# Patient Record
Sex: FEMALE | Race: WHITE | NOT HISPANIC OR LATINO | Employment: OTHER | URBAN - METROPOLITAN AREA
[De-identification: names, ages, dates, MRNs, and addresses within clinical notes are randomized per-mention and may not be internally consistent; named-entity substitution may affect disease eponyms.]

---

## 2017-12-04 ENCOUNTER — CONVERSION ENCOUNTER (OUTPATIENT)
Dept: MAMMOGRAPHY | Facility: HOSPITAL | Age: 62
End: 2017-12-04

## 2018-05-08 ENCOUNTER — CONVERSION ENCOUNTER (OUTPATIENT)
Dept: ORTHOPEDIC SURGERY | Facility: CLINIC | Age: 63
End: 2018-05-08

## 2018-05-08 ENCOUNTER — OFFICE VISIT CONVERTED (OUTPATIENT)
Dept: ORTHOPEDIC SURGERY | Facility: CLINIC | Age: 63
End: 2018-05-08
Attending: ORTHOPAEDIC SURGERY

## 2018-06-14 ENCOUNTER — OFFICE VISIT CONVERTED (OUTPATIENT)
Dept: ORTHOPEDIC SURGERY | Facility: CLINIC | Age: 63
End: 2018-06-14
Attending: ORTHOPAEDIC SURGERY

## 2018-07-13 ENCOUNTER — OFFICE VISIT CONVERTED (OUTPATIENT)
Dept: ORTHOPEDIC SURGERY | Facility: CLINIC | Age: 63
End: 2018-07-13
Attending: ORTHOPAEDIC SURGERY

## 2018-11-13 ENCOUNTER — OFFICE VISIT CONVERTED (OUTPATIENT)
Dept: ORTHOPEDIC SURGERY | Facility: CLINIC | Age: 63
End: 2018-11-13
Attending: ORTHOPAEDIC SURGERY

## 2018-11-29 ENCOUNTER — OFFICE VISIT CONVERTED (OUTPATIENT)
Dept: ORTHOPEDIC SURGERY | Facility: CLINIC | Age: 63
End: 2018-11-29
Attending: ORTHOPAEDIC SURGERY

## 2018-11-29 ENCOUNTER — CONVERSION ENCOUNTER (OUTPATIENT)
Dept: ORTHOPEDIC SURGERY | Facility: CLINIC | Age: 63
End: 2018-11-29

## 2019-01-02 ENCOUNTER — HOSPITAL ENCOUNTER (OUTPATIENT)
Dept: NUCLEAR MEDICINE | Facility: HOSPITAL | Age: 64
Discharge: HOME | End: 2019-01-02
Attending: SPECIALIST

## 2019-01-03 ENCOUNTER — OFFICE VISIT CONVERTED (OUTPATIENT)
Dept: ORTHOPEDIC SURGERY | Facility: CLINIC | Age: 64
End: 2019-01-03
Attending: ORTHOPAEDIC SURGERY

## 2019-01-29 ENCOUNTER — HOSPITAL ENCOUNTER (OUTPATIENT)
Dept: PREADMISSION TESTING | Facility: HOSPITAL | Age: 64
Discharge: HOME | End: 2019-01-29
Attending: ORTHOPAEDIC SURGERY

## 2019-01-29 LAB
ANION GAP SERPL CALC-SCNC: 20 MMOL/L (ref 8–19)
APTT BLD: 22.4 S (ref 22.2–34.2)
BASOPHILS # BLD AUTO: 0.03 10*3/UL (ref 0–0.2)
BASOPHILS NFR BLD AUTO: 0.34 % (ref 0–3)
BUN SERPL-MCNC: 18 MG/DL (ref 5–25)
BUN/CREAT SERPL: 18 {RATIO} (ref 6–20)
CALCIUM SERPL-MCNC: 10.2 MG/DL (ref 8.7–10.4)
CHLORIDE SERPL-SCNC: 98 MMOL/L (ref 99–111)
CONV CO2: 24 MMOL/L (ref 22–32)
CREAT UR-MCNC: 1.02 MG/DL (ref 0.5–0.9)
EOSINOPHIL # BLD AUTO: 0.37 10*3/UL (ref 0–0.7)
EOSINOPHIL # BLD AUTO: 4.82 % (ref 0–7)
ERYTHROCYTE [DISTWIDTH] IN BLOOD BY AUTOMATED COUNT: 13.6 % (ref 11.5–14.5)
GFR SERPLBLD BASED ON 1.73 SQ M-ARVRAT: 57 ML/MIN/{1.73_M2}
GLUCOSE SERPL-MCNC: 120 MG/DL (ref 65–99)
HBA1C MFR BLD: 13.7 G/DL (ref 12–16)
HCT VFR BLD AUTO: 40.2 % (ref 37–47)
INR PPP: 1 (ref 2–3)
LYMPHOCYTES # BLD AUTO: 2.38 10*3/UL (ref 1–5)
MCH RBC QN AUTO: 28.7 PG (ref 27–31)
MCHC RBC AUTO-ENTMCNC: 34.1 G/DL (ref 33–37)
MCV RBC AUTO: 84.1 FL (ref 81–99)
MONOCYTES # BLD AUTO: 0.84 10*3/UL (ref 0.2–1.2)
MONOCYTES NFR BLD AUTO: 11 % (ref 3–10)
NEUTROPHILS # BLD AUTO: 3.97 10*3/UL (ref 2–8)
NEUTROPHILS NFR BLD AUTO: 52.4 % (ref 30–85)
NRBC BLD AUTO-RTO: 0 % (ref 0–0.01)
OSMOLALITY SERPL CALC.SUM OF ELEC: 287 MOSM/KG (ref 273–304)
PLATELET # BLD AUTO: 203 10*3/UL (ref 130–400)
PMV BLD AUTO: 7.4 FL (ref 7.4–10.4)
POTASSIUM SERPL-SCNC: 5 MMOL/L (ref 3.5–5.3)
PROTHROMBIN TIME: 10.4 S (ref 9.4–12)
RBC # BLD AUTO: 4.78 10*6/UL (ref 4.2–5.4)
SODIUM SERPL-SCNC: 137 MMOL/L (ref 135–147)
VARIANT LYMPHS NFR BLD MANUAL: 31.4 % (ref 20–45)
WBC # BLD AUTO: 7.58 10*3/UL (ref 4.8–10.8)

## 2019-02-01 ENCOUNTER — HOSPITAL ENCOUNTER (OUTPATIENT)
Dept: OTHER | Facility: HOSPITAL | Age: 64
Discharge: HOME | End: 2019-02-01

## 2019-02-01 LAB
ALBUMIN SERPL-MCNC: 4 G/DL (ref 3.5–5)
ALBUMIN/GLOB SERPL: 1.4 {RATIO} (ref 1.4–2.6)
ALP SERPL-CCNC: 100 U/L (ref 43–160)
ALT SERPL-CCNC: 29 U/L (ref 10–40)
ANION GAP SERPL CALC-SCNC: 18 MMOL/L (ref 8–19)
AST SERPL-CCNC: 25 U/L (ref 15–50)
BILIRUB SERPL-MCNC: 0.95 MG/DL (ref 0.2–1.3)
BUN SERPL-MCNC: 22 MG/DL (ref 5–25)
BUN/CREAT SERPL: 17 {RATIO} (ref 6–20)
CALCIUM SERPL-MCNC: 9.8 MG/DL (ref 8.7–10.4)
CHLORIDE SERPL-SCNC: 102 MMOL/L (ref 99–111)
CHOLEST SERPL-MCNC: 133 MG/DL (ref 107–200)
CHOLEST/HDLC SERPL: 3.3 {RATIO} (ref 3–6)
CONV CO2: 23 MMOL/L (ref 22–32)
CONV CREATININE URINE, RANDOM: 82.5 MG/DL (ref 10–300)
CONV MICROALBUM.,U,RANDOM: <12 MG/L (ref 0–20)
CONV TOTAL PROTEIN: 6.9 G/DL (ref 6.3–8.2)
CREAT UR-MCNC: 1.32 MG/DL (ref 0.5–0.9)
EST. AVERAGE GLUCOSE BLD GHB EST-MCNC: 151 MG/DL
GFR SERPLBLD BASED ON 1.73 SQ M-ARVRAT: 42 ML/MIN/{1.73_M2}
GLOBULIN UR ELPH-MCNC: 2.9 G/DL (ref 2–3.5)
GLUCOSE SERPL-MCNC: 151 MG/DL (ref 65–99)
HBA1C MFR BLD: 6.9 % (ref 3.5–5.7)
HDLC SERPL-MCNC: 40 MG/DL (ref 40–60)
LDLC SERPL CALC-MCNC: 65 MG/DL (ref 70–100)
MICROALBUMIN/CREAT UR: 14.5 MG/G{CRE} (ref 0–35)
OSMOLALITY SERPL CALC.SUM OF ELEC: 292 MOSM/KG (ref 273–304)
POTASSIUM SERPL-SCNC: 4.9 MMOL/L (ref 3.5–5.3)
SODIUM SERPL-SCNC: 138 MMOL/L (ref 135–147)
TRIGL SERPL-MCNC: 140 MG/DL (ref 40–150)
TSH SERPL-ACNC: 3.89 M[IU]/L (ref 0.27–4.2)
VLDLC SERPL-MCNC: 28 MG/DL (ref 5–37)

## 2019-02-20 ENCOUNTER — HOSPITAL ENCOUNTER (OUTPATIENT)
Dept: PHYSICAL THERAPY | Facility: CLINIC | Age: 64
Setting detail: RECURRING SERIES
Discharge: STILL ADMITTED OR ADMINISTRATIVE | End: 2019-06-12
Attending: ORTHOPAEDIC SURGERY

## 2019-03-05 ENCOUNTER — OFFICE VISIT CONVERTED (OUTPATIENT)
Dept: ORTHOPEDIC SURGERY | Facility: CLINIC | Age: 64
End: 2019-03-05
Attending: PHYSICIAN ASSISTANT

## 2019-03-08 ENCOUNTER — OFFICE VISIT CONVERTED (OUTPATIENT)
Dept: ORTHOPEDIC SURGERY | Facility: CLINIC | Age: 64
End: 2019-03-08
Attending: PHYSICIAN ASSISTANT

## 2019-03-12 ENCOUNTER — OFFICE VISIT CONVERTED (OUTPATIENT)
Dept: ORTHOPEDIC SURGERY | Facility: CLINIC | Age: 64
End: 2019-03-12
Attending: PHYSICIAN ASSISTANT

## 2019-03-15 ENCOUNTER — CONVERSION ENCOUNTER (OUTPATIENT)
Dept: ORTHOPEDIC SURGERY | Facility: CLINIC | Age: 64
End: 2019-03-15

## 2019-03-15 ENCOUNTER — OFFICE VISIT CONVERTED (OUTPATIENT)
Dept: ORTHOPEDIC SURGERY | Facility: CLINIC | Age: 64
End: 2019-03-15
Attending: PHYSICIAN ASSISTANT

## 2019-03-19 ENCOUNTER — OFFICE VISIT CONVERTED (OUTPATIENT)
Dept: ORTHOPEDIC SURGERY | Facility: CLINIC | Age: 64
End: 2019-03-19
Attending: PHYSICIAN ASSISTANT

## 2019-03-20 ENCOUNTER — HOSPITAL ENCOUNTER (OUTPATIENT)
Dept: INFUSION THERAPY | Facility: HOSPITAL | Age: 64
Setting detail: RECURRING SERIES
Discharge: HOME | End: 2019-03-31
Attending: ORTHOPAEDIC SURGERY

## 2019-03-26 ENCOUNTER — HOSPITAL ENCOUNTER (OUTPATIENT)
Dept: URGENT CARE | Facility: CLINIC | Age: 64
Discharge: HOME | End: 2019-03-26
Attending: NURSE PRACTITIONER

## 2019-04-02 ENCOUNTER — HOSPITAL ENCOUNTER (OUTPATIENT)
Dept: INFUSION THERAPY | Facility: HOSPITAL | Age: 64
Setting detail: RECURRING SERIES
Discharge: HOME | End: 2019-04-30
Attending: ORTHOPAEDIC SURGERY

## 2019-04-09 ENCOUNTER — OFFICE VISIT CONVERTED (OUTPATIENT)
Dept: ORTHOPEDIC SURGERY | Facility: CLINIC | Age: 64
End: 2019-04-09
Attending: PHYSICIAN ASSISTANT

## 2019-04-09 ENCOUNTER — CONVERSION ENCOUNTER (OUTPATIENT)
Dept: ORTHOPEDIC SURGERY | Facility: CLINIC | Age: 64
End: 2019-04-09

## 2019-04-18 ENCOUNTER — OFFICE VISIT CONVERTED (OUTPATIENT)
Dept: ORTHOPEDIC SURGERY | Facility: CLINIC | Age: 64
End: 2019-04-18
Attending: ORTHOPAEDIC SURGERY

## 2019-05-03 ENCOUNTER — HOSPITAL ENCOUNTER (OUTPATIENT)
Dept: INFUSION THERAPY | Facility: HOSPITAL | Age: 64
Setting detail: RECURRING SERIES
Discharge: HOME | End: 2019-05-31
Attending: ORTHOPAEDIC SURGERY

## 2019-05-07 ENCOUNTER — OFFICE VISIT CONVERTED (OUTPATIENT)
Dept: ORTHOPEDIC SURGERY | Facility: CLINIC | Age: 64
End: 2019-05-07
Attending: ORTHOPAEDIC SURGERY

## 2019-05-14 ENCOUNTER — OFFICE VISIT CONVERTED (OUTPATIENT)
Dept: ORTHOPEDIC SURGERY | Facility: CLINIC | Age: 64
End: 2019-05-14
Attending: PHYSICIAN ASSISTANT

## 2019-05-14 ENCOUNTER — HOSPITAL ENCOUNTER (OUTPATIENT)
Dept: URGENT CARE | Facility: CLINIC | Age: 64
Discharge: HOME | End: 2019-05-14
Attending: NURSE PRACTITIONER

## 2019-05-14 LAB — GLUCOSE BLD-MCNC: 262 MG/DL (ref 65–99)

## 2019-05-21 ENCOUNTER — CONVERSION ENCOUNTER (OUTPATIENT)
Dept: ORTHOPEDIC SURGERY | Facility: CLINIC | Age: 64
End: 2019-05-21

## 2019-05-21 ENCOUNTER — OFFICE VISIT CONVERTED (OUTPATIENT)
Dept: ORTHOPEDIC SURGERY | Facility: CLINIC | Age: 64
End: 2019-05-21
Attending: ORTHOPAEDIC SURGERY

## 2019-05-21 ENCOUNTER — HOSPITAL ENCOUNTER (OUTPATIENT)
Dept: LAB | Facility: HOSPITAL | Age: 64
Discharge: HOME | End: 2019-05-21
Attending: ORTHOPAEDIC SURGERY

## 2019-05-21 LAB
BASOPHILS # BLD AUTO: 0.05 10*3/UL (ref 0–0.2)
BASOPHILS NFR BLD AUTO: 0.4 % (ref 0–3)
CONV ABS IMM GRAN: 0.14 10*3/UL (ref 0–0.2)
CONV IMMATURE GRAN: 1.2 % (ref 0–1.8)
CRP SERPL-MCNC: 38.7 MG/L (ref 0–5)
DEPRECATED RDW RBC AUTO: 49.8 FL (ref 36.4–46.3)
EOSINOPHIL # BLD AUTO: 0.2 10*3/UL (ref 0–0.7)
EOSINOPHIL # BLD AUTO: 1.7 % (ref 0–7)
ERYTHROCYTE [DISTWIDTH] IN BLOOD BY AUTOMATED COUNT: 17.2 % (ref 11.7–14.4)
ERYTHROCYTE [SEDIMENTATION RATE] IN BLOOD: 101 MM/H (ref 0–30)
HBA1C MFR BLD: 10.3 G/DL (ref 12–16)
HCT VFR BLD AUTO: 33.9 % (ref 37–47)
LYMPHOCYTES # BLD AUTO: 1.94 10*3/UL (ref 1–5)
MCH RBC QN AUTO: 24.1 PG (ref 27–31)
MCHC RBC AUTO-ENTMCNC: 30.4 G/DL (ref 33–37)
MCV RBC AUTO: 79.4 FL (ref 81–99)
MONOCYTES # BLD AUTO: 1.03 10*3/UL (ref 0.2–1.2)
MONOCYTES NFR BLD AUTO: 8.6 % (ref 3–10)
NEUTROPHILS # BLD AUTO: 8.59 10*3/UL (ref 2–8)
NEUTROPHILS NFR BLD AUTO: 71.9 % (ref 30–85)
NRBC CBCN: 0 % (ref 0–0.7)
PLATELET # BLD AUTO: 413 10*3/UL (ref 130–400)
PMV BLD AUTO: 9.8 FL (ref 9.4–12.3)
RBC # BLD AUTO: 4.27 10*6/UL (ref 4.2–5.4)
VARIANT LYMPHS NFR BLD MANUAL: 16.2 % (ref 20–45)
WBC # BLD AUTO: 11.95 10*3/UL (ref 4.8–10.8)

## 2019-05-31 ENCOUNTER — HOSPITAL ENCOUNTER (OUTPATIENT)
Dept: GENERAL RADIOLOGY | Facility: HOSPITAL | Age: 64
Discharge: HOME | End: 2019-05-31
Attending: ORTHOPAEDIC SURGERY

## 2019-06-06 ENCOUNTER — OFFICE VISIT CONVERTED (OUTPATIENT)
Dept: ORTHOPEDIC SURGERY | Facility: CLINIC | Age: 64
End: 2019-06-06
Attending: ORTHOPAEDIC SURGERY

## 2019-06-06 ENCOUNTER — CONVERSION ENCOUNTER (OUTPATIENT)
Dept: ORTHOPEDIC SURGERY | Facility: CLINIC | Age: 64
End: 2019-06-06

## 2019-06-11 ENCOUNTER — HOSPITAL ENCOUNTER (OUTPATIENT)
Dept: INFUSION THERAPY | Facility: HOSPITAL | Age: 64
Setting detail: RECURRING SERIES
Discharge: HOME | End: 2019-06-30
Attending: ORTHOPAEDIC SURGERY

## 2019-06-20 ENCOUNTER — HOSPITAL ENCOUNTER (OUTPATIENT)
Dept: LAB | Facility: HOSPITAL | Age: 64
Discharge: HOME | End: 2019-06-20
Attending: ORTHOPAEDIC SURGERY

## 2019-06-20 LAB
BASOPHILS # BLD AUTO: 0.04 10*3/UL (ref 0–0.2)
BASOPHILS NFR BLD AUTO: 0.4 % (ref 0–3)
CONV ABS IMM GRAN: 0.07 10*3/UL (ref 0–0.2)
CONV IMMATURE GRAN: 0.7 % (ref 0–1.8)
CRP SERPL-MCNC: 11.9 MG/L (ref 0–5)
DEPRECATED RDW RBC AUTO: 55.1 FL (ref 36.4–46.3)
EOSINOPHIL # BLD AUTO: 0.49 10*3/UL (ref 0–0.7)
EOSINOPHIL # BLD AUTO: 4.8 % (ref 0–7)
ERYTHROCYTE [DISTWIDTH] IN BLOOD BY AUTOMATED COUNT: 18.8 % (ref 11.7–14.4)
ERYTHROCYTE [SEDIMENTATION RATE] IN BLOOD: 44 MM/H (ref 0–30)
HBA1C MFR BLD: 11.5 G/DL (ref 12–16)
HCT VFR BLD AUTO: 38 % (ref 37–47)
LYMPHOCYTES # BLD AUTO: 2.29 10*3/UL (ref 1–5)
MCH RBC QN AUTO: 24.2 PG (ref 27–31)
MCHC RBC AUTO-ENTMCNC: 30.3 G/DL (ref 33–37)
MCV RBC AUTO: 79.8 FL (ref 81–99)
MONOCYTES # BLD AUTO: 1.08 10*3/UL (ref 0.2–1.2)
MONOCYTES NFR BLD AUTO: 10.7 % (ref 3–10)
NEUTROPHILS # BLD AUTO: 6.14 10*3/UL (ref 2–8)
NEUTROPHILS NFR BLD AUTO: 60.7 % (ref 30–85)
NRBC CBCN: 0 % (ref 0–0.7)
PLATELET # BLD AUTO: 291 10*3/UL (ref 130–400)
PMV BLD AUTO: 9.7 FL (ref 9.4–12.3)
RBC # BLD AUTO: 4.76 10*6/UL (ref 4.2–5.4)
VARIANT LYMPHS NFR BLD MANUAL: 22.7 % (ref 20–45)
WBC # BLD AUTO: 10.11 10*3/UL (ref 4.8–10.8)

## 2019-07-09 ENCOUNTER — HOSPITAL ENCOUNTER (OUTPATIENT)
Dept: OTHER | Facility: HOSPITAL | Age: 64
Discharge: HOME | End: 2019-07-09

## 2019-07-09 LAB
ALBUMIN SERPL-MCNC: 2.9 G/DL (ref 3.5–5)
ALBUMIN/GLOB SERPL: 1 {RATIO} (ref 1.4–2.6)
ALP SERPL-CCNC: 64 U/L (ref 43–160)
ALT SERPL-CCNC: 9 U/L (ref 10–40)
ANION GAP SERPL CALC-SCNC: 17 MMOL/L (ref 8–19)
AST SERPL-CCNC: 13 U/L (ref 15–50)
BASOPHILS # BLD AUTO: 0.03 10*3/UL (ref 0–0.2)
BASOPHILS NFR BLD AUTO: 0.4 % (ref 0–3)
BILIRUB SERPL-MCNC: 0.36 MG/DL (ref 0.2–1.3)
BUN SERPL-MCNC: 15 MG/DL (ref 5–25)
BUN/CREAT SERPL: 8 {RATIO} (ref 6–20)
CALCIUM SERPL-MCNC: 9.2 MG/DL (ref 8.7–10.4)
CHLORIDE SERPL-SCNC: 100 MMOL/L (ref 99–111)
CONV ABS IMM GRAN: 0.1 10*3/UL (ref 0–0.2)
CONV CO2: 24 MMOL/L (ref 22–32)
CONV IMMATURE GRAN: 1.4 % (ref 0–1.8)
CONV TOTAL PROTEIN: 5.7 G/DL (ref 6.3–8.2)
CREAT UR-MCNC: 1.78 MG/DL (ref 0.5–0.9)
CRP SERPL-MCNC: 27.7 MG/L (ref 0–5)
DEPRECATED RDW RBC AUTO: 58.3 FL (ref 36.4–46.3)
EOSINOPHIL # BLD AUTO: 0.47 10*3/UL (ref 0–0.7)
EOSINOPHIL # BLD AUTO: 6.7 % (ref 0–7)
ERYTHROCYTE [DISTWIDTH] IN BLOOD BY AUTOMATED COUNT: 18.3 % (ref 11.7–14.4)
ERYTHROCYTE [SEDIMENTATION RATE] IN BLOOD: 71 MM/H (ref 0–30)
GFR SERPLBLD BASED ON 1.73 SQ M-ARVRAT: 29 ML/MIN/{1.73_M2}
GLOBULIN UR ELPH-MCNC: 2.8 G/DL (ref 2–3.5)
GLUCOSE SERPL-MCNC: 149 MG/DL (ref 65–99)
HBA1C MFR BLD: 8 G/DL (ref 12–16)
HCT VFR BLD AUTO: 26.2 % (ref 37–47)
LYMPHOCYTES # BLD AUTO: 1.24 10*3/UL (ref 1–5)
MCH RBC QN AUTO: 26.3 PG (ref 27–31)
MCHC RBC AUTO-ENTMCNC: 30.5 G/DL (ref 33–37)
MCV RBC AUTO: 86.2 FL (ref 81–99)
MONOCYTES # BLD AUTO: 0.97 10*3/UL (ref 0.2–1.2)
MONOCYTES NFR BLD AUTO: 13.9 % (ref 3–10)
NEUTROPHILS # BLD AUTO: 4.19 10*3/UL (ref 2–8)
NEUTROPHILS NFR BLD AUTO: 59.9 % (ref 30–85)
NRBC CBCN: 0 % (ref 0–0.7)
OSMOLALITY SERPL CALC.SUM OF ELEC: 286 MOSM/KG (ref 273–304)
PLATELET # BLD AUTO: 200 10*3/UL (ref 130–400)
PMV BLD AUTO: 9.6 FL (ref 9.4–12.3)
POTASSIUM SERPL-SCNC: 4.5 MMOL/L (ref 3.5–5.3)
RBC # BLD AUTO: 3.04 10*6/UL (ref 4.2–5.4)
SODIUM SERPL-SCNC: 136 MMOL/L (ref 135–147)
VANCOMYCIN TROUGH SERPL-MCNC: 34 UG/ML (ref 10–20)
VARIANT LYMPHS NFR BLD MANUAL: 17.7 % (ref 20–45)
WBC # BLD AUTO: 7 10*3/UL (ref 4.8–10.8)

## 2019-07-11 ENCOUNTER — HOSPITAL ENCOUNTER (OUTPATIENT)
Dept: OTHER | Facility: HOSPITAL | Age: 64
Discharge: HOME | End: 2019-07-11

## 2019-07-11 LAB
ANION GAP SERPL CALC-SCNC: 16 MMOL/L (ref 8–19)
BUN SERPL-MCNC: 17 MG/DL (ref 5–25)
BUN/CREAT SERPL: 9 {RATIO} (ref 6–20)
CALCIUM SERPL-MCNC: 8.4 MG/DL (ref 8.7–10.4)
CHLORIDE SERPL-SCNC: 101 MMOL/L (ref 99–111)
CONV CO2: 24 MMOL/L (ref 22–32)
CREAT UR-MCNC: 1.9 MG/DL (ref 0.5–0.9)
GFR SERPLBLD BASED ON 1.73 SQ M-ARVRAT: 27 ML/MIN/{1.73_M2}
GLUCOSE SERPL-MCNC: 156 MG/DL (ref 65–99)
OSMOLALITY SERPL CALC.SUM OF ELEC: 287 MOSM/KG (ref 273–304)
POTASSIUM SERPL-SCNC: 4.6 MMOL/L (ref 3.5–5.3)
SODIUM SERPL-SCNC: 136 MMOL/L (ref 135–147)
VANCOMYCIN SERPL-MCNC: 24 UG/ML

## 2019-07-16 ENCOUNTER — HOSPITAL ENCOUNTER (OUTPATIENT)
Dept: OTHER | Facility: HOSPITAL | Age: 64
Discharge: HOME | End: 2019-07-16

## 2019-07-16 LAB
ALBUMIN SERPL-MCNC: 3.2 G/DL (ref 3.5–5)
ALBUMIN/GLOB SERPL: 1.2 {RATIO} (ref 1.4–2.6)
ALP SERPL-CCNC: 65 U/L (ref 43–160)
ALT SERPL-CCNC: 7 U/L (ref 10–40)
ANION GAP SERPL CALC-SCNC: 21 MMOL/L (ref 8–19)
AST SERPL-CCNC: 13 U/L (ref 15–50)
BASOPHILS # BLD AUTO: 0.03 10*3/UL (ref 0–0.2)
BASOPHILS NFR BLD AUTO: 0.5 % (ref 0–3)
BILIRUB SERPL-MCNC: 0.33 MG/DL (ref 0.2–1.3)
BUN SERPL-MCNC: 26 MG/DL (ref 5–25)
BUN/CREAT SERPL: 11 {RATIO} (ref 6–20)
CALCIUM SERPL-MCNC: 9.3 MG/DL (ref 8.7–10.4)
CHLORIDE SERPL-SCNC: 100 MMOL/L (ref 99–111)
CONV ABS IMM GRAN: 0.03 10*3/UL (ref 0–0.2)
CONV CO2: 21 MMOL/L (ref 22–32)
CONV IMMATURE GRAN: 0.5 % (ref 0–1.8)
CONV TOTAL PROTEIN: 5.9 G/DL (ref 6.3–8.2)
CREAT UR-MCNC: 2.27 MG/DL (ref 0.5–0.9)
CRP SERPL-MCNC: 5.9 MG/L (ref 0–5)
DEPRECATED RDW RBC AUTO: 55.9 FL (ref 36.4–46.3)
EOSINOPHIL # BLD AUTO: 0.36 10*3/UL (ref 0–0.7)
EOSINOPHIL # BLD AUTO: 5.4 % (ref 0–7)
ERYTHROCYTE [DISTWIDTH] IN BLOOD BY AUTOMATED COUNT: 18 % (ref 11.7–14.4)
ERYTHROCYTE [SEDIMENTATION RATE] IN BLOOD: 58 MM/H (ref 0–30)
GFR SERPLBLD BASED ON 1.73 SQ M-ARVRAT: 22 ML/MIN/{1.73_M2}
GLOBULIN UR ELPH-MCNC: 2.7 G/DL (ref 2–3.5)
GLUCOSE SERPL-MCNC: 130 MG/DL (ref 65–99)
HBA1C MFR BLD: 8 G/DL (ref 12–16)
HCT VFR BLD AUTO: 26.2 % (ref 37–47)
LYMPHOCYTES # BLD AUTO: 1.08 10*3/UL (ref 1–5)
MCH RBC QN AUTO: 25.7 PG (ref 27–31)
MCHC RBC AUTO-ENTMCNC: 30.5 G/DL (ref 33–37)
MCV RBC AUTO: 84.2 FL (ref 81–99)
MONOCYTES # BLD AUTO: 0.65 10*3/UL (ref 0.2–1.2)
MONOCYTES NFR BLD AUTO: 9.8 % (ref 3–10)
NEUTROPHILS # BLD AUTO: 4.5 10*3/UL (ref 2–8)
NEUTROPHILS NFR BLD AUTO: 67.6 % (ref 30–85)
NRBC CBCN: 0 % (ref 0–0.7)
OSMOLALITY SERPL CALC.SUM OF ELEC: 293 MOSM/KG (ref 273–304)
PLATELET # BLD AUTO: 184 10*3/UL (ref 130–400)
PMV BLD AUTO: 9.6 FL (ref 9.4–12.3)
POTASSIUM SERPL-SCNC: 4.4 MMOL/L (ref 3.5–5.3)
RBC # BLD AUTO: 3.11 10*6/UL (ref 4.2–5.4)
SODIUM SERPL-SCNC: 138 MMOL/L (ref 135–147)
VARIANT LYMPHS NFR BLD MANUAL: 16.2 % (ref 20–45)
WBC # BLD AUTO: 6.65 10*3/UL (ref 4.8–10.8)

## 2019-07-17 ENCOUNTER — HOSPITAL ENCOUNTER (OUTPATIENT)
Dept: LAB | Facility: HOSPITAL | Age: 64
Discharge: HOME | End: 2019-07-17

## 2019-07-17 LAB
ALBUMIN SERPL-MCNC: 3.6 G/DL (ref 3.5–5)
ALBUMIN/GLOB SERPL: 1.3 {RATIO} (ref 1.4–2.6)
ALP SERPL-CCNC: 71 U/L (ref 43–160)
ALT SERPL-CCNC: 8 U/L (ref 10–40)
ANION GAP SERPL CALC-SCNC: 19 MMOL/L (ref 8–19)
AST SERPL-CCNC: 13 U/L (ref 15–50)
BILIRUB SERPL-MCNC: 0.5 MG/DL (ref 0.2–1.3)
BUN SERPL-MCNC: 28 MG/DL (ref 5–25)
BUN/CREAT SERPL: 12 {RATIO} (ref 6–20)
CALCIUM SERPL-MCNC: 9.2 MG/DL (ref 8.7–10.4)
CHLORIDE SERPL-SCNC: 100 MMOL/L (ref 99–111)
CHOLEST SERPL-MCNC: 104 MG/DL (ref 107–200)
CHOLEST/HDLC SERPL: 3.1 {RATIO} (ref 3–6)
CONV CO2: 23 MMOL/L (ref 22–32)
CONV CREATININE URINE, RANDOM: 58.2 MG/DL (ref 10–300)
CONV MICROALBUM.,U,RANDOM: <12 MG/L (ref 0–20)
CONV TOTAL PROTEIN: 6.4 G/DL (ref 6.3–8.2)
CREAT UR-MCNC: 2.26 MG/DL (ref 0.5–0.9)
EST. AVERAGE GLUCOSE BLD GHB EST-MCNC: 134 MG/DL
GFR SERPLBLD BASED ON 1.73 SQ M-ARVRAT: 22 ML/MIN/{1.73_M2}
GLOBULIN UR ELPH-MCNC: 2.8 G/DL (ref 2–3.5)
GLUCOSE SERPL-MCNC: 159 MG/DL (ref 65–99)
HBA1C MFR BLD: 6.3 % (ref 3.5–5.7)
HDLC SERPL-MCNC: 34 MG/DL (ref 40–60)
LDLC SERPL CALC-MCNC: 46 MG/DL (ref 70–100)
MICROALBUMIN/CREAT UR: 20.6 MG/G{CRE} (ref 0–35)
OSMOLALITY SERPL CALC.SUM OF ELEC: 293 MOSM/KG (ref 273–304)
POTASSIUM SERPL-SCNC: 4.8 MMOL/L (ref 3.5–5.3)
SODIUM SERPL-SCNC: 137 MMOL/L (ref 135–147)
TRIGL SERPL-MCNC: 118 MG/DL (ref 40–150)
VLDLC SERPL-MCNC: 24 MG/DL (ref 5–37)

## 2019-07-18 ENCOUNTER — HOSPITAL ENCOUNTER (OUTPATIENT)
Dept: OTHER | Facility: HOSPITAL | Age: 64
Discharge: HOME | End: 2019-07-18

## 2019-07-18 LAB
ANION GAP SERPL CALC-SCNC: 18 MMOL/L (ref 8–19)
BUN SERPL-MCNC: 26 MG/DL (ref 5–25)
BUN/CREAT SERPL: 14 {RATIO} (ref 6–20)
CALCIUM SERPL-MCNC: 8.5 MG/DL (ref 8.7–10.4)
CHLORIDE SERPL-SCNC: 103 MMOL/L (ref 99–111)
CONV CO2: 23 MMOL/L (ref 22–32)
CREAT UR-MCNC: 1.91 MG/DL (ref 0.5–0.9)
GFR SERPLBLD BASED ON 1.73 SQ M-ARVRAT: 27 ML/MIN/{1.73_M2}
GLUCOSE SERPL-MCNC: 158 MG/DL (ref 65–99)
OSMOLALITY SERPL CALC.SUM OF ELEC: 296 MOSM/KG (ref 273–304)
POTASSIUM SERPL-SCNC: 4.5 MMOL/L (ref 3.5–5.3)
SODIUM SERPL-SCNC: 139 MMOL/L (ref 135–147)

## 2019-07-23 ENCOUNTER — HOSPITAL ENCOUNTER (OUTPATIENT)
Dept: OTHER | Facility: HOSPITAL | Age: 64
Discharge: HOME | End: 2019-07-23

## 2019-07-23 LAB
ALBUMIN SERPL-MCNC: 3.3 G/DL (ref 3.5–5)
ALBUMIN/GLOB SERPL: 1.2 {RATIO} (ref 1.4–2.6)
ALP SERPL-CCNC: 72 U/L (ref 43–160)
ALT SERPL-CCNC: 8 U/L (ref 10–40)
ANION GAP SERPL CALC-SCNC: 19 MMOL/L (ref 8–19)
AST SERPL-CCNC: 17 U/L (ref 15–50)
BASOPHILS # BLD AUTO: 0.04 10*3/UL (ref 0–0.2)
BASOPHILS NFR BLD AUTO: 0.5 % (ref 0–3)
BILIRUB SERPL-MCNC: 0.54 MG/DL (ref 0.2–1.3)
BUN SERPL-MCNC: 22 MG/DL (ref 5–25)
BUN/CREAT SERPL: 14 {RATIO} (ref 6–20)
CALCIUM SERPL-MCNC: 9.3 MG/DL (ref 8.7–10.4)
CHLORIDE SERPL-SCNC: 102 MMOL/L (ref 99–111)
CONV ABS IMM GRAN: 0.04 10*3/UL (ref 0–0.2)
CONV CO2: 21 MMOL/L (ref 22–32)
CONV IMMATURE GRAN: 0.5 % (ref 0–1.8)
CONV TOTAL PROTEIN: 6.1 G/DL (ref 6.3–8.2)
CREAT UR-MCNC: 1.54 MG/DL (ref 0.5–0.9)
CRP SERPL-MCNC: 6.9 MG/L (ref 0–5)
DEPRECATED RDW RBC AUTO: 54.3 FL (ref 36.4–46.3)
EOSINOPHIL # BLD AUTO: 0.58 10*3/UL (ref 0–0.7)
EOSINOPHIL # BLD AUTO: 7.8 % (ref 0–7)
ERYTHROCYTE [DISTWIDTH] IN BLOOD BY AUTOMATED COUNT: 17.6 % (ref 11.7–14.4)
ERYTHROCYTE [SEDIMENTATION RATE] IN BLOOD: 46 MM/H (ref 0–30)
GFR SERPLBLD BASED ON 1.73 SQ M-ARVRAT: 35 ML/MIN/{1.73_M2}
GLOBULIN UR ELPH-MCNC: 2.8 G/DL (ref 2–3.5)
GLUCOSE SERPL-MCNC: 125 MG/DL (ref 65–99)
HBA1C MFR BLD: 8.4 G/DL (ref 12–16)
HCT VFR BLD AUTO: 26.4 % (ref 37–47)
LYMPHOCYTES # BLD AUTO: 1.23 10*3/UL (ref 1–5)
MCH RBC QN AUTO: 26.8 PG (ref 27–31)
MCHC RBC AUTO-ENTMCNC: 31.8 G/DL (ref 33–37)
MCV RBC AUTO: 84.1 FL (ref 81–99)
MONOCYTES # BLD AUTO: 0.78 10*3/UL (ref 0.2–1.2)
MONOCYTES NFR BLD AUTO: 10.5 % (ref 3–10)
NEUTROPHILS # BLD AUTO: 4.76 10*3/UL (ref 2–8)
NEUTROPHILS NFR BLD AUTO: 64.1 % (ref 30–85)
NRBC CBCN: 0 % (ref 0–0.7)
OSMOLALITY SERPL CALC.SUM OF ELEC: 291 MOSM/KG (ref 273–304)
PLATELET # BLD AUTO: 168 10*3/UL (ref 130–400)
PMV BLD AUTO: 10.1 FL (ref 9.4–12.3)
POTASSIUM SERPL-SCNC: 4.1 MMOL/L (ref 3.5–5.3)
RBC # BLD AUTO: 3.14 10*6/UL (ref 4.2–5.4)
SODIUM SERPL-SCNC: 138 MMOL/L (ref 135–147)
VARIANT LYMPHS NFR BLD MANUAL: 16.6 % (ref 20–45)
WBC # BLD AUTO: 7.43 10*3/UL (ref 4.8–10.8)

## 2019-07-30 ENCOUNTER — HOSPITAL ENCOUNTER (OUTPATIENT)
Dept: OTHER | Facility: HOSPITAL | Age: 64
Discharge: HOME | End: 2019-07-30

## 2019-07-30 LAB
ALBUMIN SERPL-MCNC: 3.4 G/DL (ref 3.5–5)
ALBUMIN/GLOB SERPL: 1.3 {RATIO} (ref 1.4–2.6)
ALP SERPL-CCNC: 72 U/L (ref 43–160)
ALT SERPL-CCNC: 18 U/L (ref 10–40)
ANION GAP SERPL CALC-SCNC: 16 MMOL/L (ref 8–19)
AST SERPL-CCNC: 29 U/L (ref 15–50)
BASOPHILS # BLD AUTO: 0.02 10*3/UL (ref 0–0.2)
BASOPHILS NFR BLD AUTO: 0.3 % (ref 0–3)
BILIRUB SERPL-MCNC: 0.58 MG/DL (ref 0.2–1.3)
BUN SERPL-MCNC: 18 MG/DL (ref 5–25)
BUN/CREAT SERPL: 14 {RATIO} (ref 6–20)
CALCIUM SERPL-MCNC: 9.5 MG/DL (ref 8.7–10.4)
CHLORIDE SERPL-SCNC: 102 MMOL/L (ref 99–111)
CK SERPL-CCNC: 105 U/L (ref 35–230)
CONV ABS IMM GRAN: 0.03 10*3/UL (ref 0–0.2)
CONV CO2: 24 MMOL/L (ref 22–32)
CONV IMMATURE GRAN: 0.5 % (ref 0–1.8)
CONV TOTAL PROTEIN: 6 G/DL (ref 6.3–8.2)
CREAT UR-MCNC: 1.3 MG/DL (ref 0.5–0.9)
CRP SERPL-MCNC: 6.7 MG/L (ref 0–5)
DEPRECATED RDW RBC AUTO: 53.2 FL (ref 36.4–46.3)
EOSINOPHIL # BLD AUTO: 0.51 10*3/UL (ref 0–0.7)
EOSINOPHIL # BLD AUTO: 8 % (ref 0–7)
ERYTHROCYTE [DISTWIDTH] IN BLOOD BY AUTOMATED COUNT: 17.2 % (ref 11.7–14.4)
ERYTHROCYTE [SEDIMENTATION RATE] IN BLOOD: 36 MM/H (ref 0–30)
GFR SERPLBLD BASED ON 1.73 SQ M-ARVRAT: 43 ML/MIN/{1.73_M2}
GLOBULIN UR ELPH-MCNC: 2.6 G/DL (ref 2–3.5)
GLUCOSE SERPL-MCNC: 80 MG/DL (ref 65–99)
HBA1C MFR BLD: 8.4 G/DL (ref 12–16)
HCT VFR BLD AUTO: 27 % (ref 37–47)
LYMPHOCYTES # BLD AUTO: 1.17 10*3/UL (ref 1–5)
MCH RBC QN AUTO: 26.3 PG (ref 27–31)
MCHC RBC AUTO-ENTMCNC: 31.1 G/DL (ref 33–37)
MCV RBC AUTO: 84.4 FL (ref 81–99)
MONOCYTES # BLD AUTO: 0.83 10*3/UL (ref 0.2–1.2)
MONOCYTES NFR BLD AUTO: 13.1 % (ref 3–10)
NEUTROPHILS # BLD AUTO: 3.78 10*3/UL (ref 2–8)
NEUTROPHILS NFR BLD AUTO: 59.6 % (ref 30–85)
NRBC CBCN: 0 % (ref 0–0.7)
OSMOLALITY SERPL CALC.SUM OF ELEC: 287 MOSM/KG (ref 273–304)
PLATELET # BLD AUTO: 142 10*3/UL (ref 130–400)
PMV BLD AUTO: 10 FL (ref 9.4–12.3)
POTASSIUM SERPL-SCNC: 3.9 MMOL/L (ref 3.5–5.3)
RBC # BLD AUTO: 3.2 10*6/UL (ref 4.2–5.4)
SODIUM SERPL-SCNC: 138 MMOL/L (ref 135–147)
VARIANT LYMPHS NFR BLD MANUAL: 18.5 % (ref 20–45)
WBC # BLD AUTO: 6.34 10*3/UL (ref 4.8–10.8)

## 2019-08-06 ENCOUNTER — HOSPITAL ENCOUNTER (OUTPATIENT)
Dept: OTHER | Facility: HOSPITAL | Age: 64
Discharge: HOME | End: 2019-08-06

## 2019-08-06 LAB
ALBUMIN SERPL-MCNC: 3.4 G/DL (ref 3.5–5)
ALBUMIN/GLOB SERPL: 1.1 {RATIO} (ref 1.4–2.6)
ALP SERPL-CCNC: 79 U/L (ref 43–160)
ALT SERPL-CCNC: 26 U/L (ref 10–40)
ANION GAP SERPL CALC-SCNC: 16 MMOL/L (ref 8–19)
AST SERPL-CCNC: 32 U/L (ref 15–50)
BASOPHILS # BLD AUTO: 0.02 10*3/UL (ref 0–0.2)
BASOPHILS NFR BLD AUTO: 0.3 % (ref 0–3)
BILIRUB SERPL-MCNC: 0.4 MG/DL (ref 0.2–1.3)
BUN SERPL-MCNC: 16 MG/DL (ref 5–25)
BUN/CREAT SERPL: 15 {RATIO} (ref 6–20)
CALCIUM SERPL-MCNC: 9.5 MG/DL (ref 8.7–10.4)
CHLORIDE SERPL-SCNC: 102 MMOL/L (ref 99–111)
CK SERPL-CCNC: 37 U/L (ref 35–230)
CONV ABS IMM GRAN: 0.02 10*3/UL (ref 0–0.2)
CONV CO2: 22 MMOL/L (ref 22–32)
CONV IMMATURE GRAN: 0.3 % (ref 0–1.8)
CONV TOTAL PROTEIN: 6.5 G/DL (ref 6.3–8.2)
CREAT UR-MCNC: 1.07 MG/DL (ref 0.5–0.9)
CRP SERPL-MCNC: 4.7 MG/L (ref 0–5)
DEPRECATED RDW RBC AUTO: 52 FL (ref 36.4–46.3)
EOSINOPHIL # BLD AUTO: 0.38 10*3/UL (ref 0–0.7)
EOSINOPHIL # BLD AUTO: 6.6 % (ref 0–7)
ERYTHROCYTE [DISTWIDTH] IN BLOOD BY AUTOMATED COUNT: 16.8 % (ref 11.7–14.4)
ERYTHROCYTE [SEDIMENTATION RATE] IN BLOOD: 32 MM/H (ref 0–30)
GFR SERPLBLD BASED ON 1.73 SQ M-ARVRAT: 54 ML/MIN/{1.73_M2}
GLOBULIN UR ELPH-MCNC: 3.1 G/DL (ref 2–3.5)
GLUCOSE SERPL-MCNC: 125 MG/DL (ref 65–99)
HCT VFR BLD AUTO: 29.8 % (ref 37–47)
HGB BLD-MCNC: 9 G/DL (ref 12–16)
LYMPHOCYTES # BLD AUTO: 1.29 10*3/UL (ref 1–5)
LYMPHOCYTES NFR BLD AUTO: 22.2 % (ref 20–45)
MCH RBC QN AUTO: 25.6 PG (ref 27–31)
MCHC RBC AUTO-ENTMCNC: 30.2 G/DL (ref 33–37)
MCV RBC AUTO: 84.7 FL (ref 81–99)
MONOCYTES # BLD AUTO: 0.57 10*3/UL (ref 0.2–1.2)
MONOCYTES NFR BLD AUTO: 9.8 % (ref 3–10)
NEUTROPHILS # BLD AUTO: 3.52 10*3/UL (ref 2–8)
NEUTROPHILS NFR BLD AUTO: 60.8 % (ref 30–85)
NRBC CBCN: 0 % (ref 0–0.7)
OSMOLALITY SERPL CALC.SUM OF ELEC: 285 MOSM/KG (ref 273–304)
PLATELET # BLD AUTO: 176 10*3/UL (ref 130–400)
PMV BLD AUTO: 10.1 FL (ref 9.4–12.3)
POTASSIUM SERPL-SCNC: 4.3 MMOL/L (ref 3.5–5.3)
RBC # BLD AUTO: 3.52 10*6/UL (ref 4.2–5.4)
SODIUM SERPL-SCNC: 136 MMOL/L (ref 135–147)
WBC # BLD AUTO: 5.8 10*3/UL (ref 4.8–10.8)

## 2019-08-08 ENCOUNTER — HOSPITAL ENCOUNTER (OUTPATIENT)
Dept: URGENT CARE | Facility: CLINIC | Age: 64
Discharge: HOME | End: 2019-08-08
Attending: NURSE PRACTITIONER

## 2019-08-13 ENCOUNTER — HOSPITAL ENCOUNTER (OUTPATIENT)
Dept: OTHER | Facility: HOSPITAL | Age: 64
Discharge: HOME | End: 2019-08-13

## 2019-08-13 LAB
ALBUMIN SERPL-MCNC: 3.3 G/DL (ref 3.5–5)
ALBUMIN/GLOB SERPL: 1.1 {RATIO} (ref 1.4–2.6)
ALP SERPL-CCNC: 83 U/L (ref 43–160)
ALT SERPL-CCNC: 30 U/L (ref 10–40)
ANION GAP SERPL CALC-SCNC: 15 MMOL/L (ref 8–19)
AST SERPL-CCNC: 34 U/L (ref 15–50)
BASOPHILS # BLD AUTO: 0.01 10*3/UL (ref 0–0.2)
BASOPHILS NFR BLD AUTO: 0.2 % (ref 0–3)
BILIRUB SERPL-MCNC: 0.45 MG/DL (ref 0.2–1.3)
BUN SERPL-MCNC: 13 MG/DL (ref 5–25)
BUN/CREAT SERPL: 14 {RATIO} (ref 6–20)
CALCIUM SERPL-MCNC: 9.5 MG/DL (ref 8.7–10.4)
CHLORIDE SERPL-SCNC: 103 MMOL/L (ref 99–111)
CK SERPL-CCNC: 41 U/L (ref 35–230)
CONV ABS IMM GRAN: 0.03 10*3/UL (ref 0–0.2)
CONV CO2: 23 MMOL/L (ref 22–32)
CONV IMMATURE GRAN: 0.6 % (ref 0–1.8)
CONV TOTAL PROTEIN: 6.2 G/DL (ref 6.3–8.2)
CREAT UR-MCNC: 0.92 MG/DL (ref 0.5–0.9)
CRP SERPL-MCNC: 3.5 MG/L (ref 0–5)
DEPRECATED RDW RBC AUTO: 49.3 FL (ref 36.4–46.3)
EOSINOPHIL # BLD AUTO: 0.2 10*3/UL (ref 0–0.7)
EOSINOPHIL # BLD AUTO: 4.2 % (ref 0–7)
ERYTHROCYTE [DISTWIDTH] IN BLOOD BY AUTOMATED COUNT: 16.2 % (ref 11.7–14.4)
ERYTHROCYTE [SEDIMENTATION RATE] IN BLOOD: 23 MM/H (ref 0–30)
GFR SERPLBLD BASED ON 1.73 SQ M-ARVRAT: >60 ML/MIN/{1.73_M2}
GLOBULIN UR ELPH-MCNC: 2.9 G/DL (ref 2–3.5)
GLUCOSE SERPL-MCNC: 160 MG/DL (ref 65–99)
HCT VFR BLD AUTO: 28.4 % (ref 37–47)
HGB BLD-MCNC: 9 G/DL (ref 12–16)
LYMPHOCYTES # BLD AUTO: 1.17 10*3/UL (ref 1–5)
LYMPHOCYTES NFR BLD AUTO: 24.5 % (ref 20–45)
MCH RBC QN AUTO: 26.3 PG (ref 27–31)
MCHC RBC AUTO-ENTMCNC: 31.7 G/DL (ref 33–37)
MCV RBC AUTO: 83 FL (ref 81–99)
MONOCYTES # BLD AUTO: 0.67 10*3/UL (ref 0.2–1.2)
MONOCYTES NFR BLD AUTO: 14 % (ref 3–10)
NEUTROPHILS # BLD AUTO: 2.7 10*3/UL (ref 2–8)
NEUTROPHILS NFR BLD AUTO: 56.5 % (ref 30–85)
NRBC CBCN: 0 % (ref 0–0.7)
OSMOLALITY SERPL CALC.SUM OF ELEC: 288 MOSM/KG (ref 273–304)
PLATELET # BLD AUTO: 166 10*3/UL (ref 130–400)
PMV BLD AUTO: 9.8 FL (ref 9.4–12.3)
POTASSIUM SERPL-SCNC: 4.4 MMOL/L (ref 3.5–5.3)
RBC # BLD AUTO: 3.42 10*6/UL (ref 4.2–5.4)
SODIUM SERPL-SCNC: 137 MMOL/L (ref 135–147)
WBC # BLD AUTO: 4.78 10*3/UL (ref 4.8–10.8)

## 2019-08-15 ENCOUNTER — OFFICE VISIT CONVERTED (OUTPATIENT)
Dept: ORTHOPEDIC SURGERY | Facility: CLINIC | Age: 64
End: 2019-08-15
Attending: PHYSICIAN ASSISTANT

## 2019-08-20 ENCOUNTER — HOSPITAL ENCOUNTER (OUTPATIENT)
Dept: OTHER | Facility: HOSPITAL | Age: 64
Discharge: HOME | End: 2019-08-20

## 2019-08-20 LAB
ALBUMIN SERPL-MCNC: 3.2 G/DL (ref 3.5–5)
ALBUMIN/GLOB SERPL: 1.1 {RATIO} (ref 1.4–2.6)
ALP SERPL-CCNC: 82 U/L (ref 43–160)
ALT SERPL-CCNC: 24 U/L (ref 10–40)
ANION GAP SERPL CALC-SCNC: 19 MMOL/L (ref 8–19)
AST SERPL-CCNC: 29 U/L (ref 15–50)
BASOPHILS # BLD AUTO: 0.01 10*3/UL (ref 0–0.2)
BASOPHILS NFR BLD AUTO: 0.2 % (ref 0–3)
BILIRUB SERPL-MCNC: 0.39 MG/DL (ref 0.2–1.3)
BUN SERPL-MCNC: 16 MG/DL (ref 5–25)
BUN/CREAT SERPL: 17 {RATIO} (ref 6–20)
CALCIUM SERPL-MCNC: 9.4 MG/DL (ref 8.7–10.4)
CHLORIDE SERPL-SCNC: 104 MMOL/L (ref 99–111)
CK SERPL-CCNC: 48 U/L (ref 35–230)
CONV ABS IMM GRAN: 0.02 10*3/UL (ref 0–0.2)
CONV CO2: 20 MMOL/L (ref 22–32)
CONV IMMATURE GRAN: 0.4 % (ref 0–1.8)
CONV TOTAL PROTEIN: 6 G/DL (ref 6.3–8.2)
CREAT UR-MCNC: 0.94 MG/DL (ref 0.5–0.9)
CRP SERPL-MCNC: 6.4 MG/L (ref 0–5)
DEPRECATED RDW RBC AUTO: 47.8 FL (ref 36.4–46.3)
EOSINOPHIL # BLD AUTO: 0.16 10*3/UL (ref 0–0.7)
EOSINOPHIL # BLD AUTO: 3.5 % (ref 0–7)
ERYTHROCYTE [DISTWIDTH] IN BLOOD BY AUTOMATED COUNT: 15.9 % (ref 11.7–14.4)
ERYTHROCYTE [SEDIMENTATION RATE] IN BLOOD: 25 MM/H (ref 0–30)
GFR SERPLBLD BASED ON 1.73 SQ M-ARVRAT: >60 ML/MIN/{1.73_M2}
GLOBULIN UR ELPH-MCNC: 2.8 G/DL (ref 2–3.5)
GLUCOSE SERPL-MCNC: 245 MG/DL (ref 65–99)
HCT VFR BLD AUTO: 28.5 % (ref 37–47)
HGB BLD-MCNC: 9 G/DL (ref 12–16)
LYMPHOCYTES # BLD AUTO: 1.08 10*3/UL (ref 1–5)
LYMPHOCYTES NFR BLD AUTO: 23.6 % (ref 20–45)
MCH RBC QN AUTO: 26 PG (ref 27–31)
MCHC RBC AUTO-ENTMCNC: 31.6 G/DL (ref 33–37)
MCV RBC AUTO: 82.4 FL (ref 81–99)
MONOCYTES # BLD AUTO: 0.58 10*3/UL (ref 0.2–1.2)
MONOCYTES NFR BLD AUTO: 12.7 % (ref 3–10)
NEUTROPHILS # BLD AUTO: 2.72 10*3/UL (ref 2–8)
NEUTROPHILS NFR BLD AUTO: 59.6 % (ref 30–85)
NRBC CBCN: 0 % (ref 0–0.7)
OSMOLALITY SERPL CALC.SUM OF ELEC: 297 MOSM/KG (ref 273–304)
PLATELET # BLD AUTO: 161 10*3/UL (ref 130–400)
PMV BLD AUTO: 9.9 FL (ref 9.4–12.3)
POTASSIUM SERPL-SCNC: 4.2 MMOL/L (ref 3.5–5.3)
RBC # BLD AUTO: 3.46 10*6/UL (ref 4.2–5.4)
SODIUM SERPL-SCNC: 139 MMOL/L (ref 135–147)
WBC # BLD AUTO: 4.57 10*3/UL (ref 4.8–10.8)

## 2019-08-28 ENCOUNTER — HOSPITAL ENCOUNTER (OUTPATIENT)
Dept: MAMMOGRAPHY | Facility: HOSPITAL | Age: 64
Discharge: HOME | End: 2019-08-28

## 2019-09-03 ENCOUNTER — HOSPITAL ENCOUNTER (OUTPATIENT)
Dept: OTHER | Facility: HOSPITAL | Age: 64
Discharge: HOME | End: 2019-09-03

## 2019-09-03 LAB
ALBUMIN SERPL-MCNC: 3.3 G/DL (ref 3.5–5)
ALBUMIN/GLOB SERPL: 1.2 {RATIO} (ref 1.4–2.6)
ALP SERPL-CCNC: 72 U/L (ref 43–160)
ALT SERPL-CCNC: 12 U/L (ref 10–40)
ANION GAP SERPL CALC-SCNC: 18 MMOL/L (ref 8–19)
AST SERPL-CCNC: 22 U/L (ref 15–50)
BASOPHILS # BLD AUTO: 0.02 10*3/UL (ref 0–0.2)
BASOPHILS NFR BLD AUTO: 0.5 % (ref 0–3)
BILIRUB SERPL-MCNC: 0.31 MG/DL (ref 0.2–1.3)
BUN SERPL-MCNC: 10 MG/DL (ref 5–25)
BUN/CREAT SERPL: 9 {RATIO} (ref 6–20)
CALCIUM SERPL-MCNC: 9 MG/DL (ref 8.7–10.4)
CHLORIDE SERPL-SCNC: 103 MMOL/L (ref 99–111)
CONV ABS IMM GRAN: 0.01 10*3/UL (ref 0–0.2)
CONV CO2: 22 MMOL/L (ref 22–32)
CONV IMMATURE GRAN: 0.3 % (ref 0–1.8)
CONV TOTAL PROTEIN: 6.1 G/DL (ref 6.3–8.2)
CREAT UR-MCNC: 1.15 MG/DL (ref 0.5–0.9)
CRP SERPL-MCNC: 3.9 MG/L (ref 0–5)
DEPRECATED RDW RBC AUTO: 52.4 FL (ref 36.4–46.3)
EOSINOPHIL # BLD AUTO: 0.28 10*3/UL (ref 0–0.7)
EOSINOPHIL # BLD AUTO: 7.1 % (ref 0–7)
ERYTHROCYTE [DISTWIDTH] IN BLOOD BY AUTOMATED COUNT: 16.6 % (ref 11.7–14.4)
ERYTHROCYTE [SEDIMENTATION RATE] IN BLOOD: 38 MM/H (ref 0–30)
GFR SERPLBLD BASED ON 1.73 SQ M-ARVRAT: 49 ML/MIN/{1.73_M2}
GLOBULIN UR ELPH-MCNC: 2.8 G/DL (ref 2–3.5)
GLUCOSE SERPL-MCNC: 159 MG/DL (ref 65–99)
HCT VFR BLD AUTO: 23.2 % (ref 37–47)
HGB BLD-MCNC: 6.9 G/DL (ref 12–16)
LYMPHOCYTES # BLD AUTO: 0.92 10*3/UL (ref 1–5)
LYMPHOCYTES NFR BLD AUTO: 23.4 % (ref 20–45)
MCH RBC QN AUTO: 25.7 PG (ref 27–31)
MCHC RBC AUTO-ENTMCNC: 29.7 G/DL (ref 33–37)
MCV RBC AUTO: 86.6 FL (ref 81–99)
MONOCYTES # BLD AUTO: 0.58 10*3/UL (ref 0.2–1.2)
MONOCYTES NFR BLD AUTO: 14.8 % (ref 3–10)
NEUTROPHILS # BLD AUTO: 2.12 10*3/UL (ref 2–8)
NEUTROPHILS NFR BLD AUTO: 53.9 % (ref 30–85)
NRBC CBCN: 0 % (ref 0–0.7)
OSMOLALITY SERPL CALC.SUM OF ELEC: 290 MOSM/KG (ref 273–304)
PLATELET # BLD AUTO: 164 10*3/UL (ref 130–400)
PMV BLD AUTO: 10 FL (ref 9.4–12.3)
POTASSIUM SERPL-SCNC: 4.4 MMOL/L (ref 3.5–5.3)
RBC # BLD AUTO: 2.68 10*6/UL (ref 4.2–5.4)
SODIUM SERPL-SCNC: 139 MMOL/L (ref 135–147)
WBC # BLD AUTO: 3.93 10*3/UL (ref 4.8–10.8)

## 2019-09-06 ENCOUNTER — HOSPITAL ENCOUNTER (OUTPATIENT)
Dept: OTHER | Facility: HOSPITAL | Age: 64
Discharge: HOME | End: 2019-09-06

## 2019-09-06 LAB
BASOPHILS # BLD AUTO: 0.01 10*3/UL (ref 0–0.2)
BASOPHILS NFR BLD AUTO: 0.3 % (ref 0–3)
CONV ABS IMM GRAN: 0.02 10*3/UL (ref 0–0.2)
CONV IMMATURE GRAN: 0.6 % (ref 0–1.8)
DEPRECATED RDW RBC AUTO: 51 FL (ref 36.4–46.3)
EOSINOPHIL # BLD AUTO: 0.27 10*3/UL (ref 0–0.7)
EOSINOPHIL # BLD AUTO: 7.8 % (ref 0–7)
ERYTHROCYTE [DISTWIDTH] IN BLOOD BY AUTOMATED COUNT: 16.2 % (ref 11.7–14.4)
HCT VFR BLD AUTO: 24.3 % (ref 37–47)
HGB BLD-MCNC: 7.3 G/DL (ref 12–16)
LYMPHOCYTES # BLD AUTO: 0.67 10*3/UL (ref 1–5)
LYMPHOCYTES NFR BLD AUTO: 19.4 % (ref 20–45)
MCH RBC QN AUTO: 25.6 PG (ref 27–31)
MCHC RBC AUTO-ENTMCNC: 30 G/DL (ref 33–37)
MCV RBC AUTO: 85.3 FL (ref 81–99)
MONOCYTES # BLD AUTO: 0.6 10*3/UL (ref 0.2–1.2)
MONOCYTES NFR BLD AUTO: 17.3 % (ref 3–10)
NEUTROPHILS # BLD AUTO: 1.89 10*3/UL (ref 2–8)
NEUTROPHILS NFR BLD AUTO: 54.6 % (ref 30–85)
NRBC CBCN: 0 % (ref 0–0.7)
PLATELET # BLD AUTO: 141 10*3/UL (ref 130–400)
PMV BLD AUTO: 10.5 FL (ref 9.4–12.3)
RBC # BLD AUTO: 2.85 10*6/UL (ref 4.2–5.4)
WBC # BLD AUTO: 3.46 10*3/UL (ref 4.8–10.8)

## 2019-09-19 ENCOUNTER — HOSPITAL ENCOUNTER (OUTPATIENT)
Dept: LAB | Facility: HOSPITAL | Age: 64
Discharge: HOME | End: 2019-09-19

## 2019-09-19 LAB
BASOPHILS # BLD AUTO: 0.05 10*3/UL (ref 0–0.2)
BASOPHILS NFR BLD AUTO: 0.9 % (ref 0–3)
CONV ABS IMM GRAN: 0.06 10*3/UL (ref 0–0.2)
CONV IMMATURE GRAN: 1.1 % (ref 0–1.8)
CRP SERPL-MCNC: 14.1 MG/L (ref 0–5)
DEPRECATED RDW RBC AUTO: 48.9 FL (ref 36.4–46.3)
EOSINOPHIL # BLD AUTO: 0.24 10*3/UL (ref 0–0.7)
EOSINOPHIL # BLD AUTO: 4.4 % (ref 0–7)
ERYTHROCYTE [DISTWIDTH] IN BLOOD BY AUTOMATED COUNT: 16.3 % (ref 11.7–14.4)
ERYTHROCYTE [SEDIMENTATION RATE] IN BLOOD: 57 MM/H (ref 0–30)
HCT VFR BLD AUTO: 29.6 % (ref 37–47)
HGB BLD-MCNC: 8.9 G/DL (ref 12–16)
LYMPHOCYTES # BLD AUTO: 1.38 10*3/UL (ref 1–5)
LYMPHOCYTES NFR BLD AUTO: 25.4 % (ref 20–45)
MCH RBC QN AUTO: 24.6 PG (ref 27–31)
MCHC RBC AUTO-ENTMCNC: 30.1 G/DL (ref 33–37)
MCV RBC AUTO: 81.8 FL (ref 81–99)
MONOCYTES # BLD AUTO: 1.17 10*3/UL (ref 0.2–1.2)
MONOCYTES NFR BLD AUTO: 21.5 % (ref 3–10)
NEUTROPHILS # BLD AUTO: 2.54 10*3/UL (ref 2–8)
NEUTROPHILS NFR BLD AUTO: 46.7 % (ref 30–85)
NRBC CBCN: 0 % (ref 0–0.7)
PLATELET # BLD AUTO: 284 10*3/UL (ref 130–400)
PMV BLD AUTO: 10.1 FL (ref 9.4–12.3)
RBC # BLD AUTO: 3.62 10*6/UL (ref 4.2–5.4)
WBC # BLD AUTO: 5.44 10*3/UL (ref 4.8–10.8)

## 2019-09-26 ENCOUNTER — HOSPITAL ENCOUNTER (OUTPATIENT)
Dept: OTHER | Facility: HOSPITAL | Age: 64
Discharge: HOME | End: 2019-09-26
Attending: INTERNAL MEDICINE

## 2019-09-26 LAB
ANION GAP SERPL CALC-SCNC: 16 MMOL/L (ref 8–19)
APPEARANCE UR: CLEAR
BILIRUB UR QL: NEGATIVE
BUN SERPL-MCNC: 11 MG/DL (ref 5–25)
BUN/CREAT SERPL: 12 {RATIO} (ref 6–20)
CALCIUM SERPL-MCNC: 9.2 MG/DL (ref 8.7–10.4)
CHLORIDE SERPL-SCNC: 100 MMOL/L (ref 99–111)
COLOR UR: YELLOW
CONV CO2: 23 MMOL/L (ref 22–32)
CONV COLLECTION SOURCE (UA): ABNORMAL
CONV CREATININE URINE, RANDOM: 56.4 MG/DL (ref 10–300)
CONV UROBILINOGEN IN URINE BY AUTOMATED TEST STRIP: 0.2 {EHRLICHU}/DL (ref 0.1–1)
CREAT UR-MCNC: 0.92 MG/DL (ref 0.5–0.9)
GFR SERPLBLD BASED ON 1.73 SQ M-ARVRAT: >60 ML/MIN/{1.73_M2}
GLUCOSE SERPL-MCNC: 226 MG/DL (ref 65–99)
GLUCOSE UR QL: 100 MG/DL
HGB UR QL STRIP: NEGATIVE
KETONES UR QL STRIP: NEGATIVE MG/DL
LEUKOCYTE ESTERASE UR QL STRIP: NEGATIVE
NITRITE UR QL STRIP: NEGATIVE
OSMOLALITY SERPL CALC.SUM OF ELEC: 286 MOSM/KG (ref 273–304)
PH UR STRIP.AUTO: 5.5 [PH] (ref 5–8)
POTASSIUM SERPL-SCNC: 4.1 MMOL/L (ref 3.5–5.3)
PROT UR QL: NEGATIVE MG/DL
PROT UR-MCNC: 6.7 MG/DL
SODIUM SERPL-SCNC: 135 MMOL/L (ref 135–147)
SP GR UR: 1.01 (ref 1–1.03)

## 2019-10-21 ENCOUNTER — HOSPITAL ENCOUNTER (OUTPATIENT)
Dept: OTHER | Facility: HOSPITAL | Age: 64
Discharge: HOME | End: 2019-10-21

## 2019-10-21 LAB
ALBUMIN SERPL-MCNC: 2.9 G/DL (ref 3.5–5)
ALBUMIN/GLOB SERPL: 0.9 {RATIO} (ref 1.4–2.6)
ALP SERPL-CCNC: 72 U/L (ref 43–160)
ALT SERPL-CCNC: 10 U/L (ref 10–40)
ANION GAP SERPL CALC-SCNC: 14 MMOL/L (ref 8–19)
AST SERPL-CCNC: 16 U/L (ref 15–50)
BASOPHILS # BLD AUTO: 0.03 10*3/UL (ref 0–0.2)
BASOPHILS NFR BLD AUTO: 0.5 % (ref 0–3)
BILIRUB SERPL-MCNC: 0.28 MG/DL (ref 0.2–1.3)
BUN SERPL-MCNC: 12 MG/DL (ref 5–25)
BUN/CREAT SERPL: 17 {RATIO} (ref 6–20)
CALCIUM SERPL-MCNC: 9.9 MG/DL (ref 8.7–10.4)
CHLORIDE SERPL-SCNC: 106 MMOL/L (ref 99–111)
CONV ABS IMM GRAN: 0.06 10*3/UL (ref 0–0.2)
CONV CO2: 22 MMOL/L (ref 22–32)
CONV IMMATURE GRAN: 1.1 % (ref 0–1.8)
CONV TOTAL PROTEIN: 6 G/DL (ref 6.3–8.2)
CREAT UR-MCNC: 0.69 MG/DL (ref 0.5–0.9)
CRP SERPL-MCNC: 12 MG/L (ref 0–5)
DEPRECATED RDW RBC AUTO: 53.3 FL (ref 36.4–46.3)
EOSINOPHIL # BLD AUTO: 0.48 10*3/UL (ref 0–0.7)
EOSINOPHIL # BLD AUTO: 8.4 % (ref 0–7)
ERYTHROCYTE [DISTWIDTH] IN BLOOD BY AUTOMATED COUNT: 17.1 % (ref 11.7–14.4)
ERYTHROCYTE [SEDIMENTATION RATE] IN BLOOD: 67 MM/H (ref 0–30)
GFR SERPLBLD BASED ON 1.73 SQ M-ARVRAT: >60 ML/MIN/{1.73_M2}
GLOBULIN UR ELPH-MCNC: 3.1 G/DL (ref 2–3.5)
GLUCOSE SERPL-MCNC: 163 MG/DL (ref 65–99)
HCT VFR BLD AUTO: 25.8 % (ref 37–47)
HGB BLD-MCNC: 7.7 G/DL (ref 12–16)
LYMPHOCYTES # BLD AUTO: 0.9 10*3/UL (ref 1–5)
LYMPHOCYTES NFR BLD AUTO: 15.8 % (ref 20–45)
MCH RBC QN AUTO: 25.5 PG (ref 27–31)
MCHC RBC AUTO-ENTMCNC: 29.8 G/DL (ref 33–37)
MCV RBC AUTO: 85.4 FL (ref 81–99)
MONOCYTES # BLD AUTO: 0.57 10*3/UL (ref 0.2–1.2)
MONOCYTES NFR BLD AUTO: 10 % (ref 3–10)
NEUTROPHILS # BLD AUTO: 3.66 10*3/UL (ref 2–8)
NEUTROPHILS NFR BLD AUTO: 64.2 % (ref 30–85)
NRBC CBCN: 0 % (ref 0–0.7)
OSMOLALITY SERPL CALC.SUM OF ELEC: 289 MOSM/KG (ref 273–304)
PLATELET # BLD AUTO: 192 10*3/UL (ref 130–400)
PMV BLD AUTO: 10.7 FL (ref 9.4–12.3)
POTASSIUM SERPL-SCNC: 4.3 MMOL/L (ref 3.5–5.3)
RBC # BLD AUTO: 3.02 10*6/UL (ref 4.2–5.4)
SODIUM SERPL-SCNC: 138 MMOL/L (ref 135–147)
WBC # BLD AUTO: 5.7 10*3/UL (ref 4.8–10.8)

## 2019-10-28 ENCOUNTER — HOSPITAL ENCOUNTER (OUTPATIENT)
Dept: LAB | Facility: HOSPITAL | Age: 64
Discharge: HOME | End: 2019-10-28
Attending: INTERNAL MEDICINE

## 2019-10-28 LAB
25(OH)D3 SERPL-MCNC: 19.7 NG/ML (ref 30–100)
ANION GAP SERPL CALC-SCNC: 17 MMOL/L (ref 8–19)
APPEARANCE UR: CLEAR
BASOPHILS # BLD AUTO: 0.04 10*3/UL (ref 0–0.2)
BASOPHILS NFR BLD AUTO: 0.5 % (ref 0–3)
BILIRUB UR QL: NEGATIVE
BUN SERPL-MCNC: 12 MG/DL (ref 5–25)
BUN/CREAT SERPL: 16 {RATIO} (ref 6–20)
CALCIUM SERPL-MCNC: 9.7 MG/DL (ref 8.7–10.4)
CHLORIDE SERPL-SCNC: 103 MMOL/L (ref 99–111)
COLOR UR: YELLOW
CONV ABS IMM GRAN: 0.06 10*3/UL (ref 0–0.2)
CONV BACTERIA: NEGATIVE
CONV CO2: 22 MMOL/L (ref 22–32)
CONV COLLECTION SOURCE (UA): ABNORMAL
CONV CREATININE URINE, RANDOM: 55.2 MG/DL (ref 10–300)
CONV HYALINE CASTS IN URINE MICRO: ABNORMAL /[LPF]
CONV IMMATURE GRAN: 0.8 % (ref 0–1.8)
CONV UROBILINOGEN IN URINE BY AUTOMATED TEST STRIP: 0.2 {EHRLICHU}/DL (ref 0.1–1)
CREAT UR-MCNC: 0.76 MG/DL (ref 0.5–0.9)
DEPRECATED RDW RBC AUTO: 50 FL (ref 36.4–46.3)
EOSINOPHIL # BLD AUTO: 0.43 10*3/UL (ref 0–0.7)
EOSINOPHIL # BLD AUTO: 5.6 % (ref 0–7)
ERYTHROCYTE [DISTWIDTH] IN BLOOD BY AUTOMATED COUNT: 16.3 % (ref 11.7–14.4)
GFR SERPLBLD BASED ON 1.73 SQ M-ARVRAT: >60 ML/MIN/{1.73_M2}
GLUCOSE SERPL-MCNC: 102 MG/DL (ref 65–99)
GLUCOSE UR QL: NEGATIVE MG/DL
HCT VFR BLD AUTO: 32.5 % (ref 37–47)
HGB BLD-MCNC: 9.7 G/DL (ref 12–16)
HGB UR QL STRIP: ABNORMAL
KETONES UR QL STRIP: NEGATIVE MG/DL
LEUKOCYTE ESTERASE UR QL STRIP: ABNORMAL
LYMPHOCYTES # BLD AUTO: 1.43 10*3/UL (ref 1–5)
LYMPHOCYTES NFR BLD AUTO: 18.6 % (ref 20–45)
MCH RBC QN AUTO: 24.9 PG (ref 27–31)
MCHC RBC AUTO-ENTMCNC: 29.8 G/DL (ref 33–37)
MCV RBC AUTO: 83.3 FL (ref 81–99)
MONOCYTES # BLD AUTO: 0.99 10*3/UL (ref 0.2–1.2)
MONOCYTES NFR BLD AUTO: 12.9 % (ref 3–10)
NEUTROPHILS # BLD AUTO: 4.73 10*3/UL (ref 2–8)
NEUTROPHILS NFR BLD AUTO: 61.6 % (ref 30–85)
NITRITE UR QL STRIP: NEGATIVE
NRBC CBCN: 0 % (ref 0–0.7)
OSMOLALITY SERPL CALC.SUM OF ELEC: 286 MOSM/KG (ref 273–304)
PH UR STRIP.AUTO: 6 [PH] (ref 5–8)
PHOSPHATE SERPL-MCNC: 2.6 MG/DL (ref 2.4–4.5)
PLATELET # BLD AUTO: 127 10*3/UL (ref 130–400)
PMV BLD AUTO: 10.9 FL (ref 9.4–12.3)
POTASSIUM SERPL-SCNC: 4.2 MMOL/L (ref 3.5–5.3)
PROT UR QL: 100 MG/DL
PROT UR-MCNC: 74.4 MG/DL
PTH-INTACT SERPL-MCNC: 31 PG/ML (ref 11.1–79.5)
RBC # BLD AUTO: 3.9 10*6/UL (ref 4.2–5.4)
RBC #/AREA URNS HPF: ABNORMAL /[HPF]
SODIUM SERPL-SCNC: 138 MMOL/L (ref 135–147)
SP GR UR: 1.01 (ref 1–1.03)
SQUAMOUS SPT QL MICRO: ABNORMAL /[HPF]
WBC # BLD AUTO: 7.68 10*3/UL (ref 4.8–10.8)
WBC #/AREA URNS HPF: ABNORMAL /[HPF]

## 2019-10-29 ENCOUNTER — HOSPITAL ENCOUNTER (OUTPATIENT)
Dept: OTHER | Facility: HOSPITAL | Age: 64
Discharge: HOME | End: 2019-10-29

## 2019-10-29 LAB
ALBUMIN SERPL-MCNC: 3.1 G/DL (ref 3.5–5)
ALBUMIN/GLOB SERPL: 0.9 {RATIO} (ref 1.4–2.6)
ALP SERPL-CCNC: 90 U/L (ref 43–160)
ALT SERPL-CCNC: 8 U/L (ref 10–40)
ANION GAP SERPL CALC-SCNC: 17 MMOL/L (ref 8–19)
AST SERPL-CCNC: 15 U/L (ref 15–50)
BASOPHILS # BLD AUTO: 0.03 10*3/UL (ref 0–0.2)
BASOPHILS NFR BLD AUTO: 0.5 % (ref 0–3)
BILIRUB SERPL-MCNC: 0.35 MG/DL (ref 0.2–1.3)
BUN SERPL-MCNC: 14 MG/DL (ref 5–25)
BUN/CREAT SERPL: 20 {RATIO} (ref 6–20)
CALCIUM SERPL-MCNC: 9.9 MG/DL (ref 8.7–10.4)
CHLORIDE SERPL-SCNC: 104 MMOL/L (ref 99–111)
CONV ABS IMM GRAN: 0.03 10*3/UL (ref 0–0.2)
CONV CO2: 21 MMOL/L (ref 22–32)
CONV IMMATURE GRAN: 0.5 % (ref 0–1.8)
CONV TOTAL PROTEIN: 6.4 G/DL (ref 6.3–8.2)
CREAT UR-MCNC: 0.69 MG/DL (ref 0.5–0.9)
CRP SERPL-MCNC: 4.8 MG/L (ref 0–5)
DEPRECATED RDW RBC AUTO: 48.9 FL (ref 36.4–46.3)
EOSINOPHIL # BLD AUTO: 0.44 10*3/UL (ref 0–0.7)
EOSINOPHIL # BLD AUTO: 7.3 % (ref 0–7)
ERYTHROCYTE [DISTWIDTH] IN BLOOD BY AUTOMATED COUNT: 16.2 % (ref 11.7–14.4)
ERYTHROCYTE [SEDIMENTATION RATE] IN BLOOD: 44 MM/H (ref 0–30)
GFR SERPLBLD BASED ON 1.73 SQ M-ARVRAT: >60 ML/MIN/{1.73_M2}
GLOBULIN UR ELPH-MCNC: 3.3 G/DL (ref 2–3.5)
GLUCOSE SERPL-MCNC: 89 MG/DL (ref 65–99)
HCT VFR BLD AUTO: 27.1 % (ref 37–47)
HGB BLD-MCNC: 8.2 G/DL (ref 12–16)
LYMPHOCYTES # BLD AUTO: 1.43 10*3/UL (ref 1–5)
LYMPHOCYTES NFR BLD AUTO: 23.6 % (ref 20–45)
MCH RBC QN AUTO: 24.7 PG (ref 27–31)
MCHC RBC AUTO-ENTMCNC: 30.3 G/DL (ref 33–37)
MCV RBC AUTO: 81.6 FL (ref 81–99)
MONOCYTES # BLD AUTO: 1.02 10*3/UL (ref 0.2–1.2)
MONOCYTES NFR BLD AUTO: 16.8 % (ref 3–10)
NEUTROPHILS # BLD AUTO: 3.11 10*3/UL (ref 2–8)
NEUTROPHILS NFR BLD AUTO: 51.3 % (ref 30–85)
NRBC CBCN: 0 % (ref 0–0.7)
OSMOLALITY SERPL CALC.SUM OF ELEC: 286 MOSM/KG (ref 273–304)
PLATELET # BLD AUTO: 116 10*3/UL (ref 130–400)
PMV BLD AUTO: 11.6 FL (ref 9.4–12.3)
POTASSIUM SERPL-SCNC: 4 MMOL/L (ref 3.5–5.3)
RBC # BLD AUTO: 3.32 10*6/UL (ref 4.2–5.4)
SODIUM SERPL-SCNC: 138 MMOL/L (ref 135–147)
WBC # BLD AUTO: 6.06 10*3/UL (ref 4.8–10.8)

## 2019-10-31 ENCOUNTER — HOSPITAL ENCOUNTER (OUTPATIENT)
Dept: NUCLEAR MEDICINE | Facility: HOSPITAL | Age: 64
Discharge: HOME | End: 2019-10-31
Attending: SPECIALIST

## 2019-11-07 ENCOUNTER — HOSPITAL ENCOUNTER (OUTPATIENT)
Dept: OTHER | Facility: HOSPITAL | Age: 64
Discharge: HOME | End: 2019-11-07

## 2019-11-07 LAB
ALBUMIN SERPL-MCNC: 3 G/DL (ref 3.5–5)
ALBUMIN/GLOB SERPL: 0.8 {RATIO} (ref 1.4–2.6)
ALP SERPL-CCNC: 89 U/L (ref 43–160)
ALT SERPL-CCNC: 7 U/L (ref 10–40)
ANION GAP SERPL CALC-SCNC: 18 MMOL/L (ref 8–19)
AST SERPL-CCNC: 16 U/L (ref 15–50)
BASOPHILS # BLD AUTO: 0.02 10*3/UL (ref 0–0.2)
BASOPHILS NFR BLD AUTO: 0.4 % (ref 0–3)
BILIRUB SERPL-MCNC: 0.3 MG/DL (ref 0.2–1.3)
BUN SERPL-MCNC: 12 MG/DL (ref 5–25)
BUN/CREAT SERPL: 17 {RATIO} (ref 6–20)
CALCIUM SERPL-MCNC: 9.8 MG/DL (ref 8.7–10.4)
CHLORIDE SERPL-SCNC: 99 MMOL/L (ref 99–111)
CONV ABS IMM GRAN: 0.02 10*3/UL (ref 0–0.2)
CONV CO2: 20 MMOL/L (ref 22–32)
CONV IMMATURE GRAN: 0.4 % (ref 0–1.8)
CONV TOTAL PROTEIN: 6.6 G/DL (ref 6.3–8.2)
CREAT UR-MCNC: 0.71 MG/DL (ref 0.5–0.9)
CRP SERPL-MCNC: 22 MG/L (ref 0–5)
DEPRECATED RDW RBC AUTO: 49.6 FL (ref 36.4–46.3)
EOSINOPHIL # BLD AUTO: 0.25 10*3/UL (ref 0–0.7)
EOSINOPHIL # BLD AUTO: 4.9 % (ref 0–7)
ERYTHROCYTE [DISTWIDTH] IN BLOOD BY AUTOMATED COUNT: 16.5 % (ref 11.7–14.4)
ERYTHROCYTE [SEDIMENTATION RATE] IN BLOOD: 74 MM/H (ref 0–30)
GFR SERPLBLD BASED ON 1.73 SQ M-ARVRAT: >60 ML/MIN/{1.73_M2}
GLOBULIN UR ELPH-MCNC: 3.6 G/DL (ref 2–3.5)
GLUCOSE SERPL-MCNC: 234 MG/DL (ref 65–99)
HCT VFR BLD AUTO: 27.8 % (ref 37–47)
HGB BLD-MCNC: 8.3 G/DL (ref 12–16)
LYMPHOCYTES # BLD AUTO: 1.02 10*3/UL (ref 1–5)
LYMPHOCYTES NFR BLD AUTO: 20.1 % (ref 20–45)
MCH RBC QN AUTO: 24.2 PG (ref 27–31)
MCHC RBC AUTO-ENTMCNC: 29.9 G/DL (ref 33–37)
MCV RBC AUTO: 81 FL (ref 81–99)
MONOCYTES # BLD AUTO: 0.82 10*3/UL (ref 0.2–1.2)
MONOCYTES NFR BLD AUTO: 16.1 % (ref 3–10)
NEUTROPHILS # BLD AUTO: 2.95 10*3/UL (ref 2–8)
NEUTROPHILS NFR BLD AUTO: 58.1 % (ref 30–85)
NRBC CBCN: 0 % (ref 0–0.7)
OSMOLALITY SERPL CALC.SUM OF ELEC: 283 MOSM/KG (ref 273–304)
PLATELET # BLD AUTO: 112 10*3/UL (ref 130–400)
PMV BLD AUTO: 12.5 FL (ref 9.4–12.3)
POTASSIUM SERPL-SCNC: 4.1 MMOL/L (ref 3.5–5.3)
RBC # BLD AUTO: 3.43 10*6/UL (ref 4.2–5.4)
SODIUM SERPL-SCNC: 133 MMOL/L (ref 135–147)
WBC # BLD AUTO: 5.08 10*3/UL (ref 4.8–10.8)

## 2019-11-11 ENCOUNTER — HOSPITAL ENCOUNTER (OUTPATIENT)
Dept: OTHER | Facility: HOSPITAL | Age: 64
Discharge: HOME | End: 2019-11-11

## 2019-11-11 LAB
ALBUMIN SERPL-MCNC: 3.3 G/DL (ref 3.5–5)
ALBUMIN/GLOB SERPL: 1 {RATIO} (ref 1.4–2.6)
ALP SERPL-CCNC: 88 U/L (ref 43–160)
ALT SERPL-CCNC: 9 U/L (ref 10–40)
ANION GAP SERPL CALC-SCNC: 18 MMOL/L (ref 8–19)
AST SERPL-CCNC: 20 U/L (ref 15–50)
BASOPHILS # BLD AUTO: 0.02 10*3/UL (ref 0–0.2)
BASOPHILS NFR BLD AUTO: 0.4 % (ref 0–3)
BILIRUB SERPL-MCNC: 0.34 MG/DL (ref 0.2–1.3)
BUN SERPL-MCNC: 9 MG/DL (ref 5–25)
BUN/CREAT SERPL: 13 {RATIO} (ref 6–20)
CALCIUM SERPL-MCNC: 9.8 MG/DL (ref 8.7–10.4)
CHLORIDE SERPL-SCNC: 99 MMOL/L (ref 99–111)
CONV ABS IMM GRAN: 0.03 10*3/UL (ref 0–0.2)
CONV CO2: 21 MMOL/L (ref 22–32)
CONV IMMATURE GRAN: 0.7 % (ref 0–1.8)
CONV TOTAL PROTEIN: 6.7 G/DL (ref 6.3–8.2)
CREAT UR-MCNC: 0.69 MG/DL (ref 0.5–0.9)
CRP SERPL-MCNC: 22.9 MG/L (ref 0–5)
DEPRECATED RDW RBC AUTO: 47.7 FL (ref 36.4–46.3)
EOSINOPHIL # BLD AUTO: 0.21 10*3/UL (ref 0–0.7)
EOSINOPHIL # BLD AUTO: 4.6 % (ref 0–7)
ERYTHROCYTE [DISTWIDTH] IN BLOOD BY AUTOMATED COUNT: 16.6 % (ref 11.7–14.4)
ERYTHROCYTE [SEDIMENTATION RATE] IN BLOOD: 87 MM/H (ref 0–30)
GFR SERPLBLD BASED ON 1.73 SQ M-ARVRAT: >60 ML/MIN/{1.73_M2}
GLOBULIN UR ELPH-MCNC: 3.4 G/DL (ref 2–3.5)
GLUCOSE SERPL-MCNC: 216 MG/DL (ref 65–99)
HCT VFR BLD AUTO: 27.7 % (ref 37–47)
HGB BLD-MCNC: 8.3 G/DL (ref 12–16)
LYMPHOCYTES # BLD AUTO: 0.89 10*3/UL (ref 1–5)
LYMPHOCYTES NFR BLD AUTO: 19.3 % (ref 20–45)
MCH RBC QN AUTO: 23.6 PG (ref 27–31)
MCHC RBC AUTO-ENTMCNC: 30 G/DL (ref 33–37)
MCV RBC AUTO: 78.7 FL (ref 81–99)
MONOCYTES # BLD AUTO: 0.56 10*3/UL (ref 0.2–1.2)
MONOCYTES NFR BLD AUTO: 12.1 % (ref 3–10)
NEUTROPHILS # BLD AUTO: 2.9 10*3/UL (ref 2–8)
NEUTROPHILS NFR BLD AUTO: 62.9 % (ref 30–85)
NRBC CBCN: 0 % (ref 0–0.7)
OSMOLALITY SERPL CALC.SUM OF ELEC: 283 MOSM/KG (ref 273–304)
PLATELET # BLD AUTO: 145 10*3/UL (ref 130–400)
PMV BLD AUTO: 11.1 FL (ref 9.4–12.3)
POTASSIUM SERPL-SCNC: 4.2 MMOL/L (ref 3.5–5.3)
RBC # BLD AUTO: 3.52 10*6/UL (ref 4.2–5.4)
SODIUM SERPL-SCNC: 134 MMOL/L (ref 135–147)
WBC # BLD AUTO: 4.61 10*3/UL (ref 4.8–10.8)

## 2019-11-18 ENCOUNTER — HOSPITAL ENCOUNTER (OUTPATIENT)
Dept: OTHER | Facility: HOSPITAL | Age: 64
Discharge: HOME | End: 2019-11-18

## 2019-11-18 LAB
ALBUMIN SERPL-MCNC: 3.3 G/DL (ref 3.5–5)
ALBUMIN/GLOB SERPL: 1 {RATIO} (ref 1.4–2.6)
ALP SERPL-CCNC: 81 U/L (ref 43–160)
ALT SERPL-CCNC: 9 U/L (ref 10–40)
ANION GAP SERPL CALC-SCNC: 16 MMOL/L (ref 8–19)
AST SERPL-CCNC: 18 U/L (ref 15–50)
BASOPHILS # BLD AUTO: 0.01 10*3/UL (ref 0–0.2)
BASOPHILS NFR BLD AUTO: 0.2 % (ref 0–3)
BILIRUB SERPL-MCNC: 0.36 MG/DL (ref 0.2–1.3)
BUN SERPL-MCNC: 11 MG/DL (ref 5–25)
BUN/CREAT SERPL: 17 {RATIO} (ref 6–20)
CALCIUM SERPL-MCNC: 10.1 MG/DL (ref 8.7–10.4)
CHLORIDE SERPL-SCNC: 98 MMOL/L (ref 99–111)
CK SERPL-CCNC: 21 U/L (ref 35–230)
CONV ABS IMM GRAN: 0.02 10*3/UL (ref 0–0.2)
CONV CO2: 23 MMOL/L (ref 22–32)
CONV IMMATURE GRAN: 0.4 % (ref 0–1.8)
CONV TOTAL PROTEIN: 6.7 G/DL (ref 6.3–8.2)
CREAT UR-MCNC: 0.66 MG/DL (ref 0.5–0.9)
CRP SERPL-MCNC: 18.5 MG/L (ref 0–5)
DEPRECATED RDW RBC AUTO: 45.3 FL (ref 36.4–46.3)
EOSINOPHIL # BLD AUTO: 0.21 10*3/UL (ref 0–0.7)
EOSINOPHIL # BLD AUTO: 4.1 % (ref 0–7)
ERYTHROCYTE [DISTWIDTH] IN BLOOD BY AUTOMATED COUNT: 16.3 % (ref 11.7–14.4)
ERYTHROCYTE [SEDIMENTATION RATE] IN BLOOD: 75 MM/H (ref 0–30)
GFR SERPLBLD BASED ON 1.73 SQ M-ARVRAT: >60 ML/MIN/{1.73_M2}
GLOBULIN UR ELPH-MCNC: 3.4 G/DL (ref 2–3.5)
GLUCOSE SERPL-MCNC: 158 MG/DL (ref 65–99)
HCT VFR BLD AUTO: 26.9 % (ref 37–47)
HGB BLD-MCNC: 8.3 G/DL (ref 12–16)
LYMPHOCYTES # BLD AUTO: 1.02 10*3/UL (ref 1–5)
LYMPHOCYTES NFR BLD AUTO: 19.8 % (ref 20–45)
MCH RBC QN AUTO: 23.4 PG (ref 27–31)
MCHC RBC AUTO-ENTMCNC: 30.9 G/DL (ref 33–37)
MCV RBC AUTO: 76 FL (ref 81–99)
MONOCYTES # BLD AUTO: 0.86 10*3/UL (ref 0.2–1.2)
MONOCYTES NFR BLD AUTO: 16.7 % (ref 3–10)
NEUTROPHILS # BLD AUTO: 3.03 10*3/UL (ref 2–8)
NEUTROPHILS NFR BLD AUTO: 58.8 % (ref 30–85)
NRBC CBCN: 0 % (ref 0–0.7)
OSMOLALITY SERPL CALC.SUM OF ELEC: 279 MOSM/KG (ref 273–304)
PLATELET # BLD AUTO: 122 10*3/UL (ref 130–400)
PMV BLD AUTO: 9.9 FL (ref 9.4–12.3)
POTASSIUM SERPL-SCNC: 4.2 MMOL/L (ref 3.5–5.3)
RBC # BLD AUTO: 3.54 10*6/UL (ref 4.2–5.4)
SODIUM SERPL-SCNC: 133 MMOL/L (ref 135–147)
WBC # BLD AUTO: 5.15 10*3/UL (ref 4.8–10.8)

## 2019-11-25 ENCOUNTER — HOSPITAL ENCOUNTER (OUTPATIENT)
Dept: OTHER | Facility: HOSPITAL | Age: 64
Discharge: HOME | End: 2019-11-25

## 2019-11-25 LAB
ALBUMIN SERPL-MCNC: 3.2 G/DL (ref 3.5–5)
ALBUMIN/GLOB SERPL: 0.9 {RATIO} (ref 1.4–2.6)
ALP SERPL-CCNC: 77 U/L (ref 43–160)
ALT SERPL-CCNC: 8 U/L (ref 10–40)
ANION GAP SERPL CALC-SCNC: 19 MMOL/L (ref 8–19)
AST SERPL-CCNC: 22 U/L (ref 15–50)
BASOPHILS # BLD AUTO: 0.01 10*3/UL (ref 0–0.2)
BASOPHILS NFR BLD AUTO: 0.3 % (ref 0–3)
BILIRUB SERPL-MCNC: 0.22 MG/DL (ref 0.2–1.3)
BUN SERPL-MCNC: 13 MG/DL (ref 5–25)
BUN/CREAT SERPL: 19 {RATIO} (ref 6–20)
CALCIUM SERPL-MCNC: 10.1 MG/DL (ref 8.7–10.4)
CHLORIDE SERPL-SCNC: 99 MMOL/L (ref 99–111)
CK SERPL-CCNC: 13 U/L (ref 35–230)
CONV ABS IMM GRAN: 0.02 10*3/UL (ref 0–0.2)
CONV CO2: 20 MMOL/L (ref 22–32)
CONV IMMATURE GRAN: 0.6 % (ref 0–1.8)
CONV TOTAL PROTEIN: 6.6 G/DL (ref 6.3–8.2)
CREAT UR-MCNC: 0.69 MG/DL (ref 0.5–0.9)
CRP SERPL-MCNC: 13.4 MG/L (ref 0–5)
DEPRECATED RDW RBC AUTO: 44.5 FL (ref 36.4–46.3)
EOSINOPHIL # BLD AUTO: 0.16 10*3/UL (ref 0–0.7)
EOSINOPHIL # BLD AUTO: 5 % (ref 0–7)
ERYTHROCYTE [DISTWIDTH] IN BLOOD BY AUTOMATED COUNT: 16.2 % (ref 11.7–14.4)
ERYTHROCYTE [SEDIMENTATION RATE] IN BLOOD: 65 MM/H (ref 0–30)
GFR SERPLBLD BASED ON 1.73 SQ M-ARVRAT: >60 ML/MIN/{1.73_M2}
GLOBULIN UR ELPH-MCNC: 3.4 G/DL (ref 2–3.5)
GLUCOSE SERPL-MCNC: 192 MG/DL (ref 65–99)
HCT VFR BLD AUTO: 27 % (ref 37–47)
HGB BLD-MCNC: 7.9 G/DL (ref 12–16)
LYMPHOCYTES # BLD AUTO: 0.93 10*3/UL (ref 1–5)
LYMPHOCYTES NFR BLD AUTO: 28.8 % (ref 20–45)
MCH RBC QN AUTO: 22.3 PG (ref 27–31)
MCHC RBC AUTO-ENTMCNC: 29.3 G/DL (ref 33–37)
MCV RBC AUTO: 76.1 FL (ref 81–99)
MONOCYTES # BLD AUTO: 0.7 10*3/UL (ref 0.2–1.2)
MONOCYTES NFR BLD AUTO: 21.7 % (ref 3–10)
NEUTROPHILS # BLD AUTO: 1.41 10*3/UL (ref 2–8)
NEUTROPHILS NFR BLD AUTO: 43.6 % (ref 30–85)
NRBC CBCN: 0 % (ref 0–0.7)
OSMOLALITY SERPL CALC.SUM OF ELEC: 281 MOSM/KG (ref 273–304)
PLATELET # BLD AUTO: 118 10*3/UL (ref 130–400)
PMV BLD AUTO: 10.8 FL (ref 9.4–12.3)
POTASSIUM SERPL-SCNC: 4.5 MMOL/L (ref 3.5–5.3)
RBC # BLD AUTO: 3.55 10*6/UL (ref 4.2–5.4)
SODIUM SERPL-SCNC: 133 MMOL/L (ref 135–147)
WBC # BLD AUTO: 3.23 10*3/UL (ref 4.8–10.8)

## 2019-12-02 ENCOUNTER — HOSPITAL ENCOUNTER (OUTPATIENT)
Dept: OTHER | Facility: HOSPITAL | Age: 64
Discharge: HOME | End: 2019-12-02

## 2019-12-02 LAB
ALBUMIN SERPL-MCNC: 3.5 G/DL (ref 3.5–5)
ALBUMIN/GLOB SERPL: 0.9 {RATIO} (ref 1.4–2.6)
ALP SERPL-CCNC: 85 U/L (ref 43–160)
ALT SERPL-CCNC: 10 U/L (ref 10–40)
ANION GAP SERPL CALC-SCNC: 19 MMOL/L (ref 8–19)
AST SERPL-CCNC: 20 U/L (ref 15–50)
BASOPHILS # BLD AUTO: 0.02 10*3/UL (ref 0–0.2)
BASOPHILS NFR BLD AUTO: 0.6 % (ref 0–3)
BILIRUB SERPL-MCNC: 0.31 MG/DL (ref 0.2–1.3)
BUN SERPL-MCNC: 12 MG/DL (ref 5–25)
BUN/CREAT SERPL: 16 {RATIO} (ref 6–20)
CALCIUM SERPL-MCNC: 10 MG/DL (ref 8.7–10.4)
CHLORIDE SERPL-SCNC: 98 MMOL/L (ref 99–111)
CK SERPL-CCNC: 17 U/L (ref 35–230)
CONV ABS IMM GRAN: 0.01 10*3/UL (ref 0–0.2)
CONV CO2: 20 MMOL/L (ref 22–32)
CONV IMMATURE GRAN: 0.3 % (ref 0–1.8)
CONV TOTAL PROTEIN: 7.2 G/DL (ref 6.3–8.2)
CREAT UR-MCNC: 0.73 MG/DL (ref 0.5–0.9)
CRP SERPL-MCNC: 14 MG/L (ref 0–5)
DEPRECATED RDW RBC AUTO: 45 FL (ref 36.4–46.3)
EOSINOPHIL # BLD AUTO: 0.15 10*3/UL (ref 0–0.7)
EOSINOPHIL # BLD AUTO: 4.8 % (ref 0–7)
ERYTHROCYTE [DISTWIDTH] IN BLOOD BY AUTOMATED COUNT: 16.4 % (ref 11.7–14.4)
ERYTHROCYTE [SEDIMENTATION RATE] IN BLOOD: 68 MM/H (ref 0–30)
GFR SERPLBLD BASED ON 1.73 SQ M-ARVRAT: >60 ML/MIN/{1.73_M2}
GLOBULIN UR ELPH-MCNC: 3.7 G/DL (ref 2–3.5)
GLUCOSE SERPL-MCNC: 277 MG/DL (ref 65–99)
HCT VFR BLD AUTO: 29.6 % (ref 37–47)
HGB BLD-MCNC: 8.7 G/DL (ref 12–16)
LYMPHOCYTES # BLD AUTO: 0.8 10*3/UL (ref 1–5)
LYMPHOCYTES NFR BLD AUTO: 25.6 % (ref 20–45)
MCH RBC QN AUTO: 22.1 PG (ref 27–31)
MCHC RBC AUTO-ENTMCNC: 29.4 G/DL (ref 33–37)
MCV RBC AUTO: 75.1 FL (ref 81–99)
MONOCYTES # BLD AUTO: 0.69 10*3/UL (ref 0.2–1.2)
MONOCYTES NFR BLD AUTO: 22.1 % (ref 3–10)
NEUTROPHILS # BLD AUTO: 1.45 10*3/UL (ref 2–8)
NEUTROPHILS NFR BLD AUTO: 46.6 % (ref 30–85)
NRBC CBCN: 0 % (ref 0–0.7)
OSMOLALITY SERPL CALC.SUM OF ELEC: 286 MOSM/KG (ref 273–304)
PLATELET # BLD AUTO: 117 10*3/UL (ref 130–400)
PMV BLD AUTO: 10.6 FL (ref 9.4–12.3)
POTASSIUM SERPL-SCNC: 4.2 MMOL/L (ref 3.5–5.3)
RBC # BLD AUTO: 3.94 10*6/UL (ref 4.2–5.4)
SODIUM SERPL-SCNC: 133 MMOL/L (ref 135–147)
WBC # BLD AUTO: 3.12 10*3/UL (ref 4.8–10.8)

## 2019-12-11 ENCOUNTER — HOSPITAL ENCOUNTER (OUTPATIENT)
Dept: OTHER | Facility: HOSPITAL | Age: 64
Discharge: HOME | End: 2019-12-11

## 2019-12-11 LAB
ALBUMIN SERPL-MCNC: 3.6 G/DL (ref 3.5–5)
ALBUMIN/GLOB SERPL: 1.1 {RATIO} (ref 1.4–2.6)
ALP SERPL-CCNC: 88 U/L (ref 43–160)
ALT SERPL-CCNC: 8 U/L (ref 10–40)
ANION GAP SERPL CALC-SCNC: 14 MMOL/L (ref 8–19)
AST SERPL-CCNC: 17 U/L (ref 15–50)
BASOPHILS # BLD AUTO: 0.03 10*3/UL (ref 0–0.2)
BASOPHILS NFR BLD AUTO: 1.1 % (ref 0–3)
BILIRUB SERPL-MCNC: 0.32 MG/DL (ref 0.2–1.3)
BUN SERPL-MCNC: 15 MG/DL (ref 5–25)
BUN/CREAT SERPL: 20 {RATIO} (ref 6–20)
CALCIUM SERPL-MCNC: 10 MG/DL (ref 8.7–10.4)
CHLORIDE SERPL-SCNC: 100 MMOL/L (ref 99–111)
CK SERPL-CCNC: 14 U/L (ref 35–230)
CONV ABS IMM GRAN: 0 10*3/UL (ref 0–0.2)
CONV CO2: 23 MMOL/L (ref 22–32)
CONV IMMATURE GRAN: 0 % (ref 0–1.8)
CONV TOTAL PROTEIN: 7 G/DL (ref 6.3–8.2)
CREAT UR-MCNC: 0.75 MG/DL (ref 0.5–0.9)
CRP SERPL-MCNC: 28.8 MG/L (ref 0–5)
DEPRECATED RDW RBC AUTO: 43.9 FL (ref 36.4–46.3)
EOSINOPHIL # BLD AUTO: 0.15 10*3/UL (ref 0–0.7)
EOSINOPHIL # BLD AUTO: 5.5 % (ref 0–7)
ERYTHROCYTE [DISTWIDTH] IN BLOOD BY AUTOMATED COUNT: 16.5 % (ref 11.7–14.4)
ERYTHROCYTE [SEDIMENTATION RATE] IN BLOOD: 57 MM/H (ref 0–30)
GFR SERPLBLD BASED ON 1.73 SQ M-ARVRAT: >60 ML/MIN/{1.73_M2}
GLOBULIN UR ELPH-MCNC: 3.4 G/DL (ref 2–3.5)
GLUCOSE SERPL-MCNC: 197 MG/DL (ref 65–99)
HCT VFR BLD AUTO: 31 % (ref 37–47)
HGB BLD-MCNC: 9.2 G/DL (ref 12–16)
LYMPHOCYTES # BLD AUTO: 0.86 10*3/UL (ref 1–5)
LYMPHOCYTES NFR BLD AUTO: 31.7 % (ref 20–45)
MCH RBC QN AUTO: 21.8 PG (ref 27–31)
MCHC RBC AUTO-ENTMCNC: 29.7 G/DL (ref 33–37)
MCV RBC AUTO: 73.5 FL (ref 81–99)
MONOCYTES # BLD AUTO: 0.64 10*3/UL (ref 0.2–1.2)
MONOCYTES NFR BLD AUTO: 23.6 % (ref 3–10)
NEUTROPHILS # BLD AUTO: 1.03 10*3/UL (ref 2–8)
NEUTROPHILS NFR BLD AUTO: 38.1 % (ref 30–85)
NRBC CBCN: 0 % (ref 0–0.7)
OSMOLALITY SERPL CALC.SUM OF ELEC: 282 MOSM/KG (ref 273–304)
PLATELET # BLD AUTO: 152 10*3/UL (ref 130–400)
PMV BLD AUTO: 11 FL (ref 9.4–12.3)
POTASSIUM SERPL-SCNC: 4.2 MMOL/L (ref 3.5–5.3)
RBC # BLD AUTO: 4.22 10*6/UL (ref 4.2–5.4)
SODIUM SERPL-SCNC: 133 MMOL/L (ref 135–147)
WBC # BLD AUTO: 2.71 10*3/UL (ref 4.8–10.8)

## 2019-12-16 ENCOUNTER — HOSPITAL ENCOUNTER (OUTPATIENT)
Dept: OTHER | Facility: HOSPITAL | Age: 64
Discharge: HOME | End: 2019-12-16

## 2019-12-16 LAB
ALBUMIN SERPL-MCNC: 3.7 G/DL (ref 3.5–5)
ALBUMIN/GLOB SERPL: 1 {RATIO} (ref 1.4–2.6)
ALP SERPL-CCNC: 90 U/L (ref 43–160)
ALT SERPL-CCNC: 6 U/L (ref 10–40)
ANION GAP SERPL CALC-SCNC: 21 MMOL/L (ref 8–19)
AST SERPL-CCNC: 12 U/L (ref 15–50)
BASOPHILS # BLD AUTO: 0.02 10*3/UL (ref 0–0.2)
BASOPHILS # BLD: 1 % (ref 0–3)
BASOPHILS NFR BLD AUTO: 0.9 % (ref 0–3)
BILIRUB SERPL-MCNC: 0.46 MG/DL (ref 0.2–1.3)
BUN SERPL-MCNC: 12 MG/DL (ref 5–25)
BUN/CREAT SERPL: 18 {RATIO} (ref 6–20)
CALCIUM SERPL-MCNC: 10.1 MG/DL (ref 8.7–10.4)
CHLORIDE SERPL-SCNC: 98 MMOL/L (ref 99–111)
CLUMPED PLATELETS: SLIGHT
CONV ABS BANDS: 1 % (ref 1–5)
CONV ABS IMM GRAN: 0 10*3/UL (ref 0–0.2)
CONV ANISOCYTES: SLIGHT
CONV CO2: 20 MMOL/L (ref 22–32)
CONV IMMATURE GRAN: 0 % (ref 0–1.8)
CONV SEGMENTED NEUTROPHILS: 5 % (ref 45–70)
CONV TOTAL PROTEIN: 7.3 G/DL (ref 6.3–8.2)
CREAT UR-MCNC: 0.66 MG/DL (ref 0.5–0.9)
CRP SERPL-MCNC: 61.5 MG/L (ref 0–5)
DACRYOCYTES BLD QL SMEAR: ABNORMAL
DEPRECATED RDW RBC AUTO: 43 FL (ref 36.4–46.3)
EOSINOPHIL # BLD AUTO: 0.12 10*3/UL (ref 0–0.7)
EOSINOPHIL # BLD AUTO: 5.3 % (ref 0–7)
EOSINOPHIL NFR BLD AUTO: 6 % (ref 0–7)
ERYTHROCYTE [DISTWIDTH] IN BLOOD BY AUTOMATED COUNT: 16.6 % (ref 11.7–14.4)
ERYTHROCYTE [SEDIMENTATION RATE] IN BLOOD: 78 MM/H (ref 0–30)
GFR SERPLBLD BASED ON 1.73 SQ M-ARVRAT: >60 ML/MIN/{1.73_M2}
GLOBULIN UR ELPH-MCNC: 3.6 G/DL (ref 2–3.5)
GLUCOSE SERPL-MCNC: 132 MG/DL (ref 65–99)
HCT VFR BLD AUTO: 32.4 % (ref 37–47)
HGB BLD-MCNC: 9.4 G/DL (ref 12–16)
LARGE PLATELETS: SLIGHT
LYMPHOCYTES # BLD AUTO: 0.86 10*3/UL (ref 1–5)
LYMPHOCYTES NFR BLD AUTO: 38.2 % (ref 20–45)
MACROCYTES BLD QL SMEAR: SLIGHT
MCH RBC QN AUTO: 20.8 PG (ref 27–31)
MCHC RBC AUTO-ENTMCNC: 29 G/DL (ref 33–37)
MCV RBC AUTO: 71.8 FL (ref 81–99)
MICROCYTES BLD QL: SLIGHT
MONOCYTES # BLD AUTO: 1.17 10*3/UL (ref 0.2–1.2)
MONOCYTES NFR BLD AUTO: 52 % (ref 3–10)
MONOCYTES NFR BLD MANUAL: 64 % (ref 3–10)
NEUTROPHILS # BLD AUTO: 0.08 10*3/UL (ref 2–8)
NEUTROPHILS NFR BLD AUTO: 3.6 % (ref 30–85)
NRBC CBCN: 0 % (ref 0–0.7)
NUC CELL # PRT MANUAL: 0 /100{WBCS}
OSMOLALITY SERPL CALC.SUM OF ELEC: 282 MOSM/KG (ref 273–304)
OVALOCYTES BLD QL SMEAR: SLIGHT
PLAT MORPH BLD: NORMAL
PLATELET # BLD AUTO: 133 10*3/UL (ref 130–400)
PMV BLD AUTO: 10.7 FL (ref 9.4–12.3)
POIKILOCYTOSIS BLD QL SMEAR: SLIGHT
POTASSIUM SERPL-SCNC: 4.3 MMOL/L (ref 3.5–5.3)
RBC # BLD AUTO: 4.51 10*6/UL (ref 4.2–5.4)
SMALL PLATELETS BLD QL SMEAR: ADEQUATE
SODIUM SERPL-SCNC: 135 MMOL/L (ref 135–147)
VARIANT LYMPHS NFR BLD MANUAL: 23 % (ref 20–45)
WBC # BLD AUTO: 2.25 10*3/UL (ref 4.8–10.8)

## 2019-12-27 ENCOUNTER — HOSPITAL ENCOUNTER (OUTPATIENT)
Dept: INFUSION THERAPY | Facility: HOSPITAL | Age: 64
Setting detail: HOSPITAL OUTPATIENT SURGERY
Discharge: NURSING, REHAB OR LONG-TERM CARE | End: 2019-12-27
Attending: INTERNAL MEDICINE

## 2019-12-27 LAB
ABO GROUP BLD: NORMAL
ALBUMIN SERPL-MCNC: 3.1 G/DL (ref 3.5–5)
ALP SERPL-CCNC: 86 U/L (ref 43–160)
ALT SERPL-CCNC: 9 U/L (ref 10–40)
AST SERPL-CCNC: 14 U/L (ref 15–50)
BASOPHILS # BLD AUTO: 0.02 10*3/UL (ref 0–0.2)
BASOPHILS # BLD: 0 % (ref 0–3)
BASOPHILS NFR BLD AUTO: 0.6 % (ref 0–3)
BILIRUB SERPL-MCNC: 0.34 MG/DL (ref 0.2–1.3)
BLD GP AB SCN SERPL QL: NORMAL
CONV ABD CONTROL: NORMAL
CONV ABS BANDS: 0 % (ref 1–5)
CONV ABS IMM GRAN: 0.01 10*3/UL (ref 0–0.2)
CONV ANISOCYTES: SLIGHT
CONV ATYPICAL LYMPHOCYTES: 2 % (ref 0–5)
CONV BILI, CONJUGATED: <0.2 MG/DL (ref 0–0.6)
CONV HYPOCHROMIA IN BLOOD BY LIGHT MICROSCOPY: SLIGHT
CONV IMMATURE GRAN: 0.3 % (ref 0–1.8)
CONV SEGMENTED NEUTROPHILS: 64 % (ref 45–70)
CONV TOTAL PROTEIN: 6.2 G/DL (ref 6.3–8.2)
CONV UNCONJUGATED BILIRUBIN: 0.1 MG/DL (ref 0–1.1)
DEPRECATED RDW RBC AUTO: 42.9 FL (ref 36.4–46.3)
EOSINOPHIL # BLD AUTO: 0.2 10*3/UL (ref 0–0.7)
EOSINOPHIL # BLD AUTO: 5.6 % (ref 0–7)
EOSINOPHIL NFR BLD AUTO: 2 % (ref 0–7)
ERYTHROCYTE [DISTWIDTH] IN BLOOD BY AUTOMATED COUNT: 17.3 % (ref 11.7–14.4)
FOLATE SERPL-MCNC: 6.6 NG/ML (ref 4.8–20)
GLUCOSE BLD-MCNC: 154 MG/DL (ref 65–99)
GLUCOSE BLD-MCNC: 232 MG/DL (ref 65–99)
HCT VFR BLD AUTO: 26 % (ref 37–47)
HGB BLD-MCNC: 7.7 G/DL (ref 12–16)
IRON SATN MFR SERPL: 4 % (ref 20–55)
IRON SERPL-MCNC: 17 UG/DL (ref 60–170)
LARGE PLATELETS: ABNORMAL
LYMPHOCYTES # BLD AUTO: 0.67 10*3/UL (ref 1–5)
LYMPHOCYTES NFR BLD AUTO: 18.7 % (ref 20–45)
MACROCYTES BLD QL SMEAR: SLIGHT
MCH RBC QN AUTO: 20.6 PG (ref 27–31)
MCHC RBC AUTO-ENTMCNC: 29.6 G/DL (ref 33–37)
MCV RBC AUTO: 69.7 FL (ref 81–99)
MONOCYTES # BLD AUTO: 0.61 10*3/UL (ref 0.2–1.2)
MONOCYTES NFR BLD AUTO: 17 % (ref 3–10)
MONOCYTES NFR BLD MANUAL: 14 % (ref 3–10)
NEUTROPHILS # BLD AUTO: 2.08 10*3/UL (ref 2–8)
NEUTROPHILS NFR BLD AUTO: 57.8 % (ref 30–85)
NRBC CBCN: 0 % (ref 0–0.7)
NUC CELL # PRT MANUAL: 0 /100{WBCS}
PLAT MORPH BLD: NORMAL
PLATELET # BLD AUTO: 140 10*3/UL (ref 130–400)
PMV BLD AUTO: 10 FL (ref 9.4–12.3)
POIKILOCYTOSIS BLD QL SMEAR: SLIGHT
RBC # BLD AUTO: 3.73 10*6/UL (ref 4.2–5.4)
RETICS # AUTO: 0.08 10*6/UL (ref 0.02–0.08)
RETICS/RBC NFR AUTO: 2.11 % (ref 0.5–1.7)
RH BLD: NORMAL
SMALL PLATELETS BLD QL SMEAR: ADEQUATE
TIBC SERPL-MCNC: 379 UG/DL (ref 245–450)
TRANSFERRIN SERPL-MCNC: 265 MG/DL (ref 250–380)
VARIANT LYMPHS NFR BLD MANUAL: 18 % (ref 20–45)
VIT B12 SERPL-MCNC: >2000 PG/ML (ref 211–911)
WBC # BLD AUTO: 3.59 10*3/UL (ref 4.8–10.8)

## 2019-12-29 ENCOUNTER — HOSPITAL ENCOUNTER (OUTPATIENT)
Dept: NUCLEAR MEDICINE | Facility: HOSPITAL | Age: 64
Discharge: HOME | End: 2019-12-29
Attending: INTERNAL MEDICINE

## 2020-01-23 ENCOUNTER — HOSPITAL ENCOUNTER (OUTPATIENT)
Dept: OTHER | Facility: HOSPITAL | Age: 65
Discharge: HOME | End: 2020-01-23
Attending: ORTHOPAEDIC SURGERY

## 2020-01-23 LAB
ALBUMIN SERPL-MCNC: 2.7 G/DL (ref 3.5–5)
ALBUMIN/GLOB SERPL: 0.8 {RATIO} (ref 1.4–2.6)
ALP SERPL-CCNC: 114 U/L (ref 43–160)
ALT SERPL-CCNC: 7 U/L (ref 10–40)
ANION GAP SERPL CALC-SCNC: 16 MMOL/L (ref 8–19)
AST SERPL-CCNC: 14 U/L (ref 15–50)
BASOPHILS # BLD AUTO: 0.03 10*3/UL (ref 0–0.2)
BASOPHILS NFR BLD AUTO: 0.8 % (ref 0–3)
BILIRUB SERPL-MCNC: 0.43 MG/DL (ref 0.2–1.3)
BUN SERPL-MCNC: 10 MG/DL (ref 5–25)
BUN/CREAT SERPL: 18 {RATIO} (ref 6–20)
BURR CELLS BLD QL SMEAR: NORMAL
C3 FRG RBC-MCNC: NORMAL
CALCIUM SERPL-MCNC: 10.1 MG/DL (ref 8.7–10.4)
CHLORIDE SERPL-SCNC: 96 MMOL/L (ref 99–111)
CK SERPL-CCNC: 19 U/L (ref 35–230)
CONV ABS IMM GRAN: 0.01 10*3/UL (ref 0–0.2)
CONV ANISOCYTES: SLIGHT
CONV BASOPHILIC STIPPLING IN BLOOD BY LIGHT MICROSCOPY: NORMAL
CONV CO2: 24 MMOL/L (ref 22–32)
CONV HYPOCHROMIA IN BLOOD BY LIGHT MICROSCOPY: SLIGHT
CONV IMMATURE GRAN: 0.3 % (ref 0–1.8)
CONV TOTAL PROTEIN: 6.1 G/DL (ref 6.3–8.2)
CREAT UR-MCNC: 0.57 MG/DL (ref 0.5–0.9)
CRP SERPL-MCNC: 22.5 MG/L (ref 0–5)
DACRYOCYTES BLD QL SMEAR: SLIGHT
DEPRECATED RDW RBC AUTO: 67.4 FL (ref 36.4–46.3)
EOSINOPHIL # BLD AUTO: 0.14 10*3/UL (ref 0–0.7)
EOSINOPHIL # BLD AUTO: 3.5 % (ref 0–7)
ERYTHROCYTE [DISTWIDTH] IN BLOOD BY AUTOMATED COUNT: 24.8 % (ref 11.7–14.4)
ERYTHROCYTE [SEDIMENTATION RATE] IN BLOOD: 53 MM/H (ref 0–30)
GFR SERPLBLD BASED ON 1.73 SQ M-ARVRAT: >60 ML/MIN/{1.73_M2}
GLOBULIN UR ELPH-MCNC: 3.4 G/DL (ref 2–3.5)
GLUCOSE SERPL-MCNC: 157 MG/DL (ref 65–99)
HCT VFR BLD AUTO: 35.5 % (ref 37–47)
HGB BLD-MCNC: 10.9 G/DL (ref 12–16)
LYMPHOCYTES # BLD AUTO: 0.92 10*3/UL (ref 1–5)
LYMPHOCYTES NFR BLD AUTO: 23.3 % (ref 20–45)
MACROCYTES BLD QL SMEAR: SLIGHT
MCH RBC QN AUTO: 23.5 PG (ref 27–31)
MCHC RBC AUTO-ENTMCNC: 30.7 G/DL (ref 33–37)
MCV RBC AUTO: 76.5 FL (ref 81–99)
MICROCYTES BLD QL: SLIGHT
MONOCYTES # BLD AUTO: 1.06 10*3/UL (ref 0.2–1.2)
MONOCYTES NFR BLD AUTO: 26.8 % (ref 3–10)
NEUTROPHILS # BLD AUTO: 1.79 10*3/UL (ref 2–8)
NEUTROPHILS NFR BLD AUTO: 45.3 % (ref 30–85)
NRBC CBCN: 0 % (ref 0–0.7)
OSMOLALITY SERPL CALC.SUM OF ELEC: 274 MOSM/KG (ref 273–304)
OVALOCYTES BLD QL SMEAR: SLIGHT
PLATELET # BLD AUTO: 179 10*3/UL (ref 130–400)
PMV BLD AUTO: 10.5 FL (ref 9.4–12.3)
POIKILOCYTOSIS BLD QL SMEAR: SLIGHT
POLYCHROMASIA BLD QL SMEAR: NORMAL
POTASSIUM SERPL-SCNC: 4.9 MMOL/L (ref 3.5–5.3)
RBC # BLD AUTO: 4.64 10*6/UL (ref 4.2–5.4)
SODIUM SERPL-SCNC: 131 MMOL/L (ref 135–147)
WBC # BLD AUTO: 3.95 10*3/UL (ref 4.8–10.8)

## 2020-01-29 ENCOUNTER — HOSPITAL ENCOUNTER (OUTPATIENT)
Dept: OTHER | Facility: HOSPITAL | Age: 65
Discharge: HOME | End: 2020-01-29
Attending: INTERNAL MEDICINE

## 2020-01-29 LAB
ALBUMIN SERPL-MCNC: 2.9 G/DL (ref 3.5–5)
ALBUMIN/GLOB SERPL: 0.9 {RATIO} (ref 1.4–2.6)
ALP SERPL-CCNC: 108 U/L (ref 43–160)
ALT SERPL-CCNC: 7 U/L (ref 10–40)
ANION GAP SERPL CALC-SCNC: 16 MMOL/L (ref 8–19)
AST SERPL-CCNC: 13 U/L (ref 15–50)
BASOPHILS # BLD AUTO: 0.04 10*3/UL (ref 0–0.2)
BASOPHILS NFR BLD AUTO: 1 % (ref 0–3)
BILIRUB SERPL-MCNC: 0.37 MG/DL (ref 0.2–1.3)
BUN SERPL-MCNC: 10 MG/DL (ref 5–25)
BUN/CREAT SERPL: 20 {RATIO} (ref 6–20)
CALCIUM SERPL-MCNC: 9.9 MG/DL (ref 8.7–10.4)
CHLORIDE SERPL-SCNC: 99 MMOL/L (ref 99–111)
CK SERPL-CCNC: 23 U/L (ref 35–230)
CONV ABS IMM GRAN: 0.02 10*3/UL (ref 0–0.2)
CONV CO2: 24 MMOL/L (ref 22–32)
CONV IMMATURE GRAN: 0.5 % (ref 0–1.8)
CONV TOTAL PROTEIN: 6.1 G/DL (ref 6.3–8.2)
CREAT UR-MCNC: 0.5 MG/DL (ref 0.5–0.9)
CRP SERPL-MCNC: 26.2 MG/L (ref 0–5)
DEPRECATED RDW RBC AUTO: 68.2 FL (ref 36.4–46.3)
EOSINOPHIL # BLD AUTO: 0.15 10*3/UL (ref 0–0.7)
EOSINOPHIL # BLD AUTO: 3.8 % (ref 0–7)
ERYTHROCYTE [DISTWIDTH] IN BLOOD BY AUTOMATED COUNT: 25 % (ref 11.7–14.4)
ERYTHROCYTE [SEDIMENTATION RATE] IN BLOOD: 64 MM/H (ref 0–30)
GFR SERPLBLD BASED ON 1.73 SQ M-ARVRAT: >60 ML/MIN/{1.73_M2}
GLOBULIN UR ELPH-MCNC: 3.2 G/DL (ref 2–3.5)
GLUCOSE SERPL-MCNC: 146 MG/DL (ref 65–99)
HCT VFR BLD AUTO: 35.9 % (ref 37–47)
HGB BLD-MCNC: 11 G/DL (ref 12–16)
LYMPHOCYTES # BLD AUTO: 0.78 10*3/UL (ref 1–5)
LYMPHOCYTES NFR BLD AUTO: 19.8 % (ref 20–45)
MCH RBC QN AUTO: 23.6 PG (ref 27–31)
MCHC RBC AUTO-ENTMCNC: 30.6 G/DL (ref 33–37)
MCV RBC AUTO: 77 FL (ref 81–99)
MONOCYTES # BLD AUTO: 0.89 10*3/UL (ref 0.2–1.2)
MONOCYTES NFR BLD AUTO: 22.6 % (ref 3–10)
NEUTROPHILS # BLD AUTO: 2.06 10*3/UL (ref 2–8)
NEUTROPHILS NFR BLD AUTO: 52.3 % (ref 30–85)
NRBC CBCN: 0 % (ref 0–0.7)
OSMOLALITY SERPL CALC.SUM OF ELEC: 280 MOSM/KG (ref 273–304)
PLATELET # BLD AUTO: 184 10*3/UL (ref 130–400)
PMV BLD AUTO: 10.2 FL (ref 9.4–12.3)
POTASSIUM SERPL-SCNC: 4.5 MMOL/L (ref 3.5–5.3)
RBC # BLD AUTO: 4.66 10*6/UL (ref 4.2–5.4)
SODIUM SERPL-SCNC: 134 MMOL/L (ref 135–147)
WBC # BLD AUTO: 3.94 10*3/UL (ref 4.8–10.8)

## 2020-02-05 ENCOUNTER — HOSPITAL ENCOUNTER (OUTPATIENT)
Dept: OTHER | Facility: HOSPITAL | Age: 65
Discharge: HOME | End: 2020-02-05
Attending: INTERNAL MEDICINE

## 2020-02-05 LAB
ALBUMIN SERPL-MCNC: 3.1 G/DL (ref 3.5–5)
ALBUMIN/GLOB SERPL: 0.9 {RATIO} (ref 1.4–2.6)
ALP SERPL-CCNC: 101 U/L (ref 43–160)
ALT SERPL-CCNC: 10 U/L (ref 10–40)
ANION GAP SERPL CALC-SCNC: 18 MMOL/L (ref 8–19)
AST SERPL-CCNC: 20 U/L (ref 15–50)
BASOPHILS # BLD AUTO: 0.03 10*3/UL (ref 0–0.2)
BASOPHILS NFR BLD AUTO: 0.6 % (ref 0–3)
BILIRUB SERPL-MCNC: 0.32 MG/DL (ref 0.2–1.3)
BUN SERPL-MCNC: 11 MG/DL (ref 5–25)
BUN/CREAT SERPL: 18 {RATIO} (ref 6–20)
CALCIUM SERPL-MCNC: 10.1 MG/DL (ref 8.7–10.4)
CHLORIDE SERPL-SCNC: 100 MMOL/L (ref 99–111)
CONV ABS IMM GRAN: 0.02 10*3/UL (ref 0–0.2)
CONV CO2: 22 MMOL/L (ref 22–32)
CONV IMMATURE GRAN: 0.4 % (ref 0–1.8)
CONV TOTAL PROTEIN: 6.4 G/DL (ref 6.3–8.2)
CREAT UR-MCNC: 0.61 MG/DL (ref 0.5–0.9)
CRP SERPL-MCNC: 17.2 MG/L (ref 0–5)
DEPRECATED RDW RBC AUTO: 68.2 FL (ref 36.4–46.3)
EOSINOPHIL # BLD AUTO: 0.33 10*3/UL (ref 0–0.7)
EOSINOPHIL # BLD AUTO: 6.8 % (ref 0–7)
ERYTHROCYTE [DISTWIDTH] IN BLOOD BY AUTOMATED COUNT: 24.9 % (ref 11.7–14.4)
ERYTHROCYTE [SEDIMENTATION RATE] IN BLOOD: 60 MM/H (ref 0–30)
GFR SERPLBLD BASED ON 1.73 SQ M-ARVRAT: >60 ML/MIN/{1.73_M2}
GLOBULIN UR ELPH-MCNC: 3.3 G/DL (ref 2–3.5)
GLUCOSE SERPL-MCNC: 123 MG/DL (ref 65–99)
HCT VFR BLD AUTO: 36.2 % (ref 37–47)
HGB BLD-MCNC: 11.2 G/DL (ref 12–16)
LYMPHOCYTES # BLD AUTO: 0.91 10*3/UL (ref 1–5)
LYMPHOCYTES NFR BLD AUTO: 18.8 % (ref 20–45)
MCH RBC QN AUTO: 24.1 PG (ref 27–31)
MCHC RBC AUTO-ENTMCNC: 30.9 G/DL (ref 33–37)
MCV RBC AUTO: 77.8 FL (ref 81–99)
MONOCYTES # BLD AUTO: 0.78 10*3/UL (ref 0.2–1.2)
MONOCYTES NFR BLD AUTO: 16.1 % (ref 3–10)
NEUTROPHILS # BLD AUTO: 2.76 10*3/UL (ref 2–8)
NEUTROPHILS NFR BLD AUTO: 57.3 % (ref 30–85)
NRBC CBCN: 0 % (ref 0–0.7)
OSMOLALITY SERPL CALC.SUM OF ELEC: 283 MOSM/KG (ref 273–304)
PLATELET # BLD AUTO: 183 10*3/UL (ref 130–400)
PMV BLD AUTO: 9.7 FL (ref 9.4–12.3)
POTASSIUM SERPL-SCNC: 4 MMOL/L (ref 3.5–5.3)
RBC # BLD AUTO: 4.65 10*6/UL (ref 4.2–5.4)
SODIUM SERPL-SCNC: 136 MMOL/L (ref 135–147)
WBC # BLD AUTO: 4.83 10*3/UL (ref 4.8–10.8)

## 2020-02-12 ENCOUNTER — HOSPITAL ENCOUNTER (OUTPATIENT)
Dept: OTHER | Facility: HOSPITAL | Age: 65
Discharge: HOME | End: 2020-02-12
Attending: INTERNAL MEDICINE

## 2020-02-12 LAB
ALBUMIN SERPL-MCNC: 3.1 G/DL (ref 3.5–5)
ALBUMIN/GLOB SERPL: 1 {RATIO} (ref 1.4–2.6)
ALP SERPL-CCNC: 98 U/L (ref 43–160)
ALT SERPL-CCNC: 7 U/L (ref 10–40)
ANION GAP SERPL CALC-SCNC: 18 MMOL/L (ref 8–19)
AST SERPL-CCNC: 13 U/L (ref 15–50)
BASOPHILS # BLD AUTO: 0.03 10*3/UL (ref 0–0.2)
BASOPHILS NFR BLD AUTO: 0.7 % (ref 0–3)
BILIRUB SERPL-MCNC: 0.39 MG/DL (ref 0.2–1.3)
BUN SERPL-MCNC: 13 MG/DL (ref 5–25)
BUN/CREAT SERPL: 23 {RATIO} (ref 6–20)
CALCIUM SERPL-MCNC: 9.9 MG/DL (ref 8.7–10.4)
CHLORIDE SERPL-SCNC: 101 MMOL/L (ref 99–111)
CONV ABS IMM GRAN: 0.01 10*3/UL (ref 0–0.2)
CONV CO2: 21 MMOL/L (ref 22–32)
CONV IMMATURE GRAN: 0.2 % (ref 0–1.8)
CONV TOTAL PROTEIN: 6.1 G/DL (ref 6.3–8.2)
CREAT UR-MCNC: 0.57 MG/DL (ref 0.5–0.9)
CRP SERPL-MCNC: 24.9 MG/L (ref 0–5)
DEPRECATED RDW RBC AUTO: 67.4 FL (ref 36.4–46.3)
EOSINOPHIL # BLD AUTO: 0.33 10*3/UL (ref 0–0.7)
EOSINOPHIL # BLD AUTO: 7.2 % (ref 0–7)
ERYTHROCYTE [DISTWIDTH] IN BLOOD BY AUTOMATED COUNT: 24.6 % (ref 11.7–14.4)
ERYTHROCYTE [SEDIMENTATION RATE] IN BLOOD: 48 MM/H (ref 0–30)
GFR SERPLBLD BASED ON 1.73 SQ M-ARVRAT: >60 ML/MIN/{1.73_M2}
GLOBULIN UR ELPH-MCNC: 3 G/DL (ref 2–3.5)
GLUCOSE SERPL-MCNC: 170 MG/DL (ref 65–99)
HCT VFR BLD AUTO: 35.2 % (ref 37–47)
HGB BLD-MCNC: 11 G/DL (ref 12–16)
LYMPHOCYTES # BLD AUTO: 0.67 10*3/UL (ref 1–5)
LYMPHOCYTES NFR BLD AUTO: 14.7 % (ref 20–45)
MCH RBC QN AUTO: 24.4 PG (ref 27–31)
MCHC RBC AUTO-ENTMCNC: 31.3 G/DL (ref 33–37)
MCV RBC AUTO: 78 FL (ref 81–99)
MONOCYTES # BLD AUTO: 0.76 10*3/UL (ref 0.2–1.2)
MONOCYTES NFR BLD AUTO: 16.6 % (ref 3–10)
NEUTROPHILS # BLD AUTO: 2.77 10*3/UL (ref 2–8)
NEUTROPHILS NFR BLD AUTO: 60.6 % (ref 30–85)
NRBC CBCN: 0 % (ref 0–0.7)
OSMOLALITY SERPL CALC.SUM OF ELEC: 284 MOSM/KG (ref 273–304)
PLATELET # BLD AUTO: 144 10*3/UL (ref 130–400)
PMV BLD AUTO: 9.9 FL (ref 9.4–12.3)
POTASSIUM SERPL-SCNC: 4.6 MMOL/L (ref 3.5–5.3)
RBC # BLD AUTO: 4.51 10*6/UL (ref 4.2–5.4)
SODIUM SERPL-SCNC: 135 MMOL/L (ref 135–147)
WBC # BLD AUTO: 4.57 10*3/UL (ref 4.8–10.8)

## 2020-02-19 ENCOUNTER — HOSPITAL ENCOUNTER (OUTPATIENT)
Dept: OTHER | Facility: HOSPITAL | Age: 65
Discharge: HOME | End: 2020-02-19
Attending: INTERNAL MEDICINE

## 2020-02-19 LAB
ALBUMIN SERPL-MCNC: 2.9 G/DL (ref 3.5–5)
ALBUMIN/GLOB SERPL: 1 {RATIO} (ref 1.4–2.6)
ALP SERPL-CCNC: 91 U/L (ref 43–160)
ALT SERPL-CCNC: 6 U/L (ref 10–40)
ANION GAP SERPL CALC-SCNC: 17 MMOL/L (ref 8–19)
AST SERPL-CCNC: 11 U/L (ref 15–50)
BASOPHILS # BLD AUTO: 0.03 10*3/UL (ref 0–0.2)
BASOPHILS NFR BLD AUTO: 0.7 % (ref 0–3)
BILIRUB SERPL-MCNC: 0.3 MG/DL (ref 0.2–1.3)
BUN SERPL-MCNC: 12 MG/DL (ref 5–25)
BUN/CREAT SERPL: 24 {RATIO} (ref 6–20)
CALCIUM SERPL-MCNC: 10.1 MG/DL (ref 8.7–10.4)
CHLORIDE SERPL-SCNC: 100 MMOL/L (ref 99–111)
CONV ABS IMM GRAN: 0.02 10*3/UL (ref 0–0.2)
CONV CO2: 21 MMOL/L (ref 22–32)
CONV IMMATURE GRAN: 0.5 % (ref 0–1.8)
CONV TOTAL PROTEIN: 5.9 G/DL (ref 6.3–8.2)
CREAT UR-MCNC: 0.51 MG/DL (ref 0.5–0.9)
CRP SERPL-MCNC: 25.8 MG/L (ref 0–5)
DEPRECATED RDW RBC AUTO: 66.7 FL (ref 36.4–46.3)
EOSINOPHIL # BLD AUTO: 0.26 10*3/UL (ref 0–0.7)
EOSINOPHIL # BLD AUTO: 6.2 % (ref 0–7)
ERYTHROCYTE [DISTWIDTH] IN BLOOD BY AUTOMATED COUNT: 23.8 % (ref 11.7–14.4)
ERYTHROCYTE [SEDIMENTATION RATE] IN BLOOD: 50 MM/H (ref 0–30)
GFR SERPLBLD BASED ON 1.73 SQ M-ARVRAT: >60 ML/MIN/{1.73_M2}
GLOBULIN UR ELPH-MCNC: 3 G/DL (ref 2–3.5)
GLUCOSE SERPL-MCNC: 194 MG/DL (ref 65–99)
HCT VFR BLD AUTO: 34.2 % (ref 37–47)
HGB BLD-MCNC: 10.7 G/DL (ref 12–16)
LYMPHOCYTES # BLD AUTO: 0.65 10*3/UL (ref 1–5)
LYMPHOCYTES NFR BLD AUTO: 15.5 % (ref 20–45)
MCH RBC QN AUTO: 24.7 PG (ref 27–31)
MCHC RBC AUTO-ENTMCNC: 31.3 G/DL (ref 33–37)
MCV RBC AUTO: 78.8 FL (ref 81–99)
MONOCYTES # BLD AUTO: 0.79 10*3/UL (ref 0.2–1.2)
MONOCYTES NFR BLD AUTO: 18.9 % (ref 3–10)
NEUTROPHILS # BLD AUTO: 2.44 10*3/UL (ref 2–8)
NEUTROPHILS NFR BLD AUTO: 58.2 % (ref 30–85)
NRBC CBCN: 0 % (ref 0–0.7)
OSMOLALITY SERPL CALC.SUM OF ELEC: 283 MOSM/KG (ref 273–304)
PLATELET # BLD AUTO: 137 10*3/UL (ref 130–400)
PMV BLD AUTO: 9.4 FL (ref 9.4–12.3)
POTASSIUM SERPL-SCNC: 4.1 MMOL/L (ref 3.5–5.3)
RBC # BLD AUTO: 4.34 10*6/UL (ref 4.2–5.4)
SODIUM SERPL-SCNC: 134 MMOL/L (ref 135–147)
WBC # BLD AUTO: 4.19 10*3/UL (ref 4.8–10.8)

## 2020-02-26 ENCOUNTER — HOSPITAL ENCOUNTER (OUTPATIENT)
Dept: OTHER | Facility: HOSPITAL | Age: 65
Discharge: HOME | End: 2020-02-26
Attending: INTERNAL MEDICINE

## 2020-02-26 LAB
ALBUMIN SERPL-MCNC: 2.8 G/DL (ref 3.5–5)
ALBUMIN/GLOB SERPL: 0.9 {RATIO} (ref 1.4–2.6)
ALP SERPL-CCNC: 94 U/L (ref 43–160)
ALT SERPL-CCNC: 9 U/L (ref 10–40)
ANION GAP SERPL CALC-SCNC: 16 MMOL/L (ref 8–19)
AST SERPL-CCNC: 17 U/L (ref 15–50)
BASOPHILS # BLD AUTO: 0.02 10*3/UL (ref 0–0.2)
BASOPHILS NFR BLD AUTO: 0.5 % (ref 0–3)
BILIRUB SERPL-MCNC: 0.32 MG/DL (ref 0.2–1.3)
BUN SERPL-MCNC: 14 MG/DL (ref 5–25)
BUN/CREAT SERPL: 22 {RATIO} (ref 6–20)
CALCIUM SERPL-MCNC: 9.7 MG/DL (ref 8.7–10.4)
CHLORIDE SERPL-SCNC: 102 MMOL/L (ref 99–111)
CONV ABS IMM GRAN: 0.02 10*3/UL (ref 0–0.2)
CONV CO2: 22 MMOL/L (ref 22–32)
CONV IMMATURE GRAN: 0.5 % (ref 0–1.8)
CONV TOTAL PROTEIN: 5.9 G/DL (ref 6.3–8.2)
CREAT UR-MCNC: 0.64 MG/DL (ref 0.5–0.9)
CRP SERPL-MCNC: 16.4 MG/L (ref 0–5)
DEPRECATED RDW RBC AUTO: 65 FL (ref 36.4–46.3)
EOSINOPHIL # BLD AUTO: 0.2 10*3/UL (ref 0–0.7)
EOSINOPHIL # BLD AUTO: 5 % (ref 0–7)
ERYTHROCYTE [DISTWIDTH] IN BLOOD BY AUTOMATED COUNT: 22.9 % (ref 11.7–14.4)
ERYTHROCYTE [SEDIMENTATION RATE] IN BLOOD: 53 MM/H (ref 0–30)
GFR SERPLBLD BASED ON 1.73 SQ M-ARVRAT: >60 ML/MIN/{1.73_M2}
GLOBULIN UR ELPH-MCNC: 3.1 G/DL (ref 2–3.5)
GLUCOSE SERPL-MCNC: 150 MG/DL (ref 65–99)
HCT VFR BLD AUTO: 34.1 % (ref 37–47)
HGB BLD-MCNC: 10.6 G/DL (ref 12–16)
LYMPHOCYTES # BLD AUTO: 0.71 10*3/UL (ref 1–5)
LYMPHOCYTES NFR BLD AUTO: 17.8 % (ref 20–45)
MCH RBC QN AUTO: 24.9 PG (ref 27–31)
MCHC RBC AUTO-ENTMCNC: 31.1 G/DL (ref 33–37)
MCV RBC AUTO: 80 FL (ref 81–99)
MONOCYTES # BLD AUTO: 0.68 10*3/UL (ref 0.2–1.2)
MONOCYTES NFR BLD AUTO: 17 % (ref 3–10)
NEUTROPHILS # BLD AUTO: 2.37 10*3/UL (ref 2–8)
NEUTROPHILS NFR BLD AUTO: 59.2 % (ref 30–85)
NRBC CBCN: 0 % (ref 0–0.7)
OSMOLALITY SERPL CALC.SUM OF ELEC: 285 MOSM/KG (ref 273–304)
PLATELET # BLD AUTO: 137 10*3/UL (ref 130–400)
PMV BLD AUTO: 9.6 FL (ref 9.4–12.3)
POTASSIUM SERPL-SCNC: 4.1 MMOL/L (ref 3.5–5.3)
RBC # BLD AUTO: 4.26 10*6/UL (ref 4.2–5.4)
SODIUM SERPL-SCNC: 136 MMOL/L (ref 135–147)
WBC # BLD AUTO: 4 10*3/UL (ref 4.8–10.8)

## 2020-03-04 ENCOUNTER — HOSPITAL ENCOUNTER (OUTPATIENT)
Dept: OTHER | Facility: HOSPITAL | Age: 65
Discharge: HOME | End: 2020-03-04
Attending: INTERNAL MEDICINE

## 2020-03-04 LAB
ALBUMIN SERPL-MCNC: 2.9 G/DL (ref 3.5–5)
ALBUMIN/GLOB SERPL: 0.9 {RATIO} (ref 1.4–2.6)
ALP SERPL-CCNC: 92 U/L (ref 43–160)
ALT SERPL-CCNC: 8 U/L (ref 10–40)
ANION GAP SERPL CALC-SCNC: 17 MMOL/L (ref 8–19)
AST SERPL-CCNC: 15 U/L (ref 15–50)
BASOPHILS # BLD AUTO: 0.04 10*3/UL (ref 0–0.2)
BASOPHILS NFR BLD AUTO: 0.9 % (ref 0–3)
BILIRUB SERPL-MCNC: 0.35 MG/DL (ref 0.2–1.3)
BUN SERPL-MCNC: 13 MG/DL (ref 5–25)
BUN/CREAT SERPL: 21 {RATIO} (ref 6–20)
CALCIUM SERPL-MCNC: 10.1 MG/DL (ref 8.7–10.4)
CHLORIDE SERPL-SCNC: 101 MMOL/L (ref 99–111)
CONV ABS IMM GRAN: 0.02 10*3/UL (ref 0–0.2)
CONV CO2: 21 MMOL/L (ref 22–32)
CONV IMMATURE GRAN: 0.5 % (ref 0–1.8)
CONV TOTAL PROTEIN: 6.1 G/DL (ref 6.3–8.2)
CREAT UR-MCNC: 0.63 MG/DL (ref 0.5–0.9)
CRP SERPL-MCNC: 15.5 MG/L (ref 0–5)
DEPRECATED RDW RBC AUTO: 63.4 FL (ref 36.4–46.3)
EOSINOPHIL # BLD AUTO: 0.23 10*3/UL (ref 0–0.7)
EOSINOPHIL # BLD AUTO: 5.4 % (ref 0–7)
ERYTHROCYTE [DISTWIDTH] IN BLOOD BY AUTOMATED COUNT: 21.6 % (ref 11.7–14.4)
ERYTHROCYTE [SEDIMENTATION RATE] IN BLOOD: 58 MM/H (ref 0–30)
GFR SERPLBLD BASED ON 1.73 SQ M-ARVRAT: >60 ML/MIN/{1.73_M2}
GLOBULIN UR ELPH-MCNC: 3.2 G/DL (ref 2–3.5)
GLUCOSE SERPL-MCNC: 142 MG/DL (ref 65–99)
HCT VFR BLD AUTO: 35.8 % (ref 37–47)
HGB BLD-MCNC: 11.2 G/DL (ref 12–16)
LYMPHOCYTES # BLD AUTO: 0.68 10*3/UL (ref 1–5)
LYMPHOCYTES NFR BLD AUTO: 15.9 % (ref 20–45)
MCH RBC QN AUTO: 25.2 PG (ref 27–31)
MCHC RBC AUTO-ENTMCNC: 31.3 G/DL (ref 33–37)
MCV RBC AUTO: 80.4 FL (ref 81–99)
MONOCYTES # BLD AUTO: 0.8 10*3/UL (ref 0.2–1.2)
MONOCYTES NFR BLD AUTO: 18.7 % (ref 3–10)
NEUTROPHILS # BLD AUTO: 2.5 10*3/UL (ref 2–8)
NEUTROPHILS NFR BLD AUTO: 58.6 % (ref 30–85)
NRBC CBCN: 0 % (ref 0–0.7)
OSMOLALITY SERPL CALC.SUM OF ELEC: 283 MOSM/KG (ref 273–304)
PLATELET # BLD AUTO: 180 10*3/UL (ref 130–400)
PMV BLD AUTO: 9.9 FL (ref 9.4–12.3)
POTASSIUM SERPL-SCNC: 4.2 MMOL/L (ref 3.5–5.3)
RBC # BLD AUTO: 4.45 10*6/UL (ref 4.2–5.4)
SODIUM SERPL-SCNC: 135 MMOL/L (ref 135–147)
WBC # BLD AUTO: 4.27 10*3/UL (ref 4.8–10.8)

## 2020-03-10 ENCOUNTER — HOSPITAL ENCOUNTER (OUTPATIENT)
Dept: OTHER | Facility: HOSPITAL | Age: 65
Discharge: HOME | End: 2020-03-10
Attending: INTERNAL MEDICINE

## 2020-03-10 LAB
ALBUMIN SERPL-MCNC: 3 G/DL (ref 3.5–5)
ALBUMIN/GLOB SERPL: 1 {RATIO} (ref 1.4–2.6)
ALP SERPL-CCNC: 87 U/L (ref 43–160)
ALT SERPL-CCNC: 8 U/L (ref 10–40)
ANION GAP SERPL CALC-SCNC: 17 MMOL/L (ref 8–19)
AST SERPL-CCNC: 16 U/L (ref 15–50)
BASOPHILS # BLD AUTO: 0.03 10*3/UL (ref 0–0.2)
BASOPHILS NFR BLD AUTO: 0.7 % (ref 0–3)
BILIRUB SERPL-MCNC: 0.32 MG/DL (ref 0.2–1.3)
BUN SERPL-MCNC: 12 MG/DL (ref 5–25)
BUN/CREAT SERPL: 18 {RATIO} (ref 6–20)
CALCIUM SERPL-MCNC: 10 MG/DL (ref 8.7–10.4)
CHLORIDE SERPL-SCNC: 101 MMOL/L (ref 99–111)
CONV ABS IMM GRAN: 0 10*3/UL (ref 0–0.2)
CONV CO2: 20 MMOL/L (ref 22–32)
CONV IMMATURE GRAN: 0 % (ref 0–1.8)
CONV TOTAL PROTEIN: 6.1 G/DL (ref 6.3–8.2)
CREAT UR-MCNC: 0.66 MG/DL (ref 0.5–0.9)
CRP SERPL-MCNC: 21.5 MG/L (ref 0–5)
DEPRECATED RDW RBC AUTO: 59.7 FL (ref 36.4–46.3)
EOSINOPHIL # BLD AUTO: 0.21 10*3/UL (ref 0–0.7)
EOSINOPHIL # BLD AUTO: 4.9 % (ref 0–7)
ERYTHROCYTE [DISTWIDTH] IN BLOOD BY AUTOMATED COUNT: 20.2 % (ref 11.7–14.4)
ERYTHROCYTE [SEDIMENTATION RATE] IN BLOOD: 72 MM/H (ref 0–30)
GFR SERPLBLD BASED ON 1.73 SQ M-ARVRAT: >60 ML/MIN/{1.73_M2}
GLOBULIN UR ELPH-MCNC: 3.1 G/DL (ref 2–3.5)
GLUCOSE SERPL-MCNC: 234 MG/DL (ref 65–99)
HCT VFR BLD AUTO: 34.5 % (ref 37–47)
HGB BLD-MCNC: 11 G/DL (ref 12–16)
LYMPHOCYTES # BLD AUTO: 0.68 10*3/UL (ref 1–5)
LYMPHOCYTES NFR BLD AUTO: 15.9 % (ref 20–45)
MCH RBC QN AUTO: 25.7 PG (ref 27–31)
MCHC RBC AUTO-ENTMCNC: 31.9 G/DL (ref 33–37)
MCV RBC AUTO: 80.6 FL (ref 81–99)
MONOCYTES # BLD AUTO: 0.75 10*3/UL (ref 0.2–1.2)
MONOCYTES NFR BLD AUTO: 17.5 % (ref 3–10)
NEUTROPHILS # BLD AUTO: 2.61 10*3/UL (ref 2–8)
NEUTROPHILS NFR BLD AUTO: 61 % (ref 30–85)
NRBC CBCN: 0 % (ref 0–0.7)
OSMOLALITY SERPL CALC.SUM OF ELEC: 285 MOSM/KG (ref 273–304)
PLATELET # BLD AUTO: 160 10*3/UL (ref 130–400)
PMV BLD AUTO: 10 FL (ref 9.4–12.3)
POTASSIUM SERPL-SCNC: 4 MMOL/L (ref 3.5–5.3)
RBC # BLD AUTO: 4.28 10*6/UL (ref 4.2–5.4)
SODIUM SERPL-SCNC: 134 MMOL/L (ref 135–147)
WBC # BLD AUTO: 4.28 10*3/UL (ref 4.8–10.8)

## 2020-03-11 ENCOUNTER — OFFICE VISIT CONVERTED (OUTPATIENT)
Dept: NEUROLOGY | Facility: CLINIC | Age: 65
End: 2020-03-11
Attending: PSYCHIATRY & NEUROLOGY

## 2020-03-11 ENCOUNTER — CONVERSION ENCOUNTER (OUTPATIENT)
Dept: NEUROLOGY | Facility: CLINIC | Age: 65
End: 2020-03-11

## 2020-03-17 ENCOUNTER — HOSPITAL ENCOUNTER (OUTPATIENT)
Dept: OTHER | Facility: HOSPITAL | Age: 65
Discharge: HOME | End: 2020-03-17
Attending: INTERNAL MEDICINE

## 2020-03-17 LAB
ALBUMIN SERPL-MCNC: 3 G/DL (ref 3.5–5)
ALBUMIN/GLOB SERPL: 0.9 {RATIO} (ref 1.4–2.6)
ALP SERPL-CCNC: 78 U/L (ref 43–160)
ALT SERPL-CCNC: 6 U/L (ref 10–40)
ANION GAP SERPL CALC-SCNC: 15 MMOL/L (ref 8–19)
AST SERPL-CCNC: 15 U/L (ref 15–50)
BASOPHILS # BLD AUTO: 0.02 10*3/UL (ref 0–0.2)
BASOPHILS NFR BLD AUTO: 0.4 % (ref 0–3)
BILIRUB SERPL-MCNC: 0.36 MG/DL (ref 0.2–1.3)
BUN SERPL-MCNC: 10 MG/DL (ref 5–25)
BUN/CREAT SERPL: 16 {RATIO} (ref 6–20)
CALCIUM SERPL-MCNC: 9.9 MG/DL (ref 8.7–10.4)
CHLORIDE SERPL-SCNC: 101 MMOL/L (ref 99–111)
CONV ABS IMM GRAN: 0.01 10*3/UL (ref 0–0.2)
CONV CO2: 21 MMOL/L (ref 22–32)
CONV IMMATURE GRAN: 0.2 % (ref 0–1.8)
CONV TOTAL PROTEIN: 6.3 G/DL (ref 6.3–8.2)
CREAT UR-MCNC: 0.61 MG/DL (ref 0.5–0.9)
CRP SERPL-MCNC: 42.1 MG/L (ref 0–5)
DEPRECATED RDW RBC AUTO: 57.1 FL (ref 36.4–46.3)
EOSINOPHIL # BLD AUTO: 0.22 10*3/UL (ref 0–0.7)
EOSINOPHIL # BLD AUTO: 4.8 % (ref 0–7)
ERYTHROCYTE [DISTWIDTH] IN BLOOD BY AUTOMATED COUNT: 18.8 % (ref 11.7–14.4)
ERYTHROCYTE [SEDIMENTATION RATE] IN BLOOD: 73 MM/H (ref 0–30)
GFR SERPLBLD BASED ON 1.73 SQ M-ARVRAT: >60 ML/MIN/{1.73_M2}
GLOBULIN UR ELPH-MCNC: 3.3 G/DL (ref 2–3.5)
GLUCOSE SERPL-MCNC: 210 MG/DL (ref 65–99)
HCT VFR BLD AUTO: 32.2 % (ref 37–47)
HGB BLD-MCNC: 10.3 G/DL (ref 12–16)
LYMPHOCYTES # BLD AUTO: 0.69 10*3/UL (ref 1–5)
LYMPHOCYTES NFR BLD AUTO: 15.1 % (ref 20–45)
MCH RBC QN AUTO: 26.2 PG (ref 27–31)
MCHC RBC AUTO-ENTMCNC: 32 G/DL (ref 33–37)
MCV RBC AUTO: 81.9 FL (ref 81–99)
MONOCYTES # BLD AUTO: 0.69 10*3/UL (ref 0.2–1.2)
MONOCYTES NFR BLD AUTO: 15.1 % (ref 3–10)
NEUTROPHILS # BLD AUTO: 2.95 10*3/UL (ref 2–8)
NEUTROPHILS NFR BLD AUTO: 64.4 % (ref 30–85)
NRBC CBCN: 0 % (ref 0–0.7)
OSMOLALITY SERPL CALC.SUM OF ELEC: 281 MOSM/KG (ref 273–304)
PLATELET # BLD AUTO: 166 10*3/UL (ref 130–400)
PMV BLD AUTO: 10.1 FL (ref 9.4–12.3)
POTASSIUM SERPL-SCNC: 3.9 MMOL/L (ref 3.5–5.3)
RBC # BLD AUTO: 3.93 10*6/UL (ref 4.2–5.4)
SODIUM SERPL-SCNC: 133 MMOL/L (ref 135–147)
WBC # BLD AUTO: 4.58 10*3/UL (ref 4.8–10.8)

## 2020-03-24 ENCOUNTER — HOSPITAL ENCOUNTER (OUTPATIENT)
Dept: OTHER | Facility: HOSPITAL | Age: 65
Discharge: HOME | End: 2020-03-24
Attending: INTERNAL MEDICINE

## 2020-03-24 LAB
ALBUMIN SERPL-MCNC: 3.1 G/DL (ref 3.5–5)
ALBUMIN/GLOB SERPL: 0.9 {RATIO} (ref 1.4–2.6)
ALP SERPL-CCNC: 88 U/L (ref 43–160)
ALT SERPL-CCNC: 6 U/L (ref 10–40)
ANION GAP SERPL CALC-SCNC: 16 MMOL/L (ref 8–19)
AST SERPL-CCNC: 14 U/L (ref 15–50)
BASOPHILS # BLD AUTO: 0.03 10*3/UL (ref 0–0.2)
BASOPHILS NFR BLD AUTO: 0.7 % (ref 0–3)
BILIRUB SERPL-MCNC: 0.38 MG/DL (ref 0.2–1.3)
BUN SERPL-MCNC: 11 MG/DL (ref 5–25)
BUN/CREAT SERPL: 20 {RATIO} (ref 6–20)
CALCIUM SERPL-MCNC: 10.1 MG/DL (ref 8.7–10.4)
CHLORIDE SERPL-SCNC: 98 MMOL/L (ref 99–111)
CONV ABS IMM GRAN: 0.01 10*3/UL (ref 0–0.2)
CONV CO2: 21 MMOL/L (ref 22–32)
CONV IMMATURE GRAN: 0.2 % (ref 0–1.8)
CONV TOTAL PROTEIN: 6.4 G/DL (ref 6.3–8.2)
CREAT UR-MCNC: 0.55 MG/DL (ref 0.5–0.9)
CRP SERPL-MCNC: 21.9 MG/L (ref 0–5)
DEPRECATED RDW RBC AUTO: 51.1 FL (ref 36.4–46.3)
EOSINOPHIL # BLD AUTO: 0.21 10*3/UL (ref 0–0.7)
EOSINOPHIL # BLD AUTO: 4.7 % (ref 0–7)
ERYTHROCYTE [DISTWIDTH] IN BLOOD BY AUTOMATED COUNT: 17.3 % (ref 11.7–14.4)
ERYTHROCYTE [SEDIMENTATION RATE] IN BLOOD: 78 MM/H (ref 0–30)
GFR SERPLBLD BASED ON 1.73 SQ M-ARVRAT: >60 ML/MIN/{1.73_M2}
GLOBULIN UR ELPH-MCNC: 3.3 G/DL (ref 2–3.5)
GLUCOSE SERPL-MCNC: 103 MG/DL (ref 65–99)
HCT VFR BLD AUTO: 33.7 % (ref 37–47)
HGB BLD-MCNC: 10.7 G/DL (ref 12–16)
LYMPHOCYTES # BLD AUTO: 0.69 10*3/UL (ref 1–5)
LYMPHOCYTES NFR BLD AUTO: 15.4 % (ref 20–45)
MCH RBC QN AUTO: 25.8 PG (ref 27–31)
MCHC RBC AUTO-ENTMCNC: 31.8 G/DL (ref 33–37)
MCV RBC AUTO: 81.2 FL (ref 81–99)
MONOCYTES # BLD AUTO: 0.84 10*3/UL (ref 0.2–1.2)
MONOCYTES NFR BLD AUTO: 18.7 % (ref 3–10)
NEUTROPHILS # BLD AUTO: 2.71 10*3/UL (ref 2–8)
NEUTROPHILS NFR BLD AUTO: 60.3 % (ref 30–85)
NRBC CBCN: 0 % (ref 0–0.7)
OSMOLALITY SERPL CALC.SUM OF ELEC: 272 MOSM/KG (ref 273–304)
PLATELET # BLD AUTO: 193 10*3/UL (ref 130–400)
PMV BLD AUTO: 9.6 FL (ref 9.4–12.3)
POTASSIUM SERPL-SCNC: 4.1 MMOL/L (ref 3.5–5.3)
RBC # BLD AUTO: 4.15 10*6/UL (ref 4.2–5.4)
SODIUM SERPL-SCNC: 131 MMOL/L (ref 135–147)
WBC # BLD AUTO: 4.49 10*3/UL (ref 4.8–10.8)

## 2020-03-31 ENCOUNTER — HOSPITAL ENCOUNTER (OUTPATIENT)
Dept: OTHER | Facility: HOSPITAL | Age: 65
Discharge: HOME | End: 2020-03-31
Attending: INTERNAL MEDICINE

## 2020-03-31 LAB
ALBUMIN SERPL-MCNC: 3.2 G/DL (ref 3.5–5)
ALBUMIN/GLOB SERPL: 1 {RATIO} (ref 1.4–2.6)
ALP SERPL-CCNC: 92 U/L (ref 43–160)
ALT SERPL-CCNC: 8 U/L (ref 10–40)
ANION GAP SERPL CALC-SCNC: 15 MMOL/L (ref 8–19)
AST SERPL-CCNC: 14 U/L (ref 15–50)
BASOPHILS # BLD AUTO: 0.03 10*3/UL (ref 0–0.2)
BASOPHILS NFR BLD AUTO: 0.7 % (ref 0–3)
BILIRUB SERPL-MCNC: 0.38 MG/DL (ref 0.2–1.3)
BUN SERPL-MCNC: 14 MG/DL (ref 5–25)
BUN/CREAT SERPL: 24 {RATIO} (ref 6–20)
CALCIUM SERPL-MCNC: 9.8 MG/DL (ref 8.7–10.4)
CHLORIDE SERPL-SCNC: 100 MMOL/L (ref 99–111)
CONV ABS IMM GRAN: 0.01 10*3/UL (ref 0–0.2)
CONV CO2: 22 MMOL/L (ref 22–32)
CONV IMMATURE GRAN: 0.2 % (ref 0–1.8)
CONV TOTAL PROTEIN: 6.5 G/DL (ref 6.3–8.2)
CREAT UR-MCNC: 0.59 MG/DL (ref 0.5–0.9)
CRP SERPL-MCNC: 25.1 MG/L (ref 0–5)
DEPRECATED RDW RBC AUTO: 47.4 FL (ref 36.4–46.3)
EOSINOPHIL # BLD AUTO: 0.23 10*3/UL (ref 0–0.7)
EOSINOPHIL # BLD AUTO: 5.1 % (ref 0–7)
ERYTHROCYTE [DISTWIDTH] IN BLOOD BY AUTOMATED COUNT: 16.4 % (ref 11.7–14.4)
ERYTHROCYTE [SEDIMENTATION RATE] IN BLOOD: 77 MM/H (ref 0–30)
GFR SERPLBLD BASED ON 1.73 SQ M-ARVRAT: >60 ML/MIN/{1.73_M2}
GLOBULIN UR ELPH-MCNC: 3.3 G/DL (ref 2–3.5)
GLUCOSE SERPL-MCNC: 174 MG/DL (ref 65–99)
HCT VFR BLD AUTO: 32.4 % (ref 37–47)
HGB BLD-MCNC: 10.5 G/DL (ref 12–16)
LYMPHOCYTES # BLD AUTO: 0.71 10*3/UL (ref 1–5)
LYMPHOCYTES NFR BLD AUTO: 15.8 % (ref 20–45)
MCH RBC QN AUTO: 26.2 PG (ref 27–31)
MCHC RBC AUTO-ENTMCNC: 32.4 G/DL (ref 33–37)
MCV RBC AUTO: 80.8 FL (ref 81–99)
MONOCYTES # BLD AUTO: 0.83 10*3/UL (ref 0.2–1.2)
MONOCYTES NFR BLD AUTO: 18.5 % (ref 3–10)
NEUTROPHILS # BLD AUTO: 2.67 10*3/UL (ref 2–8)
NEUTROPHILS NFR BLD AUTO: 59.7 % (ref 30–85)
NRBC CBCN: 0 % (ref 0–0.7)
OSMOLALITY SERPL CALC.SUM OF ELEC: 281 MOSM/KG (ref 273–304)
PLATELET # BLD AUTO: 138 10*3/UL (ref 130–400)
PMV BLD AUTO: 9.9 FL (ref 9.4–12.3)
POTASSIUM SERPL-SCNC: 4.2 MMOL/L (ref 3.5–5.3)
RBC # BLD AUTO: 4.01 10*6/UL (ref 4.2–5.4)
SODIUM SERPL-SCNC: 133 MMOL/L (ref 135–147)
WBC # BLD AUTO: 4.48 10*3/UL (ref 4.8–10.8)

## 2020-04-07 ENCOUNTER — HOSPITAL ENCOUNTER (OUTPATIENT)
Dept: OTHER | Facility: HOSPITAL | Age: 65
Discharge: HOME | End: 2020-04-07
Attending: INTERNAL MEDICINE

## 2020-04-07 LAB
ALBUMIN SERPL-MCNC: 3.2 G/DL (ref 3.5–5)
ALBUMIN/GLOB SERPL: 0.9 {RATIO} (ref 1.4–2.6)
ALP SERPL-CCNC: 91 U/L (ref 43–160)
ALT SERPL-CCNC: 13 U/L (ref 10–40)
ANION GAP SERPL CALC-SCNC: 18 MMOL/L (ref 8–19)
AST SERPL-CCNC: 19 U/L (ref 15–50)
BASOPHILS # BLD AUTO: 0.02 10*3/UL (ref 0–0.2)
BASOPHILS NFR BLD AUTO: 0.4 % (ref 0–3)
BILIRUB SERPL-MCNC: 0.3 MG/DL (ref 0.2–1.3)
BUN SERPL-MCNC: 14 MG/DL (ref 5–25)
BUN/CREAT SERPL: 21 {RATIO} (ref 6–20)
CALCIUM SERPL-MCNC: 9.7 MG/DL (ref 8.7–10.4)
CHLORIDE SERPL-SCNC: 99 MMOL/L (ref 99–111)
CONV ABS IMM GRAN: 0.02 10*3/UL (ref 0–0.2)
CONV CO2: 19 MMOL/L (ref 22–32)
CONV IMMATURE GRAN: 0.4 % (ref 0–1.8)
CONV TOTAL PROTEIN: 6.6 G/DL (ref 6.3–8.2)
CREAT UR-MCNC: 0.67 MG/DL (ref 0.5–0.9)
CRP SERPL-MCNC: 22.5 MG/L (ref 0–5)
DEPRECATED RDW RBC AUTO: 45 FL (ref 36.4–46.3)
EOSINOPHIL # BLD AUTO: 0.17 10*3/UL (ref 0–0.7)
EOSINOPHIL # BLD AUTO: 3.8 % (ref 0–7)
ERYTHROCYTE [DISTWIDTH] IN BLOOD BY AUTOMATED COUNT: 15.6 % (ref 11.7–14.4)
ERYTHROCYTE [SEDIMENTATION RATE] IN BLOOD: 51 MM/H (ref 0–30)
GFR SERPLBLD BASED ON 1.73 SQ M-ARVRAT: >60 ML/MIN/{1.73_M2}
GLOBULIN UR ELPH-MCNC: 3.4 G/DL (ref 2–3.5)
GLUCOSE SERPL-MCNC: 218 MG/DL (ref 65–99)
HCT VFR BLD AUTO: 34 % (ref 37–47)
HGB BLD-MCNC: 10.7 G/DL (ref 12–16)
LYMPHOCYTES # BLD AUTO: 0.61 10*3/UL (ref 1–5)
LYMPHOCYTES NFR BLD AUTO: 13.7 % (ref 20–45)
MCH RBC QN AUTO: 25.5 PG (ref 27–31)
MCHC RBC AUTO-ENTMCNC: 31.5 G/DL (ref 33–37)
MCV RBC AUTO: 81 FL (ref 81–99)
MONOCYTES # BLD AUTO: 0.67 10*3/UL (ref 0.2–1.2)
MONOCYTES NFR BLD AUTO: 15.1 % (ref 3–10)
NEUTROPHILS # BLD AUTO: 2.96 10*3/UL (ref 2–8)
NEUTROPHILS NFR BLD AUTO: 66.6 % (ref 30–85)
NRBC CBCN: 0 % (ref 0–0.7)
OSMOLALITY SERPL CALC.SUM OF ELEC: 279 MOSM/KG (ref 273–304)
PLATELET # BLD AUTO: 147 10*3/UL (ref 130–400)
PMV BLD AUTO: 9.5 FL (ref 9.4–12.3)
POTASSIUM SERPL-SCNC: 4.5 MMOL/L (ref 3.5–5.3)
RBC # BLD AUTO: 4.2 10*6/UL (ref 4.2–5.4)
SODIUM SERPL-SCNC: 131 MMOL/L (ref 135–147)
WBC # BLD AUTO: 4.45 10*3/UL (ref 4.8–10.8)

## 2020-04-15 ENCOUNTER — HOSPITAL ENCOUNTER (OUTPATIENT)
Dept: OTHER | Facility: HOSPITAL | Age: 65
Discharge: HOME | End: 2020-04-15
Attending: INTERNAL MEDICINE

## 2020-04-15 LAB
ALBUMIN SERPL-MCNC: 3.1 G/DL (ref 3.5–5)
ALBUMIN/GLOB SERPL: 0.9 {RATIO} (ref 1.4–2.6)
ALP SERPL-CCNC: 98 U/L (ref 43–160)
ALT SERPL-CCNC: 16 U/L (ref 10–40)
ANION GAP SERPL CALC-SCNC: 16 MMOL/L (ref 8–19)
AST SERPL-CCNC: 23 U/L (ref 15–50)
BASOPHILS # BLD AUTO: 0.03 10*3/UL (ref 0–0.2)
BASOPHILS NFR BLD AUTO: 0.6 % (ref 0–3)
BILIRUB SERPL-MCNC: 0.34 MG/DL (ref 0.2–1.3)
BUN SERPL-MCNC: 15 MG/DL (ref 5–25)
BUN/CREAT SERPL: 22 {RATIO} (ref 6–20)
CALCIUM SERPL-MCNC: 9.7 MG/DL (ref 8.7–10.4)
CHLORIDE SERPL-SCNC: 99 MMOL/L (ref 99–111)
CONV ABS IMM GRAN: 0.01 10*3/UL (ref 0–0.2)
CONV CO2: 20 MMOL/L (ref 22–32)
CONV IMMATURE GRAN: 0.2 % (ref 0–1.8)
CONV TOTAL PROTEIN: 6.4 G/DL (ref 6.3–8.2)
CREAT UR-MCNC: 0.67 MG/DL (ref 0.5–0.9)
CRP SERPL-MCNC: 32.4 MG/L (ref 0–5)
DEPRECATED RDW RBC AUTO: 44.1 FL (ref 36.4–46.3)
EOSINOPHIL # BLD AUTO: 0.19 10*3/UL (ref 0–0.7)
EOSINOPHIL # BLD AUTO: 4 % (ref 0–7)
ERYTHROCYTE [DISTWIDTH] IN BLOOD BY AUTOMATED COUNT: 15 % (ref 11.7–14.4)
ERYTHROCYTE [SEDIMENTATION RATE] IN BLOOD: 82 MM/H (ref 0–30)
GFR SERPLBLD BASED ON 1.73 SQ M-ARVRAT: >60 ML/MIN/{1.73_M2}
GLOBULIN UR ELPH-MCNC: 3.3 G/DL (ref 2–3.5)
GLUCOSE SERPL-MCNC: 252 MG/DL (ref 65–99)
HCT VFR BLD AUTO: 32.9 % (ref 37–47)
HGB BLD-MCNC: 10.4 G/DL (ref 12–16)
LYMPHOCYTES # BLD AUTO: 0.53 10*3/UL (ref 1–5)
LYMPHOCYTES NFR BLD AUTO: 11.2 % (ref 20–45)
MCH RBC QN AUTO: 25.6 PG (ref 27–31)
MCHC RBC AUTO-ENTMCNC: 31.6 G/DL (ref 33–37)
MCV RBC AUTO: 81 FL (ref 81–99)
MONOCYTES # BLD AUTO: 0.67 10*3/UL (ref 0.2–1.2)
MONOCYTES NFR BLD AUTO: 14.1 % (ref 3–10)
NEUTROPHILS # BLD AUTO: 3.32 10*3/UL (ref 2–8)
NEUTROPHILS NFR BLD AUTO: 69.9 % (ref 30–85)
NRBC CBCN: 0 % (ref 0–0.7)
OSMOLALITY SERPL CALC.SUM OF ELEC: 279 MOSM/KG (ref 273–304)
PLATELET # BLD AUTO: 134 10*3/UL (ref 130–400)
PMV BLD AUTO: 10.1 FL (ref 9.4–12.3)
POTASSIUM SERPL-SCNC: 4.6 MMOL/L (ref 3.5–5.3)
RBC # BLD AUTO: 4.06 10*6/UL (ref 4.2–5.4)
SODIUM SERPL-SCNC: 130 MMOL/L (ref 135–147)
WBC # BLD AUTO: 4.75 10*3/UL (ref 4.8–10.8)

## 2020-04-22 ENCOUNTER — HOSPITAL ENCOUNTER (OUTPATIENT)
Dept: OTHER | Facility: HOSPITAL | Age: 65
Discharge: HOME | End: 2020-04-22
Attending: INTERNAL MEDICINE

## 2020-04-22 LAB
ALBUMIN SERPL-MCNC: 3.2 G/DL (ref 3.5–5)
ALBUMIN/GLOB SERPL: 0.9 {RATIO} (ref 1.4–2.6)
ALP SERPL-CCNC: 96 U/L (ref 43–160)
ALT SERPL-CCNC: 12 U/L (ref 10–40)
ANION GAP SERPL CALC-SCNC: 17 MMOL/L (ref 8–19)
AST SERPL-CCNC: 17 U/L (ref 15–50)
BASOPHILS # BLD AUTO: 0.03 10*3/UL (ref 0–0.2)
BASOPHILS NFR BLD AUTO: 0.5 % (ref 0–3)
BILIRUB SERPL-MCNC: 0.35 MG/DL (ref 0.2–1.3)
BUN SERPL-MCNC: 15 MG/DL (ref 5–25)
BUN/CREAT SERPL: 24 {RATIO} (ref 6–20)
CALCIUM SERPL-MCNC: 9.8 MG/DL (ref 8.7–10.4)
CHLORIDE SERPL-SCNC: 98 MMOL/L (ref 99–111)
CONV ABS IMM GRAN: 0.02 10*3/UL (ref 0–0.2)
CONV CO2: 20 MMOL/L (ref 22–32)
CONV IMMATURE GRAN: 0.3 % (ref 0–1.8)
CONV TOTAL PROTEIN: 6.7 G/DL (ref 6.3–8.2)
CREAT UR-MCNC: 0.63 MG/DL (ref 0.5–0.9)
CRP SERPL-MCNC: 29 MG/L (ref 0–5)
DEPRECATED RDW RBC AUTO: 43.8 FL (ref 36.4–46.3)
EOSINOPHIL # BLD AUTO: 0.22 10*3/UL (ref 0–0.7)
EOSINOPHIL # BLD AUTO: 3.7 % (ref 0–7)
ERYTHROCYTE [DISTWIDTH] IN BLOOD BY AUTOMATED COUNT: 14.8 % (ref 11.7–14.4)
ERYTHROCYTE [SEDIMENTATION RATE] IN BLOOD: 82 MM/H (ref 0–30)
GFR SERPLBLD BASED ON 1.73 SQ M-ARVRAT: >60 ML/MIN/{1.73_M2}
GLOBULIN UR ELPH-MCNC: 3.5 G/DL (ref 2–3.5)
GLUCOSE SERPL-MCNC: 194 MG/DL (ref 65–99)
HCT VFR BLD AUTO: 32 % (ref 37–47)
HGB BLD-MCNC: 10.3 G/DL (ref 12–16)
LYMPHOCYTES # BLD AUTO: 0.74 10*3/UL (ref 1–5)
LYMPHOCYTES NFR BLD AUTO: 12.5 % (ref 20–45)
MCH RBC QN AUTO: 26.1 PG (ref 27–31)
MCHC RBC AUTO-ENTMCNC: 32.2 G/DL (ref 33–37)
MCV RBC AUTO: 81.2 FL (ref 81–99)
MONOCYTES # BLD AUTO: 0.85 10*3/UL (ref 0.2–1.2)
MONOCYTES NFR BLD AUTO: 14.4 % (ref 3–10)
NEUTROPHILS # BLD AUTO: 4.04 10*3/UL (ref 2–8)
NEUTROPHILS NFR BLD AUTO: 68.6 % (ref 30–85)
NRBC CBCN: 0 % (ref 0–0.7)
OSMOLALITY SERPL CALC.SUM OF ELEC: 276 MOSM/KG (ref 273–304)
PLATELET # BLD AUTO: 171 10*3/UL (ref 130–400)
PMV BLD AUTO: 10.3 FL (ref 9.4–12.3)
POTASSIUM SERPL-SCNC: 4.5 MMOL/L (ref 3.5–5.3)
RBC # BLD AUTO: 3.94 10*6/UL (ref 4.2–5.4)
SODIUM SERPL-SCNC: 130 MMOL/L (ref 135–147)
WBC # BLD AUTO: 5.9 10*3/UL (ref 4.8–10.8)

## 2020-04-28 ENCOUNTER — HOSPITAL ENCOUNTER (OUTPATIENT)
Dept: OTHER | Facility: HOSPITAL | Age: 65
Discharge: HOME | End: 2020-04-28
Attending: INTERNAL MEDICINE

## 2020-04-28 LAB
ALBUMIN SERPL-MCNC: 3.4 G/DL (ref 3.5–5)
ALBUMIN/GLOB SERPL: 1 {RATIO} (ref 1.4–2.6)
ALP SERPL-CCNC: 91 U/L (ref 43–160)
ALT SERPL-CCNC: 12 U/L (ref 10–40)
ANION GAP SERPL CALC-SCNC: 18 MMOL/L (ref 8–19)
AST SERPL-CCNC: 16 U/L (ref 15–50)
BASOPHILS # BLD AUTO: 0.02 10*3/UL (ref 0–0.2)
BASOPHILS NFR BLD AUTO: 0.4 % (ref 0–3)
BILIRUB SERPL-MCNC: 0.25 MG/DL (ref 0.2–1.3)
BUN SERPL-MCNC: 15 MG/DL (ref 5–25)
BUN/CREAT SERPL: 23 {RATIO} (ref 6–20)
CALCIUM SERPL-MCNC: 9.8 MG/DL (ref 8.7–10.4)
CHLORIDE SERPL-SCNC: 100 MMOL/L (ref 99–111)
CONV ABS IMM GRAN: 0.03 10*3/UL (ref 0–0.2)
CONV CO2: 19 MMOL/L (ref 22–32)
CONV IMMATURE GRAN: 0.6 % (ref 0–1.8)
CONV TOTAL PROTEIN: 6.7 G/DL (ref 6.3–8.2)
CREAT UR-MCNC: 0.65 MG/DL (ref 0.5–0.9)
CRP SERPL-MCNC: 26.8 MG/L (ref 0–5)
DEPRECATED RDW RBC AUTO: 43.5 FL (ref 36.4–46.3)
EOSINOPHIL # BLD AUTO: 0.26 10*3/UL (ref 0–0.7)
EOSINOPHIL # BLD AUTO: 5.2 % (ref 0–7)
ERYTHROCYTE [DISTWIDTH] IN BLOOD BY AUTOMATED COUNT: 14.6 % (ref 11.7–14.4)
ERYTHROCYTE [SEDIMENTATION RATE] IN BLOOD: 72 MM/H (ref 0–30)
GFR SERPLBLD BASED ON 1.73 SQ M-ARVRAT: >60 ML/MIN/{1.73_M2}
GLOBULIN UR ELPH-MCNC: 3.3 G/DL (ref 2–3.5)
GLUCOSE SERPL-MCNC: 179 MG/DL (ref 65–99)
HCT VFR BLD AUTO: 32.7 % (ref 37–47)
HGB BLD-MCNC: 10.3 G/DL (ref 12–16)
LYMPHOCYTES # BLD AUTO: 0.65 10*3/UL (ref 1–5)
LYMPHOCYTES NFR BLD AUTO: 12.9 % (ref 20–45)
MCH RBC QN AUTO: 25.6 PG (ref 27–31)
MCHC RBC AUTO-ENTMCNC: 31.5 G/DL (ref 33–37)
MCV RBC AUTO: 81.1 FL (ref 81–99)
MONOCYTES # BLD AUTO: 0.76 10*3/UL (ref 0.2–1.2)
MONOCYTES NFR BLD AUTO: 15.1 % (ref 3–10)
NEUTROPHILS # BLD AUTO: 3.3 10*3/UL (ref 2–8)
NEUTROPHILS NFR BLD AUTO: 65.8 % (ref 30–85)
NRBC CBCN: 0 % (ref 0–0.7)
OSMOLALITY SERPL CALC.SUM OF ELEC: 281 MOSM/KG (ref 273–304)
PLATELET # BLD AUTO: 139 10*3/UL (ref 130–400)
PMV BLD AUTO: 10.5 FL (ref 9.4–12.3)
POTASSIUM SERPL-SCNC: 4.2 MMOL/L (ref 3.5–5.3)
RBC # BLD AUTO: 4.03 10*6/UL (ref 4.2–5.4)
SODIUM SERPL-SCNC: 133 MMOL/L (ref 135–147)
WBC # BLD AUTO: 5.02 10*3/UL (ref 4.8–10.8)

## 2020-05-04 ENCOUNTER — TELEMEDICINE CONVERTED (OUTPATIENT)
Dept: NEUROSURGERY | Facility: CLINIC | Age: 65
End: 2020-05-04
Attending: NEUROLOGICAL SURGERY

## 2020-05-06 ENCOUNTER — HOSPITAL ENCOUNTER (OUTPATIENT)
Dept: OTHER | Facility: HOSPITAL | Age: 65
Discharge: HOME | End: 2020-05-06
Attending: INTERNAL MEDICINE

## 2020-05-06 LAB
ALBUMIN SERPL-MCNC: 3.2 G/DL (ref 3.5–5)
ALBUMIN/GLOB SERPL: 0.9 {RATIO} (ref 1.4–2.6)
ALP SERPL-CCNC: 92 U/L (ref 43–160)
ALT SERPL-CCNC: 12 U/L (ref 10–40)
ANION GAP SERPL CALC-SCNC: 15 MMOL/L (ref 8–19)
AST SERPL-CCNC: 16 U/L (ref 15–50)
BASOPHILS # BLD AUTO: 0.03 10*3/UL (ref 0–0.2)
BASOPHILS NFR BLD AUTO: 0.5 % (ref 0–3)
BILIRUB SERPL-MCNC: 0.32 MG/DL (ref 0.2–1.3)
BUN SERPL-MCNC: 16 MG/DL (ref 5–25)
BUN/CREAT SERPL: 24 {RATIO} (ref 6–20)
CALCIUM SERPL-MCNC: 9.8 MG/DL (ref 8.7–10.4)
CHLORIDE SERPL-SCNC: 100 MMOL/L (ref 99–111)
CONV ABS IMM GRAN: 0.03 10*3/UL (ref 0–0.2)
CONV CO2: 21 MMOL/L (ref 22–32)
CONV IMMATURE GRAN: 0.5 % (ref 0–1.8)
CONV TOTAL PROTEIN: 6.7 G/DL (ref 6.3–8.2)
CREAT UR-MCNC: 0.68 MG/DL (ref 0.5–0.9)
CRP SERPL-MCNC: 44.3 MG/L (ref 0–5)
DEPRECATED RDW RBC AUTO: 42.7 FL (ref 36.4–46.3)
EOSINOPHIL # BLD AUTO: 0.21 10*3/UL (ref 0–0.7)
EOSINOPHIL # BLD AUTO: 3.7 % (ref 0–7)
ERYTHROCYTE [DISTWIDTH] IN BLOOD BY AUTOMATED COUNT: 14.4 % (ref 11.7–14.4)
ERYTHROCYTE [SEDIMENTATION RATE] IN BLOOD: 90 MM/H (ref 0–30)
GFR SERPLBLD BASED ON 1.73 SQ M-ARVRAT: >60 ML/MIN/{1.73_M2}
GLOBULIN UR ELPH-MCNC: 3.5 G/DL (ref 2–3.5)
GLUCOSE SERPL-MCNC: 210 MG/DL (ref 65–99)
HCT VFR BLD AUTO: 32.8 % (ref 37–47)
HGB BLD-MCNC: 10.2 G/DL (ref 12–16)
LYMPHOCYTES # BLD AUTO: 0.66 10*3/UL (ref 1–5)
LYMPHOCYTES NFR BLD AUTO: 11.8 % (ref 20–45)
MCH RBC QN AUTO: 25.2 PG (ref 27–31)
MCHC RBC AUTO-ENTMCNC: 31.1 G/DL (ref 33–37)
MCV RBC AUTO: 81 FL (ref 81–99)
MONOCYTES # BLD AUTO: 0.85 10*3/UL (ref 0.2–1.2)
MONOCYTES NFR BLD AUTO: 15.2 % (ref 3–10)
NEUTROPHILS # BLD AUTO: 3.83 10*3/UL (ref 2–8)
NEUTROPHILS NFR BLD AUTO: 68.3 % (ref 30–85)
NRBC CBCN: 0 % (ref 0–0.7)
OSMOLALITY SERPL CALC.SUM OF ELEC: 279 MOSM/KG (ref 273–304)
PLATELET # BLD AUTO: 154 10*3/UL (ref 130–400)
PMV BLD AUTO: 10.1 FL (ref 9.4–12.3)
POTASSIUM SERPL-SCNC: 4.6 MMOL/L (ref 3.5–5.3)
RBC # BLD AUTO: 4.05 10*6/UL (ref 4.2–5.4)
SODIUM SERPL-SCNC: 131 MMOL/L (ref 135–147)
WBC # BLD AUTO: 5.61 10*3/UL (ref 4.8–10.8)

## 2020-05-12 ENCOUNTER — HOSPITAL ENCOUNTER (OUTPATIENT)
Dept: OTHER | Facility: HOSPITAL | Age: 65
Discharge: HOME | End: 2020-05-12
Attending: INTERNAL MEDICINE

## 2020-05-12 LAB
ALBUMIN SERPL-MCNC: 3.3 G/DL (ref 3.5–5)
ALBUMIN/GLOB SERPL: 0.9 {RATIO} (ref 1.4–2.6)
ALP SERPL-CCNC: 101 U/L (ref 43–160)
ALT SERPL-CCNC: 14 U/L (ref 10–40)
ANION GAP SERPL CALC-SCNC: 16 MMOL/L (ref 8–19)
AST SERPL-CCNC: 15 U/L (ref 15–50)
BASOPHILS # BLD AUTO: 0.03 10*3/UL (ref 0–0.2)
BASOPHILS NFR BLD AUTO: 0.5 % (ref 0–3)
BILIRUB SERPL-MCNC: 0.37 MG/DL (ref 0.2–1.3)
BUN SERPL-MCNC: 13 MG/DL (ref 5–25)
BUN/CREAT SERPL: 24 {RATIO} (ref 6–20)
CALCIUM SERPL-MCNC: 9.9 MG/DL (ref 8.7–10.4)
CHLORIDE SERPL-SCNC: 99 MMOL/L (ref 99–111)
CONV ABS IMM GRAN: 0.03 10*3/UL (ref 0–0.2)
CONV CO2: 23 MMOL/L (ref 22–32)
CONV IMMATURE GRAN: 0.5 % (ref 0–1.8)
CONV TOTAL PROTEIN: 6.8 G/DL (ref 6.3–8.2)
CREAT UR-MCNC: 0.55 MG/DL (ref 0.5–0.9)
CRP SERPL-MCNC: 89.6 MG/L (ref 0–5)
DEPRECATED RDW RBC AUTO: 42.1 FL (ref 36.4–46.3)
EOSINOPHIL # BLD AUTO: 0.23 10*3/UL (ref 0–0.7)
EOSINOPHIL # BLD AUTO: 3.5 % (ref 0–7)
ERYTHROCYTE [DISTWIDTH] IN BLOOD BY AUTOMATED COUNT: 14.4 % (ref 11.7–14.4)
ERYTHROCYTE [SEDIMENTATION RATE] IN BLOOD: 91 MM/H (ref 0–30)
GFR SERPLBLD BASED ON 1.73 SQ M-ARVRAT: >60 ML/MIN/{1.73_M2}
GLOBULIN UR ELPH-MCNC: 3.5 G/DL (ref 2–3.5)
GLUCOSE SERPL-MCNC: 156 MG/DL (ref 65–99)
HCT VFR BLD AUTO: 32.7 % (ref 37–47)
HGB BLD-MCNC: 10.8 G/DL (ref 12–16)
LYMPHOCYTES # BLD AUTO: 0.5 10*3/UL (ref 1–5)
LYMPHOCYTES NFR BLD AUTO: 7.7 % (ref 20–45)
MCH RBC QN AUTO: 26.6 PG (ref 27–31)
MCHC RBC AUTO-ENTMCNC: 33 G/DL (ref 33–37)
MCV RBC AUTO: 80.5 FL (ref 81–99)
MONOCYTES # BLD AUTO: 0.91 10*3/UL (ref 0.2–1.2)
MONOCYTES NFR BLD AUTO: 13.9 % (ref 3–10)
NEUTROPHILS # BLD AUTO: 4.83 10*3/UL (ref 2–8)
NEUTROPHILS NFR BLD AUTO: 73.9 % (ref 30–85)
NRBC CBCN: 0 % (ref 0–0.7)
OSMOLALITY SERPL CALC.SUM OF ELEC: 281 MOSM/KG (ref 273–304)
PLATELET # BLD AUTO: 177 10*3/UL (ref 130–400)
PMV BLD AUTO: 10.1 FL (ref 9.4–12.3)
POTASSIUM SERPL-SCNC: 4.4 MMOL/L (ref 3.5–5.3)
RBC # BLD AUTO: 4.06 10*6/UL (ref 4.2–5.4)
SODIUM SERPL-SCNC: 134 MMOL/L (ref 135–147)
WBC # BLD AUTO: 6.53 10*3/UL (ref 4.8–10.8)

## 2020-05-19 ENCOUNTER — HOSPITAL ENCOUNTER (OUTPATIENT)
Dept: OTHER | Facility: HOSPITAL | Age: 65
Discharge: HOME | End: 2020-05-19
Attending: INTERNAL MEDICINE

## 2020-05-19 LAB
ALBUMIN SERPL-MCNC: 3 G/DL (ref 3.5–5)
ALBUMIN/GLOB SERPL: 0.9 {RATIO} (ref 1.4–2.6)
ALP SERPL-CCNC: 89 U/L (ref 43–160)
ALT SERPL-CCNC: 13 U/L (ref 10–40)
ANION GAP SERPL CALC-SCNC: 15 MMOL/L (ref 8–19)
AST SERPL-CCNC: 24 U/L (ref 15–50)
BASOPHILS # BLD AUTO: 0.03 10*3/UL (ref 0–0.2)
BASOPHILS NFR BLD AUTO: 0.5 % (ref 0–3)
BILIRUB SERPL-MCNC: <0.15 MG/DL (ref 0.2–1.3)
BUN SERPL-MCNC: 16 MG/DL (ref 5–25)
BUN/CREAT SERPL: 27 {RATIO} (ref 6–20)
CALCIUM SERPL-MCNC: 9.8 MG/DL (ref 8.7–10.4)
CHLORIDE SERPL-SCNC: 100 MMOL/L (ref 99–111)
CONV ABS IMM GRAN: 0.11 10*3/UL (ref 0–0.2)
CONV CO2: 22 MMOL/L (ref 22–32)
CONV IMMATURE GRAN: 1.9 % (ref 0–1.8)
CONV TOTAL PROTEIN: 6.3 G/DL (ref 6.3–8.2)
CREAT UR-MCNC: 0.59 MG/DL (ref 0.5–0.9)
CRP SERPL-MCNC: 20.1 MG/L (ref 0–5)
DEPRECATED RDW RBC AUTO: 41.8 FL (ref 36.4–46.3)
EOSINOPHIL # BLD AUTO: 0.26 10*3/UL (ref 0–0.7)
EOSINOPHIL # BLD AUTO: 4.4 % (ref 0–7)
ERYTHROCYTE [DISTWIDTH] IN BLOOD BY AUTOMATED COUNT: 14.4 % (ref 11.7–14.4)
ERYTHROCYTE [SEDIMENTATION RATE] IN BLOOD: 85 MM/H (ref 0–30)
GFR SERPLBLD BASED ON 1.73 SQ M-ARVRAT: >60 ML/MIN/{1.73_M2}
GLOBULIN UR ELPH-MCNC: 3.3 G/DL (ref 2–3.5)
GLUCOSE SERPL-MCNC: 162 MG/DL (ref 65–99)
HCT VFR BLD AUTO: 29.5 % (ref 37–47)
HGB BLD-MCNC: 9.1 G/DL (ref 12–16)
LYMPHOCYTES # BLD AUTO: 0.6 10*3/UL (ref 1–5)
LYMPHOCYTES NFR BLD AUTO: 10.2 % (ref 20–45)
MCH RBC QN AUTO: 24.7 PG (ref 27–31)
MCHC RBC AUTO-ENTMCNC: 30.8 G/DL (ref 33–37)
MCV RBC AUTO: 79.9 FL (ref 81–99)
MONOCYTES # BLD AUTO: 0.64 10*3/UL (ref 0.2–1.2)
MONOCYTES NFR BLD AUTO: 10.9 % (ref 3–10)
NEUTROPHILS # BLD AUTO: 4.25 10*3/UL (ref 2–8)
NEUTROPHILS NFR BLD AUTO: 72.1 % (ref 30–85)
NRBC CBCN: 0 % (ref 0–0.7)
OSMOLALITY SERPL CALC.SUM OF ELEC: 279 MOSM/KG (ref 273–304)
PLATELET # BLD AUTO: 232 10*3/UL (ref 130–400)
PMV BLD AUTO: 10 FL (ref 9.4–12.3)
POTASSIUM SERPL-SCNC: 4.9 MMOL/L (ref 3.5–5.3)
RBC # BLD AUTO: 3.69 10*6/UL (ref 4.2–5.4)
SODIUM SERPL-SCNC: 132 MMOL/L (ref 135–147)
WBC # BLD AUTO: 5.89 10*3/UL (ref 4.8–10.8)

## 2020-05-22 ENCOUNTER — HOSPITAL ENCOUNTER (OUTPATIENT)
Dept: OTHER | Facility: HOSPITAL | Age: 65
Discharge: HOME | End: 2020-05-22

## 2020-05-22 LAB
C DIFF TOX B STL QL CT TISS CULT: POSITIVE
CONV 027 TOXIN: NEGATIVE

## 2020-05-26 ENCOUNTER — HOSPITAL ENCOUNTER (OUTPATIENT)
Dept: OTHER | Facility: HOSPITAL | Age: 65
Discharge: HOME | End: 2020-05-26
Attending: INTERNAL MEDICINE

## 2020-05-26 LAB
ALBUMIN SERPL-MCNC: 3.2 G/DL (ref 3.5–5)
ALBUMIN/GLOB SERPL: 1 {RATIO} (ref 1.4–2.6)
ALP SERPL-CCNC: 98 U/L (ref 43–160)
ALT SERPL-CCNC: 11 U/L (ref 10–40)
ANION GAP SERPL CALC-SCNC: 16 MMOL/L (ref 8–19)
AST SERPL-CCNC: 16 U/L (ref 15–50)
BASOPHILS # BLD AUTO: 0.04 10*3/UL (ref 0–0.2)
BASOPHILS NFR BLD AUTO: 0.7 % (ref 0–3)
BILIRUB SERPL-MCNC: 0.3 MG/DL (ref 0.2–1.3)
BUN SERPL-MCNC: 15 MG/DL (ref 5–25)
BUN/CREAT SERPL: 23 {RATIO} (ref 6–20)
CALCIUM SERPL-MCNC: 9.9 MG/DL (ref 8.7–10.4)
CHLORIDE SERPL-SCNC: 102 MMOL/L (ref 99–111)
CONV ABS IMM GRAN: 0.03 10*3/UL (ref 0–0.2)
CONV CO2: 20 MMOL/L (ref 22–32)
CONV IMMATURE GRAN: 0.5 % (ref 0–1.8)
CONV TOTAL PROTEIN: 6.5 G/DL (ref 6.3–8.2)
CREAT UR-MCNC: 0.66 MG/DL (ref 0.5–0.9)
CRP SERPL-MCNC: 11.1 MG/L (ref 0–5)
DEPRECATED RDW RBC AUTO: 44.9 FL (ref 36.4–46.3)
EOSINOPHIL # BLD AUTO: 0.18 10*3/UL (ref 0–0.7)
EOSINOPHIL # BLD AUTO: 3.1 % (ref 0–7)
ERYTHROCYTE [DISTWIDTH] IN BLOOD BY AUTOMATED COUNT: 15.2 % (ref 11.7–14.4)
ERYTHROCYTE [SEDIMENTATION RATE] IN BLOOD: 67 MM/H (ref 0–30)
GFR SERPLBLD BASED ON 1.73 SQ M-ARVRAT: >60 ML/MIN/{1.73_M2}
GLOBULIN UR ELPH-MCNC: 3.3 G/DL (ref 2–3.5)
GLUCOSE SERPL-MCNC: 214 MG/DL (ref 65–99)
HCT VFR BLD AUTO: 32.4 % (ref 37–47)
HGB BLD-MCNC: 10 G/DL (ref 12–16)
LYMPHOCYTES # BLD AUTO: 0.61 10*3/UL (ref 1–5)
LYMPHOCYTES NFR BLD AUTO: 10.6 % (ref 20–45)
MCH RBC QN AUTO: 25.1 PG (ref 27–31)
MCHC RBC AUTO-ENTMCNC: 30.9 G/DL (ref 33–37)
MCV RBC AUTO: 81.2 FL (ref 81–99)
MONOCYTES # BLD AUTO: 0.7 10*3/UL (ref 0.2–1.2)
MONOCYTES NFR BLD AUTO: 12.1 % (ref 3–10)
NEUTROPHILS # BLD AUTO: 4.22 10*3/UL (ref 2–8)
NEUTROPHILS NFR BLD AUTO: 73 % (ref 30–85)
NRBC CBCN: 0 % (ref 0–0.7)
OSMOLALITY SERPL CALC.SUM OF ELEC: 283 MOSM/KG (ref 273–304)
PLATELET # BLD AUTO: 173 10*3/UL (ref 130–400)
PMV BLD AUTO: 9.5 FL (ref 9.4–12.3)
POTASSIUM SERPL-SCNC: 4.7 MMOL/L (ref 3.5–5.3)
RBC # BLD AUTO: 3.99 10*6/UL (ref 4.2–5.4)
SODIUM SERPL-SCNC: 133 MMOL/L (ref 135–147)
WBC # BLD AUTO: 5.78 10*3/UL (ref 4.8–10.8)

## 2020-05-29 ENCOUNTER — HOSPITAL ENCOUNTER (OUTPATIENT)
Dept: OTHER | Facility: HOSPITAL | Age: 65
Discharge: HOME | End: 2020-05-29
Attending: INTERNAL MEDICINE

## 2020-05-31 LAB — BACTERIA SPEC AEROBE CULT: NORMAL

## 2020-06-02 ENCOUNTER — HOSPITAL ENCOUNTER (OUTPATIENT)
Dept: OTHER | Facility: HOSPITAL | Age: 65
Discharge: HOME | End: 2020-06-02
Attending: INTERNAL MEDICINE

## 2020-06-02 LAB
ALBUMIN SERPL-MCNC: 3.4 G/DL (ref 3.5–5)
ALBUMIN/GLOB SERPL: 1 {RATIO} (ref 1.4–2.6)
ALP SERPL-CCNC: 95 U/L (ref 43–160)
ALT SERPL-CCNC: 13 U/L (ref 10–40)
ANION GAP SERPL CALC-SCNC: 18 MMOL/L (ref 8–19)
AST SERPL-CCNC: 21 U/L (ref 15–50)
BASOPHILS # BLD AUTO: 0.03 10*3/UL (ref 0–0.2)
BASOPHILS NFR BLD AUTO: 0.6 % (ref 0–3)
BILIRUB SERPL-MCNC: 0.32 MG/DL (ref 0.2–1.3)
BUN SERPL-MCNC: 16 MG/DL (ref 5–25)
BUN/CREAT SERPL: 21 {RATIO} (ref 6–20)
CALCIUM SERPL-MCNC: 9.7 MG/DL (ref 8.7–10.4)
CHLORIDE SERPL-SCNC: 103 MMOL/L (ref 99–111)
CONV ABS IMM GRAN: 0.04 10*3/UL (ref 0–0.2)
CONV CO2: 18 MMOL/L (ref 22–32)
CONV IMMATURE GRAN: 0.8 % (ref 0–1.8)
CONV TOTAL PROTEIN: 6.7 G/DL (ref 6.3–8.2)
CREAT UR-MCNC: 0.77 MG/DL (ref 0.5–0.9)
CRP SERPL-MCNC: 20.3 MG/L (ref 0–5)
DEPRECATED RDW RBC AUTO: 46.7 FL (ref 36.4–46.3)
EOSINOPHIL # BLD AUTO: 0.23 10*3/UL (ref 0–0.7)
EOSINOPHIL # BLD AUTO: 4.3 % (ref 0–7)
ERYTHROCYTE [DISTWIDTH] IN BLOOD BY AUTOMATED COUNT: 15.7 % (ref 11.7–14.4)
ERYTHROCYTE [SEDIMENTATION RATE] IN BLOOD: 69 MM/H (ref 0–30)
GFR SERPLBLD BASED ON 1.73 SQ M-ARVRAT: >60 ML/MIN/{1.73_M2}
GLOBULIN UR ELPH-MCNC: 3.3 G/DL (ref 2–3.5)
GLUCOSE SERPL-MCNC: 146 MG/DL (ref 65–99)
HCT VFR BLD AUTO: 32.8 % (ref 37–47)
HGB BLD-MCNC: 10 G/DL (ref 12–16)
LYMPHOCYTES # BLD AUTO: 0.72 10*3/UL (ref 1–5)
LYMPHOCYTES NFR BLD AUTO: 13.5 % (ref 20–45)
MCH RBC QN AUTO: 24.9 PG (ref 27–31)
MCHC RBC AUTO-ENTMCNC: 30.5 G/DL (ref 33–37)
MCV RBC AUTO: 81.6 FL (ref 81–99)
MONOCYTES # BLD AUTO: 0.83 10*3/UL (ref 0.2–1.2)
MONOCYTES NFR BLD AUTO: 15.6 % (ref 3–10)
NEUTROPHILS # BLD AUTO: 3.48 10*3/UL (ref 2–8)
NEUTROPHILS NFR BLD AUTO: 65.2 % (ref 30–85)
NRBC CBCN: 0 % (ref 0–0.7)
OSMOLALITY SERPL CALC.SUM OF ELEC: 282 MOSM/KG (ref 273–304)
PLATELET # BLD AUTO: 180 10*3/UL (ref 130–400)
PMV BLD AUTO: 10.6 FL (ref 9.4–12.3)
POTASSIUM SERPL-SCNC: 4.6 MMOL/L (ref 3.5–5.3)
RBC # BLD AUTO: 4.02 10*6/UL (ref 4.2–5.4)
SODIUM SERPL-SCNC: 134 MMOL/L (ref 135–147)
WBC # BLD AUTO: 5.33 10*3/UL (ref 4.8–10.8)

## 2020-06-03 ENCOUNTER — HOSPITAL ENCOUNTER (OUTPATIENT)
Dept: OTHER | Facility: HOSPITAL | Age: 65
Discharge: HOME | End: 2020-06-03
Attending: INTERNAL MEDICINE

## 2020-06-05 LAB — BACTERIA SPEC AEROBE CULT: NORMAL

## 2020-07-15 ENCOUNTER — HOSPITAL ENCOUNTER (OUTPATIENT)
Dept: OTHER | Facility: HOSPITAL | Age: 65
Discharge: HOME | End: 2020-07-15
Attending: INTERNAL MEDICINE

## 2020-07-15 LAB
ALBUMIN SERPL-MCNC: 3.1 G/DL (ref 3.5–5)
ALBUMIN/GLOB SERPL: 0.9 {RATIO} (ref 1.4–2.6)
ALP SERPL-CCNC: 96 U/L (ref 43–160)
ALT SERPL-CCNC: 11 U/L (ref 10–40)
ANION GAP SERPL CALC-SCNC: 15 MMOL/L (ref 8–19)
AST SERPL-CCNC: 18 U/L (ref 15–50)
BASOPHILS # BLD AUTO: 0.03 10*3/UL (ref 0–0.2)
BASOPHILS NFR BLD AUTO: 0.3 % (ref 0–3)
BILIRUB SERPL-MCNC: 0.27 MG/DL (ref 0.2–1.3)
BUN SERPL-MCNC: 10 MG/DL (ref 5–25)
BUN/CREAT SERPL: 14 {RATIO} (ref 6–20)
CALCIUM SERPL-MCNC: 9.8 MG/DL (ref 8.7–10.4)
CHLORIDE SERPL-SCNC: 100 MMOL/L (ref 99–111)
CONV ABS IMM GRAN: 0.09 10*3/UL (ref 0–0.2)
CONV CO2: 23 MMOL/L (ref 22–32)
CONV IMMATURE GRAN: 1 % (ref 0–1.8)
CONV TOTAL PROTEIN: 6.7 G/DL (ref 6.3–8.2)
CREAT UR-MCNC: 0.7 MG/DL (ref 0.5–0.9)
CRP SERPL-MCNC: 9.9 MG/L (ref 0–5)
DEPRECATED RDW RBC AUTO: 47.4 FL (ref 36.4–46.3)
EOSINOPHIL # BLD AUTO: 0.26 10*3/UL (ref 0–0.7)
EOSINOPHIL # BLD AUTO: 2.8 % (ref 0–7)
ERYTHROCYTE [DISTWIDTH] IN BLOOD BY AUTOMATED COUNT: 16.7 % (ref 11.7–14.4)
ERYTHROCYTE [SEDIMENTATION RATE] IN BLOOD: 101 MM/H (ref 0–30)
GFR SERPLBLD BASED ON 1.73 SQ M-ARVRAT: >60 ML/MIN/{1.73_M2}
GLOBULIN UR ELPH-MCNC: 3.6 G/DL (ref 2–3.5)
GLUCOSE SERPL-MCNC: 191 MG/DL (ref 65–99)
HCT VFR BLD AUTO: 27.1 % (ref 37–47)
HGB BLD-MCNC: 7.9 G/DL (ref 12–16)
LYMPHOCYTES # BLD AUTO: 0.97 10*3/UL (ref 1–5)
LYMPHOCYTES NFR BLD AUTO: 10.5 % (ref 20–45)
MCH RBC QN AUTO: 22.8 PG (ref 27–31)
MCHC RBC AUTO-ENTMCNC: 29.2 G/DL (ref 33–37)
MCV RBC AUTO: 78.1 FL (ref 81–99)
MONOCYTES # BLD AUTO: 0.76 10*3/UL (ref 0.2–1.2)
MONOCYTES NFR BLD AUTO: 8.2 % (ref 3–10)
NEUTROPHILS # BLD AUTO: 7.11 10*3/UL (ref 2–8)
NEUTROPHILS NFR BLD AUTO: 77.2 % (ref 30–85)
NRBC CBCN: 0 % (ref 0–0.7)
OSMOLALITY SERPL CALC.SUM OF ELEC: 282 MOSM/KG (ref 273–304)
PLATELET # BLD AUTO: 273 10*3/UL (ref 130–400)
PMV BLD AUTO: 9.8 FL (ref 9.4–12.3)
POTASSIUM SERPL-SCNC: 4.4 MMOL/L (ref 3.5–5.3)
RBC # BLD AUTO: 3.47 10*6/UL (ref 4.2–5.4)
SODIUM SERPL-SCNC: 134 MMOL/L (ref 135–147)
WBC # BLD AUTO: 9.22 10*3/UL (ref 4.8–10.8)

## 2020-07-18 ENCOUNTER — HOSPITAL ENCOUNTER (OUTPATIENT)
Dept: OTHER | Facility: HOSPITAL | Age: 65
Discharge: HOME | End: 2020-07-18
Attending: INTERNAL MEDICINE

## 2020-07-18 LAB — CK SERPL-CCNC: 29 U/L (ref 35–230)

## 2020-07-22 ENCOUNTER — HOSPITAL ENCOUNTER (OUTPATIENT)
Dept: OTHER | Facility: HOSPITAL | Age: 65
Discharge: HOME | End: 2020-07-22
Attending: INTERNAL MEDICINE

## 2020-07-22 LAB
ALBUMIN SERPL-MCNC: 3.4 G/DL (ref 3.5–5)
ALBUMIN/GLOB SERPL: 0.9 {RATIO} (ref 1.4–2.6)
ALP SERPL-CCNC: 98 U/L (ref 43–160)
ALT SERPL-CCNC: 13 U/L (ref 10–40)
ANION GAP SERPL CALC-SCNC: 16 MMOL/L (ref 8–19)
AST SERPL-CCNC: 22 U/L (ref 15–50)
BASOPHILS # BLD AUTO: 0.04 10*3/UL (ref 0–0.2)
BASOPHILS NFR BLD AUTO: 0.6 % (ref 0–3)
BILIRUB SERPL-MCNC: 0.37 MG/DL (ref 0.2–1.3)
BUN SERPL-MCNC: 11 MG/DL (ref 5–25)
BUN/CREAT SERPL: 13 {RATIO} (ref 6–20)
CALCIUM SERPL-MCNC: 9.8 MG/DL (ref 8.7–10.4)
CHLORIDE SERPL-SCNC: 100 MMOL/L (ref 99–111)
CK SERPL-CCNC: 30 U/L (ref 35–230)
CONV ABS IMM GRAN: 0.03 10*3/UL (ref 0–0.2)
CONV CO2: 24 MMOL/L (ref 22–32)
CONV IMMATURE GRAN: 0.5 % (ref 0–1.8)
CONV TOTAL PROTEIN: 7 G/DL (ref 6.3–8.2)
CREAT UR-MCNC: 0.88 MG/DL (ref 0.5–0.9)
CRP SERPL-MCNC: 8.4 MG/L (ref 0–5)
DEPRECATED RDW RBC AUTO: 48.8 FL (ref 36.4–46.3)
EOSINOPHIL # BLD AUTO: 0.3 10*3/UL (ref 0–0.7)
EOSINOPHIL # BLD AUTO: 4.7 % (ref 0–7)
ERYTHROCYTE [DISTWIDTH] IN BLOOD BY AUTOMATED COUNT: 17.3 % (ref 11.7–14.4)
ERYTHROCYTE [SEDIMENTATION RATE] IN BLOOD: 92 MM/H (ref 0–30)
GFR SERPLBLD BASED ON 1.73 SQ M-ARVRAT: >60 ML/MIN/{1.73_M2}
GLOBULIN UR ELPH-MCNC: 3.6 G/DL (ref 2–3.5)
GLUCOSE SERPL-MCNC: 155 MG/DL (ref 65–99)
HCT VFR BLD AUTO: 29.8 % (ref 37–47)
HGB BLD-MCNC: 8.8 G/DL (ref 12–16)
LYMPHOCYTES # BLD AUTO: 1.09 10*3/UL (ref 1–5)
LYMPHOCYTES NFR BLD AUTO: 17.1 % (ref 20–45)
MCH RBC QN AUTO: 22.9 PG (ref 27–31)
MCHC RBC AUTO-ENTMCNC: 29.5 G/DL (ref 33–37)
MCV RBC AUTO: 77.4 FL (ref 81–99)
MONOCYTES # BLD AUTO: 0.8 10*3/UL (ref 0.2–1.2)
MONOCYTES NFR BLD AUTO: 12.6 % (ref 3–10)
NEUTROPHILS # BLD AUTO: 4.11 10*3/UL (ref 2–8)
NEUTROPHILS NFR BLD AUTO: 64.5 % (ref 30–85)
NRBC CBCN: 0 % (ref 0–0.7)
OSMOLALITY SERPL CALC.SUM OF ELEC: 285 MOSM/KG (ref 273–304)
PLATELET # BLD AUTO: 265 10*3/UL (ref 130–400)
PMV BLD AUTO: 10.8 FL (ref 9.4–12.3)
POTASSIUM SERPL-SCNC: 4.4 MMOL/L (ref 3.5–5.3)
RBC # BLD AUTO: 3.85 10*6/UL (ref 4.2–5.4)
SODIUM SERPL-SCNC: 136 MMOL/L (ref 135–147)
WBC # BLD AUTO: 6.37 10*3/UL (ref 4.8–10.8)

## 2020-07-29 ENCOUNTER — HOSPITAL ENCOUNTER (OUTPATIENT)
Dept: OTHER | Facility: HOSPITAL | Age: 65
Discharge: HOME | End: 2020-07-29
Attending: INTERNAL MEDICINE

## 2020-07-29 LAB
ALBUMIN SERPL-MCNC: 3.8 G/DL (ref 3.5–5)
ALBUMIN/GLOB SERPL: 1.1 {RATIO} (ref 1.4–2.6)
ALP SERPL-CCNC: 107 U/L (ref 43–160)
ALT SERPL-CCNC: 14 U/L (ref 10–40)
ANION GAP SERPL CALC-SCNC: 19 MMOL/L (ref 8–19)
AST SERPL-CCNC: 23 U/L (ref 15–50)
BASOPHILS # BLD AUTO: 0.04 10*3/UL (ref 0–0.2)
BASOPHILS NFR BLD AUTO: 0.6 % (ref 0–3)
BILIRUB SERPL-MCNC: 0.22 MG/DL (ref 0.2–1.3)
BUN SERPL-MCNC: 13 MG/DL (ref 5–25)
BUN/CREAT SERPL: 11 {RATIO} (ref 6–20)
CALCIUM SERPL-MCNC: 9.9 MG/DL (ref 8.7–10.4)
CHLORIDE SERPL-SCNC: 101 MMOL/L (ref 99–111)
CK SERPL-CCNC: 41 U/L (ref 35–230)
CONV ABS IMM GRAN: 0.04 10*3/UL (ref 0–0.2)
CONV CO2: 20 MMOL/L (ref 22–32)
CONV IMMATURE GRAN: 0.6 % (ref 0–1.8)
CONV TOTAL PROTEIN: 7.2 G/DL (ref 6.3–8.2)
CREAT UR-MCNC: 1.16 MG/DL (ref 0.5–0.9)
CRP SERPL-MCNC: 12 MG/L (ref 0–5)
DEPRECATED RDW RBC AUTO: 49.2 FL (ref 36.4–46.3)
EOSINOPHIL # BLD AUTO: 0.43 10*3/UL (ref 0–0.7)
EOSINOPHIL # BLD AUTO: 6.3 % (ref 0–7)
ERYTHROCYTE [DISTWIDTH] IN BLOOD BY AUTOMATED COUNT: 17.4 % (ref 11.7–14.4)
ERYTHROCYTE [SEDIMENTATION RATE] IN BLOOD: 72 MM/H (ref 0–30)
GFR SERPLBLD BASED ON 1.73 SQ M-ARVRAT: 49 ML/MIN/{1.73_M2}
GLOBULIN UR ELPH-MCNC: 3.4 G/DL (ref 2–3.5)
GLUCOSE SERPL-MCNC: 185 MG/DL (ref 65–99)
HCT VFR BLD AUTO: 31.6 % (ref 37–47)
HGB BLD-MCNC: 9.2 G/DL (ref 12–16)
LYMPHOCYTES # BLD AUTO: 1.26 10*3/UL (ref 1–5)
LYMPHOCYTES NFR BLD AUTO: 18.5 % (ref 20–45)
MCH RBC QN AUTO: 22.4 PG (ref 27–31)
MCHC RBC AUTO-ENTMCNC: 29.1 G/DL (ref 33–37)
MCV RBC AUTO: 77.1 FL (ref 81–99)
MONOCYTES # BLD AUTO: 0.79 10*3/UL (ref 0.2–1.2)
MONOCYTES NFR BLD AUTO: 11.6 % (ref 3–10)
NEUTROPHILS # BLD AUTO: 4.26 10*3/UL (ref 2–8)
NEUTROPHILS NFR BLD AUTO: 62.4 % (ref 30–85)
NRBC CBCN: 0 % (ref 0–0.7)
OSMOLALITY SERPL CALC.SUM OF ELEC: 285 MOSM/KG (ref 273–304)
PLATELET # BLD AUTO: 259 10*3/UL (ref 130–400)
PMV BLD AUTO: 10.7 FL (ref 9.4–12.3)
POTASSIUM SERPL-SCNC: 4.6 MMOL/L (ref 3.5–5.3)
RBC # BLD AUTO: 4.1 10*6/UL (ref 4.2–5.4)
SODIUM SERPL-SCNC: 135 MMOL/L (ref 135–147)
WBC # BLD AUTO: 6.82 10*3/UL (ref 4.8–10.8)

## 2020-08-05 ENCOUNTER — HOSPITAL ENCOUNTER (OUTPATIENT)
Dept: OTHER | Facility: HOSPITAL | Age: 65
Discharge: HOME | End: 2020-08-05
Attending: INTERNAL MEDICINE

## 2020-08-05 LAB
ALBUMIN SERPL-MCNC: 3.7 G/DL (ref 3.5–5)
ALBUMIN/GLOB SERPL: 1 {RATIO} (ref 1.4–2.6)
ALP SERPL-CCNC: 115 U/L (ref 43–160)
ALT SERPL-CCNC: 17 U/L (ref 10–40)
ANION GAP SERPL CALC-SCNC: 16 MMOL/L (ref 8–19)
AST SERPL-CCNC: 21 U/L (ref 15–50)
BASOPHILS # BLD AUTO: 0.05 10*3/UL (ref 0–0.2)
BASOPHILS NFR BLD AUTO: 0.7 % (ref 0–3)
BILIRUB SERPL-MCNC: 0.25 MG/DL (ref 0.2–1.3)
BUN SERPL-MCNC: 11 MG/DL (ref 5–25)
BUN/CREAT SERPL: 11 {RATIO} (ref 6–20)
CALCIUM SERPL-MCNC: 10.1 MG/DL (ref 8.7–10.4)
CHLORIDE SERPL-SCNC: 98 MMOL/L (ref 99–111)
CK SERPL-CCNC: 35 U/L (ref 35–230)
CONV ABS IMM GRAN: 0.04 10*3/UL (ref 0–0.2)
CONV CO2: 23 MMOL/L (ref 22–32)
CONV IMMATURE GRAN: 0.6 % (ref 0–1.8)
CONV TOTAL PROTEIN: 7.5 G/DL (ref 6.3–8.2)
CREAT UR-MCNC: 1.03 MG/DL (ref 0.5–0.9)
DEPRECATED RDW RBC AUTO: 47.3 FL (ref 36.4–46.3)
EOSINOPHIL # BLD AUTO: 0.35 10*3/UL (ref 0–0.7)
EOSINOPHIL # BLD AUTO: 4.9 % (ref 0–7)
ERYTHROCYTE [DISTWIDTH] IN BLOOD BY AUTOMATED COUNT: 17.2 % (ref 11.7–14.4)
ERYTHROCYTE [SEDIMENTATION RATE] IN BLOOD: 85 MM/H (ref 0–30)
GFR SERPLBLD BASED ON 1.73 SQ M-ARVRAT: 56 ML/MIN/{1.73_M2}
GLOBULIN UR ELPH-MCNC: 3.8 G/DL (ref 2–3.5)
GLUCOSE SERPL-MCNC: 266 MG/DL (ref 65–99)
HCT VFR BLD AUTO: 32.7 % (ref 37–47)
HGB BLD-MCNC: 9.6 G/DL (ref 12–16)
LYMPHOCYTES # BLD AUTO: 1.41 10*3/UL (ref 1–5)
LYMPHOCYTES NFR BLD AUTO: 19.8 % (ref 20–45)
MCH RBC QN AUTO: 22.2 PG (ref 27–31)
MCHC RBC AUTO-ENTMCNC: 29.4 G/DL (ref 33–37)
MCV RBC AUTO: 75.5 FL (ref 81–99)
MONOCYTES # BLD AUTO: 0.85 10*3/UL (ref 0.2–1.2)
MONOCYTES NFR BLD AUTO: 11.9 % (ref 3–10)
NEUTROPHILS # BLD AUTO: 4.42 10*3/UL (ref 2–8)
NEUTROPHILS NFR BLD AUTO: 62.1 % (ref 30–85)
NRBC CBCN: 0 % (ref 0–0.7)
OSMOLALITY SERPL CALC.SUM OF ELEC: 283 MOSM/KG (ref 273–304)
PLATELET # BLD AUTO: 309 10*3/UL (ref 130–400)
PMV BLD AUTO: 10.8 FL (ref 9.4–12.3)
POTASSIUM SERPL-SCNC: 4.6 MMOL/L (ref 3.5–5.3)
RBC # BLD AUTO: 4.33 10*6/UL (ref 4.2–5.4)
SODIUM SERPL-SCNC: 132 MMOL/L (ref 135–147)
WBC # BLD AUTO: 7.12 10*3/UL (ref 4.8–10.8)

## 2020-08-06 LAB — CRP SERPL-MCNC: 21.1 MG/L (ref 0–5)

## 2020-08-12 ENCOUNTER — HOSPITAL ENCOUNTER (OUTPATIENT)
Dept: OTHER | Facility: HOSPITAL | Age: 65
Discharge: HOME | End: 2020-08-12
Attending: INTERNAL MEDICINE

## 2020-08-12 LAB
BASOPHILS # BLD AUTO: 0.03 10*3/UL (ref 0–0.2)
BASOPHILS NFR BLD AUTO: 0.4 % (ref 0–3)
CONV ABS IMM GRAN: 0.03 10*3/UL (ref 0–0.2)
CONV IMMATURE GRAN: 0.4 % (ref 0–1.8)
DEPRECATED RDW RBC AUTO: 45.5 FL (ref 36.4–46.3)
EOSINOPHIL # BLD AUTO: 0.28 10*3/UL (ref 0–0.7)
EOSINOPHIL # BLD AUTO: 4.2 % (ref 0–7)
ERYTHROCYTE [DISTWIDTH] IN BLOOD BY AUTOMATED COUNT: 16.9 % (ref 11.7–14.4)
ERYTHROCYTE [SEDIMENTATION RATE] IN BLOOD: 64 MM/H (ref 0–30)
HCT VFR BLD AUTO: 30.5 % (ref 37–47)
HGB BLD-MCNC: 9 G/DL (ref 12–16)
LYMPHOCYTES # BLD AUTO: 1.25 10*3/UL (ref 1–5)
LYMPHOCYTES NFR BLD AUTO: 18.7 % (ref 20–45)
MCH RBC QN AUTO: 22 PG (ref 27–31)
MCHC RBC AUTO-ENTMCNC: 29.5 G/DL (ref 33–37)
MCV RBC AUTO: 74.4 FL (ref 81–99)
MONOCYTES # BLD AUTO: 0.58 10*3/UL (ref 0.2–1.2)
MONOCYTES NFR BLD AUTO: 8.7 % (ref 3–10)
NEUTROPHILS # BLD AUTO: 4.53 10*3/UL (ref 2–8)
NEUTROPHILS NFR BLD AUTO: 67.6 % (ref 30–85)
NRBC CBCN: 0 % (ref 0–0.7)
PLATELET # BLD AUTO: 248 10*3/UL (ref 130–400)
PMV BLD AUTO: 10.9 FL (ref 9.4–12.3)
RBC # BLD AUTO: 4.1 10*6/UL (ref 4.2–5.4)
WBC # BLD AUTO: 6.7 10*3/UL (ref 4.8–10.8)

## 2020-08-13 LAB
ALBUMIN SERPL-MCNC: 3.5 G/DL (ref 3.5–5)
ALBUMIN/GLOB SERPL: 1.1 {RATIO} (ref 1.4–2.6)
ALP SERPL-CCNC: 103 U/L (ref 43–160)
ALT SERPL-CCNC: 12 U/L (ref 10–40)
ANION GAP SERPL CALC-SCNC: 19 MMOL/L (ref 8–19)
AST SERPL-CCNC: 21 U/L (ref 15–50)
BILIRUB SERPL-MCNC: 0.21 MG/DL (ref 0.2–1.3)
BUN SERPL-MCNC: 13 MG/DL (ref 5–25)
BUN/CREAT SERPL: 14 {RATIO} (ref 6–20)
CALCIUM SERPL-MCNC: 10.1 MG/DL (ref 8.7–10.4)
CHLORIDE SERPL-SCNC: 100 MMOL/L (ref 99–111)
CK SERPL-CCNC: 29 U/L (ref 35–230)
CONV CO2: 22 MMOL/L (ref 22–32)
CONV TOTAL PROTEIN: 6.8 G/DL (ref 6.3–8.2)
CREAT UR-MCNC: 0.96 MG/DL (ref 0.5–0.9)
CRP SERPL-MCNC: 11 MG/L (ref 0–5)
GFR SERPLBLD BASED ON 1.73 SQ M-ARVRAT: >60 ML/MIN/{1.73_M2}
GLOBULIN UR ELPH-MCNC: 3.3 G/DL (ref 2–3.5)
GLUCOSE SERPL-MCNC: 206 MG/DL (ref 65–99)
OSMOLALITY SERPL CALC.SUM OF ELEC: 288 MOSM/KG (ref 273–304)
POTASSIUM SERPL-SCNC: 4.5 MMOL/L (ref 3.5–5.3)
SODIUM SERPL-SCNC: 136 MMOL/L (ref 135–147)

## 2020-08-19 ENCOUNTER — HOSPITAL ENCOUNTER (OUTPATIENT)
Dept: OTHER | Facility: HOSPITAL | Age: 65
Discharge: HOME | End: 2020-08-19
Attending: INTERNAL MEDICINE

## 2020-08-19 LAB
BASOPHILS # BLD AUTO: 0.03 10*3/UL (ref 0–0.2)
BASOPHILS NFR BLD AUTO: 0.5 % (ref 0–3)
CONV ABS IMM GRAN: 0.03 10*3/UL (ref 0–0.2)
CONV IMMATURE GRAN: 0.5 % (ref 0–1.8)
DEPRECATED RDW RBC AUTO: 45.1 FL (ref 36.4–46.3)
EOSINOPHIL # BLD AUTO: 0.37 10*3/UL (ref 0–0.7)
EOSINOPHIL # BLD AUTO: 5.8 % (ref 0–7)
ERYTHROCYTE [DISTWIDTH] IN BLOOD BY AUTOMATED COUNT: 16.9 % (ref 11.7–14.4)
ERYTHROCYTE [SEDIMENTATION RATE] IN BLOOD: 18 MM/H (ref 0–30)
HCT VFR BLD AUTO: 28.5 % (ref 37–47)
HGB BLD-MCNC: 8.5 G/DL (ref 12–16)
LYMPHOCYTES # BLD AUTO: 1.21 10*3/UL (ref 1–5)
LYMPHOCYTES NFR BLD AUTO: 18.8 % (ref 20–45)
MCH RBC QN AUTO: 22.1 PG (ref 27–31)
MCHC RBC AUTO-ENTMCNC: 29.8 G/DL (ref 33–37)
MCV RBC AUTO: 74.2 FL (ref 81–99)
MONOCYTES # BLD AUTO: 0.61 10*3/UL (ref 0.2–1.2)
MONOCYTES NFR BLD AUTO: 9.5 % (ref 3–10)
NEUTROPHILS # BLD AUTO: 4.18 10*3/UL (ref 2–8)
NEUTROPHILS NFR BLD AUTO: 64.9 % (ref 30–85)
NRBC CBCN: 0 % (ref 0–0.7)
PLATELET # BLD AUTO: 232 10*3/UL (ref 130–400)
PMV BLD AUTO: 11.8 FL (ref 9.4–12.3)
RBC # BLD AUTO: 3.84 10*6/UL (ref 4.2–5.4)
WBC # BLD AUTO: 6.43 10*3/UL (ref 4.8–10.8)

## 2020-08-20 LAB
ALBUMIN SERPL-MCNC: 3.5 G/DL (ref 3.5–5)
ALBUMIN/GLOB SERPL: 1.3 {RATIO} (ref 1.4–2.6)
ALP SERPL-CCNC: 95 U/L (ref 43–160)
ALT SERPL-CCNC: 11 U/L (ref 10–40)
ANION GAP SERPL CALC-SCNC: 16 MMOL/L (ref 8–19)
AST SERPL-CCNC: 18 U/L (ref 15–50)
BILIRUB SERPL-MCNC: 0.22 MG/DL (ref 0.2–1.3)
BUN SERPL-MCNC: 13 MG/DL (ref 5–25)
BUN/CREAT SERPL: 14 {RATIO} (ref 6–20)
CALCIUM SERPL-MCNC: 9.9 MG/DL (ref 8.7–10.4)
CHLORIDE SERPL-SCNC: 101 MMOL/L (ref 99–111)
CK SERPL-CCNC: 36 U/L (ref 35–230)
CONV CO2: 22 MMOL/L (ref 22–32)
CONV TOTAL PROTEIN: 6.3 G/DL (ref 6.3–8.2)
CREAT UR-MCNC: 0.94 MG/DL (ref 0.5–0.9)
CRP SERPL-MCNC: 11.3 MG/L (ref 0–5)
GFR SERPLBLD BASED ON 1.73 SQ M-ARVRAT: >60 ML/MIN/{1.73_M2}
GLOBULIN UR ELPH-MCNC: 2.8 G/DL (ref 2–3.5)
GLUCOSE SERPL-MCNC: 216 MG/DL (ref 65–99)
OSMOLALITY SERPL CALC.SUM OF ELEC: 287 MOSM/KG (ref 273–304)
POTASSIUM SERPL-SCNC: 4.3 MMOL/L (ref 3.5–5.3)
SODIUM SERPL-SCNC: 135 MMOL/L (ref 135–147)

## 2020-08-25 ENCOUNTER — CONVERSION ENCOUNTER (OUTPATIENT)
Dept: ORTHOPEDIC SURGERY | Facility: CLINIC | Age: 65
End: 2020-08-25

## 2020-08-25 ENCOUNTER — OFFICE VISIT CONVERTED (OUTPATIENT)
Dept: ORTHOPEDIC SURGERY | Facility: CLINIC | Age: 65
End: 2020-08-25
Attending: PHYSICIAN ASSISTANT

## 2020-10-24 ENCOUNTER — HOSPITAL ENCOUNTER (OUTPATIENT)
Dept: OTHER | Facility: HOSPITAL | Age: 65
Discharge: HOME | End: 2020-10-24

## 2020-10-24 LAB
ALBUMIN SERPL-MCNC: 3.9 G/DL (ref 3.5–5)
ALBUMIN/GLOB SERPL: 1.2 {RATIO} (ref 1.4–2.6)
ALP SERPL-CCNC: 127 U/L (ref 43–160)
ALT SERPL-CCNC: 14 U/L (ref 10–40)
ANION GAP SERPL CALC-SCNC: 13 MMOL/L (ref 8–19)
AST SERPL-CCNC: 21 U/L (ref 15–50)
BASOPHILS # BLD AUTO: 0.05 10*3/UL (ref 0–0.2)
BASOPHILS NFR BLD AUTO: 0.7 % (ref 0–3)
BILIRUB SERPL-MCNC: 0.48 MG/DL (ref 0.2–1.3)
BUN SERPL-MCNC: 18 MG/DL (ref 5–25)
BUN/CREAT SERPL: 20 {RATIO} (ref 6–20)
CALCIUM SERPL-MCNC: 9.5 MG/DL (ref 8.7–10.4)
CHLORIDE SERPL-SCNC: 103 MMOL/L (ref 99–111)
CHOLEST SERPL-MCNC: 143 MG/DL (ref 107–200)
CHOLEST/HDLC SERPL: 3.7 {RATIO} (ref 3–6)
CONV ABS IMM GRAN: 0.06 10*3/UL (ref 0–0.2)
CONV CO2: 25 MMOL/L (ref 22–32)
CONV CREATININE URINE, RANDOM: 48.9 MG/DL (ref 10–300)
CONV IMMATURE GRAN: 0.8 % (ref 0–1.8)
CONV MICROALBUM.,U,RANDOM: <12 MG/L (ref 0–20)
CONV TOTAL PROTEIN: 7.1 G/DL (ref 6.3–8.2)
CREAT UR-MCNC: 0.89 MG/DL (ref 0.5–0.9)
DEPRECATED RDW RBC AUTO: 55.1 FL (ref 36.4–46.3)
EOSINOPHIL # BLD AUTO: 0.32 10*3/UL (ref 0–0.7)
EOSINOPHIL # BLD AUTO: 4.3 % (ref 0–7)
ERYTHROCYTE [DISTWIDTH] IN BLOOD BY AUTOMATED COUNT: 20.8 % (ref 11.7–14.4)
FOLATE SERPL-MCNC: 6.9 NG/ML (ref 4.8–20)
GFR SERPLBLD BASED ON 1.73 SQ M-ARVRAT: >60 ML/MIN/{1.73_M2}
GLOBULIN UR ELPH-MCNC: 3.2 G/DL (ref 2–3.5)
GLUCOSE SERPL-MCNC: 179 MG/DL (ref 65–99)
HCT VFR BLD AUTO: 37.9 % (ref 37–47)
HDLC SERPL-MCNC: 39 MG/DL (ref 40–60)
HGB BLD-MCNC: 11.4 G/DL (ref 12–16)
LDLC SERPL CALC-MCNC: 69 MG/DL (ref 70–100)
LYMPHOCYTES # BLD AUTO: 1.24 10*3/UL (ref 1–5)
LYMPHOCYTES NFR BLD AUTO: 16.6 % (ref 20–45)
MCH RBC QN AUTO: 22.7 PG (ref 27–31)
MCHC RBC AUTO-ENTMCNC: 30.1 G/DL (ref 33–37)
MCV RBC AUTO: 75.5 FL (ref 81–99)
MICROALBUMIN/CREAT UR: 24.5 MG/G{CRE} (ref 0–35)
MONOCYTES # BLD AUTO: 0.76 10*3/UL (ref 0.2–1.2)
MONOCYTES NFR BLD AUTO: 10.2 % (ref 3–10)
NEUTROPHILS # BLD AUTO: 5.02 10*3/UL (ref 2–8)
NEUTROPHILS NFR BLD AUTO: 67.4 % (ref 30–85)
NRBC CBCN: 0 % (ref 0–0.7)
OSMOLALITY SERPL CALC.SUM OF ELEC: 290 MOSM/KG (ref 273–304)
PLATELET # BLD AUTO: 212 10*3/UL (ref 130–400)
PMV BLD AUTO: 9.3 FL (ref 9.4–12.3)
POTASSIUM SERPL-SCNC: 4.4 MMOL/L (ref 3.5–5.3)
RBC # BLD AUTO: 5.02 10*6/UL (ref 4.2–5.4)
SODIUM SERPL-SCNC: 137 MMOL/L (ref 135–147)
TRIGL SERPL-MCNC: 176 MG/DL (ref 40–150)
TSH SERPL-ACNC: 2.46 M[IU]/L (ref 0.27–4.2)
VIT B12 SERPL-MCNC: 326 PG/ML (ref 211–911)
VLDLC SERPL-MCNC: 35 MG/DL (ref 5–37)
WBC # BLD AUTO: 7.45 10*3/UL (ref 4.8–10.8)

## 2021-01-05 ENCOUNTER — CONVERSION ENCOUNTER (OUTPATIENT)
Dept: ORTHOPEDIC SURGERY | Facility: CLINIC | Age: 66
End: 2021-01-05

## 2021-01-05 ENCOUNTER — OFFICE VISIT CONVERTED (OUTPATIENT)
Dept: ORTHOPEDIC SURGERY | Facility: CLINIC | Age: 66
End: 2021-01-05
Attending: PHYSICIAN ASSISTANT

## 2021-01-12 ENCOUNTER — HOSPITAL ENCOUNTER (OUTPATIENT)
Dept: PHYSICAL THERAPY | Facility: CLINIC | Age: 66
Setting detail: RECURRING SERIES
Discharge: HOME | End: 2021-01-13
Attending: ORTHOPAEDIC SURGERY

## 2021-02-10 ENCOUNTER — HOSPITAL ENCOUNTER (OUTPATIENT)
Dept: MAMMOGRAPHY | Facility: HOSPITAL | Age: 66
Discharge: HOME | End: 2021-02-10

## 2021-03-08 ENCOUNTER — HOSPITAL ENCOUNTER (OUTPATIENT)
Dept: VACCINE CLINIC | Facility: HOSPITAL | Age: 66
Discharge: HOME | End: 2021-03-08
Attending: INTERNAL MEDICINE

## 2021-03-29 ENCOUNTER — HOSPITAL ENCOUNTER (OUTPATIENT)
Dept: VACCINE CLINIC | Facility: HOSPITAL | Age: 66
Discharge: HOME | End: 2021-03-29
Attending: INTERNAL MEDICINE

## 2021-05-10 NOTE — H&P
History and Physical      Patient Name: Stephy Duncan   Patient ID: [id number]   Sex: Female   YOB: 1955    Primary Care Provider: Reinaldo Zacarias DO   Referring Provider: Gregorio Rosales MD    Visit Date: August 25, 2020    Provider: Roopa Abdul PA-C   Location: [facility]   Location Address: [address] [place] [address]  [id number]   Location Phone: [phone]          Chief Complaint  · Left Shoulder Pain      History Of Present Illness  Stephy Duncan is a 65 year old /White female who presents today to [facility].      Patient is here for left shoulder pain. Patient states pain at the anterior and posterior sides of the shoulder. Patient states pain from using a walker. Patient states physical therapy has improved range of motion.       Past Medical History  Aftercare following surgery of the muskuloskeletal system, Left total knee arthroplasty; Arthritis; Benign Essential Hypertension; Cancer; Cervical Neoplasm, Malignant; Chest pain; Chronic Obstructive Pulmonary Disease; Coronary artery disease; Diabetes; Diabetes mellitus, insulin dependent (IDDM), controlled; Diabetes Mellitus, Type II, Uncontrolled; Diarrhea; Edema, unspecified type; Heart attack; Heart Disease; Hyperlipemia; Hyperlipidemia; Hypertension; Hypertension, Benign Essential; Left total hip replacement 02/18/2019; Leg pain; Leg swelling; Limb Swelling; LVH (left ventricular hypertrophy); Morbid obesity due to excess calories; Muscle cramps; Neuropathy; Osteoarth NOS-shlder; Reflux; Right Shoulder Impingement; Seasonal allergies; Shortness of Breath; Ulcer         Past Surgical History  Arthroscopic knee surgery, right; Artificial Joints/Limbs; Bunionectomy; Cervical cone biopsy; Cholecstectomy; Cholecystecomy; Colonoscopy; Foot surgery; Gallbladder; heart surgery; Hysterectomy; Joint Surgery; Metal implants; Tonsilectomy; Tonsillectomy; Total replacement of left knee joint          Medication List  [facility] miscellaneous strip; aspirin 81 mg oral tablet,delayed release (DR/EC); atorvastatin 40 mg oral tablet; Brilinta 60 mg oral tablet; cefepime 100 gram intravenous recon soln; ferrous sulfate oral; gabapentin 300 mg oral capsule; hydrocodone-acetaminophen 7.5-325 mg/15 mL oral solution; isosorbide mononitrate 30 mg oral tablet extended release 24 hr; Levemir U-100 Insulin 100 unit/mL subcutaneous solution; losartan 100 mg oral tablet; metoprolol succinate 100 mg oral tablet extended release 24 hr; Mycamine 100 mg intravenous recon soln; Nexium 40 mg oral capsule,delayed release(DR/EC); Nitrostat 0.4 mg sublingual tablet, sublingual; Novolog Flexpen 100 unit/mL subcutaneous insulin pen; Trulicity 0.75 mg/0.5 mL subcutaneous pen injector; Voltaren 1 % topical gel         Allergy List  * Other; Ace Inhibitors; Augmentin; daptomycin; morphine; PENICILLINS; vancomycin         Family Medical History  Chronic Obstructive Pulmonary Disease; Breast Neoplasm, Malignant; Uterus Neoplasm, Malignant; Lung Neoplasm, Malignant; Stroke; Heart Disease; Hypertension; Cancer, Unspecified; Diabetes, unspecified type; Kidney Cancer; Family history of certain chronic disabling diseases; arthritis; Osteoporosis; Family history of Arthritis; Family history of cancer; Family history of heart disease; Family history of diabetes mellitus; Family history of osteoporosis         Social History  Active but no formal exercise; Alcohol (Current some day); lives with spouse; .; No known infection risk; Recreational Drug Use (Never); Retired.; Tobacco (Former); Working         Review of Systems  · Constitutional  o Denies  o : fever, chills, weight loss  · Cardiovascular  o Denies  o : chest pain, shortness of breath  · Gastrointestinal  o Denies  o : liver disease, heartburn, nausea, blood in stools  · Genitourinary  o Denies  o : painful urination, blood in  "urine  · Integument  o Denies  o : rash, itching  · Neurologic  o Denies  o : headache, weakness, loss of consciousness  · Musculoskeletal  o Denies  o : painful, swollen joints  · Psychiatric  o Denies  o : drug/alcohol addiction, anxiety, depression      Vitals  Date Time BP Position Site L\R Cuff Size HR RR TEMP (F) WT  HT  BMI kg/m2 BSA m2 O2 Sat HC       08/25/2020 02:10 PM         236lbs 0oz 5'  4\" 40.51 2.2           Physical Examination  · Constitutional  o Appearance  o : well developed, well-nourished, no obvious deformities present  · Head and Face  o Head  o :   § Inspection  § : normocephalic  o Face  o :   § Inspection  § : no facial lesions  · Eyes  o Conjunctivae  o : conjunctivae normal  o Sclerae  o : sclerae white  · Ears, Nose, Mouth and Throat  o Ears  o :   § External Ears  § : appearance within normal limits  § Hearing  § : intact  o Nose  o :   § External Nose  § : appearance normal  · Neck  o Inspection/Palpation  o : normal appearance  o Range of Motion  o : full range of motion  · Respiratory  o Respiratory Effort  o : breathing unlabored  o Inspection of Chest  o : normal appearance  o Auscultation of Lungs  o : no audible wheezing or rales  · Cardiovascular  o Heart  o : regular rate  · Gastrointestinal  o Abdominal Examination  o : soft and non-tender  · Skin and Subcutaneous Tissue  o General Inspection  o : intact, no rashes  · Psychiatric  o General  o : Alert and oriented x3  o Judgement and Insight  o : judgment and insight intact  o Mood and Affect  o : mood normal, affect appropriate  · Injection Note/Aspiration Note  o Site  o : left shoulder   o Procedure  o : Procedure: After educating the patient, patient gave consent for procedure. After using Chloraprep, the joint space was injected. The patient tolerated the procedure well.   o Medication  o : 80 mg of DepoMedrol with 9cc of 1% Lidocaine  · In Office Procedures  o View  o : AP/LATERAL  o Site  o : left, shoulder "   o Indication  o : Left Shoulder Pain  o Study  o : X-rays ordered, taken in the office, and reviewed today.  o Xray  o : AC osteoarthritis  o Comparative Data  o : Comparative Data found and reviewed today   · Left Shoulder Street  o Inspection  o : No swelling, no erythema, no ecchymosis, no atrophy  o Palpation  o : no tenderness at sternoclavicular joint, no tenderness at clavicle, no tenderness of acromioclavicular joint, no tenderness at greater tuberosity, tenderness at subacromial bursae, tenderness at biciptal groove   o ROM  o : Full extension, full flexion, full abduction, full internal rotation, full external rotation, no winging of scapula, no scapular dyskinesis, full cervical ROM , full cross body adduction  o Strength  o : full extension, full flexion, full abduction, full external rotation, full internal rotation, full empty can test, full lift off test, full drop arm test  o Special Tests  o : negative neer's test, negative Hawkin's test  o Neurovascular  o : Full sensation, radial pulse 2+, ulnar pulse 2+          Assessment  · AC (acromioclavicular) arthritis     716.91/M19.019  · Left shoulder pain, unspecified chronicity     719.41/M25.512  · Shoulder bursitis     726.10/M75.50      Plan  · Orders  o Depo-Medrol injection 80mg () - - 08/25/2020   Lot [id number] Exp 07 2021 [employer] Administered by [address] PA C  o Shoulder Intra-articular Injection without US Guidance [facility] (98320) - - 08/25/2020   Lot [id number] Exp 08 01 2021 [employer] Administered by [address] PA C  o Scapula (Left) [facility] (37706-IE) - 719.41/M25.512 - 08/25/2020  · Medications  o Medications have been Reconciled  o Transition of Care or Provider Policy  · Instructions  o Reviewed the patient's Past Medical, Social, and Family history as well as the ROS at today's visit, no changes.  o Call or return if worsening symptoms.  o Exercise handout given.  o X-ray ordered, taken and reviewed at this  visit.  o Follow Up PRN.  o Electronically Identified Patient Education Materials Provided Electronically            Electronically Signed by: TYRA JuarezC -Author on August 25, 2020 03:02:11 PM

## 2021-05-10 NOTE — H&P
History and Physical      Patient Name: Stephy Duncan   Patient ID: [id number]   Sex: Female   YOB: 1955    Primary Care Provider: Reinaldo Zacarias DO   Referring Provider: Gregorio Rosales MD    Visit Date: May 4, 2020    Provider: Dino Spivey MD   Location: [facility]   Location Address: [address] [place] [address]  [id number]   Location Phone: [phone]          Chief Complaint  · Numbness in both hands      History Of Present Illness  Video Conferencing Visit  Stephy Duncan is a 64 year old /White female who is presenting for evaluation via video conferencing. Verbal consent obtained before beginning visit.   The following staff were present during this visit: Antonia Monet MA   Provider(s) involved in this patient's care are: Juan Jose Hamlin MD   The patient is a IS PATIENT RIGHT HANDED 64 year old /White female who presents for evaluation of numbness, paresthesias and pain in the right hand. - The numbness is localized to the palm, thumb, index finger, and long finger. She states the pain does not awaken her at night and has tingling then just numb quality. - These symptoms began approximately 2 years ago. -   The patient also reports mild on left side.. - The following information was reviewed : EMG report. The emg report showed carpal tunnel syndrome at the wrist on both arms.   The patient had a NCV/EMG recently that was consistent with carpal tunnel syndrome.   The patient has done conservative modalities including wrist splints.       Past Medical History  Aftercare following surgery of the muskuloskeletal system, Left total knee arthroplasty; Arthritis; Benign Essential Hypertension; Cancer; Cervical Neoplasm, Malignant; Chest pain; Chronic Obstructive Pulmonary Disease; Coronary artery disease; Diabetes; Diabetes mellitus, insulin dependent (IDDM), controlled; Diabetes Mellitus, Type II, Uncontrolled; Diarrhea; Edema, unspecified  type; Heart attack; Heart Disease; Hyperlipemia; Hyperlipidemia; Hypertension; Hypertension, Benign Essential; Left total hip replacement 02/18/2019; Leg pain; Leg swelling; Limb Swelling; LVH (left ventricular hypertrophy); Morbid obesity due to excess calories; Muscle cramps; Neuropathy; Osteoarth NOS-shlder; Reflux; Right Shoulder Impingement; Seasonal allergies; Shortness of Breath; Ulcer         Past Surgical History  Arthroscopic knee surgery, right; Artificial Joints/Limbs; Bunionectomy; Cervical cone biopsy; Cholecstectomy; Cholecystecomy; Colonoscopy; Foot surgery; Gallbladder; heart surgery; Hysterectomy; Joint Surgery; Metal implants; Tonsilectomy; Tonsillectomy; Total replacement of left knee joint         Medication List  [employer] SmartView Test Strip miscellaneous strip; aspirin 81 mg oral tablet,delayed release (DR/EC); atorvastatin 40 mg oral tablet; Brilinta 60 mg oral tablet; cefepime 100 gram intravenous recon soln; ferrous sulfate oral; gabapentin 300 mg oral capsule; hydrocodone-acetaminophen 7.5-325 mg/15 mL oral solution; isosorbide mononitrate 30 mg oral tablet extended release 24 hr; Levemir U-100 Insulin 100 unit/mL subcutaneous solution; losartan 100 mg oral tablet; metoprolol succinate 100 mg oral tablet extended release 24 hr; Mycamine 100 mg intravenous recon soln; Nexium 40 mg oral capsule,delayed release(DR/EC); Nitrostat 0.4 mg sublingual tablet, sublingual; Novolog Flexpen 100 unit/mL subcutaneous insulin pen; Trulicity 0.75 mg/0.5 mL subcutaneous pen injector; Voltaren 1 % topical gel         Allergy List  * Other; Ace Inhibitors; Augmentin; daptomycin; morphine; PENICILLINS; vancomycin       Allergies Reconciled  Family Medical History  Chronic Obstructive Pulmonary Disease; Breast Neoplasm, Malignant; Uterus Neoplasm, Malignant; Lung Neoplasm, Malignant; Stroke; Heart Disease; Hypertension; Cancer, Unspecified; Diabetes, unspecified type; Kidney Cancer; Family history of certain  chronic disabling diseases; arthritis; Osteoporosis; Family history of Arthritis; Family history of cancer; Family history of heart disease; Family history of diabetes mellitus; Family history of osteoporosis         Social History  Active but no formal exercise; Alcohol (Current some day); lives with spouse; .; No known infection risk; Recreational Drug Use (Never); Retired.; Tobacco (Former); Working         Review of Systems  · Constitutional  o Denies  o : chills, excessive sweating, fatigue, fever, sycope/passing out, weight gain, weight loss  · Eyes  o Denies  o : changes in vision, blurry vision, double vision  · HENT  o Denies  o : loss of hearing, ringing in the ears, ear aches, sore throat, nasal congestion, sinus pain, nose bleeds, seasonal allergies  · Cardiovascular  o Denies  o : blood clots, swollen legs, anemia, easy burising or bleeding, transfusions  · Respiratory  o Denies  o : shortness of breath, dry cough, productive cough, pneumonia, COPD  · Gastrointestinal  o Denies  o : difficulty swallowing, reflux  · Genitourinary  o Denies  o : incontinence  · Neurologic  o Admits  o : numbness/tingling/paresthesia   o Denies  o : headache, seizure, stroke, tremor, loss of balance, falls, dizziness/vertigo, difficulty with sleep, difficulty with coordination, difficulty with dexterity, weakness  · Musculoskeletal  o Denies  o : neck stiffness/pain, swollen lymph nodes, muscle aches, joint pain, weakness, spasms, sciatica, pain radiating in arm, pain radiating in leg, low back pain  · Endocrine  o Denies  o : diabetes, thyroid disorder  · Psychiatric  o Denies  o : anxiety, depression      Physical Examination  · Constitutional  o Appearance  o : well-nourished, well developed, alert, in no acute distress  · Neck  o Range of Motion  o : cervical range of motion within normal limits  · Cardiovascular  o Peripheral Vascular System  o :   § Extremities  § : no edema or cyanosis  · Psychiatric  o Mood  and Affect  o : mood normal, affect appropriate     PICC line right arm for IV abx following hip replacement.  CAD with MI in Dec 2019.  Has appointment tomorrow with ID.      negative Lherittes    appears to be 3/5 or better in extremitis.  able to open, close hand.  able to abduct and oppose thumb               Assessment  · Bilateral Carpal Tunnel Syndrome     354.0/G56.01  right greater than left  requested release from ID and Card  She wants to wait until not using walker.    Problems Reconciled  Plan  · Orders  o ACO-17: Screened for tobacco use AND received tobacco cessation intervention (4004F) - - 05/04/2020  · Medications  o Medications have been Reconciled  o Transition of Care or Provider Policy  · Instructions  o Tobacco Cessation Counseling: non-user  o Encouraged to follow-up with Primary Care Provider for preventative care.  o The ROS and the PFSH were reviewed at today's visit.  o The etiology and available treatment options for carpal tunnel syndrome were discussed in detail with patient.  o The risks and benefits of surgical decompression of the carpal tunnel were discussed with the patient, and all questions were answered.  o Based on the chronicity of the symptoms, physical examination findings, and electrophysiologic study results, the patient was informed that surgical intervention is an option.  o The patient will contact office if she would like to proceed with surgery.  o Possible risks, complications, benefits, and alternatives to surgical or invasive procedure have been explained to patient and/or legal guardian.  · Disposition  o Call or Return if symptoms worsen or persist.  o Follow Up PRN.     This was a [tele-video, including audio and video via Zoom application]  visit from [home] .  I personally spent 14 minutes of [face-to-face] time.  This does not include the time connecting to the patient , our medical assistant spent updating demographics, preferred pharmacy, medication  changes, and the note, order(s), maintain those to the office staff or other ancillary task(s).    Please note portions of this document are an electronic transcription of spoken language to printed text. The electronic translation/transcription may permit erroneous or at times nonsensical words or phrases to be inadvertently transcribed.  I have reviewed the note for such errors; however, some may still exist. Before it was signed, the document was checked for such errors, but some may still exist.  The reader is encouraged to use prudent judgment when interpreting this document.             Electronically Signed by: Dino Spivey MD -Author on May 4, 2020 01:25:57 PM

## 2021-05-14 VITALS — WEIGHT: 236 LBS | BODY MASS INDEX: 40.29 KG/M2 | HEIGHT: 64 IN

## 2021-05-14 VITALS — WEIGHT: 237 LBS | HEART RATE: 62 BPM | BODY MASS INDEX: 40.46 KG/M2 | HEIGHT: 64 IN | OXYGEN SATURATION: 97 %

## 2021-05-14 NOTE — PROGRESS NOTES
Progress Note      Patient Name: Stephy Duncan   Patient ID: [id number]   Sex: Female   YOB: 1955    Primary Care Provider: Reinaldo Zacarias DO   Referring Provider: Gregorio Rosales MD    Visit Date: January 5, 2021    Provider: Roopa Abdul PA-C   Location: [facility]   Location Address: [address] [place] [address]  [id number]   Location Phone: [phone]          Chief Complaint  · Follow up bilateral shoulder pain      History Of Present Illness  Stephy Duncan is a 65 year old /White female who presents today to [facility].      Patient is following up for shoulder pain. Patient states pain at the anterior and posterior sides of the shoulder. Patient states pain from moving. Patient is using a walker.       Past Medical History  Aftercare following surgery of the muskuloskeletal system, Left total knee arthroplasty; Arthritis; Benign Essential Hypertension; Cancer; Cervical Neoplasm, Malignant; Chest pain; Chronic Obstructive Pulmonary Disease; Coronary artery disease; Diabetes; Diabetes mellitus, insulin dependent (IDDM), controlled; Diabetes Mellitus, Type II, Uncontrolled; Diarrhea; Edema, unspecified type; Heart attack; Heart Disease; Hyperlipemia; Hyperlipidemia; Hypertension; Hypertension, Benign Essential; Left total hip replacement 02/18/2019; Leg pain; Leg swelling; Limb Swelling; LVH (left ventricular hypertrophy); Morbid obesity due to excess calories; Muscle cramps; Neurologic disorder; Neuropathy; Osteoarth NOS-shlder; Reflux; Right Shoulder Impingement; Seasonal allergies; Shortness of Breath; Ulcer         Past Surgical History  Arthroscopic knee surgery, right; Artificial Joints/Limbs; Bunionectomy; Cervical cone biopsy; Cholecstectomy; Cholecystecomy; Colonoscopy; Foot surgery; Gallbladder; heart surgery; Hysterectomy; Joint Surgery; Metal implants; Tonsilectomy; Tonsillectomy; Total replacement of left knee joint         Medication  List  Accu-Chek SmartView Test Strip miscellaneous strip; aspirin 81 mg oral tablet,delayed release (DR/EC); atorvastatin 40 mg oral tablet; Brilinta 60 mg oral tablet; cefepime 100 gram intravenous recon soln; ferrous sulfate oral; gabapentin 300 mg oral capsule; hydrocodone-acetaminophen 7.5-325 mg/15 mL oral solution; isosorbide mononitrate 30 mg oral tablet extended release 24 hr; Levemir U-100 Insulin 100 unit/mL subcutaneous solution; losartan 100 mg oral tablet; metoprolol succinate 100 mg oral tablet extended release 24 hr; Mycamine 100 mg intravenous recon soln; Nexium 40 mg oral capsule,delayed release(DR/EC); Nitrostat 0.4 mg sublingual tablet, sublingual; Novolog Flexpen 100 unit/mL subcutaneous insulin pen; Trulicity 0.75 mg/0.5 mL subcutaneous pen injector; Voltaren 1 % topical gel         Allergy List  * Other; Ace Inhibitors; Augmentin; daptomycin; morphine; PENICILLINS; vancomycin         Family Medical History  Chronic Obstructive Pulmonary Disease; Breast Neoplasm, Malignant; Uterus Neoplasm, Malignant; Lung Neoplasm, Malignant; Stroke; Heart Disease; Hypertension; Cancer, Unspecified; Diabetes, unspecified type; Kidney Cancer; Family history of certain chronic disabling diseases; arthritis; Osteoporosis; Family history of Arthritis; Family history of cancer; Family history of heart disease; Family history of diabetes mellitus; Family history of osteoporosis         Social History  Active but no formal exercise; Alcohol (Current some day); Alcohol Use (Current some day); lives with spouse; .; No known infection risk; Recreational Drug Use (Never); Retired.; Tobacco (Former); Working         Review of Systems  · Constitutional  o Denies  o : fever, chills, weight loss  · Cardiovascular  o Denies  o : chest pain, shortness of breath  · Gastrointestinal  o Denies  o : liver disease, heartburn, nausea, blood in stools  · Genitourinary  o Denies  o : painful urination, blood in  "urine  · Integument  o Denies  o : rash, itching  · Neurologic  o Denies  o : headache, weakness, loss of consciousness  · Musculoskeletal  o Denies  o : painful, swollen joints  · Psychiatric  o Denies  o : drug/alcohol addiction, anxiety, depression      Vitals  Date Time BP Position Site L\R Cuff Size HR RR TEMP (F) WT  HT  BMI kg/m2 BSA m2 O2 Sat FR L/min FiO2        01/05/2021 11:03 AM      62 - R   237lbs 0oz 5'  4\" 40.68 2.2 97 %            Physical Examination  · Constitutional  o Appearance  o : well developed, well-nourished, no obvious deformities present  · Head and Face  o Head  o :   § Inspection  § : normocephalic  o Face  o :   § Inspection  § : no facial lesions  · Eyes  o Conjunctivae  o : conjunctivae normal  o Sclerae  o : sclerae white  · Ears, Nose, Mouth and Throat  o Ears  o :   § External Ears  § : appearance within normal limits  § Hearing  § : intact  o Nose  o :   § External Nose  § : appearance normal  · Neck  o Inspection/Palpation  o : normal appearance  o Range of Motion  o : full range of motion  · Respiratory  o Respiratory Effort  o : breathing unlabored  o Inspection of Chest  o : normal appearance  o Auscultation of Lungs  o : no audible wheezing or rales  · Cardiovascular  o Heart  o : regular rate  · Gastrointestinal  o Abdominal Examination  o : soft and non-tender  · Skin and Subcutaneous Tissue  o General Inspection  o : intact, no rashes  · Psychiatric  o General  o : Alert and oriented x3  o Judgement and Insight  o : judgment and insight intact  o Mood and Affect  o : mood normal, affect appropriate  · Injection Note/Aspiration Note  o Site  o : bilateral shoulder   o Procedure  o : Procedure: After educating the patient, patient gave consent for procedure. After using Chloraprep, the joint space was injected. The patient tolerated the procedure well.   o Medication  o : 80 mg of DepoMedrol with 9cc of 1% Lidocaine  · Right Shoulder-Street  o Inspection  o : No " swelling, no erythema, no ecchymosis, no atrophy  o Palpation  o : no tenderness at sternoclavicular joint, no tenderness at clavicle, tenderness of acromioclavicular joint, tenderness at greater tuberosity, tenderness at subacromial bursae, no tenderness at biciptal groove  o ROM  o : Full extension, full flexion, full abduction, full internal rotation, full external rotation, no winging of scapula, no scapular dyskinesis, full cervical ROM , full cross body adduction  o Strength  o : full extension, full flexion, full abduction, full external rotation, full internal rotation, full empty can test, full lift off test, full drop arm test  o Special Tests  o : negative neer's test, negative Hawkin's test  o Neurovascular  o : Full sensation, radial pulse 2+, ulnar pulse 2+  · Left Shoulder Street  o Inspection  o : No swelling, no erythema, no ecchymosis, no atrophy  o Palpation  o : no tenderness at sternoclavicular joint, no tenderness at clavicle, tenderness of acromioclavicular joint, tenderness at greater tuberosity, tenderness at subacromial bursae, no tenderness at biciptal groove  o ROM  o : Full extension, full flexion, full abduction, full internal rotation, full external rotation, no winging of scapula, no scapular dyskinesis, full cervical ROM , full cross body adduction  o Strength  o : full extension, full flexion, full abduction, full external rotation, full internal rotation, full empty can test, full lift off test, full drop arm test  o Special Tests  o : negative neer's test, negative Hawkin's test  o Neurovascular  o : Full sensation, radial pulse 2+, ulnar pulse 2+          Assessment  · AC (acromioclavicular) arthritis     716.91/M19.019  · Pain: Shoulder     719.41/M25.519      Plan  · Orders  o 2 - Depo-Medrol injection 80mg () - 719.41/M25.519 - 01/05/2021   Lot [id number] manufactured by [employer] 03 2022  o 2 - Shoulder Intra-articular Injection without US Guidance [facility] ([id number]) -  719.41/M25.519 - 01/05/2021   Lot [id number] manufactured by [employer] 08 2021 Administered by Roopa ALVARADO  · Medications  o Medications have been Reconciled  o Transition of Care or Provider Policy  · Instructions  o Reviewed the patient's Past Medical, Social, and Family history as well as the ROS at today's visit, no changes.  o Call or return if worsening symptoms.  o Follow Up PRN.  o Electronically Identified Patient Education Materials Provided Electronically            Electronically Signed by: JENNY Juarez-C -Author on January 5, 2021 11:41:55 AM  Electronically Co-signed by: Yassine Perez MD -Reviewer on January 5, 2021 06:57:21 PM

## 2021-05-15 VITALS — HEIGHT: 64 IN | OXYGEN SATURATION: 96 % | HEART RATE: 73 BPM | BODY MASS INDEX: 40.8 KG/M2 | WEIGHT: 239 LBS

## 2021-05-15 VITALS
HEART RATE: 84 BPM | DIASTOLIC BLOOD PRESSURE: 49 MMHG | SYSTOLIC BLOOD PRESSURE: 153 MMHG | HEART RATE: 91 BPM | SYSTOLIC BLOOD PRESSURE: 123 MMHG | DIASTOLIC BLOOD PRESSURE: 100 MMHG | HEIGHT: 64 IN

## 2021-05-15 VITALS — WEIGHT: 236 LBS | BODY MASS INDEX: 40.29 KG/M2 | HEIGHT: 64 IN | OXYGEN SATURATION: 97 % | HEART RATE: 76 BPM

## 2021-05-15 VITALS — BODY MASS INDEX: 40.8 KG/M2 | HEIGHT: 64 IN | WEIGHT: 239 LBS

## 2021-05-15 VITALS — WEIGHT: 239.12 LBS | HEART RATE: 66 BPM | HEIGHT: 64 IN | BODY MASS INDEX: 40.82 KG/M2 | OXYGEN SATURATION: 97 %

## 2021-05-15 VITALS — BODY MASS INDEX: 40.29 KG/M2 | WEIGHT: 236 LBS | HEIGHT: 64 IN

## 2021-05-15 VITALS — HEIGHT: 64 IN | HEART RATE: 70 BPM | OXYGEN SATURATION: 97 % | WEIGHT: 236 LBS | BODY MASS INDEX: 40.29 KG/M2

## 2021-05-15 VITALS — HEIGHT: 64 IN | BODY MASS INDEX: 40.8 KG/M2 | WEIGHT: 239 LBS | OXYGEN SATURATION: 95 % | HEART RATE: 70 BPM

## 2021-05-15 VITALS — OXYGEN SATURATION: 93 % | HEART RATE: 94 BPM | HEIGHT: 64 IN

## 2021-05-16 VITALS — HEIGHT: 64 IN | OXYGEN SATURATION: 94 % | HEART RATE: 80 BPM

## 2021-05-16 VITALS — WEIGHT: 244 LBS | HEART RATE: 71 BPM | BODY MASS INDEX: 41.66 KG/M2 | HEIGHT: 64 IN | OXYGEN SATURATION: 93 %

## 2021-05-16 VITALS — WEIGHT: 242 LBS | BODY MASS INDEX: 41.32 KG/M2 | HEIGHT: 64 IN

## 2021-05-16 VITALS — WEIGHT: 236 LBS | HEART RATE: 53 BPM | OXYGEN SATURATION: 97 % | BODY MASS INDEX: 40.29 KG/M2 | HEIGHT: 64 IN

## 2021-05-16 VITALS — WEIGHT: 234.12 LBS | HEIGHT: 64 IN | BODY MASS INDEX: 39.97 KG/M2 | HEART RATE: 88 BPM | OXYGEN SATURATION: 98 %

## 2021-05-16 VITALS — HEART RATE: 55 BPM | OXYGEN SATURATION: 97 % | HEIGHT: 64 IN | BODY MASS INDEX: 41.66 KG/M2 | WEIGHT: 244 LBS

## 2021-05-16 VITALS — HEIGHT: 64 IN | HEART RATE: 70 BPM | BODY MASS INDEX: 40.01 KG/M2 | OXYGEN SATURATION: 98 % | WEIGHT: 234.37 LBS

## 2021-05-16 VITALS — OXYGEN SATURATION: 98 % | BODY MASS INDEX: 40.82 KG/M2 | HEIGHT: 64 IN | HEART RATE: 80 BPM | WEIGHT: 239.12 LBS

## 2021-05-16 VITALS — HEIGHT: 64 IN | OXYGEN SATURATION: 96 % | WEIGHT: 242.5 LBS | HEART RATE: 67 BPM | BODY MASS INDEX: 41.4 KG/M2

## 2021-05-16 VITALS — HEIGHT: 64 IN | WEIGHT: 239 LBS | HEART RATE: 70 BPM | BODY MASS INDEX: 40.8 KG/M2 | OXYGEN SATURATION: 96 %

## 2021-05-17 ENCOUNTER — HOSPITAL ENCOUNTER (OUTPATIENT)
Dept: MAMMOGRAPHY | Facility: HOSPITAL | Age: 66
Discharge: HOME | End: 2021-05-17

## 2021-07-15 ENCOUNTER — LAB (OUTPATIENT)
Dept: LAB | Facility: HOSPITAL | Age: 66
End: 2021-07-15

## 2021-07-15 ENCOUNTER — TRANSCRIBE ORDERS (OUTPATIENT)
Dept: LAB | Facility: HOSPITAL | Age: 66
End: 2021-07-15

## 2021-07-15 DIAGNOSIS — I10 HYPERTENSION, UNSPECIFIED TYPE: ICD-10-CM

## 2021-07-15 DIAGNOSIS — I25.10 CVD (CARDIOVASCULAR DISEASE): Primary | ICD-10-CM

## 2021-07-15 DIAGNOSIS — M86.9: ICD-10-CM

## 2021-07-15 DIAGNOSIS — E11.9 TYPE 2 DIABETES MELLITUS WITHOUT COMPLICATION, UNSPECIFIED WHETHER LONG TERM INSULIN USE (HCC): ICD-10-CM

## 2021-07-15 DIAGNOSIS — E78.5 HYPERLIPIDEMIA, UNSPECIFIED HYPERLIPIDEMIA TYPE: ICD-10-CM

## 2021-07-15 DIAGNOSIS — K21.9 GASTROESOPHAGEAL REFLUX DISEASE, UNSPECIFIED WHETHER ESOPHAGITIS PRESENT: ICD-10-CM

## 2021-07-15 DIAGNOSIS — G62.9 NEUROPATHY: ICD-10-CM

## 2021-07-15 DIAGNOSIS — I25.10 CVD (CARDIOVASCULAR DISEASE): ICD-10-CM

## 2021-07-15 DIAGNOSIS — Z11.59 SCREENING EXAMINATION FOR POLIOMYELITIS: ICD-10-CM

## 2021-07-15 LAB
BASOPHILS # BLD AUTO: 0.04 10*3/MM3 (ref 0–0.2)
BASOPHILS NFR BLD AUTO: 0.8 % (ref 0–1.5)
DEPRECATED RDW RBC AUTO: 41.6 FL (ref 37–54)
EOSINOPHIL # BLD AUTO: 0.23 10*3/MM3 (ref 0–0.4)
EOSINOPHIL NFR BLD AUTO: 4.7 % (ref 0.3–6.2)
ERYTHROCYTE [DISTWIDTH] IN BLOOD BY AUTOMATED COUNT: 12.9 % (ref 12.3–15.4)
HBA1C MFR BLD: 7.5 % (ref 4.8–5.6)
HCT VFR BLD AUTO: 36.8 % (ref 34–46.6)
HGB BLD-MCNC: 12 G/DL (ref 12–15.9)
IMM GRANULOCYTES # BLD AUTO: 0.02 10*3/MM3 (ref 0–0.05)
IMM GRANULOCYTES NFR BLD AUTO: 0.4 % (ref 0–0.5)
LYMPHOCYTES # BLD AUTO: 1.11 10*3/MM3 (ref 0.7–3.1)
LYMPHOCYTES NFR BLD AUTO: 22.7 % (ref 19.6–45.3)
MCH RBC QN AUTO: 28.8 PG (ref 26.6–33)
MCHC RBC AUTO-ENTMCNC: 32.6 G/DL (ref 31.5–35.7)
MCV RBC AUTO: 88.2 FL (ref 79–97)
MONOCYTES # BLD AUTO: 0.44 10*3/MM3 (ref 0.1–0.9)
MONOCYTES NFR BLD AUTO: 9 % (ref 5–12)
NEUTROPHILS NFR BLD AUTO: 3.04 10*3/MM3 (ref 1.7–7)
NEUTROPHILS NFR BLD AUTO: 62.4 % (ref 42.7–76)
NRBC BLD AUTO-RTO: 0 /100 WBC (ref 0–0.2)
PLATELET # BLD AUTO: 132 10*3/MM3 (ref 140–450)
PMV BLD AUTO: 10.5 FL (ref 6–12)
RBC # BLD AUTO: 4.17 10*6/MM3 (ref 3.77–5.28)
WBC # BLD AUTO: 4.88 10*3/MM3 (ref 3.4–10.8)

## 2021-07-15 PROCEDURE — 82570 ASSAY OF URINE CREATININE: CPT

## 2021-07-15 PROCEDURE — 83036 HEMOGLOBIN GLYCOSYLATED A1C: CPT

## 2021-07-15 PROCEDURE — 85025 COMPLETE CBC W/AUTO DIFF WBC: CPT

## 2021-07-15 PROCEDURE — 80061 LIPID PANEL: CPT

## 2021-07-15 PROCEDURE — 82043 UR ALBUMIN QUANTITATIVE: CPT

## 2021-07-15 PROCEDURE — 86803 HEPATITIS C AB TEST: CPT

## 2021-07-15 PROCEDURE — 80053 COMPREHEN METABOLIC PANEL: CPT

## 2021-07-15 PROCEDURE — 36415 COLL VENOUS BLD VENIPUNCTURE: CPT

## 2021-07-16 LAB
ALBUMIN SERPL-MCNC: 3.7 G/DL (ref 3.5–5.2)
ALBUMIN UR-MCNC: <1.2 MG/DL
ALBUMIN/GLOB SERPL: 1.5 G/DL
ALP SERPL-CCNC: 83 U/L (ref 39–117)
ALT SERPL W P-5'-P-CCNC: 17 U/L (ref 1–33)
ANION GAP SERPL CALCULATED.3IONS-SCNC: 9.5 MMOL/L (ref 5–15)
AST SERPL-CCNC: 19 U/L (ref 1–32)
BILIRUB SERPL-MCNC: 0.6 MG/DL (ref 0–1.2)
BUN SERPL-MCNC: 16 MG/DL (ref 8–23)
BUN/CREAT SERPL: 17 (ref 7–25)
CALCIUM SPEC-SCNC: 8.7 MG/DL (ref 8.6–10.5)
CHLORIDE SERPL-SCNC: 105 MMOL/L (ref 98–107)
CHOLEST SERPL-MCNC: 106 MG/DL (ref 0–200)
CO2 SERPL-SCNC: 21.5 MMOL/L (ref 22–29)
CREAT SERPL-MCNC: 0.94 MG/DL (ref 0.57–1)
CREAT UR-MCNC: 42.5 MG/DL
GFR SERPL CREATININE-BSD FRML MDRD: 59 ML/MIN/1.73
GLOBULIN UR ELPH-MCNC: 2.5 GM/DL
GLUCOSE SERPL-MCNC: 201 MG/DL (ref 65–99)
HDLC SERPL-MCNC: 35 MG/DL (ref 40–60)
LDLC SERPL CALC-MCNC: 49 MG/DL (ref 0–100)
LDLC/HDLC SERPL: 1.35 {RATIO}
MICROALBUMIN/CREAT UR: NORMAL MG/G{CREAT}
POTASSIUM SERPL-SCNC: 4.3 MMOL/L (ref 3.5–5.2)
PROT SERPL-MCNC: 6.2 G/DL (ref 6–8.5)
SODIUM SERPL-SCNC: 136 MMOL/L (ref 136–145)
TRIGL SERPL-MCNC: 118 MG/DL (ref 0–150)
VLDLC SERPL-MCNC: 22 MG/DL (ref 5–40)

## 2021-07-17 LAB
HCV AB S/CO SERPL IA: <0.1 S/CO RATIO (ref 0–0.9)
HCV AB SERPL QL IA: NORMAL

## 2021-09-09 ENCOUNTER — TRANSCRIBE ORDERS (OUTPATIENT)
Dept: LAB | Facility: HOSPITAL | Age: 66
End: 2021-09-09

## 2021-09-09 ENCOUNTER — LAB (OUTPATIENT)
Dept: LAB | Facility: HOSPITAL | Age: 66
End: 2021-09-09

## 2021-09-09 DIAGNOSIS — Z00.00 ROUTINE GENERAL MEDICAL EXAMINATION AT A HEALTH CARE FACILITY: ICD-10-CM

## 2021-09-09 DIAGNOSIS — Z00.00 ROUTINE GENERAL MEDICAL EXAMINATION AT A HEALTH CARE FACILITY: Primary | ICD-10-CM

## 2021-09-09 PROCEDURE — C9803 HOPD COVID-19 SPEC COLLECT: HCPCS

## 2021-09-09 PROCEDURE — U0004 COV-19 TEST NON-CDC HGH THRU: HCPCS

## 2021-09-09 PROCEDURE — U0005 INFEC AGEN DETEC AMPLI PROBE: HCPCS

## 2021-09-11 LAB — SARS-COV-2 RNA NOSE QL NAA+PROBE: NOT DETECTED

## 2021-10-01 ENCOUNTER — TRANSCRIBE ORDERS (OUTPATIENT)
Dept: ADMINISTRATIVE | Facility: HOSPITAL | Age: 66
End: 2021-10-01

## 2021-10-01 DIAGNOSIS — M25.551 RIGHT HIP PAIN: Primary | ICD-10-CM

## 2021-10-06 ENCOUNTER — HOSPITAL ENCOUNTER (OUTPATIENT)
Dept: CT IMAGING | Facility: HOSPITAL | Age: 66
Discharge: HOME | End: 2021-10-06

## 2021-10-06 ENCOUNTER — HOSPITAL ENCOUNTER (OUTPATIENT)
Dept: INTERVENTIONAL RADIOLOGY/VASCULAR | Facility: HOSPITAL | Age: 66
Discharge: HOME | End: 2021-10-06

## 2021-10-06 DIAGNOSIS — M25.551 RIGHT HIP PAIN: ICD-10-CM

## 2021-10-06 PROCEDURE — 72192 CT PELVIS W/O DYE: CPT

## 2021-10-13 ENCOUNTER — APPOINTMENT (OUTPATIENT)
Dept: CT IMAGING | Facility: HOSPITAL | Age: 66
End: 2021-10-13

## 2021-10-13 ENCOUNTER — HOSPITAL ENCOUNTER (OUTPATIENT)
Dept: INTERVENTIONAL RADIOLOGY/VASCULAR | Facility: HOSPITAL | Age: 66
Discharge: HOME | End: 2021-10-13
Admitting: ORTHOPAEDIC SURGERY

## 2021-10-13 LAB
APPEARANCE FLD: ABNORMAL
COLOR FLD: ABNORMAL
LYMPHOCYTES NFR FLD MANUAL: 36 %
MACROPHAGE FLUID: 4 %
MONOCYTES NFR FLD: 4 %
NEUTROPHILS NFR FLD MANUAL: 56 %
NUC CELL # FLD: 44.44 /MM3
RBC # FLD AUTO: ABNORMAL /MM3

## 2021-10-13 PROCEDURE — 87070 CULTURE OTHR SPECIMN AEROBIC: CPT | Performed by: ORTHOPAEDIC SURGERY

## 2021-10-13 PROCEDURE — 87205 SMEAR GRAM STAIN: CPT | Performed by: ORTHOPAEDIC SURGERY

## 2021-10-13 PROCEDURE — 77002 NEEDLE LOCALIZATION BY XRAY: CPT

## 2021-10-13 PROCEDURE — 89051 BODY FLUID CELL COUNT: CPT | Performed by: ORTHOPAEDIC SURGERY

## 2021-10-16 LAB
BACTERIA FLD CULT: NORMAL
GRAM STN SPEC: NORMAL

## 2021-10-27 ENCOUNTER — TRANSCRIBE ORDERS (OUTPATIENT)
Dept: ADMINISTRATIVE | Facility: HOSPITAL | Age: 66
End: 2021-10-27

## 2021-10-27 DIAGNOSIS — Z12.31 VISIT FOR SCREENING MAMMOGRAM: Primary | ICD-10-CM

## 2021-11-10 ENCOUNTER — PRE-ADMISSION TESTING (OUTPATIENT)
Dept: PREADMISSION TESTING | Facility: HOSPITAL | Age: 66
End: 2021-11-10

## 2021-11-10 ENCOUNTER — HOSPITAL ENCOUNTER (OUTPATIENT)
Dept: GENERAL RADIOLOGY | Facility: HOSPITAL | Age: 66
Discharge: HOME | End: 2021-11-10

## 2021-11-10 VITALS
SYSTOLIC BLOOD PRESSURE: 157 MMHG | TEMPERATURE: 97.2 F | BODY MASS INDEX: 39.78 KG/M2 | OXYGEN SATURATION: 97 % | WEIGHT: 233 LBS | HEART RATE: 72 BPM | DIASTOLIC BLOOD PRESSURE: 59 MMHG | RESPIRATION RATE: 16 BRPM | HEIGHT: 64 IN

## 2021-11-10 LAB
ALBUMIN SERPL-MCNC: 4.2 G/DL (ref 3.5–5.2)
ALBUMIN/GLOB SERPL: 1.4 G/DL
ALP SERPL-CCNC: 91 U/L (ref 39–117)
ALT SERPL W P-5'-P-CCNC: 16 U/L (ref 1–33)
ANION GAP SERPL CALCULATED.3IONS-SCNC: 9.8 MMOL/L (ref 5–15)
APTT PPP: 27 SECONDS (ref 22.7–35.4)
AST SERPL-CCNC: 19 U/L (ref 1–32)
BACTERIA UR QL AUTO: ABNORMAL /HPF
BASOPHILS # BLD AUTO: 0.03 10*3/MM3 (ref 0–0.2)
BASOPHILS NFR BLD AUTO: 0.4 % (ref 0–1.5)
BILIRUB SERPL-MCNC: 0.4 MG/DL (ref 0–1.2)
BILIRUB UR QL STRIP: NEGATIVE
BUN SERPL-MCNC: 19 MG/DL (ref 8–23)
BUN/CREAT SERPL: 24.1 (ref 7–25)
CALCIUM SPEC-SCNC: 9.3 MG/DL (ref 8.6–10.5)
CHLORIDE SERPL-SCNC: 106 MMOL/L (ref 98–107)
CLARITY UR: ABNORMAL
CO2 SERPL-SCNC: 26.2 MMOL/L (ref 22–29)
COLOR UR: YELLOW
CREAT SERPL-MCNC: 0.79 MG/DL (ref 0.57–1)
DEPRECATED RDW RBC AUTO: 43.4 FL (ref 37–54)
EOSINOPHIL # BLD AUTO: 0.28 10*3/MM3 (ref 0–0.4)
EOSINOPHIL NFR BLD AUTO: 3.7 % (ref 0.3–6.2)
ERYTHROCYTE [DISTWIDTH] IN BLOOD BY AUTOMATED COUNT: 13.4 % (ref 12.3–15.4)
GFR SERPL CREATININE-BSD FRML MDRD: 72 ML/MIN/1.73
GLOBULIN UR ELPH-MCNC: 3.1 GM/DL
GLUCOSE SERPL-MCNC: 120 MG/DL (ref 65–99)
GLUCOSE UR STRIP-MCNC: NEGATIVE MG/DL
HBA1C MFR BLD: 6.74 % (ref 4.8–5.6)
HCT VFR BLD AUTO: 39.1 % (ref 34–46.6)
HGB BLD-MCNC: 12.3 G/DL (ref 12–15.9)
HGB UR QL STRIP.AUTO: ABNORMAL
HYALINE CASTS UR QL AUTO: ABNORMAL /LPF
IMM GRANULOCYTES # BLD AUTO: 0.03 10*3/MM3 (ref 0–0.05)
IMM GRANULOCYTES NFR BLD AUTO: 0.4 % (ref 0–0.5)
INR PPP: 1.1 (ref 0.9–1.1)
KETONES UR QL STRIP: NEGATIVE
LEUKOCYTE ESTERASE UR QL STRIP.AUTO: ABNORMAL
LYMPHOCYTES # BLD AUTO: 1.13 10*3/MM3 (ref 0.7–3.1)
LYMPHOCYTES NFR BLD AUTO: 14.8 % (ref 19.6–45.3)
MCH RBC QN AUTO: 27.8 PG (ref 26.6–33)
MCHC RBC AUTO-ENTMCNC: 31.5 G/DL (ref 31.5–35.7)
MCV RBC AUTO: 88.3 FL (ref 79–97)
MONOCYTES # BLD AUTO: 0.58 10*3/MM3 (ref 0.1–0.9)
MONOCYTES NFR BLD AUTO: 7.6 % (ref 5–12)
NEUTROPHILS NFR BLD AUTO: 5.6 10*3/MM3 (ref 1.7–7)
NEUTROPHILS NFR BLD AUTO: 73.1 % (ref 42.7–76)
NITRITE UR QL STRIP: NEGATIVE
NRBC BLD AUTO-RTO: 0.1 /100 WBC (ref 0–0.2)
PH UR STRIP.AUTO: 6 [PH] (ref 5–8)
PLATELET # BLD AUTO: 133 10*3/MM3 (ref 140–450)
PMV BLD AUTO: 10.4 FL (ref 6–12)
POTASSIUM SERPL-SCNC: 4.6 MMOL/L (ref 3.5–5.2)
PROT SERPL-MCNC: 7.3 G/DL (ref 6–8.5)
PROT UR QL STRIP: NEGATIVE
PROTHROMBIN TIME: 14 SECONDS (ref 11.7–14.2)
QT INTERVAL: 406 MS
RBC # BLD AUTO: 4.43 10*6/MM3 (ref 3.77–5.28)
RBC # UR: ABNORMAL /HPF
REF LAB TEST METHOD: ABNORMAL
SODIUM SERPL-SCNC: 142 MMOL/L (ref 136–145)
SP GR UR STRIP: 1.01 (ref 1–1.03)
SQUAMOUS #/AREA URNS HPF: ABNORMAL /HPF
UROBILINOGEN UR QL STRIP: ABNORMAL
WBC # BLD AUTO: 7.65 10*3/MM3 (ref 3.4–10.8)
WBC UR QL AUTO: ABNORMAL /HPF

## 2021-11-10 PROCEDURE — 83036 HEMOGLOBIN GLYCOSYLATED A1C: CPT

## 2021-11-10 PROCEDURE — 36415 COLL VENOUS BLD VENIPUNCTURE: CPT

## 2021-11-10 PROCEDURE — 85610 PROTHROMBIN TIME: CPT

## 2021-11-10 PROCEDURE — 71046 X-RAY EXAM CHEST 2 VIEWS: CPT

## 2021-11-10 PROCEDURE — 80053 COMPREHEN METABOLIC PANEL: CPT

## 2021-11-10 PROCEDURE — 93010 ELECTROCARDIOGRAM REPORT: CPT | Performed by: INTERNAL MEDICINE

## 2021-11-10 PROCEDURE — 85025 COMPLETE CBC W/AUTO DIFF WBC: CPT

## 2021-11-10 PROCEDURE — 85730 THROMBOPLASTIN TIME PARTIAL: CPT

## 2021-11-10 PROCEDURE — 93005 ELECTROCARDIOGRAM TRACING: CPT

## 2021-11-10 PROCEDURE — 73502 X-RAY EXAM HIP UNI 2-3 VIEWS: CPT

## 2021-11-10 PROCEDURE — 81001 URINALYSIS AUTO W/SCOPE: CPT

## 2021-11-10 RX ORDER — LOSARTAN POTASSIUM 25 MG/1
25 TABLET ORAL 2 TIMES DAILY
COMMUNITY
End: 2021-12-23 | Stop reason: HOSPADM

## 2021-11-10 RX ORDER — CYCLOBENZAPRINE HCL 10 MG
10 TABLET ORAL 3 TIMES DAILY PRN
COMMUNITY
End: 2021-12-23 | Stop reason: HOSPADM

## 2021-11-10 RX ORDER — HYDROCODONE BITARTRATE AND ACETAMINOPHEN 7.5; 325 MG/1; MG/1
1 TABLET ORAL EVERY 6 HOURS PRN
COMMUNITY
End: 2021-11-24 | Stop reason: HOSPADM

## 2021-11-10 RX ORDER — ISOSORBIDE MONONITRATE 30 MG/1
30 TABLET, EXTENDED RELEASE ORAL EVERY MORNING
COMMUNITY

## 2021-11-10 RX ORDER — DULAGLUTIDE 3 MG/.5ML
0.5 INJECTION, SOLUTION SUBCUTANEOUS WEEKLY
COMMUNITY
End: 2022-06-10 | Stop reason: HOSPADM

## 2021-11-10 RX ORDER — FERROUS SULFATE 325(65) MG
325 TABLET ORAL EVERY OTHER DAY
COMMUNITY

## 2021-11-10 RX ORDER — ATORVASTATIN CALCIUM 10 MG/1
10 TABLET, FILM COATED ORAL NIGHTLY
COMMUNITY
End: 2022-04-21

## 2021-11-10 RX ORDER — GABAPENTIN 300 MG/1
300 CAPSULE ORAL 3 TIMES DAILY
COMMUNITY
End: 2021-12-17

## 2021-11-10 RX ORDER — LORATADINE 10 MG/1
10 TABLET ORAL NIGHTLY
COMMUNITY

## 2021-11-10 RX ORDER — LANSOPRAZOLE 30 MG/1
30 CAPSULE, DELAYED RELEASE ORAL DAILY
COMMUNITY
End: 2021-12-17

## 2021-11-10 ASSESSMENT — HOOS JR
HOOS JR SCORE: 46.652
HOOS JR SCORE: 14

## 2021-11-10 NOTE — DISCHARGE INSTRUCTIONS
Take the following medications the morning of surgery:    PREVACID,  IMDUR,  METOPROLOL AND GABAPENTIN    ARRIVE AT 8:00      If you are on prescription narcotic pain medication to control your pain you may also take that medication the morning of surgery.    General Instructions:  • Do not eat solid food after midnight the night before surgery.  • You may drink clear liquids day of surgery but must stop at least one hour before your hospital arrival time.  • It is beneficial for you to have a clear drink that contains carbohydrates the day of surgery.  We suggest a 12 to 20 ounce bottle of Gatorade or Powerade for non-diabetic patients or a 12 to 20 ounce bottle of G2 or Powerade Zero for diabetic patients. (Pediatric patients, are not advised to drink a 12 to 20 ounce carbohydrate drink)    Clear liquids are liquids you can see through.  Nothing red in color.     Plain water                               Sports drinks  Sodas                                   Gelatin (Jell-O)  Fruit juices without pulp such as white grape juice and apple juice  Popsicles that contain no fruit or yogurt  Tea or coffee (no cream or milk added)  Gatorade / Powerade  G2 / Powerade Zero    •   • Patients who avoid smoking, chewing tobacco and alcohol for 4 weeks prior to surgery have a reduced risk of post-operative complications.  Quit smoking as many days before surgery as you can.  • Do not smoke, use chewing tobacco or drink alcohol the day of surgery.   • If applicable bring your C-PAP/ BI-PAP machine.  • Bring any papers given to you in the doctor’s office.  • Wear clean comfortable clothes.  • Do not wear contact lenses, false eyelashes or make-up.  Bring a case for your glasses.   • Bring crutches or walker if applicable.  • Remove all piercings.  Leave jewelry and any other valuables at home.  • Hair extensions with metal clips must be removed prior to surgery.  • The Pre-Admission Testing nurse will instruct you to bring  medications if unable to obtain an accurate list in Pre-Admission Testing.          Preventing a Surgical Site Infection:  • For 2 to 3 days before surgery, avoid shaving with a razor because the razor can irritate skin and make it easier to develop an infection.    • Any areas of open skin can increase the risk of a post-operative wound infection by allowing bacteria to enter and travel throughout the body.  Notify your surgeon if you have any skin wounds / rashes even if it is not near the expected surgical site.  The area will need assessed to determine if surgery should be delayed until it is healed.  • The night prior to surgery shower using a fresh bar of anti-bacterial soap (such as Dial) and clean washcloth.  Sleep in a clean bed with clean clothing.  Do not allow pets to sleep with you.  • Shower on the morning of surgery using a fresh bar of anti-bacterial soap (such as Dial) and clean washcloth.  Dry with a clean towel and dress in clean clothing.  • Ask your surgeon if you will be receiving antibiotics prior to surgery.  • Make sure you, your family, and all healthcare providers clean their hands with soap and water or an alcohol based hand  before caring for you or your wound.    Day of surgery:  Your arrival time is approximately two hours before your scheduled surgery time.  Upon arrival, a Pre-op nurse and Anesthesiologist will review your health history, obtain vital signs, and answer questions you may have.  The only belongings needed at this time will be a list of your home medications and if applicable your C-PAP/BI-PAP machine.  A Pre-op nurse will start an IV and you may receive medication in preparation for surgery, including something to help you relax.     Please be aware that surgery does come with discomfort.  We want to make every effort to control your discomfort so please discuss any uncontrolled symptoms with your nurse.   Your doctor will most likely have prescribed pain  medications.      If you are going home after surgery you will receive individualized written care instructions before being discharged.  A responsible adult must drive you to and from the hospital on the day of your surgery and stay with you for 24 hours.  Discharge prescriptions can be filled by the hospital pharmacy during regular pharmacy hours.  If you are having surgery late in the day/evening your prescription may be e-prescribed to your pharmacy.  Please verify your pharmacy hours or chose a 24 hour pharmacy to avoid not having access to your prescription because your pharmacy has closed for the day.    If you are staying overnight following surgery, you will be transported to your hospital room following the recovery period.  [facility] has all private rooms.    If you have any questions please call Pre-Admission Testing at [phone].  Deductibles and co-payments are collected on the day of service. Please be prepared to pay the required co-pay, deductible or deposit on the day of service as defined by your plan.    Patient Education for Self-Quarantine Process    • Following your COVID testing, we strongly recommend that you wear a mask when you are with other people and practice social distancing.   • Limit your activities to only required outings.  • Wash your hands with soap and water frequently for at least 20 seconds.   • Avoid touching your eyes, nose and mouth with unwashed hands.  • Do not share anything - utensils, drinking glasses, food from the same bowl.   • Sanitize household surfaces daily. Include all high touch areas (door handles, light switches, phones, countertops, etc.)    Call your surgeon immediately if you experience any of the following symptoms:  • Sore Throat  • Shortness of Breath or difficulty breathing  • Cough  • Chills  • Body soreness or muscle pain  • Headache  • Fever  • New loss of taste or smell  • Do not arrive for your surgery ill.  Your procedure  will need to be rescheduled to another time.  You will need to call your physician before the day of surgery to avoid any unnecessary exposure to hospital staff as well as other patients.    CHLORHEXIDINE CLOTH INSTRUCTIONS  The morning of surgery follow these instructions using the Chlorhexidine cloths you've been given.  These steps reduce bacteria on the body.  Do not use the cloths near your eyes, ears mouth, genitalia or on open wounds.  Throw the cloths away after use but do not try to flush them down a toilet.      • Open and remove one cloth at a time from the package.    • Leave the cloth unfolded and begin the bathing.  • Massage the skin with the cloths using gentle pressure to remove bacteria.  Do not scrub harshly.   • Follow the steps below with one 2% CHG cloth per area (6 total cloths).  • One cloth for neck, shoulders and chest.  • One cloth for both arms, hands, fingers and underarms (do underarms last).  • One cloth for the abdomen followed by groin.  • One cloth for right leg and foot including between the toes.  • One cloth for left leg and foot including between the toes.  • The last cloth is to be used for the back of the neck, back and buttocks.    Allow the CHG to air dry 3 minutes on the skin which will give it time to work and decrease the chance of irritation.  The skin may feel sticky until it is dry.  Do not rinse with water or any other liquid or you will lose the beneficial effects of the CHG.  If mild skin irritation occurs, do rinse the skin to remove the CHG.  Report this to the nurse at time of admission.  Do not apply lotions, creams, ointments, deodorants or perfumes after using the clothes. Dress in clean clothes before coming to the hospital.    BACTROBAN NASAL OINTMENT  There are many germs normally in your nose. Bactroban is an ointment that will help reduce these germs. Please follow these instructions for Bactroban use:      ____The day before surgery in the  morning  Date________    ____The day before surgery in the evening              Date________    ____The day of surgery in the morning    Date________    **Squirt ½ package of Bactroban Ointment onto a cotton applicator and apply to inside of 1st nostril.  Squirt the remaining Bactroban and apply to the inside of the other nostril.

## 2021-11-13 ENCOUNTER — LAB (OUTPATIENT)
Dept: LAB | Facility: HOSPITAL | Age: 66
End: 2021-11-13

## 2021-11-13 LAB — SARS-COV-2 ORF1AB RESP QL NAA+PROBE: NOT DETECTED

## 2021-11-13 PROCEDURE — C9803 HOPD COVID-19 SPEC COLLECT: HCPCS

## 2021-11-13 PROCEDURE — U0004 COV-19 TEST NON-CDC HGH THRU: HCPCS

## 2021-11-13 PROCEDURE — U0005 INFEC AGEN DETEC AMPLI PROBE: HCPCS

## 2021-11-16 ENCOUNTER — HOSPITAL ENCOUNTER (INPATIENT)
Facility: HOSPITAL | Age: 66
LOS: 6 days | Discharge: NURSING, REHAB OR LONG-TERM CARE | End: 2021-11-24
Attending: ORTHOPAEDIC SURGERY | Admitting: ORTHOPAEDIC SURGERY

## 2021-11-16 ENCOUNTER — APPOINTMENT (OUTPATIENT)
Dept: GENERAL RADIOLOGY | Facility: HOSPITAL | Age: 66
End: 2021-11-16

## 2021-11-16 ENCOUNTER — ANESTHESIA EVENT (OUTPATIENT)
Dept: PERIOP | Facility: HOSPITAL | Age: 66
End: 2021-11-16

## 2021-11-16 ENCOUNTER — ANESTHESIA (OUTPATIENT)
Dept: PERIOP | Facility: HOSPITAL | Age: 66
End: 2021-11-16

## 2021-11-16 DIAGNOSIS — S79.919A HIP INJURY: ICD-10-CM

## 2021-11-16 PROBLEM — Z96.649 STATUS POST TOTAL REPLACEMENT OF HIP: Status: ACTIVE | Noted: 2021-11-16

## 2021-11-16 LAB
ABO GROUP BLD: NORMAL
BLD GP AB SCN SERPL QL: NEGATIVE
GLUCOSE BLDC GLUCOMTR-MCNC: 119 MG/DL (ref 70–130)
GLUCOSE BLDC GLUCOMTR-MCNC: 140 MG/DL (ref 70–130)
GLUCOSE BLDC GLUCOMTR-MCNC: 167 MG/DL (ref 70–130)
HCT VFR BLD AUTO: 29.2 % (ref 34–46.6)
HGB BLD-MCNC: 9.7 G/DL (ref 12–15.9)
RH BLD: POSITIVE
T&S EXPIRATION DATE: NORMAL

## 2021-11-16 PROCEDURE — 85014 HEMATOCRIT: CPT | Performed by: ORTHOPAEDIC SURGERY

## 2021-11-16 PROCEDURE — 25010000002 PHENYLEPHRINE 10 MG/ML SOLUTION: Performed by: NURSE ANESTHETIST, CERTIFIED REGISTERED

## 2021-11-16 PROCEDURE — C1713 ANCHOR/SCREW BN/BN,TIS/BN: HCPCS | Performed by: ORTHOPAEDIC SURGERY

## 2021-11-16 PROCEDURE — 86900 BLOOD TYPING SEROLOGIC ABO: CPT

## 2021-11-16 PROCEDURE — 25010000002 PROPOFOL 10 MG/ML EMULSION: Performed by: NURSE ANESTHETIST, CERTIFIED REGISTERED

## 2021-11-16 PROCEDURE — 25010000002 HYDROMORPHONE PER 4 MG: Performed by: NURSE ANESTHETIST, CERTIFIED REGISTERED

## 2021-11-16 PROCEDURE — C1889 IMPLANT/INSERT DEVICE, NOC: HCPCS | Performed by: ORTHOPAEDIC SURGERY

## 2021-11-16 PROCEDURE — 25010000002 ALBUMIN HUMAN 5% PER 50 ML: Performed by: NURSE ANESTHETIST, CERTIFIED REGISTERED

## 2021-11-16 PROCEDURE — 86901 BLOOD TYPING SEROLOGIC RH(D): CPT | Performed by: ORTHOPAEDIC SURGERY

## 2021-11-16 PROCEDURE — 0 CEFAZOLIN IN DEXTROSE 2-4 GM/100ML-% SOLUTION: Performed by: ORTHOPAEDIC SURGERY

## 2021-11-16 PROCEDURE — 25010000002 FENTANYL CITRATE (PF) 50 MCG/ML SOLUTION: Performed by: NURSE ANESTHETIST, CERTIFIED REGISTERED

## 2021-11-16 PROCEDURE — 86900 BLOOD TYPING SEROLOGIC ABO: CPT | Performed by: ORTHOPAEDIC SURGERY

## 2021-11-16 PROCEDURE — 25010000002 ONDANSETRON PER 1 MG: Performed by: NURSE ANESTHETIST, CERTIFIED REGISTERED

## 2021-11-16 PROCEDURE — C1776 JOINT DEVICE (IMPLANTABLE): HCPCS | Performed by: ORTHOPAEDIC SURGERY

## 2021-11-16 PROCEDURE — 86920 COMPATIBILITY TEST SPIN: CPT

## 2021-11-16 PROCEDURE — 86901 BLOOD TYPING SEROLOGIC RH(D): CPT

## 2021-11-16 PROCEDURE — 25010000002 EPINEPHRINE 1 MG/ML SOLUTION 30 ML VIAL: Performed by: ORTHOPAEDIC SURGERY

## 2021-11-16 PROCEDURE — P9041 ALBUMIN (HUMAN),5%, 50ML: HCPCS | Performed by: NURSE ANESTHETIST, CERTIFIED REGISTERED

## 2021-11-16 PROCEDURE — 85018 HEMOGLOBIN: CPT | Performed by: ORTHOPAEDIC SURGERY

## 2021-11-16 PROCEDURE — 86850 RBC ANTIBODY SCREEN: CPT | Performed by: ORTHOPAEDIC SURGERY

## 2021-11-16 PROCEDURE — 82962 GLUCOSE BLOOD TEST: CPT

## 2021-11-16 PROCEDURE — 25010000002 ROPIVACAINE PER 1 MG: Performed by: ORTHOPAEDIC SURGERY

## 2021-11-16 PROCEDURE — 0SR902Z REPLACEMENT OF RIGHT HIP JOINT WITH METAL ON POLYETHYLENE SYNTHETIC SUBSTITUTE, OPEN APPROACH: ICD-10-PCS | Performed by: ORTHOPAEDIC SURGERY

## 2021-11-16 PROCEDURE — 25010000002 KETOROLAC TROMETHAMINE PER 15 MG: Performed by: ORTHOPAEDIC SURGERY

## 2021-11-16 PROCEDURE — G0378 HOSPITAL OBSERVATION PER HR: HCPCS

## 2021-11-16 PROCEDURE — 72170 X-RAY EXAM OF PELVIS: CPT

## 2021-11-16 PROCEDURE — 25010000002 CLONIDINE PER 1 MG: Performed by: ORTHOPAEDIC SURGERY

## 2021-11-16 DEVICE — OR3O DUAL MOBILITY XLPE INSERT 28/44
Type: IMPLANTABLE DEVICE | Site: HIP | Status: FUNCTIONAL
Brand: OR3O DUAL MOBILITY

## 2021-11-16 DEVICE — KNOTLESS TISSUE CONTROL DEVICE, VIOLET UNIDIRECTIONAL (ANTIBACTERIAL) SYNTHETIC ABSORBABLE DEVICE
Type: IMPLANTABLE DEVICE | Site: HIP | Status: FUNCTIONAL
Brand: STRATAFIX

## 2021-11-16 DEVICE — OR3O DUAL MOBILITY LINER 44/56
Type: IMPLANTABLE DEVICE | Site: HIP | Status: FUNCTIONAL
Brand: OR3O DUAL MOBILITY

## 2021-11-16 DEVICE — REDAPT MODULAR SHELL 56MM
Type: IMPLANTABLE DEVICE | Site: HIP | Status: FUNCTIONAL
Brand: REDAPT

## 2021-11-16 DEVICE — VIOLET ANTIBACTERIAL POLYDIOXANONE, KNOTLESS TISSUE CONTROL DEVICE
Type: IMPLANTABLE DEVICE | Site: HIP | Status: FUNCTIONAL
Brand: STRATAFIX

## 2021-11-16 DEVICE — DEV CONTRL TISS STRATAFIX SYMM PDS PLUS VIL CT-1 45CM: Type: IMPLANTABLE DEVICE | Site: HIP | Status: FUNCTIONAL

## 2021-11-16 DEVICE — REDAPT 240MM SLEEVELESS REVISION                                    STEM SIZE 24 HIGH OFFSET
Type: IMPLANTABLE DEVICE | Site: HIP | Status: FUNCTIONAL
Brand: REDAPT

## 2021-11-16 DEVICE — OXINIUM FEMORAL HEAD 12/14 TAPER                                    28 MM +8
Type: IMPLANTABLE DEVICE | Site: HIP | Status: FUNCTIONAL
Brand: OXINIUM

## 2021-11-16 DEVICE — ACCORD 2.0MM COBALT CHROME CABLE                                    WITH CLAMP
Type: IMPLANTABLE DEVICE | Site: HIP | Status: FUNCTIONAL
Brand: ACCORD

## 2021-11-16 DEVICE — REDAPT LOCKING SCREW 30MM
Type: IMPLANTABLE DEVICE | Site: HIP | Status: FUNCTIONAL
Brand: REDAPT

## 2021-11-16 DEVICE — REDAPT LOCKING SCREW 20MM
Type: IMPLANTABLE DEVICE | Site: HIP | Status: FUNCTIONAL
Brand: REDAPT

## 2021-11-16 DEVICE — REDAPT LOCKING SCREW 15MM
Type: IMPLANTABLE DEVICE | Site: HIP | Status: FUNCTIONAL
Brand: REDAPT

## 2021-11-16 DEVICE — REDAPT LOCKING SCREW 25MM
Type: IMPLANTABLE DEVICE | Site: HIP | Status: FUNCTIONAL
Brand: REDAPT

## 2021-11-16 RX ORDER — HYDROCODONE BITARTRATE AND ACETAMINOPHEN 7.5; 325 MG/1; MG/1
1 TABLET ORAL ONCE AS NEEDED
Status: DISCONTINUED | OUTPATIENT
Start: 2021-11-16 | End: 2021-11-16 | Stop reason: HOSPADM

## 2021-11-16 RX ORDER — AMPICILLIN 500 MG/1
500 CAPSULE ORAL 2 TIMES DAILY
COMMUNITY
End: 2021-11-24 | Stop reason: HOSPADM

## 2021-11-16 RX ORDER — GABAPENTIN 300 MG/1
300 CAPSULE ORAL 3 TIMES DAILY
Status: DISCONTINUED | OUTPATIENT
Start: 2021-11-16 | End: 2021-11-24 | Stop reason: HOSPADM

## 2021-11-16 RX ORDER — OXYCODONE AND ACETAMINOPHEN 10; 325 MG/1; MG/1
1 TABLET ORAL EVERY 4 HOURS PRN
Status: DISCONTINUED | OUTPATIENT
Start: 2021-11-16 | End: 2021-11-16 | Stop reason: HOSPADM

## 2021-11-16 RX ORDER — PROMETHAZINE HYDROCHLORIDE 25 MG/1
25 SUPPOSITORY RECTAL ONCE AS NEEDED
Status: DISCONTINUED | OUTPATIENT
Start: 2021-11-16 | End: 2021-11-16 | Stop reason: HOSPADM

## 2021-11-16 RX ORDER — DROPERIDOL 2.5 MG/ML
0.62 INJECTION, SOLUTION INTRAMUSCULAR; INTRAVENOUS ONCE
Status: DISCONTINUED | OUTPATIENT
Start: 2021-11-16 | End: 2021-11-24 | Stop reason: HOSPADM

## 2021-11-16 RX ORDER — DIPHENHYDRAMINE HCL 25 MG
25 CAPSULE ORAL
Status: DISCONTINUED | OUTPATIENT
Start: 2021-11-16 | End: 2021-11-16 | Stop reason: HOSPADM

## 2021-11-16 RX ORDER — MULTIPLE VITAMINS W/ MINERALS TAB 9MG-400MCG
1 TAB ORAL DAILY
COMMUNITY
End: 2021-12-17

## 2021-11-16 RX ORDER — MAGNESIUM HYDROXIDE 1200 MG/15ML
LIQUID ORAL AS NEEDED
Status: DISCONTINUED | OUTPATIENT
Start: 2021-11-16 | End: 2021-11-16 | Stop reason: HOSPADM

## 2021-11-16 RX ORDER — SODIUM CHLORIDE, SODIUM LACTATE, POTASSIUM CHLORIDE, CALCIUM CHLORIDE 600; 310; 30; 20 MG/100ML; MG/100ML; MG/100ML; MG/100ML
9 INJECTION, SOLUTION INTRAVENOUS CONTINUOUS
Status: DISCONTINUED | OUTPATIENT
Start: 2021-11-16 | End: 2021-11-24 | Stop reason: HOSPADM

## 2021-11-16 RX ORDER — NALOXONE HCL 0.4 MG/ML
0.1 VIAL (ML) INJECTION
Status: DISCONTINUED | OUTPATIENT
Start: 2021-11-16 | End: 2021-11-24 | Stop reason: HOSPADM

## 2021-11-16 RX ORDER — IBUPROFEN 600 MG/1
600 TABLET ORAL ONCE AS NEEDED
Status: DISCONTINUED | OUTPATIENT
Start: 2021-11-16 | End: 2021-11-16 | Stop reason: HOSPADM

## 2021-11-16 RX ORDER — ACETAMINOPHEN 500 MG
1000 TABLET ORAL ONCE
Status: COMPLETED | OUTPATIENT
Start: 2021-11-16 | End: 2021-11-16

## 2021-11-16 RX ORDER — ESOMEPRAZOLE MAGNESIUM 40 MG/1
40 CAPSULE, DELAYED RELEASE ORAL
COMMUNITY

## 2021-11-16 RX ORDER — EPHEDRINE SULFATE 50 MG/ML
5 INJECTION, SOLUTION INTRAVENOUS ONCE AS NEEDED
Status: DISCONTINUED | OUTPATIENT
Start: 2021-11-16 | End: 2021-11-16 | Stop reason: HOSPADM

## 2021-11-16 RX ORDER — FAMOTIDINE 10 MG/ML
20 INJECTION, SOLUTION INTRAVENOUS ONCE
Status: COMPLETED | OUTPATIENT
Start: 2021-11-16 | End: 2021-11-16

## 2021-11-16 RX ORDER — SODIUM CHLORIDE 9 MG/ML
100 INJECTION, SOLUTION INTRAVENOUS CONTINUOUS
Status: DISCONTINUED | OUTPATIENT
Start: 2021-11-16 | End: 2021-11-19

## 2021-11-16 RX ORDER — TRANEXAMIC ACID 100 MG/ML
INJECTION, SOLUTION INTRAVENOUS AS NEEDED
Status: DISCONTINUED | OUTPATIENT
Start: 2021-11-16 | End: 2021-11-16 | Stop reason: SURG

## 2021-11-16 RX ORDER — OXYCODONE HYDROCHLORIDE AND ACETAMINOPHEN 5; 325 MG/1; MG/1
2 TABLET ORAL EVERY 4 HOURS PRN
Status: DISPENSED | OUTPATIENT
Start: 2021-11-16 | End: 2021-11-23

## 2021-11-16 RX ORDER — LABETALOL HYDROCHLORIDE 5 MG/ML
5 INJECTION, SOLUTION INTRAVENOUS
Status: DISCONTINUED | OUTPATIENT
Start: 2021-11-16 | End: 2021-11-16 | Stop reason: HOSPADM

## 2021-11-16 RX ORDER — LOSARTAN POTASSIUM 25 MG/1
25 TABLET ORAL 2 TIMES DAILY
Status: DISCONTINUED | OUTPATIENT
Start: 2021-11-16 | End: 2021-11-17

## 2021-11-16 RX ORDER — CEFAZOLIN SODIUM 2 G/100ML
2 INJECTION, SOLUTION INTRAVENOUS EVERY 8 HOURS
Status: COMPLETED | OUTPATIENT
Start: 2021-11-16 | End: 2021-11-17

## 2021-11-16 RX ORDER — HYDRALAZINE HYDROCHLORIDE 20 MG/ML
5 INJECTION INTRAMUSCULAR; INTRAVENOUS
Status: DISCONTINUED | OUTPATIENT
Start: 2021-11-16 | End: 2021-11-16 | Stop reason: HOSPADM

## 2021-11-16 RX ORDER — CELECOXIB 200 MG/1
200 CAPSULE ORAL ONCE
Status: COMPLETED | OUTPATIENT
Start: 2021-11-16 | End: 2021-11-16

## 2021-11-16 RX ORDER — ONDANSETRON 2 MG/ML
INJECTION INTRAMUSCULAR; INTRAVENOUS AS NEEDED
Status: DISCONTINUED | OUTPATIENT
Start: 2021-11-16 | End: 2021-11-16 | Stop reason: SURG

## 2021-11-16 RX ORDER — FAMOTIDINE 20 MG/1
40 TABLET, FILM COATED ORAL DAILY
Status: DISCONTINUED | OUTPATIENT
Start: 2021-11-16 | End: 2021-11-18

## 2021-11-16 RX ORDER — DEXTROSE MONOHYDRATE 25 G/50ML
25 INJECTION, SOLUTION INTRAVENOUS
Status: DISCONTINUED | OUTPATIENT
Start: 2021-11-16 | End: 2021-11-24 | Stop reason: HOSPADM

## 2021-11-16 RX ORDER — ROCURONIUM BROMIDE 10 MG/ML
INJECTION, SOLUTION INTRAVENOUS AS NEEDED
Status: DISCONTINUED | OUTPATIENT
Start: 2021-11-16 | End: 2021-11-16 | Stop reason: SURG

## 2021-11-16 RX ORDER — FLUMAZENIL 0.1 MG/ML
0.2 INJECTION INTRAVENOUS AS NEEDED
Status: DISCONTINUED | OUTPATIENT
Start: 2021-11-16 | End: 2021-11-16 | Stop reason: HOSPADM

## 2021-11-16 RX ORDER — EPHEDRINE SULFATE 50 MG/ML
INJECTION, SOLUTION INTRAVENOUS AS NEEDED
Status: DISCONTINUED | OUTPATIENT
Start: 2021-11-16 | End: 2021-11-16 | Stop reason: SURG

## 2021-11-16 RX ORDER — SODIUM CHLORIDE 0.9 % (FLUSH) 0.9 %
3 SYRINGE (ML) INJECTION EVERY 12 HOURS SCHEDULED
Status: DISCONTINUED | OUTPATIENT
Start: 2021-11-16 | End: 2021-11-16 | Stop reason: HOSPADM

## 2021-11-16 RX ORDER — FENTANYL CITRATE 50 UG/ML
INJECTION, SOLUTION INTRAMUSCULAR; INTRAVENOUS AS NEEDED
Status: DISCONTINUED | OUTPATIENT
Start: 2021-11-16 | End: 2021-11-16 | Stop reason: SURG

## 2021-11-16 RX ORDER — MIDAZOLAM HYDROCHLORIDE 1 MG/ML
0.5 INJECTION INTRAMUSCULAR; INTRAVENOUS
Status: DISCONTINUED | OUTPATIENT
Start: 2021-11-16 | End: 2021-11-16 | Stop reason: HOSPADM

## 2021-11-16 RX ORDER — DOCUSATE SODIUM 100 MG/1
100 CAPSULE, LIQUID FILLED ORAL 2 TIMES DAILY PRN
Status: DISCONTINUED | OUTPATIENT
Start: 2021-11-16 | End: 2021-11-22

## 2021-11-16 RX ORDER — FENTANYL CITRATE 50 UG/ML
50 INJECTION, SOLUTION INTRAMUSCULAR; INTRAVENOUS
Status: DISCONTINUED | OUTPATIENT
Start: 2021-11-16 | End: 2021-11-16 | Stop reason: HOSPADM

## 2021-11-16 RX ORDER — MELOXICAM 15 MG/1
15 TABLET ORAL DAILY
Status: DISCONTINUED | OUTPATIENT
Start: 2021-11-16 | End: 2021-11-18

## 2021-11-16 RX ORDER — SODIUM CHLORIDE 0.9 % (FLUSH) 0.9 %
3-10 SYRINGE (ML) INJECTION AS NEEDED
Status: DISCONTINUED | OUTPATIENT
Start: 2021-11-16 | End: 2021-11-16 | Stop reason: HOSPADM

## 2021-11-16 RX ORDER — ASPIRIN 81 MG/1
81 TABLET, CHEWABLE ORAL DAILY
Status: DISCONTINUED | OUTPATIENT
Start: 2021-11-16 | End: 2021-11-18

## 2021-11-16 RX ORDER — NALOXONE HCL 0.4 MG/ML
0.2 VIAL (ML) INJECTION AS NEEDED
Status: DISCONTINUED | OUTPATIENT
Start: 2021-11-16 | End: 2021-11-16 | Stop reason: HOSPADM

## 2021-11-16 RX ORDER — ONDANSETRON 2 MG/ML
4 INJECTION INTRAMUSCULAR; INTRAVENOUS ONCE AS NEEDED
Status: DISCONTINUED | OUTPATIENT
Start: 2021-11-16 | End: 2021-11-16 | Stop reason: HOSPADM

## 2021-11-16 RX ORDER — CEFAZOLIN SODIUM 2 G/100ML
2 INJECTION, SOLUTION INTRAVENOUS ONCE
Status: COMPLETED | OUTPATIENT
Start: 2021-11-16 | End: 2021-11-16

## 2021-11-16 RX ORDER — ERYTHROMYCIN 5 MG/G
OINTMENT OPHTHALMIC EVERY 12 HOURS
Status: DISCONTINUED | OUTPATIENT
Start: 2021-11-17 | End: 2021-11-24 | Stop reason: HOSPADM

## 2021-11-16 RX ORDER — INSULIN LISPRO 100 [IU]/ML
0-9 INJECTION, SOLUTION INTRAVENOUS; SUBCUTANEOUS
Status: DISCONTINUED | OUTPATIENT
Start: 2021-11-17 | End: 2021-11-24 | Stop reason: HOSPADM

## 2021-11-16 RX ORDER — PROMETHAZINE HYDROCHLORIDE 25 MG/1
25 TABLET ORAL ONCE AS NEEDED
Status: DISCONTINUED | OUTPATIENT
Start: 2021-11-16 | End: 2021-11-16 | Stop reason: HOSPADM

## 2021-11-16 RX ORDER — LIDOCAINE HYDROCHLORIDE 20 MG/ML
INJECTION, SOLUTION INFILTRATION; PERINEURAL AS NEEDED
Status: DISCONTINUED | OUTPATIENT
Start: 2021-11-16 | End: 2021-11-16 | Stop reason: SURG

## 2021-11-16 RX ORDER — NICOTINE POLACRILEX 4 MG
15 LOZENGE BUCCAL
Status: DISCONTINUED | OUTPATIENT
Start: 2021-11-16 | End: 2021-11-24 | Stop reason: HOSPADM

## 2021-11-16 RX ORDER — PHENYLEPHRINE HYDROCHLORIDE 10 MG/ML
INJECTION INTRAVENOUS AS NEEDED
Status: DISCONTINUED | OUTPATIENT
Start: 2021-11-16 | End: 2021-11-16 | Stop reason: SURG

## 2021-11-16 RX ORDER — PROPOFOL 10 MG/ML
VIAL (ML) INTRAVENOUS AS NEEDED
Status: DISCONTINUED | OUTPATIENT
Start: 2021-11-16 | End: 2021-11-16 | Stop reason: SURG

## 2021-11-16 RX ORDER — ONDANSETRON 2 MG/ML
4 INJECTION INTRAMUSCULAR; INTRAVENOUS EVERY 6 HOURS PRN
Status: DISCONTINUED | OUTPATIENT
Start: 2021-11-16 | End: 2021-11-24 | Stop reason: HOSPADM

## 2021-11-16 RX ORDER — LIDOCAINE HYDROCHLORIDE 10 MG/ML
0.5 INJECTION, SOLUTION EPIDURAL; INFILTRATION; INTRACAUDAL; PERINEURAL ONCE AS NEEDED
Status: DISCONTINUED | OUTPATIENT
Start: 2021-11-16 | End: 2021-11-16 | Stop reason: HOSPADM

## 2021-11-16 RX ORDER — PROPARACAINE HYDROCHLORIDE 5 MG/ML
2 SOLUTION/ DROPS OPHTHALMIC ONCE
Status: COMPLETED | OUTPATIENT
Start: 2021-11-16 | End: 2021-11-16

## 2021-11-16 RX ORDER — ONDANSETRON 4 MG/1
4 TABLET, FILM COATED ORAL EVERY 6 HOURS PRN
Status: DISCONTINUED | OUTPATIENT
Start: 2021-11-16 | End: 2021-11-24 | Stop reason: HOSPADM

## 2021-11-16 RX ORDER — OXYCODONE HYDROCHLORIDE AND ACETAMINOPHEN 5; 325 MG/1; MG/1
1 TABLET ORAL EVERY 4 HOURS PRN
Status: DISPENSED | OUTPATIENT
Start: 2021-11-16 | End: 2021-11-23

## 2021-11-16 RX ORDER — CYCLOBENZAPRINE HCL 10 MG
10 TABLET ORAL 3 TIMES DAILY PRN
Status: DISCONTINUED | OUTPATIENT
Start: 2021-11-16 | End: 2021-11-24 | Stop reason: HOSPADM

## 2021-11-16 RX ORDER — OXYCODONE HYDROCHLORIDE 5 MG/1
5 TABLET ORAL ONCE
Status: COMPLETED | OUTPATIENT
Start: 2021-11-16 | End: 2021-11-16

## 2021-11-16 RX ORDER — DIPHENHYDRAMINE HYDROCHLORIDE 50 MG/ML
12.5 INJECTION INTRAMUSCULAR; INTRAVENOUS
Status: DISCONTINUED | OUTPATIENT
Start: 2021-11-16 | End: 2021-11-16 | Stop reason: HOSPADM

## 2021-11-16 RX ORDER — ALBUMIN, HUMAN INJ 5% 5 %
SOLUTION INTRAVENOUS CONTINUOUS PRN
Status: DISCONTINUED | OUTPATIENT
Start: 2021-11-16 | End: 2021-11-16 | Stop reason: SURG

## 2021-11-16 RX ORDER — HYDROMORPHONE HYDROCHLORIDE 1 MG/ML
0.5 INJECTION, SOLUTION INTRAMUSCULAR; INTRAVENOUS; SUBCUTANEOUS
Status: DISCONTINUED | OUTPATIENT
Start: 2021-11-16 | End: 2021-11-16 | Stop reason: HOSPADM

## 2021-11-16 RX ADMIN — FENTANYL CITRATE 100 MCG: 0.05 INJECTION, SOLUTION INTRAMUSCULAR; INTRAVENOUS at 13:05

## 2021-11-16 RX ADMIN — MELOXICAM 15 MG: 15 TABLET ORAL at 18:52

## 2021-11-16 RX ADMIN — ALBUMIN HUMAN: 0.05 INJECTION, SOLUTION INTRAVENOUS at 14:21

## 2021-11-16 RX ADMIN — MUPIROCIN 1 APPLICATION: 20 OINTMENT TOPICAL at 21:43

## 2021-11-16 RX ADMIN — GABAPENTIN 300 MG: 300 CAPSULE ORAL at 18:52

## 2021-11-16 RX ADMIN — CELECOXIB 200 MG: 200 CAPSULE ORAL at 12:11

## 2021-11-16 RX ADMIN — FAMOTIDINE 20 MG: 10 INJECTION INTRAVENOUS at 11:33

## 2021-11-16 RX ADMIN — PHENYLEPHRINE HYDROCHLORIDE 200 MCG: 10 INJECTION, SOLUTION INTRAVENOUS at 13:21

## 2021-11-16 RX ADMIN — ROCURONIUM BROMIDE 50 MG: 50 INJECTION INTRAVENOUS at 13:13

## 2021-11-16 RX ADMIN — OXYCODONE AND ACETAMINOPHEN 2 TABLET: 5; 325 TABLET ORAL at 21:44

## 2021-11-16 RX ADMIN — EPHEDRINE SULFATE 10 MG: 50 INJECTION INTRAVENOUS at 15:15

## 2021-11-16 RX ADMIN — SUGAMMADEX 200 MG: 100 INJECTION, SOLUTION INTRAVENOUS at 15:22

## 2021-11-16 RX ADMIN — FAMOTIDINE 40 MG: 20 TABLET, FILM COATED ORAL at 18:52

## 2021-11-16 RX ADMIN — PHENYLEPHRINE HYDROCHLORIDE 200 MCG: 10 INJECTION, SOLUTION INTRAVENOUS at 13:39

## 2021-11-16 RX ADMIN — CEFAZOLIN SODIUM 2 G: 2 INJECTION, SOLUTION INTRAVENOUS at 12:53

## 2021-11-16 RX ADMIN — TRANEXAMIC ACID 1000 MG: 1 INJECTION, SOLUTION INTRAVENOUS at 13:20

## 2021-11-16 RX ADMIN — GABAPENTIN 300 MG: 300 CAPSULE ORAL at 21:47

## 2021-11-16 RX ADMIN — SODIUM CHLORIDE, POTASSIUM CHLORIDE, SODIUM LACTATE AND CALCIUM CHLORIDE 9 ML/HR: 600; 310; 30; 20 INJECTION, SOLUTION INTRAVENOUS at 11:33

## 2021-11-16 RX ADMIN — LIDOCAINE HYDROCHLORIDE 100 MG: 20 INJECTION, SOLUTION INFILTRATION; PERINEURAL at 13:12

## 2021-11-16 RX ADMIN — PROPOFOL 150 MG: 10 INJECTION, EMULSION INTRAVENOUS at 13:12

## 2021-11-16 RX ADMIN — HYDROMORPHONE HYDROCHLORIDE 0.5 MG: 1 INJECTION, SOLUTION INTRAMUSCULAR; INTRAVENOUS; SUBCUTANEOUS at 16:17

## 2021-11-16 RX ADMIN — PHENYLEPHRINE HYDROCHLORIDE 200 MCG: 10 INJECTION, SOLUTION INTRAVENOUS at 13:43

## 2021-11-16 RX ADMIN — OXYCODONE AND ACETAMINOPHEN 1 TABLET: 325; 10 TABLET ORAL at 16:59

## 2021-11-16 RX ADMIN — ONDANSETRON 4 MG: 2 INJECTION INTRAMUSCULAR; INTRAVENOUS at 15:16

## 2021-11-16 RX ADMIN — EPHEDRINE SULFATE 10 MG: 50 INJECTION INTRAVENOUS at 14:18

## 2021-11-16 RX ADMIN — ACETAMINOPHEN 1000 MG: 500 TABLET ORAL at 12:11

## 2021-11-16 RX ADMIN — OXYCODONE 5 MG: 5 TABLET ORAL at 12:11

## 2021-11-16 RX ADMIN — PROPARACAINE HYDROCHLORIDE 2 DROP: 5 SOLUTION/ DROPS OPHTHALMIC at 17:10

## 2021-11-16 RX ADMIN — EPHEDRINE SULFATE 10 MG: 50 INJECTION INTRAVENOUS at 14:45

## 2021-11-16 RX ADMIN — FENTANYL CITRATE 50 MCG: 50 INJECTION INTRAMUSCULAR; INTRAVENOUS at 15:52

## 2021-11-16 RX ADMIN — EPHEDRINE SULFATE 10 MG: 50 INJECTION INTRAVENOUS at 14:05

## 2021-11-16 RX ADMIN — PHENYLEPHRINE HYDROCHLORIDE 100 MCG: 10 INJECTION, SOLUTION INTRAVENOUS at 14:05

## 2021-11-16 RX ADMIN — ERYTHROMYCIN: 5 OINTMENT OPHTHALMIC at 23:41

## 2021-11-16 RX ADMIN — PHENYLEPHRINE HYDROCHLORIDE 200 MCG: 10 INJECTION, SOLUTION INTRAVENOUS at 13:24

## 2021-11-16 RX ADMIN — PHENYLEPHRINE HYDROCHLORIDE 100 MCG: 10 INJECTION, SOLUTION INTRAVENOUS at 14:18

## 2021-11-16 RX ADMIN — ASPIRIN 81 MG: 81 TABLET, CHEWABLE ORAL at 21:43

## 2021-11-16 RX ADMIN — CEFAZOLIN SODIUM 2 G: 2 INJECTION, SOLUTION INTRAVENOUS at 21:43

## 2021-11-16 NOTE — ANESTHESIA PREPROCEDURE EVALUATION
Anesthesia Evaluation     Patient summary reviewed and Nursing notes reviewed                Airway   Mallampati: II  TM distance: >3 FB  Neck ROM: limited  Dental    (+) edentulous    Pulmonary    (+) a smoker Former,   Cardiovascular     ECG reviewed  Patient on routine beta blocker and Beta blocker given within 24 hours of surgery  Rhythm: regular  Rate: normal    (+) hypertension, valvular problems/murmurs murmur, past MI  >12 months, cardiac stents Drug eluting stent more than 12 months ago hyperlipidemia,       Neuro/Psych- negative ROS  GI/Hepatic/Renal/Endo    (+) morbid obesity, GERD,  diabetes mellitus type 2 using insulin,     Musculoskeletal     Abdominal    Substance History - negative use     OB/GYN negative ob/gyn ROS         Other   arthritis,                      Anesthesia Plan    ASA 4     general   (Known CAD s/p placement of 2 coronary stents    DM treated with insulin    BMI    Edentulous    I have reviewed the patient's history with the patient and the chart, including all pertinent laboratory results and imaging. I have explained the risks of anesthesia including but not limited to dental damage, corneal abrasion, nerve injury, MI, stroke, and death. Questions asked and answered. Anesthetic plan discussed with patient and team as indicated. Patient expressed understanding of the above.  )  intravenous induction     Anesthetic plan, all risks, benefits, and alternatives have been provided, discussed and informed consent has been obtained with: patient.

## 2021-11-16 NOTE — ANESTHESIA PROCEDURE NOTES
Airway  Urgency: elective    Date/Time: 11/16/2021 1:16 PM  Airway not difficult    General Information and Staff    Patient location during procedure: OR  Anesthesiologist: Nazario Jackson MD  CRNA: Aleksandra Silva CRNA    Indications and Patient Condition  Indications for airway management: airway protection    Preoxygenated: yes  Mask difficulty assessment: 2 - vent by mask + OA or adjuvant +/- NMBA    Final Airway Details  Final airway type: endotracheal airway      Successful airway: ETT  Cuffed: yes   Successful intubation technique: direct laryngoscopy  Facilitating devices/methods: cricoid pressure  Endotracheal tube insertion site: oral  Blade: Hood  Blade size: 2  ETT size (mm): 7.0  Cormack-Lehane Classification: grade IIa - partial view of glottis  Placement verified by: chest auscultation and capnometry   Cuff volume (mL): 5  Measured from: lips  ETT/EBT  to lips (cm): 20  Number of attempts at approach: 1    Additional Comments  EBBS: ETCO2+: Atraumatic intubation; Lips &  gums same as preop.

## 2021-11-16 NOTE — OP NOTE
Total Hip Revision Operative Note  Dr. JENNIE Schaeffer II  [phone]    PATIENT NAME: Stephy Duncan  MRN: [id number]  : 1955 AGE: 66 y.o. 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 This case would not have been possible without another set of skilled surgical hands for retraction, use of instrumentation, and general assistance.  This assistance was vital to the success of the case.   ESTIMATED BLOOD LOSS: 1000  SPONGE AND NEEDLE COUNT: Correct  INDICATIONS: This patient had a prior hip arthroplasty with infection that had 3 separate spacers and then a Girdlestone. A discussion of operative versus nonoperative treatment was had with the patient. They elected to undergo revision hip arthroplasty. The risks of surgery were discussed and included the risk of anesthesia, infection, damage to neurovascular structures, implant loosening/failure, fracture, hardware prominence, dislocation, the need for further procedures, medical complications, and others. No guarantees were made. The patient wished to proceed with surgery and a surgical consent was signed.  COMPONENTS:   · Accord 2 mm cable with clamp  · 56 mm readapt modular acetabular shell with 8 locking screws and a dual mobility liner  · Readapt femoral high offset size 24 x 240 sleeveless revision femoral component with a +8 dual mobility head ball    PERTINENT FINDINGS: Gross scar tissue but no signs of infection    DETAILS OF PROCEDURE:   The patient was met in the preoperative area. The site was marked. The consent and H&P were reviewed. The patient was then wheeled back to the  operative suite and underwent anesthesia. The patient was positioned laterally using the pegboard.  An axillary roll was placed under the down arm. Excess hair about the operative hip was removed using surgical clippers. Surgical alcohol was used to thoroughly clean the entire operative extremity.     The hip and leg were then prepped in the normal sterile fashion, which included ChloraPrep, multiple layers of sterile drapes, and surgical space suits for the entire operative team. New outer gloves were used by all sterile surgical team members after final draping. The surgical incision was marked. A surgical timeout was performed in which administration of preoperative antibiotics and tranexamic acid, if not contraindicated, was confirmed.    A standard posterior approach was then utilized.  Dissection was carried down to the femur which I carefully dissected posteriorly to allow access to the calcar.  I had to remove excess amount of scar tissue around the calcar.  I did release the gluteus leonel tendon insertion distally.  Even after removal of that, there was still significant amount of scar tissue from her prior surgery.  I did up having to release a lot of the femur anteriorly as well to get it back out the length.  I then placed retractors around the acetabulum and did some dissection but unfortunately I think I was a little posterior to the acetabulum due to all the scar tissue.  Retractors were then placed around the acetabulum and I was able to dissected out.  There was still some anterior wall and posterior wall along with a superior rim.  There was very incompetent inferior structures especially inferiorly anterior.  After adequate removal of scar tissue and scraping away the remaining tissue, I began gently reaming.  I was unable to antevert the cup sufficiently due to bone stock and I had to put the cup in a rather neutral position.  I then passed 1 nonlocking screw to help secure the cup and then  "passed a bunch of locking screws before removing the nonlocking screw and replacing it with a locking screw.  A dual mobility liner was then placed.    I then turned my attention to the femur.  Due to the bony destruction, I did pass a prophylactic cable around the femur to prevent any sort of fracture.  This was tightened and then cut.  Sequential reaming of the femur was then performed and afterwards proximal reaming was done.  The hip was then trialed and noted to be very stable posteriorly but a little bit of instability anteriorly and I reduce some of the anteversion and the stem.  This along with increasing the offset led to a very stable hip.  The real implants were then opened and inserted and the hip was reduced 1 final time.  The hip was noted to be very stable.  The hip was thoroughly irrigated and injected with an analgesic cocktail and then closed in layers.  A sterile dressing was applied.    The patient was moved from the operative table to the bed. The patient was taken to the recovery room in stable condition. There were no complications and the patient tolerated the procedure well.    R \"Kilo\" Laury BAXTER MD  Orthopaedic Surgery  [facility]  [phone]                  "

## 2021-11-16 NOTE — PLAN OF CARE
See below.    Problem: Adult Inpatient Plan of Care  Goal: Plan of Care Review  Outcome: Ongoing, Progressing  Flowsheets (Taken 11/16/2021 1832)  Progress: improving  Plan of Care Reviewed With:   patient   spouse  Outcome Summary: 66/F POD#0 right posterior TRISTAN revision.  ALOx4, 2L NC, lungs clear but diminished, BS hypoactive but improving, due to void at 2346 - 11/16/2021.  Up x1-2 BRP/BSC with walker/gait belt.  1+ pedal pulses noted bilaterally, no c/o numbness/tingling in operative extremity, dressing CDI.  Pain well-controlled with PO pain meds only.  PIV infusing NS @ 100 cc/hr per MD orders with intermittent IV ABX.  D/C planning in progress, HH vs SNF, will CTM.

## 2021-11-16 NOTE — ANESTHESIA POSTPROCEDURE EVALUATION
"Patient: Stephy Duncan    Procedure Summary     Date: 11/16/21 Room / Location: [facility] OR [address] [place] [address] MAIN OR    Anesthesia Start: 1256 Anesthesia Stop: 1551    Procedure: TOTAL HIP REVISION ARTHROPLASTY RIGHT POSTERIOR (Right Hip) Diagnosis:     Surgeons: Karri Schaeffer II, MD Provider: Nazario Jackson MD    Anesthesia Type: general ASA Status: 4          Anesthesia Type: general    Vitals  Vitals Value Taken Time   /55 11/16/21 1731   Temp 36.3 °C (97.3 °F) 11/16/21 1730   Pulse 69 11/16/21 1745   Resp 16 11/16/21 1730   SpO2 97 % 11/16/21 1745   Vitals shown include unvalidated device data.        Post Anesthesia Care and Evaluation    Patient location during evaluation: bedside  Patient participation: complete - patient participated  Level of consciousness: awake and alert  Pain management: adequate  Airway patency: patent  Anesthetic complications: No anesthetic complications    Cardiovascular status: acceptable  Respiratory status: acceptable  Hydration status: acceptable    Comments: /55   Pulse 66   Temp 36.3 °C (97.3 °F) (Oral)   Resp 16   Ht 162.6 cm (64\")   Wt 102 kg (225 lb 9.6 oz)   SpO2 99%   BMI 38.72 kg/m²           "

## 2021-11-17 LAB
ANION GAP SERPL CALCULATED.3IONS-SCNC: 9 MMOL/L (ref 5–15)
BUN SERPL-MCNC: 22 MG/DL (ref 8–23)
BUN/CREAT SERPL: 16.7 (ref 7–25)
CALCIUM SPEC-SCNC: 8 MG/DL (ref 8.6–10.5)
CHLORIDE SERPL-SCNC: 105 MMOL/L (ref 98–107)
CO2 SERPL-SCNC: 21 MMOL/L (ref 22–29)
CREAT SERPL-MCNC: 1.32 MG/DL (ref 0.57–1)
FOLATE SERPL-MCNC: 6.7 NG/ML (ref 4.78–24.2)
GFR SERPL CREATININE-BSD FRML MDRD: 40 ML/MIN/1.73
GLUCOSE BLDC GLUCOMTR-MCNC: 180 MG/DL (ref 70–130)
GLUCOSE BLDC GLUCOMTR-MCNC: 202 MG/DL (ref 70–130)
GLUCOSE BLDC GLUCOMTR-MCNC: 241 MG/DL (ref 70–130)
GLUCOSE BLDC GLUCOMTR-MCNC: 254 MG/DL (ref 70–130)
GLUCOSE SERPL-MCNC: 182 MG/DL (ref 65–99)
HCT VFR BLD AUTO: 25.1 % (ref 34–46.6)
HCT VFR BLD AUTO: 25.8 % (ref 34–46.6)
HGB BLD-MCNC: 7.8 G/DL (ref 12–15.9)
HGB BLD-MCNC: 8.1 G/DL (ref 12–15.9)
IRON 24H UR-MRATE: 17 MCG/DL (ref 37–145)
IRON SATN MFR SERPL: 6 % (ref 20–50)
POTASSIUM SERPL-SCNC: 4.5 MMOL/L (ref 3.5–5.2)
SODIUM SERPL-SCNC: 135 MMOL/L (ref 136–145)
TIBC SERPL-MCNC: 277 MCG/DL (ref 298–536)
TRANSFERRIN SERPL-MCNC: 186 MG/DL (ref 200–360)
VIT B12 BLD-MCNC: 298 PG/ML (ref 211–946)

## 2021-11-17 PROCEDURE — 85014 HEMATOCRIT: CPT | Performed by: ORTHOPAEDIC SURGERY

## 2021-11-17 PROCEDURE — P9016 RBC LEUKOCYTES REDUCED: HCPCS

## 2021-11-17 PROCEDURE — 84466 ASSAY OF TRANSFERRIN: CPT | Performed by: HOSPITALIST

## 2021-11-17 PROCEDURE — 86900 BLOOD TYPING SEROLOGIC ABO: CPT

## 2021-11-17 PROCEDURE — 85018 HEMOGLOBIN: CPT | Performed by: HOSPITALIST

## 2021-11-17 PROCEDURE — 82962 GLUCOSE BLOOD TEST: CPT

## 2021-11-17 PROCEDURE — G0378 HOSPITAL OBSERVATION PER HR: HCPCS

## 2021-11-17 PROCEDURE — 0 CEFAZOLIN IN DEXTROSE 2-4 GM/100ML-% SOLUTION: Performed by: ORTHOPAEDIC SURGERY

## 2021-11-17 PROCEDURE — 97110 THERAPEUTIC EXERCISES: CPT

## 2021-11-17 PROCEDURE — 36430 TRANSFUSION BLD/BLD COMPNT: CPT

## 2021-11-17 PROCEDURE — 82607 VITAMIN B-12: CPT | Performed by: HOSPITALIST

## 2021-11-17 PROCEDURE — 63710000001 INSULIN LISPRO (HUMAN) PER 5 UNITS: Performed by: HOSPITALIST

## 2021-11-17 PROCEDURE — 63710000001 INSULIN GLARGINE PER 5 UNITS: Performed by: HOSPITALIST

## 2021-11-17 PROCEDURE — 85018 HEMOGLOBIN: CPT | Performed by: ORTHOPAEDIC SURGERY

## 2021-11-17 PROCEDURE — 63710000001 INSULIN LISPRO (HUMAN) PER 5 UNITS: Performed by: INTERNAL MEDICINE

## 2021-11-17 PROCEDURE — 83540 ASSAY OF IRON: CPT | Performed by: HOSPITALIST

## 2021-11-17 PROCEDURE — 97162 PT EVAL MOD COMPLEX 30 MIN: CPT

## 2021-11-17 PROCEDURE — 25010000002 ONDANSETRON PER 1 MG: Performed by: ORTHOPAEDIC SURGERY

## 2021-11-17 PROCEDURE — 82746 ASSAY OF FOLIC ACID SERUM: CPT | Performed by: HOSPITALIST

## 2021-11-17 PROCEDURE — 80048 BASIC METABOLIC PNL TOTAL CA: CPT | Performed by: ORTHOPAEDIC SURGERY

## 2021-11-17 PROCEDURE — 85014 HEMATOCRIT: CPT | Performed by: HOSPITALIST

## 2021-11-17 RX ORDER — OXYCODONE HYDROCHLORIDE AND ACETAMINOPHEN 5; 325 MG/1; MG/1
1 TABLET ORAL EVERY 4 HOURS PRN
Qty: 50 TABLET | Refills: 0 | Status: SHIPPED | OUTPATIENT
Start: 2021-11-17 | End: 2021-12-17

## 2021-11-17 RX ORDER — INSULIN LISPRO 100 [IU]/ML
10 INJECTION, SOLUTION INTRAVENOUS; SUBCUTANEOUS
Status: DISCONTINUED | OUTPATIENT
Start: 2021-11-17 | End: 2021-11-21

## 2021-11-17 RX ORDER — INSULIN GLARGINE 100 [IU]/ML
40 INJECTION, SOLUTION SUBCUTANEOUS NIGHTLY
Status: DISCONTINUED | OUTPATIENT
Start: 2021-11-17 | End: 2021-11-18

## 2021-11-17 RX ADMIN — INSULIN LISPRO 2 UNITS: 100 INJECTION, SOLUTION INTRAVENOUS; SUBCUTANEOUS at 07:57

## 2021-11-17 RX ADMIN — ONDANSETRON 4 MG: 2 INJECTION INTRAMUSCULAR; INTRAVENOUS at 01:00

## 2021-11-17 RX ADMIN — GABAPENTIN 300 MG: 300 CAPSULE ORAL at 20:50

## 2021-11-17 RX ADMIN — SODIUM CHLORIDE 100 ML/HR: 9 INJECTION, SOLUTION INTRAVENOUS at 10:02

## 2021-11-17 RX ADMIN — ERYTHROMYCIN: 5 OINTMENT OPHTHALMIC at 11:20

## 2021-11-17 RX ADMIN — SODIUM CHLORIDE 500 ML: 9 INJECTION, SOLUTION INTRAVENOUS at 08:09

## 2021-11-17 RX ADMIN — OXYCODONE AND ACETAMINOPHEN 2 TABLET: 5; 325 TABLET ORAL at 13:20

## 2021-11-17 RX ADMIN — INSULIN LISPRO 10 UNITS: 100 INJECTION, SOLUTION INTRAVENOUS; SUBCUTANEOUS at 17:43

## 2021-11-17 RX ADMIN — INSULIN GLARGINE 40 UNITS: 100 INJECTION, SOLUTION SUBCUTANEOUS at 20:49

## 2021-11-17 RX ADMIN — INSULIN LISPRO 4 UNITS: 100 INJECTION, SOLUTION INTRAVENOUS; SUBCUTANEOUS at 11:17

## 2021-11-17 RX ADMIN — ASPIRIN 81 MG: 81 TABLET, CHEWABLE ORAL at 10:06

## 2021-11-17 RX ADMIN — TRANEXAMIC ACID 1000 MG: 100 INJECTION INTRAVENOUS at 11:16

## 2021-11-17 RX ADMIN — GABAPENTIN 300 MG: 300 CAPSULE ORAL at 08:10

## 2021-11-17 RX ADMIN — OXYCODONE AND ACETAMINOPHEN 2 TABLET: 5; 325 TABLET ORAL at 17:42

## 2021-11-17 RX ADMIN — FAMOTIDINE 40 MG: 20 TABLET, FILM COATED ORAL at 08:09

## 2021-11-17 RX ADMIN — MUPIROCIN 1 APPLICATION: 20 OINTMENT TOPICAL at 20:50

## 2021-11-17 RX ADMIN — OXYCODONE AND ACETAMINOPHEN 2 TABLET: 5; 325 TABLET ORAL at 06:23

## 2021-11-17 RX ADMIN — CEFAZOLIN SODIUM 2 G: 2 INJECTION, SOLUTION INTRAVENOUS at 06:23

## 2021-11-17 RX ADMIN — GABAPENTIN 300 MG: 300 CAPSULE ORAL at 15:48

## 2021-11-17 RX ADMIN — MUPIROCIN 1 APPLICATION: 20 OINTMENT TOPICAL at 10:01

## 2021-11-17 RX ADMIN — INSULIN LISPRO 6 UNITS: 100 INJECTION, SOLUTION INTRAVENOUS; SUBCUTANEOUS at 17:43

## 2021-11-17 RX ADMIN — MELOXICAM 15 MG: 15 TABLET ORAL at 08:09

## 2021-11-17 NOTE — DISCHARGE PLACEMENT REQUEST
"Brian Duncan (66 y.o. Female)             Date of Birth Social Security Number Address Home Phone MRN    1955  [address]  [address]  [place] [address] [phone] [id number]    Islam Marital Status             Holiness        Admission Date Admission Type Admitting Provider Attending Provider Department, Room/Bed    11/16/21 Elective Karri Schaeffer II, MD Sweet, Richard Alexander II, MD [address], P895/1    Discharge Date Discharge Disposition Discharge Destination                         Attending Provider: Karri Schaeffer II, MD    Allergies: Ace Inhibitors, Morphine    Isolation: None   Infection: None   Code Status: Not on file   Advance Care Planning Activity    Ht: 162.6 cm (64\")   Wt: 103 kg (226 lb 3.1 oz)    Admission Cmt: None   Principal Problem: Status post total replacement of hip [Z96.649]                 Active Insurance as of 11/16/2021     Primary Coverage     Payor Plan Insurance Group Employer/Plan Group    MEDICARE MEDICARE A & B      Payor Plan Address Payor Plan Phone Number Payor Plan Fax Number Effective Dates    [address] [phone]  5/1/2018 - None Entered    [facility] [address]       Subscriber Name Subscriber Birth Date Member ID       BRIAN DUNCAN 1955 [id number]           Secondary Coverage     Payor Plan Insurance Group Employer/Plan Group    [employer] [id number]     Payor Plan Address Payor Plan Phone Number Payor Plan Fax Number Effective Dates    [address]   1/1/2019 - None Entered    [facility] [address]       Subscriber Name Subscriber Birth Date Member ID       BRIAN DUNCAN 1955 [id number]                 Emergency Contacts      (Rel.) Home Phone Work Phone Mobile Phone    ESTUARDO DUNCAN (Spouse) -- -- [phone]          "

## 2021-11-17 NOTE — CONSULTS
Internal Medicine Consult  INTERNAL MEDICINE   [facility]       Patient Identification:  Name: Stephy Duncan  Age: 66 y.o.  Sex: female  :  1955  MRN: [id number]                   Primary Care Physician: Gregorio Rosales                               Requesting physician: Dr. Schaeffer  Date of consultation: 2021    Reason for consultation: Comanagement of diabetes and other medical issues in the patient is status post right total hip revision earlier today.    History of Present Illness:   Patient is a 66-year-old female who has complicated orthopedic history including history of right hip prosthetic joint infection which required multiple surgeries antibiotic treatment and eventual Girdlestone procedure.  She was brought in today for elective right total hip arthroplasty and is being seen postoperatively for management of medical issues specifically diabetes and hypertension in the setting of a month surgery.  Patient currently denies any symptoms of chest pain shortness of breath nausea vomiting abdominal pain or diarrhea and claims that her blood sugar has been very well controlled with last hemoglobin A1c that she could remember was 6.9.      Past Medical History:  Past Medical History:   Diagnosis Date   • Arthritis    • Cardiac murmur    • Diabetes mellitus (HCC)    • GERD (gastroesophageal reflux disease)    • Heart attack (HCC) 2020   • History of cervical cancer    • Hyperlipidemia    • Hypertension    • Osteomyelitis hip (HCC)     RIGHT   • Right hip pain      Past Surgical History:  Past Surgical History:   Procedure Laterality Date   • CARDIAC CATHETERIZATION     • CERVICAL CONIZATION     • COLONOSCOPY     • CORONARY ANGIOPLASTY WITH STENT PLACEMENT     • ENDOSCOPY     • HIP ARTHROPLASTY Right    • HIP HARDWARE REMOVAL     • HIP SPACER INSERTION WITH ANTIBIOTIC CEMENT      X3   • HYSTERECTOMY     • KNEE ARTHROPLASTY Left    • LAPAROSCOPIC CHOLECYSTECTOMY     •  TONSILLECTOMY        Home Meds:  Medications Prior to Admission   Medication Sig Dispense Refill Last Dose   • ampicillin (PRINCIPEN) 500 MG capsule Take 500 mg by mouth 2 (Two) Times a Day.   11/15/2021 at 2100   • ASPIRIN 81 PO Take 1 tablet by mouth Every Morning. PT HOLDING FOR SURGERY   Past Week at Unknown time   • atorvastatin (LIPITOR) 10 MG tablet Take 10 mg by mouth Every Night.   11/15/2021 at 0900   • Chlorhexidine Gluconate 2 % pads Apply  topically. As directed pre op   11/16/2021 at 0700   • cyclobenzaprine (FLEXERIL) 10 MG tablet Take 10 mg by mouth 3 (Three) Times a Day As Needed for Muscle Spasms.   Past Week at Unknown time   • Diclofenac Sodium (VOLTAREN) 1 % gel gel Apply 4 g topically to the appropriate area as directed 4 (Four) Times a Day As Needed.   Past Week at Unknown time   • Dulaglutide (Trulicity) 3 MG/0.5ML solution pen-injector Inject  under the skin into the appropriate area as directed 1 (One) Time Per Week. SATURDAYS   Past Week at Unknown time   • esomeprazole (nexIUM) 40 MG capsule Take 40 mg by mouth Every Morning Before Breakfast.   11/16/2021 at 0700   • ferrous sulfate 325 (65 FE) MG tablet Take 325 mg by mouth Every Other Day.   Past Week at Unknown time.   • gabapentin (NEURONTIN) 300 MG capsule Take 300 mg by mouth 3 (Three) Times a Day.   11/16/2021 at 0700   • HYDROcodone-acetaminophen (NORCO) 7.5-325 MG per tablet Take 1 tablet by mouth Every 6 (Six) Hours As Needed for Moderate Pain .   11/16/2021 at 0700   • Insulin Aspart (NOVOLOG FLEXPEN SC) Inject 20 Units under the skin into the appropriate area as directed 3 (Three) Times a Day With Meals.   11/15/2021 at 1600   • Insulin Detemir (LEVEMIR FLEXPEN SC) Inject 80 Units under the skin into the appropriate area as directed Every Night.   11/15/2021 at 2100   • isosorbide mononitrate (IMDUR) 30 MG 24 hr tablet Take 30 mg by mouth Daily.   11/16/2021 at 0700   • lansoprazole (PREVACID) 30 MG capsule Take 30 mg by mouth  Daily.   11/16/2021 at 0700   • loratadine (CLARITIN) 10 MG tablet Take 10 mg by mouth Daily.   11/15/2021 at 2100   • losartan (COZAAR) 25 MG tablet Take 25 mg by mouth 2 (Two) Times a Day.   11/15/2021 at 2100   • metoprolol tartrate (LOPRESSOR) 25 MG tablet Take 37.5 mg by mouth 2 (Two) Times a Day.   11/16/2021 at 0700   • Multiple Vitamins-Minerals (PRESERVISION AREDS 2 PO) Take 1 tablet by mouth 2 (Two) Times a Day. TO HOLD 1 WEEK BEFORE SURGERY   Past Week at Unknown time   • multivitamin with minerals (ICAPS AREDS FORMULA PO) Take 1 tablet by mouth Daily.   Past Week at Unknown time   • mupirocin (BACTROBAN) 2 % nasal ointment into the nostril(s) as directed by provider. As directed pre op   11/16/2021 at 0700   • Sulfamethoxazole-Trimethoprim (BACTRIM PO) Take  by mouth.   11/16/2021 at 0700   • ticagrelor (Brilinta) 60 MG tablet tablet Take 60 mg by mouth 2 (Two) Times a Day. PT HOLDING FOR SURGERY   Past Week at Unknown time   • Turmeric (QC TUMERIC COMPLEX PO) Take  by mouth.   Past Week at Unknown time   • nitroglycerin (NITRODUR) 0.3 MG/HR patch Place 1 patch on the skin as directed by provider Daily.   More than a month at Unknown time     Current Meds:     Current Facility-Administered Medications:   •  aspirin chewable tablet 81 mg, 81 mg, Oral, Daily, Karri Schaefefr II, MD  •  ceFAZolin in dextrose (ANCEF) IVPB solution 2 g, 2 g, Intravenous, Q8H, Karri Schaeffer II, MD  •  cyclobenzaprine (FLEXERIL) tablet 10 mg, 10 mg, Oral, TID PRN, Karri Schaeffer II, MD  •  docusate sodium (COLACE) capsule 100 mg, 100 mg, Oral, BID PRN, Karri Schaeffer II, MD  •  droperidol (INAPSINE) injection 0.625 mg, 0.625 mg, Intravenous, Once, Aleksandra Silva CRNA  •  [START ON 11/17/2021] erythromycin (ROMYCIN) ophthalmic ointment, , Right Eye, Q12H, Addison Andujar,   •  famotidine (PEPCID) tablet 40 mg, 40 mg, Oral, Daily, Karri Schaeffer II, MD, 40 mg at  21  •  gabapentin (NEURONTIN) capsule 300 mg, 300 mg, Oral, TID, Karri Schaeffer II, MD, 300 mg at 21  •  HYDROmorphone (DILAUDID) injection 1 mg, 1 mg, Intramuscular, Q4H PRN **AND** naloxone (NARCAN) injection 0.1 mg, 0.1 mg, Intravenous, Q5 Min PRN, Karri Schaeffer II, MD  •  lactated ringers infusion, 9 mL/hr, Intravenous, Continuous, Nazario Slade MD, Last Rate: 9 mL/hr at 21 1133, Restarted at 21 1256  •  losartan (COZAAR) tablet 25 mg, 25 mg, Oral, BID, Karri Schaeffer II, MD  •  meloxicam (MOBIC) tablet 15 mg, 15 mg, Oral, Daily, Karri Schaeffer II, MD, 15 mg at 21  •  metoprolol tartrate (LOPRESSOR) tablet 37.5 mg, 37.5 mg, Oral, BID, Karri Schaeffer II, MD  •  mupirocin (BACTROBAN) 2 % nasal ointment, , Each Nare, BID, Karri Schaeffer II, MD  •  ondansetron (ZOFRAN) tablet 4 mg, 4 mg, Oral, Q6H PRN **OR** ondansetron (ZOFRAN) injection 4 mg, 4 mg, Intravenous, Q6H PRN, Karri Schaeffer II, MD  •  oxyCODONE-acetaminophen (PERCOCET) 5-325 MG per tablet 1 tablet, 1 tablet, Oral, Q4H PRN, Karri Schaeffer II, MD  •  oxyCODONE-acetaminophen (PERCOCET) 5-325 MG per tablet 2 tablet, 2 tablet, Oral, Q4H PRN, Karri Schaeffer II, MD  •  sodium chloride 0.9 % infusion, 100 mL/hr, Intravenous, Continuous, Karri Schaeffer II, MD, Last Rate: 100 mL/hr at 21, 100 mL/hr at 21  Allergies:  Allergies   Allergen Reactions   • Ace Inhibitors Shortness Of Breath and Swelling   • Morphine Shortness Of Breath     Social History:   Social History     Tobacco Use   • Smoking status: Former Smoker     Years: 45.00     Types: Cigarettes     Quit date: 12/10/2015     Years since quittin.9   • Smokeless tobacco: Never Used   Substance Use Topics   • Alcohol use: Never      Family History:  Family History   Problem Relation Age of Onset   • Malig Hyperthermia Neg Hx      "      Review of Systems  See history of present illness and past medical history.  Constitutional: Remarkable for no fever or chills  Cardiovascular: Remarkable for no chest pain or shortness of breath  GI: Remarkable for no nausea vomiting or diarrhea  : Remarkable for no burning urination frequency urgency  Musculoskeletal: Remarkable for right hip surgery and as described in history of present illness  Neurological: Remarkable for no loss of consciousness or continence.  Remainder of ROS is negative.      Vitals:   /63 (BP Location: Right arm, Patient Position: Lying)   Pulse 66   Temp 97.4 °F (36.3 °C) (Oral)   Resp 16   Ht 162.6 cm (64\")   Wt 103 kg (226 lb 3.1 oz)   SpO2 97%   BMI 38.83 kg/m²   I/O:     Intake/Output Summary (Last 24 hours) at 11/16/2021 1926  Last data filed at 11/16/2021 1700  Gross per 24 hour   Intake 1500 ml   Output 1000 ml   Net 500 ml     Exam:  Patient is examined using the personal protective equipment as per guidelines from infection control for this particular patient as enacted.  Hand washing was performed before and after patient interaction.  General Appearance:   Alert cooperative pleasant female does not appear to be in any acute distress   Head:    Normocephalic, without obvious abnormality, atraumatic   Eyes:    PERRL, conjunctiva/corneas clear, EOM's intact, both eyes   Ears:    Normal external ear canals, both ears   Nose:   Nares normal, septum midline, mucosa normal, no drainage    or sinus tenderness   Throat:   Lips, tongue, gums normal; oral mucosa pink and moist   Neck:   Supple, symmetrical, trachea midline, no adenopathy;     thyroid:  no enlargement/tenderness/nodules; no carotid    bruit or JVD   Back:     Symmetric, no curvature, ROM normal, no CVA tenderness   Lungs:     Clear to auscultation bilaterally, respirations unlabored   Chest Wall:    No tenderness or deformity    Heart:    Regular rate and rhythm, S1 and S2 normal, no murmur, rub   or " gallop   Abdomen:    Obese soft nontender   Extremities:  Surgical site dressed   Pulses:   Pulses palpable in all extremities; symmetric all extremities   Skin:   Skin color normal, Skin is warm and dry,  no rashes or palpable lesions   Neurologic:  Grossly nonfocal       Data Review:      I reviewed the patient's new clinical results.  Results from last 7 days   Lab Units 11/16/21  1613 11/10/21  0844   WBC 10*3/mm3  --  7.65   HEMOGLOBIN g/dL 9.7* 12.3   PLATELETS 10*3/mm3  --  133*     Results from last 7 days   Lab Units 11/10/21  0844   SODIUM mmol/L 142   POTASSIUM mmol/L 4.6   CHLORIDE mmol/L 106   CO2 mmol/L 26.2   BUN mg/dL 19   CREATININE mg/dL 0.79   CALCIUM mg/dL 9.3   GLUCOSE mg/dL 120*     XR Pelvis 1 or 2 View    Result Date: 11/16/2021   Postsurgical changes.    This report was finalized on 11/16/2021 4:28 PM by Dr. Cisco Boggs M.D.      Microbiology Results (last 10 days)     Procedure Component Value - Date/Time    COVID PRE-OP / PRE-PROCEDURE SCREENING ORDER (NO ISOLATION) - Swab, Nasopharynx [[id number]]  (Normal) Collected: 11/13/21 0853    Lab Status: Final result Specimen: Swab from Nasopharynx Updated: 11/13/21 1624    Narrative:      The following orders were created for panel order COVID PRE-OP / PRE-PROCEDURE SCREENING ORDER (NO ISOLATION) - Swab, Nasopharynx.  Procedure                               Abnormality         Status                     ---------                               -----------         ------                     COVID-19,APTIMA PANTHER(...[[id number]]  Normal              Final result                 Please view results for these tests on the individual orders.    COVID-19,APTIMA PANTHER(LORA), NATHALIE, NP/OP SWAB IN UTM/VTM/SALINE TRANSPORT MEDIA,24 HR TAT - Swab, Nasopharynx [[id number]]  (Normal) Collected: 11/13/21 0853    Lab Status: Final result Specimen: Swab from Nasopharynx Updated: 11/13/21 1624     COVID19 Not Detected    Narrative:      Fact sheet for  providers: https://www.fda.gov/media/044826/download     Fact sheet for patients: https://www.fda.gov/media/490334/download    Test performed by RT PCR.          Assessment:  Active Hospital Problems    Diagnosis  POA   • **Status post total replacement of hip [Z96.649]  Not Applicable   • Diabetes mellitus (HCC) [E11.9]  Unknown   • Hypertension [I10]  Unknown   • Anemia [D64.9]  Unknown       Medical decision making:  Status post right total hip arthroplasty/revision-management of pain, DVT prophylaxis and activity guidelines per primary orthopedic surgery service.  Diabetes mellitus-reportedly well controlled plan is to continue her home regimen watch for hypoglycemia and provide with Accu-Cheks and sliding scale coverage and check hemoglobin A1c.  Hypertension-continue antihypertensive regimen and monitor for hypotension and low threshold to hold antihypertensive if systolic blood pressures less than 100.  Anemia with hemoglobin dropping from preop lab work of 12.3 to 9.7 postoperatively-monitor H&H and her symptoms and low threshold for transfusion hemoglobin drops to 7 or if she becomes symptomatic.    Fernando Hart MD   11/16/2021  19:26 EST  Much of this encounter note is an electronic transcription/translation of spoken language to printed text. The electronic translation of spoken language may permit erroneous, or at times, nonsensical words or phrases to be inadvertently transcribed; Although I have reviewed the note for such errors, some may still exist

## 2021-11-17 NOTE — PLAN OF CARE
Goal Outcome Evaluation:  Plan of Care Reviewed With: patient           Outcome Summary: Pt is 67 y/o F, POD1 for R TRISTAN revision, posterior approach. Pt lives at home with her , no TREVON or stairs. Pt was CGA for bed mob, STS, and ambulation with RW 40' today. Demo's dec'd endurance, balance, and strength and would benefit from continued PT. Pt's  is unable to provide physical assist at home, recommending D/C home with HH vs SNF pending progress.

## 2021-11-17 NOTE — PROGRESS NOTES
Orthopaedic Surgery   Daily Progress Note  Dr. JENNIE Schaeffer II  [phone]  DEMOGRAPHICS:   · Stephy Duncan   · Age:66 y.o.   · MRN:[id number]  · Admitted: 2021    PROCEDURE: 1 Day Post-Op s/p Procedure(s):  TOTAL HIP REVISION ARTHROPLASTY RIGHT POSTERIOR     HOSPITAL PROGRESS  · Patient Issues: Having some hypotension and bleeding at the incision site, otherwise seems to be doing fine  · Ambulation/Activity: Ambulating minimally with PT/Nursing.      VITALS:  Vitals:    21 1924 21 2259 21 0234 21 0800   BP: 92/65 94/51 96/52 (!) 88/42   BP Location: Right arm Left arm Left arm Left arm   Patient Position: Lying Lying Lying Lying   Pulse: 78 69 77 88   Resp:    Temp: 96.8 °F (36 °C) 96.9 °F (36.1 °C) 97.3 °F (36.3 °C) 97.4 °F (36.3 °C)   TempSrc: Skin Skin Skin Skin   SpO2: 96% 98% 97% 93%   Weight:       Height:           PHYSICAL EXAM:  · LUNGS: Equal chest rise, no shortness of air  · CARDIOVASCULAR: brisk capillary refill intact  · WOUND: Clau saturated  · EXTREMITY: Operative Leg  · Pulses: brisk capillary refill intact  · Sensation: Sensation intact to light touch to the saphenous/sural/tibial/deep peroneal/superficial peroneal nerves, and grossly throughout the extremity.  · Motor: 5/5 EHL/FHL/TA/GS motor complexes    LABS:   Lab Results   Component Value Date    HGB 8.1 (L) 2021     Lab Results   Component Value Date    WBC 7.65 11/10/2021     Lab Results   Component Value Date    GLUCOSE 182 (H) 2021    CALCIUM 8.0 (L) 2021     (L) 2021    K 4.5 2021    CO2 21.0 (L) 2021     2021    BUN 22 2021    CREATININE 1.32 (H) 2021    EGFRIFNONA 40 (L) 2021    BCR 16.7 2021    ANIONGAP 9.0 2021       ASSESSMENT: Patient is a 66 y.o. female who is 1 Day Post-Op s/p Procedure(s):  TOTAL HIP REVISION ARTHROPLASTY RIGHT POSTERIOR     PLAN:   · Weight Bearin% weightbearing right  "lower extremity  · Labs: Above lab values review. Plan: Recheck hemoglobin tomorrow  · PT/OT: To Mobilize  · DVT PPX: Resume home ASA  · Post-Op Xray: TRISTAN implants in good position.   · Follow-Up: In office at 3 weeks postop  · Dispo:  pending progress with therapy, but since I am making her 50% weightbearing she may need rehab.  We will see how she does today    R \"Kilo\" Laury BAXTER MD  Orthopaedic Surgery  [facility]  [phone] - [place] Office  [phone] - [place] Office      "

## 2021-11-17 NOTE — PROGRESS NOTES
"DAILY PROGRESS NOTE  [facility]    Patient Identification:  Name: Stephy Duncan  Age: 66 y.o.  Sex: female  :  1955  MRN: [id number]         Primary Care Physician: Gregorio Rosales      Subjective  Patient with no new complaints.  Discussed with nursing staff notable that there is been significant bleeding from the surgical site.  No abnormal bowel movements noted.  No abdominal complaints.  Patient has been up with physical therapy and spent some time in the bedside chair also.    Objective:  General Appearance:  Comfortable, well-appearing, in no acute distress and not in pain.    Vital signs: (most recent): Blood pressure 94/54, pulse 90, temperature 97.3 °F (36.3 °C), temperature source Oral, resp. rate 18, height 162.6 cm (64\"), weight 103 kg (226 lb 3.1 oz), SpO2 97 %.    Lungs:  Normal effort and normal respiratory rate.  Breath sounds clear to auscultation.    Heart: Normal rate.  Regular rhythm.  S1 normal.    Extremities: There is no dependent edema.    Neurological: Patient is alert and oriented to person, place and time.    Skin:  Warm and dry.                Vital signs in last 24 hours:  Temp:  [96.8 °F (36 °C)-97.6 °F (36.4 °C)] 97.3 °F (36.3 °C)  Heart Rate:  [64-90] 90  Resp:  [14-18] 18  BP: ()/(42-65) 94/54    Intake/Output:    Intake/Output Summary (Last 24 hours) at 2021 1426  Last data filed at 2021 1300  Gross per 24 hour   Intake 2580 ml   Output 580 ml   Net 2000 ml         Results from last 7 days   Lab Units 21  0615 21  1613   HEMOGLOBIN g/dL 8.1* 9.7*     Results from last 7 days   Lab Units 21  0614   SODIUM mmol/L 135*   POTASSIUM mmol/L 4.5   CHLORIDE mmol/L 105   CO2 mmol/L 21.0*   BUN mg/dL 22   CREATININE mg/dL 1.32*   GLUCOSE mg/dL 182*   Estimated Creatinine Clearance: 47.7 mL/min (A) (by C-G formula based on SCr of 1.32 mg/dL (H)).  Results from last 7 days   Lab Units 21  0614   CALCIUM mg/dL 8.0*     "     Assessment:    Status post total replacement of hip    Diabetes mellitus (HCC)    Hypertension    Anemia    Diabetes type 2-insulin requiring: By history has fairly significant insulin resistance is on fairly high doses at home.  We will go ahead and start at bedtime Lantus and add scheduled Humalog doses to meals.  Postop anemia: Probably significant drop in hemoglobin and associated with hypotension.  Patient has received a dose of tranexamic acid via orthopedic service.  She appears of tolerated this well.  We will recheck hemoglobin.  If it falls below 8 I would go ahead and transfuse noting the association with hypotension.  We will also check iron levels etc.  Hypertension with postop hypotension: Discontinue Cozaar.  Monitor closely.  Will likely improve with transfusion.  Consider fluid bolus if any worsening.    All problems new to this examiner.    Plan:  See above.  We certainly appreciate been involved this pleasant lady's care and will be happy to follow her along with you.  Discussed with patient.  Discussed with RN.    Arturo Oshea MD  11/17/2021  14:26 EST

## 2021-11-17 NOTE — CASE MANAGEMENT/SOCIAL WORK
Discharge Planning Assessment  [facility]     Patient Name: Stephy Duncan  MRN: [id number]  Today's Date: 11/17/2021    Admit Date: 11/16/2021     Discharge Needs Assessment     Row Name 11/17/21 1046       Living Environment    Lives With spouse    Current Living Arrangements home/apartment/condo    Primary Care Provided by self    Provides Primary Care For no one    Family Caregiver if Needed spouse    Quality of Family Relationships unable to assess    Able to Return to Prior Arrangements yes       Resource/Environmental Concerns    Resource/Environmental Concerns home accessibility    Home Accessibility Concerns stairs to enter home       Transition Planning    Patient/Family Anticipates Transition to inpatient rehabilitation facility    Patient/Family Anticipated Services at Transition home health care; skilled nursing    Transportation Anticipated family or friend will provide       Discharge Needs Assessment    Readmission Within the Last 30 Days no previous admission in last 30 days    Current Outpatient/Agency/Support Group homecare agency; skilled nursing facility    Equipment Currently Used at Home bath bench; walker, standard    Concerns to be Addressed discharge planning; basic needs    Equipment Needed After Discharge none    Outpatient/Agency/Support Group Needs homecare agency; skilled nursing facility               Discharge Plan     Row Name 11/17/21 1047       Plan    Plan [facility] following; referral to [facility] SNF    Patient/Family in Agreement with Plan yes    Plan Comments Spoke with pt bedside. Confirmed facesheet correct. Explained role of CCP. Verified care plan from MD office. [facility]  was arranged and they can accept per Yoanna. When speaking with pt she reports she now wants to go to SNF. She would like referral to [facility] in [place] as she has been there in the past. Referral placed and spoke with Tha/jayda at [facility]. CCP to follow.                  Continued Care and Services - Admitted Since 11/16/2021     Destination     Service Provider Request Status Selected Services Address Phone Fax Patient Preferred    [facility]  Pending - Request Sent N/A [address] [place] [address] [phone] --          Home Medical Care     Service Provider Request Status Selected Services Address Phone Fax Patient Preferred    [facility]  Accepted N/A [address], [address], [place] [address] [phone] --                 Demographic Summary     Row Name 11/17/21 1048       General Information    Admission Type --               Functional Status    No documentation.                Psychosocial    No documentation.                Abuse/Neglect    No documentation.                Legal    No documentation.                Substance Abuse    No documentation.                Patient Forms    No documentation.                   REENA Culp

## 2021-11-17 NOTE — THERAPY EVALUATION
Patient Name: Stephy Duncan  : 1955    MRN: [id number]                              Today's Date: 2021       Admit Date: 2021    Visit Dx:     ICD-10-CM ICD-9-CM   1. Hip injury  S79.919A 959.6     Patient Active Problem List   Diagnosis   • Status post total replacement of hip   • Diabetes mellitus (HCC)   • Hypertension   • Anemia     Past Medical History:   Diagnosis Date   • Arthritis    • Cardiac murmur    • Diabetes mellitus (HCC)    • GERD (gastroesophageal reflux disease)    • Heart attack (HCC) 2020   • History of cervical cancer 1981   • Hyperlipidemia    • Hypertension    • Osteomyelitis hip (HCC)     RIGHT   • Right hip pain      Past Surgical History:   Procedure Laterality Date   • CARDIAC CATHETERIZATION     • CERVICAL CONIZATION     • COLONOSCOPY     • CORONARY ANGIOPLASTY WITH STENT PLACEMENT     • ENDOSCOPY     • HIP ARTHROPLASTY Right    • HIP HARDWARE REMOVAL     • HIP SPACER INSERTION WITH ANTIBIOTIC CEMENT      X3   • HYSTERECTOMY     • KNEE ARTHROPLASTY Left    • LAPAROSCOPIC CHOLECYSTECTOMY     • TONSILLECTOMY        General Information     Row Name 2136          Physical Therapy Time and Intention    Document Type evaluation  -DB     Mode of Treatment individual therapy; physical therapy  -DB     Row Name 21 0936          General Information    Patient Profile Reviewed yes  -DB     Prior Level of Function independent:  -DB     Existing Precautions/Restrictions no known precautions/restrictions  -DB     Barriers to Rehab none identified  -DB     Row Name 2136          Living Environment    Lives With spouse  -DB     Row Name 21 0936          Home Main Entrance    Number of Stairs, Main Entrance none  -DB     Row Name 21 0936          Stairs Within Home, Primary    Number of Stairs, Within Home, Primary none  -DB     Row Name 2136          Cognition    Orientation Status (Cognition) oriented x 4  -DB     Row Name  11/17/21 0936          Safety Issues, Functional Mobility    Impairments Affecting Function (Mobility) balance; strength; pain; endurance/activity tolerance  -DB           User Key  (r) = Recorded By, (t) = Taken By, (c) = Cosigned By    Initials Name Provider Type    Audra Cardozo PT Physical Therapist               Mobility     Row Name 11/17/21 0940          Bed Mobility    Bed Mobility supine-sit  -DB     Supine-Sit Pompey (Bed Mobility) contact guard; verbal cues  -DB     Assistive Device (Bed Mobility) bed rails; head of bed elevated  -DB     Row Name 11/17/21 0940          Sit-Stand Transfer    Sit-Stand Pompey (Transfers) contact guard; verbal cues  -DB     Assistive Device (Sit-Stand Transfers) walker, front-wheeled  -DB     Row Name 11/17/21 0940          Gait/Stairs (Locomotion)    Pompey Level (Gait) contact guard; verbal cues  -DB     Assistive Device (Gait) walker, front-wheeled  -DB     Distance in Feet (Gait) 40'  -DB     Deviations/Abnormal Patterns (Gait) weight shifting decreased; stride length decreased; corinne decreased; festinating/shuffling; gait speed decreased  -DB     Bilateral Gait Deviations forward flexed posture; heel strike decreased  -DB           User Key  (r) = Recorded By, (t) = Taken By, (c) = Cosigned By    Initials Name Provider Type    Audra Cardozo PT Physical Therapist               Obj/Interventions     Row Name 11/17/21 0940          Range of Motion Comprehensive    General Range of Motion no range of motion deficits identified  -DB     Row Name 11/17/21 0940          Strength Comprehensive (MMT)    Comment, General Manual Muscle Testing (MMT) Assessment generalized weakness  -DB     Row Name 11/17/21 0940          Balance    Balance Assessment sitting static balance; sitting dynamic balance; standing static balance; standing dynamic balance  -DB     Static Sitting Balance WFL; sitting, edge of bed  -DB     Dynamic Sitting Balance WFL;  sitting, edge of bed  -DB     Static Standing Balance WFL; supported  -DB     Dynamic Standing Balance mild impairment; supported  -DB     Balance Interventions sitting; standing; sit to stand  -DB           User Key  (r) = Recorded By, (t) = Taken By, (c) = Cosigned By    Initials Name Provider Type    Audra Cardozo, PT Physical Therapist               Goals/Plan     Row Name 11/17/21 0944          Bed Mobility Goal 1 (PT)    Activity/Assistive Device (Bed Mobility Goal 1, PT) bed mobility activities, all  -DB     Edwards Level/Cues Needed (Bed Mobility Goal 1, PT) standby assist  -DB     Time Frame (Bed Mobility Goal 1, PT) 1 week  -DB     Row Name 11/17/21 0944          Transfer Goal 1 (PT)    Activity/Assistive Device (Transfer Goal 1, PT) transfers, all  -DB     Edwards Level/Cues Needed (Transfer Goal 1, PT) standby assist  -DB     Time Frame (Transfer Goal 1, PT) 1 week  -DB     Row Name 11/17/21 0944          Gait Training Goal 1 (PT)    Activity/Assistive Device (Gait Training Goal 1, PT) gait (walking locomotion)  -DB     Edwards Level (Gait Training Goal 1, PT) standby assist  -DB     Time Frame (Gait Training Goal 1, PT) 1 week  -DB           User Key  (r) = Recorded By, (t) = Taken By, (c) = Cosigned By    Initials Name Provider Type    Audra Cardozo, PT Physical Therapist               Clinical Impression     Row Name 11/17/21 0941          Plan of Care Review    Plan of Care Reviewed With patient  -DB     Outcome Summary Pt is 67 y/o F, POD1 for R TRISTAN revision, posterior approach. Pt lives at home with her , no TREVON or stairs. Pt was CGA for bed mob, STS, and ambulation with RW 40' today. Demo's dec'd endurance, balance, and strength and would benefit from continued PT. Pt's  is unable to provide physical assist at home, recommending D/C home with HH vs SNF pending progress.  -DB     Row Name 11/17/21 0941          Therapy Assessment/Plan (PT)    Rehab Potential  (PT) good, to achieve stated therapy goals  -DB     Criteria for Skilled Interventions Met (PT) yes  -DB     Row Name 11/17/21 0941          Vital Signs    O2 Delivery Pre Treatment room air  -DB     O2 Delivery Intra Treatment room air  -DB     O2 Delivery Post Treatment room air  -DB     Pre Patient Position Supine  -DB     Intra Patient Position Standing  -DB     Post Patient Position Sitting  -DB     Row Name 11/17/21 0941          Positioning and Restraints    Pre-Treatment Position in bed  -DB     Post Treatment Position chair  -DB     In Chair reclined; sitting; call light within reach; encouraged to call for assist; exit alarm on; with nsg  -DB           User Key  (r) = Recorded By, (t) = Taken By, (c) = Cosigned By    Initials Name Provider Type    Audra Cardozo PT Physical Therapist               Outcome Measures     Row Name 11/17/21 0944          How much help from another person do you currently need...    Turning from your back to your side while in flat bed without using bedrails? 3  -DB     Moving from lying on back to sitting on the side of a flat bed without bedrails? 3  -DB     Moving to and from a bed to a chair (including a wheelchair)? 3  -DB     Standing up from a chair using your arms (e.g., wheelchair, bedside chair)? 3  -DB     Climbing 3-5 steps with a railing? 2  -DB     To walk in hospital room? 3  -DB     AM-PAC 6 Clicks Score (PT) 17  -DB     Row Name 11/17/21 0944          Functional Assessment    Outcome Measure Options AM-PAC 6 Clicks Basic Mobility (PT)  -DB           User Key  (r) = Recorded By, (t) = Taken By, (c) = Cosigned By    Initials Name Provider Type    Audra Cardozo, WESLY Physical Therapist                             Physical Therapy Education                 Title: PT OT SLP Therapies (Done)     Topic: Physical Therapy (Done)     Point: Mobility training (Done)     Learning Progress Summary           Patient Acceptance, E, VU by CASSANDRA at 11/17/2021 0944                    Point: Home exercise program (Done)     Learning Progress Summary           Patient Acceptance, E, VU by DB at 11/17/2021 0944                   Point: Body mechanics (Done)     Learning Progress Summary           Patient Acceptance, E, VU by DB at 11/17/2021 0944                   Point: Precautions (Done)     Learning Progress Summary           Patient Acceptance, E, VU by DB at 11/17/2021 0944                               User Key     Initials Effective Dates Name Provider Type Discipline    DB 06/16/21 -  Audra Gilliam PT Physical Therapist PT              PT Recommendation and Plan  Planned Therapy Interventions (PT): balance training, bed mobility training, gait training, home exercise program, postural re-education, patient/family education, neuromuscular re-education, stair training, strengthening, transfer training  Plan of Care Reviewed With: patient  Outcome Summary: Pt is 67 y/o F, POD1 for R TRISTAN revision, posterior approach. Pt lives at home with her , no TREVON or stairs. Pt was CGA for bed mob, STS, and ambulation with RW 40' today. Demo's dec'd endurance, balance, and strength and would benefit from continued PT. Pt's  is unable to provide physical assist at home, recommending D/C home with HH vs SNF pending progress.     Time Calculation:    PT Charges     Row Name 11/17/21 0945             Time Calculation    Start Time 0857  -DB      Stop Time 0913  -DB      Time Calculation (min) 16 min  -DB      PT Received On 11/17/21  -DB      PT - Next Appointment 11/18/21  -DB      PT Goal Re-Cert Due Date 11/24/21  -DB              Time Calculation- PT    Total Timed Code Minutes- PT 8 minute(s)  -DB            User Key  (r) = Recorded By, (t) = Taken By, (c) = Cosigned By    Initials Name Provider Type    DB Audra Gilliam PT Physical Therapist              Therapy Charges for Today     Code Description Service Date Service Provider Modifiers Qty    17272514658 HC PT EVAL MOD  COMPLEXITY 2 11/17/2021 Audra Gilliam, PT GP 1    [id number] HC PT THER PROC EA 15 MIN 11/17/2021 Audra Gilliam, PT GP 1          PT G-Codes  Outcome Measure Options: AM-PAC 6 Clicks Basic Mobility (PT)  AM-PAC 6 Clicks Score (PT): 17    Audra Gilliam, PT  11/17/2021

## 2021-11-18 LAB
ANION GAP SERPL CALCULATED.3IONS-SCNC: 12.5 MMOL/L (ref 5–15)
APTT PPP: 37.2 SECONDS (ref 22.7–35.4)
BASOPHILS # BLD AUTO: 0.01 10*3/MM3 (ref 0–0.2)
BASOPHILS NFR BLD AUTO: 0.1 % (ref 0–1.5)
BUN SERPL-MCNC: 29 MG/DL (ref 8–23)
BUN/CREAT SERPL: 15.3 (ref 7–25)
CALCIUM SPEC-SCNC: 7.7 MG/DL (ref 8.6–10.5)
CHLORIDE SERPL-SCNC: 101 MMOL/L (ref 98–107)
CO2 SERPL-SCNC: 17.5 MMOL/L (ref 22–29)
CREAT SERPL-MCNC: 1.89 MG/DL (ref 0.57–1)
DEPRECATED RDW RBC AUTO: 43.3 FL (ref 37–54)
EOSINOPHIL # BLD AUTO: 0.09 10*3/MM3 (ref 0–0.4)
EOSINOPHIL NFR BLD AUTO: 1 % (ref 0.3–6.2)
ERYTHROCYTE [DISTWIDTH] IN BLOOD BY AUTOMATED COUNT: 13.8 % (ref 12.3–15.4)
GFR SERPL CREATININE-BSD FRML MDRD: 26 ML/MIN/1.73
GLUCOSE BLDC GLUCOMTR-MCNC: 168 MG/DL (ref 70–130)
GLUCOSE BLDC GLUCOMTR-MCNC: 168 MG/DL (ref 70–130)
GLUCOSE BLDC GLUCOMTR-MCNC: 172 MG/DL (ref 70–130)
GLUCOSE BLDC GLUCOMTR-MCNC: 242 MG/DL (ref 70–130)
GLUCOSE SERPL-MCNC: 213 MG/DL (ref 65–99)
HCT VFR BLD AUTO: 23.9 % (ref 34–46.6)
HGB BLD-MCNC: 7.8 G/DL (ref 12–15.9)
INR PPP: 1.43 (ref 0.9–1.1)
LYMPHOCYTES # BLD AUTO: 0.9 10*3/MM3 (ref 0.7–3.1)
LYMPHOCYTES NFR BLD AUTO: 9.8 % (ref 19.6–45.3)
MCH RBC QN AUTO: 28 PG (ref 26.6–33)
MCHC RBC AUTO-ENTMCNC: 32.6 G/DL (ref 31.5–35.7)
MCV RBC AUTO: 85.7 FL (ref 79–97)
MONOCYTES # BLD AUTO: 1.17 10*3/MM3 (ref 0.1–0.9)
MONOCYTES NFR BLD AUTO: 12.8 % (ref 5–12)
NEUTROPHILS NFR BLD AUTO: 6.92 10*3/MM3 (ref 1.7–7)
NEUTROPHILS NFR BLD AUTO: 75.4 % (ref 42.7–76)
PLATELET # BLD AUTO: 123 10*3/MM3 (ref 140–450)
PLATELET # BLD AUTO: 84 10*3/MM3 (ref 140–450)
PLATELETS.RETICULATED NFR BLD AUTO: 3.2 % (ref 0.9–6.5)
PMV BLD AUTO: 10.3 FL (ref 6–12)
POTASSIUM SERPL-SCNC: 4 MMOL/L (ref 3.5–5.2)
PROTHROMBIN TIME: 17.3 SECONDS (ref 11.7–14.2)
RBC # BLD AUTO: 2.79 10*6/MM3 (ref 3.77–5.28)
RETICS # AUTO: 0.07 10*6/MM3 (ref 0.02–0.13)
RETICS/RBC NFR AUTO: 2.6 % (ref 0.7–1.9)
SODIUM SERPL-SCNC: 131 MMOL/L (ref 136–145)
WBC NRBC COR # BLD: 9.17 10*3/MM3 (ref 3.4–10.8)

## 2021-11-18 PROCEDURE — 85045 AUTOMATED RETICULOCYTE COUNT: CPT | Performed by: HOSPITALIST

## 2021-11-18 PROCEDURE — 63710000001 INSULIN LISPRO (HUMAN) PER 5 UNITS: Performed by: HOSPITALIST

## 2021-11-18 PROCEDURE — 97110 THERAPEUTIC EXERCISES: CPT

## 2021-11-18 PROCEDURE — 36430 TRANSFUSION BLD/BLD COMPNT: CPT

## 2021-11-18 PROCEDURE — 63710000001 INSULIN LISPRO (HUMAN) PER 5 UNITS: Performed by: INTERNAL MEDICINE

## 2021-11-18 PROCEDURE — 82962 GLUCOSE BLOOD TEST: CPT

## 2021-11-18 PROCEDURE — 80048 BASIC METABOLIC PNL TOTAL CA: CPT | Performed by: HOSPITALIST

## 2021-11-18 PROCEDURE — 85730 THROMBOPLASTIN TIME PARTIAL: CPT | Performed by: HOSPITALIST

## 2021-11-18 PROCEDURE — 86900 BLOOD TYPING SEROLOGIC ABO: CPT

## 2021-11-18 PROCEDURE — 25010000002 IRON SUCROSE PER 1 MG: Performed by: HOSPITALIST

## 2021-11-18 PROCEDURE — 85610 PROTHROMBIN TIME: CPT | Performed by: HOSPITALIST

## 2021-11-18 PROCEDURE — 85055 RETICULATED PLATELET ASSAY: CPT | Performed by: HOSPITALIST

## 2021-11-18 PROCEDURE — P9016 RBC LEUKOCYTES REDUCED: HCPCS

## 2021-11-18 PROCEDURE — 85025 COMPLETE CBC W/AUTO DIFF WBC: CPT | Performed by: HOSPITALIST

## 2021-11-18 PROCEDURE — 63710000001 INSULIN GLARGINE PER 5 UNITS: Performed by: HOSPITALIST

## 2021-11-18 RX ORDER — FAMOTIDINE 20 MG/1
20 TABLET, FILM COATED ORAL DAILY
Status: DISCONTINUED | OUTPATIENT
Start: 2021-11-19 | End: 2021-11-24 | Stop reason: HOSPADM

## 2021-11-18 RX ORDER — INSULIN GLARGINE 100 [IU]/ML
50 INJECTION, SOLUTION SUBCUTANEOUS NIGHTLY
Status: DISCONTINUED | OUTPATIENT
Start: 2021-11-18 | End: 2021-11-19

## 2021-11-18 RX ORDER — SODIUM CHLORIDE 9 MG/ML
75 INJECTION, SOLUTION INTRAVENOUS CONTINUOUS
Status: DISCONTINUED | OUTPATIENT
Start: 2021-11-18 | End: 2021-11-20

## 2021-11-18 RX ORDER — LOPERAMIDE HYDROCHLORIDE 2 MG/1
2 CAPSULE ORAL 4 TIMES DAILY PRN
Status: DISCONTINUED | OUTPATIENT
Start: 2021-11-18 | End: 2021-11-24 | Stop reason: HOSPADM

## 2021-11-18 RX ORDER — DOXYCYCLINE 100 MG/1
100 CAPSULE ORAL EVERY 12 HOURS SCHEDULED
Status: DISCONTINUED | OUTPATIENT
Start: 2021-11-18 | End: 2021-11-22

## 2021-11-18 RX ADMIN — MUPIROCIN 1 APPLICATION: 20 OINTMENT TOPICAL at 08:15

## 2021-11-18 RX ADMIN — GABAPENTIN 300 MG: 300 CAPSULE ORAL at 16:09

## 2021-11-18 RX ADMIN — GABAPENTIN 300 MG: 300 CAPSULE ORAL at 22:27

## 2021-11-18 RX ADMIN — SODIUM CHLORIDE 75 ML/HR: 9 INJECTION, SOLUTION INTRAVENOUS at 13:12

## 2021-11-18 RX ADMIN — OXYCODONE AND ACETAMINOPHEN 2 TABLET: 5; 325 TABLET ORAL at 11:14

## 2021-11-18 RX ADMIN — INSULIN LISPRO 10 UNITS: 100 INJECTION, SOLUTION INTRAVENOUS; SUBCUTANEOUS at 08:15

## 2021-11-18 RX ADMIN — OXYCODONE AND ACETAMINOPHEN 1 TABLET: 5; 325 TABLET ORAL at 06:26

## 2021-11-18 RX ADMIN — LOPERAMIDE HYDROCHLORIDE 2 MG: 2 CAPSULE ORAL at 22:31

## 2021-11-18 RX ADMIN — DOXYCYCLINE 100 MG: 100 CAPSULE ORAL at 11:15

## 2021-11-18 RX ADMIN — INSULIN LISPRO 2 UNITS: 100 INJECTION, SOLUTION INTRAVENOUS; SUBCUTANEOUS at 11:28

## 2021-11-18 RX ADMIN — GABAPENTIN 300 MG: 300 CAPSULE ORAL at 08:14

## 2021-11-18 RX ADMIN — MELOXICAM 15 MG: 15 TABLET ORAL at 08:14

## 2021-11-18 RX ADMIN — DOXYCYCLINE 100 MG: 100 CAPSULE ORAL at 22:26

## 2021-11-18 RX ADMIN — INSULIN LISPRO 10 UNITS: 100 INJECTION, SOLUTION INTRAVENOUS; SUBCUTANEOUS at 18:06

## 2021-11-18 RX ADMIN — ASPIRIN 81 MG: 81 TABLET, CHEWABLE ORAL at 08:16

## 2021-11-18 RX ADMIN — IRON SUCROSE 300 MG: 20 INJECTION, SOLUTION INTRAVENOUS at 11:15

## 2021-11-18 RX ADMIN — TRANEXAMIC ACID 1000 MG: 100 INJECTION, SOLUTION INTRAVENOUS at 13:12

## 2021-11-18 RX ADMIN — MUPIROCIN 1 APPLICATION: 20 OINTMENT TOPICAL at 22:20

## 2021-11-18 RX ADMIN — INSULIN GLARGINE 50 UNITS: 100 INJECTION, SOLUTION SUBCUTANEOUS at 22:21

## 2021-11-18 RX ADMIN — INSULIN LISPRO 10 UNITS: 100 INJECTION, SOLUTION INTRAVENOUS; SUBCUTANEOUS at 11:28

## 2021-11-18 RX ADMIN — METOPROLOL TARTRATE 37.5 MG: 25 TABLET, FILM COATED ORAL at 08:14

## 2021-11-18 RX ADMIN — LOPERAMIDE HYDROCHLORIDE 2 MG: 2 CAPSULE ORAL at 15:33

## 2021-11-18 RX ADMIN — INSULIN LISPRO 4 UNITS: 100 INJECTION, SOLUTION INTRAVENOUS; SUBCUTANEOUS at 08:14

## 2021-11-18 RX ADMIN — INSULIN LISPRO 2 UNITS: 100 INJECTION, SOLUTION INTRAVENOUS; SUBCUTANEOUS at 18:06

## 2021-11-18 RX ADMIN — FAMOTIDINE 40 MG: 20 TABLET, FILM COATED ORAL at 08:14

## 2021-11-18 NOTE — THERAPY TREATMENT NOTE
Patient Name: Stephy Ducnan  : 1955    MRN: [id number]                              Today's Date: 2021       Admit Date: 2021    Visit Dx:     ICD-10-CM ICD-9-CM   1. Hip injury  S79.919A 959.6     Patient Active Problem List   Diagnosis   • Status post total replacement of hip   • Diabetes mellitus (HCC)   • Hypertension   • Anemia     Past Medical History:   Diagnosis Date   • Arthritis    • Cardiac murmur    • Diabetes mellitus (HCC)    • GERD (gastroesophageal reflux disease)    • Heart attack (HCC) 2020   • History of cervical cancer    • Hyperlipidemia    • Hypertension    • Osteomyelitis hip (HCC)     RIGHT   • Right hip pain      Past Surgical History:   Procedure Laterality Date   • CARDIAC CATHETERIZATION     • CERVICAL CONIZATION     • COLONOSCOPY     • CORONARY ANGIOPLASTY WITH STENT PLACEMENT     • ENDOSCOPY     • HIP ARTHROPLASTY Right    • HIP HARDWARE REMOVAL     • HIP SPACER INSERTION WITH ANTIBIOTIC CEMENT      X3   • HYSTERECTOMY     • KNEE ARTHROPLASTY Left    • LAPAROSCOPIC CHOLECYSTECTOMY     • TONSILLECTOMY     • TOTAL HIP ARTHROPLASTY REVISION Right 2021    Procedure: TOTAL HIP REVISION ARTHROPLASTY RIGHT POSTERIOR;  Surgeon: Karri Schaeffer II, MD;  Location: [facility];  Service: Orthopedics;  Laterality: Right;      General Information     Row Name 21 1616          Physical Therapy Time and Intention    Document Type therapy note (daily note)  -DB     Mode of Treatment individual therapy; physical therapy  -DB     Row Name 21 1616          General Information    Patient Profile Reviewed yes  -DB     Existing Precautions/Restrictions right; hip, posterior; partial weight bearing  50% WB'ing  -DB     Barriers to Rehab none identified  -DB           User Key  (r) = Recorded By, (t) = Taken By, (c) = Cosigned By    Initials Name Provider Type    DB Audra Gilliam PT Physical Therapist               Mobility     Row Name  11/18/21 1617          Bed Mobility    Bed Mobility supine-sit; sit-supine; scooting/bridging  -DB     Scooting/Bridging [place] (Bed Mobility) standby assist  -DB     Supine-Sit [place] (Bed Mobility) contact guard; verbal cues  -DB     Sit-Supine [place] (Bed Mobility) minimum assist (75% patient effort); verbal cues  -DB     Assistive Device (Bed Mobility) bed rails; head of bed elevated  -DB     Row Name 11/18/21 1617          Sit-Stand Transfer    Sit-Stand [place] (Transfers) contact guard; verbal cues  -DB     Assistive Device (Sit-Stand Transfers) walker, front-wheeled  -DB     Row Name 11/18/21 1617          Gait/Stairs (Locomotion)    [place] Level (Gait) contact guard; verbal cues  -DB     Assistive Device (Gait) walker, front-wheeled  -DB     Distance in Feet (Gait) 20'  -DB     Deviations/Abnormal Patterns (Gait) weight shifting decreased; stride length decreased; corinne decreased; festinating/shuffling; gait speed decreased  -DB     Bilateral Gait Deviations forward flexed posture; heel strike decreased  -DB     Row Name 11/18/21 1617          Mobility    Extremity Weight-bearing Status right lower extremity  -DB     Right Lower Extremity (Weight-bearing Status) partial weight-bearing (PWB)  50%  -DB           User Key  (r) = Recorded By, (t) = Taken By, (c) = Cosigned By    Initials Name Provider Type    Audra Cardozo PT Physical Therapist               Obj/Interventions     Row Name 11/18/21 1618          Motor Skills    Therapeutic Exercise other (see comments)  seated LAQ,  AP  -DB     Row Name 11/18/21 1618          Balance    Balance Assessment sitting static balance; sitting dynamic balance; standing static balance; standing dynamic balance  -DB     Static Sitting Balance WFL; sitting, edge of bed  -DB     Dynamic Sitting Balance WFL; sitting, edge of bed  -DB     Static Standing Balance WFL; supported  -DB     Dynamic Standing Balance mild impairment; supported   -DB     Balance Interventions sitting; standing; sit to stand  -DB           User Key  (r) = Recorded By, (t) = Taken By, (c) = Cosigned By    Initials Name Provider Type    Audra Cardozo PT Physical Therapist               Goals/Plan    No documentation.                Clinical Impression     Row Name 11/18/21 1619          Plan of Care Review    Plan of Care Reviewed With patient  -DB     Progress improving  -DB     Outcome Summary Pt reports she has been up to the bathroom a lot today, but agreeable to work with PT. CGA/Johnnie for bed mob. CGA for STS to RW. Able to ambulate 20' in room with RW. Pt aware of 50% WB'ing restriction. Performed exercises seated EOB. Continues to benefit from skilled PT.  -DB     Row Name 11/18/21 1619          Vital Signs    O2 Delivery Pre Treatment room air  -DB     O2 Delivery Intra Treatment room air  -DB     O2 Delivery Post Treatment room air  -DB     Pre Patient Position Supine  -DB     Intra Patient Position Standing  -DB     Post Patient Position Supine  -DB     Row Name 11/18/21 1619          Positioning and Restraints    Pre-Treatment Position in bed  -DB     Post Treatment Position bed  -DB     In Bed supine; call light within reach; encouraged to call for assist; with nsg; with family/caregiver  -DB           User Key  (r) = Recorded By, (t) = Taken By, (c) = Cosigned By    Initials Name Provider Type    Audra Cardozo, WESLY Physical Therapist               Outcome Measures     Row Name 11/18/21 1627          How much help from another person do you currently need...    Turning from your back to your side while in flat bed without using bedrails? 3  -DB     Moving from lying on back to sitting on the side of a flat bed without bedrails? 3  -DB     Moving to and from a bed to a chair (including a wheelchair)? 3  -DB     Standing up from a chair using your arms (e.g., wheelchair, bedside chair)? 3  -DB     Climbing 3-5 steps with a railing? 2  -DB     To walk in  hospital room? 3  -DB     AM-PAC 6 Clicks Score (PT) 17  -DB     Row Name 11/18/21 1624          Functional Assessment    Outcome Measure Options AM-PAC 6 Clicks Basic Mobility (PT)  -DB           User Key  (r) = Recorded By, (t) = Taken By, (c) = Cosigned By    Initials Name Provider Type    DB Audra Gilliam PT Physical Therapist                             Physical Therapy Education                 Title: PT OT SLP Therapies (Done)     Topic: Physical Therapy (Done)     Point: Mobility training (Done)     Learning Progress Summary           Patient Acceptance, E, VU by DB at 11/18/2021 1624    Acceptance, E, VU by DB at 11/17/2021 0944                   Point: Home exercise program (Done)     Learning Progress Summary           Patient Acceptance, E, VU by DB at 11/18/2021 1624    Acceptance, E, VU by DB at 11/17/2021 0944                   Point: Body mechanics (Done)     Learning Progress Summary           Patient Acceptance, E, VU by DB at 11/18/2021 1624    Acceptance, E, VU by DB at 11/17/2021 0944                   Point: Precautions (Done)     Learning Progress Summary           Patient Acceptance, E, VU by DB at 11/18/2021 1624    Acceptance, E, VU by DB at 11/17/2021 0944                               User Key     Initials Effective Dates Name Provider Type Discipline    DB 06/16/21 -  Audra Gilliam PT Physical Therapist PT              PT Recommendation and Plan  Planned Therapy Interventions (PT): balance training, bed mobility training, gait training, home exercise program, postural re-education, patient/family education, neuromuscular re-education, stair training, strengthening, transfer training  Plan of Care Reviewed With: patient  Progress: improving  Outcome Summary: Pt reports she has been up to the bathroom a lot today, but agreeable to work with PT. CGA/Johnnie for bed mob. CGA for STS to RW. Able to ambulate 20' in room with RW. Pt aware of 50% WB'ing restriction. Performed exercises  seated EOB. Continues to benefit from skilled PT.     Time Calculation:    PT Charges     Row Name 11/18/21 1625             Time Calculation    Start Time 1555  -DB      Stop Time 1610  -DB      Time Calculation (min) 15 min  -DB      PT Received On 11/18/21  -DB      PT - Next Appointment 11/19/21  -DB              Time Calculation- PT    Total Timed Code Minutes- PT 15 minute(s)  -DB            User Key  (r) = Recorded By, (t) = Taken By, (c) = Cosigned By    Initials Name Provider Type    DB Audra Gilliam, PT Physical Therapist              Therapy Charges for Today     Code Description Service Date Service Provider Modifiers Qty    40925998121 HC PT EVAL MOD COMPLEXITY 2 11/17/2021 Audra Gilliam, PT GP 1    16668110653 HC PT THER PROC EA 15 MIN 11/17/2021 Audra Gilliam, PT GP 1    65650257137 HC PT THER PROC EA 15 MIN 11/18/2021 Audra Gilliam, PT GP 1          PT G-Codes  Outcome Measure Options: AM-PAC 6 Clicks Basic Mobility (PT)  AM-PAC 6 Clicks Score (PT): 17    Audra Gilliam PT  11/18/2021

## 2021-11-18 NOTE — PROGRESS NOTES
"DAILY PROGRESS NOTE  [facility]    Patient Identification:  Name: Stephy Duncan  Age: 66 y.o.  Sex: female  :  1955  MRN: [id number]         Primary Care Physician: Gregorio Rosales      Subjective  No new complaints.  The surgical wound continues to drain profusely with serosanguineous drainage.  She was transfused 1 unit of packed RBCs yesterday and her hemoglobin has not budged 1 [place].  No lightheadedness.    Objective:  General Appearance:  Comfortable, well-appearing, in no acute distress and not in pain (Obese).    Vital signs: (most recent): Blood pressure 104/53, pulse 93, temperature 97.3 °F (36.3 °C), temperature source Temporal, resp. rate 18, height 162.6 cm (64\"), weight 103 kg (226 lb 3.1 oz), SpO2 94 %.    Lungs:  Normal effort and normal respiratory rate.  Breath sounds clear to auscultation.    Heart: Normal rate.  Regular rhythm.    Extremities: There is no dependent edema.    Neurological: Patient is alert and oriented to person, place and time.    Skin:  Warm and dry.                Vital signs in last 24 hours:  Temp:  [96.9 °F (36.1 °C)-99.2 °F (37.3 °C)] 97.3 °F (36.3 °C)  Heart Rate:  [] 93  Resp:  [14-18] 18  BP: ()/(47-56) 104/53    Intake/Output:    Intake/Output Summary (Last 24 hours) at 2021 0921  Last data filed at 2021 0135  Gross per 24 hour   Intake 1424.6 ml   Output --   Net 1424.6 ml         Results from last 7 days   Lab Units 21  0523 21  1503 21  0615 21  1613   WBC 10*3/mm3 9.17  --   --   --    HEMOGLOBIN g/dL 7.8* 7.8* 8.1* 9.7*   PLATELETS 10*3/mm3 84*  --   --   --      Results from last 7 days   Lab Units 21  0523 21  0614   SODIUM mmol/L 131* 135*   POTASSIUM mmol/L 4.0 4.5   CHLORIDE mmol/L 101 105   CO2 mmol/L 17.5* 21.0*   BUN mg/dL 29* 22   CREATININE mg/dL 1.89* 1.32*   GLUCOSE mg/dL 213* 182*   Estimated Creatinine Clearance: 33.3 mL/min (A) (by C-G formula based on SCr of 1.89 " mg/dL (H)).  Results from last 7 days   Lab Units 11/18/21  0523 11/17/21  0614   CALCIUM mg/dL 7.7* 8.0*         Assessment:    Status post total replacement of hip    Diabetes mellitus (HCC)    Hypertension    Anemia    Anemia-multifactorial-acute blood loss anemia/iron deficiency anemia (severe)/anemia of chronic disease: We will retransfuse today.  Monitor hemoglobin closely.  Recheck coags.  Reticulocyte count noted and appears to be inadequate for her degree of anemia.  We will also initiate intravenous iron sucrose infusions.  Discontinue aspirin.  Thrombocytopenia: Appears to be acute on chronic.  She has a baseline mild thrombocytopenia.  Worsening over the last couple of days.  Monitor closely.  Check immature platelet fraction.  If any further worsening would consider hematology consultation.  Discontinue aspirin.  THALIA: Likely associated hypoperfusion secondary to postop hypotension and decreased intravascular volume.  And IV fluids.  Monitor closely.  Keep Cozaar on hold.  Discontinue Mobic.  (Celebrex had previously been discontinued.)  Avoid NSAIDs and Black 2 inhibitors.  Hyponatremia: Presently mild but monitor closely.  Hopefully will improve with normal saline.  Diabetes type 2: Associated with significant insulin resistance.  Increase Lantus and monitor.      Plan:  Please see above.  She will need close monitoring over the next several days.  We appreciate been involved this pleasant lady's care will be happy to follow her along with you.    Arturo Oshea MD  11/18/2021  09:21 EST

## 2021-11-18 NOTE — PROGRESS NOTES
"Having some loose stools, Imodium ordered.  Also still having a fair amount of drainage.  I am going to cover her with doxycycline.  In addition she will be given 1 more gram of IV Tranxene make acid.  I do not want her to go to rehab today due to the drainage, I will reassess tomorrow.    R \"Kilo\" Laury BAXTER MD  Orthopaedic Surgery  [facility]  [phone] - [place] Office  [phone] - [place] Office    "

## 2021-11-18 NOTE — PLAN OF CARE
Goal Outcome Evaluation:  Plan of Care Reviewed With: patient        Progress: improving  Outcome Summary: Pt reports she has been up to the bathroom a lot today, but agreeable to work with PT. CGA/Johnnie for bed mob. CGA for STS to RW. Able to ambulate 20' in room with RW. Pt aware of 50% WB'ing restriction. Performed exercises seated EOB. Continues to benefit from skilled PT.

## 2021-11-18 NOTE — PLAN OF CARE
Goal Outcome Evaluation:  Plan of Care Reviewed With: patient        Progress: improving  Outcome Summary: Patient able to ambulate with walker and x1 assist while maintaining PWB status. 1 unit of blood transfused without issue, VSS and voiding function is intact. Patient with x1 instance of loose stool. Pain managed with po meds and patient able to rest comfortably throughout the night. Dressing changed x1 with pressure dressing and ice applied. Plan for d/c to snf when stable and bed available, CCP following.

## 2021-11-19 LAB
ALBUMIN SERPL-MCNC: 3.1 G/DL (ref 3.5–5.2)
ALBUMIN/GLOB SERPL: 1.3 G/DL
ALP SERPL-CCNC: 62 U/L (ref 39–117)
ALT SERPL W P-5'-P-CCNC: 7 U/L (ref 1–33)
ANION GAP SERPL CALCULATED.3IONS-SCNC: 10.4 MMOL/L (ref 5–15)
AST SERPL-CCNC: 18 U/L (ref 1–32)
BH BB BLOOD EXPIRATION DATE: NORMAL
BH BB BLOOD EXPIRATION DATE: NORMAL
BH BB BLOOD TYPE BARCODE: 5100
BH BB BLOOD TYPE BARCODE: 5100
BH BB DISPENSE STATUS: NORMAL
BH BB DISPENSE STATUS: NORMAL
BH BB PRODUCT CODE: NORMAL
BH BB PRODUCT CODE: NORMAL
BH BB UNIT NUMBER: NORMAL
BH BB UNIT NUMBER: NORMAL
BILIRUB SERPL-MCNC: 0.7 MG/DL (ref 0–1.2)
BUN SERPL-MCNC: 35 MG/DL (ref 8–23)
BUN/CREAT SERPL: 22.7 (ref 7–25)
CALCIUM SPEC-SCNC: 8.1 MG/DL (ref 8.6–10.5)
CHLORIDE SERPL-SCNC: 104 MMOL/L (ref 98–107)
CO2 SERPL-SCNC: 17.6 MMOL/L (ref 22–29)
CREAT SERPL-MCNC: 1.54 MG/DL (ref 0.57–1)
CROSSMATCH INTERPRETATION: NORMAL
CROSSMATCH INTERPRETATION: NORMAL
DEPRECATED RDW RBC AUTO: 44.3 FL (ref 37–54)
EOSINOPHIL # BLD MANUAL: 0.36 10*3/MM3 (ref 0–0.4)
EOSINOPHIL NFR BLD MANUAL: 4 % (ref 0.3–6.2)
ERYTHROCYTE [DISTWIDTH] IN BLOOD BY AUTOMATED COUNT: 14 % (ref 12.3–15.4)
GFR SERPL CREATININE-BSD FRML MDRD: 33 ML/MIN/1.73
GLOBULIN UR ELPH-MCNC: 2.4 GM/DL
GLUCOSE BLDC GLUCOMTR-MCNC: 168 MG/DL (ref 70–130)
GLUCOSE BLDC GLUCOMTR-MCNC: 175 MG/DL (ref 70–130)
GLUCOSE BLDC GLUCOMTR-MCNC: 254 MG/DL (ref 70–130)
GLUCOSE SERPL-MCNC: 185 MG/DL (ref 65–99)
HCT VFR BLD AUTO: 28.5 % (ref 34–46.6)
HGB BLD-MCNC: 9.1 G/DL (ref 12–15.9)
LYMPHOCYTES # BLD MANUAL: 0.64 10*3/MM3 (ref 0.7–3.1)
LYMPHOCYTES NFR BLD MANUAL: 7.1 % (ref 5–12)
MCH RBC QN AUTO: 27.7 PG (ref 26.6–33)
MCHC RBC AUTO-ENTMCNC: 31.9 G/DL (ref 31.5–35.7)
MCV RBC AUTO: 86.9 FL (ref 79–97)
MONOCYTES # BLD: 0.64 10*3/MM3 (ref 0.1–0.9)
NEUTROPHILS # BLD AUTO: 7.36 10*3/MM3 (ref 1.7–7)
NEUTROPHILS NFR BLD MANUAL: 81.8 % (ref 42.7–76)
NRBC BLD AUTO-RTO: 0 /100 WBC (ref 0–0.2)
PLAT MORPH BLD: NORMAL
PLATELET # BLD AUTO: 117 10*3/MM3 (ref 140–450)
PMV BLD AUTO: 10.2 FL (ref 6–12)
POLYCHROMASIA BLD QL SMEAR: ABNORMAL
POTASSIUM SERPL-SCNC: 3.9 MMOL/L (ref 3.5–5.2)
PROT SERPL-MCNC: 5.5 G/DL (ref 6–8.5)
RBC # BLD AUTO: 3.28 10*6/MM3 (ref 3.77–5.28)
SODIUM SERPL-SCNC: 132 MMOL/L (ref 136–145)
UNIT  ABO: NORMAL
UNIT  ABO: NORMAL
UNIT  RH: NORMAL
UNIT  RH: NORMAL
VARIANT LYMPHS NFR BLD MANUAL: 7.1 % (ref 19.6–45.3)
WBC MORPH BLD: NORMAL
WBC NRBC COR # BLD: 9 10*3/MM3 (ref 3.4–10.8)

## 2021-11-19 PROCEDURE — 85025 COMPLETE CBC W/AUTO DIFF WBC: CPT | Performed by: HOSPITALIST

## 2021-11-19 PROCEDURE — 97530 THERAPEUTIC ACTIVITIES: CPT

## 2021-11-19 PROCEDURE — 63710000001 INSULIN GLARGINE PER 5 UNITS: Performed by: HOSPITALIST

## 2021-11-19 PROCEDURE — 63710000001 INSULIN LISPRO (HUMAN) PER 5 UNITS: Performed by: HOSPITALIST

## 2021-11-19 PROCEDURE — 82962 GLUCOSE BLOOD TEST: CPT

## 2021-11-19 PROCEDURE — 85007 BL SMEAR W/DIFF WBC COUNT: CPT | Performed by: HOSPITALIST

## 2021-11-19 PROCEDURE — 63710000001 INSULIN LISPRO (HUMAN) PER 5 UNITS: Performed by: INTERNAL MEDICINE

## 2021-11-19 PROCEDURE — 80053 COMPREHEN METABOLIC PANEL: CPT | Performed by: HOSPITALIST

## 2021-11-19 PROCEDURE — 25010000002 HYDROMORPHONE 1 MG/ML SOLUTION: Performed by: ORTHOPAEDIC SURGERY

## 2021-11-19 PROCEDURE — 25010000002 IRON SUCROSE PER 1 MG: Performed by: HOSPITALIST

## 2021-11-19 RX ORDER — PHYTONADIONE 5 MG/1
5 TABLET ORAL ONCE
Status: COMPLETED | OUTPATIENT
Start: 2021-11-19 | End: 2021-11-19

## 2021-11-19 RX ORDER — INSULIN GLARGINE 100 [IU]/ML
60 INJECTION, SOLUTION SUBCUTANEOUS NIGHTLY
Status: DISCONTINUED | OUTPATIENT
Start: 2021-11-19 | End: 2021-11-24 | Stop reason: HOSPADM

## 2021-11-19 RX ADMIN — PHYTONADIONE 5 MG: 5 TABLET ORAL at 13:53

## 2021-11-19 RX ADMIN — OXYCODONE AND ACETAMINOPHEN 1 TABLET: 5; 325 TABLET ORAL at 02:56

## 2021-11-19 RX ADMIN — DOXYCYCLINE 100 MG: 100 CAPSULE ORAL at 20:45

## 2021-11-19 RX ADMIN — INSULIN LISPRO 10 UNITS: 100 INJECTION, SOLUTION INTRAVENOUS; SUBCUTANEOUS at 17:22

## 2021-11-19 RX ADMIN — INSULIN LISPRO 10 UNITS: 100 INJECTION, SOLUTION INTRAVENOUS; SUBCUTANEOUS at 11:14

## 2021-11-19 RX ADMIN — INSULIN LISPRO 2 UNITS: 100 INJECTION, SOLUTION INTRAVENOUS; SUBCUTANEOUS at 17:22

## 2021-11-19 RX ADMIN — GABAPENTIN 300 MG: 300 CAPSULE ORAL at 08:45

## 2021-11-19 RX ADMIN — INSULIN LISPRO 6 UNITS: 100 INJECTION, SOLUTION INTRAVENOUS; SUBCUTANEOUS at 11:14

## 2021-11-19 RX ADMIN — FAMOTIDINE 20 MG: 20 TABLET, FILM COATED ORAL at 08:42

## 2021-11-19 RX ADMIN — MUPIROCIN 1 APPLICATION: 20 OINTMENT TOPICAL at 20:46

## 2021-11-19 RX ADMIN — DOXYCYCLINE 100 MG: 100 CAPSULE ORAL at 08:42

## 2021-11-19 RX ADMIN — IRON SUCROSE 300 MG: 20 INJECTION, SOLUTION INTRAVENOUS at 12:19

## 2021-11-19 RX ADMIN — GABAPENTIN 300 MG: 300 CAPSULE ORAL at 20:45

## 2021-11-19 RX ADMIN — METOPROLOL TARTRATE 37.5 MG: 25 TABLET, FILM COATED ORAL at 08:42

## 2021-11-19 RX ADMIN — MUPIROCIN 1 APPLICATION: 20 OINTMENT TOPICAL at 08:43

## 2021-11-19 RX ADMIN — INSULIN GLARGINE 60 UNITS: 100 INJECTION, SOLUTION SUBCUTANEOUS at 21:49

## 2021-11-19 RX ADMIN — GABAPENTIN 300 MG: 300 CAPSULE ORAL at 17:22

## 2021-11-19 RX ADMIN — HYDROMORPHONE HYDROCHLORIDE 1 MG: 1 INJECTION, SOLUTION INTRAMUSCULAR; INTRAVENOUS; SUBCUTANEOUS at 11:23

## 2021-11-19 RX ADMIN — ERYTHROMYCIN: 5 OINTMENT OPHTHALMIC at 11:15

## 2021-11-19 RX ADMIN — OXYCODONE AND ACETAMINOPHEN 2 TABLET: 5; 325 TABLET ORAL at 17:25

## 2021-11-19 NOTE — PLAN OF CARE
Goal Outcome Evaluation:  Plan of Care Reviewed With: patient        Progress: improving  Outcome Summary: VSS, NVI, DRESSING CHANGED TWICE THIS SHIFT, AMBULATING ASSIST OF 1 WITH WALKER, 1 U PRBC ADMIN THIS SHIFT, 1 U IV VENOFER ADMIN THIS SHIFT, LISID AMIN THIS SHIFT, DIARRHEA-IMMODIUM ADMINISTERED, PLAN TO DC TO SNF OVER WEEKEND HOPEFULLY, EDUCATED ON GLUCOSE MEDS AND MONITORING

## 2021-11-19 NOTE — PLAN OF CARE
Goal Outcome Evaluation:  Plan of Care Reviewed With: patient        Progress: improving  Outcome Summary: Pt agreeable to work with PT today. SBA for bed mob, CGA for STS and ambulation with RW. Able to ambulate 40' + 40' with standing rest break. Upon returning to room, incision started bleeding, RN was notified and came in to change dressing. Pt continues to benefit from skilled PT.

## 2021-11-19 NOTE — THERAPY TREATMENT NOTE
Patient Name: Stephy Duncan  : 1955    MRN: [id number]                              Today's Date: 2021       Admit Date: 2021    Visit Dx:     ICD-10-CM ICD-9-CM   1. Hip injury  S79.919A 959.6     Patient Active Problem List   Diagnosis   • Status post total replacement of hip   • Diabetes mellitus (HCC)   • Hypertension   • Anemia     Past Medical History:   Diagnosis Date   • Arthritis    • Cardiac murmur    • Diabetes mellitus (HCC)    • GERD (gastroesophageal reflux disease)    • Heart attack (HCC) 2020   • History of cervical cancer    • Hyperlipidemia    • Hypertension    • Osteomyelitis hip (HCC)     RIGHT   • Right hip pain      Past Surgical History:   Procedure Laterality Date   • CARDIAC CATHETERIZATION     • CERVICAL CONIZATION     • COLONOSCOPY     • CORONARY ANGIOPLASTY WITH STENT PLACEMENT     • ENDOSCOPY     • HIP ARTHROPLASTY Right    • HIP HARDWARE REMOVAL     • HIP SPACER INSERTION WITH ANTIBIOTIC CEMENT      X3   • HYSTERECTOMY     • KNEE ARTHROPLASTY Left    • LAPAROSCOPIC CHOLECYSTECTOMY     • TONSILLECTOMY     • TOTAL HIP ARTHROPLASTY REVISION Right 2021    Procedure: TOTAL HIP REVISION ARTHROPLASTY RIGHT POSTERIOR;  Surgeon: Karri Schaeffer II, MD;  Location: [facility];  Service: Orthopedics;  Laterality: Right;      General Information     Row Name 21 1255          Physical Therapy Time and Intention    Document Type therapy note (daily note)  -DB     Mode of Treatment individual therapy; physical therapy  -DB     Row Name 21 1255          General Information    Patient Profile Reviewed yes  -DB           User Key  (r) = Recorded By, (t) = Taken By, (c) = Cosigned By    Initials Name Provider Type    DB Audra Gilliam PT Physical Therapist               Mobility     Row Name 21 1255          Bed Mobility    Bed Mobility supine-sit  -DB     Supine-Sit [place] (Bed Mobility) standby assist; verbal cues  -DB      Assistive Device (Bed Mobility) bed rails; head of bed elevated  -DB     Row Name 11/19/21 1255          Sit-Stand Transfer    Sit-Stand [place] (Transfers) contact guard; verbal cues  -DB     Assistive Device (Sit-Stand Transfers) walker, front-wheeled  -DB     Row Name 11/19/21 1255          Gait/Stairs (Locomotion)    [place] Level (Gait) contact guard; verbal cues  -DB     Assistive Device (Gait) walker, front-wheeled  -DB     Distance in Feet (Gait) 40' + 40' with standing rest break  -DB     Deviations/Abnormal Patterns (Gait) weight shifting decreased; stride length decreased; corinne decreased; festinating/shuffling; gait speed decreased; antalgic  -DB     Bilateral Gait Deviations forward flexed posture; heel strike decreased  -DB           User Key  (r) = Recorded By, (t) = Taken By, (c) = Cosigned By    Initials Name Provider Type    Audra Cardozo, WESLY Physical Therapist               Obj/Interventions     Row Name 11/19/21 1256          Motor Skills    Therapeutic Exercise --  seated MIP, LAQ, AP  -DB     Row Name 11/19/21 1256          Balance    Balance Assessment sitting static balance; sitting dynamic balance; standing static balance; standing dynamic balance  -DB     Static Sitting Balance WFL; sitting, edge of bed  -DB     Dynamic Sitting Balance WFL; sitting, edge of bed  -DB     Static Standing Balance WFL; supported  -DB     Dynamic Standing Balance mild impairment; supported  -DB     Balance Interventions sitting; standing; sit to stand  -DB           User Key  (r) = Recorded By, (t) = Taken By, (c) = Cosigned By    Initials Name Provider Type    Audra Cardozo, WESLY Physical Therapist               Goals/Plan    No documentation.                Clinical Impression     Row Name 11/19/21 1256          Plan of Care Review    Plan of Care Reviewed With patient  -DB     Progress improving  -DB     Outcome Summary Pt agreeable to work with PT today. SBA for bed mob, CGA for STS and  ambulation with RW. Able to ambulate 40' + 40' with standing rest break. Upon returning to room, incision started bleeding, RN was notified and came in to change dressing. Pt continues to benefit from skilled PT.  -DB     Row Name 11/19/21 1256          Vital Signs    O2 Delivery Pre Treatment room air  -DB     O2 Delivery Intra Treatment room air  -DB     O2 Delivery Post Treatment room air  -DB     Pre Patient Position Supine  -DB     Intra Patient Position Standing  -DB     Post Patient Position Sitting  -DB     Row Name 11/19/21 1256          Positioning and Restraints    Pre-Treatment Position in bed  -DB     Post Treatment Position bed  -DB     In Bed sitting EOB; with nsg  -DB           User Key  (r) = Recorded By, (t) = Taken By, (c) = Cosigned By    Initials Name Provider Type    Audra Cardozo PT Physical Therapist               Outcome Measures     Row Name 11/19/21 1258          How much help from another person do you currently need...    Turning from your back to your side while in flat bed without using bedrails? 3  -DB     Moving from lying on back to sitting on the side of a flat bed without bedrails? 3  -DB     Moving to and from a bed to a chair (including a wheelchair)? 3  -DB     Standing up from a chair using your arms (e.g., wheelchair, bedside chair)? 4  -DB     Climbing 3-5 steps with a railing? 2  -DB     To walk in hospital room? 3  -DB     AM-PAC 6 Clicks Score (PT) 18  -DB     Row Name 11/19/21 1258          Functional Assessment    Outcome Measure Options AM-PAC 6 Clicks Basic Mobility (PT)  -DB           User Key  (r) = Recorded By, (t) = Taken By, (c) = Cosigned By    Initials Name Provider Type    Audra Cardozo PT Physical Therapist                             Physical Therapy Education                 Title: PT OT SLP Therapies (Done)     Topic: Physical Therapy (Done)     Point: Mobility training (Done)     Learning Progress Summary           Patient Acceptance, E VU  by DB at 11/19/2021 1258    Acceptance, E, VU by DB at 11/18/2021 1624    Acceptance, E, VU by DB at 11/17/2021 0944                   Point: Home exercise program (Done)     Learning Progress Summary           Patient Acceptance, E, VU by DB at 11/19/2021 1258    Acceptance, E, VU by DB at 11/18/2021 1624    Acceptance, E, VU by DB at 11/17/2021 0944                   Point: Body mechanics (Done)     Learning Progress Summary           Patient Acceptance, E, VU by DB at 11/19/2021 1258    Acceptance, E, VU by DB at 11/18/2021 1624    Acceptance, E, VU by DB at 11/17/2021 0944                   Point: Precautions (Done)     Learning Progress Summary           Patient Acceptance, E, VU by DB at 11/19/2021 1258    Acceptance, E, VU by DB at 11/18/2021 1624    Acceptance, E, VU by DB at 11/17/2021 0944                               User Key     Initials Effective Dates Name Provider Type Discipline    DB 06/16/21 -  Audra Gilliam, PT Physical Therapist PT              PT Recommendation and Plan  Planned Therapy Interventions (PT): balance training, bed mobility training, gait training, home exercise program, postural re-education, patient/family education, neuromuscular re-education, stair training, strengthening, transfer training  Plan of Care Reviewed With: patient  Progress: improving  Outcome Summary: Pt agreeable to work with PT today. SBA for bed mob, CGA for STS and ambulation with RW. Able to ambulate 40' + 40' with standing rest break. Upon returning to room, incision started bleeding, RN was notified and came in to change dressing. Pt continues to benefit from skilled PT.     Time Calculation:    PT Charges     Row Name 11/19/21 6229             Time Calculation    Start Time 1027  -DB      Stop Time 1040  -DB      Time Calculation (min) 13 min  -DB      PT Received On 11/19/21  -DB      PT - Next Appointment 11/20/21  -DB              Time Calculation- PT    Total Timed Code Minutes- PT 13 minute(s)  -DB             User Key  (r) = Recorded By, (t) = Taken By, (c) = Cosigned By    Initials Name Provider Type    DB Audra Gilliam, PT Physical Therapist              Therapy Charges for Today     Code Description Service Date Service Provider Modifiers Qty    66640577690  PT THER PROC EA 15 MIN 11/18/2021 Audra Gilliam, PT GP 1    [id number]  PT THERAPEUTIC ACT EA 15 MIN 11/19/2021 Audra Gilliam, PT GP 1          PT G-Codes  Outcome Measure Options: AM-PAC 6 Clicks Basic Mobility (PT)  AM-PAC 6 Clicks Score (PT): 18    Audra Gilliam, PT  11/19/2021

## 2021-11-19 NOTE — NURSING NOTE
CWOCN- consult to place VAC over right hip/thigh incision. Patient had just worked with therapy and there was a large amount of drainage. Patient reports that it depends on her movement and activity.   Wound VAC placed to the incision line. Patient tolerated well. Will plan to change Monday. Continue to monitor drainage.      11/19/21 1101   Wound 11/16/21 1328 Right anterior greater trochanter Incision   Placement Date/Time: 11/16/21 1328   Present on Hospital Admission: No  Side: Right  Orientation: anterior  Location: greater trochanter  Primary Wound Type: Incision   Dressing Appearance intact   Closure Staples; Approximated   Periwound intact   Periwound Temperature warm   Periwound Skin Turgor soft   Wound Length (cm) 34 cm   Drainage Characteristics/Odor serosanguineous   Drainage Amount scant  (dressings recently changed by RN)   Care, Wound cleansed with; sterile normal saline; negative pressure wound therapy   Periwound Care barrier film applied   NPWT (Negative Pressure Wound Therapy) 11/19/21 1101 right hip   Placement Date/Time: 11/19/21 1101   Location: right hip  Additional Comments: incision vac   Therapy Setting continuous therapy   Dressing foam, black; transparent dressing   Pressure Setting 125 mmHg   Sponges Inserted 1  (over incision)

## 2021-11-19 NOTE — CASE MANAGEMENT/SOCIAL WORK
Continued Stay Note  [facility]     Patient Name: Stephy Duncan  MRN: [id number]  Today's Date: 11/19/2021    Admit Date: 11/16/2021     Discharge Plan     Row Name 11/19/21 1341       Plan    Plan [facility] SNF -- Accepted.    Patient/Family in Agreement with Plan yes    Plan Comments Spoke with Tha/Jackie who has accepted the patient and will have a SNF bed for the patient today, Saturday & Sunday. Updated Tha that the patient has a WoundVac that was placed today and he's agreeable. No other needs identified at this time. CCP following.               Discharge Codes    No documentation.                     Lucy Meeks RN

## 2021-11-19 NOTE — PROGRESS NOTES
"DAILY PROGRESS NOTE  [facility]    Patient Identification:  Name: Stephy Duncan  Age: 66 y.o.  Sex: female  :  1955  MRN: [id number]         Primary Care Physician: Gregorio Rosales      Subjective  Patient with no new complaints.  Still with significant amount of drainage from the incision site.    Objective:  General Appearance:  Comfortable, well-appearing, in no acute distress and not in pain (Obese).    Vital signs: (most recent): Blood pressure 119/67, pulse 76, temperature 97.1 °F (36.2 °C), temperature source Temporal, resp. rate 16, height 162.6 cm (64\"), weight 103 kg (226 lb 3.1 oz), SpO2 98 %.    Lungs:  Normal effort and normal respiratory rate.  Breath sounds clear to auscultation.    Heart: Normal rate.  Regular rhythm.    Extremities: There is no dependent edema.    Neurological: Patient is alert and oriented to person, place and time.    Skin:  Warm and dry.                Vital signs in last 24 hours:  Temp:  [97.1 °F (36.2 °C)-100.8 °F (38.2 °C)] 97.1 °F (36.2 °C)  Heart Rate:  [68-82] 76  Resp:  [14-18] 16  BP: ()/(41-67) 119/67    Intake/Output:    Intake/Output Summary (Last 24 hours) at 2021 1048  Last data filed at 2021 0900  Gross per 24 hour   Intake 1405 ml   Output --   Net 1405 ml         Results from last 7 days   Lab Units 21  0618 21  1049 21  0523 21  1503 21  0615 21  1613   WBC 10*3/mm3 9.00  --  9.17  --   --   --    HEMOGLOBIN g/dL 9.1*  --  7.8* 7.8* 8.1* 9.7*   PLATELETS 10*3/mm3 117* 123* 84*  --   --   --      Results from last 7 days   Lab Units 21  0618 21  0523 21  0614   SODIUM mmol/L 132* 131* 135*   POTASSIUM mmol/L 3.9 4.0 4.5   CHLORIDE mmol/L 104 101 105   CO2 mmol/L 17.6* 17.5* 21.0*   BUN mg/dL 35* 29* 22   CREATININE mg/dL 1.54* 1.89* 1.32*   GLUCOSE mg/dL 185* 213* 182*   Estimated Creatinine Clearance: 40.8 mL/min (A) (by C-G formula based on SCr of 1.54 mg/dL " "(H)).  Results from last 7 days   Lab Units 11/19/21  0618 11/18/21  0523 11/17/21  0614   CALCIUM mg/dL 8.1* 7.7* 8.0*   ALBUMIN g/dL 3.10*  --   --      Results from last 7 days   Lab Units 11/19/21  0618   ALBUMIN g/dL 3.10*   BILIRUBIN mg/dL 0.7   ALK PHOS U/L 62   AST (SGOT) U/L 18   ALT (SGPT) U/L 7       Assessment:    Status post total replacement of hip    Diabetes mellitus (HCC)    Hypertension    Anemia    Anemia-multifactorial-acute blood loss anemia/iron deficiency anemia (severe)/anemia of chronic disease: Hemoglobin improved after transfusion this morning.  Repeat intravenous iron sucrose.  Continue to monitor closely.  Thrombocytopenia:  Chronic mild thrombocytopenia with acute exacerbation postop.  Now improving again.  IPF was \"within normal limits\" but not quite adequate for a degree of thrombocytopenia.  Suspect a combination of consumption and slow bone marrow response.  Thankfully improving now but continue to monitor.  Mild increase in INR and PTT: Give a dose of oral vitamin K today and continue to monitor.  THALIA: Likely associated hypoperfusion secondary to postop hypotension and decreased intravascular volume.    Improving..  Keep Cozaar on hold.  Avoid NSAIDs and Black 2 inhibitors.  Continue IV fluids and monitor.  Hyponatremia: Presently mild but monitor closely.    Stable.   Diabetes type 2: Associated with significant insulin resistance.  Improved today. Increase Lantus and monitor.    Plan:  Please see above.  Thankfully overall improving.  If there is a relapse I will proceed with checking FSP, fibrinogen etc. and would consider hematology consultation if okay with orthopedics.    Arturo Oshea MD  11/19/2021  10:48 EST    "

## 2021-11-19 NOTE — PLAN OF CARE
Goal Outcome Evaluation:  Plan of Care Reviewed With: patient           Outcome Summary: Pt up OOB with use of walker to bathroom this shift and voiding without diffculty. Incision to (R) continues with copious drainage. MD in to see pt, and ordered wound care to consult for placement of incision wound vac to site.  Dressing changed and reinforced to site this shift. PRN immodium given, Labs in the am. WCTM

## 2021-11-19 NOTE — PROGRESS NOTES
"Unit of blood given yesterday, hemoglobin not yet back today.  Still with significant serosanguineous drainage from the hip.  I am going to have an incisional wound VAC placed today to decompress the developing seroma in the hip and hopefully help the incision to heal.  Plan will be for rehab eventually, but not yet ready    R \"Kilo\" Laury BAXTER MD  Orthopaedic Surgery  [facility]  [phone] - [place] Office  [phone] - [place] Office    "

## 2021-11-20 LAB
ALBUMIN SERPL-MCNC: 2.9 G/DL (ref 3.5–5.2)
ALBUMIN/GLOB SERPL: 1.2 G/DL
ALP SERPL-CCNC: 63 U/L (ref 39–117)
ALT SERPL W P-5'-P-CCNC: 7 U/L (ref 1–33)
ANION GAP SERPL CALCULATED.3IONS-SCNC: 9.2 MMOL/L (ref 5–15)
AST SERPL-CCNC: 12 U/L (ref 1–32)
BASOPHILS # BLD AUTO: 0.02 10*3/MM3 (ref 0–0.2)
BASOPHILS NFR BLD AUTO: 0.3 % (ref 0–1.5)
BILIRUB SERPL-MCNC: 0.8 MG/DL (ref 0–1.2)
BUN SERPL-MCNC: 22 MG/DL (ref 8–23)
BUN/CREAT SERPL: 22.7 (ref 7–25)
CALCIUM SPEC-SCNC: 8.5 MG/DL (ref 8.6–10.5)
CHLORIDE SERPL-SCNC: 110 MMOL/L (ref 98–107)
CO2 SERPL-SCNC: 18.8 MMOL/L (ref 22–29)
CREAT SERPL-MCNC: 0.97 MG/DL (ref 0.57–1)
DEPRECATED RDW RBC AUTO: 42.6 FL (ref 37–54)
EOSINOPHIL # BLD AUTO: 0.24 10*3/MM3 (ref 0–0.4)
EOSINOPHIL NFR BLD AUTO: 3.3 % (ref 0.3–6.2)
ERYTHROCYTE [DISTWIDTH] IN BLOOD BY AUTOMATED COUNT: 14 % (ref 12.3–15.4)
GFR SERPL CREATININE-BSD FRML MDRD: 57 ML/MIN/1.73
GLOBULIN UR ELPH-MCNC: 2.5 GM/DL
GLUCOSE BLDC GLUCOMTR-MCNC: 141 MG/DL (ref 70–130)
GLUCOSE BLDC GLUCOMTR-MCNC: 152 MG/DL (ref 70–130)
GLUCOSE BLDC GLUCOMTR-MCNC: 163 MG/DL (ref 70–130)
GLUCOSE BLDC GLUCOMTR-MCNC: 181 MG/DL (ref 70–130)
GLUCOSE SERPL-MCNC: 168 MG/DL (ref 65–99)
HCT VFR BLD AUTO: 26.1 % (ref 34–46.6)
HGB BLD-MCNC: 8.6 G/DL (ref 12–15.9)
IMM GRANULOCYTES # BLD AUTO: 0.11 10*3/MM3 (ref 0–0.05)
IMM GRANULOCYTES NFR BLD AUTO: 1.5 % (ref 0–0.5)
LDH SERPL-CCNC: 174 U/L (ref 135–214)
LYMPHOCYTES # BLD AUTO: 0.7 10*3/MM3 (ref 0.7–3.1)
LYMPHOCYTES NFR BLD AUTO: 9.6 % (ref 19.6–45.3)
MCH RBC QN AUTO: 27.7 PG (ref 26.6–33)
MCHC RBC AUTO-ENTMCNC: 33 G/DL (ref 31.5–35.7)
MCV RBC AUTO: 83.9 FL (ref 79–97)
MONOCYTES # BLD AUTO: 0.9 10*3/MM3 (ref 0.1–0.9)
MONOCYTES NFR BLD AUTO: 12.3 % (ref 5–12)
NEUTROPHILS NFR BLD AUTO: 5.33 10*3/MM3 (ref 1.7–7)
NEUTROPHILS NFR BLD AUTO: 73 % (ref 42.7–76)
NRBC BLD AUTO-RTO: 0.1 /100 WBC (ref 0–0.2)
PLATELET # BLD AUTO: 129 10*3/MM3 (ref 140–450)
PLATELET # BLD AUTO: 129 10*3/MM3 (ref 140–450)
PLATELETS.RETICULATED NFR BLD AUTO: 2.1 % (ref 0.9–6.5)
PMV BLD AUTO: 9.7 FL (ref 6–12)
POTASSIUM SERPL-SCNC: 3.9 MMOL/L (ref 3.5–5.2)
PROT SERPL-MCNC: 5.4 G/DL (ref 6–8.5)
RBC # BLD AUTO: 3.11 10*6/MM3 (ref 3.77–5.28)
SODIUM SERPL-SCNC: 138 MMOL/L (ref 136–145)
WBC NRBC COR # BLD: 7.3 10*3/MM3 (ref 3.4–10.8)

## 2021-11-20 PROCEDURE — 83615 LACTATE (LD) (LDH) ENZYME: CPT | Performed by: HOSPITALIST

## 2021-11-20 PROCEDURE — 63710000001 INSULIN LISPRO (HUMAN) PER 5 UNITS: Performed by: INTERNAL MEDICINE

## 2021-11-20 PROCEDURE — 25010000002 IRON SUCROSE PER 1 MG: Performed by: HOSPITALIST

## 2021-11-20 PROCEDURE — 85055 RETICULATED PLATELET ASSAY: CPT | Performed by: HOSPITALIST

## 2021-11-20 PROCEDURE — 63710000001 INSULIN LISPRO (HUMAN) PER 5 UNITS: Performed by: HOSPITALIST

## 2021-11-20 PROCEDURE — 97530 THERAPEUTIC ACTIVITIES: CPT

## 2021-11-20 PROCEDURE — 85025 COMPLETE CBC W/AUTO DIFF WBC: CPT | Performed by: HOSPITALIST

## 2021-11-20 PROCEDURE — 80053 COMPREHEN METABOLIC PANEL: CPT | Performed by: HOSPITALIST

## 2021-11-20 PROCEDURE — 63710000001 INSULIN GLARGINE PER 5 UNITS: Performed by: HOSPITALIST

## 2021-11-20 PROCEDURE — 82962 GLUCOSE BLOOD TEST: CPT

## 2021-11-20 RX ORDER — FERROUS SULFATE 325(65) MG
325 TABLET ORAL
Status: DISCONTINUED | OUTPATIENT
Start: 2021-11-24 | End: 2021-11-24 | Stop reason: HOSPADM

## 2021-11-20 RX ORDER — CHOLECALCIFEROL (VITAMIN D3) 125 MCG
1000 CAPSULE ORAL DAILY
Status: DISCONTINUED | OUTPATIENT
Start: 2021-11-20 | End: 2021-11-24 | Stop reason: HOSPADM

## 2021-11-20 RX ADMIN — INSULIN LISPRO 10 UNITS: 100 INJECTION, SOLUTION INTRAVENOUS; SUBCUTANEOUS at 16:50

## 2021-11-20 RX ADMIN — OXYCODONE AND ACETAMINOPHEN 2 TABLET: 5; 325 TABLET ORAL at 08:26

## 2021-11-20 RX ADMIN — GABAPENTIN 300 MG: 300 CAPSULE ORAL at 08:26

## 2021-11-20 RX ADMIN — MUPIROCIN 1 APPLICATION: 20 OINTMENT TOPICAL at 08:26

## 2021-11-20 RX ADMIN — METOPROLOL TARTRATE 37.5 MG: 25 TABLET, FILM COATED ORAL at 20:41

## 2021-11-20 RX ADMIN — OXYCODONE AND ACETAMINOPHEN 2 TABLET: 5; 325 TABLET ORAL at 20:55

## 2021-11-20 RX ADMIN — METOPROLOL TARTRATE 37.5 MG: 25 TABLET, FILM COATED ORAL at 08:27

## 2021-11-20 RX ADMIN — GABAPENTIN 300 MG: 300 CAPSULE ORAL at 20:41

## 2021-11-20 RX ADMIN — INSULIN LISPRO 10 UNITS: 100 INJECTION, SOLUTION INTRAVENOUS; SUBCUTANEOUS at 13:13

## 2021-11-20 RX ADMIN — LOPERAMIDE HYDROCHLORIDE 2 MG: 2 CAPSULE ORAL at 21:06

## 2021-11-20 RX ADMIN — Medication 1000 MCG: at 13:13

## 2021-11-20 RX ADMIN — DOXYCYCLINE 100 MG: 100 CAPSULE ORAL at 20:41

## 2021-11-20 RX ADMIN — INSULIN LISPRO 10 UNITS: 100 INJECTION, SOLUTION INTRAVENOUS; SUBCUTANEOUS at 08:27

## 2021-11-20 RX ADMIN — IRON SUCROSE 300 MG: 20 INJECTION, SOLUTION INTRAVENOUS at 13:13

## 2021-11-20 RX ADMIN — GABAPENTIN 300 MG: 300 CAPSULE ORAL at 16:49

## 2021-11-20 RX ADMIN — INSULIN LISPRO 2 UNITS: 100 INJECTION, SOLUTION INTRAVENOUS; SUBCUTANEOUS at 16:49

## 2021-11-20 RX ADMIN — DOXYCYCLINE 100 MG: 100 CAPSULE ORAL at 08:27

## 2021-11-20 RX ADMIN — MUPIROCIN 1 APPLICATION: 20 OINTMENT TOPICAL at 20:40

## 2021-11-20 RX ADMIN — INSULIN GLARGINE 60 UNITS: 100 INJECTION, SOLUTION SUBCUTANEOUS at 21:57

## 2021-11-20 RX ADMIN — FAMOTIDINE 20 MG: 20 TABLET, FILM COATED ORAL at 08:27

## 2021-11-20 RX ADMIN — OXYCODONE AND ACETAMINOPHEN 2 TABLET: 5; 325 TABLET ORAL at 04:30

## 2021-11-20 NOTE — PROGRESS NOTES
"DAILY PROGRESS NOTE  [facility]    Patient Identification:  Name: Stephy Duncan  Age: 66 y.o.  Sex: female  :  1955  MRN: [id number]         Primary Care Physician: Gregorio Rosales      Subjective  No new complaints and overall feeling well.    Objective:  General Appearance:  Comfortable, in no acute distress, not in pain and well-appearing.    Vital signs: (most recent): Blood pressure 116/52, pulse 79, temperature 97 °F (36.1 °C), temperature source Skin, resp. rate 18, height 162.6 cm (64\"), weight 103 kg (226 lb 3.1 oz), SpO2 96 %.    Lungs:  Normal effort and normal respiratory rate.  Breath sounds clear to auscultation.    Heart: Normal rate.  Regular rhythm.    Extremities: There is no dependent edema.    Neurological: Patient is alert and oriented to person, place and time.    Skin:  Warm and dry.                Vital signs in last 24 hours:  Temp:  [97 °F (36.1 °C)-97.3 °F (36.3 °C)] 97 °F (36.1 °C)  Heart Rate:  [67-79] 79  Resp:  [16-18] 18  BP: (104-126)/(50-56) 116/52    Intake/Output:    Intake/Output Summary (Last 24 hours) at 2021 1242  Last data filed at 2021 1700  Gross per 24 hour   Intake 720 ml   Output --   Net 720 ml         Results from last 7 days   Lab Units 21  0639 21  0618 21  1049 21  0523 21  1503 21  0615 21  1613   WBC 10*3/mm3 7.30 9.00  --  9.17  --   --   --    HEMOGLOBIN g/dL 8.6* 9.1*  --  7.8* 7.8* 8.1* 9.7*   PLATELETS 10*3/mm3 129*  129* 117* 123* 84*  --   --   --      Results from last 7 days   Lab Units 21  0639 21  0618 21  0523 21  0614   SODIUM mmol/L 138 132* 131* 135*   POTASSIUM mmol/L 3.9 3.9 4.0 4.5   CHLORIDE mmol/L 110* 104 101 105   CO2 mmol/L 18.8* 17.6* 17.5* 21.0*   BUN mg/dL 22 35* 29* 22   CREATININE mg/dL 0.97 1.54* 1.89* 1.32*   GLUCOSE mg/dL 168* 185* 213* 182*   Estimated Creatinine Clearance: 64.8 mL/min (by C-G formula based on SCr of 0.97 " "mg/dL).  Results from last 7 days   Lab Units 11/20/21  0639 11/19/21  0618 11/18/21  0523 11/17/21  0614   CALCIUM mg/dL 8.5* 8.1* 7.7* 8.0*   ALBUMIN g/dL 2.90* 3.10*  --   --      Results from last 7 days   Lab Units 11/20/21  0639 11/19/21  0618   ALBUMIN g/dL 2.90* 3.10*   BILIRUBIN mg/dL 0.8 0.7   ALK PHOS U/L 63 62   AST (SGOT) U/L 12 18   ALT (SGPT) U/L 7 7       Assessment:  Anemia-multifactorial-acute blood loss anemia/iron deficiency anemia (severe)/anemia of chronic disease:  Last transfusion 11/18/2021.  Containing hemoglobin much better now.  Patient receiving her last dose of iron sucrose.  Thrombocytopenia:  Chronic mild thrombocytopenia with acute exacerbation postop.  Now improving again.  IPF was \"within normal limits\" but not quite adequate for a degree of thrombocytopenia.  Suspect a combination of consumption and slow bone marrow response.    Platelets now back to baseline.    Mild increase in INR and PTT:  Given dose of vitamin K yesterday.  We will recheck INR in the morning.    THALIA: Likely associated hypoperfusion secondary to postop hypotension and decreased intravascular volume.      Resolved.  Cozaar still on hold.  Can resume if, when blood pressure starts creeping back up..  Avoid NSAIDs and Black 2 inhibitors.    IV fluids discontinued   Hyponatremia: Resolved  Diabetes type 2: Associated with significant insulin resistance.    Blood glucose control improved.    Plan:  Overall much improved.  If no significant drop in hemoglobin the morning should be medically stable for discharge.  Thank you.    Arturo Oshea MD  11/20/2021  12:42 EST    "

## 2021-11-20 NOTE — PLAN OF CARE
Goal Outcome Evaluation:               Pt is a post op day 3 of a rt total hip revision. Dressing is clean dry and intact. Pt continues with the wound vac to continuous suction. Pt tolerating well. Pt has minimum complaints of pain. Pt has ambulated to the bathroom with an assist x1. Voiding function intact. Pt educated on the importance of monitoring blood sugars related to comorbidity of Diabetes. Pt voiced understanding. Pt is resting at this time, will continue to monitor.

## 2021-11-20 NOTE — THERAPY TREATMENT NOTE
Patient Name: Stephy Duncan  : 1955    MRN: [id number]                              Today's Date: 2021       Admit Date: 2021    Visit Dx:     ICD-10-CM ICD-9-CM   1. Hip injury  S79.919A 959.6     Patient Active Problem List   Diagnosis   • Status post total replacement of hip   • Diabetes mellitus (HCC)   • Hypertension   • Anemia     Past Medical History:   Diagnosis Date   • Arthritis    • Cardiac murmur    • Diabetes mellitus (HCC)    • GERD (gastroesophageal reflux disease)    • Heart attack (HCC) 2020   • History of cervical cancer    • Hyperlipidemia    • Hypertension    • Osteomyelitis hip (HCC)     RIGHT   • Right hip pain      Past Surgical History:   Procedure Laterality Date   • CARDIAC CATHETERIZATION     • CERVICAL CONIZATION     • COLONOSCOPY     • CORONARY ANGIOPLASTY WITH STENT PLACEMENT     • ENDOSCOPY     • HIP ARTHROPLASTY Right    • HIP HARDWARE REMOVAL     • HIP SPACER INSERTION WITH ANTIBIOTIC CEMENT      X3   • HYSTERECTOMY     • KNEE ARTHROPLASTY Left    • LAPAROSCOPIC CHOLECYSTECTOMY     • TONSILLECTOMY     • TOTAL HIP ARTHROPLASTY REVISION Right 2021    Procedure: TOTAL HIP REVISION ARTHROPLASTY RIGHT POSTERIOR;  Surgeon: Karri Schaeffer II, MD;  Location: [facility];  Service: Orthopedics;  Laterality: Right;      General Information     Row Name 21 1225          Physical Therapy Time and Intention    Document Type therapy note (daily note)  -JR     Mode of Treatment physical therapy  -JR     Row Name 21 1225          General Information    Patient Profile Reviewed yes  -JR     Existing Precautions/Restrictions right; hip, posterior; partial weight bearing  -JR     Row Name 21 1225          Cognition    Orientation Status (Cognition) oriented x 4  -JR     Row Name 21 1225          Safety Issues, Functional Mobility    Impairments Affecting Function (Mobility) balance; strength; pain; endurance/activity tolerance   -JR           User Key  (r) = Recorded By, (t) = Taken By, (c) = Cosigned By    Initials Name Provider Type    Luz Elena Slater PT Physical Therapist               Mobility     Row Name 11/20/21 1227          Bed Mobility    Comment (Bed Mobility) Patient EOB upon arrival with NSG aide  -JR     Row Name 11/20/21 1227          Sit-Stand Transfer    Sit-Stand [place] (Transfers) contact guard; verbal cues  -JR     Assistive Device (Sit-Stand Transfers) walker, front-wheeled  -JR     Row Name 11/20/21 1227          Gait/Stairs (Locomotion)    [place] Level (Gait) contact guard; verbal cues  -JR     Assistive Device (Gait) walker, front-wheeled  -JR     Distance in Feet (Gait) 10' from bed to restroom then 100' additional feet  -JR     Deviations/Abnormal Patterns (Gait) weight shifting decreased; stride length decreased; corinne decreased; festinating/shuffling; gait speed decreased; antalgic  -JR     Bilateral Gait Deviations forward flexed posture; heel strike decreased  -JR     [place] Level (Stairs) not tested  -JR     Row Name 11/20/21 1227          Mobility    Extremity Weight-bearing Status right lower extremity  -JR     Right Lower Extremity (Weight-bearing Status) partial weight-bearing (PWB)  50%  -JR           User Key  (r) = Recorded By, (t) = Taken By, (c) = Cosigned By    Initials Name Provider Type    Luz Elena Slater PT Physical Therapist               Obj/Interventions     Row Name 11/20/21 1229          Motor Skills    Therapeutic Exercise other (see comments)  AP/QS/GS/LAQ/Seated Marches x 10  -JR     Row Name 11/20/21 1229          Balance    Balance Assessment sitting static balance; sitting dynamic balance; standing static balance; standing dynamic balance  -JR     Static Sitting Balance WFL; sitting, edge of bed; unsupported  -JR     Dynamic Sitting Balance WFL; sitting, edge of bed; unsupported  -JR     Static Standing Balance WFL; supported; standing  -JR      Dynamic Standing Balance mild impairment; supported; standing  -           User Key  (r) = Recorded By, (t) = Taken By, (c) = Cosigned By    Initials Name Provider Type    Luz Elena Slater, PT Physical Therapist               Goals/Plan    No documentation.                Clinical Impression     Row Name 11/20/21 1230          Plan of Care Review    Plan of Care Reviewed With patient  -JR     Progress improving  -JR     Outcome Summary Patient sitting EOB upon arrival with NSG aide present. Patient request to ambulate to restroom and requires CGA for sit-stand and CGA to walk 10' with RWx. She then ambulated an additional 100' with RWx and benefits from intermittent cues to maintain WBing status. Patient returned to chair in restroom in preparation to be cleaned up. She performed 10 reps for each therapeutic exercise. Patient left in restroom with NSG aide present. She would continue to benefit from continued skilled PT.  -JR     Row Name 11/20/21 1230          Therapy Assessment/Plan (PT)    Rehab Potential (PT) good, to achieve stated therapy goals  -JR     Criteria for Skilled Interventions Met (PT) yes  -RJ     Row Name 11/20/21 1230          Positioning and Restraints    Pre-Treatment Position other (comment)  EOB  -JR     Post Treatment Position bathroom  -JR     Bathroom sitting; encouraged to call for assist; with other staff  NSG aide  -JR           User Key  (r) = Recorded By, (t) = Taken By, (c) = Cosigned By    Initials Name Provider Type    Luz Elena Slater, PT Physical Therapist               Outcome Measures     Row Name 11/20/21 1233          How much help from another person do you currently need...    Turning from your back to your side while in flat bed without using bedrails? 3  -JR     Moving from lying on back to sitting on the side of a flat bed without bedrails? 3  -JR     Moving to and from a bed to a chair (including a wheelchair)? 3  -JR     Standing up from a  chair using your arms (e.g., wheelchair, bedside chair)? 4  -JR     Climbing 3-5 steps with a railing? 2  -JR     To walk in hospital room? 3  -JR     AM-PAC 6 Clicks Score (PT) 18  -JR     Row Name 11/20/21 1233          Functional Assessment    Outcome Measure Options AM-PAC 6 Clicks Basic Mobility (PT)  -           User Key  (r) = Recorded By, (t) = Taken By, (c) = Cosigned By    Initials Name Provider Type    Luz Elena Slater, PT Physical Therapist                             Physical Therapy Education                 Title: PT OT SLP Therapies (Done)     Topic: Physical Therapy (Done)     Point: Mobility training (Done)     Learning Progress Summary           Patient Acceptance, E,TB, VU,DU by  at 11/20/2021 1233    Acceptance, E, VU by DB at 11/19/2021 1258    Acceptance, E, VU by DB at 11/18/2021 1624    Acceptance, E, VU by DB at 11/17/2021 0944                   Point: Home exercise program (Done)     Learning Progress Summary           Patient Acceptance, E,TB, VU,DU by  at 11/20/2021 1233    Acceptance, E, VU by DB at 11/19/2021 1258    Acceptance, E, VU by DB at 11/18/2021 1624    Acceptance, E, VU by DB at 11/17/2021 0944                   Point: Body mechanics (Done)     Learning Progress Summary           Patient Acceptance, E, VU by DB at 11/19/2021 1258    Acceptance, E, VU by DB at 11/18/2021 1624    Acceptance, E, VU by DB at 11/17/2021 0944                   Point: Precautions (Done)     Learning Progress Summary           Patient Acceptance, E,TB, VU,DU by  at 11/20/2021 1233    Acceptance, E, VU by DB at 11/19/2021 1258    Acceptance, E, VU by DB at 11/18/2021 1624    Acceptance, E, VU by DB at 11/17/2021 0944                               User Key     Initials Effective Dates Name Provider Type Discipline    JR 05/19/21 -  Luz Elena Love, PT Physical Therapist PT    DB 06/16/21 -  Audra Gilliam, PT Physical Therapist PT              PT Recommendation and Plan      Plan of Care Reviewed With: patient  Progress: improving  Outcome Summary: Patient sitting EOB upon arrival with NSG aide present. Patient request to ambulate to restroom and requires CGA for sit-stand and CGA to walk 10' with RWx. She then ambulated an additional 100' with RWx and benefits from intermittent cues to maintain WBing status. Patient returned to chair in restroom in preparation to be cleaned up. She performed 10 reps for each therapeutic exercise. Patient left in restroom with NSG aide present. She would continue to benefit from continued skilled PT.     Time Calculation:    PT Charges     Row Name 11/20/21 1234             Time Calculation    Start Time 0911  -      Stop Time 0930  -JR      Time Calculation (min) 19 min  -JR      PT Received On 11/20/21  -JR      PT - Next Appointment 11/21/21  -JR      PT Goal Re-Cert Due Date 11/24/21  -JR            User Key  (r) = Recorded By, (t) = Taken By, (c) = Cosigned By    Initials Name Provider Type    Luz Elena Slater, PT Physical Therapist              Therapy Charges for Today     Code Description Service Date Service Provider Modifiers Qty    [id number]  PT THERAPEUTIC ACT EA 15 MIN 11/20/2021 Luz Elena Love, PT GP 1          PT G-Codes  Outcome Measure Options: AM-PAC 6 Clicks Basic Mobility (PT)  AM-PAC 6 Clicks Score (PT): 18    Luz Elena Love, PT  11/20/2021

## 2021-11-20 NOTE — PLAN OF CARE
See below.    Problem: Adult Inpatient Plan of Care  Goal: Plan of Care Review  Outcome: Ongoing, Progressing  Flowsheets (Taken 11/20/2021 1605)  Progress: improving  Plan of Care Reviewed With:   patient   spouse  Outcome Summary: 66/F POD#3 right TRISTAN revision with WVAC placement.  ALOx4, RA, lungs clear but diminished, BS audible and normoactive, voiding independently with Purewick assistance occasionally.  Up x1-2 BRP/BSC with walker/gait belt.  1+ pedal pulses noted bilaterally, no c/o numbness/tingling in operative extremity, dressing CDI, WVAC in place.  Pain well-controlled with PO pain meds only.  PIV saline locked.  D/C planning in progress, plans for SNF placement, will CTM.

## 2021-11-20 NOTE — PLAN OF CARE
Goal Outcome Evaluation:  Plan of Care Reviewed With: patient        Progress: improving  Outcome Summary: Patient sitting EOB upon arrival with NSG aide present. Patient request to ambulate to restroom and requires CGA for sit-stand and CGA to walk 10' with RWx. She then ambulated an additional 100' with RWx and benefits from intermittent cues to maintain WBing status. Patient returned to chair in restroom in preparation to be cleaned up. She performed 10 reps for each therapeutic exercise. Patient left in restroom with NSG aide present. She would continue to benefit from continued skilled PT.

## 2021-11-21 LAB
ANION GAP SERPL CALCULATED.3IONS-SCNC: 7.5 MMOL/L (ref 5–15)
BASOPHILS # BLD AUTO: 0.05 10*3/MM3 (ref 0–0.2)
BASOPHILS NFR BLD AUTO: 0.7 % (ref 0–1.5)
BUN SERPL-MCNC: 15 MG/DL (ref 8–23)
BUN/CREAT SERPL: 19.2 (ref 7–25)
CALCIUM SPEC-SCNC: 8.4 MG/DL (ref 8.6–10.5)
CHLORIDE SERPL-SCNC: 110 MMOL/L (ref 98–107)
CO2 SERPL-SCNC: 20.5 MMOL/L (ref 22–29)
CREAT SERPL-MCNC: 0.78 MG/DL (ref 0.57–1)
DEPRECATED RDW RBC AUTO: 45.6 FL (ref 37–54)
EOSINOPHIL # BLD AUTO: 0.38 10*3/MM3 (ref 0–0.4)
EOSINOPHIL NFR BLD AUTO: 5.1 % (ref 0.3–6.2)
ERYTHROCYTE [DISTWIDTH] IN BLOOD BY AUTOMATED COUNT: 14.2 % (ref 12.3–15.4)
GFR SERPL CREATININE-BSD FRML MDRD: 73 ML/MIN/1.73
GLUCOSE BLDC GLUCOMTR-MCNC: 113 MG/DL (ref 70–130)
GLUCOSE BLDC GLUCOMTR-MCNC: 132 MG/DL (ref 70–130)
GLUCOSE BLDC GLUCOMTR-MCNC: 192 MG/DL (ref 70–130)
GLUCOSE BLDC GLUCOMTR-MCNC: 200 MG/DL (ref 70–130)
GLUCOSE SERPL-MCNC: 107 MG/DL (ref 65–99)
HCT VFR BLD AUTO: 26.8 % (ref 34–46.6)
HGB BLD-MCNC: 8.5 G/DL (ref 12–15.9)
IMM GRANULOCYTES # BLD AUTO: 0.35 10*3/MM3 (ref 0–0.05)
IMM GRANULOCYTES NFR BLD AUTO: 4.7 % (ref 0–0.5)
INR PPP: 1.17 (ref 0.9–1.1)
LYMPHOCYTES # BLD AUTO: 1.1 10*3/MM3 (ref 0.7–3.1)
LYMPHOCYTES NFR BLD AUTO: 14.8 % (ref 19.6–45.3)
MCH RBC QN AUTO: 27.9 PG (ref 26.6–33)
MCHC RBC AUTO-ENTMCNC: 31.7 G/DL (ref 31.5–35.7)
MCV RBC AUTO: 87.9 FL (ref 79–97)
MONOCYTES # BLD AUTO: 0.97 10*3/MM3 (ref 0.1–0.9)
MONOCYTES NFR BLD AUTO: 13.1 % (ref 5–12)
NEUTROPHILS NFR BLD AUTO: 4.56 10*3/MM3 (ref 1.7–7)
NEUTROPHILS NFR BLD AUTO: 61.6 % (ref 42.7–76)
NRBC BLD AUTO-RTO: 0 /100 WBC (ref 0–0.2)
PLATELET # BLD AUTO: 132 10*3/MM3 (ref 140–450)
PMV BLD AUTO: 9.7 FL (ref 6–12)
POTASSIUM SERPL-SCNC: 3.8 MMOL/L (ref 3.5–5.2)
PROTHROMBIN TIME: 14.7 SECONDS (ref 11.7–14.2)
RBC # BLD AUTO: 3.05 10*6/MM3 (ref 3.77–5.28)
SODIUM SERPL-SCNC: 138 MMOL/L (ref 136–145)
WBC NRBC COR # BLD: 7.41 10*3/MM3 (ref 3.4–10.8)

## 2021-11-21 PROCEDURE — 85025 COMPLETE CBC W/AUTO DIFF WBC: CPT | Performed by: HOSPITALIST

## 2021-11-21 PROCEDURE — 80048 BASIC METABOLIC PNL TOTAL CA: CPT | Performed by: HOSPITALIST

## 2021-11-21 PROCEDURE — 97530 THERAPEUTIC ACTIVITIES: CPT

## 2021-11-21 PROCEDURE — 63710000001 INSULIN LISPRO (HUMAN) PER 5 UNITS: Performed by: HOSPITALIST

## 2021-11-21 PROCEDURE — 97110 THERAPEUTIC EXERCISES: CPT

## 2021-11-21 PROCEDURE — 82962 GLUCOSE BLOOD TEST: CPT

## 2021-11-21 PROCEDURE — 63710000001 INSULIN GLARGINE PER 5 UNITS: Performed by: HOSPITALIST

## 2021-11-21 PROCEDURE — 85610 PROTHROMBIN TIME: CPT | Performed by: HOSPITALIST

## 2021-11-21 PROCEDURE — 63710000001 INSULIN LISPRO (HUMAN) PER 5 UNITS: Performed by: INTERNAL MEDICINE

## 2021-11-21 RX ORDER — INSULIN LISPRO 100 [IU]/ML
10 INJECTION, SOLUTION INTRAVENOUS; SUBCUTANEOUS
Status: DISCONTINUED | OUTPATIENT
Start: 2021-11-21 | End: 2021-11-24 | Stop reason: HOSPADM

## 2021-11-21 RX ORDER — LOSARTAN POTASSIUM 25 MG/1
25 TABLET ORAL 2 TIMES DAILY
Status: DISCONTINUED | OUTPATIENT
Start: 2021-11-21 | End: 2021-11-24 | Stop reason: HOSPADM

## 2021-11-21 RX ADMIN — INSULIN LISPRO 10 UNITS: 100 INJECTION, SOLUTION INTRAVENOUS; SUBCUTANEOUS at 16:52

## 2021-11-21 RX ADMIN — METOPROLOL TARTRATE 37.5 MG: 25 TABLET, FILM COATED ORAL at 20:18

## 2021-11-21 RX ADMIN — LOSARTAN POTASSIUM 25 MG: 25 TABLET, FILM COATED ORAL at 20:18

## 2021-11-21 RX ADMIN — LOPERAMIDE HYDROCHLORIDE 2 MG: 2 CAPSULE ORAL at 08:23

## 2021-11-21 RX ADMIN — GABAPENTIN 300 MG: 300 CAPSULE ORAL at 08:24

## 2021-11-21 RX ADMIN — METOPROLOL TARTRATE 37.5 MG: 25 TABLET, FILM COATED ORAL at 08:24

## 2021-11-21 RX ADMIN — GABAPENTIN 300 MG: 300 CAPSULE ORAL at 16:52

## 2021-11-21 RX ADMIN — INSULIN LISPRO 10 UNITS: 100 INJECTION, SOLUTION INTRAVENOUS; SUBCUTANEOUS at 12:22

## 2021-11-21 RX ADMIN — Medication 1000 MCG: at 08:24

## 2021-11-21 RX ADMIN — DOXYCYCLINE 100 MG: 100 CAPSULE ORAL at 08:24

## 2021-11-21 RX ADMIN — INSULIN GLARGINE 60 UNITS: 100 INJECTION, SOLUTION SUBCUTANEOUS at 20:21

## 2021-11-21 RX ADMIN — LOSARTAN POTASSIUM 25 MG: 25 TABLET, FILM COATED ORAL at 12:22

## 2021-11-21 RX ADMIN — LOPERAMIDE HYDROCHLORIDE 2 MG: 2 CAPSULE ORAL at 12:22

## 2021-11-21 RX ADMIN — FAMOTIDINE 20 MG: 20 TABLET, FILM COATED ORAL at 08:24

## 2021-11-21 RX ADMIN — MUPIROCIN 1 APPLICATION: 20 OINTMENT TOPICAL at 20:16

## 2021-11-21 RX ADMIN — OXYCODONE AND ACETAMINOPHEN 2 TABLET: 5; 325 TABLET ORAL at 08:24

## 2021-11-21 RX ADMIN — GABAPENTIN 300 MG: 300 CAPSULE ORAL at 20:18

## 2021-11-21 RX ADMIN — INSULIN LISPRO 2 UNITS: 100 INJECTION, SOLUTION INTRAVENOUS; SUBCUTANEOUS at 12:22

## 2021-11-21 RX ADMIN — OXYCODONE AND ACETAMINOPHEN 2 TABLET: 5; 325 TABLET ORAL at 18:16

## 2021-11-21 NOTE — PLAN OF CARE
Goal Outcome Evaluation:  Plan of Care Reviewed With: patient        Progress: no change  Outcome Summary: Patient agrees to particiapte this AM despite having nausea/vomitting/diarrhea earlier this morning. She completed therapeutic exercises x 10 reps and needed SBA for bed mobility, CGA for supine-sit with cues on UE placement. She then ambulated 100' with RWx and intermittent cues to maintain WB status prior to returning to supine position. SHe would benefit from continued skilled PT.

## 2021-11-21 NOTE — THERAPY TREATMENT NOTE
Patient Name: Stephy Duncan  : 1955    MRN: [id number]                              Today's Date: 2021       Admit Date: 2021    Visit Dx:     ICD-10-CM ICD-9-CM   1. Hip injury  S79.919A 959.6     Patient Active Problem List   Diagnosis   • Status post total replacement of hip   • Diabetes mellitus (HCC)   • Hypertension   • Anemia     Past Medical History:   Diagnosis Date   • Arthritis    • Cardiac murmur    • Diabetes mellitus (HCC)    • GERD (gastroesophageal reflux disease)    • Heart attack (HCC) 2020   • History of cervical cancer    • Hyperlipidemia    • Hypertension    • Osteomyelitis hip (HCC)     RIGHT   • Right hip pain      Past Surgical History:   Procedure Laterality Date   • CARDIAC CATHETERIZATION     • CERVICAL CONIZATION     • COLONOSCOPY     • CORONARY ANGIOPLASTY WITH STENT PLACEMENT     • ENDOSCOPY     • HIP ARTHROPLASTY Right    • HIP HARDWARE REMOVAL     • HIP SPACER INSERTION WITH ANTIBIOTIC CEMENT      X3   • HYSTERECTOMY     • KNEE ARTHROPLASTY Left    • LAPAROSCOPIC CHOLECYSTECTOMY     • TONSILLECTOMY     • TOTAL HIP ARTHROPLASTY REVISION Right 2021    Procedure: TOTAL HIP REVISION ARTHROPLASTY RIGHT POSTERIOR;  Surgeon: Karri Schaeffer II, MD;  Location: [facility];  Service: Orthopedics;  Laterality: Right;      General Information     Row Name 21 1244          Physical Therapy Time and Intention    Document Type therapy note (daily note)  -JR     Mode of Treatment physical therapy  -JR     Row Name 21 1244          General Information    Patient Profile Reviewed yes  -JR     Existing Precautions/Restrictions right; hip, posterior; partial weight bearing  -JR     Row Name 21 1244          Cognition    Orientation Status (Cognition) oriented x 4  -JR           User Key  (r) = Recorded By, (t) = Taken By, (c) = Cosigned By    Initials Name Provider Type    Luz Elena Slater PT Physical Therapist                Mobility     Row Name 11/21/21 1245          Bed Mobility    Bed Mobility supine-sit  -JR     Scooting/Bridging [place] (Bed Mobility) standby assist  -JR     Supine-Sit [place] (Bed Mobility) standby assist; verbal cues  -JR     Sit-Supine [place] (Bed Mobility) minimum assist (75% patient effort); verbal cues  -RJ     Assistive Device (Bed Mobility) bed rails; head of bed elevated  -JR     Row Name 11/21/21 1245          Bed-Chair Transfer    Bed-Chair [place] (Transfers) not tested  -JR     Row Name 11/21/21 1245          Sit-Stand Transfer    Sit-Stand [place] (Transfers) contact guard; verbal cues; minimum assist (75% patient effort)  -JR     Assistive Device (Sit-Stand Transfers) walker, front-wheeled  -JR     Row Name 11/21/21 1245          Gait/Stairs (Locomotion)    [place] Level (Gait) contact guard; verbal cues  -JR     Assistive Device (Gait) walker, front-wheeled  -JR     Distance in Feet (Gait) 100'  -JR     Deviations/Abnormal Patterns (Gait) weight shifting decreased; stride length decreased; corinne decreased; festinating/shuffling; gait speed decreased; antalgic  -JR     Bilateral Gait Deviations forward flexed posture; heel strike decreased  -JR     [place] Level (Stairs) not tested  -JR     Comment (Gait/Stairs) Patient activity limited to nausea  -JR     Row Name 11/21/21 1245          Mobility    Extremity Weight-bearing Status right lower extremity  -JR     Right Lower Extremity (Weight-bearing Status) partial weight-bearing (PWB)  50%  -JR           User Key  (r) = Recorded By, (t) = Taken By, (c) = Cosigned By    Initials Name Provider Type    Luz Elena Slater PT Physical Therapist               Obj/Interventions     Row Name 11/21/21 1246          Motor Skills    Therapeutic Exercise other (see comments)  AP/QS/GS/HS/hip abd-add/LAQ/Seated Marches x 10  -JR     Row Name 11/21/21 1246          Balance    Balance Assessment sitting static  balance; sitting dynamic balance; standing static balance; standing dynamic balance  -JR     Static Sitting Balance WFL; supported; sitting, edge of bed  -JR     Dynamic Sitting Balance WFL; sitting, edge of bed; unsupported  -JR     Static Standing Balance WFL; supported; standing  -JR     Dynamic Standing Balance mild impairment; supported; standing  -JR           User Key  (r) = Recorded By, (t) = Taken By, (c) = Cosigned By    Initials Name Provider Type    Luz Elena Slater, PT Physical Therapist               Goals/Plan    No documentation.                Clinical Impression     Row Name 11/21/21 1247          Plan of Care Review    Plan of Care Reviewed With patient  -JR     Progress no change  -JR     Outcome Summary Patient agrees to particiapte this AM despite having nausea/vomitting/diarrhea earlier this morning. She completed therapeutic exercises x 10 reps and needed SBA for bed mobility, CGA for supine-sit with cues on UE placement. She then ambulated 100' with RWx and intermittent cues to maintain WB status prior to returning to supine position. SHe would benefit from continued skilled PT.  -JR     Row Name 11/21/21 1247          Therapy Assessment/Plan (PT)    Rehab Potential (PT) good, to achieve stated therapy goals  -JR     Criteria for Skilled Interventions Met (PT) yes  -JR     Row Name 11/21/21 1247          Positioning and Restraints    Pre-Treatment Position in bed  -JR     Post Treatment Position bed  -JR     In Bed supine; exit alarm on; encouraged to call for assist; call light within reach; legs elevated  -JR           User Key  (r) = Recorded By, (t) = Taken By, (c) = Cosigned By    Initials Name Provider Type    Luz Elena Slater, PT Physical Therapist               Outcome Measures     Row Name 11/21/21 1249          How much help from another person do you currently need...    Turning from your back to your side while in flat bed without using bedrails? 3  -JR      Moving from lying on back to sitting on the side of a flat bed without bedrails? 3  -JR     Moving to and from a bed to a chair (including a wheelchair)? 3  -JR     Standing up from a chair using your arms (e.g., wheelchair, bedside chair)? 3  -JR     Climbing 3-5 steps with a railing? 2  -JR     To walk in hospital room? 3  -JR     AM-PAC 6 Clicks Score (PT) 17  -     Row Name 11/21/21 1249          Functional Assessment    Outcome Measure Options AM-PAC 6 Clicks Basic Mobility (PT)  -           User Key  (r) = Recorded By, (t) = Taken By, (c) = Cosigned By    Initials Name Provider Type    Luz Elena Slater, PT Physical Therapist                             Physical Therapy Education                 Title: PT OT SLP Therapies (Done)     Topic: Physical Therapy (Done)     Point: Mobility training (Done)     Learning Progress Summary           Patient Acceptance, E,TB, VU,NR by JR at 11/21/2021 1250    Acceptance, E,TB, VU,DU by JR at 11/20/2021 1233    Acceptance, E, VU by DB at 11/19/2021 1258    Acceptance, E, VU by DB at 11/18/2021 1624    Acceptance, E, VU by DB at 11/17/2021 0944                   Point: Home exercise program (Done)     Learning Progress Summary           Patient Acceptance, E,TB, VU,NR by JR at 11/21/2021 1250    Acceptance, E,TB, VU,DU by JR at 11/20/2021 1233    Acceptance, E, VU by DB at 11/19/2021 1258    Acceptance, E, VU by DB at 11/18/2021 1624    Acceptance, E, VU by DB at 11/17/2021 0944                   Point: Body mechanics (Done)     Learning Progress Summary           Patient Acceptance, E,TB, VU,NR by JR at 11/21/2021 1250    Acceptance, E, VU by DB at 11/19/2021 1258    Acceptance, E, VU by DB at 11/18/2021 1624    Acceptance, E, VU by DB at 11/17/2021 0944                   Point: Precautions (Done)     Learning Progress Summary           Patient Acceptance, E,TB, VU,NR by JR at 11/21/2021 1250    Acceptance, E,TB, VU,DU by JR at 11/20/2021 1233    Acceptance, E,  VU by DB at 11/19/2021 1258    Acceptance, E, VU by DB at 11/18/2021 1624    Acceptance, E, VU by DB at 11/17/2021 0944                               User Key     Initials Effective Dates Name Provider Type Discipline    JR 05/19/21 -  Luz Elena Love, PT Physical Therapist PT    DB 06/16/21 -  Audra Gilliam, PT Physical Therapist PT              PT Recommendation and Plan     Plan of Care Reviewed With: patient  Progress: no change  Outcome Summary: Patient agrees to particiapte this AM despite having nausea/vomitting/diarrhea earlier this morning. She completed therapeutic exercises x 10 reps and needed SBA for bed mobility, CGA for supine-sit with cues on UE placement. She then ambulated 100' with RWx and intermittent cues to maintain WB status prior to returning to supine position. SHe would benefit from continued skilled PT.     Time Calculation:    PT Charges     Row Name 11/21/21 1250             Time Calculation    Start Time 0951  -      Stop Time 1015  -      Time Calculation (min) 24 min  -      PT Received On 11/21/21  -JR      PT - Next Appointment 11/22/21  -JR      PT Goal Re-Cert Due Date 11/24/21  -            User Key  (r) = Recorded By, (t) = Taken By, (c) = Cosigned By    Initials Name Provider Type    Luz Elena Slater, PT Physical Therapist              Therapy Charges for Today     Code Description Service Date Service Provider Modifiers Qty    74760398827  PT THERAPEUTIC ACT EA 15 MIN 11/20/2021 Luz Elena Love, PT GP 1    78169876073 HC PT THER PROC EA 15 MIN 11/21/2021 Luz Elena Love, PT GP 1    17683738800 HC PT THERAPEUTIC ACT EA 15 MIN 11/21/2021 Luz Elena Love, PT GP 1          PT G-Codes  Outcome Measure Options: AM-PAC 6 Clicks Basic Mobility (PT)  AM-PAC 6 Clicks Score (PT): 17    Luz Elena Love, PT  11/21/2021

## 2021-11-21 NOTE — PROGRESS NOTES
"Orthopaedic Surgery  Daily Progress Note    /60 (BP Location: Right arm, Patient Position: Lying)   Pulse 71   Temp 97.1 °F (36.2 °C) (Temporal)   Resp 18   Ht 162.6 cm (64\")   Wt 103 kg (226 lb 3.1 oz)   SpO2 97%   BMI 38.83 kg/m²     Lab Results (last 24 hours)     Procedure Component Value Units Date/Time    POC Glucose Once [[id number]]  (Normal) Collected: 11/21/21 0615    Specimen: Blood Updated: 11/21/21 0617     Glucose 113 mg/dL      Comment: Meter: [id number] : [id number] Anderson [place]       Basic Metabolic Panel [[id number]]  (Abnormal) Collected: 11/21/21 0421    Specimen: Blood Updated: 11/21/21 0541     Glucose 107 mg/dL      BUN 15 mg/dL      Creatinine 0.78 mg/dL      Sodium 138 mmol/L      Potassium 3.8 mmol/L      Chloride 110 mmol/L      CO2 20.5 mmol/L      Calcium 8.4 mg/dL      eGFR Non African Amer 73 mL/min/1.73      BUN/Creatinine Ratio 19.2     Anion Gap 7.5 mmol/L     Narrative:      GFR Normal >60  Chronic Kidney Disease <60  Kidney Failure <15      Protime-INR [[id number]]  (Abnormal) Collected: 11/21/21 0421    Specimen: Blood Updated: 11/21/21 0529     Protime 14.7 Seconds      INR 1.17    CBC & Differential [[id number]]  (Abnormal) Collected: 11/21/21 0421    Specimen: Blood Updated: 11/21/21 0521    Narrative:      The following orders were created for panel order CBC & Differential.  Procedure                               Abnormality         Status                     ---------                               -----------         ------                     CBC Auto Differential[[id number]]        Abnormal            Final result                 Please view results for these tests on the individual orders.    CBC Auto Differential [[id number]]  (Abnormal) Collected: 11/21/21 0421    Specimen: Blood Updated: 11/21/21 0521     WBC 7.41 10*3/mm3      RBC 3.05 10*6/mm3      Hemoglobin 8.5 g/dL      Hematocrit 26.8 %      MCV 87.9 fL      MCH 27.9 pg      MCHC 31.7 g/dL      " RDW 14.2 %      RDW-SD 45.6 fl      MPV 9.7 fL      Platelets 132 10*3/mm3      Neutrophil % 61.6 %      Lymphocyte % 14.8 %      Monocyte % 13.1 %      Eosinophil % 5.1 %      Basophil % 0.7 %      Immature Grans % 4.7 %      Neutrophils, Absolute 4.56 10*3/mm3      Lymphocytes, Absolute 1.10 10*3/mm3      Monocytes, Absolute 0.97 10*3/mm3      Eosinophils, Absolute 0.38 10*3/mm3      Basophils, Absolute 0.05 10*3/mm3      Immature Grans, Absolute 0.35 10*3/mm3      nRBC 0.0 /100 WBC     POC Glucose Once [[id number]]  (Abnormal) Collected: 11/20/21 2102    Specimen: Blood Updated: 11/20/21 2104     Glucose 141 mg/dL      Comment: Meter: [id number] : [id number] Anderson [place] NA       POC Glucose Once [[id number]]  (Abnormal) Collected: 11/20/21 1520    Specimen: Blood Updated: 11/20/21 1521     Glucose 163 mg/dL      Comment: Meter: [id number] : [id number] Raoul Marii       POC Glucose Once [[id number]]  (Abnormal) Collected: 11/20/21 1158    Specimen: Blood Updated: 11/20/21 1159     Glucose 181 mg/dL      Comment: Meter: [id number] : [id number] Raoul Marii             Imaging Results (Last 24 Hours)     ** No results found for the last 24 hours. **          Patient Care Team:  Gregorio Rosales as PCP - General (Family Medicine)  Jose Kong MD as Consulting Physician (Cardiology)    SUBJECTIVE  Pain controlled feeling better today    PHYSICAL EXAM  Resting in NAD   wound vac intact with good seal and no leakage  Toes warm, perfused, brisk capillary refill  Flexes/extends toes calves soft and non tender  No pain with passive stretch           Status post total replacement of hip    Diabetes mellitus (HCC)    Hypertension    Anemia      PLAN / DISPOSITION:  POD 5 s/p , doing well  Has a bit of nausea today but denies any significant pain . No shortness of air  1. Pain control: Orals  2.  Wound vac intact with continues serosanguineous drainage from the hip.  3. Called [facility] and unable to change wound  vac today so will be completed by wound care tomorrow and then plan to d/c to rehab    Yoanna Braden, MORGAN  11/21/21  08:49 EST

## 2021-11-21 NOTE — PLAN OF CARE
See below.    Problem: Adult Inpatient Plan of Care  Goal: Plan of Care Review  Outcome: Ongoing, Progressing  Flowsheets (Taken 11/21/2021 6065)  Progress: improving  Plan of Care Reviewed With: patient  Outcome Summary: 66/F POD#4 right TRISTAN revision with WVAC placement.  ALOx4, RA, lungs clear, BS hyperactive, voiding independently.  Up x1 BRP with walker/gait belt.  1+ pedal pulses noted bilaterally, no c/o numbness/tingling in operative extremity, dressing CDI, WVAC in place.  Pain well-controlled with PO pain meds only.  PIV saline locked.  D/C planning in progress, HH vs SNF, will CTM.

## 2021-11-21 NOTE — PROGRESS NOTES
"DAILY PROGRESS NOTE  [facility]    Patient Identification:  Name: Stephy Duncan  Age: 66 y.o.  Sex: female  :  1955  MRN: [id number]         Primary Care Physician: Gregorio Rosales      Subjective  Patient complains of nausea and diarrhea today.  She could not characterize the nature of the stools however.  RN reports that the stools are loose, light brown.  No vomiting.    Objective:  General Appearance:  Comfortable, well-appearing, in no acute distress and not in pain.    Vital signs: (most recent): Blood pressure 147/60, pulse 71, temperature 97.1 °F (36.2 °C), temperature source Temporal, resp. rate 18, height 162.6 cm (64\"), weight 103 kg (226 lb 3.1 oz), SpO2 97 %.    Lungs:  Normal effort and normal respiratory rate.  Breath sounds clear to auscultation.    Heart: Normal rate.  Regular rhythm.    Abdomen: Abdomen is soft and non-distended.    Extremities: There is no dependent edema.    Neurological: Patient is alert and oriented to person, place and time.    Skin:  Warm and dry.                Vital signs in last 24 hours:  Temp:  [96.8 °F (36 °C)-98 °F (36.7 °C)] 97.1 °F (36.2 °C)  Heart Rate:  [63-81] 71  Resp:  [18] 18  BP: ()/(50-62) 147/60    Intake/Output:    Intake/Output Summary (Last 24 hours) at 2021 0907  Last data filed at 2021 1700  Gross per 24 hour   Intake 240 ml   Output --   Net 240 ml         Results from last 7 days   Lab Units 21  0421 21  0639 21  0618 21  1049 21  0523 21  1503 21  0615 21  1613   WBC 10*3/mm3 7.41 7.30 9.00  --  9.17  --   --   --    HEMOGLOBIN g/dL 8.5* 8.6* 9.1*  --  7.8* 7.8* 8.1* 9.7*   PLATELETS 10*3/mm3 132* 129*  129* 117* 123* 84*  --   --   --      Results from last 7 days   Lab Units 21  0421 21  0639 21  0618 21  0523 21  0614   SODIUM mmol/L 138 138 132* 131* 135*   POTASSIUM mmol/L 3.8 3.9 3.9 4.0 4.5   CHLORIDE mmol/L 110* 110* " 104 101 105   CO2 mmol/L 20.5* 18.8* 17.6* 17.5* 21.0*   BUN mg/dL 15 22 35* 29* 22   CREATININE mg/dL 0.78 0.97 1.54* 1.89* 1.32*   GLUCOSE mg/dL 107* 168* 185* 213* 182*   Estimated Creatinine Clearance: 78.6 mL/min (by C-G formula based on SCr of 0.78 mg/dL).  Results from last 7 days   Lab Units 11/21/21  0421 11/20/21  0639 11/19/21  0618 11/18/21  0523 11/17/21  0614   CALCIUM mg/dL 8.4* 8.5* 8.1* 7.7* 8.0*   ALBUMIN g/dL  --  2.90* 3.10*  --   --      Results from last 7 days   Lab Units 11/20/21  0639 11/19/21  0618   ALBUMIN g/dL 2.90* 3.10*   BILIRUBIN mg/dL 0.8 0.7   ALK PHOS U/L 63 62   AST (SGOT) U/L 12 18   ALT (SGPT) U/L 7 7       Assessment:  Anemia-multifactorial-acute blood loss anemia/iron deficiency anemia (severe)/anemia of chronic disease:  Last transfusion 11/18/2021.    Status post iron sucrose 300 mg IV x3.  Patient maintaining her hemoglobin very nicely now.  Resume oral iron when she has completed her course of doxycycline.  Recommend outpatient evaluation for cause of iron deficiency.    Thrombocytopenia:  Chronic mild thrombocytopenia with acute exacerbation postop.  Platelet count now back to baseline.    Mild increase in INR and PTT:   Much improved.  PT-PTT near normal.  THALIA: Resolved.  Okay to resume Cozaar.  Hyponatremia: Resolved  Diabetes type 2: Associated with significant insulin resistance.    Blood glucose control improved.  Diarrhea/nausea: No fever, no leukocytosis, no blood or mucus in the stool.  No clinical of C. difficile.  Common side effect of doxycycline however.  Discontinue when okay with orthopedic surgery.  History of septic arthritis: Recommend follow-up with infectious disease physician post discharge.         Plan:  Please see above.  Overall greatly improved.  Medically stable.  Discussed with patient.  Discussed with RN.    Arturo Oshea MD  11/21/2021  09:07 EST

## 2021-11-22 LAB
GLUCOSE BLDC GLUCOMTR-MCNC: 116 MG/DL (ref 70–130)
GLUCOSE BLDC GLUCOMTR-MCNC: 183 MG/DL (ref 70–130)
GLUCOSE BLDC GLUCOMTR-MCNC: 184 MG/DL (ref 70–130)
GLUCOSE BLDC GLUCOMTR-MCNC: 192 MG/DL (ref 70–130)
HCT VFR BLD AUTO: 27.8 % (ref 34–46.6)
HGB BLD-MCNC: 9.1 G/DL (ref 12–15.9)

## 2021-11-22 PROCEDURE — 63710000001 INSULIN LISPRO (HUMAN) PER 5 UNITS: Performed by: INTERNAL MEDICINE

## 2021-11-22 PROCEDURE — 82962 GLUCOSE BLOOD TEST: CPT

## 2021-11-22 PROCEDURE — 63710000001 INSULIN GLARGINE PER 5 UNITS: Performed by: HOSPITALIST

## 2021-11-22 PROCEDURE — 99223 1ST HOSP IP/OBS HIGH 75: CPT | Performed by: INTERNAL MEDICINE

## 2021-11-22 PROCEDURE — 85018 HEMOGLOBIN: CPT | Performed by: ORTHOPAEDIC SURGERY

## 2021-11-22 PROCEDURE — 63710000001 INSULIN LISPRO (HUMAN) PER 5 UNITS: Performed by: HOSPITALIST

## 2021-11-22 PROCEDURE — 85014 HEMATOCRIT: CPT | Performed by: ORTHOPAEDIC SURGERY

## 2021-11-22 PROCEDURE — 97110 THERAPEUTIC EXERCISES: CPT

## 2021-11-22 RX ORDER — SULFAMETHOXAZOLE AND TRIMETHOPRIM 400; 80 MG/1; MG/1
1 TABLET ORAL EVERY 12 HOURS SCHEDULED
Status: DISCONTINUED | OUTPATIENT
Start: 2021-11-22 | End: 2021-11-24 | Stop reason: HOSPADM

## 2021-11-22 RX ADMIN — GABAPENTIN 300 MG: 300 CAPSULE ORAL at 16:57

## 2021-11-22 RX ADMIN — GABAPENTIN 300 MG: 300 CAPSULE ORAL at 20:05

## 2021-11-22 RX ADMIN — OXYCODONE AND ACETAMINOPHEN 2 TABLET: 5; 325 TABLET ORAL at 11:18

## 2021-11-22 RX ADMIN — INSULIN LISPRO 10 UNITS: 100 INJECTION, SOLUTION INTRAVENOUS; SUBCUTANEOUS at 11:18

## 2021-11-22 RX ADMIN — METOPROLOL TARTRATE 37.5 MG: 25 TABLET, FILM COATED ORAL at 20:05

## 2021-11-22 RX ADMIN — METOPROLOL TARTRATE 37.5 MG: 25 TABLET, FILM COATED ORAL at 08:44

## 2021-11-22 RX ADMIN — GABAPENTIN 300 MG: 300 CAPSULE ORAL at 08:44

## 2021-11-22 RX ADMIN — OXYCODONE AND ACETAMINOPHEN 2 TABLET: 5; 325 TABLET ORAL at 20:05

## 2021-11-22 RX ADMIN — MUPIROCIN 1 APPLICATION: 20 OINTMENT TOPICAL at 08:44

## 2021-11-22 RX ADMIN — SULFAMETHOXAZOLE AND TRIMETHOPRIM 1 TABLET: 400; 80 TABLET ORAL at 12:04

## 2021-11-22 RX ADMIN — INSULIN LISPRO 2 UNITS: 100 INJECTION, SOLUTION INTRAVENOUS; SUBCUTANEOUS at 16:57

## 2021-11-22 RX ADMIN — INSULIN LISPRO 2 UNITS: 100 INJECTION, SOLUTION INTRAVENOUS; SUBCUTANEOUS at 11:18

## 2021-11-22 RX ADMIN — LOSARTAN POTASSIUM 25 MG: 25 TABLET, FILM COATED ORAL at 20:04

## 2021-11-22 RX ADMIN — FAMOTIDINE 20 MG: 20 TABLET, FILM COATED ORAL at 08:44

## 2021-11-22 RX ADMIN — LOSARTAN POTASSIUM 25 MG: 25 TABLET, FILM COATED ORAL at 08:45

## 2021-11-22 RX ADMIN — INSULIN GLARGINE 60 UNITS: 100 INJECTION, SOLUTION SUBCUTANEOUS at 22:50

## 2021-11-22 RX ADMIN — Medication 1000 MCG: at 08:44

## 2021-11-22 RX ADMIN — OXYCODONE AND ACETAMINOPHEN 2 TABLET: 5; 325 TABLET ORAL at 03:54

## 2021-11-22 RX ADMIN — MUPIROCIN 1 APPLICATION: 20 OINTMENT TOPICAL at 20:06

## 2021-11-22 RX ADMIN — SULFAMETHOXAZOLE AND TRIMETHOPRIM 1 TABLET: 400; 80 TABLET ORAL at 20:04

## 2021-11-22 RX ADMIN — INSULIN LISPRO 10 UNITS: 100 INJECTION, SOLUTION INTRAVENOUS; SUBCUTANEOUS at 16:57

## 2021-11-22 NOTE — THERAPY TREATMENT NOTE
Patient Name: Stephy Duncan  : 1955    MRN: [id number]                              Today's Date: 2021       Admit Date: 2021    Visit Dx:     ICD-10-CM ICD-9-CM   1. Hip injury  S79.919A 959.6     Patient Active Problem List   Diagnosis   • Status post total replacement of hip   • Diabetes mellitus (HCC)   • Hypertension   • Anemia     Past Medical History:   Diagnosis Date   • Arthritis    • Cardiac murmur    • Diabetes mellitus (HCC)    • GERD (gastroesophageal reflux disease)    • Heart attack (HCC) 2020   • History of cervical cancer    • Hyperlipidemia    • Hypertension    • Osteomyelitis hip (HCC)     RIGHT   • Right hip pain      Past Surgical History:   Procedure Laterality Date   • CARDIAC CATHETERIZATION     • CERVICAL CONIZATION     • COLONOSCOPY     • CORONARY ANGIOPLASTY WITH STENT PLACEMENT     • ENDOSCOPY     • HIP ARTHROPLASTY Right    • HIP HARDWARE REMOVAL     • HIP SPACER INSERTION WITH ANTIBIOTIC CEMENT      X3   • HYSTERECTOMY     • KNEE ARTHROPLASTY Left    • LAPAROSCOPIC CHOLECYSTECTOMY     • TONSILLECTOMY     • TOTAL HIP ARTHROPLASTY REVISION Right 2021    Procedure: TOTAL HIP REVISION ARTHROPLASTY RIGHT POSTERIOR;  Surgeon: Karri Schaeffer II, MD;  Location: [facility];  Service: Orthopedics;  Laterality: Right;      General Information     Row Name 21 1728          Physical Therapy Time and Intention    Document Type therapy note (daily note)  -     Mode of Treatment individual therapy; physical therapy  -     Row Name 21 1728          General Information    Patient Profile Reviewed yes  -     Existing Precautions/Restrictions fall; hip, posterior; right; partial weight bearing  -     Row Name 21 1728          Living Environment    Lives With spouse  -     Row Name 21 1728          Cognition    Orientation Status (Cognition) oriented x 4  -     Row Name 21 1728          Safety Issues, Functional  Mobility    Impairments Affecting Function (Mobility) balance; endurance/activity tolerance; pain; strength  -           User Key  (r) = Recorded By, (t) = Taken By, (c) = Cosigned By    Initials Name Provider Type    Roopa Peterson PTA Physical Therapy Assistant               Mobility     Row Name 11/22/21 1728          Bed Mobility    Supine-Sit Altus (Bed Mobility) contact guard; minimum assist (75% patient effort)  -     Sit-Supine Altus (Bed Mobility) minimum assist (75% patient effort); verbal cues  -     Assistive Device (Bed Mobility) head of bed elevated  -     Comment (Bed Mobility) slight HOB elevation, but did not use rail, req more assist  -     Row Name 11/22/21 1728          Sit-Stand Transfer    Sit-Stand Altus (Transfers) contact guard; verbal cues  cues for hand placement  -     Assistive Device (Sit-Stand Transfers) walker, front-wheeled  -     Row Name 11/22/21 1728          Gait/Stairs (Locomotion)    Altus Level (Gait) contact guard; verbal cues  -     Assistive Device (Gait) walker, front-wheeled  -     Distance in Feet (Gait) 85ft, pain limiting, pt also fatigued at end of session  -     Deviations/Abnormal Patterns (Gait) corinne decreased; base of support, wide  -     Bilateral Gait Deviations forward flexed posture  -     Row Name 11/22/21 1728          Mobility    Extremity Weight-bearing Status right lower extremity  -     Right Lower Extremity (Weight-bearing Status) partial weight-bearing (PWB)  -           User Key  (r) = Recorded By, (t) = Taken By, (c) = Cosigned By    Initials Name Provider Type    Roopa Peterson PTA Physical Therapy Assistant               Obj/Interventions     Row Name 11/22/21 1730          Motor Skills    Therapeutic Exercise --  THR protocol x15 reps w/assist hip abd/add  -           User Key  (r) = Recorded By, (t) = Taken By, (c) = Cosigned By    Initials Name Provider Type    RILEY Hood  MELVA Blas Physical Therapy Assistant               Goals/Plan    No documentation.                Clinical Impression     [facility] Name 11/22/21 1731          Pain    Additional Documentation Pain Scale: Numbers Pre/Post-Treatment (Group)  -[facility] Name 11/22/21 1731          Pain Scale: Numbers Pre/Post-Treatment    Pretreatment Pain Rating 4/10  -     Posttreatment Pain Rating 7/10  w/HS  -     Pain Location - Side Right  -     Pain Location hip  -     Pain Intervention(s) Medication (See MAR); Repositioned; Rest  -[facility] Name 11/22/21 1731          Plan of Care Review    Plan of Care Reviewed With patient; spouse  -     Outcome Summary Pt agreed to PT session, pt amb 85ft this session, c/o incr fatigue and pn incr w/amb to 7/10, pt maintained PWB throughout, but fatigued by return to room; plans SNU at [place] as pt not indep w/tfers and husb cannot physically assist at home  -[facility] Name 11/22/21 1731          Therapy Assessment/Plan (PT)    Criteria for Skilled Interventions Met (PT) yes  -[facility] Name 11/22/21 1731          Vital Signs    O2 Delivery Pre Treatment room air  -[facility] Name 11/22/21 1731          Positioning and Restraints    Pre-Treatment Position in bed  -     Post Treatment Position bed  -     In Bed supine; call light within reach; encouraged to call for assist; exit alarm on; with family/caregiver; notified [facility]           User Key  (r) = Recorded By, (t) = Taken By, (c) = Cosigned By    Initials Name Provider Type     Roopa Hood PTA Physical Therapy Assistant               Outcome Measures     [facility] Name 11/22/21 1734          How much help from another person do you currently need...    Turning from your back to your side while in flat bed without using bedrails? 3  -JM     Moving from lying on back to sitting on the side of a flat bed without bedrails? 3  -JM     Moving to and from a bed to a chair (including a wheelchair)? 3  -JM     Standing up from a  chair using your arms (e.g., wheelchair, bedside chair)? 3  -RILEY     Climbing 3-5 steps with a railing? 1  -RILEY     To walk in hospital room? 3  -RILEY     AM-PAC 6 Clicks Score (PT) 16  -RILEY           User Key  (r) = Recorded By, (t) = Taken By, (c) = Cosigned By    Initials Name Provider Type    Roopa Peterson PTA Physical Therapy Assistant                             Physical Therapy Education                 Title: PT OT SLP Therapies (Done)     Topic: Physical Therapy (Done)     Point: Mobility training (Done)     Learning Progress Summary           Patient Eager, E,TB,D, VU by RILEY at 11/22/2021 1735    Acceptance, E,TB, VU,NR by JR at 11/21/2021 1250    Acceptance, E,TB, VU,DU by JR at 11/20/2021 1233    Acceptance, E, VU by DB at 11/19/2021 1258    Acceptance, E, VU by CASSANDRA at 11/18/2021 1624    Acceptance, E, VU by DB at 11/17/2021 0944                   Point: Home exercise program (Done)     Learning Progress Summary           Patient Eager, E,TB,D, VU by RILEY at 11/22/2021 1735    Acceptance, E,TB, VU,NR by JR at 11/21/2021 1250    Acceptance, E,TB, VU,DU by JR at 11/20/2021 1233    Acceptance, E, VU by DB at 11/19/2021 1258    Acceptance, E, VU by CASSANDRA at 11/18/2021 1624    Acceptance, E, VU by DB at 11/17/2021 0944                   Point: Body mechanics (Done)     Learning Progress Summary           Patient Eager, E,TB,D, VU by RILEY at 11/22/2021 1735    Acceptance, E,TB, VU,NR by JR at 11/21/2021 1250    Acceptance, E, VU by CASSANDRA at 11/19/2021 1258    Acceptance, E, VU by CASSANDRA at 11/18/2021 1624    Acceptance, E, VU by DB at 11/17/2021 0944                   Point: Precautions (Done)     Learning Progress Summary           Patient Eager, E,TB,D, VU by RILEY at 11/22/2021 1735    Acceptance, E,TB, VU,NR by JR at 11/21/2021 1250    Acceptance, E,TB, VU,DU by RJ at 11/20/2021 1233    Acceptance, E, VU by DB at 11/19/2021 1258    Acceptance, E, VU by DB at 11/18/2021 1624    Acceptance, E, VU by DB at 11/17/2021 0960                                User Key     Initials Effective Dates Name Provider Type Discipline     03/07/18 -  Roopa Hood PTA Physical Therapy Assistant PT    JR 05/19/21 -  Luz Elena Love, PT Physical Therapist PT    DB 06/16/21 -  Audra Gilliam, PT Physical Therapist PT              PT Recommendation and Plan     Plan of Care Reviewed With: patient, spouse  Outcome Summary: Pt agreed to PT session, pt amb 85ft this session, c/o incr fatigue and pn incr w/amb to 7/10, pt maintained PWB throughout, but fatigued by return to room; plans SNU at [place] as pt not indep w/tfers and husb cannot physically assist at home     Time Calculation:    PT Charges     Row Name 11/22/21 1736             Time Calculation    Start Time 1435  -      Stop Time 1508  -      Time Calculation (min) 33 min  -      PT Received On 11/22/21  -RILEY      PT - Next Appointment 11/23/21  -RILEY            User Key  (r) = Recorded By, (t) = Taken By, (c) = Cosigned By    Initials Name Provider Type     Roopa Hood PTA Physical Therapy Assistant              Therapy Charges for Today     Code Description Service Date Service Provider Modifiers Qty    16791699780 HC PT THER PROC EA 15 MIN 11/22/2021 Roopa Hood PTA GP 2          PT G-Codes  Outcome Measure Options: AM-PAC 6 Clicks Basic Mobility (PT)  AM-PAC 6 Clicks Score (PT): 16    Roopa Hood PTA  11/22/2021

## 2021-11-22 NOTE — PROGRESS NOTES
"DAILY PROGRESS NOTE  [facility]    Patient Identification:  Name: Stephy Duncan  Age: 66 y.o.  Sex: female  :  1955  MRN: [id number]         Primary Care Physician: Gregorio Rosales      Subjective  Patient complains of diarrhea. 2 episodes yesterday and one this morning. Loose. Again she could not otherwise characterize the stools. RN yesterday noted that there were light brown. No blood or mucus.    Objective:  General Appearance:  Comfortable, well-appearing, in no acute distress and not in pain.    Vital signs: (most recent): Blood pressure 139/58, pulse 62, temperature 97.3 °F (36.3 °C), temperature source Skin, resp. rate 16, height 162.6 cm (64\"), weight 103 kg (226 lb 3.1 oz), SpO2 97 %.    Lungs:  Normal effort and normal respiratory rate.  Breath sounds clear to auscultation.    Heart: Normal rate.  Regular rhythm.    Extremities: There is no dependent edema.    Neurological: Patient is alert and oriented to person, place and time.    Skin:  Warm and dry.                Vital signs in last 24 hours:  Temp:  [96.5 °F (35.8 °C)-97.3 °F (36.3 °C)] 97.3 °F (36.3 °C)  Heart Rate:  [59-67] 62  Resp:  [16-18] 16  BP: ()/(55-60) 139/58    Intake/Output:    Intake/Output Summary (Last 24 hours) at 2021 0923  Last data filed at 2021 0852  Gross per 24 hour   Intake 1080 ml   Output --   Net 1080 ml         Results from last 7 days   Lab Units 21  0500 21  0421 21  0639 21  0618 21  1049 21  0523 21  1503 21  0615   WBC 10*3/mm3  --  7.41 7.30 9.00  --  9.17  --   --    HEMOGLOBIN g/dL 9.1* 8.5* 8.6* 9.1*  --  7.8* 7.8* 8.1*   PLATELETS 10*3/mm3  --  132* 129*  129* 117* 123* 84*  --   --      Results from last 7 days   Lab Units 21  0421 21  0639 21  0618 21  0523 21  0614   SODIUM mmol/L 138 138 132* 131* 135*   POTASSIUM mmol/L 3.8 3.9 3.9 4.0 4.5   CHLORIDE mmol/L 110* 110* 104 101 105 "   CO2 mmol/L 20.5* 18.8* 17.6* 17.5* 21.0*   BUN mg/dL 15 22 35* 29* 22   CREATININE mg/dL 0.78 0.97 1.54* 1.89* 1.32*   GLUCOSE mg/dL 107* 168* 185* 213* 182*   Estimated Creatinine Clearance: 78.6 mL/min (by C-G formula based on SCr of 0.78 mg/dL).  Results from last 7 days   Lab Units 11/21/21  0421 11/20/21  0639 11/19/21  0618 11/18/21  0523 11/17/21  0614   CALCIUM mg/dL 8.4* 8.5* 8.1* 7.7* 8.0*   ALBUMIN g/dL  --  2.90* 3.10*  --   --      Results from last 7 days   Lab Units 11/20/21  0639 11/19/21  0618   ALBUMIN g/dL 2.90* 3.10*   BILIRUBIN mg/dL 0.8 0.7   ALK PHOS U/L 63 62   AST (SGOT) U/L 12 18   ALT (SGPT) U/L 7 7       Assessment:  Anemia-multifactorial-acute blood loss anemia/iron deficiency anemia (severe)/anemia of chronic disease:  Last transfusion 11/18/2021.    Status post iron sucrose 300 mg IV x3.   Hemoglobin now improving on its own. Resume oral iron when she has completed her course of doxycycline.  Recommend outpatient evaluation for cause of iron deficiency.    Thrombocytopenia:  Chronic mild thrombocytopenia with acute exacerbation postop.  Platelet count now back to baseline.    Mild increase in INR and PTT:   Much improved.  PT-PTT near normal yesterday. Still a fair amount of drainage from the surgical wound. We will recheck coags in the morning if she is still here..  THALIA: Resolved.   Cozaar resumed.  Hyponatremia: Resolved  Diabetes type 2: Associated with significant insulin resistance.    Blood glucose control improved.  Diarrhea/nausea: No fever, no leukocytosis, no blood or mucus in the stool.  No clinical  indication of C. difficile.  Common side effect of doxycycline however.  Discontinue when okay with orthopedic surgery. Nausea component improved today. Symptomatic treatment of diarrhea.  History of septic arthritis:  ID consult noted.    Plan:  See above.    Arturo Oshea MD  11/22/2021  09:23 EST

## 2021-11-22 NOTE — CONSULTS
Referring Provider: Karri Schaeffer II, MD  [address] [facility]  [address]  [place] [id number]  Reason for Consultation: Right hip drainage    Subjective   History of present illness: This is a 66-year-old female with diabetes coronary artery disease hypertension and a history of right prosthetic hip septic arthritis who is followed by Edgar TIJERINA who was admitted on November 16 for right hip revision.  Patient developed a right prosthetic hip septic arthritis back in July 2020.  Cultures at that time grew VRE the patient received daptomycin.  She had revision surgery with Girdlestone procedure performed in September 2020.  Cultures at that time grew Laura glabrata and the patient was treated with micafungin.  She was also treated with cefepime.  After treatment she was placed on suppressive tedizolid.  She was switched to ampicillin 500 mg p.o. twice daily suppressive therapy in August of this year.  She was seen in the orthopedic surgery for consideration of having her hardware put back in.  Prior to surgery extensive work-up to rule out infection was done including hip aspiration which was negative.  She was admitted to the hospital and underwent right total hip revision on November 16.  There was no concern for infection per the operative note and no cultures were obtained.  Postoperatively the patient is experiencing drainage from the hip and orthopedic surgeries questing oral antibiotic recommendations.  He had an incisional wound VAC placed on November 19.    Past Medical History:   Diagnosis Date   • Arthritis    • Cardiac murmur    • Diabetes mellitus (HCC)    • GERD (gastroesophageal reflux disease)    • CAD 12/2020   • History of cervical cancer 1981   • Hyperlipidemia    • Hypertension    • Osteomyelitis hip (HCC)     RIGHT       Past Surgical History:   Procedure Laterality Date   • CARDIAC CATHETERIZATION     • CERVICAL CONIZATION     • COLONOSCOPY     • CORONARY ANGIOPLASTY WITH STENT  PLACEMENT     • ENDOSCOPY     • HIP ARTHROPLASTY Right    • HIP HARDWARE REMOVAL     • HIP SPACER INSERTION WITH ANTIBIOTIC CEMENT      X3   • HYSTERECTOMY     • KNEE ARTHROPLASTY Left    • LAPAROSCOPIC CHOLECYSTECTOMY     • TONSILLECTOMY     • TOTAL HIP ARTHROPLASTY REVISION Right 11/16/2021    Procedure: TOTAL HIP REVISION ARTHROPLASTY RIGHT POSTERIOR;  Surgeon: Karri Schaeffer II, MD;  Location: [facility];  Service: Orthopedics;  Laterality: Right;        reports that she quit smoking about 5 years ago. Her smoking use included cigarettes. She quit after 45.00 years of use. She has never used smokeless tobacco. She reports that she does not drink alcohol and does not use drugs.    family history is not on file.    Allergies   Allergen Reactions   • Ace Inhibitors Shortness Of Breath and Swelling   • Morphine Shortness Of Breath       Medication:  Antibiotics:  None    Please refer to the medical record for a full medication list    Review of Systems  Pertinent items are noted in HPI, all other systems reviewed and negative    Objective   Vital Signs   Temp:  [96.5 °F (35.8 °C)-97.3 °F (36.3 °C)] 97.3 °F (36.3 °C)  Heart Rate:  [59-67] 62  Resp:  [16-18] 16  BP: ()/(55-60) 139/58    Physical Exam:   General: In no acute distress  HEENT: Normocephalic, atraumatic,  no scleral icterus.   Neck: Supple, trachea is midline  Cardiovascular: Normal rate, regular rhythm, normal S1 and S2, no murmurs, rubs, or gallops   Respiratory: Lungs are clear to auscultation bilaterally, no wheezing  GI: Abdomen is soft, nontender, nondistended, positive bowel sounds bilaterall  Musculoskeletal: Wound VAC in place  Skin: No rashes   Extremities: No E/C/C  Neurological: Alert and oriented, moving all 4 extremities  Psychiatric: Normal mood and affect     Results Review:   I reviewed the patient's new clinical results.  I reviewed the patient's new imaging results and agree with the interpretation.    Lab Results    Component Value Date    WBC 7.41 11/21/2021    HGB 9.1 (L) 11/22/2021    HCT 27.8 (L) 11/22/2021    MCV 87.9 11/21/2021     (L) 11/21/2021       Lab Results   Component Value Date    GLUCOSE 107 (H) 11/21/2021    BUN 15 11/21/2021    CREATININE 0.78 11/21/2021    EGFRIFNONA 73 11/21/2021    BCR 19.2 11/21/2021    CO2 20.5 (L) 11/21/2021    CALCIUM 8.4 (L) 11/21/2021    ALBUMIN 2.90 (L) 11/20/2021    LABIL2 1.2 (L) 10/24/2020    AST 12 11/20/2021    ALT 7 11/20/2021       Microbiology:  11/13 COVID neg    Radiology:  X-rays of the pelvis shows right hip arthroplasty hardware in place adjacent surgical soft tissue gas.  No fracture    Assessment/Plan   History of right hip septic arthritis requiring multiple surgeries including Girdlestone procedure status post multiple courses of antibiotics followed by Edgar infectious disease   Status post right total hip revision on November 16 with serosanguineous drainage from incision  THALIA    Right hip cultures in the past have grown VRE and Candida based on review of Edgar ID note.  Having said that though a thorough work-up prior to procedure was done with no evidence of persistent infection.  Doing copious drainage orthopedic surgery is requesting oral antibiotic.  Doxycycline is certainly a good choice but I see that it was discontinued due to concerns for inducing diarrhea.  Another possibility would be Bactrim double strength 1 tab p.o. twice daily.  If this is continued for more than a week the patient will need to have blood work checked to make sure she is not experiencing any side effects of Bactrim.    Thank you for this consult ID will sign off.  Please do not hesitate to call us with further questions or concerns    I discussed the patient's findings and my recommendations with patient, family and nursing staff

## 2021-11-22 NOTE — PROGRESS NOTES
"Patient has had continued serosanguineous drainage from the incision.  An incisional wound VAC was placed over the weekend.  I did place her on doxycycline for prophylaxis.  She has a history of multiple surgeries on the right hip with an eventual Girdlestone for recalcitrant infection.  She just had takedown of her Girdlestone last week with placement of a total hip arthroplasty after a thorough preoperative work-up which did not demonstrate any sort of infection.  I am worried about the amount of dissection I had to do to free up the tissues that this has caused a lot of drainage and the drainage could lead to recurrent infection.  Therefore, I have asking for the opinion of infectious disease for an antibiotic to help cover her while we get control of the drainage.  If she continues to drain over the next week or 2, I may have to take her back to the OR for irrigation and debridement.  However, I would probably let her go to rehab today or tomorrow if we can get her on an antibiotic.    R \"Kilo\" Laury BAXTER MD  Orthopaedic Surgery  [facility]  [phone] - [place] Office  [phone] - [place] Office    "

## 2021-11-22 NOTE — CASE MANAGEMENT/SOCIAL WORK
Continued Stay Note  [facility]     Patient Name: Stephy Duncan  MRN: [id number]  Today's Date: 11/22/2021    Admit Date: 11/16/2021     Discharge Plan     Row Name 11/22/21 1152       Plan    Plan [facility] -- Accepted.    Patient/Family in Agreement with Plan yes    Plan Comments ID consult noted. Spoke with Tha/Jackie who said he will have a SNF bed for her tomorrow. Tha is aware that the patient may be on IV antibiotics after d/c. Await ID's recommendations.               Discharge Codes    No documentation.                     Lucy Meeks RN

## 2021-11-22 NOTE — PLAN OF CARE
Goal Outcome Evaluation:  Plan of Care Reviewed With: patient, spouse           Outcome Summary: Pt agreed to PT session, pt amb 85ft this session, c/o incr fatigue and pn incr w/amb to 7/10, pt maintained PWB throughout, but fatigued by return to room; plans SNU at DC as pt not indep w/tfers and husb cannot physically assist at home      Patient was wearing a face mask during this therapy encounter. Therapist used appropriate personal protective equipment including eye protection, mask, and gloves.  Mask used was standard procedure mask. Appropriate PPE was worn during the entire therapy session. Hand hygiene was completed before and after therapy session. Patient is not in enhanced droplet precautions.

## 2021-11-22 NOTE — NURSING NOTE
CWOCN- follow up on NPWT. Removed black sponge. Incision continues to be intact. No open areas. There is a little bruising on the mid to proximal edges of the incision. When pushing gently along incision there is drainage noted at the distal end. There is serosanguinous drainage in the cannister. The vac is definitely helping to manage the drainage. Replaced black sponge over incision line. Patient tolerated well.   11/22/21 1020   Wound 11/16/21 1328 Right anterior greater trochanter Incision   Placement Date/Time: 11/16/21 1328   Present on Hospital Admission: No  Side: Right  Orientation: anterior  Location: greater trochanter  Primary Wound Type: Incision   Dressing Appearance intact   Closure Approximated; Staples   Periwound intact   Periwound Temperature warm   Periwound Skin Turgor soft   Drainage Characteristics/Odor serosanguineous   Care, Wound cleansed with; sterile normal saline; negative pressure wound therapy   Dressing Care dressing changed   NPWT (Negative Pressure Wound Therapy) 11/19/21 1101 right hip   Placement Date/Time: 11/19/21 1101   Location: right hip  Additional Comments: incision vac   Therapy Setting continuous therapy   Dressing foam, black   Pressure Setting 125 mmHg   Sponges Inserted 1  (over incision)   Sponges Removed 1

## 2021-11-22 NOTE — PLAN OF CARE
Goal Outcome Evaluation:  Plan of Care Reviewed With: patient        Progress: improving  Outcome Summary: patient resting comfortably between care, voiding without difficulty, pain controlled with PO medication, plans to d/c dieudonne Real 11/22/21, educated on b/p and glucose monitoring

## 2021-11-23 LAB
ANION GAP SERPL CALCULATED.3IONS-SCNC: 8.6 MMOL/L (ref 5–15)
BASOPHILS # BLD MANUAL: 0.08 10*3/MM3 (ref 0–0.2)
BASOPHILS NFR BLD MANUAL: 1 % (ref 0–1.5)
BUN SERPL-MCNC: 10 MG/DL (ref 8–23)
BUN/CREAT SERPL: 13.5 (ref 7–25)
CALCIUM SPEC-SCNC: 9.2 MG/DL (ref 8.6–10.5)
CHLORIDE SERPL-SCNC: 109 MMOL/L (ref 98–107)
CO2 SERPL-SCNC: 20.4 MMOL/L (ref 22–29)
CREAT SERPL-MCNC: 0.74 MG/DL (ref 0.57–1)
DEPRECATED RDW RBC AUTO: 46.2 FL (ref 37–54)
EOSINOPHIL # BLD MANUAL: 0.38 10*3/MM3 (ref 0–0.4)
EOSINOPHIL NFR BLD MANUAL: 5 % (ref 0.3–6.2)
ERYTHROCYTE [DISTWIDTH] IN BLOOD BY AUTOMATED COUNT: 14.4 % (ref 12.3–15.4)
GFR SERPL CREATININE-BSD FRML MDRD: 78 ML/MIN/1.73
GLUCOSE BLDC GLUCOMTR-MCNC: 108 MG/DL (ref 70–130)
GLUCOSE BLDC GLUCOMTR-MCNC: 139 MG/DL (ref 70–130)
GLUCOSE BLDC GLUCOMTR-MCNC: 144 MG/DL (ref 70–130)
GLUCOSE BLDC GLUCOMTR-MCNC: 208 MG/DL (ref 70–130)
GLUCOSE SERPL-MCNC: 111 MG/DL (ref 65–99)
HCT VFR BLD AUTO: 27.9 % (ref 34–46.6)
HGB BLD-MCNC: 8.8 G/DL (ref 12–15.9)
INR PPP: 1.11 (ref 0.9–1.1)
LYMPHOCYTES # BLD MANUAL: 1.13 10*3/MM3 (ref 0.7–3.1)
LYMPHOCYTES NFR BLD MANUAL: 7 % (ref 5–12)
MCH RBC QN AUTO: 27.9 PG (ref 26.6–33)
MCHC RBC AUTO-ENTMCNC: 31.5 G/DL (ref 31.5–35.7)
MCV RBC AUTO: 88.6 FL (ref 79–97)
METAMYELOCYTES NFR BLD MANUAL: 2 % (ref 0–0)
MONOCYTES # BLD: 0.53 10*3/MM3 (ref 0.1–0.9)
MYELOCYTES NFR BLD MANUAL: 1 % (ref 0–0)
NEUTROPHILS # BLD AUTO: 5.2 10*3/MM3 (ref 1.7–7)
NEUTROPHILS NFR BLD MANUAL: 69 % (ref 42.7–76)
PLAT MORPH BLD: NORMAL
PLATELET # BLD AUTO: 140 10*3/MM3 (ref 140–450)
PMV BLD AUTO: 9.7 FL (ref 6–12)
POTASSIUM SERPL-SCNC: 3.9 MMOL/L (ref 3.5–5.2)
PROTHROMBIN TIME: 14.1 SECONDS (ref 11.7–14.2)
RBC # BLD AUTO: 3.15 10*6/MM3 (ref 3.77–5.28)
RBC MORPH BLD: NORMAL
SMUDGE CELLS BLD QL SMEAR: ABNORMAL
SODIUM SERPL-SCNC: 138 MMOL/L (ref 136–145)
VARIANT LYMPHS NFR BLD MANUAL: 15 % (ref 19.6–45.3)
WBC NRBC COR # BLD: 7.53 10*3/MM3 (ref 3.4–10.8)

## 2021-11-23 PROCEDURE — 82962 GLUCOSE BLOOD TEST: CPT

## 2021-11-23 PROCEDURE — 85007 BL SMEAR W/DIFF WBC COUNT: CPT | Performed by: HOSPITALIST

## 2021-11-23 PROCEDURE — 63710000001 INSULIN LISPRO (HUMAN) PER 5 UNITS: Performed by: HOSPITALIST

## 2021-11-23 PROCEDURE — 63710000001 INSULIN LISPRO (HUMAN) PER 5 UNITS: Performed by: INTERNAL MEDICINE

## 2021-11-23 PROCEDURE — 85610 PROTHROMBIN TIME: CPT | Performed by: HOSPITALIST

## 2021-11-23 PROCEDURE — 63710000001 INSULIN GLARGINE PER 5 UNITS: Performed by: HOSPITALIST

## 2021-11-23 PROCEDURE — 25010000002 ONDANSETRON PER 1 MG: Performed by: ORTHOPAEDIC SURGERY

## 2021-11-23 PROCEDURE — 80048 BASIC METABOLIC PNL TOTAL CA: CPT | Performed by: HOSPITALIST

## 2021-11-23 PROCEDURE — 85025 COMPLETE CBC W/AUTO DIFF WBC: CPT | Performed by: HOSPITALIST

## 2021-11-23 RX ORDER — OXYCODONE HYDROCHLORIDE AND ACETAMINOPHEN 5; 325 MG/1; MG/1
2 TABLET ORAL EVERY 4 HOURS PRN
Status: DISCONTINUED | OUTPATIENT
Start: 2021-11-23 | End: 2021-11-24 | Stop reason: HOSPADM

## 2021-11-23 RX ORDER — OXYCODONE HYDROCHLORIDE AND ACETAMINOPHEN 5; 325 MG/1; MG/1
1 TABLET ORAL EVERY 4 HOURS PRN
Status: DISCONTINUED | OUTPATIENT
Start: 2021-11-23 | End: 2021-11-24 | Stop reason: HOSPADM

## 2021-11-23 RX ADMIN — SULFAMETHOXAZOLE AND TRIMETHOPRIM 1 TABLET: 400; 80 TABLET ORAL at 08:40

## 2021-11-23 RX ADMIN — METOPROLOL TARTRATE 37.5 MG: 25 TABLET, FILM COATED ORAL at 08:39

## 2021-11-23 RX ADMIN — OXYCODONE AND ACETAMINOPHEN 2 TABLET: 5; 325 TABLET ORAL at 08:39

## 2021-11-23 RX ADMIN — SULFAMETHOXAZOLE AND TRIMETHOPRIM 1 TABLET: 400; 80 TABLET ORAL at 22:11

## 2021-11-23 RX ADMIN — CYCLOBENZAPRINE HYDROCHLORIDE 10 MG: 10 TABLET, FILM COATED ORAL at 22:09

## 2021-11-23 RX ADMIN — GABAPENTIN 300 MG: 300 CAPSULE ORAL at 22:10

## 2021-11-23 RX ADMIN — ONDANSETRON 4 MG: 2 INJECTION INTRAMUSCULAR; INTRAVENOUS at 10:23

## 2021-11-23 RX ADMIN — INSULIN LISPRO 10 UNITS: 100 INJECTION, SOLUTION INTRAVENOUS; SUBCUTANEOUS at 08:39

## 2021-11-23 RX ADMIN — GABAPENTIN 300 MG: 300 CAPSULE ORAL at 08:40

## 2021-11-23 RX ADMIN — INSULIN LISPRO 10 UNITS: 100 INJECTION, SOLUTION INTRAVENOUS; SUBCUTANEOUS at 10:46

## 2021-11-23 RX ADMIN — GABAPENTIN 300 MG: 300 CAPSULE ORAL at 16:40

## 2021-11-23 RX ADMIN — OXYCODONE AND ACETAMINOPHEN 2 TABLET: 5; 325 TABLET ORAL at 23:45

## 2021-11-23 RX ADMIN — INSULIN GLARGINE 60 UNITS: 100 INJECTION, SOLUTION SUBCUTANEOUS at 22:11

## 2021-11-23 RX ADMIN — INSULIN LISPRO 10 UNITS: 100 INJECTION, SOLUTION INTRAVENOUS; SUBCUTANEOUS at 16:40

## 2021-11-23 RX ADMIN — MUPIROCIN 1 APPLICATION: 20 OINTMENT TOPICAL at 08:40

## 2021-11-23 RX ADMIN — INSULIN LISPRO 4 UNITS: 100 INJECTION, SOLUTION INTRAVENOUS; SUBCUTANEOUS at 10:45

## 2021-11-23 RX ADMIN — MUPIROCIN 1 APPLICATION: 20 OINTMENT TOPICAL at 22:09

## 2021-11-23 RX ADMIN — LOSARTAN POTASSIUM 25 MG: 25 TABLET, FILM COATED ORAL at 08:39

## 2021-11-23 RX ADMIN — LOSARTAN POTASSIUM 25 MG: 25 TABLET, FILM COATED ORAL at 22:11

## 2021-11-23 RX ADMIN — Medication 1000 MCG: at 08:40

## 2021-11-23 RX ADMIN — CYCLOBENZAPRINE HYDROCHLORIDE 10 MG: 10 TABLET, FILM COATED ORAL at 08:40

## 2021-11-23 RX ADMIN — METOPROLOL TARTRATE 37.5 MG: 25 TABLET, FILM COATED ORAL at 22:09

## 2021-11-23 RX ADMIN — FAMOTIDINE 20 MG: 20 TABLET, FILM COATED ORAL at 08:42

## 2021-11-23 NOTE — CASE MANAGEMENT/SOCIAL WORK
Continued Stay Note  [facility]     Patient Name: Stephy Duncan  MRN: [id number]  Today's Date: 11/23/2021    Admit Date: 11/16/2021     Discharge Plan     Row Name 11/23/21 1457       Plan    Plan [facility] -- Accepted.    Patient/Family in Agreement with Plan yes    Plan Comments Dr. Schaeffer's plan to have the SNF WoundVac placed at [facility] prior to d/c noted. Discussed with CHET Briones/WoundNurse. Spoke with Tha/Jackie who said they use [facility]. Discussed case with Luli/RABIA. Plan at this point is for [facility] allocate a WoundVac to [facility]. [facility] will apply the Vac to the patient prior to d/c where the Vac will be subsequently transferred with the patient to SNF. Vac will stay with the patient until the treatment course is completed per Ortho. Luli will f/u with [facility] to confirm if this plan is possible. Await her call back.               Discharge Codes    No documentation.                     Lucy Meeks RN

## 2021-11-23 NOTE — PROGRESS NOTES
"DAILY PROGRESS NOTE  [facility]    Patient Identification:  Name: Stephy Duncan  Age: 66 y.o.  Sex: female  :  1955  MRN: [id number]         Primary Care Physician: Gregorio Rosales    Subjective:  Interval History:She says the diarrhea better.    Objective:    Scheduled Meds:droperidol, 0.625 mg, Intravenous, Once  erythromycin, , Right Eye, Q12H  famotidine, 20 mg, Oral, Daily  [START ON 2021] ferrous sulfate, 325 mg, Oral, Daily With Breakfast  gabapentin, 300 mg, Oral, TID  insulin glargine, 60 Units, Subcutaneous, Nightly  insulin lispro, 0-9 Units, Subcutaneous, TID AC  insulin lispro, 10 Units, Subcutaneous, TID With Meals  losartan, 25 mg, Oral, BID  metoprolol tartrate, 37.5 mg, Oral, BID  mupirocin, , Each Nare, BID  sulfamethoxazole-trimethoprim, 1 tablet, Oral, Q12H  vitamin B-12, 1,000 mcg, Oral, Daily      Continuous Infusions:lactated ringers, 9 mL/hr, Last Rate: 9 mL/hr (21 1133)        Vital signs in last 24 hours:  Temp:  [97.1 °F (36.2 °C)-98.2 °F (36.8 °C)] 97.3 °F (36.3 °C)  Heart Rate:  [60-75] 75  Resp:  [16] 16  BP: (102-148)/(56-66) 145/66    Intake/Output:    Intake/Output Summary (Last 24 hours) at 2021 1420  Last data filed at 2021 1300  Gross per 24 hour   Intake 1500 ml   Output 375 ml   Net 1125 ml       Exam:  /66 (BP Location: Left arm, Patient Position: Sitting)   Pulse 75   Temp 97.3 °F (36.3 °C) (Skin)   Resp 16   Ht 162.6 cm (64\")   Wt 103 kg (226 lb 3.1 oz)   SpO2 97%   BMI 38.83 kg/m²     General Appearance:    Alert, cooperative, no distress   Head:    Normocephalic, without obvious abnormality, atraumatic   Eyes:       Throat:   Lips, tongue, gums normal   Neck:   Supple, symmetrical, trachea midline, no JVD   Lungs:     Clear to auscultation bilaterally, respirations unlabored   Chest Wall:    No tenderness or deformity    Heart:    Regular rate and rhythm, S1 and S2 normal, no murmur,no  Rub or gallop "   Abdomen:     Soft, nontender, bowel sounds active, no masses, no organomegaly    Extremities:   Extremities normal, atraumatic, no cyanosis or edema   Pulses:      Skin:   Skin is warm and dry,  no rashes or palpable lesions   Neurologic:   no focal deficits noted      Lab Results (last 72 hours)     Procedure Component Value Units Date/Time    POC Glucose Once [[id number]]  (Abnormal) Collected: 11/23/21 1034    Specimen: Blood Updated: 11/23/21 1039     Glucose 208 mg/dL      Comment: Meter: [id number] : [id number] Blas Millan CNA       Manual Differential [[id number]]  (Abnormal) Collected: 11/23/21 0542    Specimen: Blood Updated: 11/23/21 0830     Neutrophil % 69.0 %      Lymphocyte % 15.0 %      Monocyte % 7.0 %      Eosinophil % 5.0 %      Basophil % 1.0 %      Metamyelocyte % 2.0 %      Myelocyte % 1.0 %      Neutrophils Absolute 5.20 10*3/mm3      Lymphocytes Absolute 1.13 10*3/mm3      Monocytes Absolute 0.53 10*3/mm3      Eosinophils Absolute 0.38 10*3/mm3      Basophils Absolute 0.08 10*3/mm3      RBC Morphology Normal     Smudge Cells Slight/1+     Platelet Morphology Normal    CBC & Differential [[id number]]  (Abnormal) Collected: 11/23/21 0542    Specimen: Blood Updated: 11/23/21 0830    Narrative:      The following orders were created for panel order CBC & Differential.  Procedure                               Abnormality         Status                     ---------                               -----------         ------                     CBC Auto Differential[[id number]]        Abnormal            Final result                 Please view results for these tests on the individual orders.    CBC Auto Differential [[id number]]  (Abnormal) Collected: 11/23/21 0542    Specimen: Blood Updated: 11/23/21 0830     WBC 7.53 10*3/mm3      RBC 3.15 10*6/mm3      Hemoglobin 8.8 g/dL      Hematocrit 27.9 %      MCV 88.6 fL      MCH 27.9 pg      MCHC 31.5 g/dL      RDW 14.4 %      RDW-SD 46.2 fl       MPV 9.7 fL      Platelets 140 10*3/mm3     Basic Metabolic Panel [[id number]]  (Abnormal) Collected: 11/23/21 0542    Specimen: Blood Updated: 11/23/21 0742     Glucose 111 mg/dL      BUN 10 mg/dL      Creatinine 0.74 mg/dL      Sodium 138 mmol/L      Potassium 3.9 mmol/L      Chloride 109 mmol/L      CO2 20.4 mmol/L      Calcium 9.2 mg/dL      eGFR Non African Amer 78 mL/min/1.73      BUN/Creatinine Ratio 13.5     Anion Gap 8.6 mmol/L     Narrative:      GFR Normal >60  Chronic Kidney Disease <60  Kidney Failure <15      Protime-INR [[id number]]  (Abnormal) Collected: 11/23/21 0542    Specimen: Blood Updated: 11/23/21 0713     Protime 14.1 Seconds      INR 1.11    POC Glucose Once [[id number]]  (Normal) Collected: 11/23/21 0611    Specimen: Blood Updated: 11/23/21 0613     Glucose 108 mg/dL      Comment: Meter: [id number] : [id number] Farley Eileen NA       POC Glucose Once [[id number]]  (Abnormal) Collected: 11/22/21 2026    Specimen: Blood Updated: 11/22/21 2028     Glucose 192 mg/dL      Comment: Meter: [id number] : [id number] Farley Eileen NA       POC Glucose Once [[id number]]  (Abnormal) Collected: 11/22/21 1605    Specimen: Blood Updated: 11/22/21 1612     Glucose 183 mg/dL      Comment: Meter: [id number] : [id number] Blas Yana CNA       POC Glucose Once [[id number]]  (Abnormal) Collected: 11/22/21 1059    Specimen: Blood Updated: 11/22/21 1102     Glucose 184 mg/dL      Comment: Meter: [id number] : [id number] Blas Yana CNA       POC Glucose Once [[id number]]  (Normal) Collected: 11/22/21 0607    Specimen: Blood Updated: 11/22/21 0608     Glucose 116 mg/dL      Comment: Meter: [id number] : [id number] Anderson CUNNINGHAM       Hemoglobin & Hematocrit, Blood [[id number]]  (Abnormal) Collected: 11/22/21 0500    Specimen: Blood Updated: 11/22/21 0602     Hemoglobin 9.1 g/dL      Hematocrit 27.8 %     POC Glucose Once [[id number]]  (Abnormal) Collected: 11/21/21 2111    Specimen:  Blood Updated: 11/21/21 2112     Glucose 200 mg/dL      Comment: Meter: [id number] : [id number] Anderson Shoshana NA       POC Glucose Once [[id number]]  (Abnormal) Collected: 11/21/21 1616    Specimen: Blood Updated: 11/21/21 1617     Glucose 132 mg/dL      Comment: Meter: [id number] : [id number] Sarkis DeaShanae PCA       POC Glucose Once [[id number]]  (Abnormal) Collected: 11/21/21 1055    Specimen: Blood Updated: 11/21/21 1057     Glucose 192 mg/dL      Comment: Meter: [id number] : [id number] Sarkis DeaShanae PCA       POC Glucose Once [[id number]]  (Normal) Collected: 11/21/21 0615    Specimen: Blood Updated: 11/21/21 0617     Glucose 113 mg/dL      Comment: Meter: [id number] : [id number] Anderson Shoshana NA       Basic Metabolic Panel [[id number]]  (Abnormal) Collected: 11/21/21 0421    Specimen: Blood Updated: 11/21/21 0541     Glucose 107 mg/dL      BUN 15 mg/dL      Creatinine 0.78 mg/dL      Sodium 138 mmol/L      Potassium 3.8 mmol/L      Chloride 110 mmol/L      CO2 20.5 mmol/L      Calcium 8.4 mg/dL      eGFR Non African Amer 73 mL/min/1.73      BUN/Creatinine Ratio 19.2     Anion Gap 7.5 mmol/L     Narrative:      GFR Normal >60  Chronic Kidney Disease <60  Kidney Failure <15      Protime-INR [[id number]]  (Abnormal) Collected: 11/21/21 0421    Specimen: Blood Updated: 11/21/21 0529     Protime 14.7 Seconds      INR 1.17    CBC & Differential [[id number]]  (Abnormal) Collected: 11/21/21 0421    Specimen: Blood Updated: 11/21/21 0521    Narrative:      The following orders were created for panel order CBC & Differential.  Procedure                               Abnormality         Status                     ---------                               -----------         ------                     CBC Auto Differential[[id number]]        Abnormal            Final result                 Please view results for these tests on the individual orders.    CBC Auto Differential [[id number]]  (Abnormal)  Collected: 11/21/21 0421    Specimen: Blood Updated: 11/21/21 0521     WBC 7.41 10*3/mm3      RBC 3.05 10*6/mm3      Hemoglobin 8.5 g/dL      Hematocrit 26.8 %      MCV 87.9 fL      MCH 27.9 pg      MCHC 31.7 g/dL      RDW 14.2 %      RDW-SD 45.6 fl      MPV 9.7 fL      Platelets 132 10*3/mm3      Neutrophil % 61.6 %      Lymphocyte % 14.8 %      Monocyte % 13.1 %      Eosinophil % 5.1 %      Basophil % 0.7 %      Immature Grans % 4.7 %      Neutrophils, Absolute 4.56 10*3/mm3      Lymphocytes, Absolute 1.10 10*3/mm3      Monocytes, Absolute 0.97 10*3/mm3      Eosinophils, Absolute 0.38 10*3/mm3      Basophils, Absolute 0.05 10*3/mm3      Immature Grans, Absolute 0.35 10*3/mm3      nRBC 0.0 /100 WBC     POC Glucose Once [[id number]]  (Abnormal) Collected: 11/20/21 2102    Specimen: Blood Updated: 11/20/21 2104     Glucose 141 mg/dL      Comment: Meter: [id number] : [id number] Anderson CUNNING[place]       POC Glucose Once [[id number]]  (Abnormal) Collected: 11/20/21 1520    Specimen: Blood Updated: 11/20/21 1521     Glucose 163 mg/dL      Comment: Meter: [id number] : [id number] Raoul Colvin           Data Review:  Results from last 7 days   Lab Units 11/23/21  0542 11/21/21  0421 11/21/21  0421 11/20/21  0639 11/20/21  0639   SODIUM mmol/L 138  --  138  --  138   POTASSIUM mmol/L 3.9  --  3.8  --  3.9   CHLORIDE mmol/L 109*  --  110*  --  110*   CO2 mmol/L 20.4*  --  20.5*  --  18.8*   BUN mg/dL 10  --  15  --  22   CREATININE mg/dL 0.74  --  0.78  --  0.97   GLUCOSE mg/dL 111*   < > 107*   < > 168*   CALCIUM mg/dL 9.2  --  8.4*  --  8.5*    < > = values in this interval not displayed.     Results from last 7 days   Lab Units 11/23/21  0542 11/22/21  0500 11/21/21  0421 11/20/21  0639 11/20/21  0639   WBC 10*3/mm3 7.53  --  7.41  --  7.30   HEMOGLOBIN g/dL 8.8* 9.1* 8.5*   < > 8.6*   HEMATOCRIT % 27.9* 27.8* 26.8*   < > 26.1*   PLATELETS 10*3/mm3 140  --  132*  --  129*  129*    < > = values in this interval not  displayed.             Lab Results   Lab Value Date/Time    TROPONINT 0.20 (H) 12/18/2019 1439    TROPONINT 0.26 (H) 12/18/2019 0210    TROPONINT 0.10 12/17/2019 2010         Results from last 7 days   Lab Units 11/20/21  0639 11/19/21  0618   ALK PHOS U/L 63 62   BILIRUBIN mg/dL 0.8 0.7   ALT (SGPT) U/L 7 7   AST (SGOT) U/L 12 18             Glucose   Date/Time Value Ref Range Status   11/23/2021 1034 208 (H) 70 - 130 mg/dL Final     Comment:     Meter: [id number] : [id number] Blas Millan CNA   11/23/2021 0611 108 70 - 130 mg/dL Final     Comment:     Meter: [id number] : [id number] Farley Eileen NA   11/22/2021 2026 192 (H) 70 - 130 mg/dL Final     Comment:     Meter: [id number] : [id number] Farley Eileen NA   11/22/2021 1605 183 (H) 70 - 130 mg/dL Final     Comment:     Meter: [id number] : [id number] Blas Millan CNA   11/22/2021 1059 184 (H) 70 - 130 mg/dL Final     Comment:     Meter: [id number] : [id number] Blas Millan CNA   11/22/2021 0607 116 70 - 130 mg/dL Final     Comment:     Meter: [id number] : [id number] Anderson Shoshana NA   11/21/2021 2111 200 (H) 70 - 130 mg/dL Final     Comment:     Meter: [id number] : [id number] Anderson Shoshana NA   11/21/2021 1616 132 (H) 70 - 130 mg/dL Final     Comment:     Meter: [id number] : [id number] Sarkis Brito PCA     Results from last 7 days   Lab Units 11/23/21  0542 11/21/21  0421 11/18/21  0927   INR  1.11* 1.17* 1.43*       Past Medical History:   Diagnosis Date   • Arthritis    • Cardiac murmur    • Diabetes mellitus (HCC)    • GERD (gastroesophageal reflux disease)    • Heart attack (HCC) 12/2020   • History of cervical cancer 1981   • Hyperlipidemia    • Hypertension    • Osteomyelitis hip (HCC)     RIGHT   • Right hip pain        Assessment:  Active Hospital Problems    Diagnosis  POA   • **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       Plan:  Continue with antibiotics per ID and wound vac. SNU for rehab sometime    Rahul Maloney MD  11/23/2021  14:20 EST

## 2021-11-23 NOTE — CASE MANAGEMENT/SOCIAL WORK
Continued Stay Note  [facility]     Patient Name: Stephy Duncan  MRN: [id number]  Today's Date: 11/23/2021    Admit Date: 11/16/2021     Discharge Plan     Row Name 11/23/21 1749       Plan    Plan [facility] -- Accepted.    Patient/Family in Agreement with Plan yes    Plan Comments Spoke with Pete who is having a  bring the SNF WoundVac to [facility] this evening to be applied (confirmation #: [id number]). Discussed with the pt who's agreeable. Patient said her family/friends are unable to transport her this late in the evening, and she now would like an ambulance to transport her as well. [facility] gave the pt the disclaimer that [facility] cannot guarantee insurance will cover the cost of the ambulance transport. Pt is agreeable, acknowledges understanding and wants to continue with the ambulance transport. Scheduled a [facility] for tomorrow, 11/24/21 at 1600 (Spoke with Marilynn). Updated the patient who's agreeable. No other needs identified.               Discharge Codes    No documentation.                     Lucy Meeks RN

## 2021-11-23 NOTE — PROGRESS NOTES
"Patient doing well with wound VAC on hip.  Minimal drainage since it was changed yesterday.  She is going to go to rehab with a wound VAC.  Unfortunately, I do not trust that the rehab will place this in a timely manner as I have had several patients go to 1 of these facilities and the other did not have it there or it did not work.  She needs to have it placed before going otherwise she will need to stay here.    R \"Kilo\" Laury BAXTER MD  Orthopaedic Surgery  [facility]  [phone] - [place] [facility]  [phone] - [place] [facility]    "

## 2021-11-24 ENCOUNTER — OUTSIDE FACILITY SERVICE (OUTPATIENT)
Dept: FAMILY MEDICINE CLINIC | Facility: CLINIC | Age: 66
End: 2021-11-24

## 2021-11-24 VITALS
HEIGHT: 64 IN | BODY MASS INDEX: 38.62 KG/M2 | RESPIRATION RATE: 18 BRPM | OXYGEN SATURATION: 99 % | SYSTOLIC BLOOD PRESSURE: 112 MMHG | DIASTOLIC BLOOD PRESSURE: 62 MMHG | WEIGHT: 226.19 LBS | TEMPERATURE: 97.9 F | HEART RATE: 65 BPM

## 2021-11-24 LAB
ANION GAP SERPL CALCULATED.3IONS-SCNC: 9.7 MMOL/L (ref 5–15)
ANISOCYTOSIS BLD QL: ABNORMAL
BUN SERPL-MCNC: 12 MG/DL (ref 8–23)
BUN/CREAT SERPL: 14.8 (ref 7–25)
CALCIUM SPEC-SCNC: 8.7 MG/DL (ref 8.6–10.5)
CHLORIDE SERPL-SCNC: 108 MMOL/L (ref 98–107)
CO2 SERPL-SCNC: 20.3 MMOL/L (ref 22–29)
CREAT SERPL-MCNC: 0.81 MG/DL (ref 0.57–1)
DEPRECATED RDW RBC AUTO: 46.6 FL (ref 37–54)
EOSINOPHIL # BLD MANUAL: 0.37 10*3/MM3 (ref 0–0.4)
EOSINOPHIL NFR BLD MANUAL: 5.6 % (ref 0.3–6.2)
ERYTHROCYTE [DISTWIDTH] IN BLOOD BY AUTOMATED COUNT: 14.5 % (ref 12.3–15.4)
GFR SERPL CREATININE-BSD FRML MDRD: 70 ML/MIN/1.73
GLUCOSE BLDC GLUCOMTR-MCNC: 107 MG/DL (ref 70–130)
GLUCOSE BLDC GLUCOMTR-MCNC: 131 MG/DL (ref 70–130)
GLUCOSE BLDC GLUCOMTR-MCNC: 84 MG/DL (ref 70–130)
GLUCOSE SERPL-MCNC: 87 MG/DL (ref 65–99)
HCT VFR BLD AUTO: 26.8 % (ref 34–46.6)
HGB BLD-MCNC: 8.5 G/DL (ref 12–15.9)
LYMPHOCYTES # BLD MANUAL: 0.66 10*3/MM3 (ref 0.7–3.1)
LYMPHOCYTES NFR BLD MANUAL: 10.1 % (ref 5–12)
MCH RBC QN AUTO: 28 PG (ref 26.6–33)
MCHC RBC AUTO-ENTMCNC: 31.7 G/DL (ref 31.5–35.7)
MCV RBC AUTO: 88.2 FL (ref 79–97)
MICROCYTES BLD QL: ABNORMAL
MONOCYTES # BLD: 0.66 10*3/MM3 (ref 0.1–0.9)
NEUTROPHILS # BLD AUTO: 4.84 10*3/MM3 (ref 1.7–7)
NEUTROPHILS NFR BLD MANUAL: 74.2 % (ref 42.7–76)
PLAT MORPH BLD: NORMAL
PLATELET # BLD AUTO: 138 10*3/MM3 (ref 140–450)
PMV BLD AUTO: 9.7 FL (ref 6–12)
POTASSIUM SERPL-SCNC: 3.6 MMOL/L (ref 3.5–5.2)
RBC # BLD AUTO: 3.04 10*6/MM3 (ref 3.77–5.28)
SODIUM SERPL-SCNC: 138 MMOL/L (ref 136–145)
VARIANT LYMPHS NFR BLD MANUAL: 10.1 % (ref 19.6–45.3)
WBC MORPH BLD: NORMAL
WBC NRBC COR # BLD: 6.52 10*3/MM3 (ref 3.4–10.8)

## 2021-11-24 PROCEDURE — 85025 COMPLETE CBC W/AUTO DIFF WBC: CPT | Performed by: HOSPITALIST

## 2021-11-24 PROCEDURE — 63710000001 INSULIN LISPRO (HUMAN) PER 5 UNITS: Performed by: HOSPITALIST

## 2021-11-24 PROCEDURE — 85007 BL SMEAR W/DIFF WBC COUNT: CPT | Performed by: HOSPITALIST

## 2021-11-24 PROCEDURE — 80048 BASIC METABOLIC PNL TOTAL CA: CPT | Performed by: HOSPITALIST

## 2021-11-24 PROCEDURE — 82962 GLUCOSE BLOOD TEST: CPT

## 2021-11-24 PROCEDURE — 99305 1ST NF CARE MODERATE MDM 35: CPT | Performed by: FAMILY MEDICINE

## 2021-11-24 RX ORDER — DOXYCYCLINE HYCLATE 100 MG/1
100 CAPSULE ORAL 2 TIMES DAILY
Qty: 42 CAPSULE | Refills: 0 | Status: SHIPPED | OUTPATIENT
Start: 2021-11-24 | End: 2021-12-15

## 2021-11-24 RX ORDER — OXYCODONE HYDROCHLORIDE AND ACETAMINOPHEN 5; 325 MG/1; MG/1
1 TABLET ORAL EVERY 4 HOURS PRN
Qty: 50 TABLET | Refills: 0 | Status: SHIPPED | OUTPATIENT
Start: 2021-11-24 | End: 2021-12-17

## 2021-11-24 RX ORDER — DOXYCYCLINE HYCLATE 100 MG/1
100 CAPSULE ORAL 2 TIMES DAILY
Qty: 42 CAPSULE | Refills: 0 | Status: SHIPPED | OUTPATIENT
Start: 2021-11-24 | End: 2021-12-17

## 2021-11-24 RX ADMIN — FERROUS SULFATE TAB 325 MG (65 MG ELEMENTAL FE) 325 MG: 325 (65 FE) TAB at 08:20

## 2021-11-24 RX ADMIN — OXYCODONE AND ACETAMINOPHEN 1 TABLET: 5; 325 TABLET ORAL at 08:29

## 2021-11-24 RX ADMIN — LOPERAMIDE HYDROCHLORIDE 2 MG: 2 CAPSULE ORAL at 14:23

## 2021-11-24 RX ADMIN — LOSARTAN POTASSIUM 25 MG: 25 TABLET, FILM COATED ORAL at 08:21

## 2021-11-24 RX ADMIN — FAMOTIDINE 20 MG: 20 TABLET, FILM COATED ORAL at 08:20

## 2021-11-24 RX ADMIN — METOPROLOL TARTRATE 37.5 MG: 25 TABLET, FILM COATED ORAL at 08:21

## 2021-11-24 RX ADMIN — INSULIN LISPRO 10 UNITS: 100 INJECTION, SOLUTION INTRAVENOUS; SUBCUTANEOUS at 08:21

## 2021-11-24 RX ADMIN — GABAPENTIN 300 MG: 300 CAPSULE ORAL at 08:21

## 2021-11-24 RX ADMIN — INSULIN LISPRO 10 UNITS: 100 INJECTION, SOLUTION INTRAVENOUS; SUBCUTANEOUS at 11:55

## 2021-11-24 RX ADMIN — SULFAMETHOXAZOLE AND TRIMETHOPRIM 1 TABLET: 400; 80 TABLET ORAL at 08:20

## 2021-11-24 RX ADMIN — Medication 1000 MCG: at 08:20

## 2021-11-24 RX ADMIN — OXYCODONE AND ACETAMINOPHEN 2 TABLET: 5; 325 TABLET ORAL at 13:13

## 2021-11-24 RX ADMIN — GABAPENTIN 300 MG: 300 CAPSULE ORAL at 15:50

## 2021-11-24 NOTE — DISCHARGE SUMMARY
Orthopaedic Discharge Summary  Dr. SCHNEIDER “Kilo” Laury II  [phone]    NAME: Virginia Caylalondon Duncan PCP: Gregorio Rosales   :  MRN: [id number]  [id number] LOS:  ADMIT: 6 days  2021   AGE/SEX: 66 y.o. 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]    · Surgery Performed: [place] REVISE TOTAL HIP REPLACEMENT [64940] (TOTAL HIP REVISION ARTHROPLASTY RIGHT POSTERIOR)    · Discharge Medications:         Discharge Medications      New Medications      Instructions Start Date   doxycycline 100 MG capsule  Commonly known as: VIBRAMYCIN   100 mg, Oral, 2 Times Daily      oxyCODONE-acetaminophen 5-325 MG per tablet  Commonly known as: PERCOCET   1 tablet, Oral, Every 4 Hours PRN         Continue These Medications      Instructions Start Date   ASPIRIN 81 PO   1 tablet, Oral, Every Morning, PT HOLDING FOR SURGERY       atorvastatin 10 MG tablet  Commonly known as: LIPITOR   10 mg, Oral, Nightly      BACTRIM PO   Oral      Brilinta 60 MG tablet tablet  Generic drug: ticagrelor   60 mg, Oral, 2 Times Daily, PT HOLDING FOR SURGERY       Chlorhexidine Gluconate 2 % pads   Apply externally, As directed pre op       cyclobenzaprine 10 MG tablet  Commonly known as: FLEXERIL   10 mg, Oral, 3 Times Daily PRN      Diclofenac Sodium 1 % gel gel  Commonly known as: VOLTAREN   4 g, Topical, 4 Times Daily PRN      esomeprazole 40 MG capsule  Commonly known as: nexIUM   40 mg, Oral, Every Morning Before Breakfast      ferrous sulfate 325 (65 FE) MG tablet   325 mg, Oral, Every Other Day      gabapentin 300 MG capsule  Commonly known as: NEURONTIN   300 mg, Oral, 3 Times Daily      isosorbide mononitrate 30 MG 24 hr tablet  Commonly known as: IMDUR   30 mg, Oral, Daily      lansoprazole 30 MG capsule  Commonly known as: PREVACID   30 mg, Oral, Daily      LEVEMIR FLEXPEN SC   80 Units, Subcutaneous, Nightly      loratadine 10 MG tablet  Commonly known as: CLARITIN   10 mg, Oral, Daily       losartan 25 MG tablet  Commonly known as: COZAAR   25 mg, Oral, 2 Times Daily      metoprolol tartrate 25 MG tablet  Commonly known as: LOPRESSOR   37.5 mg, Oral, 2 Times Daily      mupirocin 2 % nasal ointment  Commonly known as: BACTROBAN   Nasal, As directed pre op       nitroglycerin 0.3 MG/HR patch  Commonly known as: NITRODUR   1 patch, Transdermal, Daily      NOVOLOG FLEXPEN SC   20 Units, Subcutaneous, 3 Times Daily With Meals      PRESERVISION AREDS 2 PO   1 tablet, Oral, 2 Times Daily, TO HOLD 1 WEEK BEFORE SURGERY       multivitamin with minerals tablet tablet   1 tablet, Oral, Daily      QC TUMERIC COMPLEX PO   Oral      Trulicity 3 MG/0.5ML solution pen-injector  Generic drug: Dulaglutide   Subcutaneous, Weekly, SATURDAYS          Stop These Medications    ampicillin 500 MG capsule  Commonly known as: PRINCIPEN     HYDROcodone-acetaminophen 7.5-325 MG per tablet  Commonly known as: NORCO            · Vitals:     Vitals:    11/23/21 1432 11/23/21 1816 11/23/21 2244 11/24/21 0700   BP: 99/49 113/55 128/57 116/55   BP Location: Left arm Left arm Left arm Left arm   Patient Position: Lying Lying Lying Lying   Pulse: 64 73 66 69   Resp: 16 16 16 18   Temp: 97.1 °F (36.2 °C) 98.5 °F (36.9 °C) 98 °F (36.7 °C) 98 °F (36.7 °C)   TempSrc: Skin Oral Skin Skin   SpO2: 97% 98% 99% 99%   Weight:       Height:           · Labs:      No results displayed because visit has over 200 results.           No results found.    · Hospital Course:   66 y.o. 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 Post-operatively the patient transferred to the floor where the patient underwent mobilization therapy. Opioids were titrated to achieve appropriate pain management to allow for participation in mobilization exercises.  "Vital signs and laboratory values are now within safe parameters for discharge. The dressings and/or incision is intact without signs or symptoms of active infection. Operative extremity neurovascular status remains intact as compared to the preoperative exam.  Due to some postoperative drainage from her extensive dissection, I did place an incisional wound VAC.  This will be changed 3 times a week at the rehab facility.  Appropriate education re: incision care, activity levels, medications, and follow up visits was completed and all questions were answered. The patient is now deemed stable for discharge.    SNF: The patient had a difficult time mobilizing sufficiently with physical therapy. Further rehabilitation was recommended in a SNF facility. Once a bed was available, and the patient was cleared, there were discharged to the SNF in good condition}.       R \"Kilo\" Laury BAXTER MD  Orthopaedic Surgery  [facility]  [phone]                                               "

## 2021-11-24 NOTE — DISCHARGE INSTRUCTIONS
Weight-bear as tolerated with a walker    Incisional wound VAC to be changed 3 times a week until no output for 3 or more days then may be discharged    Okay for physical therapy and range of motion    Posterior hip precautions

## 2021-11-24 NOTE — CASE MANAGEMENT/SOCIAL WORK
Continued Stay Note  [facility]     Patient Name: Stephy Duncan  MRN: [id number]  Today's Date: 11/24/2021    Admit Date: 11/16/2021     Discharge Plan     Row Name 11/24/21 1007       Plan    Plan [facility] -- Accepted.    Patient/Family in Agreement with Plan yes    Plan Comments Discharge orders for today noted. Called and left a message for Tha/Jackie. [facility] is scheduled for 1600 today. Updated the patient, her /Bulmaro and CHET Avila. CHET Avila confirmed RABIA delivered the SNF's WoundVac. She is contacting the Wound Nurse to apply the Vac today prior to d/c to SNF. No other needs identified. Await Tha's call back. Packet is on the chart.               Discharge Codes    No documentation.               Expected Discharge Date and Time     Expected Discharge Date Expected Discharge Time    Nov 24, 2021             Lucy Meeks RN

## 2021-11-24 NOTE — NURSING NOTE
CWOCN- changed wound vac dressing. Placed patient to machine that she can take to the facility and returned the  owned machine to the office. Patient tolerating the dressing well. There continues to be fluid collecting in the cannister. When the sponge is off there is no drainage noted. There is no induration and no redness around the incision. All is WNL and staples are intact.      11/24/21 1145   Wound 11/16/21 1328 Right anterior greater trochanter Incision   Placement Date/Time: 11/16/21 1328   Present on Hospital Admission: No  Side: Right  Orientation: anterior  Location: greater trochanter  Primary Wound Type: Incision   Dressing Appearance intact   Closure Approximated; Staples   Periwound intact   Periwound Temperature warm   Periwound Skin Turgor soft   Drainage Characteristics/Odor serosanguineous   Care, Wound cleansed with; sterile normal saline   Dressing Care dressing changed   Periwound Care barrier film applied   NPWT (Negative Pressure Wound Therapy) 11/19/21 1101 right hip   Placement Date/Time: 11/19/21 1101   Location: right hip  Additional Comments: incision vac   Therapy Setting continuous therapy   Dressing foam, black   Pressure Setting 125 mmHg   Sponges Inserted 1   Sponges Removed 1

## 2021-11-24 NOTE — PROGRESS NOTES
"DAILY PROGRESS NOTE  [facility]    Patient Identification:  Name: Stephy Duncan  Age: 66 y.o.  Sex: female  :  1955  MRN: [id number]         Primary Care Physician: Gregorio Rosales    Subjective:  Interval History:She says the diarrhea better.    Objective:    Scheduled Meds:droperidol, 0.625 mg, Intravenous, Once  erythromycin, , Right Eye, Q12H  famotidine, 20 mg, Oral, Daily  ferrous sulfate, 325 mg, Oral, Daily With Breakfast  gabapentin, 300 mg, Oral, TID  insulin glargine, 60 Units, Subcutaneous, Nightly  insulin lispro, 0-9 Units, Subcutaneous, TID AC  insulin lispro, 10 Units, Subcutaneous, TID With Meals  losartan, 25 mg, Oral, BID  metoprolol tartrate, 37.5 mg, Oral, BID  mupirocin, , Each Nare, BID  sulfamethoxazole-trimethoprim, 1 tablet, Oral, Q12H  vitamin B-12, 1,000 mcg, Oral, Daily      Continuous Infusions:lactated ringers, 9 mL/hr, Last Rate: 9 mL/hr (21 1133)        Vital signs in last 24 hours:  Temp:  [97.1 °F (36.2 °C)-98.5 °F (36.9 °C)] 98 °F (36.7 °C)  Heart Rate:  [64-73] 69  Resp:  [16-18] 18  BP: ()/(49-57) 116/55    Intake/Output:    Intake/Output Summary (Last 24 hours) at 2021 1041  Last data filed at 2021 0700  Gross per 24 hour   Intake 600 ml   Output --   Net 600 ml       Exam:  /55 (BP Location: Left arm, Patient Position: Lying)   Pulse 69   Temp 98 °F (36.7 °C) (Skin)   Resp 18   Ht 162.6 cm (64\")   Wt 103 kg (226 lb 3.1 oz)   SpO2 99%   BMI 38.83 kg/m²     General Appearance:    Alert, cooperative, no distress   Head:    Normocephalic, without obvious abnormality, atraumatic   Eyes:       Throat:   Lips, tongue, gums normal   Neck:   Supple, symmetrical, trachea midline, no JVD   Lungs:     Clear to auscultation bilaterally, respirations unlabored   Chest Wall:    No tenderness or deformity    Heart:    Regular rate and rhythm, S1 and S2 normal, no murmur,no  Rub or gallop   Abdomen:     Soft, nontender, bowel " sounds active, no masses, no organomegaly    Extremities:   Extremities normal, atraumatic, no cyanosis or edema   Pulses:      Skin:   Skin is warm and dry,  no rashes or palpable lesions   Neurologic:   no focal deficits noted      Lab Results (last 72 hours)     Procedure Component Value Units Date/Time    POC Glucose Once [[id number]]  (Abnormal) Collected: 11/23/21 1034    Specimen: Blood Updated: 11/23/21 1039     Glucose 208 mg/dL      Comment: Meter: [id number] : [id number] Blas Yana CNA       Manual Differential [[id number]]  (Abnormal) Collected: 11/23/21 0542    Specimen: Blood Updated: 11/23/21 0830     Neutrophil % 69.0 %      Lymphocyte % 15.0 %      Monocyte % 7.0 %      Eosinophil % 5.0 %      Basophil % 1.0 %      Metamyelocyte % 2.0 %      Myelocyte % 1.0 %      Neutrophils Absolute 5.20 10*3/mm3      Lymphocytes Absolute 1.13 10*3/mm3      Monocytes Absolute 0.53 10*3/mm3      Eosinophils Absolute 0.38 10*3/mm3      Basophils Absolute 0.08 10*3/mm3      RBC Morphology Normal     Smudge Cells Slight/1+     Platelet Morphology Normal    CBC & Differential [[id number]]  (Abnormal) Collected: 11/23/21 0542    Specimen: Blood Updated: 11/23/21 0830    Narrative:      The following orders were created for panel order CBC & Differential.  Procedure                               Abnormality         Status                     ---------                               -----------         ------                     CBC Auto Differential[[id number]]        Abnormal            Final result                 Please view results for these tests on the individual orders.    CBC Auto Differential [[id number]]  (Abnormal) Collected: 11/23/21 0542    Specimen: Blood Updated: 11/23/21 0830     WBC 7.53 10*3/mm3      RBC 3.15 10*6/mm3      Hemoglobin 8.8 g/dL      Hematocrit 27.9 %      MCV 88.6 fL      MCH 27.9 pg      MCHC 31.5 g/dL      RDW 14.4 %      RDW-SD 46.2 fl      MPV 9.7 fL      Platelets 140 10*3/mm3      Basic Metabolic Panel [[id number]]  (Abnormal) Collected: 11/23/21 0542    Specimen: Blood Updated: 11/23/21 0742     Glucose 111 mg/dL      BUN 10 mg/dL      Creatinine 0.74 mg/dL      Sodium 138 mmol/L      Potassium 3.9 mmol/L      Chloride 109 mmol/L      CO2 20.4 mmol/L      Calcium 9.2 mg/dL      eGFR Non African Amer 78 mL/min/1.73      BUN/Creatinine Ratio 13.5     Anion Gap 8.6 mmol/L     Narrative:      GFR Normal >60  Chronic Kidney Disease <60  Kidney Failure <15      Protime-INR [[id number]]  (Abnormal) Collected: 11/23/21 0542    Specimen: Blood Updated: 11/23/21 0713     Protime 14.1 Seconds      INR 1.11    POC Glucose Once [[id number]]  (Normal) Collected: 11/23/21 0611    Specimen: Blood Updated: 11/23/21 0613     Glucose 108 mg/dL      Comment: Meter: [id number] : [id number] Miguelito Eileen NA       POC Glucose Once [[id number]]  (Abnormal) Collected: 11/22/21 2026    Specimen: Blood Updated: 11/22/21 2028     Glucose 192 mg/dL      Comment: Meter: [id number] : [id number] Farley Eileen NA       POC Glucose Once [[id number]]  (Abnormal) Collected: 11/22/21 1605    Specimen: Blood Updated: 11/22/21 1612     Glucose 183 mg/dL      Comment: Meter: [id number] : [id number] Blas Yana CNA       POC Glucose Once [[id number]]  (Abnormal) Collected: 11/22/21 1059    Specimen: Blood Updated: 11/22/21 1102     Glucose 184 mg/dL      Comment: Meter: [id number] : [id number] Blas Yana CNA       POC Glucose Once [[id number]]  (Normal) Collected: 11/22/21 0607    Specimen: Blood Updated: 11/22/21 0608     Glucose 116 mg/dL      Comment: Meter: [id number] : [id number] Anderson Pastora NA       Hemoglobin & Hematocrit, Blood [[id number]]  (Abnormal) Collected: 11/22/21 0500    Specimen: Blood Updated: 11/22/21 0602     Hemoglobin 9.1 g/dL      Hematocrit 27.8 %     POC Glucose Once [[id number]]  (Abnormal) Collected: 11/21/21 2111    Specimen: Blood Updated: 11/21/21 2112     Glucose  200 mg/dL      Comment: Meter: [id number] : [id number] Anderson Shoshana NA       POC Glucose Once [[id number]]  (Abnormal) Collected: 11/21/21 1616    Specimen: Blood Updated: 11/21/21 1617     Glucose 132 mg/dL      Comment: Meter: [id number] : [id number] Sarkis DeaShanae PCA       POC Glucose Once [[id number]]  (Abnormal) Collected: 11/21/21 1055    Specimen: Blood Updated: 11/21/21 1057     Glucose 192 mg/dL      Comment: Meter: [id number] : [id number] Sarkis intelloCutShanae PCA       POC Glucose Once [[id number]]  (Normal) Collected: 11/21/21 0615    Specimen: Blood Updated: 11/21/21 0617     Glucose 113 mg/dL      Comment: Meter: [id number] : [id number] Anderson Shoshana NA       Basic Metabolic Panel [[id number]]  (Abnormal) Collected: 11/21/21 0421    Specimen: Blood Updated: 11/21/21 0541     Glucose 107 mg/dL      BUN 15 mg/dL      Creatinine 0.78 mg/dL      Sodium 138 mmol/L      Potassium 3.8 mmol/L      Chloride 110 mmol/L      CO2 20.5 mmol/L      Calcium 8.4 mg/dL      eGFR Non African Amer 73 mL/min/1.73      BUN/Creatinine Ratio 19.2     Anion Gap 7.5 mmol/L     Narrative:      GFR Normal >60  Chronic Kidney Disease <60  Kidney Failure <15      Protime-INR [[id number]]  (Abnormal) Collected: 11/21/21 0421    Specimen: Blood Updated: 11/21/21 0529     Protime 14.7 Seconds      INR 1.17    CBC & Differential [[id number]]  (Abnormal) Collected: 11/21/21 0421    Specimen: Blood Updated: 11/21/21 0521    Narrative:      The following orders were created for panel order CBC & Differential.  Procedure                               Abnormality         Status                     ---------                               -----------         ------                     CBC Auto Differential[[id number]]        Abnormal            Final result                 Please view results for these tests on the individual orders.    CBC Auto Differential [[id number]]  (Abnormal) Collected: 11/21/21 0421    Specimen: Blood  Updated: 11/21/21 0521     WBC 7.41 10*3/mm3      RBC 3.05 10*6/mm3      Hemoglobin 8.5 g/dL      Hematocrit 26.8 %      MCV 87.9 fL      MCH 27.9 pg      MCHC 31.7 g/dL      RDW 14.2 %      RDW-SD 45.6 fl      MPV 9.7 fL      Platelets 132 10*3/mm3      Neutrophil % 61.6 %      Lymphocyte % 14.8 %      Monocyte % 13.1 %      Eosinophil % 5.1 %      Basophil % 0.7 %      Immature Grans % 4.7 %      Neutrophils, Absolute 4.56 10*3/mm3      Lymphocytes, Absolute 1.10 10*3/mm3      Monocytes, Absolute 0.97 10*3/mm3      Eosinophils, Absolute 0.38 10*3/mm3      Basophils, Absolute 0.05 10*3/mm3      Immature Grans, Absolute 0.35 10*3/mm3      nRBC 0.0 /100 WBC     POC Glucose Once [[id number]]  (Abnormal) Collected: 11/20/21 2102    Specimen: Blood Updated: 11/20/21 2104     Glucose 141 mg/dL      Comment: Meter: [id number] : [id number] Anderson Pastora OCTAVIO       POC Glucose Once [[id number]]  (Abnormal) Collected: 11/20/21 1520    Specimen: Blood Updated: 11/20/21 1521     Glucose 163 mg/dL      Comment: Meter: [id number] : [id number] Raoul Colvin           Data Review:  Results from last 7 days   Lab Units 11/24/21  0551 11/23/21 0542 11/23/21  0542 11/21/21 0421 11/21/21 0421   SODIUM mmol/L 138  --  138  --  138   POTASSIUM mmol/L 3.6  --  3.9  --  3.8   CHLORIDE mmol/L 108*  --  109*  --  110*   CO2 mmol/L 20.3*  --  20.4*  --  20.5*   BUN mg/dL 12  --  10  --  15   CREATININE mg/dL 0.81  --  0.74  --  0.78   GLUCOSE mg/dL 87   < > 111*   < > 107*   CALCIUM mg/dL 8.7  --  9.2  --  8.4*    < > = values in this interval not displayed.     Results from last 7 days   Lab Units 11/24/21  0551 11/23/21  0542 11/22/21  0500 11/21/21  0421 11/21/21  0421   WBC 10*3/mm3 6.52 7.53  --   --  7.41   HEMOGLOBIN g/dL 8.5* 8.8* 9.1*   < > 8.5*   HEMATOCRIT % 26.8* 27.9* 27.8*   < > 26.8*   PLATELETS 10*3/mm3 138* 140  --   --  132*    < > = values in this interval not displayed.             Lab Results   Lab Value Date/Time     TROPONINT 0.20 (H) 12/18/2019 1439    TROPONINT 0.26 (H) 12/18/2019 0210    TROPONINT 0.10 12/17/2019 2010         Results from last 7 days   Lab Units 11/20/21  0639 11/19/21  0618   ALK PHOS U/L 63 62   BILIRUBIN mg/dL 0.8 0.7   ALT (SGPT) U/L 7 7   AST (SGOT) U/L 12 18             Glucose   Date/Time Value Ref Range Status   11/24/2021 0611 84 70 - 130 mg/dL Final     Comment:     Meter: [id number] : [id number] Anderson Shoshana NA   11/23/2021 2109 139 (H) 70 - 130 mg/dL Final     Comment:     Meter: [id number] : [id number] Anderson Shoshana NA   11/23/2021 1603 144 (H) 70 - 130 mg/dL Final     Comment:     Meter: [id number] : [id number] Blas Anayaandra CNA   11/23/2021 1034 208 (H) 70 - 130 mg/dL Final     Comment:     Meter: [id number] : [id number] Blas Anayaandra CNA   11/23/2021 0611 108 70 - 130 mg/dL Final     Comment:     Meter: [id number] : [id number] Farley Eileen NA   11/22/2021 2026 192 (H) 70 - 130 mg/dL Final     Comment:     Meter: [id number] : [id number] Farley Eileen NA   11/22/2021 1605 183 (H) 70 - 130 mg/dL Final     Comment:     Meter: [id number] : [id number] Blas Anayaandra CNA   11/22/2021 1059 184 (H) 70 - 130 mg/dL Final     Comment:     Meter: [id number] : [id number] Blas Anayaandra CNA     Results from last 7 days   Lab Units 11/23/21  0542 11/21/21  0421 11/18/21  0927   INR  1.11* 1.17* 1.43*       Past Medical History:   Diagnosis Date   • Arthritis    • Cardiac murmur    • Diabetes mellitus (HCC)    • GERD (gastroesophageal reflux disease)    • Heart attack (HCC) 12/2020   • History of cervical cancer 1981   • Hyperlipidemia    • Hypertension    • Osteomyelitis hip (HCC)     RIGHT   • Right hip pain        Assessment:  Active Hospital Problems    Diagnosis  POA   • **Status post total replacement of hip [Z96.649]  Not Applicable   • Diabetes mellitus (HCC) [E11.9]  Yes   • Hypertension [I10]  Yes   • Anemia [D64.9]  Yes      Resolved  Hospital Problems   No resolved problems to display.       Plan:  Continue with antibiotics per ID and wound vac. SNU for rehab today    Rahul Maloney MD  11/24/2021  10:41 EST

## 2021-11-24 NOTE — CASE MANAGEMENT/SOCIAL WORK
Continued Stay Note  [facility]     Patient Name: Stephy Duncan  MRN: [id number]  Today's Date: 11/24/2021    Admit Date: 11/16/2021     Discharge Plan     Row Name 11/24/21 1234       Plan    Plan [facility] -- Accepted.    Patient/Family in Agreement with Plan yes    Plan Comments Received a call back from Danika who's agreeable with the entire d/c plan. No other needs identified.    Final Discharge Disposition Code 03 - skilled nursing facility (SNF)    Final Note [facility].    Row Name 11/24/21 1007       Plan    Plan [facility] -- Accepted.    Patient/Family in Agreement with Plan yes    Plan Comments Discharge orders for today noted. Called and left a message for Danika. [facility] is scheduled for 1600 today. Updated the patient, her /Bulmar[facility] and [facility]. [facility] confirmed [facility] delivered the SNF's WoundVac. She is contacting the Wound Nurse to apply the Vac today prior to d/c to SNF. No other needs identified. Await Tha's call back. Packet is on the chart.               Discharge Codes    No documentation.               Expected Discharge Date and Time     Expected Discharge Date Expected Discharge Time    Nov 24, 2021             Lucy Meeks RN

## 2021-11-24 NOTE — PLAN OF CARE
Goal Outcome Evaluation:  Plan of Care Reviewed With: patient        Progress: improving  Outcome Summary: Patient ambulating using walker and x1 assist. VSS and voiding function is intact. Pain is managed with po meds. Wound vac dressing changed per WOCN and d/c wound vac applied. BG stable, educated on monitoring and med management. Patient is prepared and ready for d/c to snf via ambulance.

## 2021-11-24 NOTE — CASE MANAGEMENT/SOCIAL WORK
"Physicians Statement of Medical Necessity for  Ambulance Transportation    GENERAL INFORMATION     Name: Stephy Duncan  YOB: 1955  Medicare #: [id number] (See Facesheet)  Transport Date: 11/24/2021 (Valid for round trips this date, or for scheduled repetitive trips for 60 days from the date signed below.)  Origin: [facility]  Destination: [facility].  Is the Patient's stay covered under Medicare Part A (PPS/DRG?)Yes  Closest appropriate facility? Yes  If this a hosp-hosp transfer? No  Is this a hospice patient? No    MEDICAL NECESSITY QUESTIONAIRE    Ambulance Transportation is medically necessary only if other means of transportation are contraindicated or would be potentially harmful to the patient.  To meet this requirement, the patient must be either \"bed confined\" or suffer from a condition such that transport by means other than an ambulance is contraindicated by the patient's condition.  The following questions must be answered by the healthcare professional signing below for this form to be valid:     1) Describe the MEDICAL CONDITION (physical and/or mental) of this patient AT THE TIME OF AMBULANCE TRANSPORT that requires the patient to be transported in an ambulance, and why transport by other means is contraindicated by the patient's condition:   Past Medical History:   Diagnosis Date   • Arthritis    • Cardiac murmur    • Diabetes mellitus (HCC)    • GERD (gastroesophageal reflux disease)    • Heart attack (HCC) 12/2020   • History of cervical cancer 1981   • Hyperlipidemia    • Hypertension    • Osteomyelitis hip (HCC)     RIGHT   • Right hip pain       Past Surgical History:   Procedure Laterality Date   • CARDIAC CATHETERIZATION     • CERVICAL CONIZATION     • COLONOSCOPY     • CORONARY ANGIOPLASTY WITH STENT PLACEMENT     • ENDOSCOPY     • HIP ARTHROPLASTY Right    • HIP HARDWARE REMOVAL     • HIP SPACER INSERTION WITH ANTIBIOTIC CEMENT      X3   • " "HYSTERECTOMY     • KNEE ARTHROPLASTY Left    • LAPAROSCOPIC CHOLECYSTECTOMY     • TONSILLECTOMY     • TOTAL HIP ARTHROPLASTY REVISION Right 11/16/2021    Procedure: TOTAL HIP REVISION ARTHROPLASTY RIGHT POSTERIOR;  Surgeon: Karri Schaeffer II, MD;  Location: [facility];  Service: Orthopedics;  Laterality: Right;      2) Is this patient \"bed confined\" as defined below?No    To be \"bed confined\" the patient must satisfy all three of the following criteria:  (1) unable to get up from bed without assistance; AND (2) unable to ambulate;  AND (3) unable to sit in a chair or wheelchair.  3) Can this patient safely be transported by car or wheelchair van (I.e., may safely sit during transport, without an attendant or monitoring?)No   4. In addition to completing questions 1-3 above, please check any of the following conditions that apply*:          *Note: supporting documentation for any boxes checked must be maintained in the patient's medical records Orthopedic device (backboard, halo, pins, traction, brace, wedge, etc.) required special handling during transport and Other WoundVac in Place.      SIGNATURE OF PHYSICIAN OR OTHER AUTHORIZED HEALTHCARE PROFESSIONAL    I certify that the above information is true and correct based on my evaluation of this patient, and represent that the patient requires transport by ambulance and that other forms of transport are contraindicated.  I understand that this information will be used by the Centers for Medicare and Medicaid Services (CMS) to support the determiniation of medical necessity for ambulance services, and I represent that I have personal knowledge of the patient's condition at the time of transport.    If this box is checked, I also certify that the patient is physically or mentally incapable of signing the ambulance service's claim form and that the institution with which I am affiliated has furnished care, services or assistance to the patient.  My " signature below is made on behalf of the patient pursuant to 42 .36(b)(4). In accordance with 42 .37, the specific reason(s) that the patient is physically or mentally incapable of signing the claim for is as follows:     Signature of Physician or Healthcare Professional  Date/Time:      (For Scheduled repetitive transport, this form is not valid for transports performed more than 60 days after this date).                                                                                                                                            --------------------------------------------------------------------------------------------  Printed Name and Credentials of Physician or Authorized Healthcare Professional     *Form must be signed by patient's attending physician for scheduled, repetitive transports,.  For non-repetitive ambulance transports, if unable to obtain the signature of the attending physician, any of the following may sign (please select below):     Physician  Clinical Nurse Specialist  Registered Nurse     Physician Assistant  Discharge Planner  Licensed Practical Nurse     Nurse Practitioner

## 2021-12-14 ENCOUNTER — LAB REQUISITION (OUTPATIENT)
Dept: LAB | Facility: HOSPITAL | Age: 66
End: 2021-12-14

## 2021-12-14 DIAGNOSIS — Z47.1 AFTERCARE FOLLOWING JOINT REPLACEMENT SURGERY: ICD-10-CM

## 2021-12-14 LAB
CRP SERPL-MCNC: 3.39 MG/DL (ref 0–0.5)
ERYTHROCYTE [SEDIMENTATION RATE] IN BLOOD: 71 MM/HR (ref 0–30)

## 2021-12-14 PROCEDURE — 86140 C-REACTIVE PROTEIN: CPT | Performed by: ORTHOPAEDIC SURGERY

## 2021-12-14 PROCEDURE — 85652 RBC SED RATE AUTOMATED: CPT | Performed by: ORTHOPAEDIC SURGERY

## 2021-12-17 ENCOUNTER — HOSPITAL ENCOUNTER (INPATIENT)
Facility: HOSPITAL | Age: 66
LOS: 6 days | Discharge: HOME HEALTH | End: 2021-12-23
Attending: EMERGENCY MEDICINE | Admitting: ORTHOPAEDIC SURGERY

## 2021-12-17 ENCOUNTER — APPOINTMENT (OUTPATIENT)
Dept: GENERAL RADIOLOGY | Facility: HOSPITAL | Age: 66
End: 2021-12-17

## 2021-12-17 DIAGNOSIS — Z96.641 STATUS POST TOTAL REPLACEMENT OF RIGHT HIP: Primary | ICD-10-CM

## 2021-12-17 DIAGNOSIS — T81.40XA POSTOPERATIVE INFECTION, UNSPECIFIED TYPE, INITIAL ENCOUNTER: ICD-10-CM

## 2021-12-17 DIAGNOSIS — M00.851 ARTHRITIS OF RIGHT HIP DUE TO OTHER BACTERIA: ICD-10-CM

## 2021-12-17 LAB
ALBUMIN SERPL-MCNC: 2.9 G/DL (ref 3.5–5.2)
ALBUMIN/GLOB SERPL: 0.9 G/DL
ALP SERPL-CCNC: 87 U/L (ref 39–117)
ALT SERPL W P-5'-P-CCNC: 9 U/L (ref 1–33)
ANION GAP SERPL CALCULATED.3IONS-SCNC: 7.9 MMOL/L (ref 5–15)
AST SERPL-CCNC: 10 U/L (ref 1–32)
BASOPHILS # BLD AUTO: 0.02 10*3/MM3 (ref 0–0.2)
BASOPHILS NFR BLD AUTO: 0.3 % (ref 0–1.5)
BILIRUB SERPL-MCNC: 0.3 MG/DL (ref 0–1.2)
BUN SERPL-MCNC: 7 MG/DL (ref 8–23)
BUN/CREAT SERPL: 9.9 (ref 7–25)
CALCIUM SPEC-SCNC: 8.6 MG/DL (ref 8.6–10.5)
CHLORIDE SERPL-SCNC: 107 MMOL/L (ref 98–107)
CO2 SERPL-SCNC: 26.1 MMOL/L (ref 22–29)
CREAT SERPL-MCNC: 0.71 MG/DL (ref 0.57–1)
CRP SERPL-MCNC: 2.12 MG/DL (ref 0–0.5)
D-LACTATE SERPL-SCNC: 1.2 MMOL/L (ref 0.5–2)
DEPRECATED RDW RBC AUTO: 42.9 FL (ref 37–54)
EOSINOPHIL # BLD AUTO: 0.21 10*3/MM3 (ref 0–0.4)
EOSINOPHIL NFR BLD AUTO: 3.1 % (ref 0.3–6.2)
ERYTHROCYTE [DISTWIDTH] IN BLOOD BY AUTOMATED COUNT: 14 % (ref 12.3–15.4)
ERYTHROCYTE [SEDIMENTATION RATE] IN BLOOD: 66 MM/HR (ref 0–30)
GFR SERPL CREATININE-BSD FRML MDRD: 82 ML/MIN/1.73
GLOBULIN UR ELPH-MCNC: 3.1 GM/DL
GLUCOSE BLDC GLUCOMTR-MCNC: 169 MG/DL (ref 70–130)
GLUCOSE BLDC GLUCOMTR-MCNC: 94 MG/DL (ref 70–130)
GLUCOSE SERPL-MCNC: 135 MG/DL (ref 65–99)
HCT VFR BLD AUTO: 31.7 % (ref 34–46.6)
HGB BLD-MCNC: 10.1 G/DL (ref 12–15.9)
HOLD SPECIMEN: NORMAL
IMM GRANULOCYTES # BLD AUTO: 0.09 10*3/MM3 (ref 0–0.05)
IMM GRANULOCYTES NFR BLD AUTO: 1.3 % (ref 0–0.5)
LYMPHOCYTES # BLD AUTO: 1.03 10*3/MM3 (ref 0.7–3.1)
LYMPHOCYTES NFR BLD AUTO: 15.1 % (ref 19.6–45.3)
MCH RBC QN AUTO: 26.9 PG (ref 26.6–33)
MCHC RBC AUTO-ENTMCNC: 31.9 G/DL (ref 31.5–35.7)
MCV RBC AUTO: 84.5 FL (ref 79–97)
MONOCYTES # BLD AUTO: 0.7 10*3/MM3 (ref 0.1–0.9)
MONOCYTES NFR BLD AUTO: 10.2 % (ref 5–12)
NEUTROPHILS NFR BLD AUTO: 4.78 10*3/MM3 (ref 1.7–7)
NEUTROPHILS NFR BLD AUTO: 70 % (ref 42.7–76)
NRBC BLD AUTO-RTO: 0 /100 WBC (ref 0–0.2)
PLATELET # BLD AUTO: 170 10*3/MM3 (ref 140–450)
PMV BLD AUTO: 9 FL (ref 6–12)
POTASSIUM SERPL-SCNC: 3.9 MMOL/L (ref 3.5–5.2)
PROCALCITONIN SERPL-MCNC: 0.12 NG/ML (ref 0–0.25)
PROT SERPL-MCNC: 6 G/DL (ref 6–8.5)
QT INTERVAL: 416 MS
RBC # BLD AUTO: 3.75 10*6/MM3 (ref 3.77–5.28)
SARS-COV-2 RNA RESP QL NAA+PROBE: NOT DETECTED
SODIUM SERPL-SCNC: 141 MMOL/L (ref 136–145)
WBC NRBC COR # BLD: 6.83 10*3/MM3 (ref 3.4–10.8)
WHOLE BLOOD HOLD SPECIMEN: NORMAL

## 2021-12-17 PROCEDURE — 83605 ASSAY OF LACTIC ACID: CPT | Performed by: NURSE PRACTITIONER

## 2021-12-17 PROCEDURE — 87040 BLOOD CULTURE FOR BACTERIA: CPT | Performed by: NURSE PRACTITIONER

## 2021-12-17 PROCEDURE — U0003 INFECTIOUS AGENT DETECTION BY NUCLEIC ACID (DNA OR RNA); SEVERE ACUTE RESPIRATORY SYNDROME CORONAVIRUS 2 (SARS-COV-2) (CORONAVIRUS DISEASE [COVID-19]), AMPLIFIED PROBE TECHNIQUE, MAKING USE OF HIGH THROUGHPUT TECHNOLOGIES AS DESCRIBED BY CMS-2020-01-R: HCPCS | Performed by: EMERGENCY MEDICINE

## 2021-12-17 PROCEDURE — 99284 EMERGENCY DEPT VISIT MOD MDM: CPT

## 2021-12-17 PROCEDURE — 86140 C-REACTIVE PROTEIN: CPT | Performed by: NURSE PRACTITIONER

## 2021-12-17 PROCEDURE — 93010 ELECTROCARDIOGRAM REPORT: CPT | Performed by: INTERNAL MEDICINE

## 2021-12-17 PROCEDURE — 84145 PROCALCITONIN (PCT): CPT | Performed by: NURSE PRACTITIONER

## 2021-12-17 PROCEDURE — 80053 COMPREHEN METABOLIC PANEL: CPT | Performed by: NURSE PRACTITIONER

## 2021-12-17 PROCEDURE — 36415 COLL VENOUS BLD VENIPUNCTURE: CPT | Performed by: NURSE PRACTITIONER

## 2021-12-17 PROCEDURE — 93005 ELECTROCARDIOGRAM TRACING: CPT | Performed by: EMERGENCY MEDICINE

## 2021-12-17 PROCEDURE — 85652 RBC SED RATE AUTOMATED: CPT | Performed by: NURSE PRACTITIONER

## 2021-12-17 PROCEDURE — 85025 COMPLETE CBC W/AUTO DIFF WBC: CPT | Performed by: NURSE PRACTITIONER

## 2021-12-17 PROCEDURE — 71045 X-RAY EXAM CHEST 1 VIEW: CPT

## 2021-12-17 PROCEDURE — U0005 INFEC AGEN DETEC AMPLI PROBE: HCPCS | Performed by: EMERGENCY MEDICINE

## 2021-12-17 PROCEDURE — 63710000001 INSULIN GLARGINE PER 5 UNITS: Performed by: INTERNAL MEDICINE

## 2021-12-17 PROCEDURE — 82962 GLUCOSE BLOOD TEST: CPT

## 2021-12-17 RX ORDER — HYDROCODONE BITARTRATE AND ACETAMINOPHEN 7.5; 325 MG/1; MG/1
1 TABLET ORAL EVERY 6 HOURS PRN
Status: DISCONTINUED | OUTPATIENT
Start: 2021-12-17 | End: 2021-12-18

## 2021-12-17 RX ORDER — ATORVASTATIN CALCIUM 20 MG/1
10 TABLET, FILM COATED ORAL NIGHTLY
Status: DISCONTINUED | OUTPATIENT
Start: 2021-12-17 | End: 2021-12-23 | Stop reason: HOSPADM

## 2021-12-17 RX ORDER — ACETAMINOPHEN 650 MG/1
650 SUPPOSITORY RECTAL EVERY 4 HOURS PRN
Status: DISCONTINUED | OUTPATIENT
Start: 2021-12-17 | End: 2021-12-23 | Stop reason: HOSPADM

## 2021-12-17 RX ORDER — INSULIN LISPRO 100 [IU]/ML
0-9 INJECTION, SOLUTION INTRAVENOUS; SUBCUTANEOUS
Status: DISCONTINUED | OUTPATIENT
Start: 2021-12-17 | End: 2021-12-23 | Stop reason: HOSPADM

## 2021-12-17 RX ORDER — ASPIRIN 81 MG/1
81 TABLET, CHEWABLE ORAL NIGHTLY
Status: DISCONTINUED | OUTPATIENT
Start: 2021-12-17 | End: 2021-12-23 | Stop reason: HOSPADM

## 2021-12-17 RX ORDER — ACETAMINOPHEN 325 MG/1
650 TABLET ORAL EVERY 4 HOURS PRN
Status: DISCONTINUED | OUTPATIENT
Start: 2021-12-17 | End: 2021-12-23 | Stop reason: HOSPADM

## 2021-12-17 RX ORDER — ACETAMINOPHEN 160 MG/5ML
650 SOLUTION ORAL EVERY 4 HOURS PRN
Status: DISCONTINUED | OUTPATIENT
Start: 2021-12-17 | End: 2021-12-23 | Stop reason: HOSPADM

## 2021-12-17 RX ORDER — GABAPENTIN 600 MG/1
600 TABLET ORAL 2 TIMES DAILY
Status: ON HOLD | COMMUNITY
End: 2022-04-29 | Stop reason: SDUPTHER

## 2021-12-17 RX ORDER — NICOTINE POLACRILEX 4 MG
15 LOZENGE BUCCAL
Status: DISCONTINUED | OUTPATIENT
Start: 2021-12-17 | End: 2021-12-23 | Stop reason: HOSPADM

## 2021-12-17 RX ORDER — CYCLOBENZAPRINE HCL 10 MG
10 TABLET ORAL 3 TIMES DAILY PRN
Status: DISCONTINUED | OUTPATIENT
Start: 2021-12-17 | End: 2021-12-23 | Stop reason: HOSPADM

## 2021-12-17 RX ORDER — FERROUS SULFATE 325(65) MG
325 TABLET ORAL EVERY OTHER DAY
Status: DISCONTINUED | OUTPATIENT
Start: 2021-12-19 | End: 2021-12-23 | Stop reason: HOSPADM

## 2021-12-17 RX ORDER — HYDROCODONE BITARTRATE AND ACETAMINOPHEN 7.5; 325 MG/1; MG/1
1 TABLET ORAL EVERY 6 HOURS PRN
COMMUNITY
End: 2022-05-08 | Stop reason: HOSPADM

## 2021-12-17 RX ORDER — AMPICILLIN 500 MG/1
500 CAPSULE ORAL 2 TIMES DAILY
COMMUNITY
End: 2021-12-23 | Stop reason: HOSPADM

## 2021-12-17 RX ORDER — GABAPENTIN 300 MG/1
600 CAPSULE ORAL EVERY 12 HOURS SCHEDULED
Status: DISCONTINUED | OUTPATIENT
Start: 2021-12-17 | End: 2021-12-23 | Stop reason: HOSPADM

## 2021-12-17 RX ORDER — ONDANSETRON 2 MG/ML
4 INJECTION INTRAMUSCULAR; INTRAVENOUS EVERY 6 HOURS PRN
Status: DISCONTINUED | OUTPATIENT
Start: 2021-12-17 | End: 2021-12-23 | Stop reason: HOSPADM

## 2021-12-17 RX ORDER — PANTOPRAZOLE SODIUM 40 MG/1
40 TABLET, DELAYED RELEASE ORAL EVERY MORNING
Status: DISCONTINUED | OUTPATIENT
Start: 2021-12-18 | End: 2021-12-23 | Stop reason: HOSPADM

## 2021-12-17 RX ORDER — DEXTROSE MONOHYDRATE 25 G/50ML
25 INJECTION, SOLUTION INTRAVENOUS
Status: DISCONTINUED | OUTPATIENT
Start: 2021-12-17 | End: 2021-12-23 | Stop reason: HOSPADM

## 2021-12-17 RX ORDER — LOSARTAN POTASSIUM 25 MG/1
25 TABLET ORAL 2 TIMES DAILY
Status: DISCONTINUED | OUTPATIENT
Start: 2021-12-17 | End: 2021-12-18

## 2021-12-17 RX ORDER — INSULIN LISPRO 100 [IU]/ML
20 INJECTION, SOLUTION INTRAVENOUS; SUBCUTANEOUS
Status: DISCONTINUED | OUTPATIENT
Start: 2021-12-17 | End: 2021-12-18

## 2021-12-17 RX ORDER — OXYCODONE HYDROCHLORIDE AND ACETAMINOPHEN 5; 325 MG/1; MG/1
1 TABLET ORAL EVERY 6 HOURS PRN
Status: ON HOLD | COMMUNITY
End: 2022-01-17

## 2021-12-17 RX ORDER — ISOSORBIDE MONONITRATE 30 MG/1
30 TABLET, EXTENDED RELEASE ORAL EVERY MORNING
Status: DISCONTINUED | OUTPATIENT
Start: 2021-12-18 | End: 2021-12-19

## 2021-12-17 RX ORDER — AMPICILLIN 500 MG/1
500 CAPSULE ORAL 2 TIMES DAILY
Status: DISCONTINUED | OUTPATIENT
Start: 2021-12-17 | End: 2021-12-19

## 2021-12-17 RX ADMIN — ASPIRIN 81 MG: 81 TABLET, CHEWABLE ORAL at 21:56

## 2021-12-17 RX ADMIN — ATORVASTATIN CALCIUM 10 MG: 20 TABLET, FILM COATED ORAL at 21:55

## 2021-12-17 RX ADMIN — METOPROLOL TARTRATE 25 MG: 25 TABLET, FILM COATED ORAL at 21:55

## 2021-12-17 RX ADMIN — AMPICILLIN 500 MG: 500 CAPSULE ORAL at 21:57

## 2021-12-17 RX ADMIN — INSULIN GLARGINE 40 UNITS: 100 INJECTION, SOLUTION SUBCUTANEOUS at 21:57

## 2021-12-17 RX ADMIN — GABAPENTIN 600 MG: 300 CAPSULE ORAL at 21:55

## 2021-12-18 ENCOUNTER — APPOINTMENT (OUTPATIENT)
Dept: GENERAL RADIOLOGY | Facility: HOSPITAL | Age: 66
End: 2021-12-18

## 2021-12-18 ENCOUNTER — ANESTHESIA (OUTPATIENT)
Dept: PERIOP | Facility: HOSPITAL | Age: 66
End: 2021-12-18

## 2021-12-18 ENCOUNTER — ANESTHESIA EVENT (OUTPATIENT)
Dept: PERIOP | Facility: HOSPITAL | Age: 66
End: 2021-12-18

## 2021-12-18 PROBLEM — Z95.5 PRESENCE OF STENT IN CORONARY ARTERY IN PATIENT WITH CORONARY ARTERY DISEASE: Chronic | Status: ACTIVE | Noted: 2021-12-18

## 2021-12-18 PROBLEM — I25.10 PRESENCE OF STENT IN CORONARY ARTERY IN PATIENT WITH CORONARY ARTERY DISEASE: Chronic | Status: ACTIVE | Noted: 2021-12-18

## 2021-12-18 PROBLEM — E66.01 MORBID OBESITY (HCC): Chronic | Status: ACTIVE | Noted: 2021-12-18

## 2021-12-18 PROBLEM — I50.32 CHRONIC DIASTOLIC CHF (CONGESTIVE HEART FAILURE): Status: ACTIVE | Noted: 2021-12-18

## 2021-12-18 PROBLEM — D50.9 IRON DEFICIENCY ANEMIA: Status: ACTIVE | Noted: 2021-12-18

## 2021-12-18 LAB
ANION GAP SERPL CALCULATED.3IONS-SCNC: 6.6 MMOL/L (ref 5–15)
BASOPHILS # BLD AUTO: 0.03 10*3/MM3 (ref 0–0.2)
BASOPHILS NFR BLD AUTO: 0.6 % (ref 0–1.5)
BUN SERPL-MCNC: 6 MG/DL (ref 8–23)
BUN/CREAT SERPL: 9.5 (ref 7–25)
CALCIUM SPEC-SCNC: 8.7 MG/DL (ref 8.6–10.5)
CHLORIDE SERPL-SCNC: 108 MMOL/L (ref 98–107)
CO2 SERPL-SCNC: 25.4 MMOL/L (ref 22–29)
CREAT SERPL-MCNC: 0.63 MG/DL (ref 0.57–1)
DEPRECATED RDW RBC AUTO: 44.8 FL (ref 37–54)
EOSINOPHIL # BLD AUTO: 0.18 10*3/MM3 (ref 0–0.4)
EOSINOPHIL NFR BLD AUTO: 3.5 % (ref 0.3–6.2)
ERYTHROCYTE [DISTWIDTH] IN BLOOD BY AUTOMATED COUNT: 14.4 % (ref 12.3–15.4)
GFR SERPL CREATININE-BSD FRML MDRD: 94 ML/MIN/1.73
GLUCOSE BLDC GLUCOMTR-MCNC: 105 MG/DL (ref 70–130)
GLUCOSE BLDC GLUCOMTR-MCNC: 124 MG/DL (ref 70–130)
GLUCOSE BLDC GLUCOMTR-MCNC: 141 MG/DL (ref 70–130)
GLUCOSE BLDC GLUCOMTR-MCNC: 151 MG/DL (ref 70–130)
GLUCOSE BLDC GLUCOMTR-MCNC: 152 MG/DL (ref 70–130)
GLUCOSE SERPL-MCNC: 165 MG/DL (ref 65–99)
HBA1C MFR BLD: 6 % (ref 4.8–5.6)
HCT VFR BLD AUTO: 29.8 % (ref 34–46.6)
HGB BLD-MCNC: 9.5 G/DL (ref 12–15.9)
IMM GRANULOCYTES # BLD AUTO: 0.07 10*3/MM3 (ref 0–0.05)
IMM GRANULOCYTES NFR BLD AUTO: 1.4 % (ref 0–0.5)
LYMPHOCYTES # BLD AUTO: 0.83 10*3/MM3 (ref 0.7–3.1)
LYMPHOCYTES NFR BLD AUTO: 16.2 % (ref 19.6–45.3)
MCH RBC QN AUTO: 27.5 PG (ref 26.6–33)
MCHC RBC AUTO-ENTMCNC: 31.9 G/DL (ref 31.5–35.7)
MCV RBC AUTO: 86.4 FL (ref 79–97)
MONOCYTES # BLD AUTO: 0.51 10*3/MM3 (ref 0.1–0.9)
MONOCYTES NFR BLD AUTO: 10 % (ref 5–12)
NEUTROPHILS NFR BLD AUTO: 3.49 10*3/MM3 (ref 1.7–7)
NEUTROPHILS NFR BLD AUTO: 68.3 % (ref 42.7–76)
NRBC BLD AUTO-RTO: 0 /100 WBC (ref 0–0.2)
PLATELET # BLD AUTO: 146 10*3/MM3 (ref 140–450)
PMV BLD AUTO: 9.3 FL (ref 6–12)
POTASSIUM SERPL-SCNC: 4.5 MMOL/L (ref 3.5–5.2)
RBC # BLD AUTO: 3.45 10*6/MM3 (ref 3.77–5.28)
SODIUM SERPL-SCNC: 140 MMOL/L (ref 136–145)
WBC NRBC COR # BLD: 5.11 10*3/MM3 (ref 3.4–10.8)

## 2021-12-18 PROCEDURE — 73501 X-RAY EXAM HIP UNI 1 VIEW: CPT

## 2021-12-18 PROCEDURE — C1776 JOINT DEVICE (IMPLANTABLE): HCPCS | Performed by: ORTHOPAEDIC SURGERY

## 2021-12-18 PROCEDURE — 80048 BASIC METABOLIC PNL TOTAL CA: CPT | Performed by: INTERNAL MEDICINE

## 2021-12-18 PROCEDURE — 25010000002 PROPOFOL 10 MG/ML EMULSION: Performed by: NURSE ANESTHETIST, CERTIFIED REGISTERED

## 2021-12-18 PROCEDURE — 87070 CULTURE OTHR SPECIMN AEROBIC: CPT | Performed by: ORTHOPAEDIC SURGERY

## 2021-12-18 PROCEDURE — 87102 FUNGUS ISOLATION CULTURE: CPT | Performed by: ORTHOPAEDIC SURGERY

## 2021-12-18 PROCEDURE — 25010000002 ONDANSETRON PER 1 MG: Performed by: NURSE ANESTHETIST, CERTIFIED REGISTERED

## 2021-12-18 PROCEDURE — 87176 TISSUE HOMOGENIZATION CULTR: CPT | Performed by: ORTHOPAEDIC SURGERY

## 2021-12-18 PROCEDURE — 82962 GLUCOSE BLOOD TEST: CPT

## 2021-12-18 PROCEDURE — 87075 CULTR BACTERIA EXCEPT BLOOD: CPT | Performed by: ORTHOPAEDIC SURGERY

## 2021-12-18 PROCEDURE — 87181 SC STD AGAR DILUTION PER AGT: CPT | Performed by: ORTHOPAEDIC SURGERY

## 2021-12-18 PROCEDURE — 0SP909Z REMOVAL OF LINER FROM RIGHT HIP JOINT, OPEN APPROACH: ICD-10-PCS | Performed by: ORTHOPAEDIC SURGERY

## 2021-12-18 PROCEDURE — 87015 SPECIMEN INFECT AGNT CONCNTJ: CPT | Performed by: ORTHOPAEDIC SURGERY

## 2021-12-18 PROCEDURE — 25010000002 VANCOMYCIN 10 G RECONSTITUTED SOLUTION: Performed by: ORTHOPAEDIC SURGERY

## 2021-12-18 PROCEDURE — 0SPR0JZ REMOVAL OF SYNTHETIC SUBSTITUTE FROM RIGHT HIP JOINT, FEMORAL SURFACE, OPEN APPROACH: ICD-10-PCS | Performed by: ORTHOPAEDIC SURGERY

## 2021-12-18 PROCEDURE — 25010000002 NEOSTIGMINE 5 MG/10ML SOLUTION: Performed by: NURSE ANESTHETIST, CERTIFIED REGISTERED

## 2021-12-18 PROCEDURE — 25010000002 FENTANYL CITRATE (PF) 50 MCG/ML SOLUTION: Performed by: NURSE ANESTHETIST, CERTIFIED REGISTERED

## 2021-12-18 PROCEDURE — 83036 HEMOGLOBIN GLYCOSYLATED A1C: CPT | Performed by: INTERNAL MEDICINE

## 2021-12-18 PROCEDURE — 25010000002 CEFTRIAXONE PER 250 MG: Performed by: ORTHOPAEDIC SURGERY

## 2021-12-18 PROCEDURE — C1889 IMPLANT/INSERT DEVICE, NOC: HCPCS | Performed by: ORTHOPAEDIC SURGERY

## 2021-12-18 PROCEDURE — C1713 ANCHOR/SCREW BN/BN,TIS/BN: HCPCS | Performed by: ORTHOPAEDIC SURGERY

## 2021-12-18 PROCEDURE — 25010000002 HYDROMORPHONE PER 4 MG: Performed by: NURSE ANESTHETIST, CERTIFIED REGISTERED

## 2021-12-18 PROCEDURE — 87077 CULTURE AEROBIC IDENTIFY: CPT | Performed by: ORTHOPAEDIC SURGERY

## 2021-12-18 PROCEDURE — 0SUA09Z SUPPLEMENT RIGHT HIP JOINT, ACETABULAR SURFACE WITH LINER, OPEN APPROACH: ICD-10-PCS | Performed by: ORTHOPAEDIC SURGERY

## 2021-12-18 PROCEDURE — 0SRR0JA REPLACEMENT OF RIGHT HIP JOINT, FEMORAL SURFACE WITH SYNTHETIC SUBSTITUTE, UNCEMENTED, OPEN APPROACH: ICD-10-PCS | Performed by: ORTHOPAEDIC SURGERY

## 2021-12-18 PROCEDURE — 87186 SC STD MICRODIL/AGAR DIL: CPT | Performed by: ORTHOPAEDIC SURGERY

## 2021-12-18 PROCEDURE — 0SB90ZZ EXCISION OF RIGHT HIP JOINT, OPEN APPROACH: ICD-10-PCS | Performed by: ORTHOPAEDIC SURGERY

## 2021-12-18 PROCEDURE — 87076 CULTURE ANAEROBE IDENT EACH: CPT | Performed by: ORTHOPAEDIC SURGERY

## 2021-12-18 PROCEDURE — 85025 COMPLETE CBC W/AUTO DIFF WBC: CPT | Performed by: INTERNAL MEDICINE

## 2021-12-18 PROCEDURE — 87205 SMEAR GRAM STAIN: CPT | Performed by: ORTHOPAEDIC SURGERY

## 2021-12-18 DEVICE — DEV CONTRL TISS STRATAFIX SPIRAL MNCRYL UD 3/0 PLS 30CM: Type: IMPLANTABLE DEVICE | Site: HIP | Status: FUNCTIONAL

## 2021-12-18 DEVICE — IMPLANTABLE DEVICE
Type: IMPLANTABLE DEVICE | Site: HIP | Status: FUNCTIONAL
Brand: CERAMENT™BONE VOID FILLER

## 2021-12-18 DEVICE — OR3O DUAL MOBILITY LINER 44/56
Type: IMPLANTABLE DEVICE | Site: HIP | Status: FUNCTIONAL
Brand: OR3O DUAL MOBILITY

## 2021-12-18 DEVICE — OXINIUM FEMORAL HEAD 12/14 TAPER                                    28 MM +8
Type: IMPLANTABLE DEVICE | Site: HIP | Status: FUNCTIONAL
Brand: OXINIUM

## 2021-12-18 DEVICE — OR3O DUAL MOBILITY XLPE INSERT 28/44
Type: IMPLANTABLE DEVICE | Site: HIP | Status: FUNCTIONAL
Brand: OR3O DUAL MOBILITY

## 2021-12-18 DEVICE — DEV CONTRL TISS STRATAFIX SPIRAL PDS PLS CT1 2-0 45CM: Type: IMPLANTABLE DEVICE | Site: HIP | Status: FUNCTIONAL

## 2021-12-18 RX ORDER — SODIUM CHLORIDE, SODIUM LACTATE, POTASSIUM CHLORIDE, CALCIUM CHLORIDE 600; 310; 30; 20 MG/100ML; MG/100ML; MG/100ML; MG/100ML
9 INJECTION, SOLUTION INTRAVENOUS CONTINUOUS
Status: DISCONTINUED | OUTPATIENT
Start: 2021-12-18 | End: 2021-12-23 | Stop reason: HOSPADM

## 2021-12-18 RX ORDER — OXYCODONE AND ACETAMINOPHEN 7.5; 325 MG/1; MG/1
1 TABLET ORAL EVERY 4 HOURS PRN
Status: DISCONTINUED | OUTPATIENT
Start: 2021-12-18 | End: 2021-12-18 | Stop reason: HOSPADM

## 2021-12-18 RX ORDER — SODIUM CHLORIDE 0.9 % (FLUSH) 0.9 %
3-10 SYRINGE (ML) INJECTION AS NEEDED
Status: DISCONTINUED | OUTPATIENT
Start: 2021-12-18 | End: 2021-12-18 | Stop reason: HOSPADM

## 2021-12-18 RX ORDER — MEPERIDINE HYDROCHLORIDE 25 MG/ML
12.5 INJECTION INTRAMUSCULAR; INTRAVENOUS; SUBCUTANEOUS
Status: DISCONTINUED | OUTPATIENT
Start: 2021-12-18 | End: 2021-12-18 | Stop reason: HOSPADM

## 2021-12-18 RX ORDER — EPHEDRINE SULFATE 50 MG/ML
5 INJECTION, SOLUTION INTRAVENOUS ONCE AS NEEDED
Status: DISCONTINUED | OUTPATIENT
Start: 2021-12-18 | End: 2021-12-18 | Stop reason: HOSPADM

## 2021-12-18 RX ORDER — SODIUM CHLORIDE 0.9 % (FLUSH) 0.9 %
3 SYRINGE (ML) INJECTION EVERY 12 HOURS SCHEDULED
Status: DISCONTINUED | OUTPATIENT
Start: 2021-12-18 | End: 2021-12-18 | Stop reason: HOSPADM

## 2021-12-18 RX ORDER — PROPOFOL 10 MG/ML
VIAL (ML) INTRAVENOUS AS NEEDED
Status: DISCONTINUED | OUTPATIENT
Start: 2021-12-18 | End: 2021-12-18 | Stop reason: SURG

## 2021-12-18 RX ORDER — GLYCOPYRROLATE 0.2 MG/ML
INJECTION INTRAMUSCULAR; INTRAVENOUS AS NEEDED
Status: DISCONTINUED | OUTPATIENT
Start: 2021-12-18 | End: 2021-12-18 | Stop reason: SURG

## 2021-12-18 RX ORDER — NEOSTIGMINE METHYLSULFATE 0.5 MG/ML
INJECTION, SOLUTION INTRAVENOUS AS NEEDED
Status: DISCONTINUED | OUTPATIENT
Start: 2021-12-18 | End: 2021-12-18 | Stop reason: SURG

## 2021-12-18 RX ORDER — FENTANYL CITRATE 50 UG/ML
INJECTION, SOLUTION INTRAMUSCULAR; INTRAVENOUS AS NEEDED
Status: DISCONTINUED | OUTPATIENT
Start: 2021-12-18 | End: 2021-12-18 | Stop reason: SURG

## 2021-12-18 RX ORDER — DIPHENHYDRAMINE HCL 25 MG
25 CAPSULE ORAL
Status: DISCONTINUED | OUTPATIENT
Start: 2021-12-18 | End: 2021-12-18 | Stop reason: HOSPADM

## 2021-12-18 RX ORDER — IBUPROFEN 600 MG/1
600 TABLET ORAL ONCE AS NEEDED
Status: DISCONTINUED | OUTPATIENT
Start: 2021-12-18 | End: 2021-12-18 | Stop reason: HOSPADM

## 2021-12-18 RX ORDER — PROMETHAZINE HYDROCHLORIDE 25 MG/1
25 TABLET ORAL ONCE AS NEEDED
Status: DISCONTINUED | OUTPATIENT
Start: 2021-12-18 | End: 2021-12-18 | Stop reason: HOSPADM

## 2021-12-18 RX ORDER — PROMETHAZINE HYDROCHLORIDE 25 MG/1
25 SUPPOSITORY RECTAL ONCE AS NEEDED
Status: DISCONTINUED | OUTPATIENT
Start: 2021-12-18 | End: 2021-12-18 | Stop reason: HOSPADM

## 2021-12-18 RX ORDER — SODIUM CHLORIDE, SODIUM LACTATE, POTASSIUM CHLORIDE, CALCIUM CHLORIDE 600; 310; 30; 20 MG/100ML; MG/100ML; MG/100ML; MG/100ML
INJECTION, SOLUTION INTRAVENOUS CONTINUOUS PRN
Status: DISCONTINUED | OUTPATIENT
Start: 2021-12-18 | End: 2021-12-18 | Stop reason: SURG

## 2021-12-18 RX ORDER — LIDOCAINE HYDROCHLORIDE 40 MG/ML
SOLUTION TOPICAL AS NEEDED
Status: DISCONTINUED | OUTPATIENT
Start: 2021-12-18 | End: 2021-12-18 | Stop reason: SURG

## 2021-12-18 RX ORDER — LABETALOL HYDROCHLORIDE 5 MG/ML
5 INJECTION, SOLUTION INTRAVENOUS
Status: DISCONTINUED | OUTPATIENT
Start: 2021-12-18 | End: 2021-12-18 | Stop reason: HOSPADM

## 2021-12-18 RX ORDER — MIDAZOLAM HYDROCHLORIDE 1 MG/ML
0.5 INJECTION INTRAMUSCULAR; INTRAVENOUS
Status: DISCONTINUED | OUTPATIENT
Start: 2021-12-18 | End: 2021-12-18 | Stop reason: HOSPADM

## 2021-12-18 RX ORDER — ONDANSETRON 2 MG/ML
4 INJECTION INTRAMUSCULAR; INTRAVENOUS ONCE AS NEEDED
Status: DISCONTINUED | OUTPATIENT
Start: 2021-12-18 | End: 2021-12-18 | Stop reason: HOSPADM

## 2021-12-18 RX ORDER — DIPHENHYDRAMINE HYDROCHLORIDE 50 MG/ML
12.5 INJECTION INTRAMUSCULAR; INTRAVENOUS
Status: DISCONTINUED | OUTPATIENT
Start: 2021-12-18 | End: 2021-12-18 | Stop reason: HOSPADM

## 2021-12-18 RX ORDER — NALOXONE HCL 0.4 MG/ML
0.2 VIAL (ML) INJECTION AS NEEDED
Status: DISCONTINUED | OUTPATIENT
Start: 2021-12-18 | End: 2021-12-18 | Stop reason: HOSPADM

## 2021-12-18 RX ORDER — OXYCODONE HYDROCHLORIDE AND ACETAMINOPHEN 5; 325 MG/1; MG/1
1 TABLET ORAL EVERY 6 HOURS PRN
Status: CANCELLED | OUTPATIENT
Start: 2021-12-18

## 2021-12-18 RX ORDER — HYDROMORPHONE HYDROCHLORIDE 1 MG/ML
0.5 INJECTION, SOLUTION INTRAMUSCULAR; INTRAVENOUS; SUBCUTANEOUS
Status: DISCONTINUED | OUTPATIENT
Start: 2021-12-18 | End: 2021-12-18 | Stop reason: HOSPADM

## 2021-12-18 RX ORDER — FLUMAZENIL 0.1 MG/ML
0.2 INJECTION INTRAVENOUS AS NEEDED
Status: DISCONTINUED | OUTPATIENT
Start: 2021-12-18 | End: 2021-12-18 | Stop reason: HOSPADM

## 2021-12-18 RX ORDER — ROCURONIUM BROMIDE 10 MG/ML
INJECTION, SOLUTION INTRAVENOUS AS NEEDED
Status: DISCONTINUED | OUTPATIENT
Start: 2021-12-18 | End: 2021-12-18 | Stop reason: SURG

## 2021-12-18 RX ORDER — OXYCODONE HYDROCHLORIDE AND ACETAMINOPHEN 5; 325 MG/1; MG/1
1 TABLET ORAL EVERY 4 HOURS PRN
Status: DISCONTINUED | OUTPATIENT
Start: 2021-12-18 | End: 2021-12-23 | Stop reason: HOSPADM

## 2021-12-18 RX ORDER — HYDRALAZINE HYDROCHLORIDE 20 MG/ML
5 INJECTION INTRAMUSCULAR; INTRAVENOUS
Status: DISCONTINUED | OUTPATIENT
Start: 2021-12-18 | End: 2021-12-18 | Stop reason: HOSPADM

## 2021-12-18 RX ORDER — FENTANYL CITRATE 50 UG/ML
50 INJECTION, SOLUTION INTRAMUSCULAR; INTRAVENOUS
Status: DISCONTINUED | OUTPATIENT
Start: 2021-12-18 | End: 2021-12-18 | Stop reason: HOSPADM

## 2021-12-18 RX ORDER — SCOLOPAMINE TRANSDERMAL SYSTEM 1 MG/1
1 PATCH, EXTENDED RELEASE TRANSDERMAL ONCE
Status: DISCONTINUED | OUTPATIENT
Start: 2021-12-18 | End: 2021-12-18

## 2021-12-18 RX ORDER — LIDOCAINE HYDROCHLORIDE 20 MG/ML
INJECTION, SOLUTION INFILTRATION; PERINEURAL AS NEEDED
Status: DISCONTINUED | OUTPATIENT
Start: 2021-12-18 | End: 2021-12-18 | Stop reason: SURG

## 2021-12-18 RX ORDER — ONDANSETRON 2 MG/ML
INJECTION INTRAMUSCULAR; INTRAVENOUS AS NEEDED
Status: DISCONTINUED | OUTPATIENT
Start: 2021-12-18 | End: 2021-12-18 | Stop reason: SURG

## 2021-12-18 RX ORDER — FAMOTIDINE 10 MG/ML
20 INJECTION, SOLUTION INTRAVENOUS ONCE
Status: COMPLETED | OUTPATIENT
Start: 2021-12-18 | End: 2021-12-18

## 2021-12-18 RX ORDER — OXYCODONE HYDROCHLORIDE AND ACETAMINOPHEN 5; 325 MG/1; MG/1
2 TABLET ORAL EVERY 4 HOURS PRN
Status: DISCONTINUED | OUTPATIENT
Start: 2021-12-18 | End: 2021-12-23 | Stop reason: HOSPADM

## 2021-12-18 RX ORDER — LIDOCAINE HYDROCHLORIDE 10 MG/ML
0.5 INJECTION, SOLUTION EPIDURAL; INFILTRATION; INTRACAUDAL; PERINEURAL ONCE AS NEEDED
Status: DISCONTINUED | OUTPATIENT
Start: 2021-12-18 | End: 2021-12-18 | Stop reason: HOSPADM

## 2021-12-18 RX ORDER — HYDROCODONE BITARTRATE AND ACETAMINOPHEN 7.5; 325 MG/1; MG/1
1 TABLET ORAL ONCE AS NEEDED
Status: DISCONTINUED | OUTPATIENT
Start: 2021-12-18 | End: 2021-12-18 | Stop reason: HOSPADM

## 2021-12-18 RX ADMIN — ATORVASTATIN CALCIUM 10 MG: 20 TABLET, FILM COATED ORAL at 21:06

## 2021-12-18 RX ADMIN — SODIUM CHLORIDE, POTASSIUM CHLORIDE, SODIUM LACTATE AND CALCIUM CHLORIDE: 600; 310; 30; 20 INJECTION, SOLUTION INTRAVENOUS at 10:29

## 2021-12-18 RX ADMIN — ONDANSETRON 4 MG: 2 INJECTION INTRAMUSCULAR; INTRAVENOUS at 12:01

## 2021-12-18 RX ADMIN — HYDROMORPHONE HYDROCHLORIDE 0.5 MG: 1 INJECTION, SOLUTION INTRAMUSCULAR; INTRAVENOUS; SUBCUTANEOUS at 13:17

## 2021-12-18 RX ADMIN — NEOSTIGMINE METHYLSULFATE 2 MG: 0.5 INJECTION INTRAVENOUS at 12:01

## 2021-12-18 RX ADMIN — FENTANYL CITRATE 50 MCG: 50 INJECTION INTRAMUSCULAR; INTRAVENOUS at 12:40

## 2021-12-18 RX ADMIN — CYCLOBENZAPRINE 10 MG: 10 TABLET, FILM COATED ORAL at 21:07

## 2021-12-18 RX ADMIN — PROPOFOL 150 MG: 10 INJECTION, EMULSION INTRAVENOUS at 11:09

## 2021-12-18 RX ADMIN — LIDOCAINE HYDROCHLORIDE 1 EACH: 40 SOLUTION TOPICAL at 11:11

## 2021-12-18 RX ADMIN — ROCURONIUM BROMIDE 30 MG: 50 INJECTION INTRAVENOUS at 11:09

## 2021-12-18 RX ADMIN — HYDROCODONE BITARTRATE AND ACETAMINOPHEN 1 TABLET: 7.5; 325 TABLET ORAL at 15:33

## 2021-12-18 RX ADMIN — AMPICILLIN 500 MG: 500 CAPSULE ORAL at 21:06

## 2021-12-18 RX ADMIN — GLYCOPYRROLATE 0.4 MG: 0.2 INJECTION INTRAMUSCULAR; INTRAVENOUS at 12:01

## 2021-12-18 RX ADMIN — LIDOCAINE HYDROCHLORIDE 80 MG: 20 INJECTION, SOLUTION INFILTRATION; PERINEURAL at 11:09

## 2021-12-18 RX ADMIN — SODIUM CHLORIDE, POTASSIUM CHLORIDE, SODIUM LACTATE AND CALCIUM CHLORIDE 9 ML/HR: 600; 310; 30; 20 INJECTION, SOLUTION INTRAVENOUS at 10:37

## 2021-12-18 RX ADMIN — GABAPENTIN 600 MG: 300 CAPSULE ORAL at 21:07

## 2021-12-18 RX ADMIN — CEFTRIAXONE SODIUM 2 G: 2 INJECTION, POWDER, FOR SOLUTION INTRAMUSCULAR; INTRAVENOUS at 15:33

## 2021-12-18 RX ADMIN — METOPROLOL TARTRATE 25 MG: 25 TABLET, FILM COATED ORAL at 21:07

## 2021-12-18 RX ADMIN — SCOPALAMINE 1 PATCH: 1 PATCH, EXTENDED RELEASE TRANSDERMAL at 10:37

## 2021-12-18 RX ADMIN — VANCOMYCIN HYDROCHLORIDE 1250 MG: 10 INJECTION, POWDER, LYOPHILIZED, FOR SOLUTION INTRAVENOUS at 16:41

## 2021-12-18 RX ADMIN — HYDROCODONE BITARTRATE AND ACETAMINOPHEN 1 TABLET: 7.5; 325 TABLET ORAL at 21:06

## 2021-12-18 RX ADMIN — ASPIRIN 81 MG: 81 TABLET, CHEWABLE ORAL at 21:06

## 2021-12-18 RX ADMIN — FAMOTIDINE 20 MG: 10 INJECTION INTRAVENOUS at 10:37

## 2021-12-18 RX ADMIN — FENTANYL CITRATE 50 MCG: 50 INJECTION INTRAMUSCULAR; INTRAVENOUS at 13:01

## 2021-12-18 RX ADMIN — FENTANYL CITRATE 50 MCG: 0.05 INJECTION, SOLUTION INTRAMUSCULAR; INTRAVENOUS at 11:09

## 2021-12-18 RX ADMIN — HYDROMORPHONE HYDROCHLORIDE 0.5 MG: 1 INJECTION, SOLUTION INTRAMUSCULAR; INTRAVENOUS; SUBCUTANEOUS at 12:52

## 2021-12-18 NOTE — ANESTHESIA PROCEDURE NOTES
Airway  Urgency: elective    Date/Time: 12/18/2021 11:11 AM  Airway not difficult    General Information and Staff    Patient location during procedure: OR  Anesthesiologist: Cisco Sharpe MD  CRNA: Selvin Vega CRNA    Indications and Patient Condition  Indications for airway management: airway protection    Preoxygenated: yes  MILS not maintained throughout  Mask difficulty assessment: 1 - vent by mask    Final Airway Details  Final airway type: endotracheal airway      Successful airway: ETT  Cuffed: yes   Successful intubation technique: direct laryngoscopy  Facilitating devices/methods: anterior pressure/BURP  Endotracheal tube insertion site: oral  Blade: Cedrick  Blade size: 3  ETT size (mm): 7.0  Cormack-Lehane Classification: grade I - full view of glottis  Placement verified by: chest auscultation and capnometry   Cuff volume (mL): 6  Measured from: lips  ETT/EBT  to lips (cm): 20  Number of attempts at approach: 1  Assessment: lips, teeth, and gum same as pre-op and atraumatic intubation    Additional Comments  Pre O2, SIAI

## 2021-12-18 NOTE — ANESTHESIA PREPROCEDURE EVALUATION
Anesthesia Evaluation     Patient summary reviewed and Nursing notes reviewed   history of anesthetic complications: PONV  NPO Solid Status: > 8 hours  NPO Liquid Status: > 2 hours           Airway   Mallampati: II  TM distance: >3 FB  Neck ROM: limited  Comment: Erwin 2. Cormack-Lehane Classification: grade IIa - partial view of glottis  Dental - normal exam   (+) edentulous    Pulmonary - normal exam    breath sounds clear to auscultation  (+) a smoker Former,   Cardiovascular - normal exam    ECG reviewed  Patient on routine beta blocker and Beta blocker given within 24 hours of surgery  Rhythm: regular  Rate: normal    (+) hypertension, valvular problems/murmurs murmur, past MI  >12 months, cardiac stents Drug eluting stent more than 12 months ago hyperlipidemia,   (-) angina, orthopnea, PND, CAMACHO    ROS comment: Sinus rhythm  Low voltage, precordial leads  Consider anterior infarct  When compared with ECG of 10-Nov-2021 9:45:16,  Significant voltage change     Neuro/Psych- negative ROS  GI/Hepatic/Renal/Endo    (+) morbid obesity, GERD,  diabetes mellitus type 2 using insulin,     Musculoskeletal     Abdominal    Substance History - negative use     OB/GYN negative ob/gyn ROS         Other   arthritis,    history of cancer (History of cervical cancer)                      Anesthesia Plan    ASA 4     general   (Known CAD s/p placement of 2 coronary stents    DM treated with insulin    BMI    Edentulous      )  intravenous induction     Anesthetic plan, all risks, benefits, and alternatives have been provided, discussed and informed consent has been obtained with: patient.

## 2021-12-19 PROBLEM — M00.9 SEPTIC ARTHRITIS OF HIP (HCC): Status: ACTIVE | Noted: 2021-12-19

## 2021-12-19 LAB
CREAT SERPL-MCNC: 0.77 MG/DL (ref 0.57–1)
GFR SERPL CREATININE-BSD FRML MDRD: 75 ML/MIN/1.73
GLUCOSE BLDC GLUCOMTR-MCNC: 157 MG/DL (ref 70–130)
GLUCOSE BLDC GLUCOMTR-MCNC: 165 MG/DL (ref 70–130)
GLUCOSE BLDC GLUCOMTR-MCNC: 204 MG/DL (ref 70–130)
GLUCOSE BLDC GLUCOMTR-MCNC: 204 MG/DL (ref 70–130)

## 2021-12-19 PROCEDURE — 63710000001 INSULIN GLARGINE PER 5 UNITS: Performed by: STUDENT IN AN ORGANIZED HEALTH CARE EDUCATION/TRAINING PROGRAM

## 2021-12-19 PROCEDURE — 82962 GLUCOSE BLOOD TEST: CPT

## 2021-12-19 PROCEDURE — 63710000001 INSULIN LISPRO (HUMAN) PER 5 UNITS: Performed by: ORTHOPAEDIC SURGERY

## 2021-12-19 PROCEDURE — 25010000002 VANCOMYCIN 10 G RECONSTITUTED SOLUTION: Performed by: ORTHOPAEDIC SURGERY

## 2021-12-19 PROCEDURE — 25010000002 CEFTRIAXONE PER 250 MG: Performed by: ORTHOPAEDIC SURGERY

## 2021-12-19 PROCEDURE — 82565 ASSAY OF CREATININE: CPT | Performed by: ORTHOPAEDIC SURGERY

## 2021-12-19 RX ADMIN — ASPIRIN 81 MG: 81 TABLET, CHEWABLE ORAL at 20:39

## 2021-12-19 RX ADMIN — ATORVASTATIN CALCIUM 10 MG: 20 TABLET, FILM COATED ORAL at 20:40

## 2021-12-19 RX ADMIN — OXYCODONE HYDROCHLORIDE AND ACETAMINOPHEN 2 TABLET: 5; 325 TABLET ORAL at 22:37

## 2021-12-19 RX ADMIN — GABAPENTIN 600 MG: 300 CAPSULE ORAL at 09:20

## 2021-12-19 RX ADMIN — OXYCODONE HYDROCHLORIDE AND ACETAMINOPHEN 2 TABLET: 5; 325 TABLET ORAL at 14:29

## 2021-12-19 RX ADMIN — INSULIN LISPRO 2 UNITS: 100 INJECTION, SOLUTION INTRAVENOUS; SUBCUTANEOUS at 09:20

## 2021-12-19 RX ADMIN — OXYCODONE HYDROCHLORIDE AND ACETAMINOPHEN 2 TABLET: 5; 325 TABLET ORAL at 09:20

## 2021-12-19 RX ADMIN — OXYCODONE HYDROCHLORIDE AND ACETAMINOPHEN 2 TABLET: 5; 325 TABLET ORAL at 18:34

## 2021-12-19 RX ADMIN — AMPICILLIN 500 MG: 500 CAPSULE ORAL at 09:20

## 2021-12-19 RX ADMIN — OXYCODONE HYDROCHLORIDE AND ACETAMINOPHEN 2 TABLET: 5; 325 TABLET ORAL at 00:48

## 2021-12-19 RX ADMIN — FERROUS SULFATE TAB 325 MG (65 MG ELEMENTAL FE) 325 MG: 325 (65 FE) TAB at 09:20

## 2021-12-19 RX ADMIN — INSULIN LISPRO 4 UNITS: 100 INJECTION, SOLUTION INTRAVENOUS; SUBCUTANEOUS at 12:19

## 2021-12-19 RX ADMIN — VANCOMYCIN HYDROCHLORIDE 1250 MG: 10 INJECTION, POWDER, LYOPHILIZED, FOR SOLUTION INTRAVENOUS at 04:25

## 2021-12-19 RX ADMIN — VANCOMYCIN HYDROCHLORIDE 1250 MG: 10 INJECTION, POWDER, LYOPHILIZED, FOR SOLUTION INTRAVENOUS at 18:35

## 2021-12-19 RX ADMIN — PANTOPRAZOLE SODIUM 40 MG: 40 TABLET, DELAYED RELEASE ORAL at 06:29

## 2021-12-19 RX ADMIN — OXYCODONE HYDROCHLORIDE AND ACETAMINOPHEN 2 TABLET: 5; 325 TABLET ORAL at 04:49

## 2021-12-19 RX ADMIN — INSULIN LISPRO 2 UNITS: 100 INJECTION, SOLUTION INTRAVENOUS; SUBCUTANEOUS at 16:34

## 2021-12-19 RX ADMIN — GABAPENTIN 600 MG: 300 CAPSULE ORAL at 20:40

## 2021-12-19 RX ADMIN — INSULIN GLARGINE 10 UNITS: 100 INJECTION, SOLUTION SUBCUTANEOUS at 06:30

## 2021-12-19 RX ADMIN — CEFTRIAXONE SODIUM 2 G: 2 INJECTION, POWDER, FOR SOLUTION INTRAMUSCULAR; INTRAVENOUS at 14:29

## 2021-12-20 LAB
ANION GAP SERPL CALCULATED.3IONS-SCNC: 10.5 MMOL/L (ref 5–15)
BASOPHILS # BLD AUTO: 0.03 10*3/MM3 (ref 0–0.2)
BASOPHILS NFR BLD AUTO: 0.3 % (ref 0–1.5)
BUN SERPL-MCNC: 9 MG/DL (ref 8–23)
BUN/CREAT SERPL: 11.1 (ref 7–25)
CALCIUM SPEC-SCNC: 8.5 MG/DL (ref 8.6–10.5)
CHLORIDE SERPL-SCNC: 101 MMOL/L (ref 98–107)
CO2 SERPL-SCNC: 22.5 MMOL/L (ref 22–29)
CREAT SERPL-MCNC: 0.81 MG/DL (ref 0.57–1)
DEPRECATED RDW RBC AUTO: 44.3 FL (ref 37–54)
EOSINOPHIL # BLD AUTO: 0.18 10*3/MM3 (ref 0–0.4)
EOSINOPHIL NFR BLD AUTO: 2.1 % (ref 0.3–6.2)
ERYTHROCYTE [DISTWIDTH] IN BLOOD BY AUTOMATED COUNT: 14.1 % (ref 12.3–15.4)
GFR SERPL CREATININE-BSD FRML MDRD: 70 ML/MIN/1.73
GLUCOSE BLDC GLUCOMTR-MCNC: 196 MG/DL (ref 70–130)
GLUCOSE BLDC GLUCOMTR-MCNC: 198 MG/DL (ref 70–130)
GLUCOSE BLDC GLUCOMTR-MCNC: 211 MG/DL (ref 70–130)
GLUCOSE BLDC GLUCOMTR-MCNC: 256 MG/DL (ref 70–130)
GLUCOSE SERPL-MCNC: 181 MG/DL (ref 65–99)
HCT VFR BLD AUTO: 26.1 % (ref 34–46.6)
HGB BLD-MCNC: 8.1 G/DL (ref 12–15.9)
IMM GRANULOCYTES # BLD AUTO: 0.05 10*3/MM3 (ref 0–0.05)
IMM GRANULOCYTES NFR BLD AUTO: 0.6 % (ref 0–0.5)
LYMPHOCYTES # BLD AUTO: 0.92 10*3/MM3 (ref 0.7–3.1)
LYMPHOCYTES NFR BLD AUTO: 10.6 % (ref 19.6–45.3)
MAGNESIUM SERPL-MCNC: 1.7 MG/DL (ref 1.6–2.4)
MCH RBC QN AUTO: 27 PG (ref 26.6–33)
MCHC RBC AUTO-ENTMCNC: 31 G/DL (ref 31.5–35.7)
MCV RBC AUTO: 87 FL (ref 79–97)
MONOCYTES # BLD AUTO: 1 10*3/MM3 (ref 0.1–0.9)
MONOCYTES NFR BLD AUTO: 11.5 % (ref 5–12)
NEUTROPHILS NFR BLD AUTO: 6.52 10*3/MM3 (ref 1.7–7)
NEUTROPHILS NFR BLD AUTO: 74.9 % (ref 42.7–76)
NRBC BLD AUTO-RTO: 0 /100 WBC (ref 0–0.2)
PHOSPHATE SERPL-MCNC: 3.6 MG/DL (ref 2.5–4.5)
PLATELET # BLD AUTO: 139 10*3/MM3 (ref 140–450)
PMV BLD AUTO: 9.6 FL (ref 6–12)
POTASSIUM SERPL-SCNC: 4.3 MMOL/L (ref 3.5–5.2)
RBC # BLD AUTO: 3 10*6/MM3 (ref 3.77–5.28)
SODIUM SERPL-SCNC: 134 MMOL/L (ref 136–145)
VANCOMYCIN TROUGH SERPL-MCNC: 16.5 MCG/ML (ref 5–20)
WBC NRBC COR # BLD: 8.7 10*3/MM3 (ref 3.4–10.8)

## 2021-12-20 PROCEDURE — 84100 ASSAY OF PHOSPHORUS: CPT | Performed by: STUDENT IN AN ORGANIZED HEALTH CARE EDUCATION/TRAINING PROGRAM

## 2021-12-20 PROCEDURE — 63710000001 INSULIN GLARGINE PER 5 UNITS: Performed by: STUDENT IN AN ORGANIZED HEALTH CARE EDUCATION/TRAINING PROGRAM

## 2021-12-20 PROCEDURE — 85025 COMPLETE CBC W/AUTO DIFF WBC: CPT | Performed by: STUDENT IN AN ORGANIZED HEALTH CARE EDUCATION/TRAINING PROGRAM

## 2021-12-20 PROCEDURE — 83735 ASSAY OF MAGNESIUM: CPT | Performed by: STUDENT IN AN ORGANIZED HEALTH CARE EDUCATION/TRAINING PROGRAM

## 2021-12-20 PROCEDURE — 25010000002 VANCOMYCIN PER 500 MG: Performed by: ORTHOPAEDIC SURGERY

## 2021-12-20 PROCEDURE — 63710000001 INSULIN LISPRO (HUMAN) PER 5 UNITS: Performed by: ORTHOPAEDIC SURGERY

## 2021-12-20 PROCEDURE — 80202 ASSAY OF VANCOMYCIN: CPT | Performed by: ORTHOPAEDIC SURGERY

## 2021-12-20 PROCEDURE — 25010000002 VANCOMYCIN 10 G RECONSTITUTED SOLUTION: Performed by: ORTHOPAEDIC SURGERY

## 2021-12-20 PROCEDURE — 80048 BASIC METABOLIC PNL TOTAL CA: CPT | Performed by: STUDENT IN AN ORGANIZED HEALTH CARE EDUCATION/TRAINING PROGRAM

## 2021-12-20 PROCEDURE — 82962 GLUCOSE BLOOD TEST: CPT

## 2021-12-20 PROCEDURE — 25010000002 MAGNESIUM SULFATE IN D5W 1G/100ML (PREMIX) 1-5 GM/100ML-% SOLUTION: Performed by: INTERNAL MEDICINE

## 2021-12-20 RX ORDER — MAGNESIUM SULFATE 1 G/100ML
1 INJECTION INTRAVENOUS AS NEEDED
Status: DISCONTINUED | OUTPATIENT
Start: 2021-12-20 | End: 2021-12-23 | Stop reason: HOSPADM

## 2021-12-20 RX ORDER — MAGNESIUM SULFATE HEPTAHYDRATE 40 MG/ML
2 INJECTION, SOLUTION INTRAVENOUS AS NEEDED
Status: DISCONTINUED | OUTPATIENT
Start: 2021-12-20 | End: 2021-12-23 | Stop reason: HOSPADM

## 2021-12-20 RX ORDER — VANCOMYCIN HYDROCHLORIDE 1 G/200ML
1000 INJECTION, SOLUTION INTRAVENOUS EVERY 12 HOURS
Status: COMPLETED | OUTPATIENT
Start: 2021-12-20 | End: 2021-12-21

## 2021-12-20 RX ADMIN — ATORVASTATIN CALCIUM 10 MG: 20 TABLET, FILM COATED ORAL at 22:40

## 2021-12-20 RX ADMIN — PANTOPRAZOLE SODIUM 40 MG: 40 TABLET, DELAYED RELEASE ORAL at 06:48

## 2021-12-20 RX ADMIN — MAGNESIUM SULFATE 1 G: 1 INJECTION INTRAVENOUS at 12:27

## 2021-12-20 RX ADMIN — INSULIN LISPRO 2 UNITS: 100 INJECTION, SOLUTION INTRAVENOUS; SUBCUTANEOUS at 08:08

## 2021-12-20 RX ADMIN — VANCOMYCIN HYDROCHLORIDE 1250 MG: 10 INJECTION, POWDER, LYOPHILIZED, FOR SOLUTION INTRAVENOUS at 07:34

## 2021-12-20 RX ADMIN — INSULIN LISPRO 2 UNITS: 100 INJECTION, SOLUTION INTRAVENOUS; SUBCUTANEOUS at 17:31

## 2021-12-20 RX ADMIN — INSULIN LISPRO 4 UNITS: 100 INJECTION, SOLUTION INTRAVENOUS; SUBCUTANEOUS at 12:01

## 2021-12-20 RX ADMIN — GABAPENTIN 600 MG: 300 CAPSULE ORAL at 08:08

## 2021-12-20 RX ADMIN — ASPIRIN 81 MG: 81 TABLET, CHEWABLE ORAL at 22:39

## 2021-12-20 RX ADMIN — GABAPENTIN 600 MG: 300 CAPSULE ORAL at 22:40

## 2021-12-20 RX ADMIN — VANCOMYCIN HYDROCHLORIDE 1000 MG: 1 INJECTION, SOLUTION INTRAVENOUS at 17:31

## 2021-12-20 RX ADMIN — OXYCODONE HYDROCHLORIDE AND ACETAMINOPHEN 2 TABLET: 5; 325 TABLET ORAL at 02:47

## 2021-12-20 RX ADMIN — INSULIN GLARGINE 10 UNITS: 100 INJECTION, SOLUTION SUBCUTANEOUS at 06:49

## 2021-12-20 RX ADMIN — OXYCODONE HYDROCHLORIDE AND ACETAMINOPHEN 2 TABLET: 5; 325 TABLET ORAL at 06:49

## 2021-12-20 RX ADMIN — OXYCODONE HYDROCHLORIDE AND ACETAMINOPHEN 2 TABLET: 5; 325 TABLET ORAL at 19:37

## 2021-12-20 RX ADMIN — METOPROLOL TARTRATE 25 MG: 25 TABLET, FILM COATED ORAL at 22:40

## 2021-12-20 RX ADMIN — CYCLOBENZAPRINE 10 MG: 10 TABLET, FILM COATED ORAL at 22:40

## 2021-12-20 RX ADMIN — OXYCODONE AND ACETAMINOPHEN 1 TABLET: 5; 325 TABLET ORAL at 12:01

## 2021-12-21 LAB
ABO GROUP BLD: NORMAL
ALBUMIN SERPL-MCNC: 2.3 G/DL (ref 3.5–5.2)
ANION GAP SERPL CALCULATED.3IONS-SCNC: 5.8 MMOL/L (ref 5–15)
BACTERIA SPEC AEROBE CULT: ABNORMAL
BACTERIA SPEC AEROBE CULT: NORMAL
BASOPHILS # BLD AUTO: 0.02 10*3/MM3 (ref 0–0.2)
BASOPHILS NFR BLD AUTO: 0.4 % (ref 0–1.5)
BLD GP AB SCN SERPL QL: NEGATIVE
BUN SERPL-MCNC: 8 MG/DL (ref 8–23)
BUN/CREAT SERPL: 10.8 (ref 7–25)
CALCIUM SPEC-SCNC: 8.7 MG/DL (ref 8.6–10.5)
CHLORIDE SERPL-SCNC: 102 MMOL/L (ref 98–107)
CO2 SERPL-SCNC: 24.2 MMOL/L (ref 22–29)
CREAT SERPL-MCNC: 0.74 MG/DL (ref 0.57–1)
DEPRECATED RDW RBC AUTO: 42.8 FL (ref 37–54)
EOSINOPHIL # BLD AUTO: 0.16 10*3/MM3 (ref 0–0.4)
EOSINOPHIL NFR BLD AUTO: 2.9 % (ref 0.3–6.2)
ERYTHROCYTE [DISTWIDTH] IN BLOOD BY AUTOMATED COUNT: 14.1 % (ref 12.3–15.4)
GFR SERPL CREATININE-BSD FRML MDRD: 78 ML/MIN/1.73
GLUCOSE BLDC GLUCOMTR-MCNC: 216 MG/DL (ref 70–130)
GLUCOSE BLDC GLUCOMTR-MCNC: 226 MG/DL (ref 70–130)
GLUCOSE BLDC GLUCOMTR-MCNC: 257 MG/DL (ref 70–130)
GLUCOSE BLDC GLUCOMTR-MCNC: 315 MG/DL (ref 70–130)
GLUCOSE SERPL-MCNC: 235 MG/DL (ref 65–99)
GRAM STN SPEC: ABNORMAL
GRAM STN SPEC: NORMAL
HCT VFR BLD AUTO: 22.9 % (ref 34–46.6)
HGB BLD-MCNC: 7.4 G/DL (ref 12–15.9)
IMM GRANULOCYTES # BLD AUTO: 0.04 10*3/MM3 (ref 0–0.05)
IMM GRANULOCYTES NFR BLD AUTO: 0.7 % (ref 0–0.5)
LYMPHOCYTES # BLD AUTO: 0.6 10*3/MM3 (ref 0.7–3.1)
LYMPHOCYTES NFR BLD AUTO: 10.7 % (ref 19.6–45.3)
MAGNESIUM SERPL-MCNC: 1.7 MG/DL (ref 1.6–2.4)
MCH RBC QN AUTO: 27.2 PG (ref 26.6–33)
MCHC RBC AUTO-ENTMCNC: 32.3 G/DL (ref 31.5–35.7)
MCV RBC AUTO: 84.2 FL (ref 79–97)
MONOCYTES # BLD AUTO: 0.55 10*3/MM3 (ref 0.1–0.9)
MONOCYTES NFR BLD AUTO: 9.8 % (ref 5–12)
NEUTROPHILS NFR BLD AUTO: 4.23 10*3/MM3 (ref 1.7–7)
NEUTROPHILS NFR BLD AUTO: 75.5 % (ref 42.7–76)
NRBC BLD AUTO-RTO: 0 /100 WBC (ref 0–0.2)
PHOSPHATE SERPL-MCNC: 3.1 MG/DL (ref 2.5–4.5)
PLATELET # BLD AUTO: 126 10*3/MM3 (ref 140–450)
PMV BLD AUTO: 9.8 FL (ref 6–12)
POTASSIUM SERPL-SCNC: 4 MMOL/L (ref 3.5–5.2)
RBC # BLD AUTO: 2.72 10*6/MM3 (ref 3.77–5.28)
RH BLD: POSITIVE
SODIUM SERPL-SCNC: 132 MMOL/L (ref 136–145)
T&S EXPIRATION DATE: NORMAL
WBC NRBC COR # BLD: 5.6 10*3/MM3 (ref 3.4–10.8)

## 2021-12-21 PROCEDURE — 86900 BLOOD TYPING SEROLOGIC ABO: CPT

## 2021-12-21 PROCEDURE — 86901 BLOOD TYPING SEROLOGIC RH(D): CPT | Performed by: INTERNAL MEDICINE

## 2021-12-21 PROCEDURE — 63710000001 INSULIN LISPRO (HUMAN) PER 5 UNITS: Performed by: ORTHOPAEDIC SURGERY

## 2021-12-21 PROCEDURE — 82962 GLUCOSE BLOOD TEST: CPT

## 2021-12-21 PROCEDURE — 63710000001 INSULIN GLARGINE PER 5 UNITS: Performed by: INTERNAL MEDICINE

## 2021-12-21 PROCEDURE — 25010000002 VANCOMYCIN PER 500 MG: Performed by: ORTHOPAEDIC SURGERY

## 2021-12-21 PROCEDURE — 83735 ASSAY OF MAGNESIUM: CPT | Performed by: STUDENT IN AN ORGANIZED HEALTH CARE EDUCATION/TRAINING PROGRAM

## 2021-12-21 PROCEDURE — 86850 RBC ANTIBODY SCREEN: CPT | Performed by: INTERNAL MEDICINE

## 2021-12-21 PROCEDURE — 80069 RENAL FUNCTION PANEL: CPT | Performed by: INTERNAL MEDICINE

## 2021-12-21 PROCEDURE — 85025 COMPLETE CBC W/AUTO DIFF WBC: CPT | Performed by: STUDENT IN AN ORGANIZED HEALTH CARE EDUCATION/TRAINING PROGRAM

## 2021-12-21 PROCEDURE — 36430 TRANSFUSION BLD/BLD COMPNT: CPT

## 2021-12-21 PROCEDURE — 86900 BLOOD TYPING SEROLOGIC ABO: CPT | Performed by: INTERNAL MEDICINE

## 2021-12-21 PROCEDURE — 86923 COMPATIBILITY TEST ELECTRIC: CPT

## 2021-12-21 PROCEDURE — P9016 RBC LEUKOCYTES REDUCED: HCPCS

## 2021-12-21 RX ORDER — LINEZOLID 600 MG/1
600 TABLET, FILM COATED ORAL EVERY 12 HOURS SCHEDULED
Status: DISCONTINUED | OUTPATIENT
Start: 2021-12-21 | End: 2021-12-23 | Stop reason: HOSPADM

## 2021-12-21 RX ORDER — VANCOMYCIN HYDROCHLORIDE 1 G/200ML
1000 INJECTION, SOLUTION INTRAVENOUS EVERY 12 HOURS
Status: DISCONTINUED | OUTPATIENT
Start: 2021-12-21 | End: 2021-12-21

## 2021-12-21 RX ADMIN — OXYCODONE HYDROCHLORIDE AND ACETAMINOPHEN 2 TABLET: 5; 325 TABLET ORAL at 22:37

## 2021-12-21 RX ADMIN — LINEZOLID 600 MG: 600 TABLET, FILM COATED ORAL at 22:37

## 2021-12-21 RX ADMIN — OXYCODONE HYDROCHLORIDE AND ACETAMINOPHEN 2 TABLET: 5; 325 TABLET ORAL at 09:59

## 2021-12-21 RX ADMIN — INSULIN LISPRO 4 UNITS: 100 INJECTION, SOLUTION INTRAVENOUS; SUBCUTANEOUS at 18:09

## 2021-12-21 RX ADMIN — INSULIN LISPRO 7 UNITS: 100 INJECTION, SOLUTION INTRAVENOUS; SUBCUTANEOUS at 13:28

## 2021-12-21 RX ADMIN — INSULIN GLARGINE 14 UNITS: 100 INJECTION, SOLUTION SUBCUTANEOUS at 06:13

## 2021-12-21 RX ADMIN — GABAPENTIN 600 MG: 300 CAPSULE ORAL at 09:59

## 2021-12-21 RX ADMIN — LINEZOLID 600 MG: 600 TABLET, FILM COATED ORAL at 13:28

## 2021-12-21 RX ADMIN — INSULIN LISPRO 4 UNITS: 100 INJECTION, SOLUTION INTRAVENOUS; SUBCUTANEOUS at 09:59

## 2021-12-21 RX ADMIN — CYCLOBENZAPRINE 10 MG: 10 TABLET, FILM COATED ORAL at 22:37

## 2021-12-21 RX ADMIN — OXYCODONE HYDROCHLORIDE AND ACETAMINOPHEN 2 TABLET: 5; 325 TABLET ORAL at 15:06

## 2021-12-21 RX ADMIN — CYCLOBENZAPRINE 10 MG: 10 TABLET, FILM COATED ORAL at 09:58

## 2021-12-21 RX ADMIN — METOPROLOL TARTRATE 25 MG: 25 TABLET, FILM COATED ORAL at 22:36

## 2021-12-21 RX ADMIN — GABAPENTIN 600 MG: 300 CAPSULE ORAL at 22:37

## 2021-12-21 RX ADMIN — PANTOPRAZOLE SODIUM 40 MG: 40 TABLET, DELAYED RELEASE ORAL at 05:13

## 2021-12-21 RX ADMIN — ASPIRIN 81 MG: 81 TABLET, CHEWABLE ORAL at 22:36

## 2021-12-21 RX ADMIN — OXYCODONE HYDROCHLORIDE AND ACETAMINOPHEN 2 TABLET: 5; 325 TABLET ORAL at 06:13

## 2021-12-21 RX ADMIN — OXYCODONE HYDROCHLORIDE AND ACETAMINOPHEN 2 TABLET: 5; 325 TABLET ORAL at 02:13

## 2021-12-21 RX ADMIN — FERROUS SULFATE TAB 325 MG (65 MG ELEMENTAL FE) 325 MG: 325 (65 FE) TAB at 09:58

## 2021-12-21 RX ADMIN — ATORVASTATIN CALCIUM 10 MG: 20 TABLET, FILM COATED ORAL at 22:36

## 2021-12-21 RX ADMIN — VANCOMYCIN HYDROCHLORIDE 1000 MG: 1 INJECTION, SOLUTION INTRAVENOUS at 05:13

## 2021-12-22 LAB
ALBUMIN SERPL-MCNC: 2.3 G/DL (ref 3.5–5.2)
ANION GAP SERPL CALCULATED.3IONS-SCNC: 10.1 MMOL/L (ref 5–15)
BACTERIA SPEC AEROBE CULT: ABNORMAL
BACTERIA SPEC AEROBE CULT: ABNORMAL
BACTERIA SPEC AEROBE CULT: NORMAL
BACTERIA SPEC AEROBE CULT: NORMAL
BASOPHILS # BLD AUTO: 0.03 10*3/MM3 (ref 0–0.2)
BASOPHILS NFR BLD AUTO: 0.7 % (ref 0–1.5)
BH BB BLOOD EXPIRATION DATE: NORMAL
BH BB BLOOD TYPE BARCODE: 5100
BH BB DISPENSE STATUS: NORMAL
BH BB PRODUCT CODE: NORMAL
BH BB UNIT NUMBER: NORMAL
BUN SERPL-MCNC: 8 MG/DL (ref 8–23)
BUN/CREAT SERPL: 11.3 (ref 7–25)
CALCIUM SPEC-SCNC: 8.8 MG/DL (ref 8.6–10.5)
CHLORIDE SERPL-SCNC: 103 MMOL/L (ref 98–107)
CO2 SERPL-SCNC: 20.9 MMOL/L (ref 22–29)
CREAT SERPL-MCNC: 0.71 MG/DL (ref 0.57–1)
CROSSMATCH INTERPRETATION: NORMAL
CRP SERPL-MCNC: 9.73 MG/DL (ref 0–0.5)
DEPRECATED RDW RBC AUTO: 43.3 FL (ref 37–54)
EOSINOPHIL # BLD AUTO: 0.22 10*3/MM3 (ref 0–0.4)
EOSINOPHIL NFR BLD AUTO: 4.8 % (ref 0.3–6.2)
ERYTHROCYTE [DISTWIDTH] IN BLOOD BY AUTOMATED COUNT: 13.9 % (ref 12.3–15.4)
ERYTHROCYTE [SEDIMENTATION RATE] IN BLOOD: 50 MM/HR (ref 0–30)
GFR SERPL CREATININE-BSD FRML MDRD: 82 ML/MIN/1.73
GLUCOSE BLDC GLUCOMTR-MCNC: 178 MG/DL (ref 70–130)
GLUCOSE BLDC GLUCOMTR-MCNC: 207 MG/DL (ref 70–130)
GLUCOSE BLDC GLUCOMTR-MCNC: 236 MG/DL (ref 70–130)
GLUCOSE BLDC GLUCOMTR-MCNC: 236 MG/DL (ref 70–130)
GLUCOSE SERPL-MCNC: 192 MG/DL (ref 65–99)
GRAM STN SPEC: ABNORMAL
GRAM STN SPEC: ABNORMAL
HCT VFR BLD AUTO: 30.2 % (ref 34–46.6)
HGB BLD-MCNC: 9.4 G/DL (ref 12–15.9)
LYMPHOCYTES # BLD AUTO: 0.63 10*3/MM3 (ref 0.7–3.1)
LYMPHOCYTES NFR BLD AUTO: 13.8 % (ref 19.6–45.3)
MAGNESIUM SERPL-MCNC: 1.9 MG/DL (ref 1.6–2.4)
MCH RBC QN AUTO: 27.1 PG (ref 26.6–33)
MCHC RBC AUTO-ENTMCNC: 31.1 G/DL (ref 31.5–35.7)
MCV RBC AUTO: 87 FL (ref 79–97)
MONOCYTES # BLD AUTO: 0.52 10*3/MM3 (ref 0.1–0.9)
MONOCYTES NFR BLD AUTO: 11.4 % (ref 5–12)
NEUTROPHILS NFR BLD AUTO: 3.13 10*3/MM3 (ref 1.7–7)
NEUTROPHILS NFR BLD AUTO: 68.4 % (ref 42.7–76)
PHOSPHATE SERPL-MCNC: 3.4 MG/DL (ref 2.5–4.5)
PLATELET # BLD AUTO: 133 10*3/MM3 (ref 140–450)
PMV BLD AUTO: 9.4 FL (ref 6–12)
POTASSIUM SERPL-SCNC: 4.3 MMOL/L (ref 3.5–5.2)
RBC # BLD AUTO: 3.47 10*6/MM3 (ref 3.77–5.28)
SODIUM SERPL-SCNC: 134 MMOL/L (ref 136–145)
UNIT  ABO: NORMAL
UNIT  RH: NORMAL
WBC NRBC COR # BLD: 4.57 10*3/MM3 (ref 3.4–10.8)

## 2021-12-22 PROCEDURE — 63710000001 INSULIN GLARGINE PER 5 UNITS: Performed by: INTERNAL MEDICINE

## 2021-12-22 PROCEDURE — 82962 GLUCOSE BLOOD TEST: CPT

## 2021-12-22 PROCEDURE — 63710000001 INSULIN LISPRO (HUMAN) PER 5 UNITS: Performed by: ORTHOPAEDIC SURGERY

## 2021-12-22 PROCEDURE — 94799 UNLISTED PULMONARY SVC/PX: CPT

## 2021-12-22 PROCEDURE — 80069 RENAL FUNCTION PANEL: CPT | Performed by: INTERNAL MEDICINE

## 2021-12-22 PROCEDURE — 85025 COMPLETE CBC W/AUTO DIFF WBC: CPT | Performed by: STUDENT IN AN ORGANIZED HEALTH CARE EDUCATION/TRAINING PROGRAM

## 2021-12-22 PROCEDURE — 83735 ASSAY OF MAGNESIUM: CPT | Performed by: STUDENT IN AN ORGANIZED HEALTH CARE EDUCATION/TRAINING PROGRAM

## 2021-12-22 PROCEDURE — 85652 RBC SED RATE AUTOMATED: CPT | Performed by: ORTHOPAEDIC SURGERY

## 2021-12-22 PROCEDURE — 86140 C-REACTIVE PROTEIN: CPT | Performed by: ORTHOPAEDIC SURGERY

## 2021-12-22 RX ORDER — LINEZOLID 600 MG/1
600 TABLET, FILM COATED ORAL EVERY 12 HOURS SCHEDULED
Qty: 80 TABLET | Refills: 0 | Status: SHIPPED | OUTPATIENT
Start: 2021-12-22 | End: 2022-01-22 | Stop reason: HOSPADM

## 2021-12-22 RX ADMIN — ATORVASTATIN CALCIUM 10 MG: 20 TABLET, FILM COATED ORAL at 20:59

## 2021-12-22 RX ADMIN — ASPIRIN 81 MG: 81 TABLET, CHEWABLE ORAL at 21:00

## 2021-12-22 RX ADMIN — GABAPENTIN 600 MG: 300 CAPSULE ORAL at 21:00

## 2021-12-22 RX ADMIN — GABAPENTIN 600 MG: 300 CAPSULE ORAL at 09:27

## 2021-12-22 RX ADMIN — PANTOPRAZOLE SODIUM 40 MG: 40 TABLET, DELAYED RELEASE ORAL at 06:38

## 2021-12-22 RX ADMIN — INSULIN LISPRO 4 UNITS: 100 INJECTION, SOLUTION INTRAVENOUS; SUBCUTANEOUS at 12:05

## 2021-12-22 RX ADMIN — LINEZOLID 600 MG: 600 TABLET, FILM COATED ORAL at 09:25

## 2021-12-22 RX ADMIN — INSULIN LISPRO 4 UNITS: 100 INJECTION, SOLUTION INTRAVENOUS; SUBCUTANEOUS at 16:42

## 2021-12-22 RX ADMIN — OXYCODONE HYDROCHLORIDE AND ACETAMINOPHEN 2 TABLET: 5; 325 TABLET ORAL at 09:25

## 2021-12-22 RX ADMIN — INSULIN LISPRO 2 UNITS: 100 INJECTION, SOLUTION INTRAVENOUS; SUBCUTANEOUS at 08:30

## 2021-12-22 RX ADMIN — INSULIN GLARGINE 16 UNITS: 100 INJECTION, SOLUTION SUBCUTANEOUS at 06:39

## 2021-12-22 RX ADMIN — METOPROLOL TARTRATE 25 MG: 25 TABLET, FILM COATED ORAL at 20:59

## 2021-12-22 RX ADMIN — LINEZOLID 600 MG: 600 TABLET, FILM COATED ORAL at 20:59

## 2021-12-22 RX ADMIN — OXYCODONE HYDROCHLORIDE AND ACETAMINOPHEN 2 TABLET: 5; 325 TABLET ORAL at 17:49

## 2021-12-23 VITALS
WEIGHT: 230 LBS | RESPIRATION RATE: 16 BRPM | HEIGHT: 64 IN | DIASTOLIC BLOOD PRESSURE: 68 MMHG | HEART RATE: 90 BPM | SYSTOLIC BLOOD PRESSURE: 138 MMHG | OXYGEN SATURATION: 95 % | TEMPERATURE: 96.9 F | BODY MASS INDEX: 39.27 KG/M2

## 2021-12-23 LAB
BACTERIA SPEC ANAEROBE CULT: NORMAL
BASOPHILS # BLD AUTO: 0.04 10*3/MM3 (ref 0–0.2)
BASOPHILS NFR BLD AUTO: 0.7 % (ref 0–1.5)
DEPRECATED RDW RBC AUTO: 44.3 FL (ref 37–54)
EOSINOPHIL # BLD AUTO: 0.28 10*3/MM3 (ref 0–0.4)
EOSINOPHIL NFR BLD AUTO: 5.2 % (ref 0.3–6.2)
ERYTHROCYTE [DISTWIDTH] IN BLOOD BY AUTOMATED COUNT: 13.7 % (ref 12.3–15.4)
GLUCOSE BLDC GLUCOMTR-MCNC: 219 MG/DL (ref 70–130)
HCT VFR BLD AUTO: 31.5 % (ref 34–46.6)
HGB BLD-MCNC: 9.7 G/DL (ref 12–15.9)
IMM GRANULOCYTES # BLD AUTO: 0.04 10*3/MM3 (ref 0–0.05)
IMM GRANULOCYTES NFR BLD AUTO: 0.7 % (ref 0–0.5)
LYMPHOCYTES # BLD AUTO: 0.79 10*3/MM3 (ref 0.7–3.1)
LYMPHOCYTES NFR BLD AUTO: 14.6 % (ref 19.6–45.3)
MAGNESIUM SERPL-MCNC: 2.1 MG/DL (ref 1.6–2.4)
MCH RBC QN AUTO: 27.4 PG (ref 26.6–33)
MCHC RBC AUTO-ENTMCNC: 30.8 G/DL (ref 31.5–35.7)
MCV RBC AUTO: 89 FL (ref 79–97)
MONOCYTES # BLD AUTO: 0.54 10*3/MM3 (ref 0.1–0.9)
MONOCYTES NFR BLD AUTO: 10 % (ref 5–12)
NEUTROPHILS NFR BLD AUTO: 3.72 10*3/MM3 (ref 1.7–7)
NEUTROPHILS NFR BLD AUTO: 68.8 % (ref 42.7–76)
NRBC BLD AUTO-RTO: 0 /100 WBC (ref 0–0.2)
PHOSPHATE SERPL-MCNC: 3.6 MG/DL (ref 2.5–4.5)
PLATELET # BLD AUTO: 167 10*3/MM3 (ref 140–450)
PMV BLD AUTO: 9.8 FL (ref 6–12)
RBC # BLD AUTO: 3.54 10*6/MM3 (ref 3.77–5.28)
WBC NRBC COR # BLD: 5.41 10*3/MM3 (ref 3.4–10.8)

## 2021-12-23 PROCEDURE — 63710000001 INSULIN GLARGINE PER 5 UNITS: Performed by: INTERNAL MEDICINE

## 2021-12-23 PROCEDURE — 84100 ASSAY OF PHOSPHORUS: CPT | Performed by: STUDENT IN AN ORGANIZED HEALTH CARE EDUCATION/TRAINING PROGRAM

## 2021-12-23 PROCEDURE — 83735 ASSAY OF MAGNESIUM: CPT | Performed by: STUDENT IN AN ORGANIZED HEALTH CARE EDUCATION/TRAINING PROGRAM

## 2021-12-23 PROCEDURE — 82962 GLUCOSE BLOOD TEST: CPT

## 2021-12-23 PROCEDURE — 85025 COMPLETE CBC W/AUTO DIFF WBC: CPT | Performed by: STUDENT IN AN ORGANIZED HEALTH CARE EDUCATION/TRAINING PROGRAM

## 2021-12-23 PROCEDURE — 63710000001 INSULIN LISPRO (HUMAN) PER 5 UNITS: Performed by: ORTHOPAEDIC SURGERY

## 2021-12-23 PROCEDURE — 94799 UNLISTED PULMONARY SVC/PX: CPT

## 2021-12-23 RX ADMIN — OXYCODONE HYDROCHLORIDE AND ACETAMINOPHEN 2 TABLET: 5; 325 TABLET ORAL at 09:34

## 2021-12-23 RX ADMIN — INSULIN LISPRO 4 UNITS: 100 INJECTION, SOLUTION INTRAVENOUS; SUBCUTANEOUS at 08:00

## 2021-12-23 RX ADMIN — PANTOPRAZOLE SODIUM 40 MG: 40 TABLET, DELAYED RELEASE ORAL at 05:26

## 2021-12-23 RX ADMIN — GABAPENTIN 600 MG: 300 CAPSULE ORAL at 09:34

## 2021-12-23 RX ADMIN — INSULIN GLARGINE 16 UNITS: 100 INJECTION, SOLUTION SUBCUTANEOUS at 08:00

## 2021-12-23 RX ADMIN — FERROUS SULFATE TAB 325 MG (65 MG ELEMENTAL FE) 325 MG: 325 (65 FE) TAB at 09:34

## 2021-12-23 RX ADMIN — LINEZOLID 600 MG: 600 TABLET, FILM COATED ORAL at 09:34

## 2021-12-24 ENCOUNTER — READMISSION MANAGEMENT (OUTPATIENT)
Dept: CALL CENTER | Facility: HOSPITAL | Age: 66
End: 2021-12-24

## 2021-12-24 NOTE — OUTREACH NOTE
Prep Survey      Responses   [facility] facility patient discharged from? [facility]   Is LACE score < 7 ? No   Emergency Room discharge w/ pulse ox? No   Eligibility Readm Mgmt   Discharge diagnosis Septic arthritis of the right hip with VRE   Does the patient have one of the following disease processes/diagnoses(primary or secondary)? Other   Does the patient have Home health ordered? Yes   What is the Home health agency?  [facility]    Is there a DME ordered? No   Prep survey completed? Yes          Tammi Arellano RN

## 2021-12-29 ENCOUNTER — READMISSION MANAGEMENT (OUTPATIENT)
Dept: CALL CENTER | Facility: HOSPITAL | Age: 66
End: 2021-12-29

## 2021-12-29 NOTE — OUTREACH NOTE
Medical Week 1 Survey      Responses   [facility] patient discharged from? [place]   Does the patient have one of the following disease processes/diagnoses(primary or secondary)? Other   Week 1 attempt successful? Yes   Call start time 1453   Call end time 1456   Discharge diagnosis Septic arthritis of the right hip with VRE   Meds reviewed with patient/caregiver? Yes   Is the patient having any side effects they believe may be caused by any medication additions or changes? No   Does the patient have all medications ordered at discharge? Yes   Is the patient taking all medications as directed (includes completed medication regime)? Yes   Medication comments States she got part of the prescription filled at [facility] and her pharmacy is to get [facility] to get the remaining amount   Does the patient have a primary care provider?  Yes   Does the patient have an appointment with their PCP within 7 days of discharge? No   What is preventing the patient from scheduling follow up appointments within 7 days of discharge? Haven't had time   Nursing Interventions Educated patient on importance of making appointment,  Advised patient to make appointment   Has the patient kept scheduled appointments due by today? N/A   What is the Home health agency?  [facility]     Has home health visited the patient within 72 hours of discharge? Yes   Psychosocial issues? No   Did the patient receive a copy of their discharge instructions? Yes   Nursing interventions Reviewed instructions with patient   What is the patient's perception of their health status since discharge? Improving   Is the patient/caregiver able to teach back signs and symptoms related to disease process for when to call PCP? Yes   Is the patient/caregiver able to teach back signs and symptoms related to disease process for when to call 911? Yes   Is the patient/caregiver able to teach back the hierarchy of who to call/visit for symptoms/problems? PCP,  Specialist, Home health nurse, Urgent Care, ED, 911 Yes   Additional teach back comments States she is doing well and home health has been in 3 times since discharge.  Wound vac is in place with no issues.     Week 1 call completed? Yes   Wrap up additional comments Denies questions or needs at this time          Riana Salinas LPN

## 2022-01-05 ENCOUNTER — LAB REQUISITION (OUTPATIENT)
Dept: LAB | Facility: HOSPITAL | Age: 67
End: 2022-01-05

## 2022-01-05 DIAGNOSIS — E11.69 TYPE 2 DIABETES MELLITUS WITH OTHER SPECIFIED COMPLICATION: ICD-10-CM

## 2022-01-05 DIAGNOSIS — Z47.1 AFTERCARE FOLLOWING JOINT REPLACEMENT SURGERY: ICD-10-CM

## 2022-01-05 DIAGNOSIS — T84.51XA INFECTION AND INFLAMMATORY REACTION DUE TO INTERNAL RIGHT HIP PROSTHESIS, INITIAL ENCOUNTER: ICD-10-CM

## 2022-01-05 DIAGNOSIS — M86.151 OTHER ACUTE OSTEOMYELITIS, RIGHT FEMUR: ICD-10-CM

## 2022-01-05 LAB
BASOPHILS # BLD AUTO: 0.04 10*3/MM3 (ref 0–0.2)
BASOPHILS NFR BLD AUTO: 0.6 % (ref 0–1.5)
DEPRECATED RDW RBC AUTO: 43.1 FL (ref 37–54)
EOSINOPHIL # BLD AUTO: 0.27 10*3/MM3 (ref 0–0.4)
EOSINOPHIL NFR BLD AUTO: 4.2 % (ref 0.3–6.2)
ERYTHROCYTE [DISTWIDTH] IN BLOOD BY AUTOMATED COUNT: 14.4 % (ref 12.3–15.4)
HCT VFR BLD AUTO: 27.1 % (ref 34–46.6)
HGB BLD-MCNC: 8.8 G/DL (ref 12–15.9)
IMM GRANULOCYTES # BLD AUTO: 0.01 10*3/MM3 (ref 0–0.05)
IMM GRANULOCYTES NFR BLD AUTO: 0.2 % (ref 0–0.5)
LYMPHOCYTES # BLD AUTO: 1.09 10*3/MM3 (ref 0.7–3.1)
LYMPHOCYTES NFR BLD AUTO: 16.9 % (ref 19.6–45.3)
MCH RBC QN AUTO: 27 PG (ref 26.6–33)
MCHC RBC AUTO-ENTMCNC: 32.5 G/DL (ref 31.5–35.7)
MCV RBC AUTO: 83.1 FL (ref 79–97)
MONOCYTES # BLD AUTO: 0.58 10*3/MM3 (ref 0.1–0.9)
MONOCYTES NFR BLD AUTO: 9 % (ref 5–12)
NEUTROPHILS NFR BLD AUTO: 4.46 10*3/MM3 (ref 1.7–7)
NEUTROPHILS NFR BLD AUTO: 69.1 % (ref 42.7–76)
NRBC BLD AUTO-RTO: 0 /100 WBC (ref 0–0.2)
PLATELET # BLD AUTO: 98 10*3/MM3 (ref 140–450)
PMV BLD AUTO: 10.7 FL (ref 6–12)
RBC # BLD AUTO: 3.26 10*6/MM3 (ref 3.77–5.28)
WBC NRBC COR # BLD: 6.45 10*3/MM3 (ref 3.4–10.8)

## 2022-01-05 PROCEDURE — 85025 COMPLETE CBC W/AUTO DIFF WBC: CPT | Performed by: ORTHOPAEDIC SURGERY

## 2022-01-07 ENCOUNTER — APPOINTMENT (OUTPATIENT)
Dept: GENERAL RADIOLOGY | Facility: HOSPITAL | Age: 67
End: 2022-01-07

## 2022-01-07 ENCOUNTER — HOSPITAL ENCOUNTER (EMERGENCY)
Facility: HOSPITAL | Age: 67
Discharge: HOME | End: 2022-01-07
Attending: EMERGENCY MEDICINE | Admitting: EMERGENCY MEDICINE

## 2022-01-07 VITALS
SYSTOLIC BLOOD PRESSURE: 133 MMHG | TEMPERATURE: 99.3 F | DIASTOLIC BLOOD PRESSURE: 44 MMHG | OXYGEN SATURATION: 97 % | HEIGHT: 64 IN | BODY MASS INDEX: 39.63 KG/M2 | WEIGHT: 232.14 LBS | HEART RATE: 79 BPM | RESPIRATION RATE: 21 BRPM

## 2022-01-07 DIAGNOSIS — R11.2 NON-INTRACTABLE VOMITING WITH NAUSEA, UNSPECIFIED VOMITING TYPE: ICD-10-CM

## 2022-01-07 DIAGNOSIS — S73.034A ANTERIOR DISLOCATION OF RIGHT HIP, INITIAL ENCOUNTER: Primary | ICD-10-CM

## 2022-01-07 DIAGNOSIS — B34.9 ACUTE VIRAL SYNDROME: ICD-10-CM

## 2022-01-07 LAB
ALBUMIN SERPL-MCNC: 3.4 G/DL (ref 3.5–5.2)
ALBUMIN/GLOB SERPL: 1.1 G/DL
ALP SERPL-CCNC: 87 U/L (ref 39–117)
ALT SERPL W P-5'-P-CCNC: 10 U/L (ref 1–33)
ANION GAP SERPL CALCULATED.3IONS-SCNC: 11.3 MMOL/L (ref 5–15)
AST SERPL-CCNC: 15 U/L (ref 1–32)
BASOPHILS # BLD AUTO: 0.03 10*3/MM3 (ref 0–0.2)
BASOPHILS NFR BLD AUTO: 0.5 % (ref 0–1.5)
BILIRUB SERPL-MCNC: 0.6 MG/DL (ref 0–1.2)
BUN SERPL-MCNC: 12 MG/DL (ref 8–23)
BUN/CREAT SERPL: 14.6 (ref 7–25)
CALCIUM SPEC-SCNC: 9.1 MG/DL (ref 8.6–10.5)
CHLORIDE SERPL-SCNC: 100 MMOL/L (ref 98–107)
CO2 SERPL-SCNC: 22.7 MMOL/L (ref 22–29)
CREAT SERPL-MCNC: 0.82 MG/DL (ref 0.57–1)
DEPRECATED RDW RBC AUTO: 46.3 FL (ref 37–54)
EOSINOPHIL # BLD AUTO: 0.18 10*3/MM3 (ref 0–0.4)
EOSINOPHIL NFR BLD AUTO: 3.1 % (ref 0.3–6.2)
ERYTHROCYTE [DISTWIDTH] IN BLOOD BY AUTOMATED COUNT: 15 % (ref 12.3–15.4)
GFR SERPL CREATININE-BSD FRML MDRD: 69 ML/MIN/1.73
GLOBULIN UR ELPH-MCNC: 3 GM/DL
GLUCOSE SERPL-MCNC: 144 MG/DL (ref 65–99)
HCT VFR BLD AUTO: 27.3 % (ref 34–46.6)
HGB BLD-MCNC: 8.8 G/DL (ref 12–15.9)
IMM GRANULOCYTES # BLD AUTO: 0.03 10*3/MM3 (ref 0–0.05)
IMM GRANULOCYTES NFR BLD AUTO: 0.5 % (ref 0–0.5)
LYMPHOCYTES # BLD AUTO: 1.2 10*3/MM3 (ref 0.7–3.1)
LYMPHOCYTES NFR BLD AUTO: 20.4 % (ref 19.6–45.3)
MCH RBC QN AUTO: 27.3 PG (ref 26.6–33)
MCHC RBC AUTO-ENTMCNC: 32.2 G/DL (ref 31.5–35.7)
MCV RBC AUTO: 84.8 FL (ref 79–97)
MONOCYTES # BLD AUTO: 0.63 10*3/MM3 (ref 0.1–0.9)
MONOCYTES NFR BLD AUTO: 10.7 % (ref 5–12)
NEUTROPHILS NFR BLD AUTO: 3.8 10*3/MM3 (ref 1.7–7)
NEUTROPHILS NFR BLD AUTO: 64.8 % (ref 42.7–76)
NRBC BLD AUTO-RTO: 0 /100 WBC (ref 0–0.2)
PLATELET # BLD AUTO: 95 10*3/MM3 (ref 140–450)
PMV BLD AUTO: 9.3 FL (ref 6–12)
POTASSIUM SERPL-SCNC: 4.4 MMOL/L (ref 3.5–5.2)
PROT SERPL-MCNC: 6.4 G/DL (ref 6–8.5)
RBC # BLD AUTO: 3.22 10*6/MM3 (ref 3.77–5.28)
SODIUM SERPL-SCNC: 134 MMOL/L (ref 136–145)
WBC NRBC COR # BLD: 5.87 10*3/MM3 (ref 3.4–10.8)

## 2022-01-07 PROCEDURE — C9803 HOPD COVID-19 SPEC COLLECT: HCPCS

## 2022-01-07 PROCEDURE — U0004 COV-19 TEST NON-CDC HGH THRU: HCPCS | Performed by: EMERGENCY MEDICINE

## 2022-01-07 PROCEDURE — 25010000002 ONDANSETRON PER 1 MG: Performed by: EMERGENCY MEDICINE

## 2022-01-07 PROCEDURE — 80053 COMPREHEN METABOLIC PANEL: CPT | Performed by: EMERGENCY MEDICINE

## 2022-01-07 PROCEDURE — 96375 TX/PRO/DX INJ NEW DRUG ADDON: CPT

## 2022-01-07 PROCEDURE — 99284 EMERGENCY DEPT VISIT MOD MDM: CPT

## 2022-01-07 PROCEDURE — 73501 X-RAY EXAM HIP UNI 1 VIEW: CPT

## 2022-01-07 PROCEDURE — 73502 X-RAY EXAM HIP UNI 2-3 VIEWS: CPT

## 2022-01-07 PROCEDURE — 85025 COMPLETE CBC W/AUTO DIFF WBC: CPT | Performed by: EMERGENCY MEDICINE

## 2022-01-07 PROCEDURE — 96374 THER/PROPH/DIAG INJ IV PUSH: CPT

## 2022-01-07 PROCEDURE — 25010000002 PROPOFOL 10 MG/ML EMULSION: Performed by: EMERGENCY MEDICINE

## 2022-01-07 PROCEDURE — 25010000002 HYDROMORPHONE 1 MG/ML SOLUTION: Performed by: EMERGENCY MEDICINE

## 2022-01-07 RX ORDER — PROPOFOL 10 MG/ML
VIAL (ML) INTRAVENOUS
Status: COMPLETED | OUTPATIENT
Start: 2022-01-07 | End: 2022-01-07

## 2022-01-07 RX ORDER — PROPOFOL 10 MG/ML
100 VIAL (ML) INTRAVENOUS ONCE
Status: DISCONTINUED | OUTPATIENT
Start: 2022-01-07 | End: 2022-01-07

## 2022-01-07 RX ORDER — ONDANSETRON 4 MG/1
4 TABLET, ORALLY DISINTEGRATING ORAL EVERY 6 HOURS PRN
Qty: 12 TABLET | Refills: 0 | Status: ON HOLD | OUTPATIENT
Start: 2022-01-07 | End: 2022-01-17

## 2022-01-07 RX ORDER — ONDANSETRON 2 MG/ML
4 INJECTION INTRAMUSCULAR; INTRAVENOUS ONCE
Status: COMPLETED | OUTPATIENT
Start: 2022-01-07 | End: 2022-01-07

## 2022-01-07 RX ORDER — ACETAMINOPHEN 325 MG/1
650 TABLET ORAL ONCE
Status: COMPLETED | OUTPATIENT
Start: 2022-01-07 | End: 2022-01-07

## 2022-01-07 RX ADMIN — ACETAMINOPHEN 650 MG: 325 TABLET ORAL at 19:02

## 2022-01-07 RX ADMIN — PROPOFOL 40 MG: 10 INJECTION, EMULSION INTRAVENOUS at 18:28

## 2022-01-07 RX ADMIN — SODIUM CHLORIDE 1000 ML: 9 INJECTION, SOLUTION INTRAVENOUS at 17:20

## 2022-01-07 RX ADMIN — HYDROMORPHONE HYDROCHLORIDE 0.5 MG: 1 INJECTION, SOLUTION INTRAMUSCULAR; INTRAVENOUS; SUBCUTANEOUS at 17:20

## 2022-01-07 RX ADMIN — PROPOFOL 20 MG: 10 INJECTION, EMULSION INTRAVENOUS at 18:29

## 2022-01-07 RX ADMIN — ONDANSETRON 4 MG: 2 INJECTION INTRAMUSCULAR; INTRAVENOUS at 17:20

## 2022-01-07 NOTE — ED PROVIDER NOTES
Time:   Arrived by: EMS  Chief Complaint: Right hip dislocation  History provided by: Patient    History of Present Illness:  Patient is a 66 y.o. year old female that presents to the emergency department with history of right hip total replacement surgery a couple years ago, status post multiple washouts and revision due to hardware infection, recently underwent her last hip surgery 3 weeks ago now with wound VAC in place, was turning on her side to check her wound when her hip dislocated today.    She now has sharp anterolateral pain in the right hip without radiation, 8 out of 10 in intensity, worse with any movement.    She also reports some nausea vomiting and diarrhea and mild cough and congestion the past few days.    She denies any known fevers.    She states she has been double vaccinated for COVID-19 and booster received.    She denies any sick contacts.        Similar Symptoms Previously: Yes  Recently seen: No      Patient Care Team  Primary Care Provider: Gregorio Rosales    Past Medical History:   Diabetes, GERD, hypertension, osteomyelitis right hip    Social History     Socioeconomic History   • Marital status:    Tobacco Use   • Smoking status: Former Smoker     Years: 45.00     Types: Cigarettes     Quit date: 12/10/2015     Years since quittin.0   • Smokeless tobacco: Never Used   Vaping Use   • Vaping Use: Never used   Substance and Sexual Activity   • Alcohol use: Never   • Drug use: Never   • Sexual activity: Defer         Allergies   Allergen Reactions   • Ace Inhibitors Shortness Of Breath and Swelling   • Morphine Shortness Of Breath     Past Medical History:   Diagnosis Date   • Arthritis    • Cardiac murmur    • Diabetes mellitus (HCC)    • GERD (gastroesophageal reflux disease)    • Heart attack (HCC) 2020   • History of cervical cancer    • Hyperlipidemia    • Hypertension    • Osteomyelitis hip (HCC)     RIGHT   • PONV (postoperative nausea and vomiting)    • Right hip  pain      Past Surgical History:   Procedure Laterality Date   • CARDIAC CATHETERIZATION     • CERVICAL CONIZATION     • COLONOSCOPY     • CORONARY ANGIOPLASTY WITH STENT PLACEMENT     • ENDOSCOPY     • HIP ARTHROPLASTY Right    • HIP HARDWARE REMOVAL     • HIP SPACER INSERTION WITH ANTIBIOTIC CEMENT      X3   • HYSTERECTOMY     • INCISION AND DRAINAGE HIP Right 12/18/2021    Procedure: INCISION AND DRAINAGE TOTAL HIP, LINER EXCHANGE AND WOUND VAC PLACEMENT;  Surgeon: Karri Schaeffer II, MD;  Location: [facility] OR;  Service: Orthopedics;  Laterality: Right;   • KNEE ARTHROPLASTY Left    • LAPAROSCOPIC CHOLECYSTECTOMY     • TONSILLECTOMY     • TOTAL HIP ARTHROPLASTY REVISION Right 11/16/2021    Procedure: TOTAL HIP REVISION ARTHROPLASTY RIGHT POSTERIOR;  Surgeon: Karri Schaeffer II, MD;  Location: [facility] OR;  Service: Orthopedics;  Laterality: Right;     Family History   Problem Relation Age of Onset   • Malig Hyperthermia Neg Hx        Home Medications:  Prior to Admission medications    Medication Sig Start Date End Date Taking? Authorizing Provider   ASPIRIN 81 PO Take 1 tablet by mouth Every Night. PT HOLDING FOR SURGERY     Nikunj Isaac MD   atorvastatin (LIPITOR) 10 MG tablet Take 10 mg by mouth Every Night.    Nikunj Iasac MD   Diclofenac Sodium (VOLTAREN) 1 % gel gel Apply 4 g topically to the appropriate area as directed 4 (Four) Times a Day As Needed.    Nikunj Isaac MD   Dulaglutide (Trulicity) 3 MG/0.5ML solution pen-injector Inject 0.5 mL under the skin into the appropriate area as directed 1 (One) Time Per Week. SATURDAYS     Nikunj Isaac MD   esomeprazole (nexIUM) 40 MG capsule Take 40 mg by mouth Every Morning Before Breakfast.    Nikunj Isaac MD   ferrous sulfate 325 (65 FE) MG tablet Take 325 mg by mouth Every Other Day. Mornings    Nikunj Isaac MD   gabapentin (NEURONTIN) 600 MG tablet Take 600 mg by mouth 2 (Two) Times  "a Day.    Nikunj Isaac MD   HYDROcodone-acetaminophen (NORCO) 7.5-325 MG per tablet Take 1 tablet by mouth Every 6 (Six) Hours As Needed for Moderate Pain . Patient takes 1/2 tablet QHS PRN. [facility] 10/21    Nikunj Isaac MD   Insulin Aspart (NOVOLOG FLEXPEN SC) Inject 20 Units under the skin into the appropriate area as directed 3 (Three) Times a Day With Meals. 15-20 units TID    Nikunj Isaac MD   isosorbide mononitrate (IMDUR) 30 MG 24 hr tablet Take 30 mg by mouth Every Morning.    Nikunj Isaac MD   linezolid (ZYVOX) 600 MG tablet Take 1 tablet by mouth Every 12 (Twelve) Hours for 40 days. 12/22/21 1/31/22  Lai Rhoades DO   loratadine (CLARITIN) 10 MG tablet Take 10 mg by mouth Every Night.    Nikunj Isaac MD   metoprolol tartrate (LOPRESSOR) 25 MG tablet Take 25 mg by mouth Every Night.    Nikunj Isaac MD   oxyCODONE-acetaminophen (PERCOCET) 5-325 MG per tablet Take 1 tablet by mouth Every 6 (Six) Hours As Needed. [facility] 12/12 90 count    Nikunj Isaac MD        Record Review:  I have reviewed the patient's records in [place].     Review of Systems:  Review of Systems   I performed a 10 point review of systems that was positive for right hip pain, positive for nausea vomiting diarrhea cough congestion, otherwise all negative.      Physical Exam:  /44   Pulse 79   Temp 99.3 °F (37.4 °C) (Oral)   Resp 21   Ht 162.6 cm (64\")   Wt 105 kg (232 lb 2.3 oz)   SpO2 97%   BMI 39.85 kg/m²     Physical Exam   General: Awake alert and in mild distress due to hip pain    HEENT: Head normocephalic atraumatic, eyes PERRLA EOMI, nose normal, oropharynx normal.    Neck: Supple full range of motion, no meningismus, no lymphadenopathy    Heart: Regular rate and rhythm, no murmurs or rubs, 2+ radial pulses bilaterally    Lungs: Clear to auscultation bilaterally without wheezes or crackles, no respiratory distress    Abdomen: Soft, nontender, nondistended, no " rebound or guarding    Skin: Warm, dry, no rash    Musculoskeletal: Right hip pain with any range of motion, and right leg noted to be shortened but with good pulses and neurovascular status present distally, no lower extremity edema    Neurologic: Oriented x3, no motor deficits no sensory deficits    Psychiatric: Mood appears stable, no psychosis        Medications in the Emergency Department:  Medications   sodium chloride 0.9 % bolus 1,000 mL (0 mL Intravenous Stopped 1/7/22 1903)   HYDROmorphone (DILAUDID) injection 0.5 mg (0.5 mg Intravenous Given 1/7/22 1720)   ondansetron (ZOFRAN) injection 4 mg (4 mg Intravenous Given 1/7/22 1720)   Propofol (DIPRIVAN) injection (20 mg Intravenous Given 1/7/22 1829)   acetaminophen (TYLENOL) tablet 650 mg (650 mg Oral Given 1/7/22 1902)        Labs  Lab Results (last 24 hours)     Procedure Component Value Units Date/Time    Comprehensive Metabolic Panel [[id number]]  (Abnormal) Collected: 01/07/22 1715    Specimen: Blood Updated: 01/07/22 1742     Glucose 144 mg/dL      BUN 12 mg/dL      Creatinine 0.82 mg/dL      Sodium 134 mmol/L      Potassium 4.4 mmol/L      Chloride 100 mmol/L      CO2 22.7 mmol/L      Calcium 9.1 mg/dL      Total Protein 6.4 g/dL      Albumin 3.40 g/dL      ALT (SGPT) 10 U/L      AST (SGOT) 15 U/L      Alkaline Phosphatase 87 U/L      Total Bilirubin 0.6 mg/dL      eGFR Non African Amer 69 mL/min/1.73      Globulin 3.0 gm/dL      A/G Ratio 1.1 g/dL      BUN/Creatinine Ratio 14.6     Anion Gap 11.3 mmol/L     Narrative:      GFR Normal >60  Chronic Kidney Disease <60  Kidney Failure <15      CBC & Differential [[id number]]  (Abnormal) Collected: 01/07/22 1715    Specimen: Blood Updated: 01/07/22 1727    Narrative:      The following orders were created for panel order CBC & Differential.  Procedure                               Abnormality         Status                     ---------                               -----------         ------                      CBC Auto Differential[[id number]]        Abnormal            Final result                 Please view results for these tests on the individual orders.    CBC Auto Differential [[id number]]  (Abnormal) Collected: 01/07/22 1715    Specimen: Blood Updated: 01/07/22 1727     WBC 5.87 10*3/mm3      RBC 3.22 10*6/mm3      Hemoglobin 8.8 g/dL      Hematocrit 27.3 %      MCV 84.8 fL      MCH 27.3 pg      MCHC 32.2 g/dL      RDW 15.0 %      RDW-SD 46.3 fl      MPV 9.3 fL      Platelets 95 10*3/mm3      Neutrophil % 64.8 %      Lymphocyte % 20.4 %      Monocyte % 10.7 %      Eosinophil % 3.1 %      Basophil % 0.5 %      Immature Grans % 0.5 %      Neutrophils, Absolute 3.80 10*3/mm3      Lymphocytes, Absolute 1.20 10*3/mm3      Monocytes, Absolute 0.63 10*3/mm3      Eosinophils, Absolute 0.18 10*3/mm3      Basophils, Absolute 0.03 10*3/mm3      Immature Grans, Absolute 0.03 10*3/mm3      nRBC 0.0 /100 WBC     COVID-19,APTIMA PANTHER([place]),[facility], NP/OP SWAB IN UTM/VTM/[facility]SPORT MEDIA,24 HR TAT - Swab, Nasopharynx [[id number]]  (Normal) Collected: 01/07/22 1835    Specimen: Swab from Nasopharynx Updated: 01/08/22 0018     COVID19 Not Detected    Narrative:      Fact sheet for providers: https://www.fda.gov/media/016632/download     Fact sheet for patients: https://www.fda.gov/media/390105/download    Test performed by RT PCR.           Imaging:  XR Hip With or Without Pelvis 1 View Right    Result Date: 1/7/2022  PROCEDURE: XR HIP W OR WO PELVIS 1 VIEW RIGHT  COMPARISON: [facility], CR, XR HIP W OR WO PELVIS 2-3 VIEW RIGHT, 1/07/2022, 17:32.  INDICATIONS: POST REDUCTION  FINDINGS:  Apparent interval reduction of the dislocated right hip prosthesis.  No visible fracture.       Reduction of the right hip prosthesis      CURTIS FERGUSNO MD       Electronically Signed and Approved By: CURTIS FERGUSON MD on 1/07/2022 at 18:52             XR Hip With or Without Pelvis 2 - 3 View Right    Result Date:  1/7/2022  PROCEDURE: XR HIP W OR WO PELVIS 2-3 VIEW RIGHT  COMPARISON: [facility], , RIGHT HIP/PELVIS, 4/16/2019, 4:54.  INDICATIONS: right hip pain after injury today  FINDINGS:  Dislocation of the right hip prosthesis.  No visible fracture.  There are skin staples overlying the right hip.       Right hip prosthesis.  The prosthesis is dislocated.  No visible fracture.      CURTIS FERGUSON MD       Electronically Signed and Approved By: CURTIS FERGUSON MD on 1/07/2022 at 17:54                Procedures:  Lower Extremity Dislocation    Date/Time: 1/7/2022 6:38 PM  Performed by: Ashu Knapp MD  Authorized by: Ashu Knapp MD   Consent: Verbal consent obtained. Written consent obtained.  Consent given by: patient  Patient understanding: patient states understanding of the procedure being performed  Patient consent: the patient's understanding of the procedure matches consent given  Procedure consent: procedure consent matches procedure scheduled  Relevant documents: relevant documents present and verified  Imaging studies: imaging studies available  Patient identity confirmed: verbally with patient and arm band  Injury location: hip  Location details: right hip  Injury type: dislocation  Dislocation type: anterior  Spontaneous dislocation: yes  Prosthesis: yes  Pre-procedure distal perfusion: normal  Pre-procedure neurological function: normal  Pre-procedure range of motion: reduced    Sedation:  Patient sedated: yes  Sedation type: moderate (conscious) sedation  Sedatives: propofol  Vitals: Vital signs were monitored during sedation.    Manipulation performed: yes  Reduction method: traction and counter traction  Post-procedure neurovascular assessment: post-procedure neurovascularly intact  Post-procedure distal perfusion: normal  Post-procedure neurological function: normal  Post-procedure range of motion: normal  Patient tolerance: patient tolerated the procedure well with no immediate  complications          Progress                            Medical Decision Making:  [facility]     This patient is a pleasant 66-year-old female status post right hip replacement surgery a couple years ago and multiple revisions including a revision 3 weeks ago, who presents with a spontaneous hip dislocation.    She has neurovascular intact distally.    I am checking some lab work as well as giving her some IV fluids for her recent gastroenteritis symptoms.    In addition we will manually reduce her hip dislocation under propofol sedation after consent has been signed.    I am consulting her orthopedist in [place] regarding urgent follow-up, now that we have reduced her hip in the ED.    I will have her placed on pelvic rest and minimal weightbearing status until she can follow-up with her orthopedist in [place].      Final diagnoses:   Anterior dislocation of right hip, initial encounter (HCC)   Non-intractable vomiting with nausea, unspecified vomiting type   Acute viral syndrome        Disposition:  ED Disposition     ED Disposition Condition Comment    Discharge Stable                  Ashu Knapp MD  01/08/22 0217

## 2022-01-08 LAB — SARS-COV-2 RNA PNL SPEC NAA+PROBE: NOT DETECTED

## 2022-01-08 NOTE — ED NOTES
PUREWICK DISCONTINUED PER PATIENT REQUEST. PATIENT HAD APPROX. 400ML OF CLEAR, YELLOW URINE OUTPUT.     Jaimie Alvarado, RN  01/07/22 0431

## 2022-01-08 NOTE — ED NOTES
PATIENT STILL AWAITING EMS AT THIS TIME. PATIENT RESTING COMFORTABLY WITH LIGHTS DIMMED AND CALL LIGHT WITHIN REACH.     Jaimie Alvarado RN  01/07/22 [id number]

## 2022-01-08 NOTE — ED NOTES
PATIENT NOTIFIED THAT SHE IS GOING TO BE DISCHARGED HOME ON STRICT BED REST PER ORTHOPEDIC SURGEON. EMS TRANSPORTATION REQUESTED.     Jaimie Alvarado RN  01/07/22 1954

## 2022-01-08 NOTE — DISCHARGE INSTRUCTIONS
All of your blood work look good today, but it does sound like you are coming down with a touch of a stomach virus, which should hopefully run its course in the next couple days with time and rest.    Drink plenty of fluids to stay hydrated and you can take the Zofran nausea pill as needed.    We talked with the orthopedist on-call at [facility] and he recommends it is safe to discharge you home but recommends that you try your best to avoid any weightbearing until you can get in with Dr. Schaeffer.

## 2022-01-10 ENCOUNTER — LAB REQUISITION (OUTPATIENT)
Dept: LAB | Facility: HOSPITAL | Age: 67
End: 2022-01-10

## 2022-01-10 DIAGNOSIS — R19.7 DIARRHEA, UNSPECIFIED: ICD-10-CM

## 2022-01-10 PROCEDURE — 87493 C DIFF AMPLIFIED PROBE: CPT | Performed by: FAMILY MEDICINE

## 2022-01-11 LAB
027 TOXIN: NORMAL
C DIFF TOX GENS STL QL NAA+PROBE: NEGATIVE

## 2022-01-15 LAB
FUNGUS WND CULT: NORMAL

## 2022-01-16 ENCOUNTER — HOSPITAL ENCOUNTER (EMERGENCY)
Facility: HOSPITAL | Age: 67
End: 2022-01-16

## 2022-01-16 ENCOUNTER — HOSPITAL ENCOUNTER (INPATIENT)
Facility: HOSPITAL | Age: 67
LOS: 6 days | Discharge: HOME HEALTH | End: 2022-01-22
Attending: ORTHOPAEDIC SURGERY | Admitting: ORTHOPAEDIC SURGERY

## 2022-01-16 DIAGNOSIS — M00.9: Primary | ICD-10-CM

## 2022-01-16 DIAGNOSIS — M00.9 PYOGENIC ARTHRITIS OF RIGHT HIP, DUE TO UNSPECIFIED ORGANISM: ICD-10-CM

## 2022-01-16 DIAGNOSIS — M00.9 PYOGENIC ARTHRITIS OF RIGHT HIP, DUE TO UNSPECIFIED ORGANISM: Primary | ICD-10-CM

## 2022-01-16 LAB
ABO GROUP BLD: NORMAL
ANION GAP SERPL CALCULATED.3IONS-SCNC: 11.8 MMOL/L (ref 5–15)
BASOPHILS # BLD AUTO: 0.06 10*3/MM3 (ref 0–0.2)
BASOPHILS NFR BLD AUTO: 0.5 % (ref 0–1.5)
BLD GP AB SCN SERPL QL: NEGATIVE
BUN SERPL-MCNC: 6 MG/DL (ref 8–23)
BUN/CREAT SERPL: 6.4 (ref 7–25)
CALCIUM SPEC-SCNC: 8.6 MG/DL (ref 8.6–10.5)
CHLORIDE SERPL-SCNC: 100 MMOL/L (ref 98–107)
CO2 SERPL-SCNC: 23.2 MMOL/L (ref 22–29)
CREAT SERPL-MCNC: 0.94 MG/DL (ref 0.57–1)
CRP SERPL-MCNC: 2.32 MG/DL (ref 0–0.5)
DEPRECATED RDW RBC AUTO: 45.8 FL (ref 37–54)
EOSINOPHIL # BLD AUTO: 0.2 10*3/MM3 (ref 0–0.4)
EOSINOPHIL NFR BLD AUTO: 1.7 % (ref 0.3–6.2)
ERYTHROCYTE [DISTWIDTH] IN BLOOD BY AUTOMATED COUNT: 15.3 % (ref 12.3–15.4)
ERYTHROCYTE [SEDIMENTATION RATE] IN BLOOD: 18 MM/HR (ref 0–30)
GFR SERPL CREATININE-BSD FRML MDRD: 59 ML/MIN/1.73
GLUCOSE BLDC GLUCOMTR-MCNC: 117 MG/DL (ref 70–130)
GLUCOSE SERPL-MCNC: 136 MG/DL (ref 65–99)
HBA1C MFR BLD: 7.56 % (ref 4.8–5.6)
HCT VFR BLD AUTO: 26.3 % (ref 34–46.6)
HGB BLD-MCNC: 8.2 G/DL (ref 12–15.9)
IMM GRANULOCYTES # BLD AUTO: 0.32 10*3/MM3 (ref 0–0.05)
IMM GRANULOCYTES NFR BLD AUTO: 2.7 % (ref 0–0.5)
LYMPHOCYTES # BLD AUTO: 1.83 10*3/MM3 (ref 0.7–3.1)
LYMPHOCYTES NFR BLD AUTO: 15.7 % (ref 19.6–45.3)
MCH RBC QN AUTO: 26.5 PG (ref 26.6–33)
MCHC RBC AUTO-ENTMCNC: 31.2 G/DL (ref 31.5–35.7)
MCV RBC AUTO: 85.1 FL (ref 79–97)
MONOCYTES # BLD AUTO: 1.67 10*3/MM3 (ref 0.1–0.9)
MONOCYTES NFR BLD AUTO: 14.3 % (ref 5–12)
NEUTROPHILS NFR BLD AUTO: 65.1 % (ref 42.7–76)
NEUTROPHILS NFR BLD AUTO: 7.6 10*3/MM3 (ref 1.7–7)
NRBC BLD AUTO-RTO: 0.3 /100 WBC (ref 0–0.2)
PLATELET # BLD AUTO: 210 10*3/MM3 (ref 140–450)
PMV BLD AUTO: 9.5 FL (ref 6–12)
POTASSIUM SERPL-SCNC: 3.7 MMOL/L (ref 3.5–5.2)
RBC # BLD AUTO: 3.09 10*6/MM3 (ref 3.77–5.28)
RH BLD: POSITIVE
SARS-COV-2 ORF1AB RESP QL NAA+PROBE: NOT DETECTED
SODIUM SERPL-SCNC: 135 MMOL/L (ref 136–145)
T&S EXPIRATION DATE: NORMAL
WBC NRBC COR # BLD: 11.68 10*3/MM3 (ref 3.4–10.8)

## 2022-01-16 PROCEDURE — 80048 BASIC METABOLIC PNL TOTAL CA: CPT | Performed by: ORTHOPAEDIC SURGERY

## 2022-01-16 PROCEDURE — 86923 COMPATIBILITY TEST ELECTRIC: CPT

## 2022-01-16 PROCEDURE — 86901 BLOOD TYPING SEROLOGIC RH(D): CPT | Performed by: ORTHOPAEDIC SURGERY

## 2022-01-16 PROCEDURE — 82962 GLUCOSE BLOOD TEST: CPT

## 2022-01-16 PROCEDURE — 86850 RBC ANTIBODY SCREEN: CPT | Performed by: ORTHOPAEDIC SURGERY

## 2022-01-16 PROCEDURE — 86900 BLOOD TYPING SEROLOGIC ABO: CPT | Performed by: ORTHOPAEDIC SURGERY

## 2022-01-16 PROCEDURE — 83036 HEMOGLOBIN GLYCOSYLATED A1C: CPT | Performed by: INTERNAL MEDICINE

## 2022-01-16 PROCEDURE — 85025 COMPLETE CBC W/AUTO DIFF WBC: CPT | Performed by: ORTHOPAEDIC SURGERY

## 2022-01-16 PROCEDURE — 86140 C-REACTIVE PROTEIN: CPT | Performed by: ORTHOPAEDIC SURGERY

## 2022-01-16 PROCEDURE — 36415 COLL VENOUS BLD VENIPUNCTURE: CPT | Performed by: ORTHOPAEDIC SURGERY

## 2022-01-16 PROCEDURE — 63710000001 INSULIN LISPRO (HUMAN) PER 5 UNITS: Performed by: INTERNAL MEDICINE

## 2022-01-16 PROCEDURE — U0004 COV-19 TEST NON-CDC HGH THRU: HCPCS | Performed by: ORTHOPAEDIC SURGERY

## 2022-01-16 PROCEDURE — 63710000001 PROMETHAZINE PER 12.5 MG: Performed by: ORTHOPAEDIC SURGERY

## 2022-01-16 PROCEDURE — 85652 RBC SED RATE AUTOMATED: CPT | Performed by: ORTHOPAEDIC SURGERY

## 2022-01-16 RX ORDER — FERROUS SULFATE 325(65) MG
325 TABLET ORAL EVERY OTHER DAY
Status: DISCONTINUED | OUTPATIENT
Start: 2022-01-17 | End: 2022-01-22 | Stop reason: HOSPADM

## 2022-01-16 RX ORDER — INSULIN LISPRO 100 [IU]/ML
20 INJECTION, SOLUTION INTRAVENOUS; SUBCUTANEOUS
Status: DISCONTINUED | OUTPATIENT
Start: 2022-01-16 | End: 2022-01-17

## 2022-01-16 RX ORDER — OXYCODONE HYDROCHLORIDE AND ACETAMINOPHEN 5; 325 MG/1; MG/1
1 TABLET ORAL EVERY 4 HOURS PRN
Status: ACTIVE | OUTPATIENT
Start: 2022-01-16 | End: 2022-01-23

## 2022-01-16 RX ORDER — ASPIRIN 81 MG/1
81 TABLET, CHEWABLE ORAL NIGHTLY
Status: DISCONTINUED | OUTPATIENT
Start: 2022-01-16 | End: 2022-01-22 | Stop reason: HOSPADM

## 2022-01-16 RX ORDER — HYDROCODONE BITARTRATE AND ACETAMINOPHEN 5; 325 MG/1; MG/1
2 TABLET ORAL EVERY 4 HOURS PRN
Status: DISCONTINUED | OUTPATIENT
Start: 2022-01-16 | End: 2022-01-22 | Stop reason: HOSPADM

## 2022-01-16 RX ORDER — INSULIN LISPRO 100 [IU]/ML
0-7 INJECTION, SOLUTION INTRAVENOUS; SUBCUTANEOUS
Status: DISCONTINUED | OUTPATIENT
Start: 2022-01-16 | End: 2022-01-22 | Stop reason: HOSPADM

## 2022-01-16 RX ORDER — GABAPENTIN 300 MG/1
600 CAPSULE ORAL EVERY 12 HOURS SCHEDULED
Status: DISCONTINUED | OUTPATIENT
Start: 2022-01-16 | End: 2022-01-22 | Stop reason: HOSPADM

## 2022-01-16 RX ORDER — PROMETHAZINE HYDROCHLORIDE 12.5 MG/1
12.5 TABLET ORAL EVERY 6 HOURS PRN
Status: DISCONTINUED | OUTPATIENT
Start: 2022-01-16 | End: 2022-01-22 | Stop reason: HOSPADM

## 2022-01-16 RX ORDER — PANTOPRAZOLE SODIUM 40 MG/1
40 TABLET, DELAYED RELEASE ORAL EVERY MORNING
Status: DISCONTINUED | OUTPATIENT
Start: 2022-01-17 | End: 2022-01-22 | Stop reason: HOSPADM

## 2022-01-16 RX ORDER — PROMETHAZINE HYDROCHLORIDE 12.5 MG/1
12.5 TABLET ORAL EVERY 6 HOURS PRN
COMMUNITY
End: 2022-04-21

## 2022-01-16 RX ORDER — NICOTINE POLACRILEX 4 MG
15 LOZENGE BUCCAL
Status: DISCONTINUED | OUTPATIENT
Start: 2022-01-16 | End: 2022-01-22 | Stop reason: HOSPADM

## 2022-01-16 RX ORDER — LINEZOLID 600 MG/1
600 TABLET, FILM COATED ORAL EVERY 12 HOURS SCHEDULED
Status: DISCONTINUED | OUTPATIENT
Start: 2022-01-16 | End: 2022-01-20

## 2022-01-16 RX ORDER — HYDROCODONE BITARTRATE AND ACETAMINOPHEN 5; 325 MG/1; MG/1
1 TABLET ORAL EVERY 4 HOURS PRN
Status: DISCONTINUED | OUTPATIENT
Start: 2022-01-16 | End: 2022-01-22 | Stop reason: HOSPADM

## 2022-01-16 RX ORDER — ATORVASTATIN CALCIUM 20 MG/1
10 TABLET, FILM COATED ORAL NIGHTLY
Status: DISCONTINUED | OUTPATIENT
Start: 2022-01-16 | End: 2022-01-22 | Stop reason: HOSPADM

## 2022-01-16 RX ORDER — DEXTROSE MONOHYDRATE 25 G/50ML
25 INJECTION, SOLUTION INTRAVENOUS
Status: DISCONTINUED | OUTPATIENT
Start: 2022-01-16 | End: 2022-01-22 | Stop reason: HOSPADM

## 2022-01-16 RX ORDER — GABAPENTIN 300 MG/1
600 CAPSULE ORAL EVERY 12 HOURS SCHEDULED
Status: DISCONTINUED | OUTPATIENT
Start: 2022-01-16 | End: 2022-01-19 | Stop reason: SDUPTHER

## 2022-01-16 RX ORDER — OXYCODONE HYDROCHLORIDE AND ACETAMINOPHEN 5; 325 MG/1; MG/1
2 TABLET ORAL EVERY 4 HOURS PRN
Status: ACTIVE | OUTPATIENT
Start: 2022-01-16 | End: 2022-01-23

## 2022-01-16 RX ORDER — ISOSORBIDE MONONITRATE 30 MG/1
30 TABLET, EXTENDED RELEASE ORAL EVERY MORNING
Status: DISCONTINUED | OUTPATIENT
Start: 2022-01-17 | End: 2022-01-22 | Stop reason: HOSPADM

## 2022-01-16 RX ADMIN — ATORVASTATIN CALCIUM 10 MG: 20 TABLET, FILM COATED ORAL at 21:18

## 2022-01-16 RX ADMIN — METOPROLOL TARTRATE 25 MG: 25 TABLET, FILM COATED ORAL at 21:18

## 2022-01-16 RX ADMIN — GABAPENTIN 600 MG: 300 CAPSULE ORAL at 21:18

## 2022-01-16 RX ADMIN — ASPIRIN 81 MG: 81 TABLET, CHEWABLE ORAL at 21:18

## 2022-01-16 RX ADMIN — PROMETHAZINE HYDROCHLORIDE 12.5 MG: 12.5 TABLET ORAL at 14:10

## 2022-01-16 RX ADMIN — LINEZOLID 600 MG: 600 TABLET, FILM COATED ORAL at 21:18

## 2022-01-16 RX ADMIN — INSULIN LISPRO 20 UNITS: 100 INJECTION, SOLUTION INTRAVENOUS; SUBCUTANEOUS at 19:34

## 2022-01-16 RX ADMIN — INSULIN GLARGINE-YFGN 50 UNITS: 100 INJECTION, SOLUTION SUBCUTANEOUS at 21:18

## 2022-01-16 RX ADMIN — PROMETHAZINE HYDROCHLORIDE 12.5 MG: 12.5 TABLET ORAL at 21:18

## 2022-01-16 RX ADMIN — HYDROCODONE BITARTRATE AND ACETAMINOPHEN 1 TABLET: 5; 325 TABLET ORAL at 21:23

## 2022-01-16 NOTE — H&P
HISTORY AND PHYSICAL   [facility]        Date of Admission: 2022  Patient Identification:  Name: Stephy Duncan  Age: 66 y.o.  Sex: female  :  1955  MRN: [id number]                     Primary Care Physician: Gregorio Rosales    Chief Complaint:  66 year old female who presented as a direct admission; she had hip surgery about a month ago and has had a postop wound infection; she has had other procedures since then and has a wound vac; she has noted continued copious drainage; she denies fever or chills; she has also had nausea and vomiting until a few days ago and was not able to keep anything down for a while; she has lost over 20 pounds    History of Present Illness:   As above    Past Medical History:  Past Medical History:   Diagnosis Date   • Arthritis    • Cardiac murmur    • Diabetes mellitus (HCC)    • GERD (gastroesophageal reflux disease)    • Heart attack (HCC) 2020   • History of cervical cancer    • Hyperlipidemia    • Hypertension    • Osteomyelitis hip (HCC)     RIGHT   • PONV (postoperative nausea and vomiting)    • Right hip pain      Past Surgical History:  Past Surgical History:   Procedure Laterality Date   • CARDIAC CATHETERIZATION     • CERVICAL CONIZATION     • COLONOSCOPY     • CORONARY ANGIOPLASTY WITH STENT PLACEMENT     • ENDOSCOPY     • HIP ARTHROPLASTY Right    • HIP HARDWARE REMOVAL     • HIP SPACER INSERTION WITH ANTIBIOTIC CEMENT      X3   • HYSTERECTOMY     • INCISION AND DRAINAGE HIP Right 2021    Procedure: INCISION AND DRAINAGE TOTAL HIP, LINER EXCHANGE AND WOUND VAC PLACEMENT;  Surgeon: Karri Schaeffer II, MD;  Location: [facility];  Service: Orthopedics;  Laterality: Right;   • KNEE ARTHROPLASTY Left    • LAPAROSCOPIC CHOLECYSTECTOMY     • TONSILLECTOMY     • TOTAL HIP ARTHROPLASTY REVISION Right 2021    Procedure: TOTAL HIP REVISION ARTHROPLASTY RIGHT POSTERIOR;  Surgeon: Karri Schaeffer II, MD;  Location:  [facility];  Service: Orthopedics;  Laterality: Right;      Home Meds:  Medications Prior to Admission   Medication Sig Dispense Refill Last Dose   • ASPIRIN 81 PO Take 1 tablet by mouth Every Night. PT HOLDING FOR SURGERY    1/15/2022 at 2100   • atorvastatin (LIPITOR) 10 MG tablet Take 10 mg by mouth Every Night.   1/15/2022 at 2100   • Diclofenac Sodium (VOLTAREN) 1 % gel gel Apply 4 g topically to the appropriate area as directed 4 (Four) Times a Day As Needed.   Past Week at Unknown time   • Dulaglutide (Trulicity) 3 MG/0.5ML solution pen-injector Inject 0.5 mL under the skin into the appropriate area as directed 1 (One) Time Per Week. SATURDAYS    1/15/2022 at 0800   • esomeprazole (nexIUM) 40 MG capsule Take 40 mg by mouth Every Morning Before Breakfast.   1/16/2022 at 0700   • ferrous sulfate 325 (65 FE) MG tablet Take 325 mg by mouth Every Other Day. Mornings   1/15/2022 at 0800   • gabapentin (NEURONTIN) 600 MG tablet Take 600 mg by mouth 2 (Two) Times a Day.   1/16/2022 at 0800   • HYDROcodone-acetaminophen (NORCO) 7.5-325 MG per tablet Take 1 tablet by mouth Every 6 (Six) Hours As Needed for Moderate Pain . Patient takes 1/2 tablet QHS PRN. [facility] 10/21   Past Week at Unknown time   • Insulin Aspart (NOVOLOG FLEXPEN SC) Inject 20 Units under the skin into the appropriate area as directed 3 (Three) Times a Day With Meals. 15-20 units TID   1/16/2022 at 0800   • insulin detemir (LEVEMIR) 100 UNIT/ML injection Inject 50 Units under the skin into the appropriate area as directed Every Night.   Past Week at 2100   • isosorbide mononitrate (IMDUR) 30 MG 24 hr tablet Take 30 mg by mouth Every Morning.   1/16/2022 at 0800   • linezolid (ZYVOX) 600 MG tablet Take 1 tablet by mouth Every 12 (Twelve) Hours for 40 days. 80 tablet 0 1/16/2022 at 0800   • loratadine (CLARITIN) 10 MG tablet Take 10 mg by mouth Every Night.   1/15/2022 at 2100   • metoprolol tartrate (LOPRESSOR) 25 MG tablet Take 25 mg by mouth Every  Night.   1/15/2022 at 2100   • promethazine (PHENERGAN) 12.5 MG tablet Take 12.5 mg by mouth Every 6 (Six) Hours As Needed for Nausea or Vomiting.   1/15/2022 at 2100   • ondansetron ODT (ZOFRAN-ODT) 4 MG disintegrating tablet Place 1 tablet on the tongue Every 6 (Six) Hours As Needed for Nausea or Vomiting. 12 tablet 0    • oxyCODONE-acetaminophen (PERCOCET) 5-325 MG per tablet Take 1 tablet by mouth Every 6 (Six) Hours As Needed. [facility]  90 count          Allergies:  Allergies   Allergen Reactions   • Ace Inhibitors Shortness Of Breath and Swelling   • Morphine Shortness Of Breath     Immunizations:  Immunization History   Administered Date(s) Administered   • COVID-19 (PFIZER) 2021, 2021, 10/04/2021     Social History:   Social History     Social History Narrative   • Not on file     Social History     Socioeconomic History   • Marital status:    Tobacco Use   • Smoking status: Former Smoker     Years: 45.00     Types: Cigarettes     Quit date: 12/10/2015     Years since quittin.1   • Smokeless tobacco: Never Used   Vaping Use   • Vaping Use: Never used   Substance and Sexual Activity   • Alcohol use: Never   • Drug use: Never   • Sexual activity: Defer       Family History:  Family History   Problem Relation Age of Onset   • Malig Hyperthermia Neg Hx         Review of Systems  See history of present illness and past medical history.  Patient denies headache, dizziness, syncope, falls, trauma, change in vision, change in hearing, change in taste, changes in weight, changes in appetite, focal weakness, numbness, or paresthesia.  Patient denies chest pain, palpitations, dyspnea, orthopnea, PND, cough, sinus pressure, rhinorrhea, epistaxis, hemoptysis, nausea, vomiting,hematemesis, diarrhea, constipation or hematchezia.  Denies cold or heat intolerance, polydipsia, polyuria, polyphagia. Denies hematuria, pyuria, dysuria, hesitancy, frequency or urgency. Denies consumption of raw and under  "cooked meats foods or change in water source.  Denies fever, chills, sweats, night sweats.  Denies missing any routine medications. Remainder of ROS is negative.    Objective:  T Max 24 hrs: Temp (24hrs), Av.1 °F (36.2 °C), Min:96.9 °F (36.1 °C), Max:97.3 °F (36.3 °C)    Vitals Ranges:   Temp:  [96.9 °F (36.1 °C)-97.3 °F (36.3 °C)] 96.9 °F (36.1 °C)  Heart Rate:  [69-80] 80  Resp:  [16] 16  BP: (117-122)/(56-58) 122/58      Exam:  /58 (BP Location: Left arm, Patient Position: Lying)   Pulse 80   Temp 96.9 °F (36.1 °C) (Skin)   Resp 16   Ht 162.6 cm (64\")   Wt 95.7 kg (211 lb)   SpO2 97%   BMI 36.22 kg/m²     General Appearance:    Alert, cooperative, no distress, appears stated age   Head:    Normocephalic, without obvious abnormality, atraumatic   Eyes:    PERRL, conjunctivae/corneas clear, EOM's intact, both eyes   Ears:    Normal external ear canals, both ears   Nose:   Nares normal, septum midline, mucosa normal, no drainage    or sinus tenderness   Throat:   Lips, mucosa, and tongue normal   Neck:   Supple, symmetrical, trachea midline, no adenopathy;     thyroid:  no enlargement/tenderness/nodules; no carotid    bruit or JVD   Back:     Symmetric, no curvature, ROM normal, no CVA tenderness   Lungs:     Clear to auscultation bilaterally, respirations unlabored   Chest Wall:    No tenderness or deformity    Heart:    Regular rate and rhythm, S1 and S2 normal, no murmur, rub   or gallop   Abdomen:     Soft, nontender, bowel sounds active all four quadrants,     no masses, no hepatomegaly, no splenomegaly   Extremities:   Extremities normal, atraumatic, right hip with incision, wound vac in place   Pulses:   2+ and symmetric all extremities   Skin:   Skin color, texture, turgor normal, no rashes or lesions   Lymph nodes:   Cervical, supraclavicular, and axillary nodes normal   Neurologic:   CNII-XII intact, normal strength, sensation intact throughout      .    Data Review:  Labs in chart were " reviewed.  WBC   Date Value Ref Range Status   01/16/2022 11.68 (H) 3.40 - 10.80 10*3/mm3 Final     Hemoglobin   Date Value Ref Range Status   01/16/2022 8.2 (L) 12.0 - 15.9 g/dL Final     Hematocrit   Date Value Ref Range Status   01/16/2022 26.3 (L) 34.0 - 46.6 % Final     Platelets   Date Value Ref Range Status   01/16/2022 210 140 - 450 10*3/mm3 Final     Sodium   Date Value Ref Range Status   01/16/2022 135 (L) 136 - 145 mmol/L Final     Potassium   Date Value Ref Range Status   01/16/2022 3.7 3.5 - 5.2 mmol/L Final     Chloride   Date Value Ref Range Status   01/16/2022 100 98 - 107 mmol/L Final     CO2   Date Value Ref Range Status   01/16/2022 23.2 22.0 - 29.0 mmol/L Final     BUN   Date Value Ref Range Status   01/16/2022 6 (L) 8 - 23 mg/dL Final     Creatinine   Date Value Ref Range Status   01/16/2022 0.94 0.57 - 1.00 mg/dL Final     Glucose   Date Value Ref Range Status   01/16/2022 136 (H) 65 - 99 mg/dL Final     Calcium   Date Value Ref Range Status   01/16/2022 8.6 8.6 - 10.5 mg/dL Final     No results found for: AST, ALT, ALKPHOS             Imaging Results (All)     None        Patient Active Problem List   Diagnosis Code   • Status post total replacement of hip Z96.649   • Diabetes mellitus ([facility]) E11.9   • Hypertension I10   • Anemia D64.9   • Postoperative infection T81.40XA   • Iron deficiency anemia D50.9   • Morbid obesity ([facility]) E66.01   • Presence of stent in coronary artery in patient with coronary artery disease I25.10, Z95.5   • Chronic diastolic CHF (congestive heart failure) ([facility]) I50.32   • Septic arthritis of hip ([facility]) M00.9       Assessment:   septic arthritis right hip  Diabetes  Hypertension  Obesity  cad      Plan:  Will continue zyvox  Insulin sliding scale and accu checks  Continue home insulin   Trend labs  Thanks for involving us in her care  Will follow with you    Brianna Cedeño MD  1/16/2022  18:16 EST

## 2022-01-16 NOTE — PLAN OF CARE
Goal Outcome Evaluation:  Plan of Care Reviewed With: patient, spouse        Progress: no change  Outcome Summary: Pt is 67 y/o female direct admitted from Dr. Laury BAXTER with right hip infection. Surgery planned possibly Tuesday. Wound vac in place from home health. 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 Educated pt on pain management. RADHA.

## 2022-01-17 PROBLEM — L24.A9 DRAINAGE FROM WOUND: Status: ACTIVE | Noted: 2022-01-17

## 2022-01-17 PROBLEM — T14.8XXA DRAINAGE FROM WOUND: Status: ACTIVE | Noted: 2022-01-17

## 2022-01-17 LAB
ANION GAP SERPL CALCULATED.3IONS-SCNC: 11.9 MMOL/L (ref 5–15)
BUN SERPL-MCNC: 6 MG/DL (ref 8–23)
BUN/CREAT SERPL: 6.6 (ref 7–25)
CALCIUM SPEC-SCNC: 8.3 MG/DL (ref 8.6–10.5)
CHLORIDE SERPL-SCNC: 103 MMOL/L (ref 98–107)
CO2 SERPL-SCNC: 23.1 MMOL/L (ref 22–29)
CREAT SERPL-MCNC: 0.91 MG/DL (ref 0.57–1)
DEPRECATED RDW RBC AUTO: 46.3 FL (ref 37–54)
ERYTHROCYTE [DISTWIDTH] IN BLOOD BY AUTOMATED COUNT: 15.6 % (ref 12.3–15.4)
ERYTHROCYTE [SEDIMENTATION RATE] IN BLOOD: 28 MM/HR (ref 0–30)
GFR SERPL CREATININE-BSD FRML MDRD: 61 ML/MIN/1.73
GLUCOSE BLDC GLUCOMTR-MCNC: 102 MG/DL (ref 70–130)
GLUCOSE BLDC GLUCOMTR-MCNC: 144 MG/DL (ref 70–130)
GLUCOSE BLDC GLUCOMTR-MCNC: 146 MG/DL (ref 70–130)
GLUCOSE BLDC GLUCOMTR-MCNC: 223 MG/DL (ref 70–130)
GLUCOSE SERPL-MCNC: 100 MG/DL (ref 65–99)
HCT VFR BLD AUTO: 22.9 % (ref 34–46.6)
HGB BLD-MCNC: 7.1 G/DL (ref 12–15.9)
MCH RBC QN AUTO: 26.5 PG (ref 26.6–33)
MCHC RBC AUTO-ENTMCNC: 31 G/DL (ref 31.5–35.7)
MCV RBC AUTO: 85.4 FL (ref 79–97)
PLATELET # BLD AUTO: 142 10*3/MM3 (ref 140–450)
PMV BLD AUTO: 9.3 FL (ref 6–12)
POTASSIUM SERPL-SCNC: 3.7 MMOL/L (ref 3.5–5.2)
RBC # BLD AUTO: 2.68 10*6/MM3 (ref 3.77–5.28)
SODIUM SERPL-SCNC: 138 MMOL/L (ref 136–145)
WBC NRBC COR # BLD: 6.38 10*3/MM3 (ref 3.4–10.8)

## 2022-01-17 PROCEDURE — 82962 GLUCOSE BLOOD TEST: CPT

## 2022-01-17 PROCEDURE — 85652 RBC SED RATE AUTOMATED: CPT | Performed by: ORTHOPAEDIC SURGERY

## 2022-01-17 PROCEDURE — 63710000001 PROMETHAZINE PER 12.5 MG: Performed by: ORTHOPAEDIC SURGERY

## 2022-01-17 PROCEDURE — 85027 COMPLETE CBC AUTOMATED: CPT | Performed by: INTERNAL MEDICINE

## 2022-01-17 PROCEDURE — 80048 BASIC METABOLIC PNL TOTAL CA: CPT | Performed by: INTERNAL MEDICINE

## 2022-01-17 PROCEDURE — 63710000001 INSULIN LISPRO (HUMAN) PER 5 UNITS: Performed by: INTERNAL MEDICINE

## 2022-01-17 RX ORDER — INSULIN LISPRO 100 [IU]/ML
10 INJECTION, SOLUTION INTRAVENOUS; SUBCUTANEOUS
Status: DISCONTINUED | OUTPATIENT
Start: 2022-01-17 | End: 2022-01-17

## 2022-01-17 RX ADMIN — ATORVASTATIN CALCIUM 10 MG: 20 TABLET, FILM COATED ORAL at 21:50

## 2022-01-17 RX ADMIN — PANTOPRAZOLE SODIUM 40 MG: 40 TABLET, DELAYED RELEASE ORAL at 06:25

## 2022-01-17 RX ADMIN — FERROUS SULFATE TAB 325 MG (65 MG ELEMENTAL FE) 325 MG: 325 (65 FE) TAB at 08:22

## 2022-01-17 RX ADMIN — INSULIN GLARGINE-YFGN 40 UNITS: 100 INJECTION, SOLUTION SUBCUTANEOUS at 21:55

## 2022-01-17 RX ADMIN — LINEZOLID 600 MG: 600 TABLET, FILM COATED ORAL at 21:54

## 2022-01-17 RX ADMIN — LINEZOLID 600 MG: 600 TABLET, FILM COATED ORAL at 08:22

## 2022-01-17 RX ADMIN — HYDROCODONE BITARTRATE AND ACETAMINOPHEN 2 TABLET: 5; 325 TABLET ORAL at 21:50

## 2022-01-17 RX ADMIN — GABAPENTIN 600 MG: 300 CAPSULE ORAL at 21:49

## 2022-01-17 RX ADMIN — GABAPENTIN 600 MG: 300 CAPSULE ORAL at 08:21

## 2022-01-17 RX ADMIN — METOPROLOL TARTRATE 25 MG: 25 TABLET, FILM COATED ORAL at 21:50

## 2022-01-17 RX ADMIN — PROMETHAZINE HYDROCHLORIDE 12.5 MG: 12.5 TABLET ORAL at 10:09

## 2022-01-17 RX ADMIN — INSULIN LISPRO 15 UNITS: 100 INJECTION, SOLUTION INTRAVENOUS; SUBCUTANEOUS at 08:37

## 2022-01-17 NOTE — PAYOR COMM NOTE
"INITIAL CLINICAL FOR REVIEW  REF #[id number]  F:  [phone]    I WILL FAX ADDITIONAL CLINICAL AS IT BECOMES AVAILABLE.    Stephy Duncan (66 y.o. 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)   Code Status: Prior   Advance Care Planning Activity    Ht: 162.6 cm (64\")   Wt: 95.7 kg (211 lb)    Admission Cmt: None   Principal Problem: Septic arthritis of hip (HCC) [M00.9]                 Active Insurance as of 1/16/2022     Primary Coverage     Payor Plan Insurance Group Employer/Plan Group    AETNA MEDICARE REPLACEMENT AETNA MEDICARE REPLACEMENT [id number]     Payor Plan Address Payor Plan Phone Number Payor Plan Fax Number Effective Dates    [address] [phone]  1/1/2022 - None Entered    [facility] [id number]       Subscriber Name Subscriber Birth Date Member ID       Stephy Duncan 1955 [id number]                 Emergency Contacts      (Rel.) Home Phone Work Phone Mobile Phone    ESTUARDO DUNCAN (Spouse) -- -- [phone]    ChrisBhumika (Sister) -- -- [phone]               History & Physical      Karri Schaeffer II, MD at 01/17/22 0836            Orthopaedic Surgery  History & Physical  Dr. JENNIE Schaeffer II  [phone]    HPI:  Patient is a 66 y.o. Not  or  female who presents with Continued drainage from her right " hip.  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  She then came to me a couple months ago for hip replantation.  She underwent uneventful total hip arthroplasty postoperatively has had problems with continued drainage.  She also apparently had 1 dislocation event.  She was sent to the emergency room to be admitted.  She has been afebrile.  Yesterday's labs demonstrated her white count was mildly elevated at 11.  Her CRP and sedimentation rate have both remarkably decreased since her last value and the sedimentation rate has normalized.  She continues to have painless drainage from the right hip that is serosanguineous.    MEDICAL HISTORY  Past Medical History:   Diagnosis Date   • Arthritis    • Cardiac murmur    • Diabetes mellitus (HCC)    • GERD (gastroesophageal reflux disease)    • Heart attack (HCC) 12/2020   • History of cervical cancer 1981   • Hyperlipidemia    • Hypertension    • Osteomyelitis hip (HCC)     RIGHT   • PONV (postoperative nausea and vomiting)    • Right hip pain    ·   Past Surgical History:   Procedure Laterality Date   • CARDIAC CATHETERIZATION     • CERVICAL CONIZATION     • COLONOSCOPY     • CORONARY ANGIOPLASTY WITH STENT PLACEMENT     • ENDOSCOPY     • HIP ARTHROPLASTY Right    • HIP HARDWARE REMOVAL     • HIP SPACER INSERTION WITH ANTIBIOTIC CEMENT      X3   • HYSTERECTOMY     • INCISION AND DRAINAGE HIP Right 12/18/2021    Procedure: INCISION AND DRAINAGE TOTAL HIP, LINER EXCHANGE AND WOUND VAC PLACEMENT;  Surgeon: Karri Schaeffer II, MD;  Location: [facility];  Service: Orthopedics;  Laterality: Right;   • KNEE ARTHROPLASTY Left    • LAPAROSCOPIC CHOLECYSTECTOMY     • TONSILLECTOMY     • TOTAL HIP ARTHROPLASTY REVISION Right 11/16/2021    Procedure: TOTAL HIP REVISION ARTHROPLASTY RIGHT POSTERIOR;  Surgeon: Karri Schaeffer II, MD;  Location: [facility];  Service:  Orthopedics;  Laterality: Right;   ·   Prior to Admission medications    Medication Sig Start Date End Date Taking? Authorizing Provider   ASPIRIN 81 PO Take 1 tablet by mouth Every Night. PT HOLDING FOR SURGERY    Yes Nikunj Isaac MD   atorvastatin (LIPITOR) 10 MG tablet Take 10 mg by mouth Every Night.   Yes Nikunj Isaac MD   Diclofenac Sodium (VOLTAREN) 1 % gel gel Apply 4 g topically to the appropriate area as directed 4 (Four) Times a Day As Needed.   Yes Nikunj Isaac MD   Dulaglutide (Trulicity) 3 MG/0.5ML solution pen-injector Inject 0.5 mL under the skin into the appropriate area as directed 1 (One) Time Per Week. SATURDAYS    Yes Nikunj Isaac MD   esomeprazole (nexIUM) 40 MG capsule Take 40 mg by mouth Every Morning Before Breakfast.   Yes Nikunj Isaac MD   ferrous sulfate 325 (65 FE) MG tablet Take 325 mg by mouth Every Other Day. Mornings   Yes Nikunj Isaac MD   gabapentin (NEURONTIN) 600 MG tablet Take 600 mg by mouth 2 (Two) Times a Day.   Yes Nikunj Isaac MD   HYDROcodone-acetaminophen (NORCO) 7.5-325 MG per tablet Take 1 tablet by mouth Every 6 (Six) Hours As Needed for Moderate Pain . Patient takes 1/2 tablet QHS PRN. [facility] 10/21   Yes Nikunj Isaac MD   Insulin Aspart (NOVOLOG FLEXPEN SC) Inject 20 Units under the skin into the appropriate area as directed 3 (Three) Times a Day With Meals. 15-20 units TID   Yes Nikunj Isaac MD   insulin detemir (LEVEMIR) 100 UNIT/ML injection Inject 50 Units under the skin into the appropriate area as directed Every Night.   Yes Nikunj Isaac MD   isosorbide mononitrate (IMDUR) 30 MG 24 hr tablet Take 30 mg by mouth Every Morning.   Yes Nikunj Isaac MD   linezolid (ZYVOX) 600 MG tablet Take 1 tablet by mouth Every 12 (Twelve) Hours for 40 days. 12/22/21 1/31/22 Yes Lai Rhoades DO   loratadine (CLARITIN) 10 MG tablet Take 10 mg by mouth Every Night.   Yes Marlin  "MD Nikunj   metoprolol tartrate (LOPRESSOR) 25 MG tablet Take 25 mg by mouth Every Night.   Yes ProviderNikunj MD   promethazine (PHENERGAN) 12.5 MG tablet Take 12.5 mg by mouth Every 6 (Six) Hours As Needed for Nausea or Vomiting.   Yes Provider, MD Nikunj   ondansetron ODT (ZOFRAN-ODT) 4 MG disintegrating tablet Place 1 tablet on the tongue Every 6 (Six) Hours As Needed for Nausea or Vomiting. 22   Ashu Knapp MD   oxyCODONE-acetaminophen (PERCOCET) 5-325 MG per tablet Take 1 tablet by mouth Every 6 (Six) Hours As Needed. [facility]  90 count    ProviderNikunj MD   ·   Allergies   Allergen Reactions   • Ace Inhibitors Shortness Of Breath and Swelling   • Morphine Shortness Of Breath   ·   Most Recent Immunizations   Administered Date(s) Administered   • COVID-19 (PFIZER) 10/04/2021   ·   Social History     Tobacco Use   • Smoking status: Former Smoker     Years: 45.00     Types: Cigarettes     Quit date: 12/10/2015     Years since quittin.1   • Smokeless tobacco: Never Used   Substance Use Topics   • Alcohol use: Never   ·    Social History     Substance and Sexual Activity   Drug Use Never   ·     REVIEW OF SYSTEMS:  · Head: negative for headache  · Respiratory: negative for shortness of breath.   · Cardiovascular: negative for chest pain.   · Gastrointestinal: negative abdominal pain.   · Neurological: negative for LOC  · Psychiatric/Behavioral: negative for memory loss.   · All other systems reviewed and are negative    VITALS: /62 (BP Location: Left arm, Patient Position: Lying)   Pulse 68   Temp 98.9 °F (37.2 °C) (Oral)   Resp 16   Ht 162.6 cm (64\")   Wt 95.7 kg (211 lb)   SpO2 97%   BMI 36.22 kg/m²  Body mass index is 36.22 kg/m².    PHYSICAL EXAM:   · CONSTITUTIONAL: A&Ox3, No acute distress  · LUNGS: Equal chest rise, no shortness of air  · CARDIOVASCULAR: palpable peripheral pulses  · SKIN: no skin lesions in the area examined  · LYMPH: no lymphadenopathy in " the area examined  · EXTREMITY: Right Lower Extremity  · Tenderness to Palpation: No tenderness to palpation  · Gross Deformity: Significant serosanguineous drainage from the right hip without any significant erythema or sign of active infection  · Pulses:  Brisk Capillary Refill  · Sensation: Intact to Saphenous, Sural, Deep Peroneal, Superficial Peroneal, and Tibial Nerves and grossly throughout extremity  · Motor: 5/5 EHL/FHL/TA/GS motor complexes    RADIOLOGY REVIEW:   No radiology results for the last 7 days    LABS:   Results for the past 24 hours:   Recent Results (from the past 24 hour(s))   Basic Metabolic Panel    Collection Time: 01/16/22  1:08 PM    Specimen: Blood   Result Value Ref Range    Glucose 136 (H) 65 - 99 mg/dL    BUN 6 (L) 8 - 23 mg/dL    Creatinine 0.94 0.57 - 1.00 mg/dL    Sodium 135 (L) 136 - 145 mmol/L    Potassium 3.7 3.5 - 5.2 mmol/L    Chloride 100 98 - 107 mmol/L    CO2 23.2 22.0 - 29.0 mmol/L    Calcium 8.6 8.6 - 10.5 mg/dL    eGFR Non African Amer 59 (L) >60 mL/min/1.73    BUN/Creatinine Ratio 6.4 (L) 7.0 - 25.0    Anion Gap 11.8 5.0 - 15.0 mmol/L   C-reactive Protein    Collection Time: 01/16/22  1:08 PM    Specimen: Blood   Result Value Ref Range    C-Reactive Protein 2.32 (H) 0.00 - 0.50 mg/dL   Type & Screen    Collection Time: 01/16/22  1:08 PM    Specimen: Blood   Result Value Ref Range    ABO Type O     RH type Positive     Antibody Screen Negative     T&S Expiration Date 1/19/2022 11:59:59 PM    Sedimentation Rate    Collection Time: 01/16/22  1:09 PM    Specimen: Blood   Result Value Ref Range    Sed Rate 18 0 - 30 mm/hr   CBC Auto Differential    Collection Time: 01/16/22  1:09 PM    Specimen: Blood   Result Value Ref Range    WBC 11.68 (H) 3.40 - 10.80 10*3/mm3    RBC 3.09 (L) 3.77 - 5.28 10*6/mm3    Hemoglobin 8.2 (L) 12.0 - 15.9 g/dL    Hematocrit 26.3 (L) 34.0 - 46.6 %    MCV 85.1 79.0 - 97.0 fL    MCH 26.5 (L) 26.6 - 33.0 pg    MCHC 31.2 (L) 31.5 - 35.7 g/dL    RDW  15.3 12.3 - 15.4 %    RDW-SD 45.8 37.0 - 54.0 fl    MPV 9.5 6.0 - 12.0 fL    Platelets 210 140 - 450 10*3/mm3    Neutrophil % 65.1 42.7 - 76.0 %    Lymphocyte % 15.7 (L) 19.6 - 45.3 %    Monocyte % 14.3 (H) 5.0 - 12.0 %    Eosinophil % 1.7 0.3 - 6.2 %    Basophil % 0.5 0.0 - 1.5 %    Immature Grans % 2.7 (H) 0.0 - 0.5 %    Neutrophils, Absolute 7.60 (H) 1.70 - 7.00 10*3/mm3    Lymphocytes, Absolute 1.83 0.70 - 3.10 10*3/mm3    Monocytes, Absolute 1.67 (H) 0.10 - 0.90 10*3/mm3    Eosinophils, Absolute 0.20 0.00 - 0.40 10*3/mm3    Basophils, Absolute 0.06 0.00 - 0.20 10*3/mm3    Immature Grans, Absolute 0.32 (H) 0.00 - 0.05 10*3/mm3    nRBC 0.3 (H) 0.0 - 0.2 /100 WBC   Hemoglobin A1c    Collection Time: 01/16/22  1:09 PM    Specimen: Blood   Result Value Ref Range    Hemoglobin A1C 7.56 (H) 4.80 - 5.60 %   COVID-19,APTIMA PANTHER(LORA),[facility], NP/OP SWAB IN UTM/VTM/SALINE TRANSPORT MEDIA,24 HR TAT - Swab, Nasopharynx    Collection Time: 01/16/22  6:46 PM    Specimen: Nasopharynx; Swab   Result Value Ref Range    COVID19 Not Detected Not Detected - Ref. Range   POC Glucose Once    Collection Time: 01/16/22  9:35 PM    Specimen: Blood   Result Value Ref Range    Glucose 117 70 - 130 mg/dL   POC Glucose Once    Collection Time: 01/17/22  6:24 AM    Specimen: Blood   Result Value Ref Range    Glucose 102 70 - 130 mg/dL   Sedimentation Rate    Collection Time: 01/17/22  6:34 AM    Specimen: Blood   Result Value Ref Range    Sed Rate 28 0 - 30 mm/hr   Basic Metabolic Panel    Collection Time: 01/17/22  6:34 AM    Specimen: Blood   Result Value Ref Range    Glucose 100 (H) 65 - 99 mg/dL    BUN 6 (L) 8 - 23 mg/dL    Creatinine 0.91 0.57 - 1.00 mg/dL    Sodium 138 136 - 145 mmol/L    Potassium 3.7 3.5 - 5.2 mmol/L    Chloride 103 98 - 107 mmol/L    CO2 23.1 22.0 - 29.0 mmol/L    Calcium 8.3 (L) 8.6 - 10.5 mg/dL    eGFR Non African Amer 61 >60 mL/min/1.73    BUN/Creatinine Ratio 6.6 (L) 7.0 - 25.0    Anion Gap 11.9 5.0 -  "15.0 mmol/L   CBC (No Diff)    Collection Time: 22  6:34 AM    Specimen: Blood   Result Value Ref Range    WBC 6.38 3.40 - 10.80 10*3/mm3    RBC 2.68 (L) 3.77 - 5.28 10*6/mm3    Hemoglobin 7.1 (L) 12.0 - 15.9 g/dL    Hematocrit 22.9 (L) 34.0 - 46.6 %    MCV 85.4 79.0 - 97.0 fL    MCH 26.5 (L) 26.6 - 33.0 pg    MCHC 31.0 (L) 31.5 - 35.7 g/dL    RDW 15.6 (H) 12.3 - 15.4 %    RDW-SD 46.3 37.0 - 54.0 fl    MPV 9.3 6.0 - 12.0 fL    Platelets 142 140 - 450 10*3/mm3       IMPRESSION:  Patient is a 66 y.o. Not  or  female with   Right hip drainage status post hip replantation from prior Girdlestone procedure    PLAN:   · Admited to: Karri Schaeffer II, MD   · Disposition:  surgery tomorrow for right hip irrigation debridement, head and liner exchange, and possible revision.  We need to get control of her drainage as if she has not currently infected, she is likely to become reinfected if we cannot get her hip to stop draining.    R \"Kilo\" Laury BAXTER MD  Orthopaedic Surgery  [facility]  [phone] [facility] Office  [phone] - [place] Office      Electronically signed by Karri Schaeffer II, MD at 22 [phone]     Brianna Cedeño MD at 22 [phone]          HISTORY AND PHYSICAL   [facility]        Date of Admission: 2022  Patient Identification:  Name: Stephy Duncan  Age: 66 y.o.  Sex: female  :  1955  MRN: [id number]                     Primary Care Physician: Gregorio Rosales    Chief Complaint:  66 year old female who presented as a direct admission; she had hip surgery about a month ago and has had a postop wound infection; she has had other procedures since then and has a wound vac; she has noted continued copious drainage; she denies fever or chills; she has also had nausea and vomiting until a few days ago and was not able to keep anything down for a while; she has lost over 20 pounds    History of Present " Illness:   As above    Past Medical History:  Past Medical History:   Diagnosis Date   • Arthritis    • Cardiac murmur    • Diabetes mellitus (HCC)    • GERD (gastroesophageal reflux disease)    • Heart attack (HCC) 12/2020   • History of cervical cancer 1981   • Hyperlipidemia    • Hypertension    • Osteomyelitis hip (HCC)     RIGHT   • PONV (postoperative nausea and vomiting)    • Right hip pain      Past Surgical History:  Past Surgical History:   Procedure Laterality Date   • CARDIAC CATHETERIZATION     • CERVICAL CONIZATION     • COLONOSCOPY     • CORONARY ANGIOPLASTY WITH STENT PLACEMENT     • ENDOSCOPY     • HIP ARTHROPLASTY Right    • HIP HARDWARE REMOVAL     • HIP SPACER INSERTION WITH ANTIBIOTIC CEMENT      X3   • HYSTERECTOMY     • INCISION AND DRAINAGE HIP Right 12/18/2021    Procedure: INCISION AND DRAINAGE TOTAL HIP, LINER EXCHANGE AND WOUND VAC PLACEMENT;  Surgeon: Karri Schaeffer II, MD;  Location: [facility];  Service: Orthopedics;  Laterality: Right;   • KNEE ARTHROPLASTY Left    • LAPAROSCOPIC CHOLECYSTECTOMY     • TONSILLECTOMY     • TOTAL HIP ARTHROPLASTY REVISION Right 11/16/2021    Procedure: TOTAL HIP REVISION ARTHROPLASTY RIGHT POSTERIOR;  Surgeon: Karri Schaeffer II, MD;  Location: [facility];  Service: Orthopedics;  Laterality: Right;      Home Meds:  Medications Prior to Admission   Medication Sig Dispense Refill Last Dose   • ASPIRIN 81 PO Take 1 tablet by mouth Every Night. PT HOLDING FOR SURGERY    1/15/2022 at 2100   • atorvastatin (LIPITOR) 10 MG tablet Take 10 mg by mouth Every Night.   1/15/2022 at 2100   • Diclofenac Sodium (VOLTAREN) 1 % gel gel Apply 4 g topically to the appropriate area as directed 4 (Four) Times a Day As Needed.   Past Week at Unknown time   • Dulaglutide (Trulicity) 3 MG/0.5ML solution pen-injector Inject 0.5 mL under the skin into the appropriate area as directed 1 (One) Time Per Week. SATURDAYS    1/15/2022 at 0800   • esomeprazole  (nexIUM) 40 MG capsule Take 40 mg by mouth Every Morning Before Breakfast.   1/16/2022 at 0700   • ferrous sulfate 325 (65 FE) MG tablet Take 325 mg by mouth Every Other Day. Mornings   1/15/2022 at 0800   • gabapentin (NEURONTIN) 600 MG tablet Take 600 mg by mouth 2 (Two) Times a Day.   1/16/2022 at 0800   • HYDROcodone-acetaminophen (NORCO) 7.5-325 MG per tablet Take 1 tablet by mouth Every 6 (Six) Hours As Needed for Moderate Pain . Patient takes 1/2 tablet QHS PRN. [facility] 10/21   Past Week at Unknown time   • Insulin Aspart (NOVOLOG FLEXPEN SC) Inject 20 Units under the skin into the appropriate area as directed 3 (Three) Times a Day With Meals. 15-20 units TID   1/16/2022 at 0800   • insulin detemir (LEVEMIR) 100 UNIT/ML injection Inject 50 Units under the skin into the appropriate area as directed Every Night.   Past Week at 2100   • isosorbide mononitrate (IMDUR) 30 MG 24 hr tablet Take 30 mg by mouth Every Morning.   1/16/2022 at 0800   • linezolid (ZYVOX) 600 MG tablet Take 1 tablet by mouth Every 12 (Twelve) Hours for 40 days. 80 tablet 0 1/16/2022 at 0800   • loratadine (CLARITIN) 10 MG tablet Take 10 mg by mouth Every Night.   1/15/2022 at 2100   • metoprolol tartrate (LOPRESSOR) 25 MG tablet Take 25 mg by mouth Every Night.   1/15/2022 at 2100   • promethazine (PHENERGAN) 12.5 MG tablet Take 12.5 mg by mouth Every 6 (Six) Hours As Needed for Nausea or Vomiting.   1/15/2022 at 2100   • ondansetron ODT (ZOFRAN-ODT) 4 MG disintegrating tablet Place 1 tablet on the tongue Every 6 (Six) Hours As Needed for Nausea or Vomiting. 12 tablet 0    • oxyCODONE-acetaminophen (PERCOCET) 5-325 MG per tablet Take 1 tablet by mouth Every 6 (Six) Hours As Needed. [facility] 12/12 90 count          Allergies:  Allergies   Allergen Reactions   • Ace Inhibitors Shortness Of Breath and Swelling   • Morphine Shortness Of Breath     Immunizations:  Immunization History   Administered Date(s) Administered   • COVID-19 (PFIZER) 03/08/2021,  "2021, 10/04/2021     Social History:   Social History     Social History Narrative   • Not on file     Social History     Socioeconomic History   • Marital status:    Tobacco Use   • Smoking status: Former Smoker     Years: 45.00     Types: Cigarettes     Quit date: 12/10/2015     Years since quittin.1   • Smokeless tobacco: Never Used   Vaping Use   • Vaping Use: Never used   Substance and Sexual Activity   • Alcohol use: Never   • Drug use: Never   • Sexual activity: Defer       Family History:  Family History   Problem Relation Age of Onset   • Malig Hyperthermia Neg Hx         Review of Systems  See history of present illness and past medical history.  Patient denies headache, dizziness, syncope, falls, trauma, change in vision, change in hearing, change in taste, changes in weight, changes in appetite, focal weakness, numbness, or paresthesia.  Patient denies chest pain, palpitations, dyspnea, orthopnea, PND, cough, sinus pressure, rhinorrhea, epistaxis, hemoptysis, nausea, vomiting,hematemesis, diarrhea, constipation or hematchezia.  Denies cold or heat intolerance, polydipsia, polyuria, polyphagia. Denies hematuria, pyuria, dysuria, hesitancy, frequency or urgency. Denies consumption of raw and under cooked meats foods or change in water source.  Denies fever, chills, sweats, night sweats.  Denies missing any routine medications. Remainder of ROS is negative.    Objective:  T Max 24 hrs: Temp (24hrs), Av.1 °F (36.2 °C), Min:96.9 °F (36.1 °C), Max:97.3 °F (36.3 °C)    Vitals Ranges:   Temp:  [96.9 °F (36.1 °C)-97.3 °F (36.3 °C)] 96.9 °F (36.1 °C)  Heart Rate:  [69-80] 80  Resp:  [16] 16  BP: (117-122)/(56-58) 122/58      Exam:  /58 (BP Location: Left arm, Patient Position: Lying)   Pulse 80   Temp 96.9 °F (36.1 °C) (Skin)   Resp 16   Ht 162.6 cm (64\")   Wt 95.7 kg (211 lb)   SpO2 97%   BMI 36.22 kg/m²     General Appearance:    Alert, cooperative, no distress, appears stated " age   Head:    Normocephalic, without obvious abnormality, atraumatic   Eyes:    PERRL, conjunctivae/corneas clear, EOM's intact, both eyes   Ears:    Normal external ear canals, both ears   Nose:   Nares normal, septum midline, mucosa normal, no drainage    or sinus tenderness   Throat:   Lips, mucosa, and tongue normal   Neck:   Supple, symmetrical, trachea midline, no adenopathy;     thyroid:  no enlargement/tenderness/nodules; no carotid    bruit or JVD   Back:     Symmetric, no curvature, ROM normal, no CVA tenderness   Lungs:     Clear to auscultation bilaterally, respirations unlabored   Chest Wall:    No tenderness or deformity    Heart:    Regular rate and rhythm, S1 and S2 normal, no murmur, rub   or gallop   Abdomen:     Soft, nontender, bowel sounds active all four quadrants,     no masses, no hepatomegaly, no splenomegaly   Extremities:   Extremities normal, atraumatic, right hip with incision, wound vac in place   Pulses:   2+ and symmetric all extremities   Skin:   Skin color, texture, turgor normal, no rashes or lesions   Lymph nodes:   Cervical, supraclavicular, and axillary nodes normal   Neurologic:   CNII-XII intact, normal strength, sensation intact throughout      .    Data Review:  Labs in chart were reviewed.  WBC   Date Value Ref Range Status   01/16/2022 11.68 (H) 3.40 - 10.80 10*3/mm3 Final     Hemoglobin   Date Value Ref Range Status   01/16/2022 8.2 (L) 12.0 - 15.9 g/dL Final     Hematocrit   Date Value Ref Range Status   01/16/2022 26.3 (L) 34.0 - 46.6 % Final     Platelets   Date Value Ref Range Status   01/16/2022 210 140 - 450 10*3/mm3 Final     Sodium   Date Value Ref Range Status   01/16/2022 135 (L) 136 - 145 mmol/L Final     Potassium   Date Value Ref Range Status   01/16/2022 3.7 3.5 - 5.2 mmol/L Final     Chloride   Date Value Ref Range Status   01/16/2022 100 98 - 107 mmol/L Final     CO2   Date Value Ref Range Status   01/16/2022 23.2 22.0 - 29.0 mmol/L Final     BUN   Date  Value Ref Range Status   01/16/2022 6 (L) 8 - 23 mg/dL Final     Creatinine   Date Value Ref Range Status   01/16/2022 0.94 0.57 - 1.00 mg/dL Final     Glucose   Date Value Ref Range Status   01/16/2022 136 (H) 65 - 99 mg/dL Final     Calcium   Date Value Ref Range Status   01/16/2022 8.6 8.6 - 10.5 mg/dL Final     No results found for: AST, ALT, ALKPHOS             Imaging Results (All)     None        Patient Active Problem List   Diagnosis Code   • Status post total replacement of hip Z96.649   • Diabetes mellitus ([facility]) E11.9   • Hypertension I10   • Anemia D64.9   • Postoperative infection T81.40XA   • Iron deficiency anemia D50.9   • Morbid obesity ([facility]) E66.01   • Presence of stent in coronary artery in patient with coronary artery disease I25.10, Z95.5   • Chronic diastolic CHF (congestive heart failure) ([facility]) I50.32   • Septic arthritis of hip ([facility]) M00.9       Assessment:   septic arthritis right hip  Diabetes  Hypertension  Obesity  cad      Plan:  Will continue zyvox  Insulin sliding scale and accu checks  Continue home insulin   Trend labs  Thanks for involving us in her care  Will follow with you    Brianna Cedeño MD  1/16/2022  18:16 EST      Electronically signed by Brianna Cedeño MD at 01/16/22 7390       {Outbreak/Travel/Exposure Documentation......;  Question Available Choices Patient Response   COVID-19 Outbreak Screen:  Do you currently have a new onset of the following symptoms?        Fever/Chills, Cough, Shortness of air, Loss of taste or smell, No, Unknown  No (01/16/22 1155)   COVID-19 Outbreak Screen: In the last 14 days, have you had contact with anyone who is ill, has show any of the symptoms listed above and/or has been diagnosis with the 2019 Novel Coronavirus? This includes any immediate household members but excludes any patients with whom you have been in contact within your normal work duties wearing proper PPE, if you are a healthcare worker.  Yes, No, Unknown               No (01/16/22 0895)   COVID-19 Outbreak Screen: Who was notified? Free text (not recorded)   Ebola Screening Outbreak Screen: Have you traveled to the [place] or [place] within the past 21 days?  Yes, No, Unknown (not recorded)   Ebola Screening Outbreak Screen: Do you have ANY of the following symptoms: Fever/Chills, Vomiting, Diarrhea, Fatigue, Headache, Muscle pain, Unexplained bleeding, Abdominal (stomach) pain, No, Unknown (not recorded)   Ebola Screening Outbreak Screen: Name of Person notified Free text (not recorded)   Travel Screen: Have you traveled in the last month? If so, to what country have you traveled? If [place] what state? Yes, No, Unknown  List of all countries  List of all States No (01/16/22 1600)  (not recorded)  (not recorded)   Infection Risk: Do you currently have the following symptoms?  (If cough is selected, the Tuberculosis Screen is performed.) Cough, Fever, Rash, No No (01/16/22 1600)   Tuberculosis Screen: Do you have any of the following Tuberculosis Risks?  · Have you lived or spent time with anyone who had or may have TB?  · Have you lived in or visited any of the following areas for more than one month: [place], [place], [place], [place] or [place], [place] or [place]?  · Do you have HIV/AIDS?  · Have you lived in or worked in a nursing home, homeless shelter, correctional facility, or substance abuse treatment facility?   · No    If Yes do you have any of the following symptoms? Yes responses display to the right    If Yes, symptoms listed are:  Cough greater than or equal to 3 weeks, Loss of appetite, Unexplained weight loss, Night sweats, Bloody sputum or hemoptysis, Hoarseness, Fever, Fatigue, Chest pain, No (not recorded)  (not recorded)   Exposure Screen: Have you been exposed to any of these contagious diseases in the last month? Measles, Chickenpox, Meningitis, Pertussis, Whooping Cough, No No (01/16/22 1600)       Vital Signs (last  day)     Date/Time Temp Temp src Pulse Resp BP Patient Position SpO2    01/17/22 1100 97.1 (36.2) Oral 71 18 105/55 Lying 93    01/17/22 0700 98.9 (37.2) Oral 68 16 105/62 Lying 97    01/16/22 2233 97.2 (36.2) Oral 70 16 95/38 Lying 96    01/16/22 1923 97.2 (36.2) Oral 82 16 118/59 Lying 95    01/16/22 1532 96.9 (36.1) Skin 80 16 122/58 Lying 97    01/16/22 1247 97.3 (36.3) Oral 69 16 117/56 Lying 99          Oxygen Therapy (last day)     Date/Time SpO2 Device (Oxygen Therapy) Flow (L/min) Oxygen Concentration (%) ETCO2 (mmHg)    01/17/22 1100 93 room air -- -- --    01/17/22 0700 97 room air -- -- --    01/16/22 2233 96 room air -- -- --    01/16/22 1923 95 room air -- -- --    01/16/22 1532 97 room air -- -- --    01/16/22 1247 99 room air -- -- --          Lines, Drains & Airways     Active LDAs     None                  Medication Administration Report for Stephy Duncan as of 01/17/22 1151   Legend:    Given Hold Not Given Due Canceled Entry Other Actions    Time Time (Time) Time  Time-Action       Discontinued     Completed     Future     MAR Hold     Linked           Medications 01/16/22 01/17/22    aspirin chewable tablet 81 mg  Dose: 81 mg  Freq: Nightly Route: PO  Start: 01/16/22 2100   Admin Instructions:   Do not exceed 4 grams of aspirin in a 24 hr period.    If given for pain, use the following pain scale:   Mild Pain = Pain Score of 1-3, CPOT 1-2  Moderate Pain = Pain Score of 4-6, CPOT 3-4  Severe Pain = Pain Score of 7-10, CPOT 5-8       2118-Given 2100               atorvastatin (LIPITOR) tablet 10 mg  Dose: 10 mg  Freq: Nightly Route: PO  Start: 01/16/22 2100   Admin Instructions:   Avoid grapefruit juice.       2118-Given 2100               ferrous sulfate tablet 325 mg  Dose: 325 mg  Freq: Every Other Day Route: PO  Start: 01/17/22 0900   Admin Instructions:   Swallow whole. Do not crush, split, or chew. Take with food if GI upset occurs.        0822-Given                gabapentin (NEURONTIN) capsule 600 mg  Dose: 600 mg  Freq: Every 12 Hours Scheduled Route: PO  Start: 01/16/22 2100   Admin Instructions:          2118-Given               0821-Given     2100              insulin glargine (LANTUS, SEMGLEE) injection 50 Units  Dose: 50 Units  Freq: Nightly Route: SC  Start: 01/16/22 2100   Admin Instructions:          2118-Given               2100               insulin lispro (ADMELOG) injection 0-7 Units  Dose: 0-7 Units  Freq: 3 Times Daily Before Meals Route: SC  Start: 01/16/22 1800   Admin Instructions:   Correction - Low Dose.  Less than 40 units/day total insulin dose or lean, elderly, renal patients    Blood glucose 150-199 mg/dL - 2 units  Blood glucose 200-249 mg/dL - 3 units  Blood glucose 250-299 mg/dL - 4 units  Blood glucose 300-349 mg/dL - 5 units  Blood glucose 350-400 mg/dL - 6 units  Blood glucose greater than 400 mg/dL - 7 units and call provider   Caution: Look alike/sound alike drug alert       (1726)-Not Given               (0630)-Not Given     (1143)-Not Given     1730             insulin lispro (ADMELOG) injection 20 Units  Dose: 20 Units  Freq: 3 Times Daily With Meals Route: SC  Start: 01/16/22 1915   Admin Instructions:    Caution: Look alike/sound alike drug alert       1934-Given               0837-Given [C]     1200     1800             isosorbide mononitrate (IMDUR) 24 hr tablet 30 mg  Dose: 30 mg  Freq: Every Morning Route: PO  Start: 01/17/22 0700   Admin Instructions:   Do not crush, or chew.        (0821)-Not Given [C]               linezolid (ZYVOX) tablet 600 mg  Dose: 600 mg  Freq: Every 12 Hours Scheduled Route: PO  Indications of Use: BONE AND/OR JOINT INFECTION,SKIN AND SOFT TISSUE INFECTION  Start: 01/16/22 2100   End: 01/31/22 2059   Order specific questions:   Reason for Therapy Confirmed VRE infection         2118-Given               0822-Given 2100              metoprolol tartrate (LOPRESSOR) tablet 25 mg  Dose: 25 mg  Freq:  Nightly Route: PO  Start: 01/16/22 2100 2118-Given               2100               pantoprazole (PROTONIX) EC tablet 40 mg  Dose: 40 mg  Freq: Every Morning Route: PO  Start: 01/17/22 0700   Admin Instructions:   Swallow whole; do not crush, split, or chew.        0625-Given                    ,   Medication Administration Report for Stephy Duncan as of 01/17/22 1151   Legend:    Given Hold Not Given Due Canceled Entry Other Actions    Time Time (Time) Time  Time-Action       Discontinued     Completed     Future     MAR Hold     Linked           Medications 01/16/22 01/17/22          and   Medication Administration Report for Stephy Duncan as of 01/17/22 1151   Legend:    Given Hold Not Given Due Canceled Entry Other Actions    Time Time (Time) Time  Time-Action       Discontinued     Completed     Future     MAR Hold     Linked           Medications 01/16/22 01/17/22    dextrose (D50W) (25 g/50 mL) IV injection 25 g  Dose: 25 g  Freq: Every 15 Minutes PRN Route: IV  PRN Reason: Low Blood Sugar  PRN Comment: Blood Sugar Less Than 70  Start: 01/16/22 1703   Admin Instructions:   Blood sugar less than 70; patient has IV access - Unresponsive, NPO or Unable To Safely Swallow         dextrose (GLUTOSE) oral gel 15 g  Dose: 15 g  Freq: Every 15 Minutes PRN Route: PO  PRN Reason: Low Blood Sugar  PRN Comment: Blood sugar less than 70  Start: 01/16/22 1703   Admin Instructions:   BS<70, Patient Alert, Is not NPO, Can safely swallow.         glucagon (human recombinant) (GLUCAGEN DIAGNOSTIC) injection 1 mg  Dose: 1 mg  Freq: Every 15 Minutes PRN Route: SC  PRN Reason: Low Blood Sugar  PRN Comment: Blood Glucose Less Than 70  Start: 01/16/22 1703   Admin Instructions:   Blood Glucose Less Than 70 - Patient Without IV Access - Unresponsive, NPO or Unable To Safely Swallow         HYDROcodone-acetaminophen (NORCO) 5-325 MG per tablet 1 tablet  Dose: 1 tablet  Freq: Every 4 Hours PRN Route: PO  PRN  Reason: Moderate Pain   Start: 01/16/22 1303   End: 01/23/22 1302   Admin Instructions:   [RICCARDO]    Do not exceed 4 grams of acetaminophen in a 24 hr period. Max dose of 2gm for AST/ALT greater than 120 units/L        If given for pain, use the following pain scale:   Mild Pain = Pain Score of 1-3, CPOT 1-2  Moderate Pain = Pain Score of 4-6, CPOT 3-4  Severe Pain = Pain Score of 7-10, CPOT 5-8       2123-Given               Or  HYDROcodone-acetaminophen (NORCO) 5-325 MG per tablet 2 tablet  Dose: 2 tablet  Freq: Every 4 Hours PRN Route: PO  PRN Reason: Severe Pain   Start: 01/16/22 1303   End: 01/23/22 1302   Admin Instructions:   [RICCARDO]    Do not exceed 4 grams of acetaminophen in a 24 hr period. Max dose of 2gm for AST/ALT greater than 120 units/L        If given for pain, use the following pain scale:   Mild Pain = Pain Score of 1-3, CPOT 1-2  Moderate Pain = Pain Score of 4-6, CPOT 3-4  Severe Pain = Pain Score of 7-10, CPOT 5-8       2123-Not Given:  See Alt                promethazine (PHENERGAN) tablet 12.5 mg  Dose: 12.5 mg  Freq: Every 6 Hours PRN Route: PO  PRN Reasons: Nausea,Vomiting  Start: 01/16/22 1303   Admin Instructions:          1410-Given     2118-Given              1009-Given                        Orders (active)      Start     Ordered    01/18/22 0001  NPO Diet  Diet Effective Midnight         01/17/22 0836    01/17/22 1139  Wound Vac  Weekly         01/17/22 1139    01/17/22 0900  ferrous sulfate tablet 325 mg  Every Other Day         01/16/22 1815 01/17/22 0700  pantoprazole (PROTONIX) EC tablet 40 mg  Every Morning         01/16/22 1815 01/17/22 0700  isosorbide mononitrate (IMDUR) 24 hr tablet 30 mg  Every Morning         01/16/22 1815 01/16/22 2200  POC Glucose 4x Daily AC & at Bedtime  4 Times Daily Before Meals & at Bedtime      Comments: If bedtime blood glucose is greater than 350 mg/dl, call MD.      01/16/22 1703    01/16/22 2100  aspirin chewable tablet 81 mg  Nightly          01/16/22 1815 01/16/22 2100  atorvastatin (LIPITOR) tablet 10 mg  Nightly         01/16/22 1815 01/16/22 2100  gabapentin (NEURONTIN) capsule 600 mg  Every 12 Hours Scheduled         01/16/22 1815 01/16/22 2100  insulin glargine (LANTUS, SEMGLEE) injection 50 Units  Nightly         01/16/22 1815 01/16/22 2100  linezolid (ZYVOX) tablet 600 mg  Every 12 Hours Scheduled         01/16/22 1815 01/16/22 2100  metoprolol tartrate (LOPRESSOR) tablet 25 mg  Nightly         01/16/22 1815 01/16/22 1915  insulin lispro (ADMELOG) injection 20 Units  3 Times Daily With Meals         01/16/22 1815 01/16/22 1800  insulin lispro (ADMELOG) injection 0-7 Units  3 Times Daily Before Meals         01/16/22 1703    01/16/22 1704  Do NOT Hold Basal or Correction Scale Insulin When Patient is NPO, Hold Scheduled Mealtime (Bolus) Insulin if NPO  Continuous         01/16/22 1703 01/16/22 1704  Follow [facility] Hypoglycemia Standing Orders For Blood Glucose Less Than 70 mg/dL  Until Discontinued        Comments: ALERT PATIENT - NOT NPO & CAN SAFELY SWALLOW  Administer 4 oz Fruit Juice OR 4 oz Regular Soda OR 8 oz Milk OR 15-30 grams (1 tube) of Glucose Gel.  Recheck Blood Glucose Approximately 15 Minutes After Ingestion, Repeat Treatment & Continue to Recheck Blood Sugar Approximately Every 15 Minutes Until Blood Glucose is 70 or Higher.  Once Blood Glucose is 70 or Higher & if It Will Be More Than 60 Minutes Until Next Meal, Provide Appropriate Snack (Including Carbohydrate Food) Based on Meal Plan Order. Give Meal Tray As Soon As Possible.    PATIENT HAS IV ACCESS - UNRESPONSIVE, NPO OR UNABLE TO SAFELY SWALLOW  Administer 25g (50ml) D50W IV Push.  Recheck Blood Glucose Approximately 15 Minutes After Administration, if Blood Glucose Remains Less Than 70, Repeat Treatment   Recheck Blood Glucose Approximately 15 Minutes After 2nd Administration, if Blood Glucose Remains Less Than 70 After 2nd Dose of D50W, Contact  "Provider for Further Treatment Orders & Consider Adding IVF With D5W for Maintenance    PATIENT WITHOUT IV ACCESS - UNRESPONSIVE, NPO OR UNABLE TO SAFELY SWALLOW  Administer 1mg Glucagon SQ & Establish IV Access.  Turn Patient on Side - Nausea / Vomiting May Occur.  Recheck Blood Glucose Approximately 15 Minutes After Administration.  If Blood Glucose Remains Less Than 70, Administer 25g D50W IV Push (50ml).  Recheck Blood Glucose Approximately 15 Minutes After Administration of D50W, if Blood Glucose Remains Less Than 70, Contact Provider for Further Treatment Orders & Consider Adding IVF With D5 for Maintenance    Document Event & Patient Response to Interventions in EMR, Document Medications on MAR  Notify Provider if Hypoglycemia Treatment Needed    01/16/22 1703    01/16/22 1703  dextrose (GLUTOSE) oral gel 15 g  Every 15 Minutes PRN         01/16/22 1703    01/16/22 1703  dextrose (D50W) (25 g/50 mL) IV injection 25 g  Every 15 Minutes PRN         01/16/22 1703    01/16/22 1703  glucagon (human recombinant) (GLUCAGEN DIAGNOSTIC) injection 1 mg  Every 15 Minutes PRN         01/16/22 1703    01/16/22 1303  promethazine (PHENERGAN) tablet 12.5 mg  Every 6 Hours PRN         01/16/22 1303    01/16/22 1303  HYDROcodone-acetaminophen (NORCO) 5-325 MG per tablet 1 tablet  Every 4 Hours PRN        \"Or\" Linked Group Details    01/16/22 1303    01/16/22 1303  HYDROcodone-acetaminophen (NORCO) 5-325 MG per tablet 2 tablet  Every 4 Hours PRN        \"Or\" Linked Group Details    01/16/22 1303    01/16/22 1303  Inpatient Internal Medicine Consult  Once        Specialty:  Internal Medicine  Provider:  Brianna Cedeño MD    01/16/22 1303    01/16/22 1302  Diet Regular; Consistent Carbohydrate  Diet Effective Now         01/16/22 1303                  Eliane Pemberton, RN   Registered Nurse   Wound Care   Nursing Note   Addendum   Date of Service:  01/17/22 1110   Creation Time:  01/17/22 1131                       01/17/22 " 1110   Wound 11/16/21 Right posterior hip Incision   Placement Date: 11/16/21   Side: Right  Orientation: posterior  Location: hip  Primary Wound Type: (c) Incision   Base    (vac dressing intact, good seal)   Drainage Characteristics/Odor serosanguineous   Drainage Amount large  (vac canister)   NPWT (Negative Pressure Wound Therapy) 01/14/22 1000 posterior right hip   Placement Date/Time: 01/14/22 1000   Location: posterior right hip   Therapy Setting continuous therapy  (hospital vac connected, home vac removed)   Pressure Setting 125 mmHg      Wound/ostomy - patient's initial surgery was on 11/16/21 for R posterior total hip revision. Patient has had excessive amount of drainage from surgical site and it has been managed with NPWT. Patient states that she fills up 1 home canister daily which holds 250-300 ml. Patient presented to [facility] with her home KCI vac in use, home dressings managed by [employer], current dressing was placed on 1/14. Current dressing is intact, good seal, no drainage under the dressing. Patient is to go to OR tomorrow so current dressing left in place and patient was connected to a hospital KCI vac. Home machine was left in room, spouse will remain at the hospital with patient and he will be responsible for the vac.             Eleni Jacobo RN   Registered Nurse      Plan of Care   Signed   Date of Service:  01/16/22 1851   Creation Time:  01/16/22 1851              Signed            Goal Outcome Evaluation:  Plan of Care Reviewed With: patient, spouse  Progress: no change  Outcome Summary: Pt is 67 y/o female direct admitted from Dr. Laury BAXTER with right hip infection. Surgery planned possibly Tuesday. Wound vac in place from home health. 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 Educated pt on pain management. [facility].

## 2022-01-17 NOTE — NURSING NOTE
01/17/22 1110   Wound 11/16/21 Right posterior hip Incision   Placement Date: 11/16/21   Side: Right  Orientation: posterior  Location: hip  Primary Wound Type: (c) Incision   Base   (vac dressing intact, good seal)   Drainage Characteristics/Odor serosanguineous   Drainage Amount large  (vac canister)   NPWT (Negative Pressure Wound Therapy) 01/14/22 1000 posterior right hip   Placement Date/Time: 01/14/22 1000   Location: posterior right hip   Therapy Setting continuous therapy  (hospital vac connected, home vac removed)   Pressure Setting 125 mmHg     Wound/ostomy - patient's initial surgery was on 11/16/21 for R posterior total hip revision. Patient has had excessive amount of drainage from surgical site and it has been managed with NPWT. Patient states that she fills up 1 home canister daily which holds 250-300 ml. Patient presented to [facility] with her home KCI vac in use, home dressings managed by [employer], current dressing was placed on 1/14. Current dressing is intact, good seal, no drainage under the dressing. Patient is to go to OR tomorrow so current dressing left in place and patient was connected to a hospital KCI vac. Home machine was left in room, spouse will remain at the hospital with patient and he will be responsible for the vac.

## 2022-01-17 NOTE — H&P
Orthopaedic Surgery  History & Physical  Dr. JENNIE Schaeffer II  [phone]    HPI:  Patient is a 66 y.o. Not  or  female who presents with Continued drainage from her right hip.  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  She then came to me a couple months ago for hip replantation.  She underwent uneventful total hip arthroplasty postoperatively has had problems with continued drainage.  She also apparently had 1 dislocation event.  She was sent to the emergency room to be admitted.  She has been afebrile.  Yesterday's labs demonstrated her white count was mildly elevated at 11.  Her CRP and sedimentation rate have both remarkably decreased since her last value and the sedimentation rate has normalized.  She continues to have painless drainage from the right hip that is serosanguineous.    MEDICAL HISTORY  Past Medical History:   Diagnosis Date   • Arthritis    • Cardiac murmur    • Diabetes mellitus (HCC)    • GERD (gastroesophageal reflux disease)    • Heart attack (HCC) 12/2020   • History of cervical cancer 1981   • Hyperlipidemia    • Hypertension    • Osteomyelitis hip (HCC)     RIGHT   • PONV (postoperative nausea and vomiting)    • Right hip pain    ·   Past Surgical History:   Procedure Laterality Date   • CARDIAC CATHETERIZATION     • CERVICAL CONIZATION     • COLONOSCOPY     • CORONARY ANGIOPLASTY WITH STENT PLACEMENT     • ENDOSCOPY     • HIP ARTHROPLASTY Right    • HIP HARDWARE REMOVAL     • HIP SPACER INSERTION WITH ANTIBIOTIC CEMENT      X3   • HYSTERECTOMY     • INCISION AND DRAINAGE HIP Right 12/18/2021    Procedure: INCISION AND DRAINAGE TOTAL HIP, LINER EXCHANGE AND WOUND VAC PLACEMENT;  Surgeon: Karri Schaeffer II, MD;  Location: [facility];  Service: Orthopedics;  Laterality: Right;   • KNEE ARTHROPLASTY Left    • LAPAROSCOPIC CHOLECYSTECTOMY     • TONSILLECTOMY     • TOTAL HIP  ARTHROPLASTY REVISION Right 11/16/2021    Procedure: TOTAL HIP REVISION ARTHROPLASTY RIGHT POSTERIOR;  Surgeon: Karri Schaeffer II, MD;  Location: [facility] MAIN OR;  Service: Orthopedics;  Laterality: Right;   ·   Prior to Admission medications    Medication Sig Start Date End Date Taking? Authorizing Provider   ASPIRIN 81 PO Take 1 tablet by mouth Every Night. PT HOLDING FOR SURGERY    Yes Nikunj Isaac MD   atorvastatin (LIPITOR) 10 MG tablet Take 10 mg by mouth Every Night.   Yes Nikunj Isaac MD   Diclofenac Sodium (VOLTAREN) 1 % gel gel Apply 4 g topically to the appropriate area as directed 4 (Four) Times a Day As Needed.   Yes Nikunj Isaac MD   Dulaglutide (Trulicity) 3 MG/0.5ML solution pen-injector Inject 0.5 mL under the skin into the appropriate area as directed 1 (One) Time Per Week. SATURDAYS    Yes Nikunj Isaac MD   esomeprazole (nexIUM) 40 MG capsule Take 40 mg by mouth Every Morning Before Breakfast.   Yes Nikunj Isaac MD   ferrous sulfate 325 (65 FE) MG tablet Take 325 mg by mouth Every Other Day. Mornings   Yes Nikunj Isaac MD   gabapentin (NEURONTIN) 600 MG tablet Take 600 mg by mouth 2 (Two) Times a Day.   Yes Nikunj Isaac MD   HYDROcodone-acetaminophen (NORCO) 7.5-325 MG per tablet Take 1 tablet by mouth Every 6 (Six) Hours As Needed for Moderate Pain . Patient takes 1/2 tablet QHS PRN. [facility] 10/21   Yes Nikunj Isaac MD   Insulin Aspart (NOVOLOG FLEXPEN SC) Inject 20 Units under the skin into the appropriate area as directed 3 (Three) Times a Day With Meals. 15-20 units TID   Yes Nikunj Isaac MD   insulin detemir (LEVEMIR) 100 UNIT/ML injection Inject 50 Units under the skin into the appropriate area as directed Every Night.   Yes Nikunj Isaac MD   isosorbide mononitrate (IMDUR) 30 MG 24 hr tablet Take 30 mg by mouth Every Morning.   Yes Nikunj Isaac MD   linezolid (ZYVOX) 600 MG tablet Take 1  "tablet by mouth Every 12 (Twelve) Hours for 40 days. 21 Yes Lai Rhoades,    loratadine (CLARITIN) 10 MG tablet Take 10 mg by mouth Every Night.   Yes ProviderNikunj MD   metoprolol tartrate (LOPRESSOR) 25 MG tablet Take 25 mg by mouth Every Night.   Yes Nikunj Isaac MD   promethazine (PHENERGAN) 12.5 MG tablet Take 12.5 mg by mouth Every 6 (Six) Hours As Needed for Nausea or Vomiting.   Yes Nikunj Isaac MD   ondansetron ODT (ZOFRAN-ODT) 4 MG disintegrating tablet Place 1 tablet on the tongue Every 6 (Six) Hours As Needed for Nausea or Vomiting. 22   Ashu Knapp MD   oxyCODONE-acetaminophen (PERCOCET) 5-325 MG per tablet Take 1 tablet by mouth Every 6 (Six) Hours As Needed. [facility]  90 count    Nikunj Isaac MD   ·   Allergies   Allergen Reactions   • Ace Inhibitors Shortness Of Breath and Swelling   • Morphine Shortness Of Breath   ·   Most Recent Immunizations   Administered Date(s) Administered   • COVID-19 (PFIZER) 10/04/2021   ·   Social History     Tobacco Use   • Smoking status: Former Smoker     Years: 45.00     Types: Cigarettes     Quit date: 12/10/2015     Years since quittin.1   • Smokeless tobacco: Never Used   Substance Use Topics   • Alcohol use: Never   ·    Social History     Substance and Sexual Activity   Drug Use Never   ·     REVIEW OF SYSTEMS:  · Head: negative for headache  · Respiratory: negative for shortness of breath.   · Cardiovascular: negative for chest pain.   · Gastrointestinal: negative abdominal pain.   · Neurological: negative for LOC  · Psychiatric/Behavioral: negative for memory loss.   · All other systems reviewed and are negative    VITALS: /62 (BP Location: Left arm, Patient Position: Lying)   Pulse 68   Temp 98.9 °F (37.2 °C) (Oral)   Resp 16   Ht 162.6 cm (64\")   Wt 95.7 kg (211 lb)   SpO2 97%   BMI 36.22 kg/m²  Body mass index is 36.22 kg/m².    PHYSICAL EXAM:   · CONSTITUTIONAL: A&Ox3, No " acute distress  · LUNGS: Equal chest rise, no shortness of air  · CARDIOVASCULAR: palpable peripheral pulses  · SKIN: no skin lesions in the area examined  · LYMPH: no lymphadenopathy in the area examined  · EXTREMITY: Right Lower Extremity  · Tenderness to Palpation: No tenderness to palpation  · Gross Deformity: Significant serosanguineous drainage from the right hip without any significant erythema or sign of active infection  · Pulses:  Brisk Capillary Refill  · Sensation: Intact to Saphenous, Sural, Deep Peroneal, Superficial Peroneal, and Tibial Nerves and grossly throughout extremity  · Motor: 5/5 EHL/FHL/TA/GS motor complexes    RADIOLOGY REVIEW:   No radiology results for the last 7 days    LABS:   Results for the past 24 hours:   Recent Results (from the past 24 hour(s))   Basic Metabolic Panel    Collection Time: 01/16/22  1:08 PM    Specimen: Blood   Result Value Ref Range    Glucose 136 (H) 65 - 99 mg/dL    BUN 6 (L) 8 - 23 mg/dL    Creatinine 0.94 0.57 - 1.00 mg/dL    Sodium 135 (L) 136 - 145 mmol/L    Potassium 3.7 3.5 - 5.2 mmol/L    Chloride 100 98 - 107 mmol/L    CO2 23.2 22.0 - 29.0 mmol/L    Calcium 8.6 8.6 - 10.5 mg/dL    eGFR Non African Amer 59 (L) >60 mL/min/1.73    BUN/Creatinine Ratio 6.4 (L) 7.0 - 25.0    Anion Gap 11.8 5.0 - 15.0 mmol/L   C-reactive Protein    Collection Time: 01/16/22  1:08 PM    Specimen: Blood   Result Value Ref Range    C-Reactive Protein 2.32 (H) 0.00 - 0.50 mg/dL   Type & Screen    Collection Time: 01/16/22  1:08 PM    Specimen: Blood   Result Value Ref Range    ABO Type O     RH type Positive     Antibody Screen Negative     T&S Expiration Date 1/19/2022 11:59:59 PM    Sedimentation Rate    Collection Time: 01/16/22  1:09 PM    Specimen: Blood   Result Value Ref Range    Sed Rate 18 0 - 30 mm/hr   CBC Auto Differential    Collection Time: 01/16/22  1:09 PM    Specimen: Blood   Result Value Ref Range    WBC 11.68 (H) 3.40 - 10.80 10*3/mm3    RBC 3.09 (L) 3.77 - 5.28  10*6/mm3    Hemoglobin 8.2 (L) 12.0 - 15.9 g/dL    Hematocrit 26.3 (L) 34.0 - 46.6 %    MCV 85.1 79.0 - 97.0 fL    MCH 26.5 (L) 26.6 - 33.0 pg    MCHC 31.2 (L) 31.5 - 35.7 g/dL    RDW 15.3 12.3 - 15.4 %    RDW-SD 45.8 37.0 - 54.0 fl    MPV 9.5 6.0 - 12.0 fL    Platelets 210 140 - 450 10*3/mm3    Neutrophil % 65.1 42.7 - 76.0 %    Lymphocyte % 15.7 (L) 19.6 - 45.3 %    Monocyte % 14.3 (H) 5.0 - 12.0 %    Eosinophil % 1.7 0.3 - 6.2 %    Basophil % 0.5 0.0 - 1.5 %    Immature Grans % 2.7 (H) 0.0 - 0.5 %    Neutrophils, Absolute 7.60 (H) 1.70 - 7.00 10*3/mm3    Lymphocytes, Absolute 1.83 0.70 - 3.10 10*3/mm3    Monocytes, Absolute 1.67 (H) 0.10 - 0.90 10*3/mm3    Eosinophils, Absolute 0.20 0.00 - 0.40 10*3/mm3    Basophils, Absolute 0.06 0.00 - 0.20 10*3/mm3    Immature Grans, Absolute 0.32 (H) 0.00 - 0.05 10*3/mm3    nRBC 0.3 (H) 0.0 - 0.2 /100 WBC   Hemoglobin A1c    Collection Time: 01/16/22  1:09 PM    Specimen: Blood   Result Value Ref Range    Hemoglobin A1C 7.56 (H) 4.80 - 5.60 %   COVID-19,APTIMA PANTHER(LORA),[facility], NP/OP SWAB IN UTM/VTM/SALINE TRANSPORT MEDIA,24 HR TAT - Swab, Nasopharynx    Collection Time: 01/16/22  6:46 PM    Specimen: Nasopharynx; Swab   Result Value Ref Range    COVID19 Not Detected Not Detected - Ref. Range   POC Glucose Once    Collection Time: 01/16/22  9:35 PM    Specimen: Blood   Result Value Ref Range    Glucose 117 70 - 130 mg/dL   POC Glucose Once    Collection Time: 01/17/22  6:24 AM    Specimen: Blood   Result Value Ref Range    Glucose 102 70 - 130 mg/dL   Sedimentation Rate    Collection Time: 01/17/22  6:34 AM    Specimen: Blood   Result Value Ref Range    Sed Rate 28 0 - 30 mm/hr   Basic Metabolic Panel    Collection Time: 01/17/22  6:34 AM    Specimen: Blood   Result Value Ref Range    Glucose 100 (H) 65 - 99 mg/dL    BUN 6 (L) 8 - 23 mg/dL    Creatinine 0.91 0.57 - 1.00 mg/dL    Sodium 138 136 - 145 mmol/L    Potassium 3.7 3.5 - 5.2 mmol/L    Chloride 103 98 - 107  "mmol/L    CO2 23.1 22.0 - 29.0 mmol/L    Calcium 8.3 (L) 8.6 - 10.5 mg/dL    eGFR Non African Amer 61 >60 mL/min/1.73    BUN/Creatinine Ratio 6.6 (L) 7.0 - 25.0    Anion Gap 11.9 5.0 - 15.0 mmol/L   CBC (No Diff)    Collection Time: 01/17/22  6:34 AM    Specimen: Blood   Result Value Ref Range    WBC 6.38 3.40 - 10.80 10*3/mm3    RBC 2.68 (L) 3.77 - 5.28 10*6/mm3    Hemoglobin 7.1 (L) 12.0 - 15.9 g/dL    Hematocrit 22.9 (L) 34.0 - 46.6 %    MCV 85.4 79.0 - 97.0 fL    MCH 26.5 (L) 26.6 - 33.0 pg    MCHC 31.0 (L) 31.5 - 35.7 g/dL    RDW 15.6 (H) 12.3 - 15.4 %    RDW-SD 46.3 37.0 - 54.0 fl    MPV 9.3 6.0 - 12.0 fL    Platelets 142 140 - 450 10*3/mm3       IMPRESSION:  Patient is a 66 y.o. Not  or  female with   Right hip drainage status post hip replantation from prior Girdlestone procedure    PLAN:   · Admited to: Karri Schaeffer II, MD   · Disposition:  surgery tomorrow for right hip irrigation debridement, head and liner exchange, and possible revision.  We need to get control of her drainage as if she has not currently infected, she is likely to become reinfected if we cannot get her hip to stop draining.    R \"Kilo\" Laury BAXTER MD  Orthopaedic Surgery  [facility]  [phone] - [place] Office  [phone] - [place] Office    "

## 2022-01-17 NOTE — PAYOR COMM NOTE
"ADDITIONAL CLINICAL FOR REVIEW  REF #[id number]  ID #[id number]  F:  [phone]        Stephy Duncan (66 y.o. 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)   Code Status: Prior   Advance Care Planning Activity    Ht: 162.6 cm (64\")   Wt: 95.7 kg (211 lb)    Admission Cmt: None   Principal Problem: Septic arthritis of hip (HCC) [M00.9]                 Active Insurance as of 2022     Primary Coverage     Payor Plan Insurance Group Employer/Plan Group    AETNA MEDICARE REPLACEMENT AETNA MEDICARE REPLACEMENT [id number]     Payor Plan Address Payor Plan Phone Number Payor Plan Fax Number Effective Dates    [address] [phone]  2022 - None Entered    [facility] [id number]       Subscriber Name Subscriber Birth Date Member ID       Stephy Duncan 1955 [id number]                 Emergency Contacts      (Rel.) Home Phone Work Phone Mobile Phone    ESTUARDO DUNCAN (Spouse) -- -- [phone]    Chris Bhumika (Sister) -- -- [phone]               Physician Progress Notes (all)      Aj Lozano MD at 22 1215              Name: Stephy Duncan ADMIT: 2022   : 1955  PCP: Gregorio Rosales    MRN: [id number] LOS: 1 days   AGE/SEX: 66 y.o. female  ROOM: [facility]     Subjective   Subjective   Referring Provider: Dr. Schaeffer  Reason " for follow up: Hypertension, Type 2 DM, chronic diastolic CHF  No acute events. Patient has no new complaints. She does have some ongoing nausea since starting antibiotic therapy but this has not really changed. No vomiting. Denies CP/dyspnea/f/c/d.    Objective   Objective   Vital Signs  Temp:  [96.9 °F (36.1 °C)-98.9 °F (37.2 °C)] 97.1 °F (36.2 °C)  Heart Rate:  [68-82] 71  Resp:  [16-18] 18  BP: ()/(38-62) 105/55  SpO2:  [93 %-99 %] 93 %  on   ;   Device (Oxygen Therapy): room air  Body mass index is 36.22 kg/m².  Physical Exam  Vitals and nursing note reviewed.   Constitutional:       General: She is not in acute distress.     Appearance: She is not toxic-appearing or diaphoretic.   HENT:      Head: Normocephalic and atraumatic.      Nose: Nose normal.      Mouth/Throat:      Mouth: Mucous membranes are moist.      Pharynx: Oropharynx is clear.   Eyes:      Extraocular Movements: Extraocular movements intact.      Conjunctiva/sclera: Conjunctivae normal.      Pupils: Pupils are equal, round, and reactive to light.   Cardiovascular:      Rate and Rhythm: Normal rate and regular rhythm.      Pulses: Normal pulses.   Pulmonary:      Effort: Pulmonary effort is normal.      Breath sounds: Normal breath sounds. No rales.   Abdominal:      General: Bowel sounds are normal.      Palpations: Abdomen is soft.      Tenderness: There is no abdominal tenderness.   Musculoskeletal:         General: No swelling or tenderness.      Cervical back: Normal range of motion and neck supple.      Comments: Right hip with wound vac in place   Skin:     General: Skin is warm and dry.      Capillary Refill: Capillary refill takes less than 2 seconds.   Neurological:      General: No focal deficit present.      Mental Status: She is alert and oriented to person, place, and time.   Psychiatric:         Mood and Affect: Mood normal.         Behavior: Behavior normal.       Results Review     I reviewed the patient's new clinical  results.  I reviewed the patient's hip xray  Results from last 7 days   Lab Units 01/17/22  0634 01/16/22  1309   WBC 10*3/mm3 6.38 11.68*   HEMOGLOBIN g/dL 7.1* 8.2*   PLATELETS 10*3/mm3 142 210     Results from last 7 days   Lab Units 01/17/22  0634 01/16/22  1308   SODIUM mmol/L 138 135*   POTASSIUM mmol/L 3.7 3.7   CHLORIDE mmol/L 103 100   CO2 mmol/L 23.1 23.2   BUN mg/dL 6* 6*   CREATININE mg/dL 0.91 0.94   GLUCOSE mg/dL 100* 136*   EGFR IF NONAFRICN AM mL/min/1.73 61 59*       Results from last 7 days   Lab Units 01/17/22  0634 01/16/22  1308   CALCIUM mg/dL 8.3* 8.6       COVID19   Date Value Ref Range Status   01/16/2022 Not Detected Not Detected - Ref. Range Final   01/07/2022 Not Detected Not Detected - Ref. Range Final     Hemoglobin A1C   Date/Time Value Ref Range Status   01/16/2022 1309 7.56 (H) 4.80 - 5.60 % Final     Glucose   Date/Time Value Ref Range Status   01/17/2022 1057 146 (H) 70 - 130 mg/dL Final     Comment:     Meter: [id number] : [id number] Cristi Da Silva CNA   01/17/2022 0624 102 70 - 130 mg/dL Final     Comment:     Meter: [id number] : [id number] Susannah Orozco RN   01/16/2022 2135 117 70 - 130 mg/dL Final     Comment:     Meter: [id number] : [id number] Anderson [place]       XR Hip With or Without Pelvis 1 View Right  Narrative: PROCEDURE: XR HIP W OR WO PELVIS 1 VIEW RIGHT     COMPARISON: [facility], CR, XR HIP W OR WO PELVIS 2-3 VIEW RIGHT, 1/07/2022, 17:32.     INDICATIONS: POST REDUCTION     FINDINGS:   Apparent interval reduction of the dislocated right hip prosthesis.  No visible fracture.     Impression:  Reduction of the right hip prosthesis               CURTIS FERGUSON MD         Electronically Signed and Approved By: CURTIS FERGUSON MD on 1/07/2022 at 18:52                   XR Hip With or Without Pelvis 2 - 3 View Right  Narrative: PROCEDURE: XR HIP W OR WO PELVIS 2-3 VIEW RIGHT     COMPARISON: [facility], SUNDAR, RIGHT HIP/PELVIS, 4/16/2019,  4:54.     INDICATIONS: right hip pain after injury today     FINDINGS:   Dislocation of the right hip prosthesis.  No visible fracture.  There are skin staples overlying   the right hip.     Impression:  Right hip prosthesis.  The prosthesis is dislocated.     No visible fracture.               CURTIS FERGUSON MD         Electronically Signed and Approved By: CURTIS FERGUSON MD on 1/07/2022 at 17:54                     Scheduled Medications  aspirin, 81 mg, Oral, Nightly  atorvastatin, 10 mg, Oral, Nightly  ferrous sulfate, 325 mg, Oral, Every Other Day  gabapentin, 600 mg, Oral, Q12H  insulin glargine, 40 Units, Subcutaneous, Nightly  insulin lispro, 0-7 Units, Subcutaneous, TID AC  isosorbide mononitrate, 30 mg, Oral, QAM  linezolid, 600 mg, Oral, Q12H  metoprolol tartrate, 25 mg, Oral, Nightly  pantoprazole, 40 mg, Oral, QAM    Infusions   Diet  Diet Regular; Consistent Carbohydrate  NPO Diet      Assessment/Plan     Active Hospital Problems    Diagnosis  POA   • **Septic arthritis of hip ([facility]) [M00.9]  Yes   • Drainage from wound [L24.A9]  Yes   • Morbid obesity ([facility]) [E66.01]  Yes   • Chronic diastolic CHF (congestive heart failure) ([facility]) [I50.32]  Yes   • Presence of stent in coronary artery in patient with coronary artery disease [I25.10, Z95.5]  Not Applicable   • Type 2 diabetes mellitus with diabetic polyneuropathy, with long-term current use of insulin ([facility]) [E11.42, Z79.4]  Not Applicable   • Hypertension [I10]  Yes      Resolved Hospital Problems   No resolved problems to display.   Right Hip Drainage  - long history of septic arthritis and has had multiple surgeries including Girdlestone and more recently right total hip revision 11/16/21 and right hip I&D with head and liner exchange 12/18/21-has had cultures positive for VRE previously  - admitted now with continued right hip drainage, started on zyvox  - right hip I&D planned for tomorrow per primary team, ID also consulted    Type 2 DM  - she is on large  doses of insulin at home  - will continue lantus but will reduce to 40 units nightly   - given decreased appetite will hold scheduled prandial lispro for now   - cover with ssi/hypoglycemia protocol low dose    HTN/Chronic diastolic CHF/CAD s/p remote stent  - no anginal symptoms, BP is acceptable and she appears euvolemic  - continue on ASA, statin, and metoprolol as well as imdur  - monitor BP and volume status    SCDs for DVT prophylaxis.  Full code.  Discussed with patient, family and nursing staff.  Disposition per primary team.      Aj Lozano MD  [facility]  01/17/22  12:29 EST      Electronically signed by Aj Lozano MD at 01/17/22 1855

## 2022-01-17 NOTE — PLAN OF CARE
Goal Outcome Evaluation:  Plan of Care Reviewed With: patient        Progress: no change  Outcome Summary: Pt remains stable. Up ad neville, no fall rish. Wound vac at bedside to R hip, wound consulted for management. Minimal c/o pain. Nausea intermittently which is improved by PO phenergan. Voiding without difficulty. On PO abx at this time. Awaiting further orders from surgeon when he rounds this AM. Educated on diabetes management, home regimen continued and LHA following. WCTM.

## 2022-01-17 NOTE — PROGRESS NOTES
Name: Stephy Duncan ADMIT: 2022   : 1955  PCP: Gregorio Rosales    MRN: [id number] LOS: 1 days   AGE/SEX: 66 y.o. female  ROOM: [facility]     Subjective   Subjective   Referring Provider: Dr. Schaeffer  Reason for follow up: Hypertension, Type 2 DM, chronic diastolic CHF  No acute events. Patient has no new complaints. She does have some ongoing nausea since starting antibiotic therapy but this has not really changed. No vomiting. Denies CP/dyspnea/f/c/d.    Objective   Objective   Vital Signs  Temp:  [96.9 °F (36.1 °C)-98.9 °F (37.2 °C)] 97.1 °F (36.2 °C)  Heart Rate:  [68-82] 71  Resp:  [16-18] 18  BP: ()/(38-62) 105/55  SpO2:  [93 %-99 %] 93 %  on   ;   Device (Oxygen Therapy): room air  Body mass index is 36.22 kg/m².  Physical Exam  Vitals and nursing note reviewed.   Constitutional:       General: She is not in acute distress.     Appearance: She is not toxic-appearing or diaphoretic.   HENT:      Head: Normocephalic and atraumatic.      Nose: Nose normal.      Mouth/Throat:      Mouth: Mucous membranes are moist.      Pharynx: Oropharynx is clear.   Eyes:      Extraocular Movements: Extraocular movements intact.      Conjunctiva/sclera: Conjunctivae normal.      Pupils: Pupils are equal, round, and reactive to light.   Cardiovascular:      Rate and Rhythm: Normal rate and regular rhythm.      Pulses: Normal pulses.   Pulmonary:      Effort: Pulmonary effort is normal.      Breath sounds: Normal breath sounds. No rales.   Abdominal:      General: Bowel sounds are normal.      Palpations: Abdomen is soft.      Tenderness: There is no abdominal tenderness.   Musculoskeletal:         General: No swelling or tenderness.      Cervical back: Normal range of motion and neck supple.      Comments: Right hip with wound vac in place   Skin:     General: Skin is warm and dry.      Capillary Refill: Capillary refill takes less than 2 seconds.   Neurological:      General: No focal deficit present.       Mental Status: She is alert and oriented to person, place, and time.   Psychiatric:         Mood and Affect: Mood normal.         Behavior: Behavior normal.       Results Review     I reviewed the patient's new clinical results.  I reviewed the patient's hip xray  Results from last 7 days   Lab Units 01/17/22  0634 01/16/22  1309   WBC 10*3/mm3 6.38 11.68*   HEMOGLOBIN g/dL 7.1* 8.2*   PLATELETS 10*3/mm3 142 210     Results from last 7 days   Lab Units 01/17/22  0634 01/16/22  1308   SODIUM mmol/L 138 135*   POTASSIUM mmol/L 3.7 3.7   CHLORIDE mmol/L 103 100   CO2 mmol/L 23.1 23.2   BUN mg/dL 6* 6*   CREATININE mg/dL 0.91 0.94   GLUCOSE mg/dL 100* 136*   EGFR IF NONAFRICN AM mL/min/1.73 61 59*       Results from last 7 days   Lab Units 01/17/22  0634 01/16/22  1308   CALCIUM mg/dL 8.3* 8.6       COVID19   Date Value Ref Range Status   01/16/2022 Not Detected Not Detected - Ref. Range Final   01/07/2022 Not Detected Not Detected - Ref. Range Final     Hemoglobin A1C   Date/Time Value Ref Range Status   01/16/2022 1309 7.56 (H) 4.80 - 5.60 % Final     Glucose   Date/Time Value Ref Range Status   01/17/2022 1057 146 (H) 70 - 130 mg/dL Final     Comment:     Meter: [id number] : [id number] Cristi Da Silva CNA   01/17/2022 0624 102 70 - 130 mg/dL Final     Comment:     Meter: [id number] : [id number] Susannah Orozco RN   01/16/2022 2135 117 70 - 130 mg/dL Final     Comment:     Meter: [id number] : [id number] Anderson CUNNINGHAM       XR Hip With or Without Pelvis 1 View Right  Narrative: PROCEDURE: XR HIP W OR WO PELVIS 1 VIEW RIGHT     COMPARISON: [facility], CR, XR HIP W OR WO PELVIS 2-3 VIEW RIGHT, 1/07/2022, 17:32.     INDICATIONS: POST REDUCTION     FINDINGS:   Apparent interval reduction of the dislocated right hip prosthesis.  No visible fracture.     Impression:  Reduction of the right hip prosthesis               CURTIS FERGUSON MD         Electronically Signed and Approved By: CURTIS FERGUSON MD on  1/07/2022 at 18:52                   XR Hip With or Without Pelvis 2 - 3 View Right  Narrative: PROCEDURE: XR HIP W OR WO PELVIS 2-3 VIEW RIGHT     COMPARISON: [facility], , RIGHT HIP/PELVIS, 4/16/2019, 4:54.     INDICATIONS: right hip pain after injury today     FINDINGS:   Dislocation of the right hip prosthesis.  No visible fracture.  There are skin staples overlying   the right hip.     Impression:  Right hip prosthesis.  The prosthesis is dislocated.     No visible fracture.               CURTIS FERGUSON MD         Electronically Signed and Approved By: CURTIS FERGUSON MD on 1/07/2022 at 17:54                     Scheduled Medications  aspirin, 81 mg, Oral, Nightly  atorvastatin, 10 mg, Oral, Nightly  ferrous sulfate, 325 mg, Oral, Every Other Day  gabapentin, 600 mg, Oral, Q12H  insulin glargine, 40 Units, Subcutaneous, Nightly  insulin lispro, 0-7 Units, Subcutaneous, TID AC  isosorbide mononitrate, 30 mg, Oral, QAM  linezolid, 600 mg, Oral, Q12H  metoprolol tartrate, 25 mg, Oral, Nightly  pantoprazole, 40 mg, Oral, QAM    Infusions   Diet  Diet Regular; Consistent Carbohydrate  NPO Diet       Assessment/Plan     Active Hospital Problems    Diagnosis  POA   • **Septic arthritis of hip ([facility]) [M00.9]  Yes   • Drainage from wound [L24.A9]  Yes   • Morbid obesity ([facility]) [E66.01]  Yes   • Chronic diastolic CHF (congestive heart failure) ([facility]) [I50.32]  Yes   • Presence of stent in coronary artery in patient with coronary artery disease [I25.10, Z95.5]  Not Applicable   • Type 2 diabetes mellitus with diabetic polyneuropathy, with long-term current use of insulin ([facility]) [E11.42, Z79.4]  Not Applicable   • Hypertension [I10]  Yes      Resolved Hospital Problems   No resolved problems to display.   Right Hip Drainage  - long history of septic arthritis and has had multiple surgeries including Girdlestone and more recently right total hip revision 11/16/21 and right hip I&D with head and liner exchange  12/18/21-has had cultures positive for VRE previously  - admitted now with continued right hip drainage, started on zyvox  - right hip I&D planned for tomorrow per primary team, ID also consulted    Type 2 DM  - she is on large doses of insulin at home  - will continue lantus but will reduce to 40 units nightly   - given decreased appetite will hold scheduled prandial lispro for now   - cover with ssi/hypoglycemia protocol low dose    HTN/Chronic diastolic CHF/CAD s/p remote stent  - no anginal symptoms, BP is acceptable and she appears euvolemic  - continue on ASA, statin, and metoprolol as well as imdur  - monitor BP and volume status    SCDs for DVT prophylaxis.  Full code.  Discussed with patient, family and nursing staff.  Disposition per primary team.      Aj Lozano MD  [facility]  01/17/22  12:29 EST

## 2022-01-18 ENCOUNTER — ANESTHESIA (OUTPATIENT)
Dept: PERIOP | Facility: HOSPITAL | Age: 67
End: 2022-01-18

## 2022-01-18 ENCOUNTER — ANESTHESIA EVENT (OUTPATIENT)
Dept: PERIOP | Facility: HOSPITAL | Age: 67
End: 2022-01-18

## 2022-01-18 ENCOUNTER — APPOINTMENT (OUTPATIENT)
Dept: GENERAL RADIOLOGY | Facility: HOSPITAL | Age: 67
End: 2022-01-18

## 2022-01-18 LAB
ANION GAP SERPL CALCULATED.3IONS-SCNC: 9.1 MMOL/L (ref 5–15)
BASOPHILS # BLD AUTO: 0.04 10*3/MM3 (ref 0–0.2)
BASOPHILS NFR BLD AUTO: 0.7 % (ref 0–1.5)
BUN SERPL-MCNC: 6 MG/DL (ref 8–23)
BUN/CREAT SERPL: 6.8 (ref 7–25)
CALCIUM SPEC-SCNC: 7.9 MG/DL (ref 8.6–10.5)
CHLORIDE SERPL-SCNC: 104 MMOL/L (ref 98–107)
CO2 SERPL-SCNC: 22.9 MMOL/L (ref 22–29)
CREAT SERPL-MCNC: 0.88 MG/DL (ref 0.57–1)
DEPRECATED RDW RBC AUTO: 47.2 FL (ref 37–54)
EOSINOPHIL # BLD AUTO: 0.18 10*3/MM3 (ref 0–0.4)
EOSINOPHIL NFR BLD AUTO: 3.2 % (ref 0.3–6.2)
ERYTHROCYTE [DISTWIDTH] IN BLOOD BY AUTOMATED COUNT: 15.8 % (ref 12.3–15.4)
FERRITIN SERPL-MCNC: 326 NG/ML (ref 13–150)
GFR SERPL CREATININE-BSD FRML MDRD: 64 ML/MIN/1.73
GLUCOSE BLDC GLUCOMTR-MCNC: 143 MG/DL (ref 70–130)
GLUCOSE BLDC GLUCOMTR-MCNC: 147 MG/DL (ref 70–130)
GLUCOSE BLDC GLUCOMTR-MCNC: 151 MG/DL (ref 70–130)
GLUCOSE BLDC GLUCOMTR-MCNC: 166 MG/DL (ref 70–130)
GLUCOSE BLDC GLUCOMTR-MCNC: 281 MG/DL (ref 70–130)
GLUCOSE SERPL-MCNC: 147 MG/DL (ref 65–99)
HCT VFR BLD AUTO: 22.9 % (ref 34–46.6)
HCT VFR BLD AUTO: 29.3 % (ref 34–46.6)
HGB BLD-MCNC: 7.2 G/DL (ref 12–15.9)
HGB BLD-MCNC: 9 G/DL (ref 12–15.9)
IMM GRANULOCYTES # BLD AUTO: 0.13 10*3/MM3 (ref 0–0.05)
IMM GRANULOCYTES NFR BLD AUTO: 2.3 % (ref 0–0.5)
LYMPHOCYTES # BLD AUTO: 1.19 10*3/MM3 (ref 0.7–3.1)
LYMPHOCYTES NFR BLD AUTO: 20.9 % (ref 19.6–45.3)
MCH RBC QN AUTO: 26.6 PG (ref 26.6–33)
MCHC RBC AUTO-ENTMCNC: 31.4 G/DL (ref 31.5–35.7)
MCV RBC AUTO: 84.5 FL (ref 79–97)
MONOCYTES # BLD AUTO: 0.74 10*3/MM3 (ref 0.1–0.9)
MONOCYTES NFR BLD AUTO: 13 % (ref 5–12)
NEUTROPHILS NFR BLD AUTO: 3.41 10*3/MM3 (ref 1.7–7)
NEUTROPHILS NFR BLD AUTO: 59.9 % (ref 42.7–76)
NRBC BLD AUTO-RTO: 0.2 /100 WBC (ref 0–0.2)
PLATELET # BLD AUTO: 150 10*3/MM3 (ref 140–450)
PMV BLD AUTO: 9.4 FL (ref 6–12)
POTASSIUM SERPL-SCNC: 3.6 MMOL/L (ref 3.5–5.2)
RBC # BLD AUTO: 2.71 10*6/MM3 (ref 3.77–5.28)
SODIUM SERPL-SCNC: 136 MMOL/L (ref 136–145)
WBC NRBC COR # BLD: 5.69 10*3/MM3 (ref 3.4–10.8)

## 2022-01-18 PROCEDURE — 25010000002 ROPIVACAINE PER 1 MG: Performed by: ORTHOPAEDIC SURGERY

## 2022-01-18 PROCEDURE — 80048 BASIC METABOLIC PNL TOTAL CA: CPT | Performed by: ORTHOPAEDIC SURGERY

## 2022-01-18 PROCEDURE — 25010000002 CEFTRIAXONE PER 250 MG: Performed by: NURSE ANESTHETIST, CERTIFIED REGISTERED

## 2022-01-18 PROCEDURE — 73501 X-RAY EXAM HIP UNI 1 VIEW: CPT

## 2022-01-18 PROCEDURE — 85018 HEMOGLOBIN: CPT | Performed by: NURSE ANESTHETIST, CERTIFIED REGISTERED

## 2022-01-18 PROCEDURE — 25010000002 EPINEPHRINE 1 MG/ML SOLUTION 30 ML VIAL: Performed by: ORTHOPAEDIC SURGERY

## 2022-01-18 PROCEDURE — 87186 SC STD MICRODIL/AGAR DIL: CPT | Performed by: ORTHOPAEDIC SURGERY

## 2022-01-18 PROCEDURE — 25010000002 FENTANYL CITRATE (PF) 50 MCG/ML SOLUTION: Performed by: NURSE ANESTHETIST, CERTIFIED REGISTERED

## 2022-01-18 PROCEDURE — C1776 JOINT DEVICE (IMPLANTABLE): HCPCS | Performed by: ORTHOPAEDIC SURGERY

## 2022-01-18 PROCEDURE — 25010000002 PROPOFOL 10 MG/ML EMULSION: Performed by: NURSE ANESTHETIST, CERTIFIED REGISTERED

## 2022-01-18 PROCEDURE — 0SR90JZ REPLACEMENT OF RIGHT HIP JOINT WITH SYNTHETIC SUBSTITUTE, OPEN APPROACH: ICD-10-PCS | Performed by: ORTHOPAEDIC SURGERY

## 2022-01-18 PROCEDURE — 87077 CULTURE AEROBIC IDENTIFY: CPT | Performed by: ORTHOPAEDIC SURGERY

## 2022-01-18 PROCEDURE — 87070 CULTURE OTHR SPECIMN AEROBIC: CPT | Performed by: ORTHOPAEDIC SURGERY

## 2022-01-18 PROCEDURE — 25010000002 DEXAMETHASONE PER 1 MG: Performed by: NURSE ANESTHETIST, CERTIFIED REGISTERED

## 2022-01-18 PROCEDURE — 63710000001 INSULIN LISPRO (HUMAN) PER 5 UNITS: Performed by: ORTHOPAEDIC SURGERY

## 2022-01-18 PROCEDURE — 82728 ASSAY OF FERRITIN: CPT | Performed by: INTERNAL MEDICINE

## 2022-01-18 PROCEDURE — 25010000002 HYDROMORPHONE PER 4 MG: Performed by: NURSE ANESTHETIST, CERTIFIED REGISTERED

## 2022-01-18 PROCEDURE — 87075 CULTR BACTERIA EXCEPT BLOOD: CPT | Performed by: ORTHOPAEDIC SURGERY

## 2022-01-18 PROCEDURE — 25010000002 CLONIDINE PER 1 MG: Performed by: ORTHOPAEDIC SURGERY

## 2022-01-18 PROCEDURE — 85025 COMPLETE CBC W/AUTO DIFF WBC: CPT | Performed by: ORTHOPAEDIC SURGERY

## 2022-01-18 PROCEDURE — 25010000002 MIDAZOLAM PER 1 MG: Performed by: ANESTHESIOLOGY

## 2022-01-18 PROCEDURE — 25010000002 NEOSTIGMINE 5 MG/10ML SOLUTION: Performed by: NURSE ANESTHETIST, CERTIFIED REGISTERED

## 2022-01-18 PROCEDURE — 82962 GLUCOSE BLOOD TEST: CPT

## 2022-01-18 PROCEDURE — 25010000002 VANCOMYCIN PER 500 MG: Performed by: ORTHOPAEDIC SURGERY

## 2022-01-18 PROCEDURE — C1889 IMPLANT/INSERT DEVICE, NOC: HCPCS | Performed by: ORTHOPAEDIC SURGERY

## 2022-01-18 PROCEDURE — 85014 HEMATOCRIT: CPT | Performed by: NURSE ANESTHETIST, CERTIFIED REGISTERED

## 2022-01-18 PROCEDURE — 25010000002 PHENYLEPHRINE 10 MG/ML SOLUTION: Performed by: NURSE ANESTHETIST, CERTIFIED REGISTERED

## 2022-01-18 PROCEDURE — 36430 TRANSFUSION BLD/BLD COMPNT: CPT

## 2022-01-18 PROCEDURE — 25010000002 KETOROLAC TROMETHAMINE PER 15 MG: Performed by: ORTHOPAEDIC SURGERY

## 2022-01-18 PROCEDURE — 86900 BLOOD TYPING SEROLOGIC ABO: CPT

## 2022-01-18 PROCEDURE — P9016 RBC LEUKOCYTES REDUCED: HCPCS

## 2022-01-18 PROCEDURE — 87176 TISSUE HOMOGENIZATION CULTR: CPT | Performed by: ORTHOPAEDIC SURGERY

## 2022-01-18 PROCEDURE — 25010000002 ONDANSETRON PER 1 MG: Performed by: NURSE ANESTHETIST, CERTIFIED REGISTERED

## 2022-01-18 PROCEDURE — 87205 SMEAR GRAM STAIN: CPT | Performed by: ORTHOPAEDIC SURGERY

## 2022-01-18 PROCEDURE — 25010000002 VANCOMYCIN 1 G RECONSTITUTED SOLUTION: Performed by: NURSE ANESTHETIST, CERTIFIED REGISTERED

## 2022-01-18 PROCEDURE — 0SP90JZ REMOVAL OF SYNTHETIC SUBSTITUTE FROM RIGHT HIP JOINT, OPEN APPROACH: ICD-10-PCS | Performed by: ORTHOPAEDIC SURGERY

## 2022-01-18 PROCEDURE — 25010000002 CEFTRIAXONE PER 250 MG: Performed by: ORTHOPAEDIC SURGERY

## 2022-01-18 DEVICE — OXINIUM FEMORAL HEAD 12/14 TAPER                                    28 MM +8
Type: IMPLANTABLE DEVICE | Site: HIP | Status: FUNCTIONAL
Brand: OXINIUM

## 2022-01-18 DEVICE — OR3O DUAL MOBILITY XLPE INSERT 28/44
Type: IMPLANTABLE DEVICE | Site: HIP | Status: FUNCTIONAL
Brand: OR3O DUAL MOBILITY

## 2022-01-18 DEVICE — KNOTLESS TISSUE CONTROL DEVICE, VIOLET UNIDIRECTIONAL (ANTIBACTERIAL) SYNTHETIC ABSORBABLE DEVICE
Type: IMPLANTABLE DEVICE | Site: HIP | Status: FUNCTIONAL
Brand: STRATAFIX

## 2022-01-18 DEVICE — OR3O DUAL MOBILITY LINER 44/56
Type: IMPLANTABLE DEVICE | Site: HIP | Status: FUNCTIONAL
Brand: OR3O DUAL MOBILITY

## 2022-01-18 DEVICE — DEV CONTRL TISS STRATAFIX SYMM PDS PLUS VIL CT-1 60CM: Type: IMPLANTABLE DEVICE | Site: HIP | Status: FUNCTIONAL

## 2022-01-18 RX ORDER — PHENYLEPHRINE HYDROCHLORIDE 10 MG/ML
INJECTION INTRAVENOUS AS NEEDED
Status: DISCONTINUED | OUTPATIENT
Start: 2022-01-18 | End: 2022-01-18 | Stop reason: SURG

## 2022-01-18 RX ORDER — LIDOCAINE HYDROCHLORIDE 10 MG/ML
0.5 INJECTION, SOLUTION EPIDURAL; INFILTRATION; INTRACAUDAL; PERINEURAL ONCE AS NEEDED
Status: DISCONTINUED | OUTPATIENT
Start: 2022-01-18 | End: 2022-01-18 | Stop reason: HOSPADM

## 2022-01-18 RX ORDER — PROMETHAZINE HYDROCHLORIDE 25 MG/1
25 SUPPOSITORY RECTAL ONCE AS NEEDED
Status: DISCONTINUED | OUTPATIENT
Start: 2022-01-18 | End: 2022-01-18 | Stop reason: HOSPADM

## 2022-01-18 RX ORDER — HYDROCODONE BITARTRATE AND ACETAMINOPHEN 7.5; 325 MG/1; MG/1
1 TABLET ORAL ONCE AS NEEDED
Status: DISCONTINUED | OUTPATIENT
Start: 2022-01-18 | End: 2022-01-18 | Stop reason: HOSPADM

## 2022-01-18 RX ORDER — EPHEDRINE SULFATE 50 MG/ML
5 INJECTION, SOLUTION INTRAVENOUS ONCE AS NEEDED
Status: DISCONTINUED | OUTPATIENT
Start: 2022-01-18 | End: 2022-01-18 | Stop reason: HOSPADM

## 2022-01-18 RX ORDER — HYDROMORPHONE HCL 110MG/55ML
PATIENT CONTROLLED ANALGESIA SYRINGE INTRAVENOUS AS NEEDED
Status: DISCONTINUED | OUTPATIENT
Start: 2022-01-18 | End: 2022-01-18 | Stop reason: SURG

## 2022-01-18 RX ORDER — DIPHENHYDRAMINE HCL 25 MG
25 CAPSULE ORAL
Status: DISCONTINUED | OUTPATIENT
Start: 2022-01-18 | End: 2022-01-18 | Stop reason: HOSPADM

## 2022-01-18 RX ORDER — NEOSTIGMINE METHYLSULFATE 0.5 MG/ML
INJECTION, SOLUTION INTRAVENOUS AS NEEDED
Status: DISCONTINUED | OUTPATIENT
Start: 2022-01-18 | End: 2022-01-18 | Stop reason: SURG

## 2022-01-18 RX ORDER — NALOXONE HCL 0.4 MG/ML
0.2 VIAL (ML) INJECTION AS NEEDED
Status: DISCONTINUED | OUTPATIENT
Start: 2022-01-18 | End: 2022-01-18 | Stop reason: HOSPADM

## 2022-01-18 RX ORDER — FENTANYL CITRATE 50 UG/ML
50 INJECTION, SOLUTION INTRAMUSCULAR; INTRAVENOUS
Status: DISCONTINUED | OUTPATIENT
Start: 2022-01-18 | End: 2022-01-18 | Stop reason: HOSPADM

## 2022-01-18 RX ORDER — OXYCODONE AND ACETAMINOPHEN 7.5; 325 MG/1; MG/1
1 TABLET ORAL EVERY 4 HOURS PRN
Status: DISCONTINUED | OUTPATIENT
Start: 2022-01-18 | End: 2022-01-18 | Stop reason: HOSPADM

## 2022-01-18 RX ORDER — CEFTRIAXONE 1 G/1
INJECTION, POWDER, FOR SOLUTION INTRAMUSCULAR; INTRAVENOUS AS NEEDED
Status: DISCONTINUED | OUTPATIENT
Start: 2022-01-18 | End: 2022-01-18 | Stop reason: SURG

## 2022-01-18 RX ORDER — MAGNESIUM HYDROXIDE 1200 MG/15ML
LIQUID ORAL AS NEEDED
Status: DISCONTINUED | OUTPATIENT
Start: 2022-01-18 | End: 2022-01-18 | Stop reason: HOSPADM

## 2022-01-18 RX ORDER — FAMOTIDINE 10 MG/ML
20 INJECTION, SOLUTION INTRAVENOUS ONCE
Status: COMPLETED | OUTPATIENT
Start: 2022-01-18 | End: 2022-01-18

## 2022-01-18 RX ORDER — ONDANSETRON 2 MG/ML
4 INJECTION INTRAMUSCULAR; INTRAVENOUS ONCE AS NEEDED
Status: DISCONTINUED | OUTPATIENT
Start: 2022-01-18 | End: 2022-01-18 | Stop reason: HOSPADM

## 2022-01-18 RX ORDER — GLYCOPYRROLATE 0.2 MG/ML
INJECTION INTRAMUSCULAR; INTRAVENOUS AS NEEDED
Status: DISCONTINUED | OUTPATIENT
Start: 2022-01-18 | End: 2022-01-18 | Stop reason: SURG

## 2022-01-18 RX ORDER — LABETALOL HYDROCHLORIDE 5 MG/ML
5 INJECTION, SOLUTION INTRAVENOUS
Status: DISCONTINUED | OUTPATIENT
Start: 2022-01-18 | End: 2022-01-18 | Stop reason: HOSPADM

## 2022-01-18 RX ORDER — SODIUM CHLORIDE, SODIUM LACTATE, POTASSIUM CHLORIDE, CALCIUM CHLORIDE 600; 310; 30; 20 MG/100ML; MG/100ML; MG/100ML; MG/100ML
9 INJECTION, SOLUTION INTRAVENOUS CONTINUOUS
Status: DISCONTINUED | OUTPATIENT
Start: 2022-01-18 | End: 2022-01-22 | Stop reason: HOSPADM

## 2022-01-18 RX ORDER — EPHEDRINE SULFATE 50 MG/ML
INJECTION, SOLUTION INTRAVENOUS AS NEEDED
Status: DISCONTINUED | OUTPATIENT
Start: 2022-01-18 | End: 2022-01-18 | Stop reason: SURG

## 2022-01-18 RX ORDER — FLUMAZENIL 0.1 MG/ML
0.2 INJECTION INTRAVENOUS AS NEEDED
Status: DISCONTINUED | OUTPATIENT
Start: 2022-01-18 | End: 2022-01-18 | Stop reason: HOSPADM

## 2022-01-18 RX ORDER — DIPHENHYDRAMINE HYDROCHLORIDE 50 MG/ML
12.5 INJECTION INTRAMUSCULAR; INTRAVENOUS
Status: DISCONTINUED | OUTPATIENT
Start: 2022-01-18 | End: 2022-01-18 | Stop reason: HOSPADM

## 2022-01-18 RX ORDER — ONDANSETRON 2 MG/ML
INJECTION INTRAMUSCULAR; INTRAVENOUS AS NEEDED
Status: DISCONTINUED | OUTPATIENT
Start: 2022-01-18 | End: 2022-01-18 | Stop reason: SURG

## 2022-01-18 RX ORDER — PROMETHAZINE HYDROCHLORIDE 25 MG/1
25 TABLET ORAL ONCE AS NEEDED
Status: DISCONTINUED | OUTPATIENT
Start: 2022-01-18 | End: 2022-01-18 | Stop reason: HOSPADM

## 2022-01-18 RX ORDER — SODIUM CHLORIDE 0.9 % (FLUSH) 0.9 %
3-10 SYRINGE (ML) INJECTION AS NEEDED
Status: DISCONTINUED | OUTPATIENT
Start: 2022-01-18 | End: 2022-01-18 | Stop reason: HOSPADM

## 2022-01-18 RX ORDER — LIDOCAINE HYDROCHLORIDE 20 MG/ML
INJECTION, SOLUTION INFILTRATION; PERINEURAL AS NEEDED
Status: DISCONTINUED | OUTPATIENT
Start: 2022-01-18 | End: 2022-01-18 | Stop reason: SURG

## 2022-01-18 RX ORDER — DEXAMETHASONE SODIUM PHOSPHATE 10 MG/ML
INJECTION INTRAMUSCULAR; INTRAVENOUS AS NEEDED
Status: DISCONTINUED | OUTPATIENT
Start: 2022-01-18 | End: 2022-01-18 | Stop reason: SURG

## 2022-01-18 RX ORDER — ROCURONIUM BROMIDE 10 MG/ML
INJECTION, SOLUTION INTRAVENOUS AS NEEDED
Status: DISCONTINUED | OUTPATIENT
Start: 2022-01-18 | End: 2022-01-18 | Stop reason: SURG

## 2022-01-18 RX ORDER — HYDROMORPHONE HYDROCHLORIDE 1 MG/ML
0.5 INJECTION, SOLUTION INTRAMUSCULAR; INTRAVENOUS; SUBCUTANEOUS
Status: DISCONTINUED | OUTPATIENT
Start: 2022-01-18 | End: 2022-01-18 | Stop reason: HOSPADM

## 2022-01-18 RX ORDER — VANCOMYCIN HYDROCHLORIDE 1 G/200ML
1000 INJECTION, SOLUTION INTRAVENOUS EVERY 12 HOURS
Status: DISCONTINUED | OUTPATIENT
Start: 2022-01-18 | End: 2022-01-19

## 2022-01-18 RX ORDER — MIDAZOLAM HYDROCHLORIDE 1 MG/ML
0.5 INJECTION INTRAMUSCULAR; INTRAVENOUS
Status: DISCONTINUED | OUTPATIENT
Start: 2022-01-18 | End: 2022-01-18 | Stop reason: HOSPADM

## 2022-01-18 RX ORDER — FENTANYL CITRATE 50 UG/ML
INJECTION, SOLUTION INTRAMUSCULAR; INTRAVENOUS AS NEEDED
Status: DISCONTINUED | OUTPATIENT
Start: 2022-01-18 | End: 2022-01-18 | Stop reason: SURG

## 2022-01-18 RX ORDER — VANCOMYCIN HYDROCHLORIDE 1 G/20ML
INJECTION, POWDER, LYOPHILIZED, FOR SOLUTION INTRAVENOUS AS NEEDED
Status: DISCONTINUED | OUTPATIENT
Start: 2022-01-18 | End: 2022-01-18 | Stop reason: SURG

## 2022-01-18 RX ORDER — TRANEXAMIC ACID 100 MG/ML
INJECTION, SOLUTION INTRAVENOUS AS NEEDED
Status: DISCONTINUED | OUTPATIENT
Start: 2022-01-18 | End: 2022-01-18 | Stop reason: SURG

## 2022-01-18 RX ORDER — SODIUM CHLORIDE 0.9 % (FLUSH) 0.9 %
3 SYRINGE (ML) INJECTION EVERY 12 HOURS SCHEDULED
Status: DISCONTINUED | OUTPATIENT
Start: 2022-01-18 | End: 2022-01-18 | Stop reason: HOSPADM

## 2022-01-18 RX ORDER — HYDRALAZINE HYDROCHLORIDE 20 MG/ML
5 INJECTION INTRAMUSCULAR; INTRAVENOUS
Status: DISCONTINUED | OUTPATIENT
Start: 2022-01-18 | End: 2022-01-18 | Stop reason: HOSPADM

## 2022-01-18 RX ORDER — PROPOFOL 10 MG/ML
VIAL (ML) INTRAVENOUS AS NEEDED
Status: DISCONTINUED | OUTPATIENT
Start: 2022-01-18 | End: 2022-01-18 | Stop reason: SURG

## 2022-01-18 RX ORDER — IBUPROFEN 600 MG/1
600 TABLET ORAL ONCE AS NEEDED
Status: DISCONTINUED | OUTPATIENT
Start: 2022-01-18 | End: 2022-01-18 | Stop reason: HOSPADM

## 2022-01-18 RX ADMIN — FAMOTIDINE 20 MG: 10 INJECTION INTRAVENOUS at 12:43

## 2022-01-18 RX ADMIN — MIDAZOLAM 1 MG: 1 INJECTION INTRAMUSCULAR; INTRAVENOUS at 12:43

## 2022-01-18 RX ADMIN — PHENYLEPHRINE HYDROCHLORIDE 100 MCG: 10 INJECTION, SOLUTION INTRAVENOUS at 13:42

## 2022-01-18 RX ADMIN — FENTANYL CITRATE 50 MCG: 50 INJECTION INTRAMUSCULAR; INTRAVENOUS at 14:54

## 2022-01-18 RX ADMIN — LIDOCAINE HYDROCHLORIDE 90 MG: 20 INJECTION, SOLUTION INFILTRATION; PERINEURAL at 12:59

## 2022-01-18 RX ADMIN — ONDANSETRON 4 MG: 2 INJECTION INTRAMUSCULAR; INTRAVENOUS at 13:19

## 2022-01-18 RX ADMIN — VANCOMYCIN HYDROCHLORIDE 1500 MG: 1 INJECTION, POWDER, LYOPHILIZED, FOR SOLUTION INTRAVENOUS at 13:26

## 2022-01-18 RX ADMIN — NEOSTIGMINE METHYLSULFATE 3 MG: 0.5 INJECTION INTRAVENOUS at 13:46

## 2022-01-18 RX ADMIN — PHENYLEPHRINE HYDROCHLORIDE 100 MCG: 10 INJECTION, SOLUTION INTRAVENOUS at 13:37

## 2022-01-18 RX ADMIN — EPHEDRINE SULFATE 10 MG: 50 INJECTION INTRAVENOUS at 13:51

## 2022-01-18 RX ADMIN — TRANEXAMIC ACID 1000 MG: 1 INJECTION, SOLUTION INTRAVENOUS at 13:05

## 2022-01-18 RX ADMIN — ASPIRIN 81 MG: 81 TABLET, CHEWABLE ORAL at 22:24

## 2022-01-18 RX ADMIN — VANCOMYCIN HYDROCHLORIDE 1000 MG: 1 INJECTION, SOLUTION INTRAVENOUS at 22:25

## 2022-01-18 RX ADMIN — GLYCOPYRROLATE 0.4 MG: 0.2 INJECTION INTRAMUSCULAR; INTRAVENOUS at 13:46

## 2022-01-18 RX ADMIN — PROPOFOL 200 MG: 10 INJECTION, EMULSION INTRAVENOUS at 12:59

## 2022-01-18 RX ADMIN — INSULIN GLARGINE-YFGN 40 UNITS: 100 INJECTION, SOLUTION SUBCUTANEOUS at 22:23

## 2022-01-18 RX ADMIN — SODIUM CHLORIDE, POTASSIUM CHLORIDE, SODIUM LACTATE AND CALCIUM CHLORIDE 9 ML/HR: 600; 310; 30; 20 INJECTION, SOLUTION INTRAVENOUS at 12:43

## 2022-01-18 RX ADMIN — DEXAMETHASONE SODIUM PHOSPHATE 8 MG: 10 INJECTION INTRAMUSCULAR; INTRAVENOUS at 13:04

## 2022-01-18 RX ADMIN — FENTANYL CITRATE 50 MCG: 0.05 INJECTION, SOLUTION INTRAMUSCULAR; INTRAVENOUS at 13:34

## 2022-01-18 RX ADMIN — PHENYLEPHRINE HYDROCHLORIDE 200 MCG: 10 INJECTION, SOLUTION INTRAVENOUS at 13:25

## 2022-01-18 RX ADMIN — ATORVASTATIN CALCIUM 10 MG: 20 TABLET, FILM COATED ORAL at 22:22

## 2022-01-18 RX ADMIN — LINEZOLID 600 MG: 600 TABLET, FILM COATED ORAL at 22:21

## 2022-01-18 RX ADMIN — HYDROCODONE BITARTRATE AND ACETAMINOPHEN 2 TABLET: 5; 325 TABLET ORAL at 22:22

## 2022-01-18 RX ADMIN — INSULIN LISPRO 2 UNITS: 100 INJECTION, SOLUTION INTRAVENOUS; SUBCUTANEOUS at 17:52

## 2022-01-18 RX ADMIN — PHENYLEPHRINE HYDROCHLORIDE 100 MCG: 10 INJECTION, SOLUTION INTRAVENOUS at 13:09

## 2022-01-18 RX ADMIN — CEFTRIAXONE SODIUM 2 G: 2 INJECTION, POWDER, FOR SOLUTION INTRAMUSCULAR; INTRAVENOUS at 16:20

## 2022-01-18 RX ADMIN — HYDROMORPHONE HYDROCHLORIDE 0.25 MG: 2 INJECTION, SOLUTION INTRAMUSCULAR; INTRAVENOUS; SUBCUTANEOUS at 14:08

## 2022-01-18 RX ADMIN — PHENYLEPHRINE HYDROCHLORIDE 100 MCG: 10 INJECTION, SOLUTION INTRAVENOUS at 13:18

## 2022-01-18 RX ADMIN — HYDROCODONE BITARTRATE AND ACETAMINOPHEN 1 TABLET: 5; 325 TABLET ORAL at 17:52

## 2022-01-18 RX ADMIN — TRANEXAMIC ACID 1000 MG: 1 INJECTION, SOLUTION INTRAVENOUS at 13:44

## 2022-01-18 RX ADMIN — ROCURONIUM BROMIDE 40 MG: 50 INJECTION INTRAVENOUS at 12:59

## 2022-01-18 RX ADMIN — GABAPENTIN 600 MG: 300 CAPSULE ORAL at 22:23

## 2022-01-18 RX ADMIN — FENTANYL CITRATE 50 MCG: 0.05 INJECTION, SOLUTION INTRAMUSCULAR; INTRAVENOUS at 12:58

## 2022-01-18 RX ADMIN — HYDROMORPHONE HYDROCHLORIDE 0.25 MG: 2 INJECTION, SOLUTION INTRAMUSCULAR; INTRAVENOUS; SUBCUTANEOUS at 13:55

## 2022-01-18 RX ADMIN — CEFTRIAXONE SODIUM 2 G: 1 INJECTION, POWDER, FOR SOLUTION INTRAMUSCULAR; INTRAVENOUS at 13:30

## 2022-01-18 NOTE — ANESTHESIA POSTPROCEDURE EVALUATION
Patient: Stephy Duncan    Procedure Summary     Date: 01/18/22 Room / Location: [facility] OR [address] [place] [address] [facility] MAIN OR    Anesthesia Start: 1250 Anesthesia Stop: 1439    Procedure: TOTAL HIP ARTHROPLASTY LINER REVISION, pegboard lateral (Right Hip) Diagnosis:       Pyogenic arthritis of right hip, due to unspecified organism (HCC)      (Pyogenic arthritis of right hip, due to unspecified organism (HCC) [M00.9])    Surgeons: Karri Schaeffer II, MD Provider: Nazario Slade MD    Anesthesia Type: general ASA Status: 3          Anesthesia Type: general    Vitals  Vitals Value Taken Time   /53 01/18/22 1516   Temp 36.3 °C (97.3 °F) 01/18/22 1515   Pulse 81 01/18/22 1529   Resp 16 01/18/22 1515   SpO2 91 % 01/18/22 1529   Vitals shown include unvalidated device data.        Post Anesthesia Care and Evaluation    Patient location during evaluation: PACU  Patient participation: complete - patient participated  Level of consciousness: awake and alert  Pain management: adequate  Airway patency: patent  Anesthetic complications: No anesthetic complications  PONV Status: none  Cardiovascular status: acceptable and hemodynamically stable  Respiratory status: acceptable, nonlabored ventilation and spontaneous ventilation  Hydration status: acceptable

## 2022-01-18 NOTE — PROGRESS NOTES
Name: Stephy Duncan ADMIT: 2022   : 1955  PCP: Gregorio Rosales    MRN: [id number] LOS: 2 days   AGE/SEX: 66 y.o. female  ROOM: [facility]     Subjective   Subjective   Referring Provider: Dr. Schaeffer  Reason for follow up: Hypertension, Type 2 DM, chronic diastolic CHF  No acute events. Patient has no new complaints. She has been NPO fr surgery. No nausea or vomiting. Denies CP/dyspnea/f/c/d.    Objective   Objective   Vital Signs  Temp:  [97.1 °F (36.2 °C)-99.4 °F (37.4 °C)] 97.7 °F (36.5 °C)  Heart Rate:  [69-81] 75  Resp:  [16-18] 16  BP: (103-130)/(46-70) 112/56  SpO2:  [93 %-97 %] 97 %  on   ;   Device (Oxygen Therapy): room air  Body mass index is 36.22 kg/m².  Physical Exam  Vitals and nursing note reviewed.   Constitutional:       General: She is not in acute distress.     Appearance: She is not toxic-appearing or diaphoretic.   HENT:      Head: Normocephalic and atraumatic.      Nose: Nose normal.      Mouth/Throat:      Mouth: Mucous membranes are moist.      Pharynx: Oropharynx is clear.   Eyes:      Extraocular Movements: Extraocular movements intact.      Conjunctiva/sclera: Conjunctivae normal.      Pupils: Pupils are equal, round, and reactive to light.   Cardiovascular:      Rate and Rhythm: Normal rate and regular rhythm.      Pulses: Normal pulses.   Pulmonary:      Effort: Pulmonary effort is normal.      Breath sounds: Normal breath sounds. No rales.   Abdominal:      General: Bowel sounds are normal.      Palpations: Abdomen is soft.      Tenderness: There is no abdominal tenderness.   Musculoskeletal:         General: No swelling or tenderness.      Cervical back: Normal range of motion and neck supple.      Comments: Right hip with wound vac in place   Skin:     General: Skin is warm and dry.      Capillary Refill: Capillary refill takes less than 2 seconds.   Neurological:      General: No focal deficit present.      Mental Status: She is alert and oriented to person, place,  and time.   Psychiatric:         Mood and Affect: Mood normal.         Behavior: Behavior normal.       Results Review     I reviewed the patient's new clinical results.  Results from last 7 days   Lab Units 01/18/22  0613 01/17/22  0634 01/16/22  1309   WBC 10*3/mm3 5.69 6.38 11.68*   HEMOGLOBIN g/dL 7.2* 7.1* 8.2*   PLATELETS 10*3/mm3 150 142 210     Results from last 7 days   Lab Units 01/18/22  0613 01/17/22  0634 01/16/22  1308   SODIUM mmol/L 136 138 135*   POTASSIUM mmol/L 3.6 3.7 3.7   CHLORIDE mmol/L 104 103 100   CO2 mmol/L 22.9 23.1 23.2   BUN mg/dL 6* 6* 6*   CREATININE mg/dL 0.88 0.91 0.94   GLUCOSE mg/dL 147* 100* 136*   EGFR IF NONAFRICN AM mL/min/1.73 64 61 59*       Results from last 7 days   Lab Units 01/18/22  0613 01/17/22  0634 01/16/22  1308   CALCIUM mg/dL 7.9* 8.3* 8.6       COVID19   Date Value Ref Range Status   01/16/2022 Not Detected Not Detected - Ref. Range Final   01/07/2022 Not Detected Not Detected - Ref. Range Final     Hemoglobin A1C   Date/Time Value Ref Range Status   01/16/2022 1309 7.56 (H) 4.80 - 5.60 % Final     Glucose   Date/Time Value Ref Range Status   01/18/2022 0617 151 (H) 70 - 130 mg/dL Final     Comment:     Meter: [id number] : [id number] Fernandez Elle NA   01/17/2022 2042 223 (H) 70 - 130 mg/dL Final     Comment:     Meter: [id number] : [id number] Fernandez Elle NA   01/17/2022 1616 144 (H) 70 - 130 mg/dL Final     Comment:     Meter: [id number] : [id number] Cristi Don CNA   01/17/2022 1057 146 (H) 70 - 130 mg/dL Final     Comment:     Meter: [id number] : [id number] Cristi Don CNA   01/17/2022 0624 102 70 - 130 mg/dL Final     Comment:     Meter: [id number] : [id number] Susannah Orozco RN   01/16/2022 2135 117 70 - 130 mg/dL Final     Comment:     Meter: [id number] : [id number] Anderson [place]       XR Hip With or Without Pelvis 1 View Right  Narrative: PROCEDURE: XR HIP W OR WO PELVIS 1 VIEW RIGHT     COMPARISON: [facility], CR, XR HIP  W OR WO PELVIS 2-3 VIEW RIGHT, 1/07/2022, 17:32.     INDICATIONS: POST REDUCTION     FINDINGS:   Apparent interval reduction of the dislocated right hip prosthesis.  No visible fracture.     Impression:  Reduction of the right hip prosthesis               CURTIS FERGUSON MD         Electronically Signed and Approved By: CURTIS FERGUSON MD on 1/07/2022 at 18:52                   XR Hip With or Without Pelvis 2 - 3 View Right  Narrative: PROCEDURE: XR HIP W OR WO PELVIS 2-3 VIEW RIGHT     COMPARISON: [facility], , RIGHT HIP/PELVIS, 4/16/2019, 4:54.     INDICATIONS: right hip pain after injury today     FINDINGS:   Dislocation of the right hip prosthesis.  No visible fracture.  There are skin staples overlying   the right hip.     Impression:  Right hip prosthesis.  The prosthesis is dislocated.     No visible fracture.               CURTIS FERGUSON MD         Electronically Signed and Approved By: CURTIS FERGUSON MD on 1/07/2022 at 17:54                     Scheduled Medications  aspirin, 81 mg, Oral, Nightly  atorvastatin, 10 mg, Oral, Nightly  ferrous sulfate, 325 mg, Oral, Every Other Day  gabapentin, 600 mg, Oral, Q12H  insulin glargine, 40 Units, Subcutaneous, Nightly  insulin lispro, 0-7 Units, Subcutaneous, TID AC  isosorbide mononitrate, 30 mg, Oral, QAM  linezolid, 600 mg, Oral, Q12H  metoprolol tartrate, 25 mg, Oral, Nightly  pantoprazole, 40 mg, Oral, QAM  Orthopedic surgery custom cocktail, , Injection, Once    Infusions   Diet  NPO Diet       Assessment/Plan     Active Hospital Problems    Diagnosis  POA   • **Septic arthritis of hip ([facility]) [M00.9]  Yes   • Drainage from wound [L24.A9]  Yes   • Morbid obesity ([facility]) [E66.01]  Yes   • Chronic diastolic CHF (congestive heart failure) ([facility]) [I50.32]  Yes   • Presence of stent in coronary artery in patient with coronary artery disease [I25.10, Z95.5]  Not Applicable   • Type 2 diabetes mellitus with diabetic polyneuropathy, with long-term current use of  insulin (HCC) [E11.42, Z79.4]  Not Applicable   • Hypertension [I10]  Yes      Resolved Hospital Problems   No resolved problems to display.   Right Hip Drainage  - long history of septic arthritis and has had multiple surgeries including Girdlestone and more recently right total hip revision 11/16/21 and right hip I&D with head and liner exchange 12/18/21-has had cultures positive for VRE previously  - admitted now with continued right hip drainage, started on zyvox and ID consulted  - right hip I&D planned for today per primary team    Type 2 DM  - she is on large doses of insulin at home  - continue lantus but will reduce to 40 units nightly   - hold scheduled prandial lispro for now   - cover with ssi/hypoglycemia protocol low dose    HTN/Chronic diastolic CHF/CAD s/p remote stent  - no anginal symptoms, BP is acceptable and she appears euvolemic  - continue on ASA, statin, and metoprolol as well as imdur  - monitor BP and volume status    Normocytic Anemia  - probably anemia of inflammation +/- iron deficiency  - given her hgb of 7.2 with cardiac history and low normal blood pressure will transfuse a unit of PRBCs in preparation for surgery today as blood loss is expected  - will need to further work up the anemia-check iron studies, b12, FA    SCDs for DVT prophylaxis.  Full code.  Discussed with patient, family and nursing staff.  Disposition per primary team.      Aj Lozano MD  [facility]  01/18/22  10:07 EST

## 2022-01-18 NOTE — PROGRESS NOTES
"[facility] Clinical Pharmacy Services: Vancomycin Pharmacokinetic Initial Consult Note    Stephy Duncan is a 66 y.o. female who is on day 1/5 of pharmacy to dose vancomycin.    Indication: bone and/or joint infection  Consulting Provider: Dr. Schaeffer  Planned Duration of Therapy: 5 days  Loading Dose Ordered or Given: 1500 mg on 1/18 at 1326  MRSA PCR performed: no Result: n/a  Culture/Source: n/a  Target: -600 mg/L.hr   Other Antimicrobials: po linezolid (will follow up tomorrow)    Vitals/Labs  Ht: 162.6 cm (64\"); Wt: 95.7 kg (211 lb)  Temp Readings from Last 1 Encounters:   01/18/22 96.8 °F (36 °C) (Oral)    Estimated Creatinine Clearance: 68.7 mL/min (by C-G formula based on SCr of 0.88 mg/dL).        Results from last 7 days   Lab Units 01/18/22  0613 01/17/22  0634 01/16/22  1309 01/16/22  1308   CREATININE mg/dL 0.88 0.91  --  0.94   WBC 10*3/mm3 5.69 6.38 11.68*  --      Assessment/Plan:    -patient is currently on po linezolid. Was unsuccessful in reaching Dr. Laury amado to clarify if both are needed.    Vancomycin Dose:   1000 mg IV every  12  hours  Predictive AUC level for the dose ordered is 536 mg/L.hr, which is within the target of 400-600 mg/L.hr  Vanc Trough has been ordered for 1/19 at 1000     Pharmacy will follow patient's kidney function and will adjust doses and obtain levels as necessary. Thank you for involving pharmacy in this patient's care. Please contact pharmacy with any questions or concerns.                           Emiliano Calderon, [facility]  Clinical Pharmacist    "

## 2022-01-18 NOTE — OP NOTE
Total Hip Revision Operative Note  Dr. JENNIE Schaeffer II  [phone]    PATIENT NAME: Stephy Duncan  MRN: [id number]  : 1955 AGE: 66 y.o. GENDER: female  DATE OF OPERATION: 2022  PREOPERATIVE DIAGNOSIS: Draining hip wound with instability  POSTOPERATIVE DIAGNOSIS: Same  OPERATION PERFORMED: Right Total Hip Revision  SURGEON: Karri Schaeffer MD  Circulator: Elma Lim RN; Roopa Powell RN; Danielito Shabazz RN  Scrub Person: Irish Ritter  Vendor Representative: Brendon Levine  Other: Duyen Gee  ANESTHESIA: General  ESTIMATED BLOOD LOSS: 300cc  SPONGE AND NEEDLE COUNT: Correct  INDICATIONS: This patient had a draining hip wound with instability A discussion of operative versus nonoperative treatment was had with the patient. They elected to undergo revision hip arthroplasty. The risks of surgery were discussed and included the risk of anesthesia, infection, damage to neurovascular structures, implant loosening/failure, fracture, hardware prominence, dislocation, the need for further procedures, medical complications, and others. No guarantees were made. The patient wished to proceed with surgery and a surgical consent was signed.  COMPONENTS:   · [employer] dual mobility liner for a 56 mm cup  · Dual mobility head ball, +8 for a readapt stem    DETAILS OF PROCEDURE:   The patient was met in the preoperative area. The site was marked. The consent and H&P were reviewed. The patient was then wheeled back to the operative suite and underwent anesthesia. The patient was positioned laterally using the pegboard.  An axillary roll was placed under the down arm. Excess hair about the operative hip was removed using surgical clippers. Surgical alcohol was used to thoroughly clean the entire operative extremity.     The hip and leg were then prepped in the normal sterile fashion, which included ChloraPrep, multiple layers of sterile drapes, and surgical space suits for the entire  operative team. New outer gloves were used by all sterile surgical team members after final draping. The surgical incision was marked. A surgical timeout was performed in which administration of preoperative antibiotics and tranexamic acid, if not contraindicated, was confirmed.    A standard posterior approach to the hip was then utilized.  The deep fascia was noted to not be healing well with several areas of dehiscence.  There was a draining hematoma and this was cultured.  Some tissue was also sent for culture.  At that point, antibiotics were started.  After getting through the deep fascia, the hip was dislocated.  He was reasonably stable but looks like the system lacked a little bit of anteversion.  After removing the head ball with a osteotome and mallet, I placed retractors around the acetabulum and the liner was extracted atraumatically.  I then attached the slaphammer to the femoral stem and was able to back it out without any damage.  At that point, the hip was thoroughly irrigated using saline and Betadine with cystoscopy tubing.  A curette was used down the femoral canal and the bone from this was also sent for culture.  After a thorough irrigation, the real liner was opened and then snapped into place.    A femoral stem was then rotated and about 80 degrees of anteversion as compared to the prior stem that was only about 60 degrees.  After implantation, the head ball was assembled and inserted and the hip was relocated.  It was noted to be quite a bit more stable than it was prior to this revision of the femoral stem.    At that point, the hip was irrigated again and then was closed in layers.  I used a running suture in the deep fascia and then backed this up with a bunch of interrupted Ethibond sutures.    I also closed the deep fat layer with a running stitch.  The skin was then closed in layers.  A sterile dressing was applied.  A deep drain had been left deep to the fascia prior to  "closure.    The patient was moved from the operative table to the bed. The patient was taken to the recovery room in stable condition. There were no complications and the patient tolerated the procedure well.    R \"Kilo\" Laury BAXTER MD  Orthopaedic Surgery  [facility]  [phone]                  "

## 2022-01-18 NOTE — PLAN OF CARE
Goal Outcome Evaluation:  Plan of Care Reviewed With: patient        Progress: no change  Outcome Summary: PATIENT HERE WITH SEPTIC ARTHRITIS ON THE RIGHT HIP. WOUND VAC IN PLACE. SITE DRY/ INTACT. VSS. VOIDING  FINE PER BRP. PO APIN MEDICATION HELPING WITH PAIN. NPO . SURGERY PLANNED FOR TODAY. PATIENT IS IN CONTACT ISOLATION FOR VRE. EDUCATION PROVIDED ON BLOOD SUGAR MONITORING AND SAFETY. PATIENT VERBALIZED UNDERSTANDING. WILL CONTINUE TO MONITOR. CONSENT FOR SURGERY SIGNED.

## 2022-01-18 NOTE — ANESTHESIA PROCEDURE NOTES
Airway  Urgency: elective    Date/Time: 1/18/2022 1:02 PM  Airway not difficult    General Information and Staff    Patient location during procedure: OR  Anesthesiologist: Nazario Slade MD  CRNA: Belkys Bolton CRNA    Indications and Patient Condition  Indications for airway management: airway protection    Preoxygenated: yes  MILS not maintained throughout  Mask difficulty assessment: 2 - vent by mask + OA or adjuvant +/- NMBA    Final Airway Details  Final airway type: endotracheal airway      Successful airway: ETT  Cuffed: yes   Successful intubation technique: direct laryngoscopy  Facilitating devices/methods: intubating stylet and anterior pressure/BURP  Endotracheal tube insertion site: oral  Blade: Cedrick  Blade size: 3  ETT size (mm): 7.0  Cormack-Lehane Classification: grade IIb - view of arytenoids or posterior of glottis only  Placement verified by: chest auscultation and capnometry   Measured from: lips  ETT/EBT  to lips (cm): 21  Number of attempts at approach: 1  Assessment: lips, teeth, and gum same as pre-op and atraumatic intubation

## 2022-01-18 NOTE — ANESTHESIA PREPROCEDURE EVALUATION
Anesthesia Evaluation     Patient summary reviewed and Nursing notes reviewed                Airway   Mallampati: II  TM distance: >3 FB  Dental      Pulmonary    (+) a smoker Former,   Cardiovascular     ECG reviewed  PT is on anticoagulation therapy  Patient on routine beta blocker and Beta blocker given within 24 hours of surgery  Rhythm: regular  Rate: normal    (+) hypertension, valvular problems/murmurs murmur, past MI , CAD, cardiac stents CHF , hyperlipidemia,       Neuro/Psych- negative ROS  GI/Hepatic/Renal/Endo    (+) morbid obesity, GERD,  diabetes mellitus type 2 using insulin,     Musculoskeletal     Abdominal    Substance History - negative use     OB/GYN negative ob/gyn ROS         Other   arthritis,                      Anesthesia Plan    ASA 3     general   (Grade 2a airway by history  Anemia with 1 unit of PRBC's being given pre op and another available  Coronary stent history 5 yrs PTP    I have reviewed the patient's history with the patient and the chart, including all pertinent laboratory results and imaging. I have explained the risks of anesthesia including but not limited to dental damage, corneal abrasion, nerve injury, MI, stroke, and death. Questions asked and answered. Anesthetic plan discussed with patient and team as indicated. Patient expressed understanding of the above.  )  intravenous induction     Anesthetic plan, all risks, benefits, and alternatives have been provided, discussed and informed consent has been obtained with: patient.        CODE STATUS:

## 2022-01-19 PROBLEM — D62 POSTOPERATIVE ANEMIA DUE TO ACUTE BLOOD LOSS: Status: ACTIVE | Noted: 2022-01-19

## 2022-01-19 PROBLEM — B37.2 CANDIDAL INTERTRIGO: Status: ACTIVE | Noted: 2022-01-19

## 2022-01-19 LAB
ANION GAP SERPL CALCULATED.3IONS-SCNC: 10.5 MMOL/L (ref 5–15)
BASOPHILS # BLD AUTO: 0.02 10*3/MM3 (ref 0–0.2)
BASOPHILS NFR BLD AUTO: 0.2 % (ref 0–1.5)
BH BB BLOOD EXPIRATION DATE: NORMAL
BH BB BLOOD TYPE BARCODE: 5100
BH BB DISPENSE STATUS: NORMAL
BH BB PRODUCT CODE: NORMAL
BH BB UNIT NUMBER: NORMAL
BUN SERPL-MCNC: 9 MG/DL (ref 8–23)
BUN/CREAT SERPL: 10.6 (ref 7–25)
CALCIUM SPEC-SCNC: 8.1 MG/DL (ref 8.6–10.5)
CHLORIDE SERPL-SCNC: 102 MMOL/L (ref 98–107)
CO2 SERPL-SCNC: 19.5 MMOL/L (ref 22–29)
CREAT SERPL-MCNC: 0.85 MG/DL (ref 0.57–1)
CROSSMATCH INTERPRETATION: NORMAL
DEPRECATED RDW RBC AUTO: 46.1 FL (ref 37–54)
EOSINOPHIL # BLD AUTO: 0.01 10*3/MM3 (ref 0–0.4)
EOSINOPHIL NFR BLD AUTO: 0.1 % (ref 0.3–6.2)
ERYTHROCYTE [DISTWIDTH] IN BLOOD BY AUTOMATED COUNT: 15.5 % (ref 12.3–15.4)
FOLATE SERPL-MCNC: 5.63 NG/ML (ref 4.78–24.2)
GFR SERPL CREATININE-BSD FRML MDRD: 67 ML/MIN/1.73
GLUCOSE BLDC GLUCOMTR-MCNC: 158 MG/DL (ref 70–130)
GLUCOSE BLDC GLUCOMTR-MCNC: 174 MG/DL (ref 70–130)
GLUCOSE BLDC GLUCOMTR-MCNC: 192 MG/DL (ref 70–130)
GLUCOSE BLDC GLUCOMTR-MCNC: 217 MG/DL (ref 70–130)
GLUCOSE SERPL-MCNC: 152 MG/DL (ref 65–99)
HCT VFR BLD AUTO: 24.2 % (ref 34–46.6)
HGB BLD-MCNC: 7.5 G/DL (ref 12–15.9)
IMM GRANULOCYTES # BLD AUTO: 0.08 10*3/MM3 (ref 0–0.05)
IMM GRANULOCYTES NFR BLD AUTO: 1 % (ref 0–0.5)
IRON 24H UR-MRATE: 34 MCG/DL (ref 37–145)
IRON SATN MFR SERPL: 13 % (ref 20–50)
LYMPHOCYTES # BLD AUTO: 0.77 10*3/MM3 (ref 0.7–3.1)
LYMPHOCYTES NFR BLD AUTO: 9.6 % (ref 19.6–45.3)
MCH RBC QN AUTO: 26.2 PG (ref 26.6–33)
MCHC RBC AUTO-ENTMCNC: 31 G/DL (ref 31.5–35.7)
MCV RBC AUTO: 84.6 FL (ref 79–97)
MONOCYTES # BLD AUTO: 0.74 10*3/MM3 (ref 0.1–0.9)
MONOCYTES NFR BLD AUTO: 9.2 % (ref 5–12)
NEUTROPHILS NFR BLD AUTO: 6.43 10*3/MM3 (ref 1.7–7)
NEUTROPHILS NFR BLD AUTO: 79.9 % (ref 42.7–76)
NRBC BLD AUTO-RTO: 0 /100 WBC (ref 0–0.2)
PLATELET # BLD AUTO: 160 10*3/MM3 (ref 140–450)
PMV BLD AUTO: 10 FL (ref 6–12)
POTASSIUM SERPL-SCNC: 4 MMOL/L (ref 3.5–5.2)
RBC # BLD AUTO: 2.86 10*6/MM3 (ref 3.77–5.28)
SODIUM SERPL-SCNC: 132 MMOL/L (ref 136–145)
TIBC SERPL-MCNC: 255 MCG/DL (ref 298–536)
TRANSFERRIN SERPL-MCNC: 171 MG/DL (ref 200–360)
UNIT  ABO: NORMAL
UNIT  RH: NORMAL
VANCOMYCIN TROUGH SERPL-MCNC: 12.3 MCG/ML (ref 5–20)
VIT B12 BLD-MCNC: 377 PG/ML (ref 211–946)
WBC NRBC COR # BLD: 8.05 10*3/MM3 (ref 3.4–10.8)

## 2022-01-19 PROCEDURE — 80048 BASIC METABOLIC PNL TOTAL CA: CPT | Performed by: ORTHOPAEDIC SURGERY

## 2022-01-19 PROCEDURE — 82746 ASSAY OF FOLIC ACID SERUM: CPT | Performed by: ORTHOPAEDIC SURGERY

## 2022-01-19 PROCEDURE — 85025 COMPLETE CBC W/AUTO DIFF WBC: CPT | Performed by: ORTHOPAEDIC SURGERY

## 2022-01-19 PROCEDURE — 25010000002 VANCOMYCIN PER 500 MG: Performed by: ORTHOPAEDIC SURGERY

## 2022-01-19 PROCEDURE — P9016 RBC LEUKOCYTES REDUCED: HCPCS

## 2022-01-19 PROCEDURE — 25010000002 FUROSEMIDE PER 20 MG: Performed by: INTERNAL MEDICINE

## 2022-01-19 PROCEDURE — 25010000002 CEFTRIAXONE PER 250 MG: Performed by: ORTHOPAEDIC SURGERY

## 2022-01-19 PROCEDURE — 84466 ASSAY OF TRANSFERRIN: CPT | Performed by: ORTHOPAEDIC SURGERY

## 2022-01-19 PROCEDURE — 97530 THERAPEUTIC ACTIVITIES: CPT

## 2022-01-19 PROCEDURE — 36430 TRANSFUSION BLD/BLD COMPNT: CPT

## 2022-01-19 PROCEDURE — 97162 PT EVAL MOD COMPLEX 30 MIN: CPT

## 2022-01-19 PROCEDURE — 86900 BLOOD TYPING SEROLOGIC ABO: CPT

## 2022-01-19 PROCEDURE — 82962 GLUCOSE BLOOD TEST: CPT

## 2022-01-19 PROCEDURE — 83540 ASSAY OF IRON: CPT | Performed by: ORTHOPAEDIC SURGERY

## 2022-01-19 PROCEDURE — 82607 VITAMIN B-12: CPT | Performed by: ORTHOPAEDIC SURGERY

## 2022-01-19 PROCEDURE — 25010000002 CYANOCOBALAMIN PER 1000 MCG: Performed by: INTERNAL MEDICINE

## 2022-01-19 PROCEDURE — 80202 ASSAY OF VANCOMYCIN: CPT | Performed by: ORTHOPAEDIC SURGERY

## 2022-01-19 PROCEDURE — 63710000001 INSULIN LISPRO (HUMAN) PER 5 UNITS: Performed by: ORTHOPAEDIC SURGERY

## 2022-01-19 RX ORDER — FUROSEMIDE 10 MG/ML
40 INJECTION INTRAMUSCULAR; INTRAVENOUS ONCE
Status: COMPLETED | OUTPATIENT
Start: 2022-01-19 | End: 2022-01-19

## 2022-01-19 RX ORDER — CHOLECALCIFEROL (VITAMIN D3) 125 MCG
1000 CAPSULE ORAL DAILY
Status: DISCONTINUED | OUTPATIENT
Start: 2022-01-21 | End: 2022-01-22 | Stop reason: HOSPADM

## 2022-01-19 RX ORDER — VANCOMYCIN HYDROCHLORIDE 1 G/200ML
1000 INJECTION, SOLUTION INTRAVENOUS EVERY 12 HOURS
Status: DISCONTINUED | OUTPATIENT
Start: 2022-01-19 | End: 2022-01-20

## 2022-01-19 RX ORDER — NYSTATIN 100000 [USP'U]/G
POWDER TOPICAL EVERY 12 HOURS SCHEDULED
Status: DISCONTINUED | OUTPATIENT
Start: 2022-01-19 | End: 2022-01-22 | Stop reason: HOSPADM

## 2022-01-19 RX ORDER — CYANOCOBALAMIN 1000 UG/ML
1000 INJECTION, SOLUTION INTRAMUSCULAR; SUBCUTANEOUS DAILY
Status: COMPLETED | OUTPATIENT
Start: 2022-01-19 | End: 2022-01-20

## 2022-01-19 RX ADMIN — ATORVASTATIN CALCIUM 10 MG: 20 TABLET, FILM COATED ORAL at 20:29

## 2022-01-19 RX ADMIN — GABAPENTIN 600 MG: 300 CAPSULE ORAL at 20:29

## 2022-01-19 RX ADMIN — NYSTATIN: 100000 POWDER TOPICAL at 20:37

## 2022-01-19 RX ADMIN — VANCOMYCIN HYDROCHLORIDE 1000 MG: 1 INJECTION, SOLUTION INTRAVENOUS at 12:15

## 2022-01-19 RX ADMIN — CYANOCOBALAMIN 1000 MCG: 1000 INJECTION, SOLUTION INTRAMUSCULAR at 12:14

## 2022-01-19 RX ADMIN — INSULIN LISPRO 2 UNITS: 100 INJECTION, SOLUTION INTRAVENOUS; SUBCUTANEOUS at 16:32

## 2022-01-19 RX ADMIN — FERROUS SULFATE TAB 325 MG (65 MG ELEMENTAL FE) 325 MG: 325 (65 FE) TAB at 08:07

## 2022-01-19 RX ADMIN — INSULIN LISPRO 2 UNITS: 100 INJECTION, SOLUTION INTRAVENOUS; SUBCUTANEOUS at 08:07

## 2022-01-19 RX ADMIN — FUROSEMIDE 40 MG: 10 INJECTION, SOLUTION INTRAMUSCULAR; INTRAVENOUS at 15:20

## 2022-01-19 RX ADMIN — INSULIN GLARGINE-YFGN 40 UNITS: 100 INJECTION, SOLUTION SUBCUTANEOUS at 20:29

## 2022-01-19 RX ADMIN — LINEZOLID 600 MG: 600 TABLET, FILM COATED ORAL at 08:07

## 2022-01-19 RX ADMIN — HYDROCODONE BITARTRATE AND ACETAMINOPHEN 1 TABLET: 5; 325 TABLET ORAL at 08:06

## 2022-01-19 RX ADMIN — HYDROCODONE BITARTRATE AND ACETAMINOPHEN 2 TABLET: 5; 325 TABLET ORAL at 16:32

## 2022-01-19 RX ADMIN — GABAPENTIN 600 MG: 300 CAPSULE ORAL at 08:06

## 2022-01-19 RX ADMIN — PANTOPRAZOLE SODIUM 40 MG: 40 TABLET, DELAYED RELEASE ORAL at 08:06

## 2022-01-19 RX ADMIN — NYSTATIN: 100000 POWDER TOPICAL at 12:14

## 2022-01-19 RX ADMIN — ASPIRIN 81 MG: 81 TABLET, CHEWABLE ORAL at 20:29

## 2022-01-19 RX ADMIN — HYDROCODONE BITARTRATE AND ACETAMINOPHEN 2 TABLET: 5; 325 TABLET ORAL at 03:48

## 2022-01-19 RX ADMIN — HYDROCODONE BITARTRATE AND ACETAMINOPHEN 2 TABLET: 5; 325 TABLET ORAL at 12:09

## 2022-01-19 RX ADMIN — VANCOMYCIN HYDROCHLORIDE 1000 MG: 1 INJECTION, SOLUTION INTRAVENOUS at 23:56

## 2022-01-19 RX ADMIN — CEFTRIAXONE SODIUM 2 G: 2 INJECTION, POWDER, FOR SOLUTION INTRAMUSCULAR; INTRAVENOUS at 15:20

## 2022-01-19 RX ADMIN — HYDROCODONE BITARTRATE AND ACETAMINOPHEN 2 TABLET: 5; 325 TABLET ORAL at 20:29

## 2022-01-19 RX ADMIN — ISOSORBIDE MONONITRATE 30 MG: 30 TABLET ORAL at 08:07

## 2022-01-19 RX ADMIN — INSULIN LISPRO 2 UNITS: 100 INJECTION, SOLUTION INTRAVENOUS; SUBCUTANEOUS at 11:30

## 2022-01-19 RX ADMIN — LINEZOLID 600 MG: 600 TABLET, FILM COATED ORAL at 20:29

## 2022-01-19 NOTE — PLAN OF CARE
Goal Outcome Evaluation:  Plan of Care Reviewed With: patient, spouse           Outcome Summary: Pt is 66 u/o F POD1 R hip I&D. Pt lives at home with her , 0 TREVON/stairs. Per Dr. Schaeffer, pt does not need to mobilize too much until soft tissue has healed. Ambulated to bathroom today and then returned pt to bed. Pt was SBA/CGA for all mobility and demo's good balance with use of RW. Dec'd strength, endurance, and balance, but rec D/C home with assist and HH.

## 2022-01-19 NOTE — PROGRESS NOTES
Name: Stephy Duncan ADMIT: 2022   : 1955  PCP: Pierre Rosalesin    MRN: [id number] LOS: 3 days   AGE/SEX: 66 y.o. female  ROOM: [facility]     Subjective   Subjective   Referring Provider: Dr. Schaeffer  Reason for follow up: Hypertension, Type 2 DM, chronic diastolic CHF  No acute events. Had surgery and tolerated well. Pain is well-controlled. Taking PO. No nausea or vomiting. Denies CP/dyspnea/f/c/d.  She does complain of itching and rash under her abdominal folds and states she gets this often.   Her  is at bedside.    Objective   Objective   Vital Signs  Temp:  [96.8 °F (36 °C)-98.4 °F (36.9 °C)] 97.6 °F (36.4 °C)  Heart Rate:  [] 85  Resp:  [14-17] 16  BP: ()/(49-68) 96/58  SpO2:  [93 %-100 %] 96 %  on  Flow (L/min):  [2-3] 2;   Device (Oxygen Therapy): room air  Body mass index is 36.22 kg/m².  Physical Exam  Vitals and nursing note reviewed.   Constitutional:       General: She is not in acute distress.     Appearance: She is not toxic-appearing or diaphoretic.   HENT:      Head: Normocephalic and atraumatic.      Nose: Nose normal.      Mouth/Throat:      Mouth: Mucous membranes are moist.      Pharynx: Oropharynx is clear.   Eyes:      Extraocular Movements: Extraocular movements intact.      Conjunctiva/sclera: Conjunctivae normal.      Pupils: Pupils are equal, round, and reactive to light.   Cardiovascular:      Rate and Rhythm: Normal rate and regular rhythm.      Pulses: Normal pulses.   Pulmonary:      Effort: Pulmonary effort is normal.      Breath sounds: Normal breath sounds. No rales.   Abdominal:      General: Bowel sounds are normal.      Palpations: Abdomen is soft.      Tenderness: There is no abdominal tenderness.   Musculoskeletal:         General: No swelling or tenderness.      Cervical back: Normal range of motion and neck supple.      Comments: Right hip with wound vac in place   Skin:     General: Skin is warm and dry.      Capillary Refill: Capillary  refill takes less than 2 seconds.      Comments: Erythema with small satellite lesions under her abdominal fold   Neurological:      General: No focal deficit present.      Mental Status: She is alert and oriented to person, place, and time.   Psychiatric:         Mood and Affect: Mood normal.         Behavior: Behavior normal.     Results Review     I reviewed the patient's new clinical results.  Results from last 7 days   Lab Units 01/19/22  0700 01/18/22  1702 01/18/22  0613 01/17/22  0634 01/16/22  1309 01/16/22  1309   WBC 10*3/mm3 8.05  --  5.69 6.38  --  11.68*   HEMOGLOBIN g/dL 7.5* 9.0* 7.2* 7.1*   < > 8.2*   PLATELETS 10*3/mm3 160  --  150 142  --  210    < > = values in this interval not displayed.     Results from last 7 days   Lab Units 01/19/22  0700 01/18/22  0613 01/17/22  0634 01/16/22  1308   SODIUM mmol/L 132* 136 138 135*   POTASSIUM mmol/L 4.0 3.6 3.7 3.7   CHLORIDE mmol/L 102 104 103 100   CO2 mmol/L 19.5* 22.9 23.1 23.2   BUN mg/dL 9 6* 6* 6*   CREATININE mg/dL 0.85 0.88 0.91 0.94   GLUCOSE mg/dL 152* 147* 100* 136*   EGFR IF NONAFRICN AM mL/min/1.73 67 64 61 59*       Results from last 7 days   Lab Units 01/19/22  0700 01/18/22  0613 01/17/22  0634 01/16/22  1308   CALCIUM mg/dL 8.1* 7.9* 8.3* 8.6       COVID19   Date Value Ref Range Status   01/16/2022 Not Detected Not Detected - Ref. Range Final   01/07/2022 Not Detected Not Detected - Ref. Range Final     Hemoglobin A1C   Date/Time Value Ref Range Status   01/16/2022 1309 7.56 (H) 4.80 - 5.60 % Final     Glucose   Date/Time Value Ref Range Status   01/19/2022 0634 174 (H) 70 - 130 mg/dL Final     Comment:     Meter: [id number] : [id number] Rossi [place]   01/18/2022 2107 281 (H) 70 - 130 mg/dL Final     Comment:     Meter: [id number] : [id number] Albertcheliat Ariana NA   01/18/2022 1625 166 (H) 70 - 130 mg/dL Final     Comment:     Meter: [id number] : [id number] Dash Fernández    01/18/2022 1445 147 (H) 70 - 130 mg/dL Final      Comment:     Meter: [id number] : [id number] Bebeto Wade RN   01/18/2022 1146 143 (H) 70 - 130 mg/dL Final     Comment:     Meter: [id number] : [id number] Dash Fernández NA   01/18/2022 0617 151 (H) 70 - 130 mg/dL Final     Comment:     Meter: [id number] : [id number] Fernandez Elle NA   01/17/2022 2042 223 (H) 70 - 130 mg/dL Final     Comment:     Meter: [id number] : [id number] Fernandez Elle NA       XR Hip With or Without Pelvis 1 View Right  ONE VIEW PORTABLE RIGHT HIP AND ONE VIEW PORTABLE AP PELVIS     HISTORY: Right hip replacement and hip revision surgery.     FINDINGS: The patient has had recent hip revision surgery and the  alignment appears satisfactory.     This report was finalized on 1/18/2022 3:00 PM by Dr. Juwan Combs M.D.       Scheduled Medications  aspirin, 81 mg, Oral, Nightly  atorvastatin, 10 mg, Oral, Nightly  cefTRIAXone, 2 g, Intravenous, Q24H  cyanocobalamin, 1,000 mcg, Subcutaneous, Daily  ferrous sulfate, 325 mg, Oral, Every Other Day  gabapentin, 600 mg, Oral, Q12H  insulin glargine, 40 Units, Subcutaneous, Nightly  insulin lispro, 0-7 Units, Subcutaneous, TID AC  isosorbide mononitrate, 30 mg, Oral, QAM  linezolid, 600 mg, Oral, Q12H  metoprolol tartrate, 25 mg, Oral, Nightly  nystatin, , Topical, Q12H  pantoprazole, 40 mg, Oral, QAM  [START ON 1/21/2022] vitamin B-12, 1,000 mcg, Oral, Daily    Infusions  lactated ringers, 9 mL/hr, Last Rate: 9 mL/hr (01/18/22 1250)  Pharmacy to dose vancomycin,     Diet  Diet Regular       Assessment/Plan     Active Hospital Problems    Diagnosis  POA   • **Septic arthritis of hip (HCC) [M00.9]  Yes   • Candidal intertrigo [B37.2]  Yes   • Postoperative anemia due to acute blood loss [D62]  No   • Drainage from wound [L24.A9]  Yes   • Morbid obesity (HCC) [E66.01]  Yes   • Chronic diastolic CHF (congestive heart failure) (HCC) [I50.32]  Yes   • Presence of stent in coronary artery in patient with coronary artery disease [I25.10, Z95.5]  Not  Applicable   • Type 2 diabetes mellitus with diabetic polyneuropathy, with long-term current use of insulin (HCC) [E11.42, Z79.4]  Not Applicable   • Hypertension [I10]  Yes   • Normocytic anemia [D64.9]  Yes      Resolved Hospital Problems   No resolved problems to display.   Right Hip Drainage  - long history of septic arthritis and has had multiple surgeries including [place] and more recently right total hip revision 11/16/21 and right hip I&D with head and liner exchange 12/18/21-has had cultures positive for VRE previously  - admitted now with continued right hip drainage, started on zyvox and ID consulted  - s/p right hip I&D 1/18/22  - on abx managed by primary team    Type 2 DM  - she is on large doses of insulin at home  - continue lantus 40 units nightly (on 50 units at home)  - holding scheduled prandial lispro for now   - continue coverage with ssi/hypoglycemia protocol low dose    HTN/Chronic diastolic CHF/CAD s/p remote stent  - no anginal symptoms, BP is acceptable and she appears euvolemic  - continue on ASA, statin, and metoprolol as well as imdur  - monitor BP and volume status    Normocytic Anemia  -s/p 1 unit of PRBCs preoperatively with good response, now has postoperative anemia Hgb less than 8.0 will give another unit of PRBCs with a dose of lasix given diastolic CHF  - B12 is a little low, will replace  - has some iron deficiency along with concomitant anemia of inflammation as expected, on PO iron    SCDs for DVT prophylaxis.  Full code.  Discussed with patient, family and nursing staff.  Disposition per primary team.      Aj Lozano MD  [facility]  01/19/22  10:58 EST

## 2022-01-19 NOTE — PLAN OF CARE
Goal Outcome Evaluation:              Outcome Summary: Pt POD1 from I&D of L hip today. AxO x4. VSS. NVI. Pain controlled with PO pain meds. Up with stand-by assist. 1 unit PRBCs transfused today. Drsg to R hip changed to island drsg due to falling apart when pt up to bathroom. HV in place. Contact precautions maintained. Plans to d/c home w HH pending. All needs met at this time.

## 2022-01-19 NOTE — DISCHARGE PLACEMENT REQUEST
"Stephy Duncan (66 y.o. 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)   Code Status: Prior   Advance Care Planning Activity    Ht: 162.6 cm (64\")   Wt: 95.7 kg (211 lb)    Admission Cmt: None   Principal Problem: Septic arthritis of hip (HCC) [M00.9]                 Active Insurance as of 1/16/2022     Primary Coverage     Payor Plan Insurance Group Employer/Plan Group    AETNA MEDICARE REPLACEMENT AETNA MEDICARE REPLACEMENT [id number]     Payor Plan Address Payor Plan Phone Number Payor Plan Fax Number Effective Dates    [address] [phone]  1/1/2022 - None Entered    [facility] [id number]       Subscriber Name Subscriber Birth Date Member ID       Stephy Duncan 1955 [id number]                 Emergency Contacts      (Rel.) Home Phone Work Phone Mobile Phone    ADRIANNAESTUARDO (Spouse) -- -- [phone]    Bhumika Perez (Sister) -- -- [phone]              "

## 2022-01-19 NOTE — CASE MANAGEMENT/SOCIAL WORK
Discharge Planning Assessment  [facility]     Patient Name: Stephy Duncan  MRN: [id number]  Today's Date: 1/19/2022    Admit Date: 1/16/2022     Discharge Needs Assessment     Row Name 01/19/22 7656       Living Environment    Lives With spouse    Name(s) of Who Lives With Patient /Bulmaro.    Current Living Arrangements home/apartment/condo    Primary Care Provided by self    Provides Primary Care For no one    Family Caregiver if Needed spouse    Quality of Family Relationships helpful; involved; supportive    Able to Return to Prior Arrangements yes       Resource/Environmental Concerns    Transportation Concerns car, none       Transition Planning    Patient/Family Anticipates Transition to home with family; home with help/services    Patient/Family Anticipated Services at Transition     Transportation Anticipated family or friend will provide; health plan transportation       Discharge Needs Assessment    Readmission Within the Last 30 Days no previous admission in last 30 days    Equipment Currently Used at Home cane, straight; walker, rolling; rollator; shower chair; grab bar; wheelchair; glucometer    Discharge Facility/Level of Care Needs home with home health    Provided Post Acute Provider List? N/A    N/A Provider List Comment Current with [facility].    Patient's Choice of Community Agency(s) [facility].               Discharge Plan     Row Name 01/19/22 9147       Plan    Plan Home with family support & [facility]. (follow for possible [facility])    Patient/Family in Agreement with Plan yes    Plan Comments Spoke with the patient, verified current information and explained the role of the CCP. Patient said she lives with her /Bulmaro and has family support. She's overall dependent on Bulmaro to assist with some ADLs. She owns a cane, walker, rollator, shower chair, grab bars, wheelchair and glucometer. She's beent o [facility] and is current with  [facility] [place]. Physical Therapy eval/rec noted. Patient plans to d/c home with family support & [facility]. Referral sent in [facility]. CCP will follow for discharge needs (possible [facility]ac).              Continued Care and Services - Admitted Since 1/16/2022     Home Medical Care     Service Provider Request Status Selected Services Address Phone Fax Patient Preferred    [facility]  Accepted N/A [address], [address], [facility] [address] [phone] --            Selected Continued Care - Prior Encounters Includes selections from prior encounters from 10/18/2021 to 1/19/2022    Discharged on 12/23/2021 Admission date: 12/17/2021 - Discharge disposition: Home-Health Care [facility]    Home Medical Care     Service Provider Selected Services Address Phone Fax Patient Preferred    [facility] [address], [address], [place] [facility] [facility] [address] [phone] --                Discharged on 11/24/2021 Admission date: 11/16/2021 - Discharge disposition: Skilled Nursing Facility (DC - External)    Destination     Service Provider Selected Services Address Phone Fax Patient Preferred    [facility] [address] [facility] [address] [phone] --          Home Medical Care     Service Provider Selected Services Address Phone Fax Patient Preferred    [facility] [place] [address], [address], [facility] [address] [phone] --                    Expected Discharge Date and Time     Expected Discharge Date Expected Discharge Time    Jan 20, 2022          Demographic Summary     Row Name 01/19/22 1643       General Information    Admission Type inpatient    Reason for Consult discharge planning    Preferred Language English     Used During This Interaction no       Contact Information    Permission Granted to Share Info  With ; family/designee               Functional Status     Row Name 01/19/22 8962       Functional Status    Usual Activity Tolerance moderate    Current Activity Tolerance moderate       Functional Status, IADL    Medications independent    Meal Preparation assistive equipment; assistive equipment and person    Housekeeping assistive equipment; assistive equipment and person    Laundry assistive equipment; assistive equipment and person    Shopping assistive equipment; assistive equipment and person       Mental Status Summary    Recent Changes in Mental Status/Cognitive Functioning no changes               Psychosocial     Row Name 01/19/22 1640       Intellectual Performance WDL    Level of Consciousness Alert       Coping/Stress    Patient Personal Strengths able to adapt    Sources of Support spouse    Reaction to Health Status accepting    Understanding of Condition and Treatment adequate understanding of medical condition; adequate understanding of treatment       Developmental Stage (Eriksson's)    Developmental Stage Stage 8 (65 years-death/Late Adulthood) Integrity vs. Despair               Abuse/Neglect    No documentation.                Legal    No documentation.                Substance Abuse    No documentation.                Patient Forms    No documentation.                   Lucy Meeks RN

## 2022-01-19 NOTE — PLAN OF CARE
Goal Outcome Evaluation:              Outcome Summary: Pt s/p L hip I&D today. AxO x4. VSS. NVI. Pain controlled with PO pain meds. HV in place. Drsg CDI. Contact precautions maintained. 1 unit PRBCs given post-op, finished 1 unit upon admission to floor. Up with assist x1 and walker. Plans to d/c home pending. Educated on use of call light and fall prevention, verbalized understanding. All needs met at this time.

## 2022-01-19 NOTE — PAYOR COMM NOTE
"CLINICAL FOR REVIEW  REF #[id number]  ID #[id number]  F:  [phone]      Stephy Duncan (66 y.o. 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)   Code Status: Prior   Advance Care Planning Activity    Ht: 162.6 cm (64\")   Wt: 95.7 kg (211 lb)    Admission Cmt: None   Principal Problem: Septic arthritis of hip (HCC) [M00.9]                 Active Insurance as of 2022     Primary Coverage     Payor Plan Insurance Group Employer/Plan Group    AETNA MEDICARE REPLACEMENT AETNA MEDICARE REPLACEMENT [id number]     Payor Plan Address Payor Plan Phone Number Payor Plan Fax Number Effective Dates    [address] [phone]  2022 - None Entered    [facility] [id number]       Subscriber Name Subscriber Birth Date Member ID       Stephy Duncan 1955 [id number]                 Emergency Contacts      (Rel.) Home Phone Work Phone Mobile Phone    ESTUARDO DUNCAN (Spouse) -- -- [phone]    Chris Bhumika (Sister) -- -- [phone]               Operative/Procedure Notes      Karri Schaeffer II, MD at 22 1313          Total Hip Revision Operative Note  Dr. SCHNEIDER “Kilo” Laury BAXTER  [phone]    PATIENT NAME: Stephy Duncan  MRN: [id number]  : 1955 AGE: 66 y.o. GENDER: female  DATE OF OPERATION: 2022  PREOPERATIVE DIAGNOSIS: " Draining hip wound with instability  POSTOPERATIVE DIAGNOSIS: Same  OPERATION PERFORMED: Right Total Hip Revision  SURGEON: Karri Schaeffer MD  Circulator: Elma Lim RN; Roopa Powell RN; Danielito Shabazz RN  Scrub Person: Irish Ritter  Vendor Representative: Brendon Levine  Other: Duyen Gee  ANESTHESIA: General  ESTIMATED BLOOD LOSS: 300cc  SPONGE AND NEEDLE COUNT: Correct  INDICATIONS: This patient had a draining hip wound with instability A discussion of operative versus nonoperative treatment was had with the patient. They elected to undergo revision hip arthroplasty. The risks of surgery were discussed and included the risk of anesthesia, infection, damage to neurovascular structures, implant loosening/failure, fracture, hardware prominence, dislocation, the need for further procedures, medical complications, and others. No guarantees were made. The patient wished to proceed with surgery and a surgical consent was signed.  COMPONENTS:   · [employer] dual mobility liner for a 56 mm cup  · Dual mobility head ball, +8 for a readapt stem    DETAILS OF PROCEDURE:   The patient was met in the preoperative area. The site was marked. The consent and H&P were reviewed. The patient was then wheeled back to the operative suite and underwent anesthesia. The patient was positioned laterally using the pegboard.  An axillary roll was placed under the down arm. Excess hair about the operative hip was removed using surgical clippers. Surgical alcohol was used to thoroughly clean the entire operative extremity.     The hip and leg were then prepped in the normal sterile fashion, which included ChloraPrep, multiple layers of sterile drapes, and surgical space suits for the entire operative team. New outer gloves were used by all sterile surgical team members after final draping. The surgical incision was marked. A surgical timeout was performed in which administration of preoperative antibiotics and  "tranexamic acid, if not contraindicated, was confirmed.    A standard posterior approach to the hip was then utilized.  The deep fascia was noted to not be healing well with several areas of dehiscence.  There was a draining hematoma and this was cultured.  Some tissue was also sent for culture.  At that point, antibiotics were started.  After getting through the deep fascia, the hip was dislocated.  He was reasonably stable but looks like the system lacked a little bit of anteversion.  After removing the head ball with a osteotome and mallet, I placed retractors around the acetabulum and the liner was extracted atraumatically.  I then attached the slaphammer to the femoral stem and was able to back it out without any damage.  At that point, the hip was thoroughly irrigated using saline and Betadine with cystoscopy tubing.  A curette was used down the femoral canal and the bone from this was also sent for culture.  After a thorough irrigation, the real liner was opened and then snapped into place.    A femoral stem was then rotated and about 80 degrees of anteversion as compared to the prior stem that was only about 60 degrees.  After implantation, the head ball was assembled and inserted and the hip was relocated.  It was noted to be quite a bit more stable than it was prior to this revision of the femoral stem.    At that point, the hip was irrigated again and then was closed in layers.  I used a running suture in the deep fascia and then backed this up with a bunch of interrupted Ethibond sutures.    I also closed the deep fat layer with a running stitch.  The skin was then closed in layers.  A sterile dressing was applied.  A deep drain had been left deep to the fascia prior to closure.    The patient was moved from the operative table to the bed. The patient was taken to the recovery room in stable condition. There were no complications and the patient tolerated the procedure well.    R \"Kilo\" Sweet II " MD  Orthopaedic Surgery  [facility]  [phone]        Electronically signed by Karri Schaeffer II, MD at 22 0070          Physician Progress Notes      Karri Schaeffer II, MD at 22 2994        Postop day 1 right hip surgery.  Dressings with minimal drainage.  Okay for weightbearing as tolerated but she really does not need to mobilize too much until her soft tissues have time to heal.  Cultures pending at this point    Electronically signed by Karri Schaeffer II, MD at 22 075     Aj Lozano MD at 22 1007              Name: Stephy Duncan ADMIT: 2022   : 1955  PCP: Gregorio Rosales    MRN: [id number] LOS: 2 days   AGE/SEX: 66 y.o. female  ROOM: [facility]     Subjective   Subjective   Referring Provider: Dr. Schaeffer  Reason for follow up: Hypertension, Type 2 DM, chronic diastolic CHF  No acute events. Patient has no new complaints. She has been NPO fr surgery. No nausea or vomiting. Denies CP/dyspnea/f/c/d.    Objective   Objective   Vital Signs  Temp:  [97.1 °F (36.2 °C)-99.4 °F (37.4 °C)] 97.7 °F (36.5 °C)  Heart Rate:  [69-81] 75  Resp:  [16-18] 16  BP: (103-130)/(46-70) 112/56  SpO2:  [93 %-97 %] 97 %  on   ;   Device (Oxygen Therapy): room air  Body mass index is 36.22 kg/m².  Physical Exam  Vitals and nursing note reviewed.   Constitutional:       General: She is not in acute distress.     Appearance: She is not toxic-appearing or diaphoretic.   HENT:      Head: Normocephalic and atraumatic.      Nose: Nose normal.      Mouth/Throat:      Mouth: Mucous membranes are moist.      Pharynx: Oropharynx is clear.   Eyes:      Extraocular Movements: Extraocular movements intact.      Conjunctiva/sclera: Conjunctivae normal.      Pupils: Pupils are equal, round, and reactive to light.   Cardiovascular:      Rate and Rhythm: Normal rate and regular rhythm.      Pulses: Normal pulses.   Pulmonary:      Effort: Pulmonary effort is  normal.      Breath sounds: Normal breath sounds. No rales.   Abdominal:      General: Bowel sounds are normal.      Palpations: Abdomen is soft.      Tenderness: There is no abdominal tenderness.   Musculoskeletal:         General: No swelling or tenderness.      Cervical back: Normal range of motion and neck supple.      Comments: Right hip with wound vac in place   Skin:     General: Skin is warm and dry.      Capillary Refill: Capillary refill takes less than 2 seconds.   Neurological:      General: No focal deficit present.      Mental Status: She is alert and oriented to person, place, and time.   Psychiatric:         Mood and Affect: Mood normal.         Behavior: Behavior normal.       Results Review     I reviewed the patient's new clinical results.  Results from last 7 days   Lab Units 01/18/22  0613 01/17/22  0634 01/16/22  1309   WBC 10*3/mm3 5.69 6.38 11.68*   HEMOGLOBIN g/dL 7.2* 7.1* 8.2*   PLATELETS 10*3/mm3 150 142 210     Results from last 7 days   Lab Units 01/18/22  0613 01/17/22  0634 01/16/22  1308   SODIUM mmol/L 136 138 135*   POTASSIUM mmol/L 3.6 3.7 3.7   CHLORIDE mmol/L 104 103 100   CO2 mmol/L 22.9 23.1 23.2   BUN mg/dL 6* 6* 6*   CREATININE mg/dL 0.88 0.91 0.94   GLUCOSE mg/dL 147* 100* 136*   EGFR IF NONAFRICN AM mL/min/1.73 64 61 59*       Results from last 7 days   Lab Units 01/18/22  0613 01/17/22  0634 01/16/22  1308   CALCIUM mg/dL 7.9* 8.3* 8.6       COVID19   Date Value Ref Range Status   01/16/2022 Not Detected Not Detected - Ref. Range Final   01/07/2022 Not Detected Not Detected - Ref. Range Final     Hemoglobin A1C   Date/Time Value Ref Range Status   01/16/2022 1309 7.56 (H) 4.80 - 5.60 % Final     Glucose   Date/Time Value Ref Range Status   01/18/2022 0617 151 (H) 70 - 130 mg/dL Final     Comment:     Meter: [id number] : [id number] Fernandez Ellenorma [place]   01/17/2022 2042 223 (H) 70 - 130 mg/dL Final     Comment:     Meter: [id number] : [id number] Jim [place]   01/17/2022  1616 144 (H) 70 - 130 mg/dL Final     Comment:     Meter: [id number] : [id number] Cristi Da Silva CNA   01/17/2022 1057 146 (H) 70 - 130 mg/dL Final     Comment:     Meter: [id number] : [id number] Cristi Da Silva CNA   01/17/2022 0624 102 70 - 130 mg/dL Final     Comment:     Meter: [id number] : [id number] Susannah Cheneyyla RN   01/16/2022 2135 117 70 - 130 mg/dL Final     Comment:     Meter: [id number] : [id number] Anderson [place]       XR Hip With or Without Pelvis 1 View Right  Narrative: PROCEDURE: XR HIP W OR WO PELVIS 1 VIEW RIGHT     COMPARISON: [facility], , XR HIP W OR WO PELVIS 2-3 VIEW RIGHT, 1/07/2022, 17:32.     INDICATIONS: POST REDUCTION     FINDINGS:   Apparent interval reduction of the dislocated right hip prosthesis.  No visible fracture.     Impression:  Reduction of the right hip prosthesis               CURTIS FERGUSON MD         Electronically Signed and Approved By: CURTIS FERGUSON MD on 1/07/2022 at 18:52                   XR Hip With or Without Pelvis 2 - 3 View Right  Narrative: PROCEDURE: XR HIP W OR WO PELVIS 2-3 VIEW RIGHT     COMPARISON: [facility], , RIGHT HIP/PELVIS, 4/16/2019, 4:54.     INDICATIONS: right hip pain after injury today     FINDINGS:   Dislocation of the right hip prosthesis.  No visible fracture.  There are skin staples overlying   the right hip.     Impression:  Right hip prosthesis.  The prosthesis is dislocated.     No visible fracture.               CURTIS FERGUSON MD         Electronically Signed and Approved By: CURTIS FERGUSON MD on 1/07/2022 at 17:54                     Scheduled Medications  aspirin, 81 mg, Oral, Nightly  atorvastatin, 10 mg, Oral, Nightly  ferrous sulfate, 325 mg, Oral, Every Other Day  gabapentin, 600 mg, Oral, Q12H  insulin glargine, 40 Units, Subcutaneous, Nightly  insulin lispro, 0-7 Units, Subcutaneous, TID AC  isosorbide mononitrate, 30 mg, Oral, QAM  linezolid, 600 mg, Oral, Q12H  metoprolol tartrate, 25 mg,  Oral, Nightly  pantoprazole, 40 mg, Oral, QAM  Orthopedic surgery custom cocktail, , Injection, Once    Infusions   Diet  NPO Diet      Assessment/Plan     Active Hospital Problems    Diagnosis  POA   • **Septic arthritis of hip ([facility]) [M00.9]  Yes   • Drainage from wound [L24.A9]  Yes   • Morbid obesity ([facility]) [E66.01]  Yes   • Chronic diastolic CHF (congestive heart failure) ([facility]) [I50.32]  Yes   • Presence of stent in coronary artery in patient with coronary artery disease [I25.10, Z95.5]  Not Applicable   • Type 2 diabetes mellitus with diabetic polyneuropathy, with long-term current use of insulin ([facility]) [E11.42, Z79.4]  Not Applicable   • Hypertension [I10]  Yes      Resolved Hospital Problems   No resolved problems to display.   Right Hip Drainage  - long history of septic arthritis and has had multiple surgeries including [place] and more recently right total hip revision 11/16/21 and right hip I&D with head and liner exchange 12/18/21-has had cultures positive for VRE previously  - admitted now with continued right hip drainage, started on zyvox and ID consulted  - right hip I&D planned for today per primary team, ID also consulted    Type 2 DM  - she is on large doses of insulin at home  - continue lantus but will reduce to 40 units nightly   - hold scheduled prandial lispro for now   - cover with ssi/hypoglycemia protocol low dose    HTN/Chronic diastolic CHF/CAD s/p remote stent  - no anginal symptoms, BP is acceptable and she appears euvolemic  - continue on ASA, statin, and metoprolol as well as imdur  - monitor BP and volume status    Normocytic Anemia  - probably anemia of inflammation +/- iron deficiency  - given her hgb of 7.2 with cardiac history and low normal blood pressure will transfuse a unit of PRBCs in preparation for surgery today as blood loss is expected  - will need to further work up the anemia-check iron studies, b12, FA    SCDs for DVT prophylaxis.  Full code.  Discussed with  patient, family and nursing staff.  Disposition per primary team.      Aj Lozano MD  [facility]  01/18/22  10:07 EST      Electronically signed by Aj Lozano MD at 01/18/22 1236

## 2022-01-19 NOTE — PROGRESS NOTES
[facility] Clinical Pharmacy Services: Vancomycin Level Monitoring Note    Stephy Duncan is a 66 y.o. female who is on day 2/5 of pharmacy to dose vancomycin for Bone/joint infx.    Estimated Creatinine Clearance: 71.1 mL/min (by C-G formula based on SCr of 0.85 mg/dL).    Current Vanc Dose: 1000 mg IV every  12  hours  Lab Results   Component Value Date    VANCOTROUGH 12.30 01/19/2022    VANCORANDOM 19 12/22/2019     Predicted AUC at current dose:547 mg/L.hr  Next Level Date and Time: Vanc Trough on 1/21 @ 1100    No changes at this time. Pharmacy is continuing to monitor and will adjust as needed.    Anali Cardona, [facility]  Clinical Pharmacist

## 2022-01-19 NOTE — PLAN OF CARE
Goal Outcome Evaluation:  Plan of Care Reviewed With: patient        Progress: improving  Outcome Summary: 67 yo female POD 1 R hip I&D. vss. nvi. axo x4. pt on room air. pain managed with prn po meds and ice. educated pt on safety. pt up assist x1. will continue to monitor.

## 2022-01-19 NOTE — THERAPY EVALUATION
Patient Name: Stephy Duncan  : 1955    MRN: [id number]                              Today's Date: 2022       Admit Date: 2022    Visit Dx:     ICD-10-CM ICD-9-CM   1. Pyogenic arthritis of hip, due to unspecified organism, unspecified laterality (Formerly [facility])  M00.9 711.05   2. Pyogenic arthritis of right hip, due to unspecified organism (Formerly [facility])  M00.9 711.05     Patient Active Problem List   Diagnosis   • Status post total replacement of hip   • Type 2 diabetes mellitus with diabetic polyneuropathy, with long-term current use of insulin (Formerly [facility])   • Hypertension   • Normocytic anemia   • Postoperative infection   • Iron deficiency anemia   • Morbid obesity ([facility]ly [facility])   • Presence of stent in coronary artery in patient with coronary artery disease   • Chronic diastolic CHF (congestive heart failure) (Formerly [facility])   • Septic arthritis of hip ([facility]ly [facility])   • Drainage from wound   • Candidal intertrigo   • Postoperative anemia due to acute blood loss     Past Medical History:   Diagnosis Date   • Arthritis    • Cardiac murmur    • Diabetes mellitus ([facility])    • GERD (gastroesophageal reflux disease)    • Heart attack ([facility]) 2020   • History of cervical cancer 1981   • Hyperlipidemia    • Hypertension    • Osteomyelitis hip ([facility])     RIGHT   • PONV (postoperative nausea and vomiting)    • Right hip pain      Past Surgical History:   Procedure Laterality Date   • CARDIAC CATHETERIZATION     • CERVICAL CONIZATION     • COLONOSCOPY     • CORONARY ANGIOPLASTY WITH STENT PLACEMENT     • ENDOSCOPY     • HIP ARTHROPLASTY Right    • HIP HARDWARE REMOVAL     • HIP MINI REVISION Right 2022    Procedure: TOTAL HIP ARTHROPLASTY LINER REVISION, pegboard lateral;  Surgeon: Karri Schaeffer II, MD;  Location: [facility];  Service: Orthopedics;  Laterality: Right;   • HIP SPACER INSERTION WITH ANTIBIOTIC CEMENT      X3   • HYSTERECTOMY     • INCISION AND DRAINAGE HIP Right 2021    Procedure: INCISION AND DRAINAGE TOTAL  HIP, LINER EXCHANGE AND WOUND VAC PLACEMENT;  Surgeon: Karri Schaeffer II, MD;  Location: [facility] OR;  Service: Orthopedics;  Laterality: Right;   • KNEE ARTHROPLASTY Left    • LAPAROSCOPIC CHOLECYSTECTOMY     • TONSILLECTOMY     • TOTAL HIP ARTHROPLASTY REVISION Right 11/16/2021    Procedure: TOTAL HIP REVISION ARTHROPLASTY RIGHT POSTERIOR;  Surgeon: Karri Schaeffer II, MD;  Location: [facility] OR;  Service: Orthopedics;  Laterality: Right;      General Information     Row Name 01/19/22 1008          Physical Therapy Time and Intention    Document Type evaluation  -DB     Mode of Treatment individual therapy; physical therapy  -DB     Row Name 01/19/22 1008          General Information    Patient Profile Reviewed yes  -DB     Prior Level of Function independent:  -DB     Existing Precautions/Restrictions fall  -DB     Barriers to Rehab medically complex  -DB     Row Name 01/19/22 1008          Living Environment    Lives With spouse  -DB     Row Name 01/19/22 1008          Home Main Entrance    Number of Stairs, Main Entrance none  -DB     Row Name 01/19/22 1008          Stairs Within Home, Primary    Number of Stairs, Within Home, Primary none  -DB     Row Name 01/19/22 1008          Cognition    Orientation Status (Cognition) oriented x 4  -DB     Row Name 01/19/22 1008          Safety Issues, Functional Mobility    Impairments Affecting Function (Mobility) endurance/activity tolerance; strength; pain; balance  -DB           User Key  (r) = Recorded By, (t) = Taken By, (c) = Cosigned By    Initials Name Provider Type    DB Audra Gilliam PT Physical Therapist               Mobility     Row Name 01/19/22 1118          Bed Mobility    Bed Mobility supine-sit; sit-supine  -DB     Supine-Sit [place] (Bed Mobility) standby assist; verbal cues  -DB     Sit-Supine [place] (Bed Mobility) standby assist; verbal cues  -DB     Assistive Device (Bed Mobility) bed rails; head of bed  elevated  -DB     [facility] Name 01/19/22 1118          Sit-Stand Transfer    Sit-Stand [place] (Transfers) contact guard; verbal cues  -DB     Assistive Device (Sit-Stand Transfers) walker, front-wheeled  -DB     [facility] Name 01/19/22 1118          Gait/Stairs (Locomotion)    [place] Level (Gait) contact guard; verbal cues  -DB     Assistive Device (Gait) walker, front-wheeled  -DB     Distance in Feet (Gait) 20'  -DB     Deviations/Abnormal Patterns (Gait) festinating/shuffling; gait speed decreased; stride length decreased  -DB     Bilateral Gait Deviations heel strike decreased; forward flexed posture  -DB           User Key  (r) = Recorded By, (t) = Taken By, (c) = Cosigned By    Initials Name Provider Type    DB Audra Gilliam PT Physical Therapist               Obj/Interventions     [facility] Name 01/19/22 1118          Range of Motion Comprehensive    General Range of Motion no range of motion deficits identified  -DB     Row Name 01/19/22 1118          Strength Comprehensive (MMT)    Comment, General Manual Muscle Testing (MMT) Assessment generalized weakness  -DB     Row Name 01/19/22 1118          Balance    Balance Assessment sitting static balance; sitting dynamic balance; standing static balance; standing dynamic balance  -DB     Static Sitting Balance WFL  -DB     Dynamic Sitting Balance WFL  -DB     Static Standing Balance WFL; supported  -DB     Dynamic Standing Balance mild impairment; supported  -DB     Balance Interventions sitting; standing; sit to stand  -DB           User Key  (r) = Recorded By, (t) = Taken By, (c) = Cosigned By    Initials Name Provider Type    Audra Cardozo PT Physical Therapist               Goals/Plan     Row Name 01/19/22 1124          Bed Mobility Goal 1 (PT)    Activity/Assistive Device (Bed Mobility Goal 1, PT) bed mobility activities, all  -DB     [place] Level/Cues Needed (Bed Mobility Goal 1, PT) modified independence  -DB     Time Frame (Bed Mobility Goal  1, PT) 1 week  -DB     Row Name 01/19/22 1124          Transfer Goal 1 (PT)    Activity/Assistive Device (Transfer Goal 1, PT) transfers, all  -DB     [place] Level/Cues Needed (Transfer Goal 1, PT) modified independence  -DB     Time Frame (Transfer Goal 1, PT) 1 week  -DB     Row Name 01/19/22 1124          Gait Training Goal 1 (PT)    Activity/Assistive Device (Gait Training Goal 1, PT) gait (walking locomotion)  -DB     [place] Level (Gait Training Goal 1, PT) supervision required  -DB     Time Frame (Gait Training Goal 1, PT) 1 week  -DB           User Key  (r) = Recorded By, (t) = Taken By, (c) = Cosigned By    Initials Name Provider Type    DB Audra Gilliam, PT Physical Therapist               Clinical Impression     Row Name 01/19/22 1121          Plan of Care Review    Plan of Care Reviewed With patient; spouse  -DB     Outcome Summary Pt is 66 u/o F POD1 R hip I&D. Pt lives at home with her , 0 TREVON/stairs. Per Dr. Schaeffer, pt does not need to mobilize too much until soft tissue has healed. Ambulated to bathroom today and then returned pt to bed. Pt was SBA/CGA for all mobility and demo's good balance with use of RW. Dec'd strength, endurance, and balance, but rec D/C home with assist and HH.  -DB     Row Name 01/19/22 1121          Therapy Assessment/Plan (PT)    Rehab Potential (PT) good, to achieve stated therapy goals  -DB     Criteria for Skilled Interventions Met (PT) yes  -DB     Row Name 01/19/22 1121          Vital Signs    O2 Delivery Pre Treatment room air  -DB     O2 Delivery Intra Treatment room air  -DB     O2 Delivery Post Treatment room air  -DB     Pre Patient Position Supine  -DB     Intra Patient Position Standing  -DB     Post Patient Position Supine  -DB     Row Name 01/19/22 1121          Positioning and Restraints    Pre-Treatment Position in bed  -DB     Post Treatment Position bed  -DB     In Bed supine; call light within reach; encouraged to call for assist; exit  alarm on; with family/caregiver  -DB           User Key  (r) = Recorded By, (t) = Taken By, (c) = Cosigned By    Initials Name Provider Type    DB Audra Gilliam PT Physical Therapist               Outcome Measures     Row Name 01/19/22 1125          How much help from another person do you currently need...    Turning from your back to your side while in flat bed without using bedrails? 3  -DB     Moving from lying on back to sitting on the side of a flat bed without bedrails? 3  -DB     Moving to and from a bed to a chair (including a wheelchair)? 3  -DB     Standing up from a chair using your arms (e.g., wheelchair, bedside chair)? 3  -DB     Climbing 3-5 steps with a railing? 2  -DB     To walk in hospital room? 3  -DB     AM-PAC 6 Clicks Score (PT) 17  -DB     Row Name 01/19/22 1125          Functional Assessment    Outcome Measure Options AM-PAC 6 Clicks Basic Mobility (PT)  -DB           User Key  (r) = Recorded By, (t) = Taken By, (c) = Cosigned By    Initials Name Provider Type    DB Audra Gilliam PT Physical Therapist                             Physical Therapy Education                 Title: PT OT SLP Therapies (Done)     Topic: Physical Therapy (Done)     Point: Mobility training (Done)     Learning Progress Summary           Patient Acceptance, E, VU by DB at 1/19/2022 1125                   Point: Home exercise program (Done)     Learning Progress Summary           Patient Acceptance, E, VU by DB at 1/19/2022 1125                   Point: Body mechanics (Done)     Learning Progress Summary           Patient Acceptance, E, VU by DB at 1/19/2022 1125                   Point: Precautions (Done)     Learning Progress Summary           Patient Acceptance, E, VU by DB at 1/19/2022 1125                               User Key     Initials Effective Dates Name Provider Type Discipline    DB 06/16/21 -  Audra Gilliam PT Physical Therapist PT              PT Recommendation and Plan  Planned Therapy  Interventions (PT): balance training, bed mobility training, gait training, home exercise program, postural re-education, patient/family education, neuromuscular re-education, strengthening, transfer training  Plan of Care Reviewed With: patient, spouse  Outcome Summary: Pt is 66 u/o F POD1 R hip I&D. Pt lives at home with her , 0 [address]REV[address]/stairs. Per Dr. Schaeffer, pt does not need to mobilize too much until soft tissue has healed. Ambulated to bathroom today and then returned pt to bed. Pt was SBA/CGA for all mobility and demo's good balance with use of RW. Dec'd strength, endurance, and balance, but rec D/C home with assist and HH.     Time Calculation:    PT Charges     Row Name 01/19/22 1126             Time Calculation    Start Time 0839  -DB      Stop Time 0853  -DB      Time Calculation (min) 14 min  -DB      PT Received On 01/19/22  -DB      PT - Next Appointment 01/20/22  -DB      PT Goal Re-Cert Due Date 01/26/22  -DB              Time Calculation- PT    Total Timed Code Minutes- PT 8 minute(s)  -DB            User Key  (r) = Recorded By, (t) = Taken By, (c) = Cosigned By    Initials Name Provider Type    DB Audra Gilliam, PT Physical Therapist              Therapy Charges for Today     Code Description Service Date Service Provider Modifiers Qty    06678508740  PT EVAL MOD COMPLEXITY 2 1/19/2022 Audra Gilliam, PT GP 1    [id number]  PT THERAPEUTIC ACT EA 15 MIN 1/19/2022 Audra Gilliam, PT GP 1          PT G-Codes  Outcome Measure Options: AM-PAC 6 Clicks Basic Mobility (PT)  AM-PAC 6 Clicks Score (PT): 17    Audra Gilliam PT  1/19/2022

## 2022-01-19 NOTE — PROGRESS NOTES
Postop day 1 right hip surgery.  Dressings with minimal drainage.  Okay for weightbearing as tolerated but she really does not need to mobilize too much until her soft tissues have time to heal.  Cultures pending at this point

## 2022-01-20 LAB
ANION GAP SERPL CALCULATED.3IONS-SCNC: 7.5 MMOL/L (ref 5–15)
BASOPHILS # BLD AUTO: 0.02 10*3/MM3 (ref 0–0.2)
BASOPHILS NFR BLD AUTO: 0.4 % (ref 0–1.5)
BH BB BLOOD EXPIRATION DATE: NORMAL
BH BB BLOOD TYPE BARCODE: 5100
BH BB DISPENSE STATUS: NORMAL
BH BB PRODUCT CODE: NORMAL
BH BB UNIT NUMBER: NORMAL
BUN SERPL-MCNC: 10 MG/DL (ref 8–23)
BUN/CREAT SERPL: 11.6 (ref 7–25)
CALCIUM SPEC-SCNC: 7.8 MG/DL (ref 8.6–10.5)
CHLORIDE SERPL-SCNC: 103 MMOL/L (ref 98–107)
CO2 SERPL-SCNC: 22.5 MMOL/L (ref 22–29)
CREAT SERPL-MCNC: 0.86 MG/DL (ref 0.57–1)
CROSSMATCH INTERPRETATION: NORMAL
DEPRECATED RDW RBC AUTO: 48.4 FL (ref 37–54)
EOSINOPHIL # BLD AUTO: 0.18 10*3/MM3 (ref 0–0.4)
EOSINOPHIL NFR BLD AUTO: 3.7 % (ref 0.3–6.2)
ERYTHROCYTE [DISTWIDTH] IN BLOOD BY AUTOMATED COUNT: 15.2 % (ref 12.3–15.4)
GFR SERPL CREATININE-BSD FRML MDRD: 66 ML/MIN/1.73
GLUCOSE BLDC GLUCOMTR-MCNC: 169 MG/DL (ref 70–130)
GLUCOSE BLDC GLUCOMTR-MCNC: 195 MG/DL (ref 70–130)
GLUCOSE BLDC GLUCOMTR-MCNC: 209 MG/DL (ref 70–130)
GLUCOSE BLDC GLUCOMTR-MCNC: 213 MG/DL (ref 70–130)
GLUCOSE SERPL-MCNC: 168 MG/DL (ref 65–99)
HCT VFR BLD AUTO: 27.5 % (ref 34–46.6)
HGB BLD-MCNC: 8.3 G/DL (ref 12–15.9)
IMM GRANULOCYTES # BLD AUTO: 0.03 10*3/MM3 (ref 0–0.05)
IMM GRANULOCYTES NFR BLD AUTO: 0.6 % (ref 0–0.5)
LYMPHOCYTES # BLD AUTO: 0.7 10*3/MM3 (ref 0.7–3.1)
LYMPHOCYTES NFR BLD AUTO: 14.3 % (ref 19.6–45.3)
MCH RBC QN AUTO: 26.4 PG (ref 26.6–33)
MCHC RBC AUTO-ENTMCNC: 30.2 G/DL (ref 31.5–35.7)
MCV RBC AUTO: 87.6 FL (ref 79–97)
MONOCYTES # BLD AUTO: 0.49 10*3/MM3 (ref 0.1–0.9)
MONOCYTES NFR BLD AUTO: 10 % (ref 5–12)
NEUTROPHILS NFR BLD AUTO: 3.48 10*3/MM3 (ref 1.7–7)
NEUTROPHILS NFR BLD AUTO: 71 % (ref 42.7–76)
NRBC BLD AUTO-RTO: 0 /100 WBC (ref 0–0.2)
PLATELET # BLD AUTO: 143 10*3/MM3 (ref 140–450)
PMV BLD AUTO: 9.5 FL (ref 6–12)
POTASSIUM SERPL-SCNC: 3.8 MMOL/L (ref 3.5–5.2)
RBC # BLD AUTO: 3.14 10*6/MM3 (ref 3.77–5.28)
SODIUM SERPL-SCNC: 133 MMOL/L (ref 136–145)
UNIT  ABO: NORMAL
UNIT  RH: NORMAL
WBC NRBC COR # BLD: 4.9 10*3/MM3 (ref 3.4–10.8)

## 2022-01-20 PROCEDURE — 80048 BASIC METABOLIC PNL TOTAL CA: CPT | Performed by: ORTHOPAEDIC SURGERY

## 2022-01-20 PROCEDURE — 25010000002 DAPTOMYCIN PER 1 MG: Performed by: INTERNAL MEDICINE

## 2022-01-20 PROCEDURE — 85025 COMPLETE CBC W/AUTO DIFF WBC: CPT | Performed by: ORTHOPAEDIC SURGERY

## 2022-01-20 PROCEDURE — 63710000001 INSULIN LISPRO (HUMAN) PER 5 UNITS: Performed by: ORTHOPAEDIC SURGERY

## 2022-01-20 PROCEDURE — 25010000002 VANCOMYCIN PER 500 MG: Performed by: ORTHOPAEDIC SURGERY

## 2022-01-20 PROCEDURE — 97162 PT EVAL MOD COMPLEX 30 MIN: CPT

## 2022-01-20 PROCEDURE — 82962 GLUCOSE BLOOD TEST: CPT

## 2022-01-20 PROCEDURE — 97530 THERAPEUTIC ACTIVITIES: CPT

## 2022-01-20 PROCEDURE — 25010000002 CEFTRIAXONE PER 250 MG: Performed by: ORTHOPAEDIC SURGERY

## 2022-01-20 PROCEDURE — 25010000002 CYANOCOBALAMIN PER 1000 MCG: Performed by: INTERNAL MEDICINE

## 2022-01-20 RX ADMIN — INSULIN LISPRO 2 UNITS: 100 INJECTION, SOLUTION INTRAVENOUS; SUBCUTANEOUS at 08:30

## 2022-01-20 RX ADMIN — HYDROCODONE BITARTRATE AND ACETAMINOPHEN 2 TABLET: 5; 325 TABLET ORAL at 19:15

## 2022-01-20 RX ADMIN — GABAPENTIN 600 MG: 300 CAPSULE ORAL at 20:43

## 2022-01-20 RX ADMIN — NYSTATIN: 100000 POWDER TOPICAL at 20:44

## 2022-01-20 RX ADMIN — INSULIN LISPRO 2 UNITS: 100 INJECTION, SOLUTION INTRAVENOUS; SUBCUTANEOUS at 12:17

## 2022-01-20 RX ADMIN — HYDROCODONE BITARTRATE AND ACETAMINOPHEN 2 TABLET: 5; 325 TABLET ORAL at 23:03

## 2022-01-20 RX ADMIN — METOPROLOL TARTRATE 25 MG: 25 TABLET, FILM COATED ORAL at 20:43

## 2022-01-20 RX ADMIN — CEFTRIAXONE SODIUM 2 G: 2 INJECTION, POWDER, FOR SOLUTION INTRAMUSCULAR; INTRAVENOUS at 15:29

## 2022-01-20 RX ADMIN — ISOSORBIDE MONONITRATE 30 MG: 30 TABLET ORAL at 08:29

## 2022-01-20 RX ADMIN — INSULIN GLARGINE-YFGN 50 UNITS: 100 INJECTION, SOLUTION SUBCUTANEOUS at 20:40

## 2022-01-20 RX ADMIN — VANCOMYCIN HYDROCHLORIDE 1000 MG: 1 INJECTION, SOLUTION INTRAVENOUS at 12:18

## 2022-01-20 RX ADMIN — HYDROCODONE BITARTRATE AND ACETAMINOPHEN 2 TABLET: 5; 325 TABLET ORAL at 03:58

## 2022-01-20 RX ADMIN — HYDROCODONE BITARTRATE AND ACETAMINOPHEN 2 TABLET: 5; 325 TABLET ORAL at 15:28

## 2022-01-20 RX ADMIN — ATORVASTATIN CALCIUM 10 MG: 20 TABLET, FILM COATED ORAL at 20:43

## 2022-01-20 RX ADMIN — GABAPENTIN 600 MG: 300 CAPSULE ORAL at 08:30

## 2022-01-20 RX ADMIN — CYANOCOBALAMIN 1000 MCG: 1000 INJECTION, SOLUTION INTRAMUSCULAR at 08:31

## 2022-01-20 RX ADMIN — NYSTATIN: 100000 POWDER TOPICAL at 08:32

## 2022-01-20 RX ADMIN — INSULIN LISPRO 3 UNITS: 100 INJECTION, SOLUTION INTRAVENOUS; SUBCUTANEOUS at 18:17

## 2022-01-20 RX ADMIN — PANTOPRAZOLE SODIUM 40 MG: 40 TABLET, DELAYED RELEASE ORAL at 08:31

## 2022-01-20 RX ADMIN — ASPIRIN 81 MG: 81 TABLET, CHEWABLE ORAL at 23:03

## 2022-01-20 NOTE — PLAN OF CARE
Goal Outcome Evaluation:  Plan of Care Reviewed With: patient        Progress: improving  Outcome Summary: Pt agreeable to work with PT to get up and go to the bathroom. Supervision for bed mobility, SBA for STS, CGA for ambulating 20' with RW. Pt returns to bed after using restroom. Safe to D/C home when medically ready.

## 2022-01-20 NOTE — PLAN OF CARE
Goal Outcome Evaluation:  Plan of Care Reviewed With: patient        Progress: improving       Pt 67 y/o POD2 of rt hip IMD. Aox4, VSS on RA. Up with assist x1 with walker and gaitbelt. Pt takes norco 5 1-2 tablets q4h PRN for pain, controlled well with oral medication. Voids in bathroom, LBM yesterday, regular CC diet. Pt has island dsg with hemovac, site and dsg both c/d/I. IV sites in rt and Lt FA but SL. Plan is to d/c home Saturday with HH. Will continue to monitor.    Teena Nielsen BSN, RN

## 2022-01-20 NOTE — PROGRESS NOTES
Name: Stephy Duncan ADMIT: 2022   : 1955  PCP: Gregorio Rosales    MRN: [id number] LOS: 4 days   AGE/SEX: 66 y.o. female  ROOM: [facility]     Subjective   Subjective     No events overnight. No growth so far from cultures. She complains of a little nausea earlier today but this is improved this evening and she managed to eat dinner.    Objective   Objective   Vital Signs  Temp:  [97 °F (36.1 °C)-97.8 °F (36.6 °C)] 97.8 °F (36.6 °C)  Heart Rate:  [80-90] 90  Resp:  [16] 16  BP: ()/(48-62) 145/58  SpO2:  [94 %-97 %] 97 %  on   ;   Device (Oxygen Therapy): room air  Body mass index is 36.22 kg/m².  Physical Exam  Vitals and nursing note reviewed.   Constitutional:       General: She is not in acute distress.     Appearance: She is not toxic-appearing or diaphoretic.   HENT:      Head: Normocephalic and atraumatic.      Nose: Nose normal.      Mouth/Throat:      Mouth: Mucous membranes are moist.      Pharynx: Oropharynx is clear.   Eyes:      Extraocular Movements: Extraocular movements intact.      Conjunctiva/sclera: Conjunctivae normal.      Pupils: Pupils are equal, round, and reactive to light.   Cardiovascular:      Rate and Rhythm: Normal rate and regular rhythm.      Pulses: Normal pulses.   Pulmonary:      Effort: Pulmonary effort is normal.      Breath sounds: Normal breath sounds. No rales.   Abdominal:      General: Bowel sounds are normal.      Palpations: Abdomen is soft.      Tenderness: There is no abdominal tenderness.   Musculoskeletal:         General: No swelling or tenderness.      Cervical back: Normal range of motion and neck supple.      Comments: Right hip with wound vac in place   Skin:     General: Skin is warm and dry.      Capillary Refill: Capillary refill takes less than 2 seconds.      Comments: Erythema with small satellite lesions under her abdominal fold   Neurological:      General: No focal deficit present.      Mental Status: She is alert and oriented to  person, place, and time.   Psychiatric:         Mood and Affect: Mood normal.         Behavior: Behavior normal.     Results Review     I reviewed the patient's new clinical results.  Results from last 7 days   Lab Units 01/20/22  0542 01/19/22  0700 01/18/22  1702 01/18/22  0613 01/17/22  0634 01/17/22  0634   WBC 10*3/mm3 4.90 8.05  --  5.69  --  6.38   HEMOGLOBIN g/dL 8.3* 7.5* 9.0* 7.2*   < > 7.1*   PLATELETS 10*3/mm3 143 160  --  150  --  142    < > = values in this interval not displayed.     Results from last 7 days   Lab Units 01/20/22  0542 01/19/22  0700 01/18/22  0613 01/17/22  0634   SODIUM mmol/L 133* 132* 136 138   POTASSIUM mmol/L 3.8 4.0 3.6 3.7   CHLORIDE mmol/L 103 102 104 103   CO2 mmol/L 22.5 19.5* 22.9 23.1   BUN mg/dL 10 9 6* 6*   CREATININE mg/dL 0.86 0.85 0.88 0.91   GLUCOSE mg/dL 168* 152* 147* 100*   EGFR IF NONAFRICN AM mL/min/1.73 66 67 64 61       Results from last 7 days   Lab Units 01/20/22  0542 01/19/22  0700 01/18/22  0613 01/17/22  0634   CALCIUM mg/dL 7.8* 8.1* 7.9* 8.3*       COVID19   Date Value Ref Range Status   01/16/2022 Not Detected Not Detected - Ref. Range Final   01/07/2022 Not Detected Not Detected - Ref. Range Final     Glucose   Date/Time Value Ref Range Status   01/20/2022 1601 209 (H) 70 - 130 mg/dL Final     Comment:     Meter: [id number] : [id number] Singer Gabriela NA   01/20/2022 1014 195 (H) 70 - 130 mg/dL Final     Comment:     Meter: [id number] : [id number] Singer Gabriela NA   01/20/2022 0634 169 (H) 70 - 130 mg/dL Final     Comment:     Meter: [id number] : [id number]    01/19/2022 2035 217 (H) 70 - 130 mg/dL Final     Comment:     Meter: [id number] : [id number]    01/19/2022 1551 192 (H) 70 - 130 mg/dL Final     Comment:     Meter: [id number] : [id number] Dash Fernández    01/19/2022 1121 158 (H) 70 - 130 mg/dL Final     Comment:     Meter: [id number] : [id number] Dash Fernández    01/19/2022  0634 174 (H) 70 - 130 mg/dL Final     Comment:     Meter: [id number] : [id number] NA       XR Hip With or Without Pelvis 1 View Right  ONE VIEW PORTABLE RIGHT HIP AND ONE VIEW PORTABLE AP PELVIS     HISTORY: Right hip replacement and hip revision surgery.     FINDINGS: The patient has had recent hip revision surgery and the  alignment appears satisfactory.     This report was finalized on 1/18/2022 3:00 PM by Dr. Juwan Combs M.D.       Scheduled Medications  aspirin, 81 mg, Oral, Nightly  atorvastatin, 10 mg, Oral, Nightly  cefTRIAXone, 2 g, Intravenous, Q24H  ferrous sulfate, 325 mg, Oral, Every Other Day  gabapentin, 600 mg, Oral, Q12H  insulin glargine, 40 Units, Subcutaneous, Nightly  insulin lispro, 0-7 Units, Subcutaneous, TID AC  isosorbide mononitrate, 30 mg, Oral, QAM  metoprolol tartrate, 25 mg, Oral, Nightly  nystatin, , Topical, Q12H  pantoprazole, 40 mg, Oral, QAM  vancomycin, 1,000 mg, Intravenous, Q12H  [START ON 1/21/2022] vitamin B-12, 1,000 mcg, Oral, Daily    Infusions  lactated ringers, 9 mL/hr, Last Rate: 9 mL/hr (01/18/22 1250)  Pharmacy to dose vancomycin,     Diet  Diet Regular       Assessment/Plan     Active Hospital Problems    Diagnosis  POA   • **Septic arthritis of hip (HCC) [M00.9]  Yes   • Candidal intertrigo [B37.2]  Yes   • Postoperative anemia due to acute blood loss [D62]  No   • Drainage from wound [L24.A9]  Yes   • Morbid obesity ([facility]) [E66.01]  Yes   • Chronic diastolic CHF (congestive heart failure) ([facility]) [I50.32]  Yes   • Presence of stent in coronary artery in patient with coronary artery disease [I25.10, Z95.5]  Not Applicable   • Type 2 diabetes mellitus with diabetic polyneuropathy, with long-term current use of insulin ([facility]) [E11.42, Z79.4]  Not Applicable   • Hypertension [I10]  Yes   • Normocytic anemia [D64.9]  Yes      Resolved Hospital Problems   No resolved problems to display.   Right Hip Drainage  - long history of septic arthritis and has  had multiple surgeries including [place] and more recently right total hip revision 11/16/21 and right hip I&D with head and liner exchange 12/18/21-has had cultures positive for VRE previously  - admitted now with continued right hip drainage, started on zyvox initially and has since been changed to ceftriaxone and vancomycin. I do not see an ID consult. I will ask Dr. Hart/Rosario to see to help with antibiotic recommendations  - s/p right hip I&D 1/18/22  - cultures so far without growth    Type 2 DM  - she is on large doses of insulin at home  - her glucose is a little bit above goal for the hospital. Will increase lantus to home dose of 50 units  - holding scheduled prandial lispro for now   - continue coverage with ssi/hypoglycemia protocol low dose    HTN/Chronic diastolic CHF/CAD s/p remote stent  - no anginal symptoms, BP is acceptable and she appears euvolemic   - continue on ASA, statin, and metoprolol as well as imdur  - monitor BP and volume status    Normocytic Anemia  - s/p 1 unit of PRBCs preoperatively with good response. hgb 8.3 today from 7.5 yesterday  - B12 is a little low for her age, now on oral replacement  - has some iron deficiency along with concomitant anemia of inflammation as expected, on PO iron    SCDs for DVT prophylaxis.  Full code.  Discussed with patient.  Disposition per primary team, but looks like plan is for home with home health on saturday      Trevin Lang MD  [facility]  01/20/22  18:37 EST

## 2022-01-20 NOTE — THERAPY TREATMENT NOTE
Patient Name: Stephy Duncan  : 1955    MRN: [id number]                              Today's Date: 2022       Admit Date: 2022    Visit Dx:     ICD-10-CM ICD-9-CM   1. Pyogenic arthritis of hip, due to unspecified organism, unspecified laterality ([facility])  M00.9 711.05   2. Pyogenic arthritis of right hip, due to unspecified organism ([facility])  M00.9 711.05     Patient Active Problem List   Diagnosis   • Status post total replacement of hip   • Type 2 diabetes mellitus with diabetic polyneuropathy, with long-term current use of insulin ([facility])   • Hypertension   • Normocytic anemia   • Postoperative infection   • Iron deficiency anemia   • Morbid obesity ([facility])   • Presence of stent in coronary artery in patient with coronary artery disease   • Chronic diastolic CHF (congestive heart failure) ([facility])   • Septic arthritis of hip ([facility])   • Drainage from wound   • Candidal intertrigo   • Postoperative anemia due to acute blood loss     Past Medical History:   Diagnosis Date   • Arthritis    • Cardiac murmur    • Diabetes mellitus ([facility])    • GERD (gastroesophageal reflux disease)    • Heart attack ([facility]) 2020   • History of cervical cancer 1981   • Hyperlipidemia    • Hypertension    • Osteomyelitis hip ([facility])     RIGHT   • PONV (postoperative nausea and vomiting)    • Right hip pain      Past Surgical History:   Procedure Laterality Date   • CARDIAC CATHETERIZATION     • CERVICAL CONIZATION     • COLONOSCOPY     • CORONARY ANGIOPLASTY WITH STENT PLACEMENT     • ENDOSCOPY     • HIP ARTHROPLASTY Right    • HIP HARDWARE REMOVAL     • HIP MINI REVISION Right 2022    Procedure: TOTAL HIP ARTHROPLASTY LINER REVISION, pegboard lateral;  Surgeon: Karri Schaeffer II, MD;  Location: [facility];  Service: Orthopedics;  Laterality: Right;   • HIP SPACER INSERTION WITH ANTIBIOTIC CEMENT      X3   • HYSTERECTOMY     • INCISION AND DRAINAGE HIP Right 2021    Procedure: INCISION AND DRAINAGE TOTAL  HIP, LINER EXCHANGE AND WOUND VAC PLACEMENT;  Surgeon: Karri Schaeffer II, MD;  Location: [facility];  Service: Orthopedics;  Laterality: Right;   • KNEE ARTHROPLASTY Left    • LAPAROSCOPIC CHOLECYSTECTOMY     • TONSILLECTOMY     • TOTAL HIP ARTHROPLASTY REVISION Right 11/16/2021    Procedure: TOTAL HIP REVISION ARTHROPLASTY RIGHT POSTERIOR;  Surgeon: Karri Schaeffer II, MD;  Location: [facility];  Service: Orthopedics;  Laterality: Right;      General Information     Row Name 01/20/22 1205          Physical Therapy Time and Intention    Document Type therapy note (daily note)  -DB     Mode of Treatment individual therapy; physical therapy  -DB     Row Name 01/20/22 1205          General Information    Patient Profile Reviewed yes  -DB           User Key  (r) = Recorded By, (t) = Taken By, (c) = Cosigned By    Initials Name Provider Type    DB Audra Gilliam PT Physical Therapist               Mobility     Row Name 01/20/22 1205          Bed Mobility    Bed Mobility supine-sit; sit-supine  -DB     Supine-Sit [place] (Bed Mobility) supervision  -DB     Sit-Supine [place] (Bed Mobility) supervision  -DB     Assistive Device (Bed Mobility) bed rails; head of bed elevated  -DB     Row Name 01/20/22 1205          Sit-Stand Transfer    Sit-Stand [place] (Transfers) standby assist; verbal cues  -DB     Assistive Device (Sit-Stand Transfers) walker, front-wheeled  -DB     Row Name 01/20/22 1205          Gait/Stairs (Locomotion)    [place] Level (Gait) contact guard; verbal cues  -DB     Assistive Device (Gait) walker, front-wheeled  -DB     Distance in Feet (Gait) 20'  -DB     Deviations/Abnormal Patterns (Gait) festinating/shuffling; gait speed decreased; stride length decreased  -DB     Bilateral Gait Deviations heel strike decreased; forward flexed posture  -DB           User Key  (r) = Recorded By, (t) = Taken By, (c) = Cosigned By    Initials Name Provider Type    DB  Audra Gilliam, WESLY Physical Therapist               Obj/Interventions     Row Name 01/20/22 1205          Balance    Balance Assessment sitting static balance; sitting dynamic balance; standing static balance; standing dynamic balance  -DB     Static Sitting Balance WFL; sitting, edge of bed  -DB     Dynamic Sitting Balance WFL; sitting, edge of bed  -DB     Static Standing Balance WFL; supported  -DB     Dynamic Standing Balance mild impairment; supported  -DB     Balance Interventions standing; sit to stand; sitting  -DB           User Key  (r) = Recorded By, (t) = Taken By, (c) = Cosigned By    Initials Name Provider Type    Audra Cardozo, PT Physical Therapist               Goals/Plan    No documentation.                Clinical Impression     Row Name 01/20/22 1205          Plan of Care Review    Plan of Care Reviewed With patient  -DB     Progress improving  -DB     Outcome Summary Pt agreeable to work with PT to get up and go to the bathroom. Supervision for bed mobility, SBA for STS, CGA for ambulating 20' with RW. Pt returns to bed after using restroom. Safe to D/C home when medically ready.  -DB     Row Name 01/20/22 1205          Vital Signs    O2 Delivery Pre Treatment room air  -DB     O2 Delivery Intra Treatment room air  -DB     O2 Delivery Post Treatment room air  -DB     Pre Patient Position Supine  -DB     Intra Patient Position Standing  -DB     Post Patient Position Supine  -DB     Row Name 01/20/22 1205          Positioning and Restraints    Pre-Treatment Position in bed  -DB     Post Treatment Position bed  -DB     In Bed supine; call light within reach; encouraged to call for assist; exit alarm on  -DB           User Key  (r) = Recorded By, (t) = Taken By, (c) = Cosigned By    Initials Name Provider Type    Audra Cardozo PT Physical Therapist               Outcome Measures     Row Name 01/20/22 1213          How much help from another person do you currently need...    Turning  from your back to your side while in flat bed without using bedrails? 4  -DB     Moving from lying on back to sitting on the side of a flat bed without bedrails? 4  -DB     Moving to and from a bed to a chair (including a wheelchair)? 3  -DB     Standing up from a chair using your arms (e.g., wheelchair, bedside chair)? 3  -DB     Climbing 3-5 steps with a railing? 2  -DB     To walk in hospital room? 3  -DB     AM-PAC 6 Clicks Score (PT) 19  -DB           User Key  (r) = Recorded By, (t) = Taken By, (c) = Cosigned By    Initials Name Provider Type    DB Audra Gilliam, PT Physical Therapist                             Physical Therapy Education                 Title: PT OT SLP Therapies (Done)     Topic: Physical Therapy (Done)     Point: Mobility training (Done)     Learning Progress Summary           Patient Acceptance, E, VU by DB at 1/20/2022 1213    Acceptance, E, VU by DB at 1/19/2022 1125                   Point: Home exercise program (Done)     Learning Progress Summary           Patient Acceptance, E, VU by DB at 1/20/2022 1213    Acceptance, E, VU by DB at 1/19/2022 1125                   Point: Body mechanics (Done)     Learning Progress Summary           Patient Acceptance, E, VU by DB at 1/20/2022 1213    Acceptance, E, VU by DB at 1/19/2022 1125                   Point: Precautions (Done)     Learning Progress Summary           Patient Acceptance, E, VU by DB at 1/20/2022 1213    Acceptance, E, VU by DB at 1/19/2022 1125                               User Key     Initials Effective Dates Name Provider Type Discipline    DB 06/16/21 -  Audra Gilliam PT Physical Therapist PT              PT Recommendation and Plan  Planned Therapy Interventions (PT): balance training, bed mobility training, gait training, home exercise program, postural re-education, patient/family education, neuromuscular re-education, strengthening, transfer training  Plan of Care Reviewed With: patient  Progress:  improving  Outcome Summary: Pt agreeable to work with PT to get up and go to the bathroom. Supervision for bed mobility, SBA for STS, CGA for ambulating 20' with RW. Pt returns to bed after using restroom. Safe to D/C home when medically ready.     Time Calculation:    PT Charges     Row Name 01/20/22 1213             Time Calculation    Start Time 0955  -DB      Stop Time 1003  -DB      Time Calculation (min) 8 min  -DB      PT Received On 01/20/22  -DB      PT - Next Appointment 01/21/22  -DB              Time Calculation- PT    Total Timed Code Minutes- PT 8 minute(s)  -DB            User Key  (r) = Recorded By, (t) = Taken By, (c) = Cosigned By    Initials Name Provider Type    Audra Cardozo, PT Physical Therapist              Therapy Charges for Today     Code Description Service Date Service Provider Modifiers Qty    22215902559  PT EVAL MOD COMPLEXITY 2 1/19/2022 Audra Gilliam, PT GP 1    33968355480 HC PT THERAPEUTIC ACT EA 15 MIN 1/19/2022 Audra Gilliam, PT GP 1    45852524499 HC PT EVAL MOD COMPLEXITY 2 1/20/2022 Audra Gilliam, PT GP 1    27677327015 HC PT THERAPEUTIC ACT EA 15 MIN 1/20/2022 Audra Gilliam, PT GP 1          PT G-Codes  Outcome Measure Options: AM-PAC 6 Clicks Basic Mobility (PT)  AM-PAC 6 Clicks Score (PT): 19    Audra Gilliam PT  1/20/2022

## 2022-01-21 LAB
ANION GAP SERPL CALCULATED.3IONS-SCNC: 9.3 MMOL/L (ref 5–15)
BACTERIA SPEC AEROBE CULT: NORMAL
BASOPHILS # BLD AUTO: 0.02 10*3/MM3 (ref 0–0.2)
BASOPHILS NFR BLD AUTO: 0.4 % (ref 0–1.5)
BUN SERPL-MCNC: 8 MG/DL (ref 8–23)
BUN/CREAT SERPL: 10.5 (ref 7–25)
CALCIUM SPEC-SCNC: 8.5 MG/DL (ref 8.6–10.5)
CHLORIDE SERPL-SCNC: 104 MMOL/L (ref 98–107)
CO2 SERPL-SCNC: 22.7 MMOL/L (ref 22–29)
CREAT SERPL-MCNC: 0.76 MG/DL (ref 0.57–1)
DEPRECATED RDW RBC AUTO: 45.7 FL (ref 37–54)
EOSINOPHIL # BLD AUTO: 0.31 10*3/MM3 (ref 0–0.4)
EOSINOPHIL NFR BLD AUTO: 5.5 % (ref 0.3–6.2)
ERYTHROCYTE [DISTWIDTH] IN BLOOD BY AUTOMATED COUNT: 15.3 % (ref 12.3–15.4)
GFR SERPL CREATININE-BSD FRML MDRD: 76 ML/MIN/1.73
GLUCOSE BLDC GLUCOMTR-MCNC: 131 MG/DL (ref 70–130)
GLUCOSE BLDC GLUCOMTR-MCNC: 143 MG/DL (ref 70–130)
GLUCOSE BLDC GLUCOMTR-MCNC: 232 MG/DL (ref 70–130)
GLUCOSE BLDC GLUCOMTR-MCNC: 241 MG/DL (ref 70–130)
GLUCOSE SERPL-MCNC: 151 MG/DL (ref 65–99)
GRAM STN SPEC: NORMAL
HCT VFR BLD AUTO: 28.7 % (ref 34–46.6)
HGB BLD-MCNC: 9 G/DL (ref 12–15.9)
IMM GRANULOCYTES # BLD AUTO: 0.04 10*3/MM3 (ref 0–0.05)
IMM GRANULOCYTES NFR BLD AUTO: 0.7 % (ref 0–0.5)
LYMPHOCYTES # BLD AUTO: 0.66 10*3/MM3 (ref 0.7–3.1)
LYMPHOCYTES NFR BLD AUTO: 11.8 % (ref 19.6–45.3)
MCH RBC QN AUTO: 26.5 PG (ref 26.6–33)
MCHC RBC AUTO-ENTMCNC: 31.4 G/DL (ref 31.5–35.7)
MCV RBC AUTO: 84.4 FL (ref 79–97)
MONOCYTES # BLD AUTO: 0.45 10*3/MM3 (ref 0.1–0.9)
MONOCYTES NFR BLD AUTO: 8.1 % (ref 5–12)
NEUTROPHILS NFR BLD AUTO: 4.11 10*3/MM3 (ref 1.7–7)
NEUTROPHILS NFR BLD AUTO: 73.5 % (ref 42.7–76)
NRBC BLD AUTO-RTO: 0 /100 WBC (ref 0–0.2)
PLATELET # BLD AUTO: 158 10*3/MM3 (ref 140–450)
PMV BLD AUTO: 9.6 FL (ref 6–12)
POTASSIUM SERPL-SCNC: 3.7 MMOL/L (ref 3.5–5.2)
RBC # BLD AUTO: 3.4 10*6/MM3 (ref 3.77–5.28)
SODIUM SERPL-SCNC: 136 MMOL/L (ref 136–145)
VANCOMYCIN TROUGH SERPL-MCNC: 12.3 MCG/ML (ref 5–20)
WBC NRBC COR # BLD: 5.59 10*3/MM3 (ref 3.4–10.8)

## 2022-01-21 PROCEDURE — 25010000002 DAPTOMYCIN PER 1 MG: Performed by: INTERNAL MEDICINE

## 2022-01-21 PROCEDURE — 85025 COMPLETE CBC W/AUTO DIFF WBC: CPT | Performed by: ORTHOPAEDIC SURGERY

## 2022-01-21 PROCEDURE — 82962 GLUCOSE BLOOD TEST: CPT

## 2022-01-21 PROCEDURE — C1751 CATH, INF, PER/CENT/MIDLINE: HCPCS

## 2022-01-21 PROCEDURE — 63710000001 INSULIN LISPRO (HUMAN) PER 5 UNITS: Performed by: ORTHOPAEDIC SURGERY

## 2022-01-21 PROCEDURE — 80048 BASIC METABOLIC PNL TOTAL CA: CPT | Performed by: ORTHOPAEDIC SURGERY

## 2022-01-21 PROCEDURE — 80202 ASSAY OF VANCOMYCIN: CPT | Performed by: ORTHOPAEDIC SURGERY

## 2022-01-21 RX ORDER — SODIUM CHLORIDE 0.9 % (FLUSH) 0.9 %
10 SYRINGE (ML) INJECTION AS NEEDED
Status: DISCONTINUED | OUTPATIENT
Start: 2022-01-21 | End: 2022-01-22 | Stop reason: HOSPADM

## 2022-01-21 RX ORDER — SODIUM CHLORIDE 0.9 % (FLUSH) 0.9 %
10 SYRINGE (ML) INJECTION EVERY 12 HOURS SCHEDULED
Status: DISCONTINUED | OUTPATIENT
Start: 2022-01-21 | End: 2022-01-22 | Stop reason: HOSPADM

## 2022-01-21 RX ORDER — SODIUM CHLORIDE 0.9 % (FLUSH) 0.9 %
20 SYRINGE (ML) INJECTION AS NEEDED
Status: DISCONTINUED | OUTPATIENT
Start: 2022-01-21 | End: 2022-01-22 | Stop reason: HOSPADM

## 2022-01-21 RX ADMIN — NYSTATIN: 100000 POWDER TOPICAL at 09:04

## 2022-01-21 RX ADMIN — HYDROCODONE BITARTRATE AND ACETAMINOPHEN 2 TABLET: 5; 325 TABLET ORAL at 06:24

## 2022-01-21 RX ADMIN — SODIUM CHLORIDE 700 MG: 900 INJECTION, SOLUTION INTRAVENOUS at 00:53

## 2022-01-21 RX ADMIN — Medication 1000 MCG: at 09:04

## 2022-01-21 RX ADMIN — GABAPENTIN 600 MG: 300 CAPSULE ORAL at 09:04

## 2022-01-21 RX ADMIN — HYDROCODONE BITARTRATE AND ACETAMINOPHEN 2 TABLET: 5; 325 TABLET ORAL at 23:53

## 2022-01-21 RX ADMIN — INSULIN GLARGINE-YFGN 50 UNITS: 100 INJECTION, SOLUTION SUBCUTANEOUS at 21:33

## 2022-01-21 RX ADMIN — HYDROCODONE BITARTRATE AND ACETAMINOPHEN 2 TABLET: 5; 325 TABLET ORAL at 11:26

## 2022-01-21 RX ADMIN — HYDROCODONE BITARTRATE AND ACETAMINOPHEN 2 TABLET: 5; 325 TABLET ORAL at 15:12

## 2022-01-21 RX ADMIN — INSULIN LISPRO 3 UNITS: 100 INJECTION, SOLUTION INTRAVENOUS; SUBCUTANEOUS at 11:26

## 2022-01-21 RX ADMIN — ASPIRIN 81 MG: 81 TABLET, CHEWABLE ORAL at 21:37

## 2022-01-21 RX ADMIN — SODIUM CHLORIDE, PRESERVATIVE FREE 10 ML: 5 INJECTION INTRAVENOUS at 21:34

## 2022-01-21 RX ADMIN — NYSTATIN: 100000 POWDER TOPICAL at 21:37

## 2022-01-21 RX ADMIN — ISOSORBIDE MONONITRATE 30 MG: 30 TABLET ORAL at 09:03

## 2022-01-21 RX ADMIN — METOPROLOL TARTRATE 25 MG: 25 TABLET, FILM COATED ORAL at 21:35

## 2022-01-21 RX ADMIN — ATORVASTATIN CALCIUM 10 MG: 20 TABLET, FILM COATED ORAL at 21:35

## 2022-01-21 RX ADMIN — GABAPENTIN 600 MG: 300 CAPSULE ORAL at 21:35

## 2022-01-21 RX ADMIN — SODIUM CHLORIDE 700 MG: 900 INJECTION, SOLUTION INTRAVENOUS at 21:34

## 2022-01-21 RX ADMIN — PANTOPRAZOLE SODIUM 40 MG: 40 TABLET, DELAYED RELEASE ORAL at 06:24

## 2022-01-21 RX ADMIN — HYDROCODONE BITARTRATE AND ACETAMINOPHEN 2 TABLET: 5; 325 TABLET ORAL at 19:31

## 2022-01-21 RX ADMIN — FERROUS SULFATE TAB 325 MG (65 MG ELEMENTAL FE) 325 MG: 325 (65 FE) TAB at 09:04

## 2022-01-21 NOTE — SIGNIFICANT NOTE
01/21/22 1602   PICC Single Lumen 01/21/22 Right Basilic   Placement Date/Time: 01/21/22 1601   Hand Hygiene Completed: Yes  Size (Fr): 4  Description (optional): power picc, lot [id number], exp 10-31-22  Length (cm): 36 cm  Orientation: Right  Location: Basilic  Site Prep: Chlorhexidine isopropyl alcohol  All ...   Site Assessment Clean; Dry; Intact   #1 Lumen Status Blood return noted; Capped; Flushed; Normal saline locked   Length arsenio (cm) 36 cm   Line Care Connections checked and tightened   Extremity Circumference (cm) 30 cm   Dressing Type Border Dressing; Securing device; Antimicrobial dressing/disc   Dressing Status Clean; Dry; Intact   Dressing Intervention New dressing   Liquid Adhesive Applied   Dressing Change Due 01/28/22   PICC Line tip in SVC per 3cg technology

## 2022-01-21 NOTE — CONSULTS
LOS: 4 days   Patient Care Team:  Gregorio Rosales as PCP - General (Family Medicine)  Jose Kong MD as Consulting Physician (Cardiology)        Subjective       Attending MD : Karri Schaeffer*    Patient Complaints: wound drainage          History of Present Illness  :Patient is a 66 y.o. Not  or  female who presents with Continued drainage from her right hip.  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  She then came to me a couple months ago for hip replantation.  She underwent uneventful total hip arthroplasty postoperatively has had problems with continued drainage.  She also apparently had 1 dislocation event.  She was sent to the emergency room to be admitted.  She has been afebrile.  Yesterday's labs demonstrated her white count was mildly elevated at 11.  Her CRP and sedimentation rate have both remarkably decreased since her last value and the sedimentation rate has normalized.  She continues to have painless drainage from the right hip that is serosanguineous.     Patient Denies:  NV    PMH :   Septic arthritis of hip (HCC)    Type 2 diabetes mellitus with diabetic polyneuropathy, with long-term current use of insulin (HCC)    Hypertension    Normocytic anemia    Morbid obesity (HCC)    Presence of stent in coronary artery in patient with coronary artery disease    Chronic diastolic CHF (congestive heart failure) (HCC)    Drainage from wound    Candidal intertrigo    Postoperative anemia due to acute blood loss      Review of Systems:    pain    Objective     Vital Signs  Temp:  [97 °F (36.1 °C)-97.8 °F (36.6 °C)] 97.8 °F (36.6 °C)  Heart Rate:  [80-90] 90  Resp:  [16] 16  BP: (105-145)/(49-62) 145/58    Physical Exam:     General Appearance:    Alert, cooperative, in no acute distress   Head:    Normocephalic, without obvious abnormality, atraumatic   Eyes:            Lids and lashes  normal, conjunctivae and sclerae normal, no   icterus, no pallor, corneas clear, PERRLA   Ears:    Ears appear intact with no abnormalities noted   Throat:   No oral lesions, no thrush, oral mucosa moist   Neck:   No adenopathy, supple, trachea midline, no thyromegaly, no     carotid bruit, no JVD   Back:     No kyphosis present, no scoliosis present, no skin lesions,       erythema or scars, no tenderness to percussion or                   palpation,   range of motion normal   Lungs:     Clear to auscultation,respirations regular, even and                   unlabored    Heart:    Regular rhythm and normal rate, normal S1 and S2, no            murmur, no gallop, no rub, no click   Breast Exam:    Deferred   Abdomen:     Normal bowel sounds, no masses, no organomegaly, soft        non-tender, non-distended, no guarding, no rebound                 tenderness   Genitalia:    Deferred   Extremities:   Moves all extremities well, no edema, no cyanosis, no              redness   Pulses:   Pulses palpable and equal bilaterally   Skin:   No bleeding, bruising or rash   Lymph nodes:   No palpable adenopathy   Neurologic:   Cranial nerves 2 - 12 grossly intact, sensation intact, DTR        present and equal bilaterally          Results Review:    Lab Results (last 72 hours)     Procedure Component Value Units Date/Time    POC Glucose Once [[id number]]  (Abnormal) Collected: 01/20/22 1601    Specimen: Blood Updated: 01/20/22 1603     Glucose 209 mg/dL      Comment: Meter: [id number] : [id number] Enmanuel CUNNINGHAM       Wound Culture - Wound, Hip, Right [[id number]] Collected: 01/18/22 1318    Specimen: Wound from Hip, Right Updated: 01/20/22 1105     Wound Culture No growth at 2 days     Gram Stain Rare (1+) WBCs per low power field      No organisms seen    Wound Culture - Wound, Hip, Right [[id number]] Collected: 01/18/22 1318    Specimen: Wound from Hip, Right Updated: 01/20/22 1104     Wound Culture No growth at 2 days      Gram Stain Rare (1+) WBCs per low power field      No organisms seen    Tissue / Bone Culture - Tissue, Hip, Right [[id number]] Collected: 01/18/22 1315    Specimen: Tissue from Hip, Right Updated: 01/20/22 1104     Tissue Culture No growth at 2 days     Gram Stain Many (4+) WBCs per low power field      No organisms seen    Tissue / Bone Culture - Tissue, Hip, Right [[id number]] Collected: 01/18/22 1329    Specimen: Tissue from Hip, Right Updated: 01/20/22 1103     Tissue Culture Culture in progress     Gram Stain Rare (1+) WBCs per low power field      No organisms seen    Wound Culture - Wound, Hip, Right [[id number]] Collected: 01/18/22 1318    Specimen: Wound from Hip, Right Updated: 01/20/22 1100     Wound Culture No growth at 2 days     Gram Stain No WBCs or organisms seen    POC Glucose Once [[id number]]  (Abnormal) Collected: 01/20/22 1014    Specimen: Blood Updated: 01/20/22 1016     Glucose 195 mg/dL      Comment: Meter: [id number] : [id number] Enmanuel CUNNING[place]       Basic Metabolic Panel [[id number]]  (Abnormal) Collected: 01/20/22 0542    Specimen: Blood Updated: 01/20/22 0715     Glucose 168 mg/dL      BUN 10 mg/dL      Creatinine 0.86 mg/dL      Sodium 133 mmol/L      Potassium 3.8 mmol/L      Chloride 103 mmol/L      CO2 22.5 mmol/L      Calcium 7.8 mg/dL      eGFR Non African Amer 66 mL/min/1.73      BUN/Creatinine Ratio 11.6     Anion Gap 7.5 mmol/L     Narrative:      GFR Normal >60  Chronic Kidney Disease <60  Kidney Failure <15      CBC & Differential [[id number]]  (Abnormal) Collected: 01/20/22 0542    Specimen: Blood Updated: 01/20/22 0705    Narrative:      The following orders were created for panel order CBC & Differential.  Procedure                               Abnormality         Status                     ---------                               -----------         ------                     CBC Auto Differential[[id number]]        Abnormal            Final result                  Please view results for these tests on the individual orders.    CBC Auto Differential [[id number]]  (Abnormal) Collected: 01/20/22 0542    Specimen: Blood Updated: 01/20/22 0705     WBC 4.90 10*3/mm3      RBC 3.14 10*6/mm3      Hemoglobin 8.3 g/dL      Hematocrit 27.5 %      MCV 87.6 fL      MCH 26.4 pg      MCHC 30.2 g/dL      RDW 15.2 %      RDW-SD 48.4 fl      MPV 9.5 fL      Platelets 143 10*3/mm3      Neutrophil % 71.0 %      Lymphocyte % 14.3 %      Monocyte % 10.0 %      Eosinophil % 3.7 %      Basophil % 0.4 %      Immature Grans % 0.6 %      Neutrophils, Absolute 3.48 10*3/mm3      Lymphocytes, Absolute 0.70 10*3/mm3      Monocytes, Absolute 0.49 10*3/mm3      Eosinophils, Absolute 0.18 10*3/mm3      Basophils, Absolute 0.02 10*3/mm3      Immature Grans, Absolute 0.03 10*3/mm3      nRBC 0.0 /100 WBC     POC Glucose Once [[id number]]  (Abnormal) Collected: 01/20/22 0634    Specimen: Blood Updated: 01/20/22 0635     Glucose 169 mg/dL      Comment: Meter: [id number] : [id number] McQueary Ariana NA       POC Glucose Once [[id number]]  (Abnormal) Collected: 01/19/22 2035    Specimen: Blood Updated: 01/19/22 2036     Glucose 217 mg/dL      Comment: Meter: [id number] : [id number] McQueary Ariana NA       POC Glucose Once [[id number]]  (Abnormal) Collected: 01/19/22 1551    Specimen: Blood Updated: 01/19/22 1553     Glucose 192 mg/dL      Comment: Meter: [id number] : [id number] Dash [place]       Vancomycin, Trough Vancomycin dose due at 1100 1/19. Please make sure Vancomycin is not infusing before drawing the vanc level. Thanks [[id number]]  (Normal) Collected: 01/19/22 1142    Specimen: Blood Updated: 01/19/22 1227     Vancomycin Trough 12.30 mcg/mL     Narrative:      Therapeutic Ranges for Vancomycin    Vancomycin Random   5.0-40.0 mcg/mL  Vancomycin Trough   5.0-20.0 mcg/mL  Vancomycin Peak     20.0-40.0 mcg/mL    POC Glucose Once [[id number]]  (Abnormal) Collected: 01/19/22 1121    Specimen:  Blood Updated: 01/19/22 1124     Glucose 158 mg/dL      Comment: Meter: [id number] : [id number] Dash [place]       Folate [[id number]]  (Normal) Collected: 01/19/22 0700    Specimen: Blood Updated: 01/19/22 0853     Folate 5.63 ng/mL     Narrative:      Results may be falsely increased if patient taking Biotin.      Vitamin B12 [[id number]]  (Normal) Collected: 01/19/22 0700    Specimen: Blood Updated: 01/19/22 0853     Vitamin B-12 377 pg/mL     Narrative:      Results may be falsely increased if patient taking Biotin.      Basic Metabolic Panel [[id number]]  (Abnormal) Collected: 01/19/22 0700    Specimen: Blood Updated: 01/19/22 0840     Glucose 152 mg/dL      BUN 9 mg/dL      Creatinine 0.85 mg/dL      Sodium 132 mmol/L      Potassium 4.0 mmol/L      Chloride 102 mmol/L      CO2 19.5 mmol/L      Calcium 8.1 mg/dL      eGFR Non African Amer 67 mL/min/1.73      BUN/Creatinine Ratio 10.6     Anion Gap 10.5 mmol/L     Narrative:      GFR Normal >60  Chronic Kidney Disease <60  Kidney Failure <15      Iron Profile [[id number]]  (Abnormal) Collected: 01/19/22 0700    Specimen: Blood Updated: 01/19/22 0837     Iron 34 mcg/dL      Iron Saturation 13 %      Transferrin 171 mg/dL      TIBC 255 mcg/dL     CBC & Differential [[id number]]  (Abnormal) Collected: 01/19/22 0700    Specimen: Blood Updated: 01/19/22 0821    Narrative:      The following orders were created for panel order CBC & Differential.  Procedure                               Abnormality         Status                     ---------                               -----------         ------                     CBC Auto Differential[[id number]]        Abnormal            Final result                 Please view results for these tests on the individual orders.    CBC Auto Differential [[id number]]  (Abnormal) Collected: 01/19/22 0700    Specimen: Blood Updated: 01/19/22 0821     WBC 8.05 10*3/mm3      RBC 2.86 10*6/mm3      Hemoglobin 7.5 g/dL      Hematocrit  24.2 %      MCV 84.6 fL      MCH 26.2 pg      MCHC 31.0 g/dL      RDW 15.5 %      RDW-SD 46.1 fl      MPV 10.0 fL      Platelets 160 10*3/mm3      Neutrophil % 79.9 %      Lymphocyte % 9.6 %      Monocyte % 9.2 %      Eosinophil % 0.1 %      Basophil % 0.2 %      Immature Grans % 1.0 %      Neutrophils, Absolute 6.43 10*3/mm3      Lymphocytes, Absolute 0.77 10*3/mm3      Monocytes, Absolute 0.74 10*3/mm3      Eosinophils, Absolute 0.01 10*3/mm3      Basophils, Absolute 0.02 10*3/mm3      Immature Grans, Absolute 0.08 10*3/mm3      nRBC 0.0 /100 WBC     POC Glucose Once [[id number]]  (Abnormal) Collected: 01/19/22 0634    Specimen: Blood Updated: 01/19/22 0635     Glucose 174 mg/dL      Comment: Meter: [id number] : [id number] [address] NA       POC Glucose Once [[id number]]  (Abnormal) Collected: 01/18/22 2107    Specimen: Blood Updated: 01/18/22 2108     Glucose 281 mg/dL      Comment: Meter: [id number] : [id number] McQueary Ariana NA       Hemoglobin & Hematocrit, Blood [[id number]]  (Abnormal) Collected: 01/18/22 1702    Specimen: Blood Updated: 01/18/22 1725     Hemoglobin 9.0 g/dL      Hematocrit 29.3 %     POC Glucose Once [[id number]]  (Abnormal) Collected: 01/18/22 1625    Specimen: Blood Updated: 01/18/22 1627     Glucose 166 mg/dL      Comment: Meter: [id number] : [id number] Dash Fernández NA       POC Glucose Once [[id number]]  (Abnormal) Collected: 01/18/22 1445    Specimen: Blood Updated: 01/18/22 1449     Glucose 147 mg/dL      Comment: Meter: [id number] : [id number] Bebeto Wade RN       Anaerobic Culture - Wound, Hip, Right [[id number]] Collected: 01/18/22 1318    Specimen: Wound from Hip, Right Updated: 01/18/22 1352    Anaerobic Culture - Wound, Hip, Right [[id number]] Collected: 01/18/22 1318    Specimen: Wound from Hip, Right Updated: 01/18/22 1352    Anaerobic Culture - Tissue, Hip, Right [[id number]] Collected: 01/18/22 1315    Specimen: Tissue from Hip, Right Updated: 01/18/22 6291     Anaerobic Culture - Wound, Hip, Right [[id number]] Collected: 01/18/22 1318    Specimen: Wound from Hip, Right Updated: 01/18/22 1351    Anaerobic Culture - Tissue, Hip, Right [[id number]] Collected: 01/18/22 1329    Specimen: Tissue from Hip, Right Updated: 01/18/22 1351    POC Glucose Once [[id number]]  (Abnormal) Collected: 01/18/22 1146    Specimen: Blood Updated: 01/18/22 1148     Glucose 143 mg/dL      Comment: Meter: [id number] : [id number] Dash [place]       Ferritin [[id number]]  (Abnormal) Collected: 01/18/22 0613    Specimen: Blood Updated: 01/18/22 1049     Ferritin 326.00 ng/mL     Narrative:      Results may be falsely decreased if patient taking Biotin.      Basic Metabolic Panel [[id number]]  (Abnormal) Collected: 01/18/22 0613    Specimen: Blood Updated: 01/18/22 0702     Glucose 147 mg/dL      BUN 6 mg/dL      Creatinine 0.88 mg/dL      Sodium 136 mmol/L      Potassium 3.6 mmol/L      Chloride 104 mmol/L      CO2 22.9 mmol/L      Calcium 7.9 mg/dL      eGFR Non African Amer 64 mL/min/1.73      BUN/Creatinine Ratio 6.8     Anion Gap 9.1 mmol/L     Narrative:      GFR Normal >60  Chronic Kidney Disease <60  Kidney Failure <15      CBC & Differential [[id number]]  (Abnormal) Collected: 01/18/22 0613    Specimen: Blood Updated: 01/18/22 0631    Narrative:      The following orders were created for panel order CBC & Differential.  Procedure                               Abnormality         Status                     ---------                               -----------         ------                     CBC Auto Differential[[id number]]        Abnormal            Final result                 Please view results for these tests on the individual orders.    CBC Auto Differential [[id number]]  (Abnormal) Collected: 01/18/22 0613    Specimen: Blood Updated: 01/18/22 0631     WBC 5.69 10*3/mm3      RBC 2.71 10*6/mm3      Hemoglobin 7.2 g/dL      Hematocrit 22.9 %      MCV 84.5 fL      MCH 26.6 pg      MCHC  31.4 g/dL      RDW 15.8 %      RDW-SD 47.2 fl      MPV 9.4 fL      Platelets 150 10*3/mm3      Neutrophil % 59.9 %      Lymphocyte % 20.9 %      Monocyte % 13.0 %      Eosinophil % 3.2 %      Basophil % 0.7 %      Immature Grans % 2.3 %      Neutrophils, Absolute 3.41 10*3/mm3      Lymphocytes, Absolute 1.19 10*3/mm3      Monocytes, Absolute 0.74 10*3/mm3      Eosinophils, Absolute 0.18 10*3/mm3      Basophils, Absolute 0.04 10*3/mm3      Immature Grans, Absolute 0.13 10*3/mm3      nRBC 0.2 /100 WBC     POC Glucose Once [[id number]]  (Abnormal) Collected: 01/18/22 0617    Specimen: Blood Updated: 01/18/22 0619     Glucose 151 mg/dL      Comment: Meter: [id number] : [id number] Fernandez Elle NA       POC Glucose Once [[id number]]  (Abnormal) Collected: 01/17/22 2042    Specimen: Blood Updated: 01/17/22 2043     Glucose 223 mg/dL      Comment: Meter: [id number] : [id number] Fernandez Elle NA                 Imaging Results (Last 72 Hours)     Procedure Component Value Units Date/Time    XR Hip With or Without Pelvis 1 View Right [[id number]] Collected: 01/18/22 1459     Updated: 01/18/22 1504    Narrative:      ONE VIEW PORTABLE RIGHT HIP AND ONE VIEW PORTABLE AP PELVIS     HISTORY: Right hip replacement and hip revision surgery.     FINDINGS: The patient has had recent hip revision surgery and the  alignment appears satisfactory.     This report was finalized on 1/18/2022 3:00 PM by Dr. Juwan Combs M.D.               Medication Review:      Hospital Medications (active)       Dose Frequency Start End    aspirin chewable tablet 81 mg 81 mg Nightly 1/16/2022     Admin Instructions: Do not exceed 4 grams of aspirin in a 24 hr period.    If given for pain, use the following pain scale:   Mild Pain = Pain Score of 1-3, CPOT 1-2  Moderate Pain = Pain Score of 4-6, CPOT 3-4  Severe Pain = Pain Score of 7-10, CPOT 5-8    Route: Oral    atorvastatin (LIPITOR) tablet 10 mg 10 mg Nightly 1/16/2022     Admin Instructions: Avoid  "grapefruit juice.    Route: Oral    cefTRIAXone (ROCEPHIN) 2 g in sodium chloride 0.9 % 100 mL IVPB-VTB 2 g Every 24 Hours 1/18/2022 1/23/2022    Admin Instructions: Caution: Look alike/sound alike drug alert    Route: Intravenous    dextrose (D50W) (25 g/50 mL) IV injection 25 g 25 g Every 15 Minutes PRN 1/16/2022     Admin Instructions: Blood sugar less than 70; patient has IV access - Unresponsive, NPO or Unable To Safely Swallow    Route: Intravenous    dextrose (GLUTOSE) oral gel 15 g 15 g Every 15 Minutes PRN 1/16/2022     Admin Instructions: BS<70, Patient Alert, Is not NPO, Can safely swallow.    Route: Oral    ferrous sulfate tablet 325 mg 325 mg Every Other Day 1/17/2022     Admin Instructions: Swallow whole. Do not crush, split, or chew. Take with food if GI upset occurs.    Route: Oral    gabapentin (NEURONTIN) capsule 600 mg 600 mg Every 12 Hours Scheduled 1/16/2022     Admin Instructions:     Route: Oral    glucagon (human recombinant) (GLUCAGEN DIAGNOSTIC) injection 1 mg 1 mg Every 15 Minutes PRN 1/16/2022     Admin Instructions: Blood Glucose Less Than 70 - Patient Without IV Access - Unresponsive, NPO or Unable To Safely Swallow    Route: Subcutaneous    HYDROcodone-acetaminophen (NORCO) 5-325 MG per tablet 1 tablet 1 tablet Every 4 Hours PRN 1/16/2022 1/23/2022    Admin Instructions: [RICCARDO]    Do not exceed 4 grams of acetaminophen in a 24 hr period. Max dose of 2gm for AST/ALT greater than 120 units/L        If given for pain, use the following pain scale:   Mild Pain = Pain Score of 1-3, CPOT 1-2  Moderate Pain = Pain Score of 4-6, CPOT 3-4  Severe Pain = Pain Score of 7-10, CPOT 5-8    Route: Oral    Linked Group 1: \"Or\" Linked Group Details        HYDROcodone-acetaminophen (NORCO) 5-325 MG per tablet 2 tablet 2 tablet Every 4 Hours PRN 1/16/2022 1/23/2022    Admin Instructions: [RICCARDO]    Do not exceed 4 grams of acetaminophen in a 24 hr period. Max dose of 2gm for AST/ALT greater than 120 " "units/L        If given for pain, use the following pain scale:   Mild Pain = Pain Score of 1-3, CPOT 1-2  Moderate Pain = Pain Score of 4-6, CPOT 3-4  Severe Pain = Pain Score of 7-10, CPOT 5-8    Route: Oral    Linked Group 1: \"Or\" Linked Group Details        insulin glargine (LANTUS, SEMGLEE) injection 50 Units 50 Units Nightly 1/20/2022     Admin Instructions:     Route: Subcutaneous    insulin lispro (ADMELOG) injection 0-7 Units 0-7 Units 3 Times Daily Before Meals 1/16/2022     Admin Instructions: Correction - Low Dose.  Less than 40 units/day total insulin dose or lean, elderly, renal patients    Blood glucose 150-199 mg/dL - 2 units  Blood glucose 200-249 mg/dL - 3 units  Blood glucose 250-299 mg/dL - 4 units  Blood glucose 300-349 mg/dL - 5 units  Blood glucose 350-400 mg/dL - 6 units  Blood glucose greater than 400 mg/dL - 7 units and call provider   Caution: Look alike/sound alike drug alert    Route: Subcutaneous    isosorbide mononitrate (IMDUR) 24 hr tablet 30 mg 30 mg Every Morning 1/17/2022     Admin Instructions: Do not crush, or chew.  Hold is systolic BP is less than 100mmHg    Route: Oral    lactated ringers infusion 9 mL/hr Continuous 1/18/2022     Route: Intravenous    metoprolol tartrate (LOPRESSOR) tablet 25 mg 25 mg Nightly 1/16/2022     Route: Oral    metoprolol tartrate (LOPRESSOR) tablet 25 mg 25 mg Nightly 1/16/2022     Route: Oral    nystatin (MYCOSTATIN) powder  Every 12 Hours Scheduled 1/19/2022     Admin Instructions: Apply to abdominal folds    Route: Topical    oxyCODONE-acetaminophen (PERCOCET) 5-325 MG per tablet 1 tablet 1 tablet Every 4 Hours PRN 1/16/2022 1/23/2022    Admin Instructions: [RICCARDO]    Do not exceed 4 grams of acetaminophen in a 24 hr period. Max dose of 2gm for AST/ALT greater than 120 units/L        If given for pain, use the following pain scale:   Mild Pain = Pain Score of 1-3, CPOT 1-2  Moderate Pain = Pain Score of 4-6, CPOT 3-4  Severe Pain = Pain Score of " 7-10, CPOT 5-8    Route: Oral    oxyCODONE-acetaminophen (PERCOCET) 5-325 MG per tablet 2 tablet 2 tablet Every 4 Hours PRN 1/16/2022 1/23/2022    Admin Instructions: [RICCARDO]    Do not exceed 4 grams of acetaminophen in a 24 hr period. Max dose of 2gm for AST/ALT greater than 120 units/L        If given for pain, use the following pain scale:   Mild Pain = Pain Score of 1-3, CPOT 1-2  Moderate Pain = Pain Score of 4-6, CPOT 3-4  Severe Pain = Pain Score of 7-10, CPOT 5-8    Route: Oral    pantoprazole (PROTONIX) EC tablet 40 mg 40 mg Every Morning 1/17/2022     Admin Instructions: Swallow whole; do not crush, split, or chew.    Route: Oral    Pharmacy to dose vancomycin  Continuous PRN 1/18/2022 1/23/2022    Route: Does not apply    promethazine (PHENERGAN) tablet 12.5 mg 12.5 mg Every 6 Hours PRN 1/16/2022     Admin Instructions:     Route: Oral    vancomycin (VANCOCIN) in iso-osmotic dextrose IVPB 1 g (premix) 200 mL 1,000 mg Every 12 Hours 1/19/2022 1/24/2022    Route: Intravenous    vitamin B-12 (CYANOCOBALAMIN) tablet 1,000 mcg 1,000 mcg Daily 1/21/2022     Route: Oral          Assessment/Plan         Septic arthritis of hip (HCC)    Type 2 diabetes mellitus with diabetic polyneuropathy, with long-term current use of insulin (HCC)    Hypertension    Normocytic anemia    Morbid obesity (HCC)    Presence of stent in coronary artery in patient with coronary artery disease    Chronic diastolic CHF (congestive heart failure) (HCC)    Drainage from wound    Candidal intertrigo    Postoperative anemia due to acute blood loss    HX Right hip cultures in the past have grown VRE and Candida based on review of [facility] ID note     S/P Right Total Hip irrigation debridement with head and liner exchange 12/18/21   C&S  VRE  Had 6 weeks of PO zyvox    S/P Right Total Hip Revision  1/18/22  New C&S -    Plan :      IV daptomycin for 6 weeks  Till 2/28/22  CBC, BMP, CPK weekly  PICC line   Will follow  Thank you      Robert Ponce,  MD  01/20/22  20:41 EST

## 2022-01-21 NOTE — PROGRESS NOTES
LOS: 5 days   Patient Care Team:  Gregorio Rosales as PCP - General (Family Medicine)  Jose Kong MD as Consulting Physician (Cardiology)        Subjective     Interval History:     Patient Complaints:   Patient Denies:  NV       Review of Systems:    Pain    Objective     Vital Signs  Temp:  [97.2 °F (36.2 °C)-97.8 °F (36.6 °C)] 97.4 °F (36.3 °C)  Heart Rate:  [71-90] 71  Resp:  [16] 16  BP: (101-145)/(56-59) 110/59    Physical Exam:     General Appearance:    Alert, cooperative, in no acute distress   Head:    Normocephalic, without obvious abnormality, atraumatic   Eyes:            Lids and lashes normal, conjunctivae and sclerae normal, no   icterus, no pallor, corneas clear, PERRLA   Ears:    Ears appear intact with no abnormalities noted   Throat:   No oral lesions, no thrush, oral mucosa moist   Neck:   No adenopathy, supple, trachea midline, no thyromegaly, no     carotid bruit, no JVD   Back:     No kyphosis present, no scoliosis present, no skin lesions,       erythema or scars, no tenderness to percussion or                   palpation,   range of motion normal   Lungs:     Clear to auscultation,respirations regular, even and                   unlabored    Heart:    Regular rhythm and normal rate, normal S1 and S2, no            murmur, no gallop, no rub, no click   Breast Exam:    Deferred   Abdomen:     Normal bowel sounds, no masses, no organomegaly, soft        non-tender, non-distended, no guarding, no rebound                 tenderness   Genitalia:    Deferred   Extremities:   Moves all extremities well, no edema, no cyanosis, no              redness   Pulses:   Pulses palpable and equal bilaterally   Skin:   No bleeding, bruising or rash   Lymph nodes:   No palpable adenopathy   Neurologic:   Cranial nerves 2 - 12 grossly intact, sensation intact, DTR        present and equal bilaterally          Results Review:      Lab Results (last 72 hours)     Procedure Component Value Units Date/Time     Anaerobic Culture - Tissue, Hip, Right [[id number]]  (Normal) Collected: 01/18/22 1315    Specimen: Tissue from Hip, Right Updated: 01/21/22 1235     Anaerobic Culture No anaerobes isolated at 3 days    Anaerobic Culture - Tissue, Hip, Right [[id number]]  (Normal) Collected: 01/18/22 1329    Specimen: Tissue from Hip, Right Updated: 01/21/22 1230     Anaerobic Culture No anaerobes isolated at 3 days    Vancomycin, Trough Vancomycin trough due 30 minutes before dose scheduled on 1/21 @ 1130, please make sure Vancomycin isn't infusing before drawing level. [[id number]]  (Normal) Collected: 01/21/22 1108    Specimen: Blood Updated: 01/21/22 1219     Vancomycin Trough 12.30 mcg/mL     Narrative:      Therapeutic Ranges for Vancomycin    Vancomycin Random   5.0-40.0 mcg/mL  Vancomycin Trough   5.0-20.0 mcg/mL  Vancomycin Peak     20.0-40.0 mcg/mL    Wound Culture - Wound, Hip, Right [[id number]] Collected: 01/18/22 1318    Specimen: Wound from Hip, Right Updated: 01/21/22 1055     Wound Culture No growth at 3 days     Gram Stain Rare (1+) WBCs per low power field      No organisms seen    Wound Culture - Wound, Hip, Right [[id number]] Collected: 01/18/22 1318    Specimen: Wound from Hip, Right Updated: 01/21/22 1055     Wound Culture No growth at 3 days     Gram Stain Rare (1+) WBCs per low power field      No organisms seen    Tissue / Bone Culture - Tissue, Hip, Right [[id number]] Collected: 01/18/22 1315    Specimen: Tissue from Hip, Right Updated: 01/21/22 1054     Tissue Culture No growth at 3 days     Gram Stain Many (4+) WBCs per low power field      No organisms seen    Wound Culture - Wound, Hip, Right [[id number]] Collected: 01/18/22 1318    Specimen: Wound from Hip, Right Updated: 01/21/22 1054     Wound Culture No growth at 3 days     Gram Stain No WBCs or organisms seen    POC Glucose Once [[id number]]  (Abnormal) Collected: 01/21/22 1031    Specimen: Blood Updated: 01/21/22 1033     Glucose 241 mg/dL       Comment: Meter: [id number] : [id number] Enmanuel CUNNINGHAM       Basic Metabolic Panel [[id number]]  (Abnormal) Collected: 01/21/22 0656    Specimen: Blood Updated: 01/21/22 0755     Glucose 151 mg/dL      BUN 8 mg/dL      Creatinine 0.76 mg/dL      Sodium 136 mmol/L      Potassium 3.7 mmol/L      Chloride 104 mmol/L      CO2 22.7 mmol/L      Calcium 8.5 mg/dL      eGFR Non African Amer 76 mL/min/1.73      BUN/Creatinine Ratio 10.5     Anion Gap 9.3 mmol/L     Narrative:      GFR Normal >60  Chronic Kidney Disease <60  Kidney Failure <15      CBC & Differential [[id number]]  (Abnormal) Collected: 01/21/22 0656    Specimen: Blood Updated: 01/21/22 0753    Narrative:      The following orders were created for panel order CBC & Differential.  Procedure                               Abnormality         Status                     ---------                               -----------         ------                     CBC Auto Differential[[id number]]        Abnormal            Final result                 Please view results for these tests on the individual orders.    CBC Auto Differential [[id number]]  (Abnormal) Collected: 01/21/22 0656    Specimen: Blood Updated: 01/21/22 0753     WBC 5.59 10*3/mm3      RBC 3.40 10*6/mm3      Hemoglobin 9.0 g/dL      Hematocrit 28.7 %      MCV 84.4 fL      MCH 26.5 pg      MCHC 31.4 g/dL      RDW 15.3 %      RDW-SD 45.7 fl      MPV 9.6 fL      Platelets 158 10*3/mm3      Neutrophil % 73.5 %      Lymphocyte % 11.8 %      Monocyte % 8.1 %      Eosinophil % 5.5 %      Basophil % 0.4 %      Immature Grans % 0.7 %      Neutrophils, Absolute 4.11 10*3/mm3      Lymphocytes, Absolute 0.66 10*3/mm3      Monocytes, Absolute 0.45 10*3/mm3      Eosinophils, Absolute 0.31 10*3/mm3      Basophils, Absolute 0.02 10*3/mm3      Immature Grans, Absolute 0.04 10*3/mm3      nRBC 0.0 /100 WBC     POC Glucose Once [[id number]]  (Abnormal) Collected: 01/21/22 0628    Specimen: Blood Updated: 01/21/22 0629      Glucose 131 mg/dL      Comment: Meter: [id number] : [id number] Anderson Shoshana NA       POC Glucose Once [[id number]]  (Abnormal) Collected: 01/20/22 2113    Specimen: Blood Updated: 01/20/22 2115     Glucose 213 mg/dL      Comment: Meter: [id number] : [id number] Anderson Shoshana NA       POC Glucose Once [[id number]]  (Abnormal) Collected: 01/20/22 1601    Specimen: Blood Updated: 01/20/22 1603     Glucose 209 mg/dL      Comment: Meter: [id number] : [id number] Enmanuel Ochoa NA       Tissue / Bone Culture - Tissue, Hip, Right [[id number]] Collected: 01/18/22 1329    Specimen: Tissue from Hip, Right Updated: 01/20/22 1103     Tissue Culture Culture in progress     Gram Stain Rare (1+) WBCs per low power field      No organisms seen    POC Glucose Once [[id number]]  (Abnormal) Collected: 01/20/22 1014    Specimen: Blood Updated: 01/20/22 1016     Glucose 195 mg/dL      Comment: Meter: [id number] : [id number] Enmanuel Ochoa NA       Basic Metabolic Panel [[id number]]  (Abnormal) Collected: 01/20/22 0542    Specimen: Blood Updated: 01/20/22 0715     Glucose 168 mg/dL      BUN 10 mg/dL      Creatinine 0.86 mg/dL      Sodium 133 mmol/L      Potassium 3.8 mmol/L      Chloride 103 mmol/L      CO2 22.5 mmol/L      Calcium 7.8 mg/dL      eGFR Non African Amer 66 mL/min/1.73      BUN/Creatinine Ratio 11.6     Anion Gap 7.5 mmol/L     Narrative:      GFR Normal >60  Chronic Kidney Disease <60  Kidney Failure <15      CBC & Differential [[id number]]  (Abnormal) Collected: 01/20/22 0542    Specimen: Blood Updated: 01/20/22 0705    Narrative:      The following orders were created for panel order CBC & Differential.  Procedure                               Abnormality         Status                     ---------                               -----------         ------                     CBC Auto Differential[[id number]]        Abnormal            Final result                 Please view results for these tests on the  individual orders.    CBC Auto Differential [[id number]]  (Abnormal) Collected: 01/20/22 0542    Specimen: Blood Updated: 01/20/22 0705     WBC 4.90 10*3/mm3      RBC 3.14 10*6/mm3      Hemoglobin 8.3 g/dL      Hematocrit 27.5 %      MCV 87.6 fL      MCH 26.4 pg      MCHC 30.2 g/dL      RDW 15.2 %      RDW-SD 48.4 fl      MPV 9.5 fL      Platelets 143 10*3/mm3      Neutrophil % 71.0 %      Lymphocyte % 14.3 %      Monocyte % 10.0 %      Eosinophil % 3.7 %      Basophil % 0.4 %      Immature Grans % 0.6 %      Neutrophils, Absolute 3.48 10*3/mm3      Lymphocytes, Absolute 0.70 10*3/mm3      Monocytes, Absolute 0.49 10*3/mm3      Eosinophils, Absolute 0.18 10*3/mm3      Basophils, Absolute 0.02 10*3/mm3      Immature Grans, Absolute 0.03 10*3/mm3      nRBC 0.0 /100 WBC     POC Glucose Once [[id number]]  (Abnormal) Collected: 01/20/22 0634    Specimen: Blood Updated: 01/20/22 0635     Glucose 169 mg/dL      Comment: Meter: [id number] : [id number] McQueary Ariana NA       POC Glucose Once [[id number]]  (Abnormal) Collected: 01/19/22 2035    Specimen: Blood Updated: 01/19/22 2036     Glucose 217 mg/dL      Comment: Meter: [id number] : [id number] McQueary Ariana NA       POC Glucose Once [[id number]]  (Abnormal) Collected: 01/19/22 1551    Specimen: Blood Updated: 01/19/22 1553     Glucose 192 mg/dL      Comment: Meter: [id number] : [id number] Dash CUNNINGHAM       Vancomycin, Trough Vancomycin dose due at 1100 1/19. Please make sure Vancomycin is not infusing before drawing the vanc level. Thanks [[id number]]  (Normal) Collected: 01/19/22 1142    Specimen: Blood Updated: 01/19/22 1227     Vancomycin Trough 12.30 mcg/mL     Narrative:      Therapeutic Ranges for Vancomycin    Vancomycin Random   5.0-40.0 mcg/mL  Vancomycin Trough   5.0-20.0 mcg/mL  Vancomycin Peak     20.0-40.0 mcg/mL    POC Glucose Once [[id number]]  (Abnormal) Collected: 01/19/22 1121    Specimen: Blood Updated: 01/19/22 1124     Glucose 158 mg/dL       Comment: Meter: [id number] : [id number] Dash CUNNING[place]       Folate [[id number]]  (Normal) Collected: 01/19/22 0700    Specimen: Blood Updated: 01/19/22 0853     Folate 5.63 ng/mL     Narrative:      Results may be falsely increased if patient taking Biotin.      Vitamin B12 [[id number]]  (Normal) Collected: 01/19/22 0700    Specimen: Blood Updated: 01/19/22 0853     Vitamin B-12 377 pg/mL     Narrative:      Results may be falsely increased if patient taking Biotin.      Basic Metabolic Panel [[id number]]  (Abnormal) Collected: 01/19/22 0700    Specimen: Blood Updated: 01/19/22 0840     Glucose 152 mg/dL      BUN 9 mg/dL      Creatinine 0.85 mg/dL      Sodium 132 mmol/L      Potassium 4.0 mmol/L      Chloride 102 mmol/L      CO2 19.5 mmol/L      Calcium 8.1 mg/dL      eGFR Non African Amer 67 mL/min/1.73      BUN/Creatinine Ratio 10.6     Anion Gap 10.5 mmol/L     Narrative:      GFR Normal >60  Chronic Kidney Disease <60  Kidney Failure <15      Iron Profile [[id number]]  (Abnormal) Collected: 01/19/22 0700    Specimen: Blood Updated: 01/19/22 0837     Iron 34 mcg/dL      Iron Saturation 13 %      Transferrin 171 mg/dL      TIBC 255 mcg/dL     CBC & Differential [[id number]]  (Abnormal) Collected: 01/19/22 0700    Specimen: Blood Updated: 01/19/22 0821    Narrative:      The following orders were created for panel order CBC & Differential.  Procedure                               Abnormality         Status                     ---------                               -----------         ------                     CBC Auto Differential[[id number]]        Abnormal            Final result                 Please view results for these tests on the individual orders.    CBC Auto Differential [[id number]]  (Abnormal) Collected: 01/19/22 0700    Specimen: Blood Updated: 01/19/22 0821     WBC 8.05 10*3/mm3      RBC 2.86 10*6/mm3      Hemoglobin 7.5 g/dL      Hematocrit 24.2 %      MCV 84.6 fL      MCH 26.2 pg      MCHC  31.0 g/dL      RDW 15.5 %      RDW-SD 46.1 fl      MPV 10.0 fL      Platelets 160 10*3/mm3      Neutrophil % 79.9 %      Lymphocyte % 9.6 %      Monocyte % 9.2 %      Eosinophil % 0.1 %      Basophil % 0.2 %      Immature Grans % 1.0 %      Neutrophils, Absolute 6.43 10*3/mm3      Lymphocytes, Absolute 0.77 10*3/mm3      Monocytes, Absolute 0.74 10*3/mm3      Eosinophils, Absolute 0.01 10*3/mm3      Basophils, Absolute 0.02 10*3/mm3      Immature Grans, Absolute 0.08 10*3/mm3      nRBC 0.0 /100 WBC     POC Glucose Once [[id number]]  (Abnormal) Collected: 01/19/22 0634    Specimen: Blood Updated: 01/19/22 0635     Glucose 174 mg/dL      Comment: Meter: [id number] : [id number] Rossi Dupreen NA       POC Glucose Once [[id number]]  (Abnormal) Collected: 01/18/22 2107    Specimen: Blood Updated: 01/18/22 2108     Glucose 281 mg/dL      Comment: Meter: [id number] : [id number] McQueary Ariana NA       Hemoglobin & Hematocrit, Blood [[id number]]  (Abnormal) Collected: 01/18/22 1702    Specimen: Blood Updated: 01/18/22 1725     Hemoglobin 9.0 g/dL      Hematocrit 29.3 %     POC Glucose Once [[id number]]  (Abnormal) Collected: 01/18/22 1625    Specimen: Blood Updated: 01/18/22 1627     Glucose 166 mg/dL      Comment: Meter: [id number] : [id number] Dash Fernández NA       POC Glucose Once [[id number]]  (Abnormal) Collected: 01/18/22 1445    Specimen: Blood Updated: 01/18/22 1449     Glucose 147 mg/dL      Comment: Meter: [id number] : [id number] Bebeto Wade RN             Imaging Results (Last 72 Hours)     Procedure Component Value Units Date/Time    XR Hip With or Without Pelvis 1 View Right [[id number]] Collected: 01/18/22 1459     Updated: 01/18/22 1504    Narrative:      ONE VIEW PORTABLE RIGHT HIP AND ONE VIEW PORTABLE AP PELVIS     HISTORY: Right hip replacement and hip revision surgery.     FINDINGS: The patient has had recent hip revision surgery and the  alignment appears satisfactory.     This report was  finalized on 1/18/2022 3:00 PM by Dr. Juwan Combs M.D.               Medication Review:     Hospital Medications (active)       Dose Frequency Start End    aspirin chewable tablet 81 mg 81 mg Nightly 1/16/2022     Admin Instructions: Do not exceed 4 grams of aspirin in a 24 hr period.    If given for pain, use the following pain scale:   Mild Pain = Pain Score of 1-3, CPOT 1-2  Moderate Pain = Pain Score of 4-6, CPOT 3-4  Severe Pain = Pain Score of 7-10, CPOT 5-8    Route: Oral    atorvastatin (LIPITOR) tablet 10 mg 10 mg Nightly 1/16/2022     Admin Instructions: Avoid grapefruit juice.    Route: Oral    DAPTOmycin (CUBICIN) 700 mg in sodium chloride 0.9 % 50 mL IVPB 10 mg/kg × 71.1 kg (Adjusted) Every 24 Hours 1/20/2022 3/6/2022    Admin Instructions: Caution: Look alike/sound alike drug alert.  Refrigerate. Do not shake.    Route: Intravenous    dextrose (D50W) (25 g/50 mL) IV injection 25 g 25 g Every 15 Minutes PRN 1/16/2022     Admin Instructions: Blood sugar less than 70; patient has IV access - Unresponsive, NPO or Unable To Safely Swallow    Route: Intravenous    dextrose (GLUTOSE) oral gel 15 g 15 g Every 15 Minutes PRN 1/16/2022     Admin Instructions: BS<70, Patient Alert, Is not NPO, Can safely swallow.    Route: Oral    ferrous sulfate tablet 325 mg 325 mg Every Other Day 1/17/2022     Admin Instructions: Swallow whole. Do not crush, split, or chew. Take with food if GI upset occurs.    Route: Oral    gabapentin (NEURONTIN) capsule 600 mg 600 mg Every 12 Hours Scheduled 1/16/2022     Admin Instructions:     Route: Oral    glucagon (human recombinant) (GLUCAGEN DIAGNOSTIC) injection 1 mg 1 mg Every 15 Minutes PRN 1/16/2022     Admin Instructions: Blood Glucose Less Than 70 - Patient Without IV Access - Unresponsive, NPO or Unable To Safely Swallow    Route: Subcutaneous    HYDROcodone-acetaminophen (NORCO) 5-325 MG per tablet 1 tablet 1 tablet Every 4 Hours PRN 1/16/2022 1/23/2022    Admin  "Instructions: [RICCARDO]    Do not exceed 4 grams of acetaminophen in a 24 hr period. Max dose of 2gm for AST/ALT greater than 120 units/L        If given for pain, use the following pain scale:   Mild Pain = Pain Score of 1-3, CPOT 1-2  Moderate Pain = Pain Score of 4-6, CPOT 3-4  Severe Pain = Pain Score of 7-10, CPOT 5-8    Route: Oral    Linked Group 1: \"Or\" Linked Group Details        HYDROcodone-acetaminophen (NORCO) 5-325 MG per tablet 2 tablet 2 tablet Every 4 Hours PRN 1/16/2022 1/23/2022    Admin Instructions: [RICCARDO]    Do not exceed 4 grams of acetaminophen in a 24 hr period. Max dose of 2gm for AST/ALT greater than 120 units/L        If given for pain, use the following pain scale:   Mild Pain = Pain Score of 1-3, CPOT 1-2  Moderate Pain = Pain Score of 4-6, CPOT 3-4  Severe Pain = Pain Score of 7-10, CPOT 5-8    Route: Oral    Linked Group 1: \"Or\" Linked Group Details        insulin glargine (LANTUS, SEMGLEE) injection 50 Units 50 Units Nightly 1/20/2022     Admin Instructions:     Route: Subcutaneous    insulin lispro (ADMELOG) injection 0-7 Units 0-7 Units 3 Times Daily Before Meals 1/16/2022     Admin Instructions: Correction - Low Dose.  Less than 40 units/day total insulin dose or lean, elderly, renal patients    Blood glucose 150-199 mg/dL - 2 units  Blood glucose 200-249 mg/dL - 3 units  Blood glucose 250-299 mg/dL - 4 units  Blood glucose 300-349 mg/dL - 5 units  Blood glucose 350-400 mg/dL - 6 units  Blood glucose greater than 400 mg/dL - 7 units and call provider   Caution: Look alike/sound alike drug alert    Route: Subcutaneous    isosorbide mononitrate (IMDUR) 24 hr tablet 30 mg 30 mg Every Morning 1/17/2022     Admin Instructions: Do not crush, or chew.  Hold is systolic BP is less than 100mmHg    Route: Oral    lactated ringers infusion 9 mL/hr Continuous 1/18/2022     Route: Intravenous    metoprolol tartrate (LOPRESSOR) tablet 25 mg 25 mg Nightly 1/16/2022     Route: Oral    metoprolol " tartrate (LOPRESSOR) tablet 25 mg 25 mg Nightly 1/16/2022     Route: Oral    nystatin (MYCOSTATIN) powder  Every 12 Hours Scheduled 1/19/2022     Admin Instructions: Apply to abdominal folds    Route: Topical    oxyCODONE-acetaminophen (PERCOCET) 5-325 MG per tablet 1 tablet 1 tablet Every 4 Hours PRN 1/16/2022 1/23/2022    Admin Instructions: [RICCARDO]    Do not exceed 4 grams of acetaminophen in a 24 hr period. Max dose of 2gm for AST/ALT greater than 120 units/L        If given for pain, use the following pain scale:   Mild Pain = Pain Score of 1-3, CPOT 1-2  Moderate Pain = Pain Score of 4-6, CPOT 3-4  Severe Pain = Pain Score of 7-10, CPOT 5-8    Route: Oral    oxyCODONE-acetaminophen (PERCOCET) 5-325 MG per tablet 2 tablet 2 tablet Every 4 Hours PRN 1/16/2022 1/23/2022    Admin Instructions: [RICCARDO]    Do not exceed 4 grams of acetaminophen in a 24 hr period. Max dose of 2gm for AST/ALT greater than 120 units/L        If given for pain, use the following pain scale:   Mild Pain = Pain Score of 1-3, CPOT 1-2  Moderate Pain = Pain Score of 4-6, CPOT 3-4  Severe Pain = Pain Score of 7-10, CPOT 5-8    Route: Oral    pantoprazole (PROTONIX) EC tablet 40 mg 40 mg Every Morning 1/17/2022     Admin Instructions: Swallow whole; do not crush, split, or chew.    Route: Oral    promethazine (PHENERGAN) tablet 12.5 mg 12.5 mg Every 6 Hours PRN 1/16/2022     Admin Instructions:     Route: Oral    vitamin B-12 (CYANOCOBALAMIN) tablet 1,000 mcg 1,000 mcg Daily 1/21/2022     Route: Oral        aspirin, 81 mg, Oral, Nightly  atorvastatin, 10 mg, Oral, Nightly  DAPTOmycin, 10 mg/kg (Adjusted), Intravenous, Q24H  ferrous sulfate, 325 mg, Oral, Every Other Day  gabapentin, 600 mg, Oral, Q12H  insulin glargine, 50 Units, Subcutaneous, Nightly  insulin lispro, 0-7 Units, Subcutaneous, TID AC  isosorbide mononitrate, 30 mg, Oral, QAM  metoprolol tartrate, 25 mg, Oral, Nightly  nystatin, , Topical, Q12H  pantoprazole, 40 mg, Oral,  QAM  vitamin B-12, 1,000 mcg, Oral, Daily        Assessment/Plan         Septic arthritis of hip (HCC)    Type 2 diabetes mellitus with diabetic polyneuropathy, with long-term current use of insulin (HCC)    Hypertension    Normocytic anemia    Morbid obesity (HCC)    Presence of stent in coronary artery in patient with coronary artery disease    Chronic diastolic CHF (congestive heart failure) (HCC)    Drainage from wound    Candidal intertrigo    Postoperative anemia due to acute blood loss    HX Right hip cultures in the past have grown VRE and Candida based on review of [facility] ID note     S/P Right Total Hip irrigation debridement with head and liner exchange 12/18/21   C&S  VRE  Had 6 weeks of PO zyvox     S/P Right Total Hip Revision  1/18/22  New C&S -     Plan :        IV daptomycin for 6 weeks  Till 2/28/22  CBC, BMP, CPK weekly  PICC line   Will follow  Thank you              Robert Ponce MD  01/21/22  13:58 EST

## 2022-01-21 NOTE — PROGRESS NOTES
Name: Stephy Duncan ADMIT: 2022   : 1955  PCP: Rosales Gregorio    MRN: [id number] LOS: 5 days   AGE/SEX: 66 y.o. female  ROOM: [facility]     Subjective   Subjective   Patient appears obese, generally weak, relatively comfortable, no apparent distress.  Tolerating p.o.  Voices no new concerns.    Review of Systems   Respiratory: Negative for cough and shortness of breath.    Cardiovascular: Negative for chest pain and leg swelling.   Gastrointestinal: Negative for abdominal pain, constipation, diarrhea, nausea and vomiting.   Genitourinary: Negative for difficulty urinating and dysuria.   Musculoskeletal: Positive for gait problem and myalgias (localized).   Neurological: Positive for weakness. Negative for light-headedness.   Psychiatric/Behavioral: Negative for confusion and hallucinations.        Objective   Objective   Vital Signs  Temp:  [97.2 °F (36.2 °C)-97.8 °F (36.6 °C)] 97.4 °F (36.3 °C)  Heart Rate:  [71-90] 71  Resp:  [16] 16  BP: (101-145)/(56-59) 110/59  SpO2:  [95 %-97 %] 96 %  on   ;   Device (Oxygen Therapy): room air  Body mass index is 36.22 kg/m².     Physical Exam  Constitutional:       General: She is not in acute distress.     Appearance: She is obese. She is not toxic-appearing.   Cardiovascular:      Rate and Rhythm: Normal rate.      Heart sounds: Murmur heard.       Pulmonary:      Effort: Pulmonary effort is normal.      Comments: Diminished on expiration  Abdominal:      General: Bowel sounds are normal.      Palpations: Abdomen is soft.   Musculoskeletal:         General: Tenderness (Localized) present.   Skin:     General: Skin is warm and dry.   Neurological:      Mental Status: She is alert and oriented to person, place, and time.      Cranial Nerves: No cranial nerve deficit.      Motor: Weakness present.   Psychiatric:         Behavior: Behavior normal.         Thought Content: Thought content normal.         Results Review     I reviewed the patient's new clinical  results.  Results from last 7 days   Lab Units 01/21/22  0656 01/20/22  0542 01/19/22  0700 01/18/22  1702 01/18/22  0613 01/18/22  0613   WBC 10*3/mm3 5.59 4.90 8.05  --   --  5.69   HEMOGLOBIN g/dL 9.0* 8.3* 7.5* 9.0*   < > 7.2*   PLATELETS 10*3/mm3 158 143 160  --   --  150    < > = values in this interval not displayed.     Results from last 7 days   Lab Units 01/21/22  0656 01/20/22  0542 01/19/22  0700 01/18/22  0613   SODIUM mmol/L 136 133* 132* 136   POTASSIUM mmol/L 3.7 3.8 4.0 3.6   CHLORIDE mmol/L 104 103 102 104   CO2 mmol/L 22.7 22.5 19.5* 22.9   BUN mg/dL 8 10 9 6*   CREATININE mg/dL 0.76 0.86 0.85 0.88   GLUCOSE mg/dL 151* 168* 152* 147*   EGFR IF NONAFRICN AM mL/min/1.73 76 66 67 64       Results from last 7 days   Lab Units 01/21/22  0656 01/20/22  0542 01/19/22  0700 01/18/22  0613   CALCIUM mg/dL 8.5* 7.8* 8.1* 7.9*       COVID19   Date Value Ref Range Status   01/16/2022 Not Detected Not Detected - Ref. Range Final   01/07/2022 Not Detected Not Detected - Ref. Range Final     Glucose   Date/Time Value Ref Range Status   01/21/2022 1031 241 (H) 70 - 130 mg/dL Final     Comment:     Meter: [id number] : [id number] Enmanuel Ochoa NA   01/21/2022 0628 131 (H) 70 - 130 mg/dL Final     Comment:     Meter: [id number] : [id number] Anderson Shoshana NA   01/20/2022 2113 213 (H) 70 - 130 mg/dL Final     Comment:     Meter: [id number] : [id number] Anderson Shoshana NA   01/20/2022 1601 209 (H) 70 - 130 mg/dL Final     Comment:     Meter: [id number] : [id number] Singer Gabriela NA   01/20/2022 1014 195 (H) 70 - 130 mg/dL Final     Comment:     Meter: [id number] : [id number] Enmanuel Ochoa NA   01/20/2022 0634 169 (H) 70 - 130 mg/dL Final     Comment:     Meter: [id number] : [id number] Rossi [address] NA   01/19/2022 2035 217 (H) 70 - 130 mg/dL Final     Comment:     Meter: [id number] : [id number] Rossi Dupreen NA       XR Hip With or Without Pelvis 1 View Right  ONE VIEW PORTABLE RIGHT  HIP AND ONE VIEW PORTABLE AP PELVIS     HISTORY: Right hip replacement and hip revision surgery.     FINDINGS: The patient has had recent hip revision surgery and the  alignment appears satisfactory.     This report was finalized on 1/18/2022 3:00 PM by Dr. Juwan Combs M.D.       Scheduled Medications  aspirin, 81 mg, Oral, Nightly  atorvastatin, 10 mg, Oral, Nightly  DAPTOmycin, 10 mg/kg (Adjusted), Intravenous, Q24H  ferrous sulfate, 325 mg, Oral, Every Other Day  gabapentin, 600 mg, Oral, Q12H  insulin glargine, 50 Units, Subcutaneous, Nightly  insulin lispro, 0-7 Units, Subcutaneous, TID AC  isosorbide mononitrate, 30 mg, Oral, QAM  metoprolol tartrate, 25 mg, Oral, Nightly  nystatin, , Topical, Q12H  pantoprazole, 40 mg, Oral, QAM  vitamin B-12, 1,000 mcg, Oral, Daily    Infusions  lactated ringers, 9 mL/hr, Last Rate: 9 mL/hr (01/18/22 1250)  Pharmacy to dose vancomycin,     Diet  Diet Regular       Assessment/Plan     Active Hospital Problems    Diagnosis  POA   • **Septic arthritis of hip (HCC) [M00.9]  Yes   • Candidal intertrigo [B37.2]  Yes   • Postoperative anemia due to acute blood loss [D62]  No   • Drainage from wound [L24.A9]  Yes   • Morbid obesity ([facility]) [E66.01]  Yes   • Chronic diastolic CHF (congestive heart failure) ([facility]) [I50.32]  Yes   • Presence of stent in coronary artery in patient with coronary artery disease [I25.10, Z95.5]  Not Applicable   • Type 2 diabetes mellitus with diabetic polyneuropathy, with long-term current use of insulin ([facility]) [E11.42, Z79.4]  Not Applicable   • Hypertension [I10]  Yes   • Normocytic anemia [D64.9]  Yes      Resolved Hospital Problems   No resolved problems to display.       66 y.o. female admitted with Septic arthritis of hip (HCC).    Ortho surgery following status post total right hip revision on 1/18/2022.  Possible discharge home on Saturday, 1/22/2022.  PT following agrees with discharge home when medically ready.      Infectious disease following  recommend IV daptomycin for 6 weeks until February 28, 2022 with weekly CBC, BMP, CPK.  No growth to date.  PICC line ordered.      Vital signs on room air and labs stable.  Continue metoprolol tartrate 25 mg p.o. nightly, Imdur 30 mg p.o. daily, PPI, statin, ASA, ferrous sulfate.  Denies chest pain.  Completed 1 unit packed RBC transfusion this admission.      Glucose acceptable with Lantus 50 units subcu nightly, low-dose lispro sliding scale.  Incentive spirometry encouraged.  Hypoglycemia protocol available.      · SCD for DVT prophylaxis.  · CPR  · Discussed with Dr. Lang.  · Anticipate discharge pending clinical course / defer to primary /plan Home with family support and care tenders home health--CCP following      MORGAN Rodriguez  [facility]  01/21/22  13:12 EST

## 2022-01-21 NOTE — PLAN OF CARE
Goal Outcome Evaluation:  Plan of Care Reviewed With: patient        Progress: improving  Outcome Summary: 67 yo female POD 3 R hip I/D. vss. nvi. pt on room air. axo x4. pain managed with prn po meds and ice. pt recieved abx on shift. educated pt on skin integrity. plans for a picc line to be place, consent signed. pt stand by assist with walker. hemovac in place, little output noted. will continue to monitor.

## 2022-01-21 NOTE — SIGNIFICANT NOTE
01/21/22 1500   OTHER   Discipline physical therapist   Rehab Time/Intention   Session Not Performed patient/family declined treatment  (pt has been up to bathroom and declines PT this PM. will follow up tomorrow)   Recommendation   PT - Next Appointment 01/22/22

## 2022-01-22 VITALS
TEMPERATURE: 98 F | HEART RATE: 65 BPM | WEIGHT: 211 LBS | BODY MASS INDEX: 36.02 KG/M2 | HEIGHT: 64 IN | RESPIRATION RATE: 16 BRPM | OXYGEN SATURATION: 97 % | SYSTOLIC BLOOD PRESSURE: 127 MMHG | DIASTOLIC BLOOD PRESSURE: 76 MMHG

## 2022-01-22 LAB
ANION GAP SERPL CALCULATED.3IONS-SCNC: 8.5 MMOL/L (ref 5–15)
BACTERIA SPEC AEROBE CULT: ABNORMAL
BASOPHILS # BLD AUTO: 0.02 10*3/MM3 (ref 0–0.2)
BASOPHILS NFR BLD AUTO: 0.5 % (ref 0–1.5)
BUN SERPL-MCNC: 9 MG/DL (ref 8–23)
BUN/CREAT SERPL: 11.4 (ref 7–25)
CALCIUM SPEC-SCNC: 8 MG/DL (ref 8.6–10.5)
CHLORIDE SERPL-SCNC: 104 MMOL/L (ref 98–107)
CO2 SERPL-SCNC: 22.5 MMOL/L (ref 22–29)
CREAT SERPL-MCNC: 0.79 MG/DL (ref 0.57–1)
DEPRECATED RDW RBC AUTO: 47.9 FL (ref 37–54)
EOSINOPHIL # BLD AUTO: 0.32 10*3/MM3 (ref 0–0.4)
EOSINOPHIL NFR BLD AUTO: 8.3 % (ref 0.3–6.2)
ERYTHROCYTE [DISTWIDTH] IN BLOOD BY AUTOMATED COUNT: 15.3 % (ref 12.3–15.4)
GFR SERPL CREATININE-BSD FRML MDRD: 72 ML/MIN/1.73
GLUCOSE BLDC GLUCOMTR-MCNC: 128 MG/DL (ref 70–130)
GLUCOSE BLDC GLUCOMTR-MCNC: 169 MG/DL (ref 70–130)
GLUCOSE SERPL-MCNC: 177 MG/DL (ref 65–99)
GRAM STN SPEC: ABNORMAL
GRAM STN SPEC: ABNORMAL
HCT VFR BLD AUTO: 25.1 % (ref 34–46.6)
HGB BLD-MCNC: 7.8 G/DL (ref 12–15.9)
IMM GRANULOCYTES # BLD AUTO: 0.02 10*3/MM3 (ref 0–0.05)
IMM GRANULOCYTES NFR BLD AUTO: 0.5 % (ref 0–0.5)
LYMPHOCYTES # BLD AUTO: 0.76 10*3/MM3 (ref 0.7–3.1)
LYMPHOCYTES NFR BLD AUTO: 19.8 % (ref 19.6–45.3)
MCH RBC QN AUTO: 26.7 PG (ref 26.6–33)
MCHC RBC AUTO-ENTMCNC: 31.1 G/DL (ref 31.5–35.7)
MCV RBC AUTO: 86 FL (ref 79–97)
MONOCYTES # BLD AUTO: 0.4 10*3/MM3 (ref 0.1–0.9)
MONOCYTES NFR BLD AUTO: 10.4 % (ref 5–12)
NEUTROPHILS NFR BLD AUTO: 2.32 10*3/MM3 (ref 1.7–7)
NEUTROPHILS NFR BLD AUTO: 60.5 % (ref 42.7–76)
NRBC BLD AUTO-RTO: 0 /100 WBC (ref 0–0.2)
PLATELET # BLD AUTO: 138 10*3/MM3 (ref 140–450)
PMV BLD AUTO: 8.9 FL (ref 6–12)
POTASSIUM SERPL-SCNC: 3.8 MMOL/L (ref 3.5–5.2)
RBC # BLD AUTO: 2.92 10*6/MM3 (ref 3.77–5.28)
SODIUM SERPL-SCNC: 135 MMOL/L (ref 136–145)
WBC NRBC COR # BLD: 3.84 10*3/MM3 (ref 3.4–10.8)

## 2022-01-22 PROCEDURE — 82962 GLUCOSE BLOOD TEST: CPT

## 2022-01-22 PROCEDURE — 85025 COMPLETE CBC W/AUTO DIFF WBC: CPT | Performed by: ORTHOPAEDIC SURGERY

## 2022-01-22 PROCEDURE — 80048 BASIC METABOLIC PNL TOTAL CA: CPT | Performed by: ORTHOPAEDIC SURGERY

## 2022-01-22 PROCEDURE — 63710000001 INSULIN LISPRO (HUMAN) PER 5 UNITS: Performed by: ORTHOPAEDIC SURGERY

## 2022-01-22 RX ORDER — LINEZOLID 600 MG/1
600 TABLET, FILM COATED ORAL EVERY 12 HOURS SCHEDULED
Status: DISCONTINUED | OUTPATIENT
Start: 2022-01-22 | End: 2022-01-22

## 2022-01-22 RX ORDER — HYDROCODONE BITARTRATE AND ACETAMINOPHEN 5; 325 MG/1; MG/1
1 TABLET ORAL EVERY 4 HOURS PRN
Qty: 50 TABLET | Refills: 0 | Status: SHIPPED | OUTPATIENT
Start: 2022-01-22 | End: 2022-04-21

## 2022-01-22 RX ADMIN — ISOSORBIDE MONONITRATE 30 MG: 30 TABLET ORAL at 08:23

## 2022-01-22 RX ADMIN — HYDROCODONE BITARTRATE AND ACETAMINOPHEN 2 TABLET: 5; 325 TABLET ORAL at 04:02

## 2022-01-22 RX ADMIN — GABAPENTIN 600 MG: 300 CAPSULE ORAL at 08:23

## 2022-01-22 RX ADMIN — PANTOPRAZOLE SODIUM 40 MG: 40 TABLET, DELAYED RELEASE ORAL at 06:17

## 2022-01-22 RX ADMIN — HYDROCODONE BITARTRATE AND ACETAMINOPHEN 2 TABLET: 5; 325 TABLET ORAL at 12:06

## 2022-01-22 RX ADMIN — NYSTATIN: 100000 POWDER TOPICAL at 08:24

## 2022-01-22 RX ADMIN — HYDROCODONE BITARTRATE AND ACETAMINOPHEN 2 TABLET: 5; 325 TABLET ORAL at 08:23

## 2022-01-22 RX ADMIN — SODIUM CHLORIDE, PRESERVATIVE FREE 10 ML: 5 INJECTION INTRAVENOUS at 08:23

## 2022-01-22 RX ADMIN — Medication 1000 MCG: at 08:23

## 2022-01-22 RX ADMIN — INSULIN LISPRO 2 UNITS: 100 INJECTION, SOLUTION INTRAVENOUS; SUBCUTANEOUS at 12:06

## 2022-01-22 NOTE — PLAN OF CARE
Goal Outcome Evaluation:  Plan of Care Reviewed With: patient        Progress: improving  Outcome Summary: Pt is 67 y/o female, pod 3 right THR. Dressing is cdi, vss, n/v intact. PICC line inserted and continuing IV antibiotics. Possible d/c home tomorrow. WCTM.

## 2022-01-22 NOTE — DISCHARGE SUMMARY
Orthopaedic Discharge Summary  Dr. JENNIE Espinosa” Laury BAXTER  [phone]    NAME: Stephy Kulkarni Perla PCP: Gregorio Rosales   :  MRN: [id number]  [id number] LOS:  ADMIT: 6 days  2022   AGE/SEX: 66 y.o. female DC:  today             · Admitting Diagnosis: Drainage from wound [L24.A9]    · Surgery Performed: No admission procedures for hospital encounter.    · Discharge Medications:         Discharge Medications      New Medications      Instructions Start Date   DAPTOmycin 700 mg in sodium chloride 0.9 % 50 mL   10 mg/kg (700 mg), Intravenous, Every 24 Hours         Changes to Medications      Instructions Start Date   HYDROcodone-acetaminophen 7.5-325 MG per tablet  Commonly known as: GILSON  What changed: Another medication with the same name was added. Make sure you understand how and when to take each.   1 tablet, Oral, Every 6 Hours PRN, Patient takes 1/2 tablet QHS PRN. [facility] 10/21      HYDROcodone-acetaminophen 5-325 MG per tablet  Commonly known as: GILSON  What changed: You were already taking a medication with the same name, and this prescription was added. Make sure you understand how and when to take each.   1 tablet, Oral, Every 4 Hours PRN         Continue These Medications      Instructions Start Date   ASPIRIN 81 PO   1 tablet, Oral, Nightly, PT HOLDING FOR SURGERY      atorvastatin 10 MG tablet  Commonly known as: LIPITOR   10 mg, Oral, Nightly      Diclofenac Sodium 1 % gel gel  Commonly known as: VOLTAREN   4 g, Topical, 4 Times Daily PRN      esomeprazole 40 MG capsule  Commonly known as: nexIUM   40 mg, Oral, Every Morning Before Breakfast      ferrous sulfate 325 (65 FE) MG tablet   325 mg, Oral, Every Other Day, Mornings      gabapentin 600 MG tablet  Commonly known as: NEURONTIN   600 mg, Oral, 2 Times Daily      insulin detemir 100 UNIT/ML injection  Commonly known as: LEVEMIR   50 Units, Subcutaneous, Nightly      isosorbide mononitrate 30 MG 24 hr tablet  Commonly known as: IMDUR   30 mg,  Oral, Every Morning      loratadine 10 MG tablet  Commonly known as: CLARITIN   10 mg, Oral, Nightly      metoprolol tartrate 25 MG tablet  Commonly known as: LOPRESSOR   25 mg, Oral, Nightly      NOVOLOG FLEXPEN SC   20 Units, Subcutaneous, 3 Times Daily With Meals, 15-20 units TID      promethazine 12.5 MG tablet  Commonly known as: PHENERGAN   12.5 mg, Oral, Every 6 Hours PRN      Trulicity 3 MG/0.5ML solution pen-injector  Generic drug: Dulaglutide   0.5 mL, Subcutaneous, Weekly, SATURDAYS         Stop These Medications    linezolid 600 MG tablet  Commonly known as: ZYVOX            · Vitals:     Vitals:    01/21/22 0700 01/21/22 2135 01/21/22 2239 01/22/22 0700   BP: 110/59 114/54 120/54    BP Location: Left arm  Left arm    Patient Position: Lying  Lying    Pulse: 71 81 78 66   Resp: 16  16 16   Temp: 97.4 °F (36.3 °C)  97.1 °F (36.2 °C) 97.4 °F (36.3 °C)   TempSrc: Oral  Skin Oral   SpO2: 96%  96% 97%   Weight:       Height:           · Labs:      No results displayed because visit has over 200 results.           No results found.    · Hospital Course:   66 y.o. female was admitted to [facility] to services of Karri Schaeffer II, MD with Drainage from wound [L24.A9] on 1/16/2022 and underwent No admission procedures for hospital encounter.. Post-operatively the patient transferred to the floor where the patient underwent mobilization therapy. Opioids were titrated to achieve appropriate pain management to allow for participation in mobilization exercises. Vital signs and laboratory values are now within safe parameters for discharge. The dressings and/or incision is intact without signs or symptoms of active infection. Operative extremity neurovascular status remains intact as compared to the preoperative exam. Appropriate education re: incision care, activity levels, medications, and follow up visits was completed and all questions were answered. The patient is now deemed stable for  "discharge.    HOME: The patient progressed well with physical therapy. There were cleared for discharge to home. The patietn was sent home in good condition}.       R \"Kilo\" Laury BAXTER MD  Orthopaedic Surgery  [facility]  [phone]                                               "

## 2022-01-22 NOTE — DISCHARGE SUMMARY
Some drainage from hip, but better controlled.  IV daptomycin for 6 weeks and I really appreciate infectious disease help with this patient.  Plan home discharge today and follow-up with me in the office in 2 weeks.

## 2022-01-22 NOTE — PLAN OF CARE
Goal Outcome Evaluation:  Plan of Care Reviewed With: patient        Progress: improving  Outcome Summary: Pt remains stable. ABX given as ordered. Dressing is cdi. Ambulating with walker use and 1 assist. ISO precautions followed. Pain well controlled. PICC to QUENTIN functioning well. Voiding per BRP. Had BM yesterday. Educated on diabetes management. Plans to d/c home likely today. RADHA.

## 2022-01-22 NOTE — PROGRESS NOTES
LOS: 6 days   Patient Care Team:  Gregorio Rosales as PCP - General (Family Medicine)  Jose Kong MD as Consulting Physician (Cardiology)        Subjective     Interval History:     Patient Complaints:   Patient Denies:  NV       Review of Systems:    Pain    Objective     Vital Signs  Temp:  [97.1 °F (36.2 °C)-98 °F (36.7 °C)] 98 °F (36.7 °C)  Heart Rate:  [65-81] 65  Resp:  [16] 16  BP: (114-127)/(54-76) 127/76    Physical Exam:     General Appearance:    Alert, cooperative, in no acute distress   Head:    Normocephalic, without obvious abnormality, atraumatic   Eyes:            Lids and lashes normal, conjunctivae and sclerae normal, no   icterus, no pallor, corneas clear, PERRLA   Ears:    Ears appear intact with no abnormalities noted   Throat:   No oral lesions, no thrush, oral mucosa moist   Neck:   No adenopathy, supple, trachea midline, no thyromegaly, no     carotid bruit, no JVD   Back:     No kyphosis present, no scoliosis present, no skin lesions,       erythema or scars, no tenderness to percussion or                   palpation,   range of motion normal   Lungs:     Clear to auscultation,respirations regular, even and                   unlabored    Heart:    Regular rhythm and normal rate, normal S1 and S2, no            murmur, no gallop, no rub, no click   Breast Exam:    Deferred   Abdomen:     Normal bowel sounds, no masses, no organomegaly, soft        non-tender, non-distended, no guarding, no rebound                 tenderness   Genitalia:    Deferred   Extremities:   Moves all extremities well, no edema, no cyanosis, no              redness   Pulses:   Pulses palpable and equal bilaterally   Skin:   No bleeding, bruising or rash   Lymph nodes:   No palpable adenopathy   Neurologic:   Cranial nerves 2 - 12 grossly intact, sensation intact, DTR        present and equal bilaterally          Results Review:      Lab Results (last 72 hours)     Procedure Component Value Units Date/Time     Anaerobic Culture - Tissue, Hip, Right [[id number]]  (Normal) Collected: 01/18/22 1315    Specimen: Tissue from Hip, Right Updated: 01/21/22 1235     Anaerobic Culture No anaerobes isolated at 3 days    Anaerobic Culture - Tissue, Hip, Right [[id number]]  (Normal) Collected: 01/18/22 1329    Specimen: Tissue from Hip, Right Updated: 01/21/22 1230     Anaerobic Culture No anaerobes isolated at 3 days    Vancomycin, Trough Vancomycin trough due 30 minutes before dose scheduled on 1/21 @ 1130, please make sure Vancomycin isn't infusing before drawing level. [[id number]]  (Normal) Collected: 01/21/22 1108    Specimen: Blood Updated: 01/21/22 1219     Vancomycin Trough 12.30 mcg/mL     Narrative:      Therapeutic Ranges for Vancomycin    Vancomycin Random   5.0-40.0 mcg/mL  Vancomycin Trough   5.0-20.0 mcg/mL  Vancomycin Peak     20.0-40.0 mcg/mL    Wound Culture - Wound, Hip, Right [[id number]] Collected: 01/18/22 1318    Specimen: Wound from Hip, Right Updated: 01/21/22 1055     Wound Culture No growth at 3 days     Gram Stain Rare (1+) WBCs per low power field      No organisms seen    Wound Culture - Wound, Hip, Right [[id number]] Collected: 01/18/22 1318    Specimen: Wound from Hip, Right Updated: 01/21/22 1055     Wound Culture No growth at 3 days     Gram Stain Rare (1+) WBCs per low power field      No organisms seen    Tissue / Bone Culture - Tissue, Hip, Right [[id number]] Collected: 01/18/22 1315    Specimen: Tissue from Hip, Right Updated: 01/21/22 1054     Tissue Culture No growth at 3 days     Gram Stain Many (4+) WBCs per low power field      No organisms seen    Wound Culture - Wound, Hip, Right [[id number]] Collected: 01/18/22 1318    Specimen: Wound from Hip, Right Updated: 01/21/22 1054     Wound Culture No growth at 3 days     Gram Stain No WBCs or organisms seen    POC Glucose Once [[id number]]  (Abnormal) Collected: 01/21/22 1031    Specimen: Blood Updated: 01/21/22 1033     Glucose 241 mg/dL      Comment:  Meter: [id number] : [id number] Enmanuel [place]       Basic Metabolic Panel [[id number]]  (Abnormal) Collected: 01/21/22 0656    Specimen: Blood Updated: 01/21/22 0755     Glucose 151 mg/dL      BUN 8 mg/dL      Creatinine 0.76 mg/dL      Sodium 136 mmol/L      Potassium 3.7 mmol/L      Chloride 104 mmol/L      CO2 22.7 mmol/L      Calcium 8.5 mg/dL      eGFR Non African Amer 76 mL/min/1.73      BUN/Creatinine Ratio 10.5     Anion Gap 9.3 mmol/L     Narrative:      GFR Normal >60  Chronic Kidney Disease <60  Kidney Failure <15      CBC & Differential [[id number]]  (Abnormal) Collected: 01/21/22 0656    Specimen: Blood Updated: 01/21/22 0753    Narrative:      The following orders were created for panel order CBC & Differential.  Procedure                               Abnormality         Status                     ---------                               -----------         ------                     CBC Auto Differential[[id number]]        Abnormal            Final result                 Please view results for these tests on the individual orders.    CBC Auto Differential [[id number]]  (Abnormal) Collected: 01/21/22 0656    Specimen: Blood Updated: 01/21/22 0753     WBC 5.59 10*3/mm3      RBC 3.40 10*6/mm3      Hemoglobin 9.0 g/dL      Hematocrit 28.7 %      MCV 84.4 fL      MCH 26.5 pg      MCHC 31.4 g/dL      RDW 15.3 %      RDW-SD 45.7 fl      MPV 9.6 fL      Platelets 158 10*3/mm3      Neutrophil % 73.5 %      Lymphocyte % 11.8 %      Monocyte % 8.1 %      Eosinophil % 5.5 %      Basophil % 0.4 %      Immature Grans % 0.7 %      Neutrophils, Absolute 4.11 10*3/mm3      Lymphocytes, Absolute 0.66 10*3/mm3      Monocytes, Absolute 0.45 10*3/mm3      Eosinophils, Absolute 0.31 10*3/mm3      Basophils, Absolute 0.02 10*3/mm3      Immature Grans, Absolute 0.04 10*3/mm3      nRBC 0.0 /100 WBC     POC Glucose Once [[id number]]  (Abnormal) Collected: 01/21/22 0628    Specimen: Blood Updated: 01/21/22 0629     Glucose  131 mg/dL      Comment: Meter: [id number] : [id number] Anderson Shoshana NA       POC Glucose Once [[id number]]  (Abnormal) Collected: 01/20/22 2113    Specimen: Blood Updated: 01/20/22 2115     Glucose 213 mg/dL      Comment: Meter: [id number] : [id number] Anderson Shoshana NA       POC Glucose Once [[id number]]  (Abnormal) Collected: 01/20/22 1601    Specimen: Blood Updated: 01/20/22 1603     Glucose 209 mg/dL      Comment: Meter: [id number] : [id number] Enmanuel Ochoa NA       Tissue / Bone Culture - Tissue, Hip, Right [[id number]] Collected: 01/18/22 1329    Specimen: Tissue from Hip, Right Updated: 01/20/22 1103     Tissue Culture Culture in progress     Gram Stain Rare (1+) WBCs per low power field      No organisms seen    POC Glucose Once [[id number]]  (Abnormal) Collected: 01/20/22 1014    Specimen: Blood Updated: 01/20/22 1016     Glucose 195 mg/dL      Comment: Meter: [id number] : [id number] Singerlandon RichardGabriela NA       Basic Metabolic Panel [[id number]]  (Abnormal) Collected: 01/20/22 0542    Specimen: Blood Updated: 01/20/22 0715     Glucose 168 mg/dL      BUN 10 mg/dL      Creatinine 0.86 mg/dL      Sodium 133 mmol/L      Potassium 3.8 mmol/L      Chloride 103 mmol/L      CO2 22.5 mmol/L      Calcium 7.8 mg/dL      eGFR Non African Amer 66 mL/min/1.73      BUN/Creatinine Ratio 11.6     Anion Gap 7.5 mmol/L     Narrative:      GFR Normal >60  Chronic Kidney Disease <60  Kidney Failure <15      CBC & Differential [[id number]]  (Abnormal) Collected: 01/20/22 0542    Specimen: Blood Updated: 01/20/22 0705    Narrative:      The following orders were created for panel order CBC & Differential.  Procedure                               Abnormality         Status                     ---------                               -----------         ------                     CBC Auto Differential[[id number]]        Abnormal            Final result                 Please view results for these tests on the individual  orders.    CBC Auto Differential [[id number]]  (Abnormal) Collected: 01/20/22 0542    Specimen: Blood Updated: 01/20/22 0705     WBC 4.90 10*3/mm3      RBC 3.14 10*6/mm3      Hemoglobin 8.3 g/dL      Hematocrit 27.5 %      MCV 87.6 fL      MCH 26.4 pg      MCHC 30.2 g/dL      RDW 15.2 %      RDW-SD 48.4 fl      MPV 9.5 fL      Platelets 143 10*3/mm3      Neutrophil % 71.0 %      Lymphocyte % 14.3 %      Monocyte % 10.0 %      Eosinophil % 3.7 %      Basophil % 0.4 %      Immature Grans % 0.6 %      Neutrophils, Absolute 3.48 10*3/mm3      Lymphocytes, Absolute 0.70 10*3/mm3      Monocytes, Absolute 0.49 10*3/mm3      Eosinophils, Absolute 0.18 10*3/mm3      Basophils, Absolute 0.02 10*3/mm3      Immature Grans, Absolute 0.03 10*3/mm3      nRBC 0.0 /100 WBC     POC Glucose Once [[id number]]  (Abnormal) Collected: 01/20/22 0634    Specimen: Blood Updated: 01/20/22 0635     Glucose 169 mg/dL      Comment: Meter: [id number] : [id number] McQueary Ariana NA       POC Glucose Once [[id number]]  (Abnormal) Collected: 01/19/22 2035    Specimen: Blood Updated: 01/19/22 2036     Glucose 217 mg/dL      Comment: Meter: [id number] : [id number] McQueary Ariana NA       POC Glucose Once [[id number]]  (Abnormal) Collected: 01/19/22 1551    Specimen: Blood Updated: 01/19/22 1553     Glucose 192 mg/dL      Comment: Meter: [id number] : [id number] Dash [place]       Vancomycin, Trough Vancomycin dose due at 1100 1/19. Please make sure Vancomycin is not infusing before drawing the vanc level. Thanks [[id number]]  (Normal) Collected: 01/19/22 1142    Specimen: Blood Updated: 01/19/22 1227     Vancomycin Trough 12.30 mcg/mL     Narrative:      Therapeutic Ranges for Vancomycin    Vancomycin Random   5.0-40.0 mcg/mL  Vancomycin Trough   5.0-20.0 mcg/mL  Vancomycin Peak     20.0-40.0 mcg/mL    POC Glucose Once [[id number]]  (Abnormal) Collected: 01/19/22 1121    Specimen: Blood Updated: 01/19/22 1124     Glucose 158 mg/dL       Comment: Meter: [id number] : [id number] Dash [place]       Folate [[id number]]  (Normal) Collected: 01/19/22 0700    Specimen: Blood Updated: 01/19/22 0853     Folate 5.63 ng/mL     Narrative:      Results may be falsely increased if patient taking Biotin.      Vitamin B12 [[id number]]  (Normal) Collected: 01/19/22 0700    Specimen: Blood Updated: 01/19/22 0853     Vitamin B-12 377 pg/mL     Narrative:      Results may be falsely increased if patient taking Biotin.      Basic Metabolic Panel [[id number]]  (Abnormal) Collected: 01/19/22 0700    Specimen: Blood Updated: 01/19/22 0840     Glucose 152 mg/dL      BUN 9 mg/dL      Creatinine 0.85 mg/dL      Sodium 132 mmol/L      Potassium 4.0 mmol/L      Chloride 102 mmol/L      CO2 19.5 mmol/L      Calcium 8.1 mg/dL      eGFR Non African Amer 67 mL/min/1.73      BUN/Creatinine Ratio 10.6     Anion Gap 10.5 mmol/L     Narrative:      GFR Normal >60  Chronic Kidney Disease <60  Kidney Failure <15      Iron Profile [[id number]]  (Abnormal) Collected: 01/19/22 0700    Specimen: Blood Updated: 01/19/22 0837     Iron 34 mcg/dL      Iron Saturation 13 %      Transferrin 171 mg/dL      TIBC 255 mcg/dL     CBC & Differential [[id number]]  (Abnormal) Collected: 01/19/22 0700    Specimen: Blood Updated: 01/19/22 0821    Narrative:      The following orders were created for panel order CBC & Differential.  Procedure                               Abnormality         Status                     ---------                               -----------         ------                     CBC Auto Differential[[id number]]        Abnormal            Final result                 Please view results for these tests on the individual orders.    CBC Auto Differential [[id number]]  (Abnormal) Collected: 01/19/22 0700    Specimen: Blood Updated: 01/19/22 0821     WBC 8.05 10*3/mm3      RBC 2.86 10*6/mm3      Hemoglobin 7.5 g/dL      Hematocrit 24.2 %      MCV 84.6 fL      MCH 26.2 pg      MCHC 31.0  g/dL      RDW 15.5 %      RDW-SD 46.1 fl      MPV 10.0 fL      Platelets 160 10*3/mm3      Neutrophil % 79.9 %      Lymphocyte % 9.6 %      Monocyte % 9.2 %      Eosinophil % 0.1 %      Basophil % 0.2 %      Immature Grans % 1.0 %      Neutrophils, Absolute 6.43 10*3/mm3      Lymphocytes, Absolute 0.77 10*3/mm3      Monocytes, Absolute 0.74 10*3/mm3      Eosinophils, Absolute 0.01 10*3/mm3      Basophils, Absolute 0.02 10*3/mm3      Immature Grans, Absolute 0.08 10*3/mm3      nRBC 0.0 /100 WBC     POC Glucose Once [[id number]]  (Abnormal) Collected: 01/19/22 0634    Specimen: Blood Updated: 01/19/22 0635     Glucose 174 mg/dL      Comment: Meter: [id number] : [id number] Albertary Ariana NA       POC Glucose Once [[id number]]  (Abnormal) Collected: 01/18/22 2107    Specimen: Blood Updated: 01/18/22 2108     Glucose 281 mg/dL      Comment: Meter: [id number] : [id number] McQueary Ariana NA       Hemoglobin & Hematocrit, Blood [[id number]]  (Abnormal) Collected: 01/18/22 1702    Specimen: Blood Updated: 01/18/22 1725     Hemoglobin 9.0 g/dL      Hematocrit 29.3 %     POC Glucose Once [[id number]]  (Abnormal) Collected: 01/18/22 1625    Specimen: Blood Updated: 01/18/22 1627     Glucose 166 mg/dL      Comment: Meter: [id number] : [id number] Dash [place]       POC Glucose Once [[id number]]  (Abnormal) Collected: 01/18/22 1445    Specimen: Blood Updated: 01/18/22 1449     Glucose 147 mg/dL      Comment: Meter: [id number] : [id number] Bebeto Wade RN             Imaging Results (Last 72 Hours)     ** No results found for the last 72 hours. **            Medication Review:     Hospital Medications (active)       Dose Frequency Start End    aspirin chewable tablet 81 mg 81 mg Nightly 1/16/2022     Admin Instructions: Do not exceed 4 grams of aspirin in a 24 hr period.    If given for pain, use the following pain scale:   Mild Pain = Pain Score of 1-3, CPOT 1-2  Moderate Pain = Pain Score of 4-6, CPOT 3-4  Severe Pain  "= Pain Score of 7-10, CPOT 5-8    Route: Oral    atorvastatin (LIPITOR) tablet 10 mg 10 mg Nightly 1/16/2022     Admin Instructions: Avoid grapefruit juice.    Route: Oral    DAPTOmycin (CUBICIN) 700 mg in sodium chloride 0.9 % 50 mL IVPB 10 mg/kg × 71.1 kg (Adjusted) Every 24 Hours 1/20/2022 3/6/2022    Admin Instructions: Caution: Look alike/sound alike drug alert.  Refrigerate. Do not shake.    Route: Intravenous    dextrose (D50W) (25 g/50 mL) IV injection 25 g 25 g Every 15 Minutes PRN 1/16/2022     Admin Instructions: Blood sugar less than 70; patient has IV access - Unresponsive, NPO or Unable To Safely Swallow    Route: Intravenous    dextrose (GLUTOSE) oral gel 15 g 15 g Every 15 Minutes PRN 1/16/2022     Admin Instructions: BS<70, Patient Alert, Is not NPO, Can safely swallow.    Route: Oral    ferrous sulfate tablet 325 mg 325 mg Every Other Day 1/17/2022     Admin Instructions: Swallow whole. Do not crush, split, or chew. Take with food if GI upset occurs.    Route: Oral    gabapentin (NEURONTIN) capsule 600 mg 600 mg Every 12 Hours Scheduled 1/16/2022     Admin Instructions:     Route: Oral    glucagon (human recombinant) (GLUCAGEN DIAGNOSTIC) injection 1 mg 1 mg Every 15 Minutes PRN 1/16/2022     Admin Instructions: Blood Glucose Less Than 70 - Patient Without IV Access - Unresponsive, NPO or Unable To Safely Swallow    Route: Subcutaneous    HYDROcodone-acetaminophen (NORCO) 5-325 MG per tablet 1 tablet 1 tablet Every 4 Hours PRN 1/16/2022 1/23/2022    Admin Instructions: [RICCARDO]    Do not exceed 4 grams of acetaminophen in a 24 hr period. Max dose of 2gm for AST/ALT greater than 120 units/L        If given for pain, use the following pain scale:   Mild Pain = Pain Score of 1-3, CPOT 1-2  Moderate Pain = Pain Score of 4-6, CPOT 3-4  Severe Pain = Pain Score of 7-10, CPOT 5-8    Route: Oral    Linked Group 1: \"Or\" Linked Group Details        HYDROcodone-acetaminophen (NORCO) 5-325 MG per tablet 2 tablet " "2 tablet Every 4 Hours PRN 1/16/2022 1/23/2022    Admin Instructions: [RICCARDO]    Do not exceed 4 grams of acetaminophen in a 24 hr period. Max dose of 2gm for AST/ALT greater than 120 units/L        If given for pain, use the following pain scale:   Mild Pain = Pain Score of 1-3, CPOT 1-2  Moderate Pain = Pain Score of 4-6, CPOT 3-4  Severe Pain = Pain Score of 7-10, CPOT 5-8    Route: Oral    Linked Group 1: \"Or\" Linked Group Details        insulin glargine (LANTUS, SEMGLEE) injection 50 Units 50 Units Nightly 1/20/2022     Admin Instructions:     Route: Subcutaneous    insulin lispro (ADMELOG) injection 0-7 Units 0-7 Units 3 Times Daily Before Meals 1/16/2022     Admin Instructions: Correction - Low Dose.  Less than 40 units/day total insulin dose or lean, elderly, renal patients    Blood glucose 150-199 mg/dL - 2 units  Blood glucose 200-249 mg/dL - 3 units  Blood glucose 250-299 mg/dL - 4 units  Blood glucose 300-349 mg/dL - 5 units  Blood glucose 350-400 mg/dL - 6 units  Blood glucose greater than 400 mg/dL - 7 units and call provider   Caution: Look alike/sound alike drug alert    Route: Subcutaneous    isosorbide mononitrate (IMDUR) 24 hr tablet 30 mg 30 mg Every Morning 1/17/2022     Admin Instructions: Do not crush, or chew.  Hold is systolic BP is less than 100mmHg    Route: Oral    lactated ringers infusion 9 mL/hr Continuous 1/18/2022     Route: Intravenous    metoprolol tartrate (LOPRESSOR) tablet 25 mg 25 mg Nightly 1/16/2022     Route: Oral    metoprolol tartrate (LOPRESSOR) tablet 25 mg 25 mg Nightly 1/16/2022     Route: Oral    nystatin (MYCOSTATIN) powder  Every 12 Hours Scheduled 1/19/2022     Admin Instructions: Apply to abdominal folds    Route: Topical    oxyCODONE-acetaminophen (PERCOCET) 5-325 MG per tablet 1 tablet 1 tablet Every 4 Hours PRN 1/16/2022 1/23/2022    Admin Instructions: [RICCARDO]    Do not exceed 4 grams of acetaminophen in a 24 hr period. Max dose of 2gm for AST/ALT greater than 120 " units/L        If given for pain, use the following pain scale:   Mild Pain = Pain Score of 1-3, CPOT 1-2  Moderate Pain = Pain Score of 4-6, CPOT 3-4  Severe Pain = Pain Score of 7-10, CPOT 5-8    Route: Oral    oxyCODONE-acetaminophen (PERCOCET) 5-325 MG per tablet 2 tablet 2 tablet Every 4 Hours PRN 1/16/2022 1/23/2022    Admin Instructions: [RICCARDO]    Do not exceed 4 grams of acetaminophen in a 24 hr period. Max dose of 2gm for AST/ALT greater than 120 units/L        If given for pain, use the following pain scale:   Mild Pain = Pain Score of 1-3, CPOT 1-2  Moderate Pain = Pain Score of 4-6, CPOT 3-4  Severe Pain = Pain Score of 7-10, CPOT 5-8    Route: Oral    pantoprazole (PROTONIX) EC tablet 40 mg 40 mg Every Morning 1/17/2022     Admin Instructions: Swallow whole; do not crush, split, or chew.    Route: Oral    promethazine (PHENERGAN) tablet 12.5 mg 12.5 mg Every 6 Hours PRN 1/16/2022     Admin Instructions:     Route: Oral    vitamin B-12 (CYANOCOBALAMIN) tablet 1,000 mcg 1,000 mcg Daily 1/21/2022     Route: Oral        aspirin, 81 mg, Oral, Nightly  atorvastatin, 10 mg, Oral, Nightly  DAPTOmycin, 10 mg/kg (Adjusted), Intravenous, Q24H  ferrous sulfate, 325 mg, Oral, Every Other Day  gabapentin, 600 mg, Oral, Q12H  insulin glargine, 50 Units, Subcutaneous, Nightly  insulin lispro, 0-7 Units, Subcutaneous, TID AC  isosorbide mononitrate, 30 mg, Oral, QAM  metoprolol tartrate, 25 mg, Oral, Nightly  nystatin, , Topical, Q12H  pantoprazole, 40 mg, Oral, QAM  sodium chloride, 10 mL, Intravenous, Q12H  vitamin B-12, 1,000 mcg, Oral, Daily        Assessment/Plan         Septic arthritis of hip (HCC)    Type 2 diabetes mellitus with diabetic polyneuropathy, with long-term current use of insulin (HCC)    Hypertension    Normocytic anemia    Morbid obesity (HCC)    Presence of stent in coronary artery in patient with coronary artery disease    Chronic diastolic CHF (congestive heart failure) (HCC)    Drainage from  wound    Candidal intertrigo    Postoperative anemia due to acute blood loss    HX Right hip cultures in the past have grown VRE and Candida based on review of [facility] ID note     S/P Right Total Hip irrigation debridement with head and liner exchange 12/18/21   C&S  VRE  Had 6 weeks of PO zyvox     S/P Right Total Hip Revision  1/18/22  New C&S -     Plan :        IV daptomycin for 6 weeks  Till 2/28/22  CBC, BMP, CPK weekly  PICC line   Will follow  Thank you              Robert Ponce MD  01/22/22  12:01 EST

## 2022-01-23 ENCOUNTER — READMISSION MANAGEMENT (OUTPATIENT)
Dept: CALL CENTER | Facility: HOSPITAL | Age: 67
End: 2022-01-23

## 2022-01-23 LAB
BACTERIA SPEC ANAEROBE CULT: NORMAL

## 2022-01-23 NOTE — OUTREACH NOTE
Prep Survey      Responses   [facility] facility patient discharged from? [facility]   Is LACE score < 7 ? No   Emergency Room discharge w/ pulse ox? No   Eligibility Readm Mgmt   Discharge diagnosis right hip wound drainage, s/p Right Total Hip Revision   Does the patient have one of the following disease processes/diagnoses(primary or secondary)? Total Joint Replacement   Does the patient have Home health ordered? Yes   What is the Home health agency?  [facility]     Is there a DME ordered? Yes   What DME was ordered? IV abx- [facility]   Prep survey completed? Yes          Anila Hood RN

## 2022-01-24 ENCOUNTER — READMISSION MANAGEMENT (OUTPATIENT)
Dept: CALL CENTER | Facility: HOSPITAL | Age: 67
End: 2022-01-24

## 2022-01-24 NOTE — CASE MANAGEMENT/SOCIAL WORK
Case Management Discharge Note      Final Note: Home with [facility]  & [facility].    Provided Post Acute Provider List?: N/A  N/A Provider List Comment: Current with [facility] [place].    Selected Continued Care - Discharged on 1/22/2022 Admission date: 1/16/2022 - Discharge disposition: Home or Self Care    Destination    No services have been selected for the patient.              Durable Medical Equipment    No services have been selected for the patient.              Dialysis/Infusion Coordination complete.    Service Provider Selected Services Address Phone Fax Patient Preferred    [facility]  Infusion and IV Therapy [id number], [facility] [id number] [phone] --          Home Medical Care Coordination complete.    Service Provider Selected Services Address Phone Fax Patient Preferred    [facility] [address], [address], [facility] [address] [phone] --          Therapy    No services have been selected for the patient.              Community Resources    No services have been selected for the patient.              Community & DME    No services have been selected for the patient.                Selected Continued Care - Prior Encounters Includes selections from prior encounters from 10/18/2021 to 1/22/2022    Discharged on 12/23/2021 Admission date: 12/17/2021 - [place] [facility] [place]     Service Provider Selected Services Address Phone Fax Patient Preferred    [facility] [address], [address], [place] [facility] [address] [phone] --                Discharged on 11/24/2021 Admission date: 11/16/2021 - Discharge disposition: Skilled Nursing Facility (DC - External)    Destination     Service Provider Selected Services Address Phone Fax Patient Preferred    [facility]   Assisted Living [facility] [address] [place] [address] [phone] --          Home Medical Care     Service Provider Selected Services Address Phone Fax Patient Preferred    [facility]  Home Health Services [facility] [address], [address], [facility] [address] [phone] --                         Final Discharge Disposition Code: 06 - home with home health care

## 2022-01-24 NOTE — PAYOR COMM NOTE
"DISCHARGED  REF #[id number]  ID #[id number]      Stephy Rodas (66 y.o. 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 [id number]       Subscriber Name Subscriber Birth Date Member ID       Stephy Rodas 1955 [id number]                 Emergency Contacts      (Rel.) Home Phone Work Phone Mobile Phone    ESTUARDO RODAS (Spouse) -- -- [phone]    Bhumika Perez (Sister) -- -- [phone]            Discharge Order (From admission, onward)     Start     Ordered    01/22/22 1018  Discharge patient  Once        Expected Discharge Date: 01/22/22    Discharge Disposition: Home or Self Care    Physician of Record for Attribution - Please select from Treatment Team: BILLIE KISER II [[id number]]    Review needed by CMO to determine Physician of Record: No       Question Answer " Comment   Physician of Record for Attribution - Please select from Treatment Team BILLIE KISER II    Review needed by CMO to determine Physician of Record No        01/22/22 1011

## 2022-01-24 NOTE — OUTREACH NOTE
Total Joint Week 1 Survey      Responses   [facility] patient discharged from? [place]   Does the patient have one of the following disease processes/diagnoses(primary or secondary)? Total Joint Replacement   Joint surgery performed? Hip   Week 1 attempt successful? Yes   Call start time 1115   Call end time 1124   Discharge diagnosis right hip wound drainage, s/p Right Total Hip Revision   Is patient permission given to speak with other caregiver? Yes   List who call center can speak with     Person spoke with today (if not patient) and relationship    Does the patient have all medications related to this admission filled (includes all antibiotics, pain medications, etc.) Yes   Is the patient taking all medications as directed (includes completed medication regime)? Yes   Is the patient able to teach back alternate methods of pain control? Ice   Medication comments 5-6/10 with meds 4/10. Trying to only take 1pill.    Does the patient have a follow up appointment with their surgeon? Yes   Has the patient kept scheduled appointments due by today? N/A   What is the Home health agency?  [facility]     Has home health visited the patient within 72 hours of discharge? Yes   What DME was ordered? IV abx- [facility], through PICC line   DME comments using walker.    Psychosocial issues? No   Has the patient began therapy sessions (either in the home or as an out patient)? No   Does the patient have a wound vac in place? No  [took wound vac off and now doing dressing changes TID]   Has the patient fallen since discharge? No   If the patient has fallen, were there any injuries? No   Comments MD advised her to have no activity /x bathroom.Pt/ report having to change the dressing frequently due to bright red bleeding at site. Bleeding is less when she stays still& putting pressure on area. Advised them to call Surgeon ASAP but  reports  nurse is supposed to be calling MD.  I have urged them to go ahead and call Ortho MD now.    Did the patient receive a copy of their discharge instructions? Yes   Nursing interventions Reviewed instructions with patient   What is the patient's perception of their functional status since discharge? Same   Is the patient able to teach back signs and symptoms of infection? Incisional drainage,  Severe discomfort or pain,  Increased swelling or redness around incision (not associated with surgical edema)   Is the patient able to teach back how to prevent infection? Wash hands before and after touching incision,  Check incision daily   If the patient is a current smoker, are they able to teach back resources for cessation? Not a smoker   Additional teach back comments .   Week 1 call completed? Yes          Patti De Jesus RN

## 2022-01-26 ENCOUNTER — LAB REQUISITION (OUTPATIENT)
Dept: LAB | Facility: HOSPITAL | Age: 67
End: 2022-01-26

## 2022-01-26 DIAGNOSIS — E11.9 TYPE 2 DIABETES MELLITUS WITHOUT COMPLICATIONS: ICD-10-CM

## 2022-01-26 DIAGNOSIS — I10 ESSENTIAL (PRIMARY) HYPERTENSION: ICD-10-CM

## 2022-01-26 DIAGNOSIS — I25.10 ATHEROSCLEROTIC HEART DISEASE OF NATIVE CORONARY ARTERY WITHOUT ANGINA PECTORIS: ICD-10-CM

## 2022-01-26 DIAGNOSIS — E78.5 HYPERLIPIDEMIA, UNSPECIFIED: ICD-10-CM

## 2022-01-26 LAB
ALBUMIN SERPL-MCNC: 3 G/DL (ref 3.5–5.2)
ALBUMIN/GLOB SERPL: 1 G/DL
ALP SERPL-CCNC: 88 U/L (ref 39–117)
ALT SERPL W P-5'-P-CCNC: 9 U/L (ref 1–33)
ANION GAP SERPL CALCULATED.3IONS-SCNC: 10.1 MMOL/L (ref 5–15)
AST SERPL-CCNC: 14 U/L (ref 1–32)
BASOPHILS # BLD AUTO: 0.02 10*3/MM3 (ref 0–0.2)
BASOPHILS NFR BLD AUTO: 0.4 % (ref 0–1.5)
BILIRUB SERPL-MCNC: 0.3 MG/DL (ref 0–1.2)
BUN SERPL-MCNC: 8 MG/DL (ref 8–23)
BUN/CREAT SERPL: 10.4 (ref 7–25)
CALCIUM SPEC-SCNC: 8.5 MG/DL (ref 8.6–10.5)
CHLORIDE SERPL-SCNC: 106 MMOL/L (ref 98–107)
CK SERPL-CCNC: 22 U/L (ref 20–180)
CO2 SERPL-SCNC: 20.9 MMOL/L (ref 22–29)
CREAT SERPL-MCNC: 0.77 MG/DL (ref 0.57–1)
CRP SERPL-MCNC: 1.82 MG/DL (ref 0–0.5)
DEPRECATED RDW RBC AUTO: 55.8 FL (ref 37–54)
EOSINOPHIL # BLD AUTO: 0.26 10*3/MM3 (ref 0–0.4)
EOSINOPHIL NFR BLD AUTO: 4.8 % (ref 0.3–6.2)
ERYTHROCYTE [DISTWIDTH] IN BLOOD BY AUTOMATED COUNT: 18.4 % (ref 12.3–15.4)
ERYTHROCYTE [SEDIMENTATION RATE] IN BLOOD: 43 MM/HR (ref 0–30)
GFR SERPL CREATININE-BSD FRML MDRD: 75 ML/MIN/1.73
GLOBULIN UR ELPH-MCNC: 2.9 GM/DL
GLUCOSE SERPL-MCNC: 178 MG/DL (ref 65–99)
HCT VFR BLD AUTO: 26.2 % (ref 34–46.6)
HGB BLD-MCNC: 8.3 G/DL (ref 12–15.9)
IMM GRANULOCYTES # BLD AUTO: 0.07 10*3/MM3 (ref 0–0.05)
IMM GRANULOCYTES NFR BLD AUTO: 1.3 % (ref 0–0.5)
LYMPHOCYTES # BLD AUTO: 0.87 10*3/MM3 (ref 0.7–3.1)
LYMPHOCYTES NFR BLD AUTO: 15.9 % (ref 19.6–45.3)
MCH RBC QN AUTO: 27.5 PG (ref 26.6–33)
MCHC RBC AUTO-ENTMCNC: 31.7 G/DL (ref 31.5–35.7)
MCV RBC AUTO: 86.8 FL (ref 79–97)
MONOCYTES # BLD AUTO: 0.66 10*3/MM3 (ref 0.1–0.9)
MONOCYTES NFR BLD AUTO: 12.1 % (ref 5–12)
NEUTROPHILS NFR BLD AUTO: 3.58 10*3/MM3 (ref 1.7–7)
NEUTROPHILS NFR BLD AUTO: 65.5 % (ref 42.7–76)
NRBC BLD AUTO-RTO: 0 /100 WBC (ref 0–0.2)
PLATELET # BLD AUTO: 156 10*3/MM3 (ref 140–450)
PMV BLD AUTO: 10.2 FL (ref 6–12)
POTASSIUM SERPL-SCNC: 4 MMOL/L (ref 3.5–5.2)
PROT SERPL-MCNC: 5.9 G/DL (ref 6–8.5)
RBC # BLD AUTO: 3.02 10*6/MM3 (ref 3.77–5.28)
SODIUM SERPL-SCNC: 137 MMOL/L (ref 136–145)
WBC NRBC COR # BLD: 5.46 10*3/MM3 (ref 3.4–10.8)

## 2022-01-26 PROCEDURE — 80053 COMPREHEN METABOLIC PANEL: CPT | Performed by: ORTHOPAEDIC SURGERY

## 2022-01-26 PROCEDURE — 85652 RBC SED RATE AUTOMATED: CPT | Performed by: ORTHOPAEDIC SURGERY

## 2022-01-26 PROCEDURE — 85025 COMPLETE CBC W/AUTO DIFF WBC: CPT | Performed by: ORTHOPAEDIC SURGERY

## 2022-01-26 PROCEDURE — 82550 ASSAY OF CK (CPK): CPT | Performed by: ORTHOPAEDIC SURGERY

## 2022-01-26 PROCEDURE — 86140 C-REACTIVE PROTEIN: CPT | Performed by: ORTHOPAEDIC SURGERY

## 2022-01-28 ENCOUNTER — APPOINTMENT (OUTPATIENT)
Dept: GENERAL RADIOLOGY | Facility: HOSPITAL | Age: 67
End: 2022-01-28

## 2022-01-28 ENCOUNTER — HOSPITAL ENCOUNTER (EMERGENCY)
Facility: HOSPITAL | Age: 67
Discharge: HOME | End: 2022-01-28
Attending: EMERGENCY MEDICINE | Admitting: EMERGENCY MEDICINE

## 2022-01-28 VITALS
RESPIRATION RATE: 22 BRPM | BODY MASS INDEX: 40.31 KG/M2 | TEMPERATURE: 98.4 F | OXYGEN SATURATION: 95 % | WEIGHT: 236.11 LBS | HEART RATE: 68 BPM | DIASTOLIC BLOOD PRESSURE: 44 MMHG | HEIGHT: 64 IN | SYSTOLIC BLOOD PRESSURE: 120 MMHG

## 2022-01-28 DIAGNOSIS — S73.004A CLOSED DISLOCATION OF RIGHT HIP, INITIAL ENCOUNTER: Primary | ICD-10-CM

## 2022-01-28 PROCEDURE — 25010000002 ONDANSETRON PER 1 MG: Performed by: EMERGENCY MEDICINE

## 2022-01-28 PROCEDURE — 73502 X-RAY EXAM HIP UNI 2-3 VIEWS: CPT

## 2022-01-28 PROCEDURE — 96375 TX/PRO/DX INJ NEW DRUG ADDON: CPT

## 2022-01-28 PROCEDURE — 96376 TX/PRO/DX INJ SAME DRUG ADON: CPT

## 2022-01-28 PROCEDURE — 96374 THER/PROPH/DIAG INJ IV PUSH: CPT

## 2022-01-28 PROCEDURE — 99284 EMERGENCY DEPT VISIT MOD MDM: CPT

## 2022-01-28 PROCEDURE — 25010000002 HYDROMORPHONE 1 MG/ML SOLUTION: Performed by: EMERGENCY MEDICINE

## 2022-01-28 RX ORDER — ONDANSETRON 2 MG/ML
4 INJECTION INTRAMUSCULAR; INTRAVENOUS ONCE
Status: COMPLETED | OUTPATIENT
Start: 2022-01-28 | End: 2022-01-28

## 2022-01-28 RX ORDER — ETOMIDATE 2 MG/ML
20 INJECTION INTRAVENOUS ONCE
Status: COMPLETED | OUTPATIENT
Start: 2022-01-28 | End: 2022-01-28

## 2022-01-28 RX ADMIN — HYDROMORPHONE HYDROCHLORIDE 1 MG: 1 INJECTION, SOLUTION INTRAMUSCULAR; INTRAVENOUS; SUBCUTANEOUS at 09:27

## 2022-01-28 RX ADMIN — ONDANSETRON 4 MG: 2 INJECTION INTRAMUSCULAR; INTRAVENOUS at 13:17

## 2022-01-28 RX ADMIN — ETOMIDATE 6 MG: 40 INJECTION, SOLUTION INTRAVENOUS at 15:38

## 2022-01-28 RX ADMIN — HYDROMORPHONE HYDROCHLORIDE 1 MG: 1 INJECTION, SOLUTION INTRAMUSCULAR; INTRAVENOUS; SUBCUTANEOUS at 15:03

## 2022-01-29 ENCOUNTER — LAB REQUISITION (OUTPATIENT)
Dept: LAB | Facility: HOSPITAL | Age: 67
End: 2022-01-29

## 2022-01-29 DIAGNOSIS — E11.9 TYPE 2 DIABETES MELLITUS WITHOUT COMPLICATIONS: ICD-10-CM

## 2022-01-29 DIAGNOSIS — Z79.899 OTHER LONG TERM (CURRENT) DRUG THERAPY: ICD-10-CM

## 2022-01-29 LAB
ALBUMIN SERPL-MCNC: 2.2 G/DL (ref 3.5–5.2)
ALBUMIN UR-MCNC: <1.2 MG/DL
ALBUMIN/GLOB SERPL: 0.8 G/DL
ALP SERPL-CCNC: 85 U/L (ref 39–117)
ALT SERPL W P-5'-P-CCNC: 9 U/L (ref 1–33)
ANION GAP SERPL CALCULATED.3IONS-SCNC: 7.7 MMOL/L (ref 5–15)
AST SERPL-CCNC: 15 U/L (ref 1–32)
BACTERIA UR QL AUTO: NORMAL /HPF
BASOPHILS # BLD AUTO: 0.01 10*3/MM3 (ref 0–0.2)
BASOPHILS NFR BLD AUTO: 0.3 % (ref 0–1.5)
BILIRUB SERPL-MCNC: 0.4 MG/DL (ref 0–1.2)
BILIRUB UR QL STRIP: NEGATIVE
BUN SERPL-MCNC: 10 MG/DL (ref 8–23)
BUN/CREAT SERPL: 13.2 (ref 7–25)
CALCIUM SPEC-SCNC: 8.3 MG/DL (ref 8.6–10.5)
CHLORIDE SERPL-SCNC: 106 MMOL/L (ref 98–107)
CHOLEST SERPL-MCNC: 96 MG/DL (ref 0–200)
CLARITY UR: CLEAR
CO2 SERPL-SCNC: 22.3 MMOL/L (ref 22–29)
COLOR UR: YELLOW
CREAT SERPL-MCNC: 0.76 MG/DL (ref 0.57–1)
CREAT UR-MCNC: 73.4 MG/DL
DEPRECATED RDW RBC AUTO: 50 FL (ref 37–54)
EOSINOPHIL # BLD AUTO: 0.21 10*3/MM3 (ref 0–0.4)
EOSINOPHIL NFR BLD AUTO: 5.5 % (ref 0.3–6.2)
ERYTHROCYTE [DISTWIDTH] IN BLOOD BY AUTOMATED COUNT: 16.2 % (ref 12.3–15.4)
GFR SERPL CREATININE-BSD FRML MDRD: 76 ML/MIN/1.73
GLOBULIN UR ELPH-MCNC: 2.6 GM/DL
GLUCOSE SERPL-MCNC: 101 MG/DL (ref 65–99)
GLUCOSE UR STRIP-MCNC: NEGATIVE MG/DL
HBA1C MFR BLD: 5.6 % (ref 4.8–5.6)
HCT VFR BLD AUTO: 26.1 % (ref 34–46.6)
HDLC SERPL-MCNC: 24 MG/DL (ref 40–60)
HGB BLD-MCNC: 8 G/DL (ref 12–15.9)
HGB UR QL STRIP.AUTO: NEGATIVE
HYALINE CASTS UR QL AUTO: NORMAL /LPF
IMM GRANULOCYTES # BLD AUTO: 0.03 10*3/MM3 (ref 0–0.05)
IMM GRANULOCYTES NFR BLD AUTO: 0.8 % (ref 0–0.5)
KETONES UR QL STRIP: NEGATIVE
LDLC SERPL CALC-MCNC: 50 MG/DL (ref 0–100)
LDLC/HDLC SERPL: 2.02 {RATIO}
LEUKOCYTE ESTERASE UR QL STRIP.AUTO: ABNORMAL
LYMPHOCYTES # BLD AUTO: 0.73 10*3/MM3 (ref 0.7–3.1)
LYMPHOCYTES NFR BLD AUTO: 19.1 % (ref 19.6–45.3)
MCH RBC QN AUTO: 26.3 PG (ref 26.6–33)
MCHC RBC AUTO-ENTMCNC: 30.7 G/DL (ref 31.5–35.7)
MCV RBC AUTO: 85.9 FL (ref 79–97)
MICROALBUMIN/CREAT UR: NORMAL MG/G{CREAT}
MONOCYTES # BLD AUTO: 0.48 10*3/MM3 (ref 0.1–0.9)
MONOCYTES NFR BLD AUTO: 12.5 % (ref 5–12)
NEUTROPHILS NFR BLD AUTO: 2.37 10*3/MM3 (ref 1.7–7)
NEUTROPHILS NFR BLD AUTO: 61.8 % (ref 42.7–76)
NITRITE UR QL STRIP: NEGATIVE
NRBC BLD AUTO-RTO: 0 /100 WBC (ref 0–0.2)
PH UR STRIP.AUTO: 6 [PH] (ref 5–8)
PLATELET # BLD AUTO: 152 10*3/MM3 (ref 140–450)
PMV BLD AUTO: 10.4 FL (ref 6–12)
POTASSIUM SERPL-SCNC: 4.4 MMOL/L (ref 3.5–5.2)
PROT SERPL-MCNC: 4.8 G/DL (ref 6–8.5)
PROT UR QL STRIP: NEGATIVE
RBC # BLD AUTO: 3.04 10*6/MM3 (ref 3.77–5.28)
RBC # UR STRIP: NORMAL /HPF
REF LAB TEST METHOD: NORMAL
SODIUM SERPL-SCNC: 136 MMOL/L (ref 136–145)
SP GR UR STRIP: 1.02 (ref 1–1.03)
SQUAMOUS #/AREA URNS HPF: NORMAL /HPF
T4 FREE SERPL-MCNC: 0.57 NG/DL (ref 0.93–1.7)
TRIGL SERPL-MCNC: 118 MG/DL (ref 0–150)
TSH SERPL DL<=0.05 MIU/L-ACNC: 2.67 UIU/ML (ref 0.27–4.2)
UROBILINOGEN UR QL STRIP: ABNORMAL
VLDLC SERPL-MCNC: 22 MG/DL (ref 5–40)
WBC # UR STRIP: NORMAL /HPF
WBC NRBC COR # BLD: 3.83 10*3/MM3 (ref 3.4–10.8)

## 2022-01-29 PROCEDURE — 80061 LIPID PANEL: CPT | Performed by: FAMILY MEDICINE

## 2022-01-29 PROCEDURE — 84443 ASSAY THYROID STIM HORMONE: CPT | Performed by: FAMILY MEDICINE

## 2022-01-29 PROCEDURE — 81001 URINALYSIS AUTO W/SCOPE: CPT | Performed by: FAMILY MEDICINE

## 2022-01-29 PROCEDURE — 80053 COMPREHEN METABOLIC PANEL: CPT | Performed by: FAMILY MEDICINE

## 2022-01-29 PROCEDURE — 85025 COMPLETE CBC W/AUTO DIFF WBC: CPT | Performed by: FAMILY MEDICINE

## 2022-01-29 PROCEDURE — 84439 ASSAY OF FREE THYROXINE: CPT | Performed by: FAMILY MEDICINE

## 2022-01-29 PROCEDURE — 83036 HEMOGLOBIN GLYCOSYLATED A1C: CPT | Performed by: FAMILY MEDICINE

## 2022-01-29 PROCEDURE — 82043 UR ALBUMIN QUANTITATIVE: CPT | Performed by: FAMILY MEDICINE

## 2022-01-29 PROCEDURE — 82570 ASSAY OF URINE CREATININE: CPT | Performed by: FAMILY MEDICINE

## 2022-01-31 ENCOUNTER — READMISSION MANAGEMENT (OUTPATIENT)
Dept: CALL CENTER | Facility: HOSPITAL | Age: 67
End: 2022-01-31

## 2022-01-31 NOTE — OUTREACH NOTE
Total Joint Week 2 Survey      Responses   [facility] patient discharged from? [place]   Does the patient have one of the following disease processes/diagnoses(primary or secondary)? Total Joint Replacement   Joint surgery performed? Hip   Week 2 attempt successful? Yes   Call start time 1455   Call end time 1458   Has the patient been back in either the hospital or Emergency Department since discharge? Yes   If the patient has been back to hospital or Emergency Department list date and reason 1/28 hip dislocation, reduced in ER.   Discharge diagnosis right hip wound drainage, s/p Right Total Hip Revision   Person spoke with today (if not patient) and relationship    Is the patient taking all medications as directed (includes completed medication regime)? Yes   Is the patient able to teach back alternate methods of pain control? Ice   Has the patient kept scheduled appointments due by today? N/A   Has the patient began therapy sessions (either in the home or as an out patient)? No   Has the patient fallen since discharge? No   What is the patient's perception of their functional status since discharge? Improving   Week 2 call completed? Yes   Wrap up additional comments Brief call,  states that someone from [facility] called yesterday and we talked to them.  States that she is doing better.          Alysa Najera RN

## 2022-02-02 ENCOUNTER — LAB REQUISITION (OUTPATIENT)
Dept: LAB | Facility: HOSPITAL | Age: 67
End: 2022-02-02

## 2022-02-02 DIAGNOSIS — M25.50 PAIN IN UNSPECIFIED JOINT: ICD-10-CM

## 2022-02-02 PROCEDURE — 85025 COMPLETE CBC W/AUTO DIFF WBC: CPT | Performed by: INTERNAL MEDICINE

## 2022-02-03 LAB
BASOPHILS # BLD AUTO: 0.04 10*3/MM3 (ref 0–0.2)
BASOPHILS NFR BLD AUTO: 0.6 % (ref 0–1.5)
DEPRECATED RDW RBC AUTO: 53.9 FL (ref 37–54)
EOSINOPHIL # BLD AUTO: 0.39 10*3/MM3 (ref 0–0.4)
EOSINOPHIL NFR BLD AUTO: 5.5 % (ref 0.3–6.2)
ERYTHROCYTE [DISTWIDTH] IN BLOOD BY AUTOMATED COUNT: 17.7 % (ref 12.3–15.4)
HCT VFR BLD AUTO: 27.7 % (ref 34–46.6)
HGB BLD-MCNC: 8.8 G/DL (ref 12–15.9)
IMM GRANULOCYTES # BLD AUTO: 0.03 10*3/MM3 (ref 0–0.05)
IMM GRANULOCYTES NFR BLD AUTO: 0.4 % (ref 0–0.5)
LYMPHOCYTES # BLD AUTO: 1.16 10*3/MM3 (ref 0.7–3.1)
LYMPHOCYTES NFR BLD AUTO: 16.4 % (ref 19.6–45.3)
MCH RBC QN AUTO: 26.7 PG (ref 26.6–33)
MCHC RBC AUTO-ENTMCNC: 31.8 G/DL (ref 31.5–35.7)
MCV RBC AUTO: 84.2 FL (ref 79–97)
MONOCYTES # BLD AUTO: 0.76 10*3/MM3 (ref 0.1–0.9)
MONOCYTES NFR BLD AUTO: 10.7 % (ref 5–12)
NEUTROPHILS NFR BLD AUTO: 4.71 10*3/MM3 (ref 1.7–7)
NEUTROPHILS NFR BLD AUTO: 66.4 % (ref 42.7–76)
NRBC BLD AUTO-RTO: 0 /100 WBC (ref 0–0.2)
PLATELET # BLD AUTO: 207 10*3/MM3 (ref 140–450)
PMV BLD AUTO: 10.8 FL (ref 6–12)
RBC # BLD AUTO: 3.29 10*6/MM3 (ref 3.77–5.28)
WBC NRBC COR # BLD: 7.09 10*3/MM3 (ref 3.4–10.8)

## 2022-02-09 ENCOUNTER — LAB REQUISITION (OUTPATIENT)
Dept: LAB | Facility: HOSPITAL | Age: 67
End: 2022-02-09

## 2022-02-09 DIAGNOSIS — M01.X51: ICD-10-CM

## 2022-02-09 LAB
ALBUMIN SERPL-MCNC: 2.8 G/DL (ref 3.5–5.2)
ALBUMIN/GLOB SERPL: 0.7 G/DL
ALP SERPL-CCNC: 84 U/L (ref 39–117)
ALT SERPL W P-5'-P-CCNC: 17 U/L (ref 1–33)
ANION GAP SERPL CALCULATED.3IONS-SCNC: 11.1 MMOL/L (ref 5–15)
AST SERPL-CCNC: 30 U/L (ref 1–32)
BASOPHILS # BLD AUTO: 0.04 10*3/MM3 (ref 0–0.2)
BASOPHILS NFR BLD AUTO: 0.7 % (ref 0–1.5)
BILIRUB SERPL-MCNC: 0.3 MG/DL (ref 0–1.2)
BUN SERPL-MCNC: 9 MG/DL (ref 8–23)
BUN/CREAT SERPL: 9.7 (ref 7–25)
CALCIUM SPEC-SCNC: 8.9 MG/DL (ref 8.6–10.5)
CHLORIDE SERPL-SCNC: 102 MMOL/L (ref 98–107)
CK SERPL-CCNC: 26 U/L (ref 20–180)
CO2 SERPL-SCNC: 21.9 MMOL/L (ref 22–29)
CREAT SERPL-MCNC: 0.93 MG/DL (ref 0.57–1)
CRP SERPL-MCNC: 3.61 MG/DL (ref 0–0.5)
DEPRECATED RDW RBC AUTO: 52.1 FL (ref 37–54)
EOSINOPHIL # BLD AUTO: 0.32 10*3/MM3 (ref 0–0.4)
EOSINOPHIL NFR BLD AUTO: 5.8 % (ref 0.3–6.2)
ERYTHROCYTE [DISTWIDTH] IN BLOOD BY AUTOMATED COUNT: 16.6 % (ref 12.3–15.4)
ERYTHROCYTE [SEDIMENTATION RATE] IN BLOOD: 70 MM/HR (ref 0–30)
GFR SERPL CREATININE-BSD FRML MDRD: 60 ML/MIN/1.73
GLOBULIN UR ELPH-MCNC: 3.8 GM/DL
GLUCOSE SERPL-MCNC: 159 MG/DL (ref 65–99)
HCT VFR BLD AUTO: 27.5 % (ref 34–46.6)
HGB BLD-MCNC: 8.8 G/DL (ref 12–15.9)
IMM GRANULOCYTES # BLD AUTO: 0.04 10*3/MM3 (ref 0–0.05)
IMM GRANULOCYTES NFR BLD AUTO: 0.7 % (ref 0–0.5)
LYMPHOCYTES # BLD AUTO: 1.05 10*3/MM3 (ref 0.7–3.1)
LYMPHOCYTES NFR BLD AUTO: 19.1 % (ref 19.6–45.3)
MCH RBC QN AUTO: 27.2 PG (ref 26.6–33)
MCHC RBC AUTO-ENTMCNC: 32 G/DL (ref 31.5–35.7)
MCV RBC AUTO: 84.9 FL (ref 79–97)
MONOCYTES # BLD AUTO: 0.88 10*3/MM3 (ref 0.1–0.9)
MONOCYTES NFR BLD AUTO: 16 % (ref 5–12)
NEUTROPHILS NFR BLD AUTO: 3.17 10*3/MM3 (ref 1.7–7)
NEUTROPHILS NFR BLD AUTO: 57.7 % (ref 42.7–76)
NRBC BLD AUTO-RTO: 0 /100 WBC (ref 0–0.2)
PLATELET # BLD AUTO: 206 10*3/MM3 (ref 140–450)
PMV BLD AUTO: 10.3 FL (ref 6–12)
POTASSIUM SERPL-SCNC: 4.1 MMOL/L (ref 3.5–5.2)
PROT SERPL-MCNC: 6.6 G/DL (ref 6–8.5)
RBC # BLD AUTO: 3.24 10*6/MM3 (ref 3.77–5.28)
SODIUM SERPL-SCNC: 135 MMOL/L (ref 136–145)
WBC NRBC COR # BLD: 5.5 10*3/MM3 (ref 3.4–10.8)

## 2022-02-09 PROCEDURE — 85025 COMPLETE CBC W/AUTO DIFF WBC: CPT | Performed by: ORTHOPAEDIC SURGERY

## 2022-02-09 PROCEDURE — 82550 ASSAY OF CK (CPK): CPT | Performed by: ORTHOPAEDIC SURGERY

## 2022-02-09 PROCEDURE — 85652 RBC SED RATE AUTOMATED: CPT | Performed by: ORTHOPAEDIC SURGERY

## 2022-02-09 PROCEDURE — 80053 COMPREHEN METABOLIC PANEL: CPT | Performed by: ORTHOPAEDIC SURGERY

## 2022-02-09 PROCEDURE — 86140 C-REACTIVE PROTEIN: CPT | Performed by: ORTHOPAEDIC SURGERY

## 2022-02-14 ENCOUNTER — APPOINTMENT (OUTPATIENT)
Dept: MAMMOGRAPHY | Facility: HOSPITAL | Age: 67
End: 2022-02-14

## 2022-02-15 ENCOUNTER — READMISSION MANAGEMENT (OUTPATIENT)
Dept: CALL CENTER | Facility: HOSPITAL | Age: 67
End: 2022-02-15

## 2022-02-15 ENCOUNTER — HOSPITAL ENCOUNTER (OUTPATIENT)
Dept: MAMMOGRAPHY | Facility: HOSPITAL | Age: 67
Discharge: HOME | End: 2022-02-15
Admitting: FAMILY MEDICINE

## 2022-02-15 DIAGNOSIS — Z12.31 VISIT FOR SCREENING MAMMOGRAM: ICD-10-CM

## 2022-02-15 PROCEDURE — 77067 SCR MAMMO BI INCL CAD: CPT

## 2022-02-15 PROCEDURE — 77063 BREAST TOMOSYNTHESIS BI: CPT

## 2022-02-15 NOTE — OUTREACH NOTE
Total Joint Month 1 Survey      Responses   [facility] patient discharged from? [place]   Does the patient have one of the following disease processes/diagnoses(primary or secondary)? Total Joint Replacement   Joint surgery performed? Hip   Month 1 attempt successful? Yes   Call start time 1312   Call end time 1316   Has the patient been back in either the hospital or Emergency Department since discharge? Yes   Discharge diagnosis right hip wound drainage, s/p Right Total Hip Revision   Is patient permission given to speak with other caregiver? Yes   List who call center can speak with     Person spoke with today (if not patient) and relationship    Medication alerts for this patient Still on IV antibx via PICC line--6 weeks   Is the patient taking all medications as directed (includes completed medication regime)? Yes   Has the patient kept scheduled appointments due by today? Yes   Is the patient still receiving Home Health Services? Yes   Home health comments PICC line dressing changes and wound vac   Is the patient still attending therapy sessions(either in the home or as an outpatient)? No   Has the patient fallen since discharge? No   Comments No therapy--did not want to put stress on tissue   What is the patient's perception of their functional status since discharge? Same   Is the patient able to teach back signs and symptoms of infection? Temp >100.4 for 24h or longer,  Incisional drainage   Additional teach back comments Wound vac in place. Hip still draining   Month 1 call completed? Yes          Tiara Marlow RN

## 2022-02-16 ENCOUNTER — LAB REQUISITION (OUTPATIENT)
Dept: LAB | Facility: HOSPITAL | Age: 67
End: 2022-02-16

## 2022-02-16 DIAGNOSIS — E11.9 TYPE 2 DIABETES MELLITUS WITHOUT COMPLICATIONS: ICD-10-CM

## 2022-02-16 DIAGNOSIS — I10 ESSENTIAL (PRIMARY) HYPERTENSION: ICD-10-CM

## 2022-02-16 LAB
ALBUMIN SERPL-MCNC: 2.9 G/DL (ref 3.5–5.2)
ALBUMIN/GLOB SERPL: 0.8 G/DL
ALP SERPL-CCNC: 72 U/L (ref 39–117)
ALT SERPL W P-5'-P-CCNC: 10 U/L (ref 1–33)
ANION GAP SERPL CALCULATED.3IONS-SCNC: 8.9 MMOL/L (ref 5–15)
AST SERPL-CCNC: 14 U/L (ref 1–32)
BASOPHILS # BLD AUTO: 0.02 10*3/MM3 (ref 0–0.2)
BASOPHILS NFR BLD AUTO: 0.4 % (ref 0–1.5)
BILIRUB SERPL-MCNC: 0.3 MG/DL (ref 0–1.2)
BUN SERPL-MCNC: 9 MG/DL (ref 8–23)
BUN/CREAT SERPL: 10.8 (ref 7–25)
CALCIUM SPEC-SCNC: 8.9 MG/DL (ref 8.6–10.5)
CHLORIDE SERPL-SCNC: 102 MMOL/L (ref 98–107)
CK SERPL-CCNC: 27 U/L (ref 20–180)
CO2 SERPL-SCNC: 23.1 MMOL/L (ref 22–29)
CREAT SERPL-MCNC: 0.83 MG/DL (ref 0.57–1)
CRP SERPL-MCNC: 1.1 MG/DL (ref 0–0.5)
DEPRECATED RDW RBC AUTO: 52.2 FL (ref 37–54)
EOSINOPHIL # BLD AUTO: 0.22 10*3/MM3 (ref 0–0.4)
EOSINOPHIL NFR BLD AUTO: 4.7 % (ref 0.3–6.2)
ERYTHROCYTE [DISTWIDTH] IN BLOOD BY AUTOMATED COUNT: 17.2 % (ref 12.3–15.4)
ERYTHROCYTE [SEDIMENTATION RATE] IN BLOOD: 52 MM/HR (ref 0–30)
GFR SERPL CREATININE-BSD FRML MDRD: 68 ML/MIN/1.73
GLOBULIN UR ELPH-MCNC: 3.5 GM/DL
GLUCOSE SERPL-MCNC: 146 MG/DL (ref 65–99)
HCT VFR BLD AUTO: 28.4 % (ref 34–46.6)
HGB BLD-MCNC: 8.8 G/DL (ref 12–15.9)
IMM GRANULOCYTES # BLD AUTO: 0.02 10*3/MM3 (ref 0–0.05)
IMM GRANULOCYTES NFR BLD AUTO: 0.4 % (ref 0–0.5)
LYMPHOCYTES # BLD AUTO: 0.96 10*3/MM3 (ref 0.7–3.1)
LYMPHOCYTES NFR BLD AUTO: 20.5 % (ref 19.6–45.3)
MCH RBC QN AUTO: 26.1 PG (ref 26.6–33)
MCHC RBC AUTO-ENTMCNC: 31 G/DL (ref 31.5–35.7)
MCV RBC AUTO: 84.3 FL (ref 79–97)
MONOCYTES # BLD AUTO: 0.49 10*3/MM3 (ref 0.1–0.9)
MONOCYTES NFR BLD AUTO: 10.4 % (ref 5–12)
NEUTROPHILS NFR BLD AUTO: 2.98 10*3/MM3 (ref 1.7–7)
NEUTROPHILS NFR BLD AUTO: 63.6 % (ref 42.7–76)
NRBC BLD AUTO-RTO: 0 /100 WBC (ref 0–0.2)
PLATELET # BLD AUTO: 169 10*3/MM3 (ref 140–450)
PMV BLD AUTO: 9.5 FL (ref 6–12)
POTASSIUM SERPL-SCNC: 4.4 MMOL/L (ref 3.5–5.2)
PROT SERPL-MCNC: 6.4 G/DL (ref 6–8.5)
RBC # BLD AUTO: 3.37 10*6/MM3 (ref 3.77–5.28)
SODIUM SERPL-SCNC: 134 MMOL/L (ref 136–145)
WBC NRBC COR # BLD: 4.69 10*3/MM3 (ref 3.4–10.8)

## 2022-02-16 PROCEDURE — 86140 C-REACTIVE PROTEIN: CPT | Performed by: ORTHOPAEDIC SURGERY

## 2022-02-16 PROCEDURE — 85025 COMPLETE CBC W/AUTO DIFF WBC: CPT | Performed by: ORTHOPAEDIC SURGERY

## 2022-02-16 PROCEDURE — 82550 ASSAY OF CK (CPK): CPT | Performed by: ORTHOPAEDIC SURGERY

## 2022-02-16 PROCEDURE — 85652 RBC SED RATE AUTOMATED: CPT | Performed by: ORTHOPAEDIC SURGERY

## 2022-02-16 PROCEDURE — 80053 COMPREHEN METABOLIC PANEL: CPT | Performed by: ORTHOPAEDIC SURGERY

## 2022-02-21 ENCOUNTER — HOSPITAL ENCOUNTER (OUTPATIENT)
Dept: OTHER | Facility: HOSPITAL | Age: 67
Discharge: HOME | End: 2022-02-21

## 2022-02-23 ENCOUNTER — LAB REQUISITION (OUTPATIENT)
Dept: LAB | Facility: HOSPITAL | Age: 67
End: 2022-02-23

## 2022-02-23 DIAGNOSIS — M86.10 OTHER ACUTE OSTEOMYELITIS, UNSPECIFIED SITE: ICD-10-CM

## 2022-02-23 LAB
BASOPHILS # BLD AUTO: 0.02 10*3/MM3 (ref 0–0.2)
BASOPHILS NFR BLD AUTO: 0.5 % (ref 0–1.5)
DEPRECATED RDW RBC AUTO: 51.1 FL (ref 37–54)
EOSINOPHIL # BLD AUTO: 0.17 10*3/MM3 (ref 0–0.4)
EOSINOPHIL NFR BLD AUTO: 4.6 % (ref 0.3–6.2)
ERYTHROCYTE [DISTWIDTH] IN BLOOD BY AUTOMATED COUNT: 16.9 % (ref 12.3–15.4)
HCT VFR BLD AUTO: 27.4 % (ref 34–46.6)
HGB BLD-MCNC: 8.7 G/DL (ref 12–15.9)
IMM GRANULOCYTES # BLD AUTO: 0.01 10*3/MM3 (ref 0–0.05)
IMM GRANULOCYTES NFR BLD AUTO: 0.3 % (ref 0–0.5)
LYMPHOCYTES # BLD AUTO: 0.66 10*3/MM3 (ref 0.7–3.1)
LYMPHOCYTES NFR BLD AUTO: 17.9 % (ref 19.6–45.3)
MCH RBC QN AUTO: 26.4 PG (ref 26.6–33)
MCHC RBC AUTO-ENTMCNC: 31.8 G/DL (ref 31.5–35.7)
MCV RBC AUTO: 83.3 FL (ref 79–97)
MONOCYTES # BLD AUTO: 0.42 10*3/MM3 (ref 0.1–0.9)
MONOCYTES NFR BLD AUTO: 11.4 % (ref 5–12)
NEUTROPHILS NFR BLD AUTO: 2.41 10*3/MM3 (ref 1.7–7)
NEUTROPHILS NFR BLD AUTO: 65.3 % (ref 42.7–76)
NRBC BLD AUTO-RTO: 0 /100 WBC (ref 0–0.2)
PLATELET # BLD AUTO: 136 10*3/MM3 (ref 140–450)
PMV BLD AUTO: 10.3 FL (ref 6–12)
RBC # BLD AUTO: 3.29 10*6/MM3 (ref 3.77–5.28)
WBC NRBC COR # BLD: 3.69 10*3/MM3 (ref 3.4–10.8)

## 2022-02-23 PROCEDURE — 85025 COMPLETE CBC W/AUTO DIFF WBC: CPT | Performed by: ORTHOPAEDIC SURGERY

## 2022-03-02 ENCOUNTER — LAB REQUISITION (OUTPATIENT)
Dept: LAB | Facility: HOSPITAL | Age: 67
End: 2022-03-02

## 2022-03-02 DIAGNOSIS — Z79.899 OTHER LONG TERM (CURRENT) DRUG THERAPY: ICD-10-CM

## 2022-03-02 DIAGNOSIS — M86.9 OSTEOMYELITIS, UNSPECIFIED: ICD-10-CM

## 2022-03-02 LAB
BASOPHILS # BLD AUTO: 0.03 10*3/MM3 (ref 0–0.2)
BASOPHILS NFR BLD AUTO: 0.7 % (ref 0–1.5)
DEPRECATED RDW RBC AUTO: 52.3 FL (ref 37–54)
EOSINOPHIL # BLD AUTO: 0.22 10*3/MM3 (ref 0–0.4)
EOSINOPHIL NFR BLD AUTO: 5.4 % (ref 0.3–6.2)
ERYTHROCYTE [DISTWIDTH] IN BLOOD BY AUTOMATED COUNT: 17.2 % (ref 12.3–15.4)
HCT VFR BLD AUTO: 30.4 % (ref 34–46.6)
HGB BLD-MCNC: 9.4 G/DL (ref 12–15.9)
IMM GRANULOCYTES # BLD AUTO: 0.01 10*3/MM3 (ref 0–0.05)
IMM GRANULOCYTES NFR BLD AUTO: 0.2 % (ref 0–0.5)
LYMPHOCYTES # BLD AUTO: 0.82 10*3/MM3 (ref 0.7–3.1)
LYMPHOCYTES NFR BLD AUTO: 20 % (ref 19.6–45.3)
MCH RBC QN AUTO: 26 PG (ref 26.6–33)
MCHC RBC AUTO-ENTMCNC: 30.9 G/DL (ref 31.5–35.7)
MCV RBC AUTO: 84 FL (ref 79–97)
MONOCYTES # BLD AUTO: 0.41 10*3/MM3 (ref 0.1–0.9)
MONOCYTES NFR BLD AUTO: 10 % (ref 5–12)
NEUTROPHILS NFR BLD AUTO: 2.6 10*3/MM3 (ref 1.7–7)
NEUTROPHILS NFR BLD AUTO: 63.7 % (ref 42.7–76)
NRBC BLD AUTO-RTO: 0 /100 WBC (ref 0–0.2)
PLATELET # BLD AUTO: 154 10*3/MM3 (ref 140–450)
PMV BLD AUTO: 10.1 FL (ref 6–12)
RBC # BLD AUTO: 3.62 10*6/MM3 (ref 3.77–5.28)
WBC NRBC COR # BLD: 4.09 10*3/MM3 (ref 3.4–10.8)

## 2022-03-02 PROCEDURE — 85025 COMPLETE CBC W/AUTO DIFF WBC: CPT | Performed by: ORTHOPAEDIC SURGERY

## 2022-03-04 ENCOUNTER — LAB REQUISITION (OUTPATIENT)
Dept: LAB | Facility: HOSPITAL | Age: 67
End: 2022-03-04

## 2022-03-04 DIAGNOSIS — Z79.2 LONG TERM (CURRENT) USE OF ANTIBIOTICS: ICD-10-CM

## 2022-03-04 DIAGNOSIS — I10 ESSENTIAL (PRIMARY) HYPERTENSION: ICD-10-CM

## 2022-03-04 DIAGNOSIS — M86.9 OSTEOMYELITIS, UNSPECIFIED: ICD-10-CM

## 2022-03-04 DIAGNOSIS — E11.9 TYPE 2 DIABETES MELLITUS WITHOUT COMPLICATIONS: ICD-10-CM

## 2022-03-04 LAB
ALBUMIN SERPL-MCNC: 3.1 G/DL (ref 3.5–5.2)
ALBUMIN/GLOB SERPL: 0.9 G/DL
ALP SERPL-CCNC: 76 U/L (ref 39–117)
ALT SERPL W P-5'-P-CCNC: 8 U/L (ref 1–33)
ANION GAP SERPL CALCULATED.3IONS-SCNC: 10.7 MMOL/L (ref 5–15)
AST SERPL-CCNC: 15 U/L (ref 1–32)
BILIRUB SERPL-MCNC: 0.4 MG/DL (ref 0–1.2)
BUN SERPL-MCNC: 11 MG/DL (ref 8–23)
BUN/CREAT SERPL: 11.8 (ref 7–25)
CALCIUM SPEC-SCNC: 8.8 MG/DL (ref 8.6–10.5)
CHLORIDE SERPL-SCNC: 104 MMOL/L (ref 98–107)
CK SERPL-CCNC: 34 U/L (ref 20–180)
CO2 SERPL-SCNC: 22.3 MMOL/L (ref 22–29)
CREAT SERPL-MCNC: 0.93 MG/DL (ref 0.57–1)
CRP SERPL-MCNC: 1.55 MG/DL (ref 0–0.5)
EGFRCR SERPLBLD CKD-EPI 2021: 67.1 ML/MIN/1.73
ERYTHROCYTE [SEDIMENTATION RATE] IN BLOOD: 60 MM/HR (ref 0–30)
GLOBULIN UR ELPH-MCNC: 3.5 GM/DL
GLUCOSE SERPL-MCNC: 148 MG/DL (ref 65–99)
POTASSIUM SERPL-SCNC: 4 MMOL/L (ref 3.5–5.2)
PROT SERPL-MCNC: 6.6 G/DL (ref 6–8.5)
SODIUM SERPL-SCNC: 137 MMOL/L (ref 136–145)

## 2022-03-04 PROCEDURE — 85652 RBC SED RATE AUTOMATED: CPT | Performed by: ORTHOPAEDIC SURGERY

## 2022-03-04 PROCEDURE — 86140 C-REACTIVE PROTEIN: CPT | Performed by: ORTHOPAEDIC SURGERY

## 2022-03-04 PROCEDURE — 80053 COMPREHEN METABOLIC PANEL: CPT | Performed by: ORTHOPAEDIC SURGERY

## 2022-03-04 PROCEDURE — 82550 ASSAY OF CK (CPK): CPT | Performed by: ORTHOPAEDIC SURGERY

## 2022-03-07 ENCOUNTER — LAB REQUISITION (OUTPATIENT)
Dept: LAB | Facility: HOSPITAL | Age: 67
End: 2022-03-07

## 2022-03-07 DIAGNOSIS — M86.9 OSTEOMYELITIS, UNSPECIFIED: ICD-10-CM

## 2022-03-07 DIAGNOSIS — Z79.899 OTHER LONG TERM (CURRENT) DRUG THERAPY: ICD-10-CM

## 2022-03-07 LAB
ALBUMIN SERPL-MCNC: 3 G/DL (ref 3.5–5.2)
ALBUMIN/GLOB SERPL: 0.8 G/DL
ALP SERPL-CCNC: 69 U/L (ref 39–117)
ALT SERPL W P-5'-P-CCNC: 8 U/L (ref 1–33)
ANION GAP SERPL CALCULATED.3IONS-SCNC: 10.4 MMOL/L (ref 5–15)
AST SERPL-CCNC: 17 U/L (ref 1–32)
BASOPHILS # BLD AUTO: 0.04 10*3/MM3 (ref 0–0.2)
BASOPHILS NFR BLD AUTO: 0.9 % (ref 0–1.5)
BILIRUB SERPL-MCNC: 0.4 MG/DL (ref 0–1.2)
BUN SERPL-MCNC: 10 MG/DL (ref 8–23)
BUN/CREAT SERPL: 10.9 (ref 7–25)
CALCIUM SPEC-SCNC: 8.8 MG/DL (ref 8.6–10.5)
CHLORIDE SERPL-SCNC: 103 MMOL/L (ref 98–107)
CK SERPL-CCNC: 30 U/L (ref 20–180)
CO2 SERPL-SCNC: 21.6 MMOL/L (ref 22–29)
CREAT SERPL-MCNC: 0.92 MG/DL (ref 0.57–1)
CRP SERPL-MCNC: 1.3 MG/DL (ref 0–0.5)
DEPRECATED RDW RBC AUTO: 51 FL (ref 37–54)
EGFRCR SERPLBLD CKD-EPI 2021: 68 ML/MIN/1.73
EOSINOPHIL # BLD AUTO: 0.21 10*3/MM3 (ref 0–0.4)
EOSINOPHIL NFR BLD AUTO: 4.8 % (ref 0.3–6.2)
ERYTHROCYTE [DISTWIDTH] IN BLOOD BY AUTOMATED COUNT: 17 % (ref 12.3–15.4)
ERYTHROCYTE [SEDIMENTATION RATE] IN BLOOD: 59 MM/HR (ref 0–30)
GLOBULIN UR ELPH-MCNC: 3.6 GM/DL
GLUCOSE SERPL-MCNC: 142 MG/DL (ref 65–99)
HCT VFR BLD AUTO: 30 % (ref 34–46.6)
HGB BLD-MCNC: 9.4 G/DL (ref 12–15.9)
IMM GRANULOCYTES # BLD AUTO: 0.02 10*3/MM3 (ref 0–0.05)
IMM GRANULOCYTES NFR BLD AUTO: 0.5 % (ref 0–0.5)
LYMPHOCYTES # BLD AUTO: 0.79 10*3/MM3 (ref 0.7–3.1)
LYMPHOCYTES NFR BLD AUTO: 18.2 % (ref 19.6–45.3)
MCH RBC QN AUTO: 25.8 PG (ref 26.6–33)
MCHC RBC AUTO-ENTMCNC: 31.3 G/DL (ref 31.5–35.7)
MCV RBC AUTO: 82.2 FL (ref 79–97)
MONOCYTES # BLD AUTO: 0.45 10*3/MM3 (ref 0.1–0.9)
MONOCYTES NFR BLD AUTO: 10.4 % (ref 5–12)
NEUTROPHILS NFR BLD AUTO: 2.83 10*3/MM3 (ref 1.7–7)
NEUTROPHILS NFR BLD AUTO: 65.2 % (ref 42.7–76)
NRBC BLD AUTO-RTO: 0 /100 WBC (ref 0–0.2)
PLATELET # BLD AUTO: 157 10*3/MM3 (ref 140–450)
PMV BLD AUTO: 10.3 FL (ref 6–12)
POTASSIUM SERPL-SCNC: 4.5 MMOL/L (ref 3.5–5.2)
PROT SERPL-MCNC: 6.6 G/DL (ref 6–8.5)
RBC # BLD AUTO: 3.65 10*6/MM3 (ref 3.77–5.28)
SODIUM SERPL-SCNC: 135 MMOL/L (ref 136–145)
WBC NRBC COR # BLD: 4.34 10*3/MM3 (ref 3.4–10.8)

## 2022-03-07 PROCEDURE — 82550 ASSAY OF CK (CPK): CPT | Performed by: ORTHOPAEDIC SURGERY

## 2022-03-07 PROCEDURE — 85652 RBC SED RATE AUTOMATED: CPT | Performed by: ORTHOPAEDIC SURGERY

## 2022-03-07 PROCEDURE — 80053 COMPREHEN METABOLIC PANEL: CPT | Performed by: ORTHOPAEDIC SURGERY

## 2022-03-07 PROCEDURE — 85025 COMPLETE CBC W/AUTO DIFF WBC: CPT | Performed by: ORTHOPAEDIC SURGERY

## 2022-03-07 PROCEDURE — 86140 C-REACTIVE PROTEIN: CPT | Performed by: ORTHOPAEDIC SURGERY

## 2022-03-14 ENCOUNTER — LAB REQUISITION (OUTPATIENT)
Dept: LAB | Facility: HOSPITAL | Age: 67
End: 2022-03-14

## 2022-03-14 DIAGNOSIS — T84.51XA INFECTION AND INFLAMMATORY REACTION DUE TO INTERNAL RIGHT HIP PROSTHESIS, INITIAL ENCOUNTER: ICD-10-CM

## 2022-03-14 DIAGNOSIS — I10 ESSENTIAL (PRIMARY) HYPERTENSION: ICD-10-CM

## 2022-03-14 DIAGNOSIS — E11.9 TYPE 2 DIABETES MELLITUS WITHOUT COMPLICATIONS: ICD-10-CM

## 2022-03-14 LAB
ALBUMIN SERPL-MCNC: 3.3 G/DL (ref 3.5–5.2)
ALBUMIN/GLOB SERPL: 0.8 G/DL
ALP SERPL-CCNC: 71 U/L (ref 39–117)
ALT SERPL W P-5'-P-CCNC: 8 U/L (ref 1–33)
ANION GAP SERPL CALCULATED.3IONS-SCNC: 9.5 MMOL/L (ref 5–15)
AST SERPL-CCNC: 14 U/L (ref 1–32)
BILIRUB SERPL-MCNC: 0.6 MG/DL (ref 0–1.2)
BUN SERPL-MCNC: 13 MG/DL (ref 8–23)
BUN/CREAT SERPL: 12.7 (ref 7–25)
CALCIUM SPEC-SCNC: 9 MG/DL (ref 8.6–10.5)
CHLORIDE SERPL-SCNC: 99 MMOL/L (ref 98–107)
CK SERPL-CCNC: 23 U/L (ref 20–180)
CO2 SERPL-SCNC: 23.5 MMOL/L (ref 22–29)
CREAT SERPL-MCNC: 1.02 MG/DL (ref 0.57–1)
CRP SERPL-MCNC: 6.76 MG/DL (ref 0–0.5)
EGFRCR SERPLBLD CKD-EPI 2021: 60 ML/MIN/1.73
GLOBULIN UR ELPH-MCNC: 4 GM/DL
GLUCOSE SERPL-MCNC: 187 MG/DL (ref 65–99)
POTASSIUM SERPL-SCNC: 4.7 MMOL/L (ref 3.5–5.2)
PROT SERPL-MCNC: 7.3 G/DL (ref 6–8.5)
SODIUM SERPL-SCNC: 132 MMOL/L (ref 136–145)

## 2022-03-14 PROCEDURE — 80053 COMPREHEN METABOLIC PANEL: CPT | Performed by: ORTHOPAEDIC SURGERY

## 2022-03-14 PROCEDURE — 86140 C-REACTIVE PROTEIN: CPT | Performed by: ORTHOPAEDIC SURGERY

## 2022-03-14 PROCEDURE — 82550 ASSAY OF CK (CPK): CPT | Performed by: ORTHOPAEDIC SURGERY

## 2022-03-22 ENCOUNTER — READMISSION MANAGEMENT (OUTPATIENT)
Dept: CALL CENTER | Facility: HOSPITAL | Age: 67
End: 2022-03-22

## 2022-03-22 NOTE — OUTREACH NOTE
Total Joint Month 2 Survey    Flowsheet Row Responses   [facility] patient discharged from? [place]   Does the patient have one of the following disease processes/diagnoses(primary or secondary)? Total Joint Replacement   Joint surgery performed? Hip   Month 2 attempt successful? Yes   Call start time 1712   Call end time 1719   Discharge diagnosis right hip wound drainage, s/p Right Total Hip Revision   Prescription comments Ampicillin added when IV antibiotics completed   Is the patient taking all medications as directed (includes completed medication regime)? Yes   Is the patient able to teach back alternate methods of pain control? Ice, Correct alignment, Short, frequent activity, Reposition   Has the patient kept scheduled appointments due by today? Yes   Is the patient still receiving Home Health Services? Yes   Home health comments has woundvac with drainage   Is the patient still attending therapy sessions(either in the home or as an outpatient)? No   Has the patient fallen since discharge? No   What is the patient's perception of their functional status since discharge? Improving   Is the patient able to teach back signs and symptoms of infection? Temp >100.4 for 24h or longer, Incisional drainage, Increased swelling or redness around incision (not associated with surgical edema), Severe discomfort or pain, Changes in mobility, Shortness of breath or chest pain, Blisters around incision   Is the patient/caregiver able to teach back the hierarchy of who to call/visit for symptoms/problems? PCP, Specialist, Home health nurse, Urgent Care, ED, 911 Yes   Additional teach back comments trying to keep hip replacement, is in doubt due to continuing drainage with woundvac, tries to stay active, taking walks   Month 2 Call Completed? Yes          CHRISTY UMANA - Registered Nurse

## 2022-04-21 ENCOUNTER — HOSPITAL ENCOUNTER (OUTPATIENT)
Dept: GENERAL RADIOLOGY | Facility: HOSPITAL | Age: 67
Discharge: HOME | End: 2022-04-21

## 2022-04-21 ENCOUNTER — PRE-ADMISSION TESTING (OUTPATIENT)
Dept: PREADMISSION TESTING | Facility: HOSPITAL | Age: 67
End: 2022-04-21

## 2022-04-21 VITALS
HEIGHT: 64 IN | TEMPERATURE: 97.7 F | RESPIRATION RATE: 18 BRPM | BODY MASS INDEX: 38.65 KG/M2 | WEIGHT: 226.4 LBS | HEART RATE: 76 BPM | DIASTOLIC BLOOD PRESSURE: 65 MMHG | OXYGEN SATURATION: 99 % | SYSTOLIC BLOOD PRESSURE: 145 MMHG

## 2022-04-21 LAB
ALBUMIN SERPL-MCNC: 3.7 G/DL (ref 3.5–5.2)
ALBUMIN/GLOB SERPL: 1 G/DL
ALP SERPL-CCNC: 81 U/L (ref 39–117)
ALT SERPL W P-5'-P-CCNC: 13 U/L (ref 1–33)
ANION GAP SERPL CALCULATED.3IONS-SCNC: 11.2 MMOL/L (ref 5–15)
APTT PPP: 27 SECONDS (ref 22.7–35.4)
AST SERPL-CCNC: 18 U/L (ref 1–32)
BACTERIA UR QL AUTO: NORMAL /HPF
BASOPHILS # BLD AUTO: 0.04 10*3/MM3 (ref 0–0.2)
BASOPHILS NFR BLD AUTO: 0.7 % (ref 0–1.5)
BILIRUB SERPL-MCNC: 0.4 MG/DL (ref 0–1.2)
BILIRUB UR QL STRIP: NEGATIVE
BUN SERPL-MCNC: 12 MG/DL (ref 8–23)
BUN/CREAT SERPL: 12.8 (ref 7–25)
CALCIUM SPEC-SCNC: 9.1 MG/DL (ref 8.6–10.5)
CHLORIDE SERPL-SCNC: 102 MMOL/L (ref 98–107)
CLARITY UR: CLEAR
CO2 SERPL-SCNC: 22.8 MMOL/L (ref 22–29)
COLOR UR: YELLOW
CREAT SERPL-MCNC: 0.94 MG/DL (ref 0.57–1)
DEPRECATED RDW RBC AUTO: 50.4 FL (ref 37–54)
EGFRCR SERPLBLD CKD-EPI 2021: 66.2 ML/MIN/1.73
EOSINOPHIL # BLD AUTO: 0.34 10*3/MM3 (ref 0–0.4)
EOSINOPHIL NFR BLD AUTO: 5.7 % (ref 0.3–6.2)
ERYTHROCYTE [DISTWIDTH] IN BLOOD BY AUTOMATED COUNT: 17.2 % (ref 12.3–15.4)
GLOBULIN UR ELPH-MCNC: 3.7 GM/DL
GLUCOSE SERPL-MCNC: 90 MG/DL (ref 65–99)
GLUCOSE UR STRIP-MCNC: NEGATIVE MG/DL
HBA1C MFR BLD: 6.2 % (ref 4.8–5.6)
HCT VFR BLD AUTO: 37.2 % (ref 34–46.6)
HGB BLD-MCNC: 11.6 G/DL (ref 12–15.9)
HGB UR QL STRIP.AUTO: NEGATIVE
HYALINE CASTS UR QL AUTO: NORMAL /LPF
IMM GRANULOCYTES # BLD AUTO: 0.02 10*3/MM3 (ref 0–0.05)
IMM GRANULOCYTES NFR BLD AUTO: 0.3 % (ref 0–0.5)
INR PPP: 1.07 (ref 0.9–1.1)
KETONES UR QL STRIP: NEGATIVE
LEUKOCYTE ESTERASE UR QL STRIP.AUTO: NEGATIVE
LYMPHOCYTES # BLD AUTO: 1.14 10*3/MM3 (ref 0.7–3.1)
LYMPHOCYTES NFR BLD AUTO: 19.2 % (ref 19.6–45.3)
MCH RBC QN AUTO: 25.1 PG (ref 26.6–33)
MCHC RBC AUTO-ENTMCNC: 31.2 G/DL (ref 31.5–35.7)
MCV RBC AUTO: 80.5 FL (ref 79–97)
MONOCYTES # BLD AUTO: 0.54 10*3/MM3 (ref 0.1–0.9)
MONOCYTES NFR BLD AUTO: 9.1 % (ref 5–12)
NEUTROPHILS NFR BLD AUTO: 3.86 10*3/MM3 (ref 1.7–7)
NEUTROPHILS NFR BLD AUTO: 65 % (ref 42.7–76)
NITRITE UR QL STRIP: NEGATIVE
NRBC BLD AUTO-RTO: 0 /100 WBC (ref 0–0.2)
PH UR STRIP.AUTO: 6.5 [PH] (ref 5–8)
PLATELET # BLD AUTO: 145 10*3/MM3 (ref 140–450)
PMV BLD AUTO: 9.5 FL (ref 6–12)
POTASSIUM SERPL-SCNC: 4.1 MMOL/L (ref 3.5–5.2)
PROT SERPL-MCNC: 7.4 G/DL (ref 6–8.5)
PROT UR QL STRIP: NEGATIVE
PROTHROMBIN TIME: 13.9 SECONDS (ref 11.7–14.2)
RBC # BLD AUTO: 4.62 10*6/MM3 (ref 3.77–5.28)
RBC # UR STRIP: NORMAL /HPF
REF LAB TEST METHOD: NORMAL
SODIUM SERPL-SCNC: 136 MMOL/L (ref 136–145)
SP GR UR STRIP: 1.01 (ref 1–1.03)
SQUAMOUS #/AREA URNS HPF: NORMAL /HPF
UROBILINOGEN UR QL STRIP: NORMAL
WBC # UR STRIP: NORMAL /HPF
WBC NRBC COR # BLD: 5.94 10*3/MM3 (ref 3.4–10.8)

## 2022-04-21 PROCEDURE — 71046 X-RAY EXAM CHEST 2 VIEWS: CPT

## 2022-04-21 PROCEDURE — 83036 HEMOGLOBIN GLYCOSYLATED A1C: CPT

## 2022-04-21 PROCEDURE — 85730 THROMBOPLASTIN TIME PARTIAL: CPT

## 2022-04-21 PROCEDURE — 36415 COLL VENOUS BLD VENIPUNCTURE: CPT

## 2022-04-21 PROCEDURE — 73502 X-RAY EXAM HIP UNI 2-3 VIEWS: CPT

## 2022-04-21 PROCEDURE — 81001 URINALYSIS AUTO W/SCOPE: CPT

## 2022-04-21 PROCEDURE — 85610 PROTHROMBIN TIME: CPT

## 2022-04-21 PROCEDURE — 80053 COMPREHEN METABOLIC PANEL: CPT

## 2022-04-21 PROCEDURE — 85025 COMPLETE CBC W/AUTO DIFF WBC: CPT

## 2022-04-21 RX ORDER — FOLIC ACID 1 MG/1
1000 TABLET ORAL DAILY
COMMUNITY
Start: 2022-03-28

## 2022-04-21 RX ORDER — LOSARTAN POTASSIUM 25 MG/1
25 TABLET ORAL 2 TIMES DAILY
COMMUNITY
Start: 2022-03-13 | End: 2022-05-08 | Stop reason: HOSPADM

## 2022-04-21 RX ORDER — CHLORHEXIDINE GLUCONATE 500 MG/1
CLOTH TOPICAL
COMMUNITY
End: 2022-06-10 | Stop reason: HOSPADM

## 2022-04-21 RX ORDER — INSULIN ASPART 100 [IU]/ML
INJECTION, SOLUTION INTRAVENOUS; SUBCUTANEOUS
COMMUNITY
Start: 2022-04-08 | End: 2022-04-21

## 2022-04-21 ASSESSMENT — HOOS JR
HOOS JR SCORE: 9
HOOS JR SCORE: 61.815

## 2022-04-21 NOTE — DISCHARGE INSTRUCTIONS
ARRIVE DAY OF SURGERY AT  10:00 AM        Take the following medications the morning of surgery:  NEXIUM, ISOSORBIDE, GABAPENTIN      If you are on prescription narcotic pain medication to control your pain you may also take that medication the morning of surgery.    General Instructions:  Do not eat solid food after midnight the night before surgery.  You may drink clear liquids day of surgery but must stop at least one hour before your hospital arrival time.  It is beneficial for you to have a clear drink that contains carbohydrates the day of surgery.  We suggest a 12 to 20 ounce bottle of Gatorade or Powerade for non-diabetic patients or a 12 to 20 ounce bottle of G2 or Powerade Zero for diabetic patients. (Pediatric patients, are not advised to drink a 12 to 20 ounce carbohydrate drink)    Clear liquids are liquids you can see through.  Nothing red in color.     Plain water                               Sports drinks  Sodas                                   Gelatin (Jell-O)  Fruit juices without pulp such as white grape juice and apple juice  Popsicles that contain no fruit or yogurt  Tea or coffee (no cream or milk added)  Gatorade / Powerade  G2 / Powerade Zero    Infants may have breast milk up to four hours before surgery.  Infants drinking formula may drink formula up to six hours before surgery.   Patients who avoid smoking, chewing tobacco and alcohol for 4 weeks prior to surgery have a reduced risk of post-operative complications.  Quit smoking as many days before surgery as you can.  Do not smoke, use chewing tobacco or drink alcohol the day of surgery.   If applicable bring your C-PAP/ BI-PAP machine.  Bring any papers given to you in the doctor’s office.  Wear clean comfortable clothes.  Do not wear contact lenses, false eyelashes or make-up.  Bring a case for your glasses.   Bring crutches or walker if applicable.  Remove all piercings.  Leave jewelry and any other valuables at home.  Hair  extensions with metal clips must be removed prior to surgery.  The Pre-Admission Testing nurse will instruct you to bring medications if unable to obtain an accurate list in Pre-Admission Testing.            Preventing a Surgical Site Infection:  For 2 to 3 days before surgery, avoid shaving with a razor because the razor can irritate skin and make it easier to develop an infection.    Any areas of open skin can increase the risk of a post-operative wound infection by allowing bacteria to enter and travel throughout the body.  Notify your surgeon if you have any skin wounds / rashes even if it is not near the expected surgical site.  The area will need assessed to determine if surgery should be delayed until it is healed.  The night prior to surgery shower using a fresh bar of anti-bacterial soap (such as Dial) and clean washcloth.  Sleep in a clean bed with clean clothing.  Do not allow pets to sleep with you.  Shower on the morning of surgery using a fresh bar of anti-bacterial soap (such as Dial) and clean washcloth.  Dry with a clean towel and dress in clean clothing.  Ask your surgeon if you will be receiving antibiotics prior to surgery.  Make sure you, your family, and all healthcare providers clean their hands with soap and water or an alcohol based hand  before caring for you or your wound.    Day of surgery:  Your arrival time is approximately two hours before your scheduled surgery time.  Upon arrival, a Pre-op nurse and Anesthesiologist will review your health history, obtain vital signs, and answer questions you may have.  The only belongings needed at this time will be a list of your home medications and if applicable your C-PAP/BI-PAP machine.  A Pre-op nurse will start an IV and you may receive medication in preparation for surgery, including something to help you relax.     Please be aware that surgery does come with discomfort.  We want to make every effort to control your discomfort so  please discuss any uncontrolled symptoms with your nurse.   Your doctor will most likely have prescribed pain medications.      If you are going home after surgery you will receive individualized written care instructions before being discharged.  A responsible adult must drive you to and from the hospital on the day of your surgery and stay with you for 24 hours.  Discharge prescriptions can be filled by the hospital pharmacy during regular pharmacy hours.  If you are having surgery late in the day/evening your prescription may be e-prescribed to your pharmacy.  Please verify your pharmacy hours or chose a 24 hour pharmacy to avoid not having access to your prescription because your pharmacy has closed for the day.    If you are staying overnight following surgery, you will be transported to your hospital room following the recovery period.  [facility] has all private rooms.    If you have any questions please call Pre-Admission Testing at [phone].  Deductibles and co-payments are collected on the day of service. Please be prepared to pay the required co-pay, deductible or deposit on the day of service as defined by your plan.    Patient Education for Self-Quarantine Process    Following your COVID testing, we strongly recommend that you wear a mask when you are with other people and practice social distancing.   Limit your activities to only required outings.  Wash your hands with soap and water frequently for at least 20 seconds.   Avoid touching your eyes, nose and mouth with unwashed hands.  Do not share anything - utensils, drinking glasses, food from the same bowl.   Sanitize household surfaces daily. Include all high touch areas (door handles, light switches, phones, countertops, etc.)    Call your surgeon immediately if you experience any of the following symptoms:  Sore Throat  Shortness of Breath or difficulty breathing  Cough  Chills  Body soreness or muscle  pain  Headache  Fever  New loss of taste or smell  Do not arrive for your surgery ill.  Your procedure will need to be rescheduled to another time.  You will need to call your physician before the day of surgery to avoid any unnecessary exposure to hospital staff as well as other patients.         CHLORHEXIDINE CLOTH INSTRUCTIONS  The morning of surgery follow these instructions using the Chlorhexidine cloths you've been given.  These steps reduce bacteria on the body.  Do not use the cloths near your eyes, ears mouth, genitalia or on open wounds.  Throw the cloths away after use but do not try to flush them down a toilet.      Open and remove one cloth at a time from the package.    Leave the cloth unfolded and begin the bathing.  Massage the skin with the cloths using gentle pressure to remove bacteria.  Do not scrub harshly.   Follow the steps below with one 2% CHG cloth per area (6 total cloths).  One cloth for neck, shoulders and chest.  One cloth for both arms, hands, fingers and underarms (do underarms last).  One cloth for the abdomen followed by groin.  One cloth for right leg and foot including between the toes.  One cloth for left leg and foot including between the toes.  The last cloth is to be used for the back of the neck, back and buttocks.    Allow the CHG to air dry 3 minutes on the skin which will give it time to work and decrease the chance of irritation.  The skin may feel sticky until it is dry.  Do not rinse with water or any other liquid or you will lose the beneficial effects of the CHG.  If mild skin irritation occurs, do rinse the skin to remove the CHG.  Report this to the nurse at time of admission.  Do not apply lotions, creams, ointments, deodorants or perfumes after using the clothes. Dress in clean clothes before coming to the hospital.    BACTROBAN NASAL OINTMENT  There are many germs normally in your nose. Bactroban is an ointment that will help reduce these germs. Please follow  these instructions for Bactroban use:      ____The day before surgery in the morning  Date________    ____The day before surgery in the evening              Date________    ____The day of surgery in the morning    Date________    **Squirt ½ package of Bactroban Ointment onto a cotton applicator and apply to inside of 1st nostril.  Squirt the remaining Bactroban and apply to the inside of the other nostril.

## 2022-04-22 ENCOUNTER — LAB (OUTPATIENT)
Dept: LAB | Facility: HOSPITAL | Age: 67
End: 2022-04-22

## 2022-04-22 ENCOUNTER — TRANSCRIBE ORDERS (OUTPATIENT)
Dept: LAB | Facility: HOSPITAL | Age: 67
End: 2022-04-22

## 2022-04-22 DIAGNOSIS — Z20.822 ENCOUNTER FOR LABORATORY TESTING FOR COVID-19 VIRUS: Primary | ICD-10-CM

## 2022-04-22 DIAGNOSIS — Z20.822 ENCOUNTER FOR LABORATORY TESTING FOR COVID-19 VIRUS: ICD-10-CM

## 2022-04-22 LAB — SARS-COV-2 RNA PNL SPEC NAA+PROBE: NOT DETECTED

## 2022-04-22 PROCEDURE — U0004 COV-19 TEST NON-CDC HGH THRU: HCPCS

## 2022-04-22 PROCEDURE — C9803 HOPD COVID-19 SPEC COLLECT: HCPCS

## 2022-04-25 ENCOUNTER — READMISSION MANAGEMENT (OUTPATIENT)
Dept: CALL CENTER | Facility: HOSPITAL | Age: 67
End: 2022-04-25

## 2022-04-25 ENCOUNTER — ANESTHESIA (OUTPATIENT)
Dept: PERIOP | Facility: HOSPITAL | Age: 67
End: 2022-04-25

## 2022-04-25 ENCOUNTER — ANESTHESIA EVENT (OUTPATIENT)
Dept: PERIOP | Facility: HOSPITAL | Age: 67
End: 2022-04-25

## 2022-04-25 ENCOUNTER — HOSPITAL ENCOUNTER (INPATIENT)
Facility: HOSPITAL | Age: 67
LOS: 7 days | Discharge: NURSING, REHAB OR LONG-TERM CARE | End: 2022-05-08
Attending: ORTHOPAEDIC SURGERY | Admitting: ORTHOPAEDIC SURGERY

## 2022-04-25 ENCOUNTER — APPOINTMENT (OUTPATIENT)
Dept: GENERAL RADIOLOGY | Facility: HOSPITAL | Age: 67
End: 2022-04-25

## 2022-04-25 DIAGNOSIS — Z96.649 PROSTHETIC HIP INFECTION: ICD-10-CM

## 2022-04-25 DIAGNOSIS — Z98.890 S/P GIRDLESTONE PROCEDURE: Primary | ICD-10-CM

## 2022-04-25 DIAGNOSIS — T84.59XA PROSTHETIC HIP INFECTION: ICD-10-CM

## 2022-04-25 LAB
ABO GROUP BLD: NORMAL
ANION GAP SERPL CALCULATED.3IONS-SCNC: 10.2 MMOL/L (ref 5–15)
BLD GP AB SCN SERPL QL: NEGATIVE
BUN SERPL-MCNC: 15 MG/DL (ref 8–23)
BUN/CREAT SERPL: 15.8 (ref 7–25)
CALCIUM SPEC-SCNC: 8.2 MG/DL (ref 8.6–10.5)
CHLORIDE SERPL-SCNC: 101 MMOL/L (ref 98–107)
CO2 SERPL-SCNC: 21.8 MMOL/L (ref 22–29)
CREAT SERPL-MCNC: 0.95 MG/DL (ref 0.57–1)
EGFRCR SERPLBLD CKD-EPI 2021: 65.4 ML/MIN/1.73
GLUCOSE BLDC GLUCOMTR-MCNC: 103 MG/DL (ref 70–130)
GLUCOSE BLDC GLUCOMTR-MCNC: 132 MG/DL (ref 70–130)
GLUCOSE BLDC GLUCOMTR-MCNC: 216 MG/DL (ref 70–130)
GLUCOSE BLDC GLUCOMTR-MCNC: 231 MG/DL (ref 70–130)
GLUCOSE SERPL-MCNC: 256 MG/DL (ref 65–99)
HCT VFR BLD AUTO: 25.1 % (ref 34–46.6)
HGB BLD-MCNC: 8 G/DL (ref 12–15.9)
POTASSIUM SERPL-SCNC: 4.8 MMOL/L (ref 3.5–5.2)
RH BLD: POSITIVE
SODIUM SERPL-SCNC: 133 MMOL/L (ref 136–145)
T&S EXPIRATION DATE: NORMAL

## 2022-04-25 PROCEDURE — 63710000001 OXYCODONE-ACETAMINOPHEN 7.5-325 MG TABLET: Performed by: NURSE ANESTHETIST, CERTIFIED REGISTERED

## 2022-04-25 PROCEDURE — 86923 COMPATIBILITY TEST ELECTRIC: CPT

## 2022-04-25 PROCEDURE — C1889 IMPLANT/INSERT DEVICE, NOC: HCPCS | Performed by: ORTHOPAEDIC SURGERY

## 2022-04-25 PROCEDURE — 82962 GLUCOSE BLOOD TEST: CPT

## 2022-04-25 PROCEDURE — 63710000001 ACETAMINOPHEN 500 MG TABLET: Performed by: ORTHOPAEDIC SURGERY

## 2022-04-25 PROCEDURE — 80048 BASIC METABOLIC PNL TOTAL CA: CPT | Performed by: ORTHOPAEDIC SURGERY

## 2022-04-25 PROCEDURE — 63710000001 CELECOXIB 200 MG CAPSULE: Performed by: ORTHOPAEDIC SURGERY

## 2022-04-25 PROCEDURE — 25010000002 CEFAZOLIN IN DEXTROSE 2-4 GM/100ML-% SOLUTION: Performed by: ORTHOPAEDIC SURGERY

## 2022-04-25 PROCEDURE — 25010000002 FENTANYL CITRATE (PF) 50 MCG/ML SOLUTION: Performed by: NURSE ANESTHETIST, CERTIFIED REGISTERED

## 2022-04-25 PROCEDURE — 25010000002 PROPOFOL 10 MG/ML EMULSION: Performed by: NURSE ANESTHETIST, CERTIFIED REGISTERED

## 2022-04-25 PROCEDURE — 63710000001 ASPIRIN 81 MG TABLET DELAYED-RELEASE: Performed by: ORTHOPAEDIC SURGERY

## 2022-04-25 PROCEDURE — 25010000002 ONDANSETRON PER 1 MG: Performed by: ORTHOPAEDIC SURGERY

## 2022-04-25 PROCEDURE — 25010000002 PHENYLEPHRINE 10 MG/ML SOLUTION: Performed by: NURSE ANESTHETIST, CERTIFIED REGISTERED

## 2022-04-25 PROCEDURE — 25010000002 HYDROMORPHONE PER 4 MG: Performed by: NURSE ANESTHETIST, CERTIFIED REGISTERED

## 2022-04-25 PROCEDURE — 63710000001 DOCUSATE SODIUM 100 MG CAPSULE: Performed by: ORTHOPAEDIC SURGERY

## 2022-04-25 PROCEDURE — 85018 HEMOGLOBIN: CPT | Performed by: ORTHOPAEDIC SURGERY

## 2022-04-25 PROCEDURE — 25010000002 NEOSTIGMINE 5 MG/10ML SOLUTION: Performed by: NURSE ANESTHETIST, CERTIFIED REGISTERED

## 2022-04-25 PROCEDURE — 85014 HEMATOCRIT: CPT | Performed by: ORTHOPAEDIC SURGERY

## 2022-04-25 PROCEDURE — 63710000001 OXYCODONE 5 MG TABLET: Performed by: ORTHOPAEDIC SURGERY

## 2022-04-25 PROCEDURE — 72170 X-RAY EXAM OF PELVIS: CPT

## 2022-04-25 PROCEDURE — A9270 NON-COVERED ITEM OR SERVICE: HCPCS | Performed by: ORTHOPAEDIC SURGERY

## 2022-04-25 PROCEDURE — A9270 NON-COVERED ITEM OR SERVICE: HCPCS | Performed by: NURSE ANESTHETIST, CERTIFIED REGISTERED

## 2022-04-25 PROCEDURE — P9041 ALBUMIN (HUMAN),5%, 50ML: HCPCS | Performed by: NURSE ANESTHETIST, CERTIFIED REGISTERED

## 2022-04-25 PROCEDURE — 86900 BLOOD TYPING SEROLOGIC ABO: CPT | Performed by: ORTHOPAEDIC SURGERY

## 2022-04-25 PROCEDURE — 86850 RBC ANTIBODY SCREEN: CPT | Performed by: ORTHOPAEDIC SURGERY

## 2022-04-25 PROCEDURE — 63710000001 OXYCODONE-ACETAMINOPHEN 5-325 MG TABLET: Performed by: ORTHOPAEDIC SURGERY

## 2022-04-25 PROCEDURE — 25010000002 ONDANSETRON PER 1 MG: Performed by: NURSE ANESTHETIST, CERTIFIED REGISTERED

## 2022-04-25 PROCEDURE — 25010000002 ALBUMIN HUMAN 5% PER 50 ML: Performed by: NURSE ANESTHETIST, CERTIFIED REGISTERED

## 2022-04-25 PROCEDURE — 86901 BLOOD TYPING SEROLOGIC RH(D): CPT | Performed by: ORTHOPAEDIC SURGERY

## 2022-04-25 PROCEDURE — 63710000001 GABAPENTIN 300 MG CAPSULE: Performed by: ORTHOPAEDIC SURGERY

## 2022-04-25 PROCEDURE — G0378 HOSPITAL OBSERVATION PER HR: HCPCS

## 2022-04-25 PROCEDURE — 25010000002 DAPTOMYCIN PER 1 MG: Performed by: ORTHOPAEDIC SURGERY

## 2022-04-25 PROCEDURE — 25010000002 DEXAMETHASONE PER 1 MG: Performed by: NURSE ANESTHETIST, CERTIFIED REGISTERED

## 2022-04-25 DEVICE — DEV CONTRL TISS STRATAFIX SYMM PDS PLUS VIL CT-1 45CM: Type: IMPLANTABLE DEVICE | Site: HIP | Status: FUNCTIONAL

## 2022-04-25 DEVICE — SUT FW 5 W .5 CIR CUT NDL 48M AR7211: Type: IMPLANTABLE DEVICE | Site: HIP | Status: FUNCTIONAL

## 2022-04-25 DEVICE — DEV CONTRL TISS STRATAFIX SPIRAL PDS PLS CT1 2-0 45CM: Type: IMPLANTABLE DEVICE | Site: HIP | Status: FUNCTIONAL

## 2022-04-25 RX ORDER — PHENYLEPHRINE HYDROCHLORIDE 10 MG/ML
INJECTION INTRAVENOUS AS NEEDED
Status: DISCONTINUED | OUTPATIENT
Start: 2022-04-25 | End: 2022-04-25 | Stop reason: SURG

## 2022-04-25 RX ORDER — GLYCOPYRROLATE 0.2 MG/ML
INJECTION INTRAMUSCULAR; INTRAVENOUS AS NEEDED
Status: DISCONTINUED | OUTPATIENT
Start: 2022-04-25 | End: 2022-04-25 | Stop reason: SURG

## 2022-04-25 RX ORDER — TRANEXAMIC ACID 100 MG/ML
INJECTION, SOLUTION INTRAVENOUS AS NEEDED
Status: DISCONTINUED | OUTPATIENT
Start: 2022-04-25 | End: 2022-04-25 | Stop reason: SURG

## 2022-04-25 RX ORDER — DIPHENHYDRAMINE HCL 25 MG
25 CAPSULE ORAL
Status: DISCONTINUED | OUTPATIENT
Start: 2022-04-25 | End: 2022-04-25 | Stop reason: HOSPADM

## 2022-04-25 RX ORDER — OXYCODONE HYDROCHLORIDE AND ACETAMINOPHEN 5; 325 MG/1; MG/1
1 TABLET ORAL EVERY 4 HOURS PRN
Status: DISPENSED | OUTPATIENT
Start: 2022-04-25 | End: 2022-05-02

## 2022-04-25 RX ORDER — FAMOTIDINE 20 MG/1
40 TABLET, FILM COATED ORAL DAILY
Status: DISCONTINUED | OUTPATIENT
Start: 2022-04-25 | End: 2022-05-08 | Stop reason: HOSPADM

## 2022-04-25 RX ORDER — LIDOCAINE HYDROCHLORIDE 20 MG/ML
INJECTION, SOLUTION INFILTRATION; PERINEURAL AS NEEDED
Status: DISCONTINUED | OUTPATIENT
Start: 2022-04-25 | End: 2022-04-25 | Stop reason: SURG

## 2022-04-25 RX ORDER — FLUMAZENIL 0.1 MG/ML
0.2 INJECTION INTRAVENOUS AS NEEDED
Status: DISCONTINUED | OUTPATIENT
Start: 2022-04-25 | End: 2022-04-25 | Stop reason: HOSPADM

## 2022-04-25 RX ORDER — HYDROMORPHONE HYDROCHLORIDE 1 MG/ML
0.5 INJECTION, SOLUTION INTRAMUSCULAR; INTRAVENOUS; SUBCUTANEOUS
Status: DISCONTINUED | OUTPATIENT
Start: 2022-04-25 | End: 2022-04-25 | Stop reason: HOSPADM

## 2022-04-25 RX ORDER — ONDANSETRON 4 MG/1
4 TABLET, FILM COATED ORAL EVERY 6 HOURS PRN
Status: DISCONTINUED | OUTPATIENT
Start: 2022-04-25 | End: 2022-05-08 | Stop reason: HOSPADM

## 2022-04-25 RX ORDER — OXYCODONE HYDROCHLORIDE AND ACETAMINOPHEN 5; 325 MG/1; MG/1
2 TABLET ORAL EVERY 4 HOURS PRN
Status: DISPENSED | OUTPATIENT
Start: 2022-04-25 | End: 2022-05-02

## 2022-04-25 RX ORDER — SODIUM CHLORIDE 9 MG/ML
50 INJECTION, SOLUTION INTRAVENOUS CONTINUOUS
Status: DISCONTINUED | OUTPATIENT
Start: 2022-04-25 | End: 2022-04-28

## 2022-04-25 RX ORDER — ACETAMINOPHEN 500 MG
1000 TABLET ORAL ONCE
Status: COMPLETED | OUTPATIENT
Start: 2022-04-25 | End: 2022-04-25

## 2022-04-25 RX ORDER — PROPOFOL 10 MG/ML
VIAL (ML) INTRAVENOUS AS NEEDED
Status: DISCONTINUED | OUTPATIENT
Start: 2022-04-25 | End: 2022-04-25 | Stop reason: SURG

## 2022-04-25 RX ORDER — FAMOTIDINE 10 MG/ML
20 INJECTION, SOLUTION INTRAVENOUS ONCE
Status: COMPLETED | OUTPATIENT
Start: 2022-04-25 | End: 2022-04-25

## 2022-04-25 RX ORDER — CEFAZOLIN SODIUM 2 G/100ML
2 INJECTION, SOLUTION INTRAVENOUS ONCE
Status: COMPLETED | OUTPATIENT
Start: 2022-04-25 | End: 2022-04-25

## 2022-04-25 RX ORDER — DOCUSATE SODIUM 100 MG/1
100 CAPSULE, LIQUID FILLED ORAL 2 TIMES DAILY PRN
Status: DISCONTINUED | OUTPATIENT
Start: 2022-04-25 | End: 2022-05-08 | Stop reason: HOSPADM

## 2022-04-25 RX ORDER — DIPHENHYDRAMINE HYDROCHLORIDE 50 MG/ML
12.5 INJECTION INTRAMUSCULAR; INTRAVENOUS
Status: DISCONTINUED | OUTPATIENT
Start: 2022-04-25 | End: 2022-04-25 | Stop reason: HOSPADM

## 2022-04-25 RX ORDER — GABAPENTIN 300 MG/1
600 CAPSULE ORAL EVERY 12 HOURS SCHEDULED
Status: DISCONTINUED | OUTPATIENT
Start: 2022-04-25 | End: 2022-05-08 | Stop reason: HOSPADM

## 2022-04-25 RX ORDER — SODIUM CHLORIDE, SODIUM LACTATE, POTASSIUM CHLORIDE, CALCIUM CHLORIDE 600; 310; 30; 20 MG/100ML; MG/100ML; MG/100ML; MG/100ML
9 INJECTION, SOLUTION INTRAVENOUS CONTINUOUS
Status: DISCONTINUED | OUTPATIENT
Start: 2022-04-25 | End: 2022-05-08 | Stop reason: HOSPADM

## 2022-04-25 RX ORDER — LOSARTAN POTASSIUM 25 MG/1
25 TABLET ORAL 2 TIMES DAILY
Status: DISCONTINUED | OUTPATIENT
Start: 2022-04-25 | End: 2022-04-26

## 2022-04-25 RX ORDER — DEXTROSE MONOHYDRATE 25 G/50ML
25 INJECTION, SOLUTION INTRAVENOUS
Status: DISCONTINUED | OUTPATIENT
Start: 2022-04-25 | End: 2022-05-08 | Stop reason: HOSPADM

## 2022-04-25 RX ORDER — NEOSTIGMINE METHYLSULFATE 0.5 MG/ML
INJECTION, SOLUTION INTRAVENOUS AS NEEDED
Status: DISCONTINUED | OUTPATIENT
Start: 2022-04-25 | End: 2022-04-25 | Stop reason: SURG

## 2022-04-25 RX ORDER — FENTANYL CITRATE 50 UG/ML
50 INJECTION, SOLUTION INTRAMUSCULAR; INTRAVENOUS
Status: DISCONTINUED | OUTPATIENT
Start: 2022-04-25 | End: 2022-04-25 | Stop reason: HOSPADM

## 2022-04-25 RX ORDER — MAGNESIUM HYDROXIDE 1200 MG/15ML
LIQUID ORAL AS NEEDED
Status: DISCONTINUED | OUTPATIENT
Start: 2022-04-25 | End: 2022-04-25 | Stop reason: HOSPADM

## 2022-04-25 RX ORDER — ONDANSETRON 2 MG/ML
INJECTION INTRAMUSCULAR; INTRAVENOUS AS NEEDED
Status: DISCONTINUED | OUTPATIENT
Start: 2022-04-25 | End: 2022-04-25 | Stop reason: SURG

## 2022-04-25 RX ORDER — NALOXONE HCL 0.4 MG/ML
0.1 VIAL (ML) INJECTION
Status: DISCONTINUED | OUTPATIENT
Start: 2022-04-25 | End: 2022-05-08 | Stop reason: HOSPADM

## 2022-04-25 RX ORDER — LABETALOL HYDROCHLORIDE 5 MG/ML
5 INJECTION, SOLUTION INTRAVENOUS
Status: DISCONTINUED | OUTPATIENT
Start: 2022-04-25 | End: 2022-04-25 | Stop reason: HOSPADM

## 2022-04-25 RX ORDER — ROCURONIUM BROMIDE 10 MG/ML
INJECTION, SOLUTION INTRAVENOUS AS NEEDED
Status: DISCONTINUED | OUTPATIENT
Start: 2022-04-25 | End: 2022-04-25 | Stop reason: SURG

## 2022-04-25 RX ORDER — INSULIN LISPRO 100 [IU]/ML
0-9 INJECTION, SOLUTION INTRAVENOUS; SUBCUTANEOUS
Status: DISCONTINUED | OUTPATIENT
Start: 2022-04-26 | End: 2022-05-08 | Stop reason: HOSPADM

## 2022-04-25 RX ORDER — EPHEDRINE SULFATE 50 MG/ML
INJECTION, SOLUTION INTRAVENOUS AS NEEDED
Status: DISCONTINUED | OUTPATIENT
Start: 2022-04-25 | End: 2022-04-25 | Stop reason: SURG

## 2022-04-25 RX ORDER — OXYCODONE AND ACETAMINOPHEN 7.5; 325 MG/1; MG/1
1 TABLET ORAL EVERY 4 HOURS PRN
Status: DISCONTINUED | OUTPATIENT
Start: 2022-04-25 | End: 2022-04-25 | Stop reason: HOSPADM

## 2022-04-25 RX ORDER — ONDANSETRON 2 MG/ML
4 INJECTION INTRAMUSCULAR; INTRAVENOUS ONCE AS NEEDED
Status: DISCONTINUED | OUTPATIENT
Start: 2022-04-25 | End: 2022-04-25 | Stop reason: HOSPADM

## 2022-04-25 RX ORDER — SODIUM CHLORIDE 0.9 % (FLUSH) 0.9 %
3-10 SYRINGE (ML) INJECTION AS NEEDED
Status: DISCONTINUED | OUTPATIENT
Start: 2022-04-25 | End: 2022-04-25 | Stop reason: HOSPADM

## 2022-04-25 RX ORDER — FENTANYL CITRATE 50 UG/ML
INJECTION, SOLUTION INTRAMUSCULAR; INTRAVENOUS AS NEEDED
Status: DISCONTINUED | OUTPATIENT
Start: 2022-04-25 | End: 2022-04-25 | Stop reason: SURG

## 2022-04-25 RX ORDER — NALOXONE HCL 0.4 MG/ML
0.2 VIAL (ML) INJECTION AS NEEDED
Status: DISCONTINUED | OUTPATIENT
Start: 2022-04-25 | End: 2022-04-25 | Stop reason: HOSPADM

## 2022-04-25 RX ORDER — DEXAMETHASONE SODIUM PHOSPHATE 10 MG/ML
INJECTION INTRAMUSCULAR; INTRAVENOUS AS NEEDED
Status: DISCONTINUED | OUTPATIENT
Start: 2022-04-25 | End: 2022-04-25 | Stop reason: SURG

## 2022-04-25 RX ORDER — SODIUM CHLORIDE 0.9 % (FLUSH) 0.9 %
3 SYRINGE (ML) INJECTION EVERY 12 HOURS SCHEDULED
Status: DISCONTINUED | OUTPATIENT
Start: 2022-04-25 | End: 2022-04-25 | Stop reason: HOSPADM

## 2022-04-25 RX ORDER — NICOTINE POLACRILEX 4 MG
15 LOZENGE BUCCAL
Status: DISCONTINUED | OUTPATIENT
Start: 2022-04-25 | End: 2022-05-08 | Stop reason: HOSPADM

## 2022-04-25 RX ORDER — EPHEDRINE SULFATE 50 MG/ML
5 INJECTION, SOLUTION INTRAVENOUS ONCE AS NEEDED
Status: DISCONTINUED | OUTPATIENT
Start: 2022-04-25 | End: 2022-04-25 | Stop reason: HOSPADM

## 2022-04-25 RX ORDER — PROMETHAZINE HYDROCHLORIDE 25 MG/1
25 SUPPOSITORY RECTAL ONCE AS NEEDED
Status: DISCONTINUED | OUTPATIENT
Start: 2022-04-25 | End: 2022-04-25 | Stop reason: HOSPADM

## 2022-04-25 RX ORDER — MELOXICAM 15 MG/1
15 TABLET ORAL DAILY
Status: DISCONTINUED | OUTPATIENT
Start: 2022-04-25 | End: 2022-05-05

## 2022-04-25 RX ORDER — ASPIRIN 81 MG/1
81 TABLET ORAL EVERY 12 HOURS SCHEDULED
Status: DISCONTINUED | OUTPATIENT
Start: 2022-04-25 | End: 2022-05-08 | Stop reason: HOSPADM

## 2022-04-25 RX ORDER — LIDOCAINE HYDROCHLORIDE 10 MG/ML
0.5 INJECTION, SOLUTION EPIDURAL; INFILTRATION; INTRACAUDAL; PERINEURAL ONCE AS NEEDED
Status: DISCONTINUED | OUTPATIENT
Start: 2022-04-25 | End: 2022-04-25 | Stop reason: HOSPADM

## 2022-04-25 RX ORDER — CELECOXIB 200 MG/1
200 CAPSULE ORAL ONCE
Status: COMPLETED | OUTPATIENT
Start: 2022-04-25 | End: 2022-04-25

## 2022-04-25 RX ORDER — ALBUMIN, HUMAN INJ 5% 5 %
SOLUTION INTRAVENOUS CONTINUOUS PRN
Status: DISCONTINUED | OUTPATIENT
Start: 2022-04-25 | End: 2022-04-25 | Stop reason: SURG

## 2022-04-25 RX ORDER — MIDAZOLAM HYDROCHLORIDE 1 MG/ML
0.5 INJECTION INTRAMUSCULAR; INTRAVENOUS
Status: DISCONTINUED | OUTPATIENT
Start: 2022-04-25 | End: 2022-04-25 | Stop reason: HOSPADM

## 2022-04-25 RX ORDER — ONDANSETRON 2 MG/ML
4 INJECTION INTRAMUSCULAR; INTRAVENOUS EVERY 6 HOURS PRN
Status: DISCONTINUED | OUTPATIENT
Start: 2022-04-25 | End: 2022-05-08 | Stop reason: HOSPADM

## 2022-04-25 RX ORDER — OXYCODONE HYDROCHLORIDE 5 MG/1
5 TABLET ORAL ONCE
Status: COMPLETED | OUTPATIENT
Start: 2022-04-25 | End: 2022-04-25

## 2022-04-25 RX ORDER — PROMETHAZINE HYDROCHLORIDE 25 MG/1
25 TABLET ORAL ONCE AS NEEDED
Status: DISCONTINUED | OUTPATIENT
Start: 2022-04-25 | End: 2022-04-25 | Stop reason: HOSPADM

## 2022-04-25 RX ADMIN — HYDROMORPHONE HYDROCHLORIDE 0.5 MG: 1 INJECTION, SOLUTION INTRAMUSCULAR; INTRAVENOUS; SUBCUTANEOUS at 15:37

## 2022-04-25 RX ADMIN — SODIUM CHLORIDE, POTASSIUM CHLORIDE, SODIUM LACTATE AND CALCIUM CHLORIDE 9 ML/HR: 600; 310; 30; 20 INJECTION, SOLUTION INTRAVENOUS at 12:44

## 2022-04-25 RX ADMIN — HYDROMORPHONE HYDROCHLORIDE 0.5 MG: 1 INJECTION, SOLUTION INTRAMUSCULAR; INTRAVENOUS; SUBCUTANEOUS at 16:21

## 2022-04-25 RX ADMIN — ALBUMIN HUMAN: 0.05 INJECTION, SOLUTION INTRAVENOUS at 14:34

## 2022-04-25 RX ADMIN — LIDOCAINE HYDROCHLORIDE 80 MG: 20 INJECTION, SOLUTION INFILTRATION; PERINEURAL at 13:32

## 2022-04-25 RX ADMIN — PHENYLEPHRINE HYDROCHLORIDE 100 MCG: 10 INJECTION, SOLUTION INTRAVENOUS at 14:23

## 2022-04-25 RX ADMIN — PHENYLEPHRINE HYDROCHLORIDE 100 MCG: 10 INJECTION, SOLUTION INTRAVENOUS at 14:48

## 2022-04-25 RX ADMIN — NEOSTIGMINE METHYLSULFATE 3.5 MG: 0.5 INJECTION INTRAVENOUS at 15:03

## 2022-04-25 RX ADMIN — HYDROMORPHONE HYDROCHLORIDE 0.5 MG: 1 INJECTION, SOLUTION INTRAMUSCULAR; INTRAVENOUS; SUBCUTANEOUS at 15:53

## 2022-04-25 RX ADMIN — CEFAZOLIN SODIUM 2 G: 2 INJECTION, SOLUTION INTRAVENOUS at 13:14

## 2022-04-25 RX ADMIN — ROCURONIUM BROMIDE 50 MG: 50 INJECTION INTRAVENOUS at 13:32

## 2022-04-25 RX ADMIN — ONDANSETRON 4 MG: 2 INJECTION INTRAMUSCULAR; INTRAVENOUS at 20:38

## 2022-04-25 RX ADMIN — PROPOFOL 140 MG: 10 INJECTION, EMULSION INTRAVENOUS at 13:32

## 2022-04-25 RX ADMIN — PHENYLEPHRINE HYDROCHLORIDE 100 MCG: 10 INJECTION, SOLUTION INTRAVENOUS at 14:59

## 2022-04-25 RX ADMIN — ASPIRIN 81 MG: 81 TABLET, COATED ORAL at 20:43

## 2022-04-25 RX ADMIN — EPHEDRINE SULFATE 10 MG: 50 INJECTION INTRAVENOUS at 13:54

## 2022-04-25 RX ADMIN — PHENYLEPHRINE HYDROCHLORIDE 100 MCG: 10 INJECTION, SOLUTION INTRAVENOUS at 14:40

## 2022-04-25 RX ADMIN — FENTANYL CITRATE 50 MCG: 50 INJECTION, SOLUTION INTRAMUSCULAR; INTRAVENOUS at 15:36

## 2022-04-25 RX ADMIN — TRANEXAMIC ACID 1000 MG: 1 INJECTION, SOLUTION INTRAVENOUS at 13:47

## 2022-04-25 RX ADMIN — EPHEDRINE SULFATE 15 MG: 50 INJECTION INTRAVENOUS at 14:20

## 2022-04-25 RX ADMIN — FAMOTIDINE 20 MG: 10 INJECTION INTRAVENOUS at 12:44

## 2022-04-25 RX ADMIN — DAPTOMYCIN 900 MG: 500 INJECTION, POWDER, LYOPHILIZED, FOR SOLUTION INTRAVENOUS at 13:14

## 2022-04-25 RX ADMIN — GABAPENTIN 600 MG: 300 CAPSULE ORAL at 20:44

## 2022-04-25 RX ADMIN — CELECOXIB 200 MG: 200 CAPSULE ORAL at 11:29

## 2022-04-25 RX ADMIN — OXYCODONE HYDROCHLORIDE AND ACETAMINOPHEN 1 TABLET: 5; 325 TABLET ORAL at 20:44

## 2022-04-25 RX ADMIN — SODIUM CHLORIDE, POTASSIUM CHLORIDE, SODIUM LACTATE AND CALCIUM CHLORIDE: 600; 310; 30; 20 INJECTION, SOLUTION INTRAVENOUS at 14:56

## 2022-04-25 RX ADMIN — ALBUMIN HUMAN: 0.05 INJECTION, SOLUTION INTRAVENOUS at 14:45

## 2022-04-25 RX ADMIN — OXYCODONE 5 MG: 5 TABLET ORAL at 11:29

## 2022-04-25 RX ADMIN — DEXAMETHASONE SODIUM PHOSPHATE 6 MG: 10 INJECTION INTRAMUSCULAR; INTRAVENOUS at 13:44

## 2022-04-25 RX ADMIN — PHENYLEPHRINE HYDROCHLORIDE 100 MCG: 10 INJECTION, SOLUTION INTRAVENOUS at 14:33

## 2022-04-25 RX ADMIN — PHENYLEPHRINE HYDROCHLORIDE 100 MCG: 10 INJECTION, SOLUTION INTRAVENOUS at 14:51

## 2022-04-25 RX ADMIN — OXYCODONE HYDROCHLORIDE AND ACETAMINOPHEN 1 TABLET: 7.5; 325 TABLET ORAL at 16:21

## 2022-04-25 RX ADMIN — GLYCOPYRROLATE 0.5 MG: 0.2 INJECTION INTRAMUSCULAR; INTRAVENOUS at 15:03

## 2022-04-25 RX ADMIN — FENTANYL CITRATE 50 MCG: 50 INJECTION, SOLUTION INTRAMUSCULAR; INTRAVENOUS at 15:53

## 2022-04-25 RX ADMIN — EPHEDRINE SULFATE 15 MG: 50 INJECTION INTRAVENOUS at 13:48

## 2022-04-25 RX ADMIN — ONDANSETRON 4 MG: 2 INJECTION INTRAMUSCULAR; INTRAVENOUS at 14:49

## 2022-04-25 RX ADMIN — FENTANYL CITRATE 100 MCG: 0.05 INJECTION, SOLUTION INTRAMUSCULAR; INTRAVENOUS at 13:32

## 2022-04-25 RX ADMIN — DOCUSATE SODIUM 100 MG: 100 CAPSULE, LIQUID FILLED ORAL at 20:43

## 2022-04-25 RX ADMIN — ACETAMINOPHEN 1000 MG: 500 TABLET ORAL at 11:29

## 2022-04-25 NOTE — ANESTHESIA POSTPROCEDURE EVALUATION
Patient: Stephy Duncan    Procedure Summary     Date: 04/25/22 Room / Location: [facility] OR [address] [place] [address] MAIN OR    Anesthesia Start: 1320 Anesthesia Stop: 1527    Procedure: HIP GIRDLESTONE PROCEDURE (Right Hip) Diagnosis:     Surgeons: Karri Schaeffer II, MD Provider: Zechariah Villareal MD    Anesthesia Type: general ASA Status: 3          Anesthesia Type: general    Vitals  Vitals Value Taken Time   /48 04/25/22 1631   Temp 36.2 °C (97.2 °F) 04/25/22 1523   Pulse 59 04/25/22 1639   Resp 16 04/25/22 1630   SpO2 93 % 04/25/22 1639   Vitals shown include unvalidated device data.        Post Anesthesia Care and Evaluation    Patient location during evaluation: PACU  Patient participation: complete - patient participated  Level of consciousness: awake and alert  Pain management: adequate  Airway patency: patent  Anesthetic complications: No anesthetic complications    Cardiovascular status: acceptable  Respiratory status: acceptable  Hydration status: acceptable    Comments: /48   Pulse 60   Temp 36.2 °C (97.2 °F) (Oral)   Resp 16   SpO2 90%

## 2022-04-25 NOTE — ANESTHESIA PROCEDURE NOTES
Airway  Urgency: elective    Date/Time: 4/25/2022 1:34 PM  Airway not difficult    General Information and Staff    Patient location during procedure: OR  CRNA: Raven Saucedo CRNA    Indications and Patient Condition  Indications for airway management: airway protection    Preoxygenated: yes  Mask difficulty assessment: 1 - vent by mask    Final Airway Details  Final airway type: endotracheal airway      Successful airway: ETT  Cuffed: yes   Successful intubation technique: direct laryngoscopy  Endotracheal tube insertion site: oral  Blade: C[place]  Blade size: 3  ETT size (mm): 7.0  Cormack-Lehane Classification: grade I - full view of glottis  Placement verified by: chest auscultation and capnometry   Measured from: lips  ETT/EBT  to lips (cm): 22  Number of attempts at approach: 1  Assessment: lips, teeth, and gum same as pre-op and atraumatic intubation    Additional Comments   ett cuff up at MOP

## 2022-04-25 NOTE — OUTREACH NOTE
Total Joint Month 3 Survey    Flowsheet Row Responses   [facility] patient discharged from? [place]   Does the patient have one of the following disease processes/diagnoses(primary or secondary)? Total Joint Replacement   Joint surgery performed? Hip   Month 3 attempt successful? Yes   Call start time 0903   Call end time 0905   Has the patient been back in either the hospital or Emergency Department since discharge? Yes   Discharge diagnosis right hip wound drainage, s/p Right Total Hip Revision   Is the patient taking all medications as directed (includes completed medication regime)? Yes   Comments Having Hip surgery today and going to hospital today.    What is the patient's perception of their functional status since discharge? Same   Additional teach back comments Pt going in for hip exchange today and I told her we would probably be calling her back to check up on her.    Graduated Yes   Is the patient interested in additional calls from an ambulatory ?  NOTE:  applies to high risk patients requiring additional follow-up. No   Did the patient feel the follow up calls were helpful during their recovery period? Yes   Was the number of calls appropriate? Yes          MARIAM DUKE - Registered Nurse

## 2022-04-25 NOTE — ANESTHESIA PREPROCEDURE EVALUATION
Anesthesia Evaluation     Patient summary reviewed and Nursing notes reviewed   history of anesthetic complications: PONV  NPO Solid Status: > 8 hours  NPO Liquid Status: > 4 hours           Airway   Mallampati: III  Neck ROM: full  No difficulty expected  Dental    (+) edentulous    Pulmonary     breath sounds clear to auscultation  Cardiovascular     Rhythm: regular    (+) hypertension, valvular problems/murmurs murmur, past MI , CAD, cardiac stents CHF , hyperlipidemia,       Neuro/Psych  (+) numbness,    GI/Hepatic/Renal/Endo    (+) obesity, morbid obesity, GERD,  diabetes mellitus,     Musculoskeletal     Abdominal   (+) obese,    Substance History      OB/GYN          Other   arthritis,                      Anesthesia Plan    ASA 3     general     intravenous induction     Anesthetic plan, all risks, benefits, and alternatives have been provided, discussed and informed consent has been obtained with: patient.        CODE STATUS:

## 2022-04-26 LAB
APTT PPP: 22.7 SECONDS (ref 22.7–35.4)
BASOPHILS # BLD AUTO: 0.03 10*3/MM3 (ref 0–0.2)
BASOPHILS NFR BLD AUTO: 0.5 % (ref 0–1.5)
DEPRECATED RDW RBC AUTO: 49.5 FL (ref 37–54)
EOSINOPHIL # BLD AUTO: 0.18 10*3/MM3 (ref 0–0.4)
EOSINOPHIL NFR BLD AUTO: 2.7 % (ref 0.3–6.2)
ERYTHROCYTE [DISTWIDTH] IN BLOOD BY AUTOMATED COUNT: 16.6 % (ref 12.3–15.4)
GLUCOSE BLDC GLUCOMTR-MCNC: 176 MG/DL (ref 70–130)
GLUCOSE BLDC GLUCOMTR-MCNC: 205 MG/DL (ref 70–130)
GLUCOSE BLDC GLUCOMTR-MCNC: 213 MG/DL (ref 70–130)
GLUCOSE BLDC GLUCOMTR-MCNC: 274 MG/DL (ref 70–130)
HCT VFR BLD AUTO: 23.5 % (ref 34–46.6)
HCT VFR BLD AUTO: 24.8 % (ref 34–46.6)
HGB BLD-MCNC: 7.3 G/DL (ref 12–15.9)
HGB BLD-MCNC: 7.8 G/DL (ref 12–15.9)
INR PPP: 1.19 (ref 0.9–1.1)
LYMPHOCYTES # BLD AUTO: 0.9 10*3/MM3 (ref 0.7–3.1)
LYMPHOCYTES NFR BLD AUTO: 13.5 % (ref 19.6–45.3)
MCH RBC QN AUTO: 25.5 PG (ref 26.6–33)
MCHC RBC AUTO-ENTMCNC: 31.1 G/DL (ref 31.5–35.7)
MCV RBC AUTO: 82.2 FL (ref 79–97)
MONOCYTES # BLD AUTO: 0.75 10*3/MM3 (ref 0.1–0.9)
MONOCYTES NFR BLD AUTO: 11.3 % (ref 5–12)
NEUTROPHILS NFR BLD AUTO: 4.75 10*3/MM3 (ref 1.7–7)
NEUTROPHILS NFR BLD AUTO: 71.2 % (ref 42.7–76)
PLATELET # BLD AUTO: 111 10*3/MM3 (ref 140–450)
PMV BLD AUTO: 11.4 FL (ref 6–12)
PROTHROMBIN TIME: 15 SECONDS (ref 11.7–14.2)
RBC # BLD AUTO: 2.86 10*6/MM3 (ref 3.77–5.28)
WBC NRBC COR # BLD: 6.66 10*3/MM3 (ref 3.4–10.8)

## 2022-04-26 PROCEDURE — 85730 THROMBOPLASTIN TIME PARTIAL: CPT | Performed by: HOSPITALIST

## 2022-04-26 PROCEDURE — 85018 HEMOGLOBIN: CPT | Performed by: ORTHOPAEDIC SURGERY

## 2022-04-26 PROCEDURE — 36430 TRANSFUSION BLD/BLD COMPNT: CPT

## 2022-04-26 PROCEDURE — G0378 HOSPITAL OBSERVATION PER HR: HCPCS

## 2022-04-26 PROCEDURE — 85014 HEMATOCRIT: CPT | Performed by: ORTHOPAEDIC SURGERY

## 2022-04-26 PROCEDURE — 85610 PROTHROMBIN TIME: CPT | Performed by: HOSPITALIST

## 2022-04-26 PROCEDURE — 82962 GLUCOSE BLOOD TEST: CPT

## 2022-04-26 PROCEDURE — 63710000001 INSULIN LISPRO (HUMAN) PER 5 UNITS: Performed by: INTERNAL MEDICINE

## 2022-04-26 PROCEDURE — 97161 PT EVAL LOW COMPLEX 20 MIN: CPT

## 2022-04-26 PROCEDURE — 97530 THERAPEUTIC ACTIVITIES: CPT

## 2022-04-26 PROCEDURE — P9016 RBC LEUKOCYTES REDUCED: HCPCS

## 2022-04-26 PROCEDURE — 86900 BLOOD TYPING SEROLOGIC ABO: CPT

## 2022-04-26 PROCEDURE — 85025 COMPLETE CBC W/AUTO DIFF WBC: CPT | Performed by: HOSPITALIST

## 2022-04-26 RX ADMIN — OXYCODONE HYDROCHLORIDE AND ACETAMINOPHEN 2 TABLET: 5; 325 TABLET ORAL at 18:50

## 2022-04-26 RX ADMIN — OXYCODONE HYDROCHLORIDE AND ACETAMINOPHEN 2 TABLET: 5; 325 TABLET ORAL at 10:39

## 2022-04-26 RX ADMIN — OXYCODONE HYDROCHLORIDE AND ACETAMINOPHEN 2 TABLET: 5; 325 TABLET ORAL at 14:25

## 2022-04-26 RX ADMIN — ASPIRIN 81 MG: 81 TABLET, COATED ORAL at 08:35

## 2022-04-26 RX ADMIN — ASPIRIN 81 MG: 81 TABLET, COATED ORAL at 22:41

## 2022-04-26 RX ADMIN — METOPROLOL TARTRATE 25 MG: 25 TABLET, FILM COATED ORAL at 22:42

## 2022-04-26 RX ADMIN — GABAPENTIN 600 MG: 300 CAPSULE ORAL at 22:42

## 2022-04-26 RX ADMIN — INSULIN GLARGINE-YFGN 20 UNITS: 100 INJECTION, SOLUTION SUBCUTANEOUS at 22:41

## 2022-04-26 RX ADMIN — INSULIN LISPRO 2 UNITS: 100 INJECTION, SOLUTION INTRAVENOUS; SUBCUTANEOUS at 12:27

## 2022-04-26 RX ADMIN — INSULIN LISPRO 4 UNITS: 100 INJECTION, SOLUTION INTRAVENOUS; SUBCUTANEOUS at 17:10

## 2022-04-26 RX ADMIN — GABAPENTIN 600 MG: 300 CAPSULE ORAL at 08:36

## 2022-04-26 RX ADMIN — FAMOTIDINE 40 MG: 20 TABLET ORAL at 08:36

## 2022-04-26 RX ADMIN — INSULIN LISPRO 4 UNITS: 100 INJECTION, SOLUTION INTRAVENOUS; SUBCUTANEOUS at 08:34

## 2022-04-26 RX ADMIN — OXYCODONE HYDROCHLORIDE AND ACETAMINOPHEN 2 TABLET: 5; 325 TABLET ORAL at 22:41

## 2022-04-26 RX ADMIN — OXYCODONE HYDROCHLORIDE AND ACETAMINOPHEN 2 TABLET: 5; 325 TABLET ORAL at 01:40

## 2022-04-26 RX ADMIN — MELOXICAM 15 MG: 15 TABLET ORAL at 08:35

## 2022-04-26 RX ADMIN — OXYCODONE HYDROCHLORIDE AND ACETAMINOPHEN 2 TABLET: 5; 325 TABLET ORAL at 06:17

## 2022-04-27 LAB
ANION GAP SERPL CALCULATED.3IONS-SCNC: 8.8 MMOL/L (ref 5–15)
ANISOCYTOSIS BLD QL: ABNORMAL
BASOPHILS # BLD MANUAL: 0.05 10*3/MM3 (ref 0–0.2)
BASOPHILS NFR BLD MANUAL: 1 % (ref 0–1.5)
BH BB BLOOD EXPIRATION DATE: NORMAL
BH BB BLOOD TYPE BARCODE: 5100
BH BB DISPENSE STATUS: NORMAL
BH BB PRODUCT CODE: NORMAL
BH BB UNIT NUMBER: NORMAL
BILIRUB UR QL STRIP: NEGATIVE
BUN SERPL-MCNC: 12 MG/DL (ref 8–23)
BUN/CREAT SERPL: 12.8 (ref 7–25)
CALCIUM SPEC-SCNC: 7.5 MG/DL (ref 8.6–10.5)
CHLORIDE SERPL-SCNC: 101 MMOL/L (ref 98–107)
CLARITY UR: CLEAR
CO2 SERPL-SCNC: 20.2 MMOL/L (ref 22–29)
COLOR UR: YELLOW
CREAT SERPL-MCNC: 0.94 MG/DL (ref 0.57–1)
CROSSMATCH INTERPRETATION: NORMAL
DEPRECATED RDW RBC AUTO: 51.9 FL (ref 37–54)
EGFRCR SERPLBLD CKD-EPI 2021: 66.2 ML/MIN/1.73
ELLIPTOCYTES BLD QL SMEAR: ABNORMAL
EOSINOPHIL # BLD MANUAL: 0.1 10*3/MM3 (ref 0–0.4)
EOSINOPHIL NFR BLD MANUAL: 2 % (ref 0.3–6.2)
ERYTHROCYTE [DISTWIDTH] IN BLOOD BY AUTOMATED COUNT: 17.1 % (ref 12.3–15.4)
GLUCOSE BLDC GLUCOMTR-MCNC: 256 MG/DL (ref 70–130)
GLUCOSE BLDC GLUCOMTR-MCNC: 263 MG/DL (ref 70–130)
GLUCOSE BLDC GLUCOMTR-MCNC: 276 MG/DL (ref 70–130)
GLUCOSE BLDC GLUCOMTR-MCNC: 286 MG/DL (ref 70–130)
GLUCOSE SERPL-MCNC: 251 MG/DL (ref 65–99)
GLUCOSE UR STRIP-MCNC: NEGATIVE MG/DL
HCT VFR BLD AUTO: 25.1 % (ref 34–46.6)
HGB BLD-MCNC: 7.8 G/DL (ref 12–15.9)
HGB UR QL STRIP.AUTO: NEGATIVE
HYPOCHROMIA BLD QL: ABNORMAL
IRON 24H UR-MRATE: 17 MCG/DL (ref 37–145)
IRON SATN MFR SERPL: 9 % (ref 20–50)
KETONES UR QL STRIP: NEGATIVE
LEUKOCYTE ESTERASE UR QL STRIP.AUTO: NEGATIVE
LYMPHOCYTES # BLD MANUAL: 1.06 10*3/MM3 (ref 0.7–3.1)
LYMPHOCYTES NFR BLD MANUAL: 4 % (ref 5–12)
MCH RBC QN AUTO: 25.8 PG (ref 26.6–33)
MCHC RBC AUTO-ENTMCNC: 31.1 G/DL (ref 31.5–35.7)
MCV RBC AUTO: 83.1 FL (ref 79–97)
MONOCYTES # BLD: 0.19 10*3/MM3 (ref 0.1–0.9)
NEUTROPHILS # BLD AUTO: 3.39 10*3/MM3 (ref 1.7–7)
NEUTROPHILS NFR BLD MANUAL: 70.7 % (ref 42.7–76)
NITRITE UR QL STRIP: NEGATIVE
PH UR STRIP.AUTO: 6 [PH] (ref 5–8)
PLAT MORPH BLD: NORMAL
PLATELET # BLD AUTO: 71 10*3/MM3 (ref 140–450)
PLATELET # BLD AUTO: 71 10*3/MM3 (ref 140–450)
PLATELET # BLD AUTO: 99 10*3/MM3 (ref 140–450)
PLATELETS.RETICULATED NFR BLD AUTO: 3.7 % (ref 0.9–6.5)
PLATELETS.RETICULATED NFR BLD AUTO: 6 % (ref 0.9–6.5)
PMV BLD AUTO: 11.3 FL (ref 6–12)
POTASSIUM SERPL-SCNC: 4.6 MMOL/L (ref 3.5–5.2)
PROT UR QL STRIP: NEGATIVE
RBC # BLD AUTO: 3.02 10*6/MM3 (ref 3.77–5.28)
SODIUM SERPL-SCNC: 130 MMOL/L (ref 136–145)
SP GR UR STRIP: 1.01 (ref 1–1.03)
TIBC SERPL-MCNC: 189 MCG/DL (ref 298–536)
TRANSFERRIN SERPL-MCNC: 127 MG/DL (ref 200–360)
UNIT  ABO: NORMAL
UNIT  RH: NORMAL
UROBILINOGEN UR QL STRIP: NORMAL
VARIANT LYMPHS NFR BLD MANUAL: 22.2 % (ref 19.6–45.3)
WBC MORPH BLD: NORMAL
WBC NRBC COR # BLD: 4.79 10*3/MM3 (ref 3.4–10.8)

## 2022-04-27 PROCEDURE — G0378 HOSPITAL OBSERVATION PER HR: HCPCS

## 2022-04-27 PROCEDURE — 82962 GLUCOSE BLOOD TEST: CPT

## 2022-04-27 PROCEDURE — 83540 ASSAY OF IRON: CPT | Performed by: HOSPITALIST

## 2022-04-27 PROCEDURE — 25010000002 NA FERRIC GLUC CPLX PER 12.5 MG: Performed by: HOSPITALIST

## 2022-04-27 PROCEDURE — 85025 COMPLETE CBC W/AUTO DIFF WBC: CPT | Performed by: HOSPITALIST

## 2022-04-27 PROCEDURE — 87040 BLOOD CULTURE FOR BACTERIA: CPT

## 2022-04-27 PROCEDURE — 80048 BASIC METABOLIC PNL TOTAL CA: CPT | Performed by: HOSPITALIST

## 2022-04-27 PROCEDURE — 85055 RETICULATED PLATELET ASSAY: CPT | Performed by: HOSPITALIST

## 2022-04-27 PROCEDURE — 97530 THERAPEUTIC ACTIVITIES: CPT

## 2022-04-27 PROCEDURE — 85007 BL SMEAR W/DIFF WBC COUNT: CPT | Performed by: HOSPITALIST

## 2022-04-27 PROCEDURE — 84466 ASSAY OF TRANSFERRIN: CPT | Performed by: HOSPITALIST

## 2022-04-27 PROCEDURE — 81003 URINALYSIS AUTO W/O SCOPE: CPT

## 2022-04-27 PROCEDURE — 63710000001 INSULIN LISPRO (HUMAN) PER 5 UNITS: Performed by: INTERNAL MEDICINE

## 2022-04-27 RX ADMIN — INSULIN GLARGINE-YFGN 40 UNITS: 100 INJECTION, SOLUTION SUBCUTANEOUS at 23:37

## 2022-04-27 RX ADMIN — OXYCODONE HYDROCHLORIDE AND ACETAMINOPHEN 2 TABLET: 5; 325 TABLET ORAL at 18:46

## 2022-04-27 RX ADMIN — INSULIN LISPRO 6 UNITS: 100 INJECTION, SOLUTION INTRAVENOUS; SUBCUTANEOUS at 12:47

## 2022-04-27 RX ADMIN — GABAPENTIN 600 MG: 300 CAPSULE ORAL at 08:27

## 2022-04-27 RX ADMIN — GABAPENTIN 600 MG: 300 CAPSULE ORAL at 23:37

## 2022-04-27 RX ADMIN — MELOXICAM 15 MG: 15 TABLET ORAL at 08:27

## 2022-04-27 RX ADMIN — SODIUM CHLORIDE 250 MG: 9 INJECTION, SOLUTION INTRAVENOUS at 23:37

## 2022-04-27 RX ADMIN — INSULIN LISPRO 6 UNITS: 100 INJECTION, SOLUTION INTRAVENOUS; SUBCUTANEOUS at 17:10

## 2022-04-27 RX ADMIN — METOPROLOL TARTRATE 25 MG: 25 TABLET, FILM COATED ORAL at 23:37

## 2022-04-27 RX ADMIN — OXYCODONE HYDROCHLORIDE AND ACETAMINOPHEN 2 TABLET: 5; 325 TABLET ORAL at 06:38

## 2022-04-27 RX ADMIN — ASPIRIN 81 MG: 81 TABLET, COATED ORAL at 23:37

## 2022-04-27 RX ADMIN — OXYCODONE HYDROCHLORIDE AND ACETAMINOPHEN 2 TABLET: 5; 325 TABLET ORAL at 14:35

## 2022-04-27 RX ADMIN — OXYCODONE HYDROCHLORIDE AND ACETAMINOPHEN 2 TABLET: 5; 325 TABLET ORAL at 10:41

## 2022-04-27 RX ADMIN — FAMOTIDINE 40 MG: 20 TABLET ORAL at 08:27

## 2022-04-27 RX ADMIN — INSULIN LISPRO 6 UNITS: 100 INJECTION, SOLUTION INTRAVENOUS; SUBCUTANEOUS at 08:24

## 2022-04-28 LAB
BASOPHILS # BLD AUTO: 0.01 10*3/MM3 (ref 0–0.2)
BASOPHILS NFR BLD AUTO: 0.2 % (ref 0–1.5)
DEPRECATED RDW RBC AUTO: 48.5 FL (ref 37–54)
EOSINOPHIL # BLD AUTO: 0.16 10*3/MM3 (ref 0–0.4)
EOSINOPHIL NFR BLD AUTO: 3.7 % (ref 0.3–6.2)
ERYTHROCYTE [DISTWIDTH] IN BLOOD BY AUTOMATED COUNT: 17 % (ref 12.3–15.4)
GLUCOSE BLDC GLUCOMTR-MCNC: 224 MG/DL (ref 70–130)
GLUCOSE BLDC GLUCOMTR-MCNC: 237 MG/DL (ref 70–130)
GLUCOSE BLDC GLUCOMTR-MCNC: 247 MG/DL (ref 70–130)
GLUCOSE BLDC GLUCOMTR-MCNC: 250 MG/DL (ref 70–130)
HCT VFR BLD AUTO: 24.4 % (ref 34–46.6)
HGB BLD-MCNC: 7.8 G/DL (ref 12–15.9)
IMM GRANULOCYTES # BLD AUTO: 0.02 10*3/MM3 (ref 0–0.05)
IMM GRANULOCYTES NFR BLD AUTO: 0.5 % (ref 0–0.5)
LYMPHOCYTES # BLD AUTO: 0.65 10*3/MM3 (ref 0.7–3.1)
LYMPHOCYTES NFR BLD AUTO: 15.1 % (ref 19.6–45.3)
MCH RBC QN AUTO: 25.3 PG (ref 26.6–33)
MCHC RBC AUTO-ENTMCNC: 32 G/DL (ref 31.5–35.7)
MCV RBC AUTO: 79.2 FL (ref 79–97)
MONOCYTES # BLD AUTO: 0.53 10*3/MM3 (ref 0.1–0.9)
MONOCYTES NFR BLD AUTO: 12.3 % (ref 5–12)
NEUTROPHILS NFR BLD AUTO: 2.94 10*3/MM3 (ref 1.7–7)
NEUTROPHILS NFR BLD AUTO: 68.2 % (ref 42.7–76)
NRBC BLD AUTO-RTO: 0 /100 WBC (ref 0–0.2)
PLATELET # BLD AUTO: 101 10*3/MM3 (ref 140–450)
PMV BLD AUTO: 9.7 FL (ref 6–12)
RBC # BLD AUTO: 3.08 10*6/MM3 (ref 3.77–5.28)
RETICS # AUTO: 0.05 10*6/MM3 (ref 0.02–0.13)
RETICS/RBC NFR AUTO: 1.6 % (ref 0.7–1.9)
WBC NRBC COR # BLD: 4.31 10*3/MM3 (ref 3.4–10.8)

## 2022-04-28 PROCEDURE — G0378 HOSPITAL OBSERVATION PER HR: HCPCS

## 2022-04-28 PROCEDURE — 82962 GLUCOSE BLOOD TEST: CPT

## 2022-04-28 PROCEDURE — 25010000002 DAPTOMYCIN PER 1 MG: Performed by: ORTHOPAEDIC SURGERY

## 2022-04-28 PROCEDURE — 85025 COMPLETE CBC W/AUTO DIFF WBC: CPT | Performed by: HOSPITALIST

## 2022-04-28 PROCEDURE — 63710000001 INSULIN LISPRO (HUMAN) PER 5 UNITS: Performed by: INTERNAL MEDICINE

## 2022-04-28 PROCEDURE — 85045 AUTOMATED RETICULOCYTE COUNT: CPT | Performed by: HOSPITALIST

## 2022-04-28 PROCEDURE — 25010000002 NA FERRIC GLUC CPLX PER 12.5 MG: Performed by: HOSPITALIST

## 2022-04-28 RX ADMIN — GABAPENTIN 600 MG: 300 CAPSULE ORAL at 20:59

## 2022-04-28 RX ADMIN — SODIUM CHLORIDE 250 MG: 9 INJECTION, SOLUTION INTRAVENOUS at 15:53

## 2022-04-28 RX ADMIN — DAPTOMYCIN 750 MG: 500 INJECTION, POWDER, LYOPHILIZED, FOR SOLUTION INTRAVENOUS at 11:44

## 2022-04-28 RX ADMIN — OXYCODONE HYDROCHLORIDE AND ACETAMINOPHEN 2 TABLET: 5; 325 TABLET ORAL at 20:59

## 2022-04-28 RX ADMIN — INSULIN LISPRO 4 UNITS: 100 INJECTION, SOLUTION INTRAVENOUS; SUBCUTANEOUS at 08:31

## 2022-04-28 RX ADMIN — GABAPENTIN 600 MG: 300 CAPSULE ORAL at 08:32

## 2022-04-28 RX ADMIN — OXYCODONE HYDROCHLORIDE AND ACETAMINOPHEN 2 TABLET: 5; 325 TABLET ORAL at 16:12

## 2022-04-28 RX ADMIN — OXYCODONE HYDROCHLORIDE AND ACETAMINOPHEN 2 TABLET: 5; 325 TABLET ORAL at 11:45

## 2022-04-28 RX ADMIN — METOPROLOL TARTRATE 25 MG: 25 TABLET, FILM COATED ORAL at 20:59

## 2022-04-28 RX ADMIN — ASPIRIN 81 MG: 81 TABLET, COATED ORAL at 20:59

## 2022-04-28 RX ADMIN — INSULIN GLARGINE-YFGN 50 UNITS: 100 INJECTION, SOLUTION SUBCUTANEOUS at 21:02

## 2022-04-28 RX ADMIN — MELOXICAM 15 MG: 15 TABLET ORAL at 08:32

## 2022-04-28 RX ADMIN — INSULIN LISPRO 4 UNITS: 100 INJECTION, SOLUTION INTRAVENOUS; SUBCUTANEOUS at 16:54

## 2022-04-28 RX ADMIN — OXYCODONE HYDROCHLORIDE AND ACETAMINOPHEN 2 TABLET: 5; 325 TABLET ORAL at 04:58

## 2022-04-28 RX ADMIN — ASPIRIN 81 MG: 81 TABLET, COATED ORAL at 08:32

## 2022-04-28 RX ADMIN — FAMOTIDINE 40 MG: 20 TABLET ORAL at 08:32

## 2022-04-28 RX ADMIN — INSULIN LISPRO 4 UNITS: 100 INJECTION, SOLUTION INTRAVENOUS; SUBCUTANEOUS at 11:45

## 2022-04-29 LAB
ANION GAP SERPL CALCULATED.3IONS-SCNC: 7.8 MMOL/L (ref 5–15)
BASOPHILS # BLD AUTO: 0.02 10*3/MM3 (ref 0–0.2)
BASOPHILS NFR BLD AUTO: 0.5 % (ref 0–1.5)
BUN SERPL-MCNC: 12 MG/DL (ref 8–23)
BUN/CREAT SERPL: 13.6 (ref 7–25)
CALCIUM SPEC-SCNC: 8.2 MG/DL (ref 8.6–10.5)
CHLORIDE SERPL-SCNC: 104 MMOL/L (ref 98–107)
CO2 SERPL-SCNC: 20.2 MMOL/L (ref 22–29)
CREAT SERPL-MCNC: 0.88 MG/DL (ref 0.57–1)
DEPRECATED RDW RBC AUTO: 50.4 FL (ref 37–54)
EGFRCR SERPLBLD CKD-EPI 2021: 71.7 ML/MIN/1.73
EOSINOPHIL # BLD AUTO: 0.22 10*3/MM3 (ref 0–0.4)
EOSINOPHIL NFR BLD AUTO: 5.5 % (ref 0.3–6.2)
ERYTHROCYTE [DISTWIDTH] IN BLOOD BY AUTOMATED COUNT: 16.6 % (ref 12.3–15.4)
GLUCOSE BLDC GLUCOMTR-MCNC: 161 MG/DL (ref 70–130)
GLUCOSE BLDC GLUCOMTR-MCNC: 178 MG/DL (ref 70–130)
GLUCOSE BLDC GLUCOMTR-MCNC: 228 MG/DL (ref 70–130)
GLUCOSE BLDC GLUCOMTR-MCNC: 278 MG/DL (ref 70–130)
GLUCOSE SERPL-MCNC: 182 MG/DL (ref 65–99)
HCT VFR BLD AUTO: 25 % (ref 34–46.6)
HGB BLD-MCNC: 7.8 G/DL (ref 12–15.9)
IMM GRANULOCYTES # BLD AUTO: 0.01 10*3/MM3 (ref 0–0.05)
IMM GRANULOCYTES NFR BLD AUTO: 0.3 % (ref 0–0.5)
LYMPHOCYTES # BLD AUTO: 0.8 10*3/MM3 (ref 0.7–3.1)
LYMPHOCYTES NFR BLD AUTO: 20 % (ref 19.6–45.3)
MCH RBC QN AUTO: 26 PG (ref 26.6–33)
MCHC RBC AUTO-ENTMCNC: 31.2 G/DL (ref 31.5–35.7)
MCV RBC AUTO: 83.3 FL (ref 79–97)
MONOCYTES # BLD AUTO: 0.59 10*3/MM3 (ref 0.1–0.9)
MONOCYTES NFR BLD AUTO: 14.8 % (ref 5–12)
NEUTROPHILS NFR BLD AUTO: 2.36 10*3/MM3 (ref 1.7–7)
NEUTROPHILS NFR BLD AUTO: 58.9 % (ref 42.7–76)
NRBC BLD AUTO-RTO: 0 /100 WBC (ref 0–0.2)
PLATELET # BLD AUTO: 124 10*3/MM3 (ref 140–450)
PMV BLD AUTO: 10.3 FL (ref 6–12)
POTASSIUM SERPL-SCNC: 4.6 MMOL/L (ref 3.5–5.2)
RBC # BLD AUTO: 3 10*6/MM3 (ref 3.77–5.28)
SODIUM SERPL-SCNC: 132 MMOL/L (ref 136–145)
WBC NRBC COR # BLD: 4 10*3/MM3 (ref 3.4–10.8)

## 2022-04-29 PROCEDURE — G0378 HOSPITAL OBSERVATION PER HR: HCPCS

## 2022-04-29 PROCEDURE — 25010000002 NA FERRIC GLUC CPLX PER 12.5 MG: Performed by: HOSPITALIST

## 2022-04-29 PROCEDURE — 85025 COMPLETE CBC W/AUTO DIFF WBC: CPT | Performed by: HOSPITALIST

## 2022-04-29 PROCEDURE — 80048 BASIC METABOLIC PNL TOTAL CA: CPT | Performed by: HOSPITALIST

## 2022-04-29 PROCEDURE — 63710000001 INSULIN LISPRO (HUMAN) PER 5 UNITS: Performed by: INTERNAL MEDICINE

## 2022-04-29 PROCEDURE — 82962 GLUCOSE BLOOD TEST: CPT

## 2022-04-29 PROCEDURE — 25010000002 DAPTOMYCIN PER 1 MG: Performed by: ORTHOPAEDIC SURGERY

## 2022-04-29 RX ORDER — GABAPENTIN 600 MG/1
600 TABLET ORAL 2 TIMES DAILY
Qty: 40 TABLET | Refills: 0 | Status: ON HOLD | OUTPATIENT
Start: 2022-04-29 | End: 2022-06-10 | Stop reason: SDUPTHER

## 2022-04-29 RX ORDER — OXYCODONE HYDROCHLORIDE AND ACETAMINOPHEN 5; 325 MG/1; MG/1
1 TABLET ORAL EVERY 4 HOURS PRN
Qty: 50 TABLET | Refills: 0 | Status: ON HOLD | OUTPATIENT
Start: 2022-04-29 | End: 2022-06-10 | Stop reason: SDUPTHER

## 2022-04-29 RX ADMIN — OXYCODONE HYDROCHLORIDE AND ACETAMINOPHEN 2 TABLET: 5; 325 TABLET ORAL at 12:27

## 2022-04-29 RX ADMIN — OXYCODONE HYDROCHLORIDE AND ACETAMINOPHEN 2 TABLET: 5; 325 TABLET ORAL at 16:15

## 2022-04-29 RX ADMIN — GABAPENTIN 600 MG: 300 CAPSULE ORAL at 09:52

## 2022-04-29 RX ADMIN — INSULIN LISPRO 6 UNITS: 100 INJECTION, SOLUTION INTRAVENOUS; SUBCUTANEOUS at 12:27

## 2022-04-29 RX ADMIN — MELOXICAM 15 MG: 15 TABLET ORAL at 09:52

## 2022-04-29 RX ADMIN — METOPROLOL TARTRATE 25 MG: 25 TABLET, FILM COATED ORAL at 20:33

## 2022-04-29 RX ADMIN — SODIUM CHLORIDE 250 MG: 9 INJECTION, SOLUTION INTRAVENOUS at 16:12

## 2022-04-29 RX ADMIN — OXYCODONE HYDROCHLORIDE AND ACETAMINOPHEN 2 TABLET: 5; 325 TABLET ORAL at 20:33

## 2022-04-29 RX ADMIN — DAPTOMYCIN 750 MG: 500 INJECTION, POWDER, LYOPHILIZED, FOR SOLUTION INTRAVENOUS at 11:19

## 2022-04-29 RX ADMIN — ASPIRIN 81 MG: 81 TABLET, COATED ORAL at 20:32

## 2022-04-29 RX ADMIN — ASPIRIN 81 MG: 81 TABLET, COATED ORAL at 09:52

## 2022-04-29 RX ADMIN — GABAPENTIN 600 MG: 300 CAPSULE ORAL at 20:32

## 2022-04-29 RX ADMIN — OXYCODONE HYDROCHLORIDE AND ACETAMINOPHEN 2 TABLET: 5; 325 TABLET ORAL at 01:00

## 2022-04-29 RX ADMIN — OXYCODONE HYDROCHLORIDE AND ACETAMINOPHEN 2 TABLET: 5; 325 TABLET ORAL at 06:29

## 2022-04-29 RX ADMIN — INSULIN LISPRO 2 UNITS: 100 INJECTION, SOLUTION INTRAVENOUS; SUBCUTANEOUS at 09:52

## 2022-04-29 RX ADMIN — INSULIN LISPRO 4 UNITS: 100 INJECTION, SOLUTION INTRAVENOUS; SUBCUTANEOUS at 16:56

## 2022-04-29 RX ADMIN — FAMOTIDINE 40 MG: 20 TABLET ORAL at 09:52

## 2022-04-29 RX ADMIN — INSULIN GLARGINE-YFGN 55 UNITS: 100 INJECTION, SOLUTION SUBCUTANEOUS at 22:11

## 2022-04-30 LAB
GLUCOSE BLDC GLUCOMTR-MCNC: 165 MG/DL (ref 70–130)
GLUCOSE BLDC GLUCOMTR-MCNC: 167 MG/DL (ref 70–130)
GLUCOSE BLDC GLUCOMTR-MCNC: 168 MG/DL (ref 70–130)
GLUCOSE BLDC GLUCOMTR-MCNC: 181 MG/DL (ref 70–130)
HCT VFR BLD AUTO: 25.4 % (ref 34–46.6)
HGB BLD-MCNC: 7.9 G/DL (ref 12–15.9)

## 2022-04-30 PROCEDURE — 85014 HEMATOCRIT: CPT | Performed by: ORTHOPAEDIC SURGERY

## 2022-04-30 PROCEDURE — 25010000002 DAPTOMYCIN PER 1 MG: Performed by: ORTHOPAEDIC SURGERY

## 2022-04-30 PROCEDURE — 82962 GLUCOSE BLOOD TEST: CPT

## 2022-04-30 PROCEDURE — 85018 HEMOGLOBIN: CPT | Performed by: ORTHOPAEDIC SURGERY

## 2022-04-30 PROCEDURE — 25010000002 ONDANSETRON PER 1 MG: Performed by: ORTHOPAEDIC SURGERY

## 2022-04-30 PROCEDURE — 63710000001 INSULIN LISPRO (HUMAN) PER 5 UNITS: Performed by: INTERNAL MEDICINE

## 2022-04-30 PROCEDURE — G0378 HOSPITAL OBSERVATION PER HR: HCPCS

## 2022-04-30 RX ADMIN — DAPTOMYCIN 750 MG: 500 INJECTION, POWDER, LYOPHILIZED, FOR SOLUTION INTRAVENOUS at 10:54

## 2022-04-30 RX ADMIN — ONDANSETRON 4 MG: 2 INJECTION INTRAMUSCULAR; INTRAVENOUS at 06:45

## 2022-04-30 RX ADMIN — INSULIN GLARGINE-YFGN 55 UNITS: 100 INJECTION, SOLUTION SUBCUTANEOUS at 21:49

## 2022-04-30 RX ADMIN — OXYCODONE HYDROCHLORIDE AND ACETAMINOPHEN 2 TABLET: 5; 325 TABLET ORAL at 00:35

## 2022-04-30 RX ADMIN — INSULIN LISPRO 2 UNITS: 100 INJECTION, SOLUTION INTRAVENOUS; SUBCUTANEOUS at 12:06

## 2022-04-30 RX ADMIN — METOPROLOL TARTRATE 25 MG: 25 TABLET, FILM COATED ORAL at 21:36

## 2022-04-30 RX ADMIN — ASPIRIN 81 MG: 81 TABLET, COATED ORAL at 08:32

## 2022-04-30 RX ADMIN — OXYCODONE HYDROCHLORIDE AND ACETAMINOPHEN 2 TABLET: 5; 325 TABLET ORAL at 21:36

## 2022-04-30 RX ADMIN — MELOXICAM 15 MG: 15 TABLET ORAL at 08:31

## 2022-04-30 RX ADMIN — INSULIN LISPRO 2 UNITS: 100 INJECTION, SOLUTION INTRAVENOUS; SUBCUTANEOUS at 08:31

## 2022-04-30 RX ADMIN — OXYCODONE HYDROCHLORIDE AND ACETAMINOPHEN 2 TABLET: 5; 325 TABLET ORAL at 06:25

## 2022-04-30 RX ADMIN — ASPIRIN 81 MG: 81 TABLET, COATED ORAL at 21:36

## 2022-04-30 RX ADMIN — OXYCODONE HYDROCHLORIDE AND ACETAMINOPHEN 2 TABLET: 5; 325 TABLET ORAL at 16:17

## 2022-04-30 RX ADMIN — OXYCODONE HYDROCHLORIDE AND ACETAMINOPHEN 1 TABLET: 5; 325 TABLET ORAL at 12:13

## 2022-04-30 RX ADMIN — FAMOTIDINE 40 MG: 20 TABLET ORAL at 08:31

## 2022-04-30 RX ADMIN — INSULIN LISPRO 2 UNITS: 100 INJECTION, SOLUTION INTRAVENOUS; SUBCUTANEOUS at 18:58

## 2022-04-30 RX ADMIN — GABAPENTIN 600 MG: 300 CAPSULE ORAL at 21:36

## 2022-04-30 RX ADMIN — GABAPENTIN 600 MG: 300 CAPSULE ORAL at 08:31

## 2022-05-01 LAB
DEPRECATED RDW RBC AUTO: 49.6 FL (ref 37–54)
ERYTHROCYTE [DISTWIDTH] IN BLOOD BY AUTOMATED COUNT: 16.6 % (ref 12.3–15.4)
GLUCOSE BLDC GLUCOMTR-MCNC: 139 MG/DL (ref 70–130)
GLUCOSE BLDC GLUCOMTR-MCNC: 157 MG/DL (ref 70–130)
GLUCOSE BLDC GLUCOMTR-MCNC: 166 MG/DL (ref 70–130)
GLUCOSE BLDC GLUCOMTR-MCNC: 212 MG/DL (ref 70–130)
HCT VFR BLD AUTO: 25.7 % (ref 34–46.6)
HCT VFR BLD AUTO: 26.4 % (ref 34–46.6)
HGB BLD-MCNC: 8.2 G/DL (ref 12–15.9)
HGB BLD-MCNC: 8.3 G/DL (ref 12–15.9)
MCH RBC QN AUTO: 26.5 PG (ref 26.6–33)
MCHC RBC AUTO-ENTMCNC: 32.3 G/DL (ref 31.5–35.7)
MCV RBC AUTO: 82.1 FL (ref 79–97)
PLATELET # BLD AUTO: 142 10*3/MM3 (ref 140–450)
PMV BLD AUTO: 10.2 FL (ref 6–12)
RBC # BLD AUTO: 3.13 10*6/MM3 (ref 3.77–5.28)
SARS-COV-2 RNA RESP QL NAA+PROBE: NOT DETECTED
WBC NRBC COR # BLD: 4.14 10*3/MM3 (ref 3.4–10.8)

## 2022-05-01 PROCEDURE — 85027 COMPLETE CBC AUTOMATED: CPT | Performed by: INTERNAL MEDICINE

## 2022-05-01 PROCEDURE — 85018 HEMOGLOBIN: CPT | Performed by: ORTHOPAEDIC SURGERY

## 2022-05-01 PROCEDURE — 25010000002 DAPTOMYCIN PER 1 MG: Performed by: ORTHOPAEDIC SURGERY

## 2022-05-01 PROCEDURE — 82962 GLUCOSE BLOOD TEST: CPT

## 2022-05-01 PROCEDURE — 85014 HEMATOCRIT: CPT | Performed by: ORTHOPAEDIC SURGERY

## 2022-05-01 PROCEDURE — U0003 INFECTIOUS AGENT DETECTION BY NUCLEIC ACID (DNA OR RNA); SEVERE ACUTE RESPIRATORY SYNDROME CORONAVIRUS 2 (SARS-COV-2) (CORONAVIRUS DISEASE [COVID-19]), AMPLIFIED PROBE TECHNIQUE, MAKING USE OF HIGH THROUGHPUT TECHNOLOGIES AS DESCRIBED BY CMS-2020-01-R: HCPCS | Performed by: ORTHOPAEDIC SURGERY

## 2022-05-01 PROCEDURE — 25010000002 ONDANSETRON PER 1 MG: Performed by: ORTHOPAEDIC SURGERY

## 2022-05-01 PROCEDURE — 63710000001 INSULIN LISPRO (HUMAN) PER 5 UNITS: Performed by: INTERNAL MEDICINE

## 2022-05-01 RX ADMIN — ONDANSETRON 4 MG: 2 INJECTION INTRAMUSCULAR; INTRAVENOUS at 10:02

## 2022-05-01 RX ADMIN — FAMOTIDINE 40 MG: 20 TABLET ORAL at 08:36

## 2022-05-01 RX ADMIN — OXYCODONE HYDROCHLORIDE AND ACETAMINOPHEN 2 TABLET: 5; 325 TABLET ORAL at 16:52

## 2022-05-01 RX ADMIN — MELOXICAM 15 MG: 15 TABLET ORAL at 08:36

## 2022-05-01 RX ADMIN — GABAPENTIN 600 MG: 300 CAPSULE ORAL at 20:57

## 2022-05-01 RX ADMIN — ASPIRIN 81 MG: 81 TABLET, COATED ORAL at 20:57

## 2022-05-01 RX ADMIN — OXYCODONE HYDROCHLORIDE AND ACETAMINOPHEN 2 TABLET: 5; 325 TABLET ORAL at 12:43

## 2022-05-01 RX ADMIN — INSULIN LISPRO 2 UNITS: 100 INJECTION, SOLUTION INTRAVENOUS; SUBCUTANEOUS at 17:34

## 2022-05-01 RX ADMIN — DAPTOMYCIN 750 MG: 500 INJECTION, POWDER, LYOPHILIZED, FOR SOLUTION INTRAVENOUS at 09:57

## 2022-05-01 RX ADMIN — OXYCODONE HYDROCHLORIDE AND ACETAMINOPHEN 2 TABLET: 5; 325 TABLET ORAL at 08:36

## 2022-05-01 RX ADMIN — ASPIRIN 81 MG: 81 TABLET, COATED ORAL at 08:36

## 2022-05-01 RX ADMIN — INSULIN GLARGINE-YFGN 55 UNITS: 100 INJECTION, SOLUTION SUBCUTANEOUS at 20:57

## 2022-05-01 RX ADMIN — METOPROLOL TARTRATE 25 MG: 25 TABLET, FILM COATED ORAL at 20:57

## 2022-05-01 RX ADMIN — GABAPENTIN 600 MG: 300 CAPSULE ORAL at 08:36

## 2022-05-01 RX ADMIN — INSULIN LISPRO 4 UNITS: 100 INJECTION, SOLUTION INTRAVENOUS; SUBCUTANEOUS at 12:43

## 2022-05-01 RX ADMIN — OXYCODONE HYDROCHLORIDE AND ACETAMINOPHEN 1 TABLET: 5; 325 TABLET ORAL at 02:13

## 2022-05-01 RX ADMIN — OXYCODONE HYDROCHLORIDE AND ACETAMINOPHEN 2 TABLET: 5; 325 TABLET ORAL at 21:01

## 2022-05-01 NOTE — PROGRESS NOTES
Name: Stephy Duncan ADMIT: 2022   : 1955  PCP: Pierre Rosalesin    MRN: [id number] LOS: 0 days   AGE/SEX: 66 y.o. female  ROOM: [facility]   Subjective   No chief complaint on file.     Reporting some nausea at times but no vomiting.  Not reporting any abdominal pain and is having bowel movements.  No chest pain palpitations shortness of breath reported.  No dysuria.  Has been having fevers.  Planned debridement tomorrow.    Objective   Vital Signs  Temp:  [98.3 °F (36.8 °C)-101.7 °F (38.7 °C)] 98.5 °F (36.9 °C)  Heart Rate:  [70-87] 70  Resp:  [16-18] 16  BP: (124-147)/(58-63) 128/60  SpO2:  [94 %-98 %] 96 %  on   ;   Device (Oxygen Therapy): room air  Body mass index is 38.98 kg/m².    Physical Exam  Vitals and nursing note reviewed.   Constitutional:       General: She is not in acute distress.     Appearance: She is not diaphoretic.   HENT:      Head: Normocephalic and atraumatic.   Eyes:      General:         Right eye: No discharge.         Left eye: No discharge.      Conjunctiva/sclera: Conjunctivae normal.   Cardiovascular:      Rate and Rhythm: Normal rate and regular rhythm.      Heart sounds: Murmur heard.   Pulmonary:      Effort: Pulmonary effort is normal.      Breath sounds: No wheezing.   Abdominal:      General: There is no distension.      Palpations: Abdomen is soft.      Tenderness: There is no abdominal tenderness. There is no guarding or rebound.   Musculoskeletal:         General: Swelling and tenderness present.      Cervical back: Neck supple. No tenderness.   Skin:     General: Skin is warm and dry.   Neurological:      Mental Status: She is alert. Mental status is at baseline.   Psychiatric:         Mood and Affect: Mood normal.         Behavior: Behavior normal.         Results Review:       I reviewed the patient's new clinical results.      Results from last 7 days   Lab Units 22  0356 22  0445 22  0500 22  0538 22  2158 22  0514  04/26/22  1601   WBC 10*3/mm3  --   --  4.00 4.31  --  4.79 6.66   HEMOGLOBIN g/dL 8.2* 7.9* 7.8* 7.8*  --  7.8* 7.3*   PLATELETS 10*3/mm3  --   --  124* 101* 99* 71*  71* 111*     Results from last 7 days   Lab Units 04/29/22  0500 04/27/22  0511 04/25/22  2236   SODIUM mmol/L 132* 130* 133*   POTASSIUM mmol/L 4.6 4.6 4.8   CHLORIDE mmol/L 104 101 101   CO2 mmol/L 20.2* 20.2* 21.8*   BUN mg/dL 12 12 15   CREATININE mg/dL 0.88 0.94 0.95   GLUCOSE mg/dL 182* 251* 256*   Estimated Creatinine Clearance: 71.5 mL/min (by C-G formula based on SCr of 0.88 mg/dL).  Results from last 7 days   Lab Units 04/29/22  0500 04/27/22  0511 04/25/22  2236   CALCIUM mg/dL 8.2* 7.5* 8.2*     Results from last 7 days   Lab Units 04/26/22  1601   INR  1.19*   APTT seconds 22.7        aspirin, 81 mg, Oral, Q12H  DAPTOmycin, 10 mg/kg (Adjusted), Intravenous, Q24H  famotidine, 40 mg, Oral, Daily  gabapentin, 600 mg, Oral, Q12H  insulin glargine, 55 Units, Subcutaneous, Nightly  insulin lispro, 0-9 Units, Subcutaneous, TID AC  meloxicam, 15 mg, Oral, Daily  metoprolol tartrate, 25 mg, Oral, Nightly      lactated ringers, 9 mL/hr, Last Rate: 0  (04/25/22 1434)    Diet Regular; Consistent Carbohydrate  NPO Diet NPO Type: Strict NPO    Assessment/Plan      Active Hospital Problems    Diagnosis  POA   • **S/P Girdlestone procedure [Z98.890]  Not Applicable   • Septic arthritis of hip (HCC) [M00.9]  Yes   • Morbid obesity (HCC) [E66.01]  Yes   • Chronic diastolic CHF (congestive heart failure) (HCC) [I50.32]  Yes   • 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       · Status post Girdlestone procedure, septic arthritis, wound dehiscence: Is having fever with planned source control tomorrow.  On  daptomycin for VRE hx. Orthopedic surgery primary. I see from their note that she is current with ID, Dr Galan. Has seen Dr Ponce here in the past if ID evaluation is needed. Defer abx to orthopedic surgery.  · DM2: A1c 6.2. She is on close to her home long acting dose but has not required much premeal here. Will continue the Lantus and SSI. Monitor requirements.  · HTN: Acceptable acutely. Continue current regimen.  · Anemia: YANIRA and chronic disease. Transfuse as needed.  · Urinary Retention: Cath prn.  · Obesity    Will continue to follow. Please call with any questions or concerns.    Carlos Reyna MD  [facility]  05/01/22  09:30 EDT    Dictated portions using Dragon dictation software.    During the entire encounter, I was wearing recommended PPE including face mask and eye protection. Hand sanitization was performed prior to entering room and upon exit.

## 2022-05-01 NOTE — PLAN OF CARE
Goal Outcome Evaluation:  Plan of Care Reviewed With: patient        Progress: no change  Outcome Evaluation: VSS, RA, SL, voiding per purewick tonight, wound dressing changed as ordered, BM 4/30, pain tolerable, educated on BP and glucose monitoring, plans for OR again on Monday 5/2, rehab pending, will continue to monitor

## 2022-05-01 NOTE — PLAN OF CARE
Goal Outcome Evaluation:  Plan of Care Reviewed With: patient        Progress: no change  Outcome Evaluation: vss, nvi, dressing changed as ordered, purewick in place to decrease movement, BM x2 this shift, pain controlled with PO, NPO after MN for washout and wound closure tomorrow, SNF pending-precert, educated on glucose meds and monitoring

## 2022-05-02 ENCOUNTER — ANESTHESIA EVENT (OUTPATIENT)
Dept: PERIOP | Facility: HOSPITAL | Age: 67
End: 2022-05-02

## 2022-05-02 ENCOUNTER — ANESTHESIA (OUTPATIENT)
Dept: PERIOP | Facility: HOSPITAL | Age: 67
End: 2022-05-02

## 2022-05-02 LAB
ANION GAP SERPL CALCULATED.3IONS-SCNC: 8 MMOL/L (ref 5–15)
BACTERIA SPEC AEROBE CULT: NORMAL
BACTERIA SPEC AEROBE CULT: NORMAL
BASOPHILS # BLD AUTO: 0.02 10*3/MM3 (ref 0–0.2)
BASOPHILS NFR BLD AUTO: 0.4 % (ref 0–1.5)
BUN SERPL-MCNC: 10 MG/DL (ref 8–23)
BUN/CREAT SERPL: 11.2 (ref 7–25)
CALCIUM SPEC-SCNC: 7.9 MG/DL (ref 8.6–10.5)
CHLORIDE SERPL-SCNC: 103 MMOL/L (ref 98–107)
CO2 SERPL-SCNC: 21 MMOL/L (ref 22–29)
CREAT SERPL-MCNC: 0.89 MG/DL (ref 0.57–1)
DEPRECATED RDW RBC AUTO: 50.6 FL (ref 37–54)
EGFRCR SERPLBLD CKD-EPI 2021: 70.7 ML/MIN/1.73
EOSINOPHIL # BLD AUTO: 0.26 10*3/MM3 (ref 0–0.4)
EOSINOPHIL NFR BLD AUTO: 5.5 % (ref 0.3–6.2)
ERYTHROCYTE [DISTWIDTH] IN BLOOD BY AUTOMATED COUNT: 17.4 % (ref 12.3–15.4)
GLUCOSE BLDC GLUCOMTR-MCNC: 112 MG/DL (ref 70–130)
GLUCOSE BLDC GLUCOMTR-MCNC: 117 MG/DL (ref 70–130)
GLUCOSE BLDC GLUCOMTR-MCNC: 142 MG/DL (ref 70–130)
GLUCOSE BLDC GLUCOMTR-MCNC: 88 MG/DL (ref 70–130)
GLUCOSE BLDC GLUCOMTR-MCNC: 92 MG/DL (ref 70–130)
GLUCOSE SERPL-MCNC: 111 MG/DL (ref 65–99)
HCT VFR BLD AUTO: 26.2 % (ref 34–46.6)
HGB BLD-MCNC: 8.4 G/DL (ref 12–15.9)
IMM GRANULOCYTES # BLD AUTO: 0.07 10*3/MM3 (ref 0–0.05)
IMM GRANULOCYTES NFR BLD AUTO: 1.5 % (ref 0–0.5)
LYMPHOCYTES # BLD AUTO: 0.85 10*3/MM3 (ref 0.7–3.1)
LYMPHOCYTES NFR BLD AUTO: 17.9 % (ref 19.6–45.3)
MCH RBC QN AUTO: 25.9 PG (ref 26.6–33)
MCHC RBC AUTO-ENTMCNC: 32.1 G/DL (ref 31.5–35.7)
MCV RBC AUTO: 80.9 FL (ref 79–97)
MONOCYTES # BLD AUTO: 0.78 10*3/MM3 (ref 0.1–0.9)
MONOCYTES NFR BLD AUTO: 16.4 % (ref 5–12)
NEUTROPHILS NFR BLD AUTO: 2.77 10*3/MM3 (ref 1.7–7)
NEUTROPHILS NFR BLD AUTO: 58.3 % (ref 42.7–76)
NRBC BLD AUTO-RTO: 0 /100 WBC (ref 0–0.2)
PLATELET # BLD AUTO: 157 10*3/MM3 (ref 140–450)
PMV BLD AUTO: 9.8 FL (ref 6–12)
POTASSIUM SERPL-SCNC: 4.4 MMOL/L (ref 3.5–5.2)
RBC # BLD AUTO: 3.24 10*6/MM3 (ref 3.77–5.28)
SODIUM SERPL-SCNC: 132 MMOL/L (ref 136–145)
WBC NRBC COR # BLD: 4.75 10*3/MM3 (ref 3.4–10.8)

## 2022-05-02 PROCEDURE — 82962 GLUCOSE BLOOD TEST: CPT

## 2022-05-02 PROCEDURE — 25010000002 HYDROMORPHONE PER 4 MG: Performed by: NURSE ANESTHETIST, CERTIFIED REGISTERED

## 2022-05-02 PROCEDURE — 25010000002 PHENYLEPHRINE 10 MG/ML SOLUTION: Performed by: NURSE ANESTHETIST, CERTIFIED REGISTERED

## 2022-05-02 PROCEDURE — 0JQL0ZZ REPAIR RIGHT UPPER LEG SUBCUTANEOUS TISSUE AND FASCIA, OPEN APPROACH: ICD-10-PCS | Performed by: ORTHOPAEDIC SURGERY

## 2022-05-02 PROCEDURE — 85025 COMPLETE CBC W/AUTO DIFF WBC: CPT | Performed by: INTERNAL MEDICINE

## 2022-05-02 PROCEDURE — 25010000002 FENTANYL CITRATE (PF) 50 MCG/ML SOLUTION: Performed by: ANESTHESIOLOGY

## 2022-05-02 PROCEDURE — 80048 BASIC METABOLIC PNL TOTAL CA: CPT | Performed by: INTERNAL MEDICINE

## 2022-05-02 PROCEDURE — 25010000002 DAPTOMYCIN PER 1 MG: Performed by: ORTHOPAEDIC SURGERY

## 2022-05-02 PROCEDURE — 25010000002 ONDANSETRON PER 1 MG: Performed by: ANESTHESIOLOGY

## 2022-05-02 PROCEDURE — 25010000002 FENTANYL CITRATE (PF) 50 MCG/ML SOLUTION: Performed by: NURSE ANESTHETIST, CERTIFIED REGISTERED

## 2022-05-02 PROCEDURE — 25010000002 HYDROMORPHONE 1 MG/ML SOLUTION: Performed by: ORTHOPAEDIC SURGERY

## 2022-05-02 PROCEDURE — 25010000002 NEOSTIGMINE 5 MG/10ML SOLUTION: Performed by: ANESTHESIOLOGY

## 2022-05-02 PROCEDURE — 25010000002 MIDAZOLAM PER 1 MG: Performed by: ANESTHESIOLOGY

## 2022-05-02 PROCEDURE — 25010000002 PROPOFOL 10 MG/ML EMULSION: Performed by: NURSE ANESTHETIST, CERTIFIED REGISTERED

## 2022-05-02 RX ORDER — ROCURONIUM BROMIDE 10 MG/ML
INJECTION, SOLUTION INTRAVENOUS AS NEEDED
Status: DISCONTINUED | OUTPATIENT
Start: 2022-05-02 | End: 2022-05-02 | Stop reason: SURG

## 2022-05-02 RX ORDER — LABETALOL HYDROCHLORIDE 5 MG/ML
5 INJECTION, SOLUTION INTRAVENOUS
Status: DISCONTINUED | OUTPATIENT
Start: 2022-05-02 | End: 2022-05-02

## 2022-05-02 RX ORDER — IPRATROPIUM BROMIDE AND ALBUTEROL SULFATE 2.5; .5 MG/3ML; MG/3ML
3 SOLUTION RESPIRATORY (INHALATION) ONCE AS NEEDED
Status: DISCONTINUED | OUTPATIENT
Start: 2022-05-02 | End: 2022-05-02

## 2022-05-02 RX ORDER — OXYCODONE HYDROCHLORIDE AND ACETAMINOPHEN 5; 325 MG/1; MG/1
1 TABLET ORAL EVERY 4 HOURS PRN
Status: DISCONTINUED | OUTPATIENT
Start: 2022-05-02 | End: 2022-05-08 | Stop reason: HOSPADM

## 2022-05-02 RX ORDER — SCOLOPAMINE TRANSDERMAL SYSTEM 1 MG/1
1 PATCH, EXTENDED RELEASE TRANSDERMAL ONCE
Status: DISCONTINUED | OUTPATIENT
Start: 2022-05-02 | End: 2022-05-02 | Stop reason: HOSPADM

## 2022-05-02 RX ORDER — NALOXONE HCL 0.4 MG/ML
0.2 VIAL (ML) INJECTION AS NEEDED
Status: DISCONTINUED | OUTPATIENT
Start: 2022-05-02 | End: 2022-05-02

## 2022-05-02 RX ORDER — PHENYLEPHRINE HYDROCHLORIDE 10 MG/ML
INJECTION INTRAVENOUS AS NEEDED
Status: DISCONTINUED | OUTPATIENT
Start: 2022-05-02 | End: 2022-05-02 | Stop reason: SURG

## 2022-05-02 RX ORDER — OXYCODONE AND ACETAMINOPHEN 7.5; 325 MG/1; MG/1
1 TABLET ORAL EVERY 4 HOURS PRN
Status: DISCONTINUED | OUTPATIENT
Start: 2022-05-02 | End: 2022-05-02

## 2022-05-02 RX ORDER — NEOSTIGMINE METHYLSULFATE 0.5 MG/ML
INJECTION, SOLUTION INTRAVENOUS AS NEEDED
Status: DISCONTINUED | OUTPATIENT
Start: 2022-05-02 | End: 2022-05-02 | Stop reason: SURG

## 2022-05-02 RX ORDER — FAMOTIDINE 10 MG/ML
20 INJECTION, SOLUTION INTRAVENOUS ONCE
Status: COMPLETED | OUTPATIENT
Start: 2022-05-02 | End: 2022-05-02

## 2022-05-02 RX ORDER — HYDRALAZINE HYDROCHLORIDE 20 MG/ML
5 INJECTION INTRAMUSCULAR; INTRAVENOUS
Status: DISCONTINUED | OUTPATIENT
Start: 2022-05-02 | End: 2022-05-02

## 2022-05-02 RX ORDER — SODIUM CHLORIDE, SODIUM LACTATE, POTASSIUM CHLORIDE, CALCIUM CHLORIDE 600; 310; 30; 20 MG/100ML; MG/100ML; MG/100ML; MG/100ML
INJECTION, SOLUTION INTRAVENOUS CONTINUOUS PRN
Status: DISCONTINUED | OUTPATIENT
Start: 2022-05-02 | End: 2022-05-02 | Stop reason: SURG

## 2022-05-02 RX ORDER — FENTANYL CITRATE 50 UG/ML
50 INJECTION, SOLUTION INTRAMUSCULAR; INTRAVENOUS
Status: DISCONTINUED | OUTPATIENT
Start: 2022-05-02 | End: 2022-05-02

## 2022-05-02 RX ORDER — SODIUM CHLORIDE 0.9 % (FLUSH) 0.9 %
3 SYRINGE (ML) INJECTION EVERY 12 HOURS SCHEDULED
Status: DISCONTINUED | OUTPATIENT
Start: 2022-05-02 | End: 2022-05-02 | Stop reason: HOSPADM

## 2022-05-02 RX ORDER — PROMETHAZINE HYDROCHLORIDE 25 MG/1
25 SUPPOSITORY RECTAL ONCE AS NEEDED
Status: DISCONTINUED | OUTPATIENT
Start: 2022-05-02 | End: 2022-05-02

## 2022-05-02 RX ORDER — DIPHENHYDRAMINE HYDROCHLORIDE 50 MG/ML
12.5 INJECTION INTRAMUSCULAR; INTRAVENOUS
Status: DISCONTINUED | OUTPATIENT
Start: 2022-05-02 | End: 2022-05-02

## 2022-05-02 RX ORDER — PROMETHAZINE HYDROCHLORIDE 25 MG/1
25 TABLET ORAL ONCE AS NEEDED
Status: DISCONTINUED | OUTPATIENT
Start: 2022-05-02 | End: 2022-05-02

## 2022-05-02 RX ORDER — HYDROCODONE BITARTRATE AND ACETAMINOPHEN 7.5; 325 MG/1; MG/1
1 TABLET ORAL ONCE AS NEEDED
Status: DISCONTINUED | OUTPATIENT
Start: 2022-05-02 | End: 2022-05-02

## 2022-05-02 RX ORDER — LIDOCAINE HYDROCHLORIDE 20 MG/ML
INJECTION, SOLUTION INFILTRATION; PERINEURAL AS NEEDED
Status: DISCONTINUED | OUTPATIENT
Start: 2022-05-02 | End: 2022-05-02 | Stop reason: SURG

## 2022-05-02 RX ORDER — EPHEDRINE SULFATE 50 MG/ML
5 INJECTION, SOLUTION INTRAVENOUS ONCE AS NEEDED
Status: DISCONTINUED | OUTPATIENT
Start: 2022-05-02 | End: 2022-05-02

## 2022-05-02 RX ORDER — FENTANYL CITRATE 50 UG/ML
50 INJECTION, SOLUTION INTRAMUSCULAR; INTRAVENOUS
Status: DISCONTINUED | OUTPATIENT
Start: 2022-05-02 | End: 2022-05-02 | Stop reason: HOSPADM

## 2022-05-02 RX ORDER — OXYCODONE HYDROCHLORIDE AND ACETAMINOPHEN 5; 325 MG/1; MG/1
2 TABLET ORAL EVERY 4 HOURS PRN
Status: DISCONTINUED | OUTPATIENT
Start: 2022-05-02 | End: 2022-05-08 | Stop reason: HOSPADM

## 2022-05-02 RX ORDER — MAGNESIUM HYDROXIDE 1200 MG/15ML
LIQUID ORAL AS NEEDED
Status: DISCONTINUED | OUTPATIENT
Start: 2022-05-02 | End: 2022-05-02 | Stop reason: HOSPADM

## 2022-05-02 RX ORDER — DIPHENHYDRAMINE HCL 25 MG
25 CAPSULE ORAL
Status: DISCONTINUED | OUTPATIENT
Start: 2022-05-02 | End: 2022-05-02

## 2022-05-02 RX ORDER — GLYCOPYRROLATE 0.2 MG/ML
INJECTION INTRAMUSCULAR; INTRAVENOUS AS NEEDED
Status: DISCONTINUED | OUTPATIENT
Start: 2022-05-02 | End: 2022-05-02 | Stop reason: SURG

## 2022-05-02 RX ORDER — LIDOCAINE HYDROCHLORIDE 10 MG/ML
0.5 INJECTION, SOLUTION EPIDURAL; INFILTRATION; INTRACAUDAL; PERINEURAL ONCE AS NEEDED
Status: DISCONTINUED | OUTPATIENT
Start: 2022-05-02 | End: 2022-05-02 | Stop reason: HOSPADM

## 2022-05-02 RX ORDER — PROPOFOL 10 MG/ML
VIAL (ML) INTRAVENOUS AS NEEDED
Status: DISCONTINUED | OUTPATIENT
Start: 2022-05-02 | End: 2022-05-02 | Stop reason: SURG

## 2022-05-02 RX ORDER — ONDANSETRON 2 MG/ML
4 INJECTION INTRAMUSCULAR; INTRAVENOUS ONCE AS NEEDED
Status: DISCONTINUED | OUTPATIENT
Start: 2022-05-02 | End: 2022-05-02

## 2022-05-02 RX ORDER — MIDAZOLAM HYDROCHLORIDE 1 MG/ML
0.5 INJECTION INTRAMUSCULAR; INTRAVENOUS
Status: DISCONTINUED | OUTPATIENT
Start: 2022-05-02 | End: 2022-05-02 | Stop reason: HOSPADM

## 2022-05-02 RX ORDER — FENTANYL CITRATE 50 UG/ML
INJECTION, SOLUTION INTRAMUSCULAR; INTRAVENOUS AS NEEDED
Status: DISCONTINUED | OUTPATIENT
Start: 2022-05-02 | End: 2022-05-02 | Stop reason: SURG

## 2022-05-02 RX ORDER — SODIUM CHLORIDE 0.9 % (FLUSH) 0.9 %
3-10 SYRINGE (ML) INJECTION AS NEEDED
Status: DISCONTINUED | OUTPATIENT
Start: 2022-05-02 | End: 2022-05-02 | Stop reason: HOSPADM

## 2022-05-02 RX ORDER — HYDROMORPHONE HYDROCHLORIDE 1 MG/ML
0.5 INJECTION, SOLUTION INTRAMUSCULAR; INTRAVENOUS; SUBCUTANEOUS
Status: DISCONTINUED | OUTPATIENT
Start: 2022-05-02 | End: 2022-05-02

## 2022-05-02 RX ORDER — ONDANSETRON 2 MG/ML
INJECTION INTRAMUSCULAR; INTRAVENOUS AS NEEDED
Status: DISCONTINUED | OUTPATIENT
Start: 2022-05-02 | End: 2022-05-02 | Stop reason: SURG

## 2022-05-02 RX ORDER — SODIUM CHLORIDE, SODIUM LACTATE, POTASSIUM CHLORIDE, CALCIUM CHLORIDE 600; 310; 30; 20 MG/100ML; MG/100ML; MG/100ML; MG/100ML
9 INJECTION, SOLUTION INTRAVENOUS CONTINUOUS
Status: DISCONTINUED | OUTPATIENT
Start: 2022-05-02 | End: 2022-05-08 | Stop reason: HOSPADM

## 2022-05-02 RX ORDER — FLUMAZENIL 0.1 MG/ML
0.2 INJECTION INTRAVENOUS AS NEEDED
Status: DISCONTINUED | OUTPATIENT
Start: 2022-05-02 | End: 2022-05-02

## 2022-05-02 RX ADMIN — FENTANYL CITRATE 25 MCG: 0.05 INJECTION, SOLUTION INTRAMUSCULAR; INTRAVENOUS at 15:44

## 2022-05-02 RX ADMIN — LIDOCAINE HYDROCHLORIDE 100 MG: 20 INJECTION, SOLUTION INFILTRATION; PERINEURAL at 14:30

## 2022-05-02 RX ADMIN — GLYCOPYRROLATE 0.4 MG: 0.2 INJECTION INTRAMUSCULAR; INTRAVENOUS at 15:40

## 2022-05-02 RX ADMIN — NEOSTIGMINE METHYLSULFATE 2.5 MG: 0.5 INJECTION INTRAVENOUS at 15:40

## 2022-05-02 RX ADMIN — FAMOTIDINE 20 MG: 10 INJECTION INTRAVENOUS at 13:33

## 2022-05-02 RX ADMIN — FENTANYL CITRATE 50 MCG: 0.05 INJECTION, SOLUTION INTRAMUSCULAR; INTRAVENOUS at 13:33

## 2022-05-02 RX ADMIN — SODIUM CHLORIDE, POTASSIUM CHLORIDE, SODIUM LACTATE AND CALCIUM CHLORIDE 9 ML/HR: 600; 310; 30; 20 INJECTION, SOLUTION INTRAVENOUS at 13:33

## 2022-05-02 RX ADMIN — MIDAZOLAM 0.5 MG: 1 INJECTION INTRAMUSCULAR; INTRAVENOUS at 13:33

## 2022-05-02 RX ADMIN — ROCURONIUM BROMIDE 40 MG: 50 INJECTION INTRAVENOUS at 14:30

## 2022-05-02 RX ADMIN — HYDROMORPHONE HYDROCHLORIDE 1 MG: 1 INJECTION, SOLUTION INTRAMUSCULAR; INTRAVENOUS; SUBCUTANEOUS at 08:53

## 2022-05-02 RX ADMIN — ASPIRIN 81 MG: 81 TABLET, COATED ORAL at 20:01

## 2022-05-02 RX ADMIN — OXYCODONE HYDROCHLORIDE AND ACETAMINOPHEN 2 TABLET: 5; 325 TABLET ORAL at 20:14

## 2022-05-02 RX ADMIN — ONDANSETRON 4 MG: 2 INJECTION INTRAMUSCULAR; INTRAVENOUS at 15:34

## 2022-05-02 RX ADMIN — HYDROMORPHONE HYDROCHLORIDE 1 MG: 1 INJECTION, SOLUTION INTRAMUSCULAR; INTRAVENOUS; SUBCUTANEOUS at 02:38

## 2022-05-02 RX ADMIN — FENTANYL CITRATE 50 MCG: 50 INJECTION, SOLUTION INTRAMUSCULAR; INTRAVENOUS at 16:01

## 2022-05-02 RX ADMIN — SODIUM CHLORIDE, POTASSIUM CHLORIDE, SODIUM LACTATE AND CALCIUM CHLORIDE: 600; 310; 30; 20 INJECTION, SOLUTION INTRAVENOUS at 14:22

## 2022-05-02 RX ADMIN — FENTANYL CITRATE 50 MCG: 50 INJECTION, SOLUTION INTRAMUSCULAR; INTRAVENOUS at 16:56

## 2022-05-02 RX ADMIN — FENTANYL CITRATE 50 MCG: 0.05 INJECTION, SOLUTION INTRAMUSCULAR; INTRAVENOUS at 15:34

## 2022-05-02 RX ADMIN — GABAPENTIN 600 MG: 300 CAPSULE ORAL at 20:02

## 2022-05-02 RX ADMIN — DAPTOMYCIN 750 MG: 500 INJECTION, POWDER, LYOPHILIZED, FOR SOLUTION INTRAVENOUS at 10:47

## 2022-05-02 RX ADMIN — HYDROMORPHONE HYDROCHLORIDE 0.5 MG: 1 INJECTION, SOLUTION INTRAMUSCULAR; INTRAVENOUS; SUBCUTANEOUS at 16:23

## 2022-05-02 RX ADMIN — PHENYLEPHRINE HYDROCHLORIDE 100 MCG: 10 INJECTION, SOLUTION INTRAVENOUS at 14:54

## 2022-05-02 RX ADMIN — PROPOFOL 120 MG: 10 INJECTION, EMULSION INTRAVENOUS at 14:30

## 2022-05-02 RX ADMIN — FENTANYL CITRATE 25 MCG: 0.05 INJECTION, SOLUTION INTRAMUSCULAR; INTRAVENOUS at 14:30

## 2022-05-02 RX ADMIN — METOPROLOL TARTRATE 25 MG: 25 TABLET, FILM COATED ORAL at 20:02

## 2022-05-02 NOTE — PROGRESS NOTES
Name: Stephy Duncan ADMIT: 2022   : 1955  PCP: Pierre Rosalesin    MRN: [id number] LOS: 1 days   AGE/SEX: 66 y.o. female  ROOM: [facility]   Subjective   No chief complaint on file.     Nausea improved. No CP SOA or abdominal pain. Hip/pelvic pain controlled with medication.    Objective   Vital Signs  Temp:  [97.8 °F (36.6 °C)-99.5 °F (37.5 °C)] 98.2 °F (36.8 °C)  Heart Rate:  [71-79] 79  Resp:  [16-18] 18  BP: (117-155)/(60-74) 155/74  SpO2:  [96 %-99 %] 96 %  on   ;   Device (Oxygen Therapy): room air  Body mass index is 38.98 kg/m².    Physical Exam  Vitals and nursing note reviewed.   Constitutional:       General: She is not in acute distress.     Appearance: She is not diaphoretic.   HENT:      Head: Normocephalic and atraumatic.   Eyes:      General:         Right eye: No discharge.         Left eye: No discharge.      Conjunctiva/sclera: Conjunctivae normal.   Cardiovascular:      Rate and Rhythm: Normal rate and regular rhythm.      Heart sounds: Murmur heard.   Pulmonary:      Effort: Pulmonary effort is normal.      Breath sounds: No wheezing.   Abdominal:      General: There is no distension.      Palpations: Abdomen is soft.      Tenderness: There is no abdominal tenderness. There is no guarding or rebound.   Musculoskeletal:         General: Swelling and tenderness present.      Cervical back: Neck supple. No tenderness.   Skin:     General: Skin is warm and dry.   Neurological:      Mental Status: She is alert. Mental status is at baseline.   Psychiatric:         Mood and Affect: Mood normal.         Behavior: Behavior normal.         Results Review:       I reviewed the patient's new clinical results.      Results from last 7 days   Lab Units 22  0430 22  0356 22  0445 22  0500 22  0538   WBC 10*3/mm3 4.75 4.14  --  4.00 4.31   HEMOGLOBIN g/dL 8.4* 8.3*  8.2* 7.9* 7.8* 7.8*   PLATELETS 10*3/mm3 157 142  --  124* 101*     Results from last 7 days   Lab  Units 05/02/22  0430 04/29/22  0500 04/27/22  0511 04/25/22  2236   SODIUM mmol/L 132* 132* 130* 133*   POTASSIUM mmol/L 4.4 4.6 4.6 4.8   CHLORIDE mmol/L 103 104 101 101   CO2 mmol/L 21.0* 20.2* 20.2* 21.8*   BUN mg/dL 10 12 12 15   CREATININE mg/dL 0.89 0.88 0.94 0.95   GLUCOSE mg/dL 111* 182* 251* 256*   Estimated Creatinine Clearance: 70.7 mL/min (by C-G formula based on SCr of 0.89 mg/dL).  Results from last 7 days   Lab Units 05/02/22  0430 04/29/22  0500 04/27/22  0511 04/25/22  2236   CALCIUM mg/dL 7.9* 8.2* 7.5* 8.2*     Results from last 7 days   Lab Units 04/26/22  1601   INR  1.19*   APTT seconds 22.7        aspirin, 81 mg, Oral, Q12H  DAPTOmycin, 10 mg/kg (Adjusted), Intravenous, Q24H  famotidine, 40 mg, Oral, Daily  gabapentin, 600 mg, Oral, Q12H  insulin glargine, 55 Units, Subcutaneous, Nightly  insulin lispro, 0-9 Units, Subcutaneous, TID AC  meloxicam, 15 mg, Oral, Daily  metoprolol tartrate, 25 mg, Oral, Nightly      lactated ringers, 9 mL/hr, Last Rate: 0  (04/25/22 1434)    NPO Diet NPO Type: Strict NPO    Assessment/Plan      Active Hospital Problems    Diagnosis  POA   • **S/P Girdlestone procedure [Z98.890]  Not Applicable   • Septic arthritis of hip ([facility]) [M00.9]  Yes   • Morbid obesity ([facility]) [E66.01]  Yes   • Chronic diastolic CHF (congestive heart failure) ([facility]) [I50.32]  Yes   • 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       · Status post Girdlestone procedure, septic arthritis, wound dehiscence: Planned source control today.  On daptomycin for VRE hx. Orthopedic surgery primary. I see from their note that she is current with ID, Dr Galan. Has seen Dr Ponce here in the past if ID evaluation is needed. Defer abx to orthopedic  surgery.  · DM2: A1c 6.2. She is on close to her home long acting dose but has not required much premeal here. Will continue the Lantus and SSI. Monitor requirements.  · HTN: Acceptable acutely. Continue current regimen.  · Anemia: YANIRA and chronic disease. Transfuse as needed.  · Urinary Retention: Cath prn.  · Obesity    Will continue to follow. Please call with any questions or concerns.    Carlos Reyna MD  [facility]  05/02/22  10:11 EDT    Dictated portions using Dragon dictation software.    During the entire encounter, I was wearing recommended PPE including face mask and eye protection. Hand sanitization was performed prior to entering room and upon exit.

## 2022-05-02 NOTE — PLAN OF CARE
Goal Outcome Evaluation:  Plan of Care Reviewed With: patient        Progress: no change  Outcome Evaluation: patient resting comfortably between care, voiding per purewick and bsc, pain controlled with PO and IM medication while NPO, surgery planned for 5/2/22, educated on b/p and glucose monitoring

## 2022-05-02 NOTE — CASE MANAGEMENT/SOCIAL WORK
Continued Stay Note  [facility]     Patient Name: Stephy Duncan  MRN: [id number]  Today's Date: 5/2/2022    Admit Date: 4/25/2022     Discharge Plan     Row Name 05/02/22 1147       Plan    Plan [facility] Accepted, Needs Pre-cert.    Patient/Family in Agreement with Plan yes    Plan Comments Spoke with Addie/Dominick who said they have not started pre-cert yet due to bed availability. The patient is currently planned to go to OR with Ortho today. CCP will f/u post-op.               Discharge Codes    No documentation.               Expected Discharge Date and Time     Expected Discharge Date Expected Discharge Time    May 5, 2022             Lucy HOGAN RN

## 2022-05-02 NOTE — SIGNIFICANT NOTE
Patient  updated on patient status, returning to room approximately 45 minutes, questions encouraged

## 2022-05-02 NOTE — ANESTHESIA POSTPROCEDURE EVALUATION
"Patient: Stephy Duncan    Procedure Summary     Date: 05/02/22 Room / Location: [facility] OR  / [facility] MAIN OR    Anesthesia Start: 1422 Anesthesia Stop: 1552    Procedure: HIP INCISION AND DRAINAGE (Right Hip) Diagnosis:     Surgeons: Karri Schaeffer II, MD Provider: Donnell Jaramillo MD    Anesthesia Type: general ASA Status: 4          Anesthesia Type: general    Vitals  Vitals Value Taken Time   /51 05/02/22 1631   Temp 37.8 °C (100.1 °F) 05/02/22 1548   Pulse 83 05/02/22 1636   Resp 16 05/02/22 1630   SpO2 100 % 05/02/22 1636   Vitals shown include unvalidated device data.        Post Anesthesia Care and Evaluation    Pain management: adequate  Airway patency: patent  Anesthetic complications: No anesthetic complications    Cardiovascular status: acceptable  Respiratory status: acceptable  Hydration status: acceptable    Comments: /51   Pulse 83   Temp (S) 37.8 °C (100.1 °F) (Oral)   Resp 16   Ht 162.6 cm (64\")   Wt 103 kg (227 lb 1.2 oz)   SpO2 100%   BMI 38.98 kg/m²         "

## 2022-05-02 NOTE — ANESTHESIA PREPROCEDURE EVALUATION
Anesthesia Evaluation     history of anesthetic complications: PONV               Airway   TM distance: >3 FB  Neck ROM: full  No difficulty expected  Dental    (+) edentulous    Pulmonary - normal exam   Cardiovascular - normal exam    Patient on routine beta blocker    (+) hypertension, past MI  >12 months, CAD, cardiac stents CHF ,       Neuro/Psych  (+) numbness,    GI/Hepatic/Renal/Endo    (+) morbid obesity, GERD,  diabetes mellitus type 2,     Musculoskeletal     Abdominal    Substance History      OB/GYN          Other   arthritis,                      Anesthesia Plan    ASA 4     general     intravenous induction     Anesthetic plan, all risks, benefits, and alternatives have been provided, discussed and informed consent has been obtained with: patient.        CODE STATUS:

## 2022-05-02 NOTE — PLAN OF CARE
Goal Outcome Evaluation:            Plan for sx today, NPO at this time, dilaudid IM available for pain, will continue to monitor

## 2022-05-02 NOTE — OP NOTE
Irrigation and Debridement Operative Note  Dr. JENNIE Schaeffer II  [phone]    PATIENT NAME: Stephy Duncan  MRN: [id number]  : 1955 AGE: 66 y.o. GENDER: female  DATE OF OPERATION: 2022  PREOPERATIVE DIAGNOSIS:  Right septic hip with Girdlestone  POSTOPERATIVE DIAGNOSIS: Same  OPERATION PERFORMED: Irrigation and Debridement of right hip joint  SURGEON: Karri Schaeffer MD  Circulator: Loree Soto RN  Scrub Person: Elizabeth Machuca; Duyen Gee  ANESTHESIA: General  ASSISTANT: Donna Montana. This case would not have been possible without another set of skilled surgical hands for retraction, use of instrumentation, and general assistance.  This assistance was vital to the success of the case.   ESTIMATED BLOOD LOSS: 300cc  SPONGE AND NEEDLE COUNT: Correct  INDICATIONS: This patient was found to have wound breakdown of her right Girdlestone hip incision and continued infection. The risks of surgery were discussed and included the risk of anesthesia, continued infection, damage to neurovascular structures, the need for further procedures, medical complications, and others. No guarantees were made. The patient wished to proceed with surgery and a surgical consent was signed.    PERTINENT FINDINGS: Necrotic looking tissue and continued purulence    DETAILS OF PROCEDURE:    The patient was met in the preoperative area. The site was marked. The consent and H&P were reviewed. The patient was then wheeled back to the operative suite and underwent anesthesia. Excess hair about the operative area was removed using surgical clippers. Surgical alcohol was used to thoroughly clean the entire operative extremity.    The operative extremity was then prepped and draped in the normal sterile fashion which included multiple layers of sterile drapes. A surgical timeout was performed in which administration of preoperative antibiotics and the surgical site were confirmed.    The incision which had already  "partially dehisced was fully opened up by removing the staples and the remaining suture.  Even the deep tissues had partially dehisced.  All of those sutures were removed as well.  I then used 6 L of saline with Betadine to thoroughly irrigate the wound bed as well as using a curette and a rongeur and knife for sharp debridement.  I also cleaned out the skin edges removing all necrotic skin about centimeter and 1/2 to 2 cm in both directions along the incision line.    I attempted to close the deep fascia, but over last 2 surgeries this deep layer has continued to dehisce and pull apart.  I was able to close the proximal and distal aspects but in the middle of it, there is no enough tissue and the sutures Pulling through.  I used interrupted PDS suture.  I then closed the skin using vertical #1 9 braided suture as well.  A large bulky dressing was then placed.    The patient was then moved onto the bed and awoken from anesthesia.  The patient was taken to the recovery room in stable condition. There were no complications and the patient tolerated the procedure well.    R \"Kilo\" Laury BAXTER MD  Orthopaedic Surgery  [facility]  [phone]    "

## 2022-05-02 NOTE — PAYOR COMM NOTE
"INITIAL CLINICAL FOR REVIEW  REF #[id number]  OBSERVATION TO INPATIENT 5/1/22  F:  [phone]        Stephy Duncan (66 y.o. Female)             Date of Birth   1955    Social Security Number       Address   [address] [place] [id number]    Home Phone   [phone]    MRN   [id number]       Orthodoxy   Scientologist    Marital Status                               Admission Date   4/25/22    Admission Type   Elective    Admitting Provider   Karri Schaeffer II, MD    Attending Provider   Karri Schaeffer II, MD    Department, Room/Bed   [address], P797/1       Discharge Date       Discharge Disposition       Discharge Destination                               Attending Provider: Karri Schaeffer II, MD    Allergies: Ace Inhibitors, Morphine    Isolation: Contact   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)   Code Status: Prior   Advance Care Planning Activity    Ht: 162.6 cm (64\")   Wt: 103 kg (227 lb 1.2 oz)    Admission Cmt: None   Principal Problem: S/P Girdlestone procedure [Z98.890]                 Active Insurance as of 4/25/2022     Primary Coverage     Payor Plan Insurance Group Employer/Plan Group    AETNA MEDICARE REPLACEMENT AETNA MEDICARE REPLACEMENT [id number]     Payor Plan Address Payor Plan Phone Number Payor Plan Fax Number Effective Dates    [address] [phone]  1/1/2022 - None Entered    [facility] [id number]       Subscriber Name Subscriber Birth Date Member ID       Stephy Duncan 1955 [id number]                 Emergency Contacts      (Rel.) Home Phone Work Phone Mobile Phone    ESTUARDO DUNCAN (Spouse) [phone] -- [phone]    ChrisBhumika (Sister) -- -- [phone]               History & Physical      Karri Schaeffer II, MD at 04/25/22 1217        H&P reviewed.  The patient was examined and there are no changes to the H&P   Electronically signed by Karri Schaeffer " MD VEE at 04/25/22 1217   Source Note     Scan on 4/25/2022 by [employer], [place]: H&P, LOC, 04/19/2022          Electronically signed by [employer], [employer] at 04/22/22 1112             Karri Schaeffer II, MD at 04/25/22 1103        H&P reviewed.  The patient was examined and there are no changes to the H&P   Electronically signed by Karri Schaeffer II, MD at 04/25/22 1103   Source Note     Scan on 4/25/2022 by [employer], [employer]: H&P, LOC, 04/19/2022          Electronically signed by [employer], [place] at 04/22/22 1112             H&P signed by [employer], [employer] at 04/22/22 1112      Scan on 4/25/2022 by [employer], [employer]: H&P, LOC, 04/19/2022          Electronically signed by [employer], [place] at 04/22/22 1112       Vital Signs (last day)     Date/Time Temp Temp src Pulse Resp BP Patient Position SpO2    05/02/22 0700 98.2 (36.8) Oral 79 18 155/74 Lying 96    05/01/22 2233 99.5 (37.5) Oral 71 16 127/63 Lying 99    05/01/22 1946 97.8 (36.6) Oral 78 18 117/60 Lying 98    05/01/22 1300 98.7 (37.1) Oral 71 18 119/64 Lying 99    05/01/22 0700 98.5 (36.9) Oral 70 16 128/60 Lying 96          Oxygen Therapy (last day)     Date/Time SpO2 Device (Oxygen Therapy) Flow (L/min) Oxygen Concentration (%) ETCO2 (mmHg)    05/02/22 0820 -- room air -- -- --    05/02/22 0700 96 room air -- -- --    05/01/22 2233 99 room air -- -- --    05/01/22 2057 -- room air -- -- --    05/01/22 1946 98 room air -- -- --    05/01/22 1300 99 room air -- -- --    05/01/22 0700 96 room air -- -- --          Lines, Drains & Airways     Active LDAs     Name Placement date Placement time Site Days    Peripheral IV 04/30/22 2014 Right Hand 04/30/22 2014  Hand  1    External Urinary Catheter 04/30/22  1600  --  1                  Medication Administration Report for Stephy Duncan as of 05/02/22 0913   Legend:    Given Hold Not Given Due Canceled Entry Other Actions    Time Time (Time) Time  Time-Action        Discontinued     Completed     Future     MAR Hold     Linked           Medications 04/26/22 04/27/22 04/28/22 04/29/22 04/30/22 05/01/22 05/02/22    aspirin EC tablet 81 mg  Dose: 81 mg  Freq: Every 12 Hours Scheduled Route: PO  Start: 04/25/22 2100   Admin Instructions:   Herbal/drug interaction: Avoid use with ginkgo biloba. Do not crush or chew.  Do not exceed 4 grams of aspirin in a 24 hr period.    If given for pain, use the following pain scale:   Mild Pain = Pain Score of 1-3, CPOT 1-2  Moderate Pain = Pain Score of 4-6, CPOT 3-4  Severe Pain = Pain Score of 7-10, CPOT 5-8    0835-Given       2241-Given        0827-Hold [C]       2337-Given        0832-Given       2059-Given        0952-Given       2032-Given        0832-Given       2136-Given        0836-Given       2057-Given        (0819)-Not Given       2100           DAPTOmycin (CUBICIN) 750 mg in sodium chloride 0.9 % 50 mL IVPB  Dose: 10 mg/kg  Weight Dosing Info: 74 kg (Adjusted)  Freq: Every 24 Hours Route: IV  Indications of Use: BONE AND/OR JOINT INFECTION  Start: 04/28/22 1000   End: 06/09/22 0959   Admin Instructions:   Caution: Look alike/sound alike drug alert.  Refrigerate. Do not shake.   Order specific questions:   Reason for Therapy Confirmed VRE infection        1144-New Bag       1613-Stopped        1119-New Bag       1153-Stopped        1054-New Bag       1130-Stopped        0957-New Bag        1000           famotidine (PEPCID) tablet 40 mg  Dose: 40 mg  Freq: Daily Route: PO  Start: 04/25/22 1716    0836-Given        0827-Given        0832-Given        0952-Given        0831-Given        0836-Given        (0819)-Not Given           gabapentin (NEURONTIN) capsule 600 mg  Dose: 600 mg  Freq: Every 12 Hours Scheduled Route: PO  Start: 04/25/22 2100   Admin Instructions:       0836-Given       2242-Given        0827-Given       2337-Given        0832-Given       2059-Given        0952-Given       2032-Given        0831-Given        2136-Given        0836-Given       2057-Given        (0820)-Not Given       2100           insulin glargine (LANTUS, SEMGLEE) injection 55 Units  Dose: 55 Units  Freq: Nightly Route: SC  Start: 04/29/22 2100   Admin Instructions:          2211-Given        2149-Given        2057-Given        2100           insulin lispro (ADMELOG) injection 0-9 Units  Dose: 0-9 Units  Freq: 3 Times Daily Before Meals Route: SC  Start: 04/26/22 0730   Admin Instructions:   Correction - Moderate Dose.  40-60 units/day total insulin dose or average weight, on oral agents    Blood glucose 150-199 mg/dL - 2 units  Blood glucose 200-249 mg/dL - 4 units  Blood glucose 250-299 mg/dL - 6 units  Blood glucose 300-349 mg/dL - 7 units  Blood glucose 350-400 mg/dL - 8 units  Blood glucose greater than 400 mg/dL - 9 units and call provider   Caution: Look alike/sound alike drug alert    0834-Given       1227-Given [C]       1710-Given        0824-Given [C]       1247-Given [C]       1710-Given [C]        0831-Given       1145-Given       1654-Given        0952-Given       1227-Given       1656-Given        0831-Given       1206-Given       1858-Given        (0638)-Not Given       1243-Given       1734-Given        (0735)-Not Given [C]       1130       1730           meloxicam (MOBIC) tablet 15 mg  Dose: 15 mg  Freq: Daily Route: PO  Start: 04/25/22 1716   Admin Instructions:   Take with food.  If given for pain, use the following pain scale:  Mild Pain = Pain Score of 1-3, CPOT 1-2  Moderate Pain = Pain Score of 4-6, CPOT 3-4  Severe Pain = Pain Score of 7-10, CPOT 5-8    0835-Given        0827-Given        0832-Given        0952-Given        0831-Given        0836-Given        (0820)-Not Given           metoprolol tartrate (LOPRESSOR) tablet 25 mg  Dose: 25 mg  Freq: Nightly Route: PO  Start: 04/25/22 2100    2242-Given        2337-Given        2059-Given        2033-Given        2136-Given        2057-Given        2100          Completed  Medications  Medications 04/26/22 04/27/22 04/28/22 04/29/22 04/30/22 05/01/22 05/02/22       ferric gluconate (FERRLECIT) 250 mg in sodium chloride 0.9 % 120 mL IVPB  Dose: 250 mg  Freq: Once Route: IV  Start: 04/29/22 1530   End: 04/29/22 1910       1612-New Bag       1910-Stopped              ferric gluconate (FERRLECIT) 250 mg in sodium chloride 0.9 % 120 mL IVPB  Dose: 250 mg  Freq: Once Route: IV  Start: 04/28/22 1400   End: 04/28/22 1817      1553-New Bag       1817-Stopped               ferric gluconate (FERRLECIT) 250 mg in sodium chloride 0.9 % 120 mL IVPB  Dose: 250 mg  Freq: Once Route: IV  Start: 04/27/22 2000   End: 04/28/22 0137     2337-New Bag                oxyCODONE (ROXICODONE) immediate release tablet 5 mg  Dose: 5 mg  Freq: Once Route: PO  Start: 04/25/22 1039   End: 04/25/22 1129   Admin Instructions:       If given for pain, use the following pain scale:  Mild Pain = Pain Score of 1-3, CPOT 1-2  Moderate Pain = Pain Score of 4-6, CPOT 3-4  Severe Pain = Pain Score of 7-10, CPOT 5-8             Discontinued Medications  Medications 04/26/22 04/27/22 04/28/22 04/29/22 04/30/22 05/01/22 05/02/22       insulin glargine (LANTUS, SEMGLEE) injection 20 Units  Dose: 20 Units  Freq: Nightly Route: SC  Start: 04/26/22 2100   End: 04/27/22 1847   Admin Instructions:       2241-Given                 insulin glargine (LANTUS, SEMGLEE) injection 30 Units  Dose: 30 Units  Freq: Nightly Route: SC  Start: 04/27/22 2100   End: 04/27/22 1848   Admin Instructions:                 insulin glargine (LANTUS, SEMGLEE) injection 40 Units  Dose: 40 Units  Freq: Nightly Route: SC  Start: 04/27/22 2100   End: 04/28/22 1300   Admin Instructions:        2337-Given                insulin glargine (LANTUS, SEMGLEE) injection 50 Units  Dose: 50 Units  Freq: Nightly Route: SC  Start: 04/28/22 2100   End: 04/29/22 1420   Admin Instructions:         2102-Given               losartan (COZAAR) tablet 25 mg  Dose: 25 mg  Freq: 2  Times Daily Route: PO  Start: 04/25/22 2100   End: 04/26/22 1910    0835-Hold                      ,   Medication Administration Report for Stephy Duncan as of 05/02/22 0913   Legend:    Given Hold Not Given Due Canceled Entry Other Actions    Time Time (Time) Time  Time-Action       Discontinued     Completed     Future     MAR Hold     Linked           Medications 04/26/22 04/27/22 04/28/22 04/29/22 04/30/22 05/01/22 05/02/22    lactated ringers infusion  Rate: 9 mL/hr Dose: 9 mL/hr  Freq: Continuous Route: IV  Start: 04/25/22 1236    0144-Canceled Entry                Discontinued Medications  Medications 04/26/22 04/27/22 04/28/22 04/29/22 04/30/22 05/01/22 05/02/22       sodium chloride 0.9 % infusion  Rate: 50 mL/hr Dose: 50 mL/hr  Freq: Continuous Route: IV  Start: 04/25/22 1716   End: 04/28/22 1321    0144-Currently Infusing       0624-Currently Infusing       0834-Currently Infusing       1040-Currently Infusing       1245-Currently Infusing       1532-Currently Infusing       1814-Currently Infusing       2200-Rate/Dose Change         1746-Stopped                     and   Medication Administration Report for Stephy Duncan as of 05/02/22 0913   Legend:    Given Hold Not Given Due Canceled Entry Other Actions    Time Time (Time) Time  Time-Action       Discontinued     Completed     Future     MAR Hold     Linked           Medications 04/26/22 04/27/22 04/28/22 04/29/22 04/30/22 05/01/22 05/02/22    dextrose (D50W) (25 g/50 mL) IV injection 25 g  Dose: 25 g  Freq: Every 15 Minutes PRN Route: IV  PRN Reason: Low Blood Sugar  PRN Comment: Blood Sugar Less Than 70  Start: 04/25/22 1936   Admin Instructions:   Blood sugar less than 70; patient has IV access - Unresponsive, NPO or Unable To Safely Swallow              dextrose (GLUTOSE) oral gel 15 g  Dose: 15 g  Freq: Every 15 Minutes PRN Route: PO  PRN Reason: Low Blood Sugar  PRN Comment: Blood sugar less than 70  Start: 04/25/22 1936    Admin Instructions:   BS<70, Patient Alert, Is not NPO, Can safely swallow.              docusate sodium (COLACE) capsule 100 mg  Dose: 100 mg  Freq: 2 Times Daily PRN Route: PO  PRN Reason: Constipation  Start: 04/25/22 1714   Admin Instructions:   Swallow whole.  Do not open, crush, or chew capsule.              glucagon (human recombinant) (GLUCAGEN DIAGNOSTIC) injection 1 mg  Dose: 1 mg  Freq: Every 15 Minutes PRN Route: IM  PRN Reason: Low Blood Sugar  PRN Comment: Blood Glucose Less Than 70  Start: 04/25/22 1936   Admin Instructions:   Blood Glucose Less Than 70 - Patient Without IV Access - Unresponsive, NPO or Unable To Safely Swallow              HYDROmorphone (DILAUDID) injection 1 mg  Dose: 1 mg  Freq: Every 4 Hours PRN Route: IM  PRN Comment: breakthrough pain  Start: 04/25/22 1714   End: 05/05/22 1713   Admin Instructions:       Caution: Look alike/sound alike drug alert    If given for pain, use the following pain scale:  Mild Pain = Pain Score of 1-3, CPOT 1-2  Moderate Pain = Pain Score of 4-6, CPOT 3-4  Severe Pain = Pain Score of 7-10, CPOT 5-8          0238-Given       0853-Given          And  naloxone (NARCAN) injection 0.1 mg  Dose: 0.1 mg  Freq: Every 5 Minutes PRN Route: IV  PRN Reason: Respiratory Depression  Start: 04/25/22 1714   Admin Instructions:   If respiratory rate is less than 8 breaths/minute or patient is difficult to arouse stop any narcotics and contact physician. Administer slow IV push. Repeat as ordered until patient's respiratory rate is greater than 12 breaths/minute.              ondansetron (ZOFRAN) tablet 4 mg  Dose: 4 mg  Freq: Every 6 Hours PRN Route: PO  PRN Reasons: Nausea,Vomiting  Start: 04/25/22 1714   Admin Instructions:   If BOTH ondansetron (ZOFRAN) and promethazine (PHENERGAN) are ordered use ondansetron first and THEN promethazine IF ondansetron is ineffective.        0645-Not Given:  See Alt        1002-Not Given:  See Alt           Or  ondansetron (ZOFRAN)  injection 4 mg  Dose: 4 mg  Freq: Every 6 Hours PRN Route: IV  PRN Reasons: Nausea,Vomiting  Start: 04/25/22 1714   Admin Instructions:   If BOTH ondansetron (ZOFRAN) and promethazine (PHENERGAN) are ordered use ondansetron first and THEN promethazine IF ondansetron is ineffective.        0645-Given        1002-Given            oxyCODONE-acetaminophen (PERCOCET) 5-325 MG per tablet 1 tablet  Dose: 1 tablet  Freq: Every 4 Hours PRN Route: PO  PRN Reason: Moderate Pain   Start: 04/25/22 1714   End: 05/02/22 1713   Admin Instructions:   [RICCARDO]    Do not exceed 4 grams of acetaminophen in a 24 hr period. Max dose of 2gm for AST/ALT greater than 120 units/L        If given for pain, use the following pain scale:   Mild Pain = Pain Score of 1-3, CPOT 1-2  Moderate Pain = Pain Score of 4-6, CPOT 3-4  Severe Pain = Pain Score of 7-10, CPOT 5-8        1213-Given        0213-Given            oxyCODONE-acetaminophen (PERCOCET) 5-325 MG per tablet 2 tablet  Dose: 2 tablet  Freq: Every 4 Hours PRN Route: PO  PRN Reason: Severe Pain   Start: 04/25/22 1714   End: 05/02/22 1713   Admin Instructions:   [RICCARDO]    Do not exceed 4 grams of acetaminophen in a 24 hr period. Max dose of 2gm for AST/ALT greater than 120 units/L        If given for pain, use the following pain scale:   Mild Pain = Pain Score of 1-3, CPOT 1-2  Moderate Pain = Pain Score of 4-6, CPOT 3-4  Severe Pain = Pain Score of 7-10, CPOT 5-8    0140-Given       0617-Given       1039-Given       1425-Given       1850-Given       2241-Given        0638-Given       1041-Given       1435-Given       1846-Given        0458-Given       1145-Given       1612-Given       2059-Given        0100-Given       0629-Given       1227-Given       1615-Given       2033-Given        0035-Given       0625-Given       1617-Given       2136-Given        0836-Given       1243-Given       1652-Given       2101-Given             Blood Administration Record (From admission, onward)    Completed  transfusions     Ordered     Start    04/26/22 1855  Transfuse RBC  Transfusion        Released Time Blood Unit Number Status   04/26/22 2043   22  [id number]  [id number] Completed 04/27/22 0115       04/26/22 1854                Orders (active)      Start     Ordered    05/02/22 0001  NPO Diet NPO Type: Strict NPO  Diet Effective Midnight         05/01/22 0935    04/29/22 2100  insulin glargine (LANTUS, SEMGLEE) injection 55 Units  Nightly         04/29/22 1420    04/29/22 1300  Wound Care  4 Times Daily      Comments: Cleanse incision with NS, pat dry. Apply ABD pads and secure with tape. Change 4x day and prn.    04/29/22 1028    04/29/22 0000  gabapentin (NEURONTIN) 600 MG tablet  2 Times Daily         04/29/22 1348    04/29/22 0000  oxyCODONE-acetaminophen (PERCOCET) 5-325 MG per tablet  Every 4 Hours PRN         04/29/22 1348    04/28/22 1000  DAPTOmycin (CUBICIN) 750 mg in sodium chloride 0.9 % 50 mL IVPB  Every 24 Hours         04/28/22 0811    04/26/22 0730  insulin lispro (ADMELOG) injection 0-9 Units  3 Times Daily Before Meals         04/25/22 1937 04/26/22 0700  POC Glucose TID AC  3 Times Daily Before Meals       04/25/22 1937    04/26/22 0000  Vital Signs  Every 8 Hours         04/25/22 1714    04/26/22 0000  Neurovascular Checks  Every 8 Hours         04/25/22 1714 04/25/22 2100  gabapentin (NEURONTIN) capsule 600 mg  Every 12 Hours Scheduled         04/25/22 1714 04/25/22 2100  metoprolol tartrate (LOPRESSOR) tablet 25 mg  Nightly         04/25/22 1714    04/25/22 2100  aspirin EC tablet 81 mg  Every 12 Hours Scheduled         04/25/22 1714 04/25/22 2000  Vital Signs  Every 4 Hours       04/25/22 1714 04/25/22 2000  Neurovascular Checks  Every 4 Hours       04/25/22 1714 04/25/22 1937  Do NOT Hold Basal or Correction Scale Insulin When Patient is NPO, Hold Scheduled Mealtime (Bolus) Insulin if NPO  Continuous         04/25/22 1937 04/25/22 1937  Follow [facility] Hypoglycemia Standing  Orders For Blood Glucose Less Than 70 mg/dL  Until Discontinued        Comments: ALERT PATIENT - NOT NPO & CAN SAFELY SWALLOW  Administer 4 oz Fruit Juice OR 4 oz Regular Soda OR 8 oz Milk OR 15-30 grams (1 tube) of Glucose Gel.  Recheck Blood Glucose Approximately 15 Minutes After Ingestion, Repeat Treatment & Continue to Recheck Blood Sugar Approximately Every 15 Minutes Until Blood Glucose is 70 or Higher.  Once Blood Glucose is 70 or Higher & if It Will Be More Than 60 Minutes Until Next Meal, Provide Appropriate Snack (Including Carbohydrate Food) Based on Meal Plan Orde carol. Give Meal Tray As Soon As Possible.    PATIENT HAS IV ACCESS - UNRESPONSIVE, NPO OR UNABLE TO SAFELY SWALLOW  Administer 25g (50ml) D50W IV Push.  Recheck Blood Glucose Approximately 15 Minutes After Administration, if Blood Glucose Remains Less Than 70, Repeat Treatment   Recheck Blood Glucose Approximately 15 Minutes After 2nd Administration, if Blood Glucose Remains Less Than 70 After 2nd Dose of D50W, Contact Provider for Further Treatment Orders & Consider Adding IVF With D5W for Mainte tenance    PATIENT WITHOUT IV ACCESS - UNRESPONSIVE, NPO OR UNABLE TO SAFELY SWALLOW  Administer 1mg Glucagon SQ & Establish IV Access.  Turn Patient on Side - Nausea / Vomiting May Occur.  Recheck Blood Glucose Approximately 15 Minutes After Administration.  If Blood Glucose Remains Less Than 70, Administer 25g D50W IV Push (50ml).  Recheck Blood Glucose Approximately 15 Minutes After Administration of D50W, if Blood Glucose Remains Less Than 70, Contact Provider for Further Treatment Orders & C  Consider Adding IVF With D5 for Maintenance    Document Event & Patient Response to Interventions in EMR, Document Medications on MAR  Notify Provider if Hypoglycemia Treatment Needed    04/25/22 1937 04/25/22 1936  dextrose (GLUTOSE) oral gel 15 g  Every 15 Minutes PRN         04/25/22 1937 04/25/22 1936  dextrose (D50W) (25 g/50 mL) IV injection 25 g   Every 15 Minutes PRN         04/25/22 1937    04/25/22 1936  glucagon (human recombinant) (GLUCAGEN DIAGNOSTIC) injection 1 mg  Every 15 Minutes PRN         04/25/22 1937    04/25/22 1800  Up in Chair 3x / Day - Beginning POD 1  3 Times Daily         04/25/22 1714 04/25/22 1800  Turn, Cough & Deep Breathe Every 2 Hours Until Ambulating  Every 2 Hours       04/25/22 1714 04/25/22 1800  Incentive Spirometry  Every 2 Hours While Awake      Comments: Until lungs are clear and no productive cough.    04/25/22 1714 04/25/22 1716  meloxicam (MOBIC) tablet 15 mg  Daily         04/25/22 1714 04/25/22 1716  famotidine (PEPCID) tablet 40 mg  Daily         04/25/22 1714 04/25/22 1715  Pillows May Be Used to Elevate Operative Leg  Continuous         04/25/22 1714 04/25/22 1715  Instruct Patient to Do At Least 10 Ankle Pumps Per Hour While Awake  Once        Comments: Instruct Patient to Do At Least 10 Ankle Pumps Per Hour While Awake    04/25/22 1714    04/25/22 1715  Saline Lock IV With Adequate PO Intake  Continuous         04/25/22 1714 04/25/22 1715  Change Dressing  Per Order Details        Comments: May Discontinue Dressing Changes When No Drainage From Wound.    04/25/22 1714 04/25/22 1715  Hemovac Drain to Self Suction  Continuous         04/25/22 1714 04/25/22 1715  Oxygen Therapy-  Continuous         04/25/22 1714    04/25/22 1715  Advance Diet as Tolerated  Until Discontinued         04/25/22 1714 04/25/22 1715  Hemoglobin & Hematocrit, Blood  Daily       04/25/22 1714    04/25/22 1715  PT Consult: Eval & Treat  Once         04/25/22 1714    04/25/22 1715  Maintain Sequential Compression Device  Continuous         04/25/22 1714 04/25/22 1714  Intake and Output  Every Shift       04/25/22 1714    04/25/22 1714  Same Day Discharge Patients - Up in Chair x2 on Day of Surgery  Every Shift       04/25/22 1714    04/25/22 1714  oxyCODONE-acetaminophen (PERCOCET) 5-325 MG per tablet 1 tablet   "Every 4 Hours PRN         22 1714    22 1714  oxyCODONE-acetaminophen (PERCOCET) 5-325 MG per tablet 2 tablet  Every 4 Hours PRN         22 1714    22 1714  HYDROmorphone (DILAUDID) injection 1 mg  Every 4 Hours PRN        \"And\" Linked Group Details    22 1714    22 1714  naloxone (NARCAN) injection 0.1 mg  Every 5 Minutes PRN        \"And\" Linked Group Details    22 1714    22 1714  ondansetron (ZOFRAN) tablet 4 mg  Every 6 Hours PRN        \"Or\" Linked Group Details    22 1714    22 1714  ondansetron (ZOFRAN) injection 4 mg  Every 6 Hours PRN        \"Or\" Linked Group Details    22 1714    22 1714  docusate sodium (COLACE) capsule 100 mg  2 Times Daily PRN         22 1714    22 1509  Pulse Oximetry, Continuous  Continuous         22 1508    22 1236  lactated ringers infusion  Continuous         22 1234    22 2100  mupirocin (BACTROBAN) 2 % nasal ointment  2 Times Daily         22 1512    Unscheduled  Ice to Incision for 48 Hours  As Needed       22 1714    Unscheduled  Apply Ice to Incision PRN for Edema, After Activity or Exercise, and to Lessen Discomfort  As Needed       22 1714    Unscheduled  Patient May Shower With Assistance  As Needed       22 1714                   Operative/Procedure Notes       Karri Schaeffer II, MD at 22 1500  Version 2 of 2         Hip Girdlestone operative Note  Dr. SCHNEIDER “Kilo” Laury BAXTER  [phone]    PATIENT NAME: Stephy Duncan  MRN: [id number]  : 1955 AGE: 66 y.o. 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  POSTOPERATIVE DIAGNOSIS: Same  OPERATION PERFORMED: Right Total Hip explant with creation of Girdlestone  SURGEON: Karri Schaeffer MD  Circulator: Alysa Hernandez RN  Scrub Person: Syed Ca; Ana Rivera; Sven, " Elizabeth  Vendor Representative: Opal Levine Audrey  ANESTHESIA: General  ASSISTANT: Donna Montana. This case would not have been possible without another set of skilled surgical hands for retraction, use of instrumentation, and general assistance.  This assistance was vital to the success of the case.   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  She has failed conservative treatment A discussion of operative versus nonoperative treatment was had with the patient. They elected to undergo revision hip Girdlestone procedure. The risks of surgery were discussed and included the risk of anesthesia, infection, damage to neurovascular structures, implant loosening/failure, fracture, hardware prominence, dislocation, the need for further procedures, medical complications, and others. No guarantees were made. The patient wished to proceed with surgery and a surgical consent was signed.    PERTINENT FINDINGS: Gross purulence    DETAILS OF PROCEDURE:   The patient was met in the preoperative area. The site was marked. The consent and H&P were reviewed. The patient was then wheeled back to the operative suite and underwent anesthesia. The patient was positioned laterally using the pegboard.  An axillary roll was placed under the down arm. Excess hair about the operative hip was removed using surgical clippers. Surgical alcohol was used to thoroughly clean the entire operative extremity.     The hip and leg were then prepped in the normal sterile fashion, which included ChloraPrep, multiple layers of sterile drapes, and surgical space suits for the entire operative team. New outer gloves were used by all sterile surgical team members after final draping. The surgical incision was marked. A surgical timeout was performed in which administration of preoperative antibiotics and  "tranexamic acid, if not contraindicated, was confirmed.    A standard posterior approach to the hip was then utilized. Retractors were then placed around the acetabulum and excess scar tissue was excised. The hip was then dislocated and the femoral head was removed using a bone tamp and mallet.     I next inserted the extraction device into the femoral stem with a slaphammer and try to get it out but unfortunately it was well ingrown.  I then began using curved osteotomes I got the osteotomes down as far as I possibly could but still the implant was rocksolid within the femur.  The femoral wall was quite thin due to her multiple surgeries.  Eventually, I had to perform an extended trochanteric osteotomy which of course given her incredibly thin bone did create some comminution.  This did allow for extraction of the implant.    I then turned my attention to the acetabulum.  After removing the liner, the screws were removed and then the cup was removed using curved osteotomes without too much difficulty.  Bone loss was minimal in the acetabulum, or release additional bone loss from this cup removal.    The hip was then thoroughly irrigated using saline Betadine and cystoscopy tubing.  After thorough irrigation debridement including a rongeur and knife for sharp debridement I began to assess the femur.  I did not want to leave any metallic implants as this would risk continued infection.  Therefore, I used #5 FiberWire like a cerclage wire to help hold the bone in reasonable alignment as best as possible.  I doubt this would minimize the risk of recurrent infection and possibly give her some bony stability for healing.    The wound was then closed in layers and a sterile dressing was applied.    The patient was moved from the operative table to the bed. The patient was taken to the recovery room in stable condition. There were no complications and the patient tolerated the procedure well.    R \"Kilo\" Laury II " MD  Orthopaedic Surgery  [facility]  [phone]                    Electronically signed by Karri Schaeffer II, MD at 22 7429     Karri Schaeffer II, MD at 22 8852  Version 1 of 2         Hip Girdlestone operative Note  Dr. JENNIE Espinosa” Laury BAXTER  [phone]    PATIENT NAME: Stephy Duncan  MRN: [id number]  : 1955 AGE: 66 y.o. 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  POSTOPERATIVE DIAGNOSIS: Same  OPERATION PERFORMED: Right Total Hip explant with creation of Girdlestone  SURGEON: Karri Schaeffer MD  Circulator: Alysa Hernandez RN  Scrub Person: Syed Ca; Ana Rivera; Elizabeth Machuca  Vendor Representative: Brendon Levine; Adeoal Samuel  ANESTHESIA: General  ASSISTANT: Donna Montana. This case would not have been possible without another set of skilled surgical hands for retraction, use of instrumentation, and general assistance.  This assistance was vital to the success of the case.   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  She has failed conservative treatment A discussion of operative versus nonoperative treatment was had with the patient. They elected to undergo revision hip Girdlestone procedure. The risks of surgery were discussed and included the risk of anesthesia, infection, damage to neurovascular structures, implant loosening/failure, fracture, hardware prominence, dislocation, the need for further procedures, medical complications, and others. No guarantees were made. The patient wished to proceed with surgery and a surgical consent was signed.    PERTINENT FINDINGS: Gross purulence    DETAILS OF PROCEDURE:   The patient was met in the preoperative area. The site was marked. The consent and H&P  were reviewed. The patient was then wheeled back to the operative suite and underwent anesthesia. The patient was positioned laterally using the pegboard.  An axillary roll was placed under the down arm. Excess hair about the operative hip was removed using surgical clippers. Surgical alcohol was used to thoroughly clean the entire operative extremity.     The hip and leg were then prepped in the normal sterile fashion, which included ChloraPrep, multiple layers of sterile drapes, and surgical space suits for the entire operative team. New outer gloves were used by all sterile surgical team members after final draping. The surgical incision was marked. A surgical timeout was performed in which administration of preoperative antibiotics and tranexamic acid, if not contraindicated, was confirmed.    A standard posterior approach to the hip was then utilized. Retractors were then placed around the acetabulum and excess scar tissue was excised. The hip was then dislocated and the femoral head was removed using a bone tamp and mallet.     I next inserted the extraction device into the femoral stem with a slaphammer and try to get it out but unfortunately it was well ingrown.  I then began using curved osteotomes I got the osteotomes down as far as I possibly could but still the implant was rocksolid within the femur.  The femoral wall was quite thin due to her multiple surgeries.  Eventually, I had to perform an extended trochanteric osteotomy which of course given her incredibly thin bone did create some comminution.  This did allow for extraction of the implant.    I then turned my attention to the acetabulum.  After removing the liner, the screws were removed and then the cup was removed using curved osteotomes without too much difficulty.  Bone loss was minimal in the acetabulum, or release additional bone loss from this cup removal.    The hip was then thoroughly irrigated using saline Betadine and cystoscopy  "tubing.  After thorough irrigation debridement including a rongeur and knife for sharp debridement I began to assess the femur.  I did not want to leave any metallic implants as this would risk continued infection.  Therefore, I used #5 FiberWire like a cerclage wire to help hold the bone in reasonable alignment as best as possible.  I doubt this would minimize the risk of recurrent infection and possibly give her some bony stability for healing.    The wound was then closed in layers and a sterile dressing was applied.    The patient was moved from the operative table to the bed. The patient was taken to the recovery room in stable condition. There were no complications and the patient tolerated the procedure well.    R \"Kilo\" Laury BAXTER MD  Orthopaedic Surgery  [facility]  [phone]                    Electronically signed by Karri Schaeffer II, MD at 22 0750          Physician Progress Notes      Karri Schaeffer II, MD at 22 0980        Plan surgery tomorrow for hip irrigation debridement with revision closure    Electronically signed by Karri Schaeffer II, MD at 22 0985     Carlos Reyna MD at 22 0905              Name: Stephy Duncan ADMIT: 2022   : 1955  PCP: Gregorio Rosales    MRN: [id number] LOS: 0 days   AGE/SEX: 66 y.o. female  ROOM: [facility]   Subjective   No chief complaint on file.     Reporting some nausea at times but no vomiting.  Not reporting any abdominal pain and is having bowel movements.  No chest pain palpitations shortness of breath reported.  No dysuria.  Has been having fevers.  Planned debridement tomorrow.    Objective   Vital Signs  Temp:  [98.3 °F (36.8 °C)-101.7 °F (38.7 °C)] 98.5 °F (36.9 °C)  Heart Rate:  [70-87] 70  Resp:  [16-18] 16  BP: (124-147)/(58-63) 128/60  SpO2:  [94 %-98 %] 96 %  on   ;   Device (Oxygen Therapy): room air  Body mass index is 38.98 kg/m².    Physical Exam    Results " Review:       I reviewed the patient's new clinical results.      Results from last 7 days   Lab Units 05/01/22  0356 04/30/22  0445 04/29/22  0500 04/28/22  0538 04/27/22  2158 04/27/22  0514 04/26/22  1601   WBC 10*3/mm3  --   --  4.00 4.31  --  4.79 6.66   HEMOGLOBIN g/dL 8.2* 7.9* 7.8* 7.8*  --  7.8* 7.3*   PLATELETS 10*3/mm3  --   --  124* 101* 99* 71*  71* 111*     Results from last 7 days   Lab Units 04/29/22  0500 04/27/22  0511 04/25/22  2236   SODIUM mmol/L 132* 130* 133*   POTASSIUM mmol/L 4.6 4.6 4.8   CHLORIDE mmol/L 104 101 101   CO2 mmol/L 20.2* 20.2* 21.8*   BUN mg/dL 12 12 15   CREATININE mg/dL 0.88 0.94 0.95   GLUCOSE mg/dL 182* 251* 256*   Estimated Creatinine Clearance: 71.5 mL/min (by C-G formula based on SCr of 0.88 mg/dL).  Results from last 7 days   Lab Units 04/29/22  0500 04/27/22  0511 04/25/22  2236   CALCIUM mg/dL 8.2* 7.5* 8.2*     Results from last 7 days   Lab Units 04/26/22  1601   INR  1.19*   APTT seconds 22.7        aspirin, 81 mg, Oral, Q12H  DAPTOmycin, 10 mg/kg (Adjusted), Intravenous, Q24H  famotidine, 40 mg, Oral, Daily  gabapentin, 600 mg, Oral, Q12H  insulin glargine, 55 Units, Subcutaneous, Nightly  insulin lispro, 0-9 Units, Subcutaneous, TID AC  meloxicam, 15 mg, Oral, Daily  metoprolol tartrate, 25 mg, Oral, Nightly      lactated ringers, 9 mL/hr, Last Rate: 0  (04/25/22 1434)    Diet Regular; Consistent Carbohydrate  NPO Diet NPO Type: Strict NPO    Assessment/Plan     Active Hospital Problems    Diagnosis  POA   • **S/P Girdlestone procedure [Z98.890]  Not Applicable   • Septic arthritis of hip ([facility]) [M00.9]  Yes   • Morbid obesity ([facility]) [E66.01]  Yes   • Chronic diastolic CHF (congestive heart failure) ([facility]) [I50.32]  Yes   • Status post total replacement of hip [Z96.649]  Not Applicable   • Type 2 diabetes mellitus with diabetic polyneuropathy, with long-term current use of insulin ([facility]) [E11.42, Z79.4]  Not Applicable   • Hypertension [I10]  Yes      Resolved  "Hospital Problems   No resolved problems to display.       · Status post Girdlestone procedure, septic arthritis, wound dehiscence: Is having fever with planned source control tomorrow.  On daptomycin for VRE hx. Orthopedic surgery primary. I see from their note that she is current with ID, Dr Galan. Has seen Dr Ponce here in the past if ID evaluation is needed. Defer abx to orthopedic surgery.  · DM2: A1c 6.2. She is on close to her home long acting dose but has not required much premeal here. Will continue the Lantus and SSI. Monitor requirements.  · HTN: Acceptable acutely. Continue current regimen.  · Anemia: YANIRA and chronic disease. Transfuse as needed.  · Urinary Retention: Cath prn.  · Obesity    Will continue to follow. Please call with any questions or concerns.    Carlos Reyna MD  [facility]  22  09:30 EDT    Dictated portions using Dragon dictation software.    During the entire encounter, I was wearing recommended PPE including face mask and eye protection. Hand sanitization was performed prior to entering room and upon exit.    Electronically signed by Carlos Reyna MD at 22 0957     Abeba Hood APRN at 22 1355          DAILY PROGRESS NOTE  [facility]    Patient Identification:  Name: Stephy Duncan  Age: 66 y.o.  Sex: female  :  1955  MRN: [id number]         Primary Care Physician: Gregorio Rosales      Subjective  Wound dehiscence now will go back to OR on Monday. No other new complaints.     Objective:  General Appearance:  Comfortable, well-appearing, in no acute distress and not in pain.    Vital signs: (most recent): Blood pressure 129/62, pulse 72, temperature 99.2 °F (37.3 °C), temperature source Oral, resp. rate 18, height 162.6 cm (64\"), weight 103 kg (227 lb 1.2 oz), SpO2 96 %.    Lungs:  Normal effort and normal respiratory rate.  Breath sounds clear to auscultation.    Heart: Normal rate.  " Regular rhythm.    Extremities: There is dependent edema.  (Trace pretib edema  Dressing R hip recently changed clean dry and intact)  Neurological: Patient is alert and oriented to person, place and time.    Skin:  Warm and dry.      Vital signs in last 24 hours:  Temp:  [97.3 °F (36.3 °C)-101.8 °F (38.8 °C)] 99.2 °F (37.3 °C)  Heart Rate:  [69-86] 72  Resp:  [16-18] 18  BP: (102-140)/(56-70) 129/62    Intake/Output:    Intake/Output Summary (Last 24 hours) at 4/30/2022 1355  Last data filed at 4/30/2022 0500  Gross per 24 hour   Intake 1200 ml   Output 250 ml   Net 950 ml     Assessment/ plan    Diabetes type 2: Blood sugars are improving with increased Lantus dose.   Anemia: Multifactorial including acute postop anemia, severe iron deficiency, anemia chronic disease.  Her bone marrow lacks the ability to respond to perioperative blood loss.    Hemoglobin finally holding steady today.  Received ferric gluconate  Thrombocytopenia: Improving.  Likely consumptive.    IPF high normal at 6%.      Hypoxemia: Postop patient has been on low-flow oxygen.  Encourage incentive spirometry and increase activity.   Hypertension:  Blood pressure initially running on the low side postop.  Now increasing again.   resume Cozaar in the next day or 2.   Urinary retention:  Improving as mobility improves.  Continue as needed In-N-Out cath.    Morbid obesity: Continue to encourage weight loss.  Right total hip explant with infection-defer to ID and Ortho.  Wound dehiscence now plan for OR again on Monday.  Monitor all of the above issues pre and postop     [facility] will follow   Discussed with patient, nurse, MORGAN Mims  4/30/2022  13:55 EDT      Electronically signed by Abeba Hood APRN at 04/30/22 5582     Karri Schaeffer II, MD at 04/30/22 7199        This patient has had significant wound dehiscence.  Her running subcutaneous stitch and a lot of the staples have dehisced.  Unfortunately, she  "will require revision closure.  She has very poor soft tissue envelope which has caused a lot of the problem with her continued ongoing infection.  Plan for I&D with revision closure on Monday.  Every 6 hours dressing changes until then.    Electronically signed by Karri Schaeffer II, MD at 22 0754     Arturo Oshea MD at 22 1421          DAILY PROGRESS NOTE  [facility]    Patient Identification:  Name: Stephy Duncan  Age: 66 y.o.  Sex: female  :  1955  MRN: [id number]         Primary Care Physician: Gregorio Rosales      Subjective  No new c/o    Objective:  General Appearance:  Comfortable, well-appearing, in no acute distress and not in pain.    Vital signs: (most recent): Blood pressure 119/85, pulse 71, temperature 97.3 °F (36.3 °C), temperature source Oral, resp. rate 18, height 162.6 cm (64\"), weight 103 kg (227 lb 1.2 oz), SpO2 94 %.    Lungs:  Normal effort and normal respiratory rate.  Breath sounds clear to auscultation.    Heart: Normal rate.  Regular rhythm.    Extremities: There is dependent edema.  (Trace pretib edema)  Neurological: Patient is alert and oriented to person, place and time.    Skin:  Warm and dry.        Vital signs in last 24 hours:  Temp:  [97.3 °F (36.3 °C)-99.5 °F (37.5 °C)] 97.3 °F (36.3 °C)  Heart Rate:  [68-81] 71  Resp:  [16-18] 18  BP: (104-119)/(51-85) 119/85    Intake/Output:    Intake/Output Summary (Last 24 hours) at 2022 1421  Last data filed at 2022 0215  Gross per 24 hour   Intake 360 ml   Output 1600 ml   Net -1240 ml       Assessment:  Diabetes type 2: Blood sugars are improving but still high..      Continue to increase Lantus dose.  Anemia: Multifactorial including acute postop anemia, severe iron deficiency, anemia chronic disease.  Her bone marrow lacks the ability to respond to perioperative blood loss.    Hemoglobin finally holding steady today.  Received the first dose of ferric gluconate " "intravenously yesterday.    Third and final dose of ferric gluconate today.  Thrombocytopenia: Improving.  Likely consumptive.    IPF high normal at 6%.      Hypoxemia: Postop patient has been on low-flow oxygen.  Encourage incentive spirometry and increase activity.   Hypertension:  Blood pressure initially running on the low side postop.  Now increasing again.  Discontinue IV fluids.  Likely resume Cozaar in the next day or 2.  Urinary retention:  Improving as mobility improves.  Continue as needed In-N-Out cath.    Morbid obesity: Continue to encourage weight loss.      Plan:  Please see above.  Medically stable for discharge.  Resume preadmission diabetic regimen discharge.  Thank you.    Arturo Oshea MD  2022  14:21 EDT      Electronically signed by Arturo Oshea MD at 22 0834     Arturo Oshea MD at 22 1316          DAILY PROGRESS NOTE  [facility]    Patient Identification:  Name: Stephy Duncan  Age: 66 y.o.  Sex: female  :  1955  MRN: [id number]         Primary Care Physician: Gregorio Rosales      Subjective  Patient with no new complaints and overall continues to feel better.    Objective:  General Appearance:  Comfortable, well-appearing, in no acute distress and not in pain.    Vital signs: (most recent): Blood pressure 119/55, pulse 74, temperature 98.9 °F (37.2 °C), temperature source Oral, resp. rate 18, height 162.6 cm (64\"), weight 103 kg (227 lb 1.2 oz), SpO2 95 %.    Lungs:  Normal effort and normal respiratory rate.  Breath sounds clear to auscultation.    Heart: Normal rate.  Regular rhythm.    Extremities: There is no dependent edema.    Neurological: Patient is alert and oriented to person, place and time.    Skin:  Warm and dry.        Vital signs in last 24 hours:  Temp:  [98.1 °F (36.7 °C)-99.9 °F (37.7 °C)] 98.9 °F (37.2 °C)  Heart Rate:  [72-93] 74  Resp:  [16-18] 18  BP: ()/(45-59) " 119/55    Intake/Output:    Intake/Output Summary (Last 24 hours) at 4/28/2022 1316  Last data filed at 4/28/2022 0900  Gross per 24 hour   Intake 720 ml   Output 2200 ml   Net -1480 ml       Assessment:  Diabetes type 2: Blood sugars running high postop.      Continue to increase Lantus dose.  Anemia: Multifactorial including acute postop anemia, severe iron deficiency, anemia chronic disease.  Her bone marrow lacks the ability to respond to perioperative blood loss.    Hemoglobin finally holding steady today.  Received the first dose of ferric gluconate intravenously yesterday.  We will repeat that dose today.  If she is still here tomorrow we will give a third and final dose.  Thrombocytopenia: Improving.  Likely consumptive.    IPF high normal at 6%.      Hypoxemia: Postop patient has been on low-flow oxygen.  Encourage incentive spirometry and increase activity.   Hypertension:  Blood pressure initially running on the low side postop.  Now increasing again.  Discontinue IV fluids.  Likely resume Cozaar in the next day or 2.  Urinary retention:  Improving as mobility improves.  Continue as needed In-N-Out cath.    Fever: Resolved. No evidence of underlying bacterial infection.   Morbid obesity: Continue to encourage weight loss.      Plan:  Please see above.  Discussed with patient and her  at bedside.  Thank you.    Arturo Oshea MD  4/28/2022  13:16 EDT      Electronically signed by Arturo Oshea MD at 04/28/22 1322     Karri Schaeffer II, MD at 04/28/22 0811        Awaiting placement at SNF.  She will need to be discharged with a wound VAC and daptomycin IV.  Plan is for 6 weeks of IV antibiotics.  She has an infectious disease doctor near her home hospital was going to set up outpatient infusion of antibiotics when she leaves the rehab facility.  Plan for wound VAC change Monday Wednesday Friday on the right hip.  Copious amounts of drainage for now.  This is to be expected and  "will hopefully slow down over the next few weeks and months.  This will likely be a very slow wound to heal.    Electronically signed by Karri Schaeffer II, MD at 22     Arturo Oshea MD at 22 190          DAILY PROGRESS NOTE  [facility]    Patient Identification:  Name: Stephy Duncan  Age: 66 y.o.  Sex: female  :  1955  MRN: [id number]         Primary Care Physician: Gregorio Rosales      Subjective  Patient presently with no acute complaints.  Overall feeling better.  Increasing mobility.  States she was able to void on her own today.  On discussing her severe iron deficiency she notes that she has required intravenous iron infusions in the past.    Objective:  General Appearance:  Comfortable, well-appearing, in no acute distress and not in pain (Morbidly obese.).    Vital signs: (most recent): Blood pressure 150/52, pulse 93, temperature 98.9 °F (37.2 °C), temperature source Oral, resp. rate 16, height 162.6 cm (64\"), weight 103 kg (227 lb 1.2 oz), SpO2 96 %.    Lungs:  Normal effort and normal respiratory rate.  Breath sounds clear to auscultation.    Heart: Normal rate.  Regular rhythm.    Extremities: There is no dependent edema.    Neurological: Patient is alert and oriented to person, place and time.    Skin:  Warm and dry.      Vital signs in last 24 hours:  Temp:  [96.7 °F (35.9 °C)-101.5 °F (38.6 °C)] 98.9 °F (37.2 °C)  Heart Rate:  [78-99] 93  Resp:  [16-18] 16  BP: ()/(46-58) 150/52    Intake/Output:    Intake/Output Summary (Last 24 hours) at 2022  Last data filed at 2022 1838  Gross per 24 hour   Intake 1895 ml   Output 1750 ml   Net 145 ml     Assessment:  Diabetes type 2: Blood sugars running high postop.    Aggressively titrate up her Lantus dose.    Anemia: Multifactorial including acute postop anemia, severe iron deficiency, anemia chronic disease.  Her bone marrow lacks the ability to respond to perioperative " blood loss.  We will go ahead and initiate intravenous ferric gluconate.  Continue to monitor closely.  Thrombocytopenia: Likely consumptive.    IPF high normal at 6%.  Continue to monitor closely.  If any further drop will ask for hematology opinion.  Hypoxemia: Postop patient has been on low-flow oxygen.  Encourage incentive spirometry and increase activity.  Monitor closely.  Hypertension:  Blood pressure initially running on the low side postop.  Now increasing again.  Discontinue IV fluids.  Likely resume Cozaar in the next day or 2.  Urinary retention:  Improving as mobility improves.  Continue as needed In-N-Out cath.    Fever: Low-grade fever last night but no leukocytosis or left shift.  No evidence of underlying bacterial infection.  Continue to monitor closely.  Morbid obesity: Continue to encourage weight loss.      Plan:  Please see above.  Thank you.    Arturo Oshea MD  2022  19:08 EDT      Electronically signed by Arturo Oshea MD at 22 1913     Karri Schaeffer II, MD at 22 0829        Having moderate drainage from the hip that has slowed a little bit.  I do certainly expect this postoperatively after this procedure.  Continue to change and reinforce as needed.  Plan will be 6 weeks of IV antibiotics likely followed by oral suppressive antibiotics.  Plan to discharge to rehab    Electronically signed by Karri Schaeffer II, MD at 22 0830     Arturo Oshea MD at 22 1907          DAILY PROGRESS NOTE  [facility]    Patient Identification:  Name: Stephy Duncan  Age: 66 y.o.  Sex: female  :  1955  MRN: [id number]         Primary Care Physician: Gregorio Rosales  RN called earlier today concerning difficulty with bleeding from surgical incision.  Defer to orthopedic surgery.  She also notes that she has had difficulty voiding and has been requiring in and out cath     Objective:  General  "Appearance:  Comfortable, well-appearing, in no acute distress and not in pain (Obese.).    Vital signs: (most recent): Blood pressure 98/58, pulse 83, temperature 98.8 °F (37.1 °C), temperature source Oral, resp. rate 16, height 162.6 cm (64\"), weight 103 kg (227 lb 1.2 oz), SpO2 100 %.    Lungs:  Normal effort and normal respiratory rate.  Breath sounds clear to auscultation.    Heart: Normal rate.  Regular rhythm.    Extremities: There is no dependent edema.    Neurological: Patient is alert and oriented to person, place and time.    Skin:  Warm and dry.        Vital signs in last 24 hours:  Temp:  [97.4 °F (36.3 °C)-98.8 °F (37.1 °C)] 98.8 °F (37.1 °C)  Heart Rate:  [74-86] 83  Resp:  [16] 16  BP: (96-98)/(52-58) 98/58    Intake/Output:    Intake/Output Summary (Last 24 hours) at 4/26/2022 1907  Last data filed at 4/26/2022 1743  Gross per 24 hour   Intake 2243.33 ml   Output 900 ml   Net 1343.33 ml       Assessment:    S/P Girdlestone procedure    Status post total replacement of hip    Type 2 diabetes mellitus with diabetic polyneuropathy, with long-term current use of insulin ([facility])    Hypertension    Morbid obesity (HCC)    Chronic diastolic CHF (congestive heart failure) ([facility])    Septic arthritis of hip ([facility])    Diabetes type 2: Blood sugars running high postop.  We will add Lantus to her sliding scale insulin and monitor.  Anemia: Multifactorial including acute postop anemia.  Noted that previous evaluations have also revealed iron deficiency, as well as anemia of chronic disease.  1 unit packed RBCs ordered.  We will recheck iron panel in the morning.  Monitor closely.  Thrombocytopenia: Likely consumptive.  Check IPF.  Monitor.  Hypoxemia: Postop patient has been on low-flow oxygen.  Encourage incentive spirometry and increase activity.  Monitor closely.  Hypertension: Blood pressure running on the low side postop.  Hold Cozaar.  Urinary retention: Continue as needed In-N-Out cath.  Hopefully will improve " with increase mobility.  Morbid obesity: Continue to encourage weight loss.    All problems new to this examiner.    Plan:  Please see above.  Wean IV fluids.  We certainly appreciate being involved in this pleasant lady's care and will be happy to follow her along with you    Arturo Oshea MD  2022  19:07 EDT      Electronically signed by Arturo Oshea MD at 22 1915     Karri Schaeffer II, MD at 22 0750        Patient is postop day 1 right hip Girdlestone procedure.  This did require a femoral osteotomy and she really does not have the most femur left.  She is nonweightbearing on the right leg.  She will need IV antibiotics for 6 weeks.  She had problems affording this last time.  She does have an infectious disease doctor she is established with from her prior infection years ago.  That doctor has offered to help set her up for infusion antibiotics at a hospital closer to her home.  She will likely go home and then go to the hospital for infusion.  That could probably happen in a couple days.    Electronically signed by Karri Schaeffer II, MD at 22 0751          Consult Notes       Brendon Woods Jr., MD at 22 0744      Consult Orders    1. Inpatient Urology Consult [[id number]] ordered by Karri Schaeffer II, MD at 22 1652                        FIRST UROLOGY CONSULT      Patient Identification:  NAME:  Stephy Duncan  Age:  66 y.o.   Sex:  female   :  1955   MRN:  [id number]       Chief complaint: post op retention    History of present illness:  67yo lady with multiple medical problems including IDDM. She has post op retention requiring intermittent catheterization      Past medical history:  Past Medical History:   Diagnosis Date   • Allergies    • Arthritis    • Cardiac murmur    • Diabetes mellitus (HCC)    • GERD (gastroesophageal reflux disease)    • Heart attack (HCC) 2020   • History of cervical cancer    •  History of transfusion     SEVERAL   • Hyperlipidemia    • Hypertension    • Iron deficiency anemia    • Osteomyelitis hip (HCC)     RIGHT   • PONV (postoperative nausea and vomiting)    • Right hip pain    • VRE infection within last 3 months    • Wound infection     RIGHT HIP.  WOUND VAC IN PLACE       Past surgical history:  Past Surgical History:   Procedure Laterality Date   • CARDIAC CATHETERIZATION     • CERVICAL CONIZATION     • COLONOSCOPY     • CORONARY ANGIOPLASTY WITH STENT PLACEMENT     • ENDOSCOPY     • HIP ARTHROPLASTY Right    • HIP HARDWARE REMOVAL Right    • HIP MINI REVISION Right 01/18/2022    Procedure: TOTAL HIP ARTHROPLASTY LINER REVISION, pegboard lateral;  Surgeon: Karri Schaeffer II, MD;  Location: [facility];  Service: Orthopedics;  Laterality: Right;   • HIP SPACER INSERTION WITH ANTIBIOTIC CEMENT      X3   • HYSTERECTOMY     • INCISION AND DRAINAGE HIP Right 12/18/2021    Procedure: INCISION AND DRAINAGE TOTAL HIP, LINER EXCHANGE AND WOUND VAC PLACEMENT;  Surgeon: Karri Schaeffer II, MD;  Location: [facility];  Service: Orthopedics;  Laterality: Right;   • KNEE ARTHROPLASTY Left    • LAPAROSCOPIC CHOLECYSTECTOMY     • TONSILLECTOMY     • TOTAL HIP ARTHROPLASTY Right 4/25/2022    Procedure: HIP GIRDLESTONE PROCEDURE;  Surgeon: Karri Schaeffer II, MD;  Location: [facility];  Service: Orthopedics;  Laterality: Right;   • TOTAL HIP ARTHROPLASTY REVISION Right 11/16/2021    Procedure: TOTAL HIP REVISION ARTHROPLASTY RIGHT POSTERIOR;  Surgeon: Karri Schaeffer II, MD;  Location: [facility];  Service: Orthopedics;  Laterality: Right;       Allergies:  Ace inhibitors and Morphine    Family history:  Family History   Problem Relation Age of Onset   • Malig Hyperthermia Neg Hx        Social history:  Social History     Tobacco Use   • Smoking status: Former Smoker     Years: 45.00     Types: Cigarettes     Quit date: 12/10/2015     Years since  quittin.3   • Smokeless tobacco: Never Used   Vaping Use   • Vaping Use: Never used   Substance Use Topics   • Alcohol use: Never   • Drug use: Never       Review of systems:    Negative 12-system ROS except for the following:        Objective:  TMax 24 hours:   Temp (24hrs), Av.4 °F (37.4 °C), Min:96.7 °F (35.9 °C), Max:101.5 °F (38.6 °C)      Vitals Ranges:   Temp:  [96.7 °F (35.9 °C)-101.5 °F (38.6 °C)] 99 °F (37.2 °C)  Heart Rate:  [82-99] 82  Resp:  [16-18] 16  BP: ()/(50-58) 107/57    Intake/Output Last 3 shifts:  I/O last 3 completed shifts:  In: 3298.3 [P.O.:1560; I.V.:1163.3; Blood:575]  Out: 1450 [Urine:1450]     Physical Exam:       General Appearance:    Alert, cooperative, in no acute distress   Head:    Normocephalic, without obvious abnormality, atraumatic   Eyes:          PERRL, conjunctivae and corneas clear   Ears:    Normal external inspection   Throat:   No oral lesions, oral mucosa moist   Neck:   Supple, no LAD, trachea midline   Back:     No CVA tenderness   Lungs:     Respirations unlabored, symmetric excursion    Heart:    RRR, intact peripheral pulses   Abdomen:        :     BRANDO:  Extremities:     No edema, no deformity   Skin:   No bleeding, bruising or rashes   Neuro/Psych:   Orientation intact, mood/affect pleasant, no focal findings       Results review:   I reviewed the patient's new clinical results.      Assessment:       S/P Girdlestone procedure    Status post total replacement of hip    Type 2 diabetes mellitus with diabetic polyneuropathy, with long-term current use of insulin (HCC)    Hypertension    Morbid obesity (HCC)    Chronic diastolic CHF (congestive heart failure) (HCC)    Septic arthritis of hip (HCC)    Post op urinary retention, multifactorial    Plan:     Continue intermittent catheterization  Avoid anticholinergic meds  Increase activity as possible  Manage constipation    Brendon Woods Jr., MD  22  07:44 EDT    Electronically signed by  Brendon Woods Jr., MD at 22 0746     Fernando Hart MD at 22 1936      Consult Orders    1. Inpatient Hospitalist Consult [[id number]] ordered by Karri Schaeffer II, MD at 22 1832               Internal Medicine Consult  INTERNAL MEDICINE   [facility]       Patient Identification:  Name: Stephy Duncan  Age: 66 y.o.  Sex: female  :  1955  MRN: [id number]                   Primary Care Physician: Gregorio Rosales                               Requesting physician: Dr. Kilo Schaeffer II  Date of consultation: 2022    Reason for consultation: Comanagement of medical issues.    History of Present Illness:   Patient is a 66-year-old female with past medical history remarkable for diabetes hypertension dyslipidemia who underwent right total hip arthroplasty which was complicated by postop surgical site infection.  Patient has had multiple surgeries attempted antibiotic therapy with continued failure.  Eventually it was recommended that patient would benefit from Girdlestone procedure for which she was brought in today and was seen postoperatively.  Patient had prior infection with VRE.  At present patient denies any fever chills shortness of breath nausea vomiting and at present she feels fine and does not need anything.  Allergies:  Allergies   Allergen Reactions   • Ace Inhibitors Shortness Of Breath and Swelling   • Morphine Shortness Of Breath       Review of Systems  See history of present illness and past medical history.    Constitutional: Remarkable for no fever or chills  Cardiovascular: Remarkable for no chest pain or shortness of breath  GI: Remarkable for no nausea vomiting or diarrhea  : Remarkable for no burning urination frequency urgency in the right hip but currently feels fine  Neurological: Remarkable for no loss of consciousness or continence.  Remainder of ROS is negative.      Vitals:   BP 96/55 (BP Location: Left arm, Patient Position: Lying)  "  Pulse 61   Temp 97.2 °F (36.2 °C) (Oral)   Resp 16   Ht 162.6 cm (64\")   Wt 103 kg (227 lb 1.2 oz)   SpO2 96%   BMI 38.98 kg/m²   I/O:     Intake/Output Summary (Last 24 hours) at 4/25/2022 1936  Last data filed at 4/25/2022 1846  Gross per 24 hour   Intake 2051.67 ml   Output 800 ml   Net 1251.67 ml     Exam:  Patient is examined using the personal protective equipment as per guidelines from infection control for this particular patient as enacted.  Hand washing was performed before and after patient interaction.  General Appearance:   Comfortable cooperative female does not appear to be toxic septic or in any acute distress.   Head:    Normocephalic, without obvious abnormality, atraumatic   Eyes:    PERRL, conjunctiva/corneas clear, EOM's intact, both eyes   Ears:    Normal external ear canals, both ears   Nose:   Nares normal, septum midline, mucosa normal, no drainage    or sinus tenderness   Throat:   Lips, tongue, gums normal; oral mucosa pink and moist   Neck:   Supple, symmetrical, trachea midline, no adenopathy;     thyroid:  no enlargement/tenderness/nodules; no carotid    bruit or JVD   Back:     Symmetric, no curvature, ROM normal, no CVA tenderness   Lungs:     Clear to auscultation bilaterally, respirations unlabored   Chest Wall:    No tenderness or deformity    Heart:   S1-S2 regular   Abdomen:    Obese soft nontender   Extremities:  Right hip surgical site dressed   Pulses:   Pulses palpable in all extremities; symmetric all extremities   Skin:   Skin color normal, Skin is warm and dry,  no rashes or palpable lesions   Neurologic:  Grossly nonfocal examination       Data Review:      I reviewed the patient's new clinical results.    Assessment:  Active Hospital Problems    Diagnosis  POA   • **S/P Girdlestone procedure [Z98.890]  Not Applicable   • Septic arthritis of hip (HCC) [M00.9]  Yes   • Morbid obesity (HCC) [E66.01]  Yes   • Chronic diastolic CHF (congestive heart failure) ([facility]) " [I50.32]  Yes   • Status post total replacement of hip [Z96.649]  Not Applicable   • Type 2 diabetes mellitus with diabetic polyneuropathy, with long-term current use of insulin (HCC) [E11.42, Z79.4]  Not Applicable   • Hypertension [I10]  Yes       Plan:  · Continue home regimen and monitor her renal function as well as volume status and hemodynamics.  · Continue with Accu-Cheks and sliding scale coverage  · Management of pain, DVT prophylaxis, activity guidelines as per primary orthopedic surgery service.  · Internal medicine service will follow while she is in the hospital.    Fernando Hart MD   4/25/2022  19:36 EDT  Much of this encounter note is an electronic transcription/translation of spoken language to printed text. The electronic translation of spoken language may permit erroneous, or at times, nonsensical words or phrases to be inadvertently transcribed; Although I have reviewed the note for such errors, some may still exist      Electronically signed by Fernando Hart MD at 04/25/22 2341           Agatha Canela       Case Management   Case Management/Social Work      Signed   Date of Service:  04/29/22 1016   Creation Time:  04/29/22 1016              Signed              Continued Stay Note  [facility]     Patient Name: Stephy Duncan                   MRN: [id number]  Today's Date: 4/29/2022                     Admit Date: 4/25/2022             Discharge Plan            Row Name 04/29/22 1015             Plan      Plan [facility], pending pre-cert approval.      Patient/Family in Agreement with Plan yes      Plan Comments CCP met with patient at bedside, reintroduced self. CCP informed patient that [facility] in [place] has accepted patient. Patient is agreeable to go to [facility] for rehab. CCP explained she would call admissions at [facility] and let them know that she is agreeable to go there and ask them to start pre-cert. CCP explained to patient that pre-cert  could take a couple of days, patient verbalized her understanding. Patient told CCP that they have remove the dressing on her hip as it is draining quite a bit and asked CCP to follow up with nursing to see what their thoughts were on the issue and having [facility] start the pre-cert. A few moments later patient’s nurse, Citlaly came in and CCP inquired about patient’s hip draining and patient being accepted to [facility] but needing pre-cert started and approved before patient could discharge.  Citlaly felt it would be fine to go ahead and notify [facility] that patient is agreeable to admit due to pre-cert likely taking a couple of days. CCP returned to office and made an out bound call to [facility] and spoke with Addie in admission who stated she would let the , Sarai know to start the pre-cert. CCP to continue to follow for pre-cert approval.

## 2022-05-02 NOTE — ANESTHESIA PROCEDURE NOTES
Airway  Urgency: elective    Date/Time: 5/2/2022 2:32 PM  Airway not difficult    General Information and Staff    Patient location during procedure: OR    Indications and Patient Condition  Indications for airway management: airway protection    Preoxygenated: yes  Mask difficulty assessment: 1 - vent by mask    Final Airway Details  Final airway type: endotracheal airway      Successful airway: ETT  Cuffed: yes   Successful intubation technique: direct laryngoscopy  Facilitating devices/methods: intubating stylet  Endotracheal tube insertion site: oral  Blade: Cedrick  Blade size: 3  ETT size (mm): 7.0  Cormack-Lehane Classification: grade I - full view of glottis  Placement verified by: chest auscultation and capnometry   Measured from: lips  ETT/EBT  to lips (cm): 21  Number of attempts at approach: 1  Assessment: lips, teeth, and gum same as pre-op and atraumatic intubation

## 2022-05-03 LAB
ANION GAP SERPL CALCULATED.3IONS-SCNC: 5 MMOL/L (ref 5–15)
BUN SERPL-MCNC: 12 MG/DL (ref 8–23)
BUN/CREAT SERPL: 10.9 (ref 7–25)
CALCIUM SPEC-SCNC: 7.8 MG/DL (ref 8.6–10.5)
CHLORIDE SERPL-SCNC: 104 MMOL/L (ref 98–107)
CK SERPL-CCNC: 24 U/L (ref 20–180)
CO2 SERPL-SCNC: 23 MMOL/L (ref 22–29)
CREAT SERPL-MCNC: 1.1 MG/DL (ref 0.57–1)
EGFRCR SERPLBLD CKD-EPI 2021: 54.8 ML/MIN/1.73
GLUCOSE BLDC GLUCOMTR-MCNC: 184 MG/DL (ref 70–130)
GLUCOSE BLDC GLUCOMTR-MCNC: 200 MG/DL (ref 70–130)
GLUCOSE BLDC GLUCOMTR-MCNC: 221 MG/DL (ref 70–130)
GLUCOSE BLDC GLUCOMTR-MCNC: 75 MG/DL (ref 70–130)
GLUCOSE SERPL-MCNC: 73 MG/DL (ref 65–99)
HCT VFR BLD AUTO: 23.5 % (ref 34–46.6)
HGB BLD-MCNC: 7.6 G/DL (ref 12–15.9)
POTASSIUM SERPL-SCNC: 4.4 MMOL/L (ref 3.5–5.2)
SODIUM SERPL-SCNC: 132 MMOL/L (ref 136–145)

## 2022-05-03 PROCEDURE — 85014 HEMATOCRIT: CPT | Performed by: ORTHOPAEDIC SURGERY

## 2022-05-03 PROCEDURE — 82962 GLUCOSE BLOOD TEST: CPT

## 2022-05-03 PROCEDURE — 63710000001 INSULIN LISPRO (HUMAN) PER 5 UNITS: Performed by: ORTHOPAEDIC SURGERY

## 2022-05-03 PROCEDURE — 80048 BASIC METABOLIC PNL TOTAL CA: CPT | Performed by: ORTHOPAEDIC SURGERY

## 2022-05-03 PROCEDURE — 97164 PT RE-EVAL EST PLAN CARE: CPT

## 2022-05-03 PROCEDURE — 82550 ASSAY OF CK (CPK): CPT | Performed by: ORTHOPAEDIC SURGERY

## 2022-05-03 PROCEDURE — 85018 HEMOGLOBIN: CPT | Performed by: ORTHOPAEDIC SURGERY

## 2022-05-03 PROCEDURE — 25010000002 DAPTOMYCIN PER 1 MG: Performed by: ORTHOPAEDIC SURGERY

## 2022-05-03 PROCEDURE — 97530 THERAPEUTIC ACTIVITIES: CPT

## 2022-05-03 RX ORDER — FERROUS SULFATE 325(65) MG
325 TABLET ORAL
Status: DISCONTINUED | OUTPATIENT
Start: 2022-05-03 | End: 2022-05-08 | Stop reason: HOSPADM

## 2022-05-03 RX ADMIN — OXYCODONE HYDROCHLORIDE AND ACETAMINOPHEN 2 TABLET: 5; 325 TABLET ORAL at 20:42

## 2022-05-03 RX ADMIN — INSULIN GLARGINE-YFGN 55 UNITS: 100 INJECTION, SOLUTION SUBCUTANEOUS at 00:26

## 2022-05-03 RX ADMIN — OXYCODONE HYDROCHLORIDE AND ACETAMINOPHEN 2 TABLET: 5; 325 TABLET ORAL at 00:27

## 2022-05-03 RX ADMIN — GABAPENTIN 600 MG: 300 CAPSULE ORAL at 08:12

## 2022-05-03 RX ADMIN — INSULIN GLARGINE-YFGN 50 UNITS: 100 INJECTION, SOLUTION SUBCUTANEOUS at 21:36

## 2022-05-03 RX ADMIN — ASPIRIN 81 MG: 81 TABLET, COATED ORAL at 08:12

## 2022-05-03 RX ADMIN — INSULIN LISPRO 4 UNITS: 100 INJECTION, SOLUTION INTRAVENOUS; SUBCUTANEOUS at 16:32

## 2022-05-03 RX ADMIN — MELOXICAM 15 MG: 15 TABLET ORAL at 08:12

## 2022-05-03 RX ADMIN — FERROUS SULFATE TAB 325 MG (65 MG ELEMENTAL FE) 325 MG: 325 (65 FE) TAB at 10:25

## 2022-05-03 RX ADMIN — OXYCODONE HYDROCHLORIDE AND ACETAMINOPHEN 2 TABLET: 5; 325 TABLET ORAL at 12:28

## 2022-05-03 RX ADMIN — OXYCODONE HYDROCHLORIDE AND ACETAMINOPHEN 2 TABLET: 5; 325 TABLET ORAL at 08:12

## 2022-05-03 RX ADMIN — FAMOTIDINE 40 MG: 20 TABLET ORAL at 08:12

## 2022-05-03 RX ADMIN — SODIUM CHLORIDE 250 ML: 9 INJECTION, SOLUTION INTRAVENOUS at 05:52

## 2022-05-03 RX ADMIN — METOPROLOL TARTRATE 25 MG: 25 TABLET, FILM COATED ORAL at 20:42

## 2022-05-03 RX ADMIN — GABAPENTIN 600 MG: 300 CAPSULE ORAL at 20:42

## 2022-05-03 RX ADMIN — DAPTOMYCIN 750 MG: 500 INJECTION, POWDER, LYOPHILIZED, FOR SOLUTION INTRAVENOUS at 10:26

## 2022-05-03 RX ADMIN — OXYCODONE HYDROCHLORIDE AND ACETAMINOPHEN 2 TABLET: 5; 325 TABLET ORAL at 16:32

## 2022-05-03 RX ADMIN — ASPIRIN 81 MG: 81 TABLET, COATED ORAL at 20:42

## 2022-05-03 RX ADMIN — INSULIN LISPRO 2 UNITS: 100 INJECTION, SOLUTION INTRAVENOUS; SUBCUTANEOUS at 12:28

## 2022-05-03 NOTE — PLAN OF CARE
Goal Outcome Evaluation:  Plan of Care Reviewed With: patient           Outcome Evaluation: Pt seen for PT this afternon. SHe had I & D of R hip joint yesterday. Pt sleeping upon entry to room, but able to awaken to participate w therapy. Pt does c/o pain. She was able to sit EOB w mod/max A x 2. She declines attempt to stand today. Per pt, she remains NWB on RLE. Pt limited by fatigue, weakness, and pain. Upone return to supine in bed, pt was able to use BUE to assist w scooting herself up in bed. Slow progress w therapy. She may benefit from SNU at [place]. Will continue to progress activity as tolerated.

## 2022-05-03 NOTE — PROGRESS NOTES
Postop day 1 right hip irrigation debridement with revision of closure.  Dressing clean dry and intact.  Plan for likely incisional wound VAC tomorrow.  Patient will be going to rehab.  From my standpoint she would be okay for discharge to rehab after wound VAC placement tomorrow or later in the week whenever she can get approved.

## 2022-05-03 NOTE — THERAPY RE-EVALUATION
Patient Name: Stephy Duncan  : 1955    MRN: [id number]                              Today's Date: 5/3/2022       Admit Date: 2022    Visit Dx:     ICD-10-CM ICD-9-CM   1. S/P Girdlestone procedure  Z98.890 V45.89   2. Prosthetic hip infection (HCC)  T84.59XA 996.66    Z96.649 V43.64     Patient Active Problem List   Diagnosis   • Status post total replacement of hip   • Type 2 diabetes mellitus with diabetic polyneuropathy, with long-term current use of insulin (HCC)   • Hypertension   • Normocytic anemia   • Postoperative infection   • Iron deficiency anemia   • Morbid obesity (HCC)   • Presence of stent in coronary artery in patient with coronary artery disease   • Chronic diastolic CHF (congestive heart failure) (HCC)   • Septic arthritis of hip (HCC)   • Drainage from wound   • Candidal intertrigo   • Postoperative anemia due to acute blood loss   • S/P Girdlestone procedure     Past Medical History:   Diagnosis Date   • Allergies    • Arthritis    • Cardiac murmur    • Diabetes mellitus (HCC)    • GERD (gastroesophageal reflux disease)    • Heart attack (HCC) 2020   • History of cervical cancer    • History of transfusion     SEVERAL   • Hyperlipidemia    • Hypertension    • Iron deficiency anemia    • Osteomyelitis hip (HCC)     RIGHT   • PONV (postoperative nausea and vomiting)    • Right hip pain    • VRE infection within last 3 months    • Wound infection     RIGHT HIP.  WOUND VAC IN PLACE     Past Surgical History:   Procedure Laterality Date   • CARDIAC CATHETERIZATION     • CERVICAL CONIZATION     • COLONOSCOPY     • CORONARY ANGIOPLASTY WITH STENT PLACEMENT     • ENDOSCOPY     • HIP ARTHROPLASTY Right    • HIP HARDWARE REMOVAL Right    • HIP MINI REVISION Right 2022    Procedure: TOTAL HIP ARTHROPLASTY LINER REVISION, pegboard lateral;  Surgeon: Karri Schaeffer II, MD;  Location: [facility];  Service: Orthopedics;  Laterality: Right;   • HIP SPACER INSERTION  WITH ANTIBIOTIC CEMENT      X3   • HYSTERECTOMY     • INCISION AND DRAINAGE HIP Right 12/18/2021    Procedure: INCISION AND DRAINAGE TOTAL HIP, LINER EXCHANGE AND WOUND VAC PLACEMENT;  Surgeon: Karri Schaeffer II, MD;  [facility] MA[facility];  Service: Orthopedics;  Laterality: Right;   • INCISION AND DRAINAGE HIP Right 5/2/2022    Procedure: HIP INCISION AND DRAINAGE;  Surgeon: Karri Schaeffer II, MD;  [facility];  Service: Orthopedics;  Laterality: Right;   • KNEE ARTHROPLASTY Left    • LAPAROSCOPIC CHOLECYSTECTOMY     • TONSILLECTOMY     • TOTAL HIP ARTHROPLASTY Right 4/25/2022    Procedure: HIP GIRDLESTONE PROCEDURE;  Surgeon: Karri Schaeffer II, MD;  [facility];  Service: Orthopedics;  Laterality: Right;   • TOTAL HIP ARTHROPLASTY REVISION Right 11/16/2021    Procedure: TOTAL HIP REVISION ARTHROPLASTY RIGHT POSTERIOR;  Surgeon: Karri Schaeffer II, MD;  Location: [facility] MA[facility];  Service: Orthopedics;  Laterality: Right;      General Information     Row Name 05/03/22 1512          Physical Therapy Time and Intention    Document Type therapy note (daily note);re-evaluation  -     Mode of Treatment physical therapy  -     Row Name 05/03/22 1512          General Information    Existing Precautions/Restrictions fall;non-weight bearing;other (see comments)  -     Row Name 05/03/22 1512          Safety Issues, Functional Mobility    Impairments Affecting Function (Mobility) endurance/activity tolerance;strength;pain;range of motion (ROM)  -           User Key  (r) = Recorded By, (t) = Taken By, (c) = Cosigned By    Initials Name Provider Type     Joann Crook, PT Physical Therapist               Mobility     Row Name 05/03/22 1513          Bed Mobility    Supine-Sit [place] (Bed Mobility) verbal cues;nonverbal cues (demo/gesture);moderate assist (50% patient effort);maximum assist (25% patient effort);2 person assist  -      Sit-Supine [place] (Bed Mobility) verbal cues;moderate assist (50% patient effort);maximum assist (25% patient effort);2 person assist  -EJ     Assistive Device (Bed Mobility) head of bed elevated;draw sheet;bed rails  -[facility] Name 05/03/22 1513          Transfers    Comment, (Transfers) pt declined attempt to stand this date  -[facility] Name 05/03/22 1513          Sit-Stand Transfer    Sit-Stand [place] (Transfers) unable to assess  -[facility] Name 05/03/22 1513          Gait/Stairs (Locomotion)    [place] Level (Gait) unable to assess  -[facility] Name 05/03/22 1513          Mobility    Extremity Weight-bearing Status right lower extremity  -EJ     Right Lower Extremity (Weight-bearing Status) non weight-bearing (NWB)   -EJ           User Key  (r) = Recorded By, (t) = Taken By, (c) = Cosigned By    Initials Name Provider Type    EJ Joann Crook, PT Physical Therapist               Obj/Interventions     [facility] Name 05/03/22 1514          Strength Comprehensive (MMT)    Comment, General Manual Muscle Testing (MMT) Assessment generalized weakness, difficult to formally assess  -[facility] Name 05/03/22 1514          Balance    Balance Assessment sitting static balance  -EJ     Static Sitting Balance standby assist  -EJ     Position, Sitting Balance sitting edge of bed  -EJ     Comment, Balance pt able to sit EOB for approx 6 minutes w SBA  -EJ           User Key  (r) = Recorded By, (t) = Taken By, (c) = Cosigned By    Initials Name Provider Type    EJ Joann Crook, PT Physical Therapist               Goals/Plan     [facility] Name 05/03/22 1518          Bed Mobility Goal 1 (PT)    Activity/Assistive Device (Bed Mobility Goal 1, PT) bed mobility activities, all  -EJ     [place] Level/Cues Needed (Bed Mobility Goal 1, PT) contact guard required  -EJ     Time Frame (Bed Mobility Goal 1, PT) 1 week  -EJ     Progress/Outcomes (Bed Mobility Goal 1, PT) goal revised this date  -[facility] Name  05/03/22 1518          Transfer Goal 1 (PT)    Activity/Assistive Device (Transfer Goal 1, PT) transfers, all;walker, rolling;sit-to-stand/stand-to-sit;bed-to-chair/chair-to-bed  -EJ     [place] Level/Cues Needed (Transfer Goal 1, PT) minimum assist (75% or more patient effort)  -EJ     Time Frame (Transfer Goal 1, PT) 1 week  -EJ     Progress/Outcome (Transfer Goal 1, PT) goal revised this date  -     Row Name 05/03/22 1518          Gait Training Goal 1 (PT)    Activity/Assistive Device (Gait Training Goal 1, PT) gait (walking locomotion);walker, rolling  -EJ     [place] Level (Gait Training Goal 1, PT) minimum assist (75% or more patient effort)  -EJ     Distance (Gait Training Goal 1, PT) 20 (maintaining NWB RLE)  -EJ     Time Frame (Gait Training Goal 1, PT) 1 week  -EJ     Progress/Outcome (Gait Training Goal 1, PT) goal revised this date  -     Row Name 05/03/22 1518          Therapy Assessment/Plan (PT)    Planned Therapy Interventions (PT) balance training;bed mobility training;gait training;home exercise program;patient/family education;strengthening;ROM (range of motion);postural re-education;transfer training  -EJ           User Key  (r) = Recorded By, (t) = Taken By, (c) = Cosigned By    Initials Name Provider Type    EJ Joann Crook, PT Physical Therapist               Clinical Impression     Row Name 05/03/22 1515          Pain    Posttreatment Pain Rating 8/10  -EJ     Pain Location - Side/Orientation Right  -EJ     Pain Location - hip  -EJ     Pain Intervention(s) Repositioned  -     Row Name 05/03/22 1515          Plan of Care Review    Plan of Care Reviewed With patient  -EJ     Outcome Evaluation Pt seen for PT this afternon. SHe had I & D of R hip joint yesterday. Pt sleeping upon entry to room, but able to awaken to participate w therapy. Pt does c/o pain. She was able to sit EOB w mod/max A x 2. She declines attempt to stand today. Per pt, she remains NWB on RLE. Pt  limited by fatigue, weakness, and pain. Upone return to supine in bed, pt was able to use BUE to assist w scooting herself up in bed. Slow progress w therapy. She may benefit from SNU at [place]. Will continue to progress activity as tolerated.  -     Row Name 05/03/22 1515          Therapy Assessment/Plan (PT)    Therapy Frequency (PT) 5 times/wk  -     Row Name 05/03/22 1515          Positioning and Restraints    Pre-Treatment Position in bed  -EJ     Post Treatment Position bed  -EJ     In Bed notified nsg;supine;call light within reach;encouraged to call for assist;exit alarm on  -EJ           User Key  (r) = Recorded By, (t) = Taken By, (c) = Cosigned By    Initials Name Provider Type    Joann Arcos, PT Physical Therapist               Outcome Measures     Row Name 05/03/22 1519 05/03/22 0812       How much help from another person do you currently need...    Turning from your back to your side while in flat bed without using bedrails? 3  -EJ 4  -VINOD    Moving from lying on back to sitting on the side of a flat bed without bedrails? 2  -EJ 2  -VINOD    Moving to and from a bed to a chair (including a wheelchair)? 1  -EJ 2  -VINOD    Standing up from a chair using your arms (e.g., wheelchair, bedside chair)? 1  -EJ 2  -VINOD    Climbing 3-5 steps with a railing? 1  -EJ 1  -VINOD    To walk in hospital room? 1  -EJ 2  -VINOD    AM-PAC 6 Clicks Score (PT) 9  -EJ 13  -VINOD    Highest level of mobility 3 --> Sat at edge of bed  -EJ 4 --> Transferred to chair/commode  -VINOD          User Key  (r) = Recorded By, (t) = Taken By, (c) = Cosigned By    Initials Name Provider Type    Joann Arcos, PT Physical Therapist    Sunita Hoffmann, RN Registered Nurse                             Physical Therapy Education                 Title: PT OT SLP Therapies (Done)     Topic: Physical Therapy (Done)     Point: Mobility training (Done)     Learning Progress Summary           Patient Acceptance, E,TB,D, VU,NR by BREANNA at 5/3/2022  1519    Acceptance, E,TB, VU,DU by NL at 5/2/2022 0825    Acceptance, E,D, VU,NR by  at 4/27/2022 1315    Acceptance, E,D, VU,NR by MS at 4/26/2022 1525                   Point: Home exercise program (Done)     Learning Progress Summary           Patient Acceptance, E,TB, VU,DU by NL at 5/2/2022 0825                   Point: Body mechanics (Done)     Learning Progress Summary           Patient Acceptance, E,TB, VU,DU by NL at 5/2/2022 0825    Acceptance, E,D, VU,NR by PH at 4/27/2022 1315    Acceptance, E,D, VU,NR by MS at 4/26/2022 1525                   Point: Precautions (Done)     Learning Progress Summary           Patient Acceptance, E,TB, VU,DU by NL at 5/2/2022 0825    Acceptance, E,D, VU,NR by PH at 4/27/2022 1315    Acceptance, E,D, VU,NR by MS at 4/26/2022 1525                               User Key     Initials Effective Dates Name Provider Type Discipline    NL 06/16/21 -  Ana Jacinto, RN Registered Nurse Nurse    MS 06/16/21 -  Aj Lang, PT Physical Therapist PT    EJ 06/16/21 -  Joann Crook, PT Physical Therapist PT     06/16/21 -  Antonia Zhong PTA Physical Therapist Assistant PT              PT Recommendation and Plan  Planned Therapy Interventions (PT): balance training, bed mobility training, gait training, home exercise program, patient/family education, strengthening, ROM (range of motion), postural re-education, transfer training  Plan of Care Reviewed With: patient  Outcome Evaluation: Pt seen for PT this afternon. SHe had I & D of R hip joint yesterday. Pt sleeping upon entry to room, but able to awaken to participate w therapy. Pt does c/o pain. She was able to sit EOB w mod/max A x 2. She declines attempt to stand today. Per pt, she remains NWB on RLE. Pt limited by fatigue, weakness, and pain. Upone return to supine in bed, pt was able to use BUE to assist w scooting herself up in bed. Slow progress w therapy. She may benefit from SNU at [place]. Will continue to  progress activity as tolerated.     Time Calculation:    PT Charges     Row Name 05/03/22 1521 05/03/22 1520          Time Calculation    Start Time -- 1455  -EJ     Stop Time -- 1510  -EJ     Time Calculation (min) -- 15 min  -EJ     PT Received On -- 05/03/22  -EJ     PT - Next Appointment -- 05/04/22  -EJ     PT Goal Re-Cert Due Date -- 05/10/22  -EJ            Time Calculation- PT    Total Timed Code Minutes- PT 8 minute(s)  -EJ --           User Key  (r) = Recorded By, (t) = Taken By, (c) = Cosigned By    Initials Name Provider Type    EJ Joann Crook, PT Physical Therapist              Therapy Charges for Today     Code Description Service Date Service Provider Modifiers Qty    [id number]  PT THERAPEUTIC ACT EA 15 MIN 5/3/2022 Joann Crook, PT GP 1    [id number]  PT RE-EVAL ESTABLISHED PLAN 2 5/3/2022 Joann Crook, PT GP 1    [id number]  PT THER SUPP EA 15 MIN 5/3/2022 Joann Crook, PT GP 1          PT G-Codes  Outcome Measure Options: AM-PAC 6 Clicks Basic Mobility (PT)  AM-PAC 6 Clicks Score (PT): 9    Joann Crook PT  5/3/2022

## 2022-05-03 NOTE — PROGRESS NOTES
Name: Stephy Duncan ADMIT: 2022   : 1955  PCP: Pierre Rosalesin    MRN: [id number] LOS: 2 days   AGE/SEX: 66 y.o. female  ROOM: [facility]   Subjective   No chief complaint on file.     No CP SOA or abdominal pain. Hip/pelvic pain controlled with medication.    Objective   Vital Signs  Temp:  [97.5 °F (36.4 °C)-100.9 °F (38.3 °C)] 99 °F (37.2 °C)  Heart Rate:  [74-99] 83  Resp:  [12-20] 16  BP: ()/(47-65) 108/63  SpO2:  [93 %-100 %] 93 %  on  Flow (L/min):  [2-4] 2;   Device (Oxygen Therapy): room air  Body mass index is 38.98 kg/m².    Physical Exam  Vitals and nursing note reviewed.   Constitutional:       General: She is not in acute distress.     Appearance: She is not diaphoretic.   HENT:      Head: Normocephalic and atraumatic.   Eyes:      General:         Right eye: No discharge.         Left eye: No discharge.      Conjunctiva/sclera: Conjunctivae normal.   Cardiovascular:      Rate and Rhythm: Normal rate and regular rhythm.      Heart sounds: Murmur heard.   Pulmonary:      Effort: Pulmonary effort is normal.      Breath sounds: No wheezing.   Abdominal:      General: There is no distension.      Palpations: Abdomen is soft.      Tenderness: There is no abdominal tenderness. There is no guarding or rebound.   Musculoskeletal:         General: Swelling and tenderness present.      Cervical back: Neck supple. No tenderness.   Skin:     General: Skin is warm and dry.   Neurological:      Mental Status: She is alert. Mental status is at baseline.   Psychiatric:         Mood and Affect: Mood normal.         Behavior: Behavior normal.         Results Review:       I reviewed the patient's new clinical results.      Results from last 7 days   Lab Units 22  0525 22  0430 22  0356 22  0445 22  0500 22  0538   WBC 10*3/mm3  --  4.75 4.14  --  4.00 4.31   HEMOGLOBIN g/dL 7.6* 8.4* 8.3*  8.2* 7.9* 7.8* 7.8*   PLATELETS 10*3/mm3  --  157 142  --  124* 101*      Results from last 7 days   Lab Units 05/03/22  0526 05/02/22  0430 04/29/22  0500 04/27/22  0511   SODIUM mmol/L 132* 132* 132* 130*   POTASSIUM mmol/L 4.4 4.4 4.6 4.6   CHLORIDE mmol/L 104 103 104 101   CO2 mmol/L 23.0 21.0* 20.2* 20.2*   BUN mg/dL 12 10 12 12   CREATININE mg/dL 1.10* 0.89 0.88 0.94   GLUCOSE mg/dL 73 111* 182* 251*   Estimated Creatinine Clearance: 57.2 mL/min (A) (by C-G formula based on SCr of 1.1 mg/dL (H)).  Results from last 7 days   Lab Units 05/03/22 0526 05/02/22 0430 04/29/22  0500 04/27/22  0511   CALCIUM mg/dL 7.8* 7.9* 8.2* 7.5*     Results from last 7 days   Lab Units 04/26/22  1601   INR  1.19*   APTT seconds 22.7        aspirin, 81 mg, Oral, Q12H  DAPTOmycin, 10 mg/kg (Adjusted), Intravenous, Q24H  famotidine, 40 mg, Oral, Daily  gabapentin, 600 mg, Oral, Q12H  insulin glargine, 55 Units, Subcutaneous, Nightly  insulin lispro, 0-9 Units, Subcutaneous, TID AC  meloxicam, 15 mg, Oral, Daily  metoprolol tartrate, 25 mg, Oral, Nightly      lactated ringers, 9 mL/hr, Last Rate: Stopped (05/02/22 1745)  lactated ringers, 9 mL/hr, Last Rate: 9 mL/hr (05/02/22 1745)    Diet Regular    Assessment/Plan      Active Hospital Problems    Diagnosis  POA   • **S/P Girdlestone procedure [Z98.890]  Not Applicable   • Septic arthritis of hip ([facility]) [M00.9]  Yes   • Morbid obesity ([facility]) [E66.01]  Yes   • Chronic diastolic CHF (congestive heart failure) ([facility]) [I50.32]  Yes   • Status post total replacement of hip [Z96.649]  Not Applicable   • Type 2 diabetes mellitus with diabetic polyneuropathy, with long-term current use of insulin ([facility]) [E11.42, Z79.4]  Not Applicable   • Hypertension [I10]  Yes      Resolved Hospital Problems   No resolved problems to display.       · Status post Girdlestone procedure, septic arthritis, wound dehiscence: On daptomycin for VRE hx. Orthopedic surgery primary. Current with outpatient ID, Dr Galan. Febrile this morning postop. Has seen Dr Ponce here in the  past if ID evaluation is needed. Defer abx to orthopedic surgery.  · DM2: A1c 6.2. Adjust back to home long acting dose of 50 units. SSI. On less than total home dose. Monitor requirements.  · HTN: Acceptable acutely. Continue current regimen.  · Anemia: Postop, YANIRA, and chronic disease. PO iron. Transfuse as needed.  · Urinary Retention: Cath prn.  · Obesity    Will continue to follow. Please call with any questions or concerns.    Carlos Reyna MD  [facility]  05/03/22  09:15 EDT    Dictated portions using Dragon dictation software.    During the entire encounter, I was wearing recommended PPE including face mask and eye protection. Hand sanitization was performed prior to entering room and upon exit.

## 2022-05-03 NOTE — PLAN OF CARE
Goal Outcome Evaluation:  Plan of Care Reviewed With: patient        Progress: no change  Outcome Evaluation: A&Ox4. VSS, hypotensive, temperature improved with IS use. Dressing CDI, HV x2 in place. Worked with PT, max assist, TTWB. Voiding per gracie, last BM 05/01/22. Pain controlled with PO PRNs. DM with SSI. Plans for wound vac placement, possibly tomorrow. D/C plans pending precert. Education provided. Will cont to monitor.

## 2022-05-03 NOTE — NURSING NOTE
Paged on call [facility] concerning bladder scans and history of post op urinary retention, no new orders, call back if next scan shows 0ml.    0450 bladder scan 0ml again, blood pressure 90/47. Paged on call [facility] at 0510, awaiting response.  On call [facility] ordered NS bolus, see orders. Will continue to monitor.

## 2022-05-04 LAB
ABO GROUP BLD: NORMAL
ANION GAP SERPL CALCULATED.3IONS-SCNC: 6 MMOL/L (ref 5–15)
BLD GP AB SCN SERPL QL: NEGATIVE
BUN SERPL-MCNC: 19 MG/DL (ref 8–23)
BUN/CREAT SERPL: 17.1 (ref 7–25)
CALCIUM SPEC-SCNC: 7.5 MG/DL (ref 8.6–10.5)
CHLORIDE SERPL-SCNC: 103 MMOL/L (ref 98–107)
CO2 SERPL-SCNC: 21 MMOL/L (ref 22–29)
CREAT SERPL-MCNC: 1.11 MG/DL (ref 0.57–1)
DEPRECATED RDW RBC AUTO: 50.4 FL (ref 37–54)
EGFRCR SERPLBLD CKD-EPI 2021: 54.3 ML/MIN/1.73
ERYTHROCYTE [DISTWIDTH] IN BLOOD BY AUTOMATED COUNT: 16.9 % (ref 12.3–15.4)
GLUCOSE BLDC GLUCOMTR-MCNC: 142 MG/DL (ref 70–130)
GLUCOSE BLDC GLUCOMTR-MCNC: 165 MG/DL (ref 70–130)
GLUCOSE BLDC GLUCOMTR-MCNC: 212 MG/DL (ref 70–130)
GLUCOSE BLDC GLUCOMTR-MCNC: 233 MG/DL (ref 70–130)
GLUCOSE SERPL-MCNC: 136 MG/DL (ref 65–99)
HCT VFR BLD AUTO: 21.2 % (ref 34–46.6)
HGB BLD-MCNC: 6.7 G/DL (ref 12–15.9)
MCH RBC QN AUTO: 25.8 PG (ref 26.6–33)
MCHC RBC AUTO-ENTMCNC: 31.6 G/DL (ref 31.5–35.7)
MCV RBC AUTO: 81.5 FL (ref 79–97)
PLATELET # BLD AUTO: 164 10*3/MM3 (ref 140–450)
PMV BLD AUTO: 9.8 FL (ref 6–12)
POTASSIUM SERPL-SCNC: 4.3 MMOL/L (ref 3.5–5.2)
RBC # BLD AUTO: 2.6 10*6/MM3 (ref 3.77–5.28)
RH BLD: POSITIVE
SODIUM SERPL-SCNC: 130 MMOL/L (ref 136–145)
T&S EXPIRATION DATE: NORMAL
WBC NRBC COR # BLD: 6.97 10*3/MM3 (ref 3.4–10.8)

## 2022-05-04 PROCEDURE — 63710000001 INSULIN LISPRO (HUMAN) PER 5 UNITS: Performed by: ORTHOPAEDIC SURGERY

## 2022-05-04 PROCEDURE — 36430 TRANSFUSION BLD/BLD COMPNT: CPT

## 2022-05-04 PROCEDURE — 82962 GLUCOSE BLOOD TEST: CPT

## 2022-05-04 PROCEDURE — P9016 RBC LEUKOCYTES REDUCED: HCPCS

## 2022-05-04 PROCEDURE — 86900 BLOOD TYPING SEROLOGIC ABO: CPT

## 2022-05-04 PROCEDURE — 86923 COMPATIBILITY TEST ELECTRIC: CPT

## 2022-05-04 PROCEDURE — 25010000002 DAPTOMYCIN PER 1 MG: Performed by: ORTHOPAEDIC SURGERY

## 2022-05-04 PROCEDURE — 86850 RBC ANTIBODY SCREEN: CPT | Performed by: STUDENT IN AN ORGANIZED HEALTH CARE EDUCATION/TRAINING PROGRAM

## 2022-05-04 PROCEDURE — 85027 COMPLETE CBC AUTOMATED: CPT | Performed by: INTERNAL MEDICINE

## 2022-05-04 PROCEDURE — 80048 BASIC METABOLIC PNL TOTAL CA: CPT | Performed by: INTERNAL MEDICINE

## 2022-05-04 PROCEDURE — 86900 BLOOD TYPING SEROLOGIC ABO: CPT | Performed by: STUDENT IN AN ORGANIZED HEALTH CARE EDUCATION/TRAINING PROGRAM

## 2022-05-04 PROCEDURE — 86901 BLOOD TYPING SEROLOGIC RH(D): CPT | Performed by: STUDENT IN AN ORGANIZED HEALTH CARE EDUCATION/TRAINING PROGRAM

## 2022-05-04 RX ADMIN — GABAPENTIN 600 MG: 300 CAPSULE ORAL at 21:17

## 2022-05-04 RX ADMIN — MELOXICAM 15 MG: 15 TABLET ORAL at 08:24

## 2022-05-04 RX ADMIN — ASPIRIN 81 MG: 81 TABLET, COATED ORAL at 08:24

## 2022-05-04 RX ADMIN — OXYCODONE HYDROCHLORIDE AND ACETAMINOPHEN 2 TABLET: 5; 325 TABLET ORAL at 00:26

## 2022-05-04 RX ADMIN — GABAPENTIN 600 MG: 300 CAPSULE ORAL at 08:24

## 2022-05-04 RX ADMIN — INSULIN LISPRO 4 UNITS: 100 INJECTION, SOLUTION INTRAVENOUS; SUBCUTANEOUS at 11:57

## 2022-05-04 RX ADMIN — OXYCODONE HYDROCHLORIDE AND ACETAMINOPHEN 2 TABLET: 5; 325 TABLET ORAL at 04:30

## 2022-05-04 RX ADMIN — DAPTOMYCIN 750 MG: 500 INJECTION, POWDER, LYOPHILIZED, FOR SOLUTION INTRAVENOUS at 10:13

## 2022-05-04 RX ADMIN — OXYCODONE HYDROCHLORIDE AND ACETAMINOPHEN 2 TABLET: 5; 325 TABLET ORAL at 17:06

## 2022-05-04 RX ADMIN — FAMOTIDINE 40 MG: 20 TABLET ORAL at 08:24

## 2022-05-04 RX ADMIN — INSULIN GLARGINE-YFGN 50 UNITS: 100 INJECTION, SOLUTION SUBCUTANEOUS at 21:16

## 2022-05-04 RX ADMIN — INSULIN LISPRO 2 UNITS: 100 INJECTION, SOLUTION INTRAVENOUS; SUBCUTANEOUS at 17:06

## 2022-05-04 RX ADMIN — METOPROLOL TARTRATE 25 MG: 25 TABLET, FILM COATED ORAL at 21:16

## 2022-05-04 RX ADMIN — ASPIRIN 81 MG: 81 TABLET, COATED ORAL at 21:16

## 2022-05-04 RX ADMIN — OXYCODONE HYDROCHLORIDE AND ACETAMINOPHEN 2 TABLET: 5; 325 TABLET ORAL at 21:16

## 2022-05-04 RX ADMIN — OXYCODONE HYDROCHLORIDE AND ACETAMINOPHEN 2 TABLET: 5; 325 TABLET ORAL at 08:24

## 2022-05-04 RX ADMIN — FERROUS SULFATE TAB 325 MG (65 MG ELEMENTAL FE) 325 MG: 325 (65 FE) TAB at 08:24

## 2022-05-04 NOTE — SIGNIFICANT NOTE
05/04/22 1434   OTHER   Discipline physical therapist   Rehab Time/Intention   Session Not Performed other (see comments)  (pt getting wound vac placed earlier and also w critically low hgb at 6.7. transfusion now- pt wishes to rest as she has had a lot going on today. will follow up tomorrow.)   Recommendation   PT - Next Appointment 05/05/22

## 2022-05-04 NOTE — PROGRESS NOTES
Name: Stephy Duncan ADMIT: 2022   : 1955  PCP: Pierre Rosalesin    MRN: [id number] LOS: 3 days   AGE/SEX: 66 y.o. female  ROOM: [facility]   Subjective   No chief complaint on file.     Pain controlled.  No chest pain palpitation shortness of breath cough nausea vomiting diarrhea or abdominal pain.  No dysuria reported.    Objective   Vital Signs  Temp:  [97.3 °F (36.3 °C)-100.5 °F (38.1 °C)] 97.3 °F (36.3 °C)  Heart Rate:  [66-88] 67  Resp:  [16] 16  BP: ()/(35-57) 102/57  SpO2:  [94 %-100 %] 94 %  on  Flow (L/min):  [2] 2;   Device (Oxygen Therapy): room air  Body mass index is 38.98 kg/m².    Physical Exam  Vitals and nursing note reviewed.   Constitutional:       General: She is not in acute distress.     Appearance: She is not diaphoretic.   HENT:      Head: Normocephalic and atraumatic.   Eyes:      General:         Right eye: No discharge.         Left eye: No discharge.      Conjunctiva/sclera: Conjunctivae normal.   Cardiovascular:      Rate and Rhythm: Normal rate and regular rhythm.      Heart sounds: Murmur heard.   Pulmonary:      Effort: Pulmonary effort is normal.      Breath sounds: No wheezing.   Abdominal:      General: There is no distension.      Palpations: Abdomen is soft.      Tenderness: There is no abdominal tenderness. There is no guarding or rebound.   Musculoskeletal:         General: Swelling and tenderness present.      Cervical back: Neck supple. No tenderness.   Skin:     General: Skin is warm and dry.   Neurological:      Mental Status: She is alert. Mental status is at baseline.   Psychiatric:         Mood and Affect: Mood normal.         Behavior: Behavior normal.         Results Review:       I reviewed the patient's new clinical results.      Results from last 7 days   Lab Units 22  0521 22  0525 22  0430 22  0356 22  0445 22  0500   WBC 10*3/mm3 6.97  --  4.75 4.14  --  4.00   HEMOGLOBIN g/dL 6.7* 7.6* 8.4* 8.3*  8.2*    < > 7.8*   PLATELETS 10*3/mm3 164  --  157 142  --  124*    < > = values in this interval not displayed.     Results from last 7 days   Lab Units 05/04/22 0521 05/03/22 0526 05/02/22 0430 04/29/22  0500   SODIUM mmol/L 130* 132* 132* 132*   POTASSIUM mmol/L 4.3 4.4 4.4 4.6   CHLORIDE mmol/L 103 104 103 104   CO2 mmol/L 21.0* 23.0 21.0* 20.2*   BUN mg/dL 19 12 10 12   CREATININE mg/dL 1.11* 1.10* 0.89 0.88   GLUCOSE mg/dL 136* 73 111* 182*   Estimated Creatinine Clearance: 56.7 mL/min (A) (by C-G formula based on SCr of 1.11 mg/dL (H)).  Results from last 7 days   Lab Units 05/04/22 0521 05/03/22 0526 05/02/22 0430 04/29/22  0500   CALCIUM mg/dL 7.5* 7.8* 7.9* 8.2*            aspirin, 81 mg, Oral, Q12H  DAPTOmycin, 10 mg/kg (Adjusted), Intravenous, Q24H  famotidine, 40 mg, Oral, Daily  ferrous sulfate, 325 mg, Oral, Daily With Breakfast  gabapentin, 600 mg, Oral, Q12H  insulin glargine, 50 Units, Subcutaneous, Nightly  insulin lispro, 0-9 Units, Subcutaneous, TID AC  meloxicam, 15 mg, Oral, Daily  metoprolol tartrate, 25 mg, Oral, Nightly      lactated ringers, 9 mL/hr, Last Rate: Stopped (05/02/22 1745)  lactated ringers, 9 mL/hr, Last Rate: 9 mL/hr (05/02/22 1745)    Diet Regular    Assessment/Plan      Active Hospital Problems    Diagnosis  POA   • **S/P Girdlestone procedure [Z98.890]  Not Applicable   • Septic arthritis of hip ([facility]) [M00.9]  Yes   • Morbid obesity ([facility]) [E66.01]  Yes   • Chronic diastolic CHF (congestive heart failure) ([facility]) [I50.32]  Yes   • 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       · Status post Girdlestone procedure, septic arthritis, wound dehiscence: On daptomycin for VRE hx. Orthopedic surgery primary. Current with outpatient ID,  Dr Galan. Febrile this morning postop. Has seen Dr Ponce here in the past if ID evaluation is needed. Defer abx to orthopedic surgery.  · DM2: A1c 6.2. Continue home long acting dose of 50 units. SSI. On less than total home dose. Monitor requirements.  · HTN: Borderline low but is going to receive blood today for anemia.  Will place hold parameters on metoprolol.  · Anemia: Postop, YANIRA, and chronic disease. PO iron. Transfuse as needed.  · Urinary Retention: Cath prn.  · Obesity    Will continue to follow. Please call with any questions or concerns.    Carlos Reyna MD  [facility]  05/04/22  09:21 EDT    Dictated portions using Dragon dictation software.    During the entire encounter, I was wearing recommended PPE including face mask and eye protection. Hand sanitization was performed prior to entering room and upon exit.

## 2022-05-04 NOTE — PLAN OF CARE
Goal Outcome Evaluation:  Plan of Care Reviewed With: patient        Progress: no change  Outcome Evaluation: A&Ox4. VSS. Dressing CDI, HV x2, wound vac placed today. Low Hgb, tranfusing 1 unit PRBC, tolerating well. Pain controlled. Decline PT this shift. Plans to D/C to SNF when ready. Education provided. Will cont to monitor.

## 2022-05-04 NOTE — PROGRESS NOTES
Minimal drainage on bandage.  Hemoglobin critically low this morning and getting a transfusion.  Plan will be for discharge to rehab once stable.

## 2022-05-04 NOTE — PLAN OF CARE
Goal Outcome Evaluation:  Plan of Care Reviewed With: patient        Progress: no change  Outcome Evaluation: VSS, hypotensive, RA, SL, voiding per purewick, possible wound vac placement today, pain tolerable with meds Q4, daptomycin daily, HVx's 2 in place, educated on BP and glucose monitoring, precert pend to [facility] when stable

## 2022-05-04 NOTE — NURSING NOTE
05/04/22 1055   Wound 05/02/22 1454 Right posterior hip Incision   Placement Date/Time: 05/02/22 1454   Present on Hospital Admission: Yes  Side: Right  Orientation: posterior  Location: hip  Primary Wound Type: Incision   Dressing Appearance dressing loose;moist drainage   Closure Sutures   Base   (approximated incision)   Periwound intact;pink;dry   Periwound Temperature warm   Edges   (incision approximated)   Drainage Characteristics/Odor serosanguineous   Drainage Amount moderate   Care, Wound cleansed with;sterile normal saline;negative pressure wound therapy   Dressing Care dressing changed   Periwound Care   (spray prep)   NPWT (Negative Pressure Wound Therapy) 05/04/22 R hip   Placement Date: 05/04/22   Location: R hip   Therapy Setting continuous therapy  (therapy initiated)   Dressing foam, black;transparent dressing  (track pad cushioned and bridged laterally off of incision to middle aspect)   Contact Layer silicone   Pressure Setting 125 mmHg   Sponges Inserted 1   Closed/Suction Drain 1 Right;Posterior Hip Accordion 15 Fr.   Placement Date/Time: 05/02/22 1519   Inserted by: Dr Laury BAXTER  Hand Hygiene Completed: Yes  Tube Number: 1  Orientation: Right;Posterior  Location: Hip  Drain Tube Type: Accordion  Size (Fr.): 15 Fr.  Drain Reservoir Size (mL): 400 mL   Dressing Status   (dressing changed, removed xeroform and gauze; applied opticel ag around site, cover with optifoam and tegaderm placed over for additional security)   Drainage Appearance Bloody   Status To bulb suction   Closed/Suction Drain 2 Right;Posterior Hip Accordion 15 Fr.   Placement Date/Time: 05/02/22 1520   Inserted by: Dr Laury BAXTER  Hand Hygiene Completed: Yes  Tube Number: 2  Orientation: Right;Posterior  Location: Hip  Drain Tube Type: Accordion  Size (Fr.): 15 Fr.  Drain Reservoir Size (mL): 100 mL   Dressing Status   (dressing changed, removed xeroform and gauze; applied opticel ag around site, cover with optifoam and tegaderm  placed over for additional security)   Drainage Appearance Bloody   Status To bulb suction  (tubing had pulled apart, was reconnected)     Wound/ostomy - patient went back to surgery on 5/2 and wound vac was removed at that time. MD requests application of incisional wound vac. Incision with sutures present, well approximated, there is an area of middle aspect of incision that had a droplet of drainage present, large amount of drainage coming through one of the drains. 2 drains present to posterior hip, the tubing had pulled apart on one of them which I reconnected, it is not draining much, I labeled this one #2, the other drain is draining well and I labeled it #1; both are compressed with vac dressing off and on. Versatel placed to incision line and staples and was bordered with vac drape.     Will see patient on Friday for vac dressing change and assess incision line. Patient reports that she has her home vac machine here at the hospital to be placed when she discharges.

## 2022-05-05 LAB
ANION GAP SERPL CALCULATED.3IONS-SCNC: 6 MMOL/L (ref 5–15)
BH BB BLOOD EXPIRATION DATE: NORMAL
BH BB BLOOD TYPE BARCODE: 5100
BH BB DISPENSE STATUS: NORMAL
BH BB PRODUCT CODE: NORMAL
BH BB UNIT NUMBER: NORMAL
BUN SERPL-MCNC: 16 MG/DL (ref 8–23)
BUN/CREAT SERPL: 15.5 (ref 7–25)
CALCIUM SPEC-SCNC: 7.9 MG/DL (ref 8.6–10.5)
CHLORIDE SERPL-SCNC: 100 MMOL/L (ref 98–107)
CO2 SERPL-SCNC: 23 MMOL/L (ref 22–29)
CREAT SERPL-MCNC: 1.03 MG/DL (ref 0.57–1)
CROSSMATCH INTERPRETATION: NORMAL
EGFRCR SERPLBLD CKD-EPI 2021: 59.3 ML/MIN/1.73
GLUCOSE BLDC GLUCOMTR-MCNC: 151 MG/DL (ref 70–130)
GLUCOSE BLDC GLUCOMTR-MCNC: 182 MG/DL (ref 70–130)
GLUCOSE BLDC GLUCOMTR-MCNC: 183 MG/DL (ref 70–130)
GLUCOSE BLDC GLUCOMTR-MCNC: 242 MG/DL (ref 70–130)
GLUCOSE SERPL-MCNC: 139 MG/DL (ref 65–99)
HCT VFR BLD AUTO: 25.2 % (ref 34–46.6)
HGB BLD-MCNC: 7.6 G/DL (ref 12–15.9)
OSMOLALITY UR: 313 MOSM/KG
POTASSIUM SERPL-SCNC: 4.4 MMOL/L (ref 3.5–5.2)
SODIUM SERPL-SCNC: 129 MMOL/L (ref 136–145)
SODIUM UR-SCNC: 35 MMOL/L
UNIT  ABO: NORMAL
UNIT  RH: NORMAL

## 2022-05-05 PROCEDURE — 85018 HEMOGLOBIN: CPT | Performed by: ORTHOPAEDIC SURGERY

## 2022-05-05 PROCEDURE — 82962 GLUCOSE BLOOD TEST: CPT

## 2022-05-05 PROCEDURE — 63710000001 INSULIN LISPRO (HUMAN) PER 5 UNITS: Performed by: ORTHOPAEDIC SURGERY

## 2022-05-05 PROCEDURE — 84300 ASSAY OF URINE SODIUM: CPT | Performed by: INTERNAL MEDICINE

## 2022-05-05 PROCEDURE — 97530 THERAPEUTIC ACTIVITIES: CPT

## 2022-05-05 PROCEDURE — 85014 HEMATOCRIT: CPT | Performed by: ORTHOPAEDIC SURGERY

## 2022-05-05 PROCEDURE — 25010000002 DAPTOMYCIN PER 1 MG: Performed by: ORTHOPAEDIC SURGERY

## 2022-05-05 PROCEDURE — 83935 ASSAY OF URINE OSMOLALITY: CPT | Performed by: INTERNAL MEDICINE

## 2022-05-05 PROCEDURE — 80048 BASIC METABOLIC PNL TOTAL CA: CPT | Performed by: INTERNAL MEDICINE

## 2022-05-05 RX ADMIN — INSULIN LISPRO 2 UNITS: 100 INJECTION, SOLUTION INTRAVENOUS; SUBCUTANEOUS at 18:08

## 2022-05-05 RX ADMIN — OXYCODONE HYDROCHLORIDE AND ACETAMINOPHEN 2 TABLET: 5; 325 TABLET ORAL at 18:36

## 2022-05-05 RX ADMIN — INSULIN LISPRO 2 UNITS: 100 INJECTION, SOLUTION INTRAVENOUS; SUBCUTANEOUS at 11:49

## 2022-05-05 RX ADMIN — ASPIRIN 81 MG: 81 TABLET, COATED ORAL at 22:00

## 2022-05-05 RX ADMIN — INSULIN GLARGINE-YFGN 50 UNITS: 100 INJECTION, SOLUTION SUBCUTANEOUS at 22:01

## 2022-05-05 RX ADMIN — GABAPENTIN 600 MG: 300 CAPSULE ORAL at 22:00

## 2022-05-05 RX ADMIN — INSULIN LISPRO 2 UNITS: 100 INJECTION, SOLUTION INTRAVENOUS; SUBCUTANEOUS at 08:23

## 2022-05-05 RX ADMIN — METOPROLOL TARTRATE 25 MG: 25 TABLET, FILM COATED ORAL at 22:00

## 2022-05-05 RX ADMIN — OXYCODONE HYDROCHLORIDE AND ACETAMINOPHEN 2 TABLET: 5; 325 TABLET ORAL at 10:06

## 2022-05-05 RX ADMIN — ASPIRIN 81 MG: 81 TABLET, COATED ORAL at 08:23

## 2022-05-05 RX ADMIN — MELOXICAM 15 MG: 15 TABLET ORAL at 08:23

## 2022-05-05 RX ADMIN — GABAPENTIN 600 MG: 300 CAPSULE ORAL at 08:23

## 2022-05-05 RX ADMIN — OXYCODONE HYDROCHLORIDE AND ACETAMINOPHEN 2 TABLET: 5; 325 TABLET ORAL at 14:39

## 2022-05-05 RX ADMIN — FERROUS SULFATE TAB 325 MG (65 MG ELEMENTAL FE) 325 MG: 325 (65 FE) TAB at 08:23

## 2022-05-05 RX ADMIN — FAMOTIDINE 40 MG: 20 TABLET ORAL at 08:23

## 2022-05-05 RX ADMIN — DAPTOMYCIN 750 MG: 500 INJECTION, POWDER, LYOPHILIZED, FOR SOLUTION INTRAVENOUS at 11:27

## 2022-05-05 RX ADMIN — OXYCODONE HYDROCHLORIDE AND ACETAMINOPHEN 2 TABLET: 5; 325 TABLET ORAL at 22:26

## 2022-05-05 RX ADMIN — OXYCODONE HYDROCHLORIDE AND ACETAMINOPHEN 2 TABLET: 5; 325 TABLET ORAL at 01:12

## 2022-05-05 NOTE — PROGRESS NOTES
Name: Stephy Duncan ADMIT: 2022   : 1955  PCP: Pierre Rosalesin    MRN: [id number] LOS: 4 days   AGE/SEX: 66 y.o. female  ROOM: [facility]   Subjective   No chief complaint on file.     Pain controlled.  No chest pain palpitation shortness of breath cough nausea vomiting diarrhea or abdominal pain.  No dysuria reported.    Objective   Vital Signs  Temp:  [98 °F (36.7 °C)-98.9 °F (37.2 °C)] 98.7 °F (37.1 °C)  Heart Rate:  [65-79] 66  Resp:  [16-18] 18  BP: ()/(48-64) 129/64  SpO2:  [95 %-100 %] 100 %  on  Flow (L/min):  [2] 2;   Device (Oxygen Therapy): room air  Body mass index is 38.98 kg/m².    Physical Exam  Vitals and nursing note reviewed.   Constitutional:       General: She is not in acute distress.     Appearance: She is not diaphoretic.   HENT:      Head: Normocephalic and atraumatic.   Eyes:      General:         Right eye: No discharge.         Left eye: No discharge.      Conjunctiva/sclera: Conjunctivae normal.   Cardiovascular:      Rate and Rhythm: Normal rate and regular rhythm.      Heart sounds: Murmur heard.   Pulmonary:      Effort: Pulmonary effort is normal.      Breath sounds: No wheezing.   Abdominal:      General: There is no distension.      Palpations: Abdomen is soft.      Tenderness: There is no abdominal tenderness. There is no guarding or rebound.   Musculoskeletal:         General: Swelling and tenderness present.      Cervical back: Neck supple. No tenderness.   Skin:     General: Skin is warm and dry.   Neurological:      Mental Status: She is alert. Mental status is at baseline.   Psychiatric:         Mood and Affect: Mood normal.         Behavior: Behavior normal.         Results Review:       I reviewed the patient's new clinical results.      Results from last 7 days   Lab Units 22  0520 22  0521 22  0525 22  0430 22  0356 22  0445 22  0500   WBC 10*3/mm3  --  6.97  --  4.75 4.14  --  4.00   HEMOGLOBIN g/dL 7.6*  6.7* 7.6* 8.4* 8.3*  8.2*   < > 7.8*   PLATELETS 10*3/mm3  --  164  --  157 142  --  124*    < > = values in this interval not displayed.     Results from last 7 days   Lab Units 05/05/22 0520 05/04/22 0521 05/03/22 0526 05/02/22  0430   SODIUM mmol/L 129* 130* 132* 132*   POTASSIUM mmol/L 4.4 4.3 4.4 4.4   CHLORIDE mmol/L 100 103 104 103   CO2 mmol/L 23.0 21.0* 23.0 21.0*   BUN mg/dL 16 19 12 10   CREATININE mg/dL 1.03* 1.11* 1.10* 0.89   GLUCOSE mg/dL 139* 136* 73 111*   Estimated Creatinine Clearance: 61.1 mL/min (A) (by C-G formula based on SCr of 1.03 mg/dL (H)).  Results from last 7 days   Lab Units 05/05/22 0520 05/04/22 0521 05/03/22 0526 05/02/22  0430   CALCIUM mg/dL 7.9* 7.5* 7.8* 7.9*            aspirin, 81 mg, Oral, Q12H  DAPTOmycin, 10 mg/kg (Adjusted), Intravenous, Q24H  famotidine, 40 mg, Oral, Daily  ferrous sulfate, 325 mg, Oral, Daily With Breakfast  gabapentin, 600 mg, Oral, Q12H  insulin glargine, 50 Units, Subcutaneous, Nightly  insulin lispro, 0-9 Units, Subcutaneous, TID AC  meloxicam, 15 mg, Oral, Daily  metoprolol tartrate, 25 mg, Oral, Nightly      lactated ringers, 9 mL/hr, Last Rate: Stopped (05/02/22 1745)  lactated ringers, 9 mL/hr, Last Rate: 9 mL/hr (05/02/22 1745)    Diet Regular    Assessment/Plan      Active Hospital Problems    Diagnosis  POA   • **S/P Girdlestone procedure [Z98.890]  Not Applicable   • Septic arthritis of hip (HCC) [M00.9]  Yes   • Morbid obesity (HCC) [E66.01]  Yes   • Chronic diastolic CHF (congestive heart failure) (HCC) [I50.32]  Yes   • 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       · Status post Girdlestone procedure, septic arthritis, wound dehiscence: On daptomycin for VRE hx. Orthopedic surgery  primary. Current with outpatient ID, Dr Galan. Has seen Dr Ponce here in the past if ID evaluation is needed. Defer abx to orthopedic surgery.  · DM2: A1c 6.2. Continue home long acting dose of 50 units. SSI. On less than total home dose. Monitor requirements.  · HTN: Needing less than home regimen.  Continuing current metoprolol.  · Anemia: Postop, YANIRA, and chronic disease. PO iron. Transfuse as needed.  · Hyponatremia: We will stop NSAID and place on fluid restriction.  · Urinary Retention: Cath prn.  · Obesity    Will continue to follow. Please call with any questions or concerns.    Carlos Reyna MD  [facility]  05/05/22  10:01 EDT    Dictated portions using Dragon dictation software.    During the entire encounter, I was wearing recommended PPE including face mask and eye protection. Hand sanitization was performed prior to entering room and upon exit.

## 2022-05-05 NOTE — PLAN OF CARE
Goal Outcome Evaluation:  Plan of Care Reviewed With: patient        Progress: improving       Pt 67 y/o female POD10 of a rt hip girdlestone and POD3 of an I&D with closure of rt hip. Aox4, VSS on RA. Up with assist x2 with walker and gaitbelt non-weight bearing to RLE.. Pt has wound vac with dsg c/d/I suctioning continuously at 125. 2 hemovacs in drains with bloody drainage. Pt is isolation for VRE. Pt IV was d/c d/t infiltration and pain when infusing antibx. New IV is to be placed. Purwick in place, pt has UTI antibx IV for tx, urine sample collected today. Diet changed to fluid restricted of 1500cc. Will continue to monitor.    Teena Nielsen BSN, RN

## 2022-05-05 NOTE — PLAN OF CARE
Goal Outcome Evaluation:  Plan of Care Reviewed With: patient        Progress: improving  Outcome Evaluation: Pt in bed at beg of PT session. Pt sat up to EOB w/ min A and a little extra time. Pt stood req min A and use of fww. Pt transferred b>c req CGA/min A and use of fww. Pt able to maintain NWB for RLE as she transferred toward L to chair in a few steps. Pt in chair at end of session w/ alarm set. PT will prog as pt lisa.    Patient was not wearing a face mask during this therapy encounter. Therapist used appropriate personal protective equipment including gown, mask and gloves.  Mask used was standard procedure mask. Appropriate PPE was worn during the entire therapy session. Hand hygiene was completed before and after therapy session. Patient is not in enhanced droplet precautions.

## 2022-05-05 NOTE — PLAN OF CARE
Goal Outcome Evaluation:  Plan of Care Reviewed With: patient        Progress: no change  Outcome Evaluation: VSS, RA, SL, voiding per purewick, frequent turns, pain tolerable, wound vac in place@125, HV x's 2, educated on BP and glucose monitoring, rehab when stable and precert obtained

## 2022-05-05 NOTE — PROGRESS NOTES
Hemoglobin 7.6, no transfusion planned.  Awaiting discharge to rehab.  Okay for discharge once bed is available.  Wound VAC in place.

## 2022-05-06 LAB
ANION GAP SERPL CALCULATED.3IONS-SCNC: 6.8 MMOL/L (ref 5–15)
BUN SERPL-MCNC: 15 MG/DL (ref 8–23)
BUN/CREAT SERPL: 17.2 (ref 7–25)
CALCIUM SPEC-SCNC: 8 MG/DL (ref 8.6–10.5)
CHLORIDE SERPL-SCNC: 103 MMOL/L (ref 98–107)
CO2 SERPL-SCNC: 22.2 MMOL/L (ref 22–29)
CORTIS SERPL-MCNC: 5.77 MCG/DL
CREAT SERPL-MCNC: 0.87 MG/DL (ref 0.57–1)
EGFRCR SERPLBLD CKD-EPI 2021: 72.7 ML/MIN/1.73
GLUCOSE BLDC GLUCOMTR-MCNC: 135 MG/DL (ref 70–130)
GLUCOSE BLDC GLUCOMTR-MCNC: 149 MG/DL (ref 70–130)
GLUCOSE BLDC GLUCOMTR-MCNC: 185 MG/DL (ref 70–130)
GLUCOSE BLDC GLUCOMTR-MCNC: 244 MG/DL (ref 70–130)
GLUCOSE SERPL-MCNC: 146 MG/DL (ref 65–99)
HCT VFR BLD AUTO: 24.8 % (ref 34–46.6)
HGB BLD-MCNC: 7.7 G/DL (ref 12–15.9)
POTASSIUM SERPL-SCNC: 4.8 MMOL/L (ref 3.5–5.2)
SODIUM SERPL-SCNC: 132 MMOL/L (ref 136–145)
TSH SERPL DL<=0.05 MIU/L-ACNC: 3.03 UIU/ML (ref 0.27–4.2)

## 2022-05-06 PROCEDURE — 82962 GLUCOSE BLOOD TEST: CPT

## 2022-05-06 PROCEDURE — 82533 TOTAL CORTISOL: CPT | Performed by: INTERNAL MEDICINE

## 2022-05-06 PROCEDURE — 84443 ASSAY THYROID STIM HORMONE: CPT | Performed by: INTERNAL MEDICINE

## 2022-05-06 PROCEDURE — 63710000001 INSULIN LISPRO (HUMAN) PER 5 UNITS: Performed by: ORTHOPAEDIC SURGERY

## 2022-05-06 PROCEDURE — 02HV33Z INSERTION OF INFUSION DEVICE INTO SUPERIOR VENA CAVA, PERCUTANEOUS APPROACH: ICD-10-PCS | Performed by: ORTHOPAEDIC SURGERY

## 2022-05-06 PROCEDURE — 80048 BASIC METABOLIC PNL TOTAL CA: CPT | Performed by: INTERNAL MEDICINE

## 2022-05-06 PROCEDURE — C1751 CATH, INF, PER/CENT/MIDLINE: HCPCS

## 2022-05-06 PROCEDURE — 25010000002 DAPTOMYCIN PER 1 MG: Performed by: ORTHOPAEDIC SURGERY

## 2022-05-06 PROCEDURE — 85014 HEMATOCRIT: CPT | Performed by: ORTHOPAEDIC SURGERY

## 2022-05-06 PROCEDURE — 97530 THERAPEUTIC ACTIVITIES: CPT

## 2022-05-06 PROCEDURE — 85018 HEMOGLOBIN: CPT | Performed by: ORTHOPAEDIC SURGERY

## 2022-05-06 RX ORDER — ASPIRIN 81 MG/1
81 TABLET ORAL EVERY 12 HOURS SCHEDULED
Qty: 60 TABLET | Refills: 0 | Status: SHIPPED | OUTPATIENT
Start: 2022-05-06 | End: 2022-06-10 | Stop reason: HOSPADM

## 2022-05-06 RX ORDER — SODIUM CHLORIDE 0.9 % (FLUSH) 0.9 %
10 SYRINGE (ML) INJECTION EVERY 12 HOURS SCHEDULED
Status: DISCONTINUED | OUTPATIENT
Start: 2022-05-06 | End: 2022-05-08 | Stop reason: HOSPADM

## 2022-05-06 RX ORDER — SODIUM CHLORIDE 0.9 % (FLUSH) 0.9 %
10 SYRINGE (ML) INJECTION AS NEEDED
Status: DISCONTINUED | OUTPATIENT
Start: 2022-05-06 | End: 2022-05-08 | Stop reason: HOSPADM

## 2022-05-06 RX ORDER — OXYCODONE HYDROCHLORIDE AND ACETAMINOPHEN 5; 325 MG/1; MG/1
1 TABLET ORAL EVERY 4 HOURS PRN
Qty: 50 TABLET | Refills: 0 | Status: SHIPPED | OUTPATIENT
Start: 2022-05-06 | End: 2022-06-10 | Stop reason: HOSPADM

## 2022-05-06 RX ORDER — SODIUM CHLORIDE 0.9 % (FLUSH) 0.9 %
20 SYRINGE (ML) INJECTION AS NEEDED
Status: DISCONTINUED | OUTPATIENT
Start: 2022-05-06 | End: 2022-05-08 | Stop reason: HOSPADM

## 2022-05-06 RX ADMIN — OXYCODONE HYDROCHLORIDE AND ACETAMINOPHEN 2 TABLET: 5; 325 TABLET ORAL at 02:34

## 2022-05-06 RX ADMIN — METOPROLOL TARTRATE 25 MG: 25 TABLET, FILM COATED ORAL at 22:23

## 2022-05-06 RX ADMIN — INSULIN GLARGINE-YFGN 50 UNITS: 100 INJECTION, SOLUTION SUBCUTANEOUS at 22:12

## 2022-05-06 RX ADMIN — GABAPENTIN 600 MG: 300 CAPSULE ORAL at 08:08

## 2022-05-06 RX ADMIN — Medication 10 ML: at 22:15

## 2022-05-06 RX ADMIN — OXYCODONE HYDROCHLORIDE AND ACETAMINOPHEN 2 TABLET: 5; 325 TABLET ORAL at 22:14

## 2022-05-06 RX ADMIN — OXYCODONE HYDROCHLORIDE AND ACETAMINOPHEN 2 TABLET: 5; 325 TABLET ORAL at 08:08

## 2022-05-06 RX ADMIN — OXYCODONE HYDROCHLORIDE AND ACETAMINOPHEN 2 TABLET: 5; 325 TABLET ORAL at 17:53

## 2022-05-06 RX ADMIN — FERROUS SULFATE TAB 325 MG (65 MG ELEMENTAL FE) 325 MG: 325 (65 FE) TAB at 08:08

## 2022-05-06 RX ADMIN — INSULIN LISPRO 4 UNITS: 100 INJECTION, SOLUTION INTRAVENOUS; SUBCUTANEOUS at 12:09

## 2022-05-06 RX ADMIN — ASPIRIN 81 MG: 81 TABLET, COATED ORAL at 22:14

## 2022-05-06 RX ADMIN — OXYCODONE HYDROCHLORIDE AND ACETAMINOPHEN 2 TABLET: 5; 325 TABLET ORAL at 13:58

## 2022-05-06 RX ADMIN — DAPTOMYCIN 750 MG: 500 INJECTION, POWDER, LYOPHILIZED, FOR SOLUTION INTRAVENOUS at 10:26

## 2022-05-06 RX ADMIN — GABAPENTIN 600 MG: 300 CAPSULE ORAL at 22:14

## 2022-05-06 RX ADMIN — FAMOTIDINE 40 MG: 20 TABLET ORAL at 08:08

## 2022-05-06 RX ADMIN — ASPIRIN 81 MG: 81 TABLET, COATED ORAL at 08:08

## 2022-05-06 NOTE — CASE MANAGEMENT/SOCIAL WORK
"Physicians Statement of Medical Necessity for  Ambulance Transportation    GENERAL INFORMATION     Name: Stephy Duncan  YOB: 1955  Medicare #: [id number]  Transport Date: 5/8/2022 (Valid for round trips this date, or for scheduled repetitive trips for 60 days from the date signed below.)  Origin: [facility]  Destination: [facility]   Is the Patient's stay covered under Medicare Part A (PPS/DRG?)Yes  Closest appropriate facility? No, reason transport to more distant facility is required: Only facility that would accept patient's insurance.   If this a hosp-hosp transfer? No  Is this a hospice patient? No    MEDICAL NECESSITY QUESTIONAIRE    Ambulance Transportation is medically necessary only if other means of transportation are contraindicated or would be potentially harmful to the patient.  To meet this requirement, the patient must be either \"bed confined\" or suffer from a condition such that transport by means other than an ambulance is contraindicated by the patient's condition.  The following questions must be answered by the healthcare professional signing below for this form to be valid:     1) Describe the MEDICAL CONDITION (physical and/or mental) of this patient AT THE TIME OF AMBULANCE TRANSPORT that requires the patient to be transported in an ambulance, and why transport by other means is contraindicated by the patient's condition:  Past Medical History:   Diagnosis Date   • Allergies    • Arthritis    • Cardiac murmur    • Diabetes mellitus (HCC)    • GERD (gastroesophageal reflux disease)    • Heart attack (HCC) 12/2020   • History of cervical cancer 1981   • History of transfusion     SEVERAL   • Hyperlipidemia    • Hypertension    • Iron deficiency anemia    • Osteomyelitis hip (HCC)     RIGHT   • PONV (postoperative nausea and vomiting)    • Right hip pain    • VRE infection within last 3 months    • Wound infection     RIGHT HIP.  WOUND VAC IN PLACE " "     Past Surgical History:   Procedure Laterality Date   • CARDIAC CATHETERIZATION     • CERVICAL CONIZATION     • COLONOSCOPY     • CORONARY ANGIOPLASTY WITH STENT PLACEMENT     • ENDOSCOPY     • HIP ARTHROPLASTY Right    • HIP HARDWARE REMOVAL Right    • HIP MINI REVISION Right 01/18/2022    Procedure: TOTAL HIP ARTHROPLASTY LINER REVISION, pegboard lateral;  Surgeon: Karri Schaeffer II, MD;  Location: [facility] MAIN OR;  Service: Orthopedics;  Laterality: Right;   • HIP SPACER INSERTION WITH ANTIBIOTIC CEMENT      X3   • HYSTERECTOMY     • INCISION AND DRAINAGE HIP Right 12/18/2021    Procedure: INCISION AND DRAINAGE TOTAL HIP, LINER EXCHANGE AND WOUND VAC PLACEMENT;  Surgeon: Karri Schaeffer II, MD;  Location: [facility] MAIN OR;  Service: Orthopedics;  Laterality: Right;   • INCISION AND DRAINAGE HIP Right 5/2/2022    Procedure: HIP INCISION AND DRAINAGE;  Surgeon: Karri Schaeffer II, MD;  Location: [facility] MAIN OR;  Service: Orthopedics;  Laterality: Right;   • KNEE ARTHROPLASTY Left    • LAPAROSCOPIC CHOLECYSTECTOMY     • TONSILLECTOMY     • TOTAL HIP ARTHROPLASTY Right 4/25/2022    Procedure: HIP GIRDLESTONE PROCEDURE;  Surgeon: Karri Schaeffer II, MD;  Location: [facility] MAIN OR;  Service: Orthopedics;  Laterality: Right;   • TOTAL HIP ARTHROPLASTY REVISION Right 11/16/2021    Procedure: TOTAL HIP REVISION ARTHROPLASTY RIGHT POSTERIOR;  Surgeon: Karri Schaeffer II, MD;  Location: [facility] MAIN OR;  Service: Orthopedics;  Laterality: Right;      2) Is this patient \"bed confined\" as defined below?Yes   To be \"bed confined\" the patient must satisfy all three of the following criteria:  (1) unable to get up from bed without assistance; AND (2) unable to ambulate;  AND (3) unable to sit in a chair or wheelchair.  3) Can this patient safely be transported by car or wheelchair van (I.e., may safely sit during transport, without an attendant or monitoring?)No   4. In addition to " completing questions 1-3 above, please check any of the following conditions that apply*:          *Note: supporting documentation for any boxes checked must be maintained in the patient's medical records Moderate/severe pain on movement and Unable to tolerate seated position for time needed to transport       SIGNATURE OF PHYSICIAN OR OTHER AUTHORIZED HEALTHCARE PROFESSIONAL    I certify that the above information is true and correct based on my evaluation of this patient, and represent that the patient requires transport by ambulance and that other forms of transport are contraindicated.  I understand that this information will be used by the Centers for Medicare and Medicaid Services (CMS) to support the determiniation of medical necessity for ambulance services, and I represent that I have personal knowledge of the patient's condition at the time of transport.       If this box is checked, I also certify that the patient is physically or mentally incapable of signing the ambulance service's claim form and that the institution with which I am affiliated has furnished care, services or assistance to the patient.  My signature below is made on behalf of the patient pursuant to 42 .36(b)(4). In accordance with 42 .37, the specific reason(s) that the patient is physically or mentally incapable of signing the claim for is as follows: Patient has a wound vac and is non-weight bearing to her right leg.     Signature of Physician or Healthcare Professional  Date/Time:   5/6/2022  14:47 EDT       (For Scheduled repetitive transport, this form is not valid for transports performed more than 60 days after this date).                                                                                                                                            --------------------------------------------------------------------------------------------  Printed Name and Credentials of Physician or Authorized  Healthcare Professional     *Form must be signed by patient's attending physician for scheduled, repetitive transports,.  For non-repetitive ambulance transports, if unable to obtain the signature of the attending physician, any of the following may sign (please select below):     Physician  Clinical Nurse Specialist  Registered Nurse     Physician Assistant  Discharge Planner  Licensed Practical Nurse     Nurse Practitioner X

## 2022-05-06 NOTE — CASE MANAGEMENT/SOCIAL WORK
Continued Stay Note  [facility]     Patient Name: Stephy Duncan  MRN: [id number]  Today's Date: 5/6/2022    Admit Date: 4/25/2022     Discharge Plan     Row Name 05/06/22 1452       Plan    Plan [facility] via [facility] on Hans May 8th @ 7:30am.    Plan Comments [facility] notified by patient's nurse Teena that Dr. Schaeffer is agreeable to patient discharging. [facility] called [facility] and spoke with John Moon who stated bed would be available for patient tomorrow afternoon after lunch. Patient's nurse Teena called [facility] and asked if Dr. Schaeffer can e-scribe patient's medicine. [facility] made outbound call to [facility] and the nurse stated the pharmacy, [facility] located in [place], [phone] can accecpt e-script. [facility] made outbound call to patient's nurse Teena and informed her. Pharmacy updated in [facility]. [facility] confirmed with Teena that patient would need EMS transportation due to having a wound vac and the amount of time it will take to transport patient would be too uncomfortable due to pain for patient to sit too long in a car or wheelchair. [facility] made and outbound call to [facility] with [facility], [facility] states they would not be able due to staffing to transport patient tomorrow but would be available on Hans May 8th at 7:30am. [facility] accecpted the 7:30am transport on Hans May 8th, 2022. [facility] notified patient's nurse Teena via secure chat of transportation and the need for a COVID prior to discharge. [facility] Safia delivered patient's discharge IMM this afternoon, patient signed/dated/timed IMM.               Discharge Codes    No documentation.               Expected Discharge Date and Time     Expected Discharge Date Expected Discharge Time    May 7, 2022

## 2022-05-06 NOTE — DISCHARGE SUMMARY
Orthopaedic Discharge Summary  Dr. SCHNEIDER “Kilo” Laury II  [phone]    NAME: Virginia Caylalondon Duncan PCP: Gregorio Rosales   :  MRN: [id number]  [id number] LOS:  ADMIT: 5 days  2022   AGE/SEX: 66 y.o. female DC:  2022             · Admitting Diagnosis: S/P Girdlestone procedure [Z98.890]    · Surgery Performed: HIP GIRDLESTONE PROCEDURE    · Discharge Medications:         Discharge Medications      New Medications      Instructions Start Date   aspirin 81 MG EC tablet   81 mg, Oral, Every 12 Hours Scheduled      DAPTOmycin 750 mg in sodium chloride 0.9 % 50 mL   10 mg/kg (750 mg), Intravenous, Every 24 Hours   Start Date: May 7, 2022     oxyCODONE-acetaminophen 5-325 MG per tablet  Commonly known as: PERCOCET   1 tablet, Oral, Every 4 Hours PRN      oxyCODONE-acetaminophen 5-325 MG per tablet  Commonly known as: PERCOCET   1 tablet, Oral, Every 4 Hours PRN         Continue These Medications      Instructions Start Date   Chlorhexidine Gluconate Cloth 2 % pads   Apply externally, AS DIRECTED PREOP      Diclofenac Sodium 1 % gel gel  Commonly known as: VOLTAREN   4 g, Topical, 4 Times Daily PRN      esomeprazole 40 MG capsule  Commonly known as: nexIUM   40 mg, Oral, Every Morning Before Breakfast      ferrous sulfate 325 (65 FE) MG tablet   325 mg, Oral, Every Other Day, Mornings      folic acid 1 MG tablet  Commonly known as: FOLVITE   1,000 mcg, Oral, Daily      gabapentin 600 MG tablet  Commonly known as: NEURONTIN   600 mg, Oral, 2 Times Daily      insulin detemir 100 UNIT/ML injection  Commonly known as: LEVEMIR   50 Units, Subcutaneous, Nightly      isosorbide mononitrate 30 MG 24 hr tablet  Commonly known as: IMDUR   30 mg, Oral, Every Morning      loratadine 10 MG tablet  Commonly known as: CLARITIN   10 mg, Oral, Nightly      losartan 25 MG tablet  Commonly known as: COZAAR   25 mg, Oral, 2 Times Daily      metoprolol tartrate 25 MG tablet  Commonly known as: LOPRESSOR   25 mg, Oral, Nightly       mupirocin 2 % nasal ointment  Commonly known as: BACTROBAN   Nasal, AS DIRECTED PREOP      NOVOLOG FLEXPEN SC   20 Units, Subcutaneous, 3 Times Daily With Meals, 15-20 units TID      Trulicity 3 MG/0.5ML solution pen-injector  Generic drug: Dulaglutide   0.5 mL, Subcutaneous, Weekly, SATURDAYS       vitamin D3 125 MCG (5000 UT) capsule capsule   5,000 Units, Oral, Daily         Stop These Medications    HYDROcodone-acetaminophen 7.5-325 MG per tablet  Commonly known as: NORCO            · Vitals:     Vitals:    05/05/22 1100 05/05/22 1929 05/05/22 2346 05/06/22 0712   BP: 112/54 120/56 105/60 127/54   BP Location: Right arm Right arm Right arm Right arm   Patient Position: Lying Lying Lying Lying   Pulse: 74 78 60 63   Resp: 18 16 16 18   Temp: 97.8 °F (36.6 °C) 99.6 °F (37.6 °C) 98.7 °F (37.1 °C) 97.7 °F (36.5 °C)   TempSrc: Oral Oral Oral Oral   SpO2: 100% 98% 94% 94%   Weight:       Height:           · Labs:      No results displayed because visit has over 200 results.           No results found.    · Hospital Course:   66 y.o. female was admitted to [facility] to services of Karri Schaeffer II, MD with S/P Girdlestone procedure [Z98.890] on 4/25/2022 and underwent HIP GIRDLESTONE PROCEDURE. Post-operatively the patient transferred to the floor where the patient underwent mobilization therapy. Opioids were titrated to achieve appropriate pain management to allow for participation in mobilization exercises. Vital signs and laboratory values are now within safe parameters for discharge. The dressings and/or incision is intact without signs or symptoms of active infection. Operative extremity neurovascular status remains intact as compared to the preoperative exam. Appropriate education re: incision care, activity levels, medications, and follow up visits was completed and all questions were answered. The patient is now deemed stable for discharge.    SNF: The patient had a difficult time mobilizing  "sufficiently with physical therapy. Further rehabilitation was recommended in a SNF facility. Once a bed was available, and the patient was cleared, there were discharged to the SNF in good condition}.       R \"Kilo\" Laury BAXTER MD  Orthopaedic Surgery  [facility]  [phone]                                               "

## 2022-05-06 NOTE — PLAN OF CARE
Goal Outcome Evaluation:  Plan of Care Reviewed With: patient        Progress: improving       Pt 65 y/o female POD11 of girdlestone and POD4 of I and D of rt hip. Aox4, VSS on RA. Up with assist x1 with walker and gaitbelt for short distances. Pt has wound vac in on rt hip with 2 hemovacs. Wound vac c/d/I suction is intact. IV is SL with exceptions of antibx. PICC being placed for antibx regimen.Single lumen on LUE, flushes with blood return, capped, dsg change on 5/13. Voids in BSC, henny d/c today. LBM was today, regular diet. Plan is to d/c to SNF Sunday at 0730 via EMS. Will continue to monitor.    Teena WELDONN, RN

## 2022-05-06 NOTE — NURSING NOTE
05/06/22 0925   Wound 05/02/22 1454 Right posterior hip Incision   Placement Date/Time: 05/02/22 1454   Present on Hospital Admission: Yes  Side: Right  Orientation: posterior  Location: hip  Primary Wound Type: Incision   Closure Sutures   Base   (edges approximated with sutures)   Periwound intact;dry;pink   Periwound Temperature warm   Periwound Skin Turgor soft   Drainage Characteristics/Odor serosanguineous   Drainage Amount small   Care, Wound cleansed with;sterile normal saline;negative pressure wound therapy   Dressing Care dressing changed   Periwound Care barrier film applied   NPWT (Negative Pressure Wound Therapy) 05/04/22 R hip   Placement Date: 05/04/22   Location: R hip   Therapy Setting continuous therapy   Dressing foam, black;transparent dressing   Contact Layer silicone   Pressure Setting 125 mmHg   Sponges Inserted 1   Sponges Removed 1     CWON note:  pt seen for follow up wound vac dressing change to rt hip incision.  Incision with sutures present, well approximated, there is an area of middle aspect of incision that had a droplet of drainage present, large amount of drainage coming through one of the drains. 2 drains present to posterior hip, both are compressed with vac dressing off and on. Versatel placed to incision line and wound edges were bordered with vac drape as well. Dressing was bridged to anterior hip. One area of shearing to rt buttocks, approximately 1x1cm, protected with Optifoam dressing.      Will see patient on Monday for vac dressing change and assess incision line. Patient reports that she has her home vac machine here at the hospital to be placed when she discharges

## 2022-05-06 NOTE — PLAN OF CARE
Goal Outcome Evaluation:  Plan of Care Reviewed With: (P) patient        Progress: (P) improving  Outcome Evaluation: (P) Upon arrival pt supine in bed and agreeable to PT. Pt completed bed mobility w/ SBA w/ cues given for sequencing. Pt sat EOB for 3-4min w/ UE support req SBA. Pt performed sit-stand w/ Johnnie x 1 to CGA, and took a few hop steps w/ turn to sit in chair w/ CGA. Fairly steady throughout trfr and able to maintain NWB well w/ use of FWW. PT will cont to progress as pt tolerates.

## 2022-05-06 NOTE — PROGRESS NOTES
"/54 (BP Location: Right arm, Patient Position: Lying)   Pulse 63   Temp 97.7 °F (36.5 °C) (Oral)   Resp 18   Ht 162.6 cm (64\")   Wt 103 kg (227 lb 1.2 oz)   SpO2 94%   BMI 38.98 kg/m²     Results from last 7 days   Lab Units 05/06/22  0458 05/05/22  0520 05/04/22  0521   WBC 10*3/mm3  --   --  6.97   HEMOGLOBIN g/dL 7.7*   < > 6.7*   HEMATOCRIT % 24.8*   < > 21.2*   PLATELETS 10*3/mm3  --   --  164    < > = values in this interval not displayed.       Results from last 7 days   Lab Units 05/06/22  0458   SODIUM mmol/L 132*   POTASSIUM mmol/L 4.8   CHLORIDE mmol/L 103   CO2 mmol/L 22.2   BUN mg/dL 15   CREATININE mg/dL 0.87   GLUCOSE mg/dL 146*   CALCIUM mg/dL 8.0*       Imaging Results (Last 24 Hours)     ** No results found for the last 24 hours. **          Patient Care Team:  Gregorio Rosales as PCP - General (Family Medicine)  Jose Kong MD as Consulting Physician (Cardiology)    SUBJECTIVE  Doing ok.  Waiting on discharge plans  PHYSICAL EXAM  Wound vac in place     S/P Girdlestone procedure    Status post total replacement of hip    Type 2 diabetes mellitus with diabetic polyneuropathy, with long-term current use of insulin (HCC)    Hypertension    Morbid obesity (HCC)    Chronic diastolic CHF (congestive heart failure) (HCC)    Septic arthritis of hip (HCC)      PLAN / DISPOSITION:  PICC line ordered  Ok for discharge once antibiotics are set up and PICC line in place  JENNY Noriega  05/06/22  09:21 EDT    "

## 2022-05-06 NOTE — THERAPY TREATMENT NOTE
Patient Name: Stephy Duncan  : 1955    MRN: [id number]                              Today's Date: 2022       Admit Date: 2022    Visit Dx:     ICD-10-CM ICD-9-CM   1. S/P Girdlestone procedure  Z98.890 V45.89   2. Prosthetic hip infection (HCC)  T84.59XA 996.66    Z96.649 V43.64     Patient Active Problem List   Diagnosis   • Status post total replacement of hip   • Type 2 diabetes mellitus with diabetic polyneuropathy, with long-term current use of insulin (HCC)   • Hypertension   • Normocytic anemia   • Postoperative infection   • Iron deficiency anemia   • Morbid obesity (HCC)   • Presence of stent in coronary artery in patient with coronary artery disease   • Chronic diastolic CHF (congestive heart failure) (HCC)   • Septic arthritis of hip (HCC)   • Drainage from wound   • Candidal intertrigo   • Postoperative anemia due to acute blood loss   • S/P Girdlestone procedure     Past Medical History:   Diagnosis Date   • Allergies    • Arthritis    • Cardiac murmur    • Diabetes mellitus (HCC)    • GERD (gastroesophageal reflux disease)    • Heart attack (HCC) 2020   • History of cervical cancer    • History of transfusion     SEVERAL   • Hyperlipidemia    • Hypertension    • Iron deficiency anemia    • Osteomyelitis hip (HCC)     RIGHT   • PONV (postoperative nausea and vomiting)    • Right hip pain    • VRE infection within last 3 months    • Wound infection     RIGHT HIP.  WOUND VAC IN PLACE     Past Surgical History:   Procedure Laterality Date   • CARDIAC CATHETERIZATION     • CERVICAL CONIZATION     • COLONOSCOPY     • CORONARY ANGIOPLASTY WITH STENT PLACEMENT     • ENDOSCOPY     • HIP ARTHROPLASTY Right    • HIP HARDWARE REMOVAL Right    • HIP MINI REVISION Right 2022    Procedure: TOTAL HIP ARTHROPLASTY LINER REVISION, pegboard lateral;  Surgeon: Karri Schaeffer II, MD;  Location: [facility];  Service: Orthopedics;  Laterality: Right;   • HIP SPACER INSERTION  WITH ANTIBIOTIC CEMENT      X3   • HYSTERECTOMY     • INCISION AND DRAINAGE HIP Right 12/18/2021    Procedure: INCISION AND DRAINAGE TOTAL HIP, LINER EXCHANGE AND WOUND VAC PLACEMENT;  Surgeon: Karri Schaeffer II, MD;  Location[facility] MA[facility];  Service: Orthopedics;  Laterality: Right;   • INCISION AND DRAINAGE HIP Right 5/2/2022    Procedure: HIP INCISION AND DRAINAGE;  Surgeon: Karri Schaeffer II, MD;  [facility];  Service: Orthopedics;  Laterality: Right;   • KNEE ARTHROPLASTY Left    • LAPAROSCOPIC CHOLECYSTECTOMY     • TONSILLECTOMY     • TOTAL HIP ARTHROPLASTY Right 4/25/2022    Procedure: HIP GIRDLESTONE PROCEDURE;  Surgeon: Karri Schaeffer II, MD;  [facility];  Service: Orthopedics;  Laterality: Right;   • TOTAL HIP ARTHROPLASTY REVISION Right 11/16/2021    Procedure: TOTAL HIP REVISION ARTHROPLASTY RIGHT POSTERIOR;  Surgeon: Karri Schaeffer II, MD;  Location: [facility] MAIN OR;  Service: Orthopedics;  Laterality: Right;      General Information     Row Name 05/06/22 1120          Physical Therapy Time and Intention    Document Type therapy note (daily note) (P)   -KG     Mode of Treatment individual therapy;physical therapy (P)   -KG     Row Name 05/06/22 1120          General Information    Patient Profile Reviewed yes (P)   -KG     Existing Precautions/Restrictions fall;non-weight bearing (P)   -KG     Row Name 05/06/22 1120          Safety Issues, Functional Mobility    Impairments Affecting Function (Mobility) endurance/activity tolerance;strength;pain;range of motion (ROM) (P)   -KG     Comment, Safety Issues/Impairments (Mobility) gt belt and non skid socks worn for safety. (P)   -KG           User Key  (r) = Recorded By, (t) = Taken By, (c) = Cosigned By    Initials Name Provider Type    KG Harry Khan PTA Student PTA Student               Mobility     Row Name 05/06/22 1121          Bed Mobility    Bed Mobility supine-sit (P)   -KG      Supine-Sit [place] (Bed Mobility) contact guard;verbal cues (P)   -KG     Assistive Device (Bed Mobility) bed rails;head of bed elevated (P)   -KG     Comment, (Bed Mobility) Extra time to EOB, SBA for sit bal at EOB. (P)   -KG     Row Name 05/06/22 1121          Bed-Chair Transfer    Bed-Chair [place] (Transfers) contact guard;verbal cues (P)   -KG     Assistive Device (Bed-Chair Transfers) walker, front-wheeled (P)   -KG     Comment, (Bed-Chair Transfer) Pt able to maintain NWB throughout trfr, few hop steps and turn using FWW to sit in chair. (P)   -KG     Row Name 05/06/22 1121          Sit-Stand Transfer    Sit-Stand [place] (Transfers) minimum assist (75% patient effort);contact guard;verbal cues (P)   -KG     Assistive Device (Sit-Stand Transfers) walker, front-wheeled (P)   -KG     Row Name 05/06/22 1121          Gait/Stairs (Locomotion)    [place] Level (Gait) unable to assess (P)   -KG     [place] Level (Stairs) unable to assess (P)   -KG     Row Name 05/06/22 1121          Mobility    Extremity Weight-bearing Status right lower extremity (P)   -KG     Right Lower Extremity (Weight-bearing Status) non weight-bearing (NWB) (P)   -KG           User Key  (r) = Recorded By, (t) = Taken By, (c) = Cosigned By    Initials Name Provider Type    KG Harry hKan, PTA Student PTA Student               Obj/Interventions     Row Name 05/06/22 1126          Balance    Balance Assessment sitting static balance;standing static balance (P)   -KG     Static Sitting Balance standby assist (P)   -KG     Static Standing Balance contact guard (P)   -KG     Balance Interventions supported (P)   -KG     Comment, Balance Pt sat EOB w/ UE support req SBA for ~3-4min (P)   -KG           User Key  (r) = Recorded By, (t) = Taken By, (c) = Cosigned By    Initials Name Provider Type    KG Harry Khan, PTA Student PTA Student               Goals/Plan    No documentation.                Clinical  Impression     Row Name 05/06/22 1127          Pain    Pretreatment Pain Rating 6/10 (P)   -KG     Posttreatment Pain Rating 6/10 (P)   -KG     Pain Location - Side/Orientation Right (P)   -KG     Pain Location incisional (P)   -KG     Pain Location - hip (P)   -KG     Pain Intervention(s) Repositioned;Ambulation/increased activity;Rest (P)   -KG     Additional Documentation Pain Scale: Numbers Pre/Post-Treatment (Group) (P)   -KG     Row Name 05/06/22 1127          Plan of Care Review    Plan of Care Reviewed With patient (P)   -KG     Progress improving (P)   -KG     Outcome Evaluation Upon arrival pt supine in bed and agreeable to PT. Pt completed bed mobility w/ SBA w/ cues given for sequencing. Pt sat EOB for 3-4min w/ UE support req SBA. Pt performed sit-stand w/ Johnnie x 1 to CGA, and took a few hop steps w/ turn to sit in chair w/ CGA. Fairly steady throughout trfr and able to maintain NWB well w/ use of FWW. PT will cont to progress as pt tolerates. (P)   -KG     Row Name 05/06/22 1127          Positioning and Restraints    Pre-Treatment Position in bed (P)   -KG     Post Treatment Position chair (P)   -KG     In Chair reclined;call light within reach;encouraged to call for assist;exit alarm on;with family/caregiver (P)   -KG           User Key  (r) = Recorded By, (t) = Taken By, (c) = Cosigned By    Initials Name Provider Type    KG Harry Khan PTA Student PTA Student               Outcome Measures     Row Name 05/06/22 1133 05/06/22 0808       How much help from another person do you currently need...    Turning from your back to your side while in flat bed without using bedrails? 3 (P)   -KG 3  -LG    Moving from lying on back to sitting on the side of a flat bed without bedrails? 3 (P)   -KG 3  -LG    Moving to and from a bed to a chair (including a wheelchair)? 3 (P)   -KG 3  -LG    Standing up from a chair using your arms (e.g., wheelchair, bedside chair)? 3 (P)   -KG 3  -LG    Climbing 3-5 steps  with a railing? 1 (P)   -KG 1  -LG    To walk in hospital room? 2 (P)   -KG 2  -LG    AM-PAC 6 Clicks Score (PT) 15 (P)   -KG 15  -LG    Highest level of mobility 4 --> Transferred to chair/commode (P)   -KG 4 --> Transferred to chair/commode  -LG    Row Name 05/06/22 1133          Functional Assessment    Outcome Measure Options AM-PAC 6 Clicks Basic Mobility (PT) (P)   -KG           User Key  (r) = Recorded By, (t) = Taken By, (c) = Cosigned By    Initials Name Provider Type    LG Teena Nielsen RN Registered Nurse    Harry Blount, PTA Student PTA Student                             Physical Therapy Education                 Title: PT OT SLP Therapies (Done)     Topic: Physical Therapy (Done)     Point: Mobility training (Done)     Learning Progress Summary           Patient Acceptance, E,TB, VU,DU,NR by KG at 5/6/2022 1134      Show all documentation for this point (5)                 Point: Home exercise program (Done)     Learning Progress Summary           Patient Acceptance, E,TB, VU,DU,NR by KG at 5/6/2022 1134      Show all documentation for this point (1)                 Point: Body mechanics (Done)     Learning Progress Summary           Patient Acceptance, E,TB, VU,DU,NR by KG at 5/6/2022 1134      Show all documentation for this point (4)                 Point: Precautions (Done)     Learning Progress Summary           Patient Acceptance, E,TB, VU,DU,NR by KG at 5/6/2022 1134      Show all documentation for this point (4)                             User Key     Initials Effective Dates Name Provider Type Discipline    KG 04/11/22 -  Harry Khan PTA Student PTA Student PT              PT Recommendation and Plan     Plan of Care Reviewed With: (P) patient  Progress: (P) improving  Outcome Evaluation: (P) Upon arrival pt supine in bed and agreeable to PT. Pt completed bed mobility w/ SBA w/ cues given for sequencing. Pt sat EOB for 3-4min w/ UE support req SBA. Pt performed sit-stand w/ Johnnie x  1 to CGA, and took a few hop steps w/ turn to sit in chair w/ CGA. Fairly steady throughout trfr and able to maintain NWB well w/ use of FWW. PT will cont to progress as pt tolerates.     Time Calculation:    PT Charges     Row Name 05/06/22 1134             Time Calculation    Start Time 1101 (P)   -KG      Stop Time 1115 (P)   -KG      Time Calculation (min) 14 min (P)   -KG      PT Received On 05/06/22 (P)   -KG      PT - Next Appointment 05/07/22 (P)   -KG              Timed Charges    14067 - PT Therapeutic Activity Minutes 14 (P)   -KG              Total Minutes    Timed Charges Total Minutes 14 (P)   -KG       Total Minutes 14 (P)   -KG            User Key  (r) = Recorded By, (t) = Taken By, (c) = Cosigned By    Initials Name Provider Type    KG Harry Khan PTA Student PTA Student              Therapy Charges for Today     Code Description Service Date Service Provider Modifiers Qty    [id number]  PT THERAPEUTIC ACT EA 15 MIN 5/6/2022 Harry Khan PTA Student GP 1          PT G-Codes  Outcome Measure Options: (P) AM-PAC 6 Clicks Basic Mobility (PT)  AM-PAC 6 Clicks Score (PT): (P) 15    MELVA Jules  5/6/2022

## 2022-05-06 NOTE — PROGRESS NOTES
Name: Stephy Duncan ADMIT: 2022   : 1955  PCP: Gregorio Rosales    MRN: [id number] LOS: 5 days   AGE/SEX: 66 y.o. female  ROOM: [facility]   Subjective   No chief complaint on file.     Pain controlled. No CP SOA NVD reported. She does not want to continue the fluid restriction. Discussed sodium levels with her and improvement today so will change to diabetic diet.    Objective   Vital Signs  Temp:  [97.7 °F (36.5 °C)-99.6 °F (37.6 °C)] 97.7 °F (36.5 °C)  Heart Rate:  [60-78] 63  Resp:  [16-18] 18  BP: (105-127)/(54-60) 127/54  SpO2:  [94 %-100 %] 94 %  on   ;   Device (Oxygen Therapy): room air  Body mass index is 38.98 kg/m².    Physical Exam  Vitals and nursing note reviewed.   Constitutional:       General: She is not in acute distress.     Appearance: She is not diaphoretic.   HENT:      Head: Normocephalic and atraumatic.   Eyes:      General:         Right eye: No discharge.         Left eye: No discharge.      Conjunctiva/sclera: Conjunctivae normal.   Cardiovascular:      Rate and Rhythm: Normal rate and regular rhythm.      Heart sounds: Murmur heard.   Pulmonary:      Effort: Pulmonary effort is normal.      Breath sounds: No wheezing.   Abdominal:      General: There is no distension.      Palpations: Abdomen is soft.      Tenderness: There is no abdominal tenderness. There is no guarding or rebound.   Musculoskeletal:         General: Swelling and tenderness present.      Cervical back: Neck supple. No tenderness.   Skin:     General: Skin is warm and dry.   Neurological:      Mental Status: She is alert. Mental status is at baseline.   Psychiatric:         Mood and Affect: Mood normal.         Behavior: Behavior normal.         Results Review:       I reviewed the patient's new clinical results.      Results from last 7 days   Lab Units 22  0458 22  0520 22  0521 22  0525 22  0430 22  0356   WBC 10*3/mm3  --   --  6.97  --  4.75 4.14   HEMOGLOBIN g/dL  7.7* 7.6* 6.7* 7.6* 8.4* 8.3*  8.2*   PLATELETS 10*3/mm3  --   --  164  --  157 142     Results from last 7 days   Lab Units 05/06/22 0458 05/05/22 0520 05/04/22  0521 05/03/22  0526   SODIUM mmol/L 132* 129* 130* 132*   POTASSIUM mmol/L 4.8 4.4 4.3 4.4   CHLORIDE mmol/L 103 100 103 104   CO2 mmol/L 22.2 23.0 21.0* 23.0   BUN mg/dL 15 16 19 12   CREATININE mg/dL 0.87 1.03* 1.11* 1.10*   GLUCOSE mg/dL 146* 139* 136* 73   Estimated Creatinine Clearance: 72.3 mL/min (by C-G formula based on SCr of 0.87 mg/dL).  Results from last 7 days   Lab Units 05/06/22 0458 05/05/22 0520 05/04/22 0521 05/03/22  0526   CALCIUM mg/dL 8.0* 7.9* 7.5* 7.8*            aspirin, 81 mg, Oral, Q12H  DAPTOmycin, 10 mg/kg (Adjusted), Intravenous, Q24H  famotidine, 40 mg, Oral, Daily  ferrous sulfate, 325 mg, Oral, Daily With Breakfast  gabapentin, 600 mg, Oral, Q12H  insulin glargine, 50 Units, Subcutaneous, Nightly  insulin lispro, 0-9 Units, Subcutaneous, TID AC  metoprolol tartrate, 25 mg, Oral, Nightly      lactated ringers, 9 mL/hr, Last Rate: Stopped (05/02/22 1745)  lactated ringers, 9 mL/hr, Last Rate: 9 mL/hr (05/02/22 1745)    Diet Regular; Daily Fluid Restriction; 1500 mL Fluid Per Day    Assessment/Plan      Active Hospital Problems    Diagnosis  POA   • **S/P Girdlestone procedure [Z98.890]  Not Applicable   • Septic arthritis of hip (HCC) [M00.9]  Yes   • Morbid obesity ([facility]) [E66.01]  Yes   • Chronic diastolic CHF (congestive heart failure) ([facility]) [I50.32]  Yes   • 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       · Status post Girdlestone procedure, septic arthritis, wound dehiscence: On daptomycin for VRE hx. Orthopedic surgery primary. Current with outpatient ID,   Angelia. Has seen Dr Ponce here in the past if ID evaluation is needed. Defer abx to orthopedic surgery.  · DM2: A1c 6.2. Continue home long acting dose of 50 units. SSI. On less than total home dose. Monitor requirements and since discharging to facility, could send with SSI instead of the scheduled premeal. They can continue to titrate there.  · HTN: Needing less than home regimen.  Continuing current metoprolol.  · Anemia: Postop, YANIRA, and chronic disease. PO iron. Transfuse as needed.  · Hyponatremia: Improved. Off nsaid.  · Urinary Retention: Cath prn.  · Obesity    No objection to discharge when surgically stable. Will continue to follow. Please call with any questions or concerns.    Carlos Reyna MD  [facility]  05/06/22  09:58 EDT    Dictated portions using Dragon dictation software.    During the entire encounter, I was wearing recommended PPE including face mask and eye protection. Hand sanitization was performed prior to entering room and upon exit.

## 2022-05-06 NOTE — PLAN OF CARE
Goal Outcome Evaluation:  Plan of Care Reviewed With: patient        Progress: improving  Outcome Evaluation: VSS, RA, SL, voiding per gracie, BM 5/5, NWB to RLE, wound vac@125, HV x's 2 in place, CCP needs to readdress discharge needs-no note placed since 5/2, parameters on beta blocker, educated on BP and glucose monitoring, to rehab when precert obtained

## 2022-05-06 NOTE — SIGNIFICANT NOTE
05/06/22 1451   PICC Single Lumen 05/06/22 Left Basilic   Placement Date/Time: 05/06/22 (c) 1448   Hand Hygiene Completed: Yes  Size (Fr): 4  Description (optional): Lot#[id number]  EXP:03-  Length (cm): 39 cm  Orientation: Left  Location: Basilic  Site Prep: Chlorhexidine  All 5 Sterile Barriers Used (...   Site Assessment Clean;Dry;Intact   #1 Lumen Status Blood return noted;Capped;Flushed;Normal saline locked   Length arsenio (cm) 39 cm   Extremity Circumference (cm) 36 cm   Dressing Type Border Dressing;Transparent;Securing device;Antimicrobial dressing/disc   Dressing Status Clean;Dry;Intact   Dressing Intervention New dressing   Liquid Adhesive Applied   Dressing Change Due 05/13/22   Indication/Daily Review of Necessity intravenous medication therapy

## 2022-05-07 LAB
GLUCOSE BLDC GLUCOMTR-MCNC: 141 MG/DL (ref 70–130)
GLUCOSE BLDC GLUCOMTR-MCNC: 178 MG/DL (ref 70–130)
GLUCOSE BLDC GLUCOMTR-MCNC: 197 MG/DL (ref 70–130)
GLUCOSE BLDC GLUCOMTR-MCNC: 200 MG/DL (ref 70–130)
HCT VFR BLD AUTO: 23.4 % (ref 34–46.6)
HGB BLD-MCNC: 7.2 G/DL (ref 12–15.9)
SARS-COV-2 RNA RESP QL NAA+PROBE: NOT DETECTED

## 2022-05-07 PROCEDURE — U0003 INFECTIOUS AGENT DETECTION BY NUCLEIC ACID (DNA OR RNA); SEVERE ACUTE RESPIRATORY SYNDROME CORONAVIRUS 2 (SARS-COV-2) (CORONAVIRUS DISEASE [COVID-19]), AMPLIFIED PROBE TECHNIQUE, MAKING USE OF HIGH THROUGHPUT TECHNOLOGIES AS DESCRIBED BY CMS-2020-01-R: HCPCS | Performed by: INTERNAL MEDICINE

## 2022-05-07 PROCEDURE — 85014 HEMATOCRIT: CPT | Performed by: ORTHOPAEDIC SURGERY

## 2022-05-07 PROCEDURE — 63710000001 ONDANSETRON PER 8 MG: Performed by: ORTHOPAEDIC SURGERY

## 2022-05-07 PROCEDURE — 82962 GLUCOSE BLOOD TEST: CPT

## 2022-05-07 PROCEDURE — 25010000002 DAPTOMYCIN PER 1 MG: Performed by: ORTHOPAEDIC SURGERY

## 2022-05-07 PROCEDURE — 85018 HEMOGLOBIN: CPT | Performed by: ORTHOPAEDIC SURGERY

## 2022-05-07 PROCEDURE — 25010000002 ONDANSETRON PER 1 MG: Performed by: ORTHOPAEDIC SURGERY

## 2022-05-07 PROCEDURE — 63710000001 INSULIN LISPRO (HUMAN) PER 5 UNITS: Performed by: ORTHOPAEDIC SURGERY

## 2022-05-07 RX ORDER — METOPROLOL SUCCINATE 25 MG/1
25 TABLET, EXTENDED RELEASE ORAL DAILY
Start: 2022-05-07 | End: 2022-07-22 | Stop reason: HOSPADM

## 2022-05-07 RX ORDER — INSULIN ASPART 100 [IU]/ML
2 INJECTION, SOLUTION INTRAVENOUS; SUBCUTANEOUS
Status: ON HOLD
Start: 2022-05-07 | End: 2022-06-10 | Stop reason: SDUPTHER

## 2022-05-07 RX ADMIN — INSULIN LISPRO 4 UNITS: 100 INJECTION, SOLUTION INTRAVENOUS; SUBCUTANEOUS at 17:22

## 2022-05-07 RX ADMIN — GABAPENTIN 600 MG: 300 CAPSULE ORAL at 22:01

## 2022-05-07 RX ADMIN — OXYCODONE HYDROCHLORIDE AND ACETAMINOPHEN 2 TABLET: 5; 325 TABLET ORAL at 06:42

## 2022-05-07 RX ADMIN — ONDANSETRON HYDROCHLORIDE 4 MG: 4 TABLET, FILM COATED ORAL at 09:55

## 2022-05-07 RX ADMIN — OXYCODONE HYDROCHLORIDE AND ACETAMINOPHEN 2 TABLET: 5; 325 TABLET ORAL at 18:45

## 2022-05-07 RX ADMIN — DAPTOMYCIN 750 MG: 500 INJECTION, POWDER, LYOPHILIZED, FOR SOLUTION INTRAVENOUS at 10:44

## 2022-05-07 RX ADMIN — INSULIN LISPRO 2 UNITS: 100 INJECTION, SOLUTION INTRAVENOUS; SUBCUTANEOUS at 11:48

## 2022-05-07 RX ADMIN — GABAPENTIN 600 MG: 300 CAPSULE ORAL at 08:01

## 2022-05-07 RX ADMIN — Medication 10 ML: at 08:01

## 2022-05-07 RX ADMIN — FERROUS SULFATE TAB 325 MG (65 MG ELEMENTAL FE) 325 MG: 325 (65 FE) TAB at 08:01

## 2022-05-07 RX ADMIN — OXYCODONE HYDROCHLORIDE AND ACETAMINOPHEN 2 TABLET: 5; 325 TABLET ORAL at 02:12

## 2022-05-07 RX ADMIN — ONDANSETRON 4 MG: 2 INJECTION INTRAMUSCULAR; INTRAVENOUS at 01:50

## 2022-05-07 RX ADMIN — ASPIRIN 81 MG: 81 TABLET, COATED ORAL at 22:01

## 2022-05-07 RX ADMIN — OXYCODONE HYDROCHLORIDE AND ACETAMINOPHEN 2 TABLET: 5; 325 TABLET ORAL at 22:54

## 2022-05-07 RX ADMIN — ASPIRIN 81 MG: 81 TABLET, COATED ORAL at 08:01

## 2022-05-07 RX ADMIN — OXYCODONE HYDROCHLORIDE AND ACETAMINOPHEN 2 TABLET: 5; 325 TABLET ORAL at 14:44

## 2022-05-07 RX ADMIN — OXYCODONE HYDROCHLORIDE AND ACETAMINOPHEN 2 TABLET: 5; 325 TABLET ORAL at 10:44

## 2022-05-07 RX ADMIN — METOPROLOL TARTRATE 25 MG: 25 TABLET, FILM COATED ORAL at 22:01

## 2022-05-07 RX ADMIN — Medication 10 ML: at 22:02

## 2022-05-07 RX ADMIN — INSULIN GLARGINE-YFGN 50 UNITS: 100 INJECTION, SOLUTION SUBCUTANEOUS at 22:47

## 2022-05-07 RX ADMIN — FAMOTIDINE 40 MG: 20 TABLET ORAL at 08:01

## 2022-05-07 NOTE — SIGNIFICANT NOTE
05/07/22 1016   OTHER   Discipline physical therapy assistant   Rehab Time/Intention   Session Not Performed patient/family declined, not feeling well  (Pt refusal after check on earlier AM. Pt stating she felt nauseous and refused PT today. PT will follow up tomorrow.)   Recommendation   PT - Next Appointment 05/08/22

## 2022-05-07 NOTE — PLAN OF CARE
Goal Outcome Evaluation:  Plan of Care Reviewed With: patient        Progress: improving  Outcome Evaluation: A&Ox4. VSS. Dressing CDI, wound vac in place, HV x2. Decline PT today, assist x1 and walker for short distances. Purewick/BSC. Pain controlled. Plans to D/C to [place] tomorrow, ambulance scheduled for 0730, packet mostly completed. Will cont to monitor.   ,[email or web address]

## 2022-05-07 NOTE — PROGRESS NOTES
Name: Stephy Duncan ADMIT: 2022   : 1955  PCP: Pierre Rosalesin    MRN: [id number] LOS: 6 days   AGE/SEX: 67 y.o. female  ROOM: [facility]   Subjective   No chief complaint on file.     Pain controlled. No CP SOA NVD reported. Having bowel movements.    Objective   Vital Signs  Temp:  [97 °F (36.1 °C)-100.2 °F (37.9 °C)] 97 °F (36.1 °C)  Heart Rate:  [60-88] 62  Resp:  [14-18] 16  BP: ()/(50-63) 112/63  SpO2:  [95 %-100 %] 100 %  on   ;   Device (Oxygen Therapy): room air  Body mass index is 38.98 kg/m².    Physical Exam  Vitals and nursing note reviewed.   Constitutional:       General: She is not in acute distress.     Appearance: She is not diaphoretic.   HENT:      Head: Normocephalic and atraumatic.   Eyes:      General:         Right eye: No discharge.         Left eye: No discharge.      Conjunctiva/sclera: Conjunctivae normal.   Cardiovascular:      Rate and Rhythm: Normal rate and regular rhythm.      Heart sounds: Murmur heard.   Pulmonary:      Effort: Pulmonary effort is normal.      Breath sounds: No wheezing.   Abdominal:      General: There is no distension.      Palpations: Abdomen is soft.      Tenderness: There is no abdominal tenderness. There is no guarding or rebound.   Musculoskeletal:         General: Swelling present.      Cervical back: Neck supple. No tenderness.      Comments: Wound vac   Skin:     General: Skin is warm and dry.   Neurological:      Mental Status: She is alert. Mental status is at baseline.   Psychiatric:         Mood and Affect: Mood normal.         Behavior: Behavior normal.         Results Review:       I reviewed the patient's new clinical results.      Results from last 7 days   Lab Units 22  0536 22  0458 22  0520 22  0521 22  0525 22  0430 22  0356   WBC 10*3/mm3  --   --   --  6.97  --  4.75 4.14   HEMOGLOBIN g/dL 7.2* 7.7* 7.6* 6.7*   < > 8.4* 8.3*  8.2*   PLATELETS 10*3/mm3  --   --   --  164  --   157 142    < > = values in this interval not displayed.     Results from last 7 days   Lab Units 05/06/22 0458 05/05/22 0520 05/04/22  0521 05/03/22  0526   SODIUM mmol/L 132* 129* 130* 132*   POTASSIUM mmol/L 4.8 4.4 4.3 4.4   CHLORIDE mmol/L 103 100 103 104   CO2 mmol/L 22.2 23.0 21.0* 23.0   BUN mg/dL 15 16 19 12   CREATININE mg/dL 0.87 1.03* 1.11* 1.10*   GLUCOSE mg/dL 146* 139* 136* 73   Estimated Creatinine Clearance: 71.3 mL/min (by C-G formula based on SCr of 0.87 mg/dL).  Results from last 7 days   Lab Units 05/06/22 0458 05/05/22 0520 05/04/22 0521 05/03/22  0526   CALCIUM mg/dL 8.0* 7.9* 7.5* 7.8*            aspirin, 81 mg, Oral, Q12H  DAPTOmycin, 10 mg/kg (Adjusted), Intravenous, Q24H  famotidine, 40 mg, Oral, Daily  ferrous sulfate, 325 mg, Oral, Daily With Breakfast  gabapentin, 600 mg, Oral, Q12H  insulin glargine, 50 Units, Subcutaneous, Nightly  insulin lispro, 0-9 Units, Subcutaneous, TID AC  metoprolol tartrate, 25 mg, Oral, Nightly  sodium chloride, 10 mL, Intravenous, Q12H      lactated ringers, 9 mL/hr, Last Rate: Stopped (05/02/22 1745)  lactated ringers, 9 mL/hr, Last Rate: 9 mL/hr (05/02/22 1745)    Diet Regular; Consistent Carbohydrate    Assessment/Plan      Active Hospital Problems    Diagnosis  POA   • **S/P Girdlestone procedure [Z98.890]  Not Applicable   • Septic arthritis of hip ([facility]) [M00.9]  Yes   • Morbid obesity ([facility]) [E66.01]  Yes   • Chronic diastolic CHF (congestive heart failure) ([facility]) [I50.32]  Yes   • Status post total replacement of hip [Z96.649]  Not Applicable   • Type 2 diabetes mellitus with diabetic polyneuropathy, with long-term current use of insulin ([facility]) [E11.42, Z79.4]  Not Applicable   • Hypertension [I10]  Yes      Resolved Hospital Problems   No resolved problems to display.       · Status post Girdlestone procedure, septic arthritis, wound dehiscence: On daptomycin for VRE hx. Orthopedic surgery primary. Current with outpatient ID, Dr Galan. Has  seen Dr Ponce here in the past if ID evaluation is needed. Defer abx to orthopedic surgery.  · DM2: A1c 6.2. Continue home long acting dose of 50 units. Not requiring much premeal insulin. Will decrease home dose from 20 to 2 units TID AC. Have changed that rx in dc. Outpatient monitoring for titration.  · HTN: Needing less than home regimen.  Continue metoprolol. DC'd the arb from home medications. Outpatient monitoring and hopefully can resume losartan when BP allows.  · Anemia: Postop, YANIRA, and chronic disease. PO iron. Transfuse as needed.  · Hyponatremia: Improved. Off nsaid.  · Urinary Retention: Cath prn.  · Obesity    No objection to discharge when surgically stable. Will continue to follow. Please call with any questions or concerns.    Carlos Reyna MD  [facility]  05/07/22  08:59 EDT    Dictated portions using Dragon dictation software.    During the entire encounter, I was wearing recommended PPE including face mask and eye protection. Hand sanitization was performed prior to entering room and upon exit.

## 2022-05-07 NOTE — PROGRESS NOTES
Orthopaedic & Hand Surgery  Daily Progress Note  Dr. Kartik Dasilva  [phone]    DEMOGRAPHICS:   · Stephy Duncan   · Age:67 y.o.   · MRN:[id number]  · Admitted: 4/25/2022    CC: Right hip infection    PROCEDURE: 5 Days Post-Op s/p Procedure(s):  HIP INCISION AND DRAINAGE     HOSPITAL PROGRESS  · Patient Issues: Notes some nausea overnight that was improved with Zofran. Otherwise doing well.  · Ambulation/Activity: Minimal activity since yesterday  · Per patient, there is a bed available at rehab at 730 tomorrow morning.    VITALS:  Vitals:    05/06/22 2220 05/07/22 0324 05/07/22 0700 05/07/22 1100   BP:  91/50 112/63 100/51   BP Location:  Right arm Right arm Right arm   Patient Position:  Lying Lying Lying   Pulse:  60 62 92   Resp:  14 16 18   Temp:  97.7 °F (36.5 °C) 97 °F (36.1 °C) 98 °F (36.7 °C)   TempSrc:  Oral Oral Oral   SpO2: 95% 99% 100% 92%   Weight:       Height:           PHYSICAL EXAM:  · LUNGS: Equal chest rise, no shortness of air  · CARDIOVASCULAR: brisk capillary refill intact  · WOUND: Dressings clean, dry, and intact  · EXTREMITY: Operative Limb  · Pulses: brisk capillary refill intact  · Sensation: Endorses sensation distally in the foot  · Motor:  able to flex and extend at the ankle and great toe    LABS:   Hemoglobin   Date Value Ref Range Status   05/07/2022 7.2 (L) 12.0 - 15.9 g/dL Final   05/06/2022 7.7 (L) 12.0 - 15.9 g/dL Final   05/05/2022 7.6 (L) 12.0 - 15.9 g/dL Final     Lab Results   Component Value Date    GLUCOSE 146 (H) 05/06/2022    CALCIUM 8.0 (L) 05/06/2022     (L) 05/06/2022    K 4.8 05/06/2022    CO2 22.2 05/06/2022     05/06/2022    BUN 15 05/06/2022    CREATININE 0.87 05/06/2022    EGFRIFNONA 68 02/16/2022    BCR 17.2 05/06/2022    ANIONGAP 6.8 05/06/2022     ASSESSMENT: Patient is a 67 y.o. female who is 5 Days Post-Op s/p Procedure(s):  HIP INCISION AND DRAINAGE     PLAN:   · Dispo: Acute, cleared for discharge to rehab tomorrow at 730 once  bed is available.    Kartik Dasilva MD, PhD  Orthopaedic & Hand Surgery  [facility]  [phone] - [place] [facility]  [phone] - [facility]

## 2022-05-08 VITALS
TEMPERATURE: 98.1 F | RESPIRATION RATE: 18 BRPM | BODY MASS INDEX: 38.77 KG/M2 | SYSTOLIC BLOOD PRESSURE: 119 MMHG | WEIGHT: 227.07 LBS | HEART RATE: 65 BPM | HEIGHT: 64 IN | DIASTOLIC BLOOD PRESSURE: 55 MMHG | OXYGEN SATURATION: 100 %

## 2022-05-08 LAB
GLUCOSE BLDC GLUCOMTR-MCNC: 129 MG/DL (ref 70–130)
GLUCOSE BLDC GLUCOMTR-MCNC: 193 MG/DL (ref 70–130)
HCT VFR BLD AUTO: 23.2 % (ref 34–46.6)
HGB BLD-MCNC: 7.2 G/DL (ref 12–15.9)

## 2022-05-08 PROCEDURE — 85018 HEMOGLOBIN: CPT | Performed by: ORTHOPAEDIC SURGERY

## 2022-05-08 PROCEDURE — 82962 GLUCOSE BLOOD TEST: CPT

## 2022-05-08 PROCEDURE — 63710000001 INSULIN LISPRO (HUMAN) PER 5 UNITS: Performed by: ORTHOPAEDIC SURGERY

## 2022-05-08 PROCEDURE — 85014 HEMATOCRIT: CPT | Performed by: ORTHOPAEDIC SURGERY

## 2022-05-08 PROCEDURE — 25010000002 DAPTOMYCIN PER 1 MG: Performed by: ORTHOPAEDIC SURGERY

## 2022-05-08 RX ADMIN — FERROUS SULFATE TAB 325 MG (65 MG ELEMENTAL FE) 325 MG: 325 (65 FE) TAB at 10:04

## 2022-05-08 RX ADMIN — Medication 10 ML: at 10:04

## 2022-05-08 RX ADMIN — GABAPENTIN 600 MG: 300 CAPSULE ORAL at 10:04

## 2022-05-08 RX ADMIN — ASPIRIN 81 MG: 81 TABLET, COATED ORAL at 10:04

## 2022-05-08 RX ADMIN — FAMOTIDINE 40 MG: 20 TABLET ORAL at 10:04

## 2022-05-08 RX ADMIN — OXYCODONE HYDROCHLORIDE AND ACETAMINOPHEN 2 TABLET: 5; 325 TABLET ORAL at 02:42

## 2022-05-08 RX ADMIN — INSULIN LISPRO 2 UNITS: 100 INJECTION, SOLUTION INTRAVENOUS; SUBCUTANEOUS at 12:23

## 2022-05-08 RX ADMIN — DAPTOMYCIN 750 MG: 500 INJECTION, POWDER, LYOPHILIZED, FOR SOLUTION INTRAVENOUS at 10:04

## 2022-05-08 RX ADMIN — OXYCODONE HYDROCHLORIDE AND ACETAMINOPHEN 2 TABLET: 5; 325 TABLET ORAL at 12:24

## 2022-05-08 RX ADMIN — OXYCODONE HYDROCHLORIDE AND ACETAMINOPHEN 2 TABLET: 5; 325 TABLET ORAL at 06:33

## 2022-05-08 NOTE — PLAN OF CARE
Goal Outcome Evaluation:  Plan of Care Reviewed With: patient        Progress: improving  Outcome Evaluation: Patient is stable, A&Ox4, VSS. Pain managed with po meds. Patient able to perform transfers with walker and x1 assist and maintain NWB status. Patient d/cing to [facility] with portable wound vac and x2 hemovac drains. Patient transport via EMS.

## 2022-05-09 NOTE — CASE MANAGEMENT/SOCIAL WORK
Case Management Discharge Note      Final Note: [facility].    Provided Post Acute Provider List?: N/A  N/A Provider List Comment: [facility]  Provided Post Acute Provider Quality & Resource List?: N/A    Selected Continued Care - Discharged on 5/8/2022 Admission date: 4/25/2022 - Discharge disposition: Skilled Nursing Facility (DC - External)    Destination Coordination complete.    Service Provider Selected Services Address Phone Fax Patient Preferred    [facility]  Skilled Nursing [address], [place] [address] [phone] --       Internal Comment last updated by Roopa Escobar CSW 4/28/2022 1349    Left voicemail for admissions                       Durable Medical Equipment    No services have been selected for the patient.              Dialysis/Infusion    No services have been selected for the patient.              Home Medical Care    No services have been selected for the patient.              Therapy    No services have been selected for the patient.              Community Resources    No services have been selected for the patient.              Community & DME    No services have been selected for the patient.                       Final Discharge Disposition Code: 03 - skilled nursing facility (SNF)

## 2022-05-09 NOTE — PAYOR COMM NOTE
"DISCHARGED  REF #[id number]  ID #[id number]          Stephy Duncan (67 y.o. Female)             Date of Birth   1955    Social Security Number       Address   [address] [place] [address] [place] [address]    Home Phone   [phone]    MRN   [id number]       Rastafari   Evangelical    Marital Status                               Admission Date   22    Admission Type   Elective    Admitting Provider   Karri Schaeffer II, MD    Attending Provider       Department, Room/Bed   [address], P797/1       Discharge Date   2022    Discharge Disposition   Skilled Nursing Facility (DC - External)    Discharge Destination                               Attending Provider: (none)   Allergies: Ace Inhibitors, Morphine    Isolation: None   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)   Code Status: Prior   Advance Care Planning Activity    Ht: 162.6 cm (64\")   Wt: 103 kg (227 lb 1.2 oz)    Admission Cmt: None   Principal Problem: S/P Girdlestone procedure [Z98.890]                 Active Insurance as of 2022     Primary Coverage     Payor Plan Insurance Group Employer/Plan Group    AETNA MEDICARE REPLACEMENT AETNA MEDICARE REPLACEMENT [id number]     Payor Plan Address Payor Plan Phone Number Payor Plan Fax Number Effective Dates    [address] [phone]  2022 - None Entered    [facility] [id number]       Subscriber Name Subscriber Birth Date Member ID       Stephy Duncan 1955 [id number]                 Emergency Contacts      (Rel.) Home Phone Work Phone Mobile Phone    ESTUARDO DUNCAN (Spouse) [phone] -- [phone]    ChrisBhumika (Sister) -- -- [phone]               Discharge Summary      Kartik Dasilva MD at 22 1423            Orthopaedic Discharge Summary  Dr. SCHNEIDER “Kilo” Laury BAXTER  [phone]    NAME: Stephy Duncan PCP: Gregorio Rosales   :  MRN: [id number]  [id number] LOS:  ADMIT: 5 days  2022   AGE/SEX: " 66 y.o. female DC:  5/8/2022             · Admitting Diagnosis: S/P Girdlestone procedure [Z98.890]    · Surgery Performed: HIP GIRDLESTONE PROCEDURE    · Discharge Medications:         Discharge Medications      New Medications      Instructions Start Date   aspirin 81 MG EC tablet   81 mg, Oral, Every 12 Hours Scheduled      DAPTOmycin 750 mg in sodium chloride 0.9 % 50 mL   10 mg/kg (750 mg), Intravenous, Every 24 Hours   Start Date: May 7, 2022     oxyCODONE-acetaminophen 5-325 MG per tablet  Commonly known as: PERCOCET   1 tablet, Oral, Every 4 Hours PRN      oxyCODONE-acetaminophen 5-325 MG per tablet  Commonly known as: PERCOCET   1 tablet, Oral, Every 4 Hours PRN         Continue These Medications      Instructions Start Date   Chlorhexidine Gluconate Cloth 2 % pads   Apply externally, AS DIRECTED PREOP      Diclofenac Sodium 1 % gel gel  Commonly known as: VOLTAREN   4 g, Topical, 4 Times Daily PRN      esomeprazole 40 MG capsule  Commonly known as: nexIUM   40 mg, Oral, Every Morning Before Breakfast      ferrous sulfate 325 (65 FE) MG tablet   325 mg, Oral, Every Other Day, Mornings      folic acid 1 MG tablet  Commonly known as: FOLVITE   1,000 mcg, Oral, Daily      gabapentin 600 MG tablet  Commonly known as: NEURONTIN   600 mg, Oral, 2 Times Daily      insulin detemir 100 UNIT/ML injection  Commonly known as: LEVEMIR   50 Units, Subcutaneous, Nightly      isosorbide mononitrate 30 MG 24 hr tablet  Commonly known as: IMDUR   30 mg, Oral, Every Morning      loratadine 10 MG tablet  Commonly known as: CLARITIN   10 mg, Oral, Nightly      losartan 25 MG tablet  Commonly known as: COZAAR   25 mg, Oral, 2 Times Daily      metoprolol tartrate 25 MG tablet  Commonly known as: LOPRESSOR   25 mg, Oral, Nightly      mupirocin 2 % nasal ointment  Commonly known as: BACTROBAN   Nasal, AS DIRECTED PREOP      NOVOLOG FLEXPEN SC   20 Units, Subcutaneous, 3 Times Daily With Meals, 15-20 units TID      Trulicity 3  MG/0.5ML solution pen-injector  Generic drug: Dulaglutide   0.5 mL, Subcutaneous, Weekly, SATURDAYS       vitamin D3 125 MCG (5000 UT) capsule capsule   5,000 Units, Oral, Daily         Stop These Medications    HYDROcodone-acetaminophen 7.5-325 MG per tablet  Commonly known as: NORCO            · Vitals:     Vitals:    05/05/22 1100 05/05/22 1929 05/05/22 2346 05/06/22 0712   BP: 112/54 120/56 105/60 127/54   BP Location: Right arm Right arm Right arm Right arm   Patient Position: Lying Lying Lying Lying   Pulse: 74 78 60 63   Resp: 18 16 16 18   Temp: 97.8 °F (36.6 °C) 99.6 °F (37.6 °C) 98.7 °F (37.1 °C) 97.7 °F (36.5 °C)   TempSrc: Oral Oral Oral Oral   SpO2: 100% 98% 94% 94%   Weight:       Height:           · Labs:      No results displayed because visit has over 200 results.           No results found.    · Hospital Course:   66 y.o. female was admitted to [facility] to services of Karri Schaeffer II, MD with S/P Girdlestone procedure [Z98.890] on 4/25/2022 and underwent HIP GIRDLESTONE PROCEDURE. Post-operatively the patient transferred to the floor where the patient underwent mobilization therapy. Opioids were titrated to achieve appropriate pain management to allow for participation in mobilization exercises. Vital signs and laboratory values are now within safe parameters for discharge. The dressings and/or incision is intact without signs or symptoms of active infection. Operative extremity neurovascular status remains intact as compared to the preoperative exam. Appropriate education re: incision care, activity levels, medications, and follow up visits was completed and all questions were answered. The patient is now deemed stable for discharge.    SNF: The patient had a difficult time mobilizing sufficiently with physical therapy. Further rehabilitation was recommended in a SNF facility. Once a bed was available, and the patient was cleared, there were discharged to the SNF in good  "condition}.       R \"Kilo\" Laury BAXTER MD  Orthopaedic Surgery  [facility]  [phone]                                                 Electronically signed by Kartik Dasilva MD at 05/07/22 1122       Discharge Order (From admission, onward)     Start     Ordered    05/06/22 1423  Discharge patient  Once        Expected Discharge Date: 05/07/22    Discharge Disposition: Skilled Nursing Facility (DC - External)    Physician of Record for Attribution - Please select from Treatment Team: BILLIE KISER II [[id number]]    Review needed by CMO to determine Physician of Record: No       Question Answer Comment   Physician of Record for Attribution - Please select from Treatment Team BILLIE KISER II    Review needed by CMO to determine Physician of Record No        05/06/22 0071                "

## 2022-05-20 ENCOUNTER — HOSPITAL ENCOUNTER (EMERGENCY)
Facility: HOSPITAL | Age: 67
Discharge: ANOTHER HOSPITAL | End: 2022-05-20
Attending: EMERGENCY MEDICINE | Admitting: EMERGENCY MEDICINE

## 2022-05-20 ENCOUNTER — APPOINTMENT (OUTPATIENT)
Dept: GENERAL RADIOLOGY | Facility: HOSPITAL | Age: 67
End: 2022-05-20

## 2022-05-20 VITALS
BODY MASS INDEX: 38.5 KG/M2 | RESPIRATION RATE: 16 BRPM | DIASTOLIC BLOOD PRESSURE: 47 MMHG | OXYGEN SATURATION: 97 % | TEMPERATURE: 99.8 F | HEART RATE: 79 BPM | SYSTOLIC BLOOD PRESSURE: 115 MMHG | WEIGHT: 225.53 LBS | HEIGHT: 64 IN

## 2022-05-20 DIAGNOSIS — R53.1 WEAKNESS: Primary | ICD-10-CM

## 2022-05-20 LAB
ALBUMIN SERPL-MCNC: 2 G/DL (ref 3.5–5.2)
ALBUMIN/GLOB SERPL: 0.6 G/DL
ALP SERPL-CCNC: 119 U/L (ref 39–117)
ALT SERPL W P-5'-P-CCNC: 10 U/L (ref 1–33)
ANION GAP SERPL CALCULATED.3IONS-SCNC: 8 MMOL/L (ref 5–15)
AST SERPL-CCNC: 19 U/L (ref 1–32)
BASOPHILS # BLD AUTO: 0.03 10*3/MM3 (ref 0–0.2)
BASOPHILS NFR BLD AUTO: 0.4 % (ref 0–1.5)
BILIRUB SERPL-MCNC: 0.3 MG/DL (ref 0–1.2)
BILIRUB UR QL STRIP: NEGATIVE
BUN SERPL-MCNC: 9 MG/DL (ref 8–23)
BUN/CREAT SERPL: 12.5 (ref 7–25)
CALCIUM SPEC-SCNC: 8.2 MG/DL (ref 8.6–10.5)
CHLORIDE SERPL-SCNC: 102 MMOL/L (ref 98–107)
CLARITY UR: CLEAR
CO2 SERPL-SCNC: 20 MMOL/L (ref 22–29)
COLOR UR: YELLOW
CREAT SERPL-MCNC: 0.72 MG/DL (ref 0.57–1)
D-LACTATE SERPL-SCNC: 1.8 MMOL/L (ref 0.5–2)
DEPRECATED RDW RBC AUTO: 56.3 FL (ref 37–54)
EGFRCR SERPLBLD CKD-EPI 2021: 90.6 ML/MIN/1.73
EOSINOPHIL # BLD AUTO: 0.22 10*3/MM3 (ref 0–0.4)
EOSINOPHIL NFR BLD AUTO: 3 % (ref 0.3–6.2)
ERYTHROCYTE [DISTWIDTH] IN BLOOD BY AUTOMATED COUNT: 18.1 % (ref 12.3–15.4)
GLOBULIN UR ELPH-MCNC: 3.6 GM/DL
GLUCOSE SERPL-MCNC: 166 MG/DL (ref 65–99)
GLUCOSE UR STRIP-MCNC: NEGATIVE MG/DL
HCT VFR BLD AUTO: 23.4 % (ref 34–46.6)
HGB BLD-MCNC: 7.3 G/DL (ref 12–15.9)
HGB UR QL STRIP.AUTO: NEGATIVE
HOLD SPECIMEN: NORMAL
HOLD SPECIMEN: NORMAL
IMM GRANULOCYTES # BLD AUTO: 0.04 10*3/MM3 (ref 0–0.05)
IMM GRANULOCYTES NFR BLD AUTO: 0.6 % (ref 0–0.5)
KETONES UR QL STRIP: NEGATIVE
LEUKOCYTE ESTERASE UR QL STRIP.AUTO: NEGATIVE
LYMPHOCYTES # BLD AUTO: 1.15 10*3/MM3 (ref 0.7–3.1)
LYMPHOCYTES NFR BLD AUTO: 15.9 % (ref 19.6–45.3)
MAGNESIUM SERPL-MCNC: 1.8 MG/DL (ref 1.6–2.4)
MCH RBC QN AUTO: 26.5 PG (ref 26.6–33)
MCHC RBC AUTO-ENTMCNC: 31.2 G/DL (ref 31.5–35.7)
MCV RBC AUTO: 85.1 FL (ref 79–97)
MONOCYTES # BLD AUTO: 1 10*3/MM3 (ref 0.1–0.9)
MONOCYTES NFR BLD AUTO: 13.9 % (ref 5–12)
NEUTROPHILS NFR BLD AUTO: 4.78 10*3/MM3 (ref 1.7–7)
NEUTROPHILS NFR BLD AUTO: 66.2 % (ref 42.7–76)
NITRITE UR QL STRIP: NEGATIVE
NRBC BLD AUTO-RTO: 0 /100 WBC (ref 0–0.2)
PH UR STRIP.AUTO: 6 [PH] (ref 5–8)
PLATELET # BLD AUTO: 204 10*3/MM3 (ref 140–450)
PMV BLD AUTO: 9 FL (ref 6–12)
POTASSIUM SERPL-SCNC: 4.2 MMOL/L (ref 3.5–5.2)
PROT SERPL-MCNC: 5.6 G/DL (ref 6–8.5)
PROT UR QL STRIP: NEGATIVE
QT INTERVAL: 366 MS
RBC # BLD AUTO: 2.75 10*6/MM3 (ref 3.77–5.28)
SODIUM SERPL-SCNC: 130 MMOL/L (ref 136–145)
SP GR UR STRIP: 1.01 (ref 1–1.03)
TROPONIN T SERPL-MCNC: <0.01 NG/ML (ref 0–0.03)
UROBILINOGEN UR QL STRIP: NORMAL
WBC NRBC COR # BLD: 7.22 10*3/MM3 (ref 3.4–10.8)
WHOLE BLOOD HOLD COAG: NORMAL
WHOLE BLOOD HOLD SPECIMEN: NORMAL

## 2022-05-20 PROCEDURE — 80053 COMPREHEN METABOLIC PANEL: CPT | Performed by: EMERGENCY MEDICINE

## 2022-05-20 PROCEDURE — 83735 ASSAY OF MAGNESIUM: CPT | Performed by: EMERGENCY MEDICINE

## 2022-05-20 PROCEDURE — 71045 X-RAY EXAM CHEST 1 VIEW: CPT

## 2022-05-20 PROCEDURE — 81003 URINALYSIS AUTO W/O SCOPE: CPT | Performed by: EMERGENCY MEDICINE

## 2022-05-20 PROCEDURE — 99283 EMERGENCY DEPT VISIT LOW MDM: CPT

## 2022-05-20 PROCEDURE — 87040 BLOOD CULTURE FOR BACTERIA: CPT | Performed by: EMERGENCY MEDICINE

## 2022-05-20 PROCEDURE — 36415 COLL VENOUS BLD VENIPUNCTURE: CPT

## 2022-05-20 PROCEDURE — 85025 COMPLETE CBC W/AUTO DIFF WBC: CPT | Performed by: EMERGENCY MEDICINE

## 2022-05-20 PROCEDURE — 93005 ELECTROCARDIOGRAM TRACING: CPT | Performed by: EMERGENCY MEDICINE

## 2022-05-20 PROCEDURE — 83605 ASSAY OF LACTIC ACID: CPT | Performed by: EMERGENCY MEDICINE

## 2022-05-20 PROCEDURE — P9612 CATHETERIZE FOR URINE SPEC: HCPCS

## 2022-05-20 PROCEDURE — 84484 ASSAY OF TROPONIN QUANT: CPT | Performed by: EMERGENCY MEDICINE

## 2022-05-20 RX ORDER — SODIUM CHLORIDE 0.9 % (FLUSH) 0.9 %
10 SYRINGE (ML) INJECTION AS NEEDED
Status: DISCONTINUED | OUTPATIENT
Start: 2022-05-20 | End: 2022-05-20 | Stop reason: HOSPADM

## 2022-05-20 NOTE — ED PROVIDER NOTES
Time: 2:53 PM EDT  Arrived by: ambulance  Chief Complaint: weakness  History provided by: patient  History is limited by: N/A     History of Present Illness:  Patient is a 67 y.o. year old female who presents to the emergency department with generalized weakness that is gotten worse over the past 2 days.  Patient has no fever or chills.  Patient has no chest pain or shortness of breath.  Patient denies dysuria and urinary frequency.  Patient has no cough or hemoptysis.  Patient denies nausea, vomiting, and diarrhea.  Patient denies subjective neurological deficit including numbness or tingling.      Weakness - Generalized  Severity:  Mild  Onset quality:  Gradual  Duration:  2 days  Timing:  Constant  Progression:  Worsening  Relieved by:  Nothing  Worsened by:  Nothing  Associated symptoms: no abdominal pain, no chest pain, no cough, no diarrhea, no dysuria, no fever, no headaches, no myalgias, no nausea, no seizures, no shortness of breath and no vomiting        Similar Symptoms Previously: yes  Recently seen: no      Patient Care Team  Primary Care Provider: Gregorio Rosales    Past Medical History:     Allergies   Allergen Reactions   • Ace Inhibitors Shortness Of Breath and Swelling   • Morphine Shortness Of Breath     Past Medical History:   Diagnosis Date   • Allergies    • Arthritis    • Cardiac murmur    • Diabetes mellitus (HCC)    • GERD (gastroesophageal reflux disease)    • Heart attack (HCC) 12/2020   • History of cervical cancer 1981   • History of transfusion     SEVERAL   • Hyperlipidemia    • Hypertension    • Iron deficiency anemia    • Osteomyelitis hip (HCC)     RIGHT   • PONV (postoperative nausea and vomiting)    • Right hip pain    • VRE infection within last 3 months    • Wound infection     RIGHT HIP.  WOUND VAC IN PLACE     Past Surgical History:   Procedure Laterality Date   • CARDIAC CATHETERIZATION     • CERVICAL CONIZATION     • COLONOSCOPY     • CORONARY ANGIOPLASTY WITH STENT PLACEMENT      • ENDOSCOPY     • HIP ARTHROPLASTY Right    • HIP HARDWARE REMOVAL Right    • HIP MINI REVISION Right 01/18/2022    Procedure: TOTAL HIP ARTHROPLASTY LINER REVISION, pegboard lateral;  Surgeon: Karri Schaeffer II, MD;  Location[facility] MA[facility];  Service: Orthopedics;  Laterality: Right;   • HIP SPACER INSERTION WITH ANTIBIOTIC CEMENT      X3   • HYSTERECTOMY     • INCISION AND DRAINAGE HIP Right 12/18/2021    Procedure: INCISION AND DRAINAGE TOTAL HIP, LINER EXCHANGE AND WOUND VAC PLACEMENT;  Surgeon: Karri Schaeffer II, MD;  Location: [facility] MA[facility];  Service: Orthopedics;  Laterality: Right;   • INCISION AND DRAINAGE HIP Right 5/2/2022    Procedure: HIP INCISION AND DRAINAGE;  Surgeon: Karri Schaeffer II, MD;  Location: [facility] MAIN [facility];  Service: Orthopedics;  Laterality: Right;   • KNEE ARTHROPLASTY Left    • LAPAROSCOPIC CHOLECYSTECTOMY     • TONSILLECTOMY     • TOTAL HIP ARTHROPLASTY Right 4/25/2022    Procedure: HIP GIRDLESTONE PROCEDURE;  Surgeon: Karri Schaeffer II, MD;  Location: [facility] MAIN OR;  Service: Orthopedics;  Laterality: Right;   • TOTAL HIP ARTHROPLASTY REVISION Right 11/16/2021    Procedure: TOTAL HIP REVISION ARTHROPLASTY RIGHT POSTERIOR;  Surgeon: Karri Schaeffer II, MD;  Location: [facility] MAIN OR;  Service: Orthopedics;  Laterality: Right;     Family History   Problem Relation Age of Onset   • Malig Hyperthermia Neg Hx        Home Medications:  Prior to Admission medications    Medication Sig Start Date End Date Taking? Authorizing Provider   aspirin 81 MG EC tablet Take 1 tablet by mouth Every 12 (Twelve) Hours. 5/6/22   Karri Schaeffer II, MD   Chlorhexidine Gluconate Cloth 2 % pads Apply  topically. AS DIRECTED PREOP    Provider, MD Nikunj   DAPTOmycin 750 mg in sodium chloride 0.9 % 50 mL Infuse 750 mg into a venous catheter Daily for 33 doses. Indications: Bone and/or Joint Infection 5/7/22 6/9/22  Karri Schaeffer II, MD    Diclofenac Sodium (VOLTAREN) 1 % gel gel Apply 4 g topically to the appropriate area as directed 4 (Four) Times a Day As Needed.    Nikunj Isaac MD   Dulaglutide (Trulicity) 3 MG/0.5ML solution pen-injector Inject 0.5 mL under the skin into the appropriate area as directed 1 (One) Time Per Week. SATURDAYS    Nikunj Isaac MD   esomeprazole (nexIUM) 40 MG capsule Take 40 mg by mouth Every Morning Before Breakfast.    Nikunj Isaac MD   ferrous sulfate 325 (65 FE) MG tablet Take 325 mg by mouth Every Other Day. Mornings    Nikunj Isaac MD   folic acid (FOLVITE) 1 MG tablet Take 1,000 mcg by mouth Daily. 3/28/22   Nikunj Isaac MD   gabapentin (NEURONTIN) 600 MG tablet Take 1 tablet by mouth 2 (Two) Times a Day. 4/29/22   Karri Schaeffer II, MD   insulin aspart (NovoLOG FlexPen) 100 UNIT/ML solution pen-injector sc pen Inject 2 Units under the skin into the appropriate area as directed 3 (Three) Times a Day With Meals. 5/7/22   Carlos Reyna MD   insulin detemir (LEVEMIR) 100 UNIT/ML injection Inject 50 Units under the skin into the appropriate area as directed Every Night.    Nikunj Isaac MD   isosorbide mononitrate (IMDUR) 30 MG 24 hr tablet Take 30 mg by mouth Every Morning.    Nikunj Isaac MD   loratadine (CLARITIN) 10 MG tablet Take 10 mg by mouth Every Night.    Nikunj Isaac MD   metoprolol succinate XL (Toprol XL) 25 MG 24 hr tablet Take 1 tablet by mouth Daily. 5/7/22   Carlos Reyna MD   mupirocin (BACTROBAN) 2 % nasal ointment into the nostril(s) as directed by provider. AS DIRECTED PREOP    Nikunj Isaac MD   oxyCODONE-acetaminophen (PERCOCET) 5-325 MG per tablet Take 1 tablet by mouth Every 4 (Four) Hours As Needed for Severe Pain . 4/29/22   Karri Schaeffer II, MD   oxyCODONE-acetaminophen (PERCOCET) 5-325 MG per tablet Take 1 tablet by mouth Every 4 (Four) Hours As Needed for Severe Pain . 5/6/22    "Karri Schaeffer II, MD   vitamin D3 125 MCG (5000 UT) capsule capsule Take 5,000 Units by mouth Daily.    Provider, MD Nikunj        Social History:   Social History     Tobacco Use   • Smoking status: Former Smoker     Years: 45.00     Types: Cigarettes     Quit date: 12/10/2015     Years since quittin.4   • Smokeless tobacco: Never Used   Vaping Use   • Vaping Use: Never used   Substance Use Topics   • Alcohol use: Never   • Drug use: Never     Recent travel: no     Review of Systems:  Review of Systems   Constitutional: Negative for chills and fever.   HENT: Negative for congestion, rhinorrhea and sore throat.    Eyes: Negative for pain and visual disturbance.   Respiratory: Negative for apnea, cough, chest tightness and shortness of breath.    Cardiovascular: Negative for chest pain and palpitations.   Gastrointestinal: Negative for abdominal pain, diarrhea, nausea and vomiting.   Genitourinary: Negative for difficulty urinating and dysuria.   Musculoskeletal: Negative for joint swelling and myalgias.   Skin: Negative for color change.   Neurological: Positive for weakness. Negative for seizures and headaches.   Psychiatric/Behavioral: Negative.    All other systems reviewed and are negative.       Physical Exam:  /47   Pulse 82   Temp 99.8 °F (37.7 °C) (Oral)   Resp 16   Ht 162.6 cm (64\")   Wt 102 kg (225 lb 8.5 oz)   SpO2 97%   BMI 38.71 kg/m²     Physical Exam  Vitals and nursing note reviewed.   Constitutional:       General: She is not in acute distress.     Appearance: Normal appearance. She is not toxic-appearing.   HENT:      Head: Normocephalic and atraumatic.      Jaw: There is normal jaw occlusion.   Eyes:      General: Lids are normal.      Extraocular Movements: Extraocular movements intact.      Conjunctiva/sclera: Conjunctivae normal.      Pupils: Pupils are equal, round, and reactive to light.   Cardiovascular:      Rate and Rhythm: Normal rate and regular rhythm. "      Pulses: Normal pulses.      Heart sounds: Normal heart sounds.   Pulmonary:      Effort: Pulmonary effort is normal. No respiratory distress.      Breath sounds: Normal breath sounds. No wheezing or rhonchi.   Abdominal:      General: Abdomen is flat.      Palpations: Abdomen is soft.      Tenderness: There is no abdominal tenderness. There is no guarding or rebound.   Musculoskeletal:         General: Normal range of motion.      Cervical back: Normal range of motion and neck supple.      Right lower leg: No edema.      Left lower leg: No edema.      Comments: (+) well appearing right hip wound   Skin:     General: Skin is warm and dry.   Neurological:      Mental Status: She is alert and oriented to person, place, and time. Mental status is at baseline.   Psychiatric:         Mood and Affect: Mood normal.                Medications in the Emergency Department:  Medications   sodium chloride 0.9 % flush 10 mL (has no administration in time range)        Labs  Lab Results (last 24 hours)     Procedure Component Value Units Date/Time    CBC & Differential [[id number]]  (Abnormal) Collected: 05/20/22 1239    Specimen: Blood from Arm, Left Updated: 05/20/22 5669    Narrative:      The following orders were created for panel order CBC & Differential.  Procedure                               Abnormality         Status                     ---------                               -----------         ------                     CBC Auto Differential[[id number]]        Abnormal            Final result                 Please view results for these tests on the individual orders.    Comprehensive Metabolic Panel [[id number]]  (Abnormal) Collected: 05/20/22 1239    Specimen: Blood from Arm, Left Updated: 05/20/22 1326     Glucose 166 mg/dL      BUN 9 mg/dL      Creatinine 0.72 mg/dL      Sodium 130 mmol/L      Potassium 4.2 mmol/L      Chloride 102 mmol/L      CO2 20.0 mmol/L      Calcium 8.2 mg/dL      Total Protein 5.6 g/dL       Albumin 2.00 g/dL      ALT (SGPT) 10 U/L      AST (SGOT) 19 U/L      Alkaline Phosphatase 119 U/L      Total Bilirubin 0.3 mg/dL      Globulin 3.6 gm/dL      A/G Ratio 0.6 g/dL      BUN/Creatinine Ratio 12.5     Anion Gap 8.0 mmol/L      eGFR 90.6 mL/min/1.73      Comment: National Kidney Foundation and American Society of Nephrology (ASN) Task Force recommended calculation based on the Chronic Kidney Disease Epidemiology Collaboration (CKD-EPI) equation refit without adjustment for race.       Narrative:      GFR Normal >60  Chronic Kidney Disease <60  Kidney Failure <15      Troponin [[id number]]  (Normal) Collected: 05/20/22 1239    Specimen: Blood from Arm, Left Updated: 05/20/22 1320     Troponin T <0.010 ng/mL     Narrative:      Troponin T Reference Range:  <= 0.03 ng/mL-   Negative for AMI  >0.03 ng/mL-     Abnormal for myocardial necrosis.  Clinicians would have to utilize clinical acumen, EKG, Troponin and serial changes to determine if it is an Acute Myocardial Infarction or myocardial injury due to an underlying chronic condition.       Results may be falsely decreased if patient taking Biotin.      Magnesium [[id number]]  (Normal) Collected: 05/20/22 1239    Specimen: Blood from Arm, Left Updated: 05/20/22 1326     Magnesium 1.8 mg/dL     CBC Auto Differential [[id number]]  (Abnormal) Collected: 05/20/22 1239    Specimen: Blood from Arm, Left Updated: 05/20/22 1259     WBC 7.22 10*3/mm3      RBC 2.75 10*6/mm3      Hemoglobin 7.3 g/dL      Hematocrit 23.4 %      MCV 85.1 fL      MCH 26.5 pg      MCHC 31.2 g/dL      RDW 18.1 %      RDW-SD 56.3 fl      MPV 9.0 fL      Platelets 204 10*3/mm3      Neutrophil % 66.2 %      Lymphocyte % 15.9 %      Monocyte % 13.9 %      Eosinophil % 3.0 %      Basophil % 0.4 %      Immature Grans % 0.6 %      Neutrophils, Absolute 4.78 10*3/mm3      Lymphocytes, Absolute 1.15 10*3/mm3      Monocytes, Absolute 1.00 10*3/mm3      Eosinophils, Absolute 0.22 10*3/mm3       Basophils, Absolute 0.03 10*3/mm3      Immature Grans, Absolute 0.04 10*3/mm3      nRBC 0.0 /100 WBC     Lactic Acid, Plasma [[id number]]  (Normal) Collected: 05/20/22 1247    Specimen: Blood from Arm, Left Updated: 05/20/22 1321     Lactate 1.8 mmol/L     Blood Culture - Blood, Arm, Right [[id number]] Collected: 05/20/22 1247    Specimen: Blood from Arm, Right Updated: 05/20/22 1255    Blood Culture - Blood, Arm, Left [[id number]] Collected: 05/20/22 1247    Specimen: Blood from Arm, Left Updated: 05/20/22 1255    Urinalysis With Microscopic If Indicated (No Culture) - Urine, Catheter [[id number]]  (Normal) Collected: 05/20/22 1415    Specimen: Urine, Catheter Updated: 05/20/22 1433     Color, UA Yellow     Appearance, UA Clear     pH, UA 6.0     Specific Gravity, UA 1.009     Glucose, UA Negative     Ketones, UA Negative     Bilirubin, UA Negative     Blood, UA Negative     Protein, UA Negative     Leuk Esterase, UA Negative     Nitrite, UA Negative     Urobilinogen, UA 0.2 E.U./dL    Narrative:      Urine microscopic not indicated.           Imaging:  XR Chest 1 View    Result Date: 5/20/2022  PROCEDURE: XR CHEST 1 VW  COMPARISON: 01/12/2020  INDICATIONS: Weak/Dizzy/AMS, PICC LINE VERIFICATION.  FINDINGS:  No evidence of pneumonia, pulmonary edema or other acute pulmonary process.  No pneumothorax or pleural effusion.  No evidence of acute process of the cardiomediastinal structures.       The PICC line terminates at the junction of the brachiocephalic vein and SVC.  No evidence of acute cardiopulmonary disease.      BISI DAMON MD       Electronically Signed and Approved By: BISI DAMON MD on 5/20/2022 at 12:09               Procedures:  Procedures    Progress  ED Course as of 05/20/22 1456   Fri May 20, 2022   1455 EKG:    Rhythm: sinus  Rate: 82  Axis: normal  Intervals: normal  ST Segment: no elevations    EKG Comparison: unchanged    Interpreted by me   [BN]      ED Course User Index  [BN] Anne Montano MD                             Medical Decision Making:  MDM  Number of Diagnoses or Management Options  Weakness  Diagnosis management comments: CBC shows a hemoglobin of 7.3 and hematocrit of 23.  This hemoglobin is stable for the patient.  The patient´s CMP was reviewed and shows no abnormalities of critical concern. Of note, the patient´s sodium and potassium are acceptable. The patient´s liver enzymes are unremarkable. The patient´s renal function (creatinine) is preserved. The patient has a normal anion gap.  Urinalysis is negative for bacteria.  Lactic acid is 1.8.  Chest x-ray is negative for pneumonia.  Patient has no hypoxia or respiratory distress.  Patient is stable and suitable for discharge.  Patient advised to follow with her primary care doctor next 2 to 2 days.       Amount and/or Complexity of Data Reviewed  Clinical lab tests: reviewed  Tests in the radiology section of CPT®: reviewed  Tests in the medicine section of CPT®: reviewed  Decide to obtain previous medical records or to obtain history from someone other than the patient: yes  Independent visualization of images, tracings, or specimens: yes    Risk of Complications, Morbidity, and/or Mortality  Presenting problems: moderate  Management options: moderate    Patient Progress  Patient progress: stable       Final diagnoses:   Weakness        Disposition:  ED Disposition     ED Disposition   Discharge    Condition   Stable    Comment   --             This medical record created using voice recognition software.           Anne Montano MD  05/20/22 5004

## 2022-05-25 LAB
BACTERIA SPEC AEROBE CULT: NORMAL
BACTERIA SPEC AEROBE CULT: NORMAL

## 2022-05-31 ENCOUNTER — HOSPITAL ENCOUNTER (OUTPATIENT)
Dept: INFUSION THERAPY | Facility: HOSPITAL | Age: 67
Setting detail: INFUSION SERIES
Discharge: HOME | End: 2022-05-31

## 2022-05-31 ENCOUNTER — TRANSCRIBE ORDERS (OUTPATIENT)
Dept: ADMINISTRATIVE | Facility: HOSPITAL | Age: 67
End: 2022-05-31

## 2022-05-31 VITALS
SYSTOLIC BLOOD PRESSURE: 111 MMHG | OXYGEN SATURATION: 100 % | HEART RATE: 89 BPM | DIASTOLIC BLOOD PRESSURE: 76 MMHG | RESPIRATION RATE: 18 BRPM | TEMPERATURE: 98.7 F

## 2022-05-31 DIAGNOSIS — T81.40XD POSTOPERATIVE INFECTION, UNSPECIFIED TYPE, SUBSEQUENT ENCOUNTER: ICD-10-CM

## 2022-05-31 DIAGNOSIS — T81.40XD POSTOPERATIVE INFECTION, SUBSEQUENT ENCOUNTER: ICD-10-CM

## 2022-05-31 DIAGNOSIS — B95.2 ENTEROCOCCUS FAECALIS INFECTION: Primary | ICD-10-CM

## 2022-05-31 DIAGNOSIS — M00.9 PYOGENIC ARTHRITIS, PELVIC REGION AND THIGH: ICD-10-CM

## 2022-05-31 PROBLEM — A49.8 ENTEROCOCCUS FAECALIS INFECTION: Status: ACTIVE | Noted: 2022-05-31

## 2022-05-31 PROCEDURE — 96365 THER/PROPH/DIAG IV INF INIT: CPT

## 2022-05-31 PROCEDURE — 25010000002 DAPTOMYCIN PER 1 MG: Performed by: NURSE PRACTITIONER

## 2022-05-31 RX ADMIN — DAPTOMYCIN 750 MG: 500 INJECTION, POWDER, FOR SOLUTION INTRAVENOUS at 15:54

## 2022-06-01 ENCOUNTER — APPOINTMENT (OUTPATIENT)
Dept: CT IMAGING | Facility: HOSPITAL | Age: 67
End: 2022-06-01

## 2022-06-01 ENCOUNTER — HOSPITAL ENCOUNTER (INPATIENT)
Facility: HOSPITAL | Age: 67
LOS: 9 days | Discharge: NURSING, REHAB OR LONG-TERM CARE | End: 2022-06-10
Attending: EMERGENCY MEDICINE | Admitting: HOSPITALIST

## 2022-06-01 ENCOUNTER — APPOINTMENT (OUTPATIENT)
Dept: CARDIOLOGY | Facility: HOSPITAL | Age: 67
End: 2022-06-01

## 2022-06-01 ENCOUNTER — HOSPITAL ENCOUNTER (OUTPATIENT)
Dept: INFUSION THERAPY | Facility: HOSPITAL | Age: 67
Setting detail: INFUSION SERIES
Discharge: HOME | End: 2022-06-01

## 2022-06-01 VITALS
DIASTOLIC BLOOD PRESSURE: 50 MMHG | OXYGEN SATURATION: 99 % | SYSTOLIC BLOOD PRESSURE: 112 MMHG | RESPIRATION RATE: 20 BRPM | TEMPERATURE: 98.1 F | HEART RATE: 83 BPM

## 2022-06-01 DIAGNOSIS — T81.40XD POSTOPERATIVE INFECTION, UNSPECIFIED TYPE, SUBSEQUENT ENCOUNTER: ICD-10-CM

## 2022-06-01 DIAGNOSIS — Z86.19 HISTORY OF INFECTION WITH VANCOMYCIN RESISTANT ENTEROCOCCUS (VRE): ICD-10-CM

## 2022-06-01 DIAGNOSIS — M86.9: Primary | ICD-10-CM

## 2022-06-01 DIAGNOSIS — M25.551 RIGHT HIP PAIN: ICD-10-CM

## 2022-06-01 DIAGNOSIS — B95.2 ENTEROCOCCUS FAECALIS INFECTION: Primary | ICD-10-CM

## 2022-06-01 DIAGNOSIS — Z86.79 HISTORY OF HYPERTENSION: ICD-10-CM

## 2022-06-01 DIAGNOSIS — Z51.89 VISIT FOR WOUND CARE: Primary | ICD-10-CM

## 2022-06-01 DIAGNOSIS — Z98.890 S/P GIRDLESTONE PROCEDURE: ICD-10-CM

## 2022-06-01 DIAGNOSIS — M80.051A: ICD-10-CM

## 2022-06-01 DIAGNOSIS — Z86.39 HISTORY OF DIABETES MELLITUS: ICD-10-CM

## 2022-06-01 DIAGNOSIS — M00.9 PYOGENIC ARTHRITIS, PELVIC REGION AND THIGH: ICD-10-CM

## 2022-06-01 DIAGNOSIS — R10.13 EPIGASTRIC ABDOMINAL PAIN: ICD-10-CM

## 2022-06-01 LAB
ALBUMIN SERPL-MCNC: 1.9 G/DL (ref 3.5–5.2)
ALBUMIN/GLOB SERPL: 0.6 G/DL
ALP SERPL-CCNC: 125 U/L (ref 39–117)
ALT SERPL W P-5'-P-CCNC: 13 U/L (ref 1–33)
ANION GAP SERPL CALCULATED.3IONS-SCNC: 11.7 MMOL/L (ref 5–15)
AST SERPL-CCNC: 18 U/L (ref 1–32)
BASOPHILS # BLD AUTO: 0.03 10*3/MM3 (ref 0–0.2)
BASOPHILS NFR BLD AUTO: 0.4 % (ref 0–1.5)
BILIRUB SERPL-MCNC: 0.2 MG/DL (ref 0–1.2)
BUN SERPL-MCNC: 15 MG/DL (ref 8–23)
BUN/CREAT SERPL: 14.6 (ref 7–25)
CALCIUM SPEC-SCNC: 7.6 MG/DL (ref 8.6–10.5)
CHLORIDE SERPL-SCNC: 97 MMOL/L (ref 98–107)
CO2 SERPL-SCNC: 17.3 MMOL/L (ref 22–29)
CREAT SERPL-MCNC: 1.03 MG/DL (ref 0.57–1)
CRP SERPL-MCNC: 13.57 MG/DL (ref 0–0.5)
D-LACTATE SERPL-SCNC: 1.8 MMOL/L (ref 0.5–2)
DEPRECATED RDW RBC AUTO: 47.9 FL (ref 37–54)
EGFRCR SERPLBLD CKD-EPI 2021: 59 ML/MIN/1.73
EOSINOPHIL # BLD AUTO: 0.14 10*3/MM3 (ref 0–0.4)
EOSINOPHIL NFR BLD AUTO: 2 % (ref 0.3–6.2)
ERYTHROCYTE [DISTWIDTH] IN BLOOD BY AUTOMATED COUNT: 15.8 % (ref 12.3–15.4)
ERYTHROCYTE [SEDIMENTATION RATE] IN BLOOD: 45 MM/HR (ref 0–30)
GLOBULIN UR ELPH-MCNC: 3.3 GM/DL
GLUCOSE SERPL-MCNC: 237 MG/DL (ref 65–99)
HCT VFR BLD AUTO: 25.1 % (ref 34–46.6)
HGB BLD-MCNC: 7.5 G/DL (ref 12–15.9)
IMM GRANULOCYTES # BLD AUTO: 0.09 10*3/MM3 (ref 0–0.05)
IMM GRANULOCYTES NFR BLD AUTO: 1.3 % (ref 0–0.5)
LYMPHOCYTES # BLD AUTO: 1.4 10*3/MM3 (ref 0.7–3.1)
LYMPHOCYTES NFR BLD AUTO: 20 % (ref 19.6–45.3)
MCH RBC QN AUTO: 25.1 PG (ref 26.6–33)
MCHC RBC AUTO-ENTMCNC: 29.9 G/DL (ref 31.5–35.7)
MCV RBC AUTO: 83.9 FL (ref 79–97)
MONOCYTES # BLD AUTO: 1.07 10*3/MM3 (ref 0.1–0.9)
MONOCYTES NFR BLD AUTO: 15.3 % (ref 5–12)
NEUTROPHILS NFR BLD AUTO: 4.27 10*3/MM3 (ref 1.7–7)
NEUTROPHILS NFR BLD AUTO: 61 % (ref 42.7–76)
NRBC BLD AUTO-RTO: 0 /100 WBC (ref 0–0.2)
PLATELET # BLD AUTO: 180 10*3/MM3 (ref 140–450)
PMV BLD AUTO: 9.5 FL (ref 6–12)
POTASSIUM SERPL-SCNC: 4.2 MMOL/L (ref 3.5–5.2)
PROT SERPL-MCNC: 5.2 G/DL (ref 6–8.5)
RBC # BLD AUTO: 2.99 10*6/MM3 (ref 3.77–5.28)
SARS-COV-2 RNA RESP QL NAA+PROBE: NOT DETECTED
SODIUM SERPL-SCNC: 126 MMOL/L (ref 136–145)
WBC NRBC COR # BLD: 7 10*3/MM3 (ref 3.4–10.8)

## 2022-06-01 PROCEDURE — 25010000002 HYDROMORPHONE PER 4 MG: Performed by: EMERGENCY MEDICINE

## 2022-06-01 PROCEDURE — 73701 CT LOWER EXTREMITY W/DYE: CPT

## 2022-06-01 PROCEDURE — G0463 HOSPITAL OUTPT CLINIC VISIT: HCPCS

## 2022-06-01 PROCEDURE — 25010000002 DAPTOMYCIN PER 1 MG: Performed by: EMERGENCY MEDICINE

## 2022-06-01 PROCEDURE — 83605 ASSAY OF LACTIC ACID: CPT | Performed by: EMERGENCY MEDICINE

## 2022-06-01 PROCEDURE — 85025 COMPLETE CBC W/AUTO DIFF WBC: CPT | Performed by: EMERGENCY MEDICINE

## 2022-06-01 PROCEDURE — 80053 COMPREHEN METABOLIC PANEL: CPT | Performed by: EMERGENCY MEDICINE

## 2022-06-01 PROCEDURE — 87040 BLOOD CULTURE FOR BACTERIA: CPT | Performed by: EMERGENCY MEDICINE

## 2022-06-01 PROCEDURE — U0003 INFECTIOUS AGENT DETECTION BY NUCLEIC ACID (DNA OR RNA); SEVERE ACUTE RESPIRATORY SYNDROME CORONAVIRUS 2 (SARS-COV-2) (CORONAVIRUS DISEASE [COVID-19]), AMPLIFIED PROBE TECHNIQUE, MAKING USE OF HIGH THROUGHPUT TECHNOLOGIES AS DESCRIBED BY CMS-2020-01-R: HCPCS | Performed by: EMERGENCY MEDICINE

## 2022-06-01 PROCEDURE — 99284 EMERGENCY DEPT VISIT MOD MDM: CPT

## 2022-06-01 PROCEDURE — 85652 RBC SED RATE AUTOMATED: CPT | Performed by: EMERGENCY MEDICINE

## 2022-06-01 PROCEDURE — 86140 C-REACTIVE PROTEIN: CPT | Performed by: ORTHOPAEDIC SURGERY

## 2022-06-01 PROCEDURE — 25010000002 IOPAMIDOL 61 % SOLUTION: Performed by: EMERGENCY MEDICINE

## 2022-06-01 PROCEDURE — 93971 EXTREMITY STUDY: CPT

## 2022-06-01 RX ORDER — HYDROMORPHONE HYDROCHLORIDE 1 MG/ML
0.5 INJECTION, SOLUTION INTRAMUSCULAR; INTRAVENOUS; SUBCUTANEOUS ONCE
Status: COMPLETED | OUTPATIENT
Start: 2022-06-01 | End: 2022-06-01

## 2022-06-01 RX ORDER — OXYCODONE HYDROCHLORIDE AND ACETAMINOPHEN 5; 325 MG/1; MG/1
1 TABLET ORAL ONCE
Status: COMPLETED | OUTPATIENT
Start: 2022-06-01 | End: 2022-06-01

## 2022-06-01 RX ADMIN — HYDROMORPHONE HYDROCHLORIDE 0.5 MG: 1 INJECTION, SOLUTION INTRAMUSCULAR; INTRAVENOUS; SUBCUTANEOUS at 21:40

## 2022-06-01 RX ADMIN — DAPTOMYCIN 750 MG: 500 INJECTION, POWDER, LYOPHILIZED, FOR SOLUTION INTRAVENOUS at 22:11

## 2022-06-01 RX ADMIN — OXYCODONE AND ACETAMINOPHEN 1 TABLET: 5; 325 TABLET ORAL at 20:02

## 2022-06-01 RX ADMIN — IOPAMIDOL 85 ML: 612 INJECTION, SOLUTION INTRAVENOUS at 20:37

## 2022-06-01 NOTE — PROGRESS NOTES
Today's right lower venous doppler preliminary report is negative for deep vein thrombosis. This preliminary report was given to Dr. Cool.

## 2022-06-01 NOTE — ED NOTES
Pt arrives via PV with c/o L leg swelling and redness. Wound vac on L hip from recent hip sx. Foul smell noted. Pt a&ox4, abc's intact, NAD noted.     Patient wearing mask during triage. RN wearing appropriate PPE during triage. Hand hygiene performed.

## 2022-06-01 NOTE — CODE DOCUMENTATION
Patient presented to unit for abx and wound care and wound vac change. Patient states she is going through 2 sometimes 3 canisters per day of wisam red blood, with a very foul odor. Area around the wound was hot to the touch and hard. Contacted patients orthopedic doctor and he advised for the patient to go to the ED. No wound care or abx was given to the patient this visit, as she left to go to the ED. Balbina Christopher RN

## 2022-06-01 NOTE — ED PROVIDER NOTES
EMERGENCY DEPARTMENT ENCOUNTER    Room Number:  16/16  Date of encounter:  6/1/2022  PCP: Gregorio Rosales  Historian: Patient,       HPI:  Chief Complaint: Right leg pain  A complete HPI/ROS/PMH/PSH/SH/FH are unobtainable due to: None    Context: Stephy Duncan is a 67 y.o. female who presents to the ED via private vehicle for evaluation for concerns for worsening right leg pain and swelling and foul-smelling drainage from her wound VAC over the last couple days.  Patient is status post surgery on the right hip back in early May for a septic hip joint.  Has been on outpatient daptomycin through PICC line.  Denies any fevers, chest pain, shortness breath, nausea, vomiting.  Has not had any significant diarrhea.  Home pain medications do help somewhat.      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/2021   • Type 2 diabetes mellitus with diabetic polyneuropathy, with long-term current use of insulin ([facility])    • Hypertension    • Normocytic anemia    • Postoperative infection 12/17/2021   • Iron deficiency anemia 12/18/2021   • Morbid obesity ([facility]) 12/18/2021   • Presence of stent in coronary artery in patient with coronary artery disease 12/18/2021   • Chronic diastolic CHF (congestive heart failure) ([facility]) 12/18/2021   • Septic arthritis of hip ([facility]) 12/19/2021   • Drainage from wound 01/17/2022   • Candidal intertrigo 01/19/2022   • Postoperative anemia due to acute blood loss 01/19/2022   • S/P Girdlestone procedure 04/25/2022   • Enterococcus faecalis infection 05/31/2022   • Pyogenic arthritis, pelvic region and thigh ([facility])  05/31/2022     Resolved Ambulatory Problems     Diagnosis Date Noted   • No Resolved Ambulatory Problems     Past Medical History:   Diagnosis Date   • Allergies    • Arthritis    • Cardiac murmur    • Diabetes mellitus (HCC)    • GERD (gastroesophageal reflux disease)    • Heart attack (HCC) 12/2020   • History of cervical cancer 1981   • History of transfusion    • Hyperlipidemia    • Osteomyelitis hip (HCC)    • PONV (postoperative nausea and vomiting)    • Right hip pain    • VRE infection within last 3 months    • Wound infection          PAST SURGICAL HISTORY  Past Surgical History:   Procedure Laterality Date   • CARDIAC CATHETERIZATION     • CERVICAL CONIZATION     • COLONOSCOPY     • CORONARY ANGIOPLASTY WITH STENT PLACEMENT     • ENDOSCOPY     • HIP ARTHROPLASTY Right    • HIP HARDWARE REMOVAL Right    • HIP MINI REVISION Right 01/18/2022    Procedure: TOTAL HIP ARTHROPLASTY LINER REVISION, pegboard lateral;  Surgeon: Karri Schaeffer II, MD;  Location: [facility];  Service: Orthopedics;  Laterality: Right;   • HIP SPACER INSERTION WITH ANTIBIOTIC CEMENT      X3   • HYSTERECTOMY     • INCISION AND DRAINAGE HIP Right 12/18/2021    Procedure: INCISION AND DRAINAGE TOTAL HIP, LINER EXCHANGE AND WOUND VAC PLACEMENT;  Surgeon: Karri Schaeffer II, MD;  Location: [facility];  Service: Orthopedics;  Laterality: Right;   • INCISION AND DRAINAGE HIP Right 5/2/2022    Procedure: HIP INCISION AND DRAINAGE;  Surgeon: Karri Schaeffer II, MD;  Location: [facility] OR;  Service: Orthopedics;  Laterality: Right;   • KNEE ARTHROPLASTY Left    • LAPAROSCOPIC CHOLECYSTECTOMY     • TONSILLECTOMY     • TOTAL HIP ARTHROPLASTY Right 4/25/2022    Procedure: HIP GIRDLESTONE PROCEDURE;  Surgeon: Karri Schaeffer II, MD;  Location: [facility];  Service: Orthopedics;  Laterality: Right;   • TOTAL HIP ARTHROPLASTY REVISION Right 11/16/2021    Procedure: TOTAL HIP REVISION ARTHROPLASTY  RIGHT POSTERIOR;  Surgeon: Karri Schaeffer II, MD;  Location: [facility] OR;  Service: Orthopedics;  Laterality: Right;         FAMILY HISTORY  Family History   Problem Relation Age of Onset   • Malig Hyperthermia Neg Hx          SOCIAL HISTORY  Social History     Socioeconomic History   • Marital status:    Tobacco Use   • Smoking status: Former Smoker     Years: 45.00     Types: Cigarettes     Quit date: 12/10/2015     Years since quittin.4   • Smokeless tobacco: Never Used   Vaping Use   • Vaping Use: Never used   Substance and Sexual Activity   • Alcohol use: Never   • Drug use: Never   • Sexual activity: Defer         ALLERGIES  Ace inhibitors and Morphine        REVIEW OF SYSTEMS  Review of Systems     All systems reviewed and negative except for those discussed in HPI.       PHYSICAL EXAM    I have reviewed the triage vital signs and nursing notes.    ED Triage Vitals [22 1818]   Temp Heart Rate Resp BP SpO2   98.9 °F (37.2 °C) 84 20 123/59 92 %      Temp src Heart Rate Source Patient Position BP Location FiO2 (%)   -- -- -- -- --       Physical Exam  General: Appears uncomfortable, nontoxic  HEENT: Mucous membranes moist, atraumatic, EOMI  Neck: Full ROM  Pulm: Symmetric chest rise, nonlabored, lungs CTAB  Cardiovascular: Regular rate and rhythm, intact distal pulses with 1+ pitting edema bilateral lower extremities with increased edema in the right hip area related to the left  GI: Soft, nontender, nondistended, no rebound, no guarding, bowel sounds present  MSK: Full ROM, no deformity  Skin: Warm, dry.  Right lateral hip wound VAC in place with foul-smelling brown aspirate without overlying cellulitis  Neuro: Awake, alert, oriented x 4, GCS 15, moving all extremities, no focal deficits  Psych: Calm, cooperative      N95, protective eye goggles, and gloves used during this encounter. Patient in surgical mask.      LAB RESULTS  Recent Results (from the past 24 hour(s))    Comprehensive Metabolic Panel    Collection Time: 06/01/22  7:16 PM    Specimen: Blood   Result Value Ref Range    Glucose 237 (H) 65 - 99 mg/dL    BUN 15 8 - 23 mg/dL    Creatinine 1.03 (H) 0.57 - 1.00 mg/dL    Sodium 126 (L) 136 - 145 mmol/L    Potassium 4.2 3.5 - 5.2 mmol/L    Chloride 97 (L) 98 - 107 mmol/L    CO2 17.3 (L) 22.0 - 29.0 mmol/L    Calcium 7.6 (L) 8.6 - 10.5 mg/dL    Total Protein 5.2 (L) 6.0 - 8.5 g/dL    Albumin 1.90 (L) 3.50 - 5.20 g/dL    ALT (SGPT) 13 1 - 33 U/L    AST (SGOT) 18 1 - 32 U/L    Alkaline Phosphatase 125 (H) 39 - 117 U/L    Total Bilirubin 0.2 0.0 - 1.2 mg/dL    Globulin 3.3 gm/dL    A/G Ratio 0.6 g/dL    BUN/Creatinine Ratio 14.6 7.0 - 25.0    Anion Gap 11.7 5.0 - 15.0 mmol/L    eGFR 59.0 (L) >60.0 mL/min/1.73   Lactic Acid, Plasma    Collection Time: 06/01/22  7:16 PM    Specimen: Blood   Result Value Ref Range    Lactate 1.8 0.5 - 2.0 mmol/L   CBC Auto Differential    Collection Time: 06/01/22  7:16 PM    Specimen: Blood   Result Value Ref Range    WBC 7.00 3.40 - 10.80 10*3/mm3    RBC 2.99 (L) 3.77 - 5.28 10*6/mm3    Hemoglobin 7.5 (L) 12.0 - 15.9 g/dL    Hematocrit 25.1 (L) 34.0 - 46.6 %    MCV 83.9 79.0 - 97.0 fL    MCH 25.1 (L) 26.6 - 33.0 pg    MCHC 29.9 (L) 31.5 - 35.7 g/dL    RDW 15.8 (H) 12.3 - 15.4 %    RDW-SD 47.9 37.0 - 54.0 fl    MPV 9.5 6.0 - 12.0 fL    Platelets 180 140 - 450 10*3/mm3    Neutrophil % 61.0 42.7 - 76.0 %    Lymphocyte % 20.0 19.6 - 45.3 %    Monocyte % 15.3 (H) 5.0 - 12.0 %    Eosinophil % 2.0 0.3 - 6.2 %    Basophil % 0.4 0.0 - 1.5 %    Immature Grans % 1.3 (H) 0.0 - 0.5 %    Neutrophils, Absolute 4.27 1.70 - 7.00 10*3/mm3    Lymphocytes, Absolute 1.40 0.70 - 3.10 10*3/mm3    Monocytes, Absolute 1.07 (H) 0.10 - 0.90 10*3/mm3    Eosinophils, Absolute 0.14 0.00 - 0.40 10*3/mm3    Basophils, Absolute 0.03 0.00 - 0.20 10*3/mm3    Immature Grans, Absolute 0.09 (H) 0.00 - 0.05 10*3/mm3    nRBC 0.0 0.0 - 0.2 /100 WBC   C-reactive Protein    Collection  Time: 06/01/22  7:16 PM    Specimen: Blood   Result Value Ref Range    C-Reactive Protein 13.57 (H) 0.00 - 0.50 mg/dL   Sedimentation Rate    Collection Time: 06/01/22  7:16 PM    Specimen: Blood   Result Value Ref Range    Sed Rate 45 (H) 0 - 30 mm/hr   Duplex Venous Lower Extremity - Right    Collection Time: 06/01/22  7:42 PM   Result Value Ref Range    Target HR (85%) 128 bpm    Max. Pred. HR (100%) 151 bpm    Right Common Femoral Spont Y     Right Common Femoral Phasic Y     Right Common Femoral Augment Y     Right Common Femoral Competent Y     Right Common Femoral Compress C     Right Saphenofemoral Junction Compress C     Right Profunda Femoral Compress C     Right Proximal Femoral Compress C     Right Mid Femoral Spont Y     Right Mid Femoral Phasic Y     Right Mid Femoral Augment Y     Right Mid Femoral Competent Y     Right Mid Femoral Compress C     Right Distal Femoral Compress C     Right Popliteal Spont Y     Right Popliteal Phasic Y     Right Popliteal Augment Y     Right Popliteal Competent Y     Right Popliteal Compress C     Right Posterior Tibial Compress C     Right Peroneal Compress C     Right Gastronemius Compress C     Right Greater Saph AK Compress C     Right Greater Saph BK Compress C     Right Lesser Saph Compress C     Left Common Femoral Spont Y     Left Common Femoral Phasic Y     Left Common Femoral Augment Y     Left Common Femoral Competent Y     Left Common Femoral Compress C    COVID-19,[facility]/ROBERTO CARLOS NP SWAB IN TRANSPORT MEDIA 8-12 HR TAT - Swab, Nasopharynx    Collection Time: 06/01/22  8:03 PM    Specimen: Nasopharynx; Swab   Result Value Ref Range    COVID19 Not Detected Not Detected - Ref. Range       Ordered the above labs and independently reviewed the results.        RADIOLOGY  CT Lower Extremity Right With Contrast    Result Date: 6/1/2022  CT OF THE RIGHT LOWER EXTREMITY WITH CONTRAST  HISTORY: Surgical site swelling  COMPARISON: 04/25/2022  TECHNIQUE:  Axial CT imaging was performed through the right lower extremity. Coronal and sagittal reformatted images were obtained. IV contrast was administered.  FINDINGS: The patient is status post Girdlestone procedure, as well as incision and drainage of the right hip. The patient has a comminuted displaced fracture of the proximal right femur. There is destruction of the intertrochanteric region of the right femur. Distal femoral shaft appears intact. There is also irregularity of the right acetabulum. Some of the appearance may be related to prior operative procedures, but overall, appearance is extremely worrisome for osteomyelitis. MRI would allow for further characterization. There is air identified within the joint space, although this may be postoperative. There is extensive edema, soft tissue stranding, and skin thickening within the lateral right thigh, worrisome for infection. No discrete rim-enhancing collection is not identified. Again, MRI would be more sensitive. There is air identified within the right lateral thigh, likely related to open wound. There is a suprapatellar effusion. There is diffuse edema noted throughout the right lower extremity. Prominent inguinal lymph nodes are likely reactive. There are also prominent external iliac chain nodes as well. I don't see any extension of the inflammation into the pelvis itself. Patient's urinary bladder does appear thick-walled, and correlation with urinalysis and urine cultures is recommended.      Comminuted and displaced fracture of the proximal right femur, with apparent destruction of the right intertrochanteric region. Appearance is extremely concerning for osteomyelitis and pathologic fracture. There is also irregularity of the right acetabulum, which may reflect further infectious involvement. MRI could be considered for further assessment.  Radiation dose reduction techniques were utilized, including automated exposure control and exposure modulation  based on body size.  This report was finalized on 6/1/2022 9:10 PM by Dr. Roula Anthony M.D.        I ordered the above noted radiological studies. Reviewed by me.  See dictation for official radiology interpretation.      PROCEDURES    Procedures      MEDICATIONS GIVEN IN ER    Medications   DAPTOmycin (CUBICIN) 750 mg in sodium chloride 0.9 % 50 mL IVPB (750 mg Intravenous New Bag 6/1/22 2211)   oxyCODONE-acetaminophen (PERCOCET) 5-325 MG per tablet 1 tablet (1 tablet Oral Given 6/1/22 2002)   iopamidol (ISOVUE-300) 61 % injection 100 mL (85 mL Intravenous Given 6/1/22 2037)   HYDROmorphone (DILAUDID) injection 0.5 mg (0.5 mg Intravenous Given 6/1/22 2140)         PROGRESS, DATA ANALYSIS, CONSULTS, AND MEDICAL DECISION MAKING    All labs have been independently reviewed by me.  All radiology studies have been reviewed by me and discussed with radiologist dictating the report.   EKG's independently viewed and interpreted by me.  Discussion below represents my analysis of pertinent findings related to patient's condition, differential diagnosis, treatment plan and final disposition.    Initial concern for soft tissue abscess, cellulitis, osteomyelitis, joint infection, sepsis, renal failure, electrolyte normalities, anemia, among others.  Plan for labs, CT scan, and continuance of IV antibiotics.  Plan for hospitalization.    ED Course as of 06/01/22 2215 Wed Jun 01, 2022 1943 Discussed with vascular tech, patient is negative for DVT [DC]   1951 WBC: 7.00 [DC]   1951 Hemoglobin(!): 7.5  Stable, chronic [DC]   2021 Glucose(!): 237 [DC]   2021 BUN: 15 [DC]   2021 Creatinine(!): 1.03 [DC]   2021 Sodium(!): 126 [DC]   2021 Potassium: 4.2 [DC]   2021 ALT (SGPT): 13 [DC]   2021 AST (SGOT): 18 [DC]   2021 Alkaline Phosphatase(!): 125 [DC]   2021 Total Bilirubin: 0.2 [DC]   2021 Lactate: 1.8 [DC]   2115 Discussed with Dr. Nichols, orthopedics, discussed patient clinical course and findings today, [DC]   2123  C-Reactive Protein(!): 13.57 [DC]   2123 Sed Rate(!): 45 [DC]   2124 CT Lower Extremity Right With Contrast  reviewed [DC]   2129 Discussed findings today and discussions with orthopedics with the patient and , they understand and agree with course and plan at this time.  Patient still in some discomfort, will give a dose of Dilaudid to help with pain.  All questions and concerns addressed. [DC]   2150 Discussed with MORGAN Skinner, [facility], discussed patient's clinical course and findings today, treatment modalities, orthopedic consult, and need for hospitalization. [DC]      ED Course User Index  [DC] Marcel Cool MD       AS OF 22:15 EDT VITALS:    BP - 114/50  HR - 83  TEMP - 98.9 °F (37.2 °C)  02 SATS - 100%        DIAGNOSIS  Final diagnoses:   Osteomyelitis of hip (HCC)   Right hip pain   History of diabetes mellitus   History of hypertension   History of infection with vancomycin resistant Enterococcus (VRE)         DISPOSITION  HOSPITALIZATION    Discussed treatment plan and reason for hospitalization with pt/family and hospitalizing physician.  Pt/family voiced understanding of the plan for hospitalization for further testing/treatment as needed.                  Marcel Cool MD  06/01/22 0035

## 2022-06-02 ENCOUNTER — APPOINTMENT (OUTPATIENT)
Dept: MRI IMAGING | Facility: HOSPITAL | Age: 67
End: 2022-06-02

## 2022-06-02 ENCOUNTER — APPOINTMENT (OUTPATIENT)
Dept: INFUSION THERAPY | Facility: HOSPITAL | Age: 67
End: 2022-06-02

## 2022-06-02 PROBLEM — M84.451A PATHOLOGICAL FRACTURE OF RIGHT FEMUR: Status: ACTIVE | Noted: 2022-06-02

## 2022-06-02 PROBLEM — A41.9 SEPSIS WITHOUT ACUTE ORGAN DYSFUNCTION (HCC): Status: ACTIVE | Noted: 2022-06-02

## 2022-06-02 PROBLEM — E66.9 OBESITY (BMI 30-39.9): Status: ACTIVE | Noted: 2021-12-18

## 2022-06-02 PROBLEM — E87.20 METABOLIC ACIDOSIS: Status: ACTIVE | Noted: 2022-06-02

## 2022-06-02 PROBLEM — E87.1 HYPONATREMIA: Status: ACTIVE | Noted: 2022-06-02

## 2022-06-02 LAB
ABO GROUP BLD: NORMAL
ANION GAP SERPL CALCULATED.3IONS-SCNC: 9.6 MMOL/L (ref 5–15)
BASOPHILS # BLD AUTO: 0.02 10*3/MM3 (ref 0–0.2)
BASOPHILS NFR BLD AUTO: 0.6 % (ref 0–1.5)
BH CV LOW VAS RIGHT LESSER SAPH VESSEL: 1
BH CV LOWER VASCULAR LEFT COMMON FEMORAL AUGMENT: NORMAL
BH CV LOWER VASCULAR LEFT COMMON FEMORAL COMPETENT: NORMAL
BH CV LOWER VASCULAR LEFT COMMON FEMORAL COMPRESS: NORMAL
BH CV LOWER VASCULAR LEFT COMMON FEMORAL PHASIC: NORMAL
BH CV LOWER VASCULAR LEFT COMMON FEMORAL SPONT: NORMAL
BH CV LOWER VASCULAR RIGHT COMMON FEMORAL AUGMENT: NORMAL
BH CV LOWER VASCULAR RIGHT COMMON FEMORAL COMPETENT: NORMAL
BH CV LOWER VASCULAR RIGHT COMMON FEMORAL COMPRESS: NORMAL
BH CV LOWER VASCULAR RIGHT COMMON FEMORAL PHASIC: NORMAL
BH CV LOWER VASCULAR RIGHT COMMON FEMORAL SPONT: NORMAL
BH CV LOWER VASCULAR RIGHT DISTAL FEMORAL COMPRESS: NORMAL
BH CV LOWER VASCULAR RIGHT GASTRONEMIUS COMPRESS: NORMAL
BH CV LOWER VASCULAR RIGHT GREATER SAPH AK COMPRESS: NORMAL
BH CV LOWER VASCULAR RIGHT GREATER SAPH BK COMPRESS: NORMAL
BH CV LOWER VASCULAR RIGHT LESSER SAPH COMPRESS: NORMAL
BH CV LOWER VASCULAR RIGHT LESSER SAPH THROMBUS: NORMAL
BH CV LOWER VASCULAR RIGHT MID FEMORAL AUGMENT: NORMAL
BH CV LOWER VASCULAR RIGHT MID FEMORAL COMPETENT: NORMAL
BH CV LOWER VASCULAR RIGHT MID FEMORAL COMPRESS: NORMAL
BH CV LOWER VASCULAR RIGHT MID FEMORAL PHASIC: NORMAL
BH CV LOWER VASCULAR RIGHT MID FEMORAL SPONT: NORMAL
BH CV LOWER VASCULAR RIGHT PERONEAL COMPRESS: NORMAL
BH CV LOWER VASCULAR RIGHT POPLITEAL AUGMENT: NORMAL
BH CV LOWER VASCULAR RIGHT POPLITEAL COMPETENT: NORMAL
BH CV LOWER VASCULAR RIGHT POPLITEAL COMPRESS: NORMAL
BH CV LOWER VASCULAR RIGHT POPLITEAL PHASIC: NORMAL
BH CV LOWER VASCULAR RIGHT POPLITEAL SPONT: NORMAL
BH CV LOWER VASCULAR RIGHT POSTERIOR TIBIAL COMPRESS: NORMAL
BH CV LOWER VASCULAR RIGHT PROFUNDA FEMORAL COMPRESS: NORMAL
BH CV LOWER VASCULAR RIGHT PROXIMAL FEMORAL COMPRESS: NORMAL
BH CV LOWER VASCULAR RIGHT SAPHENOFEMORAL JUNCTION COMPRESS: NORMAL
BLD GP AB SCN SERPL QL: NEGATIVE
BUN SERPL-MCNC: 14 MG/DL (ref 8–23)
BUN/CREAT SERPL: 16.5 (ref 7–25)
CALCIUM SPEC-SCNC: 7.3 MG/DL (ref 8.6–10.5)
CHLORIDE SERPL-SCNC: 102 MMOL/L (ref 98–107)
CO2 SERPL-SCNC: 18.4 MMOL/L (ref 22–29)
CREAT SERPL-MCNC: 0.85 MG/DL (ref 0.57–1)
D-LACTATE SERPL-SCNC: 1.7 MMOL/L (ref 0.5–2)
DEPRECATED RDW RBC AUTO: 46.9 FL (ref 37–54)
EGFRCR SERPLBLD CKD-EPI 2021: 74.3 ML/MIN/1.73
EOSINOPHIL # BLD AUTO: 0.08 10*3/MM3 (ref 0–0.4)
EOSINOPHIL NFR BLD AUTO: 2.5 % (ref 0.3–6.2)
ERYTHROCYTE [DISTWIDTH] IN BLOOD BY AUTOMATED COUNT: 15.8 % (ref 12.3–15.4)
ERYTHROCYTE [SEDIMENTATION RATE] IN BLOOD: 18 MM/HR (ref 0–30)
FERRITIN SERPL-MCNC: 616 NG/ML (ref 13–150)
GLUCOSE BLDC GLUCOMTR-MCNC: 180 MG/DL (ref 70–130)
GLUCOSE BLDC GLUCOMTR-MCNC: 187 MG/DL (ref 70–130)
GLUCOSE BLDC GLUCOMTR-MCNC: 244 MG/DL (ref 70–130)
GLUCOSE BLDC GLUCOMTR-MCNC: 247 MG/DL (ref 70–130)
GLUCOSE SERPL-MCNC: 236 MG/DL (ref 65–99)
HCT VFR BLD AUTO: 19.4 % (ref 34–46.6)
HGB BLD-MCNC: 6 G/DL (ref 12–15.9)
HGB RETIC QN AUTO: 23 PG (ref 29.8–36.1)
IMM GRANULOCYTES # BLD AUTO: 0.04 10*3/MM3 (ref 0–0.05)
IMM GRANULOCYTES NFR BLD AUTO: 1.2 % (ref 0–0.5)
IMM RETICS NFR: 21.1 % (ref 3–15.8)
INR PPP: 2.93 (ref 0.9–1.1)
IRON 24H UR-MRATE: 18 MCG/DL (ref 37–145)
IRON SATN MFR SERPL: 16 % (ref 20–50)
LDH SERPL-CCNC: 181 U/L (ref 135–214)
LYMPHOCYTES # BLD AUTO: 0.6 10*3/MM3 (ref 0.7–3.1)
LYMPHOCYTES NFR BLD AUTO: 18.6 % (ref 19.6–45.3)
MAXIMAL PREDICTED HEART RATE: 151 BPM
MCH RBC QN AUTO: 25.2 PG (ref 26.6–33)
MCHC RBC AUTO-ENTMCNC: 30.9 G/DL (ref 31.5–35.7)
MCV RBC AUTO: 81.5 FL (ref 79–97)
MONOCYTES # BLD AUTO: 0.52 10*3/MM3 (ref 0.1–0.9)
MONOCYTES NFR BLD AUTO: 16.1 % (ref 5–12)
NEUTROPHILS NFR BLD AUTO: 1.97 10*3/MM3 (ref 1.7–7)
NEUTROPHILS NFR BLD AUTO: 61 % (ref 42.7–76)
NRBC BLD AUTO-RTO: 0 /100 WBC (ref 0–0.2)
PLATELET # BLD AUTO: 121 10*3/MM3 (ref 140–450)
PMV BLD AUTO: 9.6 FL (ref 6–12)
POTASSIUM SERPL-SCNC: 3.9 MMOL/L (ref 3.5–5.2)
PROTHROMBIN TIME: 29.8 SECONDS (ref 11.7–14.2)
RBC # BLD AUTO: 2.38 10*6/MM3 (ref 3.77–5.28)
RETICS # AUTO: 0.05 10*6/MM3 (ref 0.02–0.13)
RETICS/RBC NFR AUTO: 1.92 % (ref 0.7–1.9)
RH BLD: POSITIVE
SODIUM SERPL-SCNC: 130 MMOL/L (ref 136–145)
STRESS TARGET HR: 128 BPM
T&S EXPIRATION DATE: NORMAL
TIBC SERPL-MCNC: 112 MCG/DL (ref 298–536)
TRANSFERRIN SERPL-MCNC: 75 MG/DL (ref 200–360)
WBC NRBC COR # BLD: 3.23 10*3/MM3 (ref 3.4–10.8)

## 2022-06-02 PROCEDURE — 83605 ASSAY OF LACTIC ACID: CPT | Performed by: HOSPITALIST

## 2022-06-02 PROCEDURE — 25010000002 PIPERACILLIN SOD-TAZOBACTAM PER 1 G: Performed by: INTERNAL MEDICINE

## 2022-06-02 PROCEDURE — 82962 GLUCOSE BLOOD TEST: CPT

## 2022-06-02 PROCEDURE — 86900 BLOOD TYPING SEROLOGIC ABO: CPT | Performed by: HOSPITALIST

## 2022-06-02 PROCEDURE — 85610 PROTHROMBIN TIME: CPT | Performed by: NURSE PRACTITIONER

## 2022-06-02 PROCEDURE — 86901 BLOOD TYPING SEROLOGIC RH(D): CPT | Performed by: HOSPITALIST

## 2022-06-02 PROCEDURE — 86850 RBC ANTIBODY SCREEN: CPT | Performed by: HOSPITALIST

## 2022-06-02 PROCEDURE — 63710000001 INSULIN LISPRO (HUMAN) PER 5 UNITS: Performed by: NURSE PRACTITIONER

## 2022-06-02 PROCEDURE — 85652 RBC SED RATE AUTOMATED: CPT | Performed by: INTERNAL MEDICINE

## 2022-06-02 PROCEDURE — P9016 RBC LEUKOCYTES REDUCED: HCPCS

## 2022-06-02 PROCEDURE — 83540 ASSAY OF IRON: CPT | Performed by: HOSPITALIST

## 2022-06-02 PROCEDURE — 86923 COMPATIBILITY TEST ELECTRIC: CPT

## 2022-06-02 PROCEDURE — 80048 BASIC METABOLIC PNL TOTAL CA: CPT | Performed by: NURSE PRACTITIONER

## 2022-06-02 PROCEDURE — 86900 BLOOD TYPING SEROLOGIC ABO: CPT

## 2022-06-02 PROCEDURE — 73721 MRI JNT OF LWR EXTRE W/O DYE: CPT

## 2022-06-02 PROCEDURE — 99222 1ST HOSP IP/OBS MODERATE 55: CPT | Performed by: INTERNAL MEDICINE

## 2022-06-02 PROCEDURE — 36430 TRANSFUSION BLD/BLD COMPNT: CPT

## 2022-06-02 PROCEDURE — 84466 ASSAY OF TRANSFERRIN: CPT | Performed by: HOSPITALIST

## 2022-06-02 PROCEDURE — 85025 COMPLETE CBC W/AUTO DIFF WBC: CPT | Performed by: NURSE PRACTITIONER

## 2022-06-02 PROCEDURE — 83615 LACTATE (LD) (LDH) ENZYME: CPT | Performed by: INTERNAL MEDICINE

## 2022-06-02 PROCEDURE — 82728 ASSAY OF FERRITIN: CPT | Performed by: HOSPITALIST

## 2022-06-02 PROCEDURE — 85046 RETICYTE/HGB CONCENTRATE: CPT | Performed by: INTERNAL MEDICINE

## 2022-06-02 RX ORDER — ACETAMINOPHEN 650 MG/1
650 SUPPOSITORY RECTAL EVERY 4 HOURS PRN
Status: DISCONTINUED | OUTPATIENT
Start: 2022-06-02 | End: 2022-06-02

## 2022-06-02 RX ORDER — METOPROLOL SUCCINATE 25 MG/1
25 TABLET, EXTENDED RELEASE ORAL DAILY
Status: DISCONTINUED | OUTPATIENT
Start: 2022-06-02 | End: 2022-06-10 | Stop reason: HOSPADM

## 2022-06-02 RX ORDER — GABAPENTIN 300 MG/1
600 CAPSULE ORAL EVERY 12 HOURS SCHEDULED
Refills: 0 | Status: DISCONTINUED | OUTPATIENT
Start: 2022-06-02 | End: 2022-06-10 | Stop reason: HOSPADM

## 2022-06-02 RX ORDER — ISOSORBIDE MONONITRATE 30 MG/1
30 TABLET, EXTENDED RELEASE ORAL EVERY MORNING
Status: DISCONTINUED | OUTPATIENT
Start: 2022-06-03 | End: 2022-06-10 | Stop reason: HOSPADM

## 2022-06-02 RX ORDER — ONDANSETRON 2 MG/ML
4 INJECTION INTRAMUSCULAR; INTRAVENOUS EVERY 6 HOURS PRN
Status: DISCONTINUED | OUTPATIENT
Start: 2022-06-02 | End: 2022-06-04

## 2022-06-02 RX ORDER — SODIUM CHLORIDE 0.9 % (FLUSH) 0.9 %
10 SYRINGE (ML) INJECTION EVERY 12 HOURS SCHEDULED
Status: DISCONTINUED | OUTPATIENT
Start: 2022-06-02 | End: 2022-06-02

## 2022-06-02 RX ORDER — FERROUS SULFATE 325(65) MG
325 TABLET ORAL EVERY OTHER DAY
Status: DISCONTINUED | OUTPATIENT
Start: 2022-06-02 | End: 2022-06-10 | Stop reason: HOSPADM

## 2022-06-02 RX ORDER — NICOTINE POLACRILEX 4 MG
15 LOZENGE BUCCAL
Status: DISCONTINUED | OUTPATIENT
Start: 2022-06-02 | End: 2022-06-10 | Stop reason: HOSPADM

## 2022-06-02 RX ORDER — OXYCODONE HYDROCHLORIDE AND ACETAMINOPHEN 5; 325 MG/1; MG/1
1 TABLET ORAL EVERY 4 HOURS PRN
Status: DISCONTINUED | OUTPATIENT
Start: 2022-06-02 | End: 2022-06-10 | Stop reason: HOSPADM

## 2022-06-02 RX ORDER — CETIRIZINE HYDROCHLORIDE 10 MG/1
10 TABLET ORAL DAILY
Status: DISCONTINUED | OUTPATIENT
Start: 2022-06-02 | End: 2022-06-10 | Stop reason: HOSPADM

## 2022-06-02 RX ORDER — SODIUM CHLORIDE 0.9 % (FLUSH) 0.9 %
10 SYRINGE (ML) INJECTION EVERY 12 HOURS SCHEDULED
Status: DISCONTINUED | OUTPATIENT
Start: 2022-06-02 | End: 2022-06-10 | Stop reason: HOSPADM

## 2022-06-02 RX ORDER — ISOSORBIDE MONONITRATE 30 MG/1
30 TABLET, EXTENDED RELEASE ORAL EVERY MORNING
Status: DISCONTINUED | OUTPATIENT
Start: 2022-06-02 | End: 2022-06-02

## 2022-06-02 RX ORDER — SODIUM CHLORIDE 0.9 % (FLUSH) 0.9 %
10 SYRINGE (ML) INJECTION AS NEEDED
Status: DISCONTINUED | OUTPATIENT
Start: 2022-06-02 | End: 2022-06-10 | Stop reason: HOSPADM

## 2022-06-02 RX ORDER — ACETAMINOPHEN 160 MG/5ML
650 SOLUTION ORAL EVERY 4 HOURS PRN
Status: DISCONTINUED | OUTPATIENT
Start: 2022-06-02 | End: 2022-06-02

## 2022-06-02 RX ORDER — SODIUM CHLORIDE 0.9 % (FLUSH) 0.9 %
20 SYRINGE (ML) INJECTION AS NEEDED
Status: DISCONTINUED | OUTPATIENT
Start: 2022-06-02 | End: 2022-06-10 | Stop reason: HOSPADM

## 2022-06-02 RX ORDER — NITROGLYCERIN 0.4 MG/1
0.4 TABLET SUBLINGUAL
Status: DISCONTINUED | OUTPATIENT
Start: 2022-06-02 | End: 2022-06-10 | Stop reason: HOSPADM

## 2022-06-02 RX ORDER — ACETAMINOPHEN 325 MG/1
650 TABLET ORAL EVERY 4 HOURS PRN
Status: DISCONTINUED | OUTPATIENT
Start: 2022-06-02 | End: 2022-06-10 | Stop reason: HOSPADM

## 2022-06-02 RX ORDER — INSULIN LISPRO 100 [IU]/ML
2 INJECTION, SOLUTION INTRAVENOUS; SUBCUTANEOUS
Status: DISCONTINUED | OUTPATIENT
Start: 2022-06-02 | End: 2022-06-03

## 2022-06-02 RX ORDER — SODIUM CHLORIDE 0.9 % (FLUSH) 0.9 %
10 SYRINGE (ML) INJECTION AS NEEDED
Status: DISCONTINUED | OUTPATIENT
Start: 2022-06-02 | End: 2022-06-02

## 2022-06-02 RX ORDER — PANTOPRAZOLE SODIUM 40 MG/1
40 TABLET, DELAYED RELEASE ORAL EVERY MORNING
Status: DISCONTINUED | OUTPATIENT
Start: 2022-06-02 | End: 2022-06-06

## 2022-06-02 RX ORDER — DEXTROSE MONOHYDRATE 25 G/50ML
25 INJECTION, SOLUTION INTRAVENOUS
Status: DISCONTINUED | OUTPATIENT
Start: 2022-06-02 | End: 2022-06-10 | Stop reason: HOSPADM

## 2022-06-02 RX ADMIN — INSULIN LISPRO 2 UNITS: 100 INJECTION, SOLUTION INTRAVENOUS; SUBCUTANEOUS at 12:23

## 2022-06-02 RX ADMIN — ISOSORBIDE MONONITRATE 30 MG: 30 TABLET ORAL at 06:14

## 2022-06-02 RX ADMIN — OXYCODONE AND ACETAMINOPHEN 1 TABLET: 5; 325 TABLET ORAL at 20:47

## 2022-06-02 RX ADMIN — PANTOPRAZOLE SODIUM 40 MG: 40 TABLET, DELAYED RELEASE ORAL at 06:14

## 2022-06-02 RX ADMIN — Medication 10 ML: at 09:52

## 2022-06-02 RX ADMIN — INSULIN GLARGINE-YFGN 25 UNITS: 100 INJECTION, SOLUTION SUBCUTANEOUS at 21:05

## 2022-06-02 RX ADMIN — CETIRIZINE HYDROCHLORIDE 10 MG: 10 TABLET ORAL at 09:51

## 2022-06-02 RX ADMIN — OXYCODONE AND ACETAMINOPHEN 1 TABLET: 5; 325 TABLET ORAL at 06:43

## 2022-06-02 RX ADMIN — FERROUS SULFATE TAB 325 MG (65 MG ELEMENTAL FE) 325 MG: 325 (65 FE) TAB at 09:52

## 2022-06-02 RX ADMIN — INSULIN LISPRO 2 UNITS: 100 INJECTION, SOLUTION INTRAVENOUS; SUBCUTANEOUS at 09:51

## 2022-06-02 RX ADMIN — GABAPENTIN 600 MG: 300 CAPSULE ORAL at 09:51

## 2022-06-02 RX ADMIN — SODIUM CHLORIDE 1000 ML: 9 INJECTION, SOLUTION INTRAVENOUS at 10:26

## 2022-06-02 RX ADMIN — TAZOBACTAM SODIUM AND PIPERACILLIN SODIUM 3.38 G: 375; 3 INJECTION, SOLUTION INTRAVENOUS at 15:08

## 2022-06-02 RX ADMIN — INSULIN LISPRO 2 UNITS: 100 INJECTION, SOLUTION INTRAVENOUS; SUBCUTANEOUS at 17:50

## 2022-06-02 RX ADMIN — Medication 10 ML: at 01:11

## 2022-06-02 RX ADMIN — Medication 10 ML: at 20:47

## 2022-06-02 RX ADMIN — GABAPENTIN 600 MG: 300 CAPSULE ORAL at 20:47

## 2022-06-02 RX ADMIN — OXYCODONE AND ACETAMINOPHEN 1 TABLET: 5; 325 TABLET ORAL at 13:19

## 2022-06-02 NOTE — CONSULTS
Subjective     REASON FOR CONSULTATION: pancytopenia  Provide an opinion on any further workup or treatment                             REQUESTING PHYSICIAN:  ray    RECORDS OBTAINED:  Records of the patients history including those obtained from the referring provider were reviewed and summarized in detail.      History of Present Illness   This is a pleasant 67-year-old woman with type 2 diabetes, hypertension, chronic diastolic CHF and osteomyelitis of the right hip who has been treated with antibiotics and multiple surgeries.  She presents with complaints of increasing pain, increasing drainage of foul-smelling purulence from the right hip wound VAC.  She has been on daptomycin as an outpatient.    Hematology is requested to see the patient for evaluation and management of pancytopenia.  The patient has chronic anemia and is followed by Dr. Abad Rivera in [facility].  She has recently required fairly frequent blood transfusions particularly perioperatively.  She has also been given IV iron in [place].  Her hemoglobin has typically been in the 7-9 range since November 2021.  The white blood cell count fluctuates but is typically fairly normal and her platelet count is typically also normal.  She presents with a white count of 7.0, hemoglobin 7.5 and platelets 180 but repeat this morning showed a drop in the white cells to 3.2 with an ANC 1.97, hemoglobin 6.0/MCV 81.5 and platelets 121.  Her iron sat is 16% with a TIBC 112 and ferritin is 616.  2 units of packed red blood cells have been requested.    She complains of fatigue no major short of breath lightheadedness or dizziness.  She complains of low-grade fever.    Past Medical History:   Diagnosis Date   • Allergies    • Arthritis    • Cardiac murmur    • Diabetes mellitus (HCC)    • GERD (gastroesophageal reflux disease)    • Heart attack (HCC) 12/2020   • History of cervical cancer 1981   • History of transfusion     SEVERAL   • Hyperlipidemia     • Hypertension    • Iron deficiency anemia    • Osteomyelitis hip (HCC)     RIGHT   • PONV (postoperative nausea and vomiting)    • Right hip pain    • VRE infection within last 3 months    • Wound infection     RIGHT HIP.  WOUND VAC IN PLACE        Past Surgical History:   Procedure Laterality Date   • CARDIAC CATHETERIZATION     • CERVICAL CONIZATION     • COLONOSCOPY     • CORONARY ANGIOPLASTY WITH STENT PLACEMENT     • ENDOSCOPY     • HIP ARTHROPLASTY Right    • HIP HARDWARE REMOVAL Right    • HIP MINI REVISION Right 01/18/2022    Procedure: TOTAL HIP ARTHROPLASTY LINER REVISION, pegboard lateral;  Surgeon: Karri Schaeffer II, MD;  Location: [facility];  Service: Orthopedics;  Laterality: Right;   • HIP SPACER INSERTION WITH ANTIBIOTIC CEMENT      X3   • HYSTERECTOMY     • INCISION AND DRAINAGE HIP Right 12/18/2021    Procedure: INCISION AND DRAINAGE TOTAL HIP, LINER EXCHANGE AND WOUND VAC PLACEMENT;  Surgeon: Karri Schaeffer II, MD;  Location: [facility] OR;  Service: Orthopedics;  Laterality: Right;   • INCISION AND DRAINAGE HIP Right 5/2/2022    Procedure: HIP INCISION AND DRAINAGE;  Surgeon: Karri Schaeffer II, MD;  Location: [facility];  Service: Orthopedics;  Laterality: Right;   • KNEE ARTHROPLASTY Left    • LAPAROSCOPIC CHOLECYSTECTOMY     • TONSILLECTOMY     • TOTAL HIP ARTHROPLASTY Right 4/25/2022    Procedure: HIP GIRDLESTONE PROCEDURE;  Surgeon: Karri Schaeffer II, MD;  Location: [facility];  Service: Orthopedics;  Laterality: Right;   • TOTAL HIP ARTHROPLASTY REVISION Right 11/16/2021    Procedure: TOTAL HIP REVISION ARTHROPLASTY RIGHT POSTERIOR;  Surgeon: Karri Schaeffer II, MD;  Location: [facility];  Service: Orthopedics;  Laterality: Right;        Current Facility-Administered Medications on File Prior to Encounter   Medication Dose Route Frequency Provider Last Rate Last Admin   • [DISCONTINUED] DAPTOmycin (CUBICIN) 750 mg in  sodium chloride 0.9 % 50 mL IVPB  750 mg Intravenous Once Lucy Amado APRN       • [DISCONTINUED] metoprolol tartrate (LOPRESSOR) tablet 25 mg  25 mg Oral Nightly Karri Schaeffer II, MD         Current Outpatient Medications on File Prior to Encounter   Medication Sig Dispense Refill   • aspirin 81 MG EC tablet Take 1 tablet by mouth Every 12 (Twelve) Hours. (Patient taking differently: Take 81 mg by mouth Daily.) 60 tablet 0   • Chlorhexidine Gluconate Cloth 2 % pads Apply  topically. AS DIRECTED PREOP     • DAPTOmycin 750 mg in sodium chloride 0.9 % 50 mL Infuse 750 mg into a venous catheter Daily for 33 doses. Indications: Bone and/or Joint Infection     • Diclofenac Sodium (VOLTAREN) 1 % gel gel Apply 4 g topically to the appropriate area as directed 4 (Four) Times a Day As Needed.     • Dulaglutide (Trulicity) 3 MG/0.5ML solution pen-injector Inject 0.5 mL under the skin into the appropriate area as directed 1 (One) Time Per Week. SATURDAYS     • esomeprazole (nexIUM) 40 MG capsule Take 40 mg by mouth Every Morning Before Breakfast.     • ferrous sulfate 325 (65 FE) MG tablet Take 325 mg by mouth Every Other Day. Mornings     • folic acid (FOLVITE) 1 MG tablet Take 1,000 mcg by mouth Daily.     • gabapentin (NEURONTIN) 600 MG tablet Take 1 tablet by mouth 2 (Two) Times a Day. 40 tablet 0   • insulin aspart (NovoLOG FlexPen) 100 UNIT/ML solution pen-injector sc pen Inject 2 Units under the skin into the appropriate area as directed 3 (Three) Times a Day With Meals.     • insulin detemir (LEVEMIR) 100 UNIT/ML injection Inject 50 Units under the skin into the appropriate area as directed Every Night.     • isosorbide mononitrate (IMDUR) 30 MG 24 hr tablet Take 30 mg by mouth Every Morning.     • loratadine (CLARITIN) 10 MG tablet Take 10 mg by mouth Every Night.     • metoprolol succinate XL (Toprol XL) 25 MG 24 hr tablet Take 1 tablet by mouth Daily.     • oxyCODONE-acetaminophen (PERCOCET) 5-325 MG  per tablet Take 1 tablet by mouth Every 4 (Four) Hours As Needed for Severe Pain . 50 tablet 0   • oxyCODONE-acetaminophen (PERCOCET) 5-325 MG per tablet Take 1 tablet by mouth Every 4 (Four) Hours As Needed for Severe Pain . 50 tablet 0   • vitamin D3 125 MCG (5000 UT) capsule capsule Take 5,000 Units by mouth Daily.     • [DISCONTINUED] mupirocin (BACTROBAN) 2 % nasal ointment into the nostril(s) as directed by provider. AS DIRECTED PREOP          ALLERGIES:    Allergies   Allergen Reactions   • Ace Inhibitors Shortness Of Breath and Swelling   • Morphine Shortness Of Breath        Social History     Socioeconomic History   • Marital status:    Tobacco Use   • Smoking status: Former Smoker     Years: 45.00     Types: Cigarettes     Quit date: 12/10/2015     Years since quittin.4   • Smokeless tobacco: Never Used   Vaping Use   • Vaping Use: Never used   Substance and Sexual Activity   • Alcohol use: Never   • Drug use: Never   • Sexual activity: Defer        Family History   Problem Relation Age of Onset   • Malig Hyperthermia Neg Hx         Review of Systems   Constitutional: Positive for activity change, fatigue and fever. Negative for unexpected weight change.   HENT: Negative.    Respiratory: Negative.    Cardiovascular: Negative.    Gastrointestinal: Negative.    Genitourinary: Negative.    Musculoskeletal: Positive for arthralgias and gait problem.   Neurological: Positive for weakness. Negative for dizziness and light-headedness.   Hematological: Negative.    Psychiatric/Behavioral: Negative.          Objective     Vitals:    22 0612 22 0824 22 0954 22 1230   BP: 97/45 (!) 87/42 102/47 92/48   BP Location: Right arm Right arm     Patient Position: Lying Lying     Pulse: 86 88 88    Resp: 16   17   Temp: 98.5 °F (36.9 °C)   (!) 88.3 °F (31.3 °C)   TempSrc: Oral   Oral   SpO2: 100% 99%  98%   Weight:       Height:         No flowsheet data found.    Physical Exam     CONSTITUTIONAL: pleasant well-developed obese woman  HEENT: no icterus, no thrush, moist membranes  NECK: no jvd  LYMPH: no cervical or supraclavicular lad  CV: RRR, S1S2, no murmur  RESP: cta bilat, no wheezing, no rales  GI: soft, non-tender, no splenomegaly, +bs  MUSC: no edema, wound VAC on the right hip with foul-smelling drainage  NEURO: alert and oriented x3, mild global weakness  PSYCH: normal mood, anxious affect    RECENT LABS:  Hematology WBC   Date Value Ref Range Status   06/02/2022 3.23 (L) 3.40 - 10.80 10*3/mm3 Final     RBC   Date Value Ref Range Status   06/02/2022 2.38 (L) 3.77 - 5.28 10*6/mm3 Final     Hemoglobin   Date Value Ref Range Status   06/02/2022 6.0 (C) 12.0 - 15.9 g/dL Final     Hematocrit   Date Value Ref Range Status   06/02/2022 19.4 (C) 34.0 - 46.6 % Final     Platelets   Date Value Ref Range Status   06/02/2022 121 (L) 140 - 450 10*3/mm3 Final        Lab Results   Component Value Date    GLUCOSE 236 (H) 06/02/2022    BUN 14 06/02/2022    CREATININE 0.85 06/02/2022    EGFRIFNONA 68 02/16/2022    BCR 16.5 06/02/2022    K 3.9 06/02/2022    CO2 18.4 (L) 06/02/2022    CALCIUM 7.3 (L) 06/02/2022    ALBUMIN 1.90 (L) 06/01/2022    LABIL2 1.2 (L) 10/24/2020    AST 18 06/01/2022    ALT 13 06/01/2022     CT lower extremity:    IMPRESSION:  Comminuted and displaced fracture of the proximal right femur, with  apparent destruction of the right intertrochanteric region. Appearance  is extremely concerning for osteomyelitis and pathologic fracture. There  is also irregularity of the right acetabulum, which may reflect further  infectious involvement. MRI could be considered for further assessment.         Assessment & Plan     *Acute on chronic anemia  · Her labs are most consistent with anemia of chronic disease secondary to osteomyelitis given the elevated ferritin/dramatically low TIBC.  · Hemoglobin currently 6.0 from a baseline 8-9; 2 units of PRBCs have been ordered    *Mild leukopenia  with normal ANC 1.97    *Mild thrombocytopenia    *Hypoproteinemia/hypoalbuminemia    *Hyponatremia    Hematology plan/recommendations:  1. I suspect the patient's chronic/worsening anemia and leukopenia/thrombocytopenia are all secondary to her worsening infection.  The ferritin is dramatically elevated consistent with inflammation in the TIBC very low consistent with chronic disease/inflammation  2.  I agree with transfusion 2 units PRBCs.  3. Check B12 folate ESR LDH  4. Monitor CBC    Thank you for allowing me to dissipate in the care of this pleasant woman.

## 2022-06-02 NOTE — PLAN OF CARE
Goal Outcome Evaluation:Patient  osteomyelitis of wound omRight hip. Wound vac in place . Hemoglobin of 6 today received 2 units of blood will recheck in 6hours. BP was soft received a ns bolus. Resting comfortably.

## 2022-06-02 NOTE — H&P
Patient Name:  Stephy Duncan  YOB: 1955  MRN:  [id number]  Admit Date:  6/1/2022  Patient Care Team:  Gregorio Rosales as PCP - General (Family Medicine)  Jose Kong MD as Consulting Physician (Cardiology)      Subjective   History Present Illness     Chief Complaint   Patient presents with   • Wound Infection       Ms. Duncan is a 67 y.o. former smoker with a history of recurrent infection of the right hip, type 2 diabetes mellitus, hypertension, chronic diastolic CHF, and iron deficiency anemia that presents to [facility] complaining of worsening infection in her right hip.  She states she has had increased foul smelling draining in her wound vac for the past few days.  She reports she has had multiple surgeries on the hip due to recurrent infection, the most recent was last month.  She states she was discharged to skilled rehab and has been home for about 1 week.  She states she has noticed the drainage has become foul smelling and she has had increased pain and swelling in the right leg.  She describes her pain as constant, severe, and aching in nature.  She states the pain often shoots from the right hip down the right leg.  She reports erythema and warmth to the wound vac site.  She denies fever, chills, chest pain, shortness of breath, nausea, and vomiting.  A CT of the right lower extremity showed comminuted and displaced fracture of the proximal right femur, with apparent destruction of the right intertrochanteric region. Appearance is extremely concerning for osteomyelitis and pathologic fracture.  There is also irregularity of the right acetabulum, which may reflect further infectious involvement.  She was recently admitted at [facility] from 4/25/22-5/8/22 for a girdlestone procedure performed by Dr. Schaeffer due to septic arthritis of the hip.  She was discharged with a PICC line and has been receiving Daptomycin IV outpatient per infectious  disease.      History of Present Illness  Review of Systems   Constitutional: Negative for chills and fever.   HENT: Negative for congestion and sore throat.    Eyes: Negative for photophobia and visual disturbance.   Respiratory: Negative for cough and shortness of breath.    Cardiovascular: Positive for leg swelling. Negative for chest pain and palpitations.   Gastrointestinal: Negative for abdominal pain, nausea and vomiting.   Endocrine: Negative for polydipsia, polyphagia and polyuria.   Genitourinary: Negative for dysuria, flank pain, frequency and hematuria.   Musculoskeletal: Positive for arthralgias, gait problem and myalgias.   Skin: Positive for color change and wound.   Neurological: Positive for numbness (chronic, BLE). Negative for dizziness, light-headedness and headaches.        Personal History     Past Medical History:   Diagnosis Date   • Allergies    • Arthritis    • Cardiac murmur    • Diabetes mellitus (HCC)    • GERD (gastroesophageal reflux disease)    • Heart attack (HCC) 12/2020   • History of cervical cancer 1981   • History of transfusion     SEVERAL   • Hyperlipidemia    • Hypertension    • Iron deficiency anemia    • Osteomyelitis hip (HCC)     RIGHT   • PONV (postoperative nausea and vomiting)    • Right hip pain    • VRE infection within last 3 months    • Wound infection     RIGHT HIP.  WOUND VAC IN PLACE     Past Surgical History:   Procedure Laterality Date   • CARDIAC CATHETERIZATION     • CERVICAL CONIZATION     • COLONOSCOPY     • CORONARY ANGIOPLASTY WITH STENT PLACEMENT     • ENDOSCOPY     • HIP ARTHROPLASTY Right    • HIP HARDWARE REMOVAL Right    • HIP MINI REVISION Right 01/18/2022    Procedure: TOTAL HIP ARTHROPLASTY LINER REVISION, pegboard lateral;  Surgeon: Karri Schaeffer II, MD;  Location: [facility];  Service: Orthopedics;  Laterality: Right;   • HIP SPACER INSERTION WITH ANTIBIOTIC CEMENT      X3   • HYSTERECTOMY     • INCISION AND DRAINAGE HIP Right  2021    Procedure: INCISION AND DRAINAGE TOTAL HIP, LINER EXCHANGE AND WOUND VAC PLACEMENT;  Surgeon: Karri Schaeffer II, MD;  Location:  [facility];  Service: Orthopedics;  Laterality: Right;   • INCISION AND DRAINAGE HIP Right 2022    Procedure: HIP INCISION AND DRAINAGE;  Surgeon: Karri Schaeffer II, MD;  Location:  [facility];  Service: Orthopedics;  Laterality: Right;   • KNEE ARTHROPLASTY Left    • LAPAROSCOPIC CHOLECYSTECTOMY     • TONSILLECTOMY     • TOTAL HIP ARTHROPLASTY Right 2022    Procedure: HIP GIRDLESTONE PROCEDURE;  Surgeon: Karri Schaeffer II, MD;  Location:  [facility];  Service: Orthopedics;  Laterality: Right;   • TOTAL HIP ARTHROPLASTY REVISION Right 2021    Procedure: TOTAL HIP REVISION ARTHROPLASTY RIGHT POSTERIOR;  Surgeon: Karri Schaeffer II, MD;  Location: [facility];  Service: Orthopedics;  Laterality: Right;     Family History   Problem Relation Age of Onset   • Malig Hyperthermia Neg Hx      Social History     Tobacco Use   • Smoking status: Former Smoker     Years: 45.00     Types: Cigarettes     Quit date: 12/10/2015     Years since quittin.4   • Smokeless tobacco: Never Used   Vaping Use   • Vaping Use: Never used   Substance Use Topics   • Alcohol use: Never   • Drug use: Never     Medications Prior to Admission   Medication Sig Dispense Refill Last Dose   • aspirin 81 MG EC tablet Take 1 tablet by mouth Every 12 (Twelve) Hours. (Patient taking differently: Take 81 mg by mouth Daily.) 60 tablet 0    • Chlorhexidine Gluconate Cloth 2 % pads Apply  topically. AS DIRECTED PREOP      • DAPTOmycin 750 mg in sodium chloride 0.9 % 50 mL Infuse 750 mg into a venous catheter Daily for 33 doses. Indications: Bone and/or Joint Infection      • Diclofenac Sodium (VOLTAREN) 1 % gel gel Apply 4 g topically to the appropriate area as directed 4 (Four) Times a Day As Needed.      • Dulaglutide (Trulicity) 3 MG/0.5ML solution  pen-injector Inject 0.5 mL under the skin into the appropriate area as directed 1 (One) Time Per Week. SATURDAYS      • esomeprazole (nexIUM) 40 MG capsule Take 40 mg by mouth Every Morning Before Breakfast.      • ferrous sulfate 325 (65 FE) MG tablet Take 325 mg by mouth Every Other Day. Mornings      • folic acid (FOLVITE) 1 MG tablet Take 1,000 mcg by mouth Daily.      • gabapentin (NEURONTIN) 600 MG tablet Take 1 tablet by mouth 2 (Two) Times a Day. 40 tablet 0    • insulin aspart (NovoLOG FlexPen) 100 UNIT/ML solution pen-injector sc pen Inject 2 Units under the skin into the appropriate area as directed 3 (Three) Times a Day With Meals.      • insulin detemir (LEVEMIR) 100 UNIT/ML injection Inject 50 Units under the skin into the appropriate area as directed Every Night.      • isosorbide mononitrate (IMDUR) 30 MG 24 hr tablet Take 30 mg by mouth Every Morning.      • loratadine (CLARITIN) 10 MG tablet Take 10 mg by mouth Every Night.      • metoprolol succinate XL (Toprol XL) 25 MG 24 hr tablet Take 1 tablet by mouth Daily.      • oxyCODONE-acetaminophen (PERCOCET) 5-325 MG per tablet Take 1 tablet by mouth Every 4 (Four) Hours As Needed for Severe Pain . 50 tablet 0    • oxyCODONE-acetaminophen (PERCOCET) 5-325 MG per tablet Take 1 tablet by mouth Every 4 (Four) Hours As Needed for Severe Pain . 50 tablet 0    • vitamin D3 125 MCG (5000 UT) capsule capsule Take 5,000 Units by mouth Daily.        Allergies:    Allergies   Allergen Reactions   • Ace Inhibitors Shortness Of Breath and Swelling   • Morphine Shortness Of Breath       Objective    Objective     Vital Signs  Temp:  [98.1 °F (36.7 °C)-99.3 °F (37.4 °C)] 98.5 °F (36.9 °C)  Heart Rate:  [83-87] 86  Resp:  [16-20] 16  BP: ()/(45-59) 97/45  SpO2:  [92 %-100 %] 100 %  on   ;   Device (Oxygen Therapy): room air  Body mass index is 39.05 kg/m².    Physical Exam  Vitals and nursing note reviewed.   Constitutional:       Appearance: Normal appearance.  She is obese.   HENT:      Head: Normocephalic and atraumatic.      Nose: Nose normal.      Mouth/Throat:      Mouth: Mucous membranes are moist.      Pharynx: Oropharynx is clear.   Eyes:      Extraocular Movements: Extraocular movements intact.      Conjunctiva/sclera: Conjunctivae normal.   Cardiovascular:      Rate and Rhythm: Normal rate and regular rhythm.      Heart sounds: Normal heart sounds.   Pulmonary:      Effort: Pulmonary effort is normal.      Breath sounds: Normal breath sounds.   Abdominal:      General: Bowel sounds are normal.      Palpations: Abdomen is soft.   Musculoskeletal:         General: Tenderness present.      Cervical back: Normal range of motion and neck supple.      Comments: Decreased ROM of RLE, tenderness of right hip   Skin:     General: Skin is warm and dry.      Comments: Wound vac in place to right hip with foul smelling drainage in canister.  Surrounding erythema and warmth of incision site.   Neurological:      General: No focal deficit present.      Mental Status: She is alert and oriented to person, place, and time.   Psychiatric:         Mood and Affect: Mood normal.         Behavior: Behavior normal.         Results Review:  I reviewed the patient's new clinical results.  I reviewed the patient's new imaging results and agree with the interpretation.  I reviewed the patient's other test results and agree with the interpretation  I personally viewed and interpreted the patient's EKG/Telemetry data  Discussed with ED provider.    Lab Results (last 24 hours)     Procedure Component Value Units Date/Time    CBC & Differential [[id number]]  (Abnormal) Collected: 06/01/22 1916    Specimen: Blood Updated: 06/01/22 1935    Narrative:      The following orders were created for panel order CBC & Differential.  Procedure                               Abnormality         Status                     ---------                               -----------         ------                      CBC Auto Differential[[id number]]        Abnormal            Final result                 Please view results for these tests on the individual orders.    Comprehensive Metabolic Panel [[id number]]  (Abnormal) Collected: 06/01/22 1916    Specimen: Blood Updated: 06/01/22 2000     Glucose 237 mg/dL      BUN 15 mg/dL      Creatinine 1.03 mg/dL      Sodium 126 mmol/L      Potassium 4.2 mmol/L      Chloride 97 mmol/L      CO2 17.3 mmol/L      Calcium 7.6 mg/dL      Total Protein 5.2 g/dL      Albumin 1.90 g/dL      ALT (SGPT) 13 U/L      AST (SGOT) 18 U/L      Alkaline Phosphatase 125 U/L      Total Bilirubin 0.2 mg/dL      Globulin 3.3 gm/dL      A/G Ratio 0.6 g/dL      BUN/Creatinine Ratio 14.6     Anion Gap 11.7 mmol/L      eGFR 59.0 mL/min/1.73      Comment: National Kidney Foundation and American Society of Nephrology (ASN) Task Force recommended calculation based on the Chronic Kidney Disease Epidemiology Collaboration (CKD-EPI) equation refit without adjustment for race.       Narrative:      GFR Normal >60  Chronic Kidney Disease <60  Kidney Failure <15      Lactic Acid, Plasma [[id number]]  (Normal) Collected: 06/01/22 1916    Specimen: Blood Updated: 06/01/22 1958     Lactate 1.8 mmol/L     CBC Auto Differential [[id number]]  (Abnormal) Collected: 06/01/22 1916    Specimen: Blood Updated: 06/01/22 1935     WBC 7.00 10*3/mm3      RBC 2.99 10*6/mm3      Hemoglobin 7.5 g/dL      Hematocrit 25.1 %      MCV 83.9 fL      MCH 25.1 pg      MCHC 29.9 g/dL      RDW 15.8 %      RDW-SD 47.9 fl      MPV 9.5 fL      Platelets 180 10*3/mm3      Neutrophil % 61.0 %      Lymphocyte % 20.0 %      Monocyte % 15.3 %      Eosinophil % 2.0 %      Basophil % 0.4 %      Immature Grans % 1.3 %      Neutrophils, Absolute 4.27 10*3/mm3      Lymphocytes, Absolute 1.40 10*3/mm3      Monocytes, Absolute 1.07 10*3/mm3      Eosinophils, Absolute 0.14 10*3/mm3      Basophils, Absolute 0.03 10*3/mm3      Immature Grans, Absolute 0.09 10*3/mm3       nRBC 0.0 /100 WBC     C-reactive Protein [[id number]]  (Abnormal) Collected: 06/01/22 1916    Specimen: Blood Updated: 06/01/22 2122     C-Reactive Protein 13.57 mg/dL     Sedimentation Rate [[id number]]  (Abnormal) Collected: 06/01/22 1916    Specimen: Blood Updated: 06/01/22 2102     Sed Rate 45 mm/hr     COVID PRE-OP / PRE-PROCEDURE SCREENING ORDER (NO ISOLATION) - Swab, Nasopharynx [[id number]]  (Normal) Collected: 06/01/22 2003    Specimen: Swab from Nasopharynx Updated: 06/01/22 2057    Narrative:      The following orders were created for panel order COVID PRE-OP / PRE-PROCEDURE SCREENING ORDER (NO ISOLATION) - Swab, Nasopharynx.  Procedure                               Abnormality         Status                     ---------                               -----------         ------                     COVID-19,[facility]...[[id number]]  Normal              Final result                 Please view results for these tests on the individual orders.    COVID-19,[facility] CEPHEID/ROBERTO CARLOS NP SWAB IN TRANSPORT MEDIA 8-12 HR TAT - Swab, Nasopharynx [[id number]]  (Normal) Collected: 06/01/22 2003    Specimen: Swab from Nasopharynx Updated: 06/01/22 2057     COVID19 Not Detected    Narrative:      Fact sheet for providers: https://www.fda.gov/media/293309/download     Fact sheet for patients: https://www.fda.gov/media/098999/download    Blood Culture - Blood, Blood, Central Line [[id number]] Collected: 06/01/22 2005    Specimen: Blood, Central Line Updated: 06/01/22 2011    Blood Culture - Blood, Arm, Right [[id number]] Collected: 06/01/22 2018    Specimen: Blood from Arm, Right Updated: 06/01/22 2021    POC Glucose Once [[id number]]  (Abnormal) Collected: 06/02/22 0603    Specimen: Blood Updated: 06/02/22 0605     Glucose 247 mg/dL      Comment: Meter: [id number] : [id number] Yue [place]       Basic Metabolic Panel [[id number]] Collected: 06/02/22 0638    Specimen: Blood Updated: 06/02/22 0644    CBC Auto  Differential [[id number]] Collected: 06/02/22 0638    Specimen: Blood Updated: 06/02/22 0644    Protime-INR [[id number]] Collected: 06/02/22 0638    Specimen: Blood Updated: 06/02/22 0643          Imaging Results (Last 24 Hours)     Procedure Component Value Units Date/Time    CT Lower Extremity Right With Contrast [[id number]] Collected: 06/01/22 2059     Updated: 06/01/22 2114    Narrative:      CT OF THE RIGHT LOWER EXTREMITY WITH CONTRAST     HISTORY: Surgical site swelling     COMPARISON: 04/25/2022     TECHNIQUE: Axial CT imaging was performed through the right lower  extremity. Coronal and sagittal reformatted images were obtained. IV  contrast was administered.     FINDINGS:  The patient is status post Girdlestone procedure, as well as incision  and drainage of the right hip. The patient has a comminuted displaced  fracture of the proximal right femur. There is destruction of the  intertrochanteric region of the right femur. Distal femoral shaft  appears intact. There is also irregularity of the right acetabulum. Some  of the appearance may be related to prior operative procedures, but  overall, appearance is extremely worrisome for osteomyelitis. MRI would  allow for further characterization. There is air identified within the  joint space, although this may be postoperative. There is extensive  edema, soft tissue stranding, and skin thickening within the lateral  right thigh, worrisome for infection. No discrete rim-enhancing  collection is not identified. Again, MRI would be more sensitive. There  is air identified within the right lateral thigh, likely related to open  wound. There is a suprapatellar effusion. There is diffuse edema noted  throughout the right lower extremity. Prominent inguinal lymph nodes are  likely reactive. There are also prominent external iliac chain nodes as  well. I don't see any extension of the inflammation into the pelvis  itself. Patient's urinary bladder does appear  thick-walled, and  correlation with urinalysis and urine cultures is recommended.       Impression:      Comminuted and displaced fracture of the proximal right femur, with  apparent destruction of the right intertrochanteric region. Appearance  is extremely concerning for osteomyelitis and pathologic fracture. There  is also irregularity of the right acetabulum, which may reflect further  infectious involvement. MRI could be considered for further assessment.     Radiation dose reduction techniques were utilized, including automated  exposure control and exposure modulation based on body size.     This report was finalized on 6/1/2022 9:10 PM by Dr. Roula Anthony M.D.                 No orders to display        Assessment/Plan     Active Hospital Problems    Diagnosis  POA   • **Osteomyelitis of hip ([facility]) [M86.9]  Yes   • Iron deficiency anemia [D50.9]  Yes   • Chronic diastolic CHF (congestive heart failure) ([facility]) [I50.32]  Yes   • Type 2 diabetes mellitus with diabetic polyneuropathy, with long-term current use of insulin ([facility]) [E11.42, Z79.4]  Not Applicable   • Hypertension [I10]  Yes       Osteomyelitis of Right Hip  -She has been afebrile  -Elevated CRP and sed rate. WBC and lactate ok  -Prior history of VRE, continue Daptomycin   -Infectious disease consult  -Orthopedic surgery consult  -Check MRI for osteomyelitis  -PRN analgesia    Hypertension  -She's been sightly hypotensive. Continue home regimen, will add parameters  -Monitor    Chronic Diastolic CHF  -Hold home diuretic for now  -Daily weights  -Strict I&O    Hyponatremia  -Appears chronic, but worse than baseline  -Hold diuretic  -IVF  -Repeat lab work in AM    Type 2 Diabetes Mellitus  -Hold oral diabetic medications  -Initiate correctional factor insulin  -Continue basal insulin, will cut home dose in half  -Monitor blood sugars ACHS  -Hgb A1C 6.20 on 4/21/22    YANIRA  -Hgb at baseline  -Continue home PO ferrous sulfate  -Monitor hgb  daily      -I discussed the patients findings and my recommendations with patient.    VTE Prophylaxis - SCDs.  Code Status - Full code.       MORGAN Trent  [facility]  06/02/22  06:55 EDT

## 2022-06-02 NOTE — DISCHARGE PLACEMENT REQUEST
"Stephy Duncan (67 y.o. Female)             Date of Birth   1955    Social Security Number       Address   [address] [place] [address]    Home Phone   [phone]    MRN   [id number]       Hindu   [employer]    Marital Status                               Admission Date   6/1/22    Admission Type   Emergency    Admitting Provider   Juan Luz MD    Attending Provider   Juan Luz MD    Department, Room/Bed   [facility] OBSERVATION, 127/1       Discharge Date       Discharge Disposition       Discharge Destination                               Attending Provider: Juan Luz MD    Allergies: Ace Inhibitors, Morphine    Isolation: Contact   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)   Code Status: CPR   Advance Care Planning Activity    Ht: 162.6 cm (64.02\")   Wt: 103 kg (227 lb 8.2 oz)    Admission Cmt: None   Principal Problem: Osteomyelitis of hip (HCC) [M86.9]                 Active Insurance as of 6/1/2022     Primary Coverage     Payor Plan Insurance Group Employer/Plan Group    AETNA MEDICARE REPLACEMENT AETNA MEDICARE REPLACEMENT [id number]     Payor Plan Address Payor Plan Phone Number Payor Plan Fax Number Effective Dates    [address] [phone]  1/1/2022 - None Entered    [place] [address]       Subscriber Name Subscriber Birth Date Member ID       Stephy Duncan 1955 [id number]                 Emergency Contacts      (Rel.) Home Phone Work Phone Mobile Phone    ESTUARDO DUNCAN (Spouse) [phone] -- [phone]    Bhumika Perez (Sister) [phone] -- [phone]            "

## 2022-06-02 NOTE — PROGRESS NOTES
"Patient to be seen this afternoon for hip infection    R \"Kilo\" Laury BAXTER MD  Orthopaedic Surgery  [facility]  [phone] - [facility]  [phone] - [place] [facility]    "

## 2022-06-02 NOTE — NURSING NOTE
CWON consult received. Patient with incisional wound vac to right lateral hip.  Foul odor noted upon walking in the room. We were going to remove the dressing and have it ready for when  comes to assess her but due to the large amt of malodorous exudate noted in the vac canister, CWON chose to leave dressing in place to keep skin protected and drainage contained. Most likely patient will need to go to OR.  Discussed with primary RN.  Please re consult if needed.

## 2022-06-02 NOTE — PROGRESS NOTES
"Adult Nutrition  Assessment/PES    Patient Name:  Stephy Duncan  YOB: 1955  MRN: [id number]  Admit Date:  6/1/2022    Assessment Date:  6/2/2022    Comments:  Nutrition assessment triggered by MST score of 3 per nurse admission screen, chart skin report.  Admitted with recurrent R hip infection.  Foul smelling drainage in wound vac x few days.  Osteomyelitis of hip.  Has been receiving IV antibiotics via PICC.    Noted to have bilateral gluteal stage I pressure injury and R hip incision with wound vac.  Adding Claude BID to help promote wound healing.    RD to continue to follow.     Reason for Assessment     Row Name 06/02/22 1455          Reason for Assessment    Reason For Assessment identified at risk by screening criteria     Diagnosis diabetes diagnosis/complications;cardiac disease;hematological/related complications;other (see comments);gastrointestinal disease;cancer diagnosis/related complications;infection/sepsis  DM2, HTN, CHF, YANIRA, arthritis, cardiac murmur, GERD, MI, cervical cancer, HLD, osteomyelitis; adm with osteomyelitis of hip     Identified At Risk by Screening Criteria MST SCORE 2+;large or nonhealing wound, burn or pressure injury                Nutrition/Diet History     Row Name 06/02/22 1456          Nutrition/Diet History    Typical Intake (Food/Fluid/EN/PN) no intake available                Anthropometrics     Row Name 06/02/22 1458          Anthropometrics    Height 162.6 cm (64.02\")     Weight 103 kg (227 lb 8.2 oz)     Weight for Calculation 54.4 kg (120 lb)  IBW                Labs/Tests/Procedures/Meds     Row Name 06/02/22 1456          Labs/Procedures/Meds    Lab Results Reviewed reviewed, pertinent     Lab Results Comments Gluc, Na, Ca, Iron, WBC, Hgb, Hct, Platelets            Diagnostic Tests/Procedures    Diagnostic Test/Procedure Reviewed reviewed, pertinent            Medications    Pertinent Medications Reviewed reviewed, pertinent     Pertinent " "Medications Comments FeSO4, lantus, admelog, protonix, zosyn                Physical Findings     Row Name 06/02/22 1457          Physical Findings    Overall Physical Appearance obese; mild/mod edema; B=16, bilateral gluteal st I, R hip inc with wound vac                Estimated/Assessed Needs - Anthropometrics     Row Name 06/02/22 1458          Anthropometrics    Height 162.6 cm (64.02\")     Weight 103 kg (227 lb 8.2 oz)     Weight for Calculation 54.4 kg (120 lb)  IBW            Estimated/Assessed Needs           KCAL/KG    KCAL/KG 30 Kcal/Kg (kcal);35 Kcal/Kg (kcal)     30 Kcal/Kg (kcal) 1632.96     35 Kcal/Kg (kcal) 1905.12            Protein Requirements    Weight Used For Protein Calculations 54.4 kg (120 lb)  IBW     Est Protein Requirement Amount (gms/kg) 1.5 gm protein     Estimated Protein Requirements (gms/day) 81.65            Fluid Requirements    Fluid Requirements (mL/day) 1600                Nutrition Prescription Ordered     Row Name 06/02/22 145          Nutrition Prescription PO    Current PO Diet Regular     Common Modifiers Cardiac                Evaluation of Received Nutrient/Fluid Intake     Row Name 06/02/22 1459          PO Evaluation    Number of Days PO Intake Evaluated Insufficient Data               Problem/Interventions:   Problem 1     Row Name 06/02/22 1500          Nutrition Diagnoses Problem 1    Problem 1 Increased Nutrient Needs     Macronutrient Kcal;Protein     Etiology (related to) Medical Diagnosis     Skin Pressure injury;Non healing wound     Signs/Symptoms (evidenced by) Report/Observation                Intervention Goal     Row Name 06/02/22 1503          Intervention Goal    General Maintain nutrition;Disease management/therapy;Meet nutritional needs for age/condition     PO Establish PO;Tolerate PO;PO intake (%)     PO Intake % 75 %     Weight Appropriate weight loss                Nutrition Intervention     Row Name 06/02/22 1503          Nutrition Intervention    " KIMMIE/[employer] Care plan reviewd;Follow Tx progress;Recommend/ordered     Recommended/Ordered Supplement                Nutrition Prescription     Row Name 06/02/22 1507          Nutrition Prescription PO    PO Prescription Begin/change supplement     Supplement Claude     Supplement Frequency 2 times a day     New PO Prescription Ordered? Yes                Education/Evaluation     Row Name 06/02/22 1507          Education    Education Will Instruct as appropriate            Monitor/Evaluation    Monitor Per protocol;PO intake;Supplement intake;Pertinent labs;Weight;Skin status;Symptoms                 Electronically signed by:  Eva Powers RD  06/02/22 15:07 EDT

## 2022-06-02 NOTE — CONSULTS
CONSULT NOTE    Infectious Diseases - Fernando Reyna MD  [facility]       Patient Identification:  Name: Stephy Duncan  Age: 67 y.o.  Sex: female  :  1955  MRN: [id number]             Date of Consultation: 2022      Primary Care Physician: Gregorio Rosales                               Requesting Physician: MORGAN House  Reason for Consultation: Recurrent right hip infection with proximal femur osteomyelitis and vestibular osteomyelitis.    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  Patient has had multiple attempt to treated with prolonged course of antibiotic therapy preceded by appropriate surgical intervention which included explant of hardware and antibiotic spacer placement and attempted a stage II implant and eventually required Girdlestone procedure in 2022.  Patient remained in the hospital for couple weeks and was eventually discharged on 2022 on IV daptomycin.  Patient recently left the rehabilitation facility to her home blood 3 days before her release to her home she started having decrease in appetite and not feeling very well.  After being at home for 2 days patient started having increasing drainage from the surgical incision along with weakness.  Patient came to the emergency room and is being admitted for further care.  Patient has been restarted on daptomycin and orthopedic infectious disease service is consulted.  Patient currently denies any specific complaints except for being weak.  Imaging studies performed earlier today revealed commuted and displaced fracture of the proximal femur destruction of the right intertrochanteric region  consistent with acute on chronic osteomyelitis.  She was also noted to have vestibular changes.  Patient was noted to have elevated markers of sepsis as well as mild pancytopenia which has been known from before and has had follow-up with hematology oncology service.  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  2-pancytopenia  3-morbid obesity  4-type 2 diabetes  5-hyponatremia  6-other diagnosis per primary team    Recommendations/Discussions:  Given the information we have I agree with the care plan which include continuation of daptomycin and adding coverage for the next evelin contributing to foul-smelling drainage.  Given the fact that she had Girdlestone procedure and immobility and lack of use of the right hip joint is acceped further debridement of necrotic infected bone if technically possible and feasible from orthopedic standpoint point would be recommended from infectious disease standpoint besides prolonged antibiotic therapy.  Monitor closely for side effects of antibiotics.  Thank you Dr. 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  Patient has had multiple attempt to treated with prolonged course of antibiotic therapy preceded by appropriate surgical intervention which included explant of hardware and antibiotic spacer placement and attempted a stage II implant and eventually required Girdlestone procedure in April 2022.  Patient remained in the hospital for couple weeks and was eventually discharged on 5/8/2022 on IV daptomycin.  Patient recently left the rehabilitation facility to her home blood 3 days before her release to her home she started having decrease in appetite and not feeling very well.  After being at home for 2 days patient started having increasing drainage from the surgical incision along with weakness.  Patient came to the emergency room and is being admitted for further care.  Patient has been restarted on daptomycin and orthopedic infectious disease service is consulted.  Patient currently denies any specific complaints except for being weak.  Imaging studies performed earlier today revealed commuted and displaced fracture of the proximal femur destruction of the right intertrochanteric region consistent with acute on chronic osteomyelitis.  She was also noted to have vestibular changes.  Patient was noted to have elevated markers of sepsis as well as mild pancytopenia which has been known from before and has had follow-up with hematology oncology service.      Past Medical History:  Past Medical History:   Diagnosis Date   • Allergies    • Arthritis    • Cardiac murmur    • Diabetes mellitus (HCC)    • GERD (gastroesophageal reflux disease)    • Heart attack (HCC) 12/2020   • History of cervical cancer 1981   • History of transfusion     SEVERAL   • Hyperlipidemia    • Hypertension    • Iron deficiency anemia    • Osteomyelitis hip (HCC)     RIGHT   • PONV (postoperative nausea and vomiting)    • Right hip pain    • VRE infection within  last 3 months    • Wound infection     RIGHT HIP.  WOUND VAC IN PLACE     Past Surgical History:  Past Surgical History:   Procedure Laterality Date   • CARDIAC CATHETERIZATION     • CERVICAL CONIZATION     • COLONOSCOPY     • CORONARY ANGIOPLASTY WITH STENT PLACEMENT     • ENDOSCOPY     • HIP ARTHROPLASTY Right    • HIP HARDWARE REMOVAL Right    • HIP MINI REVISION Right 01/18/2022    Procedure: TOTAL HIP ARTHROPLASTY LINER REVISION, pegboard lateral;  Surgeon: Karri Schaeffer II, MD;  Location: [facility] MAIN OR;  Service: Orthopedics;  Laterality: Right;   • HIP SPACER INSERTION WITH ANTIBIOTIC CEMENT      X3   • HYSTERECTOMY     • INCISION AND DRAINAGE HIP Right 12/18/2021    Procedure: INCISION AND DRAINAGE TOTAL HIP, LINER EXCHANGE AND WOUND VAC PLACEMENT;  Surgeon: Karri Schaeffer II, MD;  Location: [facility] MAIN OR;  Service: Orthopedics;  Laterality: Right;   • INCISION AND DRAINAGE HIP Right 5/2/2022    Procedure: HIP INCISION AND DRAINAGE;  Surgeon: Karri Schaeffer II, MD;  Location: [facility] MAIN OR;  Service: Orthopedics;  Laterality: Right;   • KNEE ARTHROPLASTY Left    • LAPAROSCOPIC CHOLECYSTECTOMY     • TONSILLECTOMY     • TOTAL HIP ARTHROPLASTY Right 4/25/2022    Procedure: HIP GIRDLESTONE PROCEDURE;  Surgeon: Karri Schaeffer II, MD;  Location: [facility] MAIN OR;  Service: Orthopedics;  Laterality: Right;   • TOTAL HIP ARTHROPLASTY REVISION Right 11/16/2021    Procedure: TOTAL HIP REVISION ARTHROPLASTY RIGHT POSTERIOR;  Surgeon: Karri Schaeffer II, MD;  Location: [facility] MAIN OR;  Service: Orthopedics;  Laterality: Right;      Home Meds:  Medications Prior to Admission   Medication Sig Dispense Refill Last Dose   • aspirin 81 MG EC tablet Take 1 tablet by mouth Every 12 (Twelve) Hours. (Patient taking differently: Take 81 mg by mouth Daily.) 60 tablet 0    • Chlorhexidine Gluconate Cloth 2 % pads Apply  topically. AS DIRECTED PREOP      • DAPTOmycin 750 mg in  sodium chloride 0.9 % 50 mL Infuse 750 mg into a venous catheter Daily for 33 doses. Indications: Bone and/or Joint Infection      • Diclofenac Sodium (VOLTAREN) 1 % gel gel Apply 4 g topically to the appropriate area as directed 4 (Four) Times a Day As Needed.      • Dulaglutide (Trulicity) 3 MG/0.5ML solution pen-injector Inject 0.5 mL under the skin into the appropriate area as directed 1 (One) Time Per Week. SATURDAYS      • esomeprazole (nexIUM) 40 MG capsule Take 40 mg by mouth Every Morning Before Breakfast.      • ferrous sulfate 325 (65 FE) MG tablet Take 325 mg by mouth Every Other Day. Mornings      • folic acid (FOLVITE) 1 MG tablet Take 1,000 mcg by mouth Daily.      • gabapentin (NEURONTIN) 600 MG tablet Take 1 tablet by mouth 2 (Two) Times a Day. 40 tablet 0    • insulin aspart (NovoLOG FlexPen) 100 UNIT/ML solution pen-injector sc pen Inject 2 Units under the skin into the appropriate area as directed 3 (Three) Times a Day With Meals.      • insulin detemir (LEVEMIR) 100 UNIT/ML injection Inject 50 Units under the skin into the appropriate area as directed Every Night.      • isosorbide mononitrate (IMDUR) 30 MG 24 hr tablet Take 30 mg by mouth Every Morning.      • loratadine (CLARITIN) 10 MG tablet Take 10 mg by mouth Every Night.      • metoprolol succinate XL (Toprol XL) 25 MG 24 hr tablet Take 1 tablet by mouth Daily.      • oxyCODONE-acetaminophen (PERCOCET) 5-325 MG per tablet Take 1 tablet by mouth Every 4 (Four) Hours As Needed for Severe Pain . 50 tablet 0    • oxyCODONE-acetaminophen (PERCOCET) 5-325 MG per tablet Take 1 tablet by mouth Every 4 (Four) Hours As Needed for Severe Pain . 50 tablet 0    • vitamin D3 125 MCG (5000 UT) capsule capsule Take 5,000 Units by mouth Daily.        Current Meds:     Current Facility-Administered Medications:   •  acetaminophen (TYLENOL) tablet 650 mg, 650 mg, Oral, Q4H PRN **OR** [DISCONTINUED] acetaminophen (TYLENOL) 160 MG/5ML solution 650 mg, 650 mg,  Oral, Q4H PRN **OR** [DISCONTINUED] acetaminophen (TYLENOL) suppository 650 mg, 650 mg, Rectal, Q4H PRN, Berenice Meek APRN  •  cetirizine (zyrTEC) tablet 10 mg, 10 mg, Oral, Daily, Roopa Schrader APRN, 10 mg at 06/02/22 0951  •  DAPTOmycin (CUBICIN) 750 mg in sodium chloride 0.9 % 50 mL IVPB, 10 mg/kg (Adjusted), Intravenous, Q24H, Roopa Schrader APRN  •  dextrose (D50W) (25 g/50 mL) IV injection 25 g, 25 g, Intravenous, Q15 Min PRN, Roopa Schrader APRN  •  dextrose (GLUTOSE) oral gel 15 g, 15 g, Oral, Q15 Min PRN, Roopa Schrader APRN  •  Diclofenac Sodium (VOLTAREN) 1 % gel 4 g, 4 g, Topical, 4x Daily PRN, Roopa Schrader APRN  •  ferrous sulfate tablet 325 mg, 325 mg, Oral, Every Other Day, Roopa Schrader APRN, 325 mg at 06/02/22 0952  •  gabapentin (NEURONTIN) capsule 600 mg, 600 mg, Oral, Q12H, Roopa Schrader APRN, 600 mg at 06/02/22 0951  •  glucagon (human recombinant) (GLUCAGEN DIAGNOSTIC) injection 1 mg, 1 mg, Intramuscular, Q15 Min PRN, Roopa Schrader APRN  •  insulin glargine (LANTUS, SEMGLEE) injection 25 Units, 25 Units, Subcutaneous, Nightly, Roopa Schrader APRN  •  insulin lispro (ADMELOG) injection 2 Units, 2 Units, Subcutaneous, TID With Meals, Roopa Schrader APRN, 2 Units at 06/02/22 1223  •  [START ON 6/3/2022] isosorbide mononitrate (IMDUR) 24 hr tablet 30 mg, 30 mg, Oral, Sukh CARD Jonathan, MD  •  metoprolol succinate XL (TOPROL-XL) 24 hr tablet 25 mg, 25 mg, Oral, Daily, Roopa Schrader APRN  •  nitroglycerin (NITROSTAT) SL tablet 0.4 mg, 0.4 mg, Sublingual, Q5 Min PRN, Berenice Meek APRN  •  ondansetron (ZOFRAN) injection 4 mg, 4 mg, Intravenous, Q6H PRN, Berenice Meek APRN  •  oxyCODONE-acetaminophen (PERCOCET) 5-325 MG per tablet 1 tablet, 1 tablet, Oral, Q4H PRN, Roopa Schrader APRN, 1 tablet at 06/02/22 0643  •  pantoprazole (PROTONIX) EC tablet 40 mg, 40 mg, Oral, QAM, Roopa Schrader  "MORGAN Baig, 40 mg at 22 0614  •  sodium chloride 0.9 % flush 10 mL, 10 mL, Intravenous, Q12H, Roopa Schrader APRN, 10 mL at 22 0952  •  sodium chloride 0.9 % flush 10 mL, 10 mL, Intravenous, PRN, Roopa Schrader APRN  •  sodium chloride 0.9 % flush 20 mL, 20 mL, Intravenous, PRN, Roopa Schrader APRN  Allergies:  Allergies   Allergen Reactions   • Ace Inhibitors Shortness Of Breath and Swelling   • Morphine Shortness Of Breath     Social History:   Social History     Tobacco Use   • Smoking status: Former Smoker     Years: 45.00     Types: Cigarettes     Quit date: 12/10/2015     Years since quittin.4   • Smokeless tobacco: Never Used   Substance Use Topics   • Alcohol use: Never      Family History:  Family History   Problem Relation Age of Onset   • Malig Hyperthermia Neg Hx           Review of Systems  See history of present illness and past medical history.   Constitutional: Negative for chills and fever.   HENT: Negative for congestion and sore throat.    Eyes: Negative for photophobia and visual disturbance.   Respiratory: Negative for cough and shortness of breath.    Cardiovascular: Positive for leg swelling. Negative for chest pain and palpitations.   Gastrointestinal: Negative for abdominal pain, nausea and vomiting.   Endocrine: Negative for polydipsia, polyphagia and polyuria.   Genitourinary: Negative for dysuria, flank pain, frequency and hematuria.   Musculoskeletal: Positive for arthralgias, gait problem and myalgias.   Skin: Positive for color change and wound.   Neurological: Positive for numbness (chronic, BLE). Negative for dizziness, light-headedness and headaches.   Remainder of ROS is negative.      Vitals:   BP 92/48   Pulse 88   Temp (!) 88.3 °F (31.3 °C) (Oral)   Resp 17   Ht 162.6 cm (64\")   Wt 103 kg (227 lb 8.2 oz)   SpO2 98%   BMI 39.05 kg/m²   I/O:     Intake/Output Summary (Last 24 hours) at 2022 1241  Last data filed at 2022 " 0644  Gross per 24 hour   Intake 170 ml   Output --   Net 170 ml     Exam:  Patient is examined using the personal protective equipment as per guidelines from infection control for this particular patient as enacted.  Hand washing was performed before and after patient interaction.  General Appearance:    Alert, cooperative, no distress, appears stated age   Head:    Normocephalic, without obvious abnormality, atraumatic   Eyes:    PERRL, conjunctivae/corneas clear, EOM's intact, both eyes   Ears:    Normal external ear canals, both ears   Nose:   Nares normal, septum midline, mucosa normal, no drainage    or sinus tenderness   Throat:   Lips, tongue, gums normal; oral mucosa pink and moist   Neck:   Supple, symmetrical, trachea midline, no adenopathy;     thyroid:  no enlargement/tenderness/nodules; no carotid    bruit or JVD   Back:     Symmetric, no curvature, ROM normal, no CVA tenderness   Lungs:     Clear to auscultation bilaterally, respirations unlabored   Chest Wall:    No tenderness or deformity    Heart:    Regular rate and rhythm, S1 and S2 normal, no murmur, rub   or gallop   Abdomen:    Obese soft nontender   Extremities:  Left hip posterior incision currently has wound VAC in place with no surrounding cellulitis.  Left arm PICC line in place   Pulses:   Pulses palpable in all extremities; symmetric all extremities   Skin:  Ecchymotic changes noted   Neurologic:  Alert and oriented x3       Data Review:    I reviewed the patient's new clinical results.  Results from last 7 days   Lab Units 06/02/22  0638 06/01/22 1916   WBC 10*3/mm3 3.23* 7.00   HEMOGLOBIN g/dL 6.0* 7.5*   PLATELETS 10*3/mm3 121* 180     Results from last 7 days   Lab Units 06/02/22  0638 06/01/22 1916   SODIUM mmol/L 130* 126*   POTASSIUM mmol/L 3.9 4.2   CHLORIDE mmol/L 102 97*   CO2 mmol/L 18.4* 17.3*   BUN mg/dL 14 15   CREATININE mg/dL 0.85 1.03*   CALCIUM mg/dL 7.3* 7.6*   GLUCOSE mg/dL 236* 237*     Culture   Date Value Ref  Range Status   06/03/2020 NO GROWTH AT 2 DAYS.  Final     Comment:     NO GROWTH AT 2 DAYS.       ]    Assessment:  Active Hospital Problems    Diagnosis  POA   • **Osteomyelitis of hip (HCC) [M86.9]  Yes   • Hyponatremia [E87.1]  Yes   • Metabolic acidosis [E87.2]  Yes   • Sepsis without acute organ dysfunction (HCC) [A41.9]  Yes   • Pathological fracture of right femur ([facility]) [M84.451A]  Yes   • Iron deficiency anemia [D50.9]  Yes   • Chronic diastolic CHF (congestive heart failure) ([facility]) [I50.32]  Yes   • Obesity (BMI 30-39.9) [E66.9]  Yes   • Type 2 diabetes mellitus with diabetic polyneuropathy, with long-term current use of insulin ([facility]) [E11.42, Z79.4]  Not Applicable   • Hypertension [I10]  Yes      Resolved Hospital Problems   No resolved problems to display.         Plan:  See above  Fernando Hart MD   6/2/2022  12:49 EDT    Much of this encounter note is an electronic transcription/translation of spoken language to printed text. The electronic translation of spoken language may permit erroneous, or at times, nonsensical words or phrases to be inadvertently transcribed; Although I have reviewed the note for such errors, some may still exist

## 2022-06-02 NOTE — CONSULTS
Orthopaedic Surgery  Consult Note  Dr. JENNIE Espinosa” [facility] [place]  [phone]    HPI:  Patient is a 67 y.o. Not  or  female who presents with ongoing right hip infection and drainage.  This patient has a long history of right hip problems.  She had initial right hip done at an outside hospital years ago.  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  Eventually, she had 2 different antibiotic spacers placed in both of those failed.  She was eventually converted to a Girdlestone.  She then came to me for possible hip replant several years afterwards.  She had a negative infection work-up but after replating her hip she had problems with drainage and positive urine cultures almost immediately.  We did wash it out once but she continued to have drainage.  Unfortunately, by the time the patient finally allowed me to remove the prosthesis, the femoral stem was well ingrown.  She had very little bone left and extracting it was incredibly difficult.  I did destroyed most of her proximal bone stock getting it out.  This does explain her CT scan that was performed that showed cortical destruction of the proximal femur.  This happened at her last surgery.  She was discharged and a wound VAC was placed.  Due to some increased swelling patient was brought back to the hospital where CRP and sed rate were noted to be significantly elevated.  She has been afebrile but she is markedly anemic.  There is also a foul smell to the wound VAC drainage.    MEDICAL HISTORY  Past Medical History:   Diagnosis Date   • Allergies    • Arthritis    • Cardiac murmur    • Diabetes mellitus (HCC)    • GERD (gastroesophageal reflux disease)    • Heart attack (HCC) 12/2020   • History of cervical cancer 1981   • History of transfusion     SEVERAL   • Hyperlipidemia    • Hypertension    • Iron deficiency anemia    • Osteomyelitis hip (HCC)     RIGHT   • PONV (postoperative  nausea and vomiting)    • Right hip pain    • VRE infection within last 3 months    • Wound infection     RIGHT HIP.  WOUND VAC IN PLACE   ·   Past Surgical History:   Procedure Laterality Date   • CARDIAC CATHETERIZATION     • CERVICAL CONIZATION     • COLONOSCOPY     • CORONARY ANGIOPLASTY WITH STENT PLACEMENT     • ENDOSCOPY     • HIP ARTHROPLASTY Right    • HIP HARDWARE REMOVAL Right    • HIP MINI REVISION Right 01/18/2022    Procedure: TOTAL HIP ARTHROPLASTY LINER REVISION, pegboard lateral;  Surgeon: Karri Schaeffer II, MD;  Location: [facility] MAIN OR;  Service: Orthopedics;  Laterality: Right;   • HIP SPACER INSERTION WITH ANTIBIOTIC CEMENT      X3   • HYSTERECTOMY     • INCISION AND DRAINAGE HIP Right 12/18/2021    Procedure: INCISION AND DRAINAGE TOTAL HIP, LINER EXCHANGE AND WOUND VAC PLACEMENT;  Surgeon: Karri Schaeffer II, MD;  Location[facility] MA[facility];  Service: Orthopedics;  Laterality: Right;   • INCISION AND DRAINAGE HIP Right 5/2/2022    Procedure: HIP INCISION AND DRAINAGE;  Surgeon: Karri Schaeffer II, MD;  Location: [facility] MAIN OR;  Service: Orthopedics;  Laterality: Right;   • KNEE ARTHROPLASTY Left    • LAPAROSCOPIC CHOLECYSTECTOMY     • TONSILLECTOMY     • TOTAL HIP ARTHROPLASTY Right 4/25/2022    Procedure: HIP GIRDLESTONE PROCEDURE;  Surgeon: Karri Schaeffer II, MD;  Location: [facility] MAIN OR;  Service: Orthopedics;  Laterality: Right;   • TOTAL HIP ARTHROPLASTY REVISION Right 11/16/2021    Procedure: TOTAL HIP REVISION ARTHROPLASTY RIGHT POSTERIOR;  Surgeon: Karri Schaeffer II, MD;  Location: [facility] MAIN OR;  Service: Orthopedics;  Laterality: Right;   ·   Prior to Admission medications    Medication Sig Start Date End Date Taking? Authorizing Provider   aspirin 81 MG EC tablet Take 1 tablet by mouth Every 12 (Twelve) Hours.  Patient taking differently: Take 81 mg by mouth Daily. 5/6/22  Yes Karri Schaeffer II, MD   Chlorhexidine Gluconate  Cloth 2 % pads Apply  topically. AS DIRECTED PREOP   Yes Nikunj Isaac MD   DAPTOmycin 750 mg in sodium chloride 0.9 % 50 mL Infuse 750 mg into a venous catheter Daily for 33 doses. Indications: Bone and/or Joint Infection 5/7/22 6/9/22 Yes Karri Schaeffer II, MD   Diclofenac Sodium (VOLTAREN) 1 % gel gel Apply 4 g topically to the appropriate area as directed 4 (Four) Times a Day As Needed.   Yes Nikunj Isaac MD   Dulaglutide (Trulicity) 3 MG/0.5ML solution pen-injector Inject 0.5 mL under the skin into the appropriate area as directed 1 (One) Time Per Week. SATURDAYS   Yes Nikunj Isaac MD   esomeprazole (nexIUM) 40 MG capsule Take 40 mg by mouth Every Morning Before Breakfast.   Yes Nikunj Isaac MD   ferrous sulfate 325 (65 FE) MG tablet Take 325 mg by mouth Every Other Day. Mornings   Yes Nikunj Isaac MD   folic acid (FOLVITE) 1 MG tablet Take 1,000 mcg by mouth Daily. 3/28/22  Yes Nikunj Isaac MD   gabapentin (NEURONTIN) 600 MG tablet Take 1 tablet by mouth 2 (Two) Times a Day. 4/29/22  Yes Karri Schaeffer II, MD   insulin aspart (NovoLOG FlexPen) 100 UNIT/ML solution pen-injector sc pen Inject 2 Units under the skin into the appropriate area as directed 3 (Three) Times a Day With Meals. 5/7/22  Yes Carlos Reyna MD   insulin detemir (LEVEMIR) 100 UNIT/ML injection Inject 50 Units under the skin into the appropriate area as directed Every Night.   Yes Nikunj Isaac MD   isosorbide mononitrate (IMDUR) 30 MG 24 hr tablet Take 30 mg by mouth Every Morning.   Yes Nikunj Isaac MD   loratadine (CLARITIN) 10 MG tablet Take 10 mg by mouth Every Night.   Yes Nikunj Isaac MD   metoprolol succinate XL (Toprol XL) 25 MG 24 hr tablet Take 1 tablet by mouth Daily. 5/7/22  Yes Carlos Reyna MD   oxyCODONE-acetaminophen (PERCOCET) 5-325 MG per tablet Take 1 tablet by mouth Every 4 (Four) Hours As Needed for Severe Pain .  "22  Yes Karri Schaeffer II, MD   oxyCODONE-acetaminophen (PERCOCET) 5-325 MG per tablet Take 1 tablet by mouth Every 4 (Four) Hours As Needed for Severe Pain . 22  Yes Karri Schaeffer II, MD   vitamin D3 125 MCG (5000 UT) capsule capsule Take 5,000 Units by mouth Daily.   Yes ProviderNikunj MD   mupirocin (BACTROBAN) 2 % nasal ointment into the nostril(s) as directed by provider. AS DIRECTED PREOP  22  ProviderNikunj MD   ·   Allergies   Allergen Reactions   • Ace Inhibitors Shortness Of Breath and Swelling   • Morphine Shortness Of Breath   ·   Most Recent Immunizations   Administered Date(s) Administered   • COVID-19 (PFIZER) PURPLE CAP 10/04/2021   • Covid-19 (Pfizer) Gray Cap 2022   • FluLaval/Fluarix/Fluzone >6 2017   • Fluzone High Dose =>65 Years (Vaxcare ONLY) 2019   • Fluzone High-Dose 65+yrs 10/04/2021   • Pneumococcal Conjugate 13-Valent (PCV13) 09/10/2019   • Pneumococcal Polysaccharide (PPSV23) 2018   • Shingrix 2020   ·   Social History     Tobacco Use   • Smoking status: Former Smoker     Years: 45.00     Types: Cigarettes     Quit date: 12/10/2015     Years since quittin.4   • Smokeless tobacco: Never Used   Substance Use Topics   • Alcohol use: Never   ·    Social History     Substance and Sexual Activity   Drug Use Never   ·     VITALS: /44   Pulse 85   Temp 98.2 °F (36.8 °C) (Oral)   Resp 17   Ht 162.6 cm (64.02\")   Wt 103 kg (227 lb 8.2 oz)   SpO2 98%   BMI 39.03 kg/m²  Body mass index is 39.03 kg/m².    PHYSICAL EXAM:   · CONSTITUTIONAL: No acute distress  · LUNGS: Equal chest rise, no shortness of air  · CARDIOVASCULAR: palpable peripheral pulses  · SKIN: no skin lesions in the area examined  · LYMPH: no lymphadenopathy in the area examined  · EXTREMITY: Right Lower Extremity  · Tenderness to Palpation: Tenderness to palpation at the Hip  · Gross Deformity: Wound VAC in place  · Pulses:  Brisk Capillary " Refill  · Sensation: Intact to Saphenous, Sural, Deep Peroneal, Superficial Peroneal, and Tibial Nerves and grossly throughout extremity  · Motor: 5/5 EHL/FHL/TA/GS motor complexes    RADIOLOGY REVIEW:   CT Lower Extremity Right With Contrast    Result Date: 6/1/2022  Comminuted and displaced fracture of the proximal right femur, with apparent destruction of the right intertrochanteric region. Appearance is extremely concerning for osteomyelitis and pathologic fracture. There is also irregularity of the right acetabulum, which may reflect further infectious involvement. MRI could be considered for further assessment.  Radiation dose reduction techniques were utilized, including automated exposure control and exposure modulation based on body size.  This report was finalized on 6/1/2022 9:10 PM by Dr. Roula Anthony M.D.        LABS:   Results for the past 24 hours:   Recent Results (from the past 24 hour(s))   Comprehensive Metabolic Panel    Collection Time: 06/01/22  7:16 PM    Specimen: Blood   Result Value Ref Range    Glucose 237 (H) 65 - 99 mg/dL    BUN 15 8 - 23 mg/dL    Creatinine 1.03 (H) 0.57 - 1.00 mg/dL    Sodium 126 (L) 136 - 145 mmol/L    Potassium 4.2 3.5 - 5.2 mmol/L    Chloride 97 (L) 98 - 107 mmol/L    CO2 17.3 (L) 22.0 - 29.0 mmol/L    Calcium 7.6 (L) 8.6 - 10.5 mg/dL    Total Protein 5.2 (L) 6.0 - 8.5 g/dL    Albumin 1.90 (L) 3.50 - 5.20 g/dL    ALT (SGPT) 13 1 - 33 U/L    AST (SGOT) 18 1 - 32 U/L    Alkaline Phosphatase 125 (H) 39 - 117 U/L    Total Bilirubin 0.2 0.0 - 1.2 mg/dL    Globulin 3.3 gm/dL    A/G Ratio 0.6 g/dL    BUN/Creatinine Ratio 14.6 7.0 - 25.0    Anion Gap 11.7 5.0 - 15.0 mmol/L    eGFR 59.0 (L) >60.0 mL/min/1.73   Lactic Acid, Plasma    Collection Time: 06/01/22  7:16 PM    Specimen: Blood   Result Value Ref Range    Lactate 1.8 0.5 - 2.0 mmol/L   CBC Auto Differential    Collection Time: 06/01/22  7:16 PM    Specimen: Blood   Result Value Ref Range    WBC 7.00 3.40 - 10.80  10*3/mm3    RBC 2.99 (L) 3.77 - 5.28 10*6/mm3    Hemoglobin 7.5 (L) 12.0 - 15.9 g/dL    Hematocrit 25.1 (L) 34.0 - 46.6 %    MCV 83.9 79.0 - 97.0 fL    MCH 25.1 (L) 26.6 - 33.0 pg    MCHC 29.9 (L) 31.5 - 35.7 g/dL    RDW 15.8 (H) 12.3 - 15.4 %    RDW-SD 47.9 37.0 - 54.0 fl    MPV 9.5 6.0 - 12.0 fL    Platelets 180 140 - 450 10*3/mm3    Neutrophil % 61.0 42.7 - 76.0 %    Lymphocyte % 20.0 19.6 - 45.3 %    Monocyte % 15.3 (H) 5.0 - 12.0 %    Eosinophil % 2.0 0.3 - 6.2 %    Basophil % 0.4 0.0 - 1.5 %    Immature Grans % 1.3 (H) 0.0 - 0.5 %    Neutrophils, Absolute 4.27 1.70 - 7.00 10*3/mm3    Lymphocytes, Absolute 1.40 0.70 - 3.10 10*3/mm3    Monocytes, Absolute 1.07 (H) 0.10 - 0.90 10*3/mm3    Eosinophils, Absolute 0.14 0.00 - 0.40 10*3/mm3    Basophils, Absolute 0.03 0.00 - 0.20 10*3/mm3    Immature Grans, Absolute 0.09 (H) 0.00 - 0.05 10*3/mm3    nRBC 0.0 0.0 - 0.2 /100 WBC   C-reactive Protein    Collection Time: 06/01/22  7:16 PM    Specimen: Blood   Result Value Ref Range    C-Reactive Protein 13.57 (H) 0.00 - 0.50 mg/dL   Sedimentation Rate    Collection Time: 06/01/22  7:16 PM    Specimen: Blood   Result Value Ref Range    Sed Rate 45 (H) 0 - 30 mm/hr   Duplex Venous Lower Extremity - Right    Collection Time: 06/01/22  7:42 PM   Result Value Ref Range    Target HR (85%) 128 bpm    Max. Pred. HR (100%) 151 bpm    Right Common Femoral Spont Y     Right Common Femoral Phasic Y     Right Common Femoral Augment Y     Right Common Femoral Competent Y     Right Common Femoral Compress C     Right Saphenofemoral Junction Compress C     Right Profunda Femoral Compress C     Right Proximal Femoral Compress C     Right Mid Femoral Spont Y     Right Mid Femoral Phasic Y     Right Mid Femoral Augment Y     Right Mid Femoral Competent Y     Right Mid Femoral Compress C     Right Distal Femoral Compress C     Right Popliteal Spont Y     Right Popliteal Phasic Y     Right Popliteal Augment Y     Right Popliteal Competent Y      Right Popliteal Compress C     Right Posterior Tibial Compress C     Right Peroneal Compress C     Right Gastronemius Compress C     Right Greater Saph AK Compress C     Right Greater Saph BK Compress C     Right Lesser Saph Compress N     Left Common Femoral Spont Y     Left Common Femoral Phasic Y     Left Common Femoral Augment Y     Left Common Femoral Competent Y     Left Common Femoral Compress C     Right Lesser Saph Vessel 1     Right Lesser Saph Thrombus C    COVID-19,[facility]ERTO CARLOS NP SWAB IN TRANSPORT MEDIA 8-12 HR TAT - Swab, Nasopharynx    Collection Time: 06/01/22  8:03 PM    Specimen: Nasopharynx; Swab   Result Value Ref Range    COVID19 Not Detected Not Detected - Ref. Range   POC Glucose Once    Collection Time: 06/02/22  6:03 AM    Specimen: Blood   Result Value Ref Range    Glucose 247 (H) 70 - 130 mg/dL   Basic Metabolic Panel    Collection Time: 06/02/22  6:38 AM    Specimen: Blood   Result Value Ref Range    Glucose 236 (H) 65 - 99 mg/dL    BUN 14 8 - 23 mg/dL    Creatinine 0.85 0.57 - 1.00 mg/dL    Sodium 130 (L) 136 - 145 mmol/L    Potassium 3.9 3.5 - 5.2 mmol/L    Chloride 102 98 - 107 mmol/L    CO2 18.4 (L) 22.0 - 29.0 mmol/L    Calcium 7.3 (L) 8.6 - 10.5 mg/dL    BUN/Creatinine Ratio 16.5 7.0 - 25.0    Anion Gap 9.6 5.0 - 15.0 mmol/L    eGFR 74.3 >60.0 mL/min/1.73   CBC Auto Differential    Collection Time: 06/02/22  6:38 AM    Specimen: Blood   Result Value Ref Range    WBC 3.23 (L) 3.40 - 10.80 10*3/mm3    RBC 2.38 (L) 3.77 - 5.28 10*6/mm3    Hemoglobin 6.0 (C) 12.0 - 15.9 g/dL    Hematocrit 19.4 (C) 34.0 - 46.6 %    MCV 81.5 79.0 - 97.0 fL    MCH 25.2 (L) 26.6 - 33.0 pg    MCHC 30.9 (L) 31.5 - 35.7 g/dL    RDW 15.8 (H) 12.3 - 15.4 %    RDW-SD 46.9 37.0 - 54.0 fl    MPV 9.6 6.0 - 12.0 fL    Platelets 121 (L) 140 - 450 10*3/mm3    Neutrophil % 61.0 42.7 - 76.0 %    Lymphocyte % 18.6 (L) 19.6 - 45.3 %    Monocyte % 16.1 (H) 5.0 - 12.0 %    Eosinophil % 2.5 0.3 - 6.2 %     Basophil % 0.6 0.0 - 1.5 %    Immature Grans % 1.2 (H) 0.0 - 0.5 %    Neutrophils, Absolute 1.97 1.70 - 7.00 10*3/mm3    Lymphocytes, Absolute 0.60 (L) 0.70 - 3.10 10*3/mm3    Monocytes, Absolute 0.52 0.10 - 0.90 10*3/mm3    Eosinophils, Absolute 0.08 0.00 - 0.40 10*3/mm3    Basophils, Absolute 0.02 0.00 - 0.20 10*3/mm3    Immature Grans, Absolute 0.04 0.00 - 0.05 10*3/mm3    nRBC 0.0 0.0 - 0.2 /100 WBC   Protime-INR    Collection Time: 06/02/22  6:38 AM    Specimen: Blood   Result Value Ref Range    Protime 29.8 (H) 11.7 - 14.2 Seconds    INR 2.93 (H) 0.90 - 1.10   Iron Profile    Collection Time: 06/02/22  6:38 AM    Specimen: Blood   Result Value Ref Range    Iron 18 (L) 37 - 145 mcg/dL    Iron Saturation 16 (L) 20 - 50 %    Transferrin 75 (L) 200 - 360 mg/dL    TIBC 112 (L) 298 - 536 mcg/dL   Ferritin    Collection Time: 06/02/22  6:38 AM    Specimen: Blood   Result Value Ref Range    Ferritin 616.00 (H) 13.00 - 150.00 ng/mL   Retic With IRF & RET-He    Collection Time: 06/02/22  6:38 AM    Specimen: Blood   Result Value Ref Range    Immature Reticulocyte Fraction 21.1 (H) 3.0 - 15.8 %    Reticulocyte % 1.92 (H) 0.70 - 1.90 %    Reticulocyte Absolute 0.0472 0.0200 - 0.1300 10*6/mm3    Reticulocyte Hgb 23.0 (L) 29.8 - 36.1 pg   Lactate Dehydrogenase    Collection Time: 06/02/22  6:38 AM    Specimen: Blood   Result Value Ref Range     135 - 214 U/L   Sedimentation Rate    Collection Time: 06/02/22  6:38 AM    Specimen: Blood   Result Value Ref Range    Sed Rate 18 0 - 30 mm/hr   Lactic Acid, Plasma    Collection Time: 06/02/22  9:56 AM    Specimen: Blood   Result Value Ref Range    Lactate 1.7 0.5 - 2.0 mmol/L   Type & Screen    Collection Time: 06/02/22 10:57 AM    Specimen: Blood   Result Value Ref Range    ABO Type O     RH type Positive     Antibody Screen Negative     T&S Expiration Date 6/5/2022 11:59:59 PM    POC Glucose Once    Collection Time: 06/02/22 12:07 PM    Specimen: Blood   Result Value Ref  "Range    Glucose 244 (H) 70 - 130 mg/dL   Prepare RBC, 2 Units    Collection Time: 06/02/22  3:58 PM   Result Value Ref Range    Product Code Z4856H17     Unit Number [id number]     UNIT  ABO O     UNIT  RH POS     Crossmatch Interpretation Compatible     Dispense Status IS     Blood Expiration Date 202206282359     Blood Type Barcode 5100     Product Code Q0197J44     Unit Number [id number]     UNIT  ABO O     UNIT  RH POS     Crossmatch Interpretation Compatible     Dispense Status IS     Blood Expiration Date 202206222359     Blood Type Barcode 5100    POC Glucose Once    Collection Time: 06/02/22  5:34 PM    Specimen: Blood   Result Value Ref Range    Glucose 180 (H) 70 - 130 mg/dL       IMPRESSION:  Patient is a 67 y.o. Not  or  female with ongoing right hip infection with Girdlestone    PLAN:   · Admited to: Juan Luz MD   · Disposition: On to see how she does over the next couple days with being admitted to the hospital.  I want to trend her CRP and sedimentation rate.  She has had 70 surgeries on this hip and continues to have problems.  Not entirely sure what another washout procedure would really accomplish we may end up having to try this.    R \"Kilo\" Laury BAXTER MD  Orthopaedic Surgery  [facility]  [phone] - [place] Office  [phone] - [place] Office            "

## 2022-06-02 NOTE — PAYOR COMM NOTE
"Stephy Duncan (67 y.o. Female)     INPATIENT REQUEST FOR [id number]    CONTACT AUGUST MACHADO  P# [phone]  F# [phone]              Date of Birth   1955    Social Security Number       Address   [address] [place] [address] [facility] [address]    Home Phone   [phone]    MRN   [id number]       Hinduism   Nondenominational    Marital Status                               Admission Date   6/1/22    Admission Type   Emergency    Admitting Provider   Juan Luz MD    Attending Provider   Juan Luz MD    Department, Room/Bed   [facility] OBSERVATION, 127/1       Discharge Date       Discharge Disposition       Discharge Destination                               Attending Provider: Juan Luz MD    Allergies: Ace Inhibitors, Morphine    Isolation: Contact   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)   Code Status: CPR   Advance Care Planning Activity    Ht: 162.6 cm (64.02\")   Wt: 103 kg (227 lb 8.2 oz)    Admission Cmt: None   Principal Problem: Osteomyelitis of hip (HCC) [M86.9]                 Active Insurance as of 6/1/2022     Primary Coverage     Payor Plan Insurance Group Employer/Plan Group    AETNA MEDICARE REPLACEMENT AETNA MEDICARE REPLACEMENT [id number]     Payor Plan Address Payor Plan Phone Number Payor Plan Fax Number Effective Dates    [address] [phone]  1/1/2022 - None Entered    [facility] [id number]       Subscriber Name Subscriber Birth Date Member ID       Stephy Duncan 1955 [id number]                 Emergency Contacts      (Rel.) Home Phone Work Phone Mobile Phone    ADRIANNAESTUARDO (Spouse) [phone] -- [phone]    Bhumika Perez (Sister) [phone] -- [phone]               History & Physical      Roopa Schrader APRN at 06/02/22 0655     Attestation signed by Juan Luz MD at 06/02/22 0918      I have seen and examined the patient, performing an independent face-to-face diagnostic evaluation with plan of " care reviewed and developed with [facility].    67 y.o. female presented to the emergency department with increased pain right hip as well as increased drainage from wound right hip.  She has a wound VAC in place due to prior recurrent hip infection.  On exam she is nontoxic in appearance, alert and oriented x3.  Wound VAC in place right hip with increased drainage and surrounding erythema.  Data I have personally reviewed:  [x]  Laboratory   [x]  Microbiology   [x]  Radiology   [x]  EKG/Telemetry   [x]  Cardiology/Vascular   []  Pathology   [x]  Old records    Assessment/Plan  Orthopedic surgery and infectious disease consulted for osteomyelitis right hip.  CT scan shows comminuted and displaced fracture proximal right femur with destruction of right intertrochanteric region with findings consistent with osteomyelitis.  Fracture suspected to be pathologic.  She has a history of VRE and daptomycin was initiated in the emergency department.  Defer any additional antibiotics to ID.  She has sepsis on admission with elevated inflammatory markers and lactic acid of 1.8.  Her BP is low and will give her a liter of normal saline.  Repeat lactic acid.  Her hemoglobin is 6 down from baseline 7.5 in ED.  Will give 2 units of blood in anticipation of surgery.  She has mild pancytopenia as well.  She follows a hematologist in [place].  Will ask our hematology to assist with her evaluation.  Will monitor CBC daily.  Will continue basal insulin plus sliding scale.  She has history of heart failure with preserved EF.  Currently seems dry.  Mild hyponatremia that will need careful monitoring.  As stated her BP is slightly low.  Will add holding parameters to her Imdur and Toprol.    Active Hospital Problems    Diagnosis  POA   • **Osteomyelitis of hip (HCC) [M86.9]  Yes   • Hyponatremia [E87.1]  Yes   • Metabolic acidosis [E87.2]  Yes   • Sepsis without acute organ dysfunction (HCC) [A41.9]  Yes   • Pathological  fracture of right femur ([facility]) [M84.451A]  Yes   • Iron deficiency anemia [D50.9]  Yes   • Chronic diastolic CHF (congestive heart failure) ([facility]) [I50.32]  Yes   • Obesity (BMI 30-39.9) [E66.9]  Yes   • Type 2 diabetes mellitus with diabetic polyneuropathy, with long-term current use of insulin ([facility]) [E11.42, Z79.4]  Not Applicable   • Hypertension [I10]  Yes      Resolved Hospital Problems   No resolved problems to display.     The majority of medical decision making (>50%) for this encounter was completed by myself and discussed with this APRN    Electronically signed by Juan Luz MD, 6/2/2022, 08:29 EDT.                           Patient Name:  Stephy Duncan  YOB: 1955  MRN:  [id number]  Admit Date:  6/1/2022  Patient Care Team:  Gregorio Rosales as PCP - General (Family Medicine)  Jose Kong MD as Consulting Physician (Cardiology)      Subjective   History Present Illness     Chief Complaint   Patient presents with   • Wound Infection       Ms. Duncan is a 67 y.o. former smoker with a history of recurrent infection of the right hip, type 2 diabetes mellitus, hypertension, chronic diastolic CHF, and iron deficiency anemia that presents to [facility] complaining of worsening infection in her right hip.  She states she has had increased foul smelling draining in her wound vac for the past few days.  She reports she has had multiple surgeries on the hip due to recurrent infection, the most recent was last month.  She states she was discharged to skilled rehab and has been home for about 1 week.  She states she has noticed the drainage has become foul smelling and she has had increased pain and swelling in the right leg.  She describes her pain as constant, severe, and aching in nature.  She states the pain often shoots from the right hip down the right leg.  She reports erythema and warmth to the wound vac site.  She denies fever, chills, chest pain, shortness of breath,  nausea, and vomiting.  A CT of the right lower extremity showed comminuted and displaced fracture of the proximal right femur, with apparent destruction of the right intertrochanteric region. Appearance is extremely concerning for osteomyelitis and pathologic fracture.  There is also irregularity of the right acetabulum, which may reflect further infectious involvement.  She was recently admitted at [facility] from 4/25/22-5/8/22 for a girdlestone procedure performed by Dr. Schaeffer due to septic arthritis of the hip.  She was discharged with a PICC line and has been receiving Daptomycin IV outpatient per infectious disease.      History of Present Illness  Review of Systems   Constitutional: Negative for chills and fever.   HENT: Negative for congestion and sore throat.    Eyes: Negative for photophobia and visual disturbance.   Respiratory: Negative for cough and shortness of breath.    Cardiovascular: Positive for leg swelling. Negative for chest pain and palpitations.   Gastrointestinal: Negative for abdominal pain, nausea and vomiting.   Endocrine: Negative for polydipsia, polyphagia and polyuria.   Genitourinary: Negative for dysuria, flank pain, frequency and hematuria.   Musculoskeletal: Positive for arthralgias, gait problem and myalgias.   Skin: Positive for color change and wound.   Neurological: Positive for numbness (chronic, BLE). Negative for dizziness, light-headedness and headaches.        Personal History     Past Medical History:   Diagnosis Date   • Allergies    • Arthritis    • Cardiac murmur    • Diabetes mellitus (HCC)    • GERD (gastroesophageal reflux disease)    • Heart attack (HCC) 12/2020   • History of cervical cancer 1981   • History of transfusion     SEVERAL   • Hyperlipidemia    • Hypertension    • Iron deficiency anemia    • Osteomyelitis hip (HCC)     RIGHT   • PONV (postoperative nausea and vomiting)    • Right hip pain    • VRE infection within last 3 months    •  Wound infection     RIGHT HIP.  WOUND VAC IN PLACE     Past Surgical History:   Procedure Laterality Date   • CARDIAC CATHETERIZATION     • CERVICAL CONIZATION     • COLONOSCOPY     • CORONARY ANGIOPLASTY WITH STENT PLACEMENT     • ENDOSCOPY     • HIP ARTHROPLASTY Right    • HIP HARDWARE REMOVAL Right    • HIP MINI REVISION Right 2022    Procedure: TOTAL HIP ARTHROPLASTY LINER REVISION, pegboard lateral;  Surgeon: Karri Schaeffer II, MD;  Location: [facility] MAIN OR;  Service: Orthopedics;  Laterality: Right;   • HIP SPACER INSERTION WITH ANTIBIOTIC CEMENT      X3   • HYSTERECTOMY     • INCISION AND DRAINAGE HIP Right 2021    Procedure: INCISION AND DRAINAGE TOTAL HIP, LINER EXCHANGE AND WOUND VAC PLACEMENT;  Surgeon: Karri Schaeffer II, MD;  Location: [facility] MAIN OR;  Service: Orthopedics;  Laterality: Right;   • INCISION AND DRAINAGE HIP Right 2022    Procedure: HIP INCISION AND DRAINAGE;  Surgeon: Karri Schaeffer II, MD;  Location: [facility] MAIN OR;  Service: Orthopedics;  Laterality: Right;   • KNEE ARTHROPLASTY Left    • LAPAROSCOPIC CHOLECYSTECTOMY     • TONSILLECTOMY     • TOTAL HIP ARTHROPLASTY Right 2022    Procedure: HIP GIRDLESTONE PROCEDURE;  Surgeon: Karri Schaeffer II, MD;  Location: [facility] MAIN OR;  Service: Orthopedics;  Laterality: Right;   • TOTAL HIP ARTHROPLASTY REVISION Right 2021    Procedure: TOTAL HIP REVISION ARTHROPLASTY RIGHT POSTERIOR;  Surgeon: Karri Schaeffer II, MD;  Location: [facility] MAIN OR;  Service: Orthopedics;  Laterality: Right;     Family History   Problem Relation Age of Onset   • Malig Hyperthermia Neg Hx      Social History     Tobacco Use   • Smoking status: Former Smoker     Years: 45.00     Types: Cigarettes     Quit date: 12/10/2015     Years since quittin.4   • Smokeless tobacco: Never Used   Vaping Use   • Vaping Use: Never used   Substance Use Topics   • Alcohol use: Never   • Drug use: Never      Medications Prior to Admission   Medication Sig Dispense Refill Last Dose   • aspirin 81 MG EC tablet Take 1 tablet by mouth Every 12 (Twelve) Hours. (Patient taking differently: Take 81 mg by mouth Daily.) 60 tablet 0    • Chlorhexidine Gluconate Cloth 2 % pads Apply  topically. AS DIRECTED PREOP      • DAPTOmycin 750 mg in sodium chloride 0.9 % 50 mL Infuse 750 mg into a venous catheter Daily for 33 doses. Indications: Bone and/or Joint Infection      • Diclofenac Sodium (VOLTAREN) 1 % gel gel Apply 4 g topically to the appropriate area as directed 4 (Four) Times a Day As Needed.      • Dulaglutide (Trulicity) 3 MG/0.5ML solution pen-injector Inject 0.5 mL under the skin into the appropriate area as directed 1 (One) Time Per Week. SATURDAYS      • esomeprazole (nexIUM) 40 MG capsule Take 40 mg by mouth Every Morning Before Breakfast.      • ferrous sulfate 325 (65 FE) MG tablet Take 325 mg by mouth Every Other Day. Mornings      • folic acid (FOLVITE) 1 MG tablet Take 1,000 mcg by mouth Daily.      • gabapentin (NEURONTIN) 600 MG tablet Take 1 tablet by mouth 2 (Two) Times a Day. 40 tablet 0    • insulin aspart (NovoLOG FlexPen) 100 UNIT/ML solution pen-injector sc pen Inject 2 Units under the skin into the appropriate area as directed 3 (Three) Times a Day With Meals.      • insulin detemir (LEVEMIR) 100 UNIT/ML injection Inject 50 Units under the skin into the appropriate area as directed Every Night.      • isosorbide mononitrate (IMDUR) 30 MG 24 hr tablet Take 30 mg by mouth Every Morning.      • loratadine (CLARITIN) 10 MG tablet Take 10 mg by mouth Every Night.      • metoprolol succinate XL (Toprol XL) 25 MG 24 hr tablet Take 1 tablet by mouth Daily.      • oxyCODONE-acetaminophen (PERCOCET) 5-325 MG per tablet Take 1 tablet by mouth Every 4 (Four) Hours As Needed for Severe Pain . 50 tablet 0    • oxyCODONE-acetaminophen (PERCOCET) 5-325 MG per tablet Take 1 tablet by mouth Every 4 (Four) Hours As  Needed for Severe Pain . 50 tablet 0    • vitamin D3 125 MCG (5000 UT) capsule capsule Take 5,000 Units by mouth Daily.        Allergies:    Allergies   Allergen Reactions   • Ace Inhibitors Shortness Of Breath and Swelling   • Morphine Shortness Of Breath       Objective    Objective     Vital Signs  Temp:  [98.1 °F (36.7 °C)-99.3 °F (37.4 °C)] 98.5 °F (36.9 °C)  Heart Rate:  [83-87] 86  Resp:  [16-20] 16  BP: ()/(45-59) 97/45  SpO2:  [92 %-100 %] 100 %  on   ;   Device (Oxygen Therapy): room air  Body mass index is 39.05 kg/m².    Physical Exam  Vitals and nursing note reviewed.   Constitutional:       Appearance: Normal appearance. She is obese.   HENT:      Head: Normocephalic and atraumatic.      Nose: Nose normal.      Mouth/Throat:      Mouth: Mucous membranes are moist.      Pharynx: Oropharynx is clear.   Eyes:      Extraocular Movements: Extraocular movements intact.      Conjunctiva/sclera: Conjunctivae normal.   Cardiovascular:      Rate and Rhythm: Normal rate and regular rhythm.      Heart sounds: Normal heart sounds.   Pulmonary:      Effort: Pulmonary effort is normal.      Breath sounds: Normal breath sounds.   Abdominal:      General: Bowel sounds are normal.      Palpations: Abdomen is soft.   Musculoskeletal:         General: Tenderness present.      Cervical back: Normal range of motion and neck supple.      Comments: Decreased ROM of RLE, tenderness of right hip   Skin:     General: Skin is warm and dry.      Comments: Wound vac in place to right hip with foul smelling drainage in canister.  Surrounding erythema and warmth of incision site.   Neurological:      General: No focal deficit present.      Mental Status: She is alert and oriented to person, place, and time.   Psychiatric:         Mood and Affect: Mood normal.         Behavior: Behavior normal.         Results Review:  I reviewed the patient's new clinical results.  I reviewed the patient's new imaging results and agree with the  interpretation.  I reviewed the patient's other test results and agree with the interpretation  I personally viewed and interpreted the patient's EKG/Telemetry data  Discussed with ED provider.    Lab Results (last 24 hours)     Procedure Component Value Units Date/Time    CBC & Differential [[id number]]  (Abnormal) Collected: 06/01/22 1916    Specimen: Blood Updated: 06/01/22 1935    Narrative:      The following orders were created for panel order CBC & Differential.  Procedure                               Abnormality         Status                     ---------                               -----------         ------                     CBC Auto Differential[[id number]]        Abnormal            Final result                 Please view results for these tests on the individual orders.    Comprehensive Metabolic Panel [[id number]]  (Abnormal) Collected: 06/01/22 1916    Specimen: Blood Updated: 06/01/22 2000     Glucose 237 mg/dL      BUN 15 mg/dL      Creatinine 1.03 mg/dL      Sodium 126 mmol/L      Potassium 4.2 mmol/L      Chloride 97 mmol/L      CO2 17.3 mmol/L      Calcium 7.6 mg/dL      Total Protein 5.2 g/dL      Albumin 1.90 g/dL      ALT (SGPT) 13 U/L      AST (SGOT) 18 U/L      Alkaline Phosphatase 125 U/L      Total Bilirubin 0.2 mg/dL      Globulin 3.3 gm/dL      A/G Ratio 0.6 g/dL      BUN/Creatinine Ratio 14.6     Anion Gap 11.7 mmol/L      eGFR 59.0 mL/min/1.73      Comment: National Kidney Foundation and American Society of Nephrology (ASN) Task Force recommended calculation based on the Chronic Kidney Disease Epidemiology Collaboration (CKD-EPI) equation refit without adjustment for race.       Narrative:      GFR Normal >60  Chronic Kidney Disease <60  Kidney Failure <15      Lactic Acid, Plasma [[id number]]  (Normal) Collected: 06/01/22 1916    Specimen: Blood Updated: 06/01/22 1958     Lactate 1.8 mmol/L     CBC Auto Differential [[id number]]  (Abnormal) Collected: 06/01/22 1916    Specimen:  Blood Updated: 06/01/22 1935     WBC 7.00 10*3/mm3      RBC 2.99 10*6/mm3      Hemoglobin 7.5 g/dL      Hematocrit 25.1 %      MCV 83.9 fL      MCH 25.1 pg      MCHC 29.9 g/dL      RDW 15.8 %      RDW-SD 47.9 fl      MPV 9.5 fL      Platelets 180 10*3/mm3      Neutrophil % 61.0 %      Lymphocyte % 20.0 %      Monocyte % 15.3 %      Eosinophil % 2.0 %      Basophil % 0.4 %      Immature Grans % 1.3 %      Neutrophils, Absolute 4.27 10*3/mm3      Lymphocytes, Absolute 1.40 10*3/mm3      Monocytes, Absolute 1.07 10*3/mm3      Eosinophils, Absolute 0.14 10*3/mm3      Basophils, Absolute 0.03 10*3/mm3      Immature Grans, Absolute 0.09 10*3/mm3      nRBC 0.0 /100 WBC     C-reactive Protein [[id number]]  (Abnormal) Collected: 06/01/22 1916    Specimen: Blood Updated: 06/01/22 2122     C-Reactive Protein 13.57 mg/dL     Sedimentation Rate [[id number]]  (Abnormal) Collected: 06/01/22 1916    Specimen: Blood Updated: 06/01/22 2102     Sed Rate 45 mm/hr     COVID PRE-OP / PRE-PROCEDURE SCREENING ORDER (NO ISOLATION) - Swab, Nasopharynx [[id number]]  (Normal) Collected: 06/01/22 2003    Specimen: Swab from Nasopharynx Updated: 06/01/22 2057    Narrative:      The following orders were created for panel order COVID PRE-OP / PRE-PROCEDURE SCREENING ORDER (NO ISOLATION) - Swab, Nasopharynx.  Procedure                               Abnormality         Status                     ---------                               -----------         ------                     COVID-19,[facility]...[[id number]]  Normal              Final result                 Please view results for these tests on the individual orders.    COVID-19,[facility] CEPHEID/ROBERTO CARLOS NP SWAB IN TRANSPORT MEDIA 8-12 HR TAT - Swab, Nasopharynx [[id number]]  (Normal) Collected: 06/01/22 2003    Specimen: Swab from Nasopharynx Updated: 06/01/22 2057     COVID19 Not Detected    Narrative:      Fact sheet for providers: https://www.fda.gov/media/473491/download     Fact  sheet for patients: https://www.fda.gov/media/662525/download    Blood Culture - Blood, Blood, Central Line [[id number]] Collected: 06/01/22 2005    Specimen: Blood, Central Line Updated: 06/01/22 2011    Blood Culture - Blood, Arm, Right [[id number]] Collected: 06/01/22 2018    Specimen: Blood from Arm, Right Updated: 06/01/22 2021    POC Glucose Once [[id number]]  (Abnormal) Collected: 06/02/22 0603    Specimen: Blood Updated: 06/02/22 0605     Glucose 247 mg/dL      Comment: Meter: [id number] : [id number] Yue [place]       Basic Metabolic Panel [[id number]] Collected: 06/02/22 0638    Specimen: Blood Updated: 06/02/22 0644    CBC Auto Differential [[id number]] Collected: 06/02/22 0638    Specimen: Blood Updated: 06/02/22 0644    Protime-INR [[id number]] Collected: 06/02/22 0638    Specimen: Blood Updated: 06/02/22 0643          Imaging Results (Last 24 Hours)     Procedure Component Value Units Date/Time    CT Lower Extremity Right With Contrast [[id number]] Collected: 06/01/22 2059     Updated: 06/01/22 2114    Narrative:      CT OF THE RIGHT LOWER EXTREMITY WITH CONTRAST     HISTORY: Surgical site swelling     COMPARISON: 04/25/2022     TECHNIQUE: Axial CT imaging was performed through the right lower  extremity. Coronal and sagittal reformatted images were obtained. IV  contrast was administered.     FINDINGS:  The patient is status post Girdlestone procedure, as well as incision  and drainage of the right hip. The patient has a comminuted displaced  fracture of the proximal right femur. There is destruction of the  intertrochanteric region of the right femur. Distal femoral shaft  appears intact. There is also irregularity of the right acetabulum. Some  of the appearance may be related to prior operative procedures, but  overall, appearance is extremely worrisome for osteomyelitis. MRI would  allow for further characterization. There is air identified within the  joint space, although this may be  postoperative. There is extensive  edema, soft tissue stranding, and skin thickening within the lateral  right thigh, worrisome for infection. No discrete rim-enhancing  collection is not identified. Again, MRI would be more sensitive. There  is air identified within the right lateral thigh, likely related to open  wound. There is a suprapatellar effusion. There is diffuse edema noted  throughout the right lower extremity. Prominent inguinal lymph nodes are  likely reactive. There are also prominent external iliac chain nodes as  well. I don't see any extension of the inflammation into the pelvis  itself. Patient's urinary bladder does appear thick-walled, and  correlation with urinalysis and urine cultures is recommended.       Impression:      Comminuted and displaced fracture of the proximal right femur, with  apparent destruction of the right intertrochanteric region. Appearance  is extremely concerning for osteomyelitis and pathologic fracture. There  is also irregularity of the right acetabulum, which may reflect further  infectious involvement. MRI could be considered for further assessment.     Radiation dose reduction techniques were utilized, including automated  exposure control and exposure modulation based on body size.     This report was finalized on 6/1/2022 9:10 PM by Dr. Roula Anthony M.D.                 No orders to display        Assessment/Plan     Active Hospital Problems    Diagnosis  POA   • **Osteomyelitis of hip ([facility]) [M86.9]  Yes   • Iron deficiency anemia [D50.9]  Yes   • Chronic diastolic CHF (congestive heart failure) ([facility]) [I50.32]  Yes   • Type 2 diabetes mellitus with diabetic polyneuropathy, with long-term current use of insulin ([facility]) [E11.42, Z79.4]  Not Applicable   • Hypertension [I10]  Yes       Osteomyelitis of Right Hip  -She has been afebrile  -Elevated CRP and sed rate. WBC and lactate ok  -Prior history of VRE, continue Daptomycin   -Infectious disease  consult  -Orthopedic surgery consult  -Check MRI for osteomyelitis  -PRN analgesia    Hypertension  -She's been sightly hypotensive. Continue home regimen, will add parameters  -Monitor    Chronic Diastolic CHF  -Hold home diuretic for now  -Daily weights  -Strict I&O    Hyponatremia  -Appears chronic, but worse than baseline  -Hold diuretic  -IVF  -Repeat lab work in AM    Type 2 Diabetes Mellitus  -Hold oral diabetic medications  -Initiate correctional factor insulin  -Continue basal insulin, will cut home dose in half  -Monitor blood sugars ACHS  -Hgb A1C 6.20 on 4/21/22    YANIRA  -Hgb at baseline  -Continue home PO ferrous sulfate  -Monitor hgb daily      -I discussed the patients findings and my recommendations with patient.    VTE Prophylaxis - SCDs.  Code Status - Full code.       MORGAN Trent  [facility]  06/02/22  06:55 EDT      Electronically signed by Juan Luz MD at 06/02/22 0918          Emergency Department Notes      Randa Roca RN at 06/01/22 1816        Pt arrives via PV with c/o L leg swelling and redness. Wound vac on L hip from recent hip sx. Foul smell noted. Pt a&ox4, abc's intact, NAD noted.     Patient wearing mask during triage. RN wearing appropriate PPE during triage. Hand hygiene performed.      Electronically signed by Randa Roca RN at 06/01/22 1817     Marcel Cool MD at 06/01/22 1947           EMERGENCY DEPARTMENT ENCOUNTER    Room Number:  16/16  Date of encounter:  6/1/2022  PCP: Gregorio Rosales  Historian: Patient,       HPI:  Chief Complaint: Right leg pain  A complete HPI/ROS/PMH/PSH/SH/FH are unobtainable due to: None    Context: Stephy Duncan is a 67 y.o. female who presents to the ED via private vehicle for evaluation for concerns for worsening right leg pain and swelling and foul-smelling drainage from her wound VAC over the last couple days.  Patient is status post surgery on the right hip back in early May for a  septic hip joint.  Has been on outpatient daptomycin through PICC line.  Denies any fevers, chest pain, shortness breath, nausea, vomiting.  Has not had any significant diarrhea.  Home pain medications do help somewhat.      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/2021   • Type 2 diabetes mellitus with diabetic polyneuropathy, with long-term current use of insulin ([facility])    • Hypertension    • Normocytic anemia    • Postoperative infection 12/17/2021   • Iron deficiency anemia 12/18/2021   • Morbid obesity ([facility]) 12/18/2021   • Presence of stent in coronary artery in patient with coronary artery disease 12/18/2021   • Chronic diastolic CHF (congestive heart failure) ([facility]) 12/18/2021   • Septic arthritis of hip ([facility]) 12/19/2021   • Drainage from wound 01/17/2022   • Candidal intertrigo 01/19/2022   • Postoperative anemia due to acute blood loss 01/19/2022   • S/P Girdlestone procedure 04/25/2022   • Enterococcus faecalis infection 05/31/2022   • Pyogenic arthritis, pelvic region and thigh ([facility]) 05/31/2022     Resolved Ambulatory Problems     Diagnosis Date Noted   • No Resolved Ambulatory Problems     Past Medical History:   Diagnosis Date   • Allergies    • Arthritis    • Cardiac murmur    • Diabetes mellitus ([facility])    • GERD (gastroesophageal reflux disease)    • Heart attack ([facility]) 12/2020   • History of cervical cancer 1981   • History of transfusion    • Hyperlipidemia    • Osteomyelitis hip ([facility])    • PONV (postoperative nausea and vomiting)    • Right hip pain    • VRE infection within last 3 months    • Wound infection          PAST  SURGICAL HISTORY  Past Surgical History:   Procedure Laterality Date   • CARDIAC CATHETERIZATION     • CERVICAL CONIZATION     • COLONOSCOPY     • CORONARY ANGIOPLASTY WITH STENT PLACEMENT     • ENDOSCOPY     • HIP ARTHROPLASTY Right    • HIP HARDWARE REMOVAL Right    • HIP MINI REVISION Right 2022    Procedure: TOTAL HIP ARTHROPLASTY LINER REVISION, pegboard lateral;  Surgeon: Karri Schaeffer II, MD;  Location: [facility] MAIN OR;  Service: Orthopedics;  Laterality: Right;   • HIP SPACER INSERTION WITH ANTIBIOTIC CEMENT      X3   • HYSTERECTOMY     • INCISION AND DRAINAGE HIP Right 2021    Procedure: INCISION AND DRAINAGE TOTAL HIP, LINER EXCHANGE AND WOUND VAC PLACEMENT;  Surgeon: Karri Schaeffer II, MD;  Location: [facility] MAIN OR;  Service: Orthopedics;  Laterality: Right;   • INCISION AND DRAINAGE HIP Right 2022    Procedure: HIP INCISION AND DRAINAGE;  Surgeon: Karri Schaeffer II, MD;  Location: [facility] OR;  Service: Orthopedics;  Laterality: Right;   • KNEE ARTHROPLASTY Left    • LAPAROSCOPIC CHOLECYSTECTOMY     • TONSILLECTOMY     • TOTAL HIP ARTHROPLASTY Right 2022    Procedure: HIP GIRDLESTONE PROCEDURE;  Surgeon: Karri Schaeffer II, MD;  Location: [facility] MAIN OR;  Service: Orthopedics;  Laterality: Right;   • TOTAL HIP ARTHROPLASTY REVISION Right 2021    Procedure: TOTAL HIP REVISION ARTHROPLASTY RIGHT POSTERIOR;  Surgeon: Karri Schaeffer II, MD;  Location: [facility] MAIN OR;  Service: Orthopedics;  Laterality: Right;         FAMILY HISTORY  Family History   Problem Relation Age of Onset   • Malig Hyperthermia Neg Hx          SOCIAL HISTORY  Social History     Socioeconomic History   • Marital status:    Tobacco Use   • Smoking status: Former Smoker     Years: 45.00     Types: Cigarettes     Quit date: 12/10/2015     Years since quittin.4   • Smokeless tobacco: Never Used   Vaping Use   • Vaping Use: Never used   Substance and  Sexual Activity   • Alcohol use: Never   • Drug use: Never   • Sexual activity: Defer         ALLERGIES  Ace inhibitors and Morphine        REVIEW OF SYSTEMS  Review of Systems     All systems reviewed and negative except for those discussed in HPI.       PHYSICAL EXAM    I have reviewed the triage vital signs and nursing notes.    ED Triage Vitals [06/01/22 1818]   Temp Heart Rate Resp BP SpO2   98.9 °F (37.2 °C) 84 20 123/59 92 %      Temp src Heart Rate Source Patient Position BP Location FiO2 (%)   -- -- -- -- --       Physical Exam  General: Appears uncomfortable, nontoxic  HEENT: Mucous membranes moist, atraumatic, EOMI  Neck: Full ROM  Pulm: Symmetric chest rise, nonlabored, lungs CTAB  Cardiovascular: Regular rate and rhythm, intact distal pulses with 1+ pitting edema bilateral lower extremities with increased edema in the right hip area related to the left  GI: Soft, nontender, nondistended, no rebound, no guarding, bowel sounds present  MSK: Full ROM, no deformity  Skin: Warm, dry.  Right lateral hip wound VAC in place with foul-smelling brown aspirate without overlying cellulitis  Neuro: Awake, alert, oriented x 4, GCS 15, moving all extremities, no focal deficits  Psych: Calm, cooperative      N95, protective eye goggles, and gloves used during this encounter. Patient in surgical mask.      LAB RESULTS  Recent Results (from the past 24 hour(s))   Comprehensive Metabolic Panel    Collection Time: 06/01/22  7:16 PM    Specimen: Blood   Result Value Ref Range    Glucose 237 (H) 65 - 99 mg/dL    BUN 15 8 - 23 mg/dL    Creatinine 1.03 (H) 0.57 - 1.00 mg/dL    Sodium 126 (L) 136 - 145 mmol/L    Potassium 4.2 3.5 - 5.2 mmol/L    Chloride 97 (L) 98 - 107 mmol/L    CO2 17.3 (L) 22.0 - 29.0 mmol/L    Calcium 7.6 (L) 8.6 - 10.5 mg/dL    Total Protein 5.2 (L) 6.0 - 8.5 g/dL    Albumin 1.90 (L) 3.50 - 5.20 g/dL    ALT (SGPT) 13 1 - 33 U/L    AST (SGOT) 18 1 - 32 U/L    Alkaline Phosphatase 125 (H) 39 - 117 U/L     Total Bilirubin 0.2 0.0 - 1.2 mg/dL    Globulin 3.3 gm/dL    A/G Ratio 0.6 g/dL    BUN/Creatinine Ratio 14.6 7.0 - 25.0    Anion Gap 11.7 5.0 - 15.0 mmol/L    eGFR 59.0 (L) >60.0 mL/min/1.73   Lactic Acid, Plasma    Collection Time: 06/01/22  7:16 PM    Specimen: Blood   Result Value Ref Range    Lactate 1.8 0.5 - 2.0 mmol/L   CBC Auto Differential    Collection Time: 06/01/22  7:16 PM    Specimen: Blood   Result Value Ref Range    WBC 7.00 3.40 - 10.80 10*3/mm3    RBC 2.99 (L) 3.77 - 5.28 10*6/mm3    Hemoglobin 7.5 (L) 12.0 - 15.9 g/dL    Hematocrit 25.1 (L) 34.0 - 46.6 %    MCV 83.9 79.0 - 97.0 fL    MCH 25.1 (L) 26.6 - 33.0 pg    MCHC 29.9 (L) 31.5 - 35.7 g/dL    RDW 15.8 (H) 12.3 - 15.4 %    RDW-SD 47.9 37.0 - 54.0 fl    MPV 9.5 6.0 - 12.0 fL    Platelets 180 140 - 450 10*3/mm3    Neutrophil % 61.0 42.7 - 76.0 %    Lymphocyte % 20.0 19.6 - 45.3 %    Monocyte % 15.3 (H) 5.0 - 12.0 %    Eosinophil % 2.0 0.3 - 6.2 %    Basophil % 0.4 0.0 - 1.5 %    Immature Grans % 1.3 (H) 0.0 - 0.5 %    Neutrophils, Absolute 4.27 1.70 - 7.00 10*3/mm3    Lymphocytes, Absolute 1.40 0.70 - 3.10 10*3/mm3    Monocytes, Absolute 1.07 (H) 0.10 - 0.90 10*3/mm3    Eosinophils, Absolute 0.14 0.00 - 0.40 10*3/mm3    Basophils, Absolute 0.03 0.00 - 0.20 10*3/mm3    Immature Grans, Absolute 0.09 (H) 0.00 - 0.05 10*3/mm3    nRBC 0.0 0.0 - 0.2 /100 WBC   C-reactive Protein    Collection Time: 06/01/22  7:16 PM    Specimen: Blood   Result Value Ref Range    C-Reactive Protein 13.57 (H) 0.00 - 0.50 mg/dL   Sedimentation Rate    Collection Time: 06/01/22  7:16 PM    Specimen: Blood   Result Value Ref Range    Sed Rate 45 (H) 0 - 30 mm/hr   Duplex Venous Lower Extremity - Right    Collection Time: 06/01/22  7:42 PM   Result Value Ref Range    Target HR (85%) 128 bpm    Max. Pred. HR (100%) 151 bpm    Right Common Femoral Spont Y     Right Common Femoral Phasic Y     Right Common Femoral Augment Y     Right Common Femoral Competent Y     Right Common  Femoral Compress C     Right Saphenofemoral Junction Compress C     Right Profunda Femoral Compress C     Right Proximal Femoral Compress C     Right Mid Femoral Spont Y     Right Mid Femoral Phasic Y     Right Mid Femoral Augment Y     Right Mid Femoral Competent Y     Right Mid Femoral Compress C     Right Distal Femoral Compress C     Right Popliteal Spont Y     Right Popliteal Phasic Y     Right Popliteal Augment Y     Right Popliteal Competent Y     Right Popliteal Compress C     Right Posterior Tibial Compress C     Right Peroneal Compress C     Right Gastronemius Compress C     Right Greater Saph AK Compress C     Right Greater Saph BK Compress C     Right Lesser Saph Compress C     Left Common Femoral Spont Y     Left Common Femoral Phasic Y     Left Common Femoral Augment Y     Left Common Femoral Competent Y     Left Common Femoral Compress C    COVID-19,[facility]/ROBERTO CARLOS NP SWAB IN TRANSPORT MEDIA 8-12 HR TAT - Swab, Nasopharynx    Collection Time: 06/01/22  8:03 PM    Specimen: Nasopharynx; Swab   Result Value Ref Range    COVID19 Not Detected Not Detected - Ref. Range       Ordered the above labs and independently reviewed the results.        RADIOLOGY  CT Lower Extremity Right With Contrast    Result Date: 6/1/2022  CT OF THE RIGHT LOWER EXTREMITY WITH CONTRAST  HISTORY: Surgical site swelling  COMPARISON: 04/25/2022  TECHNIQUE: Axial CT imaging was performed through the right lower extremity. Coronal and sagittal reformatted images were obtained. IV contrast was administered.  FINDINGS: The patient is status post Girdlestone procedure, as well as incision and drainage of the right hip. The patient has a comminuted displaced fracture of the proximal right femur. There is destruction of the intertrochanteric region of the right femur. Distal femoral shaft appears intact. There is also irregularity of the right acetabulum. Some of the appearance may be related to prior operative procedures,  but overall, appearance is extremely worrisome for osteomyelitis. MRI would allow for further characterization. There is air identified within the joint space, although this may be postoperative. There is extensive edema, soft tissue stranding, and skin thickening within the lateral right thigh, worrisome for infection. No discrete rim-enhancing collection is not identified. Again, MRI would be more sensitive. There is air identified within the right lateral thigh, likely related to open wound. There is a suprapatellar effusion. There is diffuse edema noted throughout the right lower extremity. Prominent inguinal lymph nodes are likely reactive. There are also prominent external iliac chain nodes as well. I don't see any extension of the inflammation into the pelvis itself. Patient's urinary bladder does appear thick-walled, and correlation with urinalysis and urine cultures is recommended.      Comminuted and displaced fracture of the proximal right femur, with apparent destruction of the right intertrochanteric region. Appearance is extremely concerning for osteomyelitis and pathologic fracture. There is also irregularity of the right acetabulum, which may reflect further infectious involvement. MRI could be considered for further assessment.  Radiation dose reduction techniques were utilized, including automated exposure control and exposure modulation based on body size.  This report was finalized on 6/1/2022 9:10 PM by Dr. Roula Anthony M.D.        I ordered the above noted radiological studies. Reviewed by me.  See dictation for official radiology interpretation.      PROCEDURES    Procedures      MEDICATIONS GIVEN IN ER    Medications   DAPTOmycin (CUBICIN) 750 mg in sodium chloride 0.9 % 50 mL IVPB (750 mg Intravenous New Bag 6/1/22 2211)   oxyCODONE-acetaminophen (PERCOCET) 5-325 MG per tablet 1 tablet (1 tablet Oral Given 6/1/22 2002)   iopamidol (ISOVUE-300) 61 % injection 100 mL (85 mL Intravenous  Given 6/1/22 2037)   HYDROmorphone (DILAUDID) injection 0.5 mg (0.5 mg Intravenous Given 6/1/22 2140)         PROGRESS, DATA ANALYSIS, CONSULTS, AND MEDICAL DECISION MAKING    All labs have been independently reviewed by me.  All radiology studies have been reviewed by me and discussed with radiologist dictating the report.   EKG's independently viewed and interpreted by me.  Discussion below represents my analysis of pertinent findings related to patient's condition, differential diagnosis, treatment plan and final disposition.    Initial concern for soft tissue abscess, cellulitis, osteomyelitis, joint infection, sepsis, renal failure, electrolyte normalities, anemia, among others.  Plan for labs, CT scan, and continuance of IV antibiotics.  Plan for hospitalization.    ED Course as of 06/01/22 2215 Wed Jun 01, 2022 1943 Discussed with vascular tech, patient is negative for DVT [DC]   1951 WBC: 7.00 [DC]   1951 Hemoglobin(!): 7.5  Stable, chronic [DC]   2021 Glucose(!): 237 [DC]   2021 BUN: 15 [DC]   2021 Creatinine(!): 1.03 [DC]   2021 Sodium(!): 126 [DC]   2021 Potassium: 4.2 [DC]   2021 ALT (SGPT): 13 [DC]   2021 AST (SGOT): 18 [DC]   2021 Alkaline Phosphatase(!): 125 [DC]   2021 Total Bilirubin: 0.2 [DC]   2021 Lactate: 1.8 [DC]   2115 Discussed with Dr. Nichols, orthopedics, discussed patient clinical course and findings today, [DC]   2123 C-Reactive Protein(!): 13.57 [DC]   2123 Sed Rate(!): 45 [DC]   2124 CT Lower Extremity Right With Contrast  reviewed [DC]   2129 Discussed findings today and discussions with orthopedics with the patient and , they understand and agree with course and plan at this time.  Patient still in some discomfort, will give a dose of Dilaudid to help with pain.  All questions and concerns addressed. [DC]   2150 Discussed with MORGAN Skinner, [facility], discussed patient's clinical course and findings today, treatment modalities, orthopedic consult, and need for  "hospitalization. [DC]      ED Course User Index  [DC] Marcel Cool MD       AS OF 22:15 EDT VITALS:    BP - 114/50  HR - 83  TEMP - 98.9 °F (37.2 °C)  02 SATS - 100%        DIAGNOSIS  Final diagnoses:   Osteomyelitis of hip (HCC)   Right hip pain   History of diabetes mellitus   History of hypertension   History of infection with vancomycin resistant Enterococcus (VRE)         DISPOSITION  HOSPITALIZATION    Discussed treatment plan and reason for hospitalization with pt/family and hospitalizing physician.  Pt/family voiced understanding of the plan for hospitalization for further testing/treatment as needed.                  Marcel Cool MD  22      Electronically signed by Marcel Cool MD at 22          Physician Progress Notes (last 48 hours)      Karri Schaeffer II, MD at 22 0828        Patient to be seen this afternoon for hip infection    R \"Kilo\" Laury BAXTER MD  Orthopaedic Surgery  [facility]  [phone] [facility] Office  [phone] [facility] Office      Electronically signed by Karri Schaeffer II, MD at 22 0828          Consult Notes (last 48 hours)      Fernando Hart MD at 22 1249      Consult Orders    1. Inpatient Infectious Diseases Consult [[id number]] ordered by Roopa Schrader APRN at 22 0720               CONSULT NOTE    Infectious Diseases - Fernando Hart. MD  [facility]       Patient Identification:  Name: Stepyh Duncan  Age: 67 y.o.  Sex: female  :  1955  MRN: [id number]             Date of Consultation: 2022      Primary Care Physician: Gregorio Rosales                               Requesting Physician: MORGAN House  Reason for Consultation: Recurrent right hip infection with proximal femur osteomyelitis and vestibular osteomyelitis.    Impression: 67-year-old female with complicated past medical history including morbid obesity, recurrent infection " of the right hip, type 2 diabetes hypertension chronic diastolic congestive heart failure with iron deficiency anemia has been battling with postoperative surgical site infection with infected hardware with last culture positive for VRE.  Patient has had multiple attempt to treated with prolonged course of antibiotic therapy preceded by appropriate surgical intervention which included explant of hardware and antibiotic spacer placement and attempted a stage II implant and eventually required Girdlestone procedure in April 2022.  Patient remained in the hospital for couple weeks and was eventually discharged on 5/8/2022 on IV daptomycin.  Patient recently left the rehabilitation facility to her home blood 3 days before her release to her home she started having decrease in appetite and not feeling very well.  After being at home for 2 days patient started having increasing drainage from the surgical incision along with weakness.  Patient came to the emergency room and is being admitted for further care.  Patient has been restarted on daptomycin and orthopedic infectious disease service is consulted.  Patient currently denies any specific complaints except for being weak.  Imaging studies performed earlier today revealed commuted and displaced fracture of the proximal femur destruction of the right intertrochanteric region consistent with acute on chronic osteomyelitis.  She was also noted to have vestibular changes.  Patient was noted to have elevated markers of sepsis as well as mild pancytopenia which has been known from before and has had follow-up with hematology oncology service.  This presentation and above context is consistent with:  1-acute on chronic osteomyelitis of the right proximal femur in a similar area in a patient who had previous recurrent right hip prosthetic and multiple surgeries with a latest procedure performed on 4/25/2022 and currently likely has mixed infection given the foul smell drainage  and 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  2-pancytopenia  3-morbid obesity  4-type 2 diabetes  5-hyponatremia  6-other diagnosis per primary team    Recommendations/Discussions:  Given the information we have I agree with the care plan which include continuation of daptomycin and adding coverage for the next evelin contributing to foul-smelling drainage.  Given the fact that she had Girdlestone procedure and immobility and lack of use of the right hip joint is acceped further debridement of necrotic infected bone if technically possible and feasible from orthopedic standpoint point would be recommended from infectious disease standpoint besides prolonged antibiotic therapy.  Monitor closely for side effects of antibiotics.  Thank you Dr. Luz/Roopa Schrader for letting me be the part of your patient care please see above impression and recommendations      History of Present Illness:   67-year-old female with complicated past medical history including morbid obesity, recurrent infection of the right hip, type 2 diabetes hypertension chronic diastolic congestive heart failure with iron deficiency anemia has been battling with postoperative surgical site infection with infected hardware with last culture positive for VRE.  Patient has had multiple attempt to treated with prolonged course of antibiotic therapy preceded by appropriate surgical intervention which included explant of hardware and antibiotic spacer placement and attempted a stage II implant and eventually required Girdlestone procedure in April 2022.  Patient remained in the hospital for couple weeks and was eventually discharged on 5/8/2022 on IV daptomycin.  Patient recently left the rehabilitation facility to her home blood 3 days before her release to her home she started  having decrease in appetite and not feeling very well.  After being at home for 2 days patient started having increasing drainage from the surgical incision along with weakness.  Patient came to the emergency room and is being admitted for further care.  Patient has been restarted on daptomycin and orthopedic infectious disease service is consulted.  Patient currently denies any specific complaints except for being weak.  Imaging studies performed earlier today revealed commuted and displaced fracture of the proximal femur destruction of the right intertrochanteric region consistent with acute on chronic osteomyelitis.  She was also noted to have vestibular changes.  Patient was noted to have elevated markers of sepsis as well as mild pancytopenia which has been known from before and has had follow-up with hematology oncology service.      Past Medical History:  Past Medical History:   Diagnosis Date   • Allergies    • Arthritis    • Cardiac murmur    • Diabetes mellitus (HCC)    • GERD (gastroesophageal reflux disease)    • Heart attack (HCC) 12/2020   • History of cervical cancer 1981   • History of transfusion     SEVERAL   • Hyperlipidemia    • Hypertension    • Iron deficiency anemia    • Osteomyelitis hip (HCC)     RIGHT   • PONV (postoperative nausea and vomiting)    • Right hip pain    • VRE infection within last 3 months    • Wound infection     RIGHT HIP.  WOUND VAC IN PLACE     Past Surgical History:  Past Surgical History:   Procedure Laterality Date   • CARDIAC CATHETERIZATION     • CERVICAL CONIZATION     • COLONOSCOPY     • CORONARY ANGIOPLASTY WITH STENT PLACEMENT     • ENDOSCOPY     • HIP ARTHROPLASTY Right    • HIP HARDWARE REMOVAL Right    • HIP MINI REVISION Right 01/18/2022    Procedure: TOTAL HIP ARTHROPLASTY LINER REVISION, pegboard lateral;  Surgeon: Karri Schaeffer II, MD;  Location: [facility];  Service: Orthopedics;  Laterality: Right;   • HIP SPACER INSERTION WITH ANTIBIOTIC  CEMENT      X3   • HYSTERECTOMY     • INCISION AND DRAINAGE HIP Right 12/18/2021    Procedure: INCISION AND DRAINAGE TOTAL HIP, LINER EXCHANGE AND WOUND VAC PLACEMENT;  Surgeon: Karri Scaheffer II, MD;  Location[facility] MA[facility] OR;  Service: Orthopedics;  Laterality: Right;   • INCISION AND DRAINAGE HIP Right 5/2/2022    Procedure: HIP INCISION AND DRAINAGE;  Surgeon: Karri Schaeffer II, MD;  Location[facility];  Service: Orthopedics;  Laterality: Right;   • KNEE ARTHROPLASTY Left    • LAPAROSCOPIC CHOLECYSTECTOMY     • TONSILLECTOMY     • TOTAL HIP ARTHROPLASTY Right 4/25/2022    Procedure: HIP GIRDLESTONE PROCEDURE;  Surgeon: Karri Schaeffer II, MD;  Location: [facility] MA[facility] OR;  Service: Orthopedics;  Laterality: Right;   • TOTAL HIP ARTHROPLASTY REVISION Right 11/16/2021    Procedure: TOTAL HIP REVISION ARTHROPLASTY RIGHT POSTERIOR;  Surgeon: Karri Schaeffer II, MD;  Location: [facility];  Service: Orthopedics;  Laterality: Right;      Home Meds:  Medications Prior to Admission   Medication Sig Dispense Refill Last Dose   • aspirin 81 MG EC tablet Take 1 tablet by mouth Every 12 (Twelve) Hours. (Patient taking differently: Take 81 mg by mouth Daily.) 60 tablet 0    • Chlorhexidine Gluconate Cloth 2 % pads Apply  topically. AS DIRECTED PREOP      • DAPTOmycin 750 mg in sodium chloride 0.9 % 50 mL Infuse 750 mg into a venous catheter Daily for 33 doses. Indications: Bone and/or Joint Infection      • Diclofenac Sodium (VOLTAREN) 1 % gel gel Apply 4 g topically to the appropriate area as directed 4 (Four) Times a Day As Needed.      • Dulaglutide (Trulicity) 3 MG/0.5ML solution pen-injector Inject 0.5 mL under the skin into the appropriate area as directed 1 (One) Time Per Week. SATURDAYS      • esomeprazole (nexIUM) 40 MG capsule Take 40 mg by mouth Every Morning Before Breakfast.      • ferrous sulfate 325 (65 FE) MG tablet Take 325 mg by mouth Every Other Day. Mornings       • folic acid (FOLVITE) 1 MG tablet Take 1,000 mcg by mouth Daily.      • gabapentin (NEURONTIN) 600 MG tablet Take 1 tablet by mouth 2 (Two) Times a Day. 40 tablet 0    • insulin aspart (NovoLOG FlexPen) 100 UNIT/ML solution pen-injector sc pen Inject 2 Units under the skin into the appropriate area as directed 3 (Three) Times a Day With Meals.      • insulin detemir (LEVEMIR) 100 UNIT/ML injection Inject 50 Units under the skin into the appropriate area as directed Every Night.      • isosorbide mononitrate (IMDUR) 30 MG 24 hr tablet Take 30 mg by mouth Every Morning.      • loratadine (CLARITIN) 10 MG tablet Take 10 mg by mouth Every Night.      • metoprolol succinate XL (Toprol XL) 25 MG 24 hr tablet Take 1 tablet by mouth Daily.      • oxyCODONE-acetaminophen (PERCOCET) 5-325 MG per tablet Take 1 tablet by mouth Every 4 (Four) Hours As Needed for Severe Pain . 50 tablet 0    • oxyCODONE-acetaminophen (PERCOCET) 5-325 MG per tablet Take 1 tablet by mouth Every 4 (Four) Hours As Needed for Severe Pain . 50 tablet 0    • vitamin D3 125 MCG (5000 UT) capsule capsule Take 5,000 Units by mouth Daily.        Current Meds:     Current Facility-Administered Medications:   •  acetaminophen (TYLENOL) tablet 650 mg, 650 mg, Oral, Q4H PRN **OR** [DISCONTINUED] acetaminophen (TYLENOL) 160 MG/5ML solution 650 mg, 650 mg, Oral, Q4H PRN **OR** [DISCONTINUED] acetaminophen (TYLENOL) suppository 650 mg, 650 mg, Rectal, Q4H PRN, Berenice Meek APRN  •  cetirizine (zyrTEC) tablet 10 mg, 10 mg, Oral, Daily, Roopa Schrader APRN, 10 mg at 06/02/22 0951  •  DAPTOmycin (CUBICIN) 750 mg in sodium chloride 0.9 % 50 mL IVPB, 10 mg/kg (Adjusted), Intravenous, Q24H, Roopa Schrader APRN  •  dextrose (D50W) (25 g/50 mL) IV injection 25 g, 25 g, Intravenous, Q15 Min PRN, Roopa Schrader APRN  •  dextrose (GLUTOSE) oral gel 15 g, 15 g, Oral, Q15 Min PRN, Roopa Schrader APRN  •  Diclofenac Sodium (VOLTAREN) 1 %  gel 4 g, 4 g, Topical, 4x Daily PRN, Roopa Schrader APRN  •  ferrous sulfate tablet 325 mg, 325 mg, Oral, Every Other Day, Roopa Schrader APRN, 325 mg at 06/02/22 0952  •  gabapentin (NEURONTIN) capsule 600 mg, 600 mg, Oral, Q12H, Roopa Schrader APRN, 600 mg at 06/02/22 0951  •  glucagon (human recombinant) (GLUCAGEN DIAGNOSTIC) injection 1 mg, 1 mg, Intramuscular, Q15 Min PRN, Roopa Schrader APRN  •  insulin glargine (LANTUS, SEMGLEE) injection 25 Units, 25 Units, Subcutaneous, Nightly, Roopa Schrader APRN  •  insulin lispro (ADMELOG) injection 2 Units, 2 Units, Subcutaneous, TID With Meals, Roopa Schrader APRN, 2 Units at 06/02/22 1223  •  [START ON 6/3/2022] isosorbide mononitrate (IMDUR) 24 hr tablet 30 mg, 30 mg, Oral, KYAW, Juan Luz MD  •  metoprolol succinate XL (TOPROL-XL) 24 hr tablet 25 mg, 25 mg, Oral, Daily, Roopa Schrader APRN  •  nitroglycerin (NITROSTAT) SL tablet 0.4 mg, 0.4 mg, Sublingual, Q5 Min PRN, Berenice Meek APRN  •  ondansetron (ZOFRAN) injection 4 mg, 4 mg, Intravenous, Q6H PRN, Berenice Meek APRN  •  oxyCODONE-acetaminophen (PERCOCET) 5-325 MG per tablet 1 tablet, 1 tablet, Oral, Q4H PRN, Roopa Schrader APRN, 1 tablet at 06/02/22 0643  •  pantoprazole (PROTONIX) EC tablet 40 mg, 40 mg, Oral, QAM, Roopa Schrader APRN, 40 mg at 06/02/22 0614  •  sodium chloride 0.9 % flush 10 mL, 10 mL, Intravenous, Q12H, Roopa Schrader APRN, 10 mL at 06/02/22 0952  •  sodium chloride 0.9 % flush 10 mL, 10 mL, Intravenous, PRN, Roopa Schrader, MORGAN  •  sodium chloride 0.9 % flush 20 mL, 20 mL, Intravenous, PRN, Roopa Schrader, MORGAN  Allergies:  Allergies   Allergen Reactions   • Ace Inhibitors Shortness Of Breath and Swelling   • Morphine Shortness Of Breath     Social History:   Social History     Tobacco Use   • Smoking status: Former Smoker     Years: 45.00     Types: Cigarettes     Quit date: 12/10/2015  "    Years since quittin.4   • Smokeless tobacco: Never Used   Substance Use Topics   • Alcohol use: Never      Family History:  Family History   Problem Relation Age of Onset   • Malig Hyperthermia Neg Hx           Review of Systems  See history of present illness and past medical history.   Constitutional: Negative for chills and fever.   HENT: Negative for congestion and sore throat.    Eyes: Negative for photophobia and visual disturbance.   Respiratory: Negative for cough and shortness of breath.    Cardiovascular: Positive for leg swelling. Negative for chest pain and palpitations.   Gastrointestinal: Negative for abdominal pain, nausea and vomiting.   Endocrine: Negative for polydipsia, polyphagia and polyuria.   Genitourinary: Negative for dysuria, flank pain, frequency and hematuria.   Musculoskeletal: Positive for arthralgias, gait problem and myalgias.   Skin: Positive for color change and wound.   Neurological: Positive for numbness (chronic, BLE). Negative for dizziness, light-headedness and headaches.   Remainder of ROS is negative.      Vitals:   BP 92/48   Pulse 88   Temp (!) 88.3 °F (31.3 °C) (Oral)   Resp 17   Ht 162.6 cm (64\")   Wt 103 kg (227 lb 8.2 oz)   SpO2 98%   BMI 39.05 kg/m²   I/O:     Intake/Output Summary (Last 24 hours) at 2022 1249  Last data filed at 2022 0644  Gross per 24 hour   Intake 170 ml   Output --   Net 170 ml     Exam:  Patient is examined using the personal protective equipment as per guidelines from infection control for this particular patient as enacted.  Hand washing was performed before and after patient interaction.  General Appearance:    Alert, cooperative, no distress, appears stated age   Head:    Normocephalic, without obvious abnormality, atraumatic   Eyes:    PERRL, conjunctivae/corneas clear, EOM's intact, both eyes   Ears:    Normal external ear canals, both ears   Nose:   Nares normal, septum midline, mucosa normal, no drainage    or sinus " tenderness   Throat:   Lips, tongue, gums normal; oral mucosa pink and moist   Neck:   Supple, symmetrical, trachea midline, no adenopathy;     thyroid:  no enlargement/tenderness/nodules; no carotid    bruit or JVD   Back:     Symmetric, no curvature, ROM normal, no CVA tenderness   Lungs:     Clear to auscultation bilaterally, respirations unlabored   Chest Wall:    No tenderness or deformity    Heart:    Regular rate and rhythm, S1 and S2 normal, no murmur, rub   or gallop   Abdomen:    Obese soft nontender   Extremities:  Left hip posterior incision currently has wound VAC in place with no surrounding cellulitis.  Left arm PICC line in place   Pulses:   Pulses palpable in all extremities; symmetric all extremities   Skin:  Ecchymotic changes noted   Neurologic:  Alert and oriented x3       Data Review:    I reviewed the patient's new clinical results.  Results from last 7 days   Lab Units 06/02/22  0638 06/01/22  1916   WBC 10*3/mm3 3.23* 7.00   HEMOGLOBIN g/dL 6.0* 7.5*   PLATELETS 10*3/mm3 121* 180     Results from last 7 days   Lab Units 06/02/22  0638 06/01/22  1916   SODIUM mmol/L 130* 126*   POTASSIUM mmol/L 3.9 4.2   CHLORIDE mmol/L 102 97*   CO2 mmol/L 18.4* 17.3*   BUN mg/dL 14 15   CREATININE mg/dL 0.85 1.03*   CALCIUM mg/dL 7.3* 7.6*   GLUCOSE mg/dL 236* 237*     Culture   Date Value Ref Range Status   06/03/2020 NO GROWTH AT 2 DAYS.  Final     Comment:     NO GROWTH AT 2 DAYS.       ]    Assessment:  Active Hospital Problems    Diagnosis  POA   • **Osteomyelitis of hip ([facility]) [M86.9]  Yes   • Hyponatremia [E87.1]  Yes   • Metabolic acidosis [E87.2]  Yes   • Sepsis without acute organ dysfunction ([facility]) [A41.9]  Yes   • Pathological fracture of right femur ([facility]) [M84.451A]  Yes   • Iron deficiency anemia [D50.9]  Yes   • Chronic diastolic CHF (congestive heart failure) ([facility]) [I50.32]  Yes   • Obesity (BMI 30-39.9) [E66.9]  Yes   • Type 2 diabetes mellitus with diabetic polyneuropathy, with long-term  current use of insulin (HCC) [E11.42, Z79.4]  Not Applicable   • Hypertension [I10]  Yes      Resolved Hospital Problems   No resolved problems to display.         Plan:  See above  Fernando Hart MD   6/2/2022  12:49 EDT    Much of this encounter note is an electronic transcription/translation of spoken language to printed text. The electronic translation of spoken language may permit erroneous, or at times, nonsensical words or phrases to be inadvertently transcribed; Although I have reviewed the note for such errors, some may still exist      Electronically signed by Fernando Hart MD at 06/02/22 1306     Emiliano Lunsford MD at 06/02/22 1234            Subjective     REASON FOR CONSULTATION: pancytopenia  Provide an opinion on any further workup or treatment                             REQUESTING PHYSICIAN:  ray    RECORDS OBTAINED:  Records of the patients history including those obtained from the referring provider were reviewed and summarized in detail.      History of Present Illness   This is a pleasant 67-year-old woman with type 2 diabetes, hypertension, chronic diastolic CHF and osteomyelitis of the right hip who has been treated with antibiotics and multiple surgeries.  She presents with complaints of increasing pain, increasing drainage of foul-smelling purulence from the right hip wound VAC.  She has been on daptomycin as an outpatient.    Hematology is requested to see the patient for evaluation and management of pancytopenia.  The patient has chronic anemia and is followed by Dr. Abad Rivera in [facility].  She has recently required fairly frequent blood transfusions particularly perioperatively.  She has also been given IV iron in [place].  Her hemoglobin has typically been in the 7-9 range since November 2021.  The white blood cell count fluctuates but is typically fairly normal and her platelet count is typically also normal.  She presents with a white count of 7.0, hemoglobin 7.5 and  platelets 180 but repeat this morning showed a drop in the white cells to 3.2 with an ANC 1.97, hemoglobin 6.0/MCV 81.5 and platelets 121.  Her iron sat is 16% with a TIBC 112 and ferritin is 616.  2 units of packed red blood cells have been requested.    She complains of fatigue no major short of breath lightheadedness or dizziness.  She complains of low-grade fever.    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  WOUND VAC IN PLACE        Past Surgical History:   Procedure Laterality Date   • CARDIAC CATHETERIZATION     • CERVICAL CONIZATION     • COLONOSCOPY     • CORONARY ANGIOPLASTY WITH STENT PLACEMENT     • ENDOSCOPY     • HIP ARTHROPLASTY Right    • HIP HARDWARE REMOVAL Right    • HIP MINI REVISION Right 01/18/2022    Procedure: TOTAL HIP ARTHROPLASTY LINER REVISION, pegboard lateral;  Surgeon: Karri Schaeffer II, MD;  Location: [facility];  Service: Orthopedics;  Laterality: Right;   • HIP SPACER INSERTION WITH ANTIBIOTIC CEMENT      X3   • HYSTERECTOMY     • INCISION AND DRAINAGE HIP Right 12/18/2021    Procedure: INCISION AND DRAINAGE TOTAL HIP, LINER EXCHANGE AND WOUND VAC PLACEMENT;  Surgeon: Karri Schaeffer II, MD;  Location: [facility] OR;  Service: Orthopedics;  Laterality: Right;   • INCISION AND DRAINAGE HIP Right 5/2/2022    Procedure: HIP INCISION AND DRAINAGE;  Surgeon: Karri Schaeffer II, MD;  Location: [facility] OR;  Service: Orthopedics;  Laterality: Right;   • KNEE ARTHROPLASTY Left    •  LAPAROSCOPIC CHOLECYSTECTOMY     • TONSILLECTOMY     • TOTAL HIP ARTHROPLASTY Right 4/25/2022    Procedure: HIP GIRDLESTONE PROCEDURE;  Surgeon: Karri Schaeffer II, MD;  Location: [facility] MAIN OR;  Service: Orthopedics;  Laterality: Right;   • TOTAL HIP ARTHROPLASTY REVISION Right 11/16/2021    Procedure: TOTAL HIP REVISION ARTHROPLASTY RIGHT POSTERIOR;  Surgeon: Karri Schaeffer II, MD;  Location: [facility] MAIN OR;  Service: Orthopedics;  Laterality: Right;        Current Facility-Administered Medications on File Prior to Encounter   Medication Dose Route Frequency Provider Last Rate Last Admin   • [DISCONTINUED] DAPTOmycin (CUBICIN) 750 mg in sodium chloride 0.9 % 50 mL IVPB  750 mg Intravenous Once Mansfield, Lucy, APRN       • [DISCONTINUED] metoprolol tartrate (LOPRESSOR) tablet 25 mg  25 mg Oral Nightly Karri Schaeffer II, MD         Current Outpatient Medications on File Prior to Encounter   Medication Sig Dispense Refill   • aspirin 81 MG EC tablet Take 1 tablet by mouth Every 12 (Twelve) Hours. (Patient taking differently: Take 81 mg by mouth Daily.) 60 tablet 0   • Chlorhexidine Gluconate Cloth 2 % pads Apply  topically. AS DIRECTED PREOP     • DAPTOmycin 750 mg in sodium chloride 0.9 % 50 mL Infuse 750 mg into a venous catheter Daily for 33 doses. Indications: Bone and/or Joint Infection     • Diclofenac Sodium (VOLTAREN) 1 % gel gel Apply 4 g topically to the appropriate area as directed 4 (Four) Times a Day As Needed.     • Dulaglutide (Trulicity) 3 MG/0.5ML solution pen-injector Inject 0.5 mL under the skin into the appropriate area as directed 1 (One) Time Per Week. SATURDAYS     • esomeprazole (nexIUM) 40 MG capsule Take 40 mg by mouth Every Morning Before Breakfast.     • ferrous sulfate 325 (65 FE) MG tablet Take 325 mg by mouth Every Other Day. Mornings     • folic acid (FOLVITE) 1 MG tablet Take 1,000 mcg by mouth Daily.     • gabapentin (NEURONTIN) 600 MG tablet Take 1  tablet by mouth 2 (Two) Times a Day. 40 tablet 0   • insulin aspart (NovoLOG FlexPen) 100 UNIT/ML solution pen-injector sc pen Inject 2 Units under the skin into the appropriate area as directed 3 (Three) Times a Day With Meals.     • insulin detemir (LEVEMIR) 100 UNIT/ML injection Inject 50 Units under the skin into the appropriate area as directed Every Night.     • isosorbide mononitrate (IMDUR) 30 MG 24 hr tablet Take 30 mg by mouth Every Morning.     • loratadine (CLARITIN) 10 MG tablet Take 10 mg by mouth Every Night.     • metoprolol succinate XL (Toprol XL) 25 MG 24 hr tablet Take 1 tablet by mouth Daily.     • oxyCODONE-acetaminophen (PERCOCET) 5-325 MG per tablet Take 1 tablet by mouth Every 4 (Four) Hours As Needed for Severe Pain . 50 tablet 0   • oxyCODONE-acetaminophen (PERCOCET) 5-325 MG per tablet Take 1 tablet by mouth Every 4 (Four) Hours As Needed for Severe Pain . 50 tablet 0   • vitamin D3 125 MCG (5000 UT) capsule capsule Take 5,000 Units by mouth Daily.     • [DISCONTINUED] mupirocin (BACTROBAN) 2 % nasal ointment into the nostril(s) as directed by provider. AS DIRECTED PREOP          ALLERGIES:    Allergies   Allergen Reactions   • Ace Inhibitors Shortness Of Breath and Swelling   • Morphine Shortness Of Breath        Social History     Socioeconomic History   • Marital status:    Tobacco Use   • Smoking status: Former Smoker     Years: 45.00     Types: Cigarettes     Quit date: 12/10/2015     Years since quittin.4   • Smokeless tobacco: Never Used   Vaping Use   • Vaping Use: Never used   Substance and Sexual Activity   • Alcohol use: Never   • Drug use: Never   • Sexual activity: Defer        Family History   Problem Relation Age of Onset   • Malig Hyperthermia Neg Hx         Review of Systems   Constitutional: Positive for activity change, fatigue and fever. Negative for unexpected weight change.   HENT: Negative.    Respiratory: Negative.    Cardiovascular: Negative.     Gastrointestinal: Negative.    Genitourinary: Negative.    Musculoskeletal: Positive for arthralgias and gait problem.   Neurological: Positive for weakness. Negative for dizziness and light-headedness.   Hematological: Negative.    Psychiatric/Behavioral: Negative.          Objective     Vitals:    06/02/22 0612 06/02/22 0824 06/02/22 0954 06/02/22 1230   BP: 97/45 (!) 87/42 102/47 92/48   BP Location: Right arm Right arm     Patient Position: Lying Lying     Pulse: 86 88 88    Resp: 16   17   Temp: 98.5 °F (36.9 °C)   (!) 88.3 °F (31.3 °C)   TempSrc: Oral   Oral   SpO2: 100% 99%  98%   Weight:       Height:         No flowsheet data found.    Physical Exam    CONSTITUTIONAL: pleasant well-developed obese woman  HEENT: no icterus, no thrush, moist membranes  NECK: no jvd  LYMPH: no cervical or supraclavicular lad  CV: RRR, S1S2, no murmur  RESP: cta bilat, no wheezing, no rales  GI: soft, non-tender, no splenomegaly, +bs  MUSC: no edema, wound VAC on the right hip with foul-smelling drainage  NEURO: alert and oriented x3, mild global weakness  PSYCH: normal mood, anxious affect    RECENT LABS:  Hematology WBC   Date Value Ref Range Status   06/02/2022 3.23 (L) 3.40 - 10.80 10*3/mm3 Final     RBC   Date Value Ref Range Status   06/02/2022 2.38 (L) 3.77 - 5.28 10*6/mm3 Final     Hemoglobin   Date Value Ref Range Status   06/02/2022 6.0 (C) 12.0 - 15.9 g/dL Final     Hematocrit   Date Value Ref Range Status   06/02/2022 19.4 (C) 34.0 - 46.6 % Final     Platelets   Date Value Ref Range Status   06/02/2022 121 (L) 140 - 450 10*3/mm3 Final        Lab Results   Component Value Date    GLUCOSE 236 (H) 06/02/2022    BUN 14 06/02/2022    CREATININE 0.85 06/02/2022    EGFRIFNONA 68 02/16/2022    BCR 16.5 06/02/2022    K 3.9 06/02/2022    CO2 18.4 (L) 06/02/2022    CALCIUM 7.3 (L) 06/02/2022    ALBUMIN 1.90 (L) 06/01/2022    LABIL2 1.2 (L) 10/24/2020    AST 18 06/01/2022    ALT 13 06/01/2022     CT lower  extremity:    IMPRESSION:  Comminuted and displaced fracture of the proximal right femur, with  apparent destruction of the right intertrochanteric region. Appearance  is extremely concerning for osteomyelitis and pathologic fracture. There  is also irregularity of the right acetabulum, which may reflect further  infectious involvement. MRI could be considered for further assessment.         Assessment & Plan     *Acute on chronic anemia  · Her labs are most consistent with anemia of chronic disease secondary to osteomyelitis given the elevated ferritin/dramatically low TIBC.  · Hemoglobin currently 6.0 from a baseline 8-9; 2 units of PRBCs have been ordered    *Mild leukopenia with normal ANC 1.97    *Mild thrombocytopenia    *Hypoproteinemia/hypoalbuminemia    *Hyponatremia    Hematology plan/recommendations:  1. I suspect the patient's chronic/worsening anemia and leukopenia/thrombocytopenia are all secondary to her worsening infection.  The ferritin is dramatically elevated consistent with inflammation in the TIBC very low consistent with chronic disease/inflammation  2.  I agree with transfusion 2 units PRBCs.  3. Check B12 folate ESR LDH  4. Monitor CBC    Thank you for allowing me to dissipate in the care of this pleasant woman.          Electronically signed by Emiliano Lunsford MD at 06/02/22 1246          All medication doses during the admission are shown, including meds that are no longer on order.    Scheduled Meds Sorted by Name  for Stephy Duncan as of 5/31/22 through 6/2/22    1 Day 3 Days 7 Days 10 Days < Today >   Legend:                          Inactive     Active     Other Encounter     Linked                 Medications 05/31/22 06/01/22 06/02/22   cetirizine (zyrTEC) tablet 10 mg  Dose: 10 mg  Freq: Daily Route: PO  Start: 06/02/22 0900      0951          DAPTOmycin (CUBICIN) 750 mg in sodium chloride 0.9 % 50 mL IVPB  Dose: 10 mg/kg  Weight Dosing Info: 74 kg (Adjusted)  Freq: Every  24 Hours Route: IV  Indications of Use: BONE AND/OR JOINT INFECTION  Start: 06/02/22 2100 End: 06/09/22 2059   Admin Instructions:   Caution: Look alike/sound alike drug alert.  Refrigerate. Do not shake.   Order specific questions:   Reason for Therapy MRSA & documented vancomycin failure      2100          DAPTOmycin (CUBICIN) 750 mg in sodium chloride 0.9 % 50 mL IVPB  Dose: 750 mg  Freq: Once Route: IV  Indications of Use: BONE AND/OR JOINT INFECTION  Last Dose: Stopped (06/01/22 2255)  Start: 06/01/22 2117 End: 06/01/22 2255   Admin Instructions:   Caution: Look alike/sound alike drug alert.  Refrigerate. Do not shake.   Order specific questions:   Reason for Therapy Confirmed VRE infection     2211 2255          DAPTOmycin (CUBICIN) 750 mg in sodium chloride 0.9 % 50 mL IVPB  Dose: 750 mg  Freq: Once Route: IV  Indications of Use: BONE AND/OR JOINT INFECTION  Start: 06/01/22 1630 End: 06/01/22 1546   Admin Instructions:   Caution: Look alike/sound alike drug alert.  Refrigerate. Do not shake.   Order specific questions:   Reason for Therapy Empiric VRE coverage in patient with VRE history     1546-D/C'd         ferrous sulfate tablet 325 mg  Dose: 325 mg  Freq: Every Other Day Route: PO  Start: 06/02/22 0900   Admin Instructions:   Swallow whole. Do not crush, split, or chew. Take with food if GI upset occurs.      0952          gabapentin (NEURONTIN) capsule 600 mg  Dose: 600 mg  Freq: Every 12 Hours Scheduled Route: PO  Start: 06/02/22 0900   Admin Instructions:         0951   2100         HYDROmorphone (DILAUDID) injection 0.5 mg  Dose: 0.5 mg  Freq: Once Route: IV  Start: 06/01/22 2130 End: 06/01/22 2140   Admin Instructions:   If given for pain, use the following pain scale:  Mild Pain = Pain Score of 1-3, CPOT 1-2  Moderate Pain = Pain Score of 4-6, CPOT 3-4  Severe Pain = Pain Score of 7-10, CPOT 5-8     2140           insulin glargine (LANTUS, SEMGLEE) injection 25 Units  Dose: 25 Units  Freq:  Nightly Route: SC  Start: 06/02/22 2100   Admin Instructions:         2100          insulin lispro (ADMELOG) injection 2 Units  Dose: 2 Units  Freq: 3 Times Daily With Meals Route: SC  Start: 06/02/22 0800   Admin Instructions:    Caution: Look alike/sound alike drug alert      0951   4513   1800        iopamidol (ISOVUE-300) 61 % injection 100 mL  Dose: 100 mL  Freq: Once in Imaging Route: IV  Start: 06/01/22 2037 End: 06/01/22 2037 2037           isosorbide mononitrate (IMDUR) 24 hr tablet 30 mg  Dose: 30 mg  Freq: Every Morning Route: PO  Start: 06/03/22 0700   Admin Instructions:   Do not crush, or chew.         isosorbide mononitrate (IMDUR) 24 hr tablet 30 mg  Dose: 30 mg  Freq: Every Morning Route: PO  Start: 06/02/22 0700 End: 06/02/22 0829   Admin Instructions:   Do not crush, or chew.      0614   0829-D/C'd       metoprolol succinate XL (TOPROL-XL) 24 hr tablet 25 mg  Dose: 25 mg  Freq: Daily Route: PO  Start: 06/02/22 0900   Admin Instructions:   Hold for SBP less than 100 or HR less than 60  Do not crush or chew.      (3454) [C]          metoprolol tartrate (LOPRESSOR) tablet 25 mg  Dose: 25 mg  Freq: Nightly Route: PO  Start: 01/16/22 2100 End: 06/02/22 0227 0227-D/C'd        oxyCODONE-acetaminophen (PERCOCET) 5-325 MG per tablet 1 tablet  Dose: 1 tablet  Freq: Once Route: PO  Start: 06/01/22 1910 End: 06/01/22 2002   Admin Instructions:   [RICCARDO]    Do not exceed 4 grams of acetaminophen in a 24 hr period. Max dose of 2gm for AST/ALT greater than 120 units/L        If given for pain, use the following pain scale:   Mild Pain = Pain Score of 1-3, CPOT 1-2  Moderate Pain = Pain Score of 4-6, CPOT 3-4  Severe Pain = Pain Score of 7-10, CPOT 5-8     2002           pantoprazole (PROTONIX) EC tablet 40 mg  Dose: 40 mg  Freq: Every Morning Route: PO  Start: 06/02/22 0700   Admin Instructions:   Swallow whole; do not crush, split, or chew.      0614          piperacillin-tazobactam (ZOSYN) 3.375 g in  iso-osmotic dextrose 50 ml (premix)  Dose: 3.375 g  Freq: Every 8 Hours Route: IV  Indications of Use: BONE AND/OR JOINT INFECTION  Start: 06/02/22 2000 End: 07/14/22 1959   Admin Instructions:   Refrigerate      2000          piperacillin-tazobactam (ZOSYN) 3.375 g in iso-osmotic dextrose 50 ml (premix)  Dose: 3.375 g  Freq: Once Route: IV  Start: 06/02/22 1400   Admin Instructions:   Refrigerate      1400          sodium chloride 0.9 % bolus 1,000 mL  Dose: 1,000 mL  Freq: Once Route: IV  Last Dose: 1,000 mL (06/02/22 1026)  Start: 06/02/22 1015 End: 06/02/22 1041      1026          sodium chloride 0.9 % flush 10 mL  Dose: 10 mL  Freq: Every 12 Hours Scheduled Route: IV  Start: 06/02/22 0900      0952   2100         sodium chloride 0.9 % flush 10 mL  Dose: 10 mL  Freq: Every 12 Hours Scheduled Route: IV  Start: 06/02/22 0030 End: 06/02/22 0711      0111   0711-D/C'd       Medications 05/31/22 06/01/22 06/02/22           Continuous Meds Sorted by Name  for Stephy Duncan as of 5/31/22 through 6/2/22  Legend:                          Inactive     Active     Other Encounter     Linked                 Medications 05/31/22 06/01/22 06/02/22           PRN Meds Sorted by Name  for Stephy Duncna as of 5/31/22 through 6/2/22  Legend:                          Inactive     Active     Other Encounter     Linked                 Medications 05/31/22 06/01/22 06/02/22   acetaminophen (TYLENOL) tablet 650 mg  Dose: 650 mg  Freq: Every 4 Hours PRN Route: PO  PRN Reason: Mild Pain   Start: 06/02/22 0026   Admin Instructions:   Do not exceed 4 grams of acetaminophen in a 24 hr period. Max dose of 2gm for AST/ALT greater than 120 units/L    If given for fever, use fever parameter: fever greater than 100.4 °F.    If given for pain, use the following pain scale:   Mild Pain = Pain Score of 1-3, CPOT 1-2  Moderate Pain = Pain Score of 4-6, CPOT 3-4  Severe Pain = Pain Score of 7-10, CPOT 5-8         Or  acetaminophen  (TYLENOL) 160 MG/5ML solution 650 mg  Dose: 650 mg  Freq: Every 4 Hours PRN Route: PO  PRN Reason: Mild Pain   Start: 06/02/22 0026 End: 06/02/22 0828   Admin Instructions:   Do not exceed 4 grams of acetaminophen in a 24 hr period. Max dose of 2gm for AST/ALT greater than 120 units/L    If given for fever, use fever parameter: fever greater than 100.4 °F.    If given for pain, use the following pain scale:   Mild Pain = Pain Score of 1-3, CPOT 1-2  Moderate Pain = Pain Score of 4-6, CPOT 3-4  Severe Pain = Pain Score of 7-10, CPOT 5-8      0828-D/C'd        Or  acetaminophen (TYLENOL) suppository 650 mg  Dose: 650 mg  Freq: Every 4 Hours PRN Route: RE  PRN Reason: Mild Pain   Start: 06/02/22 0026 End: 06/02/22 0828   Admin Instructions:   Do not exceed 4 grams of acetaminophen in a 24 hr period. Max dose of 2gm for AST/ALT greater than 120 units/L    If given for fever, use fever parameter: fever greater than 100.4 °F.    If given for pain, use the following pain scale:   Mild Pain = Pain Score of 1-3, CPOT 1-2  Moderate Pain = Pain Score of 4-6, CPOT 3-4  Severe Pain = Pain Score of 7-10, CPOT 5-8      0828-D/C'd        dextrose (D50W) (25 g/50 mL) IV injection 25 g  Dose: 25 g  Freq: Every 15 Minutes PRN Route: IV  PRN Reason: Low Blood Sugar  PRN Comment: Blood Sugar Less Than 70  Start: 06/02/22 0255   Admin Instructions:   Blood sugar less than 70; patient has IV access - Unresponsive, NPO or Unable To Safely Swallow         dextrose (GLUTOSE) oral gel 15 g  Dose: 15 g  Freq: Every 15 Minutes PRN Route: PO  PRN Reason: Low Blood Sugar  PRN Comment: Blood sugar less than 70  Start: 06/02/22 0255   Admin Instructions:   BS<70, Patient Alert, Is not NPO, Can safely swallow.         Diclofenac Sodium (VOLTAREN) 1 % gel 4 g  Dose: 4 g  Freq: 4 Times Daily PRN Route: TOP  PRN Comment: Pain  Start: 06/02/22 0224   Admin Instructions:   Apply to affected areas Do not cover area with occlusive dressing or apply any  other med to affected area. Do not wash affected area for 1 hr after applying. Use dosing card for correct dose measurement.         glucagon (human recombinant) (GLUCAGEN DIAGNOSTIC) injection 1 mg  Dose: 1 mg  Freq: Every 15 Minutes PRN Route: IM  PRN Reason: Low Blood Sugar  PRN Comment: Blood Glucose Less Than 70  Start: 06/02/22 0255   Admin Instructions:   Blood Glucose Less Than 70 - Patient Without IV Access - Unresponsive, NPO or Unable To Safely Swallow         nitroglycerin (NITROSTAT) SL tablet 0.4 mg  Dose: 0.4 mg  Freq: Every 5 Minutes PRN Route: SL  PRN Reason: Chest Pain  PRN Comment: Only if SBP Greater Than 100  Start: 06/02/22 0025   Admin Instructions:   If Pain Unrelieved After 3 Doses Notify MD         ondansetron (ZOFRAN) injection 4 mg  Dose: 4 mg  Freq: Every 6 Hours PRN Route: IV  PRN Reasons: Nausea,Vomiting  Start: 06/02/22 0026   Admin Instructions:   If BOTH ondansetron (ZOFRAN) and promethazine (PHENERGAN) are ordered use ondansetron first and THEN promethazine IF ondansetron is ineffective.         oxyCODONE-acetaminophen (PERCOCET) 5-325 MG per tablet 1 tablet  Dose: 1 tablet  Freq: Every 4 Hours PRN Route: PO  PRN Reason: Severe Pain   Start: 06/02/22 0226   Admin Instructions:   [RICCARDO]    Do not exceed 4 grams of acetaminophen in a 24 hr period. Max dose of 2gm for AST/ALT greater than 120 units/L        If given for pain, use the following pain scale:   Mild Pain = Pain Score of 1-3, CPOT 1-2  Moderate Pain = Pain Score of 4-6, CPOT 3-4  Severe Pain = Pain Score of 7-10, CPOT 5-8      0643   1319         sodium chloride 0.9 % flush 10 mL  Dose: 10 mL  Freq: As Needed Route: IV  PRN Reason: Line Care  PRN Comment: After Medication Administration  Start: 06/02/22 0209         sodium chloride 0.9 % flush 10 mL  Dose: 10 mL  Freq: As Needed Route: IV  PRN Reason: Line Care  Start: 06/02/22 0023 End: 06/02/22 0711      0711-D/C'd        sodium chloride 0.9 % flush 20 mL  Dose: 20  mL  Freq: As Needed Route: IV  PRN Reason: Line Care  PRN Comment: After Blood Draws or Blood Product Administration  Start: 06/02/22 0209         Medications 05/31/22 06/01/22 06/02/22

## 2022-06-02 NOTE — CASE MANAGEMENT/SOCIAL WORK
Discharge Planning Assessment  [facility]     Patient Name: Stephy Duncan  MRN: [id number]  Today's Date: 6/2/2022    Admit Date: 6/1/2022     Discharge Needs Assessment     Row Name 06/02/22 1553       Living Environment    People in Home spouse    Name(s) of People in Home bladimir Chamberlain    Current Living Arrangements apartment    Potentially Unsafe Housing Conditions --  none    Primary Care Provided by self    Provides Primary Care For no one    Family Caregiver if Needed spouse    Family Caregiver Names Bulmaro, bladimir    Quality of Family Relationships unable to assess    Able to Return to Prior Arrangements yes       Resource/Environmental Concerns    Resource/Environmental Concerns none    Transportation Concerns no car       Transition Planning    Patient/Family Anticipates Transition to inpatient rehabilitation facility    Patient/Family Anticipated Services at Transition skilled nursing;home health care    Transportation Anticipated family or friend will provide       Discharge Needs Assessment    Current Outpatient/Agency/Support Group --  none    Equipment Currently Used at Home walker, rolling;wheelchair;commode;shower chair;glucometer    Concerns to be Addressed discharge planning    Anticipated Changes Related to Illness none    Equipment Needed After Discharge none    Outpatient/Agency/Support Group Needs skilled nursing facility;homecare agency    Discharge Facility/Level of Care Needs nursing facility, skilled;home with home health    Provided Post Acute Provider List? N/A    N/A Provider List Comment Pt indicated [facility] for STR and [facility] for HH    Provided Post Acute Provider Quality & Resource List? N/A    N/A Quality & Resource List Comment Pt indicated [facility] for STR and [facility] for HH    Current Discharge Risk chronically ill               Discharge Plan     Row Name 06/02/22 155       Plan    Plan Discharge to STR vs Home with HH    Patient/Family in Agreement with  Plan yes    Plan Comments I spoke with the patient at bedside with her permission. I introduced myself and explained my role as .  I verified the information on the facesheet and her PCP as Gregorio Rosales.  The patient uses [facility] [address] and can  and pay for her medications. We discussed Meds to Bed enrollment and she is agreeable to same. The patient lives in a first floor apartment.  She lives with her , Bulmaro.  There are no steps to enter and she is able to enter and maneuver around her home with no issues. The patient is independent with her ADL’s.  Her  will pick her up at discharge. The patient has a wheel chair, elevated commode, shower chair and glucometer at home and denied needing any DME at discharge.  I offered the patient information regarding home health and other community resources and she advised that she would like to go to [facility] at discharge.  I made a referral in EPIC for same and called the facility and left a message for Tha on his confidential voicemail with admissions advising him of the referral.  She further advised that her preference would be home with home health and [facility] is her preference for home health.  I also made an [facility] referral for same.  The patient will discharge home with home health vs short term rehab and she is agreeable to that depending on her hospital course.  She did advise that she just and her family to assist as needed and he is agreeable to that plan.  She denied having any other questions or concerns.  CM will continue to follow for further needs.              Continued Care and Services - Admitted Since 6/1/2022     Destination     Service Provider Request Status Selected Services Address Phone Fax Patient Preferred    [facility]  Pending - Request Sent N/A [address] [place] [address] [phone] --          Home Medical Care     Service Provider Request Status  Selected Services Address Phone Fax Patient Preferred    [facility]  Pending - Request Sent N/A [address], [address], [facility] [address] [phone] --            Selected Continued Care - Prior Encounters Includes selections from prior encounters from 3/3/2022 to 6/2/2022    Discharged on 5/8/2022 Admission date: 4/25/2022 - Discharge disposition: Skilled Nursing Facility (DC - External)    Destination     Service Provider Selected Services Address Phone Fax Patient Preferred    [facility]  Skilled Nursing [address] [address] [phone] --       Internal Comment last updated by Roopa Escobar CSW 4/28/2022 1349    Left voicemail for admissions                                 Expected Discharge Date and Time     Expected Discharge Date Expected Discharge Time    Jun 6, 2022          Demographic Summary     Row Name 06/02/22 1552       General Information    Admission Type inpatient    Arrived From home    Referral Source admission list    Reason for Consult discharge planning    Preferred Language English       Contact Information    Permission Granted to Share Info With                Functional Status    No documentation.                Psychosocial    No documentation.                Abuse/Neglect    No documentation.                Legal    No documentation.                Substance Abuse    No documentation.                Patient Forms    No documentation.                   Balbina Holman RN

## 2022-06-03 ENCOUNTER — APPOINTMENT (OUTPATIENT)
Dept: INFUSION THERAPY | Facility: HOSPITAL | Age: 67
End: 2022-06-03

## 2022-06-03 LAB
ANION GAP SERPL CALCULATED.3IONS-SCNC: 7 MMOL/L (ref 5–15)
BH BB BLOOD EXPIRATION DATE: NORMAL
BH BB BLOOD EXPIRATION DATE: NORMAL
BH BB BLOOD TYPE BARCODE: 5100
BH BB BLOOD TYPE BARCODE: 5100
BH BB DISPENSE STATUS: NORMAL
BH BB DISPENSE STATUS: NORMAL
BH BB PRODUCT CODE: NORMAL
BH BB PRODUCT CODE: NORMAL
BH BB UNIT NUMBER: NORMAL
BH BB UNIT NUMBER: NORMAL
BUN SERPL-MCNC: 10 MG/DL (ref 8–23)
BUN/CREAT SERPL: 14.1 (ref 7–25)
CALCIUM SPEC-SCNC: 7.6 MG/DL (ref 8.6–10.5)
CHLORIDE SERPL-SCNC: 104 MMOL/L (ref 98–107)
CK SERPL-CCNC: 11 U/L (ref 20–180)
CO2 SERPL-SCNC: 19 MMOL/L (ref 22–29)
CREAT SERPL-MCNC: 0.71 MG/DL (ref 0.57–1)
CROSSMATCH INTERPRETATION: NORMAL
CROSSMATCH INTERPRETATION: NORMAL
CRP SERPL-MCNC: 6.98 MG/DL (ref 0–0.5)
DEPRECATED RDW RBC AUTO: 50 FL (ref 37–54)
EGFRCR SERPLBLD CKD-EPI 2021: 92.2 ML/MIN/1.73
ERYTHROCYTE [DISTWIDTH] IN BLOOD BY AUTOMATED COUNT: 15.9 % (ref 12.3–15.4)
ERYTHROCYTE [SEDIMENTATION RATE] IN BLOOD: 34 MM/HR (ref 0–30)
FOLATE SERPL-MCNC: 18.4 NG/ML (ref 4.78–24.2)
GLUCOSE BLDC GLUCOMTR-MCNC: 145 MG/DL (ref 70–130)
GLUCOSE BLDC GLUCOMTR-MCNC: 186 MG/DL (ref 70–130)
GLUCOSE BLDC GLUCOMTR-MCNC: 203 MG/DL (ref 70–130)
GLUCOSE BLDC GLUCOMTR-MCNC: 215 MG/DL (ref 70–130)
GLUCOSE SERPL-MCNC: 139 MG/DL (ref 65–99)
HCT VFR BLD AUTO: 27.3 % (ref 34–46.6)
HGB BLD-MCNC: 8.2 G/DL (ref 12–15.9)
MCH RBC QN AUTO: 25.9 PG (ref 26.6–33)
MCHC RBC AUTO-ENTMCNC: 30 G/DL (ref 31.5–35.7)
MCV RBC AUTO: 86.1 FL (ref 79–97)
PLATELET # BLD AUTO: 136 10*3/MM3 (ref 140–450)
PMV BLD AUTO: 9.6 FL (ref 6–12)
POTASSIUM SERPL-SCNC: 4.1 MMOL/L (ref 3.5–5.2)
RBC # BLD AUTO: 3.17 10*6/MM3 (ref 3.77–5.28)
SODIUM SERPL-SCNC: 130 MMOL/L (ref 136–145)
UNIT  ABO: NORMAL
UNIT  ABO: NORMAL
UNIT  RH: NORMAL
UNIT  RH: NORMAL
VIT B12 BLD-MCNC: 632 PG/ML (ref 211–946)
WBC NRBC COR # BLD: 4.39 10*3/MM3 (ref 3.4–10.8)

## 2022-06-03 PROCEDURE — 80048 BASIC METABOLIC PNL TOTAL CA: CPT | Performed by: HOSPITALIST

## 2022-06-03 PROCEDURE — 25010000002 PIPERACILLIN SOD-TAZOBACTAM PER 1 G: Performed by: INTERNAL MEDICINE

## 2022-06-03 PROCEDURE — 86140 C-REACTIVE PROTEIN: CPT | Performed by: ORTHOPAEDIC SURGERY

## 2022-06-03 PROCEDURE — 99232 SBSQ HOSP IP/OBS MODERATE 35: CPT | Performed by: INTERNAL MEDICINE

## 2022-06-03 PROCEDURE — 82962 GLUCOSE BLOOD TEST: CPT

## 2022-06-03 PROCEDURE — 82746 ASSAY OF FOLIC ACID SERUM: CPT | Performed by: INTERNAL MEDICINE

## 2022-06-03 PROCEDURE — 85027 COMPLETE CBC AUTOMATED: CPT | Performed by: HOSPITALIST

## 2022-06-03 PROCEDURE — 63710000001 INSULIN LISPRO (HUMAN) PER 5 UNITS: Performed by: NURSE PRACTITIONER

## 2022-06-03 PROCEDURE — 63710000001 INSULIN LISPRO (HUMAN) PER 5 UNITS: Performed by: STUDENT IN AN ORGANIZED HEALTH CARE EDUCATION/TRAINING PROGRAM

## 2022-06-03 PROCEDURE — 25010000002 DAPTOMYCIN PER 1 MG: Performed by: NURSE PRACTITIONER

## 2022-06-03 PROCEDURE — 82550 ASSAY OF CK (CPK): CPT | Performed by: STUDENT IN AN ORGANIZED HEALTH CARE EDUCATION/TRAINING PROGRAM

## 2022-06-03 PROCEDURE — 85652 RBC SED RATE AUTOMATED: CPT | Performed by: ORTHOPAEDIC SURGERY

## 2022-06-03 PROCEDURE — 82607 VITAMIN B-12: CPT | Performed by: INTERNAL MEDICINE

## 2022-06-03 RX ORDER — INSULIN LISPRO 100 [IU]/ML
3 INJECTION, SOLUTION INTRAVENOUS; SUBCUTANEOUS
Status: DISCONTINUED | OUTPATIENT
Start: 2022-06-03 | End: 2022-06-10 | Stop reason: HOSPADM

## 2022-06-03 RX ORDER — L.ACID,PARA/B.BIFIDUM/S.THERM 8B CELL
1 CAPSULE ORAL DAILY
Status: DISCONTINUED | OUTPATIENT
Start: 2022-06-03 | End: 2022-06-10 | Stop reason: HOSPADM

## 2022-06-03 RX ADMIN — PANTOPRAZOLE SODIUM 40 MG: 40 TABLET, DELAYED RELEASE ORAL at 08:48

## 2022-06-03 RX ADMIN — OXYCODONE AND ACETAMINOPHEN 1 TABLET: 5; 325 TABLET ORAL at 18:18

## 2022-06-03 RX ADMIN — GABAPENTIN 600 MG: 300 CAPSULE ORAL at 08:47

## 2022-06-03 RX ADMIN — OXYCODONE AND ACETAMINOPHEN 1 TABLET: 5; 325 TABLET ORAL at 14:04

## 2022-06-03 RX ADMIN — DAPTOMYCIN 750 MG: 500 INJECTION, POWDER, LYOPHILIZED, FOR SOLUTION INTRAVENOUS at 23:20

## 2022-06-03 RX ADMIN — OXYCODONE AND ACETAMINOPHEN 1 TABLET: 5; 325 TABLET ORAL at 00:53

## 2022-06-03 RX ADMIN — MICONAZOLE NITRATE: 2 POWDER TOPICAL at 21:00

## 2022-06-03 RX ADMIN — INSULIN LISPRO 2 UNITS: 100 INJECTION, SOLUTION INTRAVENOUS; SUBCUTANEOUS at 08:48

## 2022-06-03 RX ADMIN — MICONAZOLE NITRATE: 2 POWDER TOPICAL at 14:04

## 2022-06-03 RX ADMIN — INSULIN GLARGINE-YFGN 25 UNITS: 100 INJECTION, SOLUTION SUBCUTANEOUS at 20:56

## 2022-06-03 RX ADMIN — OXYCODONE AND ACETAMINOPHEN 1 TABLET: 5; 325 TABLET ORAL at 23:59

## 2022-06-03 RX ADMIN — Medication 1 CAPSULE: at 12:35

## 2022-06-03 RX ADMIN — INSULIN LISPRO 3 UNITS: 100 INJECTION, SOLUTION INTRAVENOUS; SUBCUTANEOUS at 17:21

## 2022-06-03 RX ADMIN — Medication 10 ML: at 20:57

## 2022-06-03 RX ADMIN — CETIRIZINE HYDROCHLORIDE 10 MG: 10 TABLET ORAL at 08:48

## 2022-06-03 RX ADMIN — Medication 10 ML: at 08:48

## 2022-06-03 RX ADMIN — METOPROLOL SUCCINATE 25 MG: 25 TABLET, EXTENDED RELEASE ORAL at 08:48

## 2022-06-03 RX ADMIN — DAPTOMYCIN 750 MG: 500 INJECTION, POWDER, LYOPHILIZED, FOR SOLUTION INTRAVENOUS at 00:13

## 2022-06-03 RX ADMIN — TAZOBACTAM SODIUM AND PIPERACILLIN SODIUM 3.38 G: 375; 3 INJECTION, SOLUTION INTRAVENOUS at 16:25

## 2022-06-03 RX ADMIN — ISOSORBIDE MONONITRATE 30 MG: 30 TABLET ORAL at 08:48

## 2022-06-03 RX ADMIN — GABAPENTIN 600 MG: 300 CAPSULE ORAL at 20:56

## 2022-06-03 RX ADMIN — TAZOBACTAM SODIUM AND PIPERACILLIN SODIUM 3.38 G: 375; 3 INJECTION, SOLUTION INTRAVENOUS at 08:48

## 2022-06-03 RX ADMIN — INSULIN LISPRO 3 UNITS: 100 INJECTION, SOLUTION INTRAVENOUS; SUBCUTANEOUS at 12:35

## 2022-06-03 RX ADMIN — OXYCODONE AND ACETAMINOPHEN 1 TABLET: 5; 325 TABLET ORAL at 08:48

## 2022-06-03 RX ADMIN — TAZOBACTAM SODIUM AND PIPERACILLIN SODIUM 3.38 G: 375; 3 INJECTION, SOLUTION INTRAVENOUS at 00:33

## 2022-06-03 NOTE — PROGRESS NOTES
Based on the MRI findings I do believe that surgery tomorrow makes the most sense.  We will go ahead and proceed.

## 2022-06-03 NOTE — PROGRESS NOTES
Name: Stephy Duncan ADMIT: 2022   : 1955  PCP: Pierre Rosalesin    MRN: [id number] LOS: 2 days   AGE/SEX: 67 y.o. female  ROOM: [facility]/1     Subjective   Subjective   No acute events overnight.  She received 2 units of PRBCs yesterday for hemoglobin of 6.0 which is increased to 8.2.  Otherwise she remained afebrile overnight, and is currently hemodynamically stable.  She complains of right hip pain and is disconcerted that that area is malodorous.  She is on daptomycin.    Review of Systems   Constitutional: Negative for chills and fever.   Respiratory: Negative for shortness of breath and wheezing.    Cardiovascular: Negative for chest pain and palpitations.   Gastrointestinal: Negative for abdominal pain and blood in stool.   Musculoskeletal:        Right hip pain tenderness   Neurological: Negative for weakness and headaches.        Objective   Objective   Vital Signs  Temp:  [88.3 °F (31.3 °C)-99.2 °F (37.3 °C)] 98.6 °F (37 °C)  Heart Rate:  [81-88] 85  Resp:  [16-18] 18  BP: ()/(44-69) 123/69  SpO2:  [97 %-100 %] 99 %  on   ;   Device (Oxygen Therapy): room air  Body mass index is 39.03 kg/m².  Physical Exam  Constitutional:       Appearance: Normal appearance. She is obese.   HENT:      Head: Normocephalic and atraumatic.   Cardiovascular:      Rate and Rhythm: Normal rate and regular rhythm.   Pulmonary:      Effort: Pulmonary effort is normal. No respiratory distress.   Abdominal:      General: There is no distension.      Palpations: Abdomen is soft.      Tenderness: There is no abdominal tenderness.   Musculoskeletal:      Comments: Right hip wound VAC.  It is malodorous and tender to palpation.   Skin:     General: Skin is warm and dry.   Neurological:      General: No focal deficit present.      Mental Status: She is alert and oriented to person, place, and time.         Results Review     I reviewed the patient's new clinical results.  Results from last 7 days   Lab Units  06/03/22  0453 06/02/22  0638 06/01/22  1916   WBC 10*3/mm3 4.39 3.23* 7.00   HEMOGLOBIN g/dL 8.2* 6.0* 7.5*   PLATELETS 10*3/mm3 136* 121* 180     Results from last 7 days   Lab Units 06/03/22  0453 06/02/22  0638 06/01/22  1916   SODIUM mmol/L 130* 130* 126*   POTASSIUM mmol/L 4.1 3.9 4.2   CHLORIDE mmol/L 104 102 97*   CO2 mmol/L 19.0* 18.4* 17.3*   BUN mg/dL 10 14 15   CREATININE mg/dL 0.71 0.85 1.03*   GLUCOSE mg/dL 139* 236* 237*   Estimated Creatinine Clearance: 87.4 mL/min (by C-G formula based on SCr of 0.71 mg/dL).  Results from last 7 days   Lab Units 06/01/22  1916   ALBUMIN g/dL 1.90*   BILIRUBIN mg/dL 0.2   ALK PHOS U/L 125*   AST (SGOT) U/L 18   ALT (SGPT) U/L 13     Results from last 7 days   Lab Units 06/03/22  0453 06/02/22  0638 06/01/22  1916   CALCIUM mg/dL 7.6* 7.3* 7.6*   ALBUMIN g/dL  --   --  1.90*     Results from last 7 days   Lab Units 06/02/22  0956 06/01/22  1916   LACTATE mmol/L 1.7 1.8     COVID19   Date Value Ref Range Status   06/01/2022 Not Detected Not Detected - Ref. Range Final   05/07/2022 Not Detected Not Detected - Ref. Range Final     Glucose   Date/Time Value Ref Range Status   06/03/2022 0802 145 (H) 70 - 130 mg/dL Final     Comment:     Meter: [id number] : [id number] Evie Stafford NA   06/02/2022 2046 187 (H) 70 - 130 mg/dL Final     Comment:     Meter: [id number] : [id number] Tone Lane RN   06/02/2022 1734 180 (H) 70 - 130 mg/dL Final     Comment:     Meter: [id number] : [id number] Ashu Moon NA   06/02/2022 1207 244 (H) 70 - 130 mg/dL Final     Comment:     Meter: [id number] : [id number] Ashu Moon OCTAVIO   06/02/2022 0603 247 (H) 70 - 130 mg/dL Final     Comment:     Meter: [id number] : [id number] Yue CUNNINGHAM       Duplex Venous Lower Extremity - Right  · Normal right lower extremity venous duplex scan.       Scheduled Medications  cetirizine, 10 mg, Oral, Daily  DAPTOmycin (CUBICIN)  IV, 10 mg/kg (Adjusted), Intravenous, Q24H  ferrous  sulfate, 325 mg, Oral, Every Other Day  gabapentin, 600 mg, Oral, Q12H  insulin glargine, 25 Units, Subcutaneous, Nightly  insulin lispro, 2 Units, Subcutaneous, TID With Meals  isosorbide mononitrate, 30 mg, Oral, QAM  metoprolol succinate XL, 25 mg, Oral, Daily  pantoprazole, 40 mg, Oral, QAM  piperacillin-tazobactam, 3.375 g, Intravenous, Q8H  sodium chloride, 10 mL, Intravenous, Q12H    Infusions   Diet  Diet Regular; Cardiac       Assessment/Plan     Active Hospital Problems    Diagnosis  POA   • **Osteomyelitis of hip (HCC) [M86.9]  Yes   • Hyponatremia [E87.1]  Yes   • Metabolic acidosis [E87.2]  Yes   • Sepsis without acute organ dysfunction ([facility]) [A41.9]  Yes   • Pathological fracture of right femur ([facility]) [M84.451A]  Yes   • Iron deficiency anemia [D50.9]  Yes   • Chronic diastolic CHF (congestive heart failure) ([facility]) [I50.32]  Yes   • Obesity (BMI 30-39.9) [E66.9]  Yes   • Type 2 diabetes mellitus with diabetic polyneuropathy, with long-term current use of insulin ([facility]) [E11.42, Z79.4]  Not Applicable   • Hypertension [I10]  Yes      Resolved Hospital Problems   No resolved problems to display.       67 y.o. female admitted with Osteomyelitis of hip (HCC).  Right hip osteomyelitis  History of VRE  Has had 7 surgeries in the past on that hip  Elevated CRP and sed rate.  Continue daptomycin and Zosyn  ID and orthopedic surgery following  MRI pending    Anemia of chronic disease  Low  transferrin saturation, low TIBC, high ferritin  Received 2 units of PRBCs with an appropriate response.    Hyponatremia  Sodium on admission was 126.  Now it is at 130 which is close to her baseline.  I do not see a diuretic on her med list.  No need to start any right now.  Appears euvolemic    Chronic diastolic heart failure  Hypertension  Continue Imdur 30, metoprolol XL 25 mg  Blood pressures acceptable    Type 2 diabetes  Lantus 25 units nightly  Lispro 2 units 3 times daily AC -> increased to 3 units 3 times daily  AC  Blood sugars a little bit high    SCDs for DVT prophylaxis.  Full code.  Discussed with patient and nursing staff.  Anticipate discharge pending hospital course      Arian Morrissey MD  [facility]  06/03/22  09:06 EDT

## 2022-06-03 NOTE — PROGRESS NOTES
REASON FOR FOLLOW-UP:  anemia    INTERVAL HISTORY:  Patient states she is feeling significantly better today after transfusion 2 units PRBCs yesterday.  Hemoglobin responded appropriately to transfusion from 6.0-8.2.    HISTORY OF PRESENT ILLNESS:   This is a pleasant 67-year-old woman with type 2 diabetes, hypertension, chronic diastolic CHF and osteomyelitis of the right hip who has been treated with antibiotics and multiple surgeries.  She presents with complaints of increasing pain, increasing drainage of foul-smelling purulence from the right hip wound VAC.  She has been on daptomycin as an outpatient.     Hematology is requested to see the patient for evaluation and management of pancytopenia.  The patient has chronic anemia and is followed by Dr. Abad Rivera in [facility].  She has recently required fairly frequent blood transfusions particularly perioperatively.  She has also been given IV iron in [place].  Her hemoglobin has typically been in the 7-9 range since November 2021.  The white blood cell count fluctuates but is typically fairly normal and her platelet count is typically also normal.  She presents with a white count of 7.0, hemoglobin 7.5 and platelets 180 but repeat this morning showed a drop in the white cells to 3.2 with an ANC 1.97, hemoglobin 6.0/MCV 81.5 and platelets 121.  Her iron sat is 16% with a TIBC 112 and ferritin is 616.  2 units of packed red blood cells have been requested.     She complains of fatigue no major short of breath lightheadedness or dizziness.     Past Medical History, Past Surgical History, Social History, Family History have been reviewed and are without significant changes except as mentioned.    Review of Systems   Constitutional: Positive for activity change and fatigue.   Respiratory: Negative for shortness of breath.    Skin: Positive for wound.   Neurological: Negative for dizziness and light-headedness.          Medications:  The current medication  list was reviewed in the EMR    ALLERGIES:    Allergies   Allergen Reactions   • Ace Inhibitors Shortness Of Breath and Swelling   • Morphine Shortness Of Breath       Objective      Vitals:    06/03/22 1407   BP: 103/54   Pulse: 74   Resp: 18   Temp: 98.2 °F (36.8 °C)   SpO2: 100%          Physical Exam    CONSTITUTIONAL: pleasant well-developed obese woman  CV: RRR, S1S2, no murmur  RESP: cta bilat, no wheezing, no rales  GI: soft, non-tender, no splenomegaly, +bs  MUSC: no edema, wound VAC on the right hip with foul-smelling drainage  NEURO: alert and oriented x3, mild global weakness  PSYCH: normal mood, anxious affect    RECENT LABS:  Hematology Results from last 7 days   Lab Units 06/03/22  0453 06/02/22  0638 06/01/22  1916   WBC 10*3/mm3 4.39 3.23* 7.00   HEMOGLOBIN g/dL 8.2* 6.0* 7.5*   HEMATOCRIT % 27.3* 19.4* 25.1*   PLATELETS 10*3/mm3 136* 121* 180     2  Lab Results   Component Value Date    GLUCOSE 139 (H) 06/03/2022    BUN 10 06/03/2022    CREATININE 0.71 06/03/2022    EGFRIFNONA 68 02/16/2022    BCR 14.1 06/03/2022    CO2 19.0 (L) 06/03/2022    CALCIUM 7.6 (L) 06/03/2022    ALBUMIN 1.90 (L) 06/01/2022    LABIL2 1.2 (L) 10/24/2020    AST 18 06/01/2022    ALT 13 06/01/2022       Lab Results   Component Value Date    IRON 18 (L) 06/02/2022    TIBC 112 (L) 06/02/2022    FERRITIN 616.00 (H) 06/02/2022       Lab Results   Component Value Date    [id number] 632 06/03/2022       Lab Results   Component Value Date    FOLATE 18.40 06/03/2022      CRP 6.98  ESR 34    Assessment & Plan   *Acute on chronic anemia  · Her labs are most consistent with anemia of chronic disease secondary to osteomyelitis given the elevated ferritin/dramatically low TIBC.  ESR/CRP elevated on admission consistent with inflammation  · Hemoglobin on admission 6.0 from a baseline 8-9  · Appropriate response to 2 units PRBCs- hemoglobin 8.2 from 6.0     *Mild leukopenia with normal ANC 1.97-improved     *Mild thrombocytopenia- improved  136     *Hypoproteinemia/hypoalbuminemia     *Hyponatremia     Hematology plan/recommendations:  1. I suspect the patient's chronic/worsening anemia and leukopenia/thrombocytopenia are all secondary to her worsening infection.  The ferritin is dramatically elevated consistent with inflammation in the TIBC very low consistent with chronic disease/inflammation  2.  Monitor CBC with transfusion support as needed                    6/3/2022      CC:

## 2022-06-03 NOTE — PLAN OF CARE
Goal Outcome Evaluation:  Plan of Care Reviewed With: patient           Outcome Evaluation: AO X4, VSS, on RA, patient admitted with osteomyelitis of right hip. Patient has wound vac @ 125 continuous. Stage 2 on bilateral buttocks. C/O pain in right hip, medicated with percocet x1. Scheduled for OR tomorrow, NPO at midnight, consent signed. Will continue to monitor.

## 2022-06-03 NOTE — PROGRESS NOTES
I am awaiting MRI results and to see how she does throughout the day, but I have provisionally add her on for surgery tomorrow for hip irrigation and debridement.  We can always cancel this

## 2022-06-03 NOTE — NURSING NOTE
Wound/ostomy - vac dressing remains intact to R hip, canister was just recently changed and there is approx 25 ml tan colored drainage to the new cannister. Patient is scheduled for OR tomorrow so vac dressing not disrupted. There is no accumulation of fluid under the drape and is functioning well as patient has filled a 500 ml canister of drainage since her admission.

## 2022-06-03 NOTE — PROGRESS NOTES
"  Infectious Diseases Progress Note    Fernando Hart MD     [facility]  Los: 2 days  Patient Identification:  Name: Stephy Duncan  Age: 67 y.o.  Sex: female  :  1955  MRN: [id number]         Primary Care Physician: Gregorio Rosales            Subjective: Feeling about the same.  Denies any fever and chills.  Still feels weak.  Interval History: See consultation note.    Objective:    Scheduled Meds:cetirizine, 10 mg, Oral, Daily  DAPTOmycin (CUBICIN)  IV, 10 mg/kg (Adjusted), Intravenous, Q24H  ferrous sulfate, 325 mg, Oral, Every Other Day  gabapentin, 600 mg, Oral, Q12H  insulin glargine, 25 Units, Subcutaneous, Nightly  insulin lispro, 3 Units, Subcutaneous, TID With Meals  isosorbide mononitrate, 30 mg, Oral, QAM  lactobacillus acidophilus, 1 capsule, Oral, Daily  metoprolol succinate XL, 25 mg, Oral, Daily  miconazole, , Topical, Q12H  pantoprazole, 40 mg, Oral, QAM  piperacillin-tazobactam, 3.375 g, Intravenous, Q8H  sodium chloride, 10 mL, Intravenous, Q12H      Continuous Infusions:     Vital signs in last 24 hours:  Temp:  [88.3 °F (31.3 °C)-99.2 °F (37.3 °C)] 98.6 °F (37 °C)  Heart Rate:  [81-85] 85  Resp:  [16-18] 18  BP: ()/(44-69) 123/69    Intake/Output:    Intake/Output Summary (Last 24 hours) at 6/3/2022 1153  Last data filed at 6/3/2022 0746  Gross per 24 hour   Intake 1050 ml   Output 2700 ml   Net -1650 ml       Exam:  /69 (BP Location: Right arm, Patient Position: Lying)   Pulse 85   Temp 98.6 °F (37 °C) (Oral)   Resp 18   Ht 162.6 cm (64.02\")   Wt 103 kg (227 lb 8.2 oz)   SpO2 99%   BMI 39.03 kg/m²   Patient is examined using the personal protective equipment as per guidelines from infection control for this particular patient as enacted.  Hand washing was performed before and after patient interaction.  General Appearance:  Awake interactive does not appear toxic                          Head:    Normocephalic, without obvious abnormality, " atraumatic                           Eyes:    PERRL, conjunctivae/corneas clear, EOM's intact, both eyes                         Throat:   Lips, tongue, gums normal; oral mucosa pink and moist                           Neck:   Supple, symmetrical, trachea midline, no JVD                         Lungs:    Clear to auscultation bilaterally, respirations unlabored                 Chest Wall:    No tenderness or deformity                          Heart:  S1-S2 regular                  Abdomen:   Soft nontender                 Extremities: Right hip surgical site dressed                            Pulses:   Pulses palpable in all extremities                            Skin: Chronic ecchymotic changes noted                  Neurologic: Grossly nonfocal       Data Review:    I reviewed the patient's new clinical results.  Results from last 7 days   Lab Units 06/03/22  0453 06/02/22 0638 06/01/22 1916   WBC 10*3/mm3 4.39 3.23* 7.00   HEMOGLOBIN g/dL 8.2* 6.0* 7.5*   PLATELETS 10*3/mm3 136* 121* 180     Results from last 7 days   Lab Units 06/03/22 0453 06/02/22 0638 06/01/22 1916   SODIUM mmol/L 130* 130* 126*   POTASSIUM mmol/L 4.1 3.9 4.2   CHLORIDE mmol/L 104 102 97*   CO2 mmol/L 19.0* 18.4* 17.3*   BUN mg/dL 10 14 15   CREATININE mg/dL 0.71 0.85 1.03*   CALCIUM mg/dL 7.6* 7.3* 7.6*   GLUCOSE mg/dL 139* 236* 237*     Microbiology Results (last 10 days)     Procedure Component Value - Date/Time    Blood Culture - Blood, Arm, Right [[id number]]  (Normal) Collected: 06/01/22 2018    Lab Status: Preliminary result Specimen: Blood from Arm, Right Updated: 06/02/22 2033     Blood Culture No growth at 24 hours    Narrative:      Less than seven (7) mL's of blood was collected.  Insufficient quantity may yield false negative results.    Blood Culture - Blood, Blood, Central Line [[id number]]  (Normal) Collected: 06/01/22 2005    Lab Status: Preliminary result Specimen: Blood, Central Line Updated: 06/02/22 2017     Blood  Culture No growth at 24 hours    Narrative:      Less than seven (7) mL's of blood was collected.  Insufficient quantity may yield false negative results.    COVID PRE-OP / PRE-PROCEDURE SCREENING ORDER (NO ISOLATION) - Swab, Nasopharynx [[id number]]  (Normal) Collected: 06/01/22 2003    Lab Status: Final result Specimen: Swab from Nasopharynx Updated: 06/01/22 2057    Narrative:      The following orders were created for panel order COVID PRE-OP / PRE-PROCEDURE SCREENING ORDER (NO ISOLATION) - Swab, Nasopharynx.  Procedure                               Abnormality         Status                     ---------                               -----------         ------                     COVID-19,[facility]...[[id number]]  Normal              Final result                 Please view results for these tests on the individual orders.    COVID-19,[facility] CEPHEID/ROBERTO CARLOS NP SWAB IN TRANSPORT MEDIA 8-12 HR TAT - Swab, Nasopharynx [[id number]]  (Normal) Collected: 06/01/22 2003    Lab Status: Final result Specimen: Swab from Nasopharynx Updated: 06/01/22 2057     COVID19 Not Detected    Narrative:      Fact sheet for providers: https://www.fda.gov/media/870099/download     Fact sheet for patients: https://www.fda.gov/media/400764/download            Assessment:    Osteomyelitis of hip (HCC)    Type 2 diabetes mellitus with diabetic polyneuropathy, with long-term current use of insulin (HCC)    Hypertension    Iron deficiency anemia    Obesity (BMI 30-39.9)    Chronic diastolic CHF (congestive heart failure) (HCC)    Hyponatremia    Metabolic acidosis    Sepsis without acute organ dysfunction (HCC)    Pathological fracture of right femur (HCC)  1-acute on chronic osteomyelitis of the right proximal femur in a similar area in a patient who had previous recurrent right hip prosthetic and multiple surgeries with a latest procedure performed on 4/25/2022 and currently likely has mixed infection given the foul smell  drainage and 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  2-pancytopenia  3-morbid obesity  4-type 2 diabetes  5-hyponatremia  6-other diagnosis per primary team     Recommendations/Discussions:  · Continue with Zosyn and daptomycin  · Follow orthopedic surgery services recommendation.  Agree that there are not many good options available going forward and I think there is a risk of future need for I&D based on her change in clinical status along with need for episodic prolonged antibiotic therapy.  · Obviously she is high risk of complications of antibiotic treatment and need to be monitored for those if she has recurrences symptoms of infection.  Fernando Hart MD  6/3/2022  11:53 EDT    Much of this encounter note is an electronic transcription/translation of spoken language to printed text. The electronic translation of spoken language may permit erroneous, or at times, nonsensical words or phrases to be inadvertently transcribed; Although I have reviewed the note for such errors, some may still exist

## 2022-06-03 NOTE — PLAN OF CARE
Goal Outcome Evaluation:  Plan of Care Reviewed With: patient        Progress: no change  Outcome Evaluation: A&O. DOROTA. SR. Left upper arm PICC in place. Medicated PRN for pain. IV abx's. Wound vac in place. No falls. Bed alarm on. Purewick in place. BM overnight. Will continue to monitor.

## 2022-06-04 ENCOUNTER — ANESTHESIA (OUTPATIENT)
Dept: PERIOP | Facility: HOSPITAL | Age: 67
End: 2022-06-04

## 2022-06-04 ENCOUNTER — APPOINTMENT (OUTPATIENT)
Dept: INFUSION THERAPY | Facility: HOSPITAL | Age: 67
End: 2022-06-04

## 2022-06-04 ENCOUNTER — ANESTHESIA EVENT (OUTPATIENT)
Dept: PERIOP | Facility: HOSPITAL | Age: 67
End: 2022-06-04

## 2022-06-04 LAB
ANION GAP SERPL CALCULATED.3IONS-SCNC: 8 MMOL/L (ref 5–15)
BUN SERPL-MCNC: 7 MG/DL (ref 8–23)
BUN/CREAT SERPL: 9.2 (ref 7–25)
CALCIUM SPEC-SCNC: 8 MG/DL (ref 8.6–10.5)
CHLORIDE SERPL-SCNC: 105 MMOL/L (ref 98–107)
CO2 SERPL-SCNC: 21 MMOL/L (ref 22–29)
CREAT SERPL-MCNC: 0.76 MG/DL (ref 0.57–1)
DEPRECATED RDW RBC AUTO: 48.1 FL (ref 37–54)
EGFRCR SERPLBLD CKD-EPI 2021: 84.9 ML/MIN/1.73
ERYTHROCYTE [DISTWIDTH] IN BLOOD BY AUTOMATED COUNT: 16 % (ref 12.3–15.4)
GLUCOSE BLDC GLUCOMTR-MCNC: 114 MG/DL (ref 70–130)
GLUCOSE BLDC GLUCOMTR-MCNC: 134 MG/DL (ref 70–130)
GLUCOSE BLDC GLUCOMTR-MCNC: 203 MG/DL (ref 70–130)
GLUCOSE BLDC GLUCOMTR-MCNC: 207 MG/DL (ref 70–130)
GLUCOSE BLDC GLUCOMTR-MCNC: 224 MG/DL (ref 70–130)
GLUCOSE SERPL-MCNC: 114 MG/DL (ref 65–99)
HCT VFR BLD AUTO: 27.3 % (ref 34–46.6)
HGB BLD-MCNC: 8.5 G/DL (ref 12–15.9)
INR PPP: 1.2 (ref 0.9–1.1)
MCH RBC QN AUTO: 25.8 PG (ref 26.6–33)
MCHC RBC AUTO-ENTMCNC: 31.1 G/DL (ref 31.5–35.7)
MCV RBC AUTO: 82.7 FL (ref 79–97)
PLATELET # BLD AUTO: 145 10*3/MM3 (ref 140–450)
PMV BLD AUTO: 9.1 FL (ref 6–12)
POTASSIUM SERPL-SCNC: 4.1 MMOL/L (ref 3.5–5.2)
PROTHROMBIN TIME: 15 SECONDS (ref 11.7–14.2)
RBC # BLD AUTO: 3.3 10*6/MM3 (ref 3.77–5.28)
SARS-COV-2 RNA RESP QL NAA+PROBE: NOT DETECTED
SODIUM SERPL-SCNC: 134 MMOL/L (ref 136–145)
WBC NRBC COR # BLD: 3.87 10*3/MM3 (ref 3.4–10.8)

## 2022-06-04 PROCEDURE — U0003 INFECTIOUS AGENT DETECTION BY NUCLEIC ACID (DNA OR RNA); SEVERE ACUTE RESPIRATORY SYNDROME CORONAVIRUS 2 (SARS-COV-2) (CORONAVIRUS DISEASE [COVID-19]), AMPLIFIED PROBE TECHNIQUE, MAKING USE OF HIGH THROUGHPUT TECHNOLOGIES AS DESCRIBED BY CMS-2020-01-R: HCPCS | Performed by: ORTHOPAEDIC SURGERY

## 2022-06-04 PROCEDURE — 25010000002 PHENYLEPHRINE 10 MG/ML SOLUTION: Performed by: NURSE ANESTHETIST, CERTIFIED REGISTERED

## 2022-06-04 PROCEDURE — 25010000002 PIPERACILLIN SOD-TAZOBACTAM PER 1 G: Performed by: INTERNAL MEDICINE

## 2022-06-04 PROCEDURE — 85027 COMPLETE CBC AUTOMATED: CPT | Performed by: STUDENT IN AN ORGANIZED HEALTH CARE EDUCATION/TRAINING PROGRAM

## 2022-06-04 PROCEDURE — 80048 BASIC METABOLIC PNL TOTAL CA: CPT | Performed by: STUDENT IN AN ORGANIZED HEALTH CARE EDUCATION/TRAINING PROGRAM

## 2022-06-04 PROCEDURE — 25010000002 HYDROMORPHONE PER 4 MG: Performed by: NURSE ANESTHETIST, CERTIFIED REGISTERED

## 2022-06-04 PROCEDURE — 99231 SBSQ HOSP IP/OBS SF/LOW 25: CPT | Performed by: INTERNAL MEDICINE

## 2022-06-04 PROCEDURE — 0QB60ZZ EXCISION OF RIGHT UPPER FEMUR, OPEN APPROACH: ICD-10-PCS | Performed by: ORTHOPAEDIC SURGERY

## 2022-06-04 PROCEDURE — 25010000002 FENTANYL CITRATE (PF) 50 MCG/ML SOLUTION: Performed by: NURSE ANESTHETIST, CERTIFIED REGISTERED

## 2022-06-04 PROCEDURE — 25010000002 DAPTOMYCIN PER 1 MG: Performed by: ORTHOPAEDIC SURGERY

## 2022-06-04 PROCEDURE — 25010000002 ONDANSETRON PER 1 MG: Performed by: STUDENT IN AN ORGANIZED HEALTH CARE EDUCATION/TRAINING PROGRAM

## 2022-06-04 PROCEDURE — 63710000001 INSULIN LISPRO (HUMAN) PER 5 UNITS: Performed by: ORTHOPAEDIC SURGERY

## 2022-06-04 PROCEDURE — 25010000002 PIPERACILLIN SOD-TAZOBACTAM PER 1 G: Performed by: ORTHOPAEDIC SURGERY

## 2022-06-04 PROCEDURE — 25010000002 PROPOFOL 10 MG/ML EMULSION: Performed by: NURSE ANESTHETIST, CERTIFIED REGISTERED

## 2022-06-04 PROCEDURE — 25010000002 FENTANYL CITRATE (PF) 50 MCG/ML SOLUTION: Performed by: ANESTHESIOLOGY

## 2022-06-04 PROCEDURE — 25010000002 ONDANSETRON PER 1 MG: Performed by: NURSE ANESTHETIST, CERTIFIED REGISTERED

## 2022-06-04 PROCEDURE — 82962 GLUCOSE BLOOD TEST: CPT

## 2022-06-04 PROCEDURE — 25010000002 NEOSTIGMINE 5 MG/10ML SOLUTION: Performed by: NURSE ANESTHETIST, CERTIFIED REGISTERED

## 2022-06-04 PROCEDURE — 85610 PROTHROMBIN TIME: CPT | Performed by: ORTHOPAEDIC SURGERY

## 2022-06-04 RX ORDER — PROPOFOL 10 MG/ML
VIAL (ML) INTRAVENOUS AS NEEDED
Status: DISCONTINUED | OUTPATIENT
Start: 2022-06-04 | End: 2022-06-04 | Stop reason: SURG

## 2022-06-04 RX ORDER — PHENYLEPHRINE HYDROCHLORIDE 10 MG/ML
INJECTION INTRAVENOUS AS NEEDED
Status: DISCONTINUED | OUTPATIENT
Start: 2022-06-04 | End: 2022-06-04 | Stop reason: SURG

## 2022-06-04 RX ORDER — FLUMAZENIL 0.1 MG/ML
0.2 INJECTION INTRAVENOUS AS NEEDED
Status: DISCONTINUED | OUTPATIENT
Start: 2022-06-04 | End: 2022-06-04 | Stop reason: HOSPADM

## 2022-06-04 RX ORDER — FAMOTIDINE 10 MG/ML
20 INJECTION, SOLUTION INTRAVENOUS ONCE
Status: COMPLETED | OUTPATIENT
Start: 2022-06-04 | End: 2022-06-04

## 2022-06-04 RX ORDER — PROMETHAZINE HYDROCHLORIDE 25 MG/1
25 SUPPOSITORY RECTAL ONCE AS NEEDED
Status: DISCONTINUED | OUTPATIENT
Start: 2022-06-04 | End: 2022-06-04 | Stop reason: HOSPADM

## 2022-06-04 RX ORDER — HYDRALAZINE HYDROCHLORIDE 20 MG/ML
5 INJECTION INTRAMUSCULAR; INTRAVENOUS
Status: DISCONTINUED | OUTPATIENT
Start: 2022-06-04 | End: 2022-06-04 | Stop reason: HOSPADM

## 2022-06-04 RX ORDER — NEOSTIGMINE METHYLSULFATE 0.5 MG/ML
INJECTION, SOLUTION INTRAVENOUS AS NEEDED
Status: DISCONTINUED | OUTPATIENT
Start: 2022-06-04 | End: 2022-06-04 | Stop reason: SURG

## 2022-06-04 RX ORDER — ONDANSETRON 2 MG/ML
INJECTION INTRAMUSCULAR; INTRAVENOUS AS NEEDED
Status: DISCONTINUED | OUTPATIENT
Start: 2022-06-04 | End: 2022-06-04 | Stop reason: SURG

## 2022-06-04 RX ORDER — SODIUM CHLORIDE 0.9 % (FLUSH) 0.9 %
3-10 SYRINGE (ML) INJECTION AS NEEDED
Status: DISCONTINUED | OUTPATIENT
Start: 2022-06-04 | End: 2022-06-04 | Stop reason: HOSPADM

## 2022-06-04 RX ORDER — DIPHENHYDRAMINE HYDROCHLORIDE 50 MG/ML
12.5 INJECTION INTRAMUSCULAR; INTRAVENOUS
Status: DISCONTINUED | OUTPATIENT
Start: 2022-06-04 | End: 2022-06-04 | Stop reason: HOSPADM

## 2022-06-04 RX ORDER — ONDANSETRON 2 MG/ML
4 INJECTION INTRAMUSCULAR; INTRAVENOUS EVERY 4 HOURS PRN
Status: DISCONTINUED | OUTPATIENT
Start: 2022-06-04 | End: 2022-06-10 | Stop reason: HOSPADM

## 2022-06-04 RX ORDER — ROCURONIUM BROMIDE 10 MG/ML
INJECTION, SOLUTION INTRAVENOUS AS NEEDED
Status: DISCONTINUED | OUTPATIENT
Start: 2022-06-04 | End: 2022-06-04 | Stop reason: SURG

## 2022-06-04 RX ORDER — GLYCOPYRROLATE 0.2 MG/ML
INJECTION INTRAMUSCULAR; INTRAVENOUS AS NEEDED
Status: DISCONTINUED | OUTPATIENT
Start: 2022-06-04 | End: 2022-06-04 | Stop reason: SURG

## 2022-06-04 RX ORDER — MAGNESIUM HYDROXIDE 1200 MG/15ML
LIQUID ORAL AS NEEDED
Status: DISCONTINUED | OUTPATIENT
Start: 2022-06-04 | End: 2022-06-04 | Stop reason: HOSPADM

## 2022-06-04 RX ORDER — FENTANYL CITRATE 50 UG/ML
25 INJECTION, SOLUTION INTRAMUSCULAR; INTRAVENOUS
Status: DISCONTINUED | OUTPATIENT
Start: 2022-06-04 | End: 2022-06-04 | Stop reason: HOSPADM

## 2022-06-04 RX ORDER — ACETAMINOPHEN 160 MG
TABLET,DISINTEGRATING ORAL AS NEEDED
Status: DISCONTINUED | OUTPATIENT
Start: 2022-06-04 | End: 2022-06-04 | Stop reason: HOSPADM

## 2022-06-04 RX ORDER — PROMETHAZINE HYDROCHLORIDE 25 MG/1
25 TABLET ORAL ONCE AS NEEDED
Status: DISCONTINUED | OUTPATIENT
Start: 2022-06-04 | End: 2022-06-04 | Stop reason: HOSPADM

## 2022-06-04 RX ORDER — HYDROMORPHONE HCL 110MG/55ML
PATIENT CONTROLLED ANALGESIA SYRINGE INTRAVENOUS AS NEEDED
Status: DISCONTINUED | OUTPATIENT
Start: 2022-06-04 | End: 2022-06-04 | Stop reason: SURG

## 2022-06-04 RX ORDER — MIDAZOLAM HYDROCHLORIDE 1 MG/ML
0.5 INJECTION INTRAMUSCULAR; INTRAVENOUS
Status: DISCONTINUED | OUTPATIENT
Start: 2022-06-04 | End: 2022-06-04 | Stop reason: HOSPADM

## 2022-06-04 RX ORDER — ONDANSETRON 2 MG/ML
4 INJECTION INTRAMUSCULAR; INTRAVENOUS ONCE AS NEEDED
Status: DISCONTINUED | OUTPATIENT
Start: 2022-06-04 | End: 2022-06-04 | Stop reason: HOSPADM

## 2022-06-04 RX ORDER — LIDOCAINE HYDROCHLORIDE 10 MG/ML
0.5 INJECTION, SOLUTION EPIDURAL; INFILTRATION; INTRACAUDAL; PERINEURAL ONCE AS NEEDED
Status: DISCONTINUED | OUTPATIENT
Start: 2022-06-04 | End: 2022-06-04 | Stop reason: HOSPADM

## 2022-06-04 RX ORDER — HYDROMORPHONE HYDROCHLORIDE 1 MG/ML
0.25 INJECTION, SOLUTION INTRAMUSCULAR; INTRAVENOUS; SUBCUTANEOUS
Status: DISCONTINUED | OUTPATIENT
Start: 2022-06-04 | End: 2022-06-04 | Stop reason: HOSPADM

## 2022-06-04 RX ORDER — LIDOCAINE HYDROCHLORIDE 20 MG/ML
INJECTION, SOLUTION INFILTRATION; PERINEURAL AS NEEDED
Status: DISCONTINUED | OUTPATIENT
Start: 2022-06-04 | End: 2022-06-04 | Stop reason: SURG

## 2022-06-04 RX ORDER — NALOXONE HCL 0.4 MG/ML
0.2 VIAL (ML) INJECTION AS NEEDED
Status: DISCONTINUED | OUTPATIENT
Start: 2022-06-04 | End: 2022-06-04 | Stop reason: HOSPADM

## 2022-06-04 RX ORDER — EPHEDRINE SULFATE 50 MG/ML
5 INJECTION, SOLUTION INTRAVENOUS ONCE AS NEEDED
Status: DISCONTINUED | OUTPATIENT
Start: 2022-06-04 | End: 2022-06-04 | Stop reason: HOSPADM

## 2022-06-04 RX ORDER — SODIUM CHLORIDE 0.9 % (FLUSH) 0.9 %
3 SYRINGE (ML) INJECTION EVERY 12 HOURS SCHEDULED
Status: DISCONTINUED | OUTPATIENT
Start: 2022-06-04 | End: 2022-06-04 | Stop reason: HOSPADM

## 2022-06-04 RX ORDER — LABETALOL HYDROCHLORIDE 5 MG/ML
5 INJECTION, SOLUTION INTRAVENOUS
Status: DISCONTINUED | OUTPATIENT
Start: 2022-06-04 | End: 2022-06-04 | Stop reason: HOSPADM

## 2022-06-04 RX ORDER — SODIUM CHLORIDE, SODIUM LACTATE, POTASSIUM CHLORIDE, CALCIUM CHLORIDE 600; 310; 30; 20 MG/100ML; MG/100ML; MG/100ML; MG/100ML
9 INJECTION, SOLUTION INTRAVENOUS CONTINUOUS
Status: DISCONTINUED | OUTPATIENT
Start: 2022-06-04 | End: 2022-06-09

## 2022-06-04 RX ORDER — FENTANYL CITRATE 50 UG/ML
50 INJECTION, SOLUTION INTRAMUSCULAR; INTRAVENOUS
Status: DISCONTINUED | OUTPATIENT
Start: 2022-06-04 | End: 2022-06-04 | Stop reason: HOSPADM

## 2022-06-04 RX ORDER — DIPHENHYDRAMINE HCL 25 MG
25 CAPSULE ORAL
Status: DISCONTINUED | OUTPATIENT
Start: 2022-06-04 | End: 2022-06-04 | Stop reason: HOSPADM

## 2022-06-04 RX ADMIN — DAPTOMYCIN 750 MG: 500 INJECTION, POWDER, LYOPHILIZED, FOR SOLUTION INTRAVENOUS at 21:32

## 2022-06-04 RX ADMIN — TAZOBACTAM SODIUM AND PIPERACILLIN SODIUM 3.38 G: 375; 3 INJECTION, SOLUTION INTRAVENOUS at 00:46

## 2022-06-04 RX ADMIN — SODIUM CHLORIDE, POTASSIUM CHLORIDE, SODIUM LACTATE AND CALCIUM CHLORIDE 9 ML/HR: 600; 310; 30; 20 INJECTION, SOLUTION INTRAVENOUS at 11:37

## 2022-06-04 RX ADMIN — HYDROMORPHONE HYDROCHLORIDE 0.25 MG: 1 INJECTION, SOLUTION INTRAMUSCULAR; INTRAVENOUS; SUBCUTANEOUS at 10:03

## 2022-06-04 RX ADMIN — FAMOTIDINE 20 MG: 10 INJECTION, SOLUTION INTRAVENOUS at 07:27

## 2022-06-04 RX ADMIN — FENTANYL CITRATE 25 MCG: 50 INJECTION INTRAMUSCULAR; INTRAVENOUS at 09:37

## 2022-06-04 RX ADMIN — HYDROMORPHONE HYDROCHLORIDE 0.25 MG: 1 INJECTION, SOLUTION INTRAMUSCULAR; INTRAVENOUS; SUBCUTANEOUS at 09:58

## 2022-06-04 RX ADMIN — TAZOBACTAM SODIUM AND PIPERACILLIN SODIUM 3.38 G: 375; 3 INJECTION, SOLUTION INTRAVENOUS at 07:16

## 2022-06-04 RX ADMIN — GABAPENTIN 600 MG: 300 CAPSULE ORAL at 11:58

## 2022-06-04 RX ADMIN — HYDROMORPHONE HYDROCHLORIDE 0.5 MG: 2 INJECTION, SOLUTION INTRAMUSCULAR; INTRAVENOUS; SUBCUTANEOUS at 08:50

## 2022-06-04 RX ADMIN — MICONAZOLE NITRATE: 2 POWDER TOPICAL at 21:33

## 2022-06-04 RX ADMIN — ONDANSETRON 4 MG: 2 INJECTION INTRAMUSCULAR; INTRAVENOUS at 16:20

## 2022-06-04 RX ADMIN — ONDANSETRON 4 MG: 2 INJECTION INTRAMUSCULAR; INTRAVENOUS at 08:44

## 2022-06-04 RX ADMIN — HYDROMORPHONE HYDROCHLORIDE 0.25 MG: 1 INJECTION, SOLUTION INTRAMUSCULAR; INTRAVENOUS; SUBCUTANEOUS at 09:40

## 2022-06-04 RX ADMIN — INSULIN LISPRO 3 UNITS: 100 INJECTION, SOLUTION INTRAVENOUS; SUBCUTANEOUS at 18:07

## 2022-06-04 RX ADMIN — GABAPENTIN 600 MG: 300 CAPSULE ORAL at 21:32

## 2022-06-04 RX ADMIN — METOPROLOL SUCCINATE 25 MG: 25 TABLET, EXTENDED RELEASE ORAL at 07:16

## 2022-06-04 RX ADMIN — PANTOPRAZOLE SODIUM 40 MG: 40 TABLET, DELAYED RELEASE ORAL at 06:59

## 2022-06-04 RX ADMIN — SODIUM CHLORIDE, POTASSIUM CHLORIDE, SODIUM LACTATE AND CALCIUM CHLORIDE 9 ML/HR: 600; 310; 30; 20 INJECTION, SOLUTION INTRAVENOUS at 07:26

## 2022-06-04 RX ADMIN — NEOSTIGMINE METHYLSULFATE 4 MG: 0.5 INJECTION INTRAVENOUS at 09:09

## 2022-06-04 RX ADMIN — FENTANYL CITRATE 25 MCG: 50 INJECTION INTRAMUSCULAR; INTRAVENOUS at 09:43

## 2022-06-04 RX ADMIN — PHENYLEPHRINE HYDROCHLORIDE 100 MCG: 10 INJECTION, SOLUTION INTRAVENOUS at 08:38

## 2022-06-04 RX ADMIN — PROPOFOL 150 MG: 10 INJECTION, EMULSION INTRAVENOUS at 07:52

## 2022-06-04 RX ADMIN — OXYCODONE AND ACETAMINOPHEN 1 TABLET: 5; 325 TABLET ORAL at 09:58

## 2022-06-04 RX ADMIN — INSULIN GLARGINE-YFGN 25 UNITS: 100 INJECTION, SOLUTION SUBCUTANEOUS at 21:36

## 2022-06-04 RX ADMIN — INSULIN LISPRO 3 UNITS: 100 INJECTION, SOLUTION INTRAVENOUS; SUBCUTANEOUS at 11:58

## 2022-06-04 RX ADMIN — Medication 10 ML: at 21:32

## 2022-06-04 RX ADMIN — PHENYLEPHRINE HYDROCHLORIDE 100 MCG: 10 INJECTION, SOLUTION INTRAVENOUS at 08:44

## 2022-06-04 RX ADMIN — ROCURONIUM BROMIDE 40 MG: 50 INJECTION INTRAVENOUS at 07:52

## 2022-06-04 RX ADMIN — FENTANYL CITRATE 50 MCG: 50 INJECTION INTRAMUSCULAR; INTRAVENOUS at 07:27

## 2022-06-04 RX ADMIN — HYDROMORPHONE HYDROCHLORIDE 0.25 MG: 1 INJECTION, SOLUTION INTRAMUSCULAR; INTRAVENOUS; SUBCUTANEOUS at 09:49

## 2022-06-04 RX ADMIN — TAZOBACTAM SODIUM AND PIPERACILLIN SODIUM 3.38 G: 375; 3 INJECTION, SOLUTION INTRAVENOUS at 16:33

## 2022-06-04 RX ADMIN — PHENYLEPHRINE HYDROCHLORIDE 100 MCG: 10 INJECTION, SOLUTION INTRAVENOUS at 08:51

## 2022-06-04 RX ADMIN — GLYCOPYRROLATE 0.4 MG: 0.2 INJECTION INTRAMUSCULAR; INTRAVENOUS at 09:09

## 2022-06-04 RX ADMIN — PHENYLEPHRINE HYDROCHLORIDE 100 MCG: 10 INJECTION, SOLUTION INTRAVENOUS at 08:58

## 2022-06-04 RX ADMIN — LIDOCAINE HYDROCHLORIDE 100 MG: 20 INJECTION, SOLUTION INFILTRATION; PERINEURAL at 07:52

## 2022-06-04 RX ADMIN — ISOSORBIDE MONONITRATE 30 MG: 30 TABLET ORAL at 06:58

## 2022-06-04 NOTE — ANESTHESIA PROCEDURE NOTES
Airway  Urgency: elective    Date/Time: 6/4/2022 7:56 AM  Airway not difficult    General Information and Staff    Patient location during procedure: OR  Anesthesiologist: Alisson Piña MD  CRNA/CAA: Elizabeth Reardon CRNA    Indications and Patient Condition  Indications for airway management: airway protection    Preoxygenated: yes  MILS not maintained throughout  Mask difficulty assessment: 1 - vent by mask    Final Airway Details  Final airway type: endotracheal airway      Successful airway: ETT  Cuffed: yes   Successful intubation technique: direct laryngoscopy  Endotracheal tube insertion site: oral  Blade: Cedrick  Blade size: 3  ETT size (mm): 7.0  Cormack-Lehane Classification: grade I - full view of glottis  Placement verified by: chest auscultation and capnometry   Cuff volume (mL): 7  Measured from: lips  ETT/EBT  to lips (cm): 21  Number of attempts at approach: 1  Assessment: lips, teeth, and gum same as pre-op and atraumatic intubation    Additional Comments  Pt preoxygenated prior to induction, easy mask airway, atraumatic intubation,+ ETCO2, + bs bilat,  ETT secured and connected to ventilator.

## 2022-06-04 NOTE — PROGRESS NOTES
REASON FOR FOLLOW-UP:  anemia    INTERVAL HISTORY:  Patient status post OR.  Hemoglobin was stable this morning at 8.5.    HISTORY OF PRESENT ILLNESS:   This is a pleasant 67-year-old woman with type 2 diabetes, hypertension, chronic diastolic CHF and osteomyelitis of the right hip who has been treated with antibiotics and multiple surgeries.  She presents with complaints of increasing pain, increasing drainage of foul-smelling purulence from the right hip wound VAC.  She has been on daptomycin as an outpatient.     Hematology is requested to see the patient for evaluation and management of pancytopenia.  The patient has chronic anemia and is followed by Dr. Abad Rivera in [facility].  She has recently required fairly frequent blood transfusions particularly perioperatively.  She has also been given IV iron in [place].  Her hemoglobin has typically been in the 7-9 range since November 2021.  The white blood cell count fluctuates but is typically fairly normal and her platelet count is typically also normal.  She presents with a white count of 7.0, hemoglobin 7.5 and platelets 180 but repeat this morning showed a drop in the white cells to 3.2 with an ANC 1.97, hemoglobin 6.0/MCV 81.5 and platelets 121.  Her iron sat is 16% with a TIBC 112 and ferritin is 616.  2 units of packed red blood cells have been requested.     She complains of fatigue no major short of breath lightheadedness or dizziness.     Past Medical History, Past Surgical History, Social History, Family History have been reviewed and are without significant changes except as mentioned.    Review of Systems   Constitutional: Positive for activity change and fatigue.   Respiratory: Negative for shortness of breath.    Musculoskeletal: Positive for gait problem.   Skin: Positive for wound.   Neurological: Positive for weakness. Negative for dizziness and light-headedness.          Medications:  The current medication list was reviewed in the  EMR    ALLERGIES:    Allergies   Allergen Reactions   • Ace Inhibitors Shortness Of Breath and Swelling   • Morphine Shortness Of Breath       Objective      Vitals:    06/04/22 1005   BP: 97/53   Pulse: 81   Resp: 16   Temp:    SpO2: 98%          Physical Exam    Deferred-pt being cleaned/changed    RECENT LABS:  Hematology Results from last 7 days   Lab Units 06/04/22  0554 06/03/22  0453 06/02/22  0638   WBC 10*3/mm3 3.87 4.39 3.23*   HEMOGLOBIN g/dL 8.5* 8.2* 6.0*   HEMATOCRIT % 27.3* 27.3* 19.4*   PLATELETS 10*3/mm3 145 136* 121*     2  Lab Results   Component Value Date    GLUCOSE 114 (H) 06/04/2022    BUN 7 (L) 06/04/2022    CREATININE 0.76 06/04/2022    EGFRIFNONA 68 02/16/2022    BCR 9.2 06/04/2022    CO2 21.0 (L) 06/04/2022    CALCIUM 8.0 (L) 06/04/2022    ALBUMIN 1.90 (L) 06/01/2022    LABIL2 1.2 (L) 10/24/2020    AST 18 06/01/2022    ALT 13 06/01/2022       Lab Results   Component Value Date    IRON 18 (L) 06/02/2022    TIBC 112 (L) 06/02/2022    FERRITIN 616.00 (H) 06/02/2022       Lab Results   Component Value Date    [id number] 632 06/03/2022       Lab Results   Component Value Date    FOLATE 18.40 06/03/2022      CRP 6.98  ESR 34    Assessment & Plan   *Acute on chronic anemia  · Her labs are most consistent with anemia of chronic disease secondary to osteomyelitis given the elevated ferritin/dramatically low TIBC.  ESR/CRP elevated on admission consistent with inflammation  · Hemoglobin on admission 6.0 from a baseline 8-9  · Appropriate response to 2 units PRBCs- hemoglobin 8.2 from 6.0  · Hgb stable 8.5     *Mild leukopenia -resolved     *Mild thrombocytopenia- improved       *Hypoproteinemia/hypoalbuminemia     *Hyponatremia     Hematology plan/recommendations:  1. I suspect the patient's chronic/worsening anemia and leukopenia/thrombocytopenia are all secondary to her worsening infection.  The ferritin is dramatically elevated consistent with inflammation in the TIBC very low consistent with  chronic disease/inflammation  2.  Monitor CBC with transfusion support as needed  3. Hematology will s/o please call as needed.                    6/4/2022      CC:

## 2022-06-04 NOTE — PLAN OF CARE
Goal Outcome Evaluation:              Outcome Evaluation: Pt a/ox4, able to voice own needs and wants. Wound vac to R hip @ 125 continuous. Open area noted under transparent wound vac dressing. Open areas to bilateral buttocks appear closed/intact. Transfered to specialty bed this shift to aid in prevention of skin breakdown. C/O pain 3/10 to R hip. Pt states this is an acceptable level of pain and declines intervention. Pt has been having intermittent nausea since procedure with one episode of emesis. Zofran admin and was effective. Pt has been tolerating liquids, ice and jello thus far this shift. Declines turning/repositioning despite education. Weaned from post op oxygen and maintaining sats on RA. Safety maintained.

## 2022-06-04 NOTE — NURSING NOTE
Low air loss mattress ordered from [facility] s/w [place], bed frame requested s/w Berenice Simpson RN aware.

## 2022-06-04 NOTE — PROGRESS NOTES
Name: Stephy Duncan ADMIT: 2022   : 1955  PCP: Pierre Rosalesin    MRN: [id number] LOS: 3 days   AGE/SEX: 67 y.o. female  ROOM: [facility] OR/MAIN OR     Subjective   Subjective   No acute events overnight.  She received 2 units of PRBCs yesterday for hemoglobin of 6.0 which is increased to 8.2.  Otherwise she remained afebrile overnight, and is currently hemodynamically stable.  She complains of right hip pain and is disconcerted that that area is malodorous.  She is on daptomycin and Zosyn      MRI of the right hip shows robust soft tissue edema with multiple small foci of gas in the right hip joint space.  Findings are concerning for cellulitis with osteomyelitis.  She is going to the OR today for hip irrigation and debridement.    She is doing well postoperatively, just complains of little bit of pain and some nausea.      Review of Systems   Constitutional: Negative for chills and fever.   Respiratory: Negative for shortness of breath and wheezing.    Cardiovascular: Negative for chest pain and palpitations.   Gastrointestinal: Negative for abdominal pain and blood in stool.   Musculoskeletal:        Right hip pain tenderness   Neurological: Negative for weakness and headaches.        Objective   Objective   Vital Signs  Temp:  [97.9 °F (36.6 °C)-98.6 °F (37 °C)] 98 °F (36.7 °C)  Heart Rate:  [66-85] 66  Resp:  [18] 18  BP: (103-123)/(49-69) 106/49  SpO2:  [97 %-100 %] 97 %  on   ;   Device (Oxygen Therapy): room air  Body mass index is 39.03 kg/m².  Physical Exam  Constitutional:       Appearance: Normal appearance. She is obese.   HENT:      Head: Normocephalic and atraumatic.   Cardiovascular:      Rate and Rhythm: Normal rate and regular rhythm.   Pulmonary:      Effort: Pulmonary effort is normal. No respiratory distress.   Abdominal:      General: There is no distension.      Palpations: Abdomen is soft.      Tenderness: There is no abdominal tenderness.   Musculoskeletal:       Comments: Right hip wound VAC.  It is malodorous and tender to palpation.   Skin:     General: Skin is warm and dry.   Neurological:      General: No focal deficit present.      Mental Status: She is alert and oriented to person, place, and time.         Results Review     I reviewed the patient's new clinical results.  Results from last 7 days   Lab Units 06/04/22  0554 06/03/22  0453 06/02/22 0638 06/01/22 1916   WBC 10*3/mm3 3.87 4.39 3.23* 7.00   HEMOGLOBIN g/dL 8.5* 8.2* 6.0* 7.5*   PLATELETS 10*3/mm3 145 136* 121* 180     Results from last 7 days   Lab Units 06/04/22  0554 06/03/22  0453 06/02/22 0638 06/01/22 1916   SODIUM mmol/L 134* 130* 130* 126*   POTASSIUM mmol/L 4.1 4.1 3.9 4.2   CHLORIDE mmol/L 105 104 102 97*   CO2 mmol/L 21.0* 19.0* 18.4* 17.3*   BUN mg/dL 7* 10 14 15   CREATININE mg/dL 0.76 0.71 0.85 1.03*   GLUCOSE mg/dL 114* 139* 236* 237*   Estimated Creatinine Clearance: 81.6 mL/min (by C-G formula based on SCr of 0.76 mg/dL).  Results from last 7 days   Lab Units 06/01/22 1916   ALBUMIN g/dL 1.90*   BILIRUBIN mg/dL 0.2   ALK PHOS U/L 125*   AST (SGOT) U/L 18   ALT (SGPT) U/L 13     Results from last 7 days   Lab Units 06/04/22  0554 06/03/22  0453 06/02/22  0638 06/01/22 1916   CALCIUM mg/dL 8.0* 7.6* 7.3* 7.6*   ALBUMIN g/dL  --   --   --  1.90*     Results from last 7 days   Lab Units 06/02/22  0956 06/01/22  1916   LACTATE mmol/L 1.7 1.8     COVID19   Date Value Ref Range Status   06/04/2022 Not Detected Not Detected - Ref. Range Final   06/01/2022 Not Detected Not Detected - Ref. Range Final     Glucose   Date/Time Value Ref Range Status   06/04/2022 0631 114 70 - 130 mg/dL Final     Comment:     Meter: [id number] : [id number] Reinaldo CUNNINGHAM   06/03/2022 2045 203 (H) 70 - 130 mg/dL Final     Comment:     Meter: [id number] : [id number] Reinaldo CUNNINGHAM   06/03/2022 1705 215 (H) 70 - 130 mg/dL Final     Comment:     Meter: [id number] : [id number] Evie CUNNINGHAM   06/03/2022  1133 186 (H) 70 - 130 mg/dL Final     Comment:     Meter: [id number] : [id number] Evie Stafford NA   06/03/2022 0802 145 (H) 70 - 130 mg/dL Final     Comment:     Meter: [id number] : [id number] Evie Stafford NA   06/02/2022 2046 187 (H) 70 - 130 mg/dL Final     Comment:     Meter: [id number] : [id number] Wayne Ariana ROSENBERG   06/02/2022 1734 180 (H) 70 - 130 mg/dL Final     Comment:     Meter: [id number] : [id number] Ashu Moon NA       MRI Hip Right Without Contrast  Narrative: RIGHT HIP MRI     HISTORY: Multiple right hip surgeries, most recently arthroplasty  removal 04/25/2022. Evaluate osteomyelitis.     TECHNIQUE: MRI of the right hip was performed using a protocol which  shows both hips in axial plane and most of the pelvis in the coronal  plane using T1 and STIR sequences. A sagittal proton density sequence  was made through the right hip. This is correlated with CT scan  performed yesterday and multiple prior x-rays as recent as 04/25/2022  and as remote as 11/10/2021.     FINDINGS: There is postoperative change in the right hip from hardware  removal. Coronal images show substantial degenerative change in the  lower lumbar spine with dextroscoliosis but no finding suggestive of  osteomyelitis or discitis in the lower lumbar spine. Most of the bones  of the pelvis and those around the left hip and proximal femur  demonstrate normal marrow signal. There is a rind of edematous tissue at  the acetabulum, corresponding to the soft tissue material seen in this  area on the CT. On the coronal STIR sequence, this material measures 7-8  mm thick. Underlying marrow signal is normal; there is no compelling  evidence for osteomyelitis in the pelvis.     There is postoperative change at the proximal femur. The note by Dr. Schaeffer describes challenging removal of the femoral stem and previous  imaging has demonstrated fairly extensive fracturing of the  periprosthetic portion of the proximal right femur.  There is multifocal  signal dropout attributable to both the postoperative change and the  multiple small foci of gas seen within and around the fractured bone of  the proximal femur on the CT. There is extensive soft tissue edema deep  around the proximal femur as well as gas in the joint space and in the  soft tissue along the lateral proximal thigh. The coronal images show  the femoral shaft beyond the level of the tip of the removed femoral  stem. Normal fatty marrow signal is observed in that area on the coronal  images. No undrained fluid collection is identified.     No intrapelvic lesion is identified but there is a small volume of  ascites. There are enlarged lymph nodes in the lower right hemipelvis.     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 No  abnormality in the pelvis is suggestive of osteomyelitis around the  acetabulum. The extensive deformity of the proximal femur is as expected  given the description of the surgical procedure. However, there is  robust soft tissue edema with multiple small foci of gas in the right  hip joint space, around the bone fragments of the proximal right femur,  and in the soft tissue lateral to the femur. Given the history and the  open wound, these findings almost certainly represent cellulitis with  osteomyelitis involving the fragmented portions of the proximal femur.     This report was finalized on 6/3/2022 10:13 AM by Dr. Brendon Carrillo M.D.       Scheduled Medications  [MAR Hold] cetirizine, 10 mg, Oral, Daily  DAPTOmycin (CUBICIN)  IV, 10 mg/kg (Adjusted), Intravenous, Q24H  [MAR Hold] ferrous sulfate, 325 mg, Oral, Every Other Day  gabapentin, 600 mg, Oral, Q12H  [MAR Hold] insulin glargine, 25 Units, Subcutaneous, Nightly  [MAR Hold] insulin lispro, 3 Units, Subcutaneous, TID With Meals  [MAR Hold] isosorbide mononitrate, 30 mg, Oral, QAM  [MAR Hold]  lactobacillus acidophilus, 1 capsule, Oral, Daily  metoprolol succinate XL, 25 mg, Oral, Daily  [MAR Hold] miconazole, , Topical, Q12H  [MAR Hold] pantoprazole, 40 mg, Oral, QAM  piperacillin-tazobactam, 3.375 g, Intravenous, Q8H  [MAR Hold] sodium chloride, 10 mL, Intravenous, Q12H    Infusions   Diet  NPO Diet NPO Type: Sips with Meds       Assessment/Plan     Active Hospital Problems    Diagnosis  POA   • **Osteomyelitis of hip (HCC) [M86.9]  Yes   • Hyponatremia [E87.1]  Yes   • Metabolic acidosis [E87.2]  Yes   • Sepsis without acute organ dysfunction ([facility]) [A41.9]  Yes   • Pathological fracture of right femur ([facility]) [M84.451A]  Yes   • Iron deficiency anemia [D50.9]  Yes   • Chronic diastolic CHF (congestive heart failure) ([facility]) [I50.32]  Yes   • Obesity (BMI 30-39.9) [E66.9]  Yes   • Type 2 diabetes mellitus with diabetic polyneuropathy, with long-term current use of insulin ([facility]) [E11.42, Z79.4]  Not Applicable   • Hypertension [I10]  Yes      Resolved Hospital Problems   No resolved problems to display.       67 y.o. female admitted with Osteomyelitis of hip (HCC).  Right hip osteomyelitis  History of VRE  Has had 7 surgeries in the past on that hip  Elevated CRP and sed rate.  Continue daptomycin and Zosyn  ID and orthopedic surgery following  MRI showed soft tissue edema with multiple small foci of gas in the right hip joint space, the bone fragments of the proximal right femur and the  S/p right hip irrigation debridement on 6/4    Anemia of chronic disease  Low  transferrin saturation, low TIBC, high ferritin  Received 2 units of PRBCs with an appropriate response.    Hyponatremia  Sodium on admission was 126.  Now it is at 130 which is close to her baseline.  I do not see a diuretic on her med list.  No need to start any right now.  Appears euvolemic    Chronic diastolic heart failure  Hypertension  Continue Imdur 30, metoprolol XL 25 mg  Blood pressures acceptable    Type 2 diabetes  Lantus 25 units  nightly  Lispro 2 units 3 times daily AC -> increased to 3 units 3 times daily AC  Blood sugars a little bit high    SCDs for DVT prophylaxis.  Full code.  Discussed with patient and nursing staff.  Anticipate discharge pending hospital course      Arian Morrissey MD  [facility]  06/04/22  07:09 EDT

## 2022-06-04 NOTE — ANESTHESIA POSTPROCEDURE EVALUATION
"Patient: Stephy Duncan    Procedure Summary     Date: 06/04/22 Room / Location: [facility] OR [address] [place] [address] [facility] MAIN OR    Anesthesia Start: 0743 Anesthesia Stop: 0931    Procedure: HIP INCISION AND DRAINAGE WITH WOUND VAC REPLACEMENT (Right Hip) Diagnosis:       Pathological fracture of right femur due to age-related osteoporosis, initial encounter ([facility])      (Pathological fracture of right femur due to age-related osteoporosis, initial encounter ([facility]) [M80.051A])    Surgeons: Karri Schaeffer II, MD Provider: Alisson Piña MD    Anesthesia Type: general ASA Status: 3          Anesthesia Type: general    Vitals  Vitals Value Taken Time   /59 06/04/22 1021   Temp 36.8 °C (98.3 °F) 06/04/22 0927   Pulse 84 06/04/22 1033   Resp 16 06/04/22 1005   SpO2 100 % 06/04/22 1033   Vitals shown include unvalidated device data.        Post Anesthesia Care and Evaluation    Patient location during evaluation: bedside  Patient participation: complete - patient participated  Level of consciousness: awake and alert  Pain management: adequate  Airway patency: patent  Anesthetic complications: No anesthetic complications  PONV Status: none  Cardiovascular status: acceptable  Respiratory status: acceptable  Hydration status: acceptable    Comments: BP 97/53 (BP Location: Right arm, Patient Position: Lying)   Pulse 81   Temp 36.8 °C (98.3 °F) (Oral)   Resp 16   Ht 162.6 cm (64.02\")   Wt 103 kg (227 lb 8.2 oz)   SpO2 98%   BMI 39.03 kg/m²         "

## 2022-06-05 ENCOUNTER — APPOINTMENT (OUTPATIENT)
Dept: INFUSION THERAPY | Facility: HOSPITAL | Age: 67
End: 2022-06-05

## 2022-06-05 LAB
ANION GAP SERPL CALCULATED.3IONS-SCNC: 10.4 MMOL/L (ref 5–15)
BACTERIA UR QL AUTO: ABNORMAL /HPF
BASOPHILS # BLD AUTO: 0.05 10*3/MM3 (ref 0–0.2)
BASOPHILS NFR BLD AUTO: 0.2 % (ref 0–1.5)
BILIRUB UR QL STRIP: NEGATIVE
BUN SERPL-MCNC: 15 MG/DL (ref 8–23)
BUN/CREAT SERPL: 10.1 (ref 7–25)
CALCIUM SPEC-SCNC: 7.7 MG/DL (ref 8.6–10.5)
CHLORIDE SERPL-SCNC: 99 MMOL/L (ref 98–107)
CLARITY UR: CLEAR
CO2 SERPL-SCNC: 19.6 MMOL/L (ref 22–29)
COLOR UR: YELLOW
CREAT SERPL-MCNC: 1.48 MG/DL (ref 0.57–1)
DEPRECATED RDW RBC AUTO: 51.5 FL (ref 37–54)
EGFRCR SERPLBLD CKD-EPI 2021: 38.2 ML/MIN/1.73
EOSINOPHIL # BLD AUTO: 0.02 10*3/MM3 (ref 0–0.4)
EOSINOPHIL NFR BLD AUTO: 0.1 % (ref 0.3–6.2)
ERYTHROCYTE [DISTWIDTH] IN BLOOD BY AUTOMATED COUNT: 16.7 % (ref 12.3–15.4)
GLUCOSE BLDC GLUCOMTR-MCNC: 205 MG/DL (ref 70–130)
GLUCOSE BLDC GLUCOMTR-MCNC: 227 MG/DL (ref 70–130)
GLUCOSE BLDC GLUCOMTR-MCNC: 245 MG/DL (ref 70–130)
GLUCOSE BLDC GLUCOMTR-MCNC: 273 MG/DL (ref 70–130)
GLUCOSE SERPL-MCNC: 223 MG/DL (ref 65–99)
GLUCOSE UR STRIP-MCNC: NEGATIVE MG/DL
HCT VFR BLD AUTO: 20.9 % (ref 34–46.6)
HCT VFR BLD AUTO: 21.4 % (ref 34–46.6)
HGB BLD-MCNC: 6.5 G/DL (ref 12–15.9)
HGB BLD-MCNC: 6.6 G/DL (ref 12–15.9)
HGB UR QL STRIP.AUTO: NEGATIVE
HYALINE CASTS UR QL AUTO: ABNORMAL /LPF
IMM GRANULOCYTES # BLD AUTO: 0.95 10*3/MM3 (ref 0–0.05)
IMM GRANULOCYTES NFR BLD AUTO: 3.6 % (ref 0–0.5)
KETONES UR QL STRIP: NEGATIVE
LEUKOCYTE ESTERASE UR QL STRIP.AUTO: ABNORMAL
LYMPHOCYTES # BLD AUTO: 2.74 10*3/MM3 (ref 0.7–3.1)
LYMPHOCYTES NFR BLD AUTO: 10.5 % (ref 19.6–45.3)
MCH RBC QN AUTO: 26.1 PG (ref 26.6–33)
MCHC RBC AUTO-ENTMCNC: 31.1 G/DL (ref 31.5–35.7)
MCV RBC AUTO: 83.9 FL (ref 79–97)
MONOCYTES # BLD AUTO: 1.88 10*3/MM3 (ref 0.1–0.9)
MONOCYTES NFR BLD AUTO: 7.2 % (ref 5–12)
NEUTROPHILS NFR BLD AUTO: 20.39 10*3/MM3 (ref 1.7–7)
NEUTROPHILS NFR BLD AUTO: 78.4 % (ref 42.7–76)
NITRITE UR QL STRIP: NEGATIVE
NRBC BLD AUTO-RTO: 0 /100 WBC (ref 0–0.2)
PH UR STRIP.AUTO: 5.5 [PH] (ref 5–8)
PLATELET # BLD AUTO: 251 10*3/MM3 (ref 140–450)
PMV BLD AUTO: 9.6 FL (ref 6–12)
POTASSIUM SERPL-SCNC: 5 MMOL/L (ref 3.5–5.2)
PROT UR QL STRIP: NEGATIVE
RBC # BLD AUTO: 2.49 10*6/MM3 (ref 3.77–5.28)
RBC # UR STRIP: ABNORMAL /HPF
REF LAB TEST METHOD: ABNORMAL
SODIUM SERPL-SCNC: 129 MMOL/L (ref 136–145)
SP GR UR STRIP: 1.01 (ref 1–1.03)
SQUAMOUS #/AREA URNS HPF: ABNORMAL /HPF
UROBILINOGEN UR QL STRIP: ABNORMAL
WBC # UR STRIP: ABNORMAL /HPF
WBC NRBC COR # BLD: 26.03 10*3/MM3 (ref 3.4–10.8)

## 2022-06-05 PROCEDURE — 36430 TRANSFUSION BLD/BLD COMPNT: CPT

## 2022-06-05 PROCEDURE — 99232 SBSQ HOSP IP/OBS MODERATE 35: CPT | Performed by: INTERNAL MEDICINE

## 2022-06-05 PROCEDURE — 85025 COMPLETE CBC W/AUTO DIFF WBC: CPT | Performed by: STUDENT IN AN ORGANIZED HEALTH CARE EDUCATION/TRAINING PROGRAM

## 2022-06-05 PROCEDURE — 81001 URINALYSIS AUTO W/SCOPE: CPT | Performed by: STUDENT IN AN ORGANIZED HEALTH CARE EDUCATION/TRAINING PROGRAM

## 2022-06-05 PROCEDURE — 86900 BLOOD TYPING SEROLOGIC ABO: CPT

## 2022-06-05 PROCEDURE — 63710000001 INSULIN LISPRO (HUMAN) PER 5 UNITS: Performed by: ORTHOPAEDIC SURGERY

## 2022-06-05 PROCEDURE — 87086 URINE CULTURE/COLONY COUNT: CPT | Performed by: STUDENT IN AN ORGANIZED HEALTH CARE EDUCATION/TRAINING PROGRAM

## 2022-06-05 PROCEDURE — 85014 HEMATOCRIT: CPT | Performed by: HOSPITALIST

## 2022-06-05 PROCEDURE — 25010000002 PIPERACILLIN SOD-TAZOBACTAM PER 1 G: Performed by: ORTHOPAEDIC SURGERY

## 2022-06-05 PROCEDURE — 85018 HEMOGLOBIN: CPT | Performed by: HOSPITALIST

## 2022-06-05 PROCEDURE — 82962 GLUCOSE BLOOD TEST: CPT

## 2022-06-05 PROCEDURE — 25010000002 DAPTOMYCIN PER 1 MG: Performed by: ORTHOPAEDIC SURGERY

## 2022-06-05 PROCEDURE — 25010000002 ONDANSETRON PER 1 MG: Performed by: STUDENT IN AN ORGANIZED HEALTH CARE EDUCATION/TRAINING PROGRAM

## 2022-06-05 PROCEDURE — 80048 BASIC METABOLIC PNL TOTAL CA: CPT | Performed by: STUDENT IN AN ORGANIZED HEALTH CARE EDUCATION/TRAINING PROGRAM

## 2022-06-05 PROCEDURE — P9016 RBC LEUKOCYTES REDUCED: HCPCS

## 2022-06-05 RX ADMIN — CETIRIZINE HYDROCHLORIDE 10 MG: 10 TABLET ORAL at 08:12

## 2022-06-05 RX ADMIN — Medication 1 CAPSULE: at 08:12

## 2022-06-05 RX ADMIN — ISOSORBIDE MONONITRATE 30 MG: 30 TABLET ORAL at 06:03

## 2022-06-05 RX ADMIN — GABAPENTIN 600 MG: 300 CAPSULE ORAL at 08:12

## 2022-06-05 RX ADMIN — PANTOPRAZOLE SODIUM 40 MG: 40 TABLET, DELAYED RELEASE ORAL at 06:03

## 2022-06-05 RX ADMIN — OXYCODONE AND ACETAMINOPHEN 1 TABLET: 5; 325 TABLET ORAL at 08:12

## 2022-06-05 RX ADMIN — INSULIN GLARGINE-YFGN 30 UNITS: 100 INJECTION, SOLUTION SUBCUTANEOUS at 21:50

## 2022-06-05 RX ADMIN — TAZOBACTAM SODIUM AND PIPERACILLIN SODIUM 3.38 G: 375; 3 INJECTION, SOLUTION INTRAVENOUS at 17:05

## 2022-06-05 RX ADMIN — INSULIN LISPRO 3 UNITS: 100 INJECTION, SOLUTION INTRAVENOUS; SUBCUTANEOUS at 13:33

## 2022-06-05 RX ADMIN — GABAPENTIN 600 MG: 300 CAPSULE ORAL at 21:37

## 2022-06-05 RX ADMIN — DAPTOMYCIN 750 MG: 500 INJECTION, POWDER, LYOPHILIZED, FOR SOLUTION INTRAVENOUS at 21:38

## 2022-06-05 RX ADMIN — TAZOBACTAM SODIUM AND PIPERACILLIN SODIUM 3.38 G: 375; 3 INJECTION, SOLUTION INTRAVENOUS at 00:15

## 2022-06-05 RX ADMIN — Medication 10 ML: at 21:38

## 2022-06-05 RX ADMIN — INSULIN LISPRO 3 UNITS: 100 INJECTION, SOLUTION INTRAVENOUS; SUBCUTANEOUS at 08:13

## 2022-06-05 RX ADMIN — OXYCODONE AND ACETAMINOPHEN 1 TABLET: 5; 325 TABLET ORAL at 18:37

## 2022-06-05 RX ADMIN — INSULIN LISPRO 3 UNITS: 100 INJECTION, SOLUTION INTRAVENOUS; SUBCUTANEOUS at 17:05

## 2022-06-05 RX ADMIN — TAZOBACTAM SODIUM AND PIPERACILLIN SODIUM 3.38 G: 375; 3 INJECTION, SOLUTION INTRAVENOUS at 08:23

## 2022-06-05 RX ADMIN — METOPROLOL SUCCINATE 25 MG: 25 TABLET, EXTENDED RELEASE ORAL at 08:12

## 2022-06-05 RX ADMIN — Medication 10 ML: at 08:28

## 2022-06-05 RX ADMIN — ONDANSETRON 4 MG: 2 INJECTION INTRAMUSCULAR; INTRAVENOUS at 08:23

## 2022-06-05 RX ADMIN — OXYCODONE AND ACETAMINOPHEN 1 TABLET: 5; 325 TABLET ORAL at 13:34

## 2022-06-05 NOTE — NURSING NOTE
Pt having frequent loose stools with foul smell. Pt not on Laxatives and is currently on IV abx.  Aprrox 0330 RN contacted [facility] spoke to MORGAN House. Order put in to test for C.diff

## 2022-06-05 NOTE — OP NOTE
Irrigation and Debridement Operative Note  Dr. JENNIE Schaeffer II  [phone]    PATIENT NAME: Stephy Duncan  MRN: [id number]  : 1955 AGE: 67 y.o. 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 The risks of surgery were discussed and included the risk of anesthesia, continued infection, damage to neurovascular structures, the need for further procedures, medical complications, and others. No guarantees were made. The patient wished to proceed with surgery and a surgical consent was signed.    PERTINENT FINDINGS: Significant necrotic tissue both superficial and deep    DETAILS OF PROCEDURE:    The patient was met in the preoperative area. The site was marked. The consent and H&P were reviewed. The patient was then wheeled back to the operative suite and underwent anesthesia.     The operative extremity was then prepped and draped in the normal sterile fashion which included multiple layers of sterile drapes. A surgical timeout was performed in which administration of preoperative antibiotics and the surgical site were confirmed.    Her old surgical incision was opened and I encountered a lot of fluid initially.  This was evacuated.  About two thirds of her deep fascia had healed the other third had not healed.  This is a chronic finding every time I went and to operate on her.  She had a very difficult time healing the fascia.  The fascia was then opened and the hip  "joint was exposed.  There were multiple fragments of bone from the prior Girdlestone procedure where I had to extract a well fixated revision stem and very poor bone quality.  To the best of my abilities, the bone fragments were extracted using a knife and Bovie for sharp debridement.  I could not get all the bone out as I was worried about damaging deeper neurovascular structures as there was quite a lot of scar tissue and adherence.  After I got it out as much bone as I thought I could safely do, I used a large curette and curetted not only the remaining bone but also the acetabulum and soft tissues.  All necrotic tissue was then evacuated.  The hip was irrigated with 6 L of saline and Betadine using cystoscopy tubing.  Afterwards, the hip was closed in layers and I placed an incisional wound VAC.    The patient was then moved onto the bed and awoken from anesthesia.  The patient was taken to the recovery room in stable condition. There were no complications and the patient tolerated the procedure well.    R \"Kilo\" Laury BAXTER MD  Orthopaedic Surgery  [facility]  [phone]    "

## 2022-06-05 NOTE — PROGRESS NOTES
Name: Stephy Duncan ADMIT: 2022   : 1955  PCP: Gregorio Rosales    MRN: [id number] LOS: 4 days   AGE/SEX: 67 y.o. female  ROOM: [facility]     Subjective   Subjective   No acute events overnight.  She received 2 units of PRBCs yesterday for hemoglobin of 6.0 which is increased to 8.2.  Otherwise she remained afebrile overnight, and is currently hemodynamically stable.  She complains of right hip pain and is disconcerted that that area is malodorous.  She is on daptomycin and Zosyn      MRI of the right hip shows robust soft tissue edema with multiple small foci of gas in the right hip joint space.  Findings are concerning for cellulitis with osteomyelitis.  She is going to the OR today for hip irrigation and debridement.    She is doing well postoperatively, just complains of little bit of pain and some nausea.    :   Hemoglobin dropped to 6.5.  Sodium is at 129.  She is chronically hyponatremic.      Review of Systems   Constitutional: Negative for chills and fever.   Respiratory: Negative for shortness of breath and wheezing.    Cardiovascular: Negative for chest pain and palpitations.   Gastrointestinal: Negative for abdominal pain and blood in stool.   Musculoskeletal:        Right hip pain tenderness   Neurological: Negative for weakness and headaches.        Objective   Objective   Vital Signs  Temp:  [97.8 °F (36.6 °C)-98.4 °F (36.9 °C)] 98.2 °F (36.8 °C)  Heart Rate:  [67-83] 81  Resp:  [16-18] 18  BP: ()/(44-64) 122/54  SpO2:  [66 %-100 %] 94 %  on  Flow (L/min):  [2] 2;   Device (Oxygen Therapy): room air  Body mass index is 39.03 kg/m².  Physical Exam  Constitutional:       Appearance: Normal appearance. She is obese.   HENT:      Head: Normocephalic and atraumatic.   Cardiovascular:      Rate and Rhythm: Normal rate and regular rhythm.   Pulmonary:      Effort: Pulmonary effort is normal. No respiratory distress.   Abdominal:      General: There is no distension.       Palpations: Abdomen is soft.      Tenderness: There is no abdominal tenderness.   Musculoskeletal:      Comments: Right hip wound VAC.    Skin:     General: Skin is warm and dry.   Neurological:      General: No focal deficit present.      Mental Status: She is alert and oriented to person, place, and time.         Results Review     I reviewed the patient's new clinical results.  Results from last 7 days   Lab Units 06/04/22  0554 06/03/22  0453 06/02/22  0638 06/01/22 1916   WBC 10*3/mm3 3.87 4.39 3.23* 7.00   HEMOGLOBIN g/dL 8.5* 8.2* 6.0* 7.5*   PLATELETS 10*3/mm3 145 136* 121* 180     Results from last 7 days   Lab Units 06/04/22  0554 06/03/22  0453 06/02/22 0638 06/01/22 1916   SODIUM mmol/L 134* 130* 130* 126*   POTASSIUM mmol/L 4.1 4.1 3.9 4.2   CHLORIDE mmol/L 105 104 102 97*   CO2 mmol/L 21.0* 19.0* 18.4* 17.3*   BUN mg/dL 7* 10 14 15   CREATININE mg/dL 0.76 0.71 0.85 1.03*   GLUCOSE mg/dL 114* 139* 236* 237*   Estimated Creatinine Clearance: 81.6 mL/min (by C-G formula based on SCr of 0.76 mg/dL).  Results from last 7 days   Lab Units 06/01/22 1916   ALBUMIN g/dL 1.90*   BILIRUBIN mg/dL 0.2   ALK PHOS U/L 125*   AST (SGOT) U/L 18   ALT (SGPT) U/L 13     Results from last 7 days   Lab Units 06/04/22  0554 06/03/22  0453 06/02/22 0638 06/01/22  1916   CALCIUM mg/dL 8.0* 7.6* 7.3* 7.6*   ALBUMIN g/dL  --   --   --  1.90*     Results from last 7 days   Lab Units 06/02/22  0956 06/01/22  1916   LACTATE mmol/L 1.7 1.8     COVID19   Date Value Ref Range Status   06/04/2022 Not Detected Not Detected - Ref. Range Final   06/01/2022 Not Detected Not Detected - Ref. Range Final     Glucose   Date/Time Value Ref Range Status   06/05/2022 0636 227 (H) 70 - 130 mg/dL Final     Comment:     Meter: [id number] : [id number] Reinaldo CUNNINGHAM   06/04/2022 2104 224 (H) 70 - 130 mg/dL Final     Comment:     Meter: [id number] : [id number] Reinaldo CUNNINGHAM   06/04/2022 1602 203 (H) 70 - 130 mg/dL Final     Comment:      Meter: [id number] : [id number] Nelson Mix NA   06/04/2022 1109 207 (H) 70 - 130 mg/dL Final     Comment:     Meter: [id number] : [id number] Nelson Mix NA   06/04/2022 0932 134 (H) 70 - 130 mg/dL Final     Comment:     Meter: [id number] : [id number] Mony RUTHERFORD RN   06/04/2022 0631 114 70 - 130 mg/dL Final     Comment:     Meter: [id number] : [id number] Anjera Tia NA   06/03/2022 2045 203 (H) 70 - 130 mg/dL Final     Comment:     Meter: [id number] : [id number] Najera Tia NA       MRI Hip Right Without Contrast  Narrative: RIGHT HIP MRI     HISTORY: Multiple right hip surgeries, most recently arthroplasty  removal 04/25/2022. Evaluate osteomyelitis.     TECHNIQUE: MRI of the right hip was performed using a protocol which  shows both hips in axial plane and most of the pelvis in the coronal  plane using T1 and STIR sequences. A sagittal proton density sequence  was made through the right hip. This is correlated with CT scan  performed yesterday and multiple prior x-rays as recent as 04/25/2022  and as remote as 11/10/2021.     FINDINGS: There is postoperative change in the right hip from hardware  removal. Coronal images show substantial degenerative change in the  lower lumbar spine with dextroscoliosis but no finding suggestive of  osteomyelitis or discitis in the lower lumbar spine. Most of the bones  of the pelvis and those around the left hip and proximal femur  demonstrate normal marrow signal. There is a rind of edematous tissue at  the acetabulum, corresponding to the soft tissue material seen in this  area on the CT. On the coronal STIR sequence, this material measures 7-8  mm thick. Underlying marrow signal is normal; there is no compelling  evidence for osteomyelitis in the pelvis.     There is postoperative change at the proximal femur. The note by Dr. Schaeffer describes challenging removal of the femoral stem and previous  imaging has demonstrated fairly extensive fracturing  of the  periprosthetic portion of the proximal right femur. There is multifocal  signal dropout attributable to both the postoperative change and the  multiple small foci of gas seen within and around the fractured bone of  the proximal femur on the CT. There is extensive soft tissue edema deep  around the proximal femur as well as gas in the joint space and in the  soft tissue along the lateral proximal thigh. The coronal images show  the femoral shaft beyond the level of the tip of the removed femoral  stem. Normal fatty marrow signal is observed in that area on the coronal  images. No undrained fluid collection is identified.     No intrapelvic lesion is identified but there is a small volume of  ascites. There are enlarged lymph nodes in the lower right hemipelvis.     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 No  abnormality in the pelvis is suggestive of osteomyelitis around the  acetabulum. The extensive deformity of the proximal femur is as expected  given the description of the surgical procedure. However, there is  robust soft tissue edema with multiple small foci of gas in the right  hip joint space, around the bone fragments of the proximal right femur,  and in the soft tissue lateral to the femur. Given the history and the  open wound, these findings almost certainly represent cellulitis with  osteomyelitis involving the fragmented portions of the proximal femur.     This report was finalized on 6/3/2022 10:13 AM by Dr. Brendon Carrillo M.D.       Scheduled Medications  cetirizine, 10 mg, Oral, Daily  DAPTOmycin (CUBICIN)  IV, 10 mg/kg (Adjusted), Intravenous, Q24H  ferrous sulfate, 325 mg, Oral, Every Other Day  gabapentin, 600 mg, Oral, Q12H  insulin glargine, 25 Units, Subcutaneous, Nightly  insulin lispro, 3 Units, Subcutaneous, TID With Meals  isosorbide mononitrate, 30 mg, Oral, QAM  lactobacillus  acidophilus, 1 capsule, Oral, Daily  metoprolol succinate XL, 25 mg, Oral, Daily  miconazole, , Topical, Q12H  pantoprazole, 40 mg, Oral, QAM  piperacillin-tazobactam, 3.375 g, Intravenous, Q8H  sodium chloride, 10 mL, Intravenous, Q12H    Infusions  lactated ringers, 9 mL/hr, Last Rate: 9 mL/hr (06/04/22 1137)    Diet  Diet Regular       Assessment/Plan     Active Hospital Problems    Diagnosis  POA   • **Osteomyelitis of hip (HCC) [M86.9]  Yes   • Hyponatremia [E87.1]  Yes   • Metabolic acidosis [E87.2]  Yes   • Sepsis without acute organ dysfunction ([facility]) [A41.9]  Yes   • Pathological fracture of right femur ([facility]) [M84.451A]  Yes   • Iron deficiency anemia [D50.9]  Yes   • Chronic diastolic CHF (congestive heart failure) ([facility]) [I50.32]  Yes   • Obesity (BMI 30-39.9) [E66.9]  Yes   • Type 2 diabetes mellitus with diabetic polyneuropathy, with long-term current use of insulin ([facility]) [E11.42, Z79.4]  Not Applicable   • Hypertension [I10]  Yes      Resolved Hospital Problems   No resolved problems to display.       67 y.o. female admitted with Osteomyelitis of hip (HCC).  Right hip osteomyelitis  History of VRE  Has had 7 surgeries in the past on that hip  Elevated CRP and sed rate.  Continue daptomycin and Zosyn  ID and orthopedic surgery following  MRI showed soft tissue edema with multiple small foci of gas in the right hip joint space, the bone fragments of the proximal right femur and the  S/p right hip irrigation debridement on 6/4    Anemia of chronic disease  Low  transferrin saturation, low TIBC, high ferritin  Received 2 units of PRBCs with an appropriate response.  Receiving another unit of PRBCs on 6/5    Hyponatremia  Sodium on admission was 126.  Now it is at 130 which is close to her baseline.  I do not see a diuretic on her med list.  No need to start any right now.  Appears euvolemic    Chronic diastolic heart failure  Hypertension  Continue Imdur 30, metoprolol XL 25 mg  Blood pressures  acceptable    Type 2 diabetes  Blood glucose remains elevated  Lantus 25 units nightly.  Increase to 30 units nightly  Lispro 2 units 3 times daily AC -> increased to 3 units 3 times daily AC  Blood sugars a little bit high    SCDs for DVT prophylaxis.  Full code.  Discussed with patient and nursing staff.  Anticipate discharge pending hospital course      Arian Morrissey MD  [facility]  06/05/22  06:46 EDT

## 2022-06-05 NOTE — PLAN OF CARE
Goal Outcome Evaluation:      Hematology plan/recommendations: chronic/worsening anemia and leukopenia/thrombocytopenia are all secondary to her worsening infection.  The ferritin is dramatically elevated consistent with inflammation and the TIBC very low consistent with chronic disease/inflammation. Transfuse 1 unit PRBCs today for hemoglobin 6.5.  Orthopedic surgery - Wound VAC changes to begin 3 times a week starting Monday.  Attending - S/p right hip irrigation debridement on 6/4. Continue daptomycin and Zosyn.  Patient with low urine output. Voided 75, , I/O cath for 300 cc UOP. Follow up 6 hours later, random bladder scan, 177 cc retained. MD notified. No new orders.   Pt without BM from 7a-7p. In spore isolation for Cdiff, contact for VRE of right hip. Given norco 3 times 7a-7p for pain to right hip. SCD's on. PT consult. PICC line to [facility].

## 2022-06-05 NOTE — PROGRESS NOTES
REASON FOR FOLLOW-UP:  anemia    INTERVAL HISTORY:  The patient complains of fatigue but no significant lightheadedness or dizziness.    HISTORY OF PRESENT ILLNESS:   This is a pleasant 67-year-old woman with type 2 diabetes, hypertension, chronic diastolic CHF and osteomyelitis of the right hip who has been treated with antibiotics and multiple surgeries.  She presents with complaints of increasing pain, increasing drainage of foul-smelling purulence from the right hip wound VAC.  She has been on daptomycin as an outpatient.     Hematology is requested to see the patient for evaluation and management of pancytopenia.  The patient has chronic anemia and is followed by Dr. Abad Rivera in [facility].  She has recently required fairly frequent blood transfusions particularly perioperatively.  She has also been given IV iron in [place].  Her hemoglobin has typically been in the 7-9 range since November 2021.  The white blood cell count fluctuates but is typically fairly normal and her platelet count is typically also normal.  She presents with a white count of 7.0, hemoglobin 7.5 and platelets 180 but repeat this morning showed a drop in the white cells to 3.2 with an ANC 1.97, hemoglobin 6.0/MCV 81.5 and platelets 121.  Her iron sat is 16% with a TIBC 112 and ferritin is 616.  2 units of packed red blood cells have been requested.     She complains of fatigue no major short of breath lightheadedness or dizziness.     Past Medical History, Past Surgical History, Social History, Family History have been reviewed and are without significant changes except as mentioned.    Review of Systems   Constitutional: Positive for activity change and fatigue.   Respiratory: Negative for shortness of breath.    Musculoskeletal: Positive for gait problem.   Skin: Positive for wound.   Neurological: Positive for weakness. Negative for dizziness and light-headedness.      ROS unchanged- 6/22    Medications:  The current  medication list was reviewed in the EMR    ALLERGIES:    Allergies   Allergen Reactions   • Ace Inhibitors Shortness Of Breath and Swelling   • Morphine Shortness Of Breath       Objective      Vitals:    06/05/22 0732   BP: 138/52   Pulse: 84   Resp: 18   Temp: 97.9 °F (36.6 °C)   SpO2: 97%          Physical Exam    CONSTITUTIONAL: pleasant well-developed obese woman  CV: RRR, S1S2, no murmur  RESP: cta bilat, no wheezing, no rales  GI: soft, non-tender, no splenomegaly, +bs  MUSC: Right hip wound  NEURO: alert and oriented x3, mild global weakness  PSYCH: normal mood and affect    RECENT LABS:  Hematology Results from last 7 days   Lab Units 06/05/22  0903 06/04/22  0554 06/03/22  0453   WBC 10*3/mm3 26.03* 3.87 4.39   HEMOGLOBIN g/dL 6.5* 8.5* 8.2*   HEMATOCRIT % 20.9* 27.3* 27.3*   PLATELETS 10*3/mm3 251 145 136*     2  Lab Results   Component Value Date    GLUCOSE 223 (H) 06/05/2022    BUN 15 06/05/2022    CREATININE 1.48 (H) 06/05/2022    EGFRIFNONA 68 02/16/2022    BCR 10.1 06/05/2022    CO2 19.6 (L) 06/05/2022    CALCIUM 7.7 (L) 06/05/2022    ALBUMIN 1.90 (L) 06/01/2022    LABIL2 1.2 (L) 10/24/2020    AST 18 06/01/2022    ALT 13 06/01/2022       Lab Results   Component Value Date    IRON 18 (L) 06/02/2022    TIBC 112 (L) 06/02/2022    FERRITIN 616.00 (H) 06/02/2022       Lab Results   Component Value Date    LAGDLSLC68 632 06/03/2022       Lab Results   Component Value Date    FOLATE 18.40 06/03/2022      CRP 6.98  ESR 34    Assessment & Plan   *Acute on chronic anemia  · Her labs are most consistent with anemia of chronic disease secondary to osteomyelitis given the elevated ferritin/dramatically low TIBC.  ESR/CRP elevated on admission consistent with inflammation  · Hemoglobin on admission 6.0 from a baseline 8-9  · Appropriate response to 2 units PRBCs- hemoglobin 8.2 from 6.0  · Hgb stable down to 6.5 postop     *Mild leukopenia -resolved, now with leukocytosis     *Mild thrombocytopenia-  improved-normal to 51     *Hypoproteinemia/hypoalbuminemia     *Hyponatremia     Hematology plan/recommendations:  1. I suspect the patient's chronic/worsening anemia and leukopenia/thrombocytopenia are all secondary to her worsening infection.  The ferritin is dramatically elevated consistent with inflammation in the TIBC very low consistent with chronic disease/inflammation  2.  Monitor CBC with transfusion support as needed  3. Transfuse 1 unit PRBCs today for hemoglobin 6.5                    6/5/2022      CC:

## 2022-06-05 NOTE — PLAN OF CARE
Problem: Adult Inpatient Plan of Care  Goal: Plan of Care Review  Outcome: Ongoing, Not Progressing  Flowsheets (Taken 6/5/2022 0509)  Plan of Care Reviewed With: patient  Outcome Evaluation: Pt A/OX4, on RA. wound vac to R hip @125 continuous. On LR infusion @9 ml/hr continuous. Pt denied pain/discomfort throughout night. Refusing turns despite education. no complaints of N/V. Resting well throughout shift. please see prior note regarding C.diff.  Goal: Patient-Specific Goal (Individualized)  Outcome: Ongoing, Not Progressing  Goal: Absence of Hospital-Acquired Illness or Injury  Outcome: Ongoing, Not Progressing  Intervention: Identify and Manage Fall Risk  Recent Flowsheet Documentation  Taken 6/5/2022 0400 by Yamel Jack RN  Safety Promotion/Fall Prevention:   clutter free environment maintained   assistive device/personal items within reach   activity supervised   nonskid shoes/slippers when out of bed   safety round/check completed  Taken 6/5/2022 0216 by Yamel Jack RN  Safety Promotion/Fall Prevention:   clutter free environment maintained   assistive device/personal items within reach   activity supervised   lighting adjusted   nonskid shoes/slippers when out of bed   safety round/check completed  Taken 6/5/2022 0015 by Yamel Jack RN  Safety Promotion/Fall Prevention:   assistive device/personal items within reach   activity supervised   clutter free environment maintained   lighting adjusted   nonskid shoes/slippers when out of bed   safety round/check completed  Taken 6/4/2022 2212 by Yamel Jack RN  Safety Promotion/Fall Prevention:   activity supervised   clutter free environment maintained   assistive device/personal items within reach   lighting adjusted   nonskid shoes/slippers when out of bed   safety round/check completed  Taken 6/4/2022 2131 by Yamel Jack RN  Safety Promotion/Fall Prevention:   assistive device/personal items within reach   activity supervised    clutter free environment maintained   lighting adjusted   nonskid shoes/slippers when out of bed   safety round/check completed  Intervention: Prevent Skin Injury  Recent Flowsheet Documentation  Taken 6/5/2022 0400 by Yamel Jack RN  Body Position: patient/family refused  Taken 6/5/2022 0216 by Yamel Jack RN  Body Position: (pt still refusing turns despite education)   patient/family refused   supine   other (see comments)  Taken 6/5/2022 0015 by Yamel Jack RN  Body Position:   patient/family refused   supine  Skin Protection:   adhesive use limited   transparent dressing maintained   tubing/devices free from skin contact  Taken 6/4/2022 2212 by Yamel Jack RN  Body Position:   patient/family refused   supine, legs elevated  Taken 6/4/2022 2131 by Yamel Jack RN  Body Position:   patient/family refused   supine, legs elevated  Skin Protection:   adhesive use limited   tubing/devices free from skin contact   transparent dressing maintained  Intervention: Prevent and Manage VTE (Venous Thromboembolism) Risk  Recent Flowsheet Documentation  Taken 6/5/2022 0400 by Yamel Jack RN  Activity Management: activity adjusted per tolerance  Taken 6/5/2022 0216 by Yamel Jack RN  Activity Management: activity adjusted per tolerance  Taken 6/5/2022 0015 by Yamel Jack RN  Activity Management: activity adjusted per tolerance  VTE Prevention/Management:   bilateral   dorsiflexion/plantar flexion performed  Range of Motion:   active ROM (range of motion) encouraged   ROM (range of motion) performed  Taken 6/4/2022 2212 by Yamel Jack RN  Activity Management: activity adjusted per tolerance  Taken 6/4/2022 2131 by Yamel Jack RN  Activity Management: activity adjusted per tolerance  VTE Prevention/Management:   bilateral   dorsiflexion/plantar flexion performed  Range of Motion:   active ROM (range of motion) encouraged   ROM (range of motion) performed  Intervention: Prevent  Infection  Recent Flowsheet Documentation  Taken 6/5/2022 0400 by Yamel Jack RN  Infection Prevention:   single patient room provided   rest/sleep promoted   hand hygiene promoted   personal protective equipment utilized   visitors restricted/screened  Taken 6/5/2022 0216 by Yamel Jack RN  Infection Prevention:   visitors restricted/screened   single patient room provided   rest/sleep promoted   personal protective equipment utilized   hand hygiene promoted  Taken 6/5/2022 0015 by Yamel Jack RN  Infection Prevention:   rest/sleep promoted   single patient room provided   visitors restricted/screened   personal protective equipment utilized   hand hygiene promoted  Taken 6/4/2022 2212 by Yamel Jack RN  Infection Prevention:   visitors restricted/screened   single patient room provided   rest/sleep promoted   personal protective equipment utilized   hand hygiene promoted  Taken 6/4/2022 2131 by Yamel Jack RN  Infection Prevention:   visitors restricted/screened   single patient room provided   rest/sleep promoted   personal protective equipment utilized   hand hygiene promoted  Goal: Optimal Comfort and Wellbeing  Outcome: Ongoing, Not Progressing  Intervention: Monitor Pain and Promote Comfort  Recent Flowsheet Documentation  Taken 6/4/2022 2212 by Yamel Jack RN  Pain Management Interventions:   quiet environment facilitated   no interventions per patient request  Taken 6/4/2022 2131 by Yamel Jack RN  Pain Management Interventions: no interventions per patient request  Intervention: Provide Person-Centered Care  Recent Flowsheet Documentation  Taken 6/5/2022 0015 by Yamel Jack RN  Trust Relationship/Rapport:   questions answered   questions encouraged   reassurance provided   thoughts/feelings acknowledged   choices provided   care explained   emotional support provided   empathic listening provided  Taken 6/4/2022 2131 by Yamel Jack RN  Trust  Relationship/Rapport:   care explained   questions answered   questions encouraged   empathic listening provided   emotional support provided   choices provided  Goal: Readiness for Transition of Care  Outcome: Ongoing, Not Progressing     Problem: Fall Injury Risk  Goal: Absence of Fall and Fall-Related Injury  Outcome: Ongoing, Not Progressing  Intervention: Identify and Manage Contributors  Recent Flowsheet Documentation  Taken 6/5/2022 0400 by Yamel Jack RN  Medication Review/Management: medications reviewed  Taken 6/5/2022 0216 by Yamel Jack RN  Medication Review/Management: medications reviewed  Taken 6/5/2022 0015 by Yamel Jack RN  Medication Review/Management: medications reviewed  Taken 6/4/2022 2212 by Yamel Jack RN  Medication Review/Management: medications reviewed  Taken 6/4/2022 2131 by Yamel Jack RN  Medication Review/Management: medications reviewed  Intervention: Promote Injury-Free Environment  Recent Flowsheet Documentation  Taken 6/5/2022 0400 by Yamel Jack RN  Safety Promotion/Fall Prevention:   clutter free environment maintained   assistive device/personal items within reach   activity supervised   nonskid shoes/slippers when out of bed   safety round/check completed  Taken 6/5/2022 0216 by Yamel Jack RN  Safety Promotion/Fall Prevention:   clutter free environment maintained   assistive device/personal items within reach   activity supervised   lighting adjusted   nonskid shoes/slippers when out of bed   safety round/check completed  Taken 6/5/2022 0015 by Yamel Jack RN  Safety Promotion/Fall Prevention:   assistive device/personal items within reach   activity supervised   clutter free environment maintained   lighting adjusted   nonskid shoes/slippers when out of bed   safety round/check completed  Taken 6/4/2022 2212 by Yamel Jack RN  Safety Promotion/Fall Prevention:   activity supervised   clutter free environment maintained    assistive device/personal items within reach   lighting adjusted   nonskid shoes/slippers when out of bed   safety round/check completed  Taken 6/4/2022 2131 by Yamel Jack RN  Safety Promotion/Fall Prevention:   assistive device/personal items within reach   activity supervised   clutter free environment maintained   lighting adjusted   nonskid shoes/slippers when out of bed   safety round/check completed     Problem: Diabetes Comorbidity  Goal: Blood Glucose Level Within Targeted Range  Outcome: Ongoing, Not Progressing  Intervention: Monitor and Manage Glycemia  Recent Flowsheet Documentation  Taken 6/5/2022 0015 by Yamel Jack RN  Glycemic Management: blood glucose monitored  Taken 6/4/2022 2131 by Yamel Jack RN  Glycemic Management: blood glucose monitored     Problem: Pain Chronic (Persistent) (Comorbidity Management)  Goal: Acceptable Pain Control and Functional Ability  Outcome: Ongoing, Not Progressing  Intervention: Manage Persistent Pain  Recent Flowsheet Documentation  Taken 6/5/2022 0400 by Yamel Jack RN  Medication Review/Management: medications reviewed  Taken 6/5/2022 0216 by Yamel Jack RN  Medication Review/Management: medications reviewed  Taken 6/5/2022 0015 by Yamel Jack RN  Sleep/Rest Enhancement: awakenings minimized  Medication Review/Management: medications reviewed  Taken 6/4/2022 2212 by Yamel Jack RN  Medication Review/Management: medications reviewed  Taken 6/4/2022 2131 by Yamel Jack RN  Sleep/Rest Enhancement: awakenings minimized  Medication Review/Management: medications reviewed  Intervention: Develop Pain Management Plan  Recent Flowsheet Documentation  Taken 6/4/2022 2212 by Yamel Jack RN  Pain Management Interventions:   quiet environment facilitated   no interventions per patient request  Taken 6/4/2022 2131 by Yamel Jack RN  Pain Management Interventions: no interventions per patient request  Intervention: Optimize  Psychosocial Wellbeing  Recent Flowsheet Documentation  Taken 6/5/2022 0015 by Yamel Jack RN  Supportive Measures: active listening utilized  Diversional Activities: television  Family/Support System Care:   support provided   self-care encouraged  Taken 6/4/2022 2131 by Yamel Jack RN  Supportive Measures: active listening utilized  Diversional Activities: television  Family/Support System Care:   self-care encouraged   support provided     Problem: Infection  Goal: Absence of Infection Signs and Symptoms  Outcome: Ongoing, Not Progressing  Intervention: Prevent or Manage Infection  Recent Flowsheet Documentation  Taken 6/5/2022 0400 by Yamel Jack RN  Isolation Precautions:   precautions maintained   precautions initiated   contact   spore  Taken 6/5/2022 0216 by Yamel Jack RN  Isolation Precautions:   precautions maintained   contact  Taken 6/5/2022 0015 by Yamel Jack RN  Isolation Precautions:   precautions maintained   contact  Taken 6/4/2022 2212 by Yamel Jack RN  Isolation Precautions:   precautions maintained   contact  Taken 6/4/2022 2131 by Yamel Jack RN  Isolation Precautions:   precautions maintained   contact     Problem: Impaired Wound Healing  Goal: Optimal Wound Healing  Outcome: Ongoing, Not Progressing  Intervention: Promote Wound Healing  Recent Flowsheet Documentation  Taken 6/5/2022 0400 by Yamel Jack RN  Activity Management: activity adjusted per tolerance  Taken 6/5/2022 0216 by Yamel Jack RN  Activity Management: activity adjusted per tolerance  Taken 6/5/2022 0015 by Yamel Jack RN  Activity Management: activity adjusted per tolerance  Sleep/Rest Enhancement: awakenings minimized  Taken 6/4/2022 2212 by Yamel Jack RN  Activity Management: activity adjusted per tolerance  Pain Management Interventions:   quiet environment facilitated   no interventions per patient request  Taken 6/4/2022 2131 by Yamel Jack RN  Activity  Management: activity adjusted per tolerance  Pain Management Interventions: no interventions per patient request  Sleep/Rest Enhancement: awakenings minimized     Problem: Skin Injury Risk Increased  Goal: Skin Health and Integrity  Outcome: Ongoing, Not Progressing  Intervention: Optimize Skin Protection  Recent Flowsheet Documentation  Taken 6/5/2022 0400 by Yamel Jack RN  Head of Bed (HOB) Positioning: HOB at 30-45 degrees  Taken 6/5/2022 0216 by Yamel Jack RN  Head of Bed (HOB) Positioning: HOB at 30-45 degrees  Taken 6/5/2022 0015 by Yamel Jack RN  Pressure Reduction Techniques: (refuses turns despite education)   frequent weight shift encouraged   other (see comments)  Head of Bed (HOB) Positioning: HOB at 30-45 degrees  Pressure Reduction Devices: specialty bed utilized  Skin Protection:   adhesive use limited   transparent dressing maintained   tubing/devices free from skin contact  Taken 6/4/2022 2212 by Yamel Jack RN  Head of Bed (HOB) Positioning: HOB at 30-45 degrees  Taken 6/4/2022 2131 by Yamel Jack RN  Pressure Reduction Techniques: (refuses turns depite education given)   frequent weight shift encouraged   other (see comments)  Head of Bed (HOB) Positioning: HOB at 30-45 degrees  Pressure Reduction Devices: specialty bed utilized  Skin Protection:   adhesive use limited   tubing/devices free from skin contact   transparent dressing maintained   Goal Outcome Evaluation:  Plan of Care Reviewed With: patient           Outcome Evaluation: Pt A/OX4, on RA. wound vac to R hip @125 continuous. On LR infusion @9 ml/hr continuous. Pt denied pain/discomfort throughout night. Refusing turns despite education. no complaints of N/V. Resting well throughout shift. please see prior note regarding C.diff.

## 2022-06-05 NOTE — PROGRESS NOTES
Wound VAC in place and functioning well.  No labs yet back today other than glucose, I have ordered an H&H.  Wound VAC changes to begin 3 times a week starting Monday.

## 2022-06-06 ENCOUNTER — APPOINTMENT (OUTPATIENT)
Dept: INFUSION THERAPY | Facility: HOSPITAL | Age: 67
End: 2022-06-06

## 2022-06-06 LAB
ABO GROUP BLD: NORMAL
BACTERIA SPEC AEROBE CULT: NO GROWTH
BACTERIA SPEC AEROBE CULT: NORMAL
BACTERIA SPEC AEROBE CULT: NORMAL
BH BB BLOOD EXPIRATION DATE: NORMAL
BH BB BLOOD TYPE BARCODE: 5100
BH BB DISPENSE STATUS: NORMAL
BH BB PRODUCT CODE: NORMAL
BH BB UNIT NUMBER: NORMAL
BLD GP AB SCN SERPL QL: NEGATIVE
C DIFF TOX GENS STL QL NAA+PROBE: NEGATIVE
CROSSMATCH INTERPRETATION: NORMAL
GLUCOSE BLDC GLUCOMTR-MCNC: 161 MG/DL (ref 70–130)
GLUCOSE BLDC GLUCOMTR-MCNC: 172 MG/DL (ref 70–130)
GLUCOSE BLDC GLUCOMTR-MCNC: 189 MG/DL (ref 70–130)
GLUCOSE BLDC GLUCOMTR-MCNC: 198 MG/DL (ref 70–130)
HCT VFR BLD AUTO: 29.6 % (ref 34–46.6)
HGB BLD-MCNC: 9.6 G/DL (ref 12–15.9)
RH BLD: POSITIVE
T&S EXPIRATION DATE: NORMAL
UNIT  ABO: NORMAL
UNIT  RH: NORMAL

## 2022-06-06 PROCEDURE — 25010000002 ALTEPLASE PER 1 MG: Performed by: NURSE PRACTITIONER

## 2022-06-06 PROCEDURE — P9016 RBC LEUKOCYTES REDUCED: HCPCS

## 2022-06-06 PROCEDURE — 86901 BLOOD TYPING SEROLOGIC RH(D): CPT | Performed by: NURSE PRACTITIONER

## 2022-06-06 PROCEDURE — 63710000001 INSULIN LISPRO (HUMAN) PER 5 UNITS: Performed by: ORTHOPAEDIC SURGERY

## 2022-06-06 PROCEDURE — 86923 COMPATIBILITY TEST ELECTRIC: CPT

## 2022-06-06 PROCEDURE — 82962 GLUCOSE BLOOD TEST: CPT

## 2022-06-06 PROCEDURE — 85014 HEMATOCRIT: CPT | Performed by: HOSPITALIST

## 2022-06-06 PROCEDURE — 86850 RBC ANTIBODY SCREEN: CPT | Performed by: NURSE PRACTITIONER

## 2022-06-06 PROCEDURE — 86900 BLOOD TYPING SEROLOGIC ABO: CPT

## 2022-06-06 PROCEDURE — 87493 C DIFF AMPLIFIED PROBE: CPT | Performed by: NURSE PRACTITIONER

## 2022-06-06 PROCEDURE — 99232 SBSQ HOSP IP/OBS MODERATE 35: CPT | Performed by: INTERNAL MEDICINE

## 2022-06-06 PROCEDURE — 25010000002 PIPERACILLIN SOD-TAZOBACTAM PER 1 G: Performed by: ORTHOPAEDIC SURGERY

## 2022-06-06 PROCEDURE — 86900 BLOOD TYPING SEROLOGIC ABO: CPT | Performed by: NURSE PRACTITIONER

## 2022-06-06 PROCEDURE — 85018 HEMOGLOBIN: CPT | Performed by: HOSPITALIST

## 2022-06-06 PROCEDURE — 25010000002 DAPTOMYCIN PER 1 MG: Performed by: ORTHOPAEDIC SURGERY

## 2022-06-06 PROCEDURE — 36430 TRANSFUSION BLD/BLD COMPNT: CPT

## 2022-06-06 RX ORDER — PANTOPRAZOLE SODIUM 40 MG/1
40 TABLET, DELAYED RELEASE ORAL
Status: DISCONTINUED | OUTPATIENT
Start: 2022-06-06 | End: 2022-06-09

## 2022-06-06 RX ORDER — ALUMINA, MAGNESIA, AND SIMETHICONE 2400; 2400; 240 MG/30ML; MG/30ML; MG/30ML
15 SUSPENSION ORAL ONCE
Status: COMPLETED | OUTPATIENT
Start: 2022-06-06 | End: 2022-06-06

## 2022-06-06 RX ORDER — LIDOCAINE HYDROCHLORIDE 20 MG/ML
5 SOLUTION OROPHARYNGEAL ONCE
Status: COMPLETED | OUTPATIENT
Start: 2022-06-06 | End: 2022-06-06

## 2022-06-06 RX ORDER — SUCRALFATE 1 G/1
1 TABLET ORAL
Status: DISCONTINUED | OUTPATIENT
Start: 2022-06-06 | End: 2022-06-09

## 2022-06-06 RX ADMIN — OXYCODONE AND ACETAMINOPHEN 1 TABLET: 5; 325 TABLET ORAL at 01:11

## 2022-06-06 RX ADMIN — METOPROLOL SUCCINATE 25 MG: 25 TABLET, EXTENDED RELEASE ORAL at 10:03

## 2022-06-06 RX ADMIN — Medication 10 ML: at 20:37

## 2022-06-06 RX ADMIN — PANTOPRAZOLE SODIUM 40 MG: 40 TABLET, DELAYED RELEASE ORAL at 16:43

## 2022-06-06 RX ADMIN — ALUMINUM HYDROXIDE, MAGNESIUM HYDROXIDE, AND DIMETHICONE 15 ML: 400; 400; 40 SUSPENSION ORAL at 12:51

## 2022-06-06 RX ADMIN — CETIRIZINE HYDROCHLORIDE 10 MG: 10 TABLET ORAL at 10:04

## 2022-06-06 RX ADMIN — ISOSORBIDE MONONITRATE 30 MG: 30 TABLET ORAL at 06:39

## 2022-06-06 RX ADMIN — OXYCODONE AND ACETAMINOPHEN 1 TABLET: 5; 325 TABLET ORAL at 10:04

## 2022-06-06 RX ADMIN — Medication 1 CAPSULE: at 10:04

## 2022-06-06 RX ADMIN — TAZOBACTAM SODIUM AND PIPERACILLIN SODIUM 3.38 G: 375; 3 INJECTION, SOLUTION INTRAVENOUS at 00:51

## 2022-06-06 RX ADMIN — GABAPENTIN 600 MG: 300 CAPSULE ORAL at 10:03

## 2022-06-06 RX ADMIN — INSULIN LISPRO 3 UNITS: 100 INJECTION, SOLUTION INTRAVENOUS; SUBCUTANEOUS at 18:56

## 2022-06-06 RX ADMIN — Medication 10 ML: at 10:05

## 2022-06-06 RX ADMIN — INSULIN LISPRO 3 UNITS: 100 INJECTION, SOLUTION INTRAVENOUS; SUBCUTANEOUS at 12:11

## 2022-06-06 RX ADMIN — TAZOBACTAM SODIUM AND PIPERACILLIN SODIUM 3.38 G: 375; 3 INJECTION, SOLUTION INTRAVENOUS at 10:03

## 2022-06-06 RX ADMIN — SUCRALFATE 1 G: 1 TABLET ORAL at 16:43

## 2022-06-06 RX ADMIN — INSULIN GLARGINE-YFGN 30 UNITS: 100 INJECTION, SOLUTION SUBCUTANEOUS at 21:36

## 2022-06-06 RX ADMIN — INSULIN LISPRO 3 UNITS: 100 INJECTION, SOLUTION INTRAVENOUS; SUBCUTANEOUS at 10:03

## 2022-06-06 RX ADMIN — DAPTOMYCIN 750 MG: 500 INJECTION, POWDER, LYOPHILIZED, FOR SOLUTION INTRAVENOUS at 20:36

## 2022-06-06 RX ADMIN — MICONAZOLE NITRATE: 2 POWDER TOPICAL at 20:37

## 2022-06-06 RX ADMIN — PANTOPRAZOLE SODIUM 40 MG: 40 TABLET, DELAYED RELEASE ORAL at 06:39

## 2022-06-06 RX ADMIN — MICONAZOLE NITRATE: 2 POWDER TOPICAL at 10:04

## 2022-06-06 RX ADMIN — SUCRALFATE 1 G: 1 TABLET ORAL at 21:34

## 2022-06-06 RX ADMIN — LIDOCAINE HYDROCHLORIDE 5 ML: 20 SOLUTION ORAL; TOPICAL at 12:51

## 2022-06-06 RX ADMIN — FERROUS SULFATE TAB 325 MG (65 MG ELEMENTAL FE) 325 MG: 325 (65 FE) TAB at 10:03

## 2022-06-06 RX ADMIN — ALTEPLASE: KIT at 15:11

## 2022-06-06 RX ADMIN — GABAPENTIN 600 MG: 300 CAPSULE ORAL at 20:35

## 2022-06-06 RX ADMIN — TAZOBACTAM SODIUM AND PIPERACILLIN SODIUM 3.38 G: 375; 3 INJECTION, SOLUTION INTRAVENOUS at 16:44

## 2022-06-06 RX ADMIN — OXYCODONE AND ACETAMINOPHEN 1 TABLET: 5; 325 TABLET ORAL at 21:34

## 2022-06-06 RX ADMIN — OXYCODONE AND ACETAMINOPHEN 1 TABLET: 5; 325 TABLET ORAL at 16:48

## 2022-06-06 NOTE — PROGRESS NOTES
REASON FOR FOLLOW-UP:  anemia    INTERVAL HISTORY:  6/6/22: Pt is resting comfortably in bed. No acute events overnight. Remains on IV daptomycin and zosyn per ID after right hip irrigation & debridement on 6/4/22. Vitals stable. Hb/Hct shows improved Hb to 9.6 after transfusion. Full CBC pending.     HISTORY OF PRESENT ILLNESS:   This is a pleasant 67-year-old woman with type 2 diabetes, hypertension, chronic diastolic CHF and osteomyelitis of the right hip who has been treated with antibiotics and multiple surgeries.  She presents with complaints of increasing pain, increasing drainage of foul-smelling purulence from the right hip wound VAC.  She has been on daptomycin as an outpatient.     Hematology is requested to see the patient for evaluation and management of pancytopenia.  The patient has chronic anemia and is followed by Dr. Abad Rivera in [facility].  She has recently required fairly frequent blood transfusions particularly perioperatively.  She has also been given IV iron in [place].  Her hemoglobin has typically been in the 7-9 range since November 2021.  The white blood cell count fluctuates but is typically fairly normal and her platelet count is typically also normal.  She presents with a white count of 7.0, hemoglobin 7.5 and platelets 180 but repeat this morning showed a drop in the white cells to 3.2 with an ANC 1.97, hemoglobin 6.0/MCV 81.5 and platelets 121.  Her iron sat is 16% with a TIBC 112 and ferritin is 616.  2 units of packed red blood cells have been requested.     She complains of fatigue no major short of breath lightheadedness or dizziness.     Past Medical History,, Past Surgical History, Social History, Family History have been reviewed and are without significant changes except as mentioned.    Review of Systems   Constitutional: Positive for activity change and fatigue.   Respiratory: Negative for shortness of breath.    Musculoskeletal: Positive for gait problem.    Skin: Positive for wound.   Neurological: Positive for weakness. Negative for dizziness and light-headedness.      Medications:  The current medication list was reviewed in the EMR    ALLERGIES:    Allergies   Allergen Reactions   • Ace Inhibitors Shortness Of Breath and Swelling   • Morphine Shortness Of Breath       Objective      Vitals:    06/06/22 0736   BP: 122/55   Pulse: 76   Resp: 18   Temp: 98.2 °F (36.8 °C)   SpO2: 99%          Physical Exam    CONSTITUTIONAL:  Vital signs reviewed.  No distress, looks comfortable.  EYES:  Conjunctiva and lids unremarkable  EARS,NOSE,MOUTH,THROAT:  Ears and nose appear unremarkable.  Lips, teeth, gums appear unremarkable.  RESPIRATORY:  Normal respiratory effort.  Equal chest movement  CARDIOVASCULAR:  Normal heart rate. No leg edema  GASTROINTESTINAL: Abdomen appears unremarkable.  Nondistended  NEURO: AAO x 3, RLE diminished strenght  SKIN:  Warm.  No rashes.  PSYCHIATRIC:  Normal judgment and insight.  Normal mood and affect.    RECENT LABS:  Hematology Results from last 7 days   Lab Units 06/05/22  2310 06/05/22  0903 06/04/22  0554 06/03/22  0453   WBC 10*3/mm3  --  26.03* 3.87 4.39   HEMOGLOBIN g/dL 6.6* 6.5* 8.5* 8.2*   HEMATOCRIT % 21.4* 20.9* 27.3* 27.3*   PLATELETS 10*3/mm3  --  251 145 136*     2  Lab Results   Component Value Date    GLUCOSE 223 (H) 06/05/2022    BUN 15 06/05/2022    CREATININE 1.48 (H) 06/05/2022    EGFRIFNONA 68 02/16/2022    BCR 10.1 06/05/2022    CO2 19.6 (L) 06/05/2022    CALCIUM 7.7 (L) 06/05/2022    ALBUMIN 1.90 (L) 06/01/2022    LABIL2 1.2 (L) 10/24/2020    AST 18 06/01/2022    ALT 13 06/01/2022       Lab Results   Component Value Date    IRON 18 (L) 06/02/2022    TIBC 112 (L) 06/02/2022    FERRITIN 616.00 (H) 06/02/2022       Lab Results   Component Value Date    RBVXIGAR47 632 06/03/2022       Lab Results   Component Value Date    FOLATE 18.40 06/03/2022      CRP 6.98  ESR 34    Assessment & Plan   *Acute on chronic anemia  · Her  labs are most consistent with anemia of chronic disease secondary to osteomyelitis given the elevated ferritin/dramatically low TIBC.  ESR/CRP elevated on admission consistent with inflammation  · Hemoglobin on admission 6.0 from a baseline 8-9  · Appropriate response to 2 units PRBCs- hemoglobin 8.2 from 6.0  · Hgb 9.6 on 6/6/22.      *Mild leukopenia -resolved     *Mild thrombocytopenia- improved       *Hypoproteinemia/hypoalbuminemia     *Hyponatremia    # Recurrent osteomyelitis: Per ID    # Leucocytosis: Likely reactive.      Hematology plan/recommendations:  1. Anemia of chronic disease +/- blood loss during surgery. Appropriate response to PRBC transfusion.   2. Continue PO iron  3.  Monitor CBC with transfusion support as needed  4. Hematology will follow in periphery. Please call as needed.      6/6/2022      CC:

## 2022-06-06 NOTE — NURSING NOTE
06/06/22 1610   Wound 06/04/22 0808 Right hip Incision   Placement Date/Time: 06/04/22 0808   Present on Hospital Admission: Yes  Side: Right  Location: hip  Primary Wound Type: Incision   Dressing Appearance   (vac dressing to incision)   Closure Sutures   Base   (approximated edges)   Periwound macerated  (pink irritation, versatel used this dressing chage to protect)   Periwound Temperature warm   Periwound Skin Turgor soft   Drainage Characteristics/Odor serosanguineous   Drainage Amount copious   Care, Wound cleansed with;sterile normal saline;negative pressure wound therapy   Dressing Care dressing changed  (vac placed to incision)   Periwound Care   (spray prep)   NPWT (Negative Pressure Wound Therapy) 05/04/22 R hip   Placement Date: 05/04/22   Location: R hip   Therapy Setting continuous therapy   Dressing foam, black  (track pad cushioned and slightly bridged to thigh, there was mild irritation along incision from where track pad was previously placed)   Contact Layer silicone   Pressure Setting 125 mmHg   Sponges Inserted 1   Sponges Removed 1     Wound/ostomy - vac was placed in OR on 6/4 to incision, remains intact, there was mild pooling of some blood to the proximal aspect but seal was adequate. Dressing removed and drainage began to stream steadily from proximal and distal aspects of incision which is a known characteristic of patient with previous surgeries to this hip. Excessive drainage has been an ongoing issue since the initial surgery. There is no odor and drainage looks clean serousanguineous. No redness to incision line and incision has well approximated edges. Due to the ongoing presence of drainage I was unable to take a photo of incision, prompt reapplication of dressing and suction/pressure required.     Will change vac dressing M-W-F.

## 2022-06-06 NOTE — PROGRESS NOTES
"  Infectious Diseases Progress Note    Fernando Hart MD     [facility]  [place]: 5 days  Patient Identification:  Name: Stephy Duncan  Age: 67 y.o.  Sex: female  :  1955  MRN: [id number]         Primary Care Physician: Gregorio Rosales            Subjective: Overall feels improved compared to 48 hours ago.  Interval History: See consultation note.  On 2022 patient underwent:Irrigation and Debridement of right hip joint with excision of proximal femur  Objective:    Scheduled Meds:cetirizine, 10 mg, Oral, Daily  DAPTOmycin (CUBICIN)  IV, 10 mg/kg (Adjusted), Intravenous, Q24H  ferrous sulfate, 325 mg, Oral, Every Other Day  gabapentin, 600 mg, Oral, Q12H  insulin glargine, 30 Units, Subcutaneous, Nightly  insulin lispro, 3 Units, Subcutaneous, TID With Meals  isosorbide mononitrate, 30 mg, Oral, QAM  lactobacillus acidophilus, 1 capsule, Oral, Daily  metoprolol succinate XL, 25 mg, Oral, Daily  miconazole, , Topical, Q12H  pantoprazole, 40 mg, Oral, QAM  piperacillin-tazobactam, 3.375 g, Intravenous, Q8H  sodium chloride, 10 mL, Intravenous, Q12H      Continuous Infusions:lactated ringers, 9 mL/hr, Last Rate: 9 mL/hr (22 1137)        Vital signs in last 24 hours:  Temp:  [97.6 °F (36.4 °C)-99.1 °F (37.3 °C)] 98.5 °F (36.9 °C)  Heart Rate:  [67-84] 70  Resp:  [18-20] 18  BP: ()/(42-65) 104/47    Intake/Output:    Intake/Output Summary (Last 24 hours) at 2022 0639  Last data filed at 2022 0329  Gross per 24 hour   Intake 1900 ml   Output 375 ml   Net 1525 ml       Exam:  /47   Pulse 70   Temp 98.5 °F (36.9 °C) (Oral)   Resp 18   Ht 162.6 cm (64.02\")   Wt 103 kg (227 lb 8.2 oz)   SpO2 96%   BMI 39.03 kg/m²   Patient is examined using the personal protective equipment as per guidelines from infection control for this particular patient as enacted.  Hand washing was performed before and after patient interaction.  General Appearance:  Awake interactive does not " appear toxic                          Head:    Normocephalic, without obvious abnormality, atraumatic                           Eyes:    PERRL, conjunctivae/corneas clear, EOM's intact, both eyes                         Throat:   Lips, tongue, gums normal; oral mucosa pink and moist                           Neck:   Supple, symmetrical, trachea midline, no JVD                         Lungs:    Clear to auscultation bilaterally, respirations unlabored                 Chest Wall:    No tenderness or deformity                          Heart:  S1-S2 regular                  Abdomen:   Soft nontender                 Extremities: Right hip surgical site dressed                            Pulses:   Pulses palpable in all extremities                            Skin: Chronic ecchymotic changes noted                  Neurologic: Grossly nonfocal       Data Review:    I reviewed the patient's new clinical results.  Results from last 7 days   Lab Units 06/05/22  2310 06/05/22  0903 06/04/22  0554 06/03/22  0453 06/02/22 0638 06/01/22 1916   WBC 10*3/mm3  --  26.03* 3.87 4.39 3.23* 7.00   HEMOGLOBIN g/dL 6.6* 6.5* 8.5* 8.2* 6.0* 7.5*   PLATELETS 10*3/mm3  --  251 145 136* 121* 180     Results from last 7 days   Lab Units 06/05/22  0903 06/04/22  0554 06/03/22  0453 06/02/22 0638 06/01/22 1916   SODIUM mmol/L 129* 134* 130* 130* 126*   POTASSIUM mmol/L 5.0 4.1 4.1 3.9 4.2   CHLORIDE mmol/L 99 105 104 102 97*   CO2 mmol/L 19.6* 21.0* 19.0* 18.4* 17.3*   BUN mg/dL 15 7* 10 14 15   CREATININE mg/dL 1.48* 0.76 0.71 0.85 1.03*   CALCIUM mg/dL 7.7* 8.0* 7.6* 7.3* 7.6*   GLUCOSE mg/dL 223* 114* 139* 236* 237*     Microbiology Results (last 10 days)     Procedure Component Value - Date/Time    COVID PRE-OP / PRE-PROCEDURE SCREENING ORDER (NO ISOLATION) - Swab, Nasopharynx [[id number]]  (Normal) Collected: 06/04/22 0336    Lab Status: Final result Specimen: Swab from Nasopharynx Updated: 06/04/22 0426    Narrative:      The following  orders were created for panel order COVID PRE-OP / PRE-PROCEDURE SCREENING ORDER (NO ISOLATION) - Swab, Nasopharynx.  Procedure                               Abnormality         Status                     ---------                               -----------         ------                     COVID-19,[facility]...[[id number]]  Normal              Final result                 Please view results for these tests on the individual orders.    COVID-19,[facility] CEPHEID/ROBERTO CARLOS NP SWAB IN TRANSPORT MEDIA 8-12 HR TAT - Swab, Nasopharynx [[id number]]  (Normal) Collected: 06/04/22 0336    Lab Status: Final result Specimen: Swab from Nasopharynx Updated: 06/04/22 0426     COVID19 Not Detected    Narrative:      Fact sheet for providers: https://www.fda.gov/media/978004/download     Fact sheet for patients: https://www.fda.gov/media/855620/download    Blood Culture - Blood, Arm, Right [[id number]]  (Normal) Collected: 06/01/22 2018    Lab Status: Preliminary result Specimen: Blood from Arm, Right Updated: 06/05/22 2032     Blood Culture No growth at 4 days    Narrative:      Less than seven (7) mL's of blood was collected.  Insufficient quantity may yield false negative results.    Blood Culture - Blood, Blood, Central Line [[id number]]  (Normal) Collected: 06/01/22 2005    Lab Status: Preliminary result Specimen: Blood, Central Line Updated: 06/05/22 2017     Blood Culture No growth at 4 days    Narrative:      Less than seven (7) mL's of blood was collected.  Insufficient quantity may yield false negative results.    COVID PRE-OP / PRE-PROCEDURE SCREENING ORDER (NO ISOLATION) - Swab, Nasopharynx [[id number]]  (Normal) Collected: 06/01/22 2003    Lab Status: Final result Specimen: Swab from Nasopharynx Updated: 06/01/22 2057    Narrative:      The following orders were created for panel order COVID PRE-OP / PRE-PROCEDURE SCREENING ORDER (NO ISOLATION) - Swab, Nasopharynx.  Procedure                                Abnormality         Status                     ---------                               -----------         ------                     COVID-19,[facility]...[[id number]]  Normal              Final result                 Please view results for these tests on the individual orders.    COVID-19,[facility] CEPHEID/ROBERTO CARLOS NP SWAB IN TRANSPORT MEDIA 8-12 HR TAT - Swab, Nasopharynx [[id number]]  (Normal) Collected: 06/01/22 2003    Lab Status: Final result Specimen: Swab from Nasopharynx Updated: 06/01/22 2057     COVID19 Not Detected    Narrative:      Fact sheet for providers: https://www.fda.gov/media/253731/download     Fact sheet for patients: https://www.fda.gov/media/719559/download            Assessment:    Osteomyelitis of hip (HCC)    Type 2 diabetes mellitus with diabetic polyneuropathy, with long-term current use of insulin (HCC)    Hypertension    Iron deficiency anemia    Obesity (BMI 30-39.9)    Chronic diastolic CHF (congestive heart failure) ([facility])    Hyponatremia    Metabolic acidosis    Sepsis without acute organ dysfunction (HCC)    Pathological fracture of right femur ([facility])  1-acute on chronic osteomyelitis of the right proximal femur in a similar area in a patient who had previous recurrent right hip prosthetic and multiple surgeries with a latest procedure performed on 4/25/2022 and currently likely has mixed infection given the foul smell drainage and 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  2-pancytopenia  3-morbid obesity  4-type 2 diabetes  5-hyponatremia  6-other diagnosis per primary team     Recommendations/Discussions:  · Continue Zosyn and daptomycin   · Monitor closely for complications of antibiotics  · Since there are no operative culture results empiric antibiotic treatment based on the prior culture results  and since she declined while on daptomycin consideration for mixed infection is needed to construct antibiotic regimen.  Based on this concept Zosyn and daptomycin need to be continued for at least couple weeks from 6/4/2022 and may be extended to 6 weeks if orthopedic surgery service thinks that there is residual osteomyelitis.  · Obviously patient need to be monitored closely for side effects of antibiotics.  Fernando Hart MD  6/6/2022  06:39 EDT    Much of this encounter note is an electronic transcription/translation of spoken language to printed text. The electronic translation of spoken language may permit erroneous, or at times, nonsensical words or phrases to be inadvertently transcribed; Although I have reviewed the note for such errors, some may still exist

## 2022-06-06 NOTE — PROGRESS NOTES
Discharge Planning Assessment  [facility]     Patient Name: Stephy Duncan  MRN: [id number]  Today's Date: 6/6/2022    Admit Date: 6/1/2022     Discharge Needs Assessment    No documentation.                Discharge Plan     Row Name 06/06/22 1407       Plan    Plan home with [facility] vs skilled rehab at [facility]    Plan Comments Spoke with patient at bedside she is agreeable with [facility] vs home with [facility]. Call placed to Rajat/Betty she is aware patient has a wound vac and will need IV Daptomycin for 6 weeks. Patient stated family will transport. Patient stated she may need to go to [facility] for the infusions if she does not go to skilled rehab due to cost of the Daptomycin. Hugo ROSENBERG              Continued Care and Services - Admitted Since 6/1/2022     Destination     Service Provider Request Status Selected Services Address Phone Fax Patient Preferred    [facility]  Accepted N/A [address] [facility] [address] [phone] --    [facility]  Pending - Request Sent N/A [address] [place] [address] [phone] --    [facility]  Pending - [facility] N[facility]A [address] [facility] [address] [phone] --    [facility]  Declined  Insurance Denial, Out of network N/A [address] [facility] [address] [phone] --          Home Medical Care     Service Provider Request Status Selected Services Address Phone Fax Patient Preferred    [facility]  Accepted N/A [address], [address], [facility] [address] [phone] --            Selected Continued Care - Prior Encounters Includes selections from prior encounters from 3/3/2022 to 6/6/2022     Discharged on 5/8/2022 Admission date: 4/25/2022 - Discharge disposition: Skilled Nursing Facility (DC - External)    Destination     Service Provider Selected Services Address Phone Fax Patient Preferred    [facility]  Skilled Nursing [address] [place] [address] [phone] --       Internal Comment last updated by Roopa Escobar CSW 4/28/2022 1349    Left voicemail for admissions                                 Expected Discharge Date and Time     Expected Discharge Date Expected Discharge Time    Jun 8, 2022          Demographic Summary    No documentation.                Functional Status    No documentation.                Psychosocial    No documentation.                Abuse/Neglect    No documentation.                Legal    No documentation.                Substance Abuse    No documentation.                Patient Forms    No documentation.                   Virginia Kraus, RN

## 2022-06-06 NOTE — PROGRESS NOTES
[facility]    [phone]   LOS: 5 days     Name: Stephy Duncan  Age/Sex: 67 y.o. female  :  1955        PCP: Gregorio Rosales  Chief Complaint   Patient presents with   • Wound Infection      Subjective   Feeling okay today but complaining of epigastric discomfort.  Denies other new issues or complaints.  Did have a low hemoglobin overnight and packed red blood cells have been ordered and she is actively undergoing transfusion.  General: No Fever or Chills, Cardiac: No Chest Pain or Palpitations, Resp: No Cough or SOA, GI: No Nausea, Vomiting, or Diarrhea and Other: No bleeding    alteplase, 2 mg, Intracatheter, Once  cetirizine, 10 mg, Oral, Daily  DAPTOmycin (CUBICIN)  IV, 10 mg/kg (Adjusted), Intravenous, Q24H  ferrous sulfate, 325 mg, Oral, Every Other Day  gabapentin, 600 mg, Oral, Q12H  insulin glargine, 30 Units, Subcutaneous, Nightly  insulin lispro, 3 Units, Subcutaneous, TID With Meals  isosorbide mononitrate, 30 mg, Oral, QAM  lactobacillus acidophilus, 1 capsule, Oral, Daily  metoprolol succinate XL, 25 mg, Oral, Daily  miconazole, , Topical, Q12H  pantoprazole, 40 mg, Oral, QAM  piperacillin-tazobactam, 3.375 g, Intravenous, Q8H  sodium chloride, 10 mL, Intravenous, Q12H      lactated ringers, 9 mL/hr, Last Rate: 9 mL/hr (22 1137)        Objective   Vital Signs  Temp:  [97.6 °F (36.4 °C)-99.1 °F (37.3 °C)] 99.1 °F (37.3 °C)  Heart Rate:  [67-77] 74  Resp:  [18] 18  BP: ()/(42-59) 90/46  Body mass index is 39.03 kg/m².    Intake/Output Summary (Last 24 hours) at 2022 1459  Last data filed at 2022 1031  Gross per 24 hour   Intake 2200 ml   Output 900 ml   Net 1300 ml       Physical Exam  Vitals and nursing note reviewed.   Constitutional:       Appearance: Normal appearance.   Cardiovascular:      Rate and Rhythm: Normal rate and regular rhythm.   Pulmonary:      Effort: No respiratory distress.      Breath sounds: Normal breath sounds.   Abdominal:       General: Bowel sounds are normal.      Palpations: Abdomen is soft.   Neurological:      General: No focal deficit present.      Mental Status: She is alert and oriented to person, place, and time.           Results Review:       I reviewed the patient's new clinical results.  Results from last 7 days   Lab Units 06/06/22  1146 06/05/22  2310 06/05/22  0903 06/04/22  0554 06/03/22  0453 06/02/22  0638 06/01/22  1916   WBC 10*3/mm3  --   --  26.03* 3.87 4.39 3.23* 7.00   HEMOGLOBIN g/dL 9.6* 6.6* 6.5* 8.5* 8.2* 6.0* 7.5*   PLATELETS 10*3/mm3  --   --  251 145 136* 121* 180     Results from last 7 days   Lab Units 06/05/22  0903 06/04/22  0554 06/03/22  0453 06/02/22  0638 06/01/22  1916   SODIUM mmol/L 129* 134* 130* 130* 126*   POTASSIUM mmol/L 5.0 4.1 4.1 3.9 4.2   CHLORIDE mmol/L 99 105 104 102 97*   CO2 mmol/L 19.6* 21.0* 19.0* 18.4* 17.3*   BUN mg/dL 15 7* 10 14 15   CREATININE mg/dL 1.48* 0.76 0.71 0.85 1.03*   CALCIUM mg/dL 7.7* 8.0* 7.6* 7.3* 7.6*   Estimated Creatinine Clearance: 41.9 mL/min (A) (by C-G formula based on SCr of 1.48 mg/dL (H)).  Lab Results   Component Value Date    HGBA1C 6.20 (H) 04/21/2022    HGBA1C 5.60 01/29/2022    HGBA1C 7.56 (H) 01/16/2022     Glucose   Date/Time Value Ref Range Status   06/06/2022 1140 189 (H) 70 - 130 mg/dL Final     Comment:     Meter: [id number] : [id number] Ivana West CNA   06/06/2022 0620 161 (H) 70 - 130 mg/dL Final     Comment:     Meter: [id number] : [id number] [facility]   06/05/2022 2107 205 (H) 70 - 130 mg/dL Final     Comment:     Meter: [id number] : [id number] [place] [facility]   06/05/2022 1617 273 (H) 70 - 130 mg/dL Final     Comment:     Meter: [id number] : [id number] [facility]   06/05/2022 1114 245 (H) 70 - 130 mg/dL Final     Comment:     Meter: [id number] : [id number] Nelson [facility]   06/05/2022 0636 227 (H) 70 - 130 mg/dL Final     Comment:     Meter: [id number] : [id number] Reinaldo Nugent    06/04/2022  2104 224 (H) 70 - 130 mg/dL Final     Comment:     Meter: [id number] : [id number] Reinaldo [place]   06/04/2022 1602 203 (H) 70 - 130 mg/dL Final     Comment:     Meter: [id number] : [id number] Nelson [place]         Assessment & Plan   Active Hospital Problems    Diagnosis  POA   • **Osteomyelitis of hip (HCC) [M86.9]  Yes   • Hyponatremia [E87.1]  Yes   • Metabolic acidosis [E87.2]  Yes   • Sepsis without acute organ dysfunction (HCC) [A41.9]  Yes   • Pathological fracture of right femur (HCC) [M84.451A]  Yes   • Iron deficiency anemia [D50.9]  Yes   • Chronic diastolic CHF (congestive heart failure) ([facility]) [I50.32]  Yes   • Obesity (BMI 30-39.9) [E66.9]  Yes   • Type 2 diabetes mellitus with diabetic polyneuropathy, with long-term current use of insulin ([facility]) [E11.42, Z79.4]  Not Applicable   • Hypertension [I10]  Yes      Resolved Hospital Problems   No resolved problems to display.       PLAN  This is a 67-year-old female who presented to the hospital with hip pain and is found to have recurrent osteomyelitis of her hip  -Discussed with infectious disease today continue antibiotics for up to 2 weeks following her last surgical intervention if felt to have all infectious material removed  -Complaining of epigastric abdominal pain concerning for possible dyspepsia or other etiology.  Will try Maalox and viscous lidocaine today.  We will see how this improves or relieves her symptoms.  If no relief may need to consider cardiac work-up if relief we will consider additional treatment for dyspepsia by increasing PPI to twice daily and adding Carafate  -A little bit concerned about the ongoing anemia.  The drainage from her wound VAC is pretty dark.  I wonder if she is not having ongoing blood loss.  Following additional 2 units of packed red blood cells hemoglobin up to 9.6 will trend from here  -Sodium levels remain low but at or near her baseline.  -Blood sugars acceptable right now      Disposition  To be  determined      Abhijit Cantu MD  [facility]  06/06/22  14:59 EDT

## 2022-06-06 NOTE — PAYOR COMM NOTE
"Stephy Duncan (67 y.o. Female)     ATTN: CONTINUED STAY CLINICALS FOR REVIEW:  REF# [id number]    PLEASE REPLY TO [facility] DEPT: [phone]  [phone]              Date of Birth   1955    Social Security Number       Address   [address] [place] [address] [address] [facility] [address]    Home Phone   [phone]    MRN   [id number]       Orthodoxy   Jew    Marital Status                               Admission Date   6/1/22    Admission Type   Emergency    Admitting Provider   uJan Luz MD    Attending Provider   Abhijit Cantu MD    Department, Room/Bed   [address], S409/1       Discharge Date       Discharge Disposition       Discharge Destination                               Attending Provider: Abhijit Cantu MD    Allergies: Ace Inhibitors, Morphine    Isolation: Contact, Spore   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), 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) (06/05/22)   Code Status: CPR   Advance Care Planning Activity    Ht: 162.6 cm (64.02\")   Wt: 103 kg (227 lb 8.2 oz)    Admission Cmt: None   Principal Problem: Osteomyelitis of hip (HCC) [M86.9]                 Active Insurance as of 6/1/2022     Primary Coverage     Payor Plan Insurance Group Employer/Plan Group    AETNA MEDICARE REPLACEMENT AETNA MEDICARE REPLACEMENT [id number]     Payor Plan Address Payor Plan Phone Number Payor Plan Fax Number Effective Dates    [address] [phone]  1/1/2022 - None Entered    [facility] [address]       Subscriber Name Subscriber Birth Date Member ID       Stephy Duncan 1955 [id number]                 Emergency Contacts      (Rel.) Home Phone Work Phone Mobile Phone    ESTUARDO DUNCAN (Spouse) [phone] -- [phone]    ChrisBhumika (Sister) [phone] -- [phone]            Vital Signs (last 2 days)     Date/Time Temp Temp src Pulse Resp BP Patient Position SpO2    06/06/22 1345 99.1 (37.3) Oral 74 18 90/46 Lying 98    06/06/22 1031 98 " (36.7) Oral 77 18 117/57 -- 100    06/06/22 0736 98.2 (36.8) Oral 76 18 122/55 Lying 99    06/06/22 0716 98.1 (36.7) Oral 71 18 112/59 -- 98    06/06/22 0643 97.7 (36.5) Oral 68 18 117/56 -- 99    06/06/22 0344 98.5 (36.9) Oral 70 18 104/47 -- 96    06/06/22 0311 98.5 (36.9) Oral 70 18 111/45 -- 96    06/06/22 0228 98.3 (36.8) Oral 70 18 106/49 -- 98    06/05/22 2335 97.6 (36.4) Oral 70 18 114/49 Lying 98    06/05/22 1935 97.9 (36.6) Oral 67 18 95/42 Lying 98    06/05/22 1457 98.2 (36.8) Oral 70 20 104/65 -- 98    06/05/22 1323 98.3 (36.8) Oral 78 20 114/46 Sitting 98    06/05/22 1206 -- -- 75 20 -- -- --    06/05/22 1205 99.1 (37.3) Oral 75 20 118/55 -- 95    06/05/22 1132 99 (37.2) Oral 82 20 -- -- 97    06/05/22 0732 97.9 (36.6) Oral 84 18 138/52 Sitting 97    06/05/22 0603 -- -- 81 -- 122/54 -- --    06/04/22 2324 98.2 (36.8) Oral 79 18 103/44 Lying 94    06/04/22 1905 98.4 (36.9) Oral 80 18 104/54 Lying 97    06/04/22 1346 -- -- -- -- 112/58 -- --     Oxygen Therapy (last 2 days)     Date/Time SpO2 Device (Oxygen Therapy) Flow (L/min) Oxygen Concentration (%) ETCO2 (mmHg)    06/06/22 1345 98 room air -- -- --    06/06/22 1031 100 -- -- -- --    06/06/22 0736 99 room air -- -- --    06/06/22 0716 98 -- -- -- --    06/06/22 0643 99 -- -- -- --    06/06/22 0344 96 -- -- -- --    06/06/22 0311 96 -- -- -- --    06/06/22 0228 98 -- -- -- --    06/05/22 2335 98 -- -- -- --    06/05/22 1935 98 -- -- -- --    06/05/22 1457 98 -- -- -- --    06/05/22 1454 -- room air -- -- --    06/05/22 1323 98 room air -- -- --    06/05/22 1205 95 -- -- -- --    06/05/22 1132 97 -- -- -- --    06/05/22 0735 -- room air -- -- --    06/05/22 0732 97 room air -- -- --    06/05/22 0015 -- room air -- -- --    06/04/22 2324 94 room air -- -- --    06/04/22 2131 -- room air -- -- --     Intake & Output (last 2 days)       06/04 0701  06/05 0700 06/05 0701 06/06 0700 06/06 0701  06/07 0700    P.O. 120 1300     I.V. (mL/kg) 600 (5.8)       Blood  900 300    IV Piggyback 50      Total Intake(mL/kg) 770 (7.5) 2200 (21.4) 300 (2.9)    Urine (mL/kg/hr)  375 (0.2) 900 (1.1)    Emesis/NG output 300      Stool       Wound       Total Output 300 375 900    Net +470 +1825 -600           Urine Unmeasured Occurrence 2 x      Stool Unmeasured Occurrence 2 x          Lines, Drains & Airways     Active LDAs     Name Placement date Placement time Site Days    PICC Single Lumen 05/06/22 Left Basilic 05/06/22  1448  All sharps and needles accounted for post-placement  Basilic  30    Peripheral IV Anterior;Right Forearm --  --  Forearm  --    Peripheral IV 06/06/22 0306 Anterior;Right Wrist 06/06/22  0306  Wrist  less than 1                Current Facility-Administered Medications   Medication Dose Route Frequency Provider Last Rate Last Admin   • acetaminophen (TYLENOL) tablet 650 mg  650 mg Oral Q4H PRN Karri Schaeffer II, MD       • alteplase ((CATHFLO/ACTIVASE)) syringe 2 mg  2 mg Intracatheter Once Roopa Schrader, MORGAN       • cetirizine (zyrTEC) tablet 10 mg  10 mg Oral Daily Karri Schaeffer II, MD   10 mg at 06/06/22 1004   • DAPTOmycin (CUBICIN) 750 mg in sodium chloride 0.9 % 50 mL IVPB  10 mg/kg (Adjusted) Intravenous Q24H Karri Schaeffer II,  mL/hr at 06/05/22 2138 750 mg at 06/05/22 2138   • dextrose (D50W) (25 g/50 mL) IV injection 25 g  25 g Intravenous Q15 Min PRN Karri Schaeffer II, MD       • dextrose (GLUTOSE) oral gel 15 g  15 g Oral Q15 Min PRN Karri Schaeffer II, MD       • Diclofenac Sodium (VOLTAREN) 1 % gel 4 g  4 g Topical 4x Daily PRN Karri Schaeffer II, MD       • ferrous sulfate tablet 325 mg  325 mg Oral Every Other Day Karri Schaeffer II, MD   325 mg at 06/06/22 1003   • gabapentin (NEURONTIN) capsule 600 mg  600 mg Oral Q12H Karri Schaeffer II, MD   600 mg at 06/06/22 1003   • glucagon (human recombinant) (GLUCAGEN DIAGNOSTIC) injection 1 mg  1 mg  Intramuscular Q15 Min PRN Karri Schaeffer II, MD       • insulin glargine (LANTUS, SEMGLEE) injection 30 Units  30 Units Subcutaneous Nightly Arian Morrissey MD   30 Units at 06/05/22 2150   • insulin lispro (ADMELOG) injection 3 Units  3 Units Subcutaneous TID With Meals Karri Schaeffer II, MD   3 Units at 06/06/22 1211   • isosorbide mononitrate (IMDUR) 24 hr tablet 30 mg  30 mg Oral QAM Karri Schaeffer II, MD   30 mg at 06/06/22 0639   • lactated ringers infusion  9 mL/hr Intravenous Continuous Karri Schaeffer II, MD 9 mL/hr at 06/04/22 1137 9 mL/hr at 06/04/22 1137   • lactobacillus acidophilus (RISAQUAD) capsule 1 capsule  1 capsule Oral Daily Karri Schaeffer II, MD   1 capsule at 06/06/22 1004   • metoprolol succinate XL (TOPROL-XL) 24 hr tablet 25 mg  25 mg Oral Daily Karri Schaeffer II, MD   25 mg at 06/06/22 1003   • miconazole (MICOTIN) 2 % powder   Topical Q12H Karri Schaeffer II, MD   Given at 06/06/22 1004   • nitroglycerin (NITROSTAT) SL tablet 0.4 mg  0.4 mg Sublingual Q5 Min PRN Karri Schaeffer II, MD       • ondansetron (ZOFRAN) injection 4 mg  4 mg Intravenous Q4H PRN Arian Morrissey MD   4 mg at 06/05/22 0823   • oxyCODONE-acetaminophen (PERCOCET) 5-325 MG per tablet 1 tablet  1 tablet Oral Q4H PRN Karri Schaeffer II, MD   1 tablet at 06/06/22 1004   • pantoprazole (PROTONIX) EC tablet 40 mg  40 mg Oral QAM Karri Schaeffer II, MD   40 mg at 06/06/22 0639   • piperacillin-tazobactam (ZOSYN) 3.375 g in iso-osmotic dextrose 50 ml (premix)  3.375 g Intravenous Q8H Karri Schaeffer II, MD   3.375 g at 06/06/22 1003   • sodium chloride 0.9 % flush 10 mL  10 mL Intravenous Q12H Karri Schaeffer II, MD   10 mL at 06/06/22 1005   • sodium chloride 0.9 % flush 10 mL  10 mL Intravenous PRN Karri Schaeffer II, MD       • sodium chloride 0.9 % flush 20 mL  20 mL Intravenous PRN Sweet, Karri  Sabas BAXTER MD           Blood Administration Record (From admission, onward)    Completed transfusions     Ordered     Start    06/06/22 0024  Transfuse RBC, 2 Units Infuse Each Unit Over: 3.5H  Transfusion      Released Time Blood Unit Number Status   06/06/22 0317   22  [id number]  [id number] Completed 06/06/22 0645   06/06/22 0645   22  [id number]  [id number] Completed 06/06/22 1032       06/06/22 0024    06/05/22 1049  Transfuse RBC Infuse Each Unit Over: 3.5H  Transfusion        Released Time Blood Unit Number Status   06/05/22 1113   22  [id number]  [id number] Completed 06/05/22 1510       06/05/22 1048    06/02/22 0828  Transfuse RBC, 2 Units Infuse Each Unit Over: 3.5H  Transfusion      Released Time Blood Unit Number Status   06/02/22 1212   22  [id number]  [id number] Completed 06/02/22 1844   06/02/22 1212   22  [id number]  [id number] Completed 06/02/22 1531       06/02/22 0824          Canceled transfusions     Ordered     Start    06/05/22 1033  Transfuse RBC, 1 Units Infuse Each Unit Over: 3.5H  Transfusion,   Status:  Canceled         06/05/22 1033                Lab Results (last 48 hours)     Procedure Component Value Units Date/Time    Hemoglobin & Hematocrit, Blood [[id number]]  (Abnormal) Collected: 06/06/22 1146    Specimen: Blood Updated: 06/06/22 1258     Hemoglobin 9.6 g/dL      Hematocrit 29.6 %     Urine Culture - Urine, Urine, Catheter In/Out [[id number]]  (Normal) Collected: 06/05/22 1137    Specimen: Urine, Catheter In/Out Updated: 06/06/22 1155     Urine Culture No growth    POC Glucose Once [[id number]]  (Abnormal) Collected: 06/06/22 1140    Specimen: Blood Updated: 06/06/22 1143     Glucose 189 mg/dL      Comment: Meter: [id number] : [id number] Ivana West CNA       POC Glucose Once [[id number]]  (Abnormal) Collected: 06/06/22 0620    Specimen: Blood Updated: 06/06/22 0621     Glucose 161 mg/dL      Comment: Meter: [id number] : [id number] Scot CUNNINGHAM        Hemoglobin & Hematocrit, Blood [[id number]]  (Abnormal) Collected: 06/05/22 2310    Specimen: Blood Updated: 06/05/22 2352     Hemoglobin 6.6 g/dL      Hematocrit 21.4 %     POC Glucose Once [[id number]]  (Abnormal) Collected: 06/05/22 2107    Specimen: Blood Updated: 06/05/22 2108     Glucose 205 mg/dL      Comment: Meter: [id number] : [id number] Scot CUNNINGHAM       Blood Culture - Blood, Arm, Right [[id number]]  (Normal) Collected: 06/01/22 2018    Specimen: Blood from Arm, Right Updated: 06/05/22 2032     Blood Culture No growth at 4 days    Narrative:      Less than seven (7) mL's of blood was collected.  Insufficient quantity may yield false negative results.    Blood Culture - Blood, Blood, Central Line [[id number]]  (Normal) Collected: 06/01/22 2005    Specimen: Blood, Central Line Updated: 06/05/22 2017     Blood Culture No growth at 4 days    Narrative:      Less than seven (7) mL's of blood was collected.  Insufficient quantity may yield false negative results.    Hemoglobin & Hematocrit, Blood [[id number]] Updated: 06/05/22 1911    Specimen: Blood     POC Glucose Once [[id number]]  (Abnormal) Collected: 06/05/22 1617    Specimen: Blood Updated: 06/05/22 1619     Glucose 273 mg/dL      Comment: Meter: [id number] : [id number] Nelson CUNNINGHAM       Urinalysis, Microscopic Only - Urine, Catheter In/Out [[id number]]  (Abnormal) Collected: 06/05/22 1137    Specimen: Urine, Catheter In/Out Updated: 06/05/22 1229     RBC, UA 0-2 /HPF      WBC, UA 13-20 /HPF      Bacteria, UA None Seen /HPF      Squamous Epithelial Cells, UA 0-2 /HPF      Hyaline Casts, UA 3-6 /LPF      Methodology Manual Light Microscopy    Urinalysis With Culture If Indicated - Urine, Catheter In/Out [[id number]]  (Abnormal) Collected: 06/05/22 1137    Specimen: Urine, Catheter In/Out Updated: 06/05/22 1202     Color, UA Yellow     Appearance, UA Clear     pH, UA 5.5     Specific Gravity, UA 1.014     Glucose, UA Negative     Ketones, UA  Negative     Bilirubin, UA Negative     Blood, UA Negative     Protein, UA Negative     Leuk Esterase, UA Moderate (2+)     Nitrite, UA Negative     Urobilinogen, UA 0.2 E.U./dL    Narrative:      In absence of clinical symptoms, the presence of pyuria, bacteria, and/or nitrites on the urinalysis result does not correlate with infection.    POC Glucose Once [[id number]]  (Abnormal) Collected: 06/05/22 1114    Specimen: Blood Updated: 06/05/22 1116     Glucose 245 mg/dL      Comment: Meter: [id number] : [id number] Nelson CUNNINGHAM       Basic Metabolic Panel [[id number]]  (Abnormal) Collected: 06/05/22 0903    Specimen: Blood Updated: 06/05/22 0957     Glucose 223 mg/dL      BUN 15 mg/dL      Creatinine 1.48 mg/dL      Sodium 129 mmol/L      Potassium 5.0 mmol/L      Chloride 99 mmol/L      CO2 19.6 mmol/L      Calcium 7.7 mg/dL      BUN/Creatinine Ratio 10.1     Anion Gap 10.4 mmol/L      eGFR 38.2 mL/min/1.73      Comment: National Kidney Foundation and American Society of Nephrology (ASN) Task Force recommended calculation based on the Chronic Kidney Disease Epidemiology Collaboration (CKD-EPI) equation refit without adjustment for race.       Narrative:      GFR Normal >60  Chronic Kidney Disease <60  Kidney Failure <15      CBC & Differential [[id number]]  (Abnormal) Collected: 06/05/22 0903    Specimen: Blood Updated: 06/05/22 0933    Narrative:      The following orders were created for panel order CBC & Differential.  Procedure                               Abnormality         Status                     ---------                               -----------         ------                     CBC Auto Differential[[id number]]        Abnormal            Final result                 Please view results for these tests on the individual orders.    CBC Auto Differential [[id number]]  (Abnormal) Collected: 06/05/22 0903    Specimen: Blood Updated: 06/05/22 0933     WBC 26.03 10*3/mm3      RBC 2.49 10*6/mm3       Hemoglobin 6.5 g/dL      Hematocrit 20.9 %      MCV 83.9 fL      MCH 26.1 pg      MCHC 31.1 g/dL      RDW 16.7 %      RDW-SD 51.5 fl      MPV 9.6 fL      Platelets 251 10*3/mm3      Neutrophil % 78.4 %      Lymphocyte % 10.5 %      Monocyte % 7.2 %      Eosinophil % 0.1 %      Basophil % 0.2 %      Immature Grans % 3.6 %      Neutrophils, Absolute 20.39 10*3/mm3      Lymphocytes, Absolute 2.74 10*3/mm3      Monocytes, Absolute 1.88 10*3/mm3      Eosinophils, Absolute 0.02 10*3/mm3      Basophils, Absolute 0.05 10*3/mm3      Immature Grans, Absolute 0.95 10*3/mm3      nRBC 0.0 /100 WBC     POC Glucose Once [[id number]]  (Abnormal) Collected: 06/05/22 0636    Specimen: Blood Updated: 06/05/22 0638     Glucose 227 mg/dL      Comment: Meter: [id number] : [id number] Reinaldo Tia NA       POC Glucose Once [[id number]]  (Abnormal) Collected: 06/04/22 2104    Specimen: Blood Updated: 06/04/22 2105     Glucose 224 mg/dL      Comment: Meter: [id number] : [id number] Najera Tia NA       POC Glucose Once [[id number]]  (Abnormal) Collected: 06/04/22 1602    Specimen: Blood Updated: 06/04/22 1603     Glucose 203 mg/dL      Comment: Meter: [id number] : [id number] Nelson CUNNINGHAM             ECG/EMG Results (last 48 hours)     Procedure Component Value Units Date/Time    SCANNED - TELEMETRY   [[id number]] Resulted: 06/01/22     Updated: 06/05/22 0723    SCANNED - TELEMETRY   [[id number]] Resulted: 06/01/22     Updated: 06/05/22 0923    SCANNED - TELEMETRY   [[id number]] Resulted: 06/01/22     Updated: 06/06/22 0648    SCANNED - TELEMETRY   [[id number]] Resulted: 06/01/22     Updated: 06/06/22 0921    SCANNED - TELEMETRY   [[id number]] Resulted: 06/01/22     Updated: 06/06/22 1140          Orders (last 48 hrs)      Start     Ordered    06/07/22 0600  CBC & Differential  Morning Draw         06/06/22 1437    06/06/22 1230  Lidocaine Viscous HCl (XYLOCAINE) 2 % solution 5 mL  Once         06/06/22 1133    06/06/22 1230   aluminum-magnesium hydroxide-simethicone (MAALOX MAX) 400-400-40 MG/5ML suspension 15 mL  Once         06/06/22 1133    06/06/22 1144  POC Glucose Once  PROCEDURE ONCE         06/06/22 1140    06/06/22 1130  Hemoglobin & Hematocrit, Blood  Once         06/06/22 1055    06/06/22 0830  alteplase ((CATHFLO/ACTIVASE)) syringe 2 mg  Once         06/06/22 0657    06/06/22 0658  Notify Provider and Activate Angioedema Order Set (see comments)  Until Discontinued        Comments: If thrombolytic induced angioedema is suspected, activate the thromobolytic induced angioedema order set. These symptoms include orolingual, hemifacial swelling, often contralateral to ischemic cerebral hemisphere. Onset may range from 5 - 180 minutes following infusion initiation.    06/06/22 0657    06/06/22 0655  Prepare RBC, 2 Units  Blood - Once         06/06/22 0655    06/06/22 0622  POC Glucose Once  PROCEDURE ONCE         06/06/22 0620    06/06/22 0600  CBC & Differential  Morning Draw         06/05/22 1034    06/06/22 0600  CBC Auto Differential  PROCEDURE ONCE         06/05/22 2204    06/06/22 0025  Prepare RBC, 2 Units  Blood - Once         06/06/22 0024    06/06/22 0024  Transfuse RBC, 2 Units Infuse Each Unit Over: 3.5H  Transfusion       06/06/22 0024    06/06/22 0024  Verify Informed Consent for Blood Product Administration  Once         06/06/22 0024    06/06/22 0024  Type & Screen  STAT         06/06/22 0024    06/06/22 0000  Consult to Wound / Ostomy Care  Once        Comments: Incisional wound VAC to be changed 3 times a week, thank you    06/05/22 0734    06/05/22 2238  Hemoglobin & Hematocrit, Blood  STAT         06/05/22 2239    06/05/22 2109  POC Glucose Once  PROCEDURE ONCE         06/05/22 2107    06/05/22 2100  insulin glargine (LANTUS, SEMGLEE) injection 30 Units  Nightly         06/05/22 1051    06/05/22 1911  Hemoglobin & Hematocrit, Blood  Timed         06/05/22 1531    06/05/22 1619  POC Glucose Once  PROCEDURE ONCE          06/05/22 1617    06/05/22 1555  PT Consult: Eval & Treat Functional Mobility Below Baseline  Once         06/05/22 1554    06/05/22 1230  Urine Culture - Urine, Urine, Catheter In/Out  Once         06/05/22 1229    06/05/22 1201  Urinalysis, Microscopic Only - Urine, Catheter In/Out  Once         06/05/22 1200    06/05/22 1117  POC Glucose Once  PROCEDURE ONCE         06/05/22 1114    06/05/22 1105  Notify Provider Acute Urinary Retention  Until Discontinued         06/05/22 1104    06/05/22 1104  Urinalysis With Culture If Indicated - Urine, Catheter In/Out  Once         06/05/22 1104    06/05/22 1049  Verify Informed Consent for Blood Product Administration  Once         06/05/22 1049    06/05/22 1049  Prepare RBC, 1 Units  Blood - Once,   Status:  Canceled         06/05/22 1049    06/05/22 1048  Transfuse RBC Infuse Each Unit Over: 3.5H  Transfusion         06/05/22 1049    06/05/22 1034  Verify Informed Consent for Blood Product Administration  Once         06/05/22 1033    06/05/22 1034  Prepare RBC, 1 Units  Blood - Once,   Status:  Canceled         06/05/22 1033    06/05/22 1033  Transfuse RBC, 1 Units Infuse Each Unit Over: 3.5H  Transfusion,   Status:  Canceled         06/05/22 1033    06/05/22 0734  Hemoglobin & Hematocrit, Blood  Once,   Status:  Canceled         06/05/22 0733    06/05/22 0647  Basic Metabolic Panel  Once         06/05/22 0646    06/05/22 0647  CBC & Differential  Once         06/05/22 0646    06/05/22 0647  CBC Auto Differential  PROCEDURE ONCE         06/05/22 0646    06/05/22 0639  POC Glucose Once  PROCEDURE ONCE         06/05/22 0636    06/05/22 0340  Clostridium Difficile Toxin, PCR - Stool, Per Rectum  Once         06/05/22 0341    06/04/22 2106  POC Glucose Once  PROCEDURE ONCE         06/04/22 2104    06/04/22 1604  POC Glucose Once  PROCEDURE ONCE         06/04/22 1602    06/04/22 1416  ondansetron (ZOFRAN) injection 4 mg  Every 4 Hours PRN         06/04/22 1417     06/04/22 0723  lactated ringers infusion  Continuous         06/04/22 0722    06/03/22 1230  lactobacillus acidophilus (RISAQUAD) capsule 1 capsule  Daily         06/03/22 1053    06/03/22 1230  miconazole (MICOTIN) 2 % powder  Every 12 Hours Scheduled         06/03/22 1053    06/03/22 1200  insulin lispro (ADMELOG) injection 3 Units  3 Times Daily With Meals         06/03/22 0913    06/03/22 0700  isosorbide mononitrate (IMDUR) 24 hr tablet 30 mg  Every Morning        Note to Pharmacy: Hold systolic less than 110    06/02/22 0829    06/02/22 2100  DAPTOmycin (CUBICIN) 750 mg in sodium chloride 0.9 % 50 mL IVPB  Every 24 Hours         06/02/22 0227 06/02/22 2100  insulin glargine (LANTUS, SEMGLEE) injection 25 Units  Nightly,   Status:  Discontinued         06/02/22 0227 06/02/22 2000  piperacillin-tazobactam (ZOSYN) 3.375 g in iso-osmotic dextrose 50 ml (premix)  Every 8 Hours         06/02/22 1307    06/02/22 1800  Dietary Nutrition Supplements Claude  Daily With Breakfast & Dinner       06/02/22 1514    06/02/22 0900  sodium chloride 0.9 % flush 10 mL  Every 12 Hours Scheduled         06/02/22 0213    06/02/22 0900  gabapentin (NEURONTIN) capsule 600 mg  Every 12 Hours Scheduled         06/02/22 0227    06/02/22 0900  cetirizine (zyrTEC) tablet 10 mg  Daily         06/02/22 0227    06/02/22 0900  metoprolol succinate XL (TOPROL-XL) 24 hr tablet 25 mg  Daily         06/02/22 0227    06/02/22 0900  ferrous sulfate tablet 325 mg  Every Other Day         06/02/22 0726    06/02/22 0700  pantoprazole (PROTONIX) EC tablet 40 mg  Every Morning         06/02/22 0227    06/02/22 0700  POC Glucose TID AC  3 Times Daily Before Meals       06/02/22 0256    06/02/22 0400  Vital Signs  Every 4 Hours       06/02/22 0028    06/02/22 0255  glucagon (human recombinant) (GLUCAGEN DIAGNOSTIC) injection 1 mg  Every 15 Minutes PRN         06/02/22 0256    06/02/22 0255  dextrose (GLUTOSE) oral gel 15 g  Every 15 Minutes PRN       "   06/02/22 0256    06/02/22 0255  dextrose (D50W) (25 g/50 mL) IV injection 25 g  Every 15 Minutes PRN         06/02/22 0256    06/02/22 0226  oxyCODONE-acetaminophen (PERCOCET) 5-325 MG per tablet 1 tablet  Every 4 Hours PRN         06/02/22 0227    06/02/22 0224  Diclofenac Sodium (VOLTAREN) 1 % gel 4 g  4 Times Daily PRN         06/02/22 0227    06/02/22 0209  sodium chloride 0.9 % flush 10 mL  As Needed         06/02/22 0213    06/02/22 0209  sodium chloride 0.9 % flush 20 mL  As Needed         06/02/22 0213    06/02/22 0026  acetaminophen (TYLENOL) tablet 650 mg  Every 4 Hours PRN        \"Or\" Linked Group Details    06/02/22 0028    06/02/22 0025  nitroglycerin (NITROSTAT) SL tablet 0.4 mg  Every 5 Minutes PRN         06/02/22 0028    06/02/22 0024  Intake & Output  Every Shift       06/02/22 0028    Unscheduled  Oxygen Therapy- Nasal Cannula; Titrate for SPO2: 90% - 95%  Continuous PRN      Comments: If Patient Develops Unresponsiveness, Acute Dyspnea, Cyanosis or Suspected Hypoxemia Start Continuous Pulse Ox Monitoring, Apply Oxygen & Notify Provider    06/02/22 0028    Unscheduled  ECG 12 Lead  As Needed      Comments: Nurse to Release if Patient Expericences Acute Chest Pain or Dysrhythmias    06/02/22 0028    Unscheduled  Potassium  As Needed      Comments: For Ventricular Arrhythmias      06/02/22 0028    Unscheduled  Magnesium  As Needed      Comments: For Ventricular Arrhythmias      06/02/22 0028    Unscheduled  Troponin  As Needed      Comments: For Chest Pain      06/02/22 0028    Unscheduled  Blood Gas, Arterial -  As Needed      Comments: Draw for Acute Dyspnea, Cyanosis, Suspected Hypoxemia or UnresponsivenessNotify Provider of Results      06/02/22 0028    Unscheduled  ACLS Protocol For Life Threatening Dysrhythmias (Unless Code Status Indicates Otherwise)  As Needed       06/02/22 0028    Unscheduled  Up With Assistance  As Needed       06/02/22 0028    Unscheduled  Change Dressing to IV Site " As Needed When Damp, Loose or Soiled  As Needed       06/02/22 0213    Unscheduled  Oxygen Therapy- Nasal Cannula; Titrate for SPO2: Per Policy  Continuous PRN       06/04/22 0722    Unscheduled  Oxygen Therapy- Nasal Cannula; Titrate for SPO2: per policy  Continuous PRN       06/04/22 0858    Unscheduled  Assess Patient For Urinary Retention  As Needed      Comments: Signs / Symptoms Urinary Retention:  - Bladder Palpable  - Suprapubic / Bladder Discomfort or Pain  - Urge to Void, But Unable  - Reports Unable to Void After 8 Hours    06/05/22 1104    Unscheduled  Utilize Voiding Measures if Retention is Suspected  As Needed      Comments: Voiding Measures:  - Privacy  - Relaxed Environment  - Suprapubic Massage  - Suprapubic Warm Compress  - Trigger Techniques  - Stand to Void   - Bedside Commode    06/05/22 1104    Unscheduled  Bladder Scan for Suspected Urinary Retention  As Needed       06/05/22 1104    Unscheduled  Monitor Every 1-2 Hours for Spontaneous Void if Bladder Scan Volume is Less Than 500mL & Patient is Without Symptoms of Bladder Discomfort / Distention  As Needed       06/05/22 1104    Unscheduled  Straight Cath For Acute Retention if Bladder Scan Volume is Greater Than 500mL or Patient Has Symptoms of Bladder Discomfort / Distention (Unless Contraindicated)  As Needed       06/05/22 1104    --  SCANNED - TELEMETRY           06/01/22 0000    --  SCANNED - TELEMETRY           06/01/22 0000    --  SCANNED - TELEMETRY           06/01/22 0000    --  SCANNED - TELEMETRY           06/01/22 0000    --  SCANNED - TELEMETRY           06/01/22 0000                   Physician Progress Notes (last 48 hours)      You Spencer MD at 06/06/22 0902          REASON FOR FOLLOW-UP:  anemia    INTERVAL HISTORY:  6/6/22: Pt is resting comfortably in bed. No acute events overnight. Remains on IV daptomycin and zosyn per ID after right hip irrigation & debridement on 6/4/22. Vitals stable. Hb/Hct shows improved Hb to  9.6 after transfusion. Full CBC pending.     HISTORY OF PRESENT ILLNESS:   This is a pleasant 67-year-old woman with type 2 diabetes, hypertension, chronic diastolic CHF and osteomyelitis of the right hip who has been treated with antibiotics and multiple surgeries.  She presents with complaints of increasing pain, increasing drainage of foul-smelling purulence from the right hip wound VAC.  She has been on daptomycin as an outpatient.     Hematology is requested to see the patient for evaluation and management of pancytopenia.  The patient has chronic anemia and is followed by Dr. Abad Rivera in [facility].  She has recently required fairly frequent blood transfusions particularly perioperatively.  She has also been given IV iron in [place].  Her hemoglobin has typically been in the 7-9 range since November 2021.  The white blood cell count fluctuates but is typically fairly normal and her platelet count is typically also normal.  She presents with a white count of 7.0, hemoglobin 7.5 and platelets 180 but repeat this morning showed a drop in the white cells to 3.2 with an ANC 1.97, hemoglobin 6.0/MCV 81.5 and platelets 121.  Her iron sat is 16% with a TIBC 112 and ferritin is 616.  2 units of packed red blood cells have been requested.     She complains of fatigue no major short of breath lightheadedness or dizziness.     Past Medical History,, Past Surgical History, Social History, Family History have been reviewed and are without significant changes except as mentioned.    Review of Systems   Constitutional: Positive for activity change and fatigue.   Respiratory: Negative for shortness of breath.    Musculoskeletal: Positive for gait problem.   Skin: Positive for wound.   Neurological: Positive for weakness. Negative for dizziness and light-headedness.      Medications:  The current medication list was reviewed in the EMR    ALLERGIES:    Allergies   Allergen Reactions   • Ace Inhibitors Shortness Of  Breath and Swelling   • Morphine Shortness Of Breath       Objective      Vitals:    06/06/22 0736   BP: 122/55   Pulse: 76   Resp: 18   Temp: 98.2 °F (36.8 °C)   SpO2: 99%          Physical Exam    CONSTITUTIONAL:  Vital signs reviewed.  No distress, looks comfortable.  EYES:  Conjunctiva and lids unremarkable  EARS,NOSE,MOUTH,THROAT:  Ears and nose appear unremarkable.  Lips, teeth, gums appear unremarkable.  RESPIRATORY:  Normal respiratory effort.  Equal chest movement  CARDIOVASCULAR:  Normal heart rate. No leg edema  GASTROINTESTINAL: Abdomen appears unremarkable.  Nondistended  NEURO: AAO x 3, RLE diminished strenght  SKIN:  Warm.  No rashes.  PSYCHIATRIC:  Normal judgment and insight.  Normal mood and affect.    RECENT LABS:  Hematology Results from last 7 days   Lab Units 06/05/22  2310 06/05/22  0903 06/04/22  0554 06/03/22  0453   WBC 10*3/mm3  --  26.03* 3.87 4.39   HEMOGLOBIN g/dL 6.6* 6.5* 8.5* 8.2*   HEMATOCRIT % 21.4* 20.9* 27.3* 27.3*   PLATELETS 10*3/mm3  --  251 145 136*     2  Lab Results   Component Value Date    GLUCOSE 223 (H) 06/05/2022    BUN 15 06/05/2022    CREATININE 1.48 (H) 06/05/2022    EGFRIFNONA 68 02/16/2022    BCR 10.1 06/05/2022    CO2 19.6 (L) 06/05/2022    CALCIUM 7.7 (L) 06/05/2022    ALBUMIN 1.90 (L) 06/01/2022    LABIL2 1.2 (L) 10/24/2020    AST 18 06/01/2022    ALT 13 06/01/2022       Lab Results   Component Value Date    IRON 18 (L) 06/02/2022    TIBC 112 (L) 06/02/2022    FERRITIN 616.00 (H) 06/02/2022       Lab Results   Component Value Date    LCCPLJHN89 632 06/03/2022       Lab Results   Component Value Date    FOLATE 18.40 06/03/2022      CRP 6.98  ESR 34    Assessment & Plan   *Acute on chronic anemia  · Her labs are most consistent with anemia of chronic disease secondary to osteomyelitis given the elevated ferritin/dramatically low TIBC.  ESR/CRP elevated on admission consistent with inflammation  · Hemoglobin on admission 6.0 from a baseline 8-9  · Appropriate  response to 2 units PRBCs- hemoglobin 8.2 from 6.0  · Hgb 9.6 on 22.      *Mild leukopenia -resolved     *Mild thrombocytopenia- improved       *Hypoproteinemia/hypoalbuminemia     *Hyponatremia    # Recurrent osteomyelitis: Per ID    # Leucocytosis: Likely reactive.      Hematology plan/recommendations:  1. Anemia of chronic disease +/- blood loss during surgery. Appropriate response to PRBC transfusion.   2. Continue PO iron  3.  Monitor CBC with transfusion support as needed  4. Hematology will follow in periphery. Please call as needed.      2022      CC:              Electronically signed by You Spencer MD at 22 6050     Fernando Hart MD at 22 0652            Infectious Diseases Progress Note    Fernando Hart MD     [facility]  [place]: 5 days  Patient Identification:  Name: Stephy Duncan  Age: 67 y.o.  Sex: female  :  1955  MRN: [id number]         Primary Care Physician: Gregorio Rosales            Subjective: Overall feels improved compared to 48 hours ago.  Interval History: See consultation note.  On 2022 patient underwent:Irrigation and Debridement of right hip joint with excision of proximal femur  Objective:    Scheduled Meds:cetirizine, 10 mg, Oral, Daily  DAPTOmycin (CUBICIN)  IV, 10 mg/kg (Adjusted), Intravenous, Q24H  ferrous sulfate, 325 mg, Oral, Every Other Day  gabapentin, 600 mg, Oral, Q12H  insulin glargine, 30 Units, Subcutaneous, Nightly  insulin lispro, 3 Units, Subcutaneous, TID With Meals  isosorbide mononitrate, 30 mg, Oral, QAM  lactobacillus acidophilus, 1 capsule, Oral, Daily  metoprolol succinate XL, 25 mg, Oral, Daily  miconazole, , Topical, Q12H  pantoprazole, 40 mg, Oral, QAM  piperacillin-tazobactam, 3.375 g, Intravenous, Q8H  sodium chloride, 10 mL, Intravenous, Q12H      Continuous Infusions:lactated ringers, 9 mL/hr, Last Rate: 9 mL/hr (22 1137)        Vital signs in last 24 hours:  Temp:  [97.6 °F (36.4 °C)-99.1 °F (37.3  "°C)] 98.5 °F (36.9 °C)  Heart Rate:  [67-84] 70  Resp:  [18-20] 18  BP: ()/(42-65) 104/47    Intake/Output:    Intake/Output Summary (Last 24 hours) at 6/6/2022 0639  Last data filed at 6/6/2022 0329  Gross per 24 hour   Intake 1900 ml   Output 375 ml   Net 1525 ml       Exam:  /47   Pulse 70   Temp 98.5 °F (36.9 °C) (Oral)   Resp 18   Ht 162.6 cm (64.02\")   Wt 103 kg (227 lb 8.2 oz)   SpO2 96%   BMI 39.03 kg/m²   Patient is examined using the personal protective equipment as per guidelines from infection control for this particular patient as enacted.  Hand washing was performed before and after patient interaction.  General Appearance:  Awake interactive does not appear toxic                          Head:    Normocephalic, without obvious abnormality, atraumatic                           Eyes:    PERRL, conjunctivae/corneas clear, EOM's intact, both eyes                         Throat:   Lips, tongue, gums normal; oral mucosa pink and moist                           Neck:   Supple, symmetrical, trachea midline, no JVD                         Lungs:    Clear to auscultation bilaterally, respirations unlabored                 Chest Wall:    No tenderness or deformity                          Heart:  S1-S2 regular                  Abdomen:   Soft nontender                 Extremities: Right hip surgical site dressed                            Pulses:   Pulses palpable in all extremities                            Skin: Chronic ecchymotic changes noted                  Neurologic: Grossly nonfocal       Data Review:    I reviewed the patient's new clinical results.  Results from last 7 days   Lab Units 06/05/22  2310 06/05/22  0903 06/04/22  0554 06/03/22  0453 06/02/22  0638 06/01/22  1916   WBC 10*3/mm3  --  26.03* 3.87 4.39 3.23* 7.00   HEMOGLOBIN g/dL 6.6* 6.5* 8.5* 8.2* 6.0* 7.5*   PLATELETS 10*3/mm3  --  251 145 136* 121* 180     Results from last 7 days   Lab Units 06/05/22  0903 " 06/04/22  0554 06/03/22  0453 06/02/22  0638 06/01/22  1916   SODIUM mmol/L 129* 134* 130* 130* 126*   POTASSIUM mmol/L 5.0 4.1 4.1 3.9 4.2   CHLORIDE mmol/L 99 105 104 102 97*   CO2 mmol/L 19.6* 21.0* 19.0* 18.4* 17.3*   BUN mg/dL 15 7* 10 14 15   CREATININE mg/dL 1.48* 0.76 0.71 0.85 1.03*   CALCIUM mg/dL 7.7* 8.0* 7.6* 7.3* 7.6*   GLUCOSE mg/dL 223* 114* 139* 236* 237*     Microbiology Results (last 10 days)     Procedure Component Value - Date/Time    COVID PRE-OP / PRE-PROCEDURE SCREENING ORDER (NO ISOLATION) - Swab, Nasopharynx [[id number]]  (Normal) Collected: 06/04/22 0336    Lab Status: Final result Specimen: Swab from Nasopharynx Updated: 06/04/22 0426    Narrative:      The following orders were created for panel order COVID PRE-OP / PRE-PROCEDURE SCREENING ORDER (NO ISOLATION) - Swab, Nasopharynx.  Procedure                               Abnormality         Status                     ---------                               -----------         ------                     COVID-19,[facility]...[[id number]]  Normal              Final result                 Please view results for these tests on the individual orders.    COVID-19,[facility] CEPHEID/ROBERTO CARLOS NP SWAB IN TRANSPORT MEDIA 8-12 HR TAT - Swab, Nasopharynx [[id number]]  (Normal) Collected: 06/04/22 0336    Lab Status: Final result Specimen: Swab from Nasopharynx Updated: 06/04/22 0426     COVID19 Not Detected    Narrative:      Fact sheet for providers: https://www.fda.gov/media/286618/download     Fact sheet for patients: https://www.fda.gov/media/709901/download    Blood Culture - Blood, Arm, Right [[id number]]  (Normal) Collected: 06/01/22 2018    Lab Status: Preliminary result Specimen: Blood from Arm, Right Updated: 06/05/22 2032     Blood Culture No growth at 4 days    Narrative:      Less than seven (7) mL's of blood was collected.  Insufficient quantity may yield false negative results.    Blood Culture - Blood, Blood, Central Line  [[id number]]  (Normal) Collected: 06/01/22 2005    Lab Status: Preliminary result Specimen: Blood, Central Line Updated: 06/05/22 2017     Blood Culture No growth at 4 days    Narrative:      Less than seven (7) mL's of blood was collected.  Insufficient quantity may yield false negative results.    COVID PRE-OP / PRE-PROCEDURE SCREENING ORDER (NO ISOLATION) - Swab, Nasopharynx [[id number]]  (Normal) Collected: 06/01/22 2003    Lab Status: Final result Specimen: Swab from Nasopharynx Updated: 06/01/22 2057    Narrative:      The following orders were created for panel order COVID PRE-OP / PRE-PROCEDURE SCREENING ORDER (NO ISOLATION) - Swab, Nasopharynx.  Procedure                               Abnormality         Status                     ---------                               -----------         ------                     COVID-19,[facility]...[[id number]]  Normal              Final result                 Please view results for these tests on the individual orders.    COVID-19,[facility] CEPHEID/ROBERTO CARLOS NP SWAB IN TRANSPORT MEDIA 8-12 HR TAT - Swab, Nasopharynx [[id number]]  (Normal) Collected: 06/01/22 2003    Lab Status: Final result Specimen: Swab from Nasopharynx Updated: 06/01/22 2057     COVID19 Not Detected    Narrative:      Fact sheet for providers: https://www.fda.gov/media/767822/download     Fact sheet for patients: https://www.fda.gov/media/847724/download            Assessment:    Osteomyelitis of hip (HCC)    Type 2 diabetes mellitus with diabetic polyneuropathy, with long-term current use of insulin (HCC)    Hypertension    Iron deficiency anemia    Obesity (BMI 30-39.9)    Chronic diastolic CHF (congestive heart failure) (HCC)    Hyponatremia    Metabolic acidosis    Sepsis without acute organ dysfunction (HCC)    Pathological fracture of right femur ([facility])  1-acute on chronic osteomyelitis of the right proximal femur in a similar area in a patient who had previous recurrent right hip  prosthetic and multiple surgeries with a latest procedure performed on 4/25/2022 and currently likely has mixed infection given the foul smell drainage and 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  2-pancytopenia  3-morbid obesity  4-type 2 diabetes  5-hyponatremia  6-other diagnosis per primary team     Recommendations/Discussions:  · Continue Zosyn and daptomycin   · Monitor closely for complications of antibiotics  · Since there are no operative culture results empiric antibiotic treatment based on the prior culture results and since she declined while on daptomycin consideration for mixed infection is needed to construct antibiotic regimen.  Based on this concept Zosyn and daptomycin need to be continued for at least couple weeks from 6/4/2022 and may be extended to 6 weeks if orthopedic surgery service thinks that there is residual osteomyelitis.  · Obviously patient need to be monitored closely for side effects of antibiotics.  Fernando Hart MD  6/6/2022  06:39 EDT    Much of this encounter note is an electronic transcription/translation of spoken language to printed text. The electronic translation of spoken language may permit erroneous, or at times, nonsensical words or phrases to be inadvertently transcribed; Although I have reviewed the note for such errors, some may still exist      Electronically signed by Fernando Hart MD at 06/06/22 0825     Emiliano Lunsford MD at 06/05/22 1031          REASON FOR FOLLOW-UP:  anemia    INTERVAL HISTORY:  The patient complains of fatigue but no significant lightheadedness or dizziness.    HISTORY OF PRESENT ILLNESS:   This is a pleasant 67-year-old woman with type 2 diabetes, hypertension, chronic diastolic CHF and osteomyelitis of the right hip who has been treated with antibiotics and multiple  surgeries.  She presents with complaints of increasing pain, increasing drainage of foul-smelling purulence from the right hip wound VAC.  She has been on daptomycin as an outpatient.     Hematology is requested to see the patient for evaluation and management of pancytopenia.  The patient has chronic anemia and is followed by Dr. Abad Rivera in [facility].  She has recently required fairly frequent blood transfusions particularly perioperatively.  She has also been given IV iron in [place].  Her hemoglobin has typically been in the 7-9 range since November 2021.  The white blood cell count fluctuates but is typically fairly normal and her platelet count is typically also normal.  She presents with a white count of 7.0, hemoglobin 7.5 and platelets 180 but repeat this morning showed a drop in the white cells to 3.2 with an ANC 1.97, hemoglobin 6.0/MCV 81.5 and platelets 121.  Her iron sat is 16% with a TIBC 112 and ferritin is 616.  2 units of packed red blood cells have been requested.     She complains of fatigue no major short of breath lightheadedness or dizziness.     Past Medical History, Past Surgical History, Social History, Family History have been reviewed and are without significant changes except as mentioned.    Review of Systems   Constitutional: Positive for activity change and fatigue.   Respiratory: Negative for shortness of breath.    Musculoskeletal: Positive for gait problem.   Skin: Positive for wound.   Neurological: Positive for weakness. Negative for dizziness and light-headedness.      ROS unchanged- 6/22    Medications:  The current medication list was reviewed in the EMR    ALLERGIES:    Allergies   Allergen Reactions   • Ace Inhibitors Shortness Of Breath and Swelling   • Morphine Shortness Of Breath       Objective      Vitals:    06/05/22 0732   BP: 138/52   Pulse: 84   Resp: 18   Temp: 97.9 °F (36.6 °C)   SpO2: 97%          Physical Exam    CONSTITUTIONAL: pleasant  well-developed obese woman  CV: RRR, S1S2, no murmur  RESP: cta bilat, no wheezing, no rales  GI: soft, non-tender, no splenomegaly, +bs  MUSC: Right hip wound  NEURO: alert and oriented x3, mild global weakness  PSYCH: normal mood and affect    RECENT LABS:  Hematology Results from last 7 days   Lab Units 06/05/22  0903 06/04/22  0554 06/03/22  0453   WBC 10*3/mm3 26.03* 3.87 4.39   HEMOGLOBIN g/dL 6.5* 8.5* 8.2*   HEMATOCRIT % 20.9* 27.3* 27.3*   PLATELETS 10*3/mm3 251 145 136*     2  Lab Results   Component Value Date    GLUCOSE 223 (H) 06/05/2022    BUN 15 06/05/2022    CREATININE 1.48 (H) 06/05/2022    EGFRIFNONA 68 02/16/2022    BCR 10.1 06/05/2022    CO2 19.6 (L) 06/05/2022    CALCIUM 7.7 (L) 06/05/2022    ALBUMIN 1.90 (L) 06/01/2022    LABIL2 1.2 (L) 10/24/2020    AST 18 06/01/2022    ALT 13 06/01/2022       Lab Results   Component Value Date    IRON 18 (L) 06/02/2022    TIBC 112 (L) 06/02/2022    FERRITIN 616.00 (H) 06/02/2022       Lab Results   Component Value Date    MFHEJBZU29 632 06/03/2022       Lab Results   Component Value Date    FOLATE 18.40 06/03/2022      CRP 6.98  ESR 34    Assessment & Plan   *Acute on chronic anemia  · Her labs are most consistent with anemia of chronic disease secondary to osteomyelitis given the elevated ferritin/dramatically low TIBC.  ESR/CRP elevated on admission consistent with inflammation  · Hemoglobin on admission 6.0 from a baseline 8-9  · Appropriate response to 2 units PRBCs- hemoglobin 8.2 from 6.0  · Hgb stable down to 6.5 postop     *Mild leukopenia -resolved, now with leukocytosis     *Mild thrombocytopenia- improved-normal to 51     *Hypoproteinemia/hypoalbuminemia     *Hyponatremia     Hematology plan/recommendations:  5. I suspect the patient's chronic/worsening anemia and leukopenia/thrombocytopenia are all secondary to her worsening infection.  The ferritin is dramatically elevated consistent with inflammation in the TIBC very low consistent with chronic  disease/inflammation  6.  Monitor CBC with transfusion support as needed  7. Transfuse 1 unit PRBCs today for hemoglobin 6.5                   2022      CC:              Electronically signed by Emiliano Lunsford MD at 22 1034     Karri Schaeffer II, MD at 22 0733        Wound VAC in place and functioning well.  No labs yet back today other than glucose, I have ordered an H&H.  Wound VAC changes to begin 3 times a week starting Monday.    Electronically signed by Karri Schaeffer II, MD at 22 0734     Arian Morrissey MD at 22 0646              Name: Stephy Duncan ADMIT: 2022   : 1955  PCP: Gregorio Rosales    MRN: [id number] LOS: 4 days   AGE/SEX: 67 y.o. female  ROOM: [facility]     Subjective   Subjective   No acute events overnight.  She received 2 units of PRBCs yesterday for hemoglobin of 6.0 which is increased to 8.2.  Otherwise she remained afebrile overnight, and is currently hemodynamically stable.  She complains of right hip pain and is disconcerted that that area is malodorous.  She is on daptomycin and Zosyn      MRI of the right hip shows robust soft tissue edema with multiple small foci of gas in the right hip joint space.  Findings are concerning for cellulitis with osteomyelitis.  She is going to the OR today for hip irrigation and debridement.    She is doing well postoperatively, just complains of little bit of pain and some nausea.    :   Hemoglobin dropped to 6.5.  Sodium is at 129.  She is chronically hyponatremic.      Review of Systems   Constitutional: Negative for chills and fever.   Respiratory: Negative for shortness of breath and wheezing.    Cardiovascular: Negative for chest pain and palpitations.   Gastrointestinal: Negative for abdominal pain and blood in stool.   Musculoskeletal:        Right hip pain tenderness   Neurological: Negative for weakness and headaches.       Objective   Objective   Vital Signs  Temp:  [97.8 °F  (36.6 °C)-98.4 °F (36.9 °C)] 98.2 °F (36.8 °C)  Heart Rate:  [67-83] 81  Resp:  [16-18] 18  BP: ()/(44-64) 122/54  SpO2:  [66 %-100 %] 94 %  on  Flow (L/min):  [2] 2;   Device (Oxygen Therapy): room air  Body mass index is 39.03 kg/m².  Physical Exam  Constitutional:       Appearance: Normal appearance. She is obese.   HENT:      Head: Normocephalic and atraumatic.   Cardiovascular:      Rate and Rhythm: Normal rate and regular rhythm.   Pulmonary:      Effort: Pulmonary effort is normal. No respiratory distress.   Abdominal:      General: There is no distension.      Palpations: Abdomen is soft.      Tenderness: There is no abdominal tenderness.   Musculoskeletal:      Comments: Right hip wound VAC.    Skin:     General: Skin is warm and dry.   Neurological:      General: No focal deficit present.      Mental Status: She is alert and oriented to person, place, and time.         Results Review     I reviewed the patient's new clinical results.  Results from last 7 days   Lab Units 06/04/22  0554 06/03/22  0453 06/02/22 0638 06/01/22 1916   WBC 10*3/mm3 3.87 4.39 3.23* 7.00   HEMOGLOBIN g/dL 8.5* 8.2* 6.0* 7.5*   PLATELETS 10*3/mm3 145 136* 121* 180     Results from last 7 days   Lab Units 06/04/22  0554 06/03/22  0453 06/02/22 0638 06/01/22 1916   SODIUM mmol/L 134* 130* 130* 126*   POTASSIUM mmol/L 4.1 4.1 3.9 4.2   CHLORIDE mmol/L 105 104 102 97*   CO2 mmol/L 21.0* 19.0* 18.4* 17.3*   BUN mg/dL 7* 10 14 15   CREATININE mg/dL 0.76 0.71 0.85 1.03*   GLUCOSE mg/dL 114* 139* 236* 237*   Estimated Creatinine Clearance: 81.6 mL/min (by C-G formula based on SCr of 0.76 mg/dL).  Results from last 7 days   Lab Units 06/01/22 1916   ALBUMIN g/dL 1.90*   BILIRUBIN mg/dL 0.2   ALK PHOS U/L 125*   AST (SGOT) U/L 18   ALT (SGPT) U/L 13     Results from last 7 days   Lab Units 06/04/22  0554 06/03/22  0453 06/02/22  0638 06/01/22  1916   CALCIUM mg/dL 8.0* 7.6* 7.3* 7.6*   ALBUMIN g/dL  --   --   --  1.90*     Results  from last 7 days   Lab Units 06/02/22  0956 06/01/22  1916   LACTATE mmol/L 1.7 1.8     COVID19   Date Value Ref Range Status   06/04/2022 Not Detected Not Detected - Ref. Range Final   06/01/2022 Not Detected Not Detected - Ref. Range Final     Glucose   Date/Time Value Ref Range Status   06/05/2022 0636 227 (H) 70 - 130 mg/dL Final     Comment:     Meter: [id number] : [id number] Najera Tia NA   06/04/2022 2104 224 (H) 70 - 130 mg/dL Final     Comment:     Meter: [id number] : [id number] Najera Tia NA   06/04/2022 1602 203 (H) 70 - 130 mg/dL Final     Comment:     Meter: [id number] : [id number] Nelson Maria Del Rosario NA   06/04/2022 1109 207 (H) 70 - 130 mg/dL Final     Comment:     Meter: [id number] : [id number] Nelson Maria Del Rosario NA   06/04/2022 0932 134 (H) 70 - 130 mg/dL Final     Comment:     Meter: [id number] : [id number] Mony RUTHERFORD RN   06/04/2022 0631 114 70 - 130 mg/dL Final     Comment:     Meter: [id number] : [id number] Najera Tia NA   06/03/2022 2045 203 (H) 70 - 130 mg/dL Final     Comment:     Meter: [id number] : [id number] Najera Tia NA       MRI Hip Right Without Contrast  Narrative: RIGHT HIP MRI     HISTORY: Multiple right hip surgeries, most recently arthroplasty  removal 04/25/2022. Evaluate osteomyelitis.     TECHNIQUE: MRI of the right hip was performed using a protocol which  shows both hips in axial plane and most of the pelvis in the coronal  plane using T1 and STIR sequences. A sagittal proton density sequence  was made through the right hip. This is correlated with CT scan  performed yesterday and multiple prior x-rays as recent as 04/25/2022  and as remote as 11/10/2021.     FINDINGS: There is postoperative change in the right hip from hardware  removal. Coronal images show substantial degenerative change in the  lower lumbar spine with dextroscoliosis but no finding suggestive of  osteomyelitis or discitis in the lower lumbar spine. Most of the bones  of the pelvis  and those around the left hip and proximal femur  demonstrate normal marrow signal. There is a rind of edematous tissue at  the acetabulum, corresponding to the soft tissue material seen in this  area on the CT. On the coronal STIR sequence, this material measures 7-8  mm thick. Underlying marrow signal is normal; there is no compelling  evidence for osteomyelitis in the pelvis.     There is postoperative change at the proximal femur. The note by Dr. Schaeffer describes challenging removal of the femoral stem and previous  imaging has demonstrated fairly extensive fracturing of the  periprosthetic portion of the proximal right femur. There is multifocal  signal dropout attributable to both the postoperative change and the  multiple small foci of gas seen within and around the fractured bone of  the proximal femur on the CT. There is extensive soft tissue edema deep  around the proximal femur as well as gas in the joint space and in the  soft tissue along the lateral proximal thigh. The coronal images show  the femoral shaft beyond the level of the tip of the removed femoral  stem. Normal fatty marrow signal is observed in that area on the coronal  images. No undrained fluid collection is identified.     No intrapelvic lesion is identified but there is a small volume of  ascites. There are enlarged lymph nodes in the lower right hemipelvis.     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 No  abnormality in the pelvis is suggestive of osteomyelitis around the  acetabulum. The extensive deformity of the proximal femur is as expected  given the description of the surgical procedure. However, there is  robust soft tissue edema with multiple small foci of gas in the right  hip joint space, around the bone fragments of the proximal right femur,  and in the soft tissue lateral to the femur. Given the history and the  open wound,  these findings almost certainly represent cellulitis with  osteomyelitis involving the fragmented portions of the proximal femur.     This report was finalized on 6/3/2022 10:13 AM by Dr. Brendon Carrillo M.D.       Scheduled Medications  cetirizine, 10 mg, Oral, Daily  DAPTOmycin (CUBICIN)  IV, 10 mg/kg (Adjusted), Intravenous, Q24H  ferrous sulfate, 325 mg, Oral, Every Other Day  gabapentin, 600 mg, Oral, Q12H  insulin glargine, 25 Units, Subcutaneous, Nightly  insulin lispro, 3 Units, Subcutaneous, TID With Meals  isosorbide mononitrate, 30 mg, Oral, QAM  lactobacillus acidophilus, 1 capsule, Oral, Daily  metoprolol succinate XL, 25 mg, Oral, Daily  miconazole, , Topical, Q12H  pantoprazole, 40 mg, Oral, QAM  piperacillin-tazobactam, 3.375 g, Intravenous, Q8H  sodium chloride, 10 mL, Intravenous, Q12H    Infusions  lactated ringers, 9 mL/hr, Last Rate: 9 mL/hr (06/04/22 1137)    Diet  Diet Regular      Assessment/Plan     Active Hospital Problems    Diagnosis  POA   • **Osteomyelitis of hip (HCC) [M86.9]  Yes   • Hyponatremia [E87.1]  Yes   • Metabolic acidosis [E87.2]  Yes   • Sepsis without acute organ dysfunction ([facility]) [A41.9]  Yes   • Pathological fracture of right femur ([facility]) [M84.451A]  Yes   • Iron deficiency anemia [D50.9]  Yes   • Chronic diastolic CHF (congestive heart failure) ([facility]) [I50.32]  Yes   • Obesity (BMI 30-39.9) [E66.9]  Yes   • Type 2 diabetes mellitus with diabetic polyneuropathy, with long-term current use of insulin ([facility]) [E11.42, Z79.4]  Not Applicable   • Hypertension [I10]  Yes      Resolved Hospital Problems   No resolved problems to display.       67 y.o. female admitted with Osteomyelitis of hip (HCC).  Right hip osteomyelitis  History of VRE  Has had 7 surgeries in the past on that hip  Elevated CRP and sed rate.  Continue daptomycin and Zosyn  ID and orthopedic surgery following  MRI showed soft tissue edema with multiple small foci of gas in the right hip joint space, the bone  fragments of the proximal right femur and the  S/p right hip irrigation debridement on 6/4    Anemia of chronic disease  Low  transferrin saturation, low TIBC, high ferritin  Received 2 units of PRBCs with an appropriate response.  Receiving another unit of PRBCs on 6/5    Hyponatremia  Sodium on admission was 126.  Now it is at 130 which is close to her baseline.  I do not see a diuretic on her med list.  No need to start any right now.  Appears euvolemic    Chronic diastolic heart failure  Hypertension  Continue Imdur 30, metoprolol XL 25 mg  Blood pressures acceptable    Type 2 diabetes  Blood glucose remains elevated  Lantus 25 units nightly.  Increase to 30 units nightly  Lispro 2 units 3 times daily AC -> increased to 3 units 3 times daily AC  Blood sugars a little bit high    · SCDs for DVT prophylaxis.  · Full code.  · Discussed with patient and nursing staff.  · Anticipate discharge pending hospital course      Arian Morrissey MD  [facility]  06/05/22  06:46 EDT            Electronically signed by Arian Morrissey MD at 06/05/22 1052                       Virginia Kraus, RN       Case Management   Progress Notes      Signed   Date of Service:  06/06/22 1416   Creation Time:  06/06/22 1416              Signed                Show:Clear all  []Manual[x]Template[]Copied    Added by:  [x]Virginia Kraus, RN      []Lyndon for details    Discharge Planning Assessment  [facility]     Patient Name: Stephy Duncan                   MRN: [id number]  Today's Date: 6/6/2022                       Admit Date: 6/1/2022       Discharge Needs Assessment    No documentation.                           Discharge Plan            Row Name 06/06/22 1407             Plan      Plan home with [facility] vs skilled rehab at [facility]      Plan Comments Spoke with patient at bedside she is agreeable with [facility] vs home with  [facility]. Call placed to [facility] she is aware patient has a wound vac and will need IV Daptomycin for 6 weeks. Patient stated family will transport. Patient stated she may need to go to [facility] for the infusions if she does not go to skilled rehab due to cost of the Daptomycin. Hugo ROSENBERG                      Continued Care and Services - Admitted Since 6/1/2022                 Destination                 Service Provider Request Status Selected Services Address Phone Fax Patient Preferred      [facility]  Accepted N/A [address] [place] [address] [phone] --      [facility] [place]  Pending - Request Sent N/A [address] [facility] [address] [phone] --      [facility]  Pending - Request Sent N/A [address] [place] [address] [phone] --      [facility]  Declined  Insurance Denial, Out of network N/A [address] [address] [phone] --                        Home Medical Care                 Service Provider Request Status Selected Services Address Phone Fax Patient Preferred      [facility]  Accepted N/A [address], [address], [facility] [address] [phone] --                       Selected Continued Care - Prior Encounters Includes selections from prior encounters from 3/3/2022 to 6/6/2022          Discharged on 5/8/2022 Admission date: 4/25/2022 - Discharge disposition: Skilled Nursing Facility (DC - External)                    Destination      Service Provider Selected Services Address Phone Fax Patient Preferred      [facility]  Skilled Nursing [address] [place] [address] [phone] --             Internal Comment last updated  by Roopa Escobar CSW 4/28/2022 1349     Left voicemail for admissions                                              Expected Discharge Date and Time      Expected Discharge Date Expected Discharge Time     Jun 8, 2022              Demographic Summary    No documentation.                     Functional Status    No documentation.                     Psychosocial    No documentation.                     Abuse/Neglect    No documentation.                     Legal    No documentation.                     Substance Abuse    No documentation.                     Patient Forms    No documentation.                         Virginia Kraus RN

## 2022-06-07 ENCOUNTER — APPOINTMENT (OUTPATIENT)
Dept: GENERAL RADIOLOGY | Facility: HOSPITAL | Age: 67
End: 2022-06-07

## 2022-06-07 ENCOUNTER — APPOINTMENT (OUTPATIENT)
Dept: INFUSION THERAPY | Facility: HOSPITAL | Age: 67
End: 2022-06-07

## 2022-06-07 ENCOUNTER — HOSPITAL ENCOUNTER (OUTPATIENT)
Dept: INFUSION THERAPY | Facility: HOSPITAL | Age: 67
Setting detail: INFUSION SERIES
End: 2022-06-07

## 2022-06-07 LAB
ANION GAP SERPL CALCULATED.3IONS-SCNC: 7.4 MMOL/L (ref 5–15)
BASOPHILS # BLD AUTO: 0.05 10*3/MM3 (ref 0–0.2)
BASOPHILS NFR BLD AUTO: 0.3 % (ref 0–1.5)
BUN SERPL-MCNC: 20 MG/DL (ref 8–23)
BUN/CREAT SERPL: 24.4 (ref 7–25)
CALCIUM SPEC-SCNC: 7.3 MG/DL (ref 8.6–10.5)
CHLORIDE SERPL-SCNC: 102 MMOL/L (ref 98–107)
CO2 SERPL-SCNC: 18.6 MMOL/L (ref 22–29)
CREAT SERPL-MCNC: 0.82 MG/DL (ref 0.57–1)
DEPRECATED RDW RBC AUTO: 49.9 FL (ref 37–54)
EGFRCR SERPLBLD CKD-EPI 2021: 77.5 ML/MIN/1.73
EOSINOPHIL # BLD AUTO: 0.25 10*3/MM3 (ref 0–0.4)
EOSINOPHIL NFR BLD AUTO: 1.6 % (ref 0.3–6.2)
ERYTHROCYTE [DISTWIDTH] IN BLOOD BY AUTOMATED COUNT: 15.8 % (ref 12.3–15.4)
GLUCOSE BLDC GLUCOMTR-MCNC: 147 MG/DL (ref 70–130)
GLUCOSE BLDC GLUCOMTR-MCNC: 155 MG/DL (ref 70–130)
GLUCOSE BLDC GLUCOMTR-MCNC: 179 MG/DL (ref 70–130)
GLUCOSE BLDC GLUCOMTR-MCNC: 191 MG/DL (ref 70–130)
GLUCOSE SERPL-MCNC: 141 MG/DL (ref 65–99)
HCT VFR BLD AUTO: 28.7 % (ref 34–46.6)
HGB BLD-MCNC: 9.2 G/DL (ref 12–15.9)
IMM GRANULOCYTES # BLD AUTO: 0.43 10*3/MM3 (ref 0–0.05)
IMM GRANULOCYTES NFR BLD AUTO: 2.8 % (ref 0–0.5)
LYMPHOCYTES # BLD AUTO: 1.61 10*3/MM3 (ref 0.7–3.1)
LYMPHOCYTES NFR BLD AUTO: 10.4 % (ref 19.6–45.3)
MAGNESIUM SERPL-MCNC: 1.8 MG/DL (ref 1.6–2.4)
MCH RBC QN AUTO: 27.7 PG (ref 26.6–33)
MCHC RBC AUTO-ENTMCNC: 32.1 G/DL (ref 31.5–35.7)
MCV RBC AUTO: 86.4 FL (ref 79–97)
MONOCYTES # BLD AUTO: 1.06 10*3/MM3 (ref 0.1–0.9)
MONOCYTES NFR BLD AUTO: 6.9 % (ref 5–12)
NEUTROPHILS NFR BLD AUTO: 12.07 10*3/MM3 (ref 1.7–7)
NEUTROPHILS NFR BLD AUTO: 78 % (ref 42.7–76)
NRBC BLD AUTO-RTO: 0.1 /100 WBC (ref 0–0.2)
PLATELET # BLD AUTO: 156 10*3/MM3 (ref 140–450)
PMV BLD AUTO: 9.7 FL (ref 6–12)
POTASSIUM SERPL-SCNC: 4.3 MMOL/L (ref 3.5–5.2)
RBC # BLD AUTO: 3.32 10*6/MM3 (ref 3.77–5.28)
SODIUM SERPL-SCNC: 128 MMOL/L (ref 136–145)
URATE SERPL-MCNC: 3.5 MG/DL (ref 2.4–5.7)
WBC NRBC COR # BLD: 15.47 10*3/MM3 (ref 3.4–10.8)

## 2022-06-07 PROCEDURE — 84550 ASSAY OF BLOOD/URIC ACID: CPT | Performed by: HOSPITALIST

## 2022-06-07 PROCEDURE — 97530 THERAPEUTIC ACTIVITIES: CPT

## 2022-06-07 PROCEDURE — 25010000002 DAPTOMYCIN PER 1 MG: Performed by: ORTHOPAEDIC SURGERY

## 2022-06-07 PROCEDURE — 25010000002 PIPERACILLIN SOD-TAZOBACTAM PER 1 G: Performed by: ORTHOPAEDIC SURGERY

## 2022-06-07 PROCEDURE — 74018 RADEX ABDOMEN 1 VIEW: CPT

## 2022-06-07 PROCEDURE — 63710000001 INSULIN LISPRO (HUMAN) PER 5 UNITS: Performed by: ORTHOPAEDIC SURGERY

## 2022-06-07 PROCEDURE — 97162 PT EVAL MOD COMPLEX 30 MIN: CPT

## 2022-06-07 PROCEDURE — 80048 BASIC METABOLIC PNL TOTAL CA: CPT | Performed by: HOSPITALIST

## 2022-06-07 PROCEDURE — 85025 COMPLETE CBC W/AUTO DIFF WBC: CPT | Performed by: INTERNAL MEDICINE

## 2022-06-07 PROCEDURE — 83735 ASSAY OF MAGNESIUM: CPT | Performed by: HOSPITALIST

## 2022-06-07 PROCEDURE — 82962 GLUCOSE BLOOD TEST: CPT

## 2022-06-07 RX ADMIN — OXYCODONE AND ACETAMINOPHEN 1 TABLET: 5; 325 TABLET ORAL at 15:07

## 2022-06-07 RX ADMIN — INSULIN LISPRO 3 UNITS: 100 INJECTION, SOLUTION INTRAVENOUS; SUBCUTANEOUS at 11:03

## 2022-06-07 RX ADMIN — Medication 1 CAPSULE: at 08:13

## 2022-06-07 RX ADMIN — PANTOPRAZOLE SODIUM 40 MG: 40 TABLET, DELAYED RELEASE ORAL at 06:39

## 2022-06-07 RX ADMIN — SUCRALFATE 1 G: 1 TABLET ORAL at 20:28

## 2022-06-07 RX ADMIN — Medication 10 ML: at 20:29

## 2022-06-07 RX ADMIN — CETIRIZINE HYDROCHLORIDE 10 MG: 10 TABLET ORAL at 08:13

## 2022-06-07 RX ADMIN — Medication 10 ML: at 08:14

## 2022-06-07 RX ADMIN — SUCRALFATE 1 G: 1 TABLET ORAL at 11:03

## 2022-06-07 RX ADMIN — OXYCODONE AND ACETAMINOPHEN 1 TABLET: 5; 325 TABLET ORAL at 11:03

## 2022-06-07 RX ADMIN — ISOSORBIDE MONONITRATE 30 MG: 30 TABLET ORAL at 06:39

## 2022-06-07 RX ADMIN — GABAPENTIN 600 MG: 300 CAPSULE ORAL at 20:28

## 2022-06-07 RX ADMIN — INSULIN LISPRO 3 UNITS: 100 INJECTION, SOLUTION INTRAVENOUS; SUBCUTANEOUS at 17:20

## 2022-06-07 RX ADMIN — SUCRALFATE 1 G: 1 TABLET ORAL at 17:20

## 2022-06-07 RX ADMIN — OXYCODONE AND ACETAMINOPHEN 1 TABLET: 5; 325 TABLET ORAL at 20:28

## 2022-06-07 RX ADMIN — MICONAZOLE NITRATE: 2 POWDER TOPICAL at 20:29

## 2022-06-07 RX ADMIN — MICONAZOLE NITRATE: 2 POWDER TOPICAL at 08:13

## 2022-06-07 RX ADMIN — PANTOPRAZOLE SODIUM 40 MG: 40 TABLET, DELAYED RELEASE ORAL at 17:20

## 2022-06-07 RX ADMIN — DAPTOMYCIN 750 MG: 500 INJECTION, POWDER, LYOPHILIZED, FOR SOLUTION INTRAVENOUS at 20:28

## 2022-06-07 RX ADMIN — METOPROLOL SUCCINATE 25 MG: 25 TABLET, EXTENDED RELEASE ORAL at 08:13

## 2022-06-07 RX ADMIN — INSULIN LISPRO 3 UNITS: 100 INJECTION, SOLUTION INTRAVENOUS; SUBCUTANEOUS at 08:13

## 2022-06-07 RX ADMIN — TAZOBACTAM SODIUM AND PIPERACILLIN SODIUM 3.38 G: 375; 3 INJECTION, SOLUTION INTRAVENOUS at 15:07

## 2022-06-07 RX ADMIN — TAZOBACTAM SODIUM AND PIPERACILLIN SODIUM 3.38 G: 375; 3 INJECTION, SOLUTION INTRAVENOUS at 23:10

## 2022-06-07 RX ADMIN — SUCRALFATE 1 G: 1 TABLET ORAL at 06:39

## 2022-06-07 RX ADMIN — TAZOBACTAM SODIUM AND PIPERACILLIN SODIUM 3.38 G: 375; 3 INJECTION, SOLUTION INTRAVENOUS at 08:13

## 2022-06-07 RX ADMIN — OXYCODONE AND ACETAMINOPHEN 1 TABLET: 5; 325 TABLET ORAL at 05:13

## 2022-06-07 RX ADMIN — INSULIN GLARGINE-YFGN 30 UNITS: 100 INJECTION, SOLUTION SUBCUTANEOUS at 20:35

## 2022-06-07 RX ADMIN — TAZOBACTAM SODIUM AND PIPERACILLIN SODIUM 3.38 G: 375; 3 INJECTION, SOLUTION INTRAVENOUS at 00:35

## 2022-06-07 RX ADMIN — GABAPENTIN 600 MG: 300 CAPSULE ORAL at 08:13

## 2022-06-07 NOTE — PROGRESS NOTES
[facility]    [phone]   LOS: 6 days     Name: Stephy Duncan  Age/Sex: 67 y.o. female  :  1955        PCP: Gregorio Rosales  Chief Complaint   Patient presents with   • Wound Infection      Subjective   Today she is more complaining of bilateral flank pain.  The epigastric discomfort yesterday resolved with GI cocktail.  She was started empirically on treatment for gastritis.  Denies any fevers or chills does feel little better today and tolerated aggressive blood transfusions yesterday.  General: No Fever or Chills, Cardiac: No Chest Pain or Palpitations, Resp: No Cough or SOA, GI: No Nausea, Vomiting, or Diarrhea and Other: No bleeding    cetirizine, 10 mg, Oral, Daily  DAPTOmycin (CUBICIN)  IV, 10 mg/kg (Adjusted), Intravenous, Q24H  ferrous sulfate, 325 mg, Oral, Every Other Day  gabapentin, 600 mg, Oral, Q12H  insulin glargine, 30 Units, Subcutaneous, Nightly  insulin lispro, 3 Units, Subcutaneous, TID With Meals  isosorbide mononitrate, 30 mg, Oral, QAM  lactobacillus acidophilus, 1 capsule, Oral, Daily  metoprolol succinate XL, 25 mg, Oral, Daily  miconazole, , Topical, Q12H  pantoprazole, 40 mg, Oral, BID AC  piperacillin-tazobactam, 3.375 g, Intravenous, Q8H  sodium chloride, 10 mL, Intravenous, Q12H  sucralfate, 1 g, Oral, 4x Daily AC & at Bedtime      lactated ringers, 9 mL/hr, Last Rate: 9 mL/hr (22 1137)        Objective   Vital Signs  Temp:  [98 °F (36.7 °C)-99 °F (37.2 °C)] 98 °F (36.7 °C)  Heart Rate:  [71-76] 75  Resp:  [18-20] 20  BP: (108-129)/(49-91) 114/91  Body mass index is 39.03 kg/m².    Intake/Output Summary (Last 24 hours) at 2022 1426  Last data filed at 2022 2339  Gross per 24 hour   Intake 240 ml   Output 950 ml   Net -710 ml       Physical Exam  Vitals and nursing note reviewed.   Constitutional:       Appearance: Normal appearance.   Cardiovascular:      Rate and Rhythm: Normal rate and regular rhythm.   Pulmonary:      Effort: No  respiratory distress.      Breath sounds: Normal breath sounds.   Abdominal:      General: Bowel sounds are normal.      Palpations: Abdomen is soft.   Neurological:      General: No focal deficit present.      Mental Status: She is alert and oriented to person, place, and time.           Results Review:       I reviewed the patient's new clinical results.  Results from last 7 days   Lab Units 06/07/22  0349 06/06/22  1146 06/05/22  2310 06/05/22  0903 06/04/22  0554 06/03/22  0453 06/02/22  0638 06/01/22  1916   WBC 10*3/mm3 15.47*  --   --  26.03* 3.87 4.39 3.23* 7.00   HEMOGLOBIN g/dL 9.2* 9.6* 6.6* 6.5* 8.5* 8.2* 6.0* 7.5*   PLATELETS 10*3/mm3 156  --   --  251 145 136* 121* 180     Results from last 7 days   Lab Units 06/07/22  0349 06/05/22  0903 06/04/22  0554 06/03/22  0453 06/02/22  0638 06/01/22  1916   SODIUM mmol/L 128* 129* 134* 130* 130* 126*   POTASSIUM mmol/L 4.3 5.0 4.1 4.1 3.9 4.2   CHLORIDE mmol/L 102 99 105 104 102 97*   CO2 mmol/L 18.6* 19.6* 21.0* 19.0* 18.4* 17.3*   BUN mg/dL 20 15 7* 10 14 15   CREATININE mg/dL 0.82 1.48* 0.76 0.71 0.85 1.03*   CALCIUM mg/dL 7.3* 7.7* 8.0* 7.6* 7.3* 7.6*   MAGNESIUM mg/dL 1.8  --   --   --   --   --    Estimated Creatinine Clearance: 75.6 mL/min (by C-G formula based on SCr of 0.82 mg/dL).  Lab Results   Component Value Date    HGBA1C 6.20 (H) 04/21/2022    HGBA1C 5.60 01/29/2022    HGBA1C 7.56 (H) 01/16/2022     Glucose   Date/Time Value Ref Range Status   06/07/2022 1101 147 (H) 70 - 130 mg/dL Final     Comment:     Meter: [id number] : [id number] Sukhi Tellez CNA   06/07/2022 0622 155 (H) 70 - 130 mg/dL Final     Comment:     Meter: [id number] : [id number] Mora Millan ERT   06/06/2022 2023 172 (H) 70 - 130 mg/dL Final     Comment:     Meter: [id number] : [id number] Mora Millan ERT   06/06/2022 1614 198 (H) 70 - 130 mg/dL Final     Comment:     Meter: [id number] : [id number] Ivana West CNA   06/06/2022 1140 189 (H) 70 -  130 mg/dL Final     Comment:     Meter: [id number] : [id number] Ivana FLORESA   06/06/2022 0620 161 (H) 70 - 130 mg/dL Final     Comment:     Meter: [id number] : [id number] Scot Cabello NA   06/05/2022 2107 205 (H) 70 - 130 mg/dL Final     Comment:     Meter: [id number] : [id number] Scot Cabello NA   06/05/2022 1617 273 (H) 70 - 130 mg/dL Final     Comment:     Meter: [id number] : [id number] Nelson CUNNINGHAM         Assessment & Plan   Active Hospital Problems    Diagnosis  POA   • **Osteomyelitis of hip (HCC) [M86.9]  Yes   • Hyponatremia [E87.1]  Yes   • Metabolic acidosis [E87.2]  Yes   • Sepsis without acute organ dysfunction (HCC) [A41.9]  Yes   • Pathological fracture of right femur ([facility]) [M84.451A]  Yes   • Iron deficiency anemia [D50.9]  Yes   • Chronic diastolic CHF (congestive heart failure) ([facility]) [I50.32]  Yes   • Obesity (BMI 30-39.9) [E66.9]  Yes   • Type 2 diabetes mellitus with diabetic polyneuropathy, with long-term current use of insulin ([facility]) [E11.42, Z79.4]  Not Applicable   • Hypertension [I10]  Yes      Resolved Hospital Problems   No resolved problems to display.       PLAN  This is a 67-year-old female who presented to the hospital with hip pain and is found to have recurrent osteomyelitis of her hip  -Discussed with infectious disease today continue antibiotics for up to 2 weeks following her last surgical intervention if felt to have all infectious material removed  -Continue empiric treatment for the underlying possible gastritis with Carafate and PPI.  -Plan abdominal x-ray today to evaluate this upper abdominal/flank pain.  -Hemoglobin has stabilized following additional transfusion continue to follow her H&H.  -Sodium levels remain low but at or near her baseline.  -Blood sugars acceptable right now      Disposition  To be determined      Abhijit Cantu MD  [facility]  06/07/22  14:26 EDT

## 2022-06-07 NOTE — PLAN OF CARE
Goal Outcome Evaluation:  Plan of Care Reviewed With: patient           Outcome Evaluation: Pt is a 68 yo female who presented from subacute rehab with ongoing R hip infection, increased drainage from hip incision, and osteomyelitis of femur. Pt with history of multiple previous R hip surgeries, including previous R TRISTAN with subsequent infection, 2 antibiotic spacers, and girdlestone procedure. Pt now  s/p R hip I & D with excision of proximal femur and wound vac placed to R hip incision. Pt TTWB R LE per ortho. Upon exam, pt demonstrates post op pain, impaired R hip ROM, R LE weakness, impaired balance, and decreased endurance limiting mobility. Pt required min A with bed mobility and CGA x 2 with rwx to stand at EOB. Pt able to maintain TTWB R LE during transfers but was too weak and fatigued today to attempt stand pivot transfer to chair. Pt will continue to benefit from PT to address impairments and increase independence with functional mobility. Rec DC to SNF for subacute rehab prior to DC home.

## 2022-06-07 NOTE — THERAPY EVALUATION
Patient Name: Stephy Duncan  : 1955    MRN: [id number]                              Today's Date: 2022       Admit Date: 2022    Visit Dx:     ICD-10-CM ICD-9-CM   1. Osteomyelitis of hip ([facility])  M86.9 730.25   2. Right hip pain  M25.551 719.45   3. History of diabetes mellitus  Z86.39 V12.29   4. History of hypertension  Z86.79 V12.59   5. History of infection with vancomycin resistant Enterococcus (VRE)  Z86.19 V12.09   6. Pathological fracture of right femur due to age-related osteoporosis, initial encounter ([facility])  M80.051A 733.14     733.01     Patient Active Problem List   Diagnosis   • Status post total replacement of hip   • Type 2 diabetes mellitus with diabetic polyneuropathy, with long-term current use of insulin ([facility])   • Hypertension   • Normocytic anemia   • Postoperative infection   • Iron deficiency anemia   • Obesity (BMI 30-39.9)   • Presence of stent in coronary artery in patient with coronary artery disease   • Chronic diastolic CHF (congestive heart failure) ([facility])   • Septic arthritis of hip ([facility])   • Drainage from wound   • Candidal intertrigo   • Postoperative anemia due to acute blood loss   • S/P Girdlestone procedure   • Enterococcus faecalis infection   • Pyogenic arthritis, pelvic region and thigh ([facility])   • Osteomyelitis of hip ([facility])   • Hyponatremia   • Metabolic acidosis   • Sepsis without acute organ dysfunction ([facility])   • Pathological fracture of right femur ([facility])     Past Medical History:   Diagnosis Date   • Allergies    • Arthritis    • Cardiac murmur    • Diabetes mellitus ([facility])    • GERD (gastroesophageal reflux disease)    • Heart attack ([facility]) 2020   • History of cervical cancer 1981   • History of transfusion     SEVERAL   • Hyperlipidemia    • Hypertension    • Iron deficiency anemia    • Osteomyelitis hip (HCC)     RIGHT   • PONV (postoperative nausea and vomiting)    • Right hip pain    • VRE infection within last 3 months    • Wound infection      RIGHT HIP.  WOUND VAC IN PLACE     Past Surgical History:   Procedure Laterality Date   • CARDIAC CATHETERIZATION     • CERVICAL CONIZATION     • COLONOSCOPY     • CORONARY ANGIOPLASTY WITH STENT PLACEMENT     • ENDOSCOPY     • HIP ARTHROPLASTY Right    • HIP HARDWARE REMOVAL Right    • HIP MINI REVISION Right 01/18/2022    Procedure: TOTAL HIP ARTHROPLASTY LINER REVISION, pegboard lateral;  Surgeon: Karri Schaeffer II, MD;  Location: [facility] MAIN OR;  Service: Orthopedics;  Laterality: Right;   • HIP SPACER INSERTION WITH ANTIBIOTIC CEMENT      X3   • HYSTERECTOMY     • INCISION AND DRAINAGE HIP Right 12/18/2021    Procedure: INCISION AND DRAINAGE TOTAL HIP, LINER EXCHANGE AND WOUND VAC PLACEMENT;  Surgeon: Karri Schaeffer II, MD;  Location: [facility] MAIN OR;  Service: Orthopedics;  Laterality: Right;   • INCISION AND DRAINAGE HIP Right 5/2/2022    Procedure: HIP INCISION AND DRAINAGE;  Surgeon: Karri Schaeffer II, MD;  Location: [facility] MAIN OR;  Service: Orthopedics;  Laterality: Right;   • INCISION AND DRAINAGE HIP Right 6/4/2022    Procedure: HIP INCISION AND DRAINAGE WITH WOUND VAC REPLACEMENT;  Surgeon: Karri Schaeffer II, MD;  Location: [facility] OR;  Service: Orthopedics;  Laterality: Right;   • KNEE ARTHROPLASTY Left    • LAPAROSCOPIC CHOLECYSTECTOMY     • TONSILLECTOMY     • TOTAL HIP ARTHROPLASTY Right 4/25/2022    Procedure: HIP GIRDLESTONE PROCEDURE;  Surgeon: Karri Schaeffer II, MD;  Location: [facility] OR;  Service: Orthopedics;  Laterality: Right;   • TOTAL HIP ARTHROPLASTY REVISION Right 11/16/2021    Procedure: TOTAL HIP REVISION ARTHROPLASTY RIGHT POSTERIOR;  Surgeon: Karri Schaeffer II, MD;  Location: [facility] MAIN OR;  Service: Orthopedics;  Laterality: Right;      General Information     Row Name 06/07/22 1534          Physical Therapy Time and Intention    Document Type evaluation  -EE     Mode of Treatment individual therapy;physical  therapy  -EE     Row Name 06/07/22 1534          General Information    Prior Level of Function --  Admitted from subacute rehab; recent R hip surgery limiting mobility. Pt has rollator, WC, grab bars at home.  -EE     Existing Precautions/Restrictions fall;partial weight bearing;right  TTWB R LE  -EE     Barriers to Rehab previous functional deficit;medically complex  -EE     Row Name 06/07/22 1534          Living Environment    People in Home spouse;other (see comments)  lives with spouse at baseline; admitted from subacute rehab  -EE     Row Name 06/07/22 1534          Cognition    Orientation Status (Cognition) oriented x 3  -EE     Row Name 06/07/22 1534          Safety Issues, Functional Mobility    Impairments Affecting Function (Mobility) endurance/activity tolerance;range of motion (ROM);postural/trunk control;pain;strength;balance  -EE           User Key  (r) = Recorded By, (t) = Taken By, (c) = Cosigned By    Initials Name Provider Type    Carmita Hill PT Physical Therapist               Mobility     Row Name 06/07/22 1536          Bed Mobility    Bed Mobility supine-sit;sit-supine  -EE     Supine-Sit [place] (Bed Mobility) minimum assist (75% patient effort);verbal cues  -EE     Sit-Supine [place] (Bed Mobility) minimum assist (75% patient effort);verbal cues  -EE     Assistive Device (Bed Mobility) head of bed elevated;bed rails  -EE     Comment, (Bed Mobility) assist required with R LE  -EE     Row Name 06/07/22 1536          Sit-Stand Transfer    Sit-Stand [place] (Transfers) contact guard;2 person assist;verbal cues;nonverbal cues (demo/gesture)  -EE     Assistive Device (Sit-Stand Transfers) walker, front-wheeled  -EE     Comment, (Sit-Stand Transfer) STS x 2 from EOB. Pt able to maintain TTWB R LE without assistance.  -EE     Row Name 06/07/22 1536          Gait/Stairs (Locomotion)    [place] Level (Gait) unable to assess  -EE     Comment, (Gait/Stairs) not tested; pt felt  fatigued and slightly lightheaded after standing EOB.  -EE     Row Name 06/07/22 1536          Mobility    Extremity Weight-bearing Status right lower extremity  -EE     Right Lower Extremity (Weight-bearing Status) toe touch weight-bearing (TTWB)  -EE           User Key  (r) = Recorded By, (t) = Taken By, (c) = Cosigned By    Initials Name Provider Type    EE Carmita Christianson PT Physical Therapist               Obj/Interventions     Row Name 06/07/22 1537          Range of Motion Comprehensive    General Range of Motion other (see comments)  -EE     Comment, General Range of Motion R hip ROM limited by pain. R knee/ankle WFL, L LE WFL.  -EE     Row Name 06/07/22 1537          Strength Comprehensive (MMT)    General Manual Muscle Testing (MMT) Assessment lower extremity strength deficits identified  -EE     Comment, General Manual Muscle Testing (MMT) Assessment L LE grossly 4-/5. R knee 3-/5, R ankle 3+/5. R hip trace movement only.  -EE     Row Name 06/07/22 1537          Motor Skills    Therapeutic Exercise other (see comments)  seated B ankle pumps, LAQ x 5 reps each  -EE     Row Name 06/07/22 1537          Balance    Balance Assessment sitting static balance;standing static balance;sitting dynamic balance  -EE     Static Sitting Balance supervision  -EE     Dynamic Sitting Balance standby assist  -EE     Position, Sitting Balance unsupported;sitting edge of bed  -EE     Static Standing Balance contact guard;2-person assist  -EE     Position/Device Used, Standing Balance supported;walker, rolling  -EE     Comment, Balance Static standing 2 x 1 min; pt able to maintain TTWB R LE without assistance.  -EE     Row Name 06/07/22 1537          Sensory Assessment (Somatosensory)    Sensory Assessment (Somatosensory) not tested  -EE           User Key  (r) = Recorded By, (t) = Taken By, (c) = Cosigned By    Initials Name Provider Type    EE Carmita Christianson, WESLY Physical Therapist               Goals/Plan     Row Name 06/07/22  1547          Bed Mobility Goal 1 (PT)    Activity/Assistive Device (Bed Mobility Goal 1, PT) sit to supine/supine to sit  -EE     [place] Level/Cues Needed (Bed Mobility Goal 1, PT) standby assist  -EE     Time Frame (Bed Mobility Goal 1, PT) 1 week  -EE     Progress/Outcomes (Bed Mobility Goal 1, PT) goal ongoing  -EE     Row Name 06/07/22 1547          Transfer Goal 1 (PT)    Activity/Assistive Device (Transfer Goal 1, PT) sit-to-stand/stand-to-sit;bed-to-chair/chair-to-bed;walker, rolling  -EE     [place] Level/Cues Needed (Transfer Goal 1, PT) standby assist  -EE     Time Frame (Transfer Goal 1, PT) 1 week  -EE     Progress/Outcome (Transfer Goal 1, PT) goal ongoing  -EE     Row Name 06/07/22 1547          Problem Specific Goal 1 (PT)    Problem Specific Goal 1 (PT) Pt will independently maintain TTWB R LE during transfers.  -EE     Time Frame (Problem Specific Goal 1, PT) 1 week  -EE     Progress/Outcome (Problem Specific Goal 1, PT) goal ongoing  -EE     Row Name 06/07/22 1547          Therapy Assessment/Plan (PT)    Planned Therapy Interventions (PT) balance training;bed mobility training;home exercise program;patient/family education;ROM (range of motion);transfer training;strengthening;postural re-education  -EE           User Key  (r) = Recorded By, (t) = Taken By, (c) = Cosigned By    Initials Name Provider Type    EE Carmita Christianson, PT Physical Therapist               Clinical Impression     Row Name 06/07/22 1539          Pain    Pretreatment Pain Rating 5/10  -EE     Posttreatment Pain Rating 8/10  -EE     Pain Location - Side/Orientation Right  -EE     Pain Location incisional  -EE     Pain Location - hip  -EE     Pain Intervention(s) Repositioned;Cold pack;Nursing Notified  -EE     Row Name 06/07/22 3748          Plan of Care Review    Plan of Care Reviewed With patient  -EE     Outcome Evaluation Pt is a 66 yo female who presented from subacute rehab with ongoing R hip infection, increased  drainage from hip incision, and osteomyelitis of femur. Pt with history of multiple previous R hip surgeries, including previous R TRISTAN with subsequent infection, 2 antibiotic spacers, and girdlestone procedure. Pt now  s/p R hip I & D with excision of proximal femur and wound vac placed to R hip incision. Pt TTWB R LE per ortho. Upon exam, pt demonstrates post op pain, impaired R hip ROM, R LE weakness, impaired balance, and decreased endurance limiting mobility. Pt required min A with bed mobility and CGA x 2 with rwx to stand at EOB. Pt able to maintain TTWB R LE during transfers but was too weak and fatigued today to attempt stand pivot transfer to chair. Pt will continue to benefit from PT to address impairments and increase independence with functional mobility. Rec DC to SNF for subacute rehab prior to DC home.  -EE     Row Name 06/07/22 1539          Therapy Assessment/Plan (PT)    Rehab Potential (PT) good, to achieve stated therapy goals  -EE     Criteria for Skilled Interventions Met (PT) yes;meets criteria;skilled treatment is necessary  -EE     Therapy Frequency (PT) 5 times/wk  -EE     Row Name 06/07/22 1539          Positioning and Restraints    Pre-Treatment Position in bed  -EE     Post Treatment Position bed  -EE     In Bed fowlers;call light within reach;encouraged to call for assist;exit alarm on;with family/caregiver;notified nsg;SCD pump applied  ice pack applied R hip  -EE           User Key  (r) = Recorded By, (t) = Taken By, (c) = Cosigned By    Initials Name Provider Type    EE Carmita Christianson, PT Physical Therapist               Outcome Measures     Row Name 06/07/22 0519          How much help from another person do you currently need...    Turning from your back to your side while in flat bed without using bedrails? 3  -EE     Moving from lying on back to sitting on the side of a flat bed without bedrails? 3  -EE     Moving to and from a bed to a chair (including a wheelchair)? 2  -EE      Standing up from a chair using your arms (e.g., wheelchair, bedside chair)? 3  -EE     Climbing 3-5 steps with a railing? 1  -EE     To walk in hospital room? 1  -EE     AM-PAC 6 Clicks Score (PT) 13  -EE     Highest level of mobility 4 --> Transferred to chair/commode  -EE     Row Name 06/07/22 1548          Functional Assessment    Outcome Measure Options AM-PAC 6 Clicks Basic Mobility (PT)  -EE           User Key  (r) = Recorded By, (t) = Taken By, (c) = Cosigned By    Initials Name Provider Type    EE Carmita Christianson, WESLY Physical Therapist                             Physical Therapy Education                 Title: PT OT SLP Therapies (In Progress)     Topic: Physical Therapy (In Progress)     Point: Mobility training (Done)     Learning Progress Summary           Patient Acceptance, E, VU,NR by EE at 6/7/2022 1548                   Point: Home exercise program (Done)     Learning Progress Summary           Patient Acceptance, E, VU,NR by EE at 6/7/2022 1548                   Point: Body mechanics (Not Started)     Learner Progress:  Not documented in this visit.          Point: Precautions (Done)     Learning Progress Summary           Patient Acceptance, E, VU,NR by EE at 6/7/2022 1548                               User Key     Initials Effective Dates Name Provider Type Discipline    EE 06/16/21 -  Carmita Christianson PT Physical Therapist PT              PT Recommendation and Plan  Planned Therapy Interventions (PT): balance training, bed mobility training, home exercise program, patient/family education, ROM (range of motion), transfer training, strengthening, postural re-education  Plan of Care Reviewed With: patient  Outcome Evaluation: Pt is a 68 yo female who presented from subacute rehab with ongoing R hip infection, increased drainage from hip incision, and osteomyelitis of femur. Pt with history of multiple previous R hip surgeries, including previous R TRISTAN with subsequent infection, 2 antibiotic spacers,  and girdlestone procedure. Pt now  s/p R hip I & D with excision of proximal femur and wound vac placed to R hip incision. Pt TTWB R LE per ortho. Upon exam, pt demonstrates post op pain, impaired R hip ROM, R LE weakness, impaired balance, and decreased endurance limiting mobility. Pt required min A with bed mobility and CGA x 2 with rwx to stand at EOB. Pt able to maintain TTWB R LE during transfers but was too weak and fatigued today to attempt stand pivot transfer to chair. Pt will continue to benefit from PT to address impairments and increase independence with functional mobility. Rec DC to SNF for subacute rehab prior to DC home.     Time Calculation:    PT Charges     Row Name 06/07/22 1549             Time Calculation    Start Time 1436  -EE      Stop Time 1502  -EE      Time Calculation (min) 26 min  -EE      PT Received On 06/07/22  -EE      PT - Next Appointment 06/08/22  -EE      PT Goal Re-Cert Due Date 06/14/22  -EE              Time Calculation- PT    Total Timed Code Minutes- PT 15 minute(s)  -EE              Timed Charges    19828 - PT Therapeutic Activity Minutes 15  -EE              Total Minutes    Timed Charges Total Minutes 15  -EE       Total Minutes 15  -EE            User Key  (r) = Recorded By, (t) = Taken By, (c) = Cosigned By    Initials Name Provider Type    EE Carmita Christianson PT Physical Therapist              Therapy Charges for Today     Code Description Service Date Service Provider Modifiers Qty    95388991068  PT THERAPEUTIC ACT EA 15 MIN 6/7/2022 Carmita Christianson, PT GP 1    18577567047 HC PT EVAL MOD COMPLEXITY 2 6/7/2022 Carmita Christianson, PT GP 1    [id number]  PT THER SUPP EA 15 MIN 6/7/2022 Carmita Christianson, PT GP 2          PT G-Codes  Outcome Measure Options: AM-PAC 6 Clicks Basic Mobility (PT)  AM-PAC 6 Clicks Score (PT): 13     Patient was not wearing a face mask during this therapy encounter. Therapist used appropriate personal protective equipment including gown, mask and  gloves.  Mask used was standard procedure mask. Appropriate PPE was worn during the entire therapy session. Hand hygiene was completed before and after therapy session. Patient is not in enhanced droplet precautions.       Carmita Christianson, PT  6/7/2022

## 2022-06-07 NOTE — PROGRESS NOTES
Continued Stay Note  [facility]     Patient Name: Stephy Duncan  MRN: [id number]  Today's Date: 6/7/2022    Admit Date: 6/1/2022     Discharge Plan     Row Name 06/07/22 1537       Plan    Plan [facility] pending [employer] Medicare pre-cert    Patient/Family in Agreement with Plan yes    Plan Comments Per Rajat, [facility] has initiated [employer] Medicare pre-cert. Pharmacy updated and packet in CCP office. Pt will require stretcher transport at d/c. Alise Byers LCSW               Discharge Codes    No documentation.               Expected Discharge Date and Time     Expected Discharge Date Expected Discharge Time    [place] 10, 2022             Luz Elena Byers LCSW

## 2022-06-07 NOTE — PROGRESS NOTES
"  Infectious Diseases Progress Note    Fernando Hart MD     [facility]  [place]: 6 days  Patient Identification:  Name: Stephy Duncan  Age: 67 y.o.  Sex: female  :  1955  MRN: [id number]         Primary Care Physician: Gregorio Rosales            Subjective: Continues to feel well and denies any specific complaints.  Denies any intolerance or side effects to current antibiotic therapy.  Interval History: See consultation note.  On 2022 patient underwent:Irrigation and Debridement of right hip joint with excision of proximal femur  Objective:    Scheduled Meds:cetirizine, 10 mg, Oral, Daily  DAPTOmycin (CUBICIN)  IV, 10 mg/kg (Adjusted), Intravenous, Q24H  ferrous sulfate, 325 mg, Oral, Every Other Day  gabapentin, 600 mg, Oral, Q12H  insulin glargine, 30 Units, Subcutaneous, Nightly  insulin lispro, 3 Units, Subcutaneous, TID With Meals  isosorbide mononitrate, 30 mg, Oral, QAM  lactobacillus acidophilus, 1 capsule, Oral, Daily  metoprolol succinate XL, 25 mg, Oral, Daily  miconazole, , Topical, Q12H  pantoprazole, 40 mg, Oral, BID AC  piperacillin-tazobactam, 3.375 g, Intravenous, Q8H  sodium chloride, 10 mL, Intravenous, Q12H  sucralfate, 1 g, Oral, 4x Daily AC & at Bedtime      Continuous Infusions:lactated ringers, 9 mL/hr, Last Rate: 9 mL/hr (22 1137)        Vital signs in last 24 hours:  Temp:  [98 °F (36.7 °C)-99.1 °F (37.3 °C)] 98 °F (36.7 °C)  Heart Rate:  [71-77] 75  Resp:  [18-20] 20  BP: ()/(46-91) 114/91    Intake/Output:    Intake/Output Summary (Last 24 hours) at 2022 0857  Last data filed at 2022 2339  Gross per 24 hour   Intake 540 ml   Output 950 ml   Net -410 ml       Exam:  /91 (BP Location: Right arm, Patient Position: Lying)   Pulse 75   Temp 98 °F (36.7 °C) (Oral)   Resp 20   Ht 162.6 cm (64.02\")   Wt 103 kg (227 lb 8.2 oz)   SpO2 98%   BMI 39.03 kg/m²   Patient is examined using the personal protective equipment as per guidelines from " infection control for this particular patient as enacted.  Hand washing was performed before and after patient interaction.  General Appearance:  Awake interactive does not appear toxic                          Head:    Normocephalic, without obvious abnormality, atraumatic                           Eyes:    PERRL, conjunctivae/corneas clear, EOM's intact, both eyes                         Throat:   Lips, tongue, gums normal; oral mucosa pink and moist                           Neck:   Supple, symmetrical, trachea midline, no JVD                         Lungs:    Clear to auscultation bilaterally, respirations unlabored                 Chest Wall:    No tenderness or deformity                          Heart:  S1-S2 regular                  Abdomen:   Soft nontender                 Extremities: Right hip surgical site dressed                            Pulses:   Pulses palpable in all extremities                            Skin: Chronic ecchymotic changes noted                  Neurologic: Grossly nonfocal       Data Review:    I reviewed the patient's new clinical results.  Results from last 7 days   Lab Units 06/07/22  0349 06/06/22  1146 06/05/22  2310 06/05/22  0903 06/04/22  0554 06/03/22  0453 06/02/22  0638 06/01/22  1916   WBC 10*3/mm3 15.47*  --   --  26.03* 3.87 4.39 3.23* 7.00   HEMOGLOBIN g/dL 9.2* 9.6* 6.6* 6.5* 8.5* 8.2* 6.0* 7.5*   PLATELETS 10*3/mm3 156  --   --  251 145 136* 121* 180     Results from last 7 days   Lab Units 06/07/22  0349 06/05/22  0903 06/04/22  0554 06/03/22  0453 06/02/22  0638 06/01/22  1916   SODIUM mmol/L 128* 129* 134* 130* 130* 126*   POTASSIUM mmol/L 4.3 5.0 4.1 4.1 3.9 4.2   CHLORIDE mmol/L 102 99 105 104 102 97*   CO2 mmol/L 18.6* 19.6* 21.0* 19.0* 18.4* 17.3*   BUN mg/dL 20 15 7* 10 14 15   CREATININE mg/dL 0.82 1.48* 0.76 0.71 0.85 1.03*   CALCIUM mg/dL 7.3* 7.7* 8.0* 7.6* 7.3* 7.6*   GLUCOSE mg/dL 141* 223* 114* 139* 236* 237*     Microbiology Results (last 10 days)      Procedure Component Value - Date/Time    Clostridium Difficile Toxin, PCR - Stool, Per Rectum [[id number]]  (Normal) Collected: 06/06/22 2128    Lab Status: Final result Specimen: Stool from Per Rectum Updated: 06/06/22 2222     C. Difficile Toxins by PCR Negative    Urine Culture - Urine, Urine, Catheter In/Out [[id number]]  (Normal) Collected: 06/05/22 1137    Lab Status: Final result Specimen: Urine, Catheter In/Out Updated: 06/06/22 1155     Urine Culture No growth    COVID PRE-OP / PRE-PROCEDURE SCREENING ORDER (NO ISOLATION) - Swab, Nasopharynx [[id number]]  (Normal) Collected: 06/04/22 0336    Lab Status: Final result Specimen: Swab from Nasopharynx Updated: 06/04/22 0426    Narrative:      The following orders were created for panel order COVID PRE-OP / PRE-PROCEDURE SCREENING ORDER (NO ISOLATION) - Swab, Nasopharynx.  Procedure                               Abnormality         Status                     ---------                               -----------         ------                     COVID-19,[facility]...[[id number]]  Normal              Final result                 Please view results for these tests on the individual orders.    COVID-19,[facility] CEPHEID/ROBERTO CARLOS NP SWAB IN TRANSPORT MEDIA 8-12 HR TAT - Swab, Nasopharynx [[id number]]  (Normal) Collected: 06/04/22 0336    Lab Status: Final result Specimen: Swab from Nasopharynx Updated: 06/04/22 0426     COVID19 Not Detected    Narrative:      Fact sheet for providers: https://www.fda.gov/media/761846/download     Fact sheet for patients: https://www.fda.gov/media/025391/download    Blood Culture - Blood, Arm, Right [[id number]]  (Normal) Collected: 06/01/22 2018    Lab Status: Final result Specimen: Blood from Arm, Right Updated: 06/06/22 2032     Blood Culture No growth at 5 days    Narrative:      Less than seven (7) mL's of blood was collected.  Insufficient quantity may yield false negative results.    Blood Culture - Blood, Blood, Central Line  [[id number]]  (Normal) Collected: 06/01/22 2005    Lab Status: Final result Specimen: Blood, Central Line Updated: 06/06/22 2017     Blood Culture No growth at 5 days    Narrative:      Less than seven (7) mL's of blood was collected.  Insufficient quantity may yield false negative results.    COVID PRE-OP / PRE-PROCEDURE SCREENING ORDER (NO ISOLATION) - Swab, Nasopharynx [[id number]]  (Normal) Collected: 06/01/22 2003    Lab Status: Final result Specimen: Swab from Nasopharynx Updated: 06/01/22 2057    Narrative:      The following orders were created for panel order COVID PRE-OP / PRE-PROCEDURE SCREENING ORDER (NO ISOLATION) - Swab, Nasopharynx.  Procedure                               Abnormality         Status                     ---------                               -----------         ------                     COVID-19,[facility]...[[id number]]  Normal              Final result                 Please view results for these tests on the individual orders.    COVID-19,[facility] CEPHEID/ROBERTO CARLOS NP SWAB IN TRANSPORT MEDIA 8-12 HR TAT - Swab, Nasopharynx [[id number]]  (Normal) Collected: 06/01/22 2003    Lab Status: Final result Specimen: Swab from Nasopharynx Updated: 06/01/22 2057     COVID19 Not Detected    Narrative:      Fact sheet for providers: https://www.fda.gov/media/898807/download     Fact sheet for patients: https://www.fda.gov/media/644149/download            Assessment:    Osteomyelitis of hip (HCC)    Type 2 diabetes mellitus with diabetic polyneuropathy, with long-term current use of insulin (HCC)    Hypertension    Iron deficiency anemia    Obesity (BMI 30-39.9)    Chronic diastolic CHF (congestive heart failure) (HCC)    Hyponatremia    Metabolic acidosis    Sepsis without acute organ dysfunction (HCC)    Pathological fracture of right femur ([facility])  1-acute on chronic osteomyelitis of the right proximal femur in a similar area in a patient who had previous recurrent right hip prosthetic and  multiple surgeries with a latest procedure performed on 4/25/2022 and currently likely has mixed infection given the foul smell drainage and 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  2-pancytopenia  3-morbid obesity  4-type 2 diabetes  5-hyponatremia  6-other diagnosis per primary team     Recommendations/Discussions:  · Continue Zosyn and daptomycin   · Monitor closely for complications of antibiotics  · Since there are no operative culture results empiric antibiotic treatment based on the prior culture results and since she declined while on daptomycin consideration for mixed infection is needed to construct antibiotic regimen.  Based on this concept Zosyn and daptomycin need to be continued for at least couple weeks from 6/4/2022 and may be extended to 6 weeks if orthopedic surgery service thinks that there is residual osteomyelitis.  · Obviously patient need to be monitored closely for side effects of antibiotics.  Fernando Hart MD  6/7/2022  08:57 EDT    Much of this encounter note is an electronic transcription/translation of spoken language to printed text. The electronic translation of spoken language may permit erroneous, or at times, nonsensical words or phrases to be inadvertently transcribed; Although I have reviewed the note for such errors, some may still exist

## 2022-06-07 NOTE — PLAN OF CARE
Goal Outcome Evaluation:           Progress: no change  Outcome Evaluation: pt alert and oriented X4. VSS, on room air. complained of pain, pain med given X2. pt has wound vac on right hip. pt does not want to turn right due to pain on right hip. pt stated she is comfortable lying supine, and on left side mostly. pt on Iv antibiotics.

## 2022-06-08 ENCOUNTER — APPOINTMENT (OUTPATIENT)
Dept: INFUSION THERAPY | Facility: HOSPITAL | Age: 67
End: 2022-06-08

## 2022-06-08 PROBLEM — R10.13 EPIGASTRIC ABDOMINAL PAIN: Status: ACTIVE | Noted: 2022-06-01

## 2022-06-08 LAB
ALBUMIN SERPL-MCNC: 1.6 G/DL (ref 3.5–5.2)
ANION GAP SERPL CALCULATED.3IONS-SCNC: 5 MMOL/L (ref 5–15)
BASOPHILS # BLD AUTO: 0.03 10*3/MM3 (ref 0–0.2)
BASOPHILS NFR BLD AUTO: 0.2 % (ref 0–1.5)
BUN SERPL-MCNC: 16 MG/DL (ref 8–23)
BUN/CREAT SERPL: 21.3 (ref 7–25)
CALCIUM SPEC-SCNC: 7.5 MG/DL (ref 8.6–10.5)
CHLORIDE SERPL-SCNC: 99 MMOL/L (ref 98–107)
CO2 SERPL-SCNC: 23 MMOL/L (ref 22–29)
CREAT SERPL-MCNC: 0.75 MG/DL (ref 0.57–1)
DEPRECATED RDW RBC AUTO: 46.6 FL (ref 37–54)
EGFRCR SERPLBLD CKD-EPI 2021: 86.3 ML/MIN/1.73
EOSINOPHIL # BLD AUTO: 0.35 10*3/MM3 (ref 0–0.4)
EOSINOPHIL NFR BLD AUTO: 2.5 % (ref 0.3–6.2)
ERYTHROCYTE [DISTWIDTH] IN BLOOD BY AUTOMATED COUNT: 15.6 % (ref 12.3–15.4)
GLUCOSE BLDC GLUCOMTR-MCNC: 114 MG/DL (ref 70–130)
GLUCOSE BLDC GLUCOMTR-MCNC: 160 MG/DL (ref 70–130)
GLUCOSE BLDC GLUCOMTR-MCNC: 186 MG/DL (ref 70–130)
GLUCOSE BLDC GLUCOMTR-MCNC: 192 MG/DL (ref 70–130)
GLUCOSE BLDC GLUCOMTR-MCNC: 195 MG/DL (ref 70–130)
GLUCOSE SERPL-MCNC: 127 MG/DL (ref 65–99)
HCT VFR BLD AUTO: 26 % (ref 34–46.6)
HGB BLD-MCNC: 8.6 G/DL (ref 12–15.9)
IMM GRANULOCYTES # BLD AUTO: 0.34 10*3/MM3 (ref 0–0.05)
IMM GRANULOCYTES NFR BLD AUTO: 2.4 % (ref 0–0.5)
LYMPHOCYTES # BLD AUTO: 1.66 10*3/MM3 (ref 0.7–3.1)
LYMPHOCYTES NFR BLD AUTO: 11.8 % (ref 19.6–45.3)
MCH RBC QN AUTO: 27.1 PG (ref 26.6–33)
MCHC RBC AUTO-ENTMCNC: 33.1 G/DL (ref 31.5–35.7)
MCV RBC AUTO: 82 FL (ref 79–97)
MONOCYTES # BLD AUTO: 0.99 10*3/MM3 (ref 0.1–0.9)
MONOCYTES NFR BLD AUTO: 7.1 % (ref 5–12)
NEUTROPHILS NFR BLD AUTO: 10.66 10*3/MM3 (ref 1.7–7)
NEUTROPHILS NFR BLD AUTO: 76 % (ref 42.7–76)
NRBC BLD AUTO-RTO: 0 /100 WBC (ref 0–0.2)
PHOSPHATE SERPL-MCNC: 2.6 MG/DL (ref 2.5–4.5)
PLATELET # BLD AUTO: 176 10*3/MM3 (ref 140–450)
PMV BLD AUTO: 9.4 FL (ref 6–12)
POTASSIUM SERPL-SCNC: 4.2 MMOL/L (ref 3.5–5.2)
RBC # BLD AUTO: 3.17 10*6/MM3 (ref 3.77–5.28)
SARS-COV-2 ORF1AB RESP QL NAA+PROBE: NOT DETECTED
SODIUM SERPL-SCNC: 127 MMOL/L (ref 136–145)
WBC NRBC COR # BLD: 14.03 10*3/MM3 (ref 3.4–10.8)

## 2022-06-08 PROCEDURE — 85025 COMPLETE CBC W/AUTO DIFF WBC: CPT | Performed by: HOSPITALIST

## 2022-06-08 PROCEDURE — U0004 COV-19 TEST NON-CDC HGH THRU: HCPCS | Performed by: PHYSICIAN ASSISTANT

## 2022-06-08 PROCEDURE — 25010000002 PIPERACILLIN SOD-TAZOBACTAM PER 1 G: Performed by: ORTHOPAEDIC SURGERY

## 2022-06-08 PROCEDURE — 80069 RENAL FUNCTION PANEL: CPT | Performed by: HOSPITALIST

## 2022-06-08 PROCEDURE — 82962 GLUCOSE BLOOD TEST: CPT

## 2022-06-08 PROCEDURE — 63710000001 INSULIN LISPRO (HUMAN) PER 5 UNITS: Performed by: ORTHOPAEDIC SURGERY

## 2022-06-08 PROCEDURE — 99222 1ST HOSP IP/OBS MODERATE 55: CPT | Performed by: PHYSICIAN ASSISTANT

## 2022-06-08 PROCEDURE — 25010000002 DAPTOMYCIN PER 1 MG: Performed by: ORTHOPAEDIC SURGERY

## 2022-06-08 RX ADMIN — Medication 10 ML: at 20:29

## 2022-06-08 RX ADMIN — SUCRALFATE 1 G: 1 TABLET ORAL at 12:15

## 2022-06-08 RX ADMIN — INSULIN LISPRO 3 UNITS: 100 INJECTION, SOLUTION INTRAVENOUS; SUBCUTANEOUS at 17:43

## 2022-06-08 RX ADMIN — OXYCODONE AND ACETAMINOPHEN 1 TABLET: 5; 325 TABLET ORAL at 08:32

## 2022-06-08 RX ADMIN — INSULIN LISPRO 3 UNITS: 100 INJECTION, SOLUTION INTRAVENOUS; SUBCUTANEOUS at 12:15

## 2022-06-08 RX ADMIN — CETIRIZINE HYDROCHLORIDE 10 MG: 10 TABLET ORAL at 08:13

## 2022-06-08 RX ADMIN — INSULIN GLARGINE-YFGN 30 UNITS: 100 INJECTION, SOLUTION SUBCUTANEOUS at 23:20

## 2022-06-08 RX ADMIN — Medication 1 CAPSULE: at 08:13

## 2022-06-08 RX ADMIN — OXYCODONE AND ACETAMINOPHEN 1 TABLET: 5; 325 TABLET ORAL at 04:34

## 2022-06-08 RX ADMIN — Medication 10 ML: at 08:28

## 2022-06-08 RX ADMIN — SUCRALFATE 1 G: 1 TABLET ORAL at 06:31

## 2022-06-08 RX ADMIN — PANTOPRAZOLE SODIUM 40 MG: 40 TABLET, DELAYED RELEASE ORAL at 17:44

## 2022-06-08 RX ADMIN — OXYCODONE AND ACETAMINOPHEN 1 TABLET: 5; 325 TABLET ORAL at 20:27

## 2022-06-08 RX ADMIN — SUCRALFATE 1 G: 1 TABLET ORAL at 20:27

## 2022-06-08 RX ADMIN — MICONAZOLE NITRATE: 2 POWDER TOPICAL at 20:28

## 2022-06-08 RX ADMIN — FERROUS SULFATE TAB 325 MG (65 MG ELEMENTAL FE) 325 MG: 325 (65 FE) TAB at 08:12

## 2022-06-08 RX ADMIN — ISOSORBIDE MONONITRATE 30 MG: 30 TABLET ORAL at 06:31

## 2022-06-08 RX ADMIN — SUCRALFATE 1 G: 1 TABLET ORAL at 17:44

## 2022-06-08 RX ADMIN — TAZOBACTAM SODIUM AND PIPERACILLIN SODIUM 3.38 G: 375; 3 INJECTION, SOLUTION INTRAVENOUS at 16:11

## 2022-06-08 RX ADMIN — TAZOBACTAM SODIUM AND PIPERACILLIN SODIUM 3.38 G: 375; 3 INJECTION, SOLUTION INTRAVENOUS at 08:14

## 2022-06-08 RX ADMIN — GABAPENTIN 600 MG: 300 CAPSULE ORAL at 20:27

## 2022-06-08 RX ADMIN — OXYCODONE AND ACETAMINOPHEN 1 TABLET: 5; 325 TABLET ORAL at 00:34

## 2022-06-08 RX ADMIN — OXYCODONE AND ACETAMINOPHEN 1 TABLET: 5; 325 TABLET ORAL at 16:10

## 2022-06-08 RX ADMIN — MICONAZOLE NITRATE: 2 POWDER TOPICAL at 08:22

## 2022-06-08 RX ADMIN — TAZOBACTAM SODIUM AND PIPERACILLIN SODIUM 3.38 G: 375; 3 INJECTION, SOLUTION INTRAVENOUS at 23:20

## 2022-06-08 RX ADMIN — METOPROLOL SUCCINATE 25 MG: 25 TABLET, EXTENDED RELEASE ORAL at 08:12

## 2022-06-08 RX ADMIN — DICLOFENAC SODIUM 4 G: 10 GEL TOPICAL at 12:15

## 2022-06-08 RX ADMIN — DAPTOMYCIN 750 MG: 500 INJECTION, POWDER, LYOPHILIZED, FOR SOLUTION INTRAVENOUS at 20:28

## 2022-06-08 RX ADMIN — GABAPENTIN 600 MG: 300 CAPSULE ORAL at 08:12

## 2022-06-08 RX ADMIN — OXYCODONE AND ACETAMINOPHEN 1 TABLET: 5; 325 TABLET ORAL at 12:15

## 2022-06-08 RX ADMIN — PANTOPRAZOLE SODIUM 40 MG: 40 TABLET, DELAYED RELEASE ORAL at 06:31

## 2022-06-08 NOTE — PLAN OF CARE
Problem: Adult Inpatient Plan of Care  Goal: Absence of Hospital-Acquired Illness or Injury  Intervention: Identify and Manage Fall Risk  Recent Flowsheet Documentation  Taken 6/8/2022 1608 by Karissa Quintanilla RN  Safety Promotion/Fall Prevention:   activity supervised   clutter free environment maintained   room organization consistent   safety round/check completed  Taken 6/8/2022 1410 by Karissa Quintanilla RN  Safety Promotion/Fall Prevention:   activity supervised   clutter free environment maintained   nonskid shoes/slippers when out of bed   room organization consistent   safety round/check completed  Taken 6/8/2022 1215 by Karissa Quintanilla RN  Safety Promotion/Fall Prevention: activity supervised  Taken 6/8/2022 1005 by Karissa Quintanilla RN  Safety Promotion/Fall Prevention:   assistive device/personal items within reach   clutter free environment maintained   room organization consistent   safety round/check completed  Taken 6/8/2022 0832 by Karissa Quintanilla RN  Safety Promotion/Fall Prevention:   activity supervised   assistive device/personal items within reach   room organization consistent   safety round/check completed   Goal Outcome Evaluation:  Plan of Care Reviewed With: patient           Outcome Evaluation: VSS AO x 4 continue with antibiotic and wound vac consult gastroenterology will be NPO EGD tomorrow pending Covid test

## 2022-06-08 NOTE — PROGRESS NOTES
Continued Stay Note  [facility]     Patient Name: Stephy Duncan  MRN: [id number]  Today's Date: 6/8/2022    Admit Date: 6/1/2022     Discharge Plan     Row Name 06/08/22 1605       Plan    Plan [facility], [employer] Medicare pre-cert approved    Patient/Family in Agreement with Plan yes    Plan Comments Per Betty Earl has obtained [employer] Medicare pre-cert and can accept pt when medically stable. Pharmacy updated and packet in CCP office. Pt will require stretcher transport at d/c. Alise Byers LCSW               Discharge Codes    No documentation.               Expected Discharge Date and Time     Expected Discharge Date Expected Discharge Time    Jun 9, 2022             Luz Elena Byers LCSW

## 2022-06-08 NOTE — CONSULTS
[facility]  Initial Inpatient Consult Note    Referring Provider: Dr. Cantu    Reason for Consultation: Upper abdominal pain and anemia    Subjective     History of present illness:      Thank you for allowing us to participate in the care of this patient.    67 y.o. female not previously known to our service with a past medical history significant for recurrent right prosthetic hip infection, type 2 diabetes mellitus, hypertension, chronic diastolic congestive heart failure, coronary artery disease status post stent placement x2 over a year ago, iron deficiency anemia who was admitted 6/1 for increased drainage and malodorous output from wound vac.  We have been asked to see the patient for complaints of upper abdominal pain and anemia. Her current hemoglobin is 8.6 g/dL. She has received 5 units packed cells since admission.  She has been undergone over 70 procedures on the right hip and required long-term use of antibiotics.  She reports epigastric abdominal pain which radiates across the entire upper abdomen and straight through to the back.  She denies heartburn/reflux, nausea, vomiting, hematemesis, melena, hematochezia, nonsteroidal anti-inflammatory use.  She is status post lap cholecystectomy.  Her current antacid regimen includes pantoprazole 40 mg p.o. twice daily and Carafate 1 g 4 times daily.  She is receiving oral iron supplementation.    She reportedly underwent a colonoscopy roughly 5 years ago by Dr. Cabrera at [facility] with no concerning findings. She has undergone an EGD but feels this was closer to 10 years ago.  There is no family history of GI malignancies.    Past Medical History:  Past Medical History:   Diagnosis Date   • Allergies    • Arthritis    • Cardiac murmur    • Diabetes mellitus (HCC)    • GERD (gastroesophageal reflux disease)    • Heart attack (HCC) 12/2020   • History of cervical cancer 1981   • History of transfusion     SEVERAL   • Hyperlipidemia    •  Hypertension    • Iron deficiency anemia    • Osteomyelitis hip (HCC)     RIGHT   • PONV (postoperative nausea and vomiting)    • Right hip pain    • VRE infection within last 3 months    • Wound infection     RIGHT HIP.  WOUND VAC IN PLACE     Past Surgical History:  Past Surgical History:   Procedure Laterality Date   • CARDIAC CATHETERIZATION     • CERVICAL CONIZATION     • COLONOSCOPY     • CORONARY ANGIOPLASTY WITH STENT PLACEMENT     • ENDOSCOPY     • HIP ARTHROPLASTY Right    • HIP HARDWARE REMOVAL Right    • HIP MINI REVISION Right 01/18/2022    Procedure: TOTAL HIP ARTHROPLASTY LINER REVISION, pegboard lateral;  Surgeon: Karri Schaeffer II, MD;  Location: [facility] OR;  Service: Orthopedics;  Laterality: Right;   • HIP SPACER INSERTION WITH ANTIBIOTIC CEMENT      X3   • HYSTERECTOMY     • INCISION AND DRAINAGE HIP Right 12/18/2021    Procedure: INCISION AND DRAINAGE TOTAL HIP, LINER EXCHANGE AND WOUND VAC PLACEMENT;  Surgeon: Karri Schaeffer II, MD;  Location: [facility] OR;  Service: Orthopedics;  Laterality: Right;   • INCISION AND DRAINAGE HIP Right 5/2/2022    Procedure: HIP INCISION AND DRAINAGE;  Surgeon: Karri Schaeffer II, MD;  Location: [facility] OR;  Service: Orthopedics;  Laterality: Right;   • INCISION AND DRAINAGE HIP Right 6/4/2022    Procedure: HIP INCISION AND DRAINAGE WITH WOUND VAC REPLACEMENT;  Surgeon: Karri Schaeffer II, MD;  Location: [facility] OR;  Service: Orthopedics;  Laterality: Right;   • KNEE ARTHROPLASTY Left    • LAPAROSCOPIC CHOLECYSTECTOMY     • TONSILLECTOMY     • TOTAL HIP ARTHROPLASTY Right 4/25/2022    Procedure: HIP GIRDLESTONE PROCEDURE;  Surgeon: Karri Schaeffer II, MD;  Location: [facility] OR;  Service: Orthopedics;  Laterality: Right;   • TOTAL HIP ARTHROPLASTY REVISION Right 11/16/2021    Procedure: TOTAL HIP REVISION ARTHROPLASTY RIGHT POSTERIOR;  Surgeon: Karri Schaeffer II, MD;  Location: [facility]  OR;  Service: Orthopedics;  Laterality: Right;      Social History:   Social History     Tobacco Use   • Smoking status: Former Smoker     Years: 45.00     Types: Cigarettes     Quit date: 12/10/2015     Years since quittin.4   • Smokeless tobacco: Never Used   Substance Use Topics   • Alcohol use: Never      Family History:  Family History   Problem Relation Age of Onset   • Malig Hyperthermia Neg Hx        Home Meds:  Medications Prior to Admission   Medication Sig Dispense Refill Last Dose   • aspirin 81 MG EC tablet Take 1 tablet by mouth Every 12 (Twelve) Hours. (Patient taking differently: Take 81 mg by mouth Daily.) 60 tablet 0    • Chlorhexidine Gluconate Cloth 2 % pads Apply  topically. AS DIRECTED PREOP      • DAPTOmycin 750 mg in sodium chloride 0.9 % 50 mL Infuse 750 mg into a venous catheter Daily for 33 doses. Indications: Bone and/or Joint Infection      • Diclofenac Sodium (VOLTAREN) 1 % gel gel Apply 4 g topically to the appropriate area as directed 4 (Four) Times a Day As Needed.      • Dulaglutide (Trulicity) 3 MG/0.5ML solution pen-injector Inject 0.5 mL under the skin into the appropriate area as directed 1 (One) Time Per Week.       • esomeprazole (nexIUM) 40 MG capsule Take 40 mg by mouth Every Morning Before Breakfast.      • ferrous sulfate 325 (65 FE) MG tablet Take 325 mg by mouth Every Other Day. Mornings      • folic acid (FOLVITE) 1 MG tablet Take 1,000 mcg by mouth Daily.      • gabapentin (NEURONTIN) 600 MG tablet Take 1 tablet by mouth 2 (Two) Times a Day. 40 tablet 0    • insulin aspart (NovoLOG FlexPen) 100 UNIT/ML solution pen-injector sc pen Inject 2 Units under the skin into the appropriate area as directed 3 (Three) Times a Day With Meals.      • insulin detemir (LEVEMIR) 100 UNIT/ML injection Inject 50 Units under the skin into the appropriate area as directed Every Night.      • isosorbide mononitrate (IMDUR) 30 MG 24 hr tablet Take 30 mg by mouth Every Morning.       • loratadine (CLARITIN) 10 MG tablet Take 10 mg by mouth Every Night.      • metoprolol succinate XL (Toprol XL) 25 MG 24 hr tablet Take 1 tablet by mouth Daily.      • oxyCODONE-acetaminophen (PERCOCET) 5-325 MG per tablet Take 1 tablet by mouth Every 4 (Four) Hours As Needed for Severe Pain . 50 tablet 0    • oxyCODONE-acetaminophen (PERCOCET) 5-325 MG per tablet Take 1 tablet by mouth Every 4 (Four) Hours As Needed for Severe Pain . 50 tablet 0    • vitamin D3 125 MCG (5000 UT) capsule capsule Take 5,000 Units by mouth Daily.        Current Meds:   cetirizine, 10 mg, Oral, Daily  DAPTOmycin (CUBICIN)  IV, 10 mg/kg (Adjusted), Intravenous, Q24H  ferrous sulfate, 325 mg, Oral, Every Other Day  gabapentin, 600 mg, Oral, Q12H  insulin glargine, 30 Units, Subcutaneous, Nightly  insulin lispro, 3 Units, Subcutaneous, TID With Meals  isosorbide mononitrate, 30 mg, Oral, QAM  lactobacillus acidophilus, 1 capsule, Oral, Daily  metoprolol succinate XL, 25 mg, Oral, Daily  miconazole, , Topical, Q12H  pantoprazole, 40 mg, Oral, BID AC  piperacillin-tazobactam, 3.375 g, Intravenous, Q8H  sodium chloride, 10 mL, Intravenous, Q12H  sucralfate, 1 g, Oral, 4x Daily AC & at Bedtime      Allergies:  Allergies   Allergen Reactions   • Ace Inhibitors Shortness Of Breath and Swelling   • Morphine Shortness Of Breath     Review of Systems   Constitutional:   No fevers, chills, sweats   Eye:   No recent visual problems, eye discharge, eye pain, redness   HENT:   No ear pain, nasal congestion, sore throat, voice changes   Respiratory:   No shortness of breath, cough, pain on breathing, sputum production   Cardiovascular:   No Chest pain, palpitations, syncope, shortness of breath while laying flat   Gastrointestinal:   No nausea, vomiting, diarrhea, constipation   Genitourinary:   No hematuria, dysuria, incontinence   Hema/Lymph:   Negative for bruising tendency, swollen lymph glands, nosebleeds, history of anticoagulation    Endocrine:   Negative for excessive thirst, excessive hunger, excessive urination,   Musculoskeletal:   No back pain, neck pain, muscle pain, decreased range of motion + right hip pain  Integumentary:   No rash, pruritus, abrasions  Neurologic: No weakness, numbness, frequent headaches   Psychiatric:   No anxiety, depression, mood change    Objective     Vital Signs  Temp:  [98.1 °F (36.7 °C)-98.7 °F (37.1 °C)] 98.1 °F (36.7 °C)  Heart Rate:  [67-88] 71  Resp:  [16-18] 18  BP: (102-129)/(54-64) 102/63  Physical Exam:   Constitutional:   Well developed, well nourished. No acute distress.   Head:   Atraumatic, normocephalic.   Neck:   Supple and symmetric. Trachea midline.   ENT:   External ears and nose without lesion. Hearing grossly intact.   Eyes:   Pupils equal and round. Sclerae anicteric. Conjunctivae clear.   Respiratory:   Quiet, even, non-labored breathing. Clear to auscultation bilaterally.   Cardiovascular:   Regular rate and rhythm. No murmur, gallop or rub appreciated.   Gastrointestinal:   Soft, nondistended, nontender. No rebound or guarding present. Bowel sounds present in all 4 quadrants.  Laparoscopic incisional scars noted.  Integumentary:   Skin warm and dry, not jaundiced.   Neurological:   Alert and oriented to person, place and time. Fluid of speech.   Musculoskeletal:  Right hip with wound vac in place, cannister currently empty as the appliance was changed earlier today.  Psychological:   Appropriate mood and affect. Cooperative.    Results Review:   I reviewed the patient's new clinical results.    Results from last 7 days   Lab Units 06/08/22  0409 06/07/22  0349 06/06/22  1146 06/05/22  2310 06/05/22  0903   WBC 10*3/mm3 14.03* 15.47*  --   --  26.03*   HEMOGLOBIN g/dL 8.6* 9.2* 9.6*   < > 6.5*   HEMATOCRIT % 26.0* 28.7* 29.6*   < > 20.9*   PLATELETS 10*3/mm3 176 156  --   --  251    < > = values in this interval not displayed.     Results from last 7 days   Lab Units 06/08/22  0409  06/07/22  0349 06/05/22  0903 06/02/22  0638 06/01/22  1916   SODIUM mmol/L 127* 128* 129*   < > 126*   POTASSIUM mmol/L 4.2 4.3 5.0   < > 4.2   CHLORIDE mmol/L 99 102 99   < > 97*   CO2 mmol/L 23.0 18.6* 19.6*   < > 17.3*   BUN mg/dL 16 20 15   < > 15   CREATININE mg/dL 0.75 0.82 1.48*   < > 1.03*   CALCIUM mg/dL 7.5* 7.3* 7.7*   < > 7.6*   BILIRUBIN mg/dL  --   --   --   --  0.2   ALK PHOS U/L  --   --   --   --  125*   ALT (SGPT) U/L  --   --   --   --  13   AST (SGOT) U/L  --   --   --   --  18   GLUCOSE mg/dL 127* 141* 223*   < > 237*    < > = values in this interval not displayed.     Results from last 7 days   Lab Units 06/04/22  0657 06/02/22  0638   INR  1.20* 2.93*     No results found for: LIPASE    Radiology:  XR Abdomen KUB   Final Result      MRI Hip Right Without Contrast   Final Result   Expected changes from previous Girdlestone procedure, right   hip arthroplasty reimplantation and subsequent removal for infection. No   abnormality in the pelvis is suggestive of osteomyelitis around the   acetabulum. The extensive deformity of the proximal femur is as expected   given the description of the surgical procedure. However, there is   robust soft tissue edema with multiple small foci of gas in the right   hip joint space, around the bone fragments of the proximal right femur,   and in the soft tissue lateral to the femur. Given the history and the   open wound, these findings almost certainly represent cellulitis with   osteomyelitis involving the fragmented portions of the proximal femur.       This report was finalized on 6/3/2022 10:13 AM by Dr. Brendon Carrillo M.D.          CT Lower Extremity Right With Contrast   Final Result   Comminuted and displaced fracture of the proximal right femur, with   apparent destruction of the right intertrochanteric region. Appearance   is extremely concerning for osteomyelitis and pathologic fracture. There   is also irregularity of the right acetabulum, which may  reflect further   infectious involvement. MRI could be considered for further assessment.       Radiation dose reduction techniques were utilized, including automated   exposure control and exposure modulation based on body size.       This report was finalized on 6/1/2022 9:10 PM by Dr. Roula Anthony M.D.              Assessment & Plan   Patient Active Problem List   Diagnosis   • Status post total replacement of hip   • Type 2 diabetes mellitus with diabetic polyneuropathy, with long-term current use of insulin ([facility])   • Hypertension   • Normocytic anemia   • Postoperative infection   • Iron deficiency anemia   • Obesity (BMI 30-39.9)   • Presence of stent in coronary artery in patient with coronary artery disease   • Chronic diastolic CHF (congestive heart failure) ([facility])   • Septic arthritis of hip ([facility])   • Drainage from wound   • Candidal intertrigo   • Postoperative anemia due to acute blood loss   • S/P Girdlestone procedure   • Enterococcus faecalis infection   • Pyogenic arthritis, pelvic region and thigh ([facility])   • Osteomyelitis of hip ([facility])   • Hyponatremia   • Metabolic acidosis   • Sepsis without acute organ dysfunction ([facility])   • Pathological fracture of right femur ([facility])       Assessment:  1. Septic arthritis of the right hip s/p mulitple debridements and wound vac placement  2. Acute on chronic anemia  3. Epigastric abdominal pain  4. Type 2 diabetes mellitus  5. Essential hypertension  6. History of VRE infection  7. Coronary artery disease status post stent placement x2  8. Obesity with a BMI of 39    Plan:  · Continue twice daily PPI and Carafate.  · Plan for EGD tomorrow with Dr. Nj to rule out gastritis, peptic ulcer, H. pylori infection.    · N.p.o. after midnight.    I discussed the patient's findings and my recommendations with patient and Dr Bryant.    Irina dictation used throughout this note.            JENNY Valero  [facility]  [address]  - [address] [place] [address]  Office: [phone]

## 2022-06-08 NOTE — PLAN OF CARE
Goal Outcome Evaluation:  Plan of Care Reviewed With: patient           Outcome Evaluation: VSS on room air. C/o Right hip pain treated with prn percocet twice. Wound vac C/D/I. Continues IV abx. Resting in bed at this time. Will continue to monitor.

## 2022-06-08 NOTE — NURSING NOTE
06/08/22 1310   Wound 05/02/22 1454 Right posterior hip Incision   Placement Date/Time: 05/02/22 1454   Present on Hospital Admission: Yes  Side: Right  Orientation: posterior  Location: hip  Primary Wound Type: Incision   Wound Image    Dressing Appearance   (vac intact, no current leaking, good seal)   Closure Staples;Approximated   Periwound   (scattered skin denudation around incision)   Periwound Temperature warm   Periwound Skin Turgor soft   Drainage Characteristics/Odor serosanguineous   Drainage Amount copious  (gentle palpation to thigh expressed approx 50 ml drainage  from most proximal aspect of incision)   Care, Wound cleansed with;sterile normal saline;negative pressure wound therapy   Dressing Care dressing changed  (versatel placed along incision, foam placed to incision, track pad cushioned and placed to proximal aspect of incision)   Periwound Care   (spray prep)   NPWT (Negative Pressure Wound Therapy) 05/04/22 R hip   Placement Date: 05/04/22   Location: R hip   Therapy Setting continuous therapy   Dressing foam, black;transparent dressing   Contact Layer silicone   Pressure Setting 125 mmHg   Sponges Inserted 1   Sponges Removed 1     Wound/ostomy - vac dressing that was placed on Monday has stayed intact, nurse reports it has leaked drainage at times but there is no current pooling or moisture under drape.     Drape removed, there is no immediate drainage from incision like there was last dressing change. With palpation of thigh/hip large amount of serosang drainage expressed. To assist with this the track pad was placed over this area rather than being bridged. No redness to skin, no odor to drainage. The current cannister is new. There was 500 ml in the canister 6/6 with last dressing change when a new canister was placed.  Canister has been changed at least 1 time since then for being full.

## 2022-06-08 NOTE — PROGRESS NOTES
[facility]    [phone]   LOS: 7 days     Name: Stephy Duncan  Age/Sex: 67 y.o. female  :  1955        PCP: Gregorio Rsoales  Chief Complaint   Patient presents with   • Wound Infection      Subjective   Still complaining of upper abdominal pain.  She is describes this differently each day.  Somewhat difficult to discern what the etiology might be.  Denies nausea or vomiting and is tolerating a diet.  Does say the pain is a little better compared to when it started 3 days ago.  General: No Fever or Chills, Cardiac: No Chest Pain or Palpitations, Resp: No Cough or SOA, GI: No Nausea, Vomiting, or Diarrhea and Other: No bleeding    cetirizine, 10 mg, Oral, Daily  DAPTOmycin (CUBICIN)  IV, 10 mg/kg (Adjusted), Intravenous, Q24H  ferrous sulfate, 325 mg, Oral, Every Other Day  gabapentin, 600 mg, Oral, Q12H  insulin glargine, 30 Units, Subcutaneous, Nightly  insulin lispro, 3 Units, Subcutaneous, TID With Meals  isosorbide mononitrate, 30 mg, Oral, QAM  lactobacillus acidophilus, 1 capsule, Oral, Daily  metoprolol succinate XL, 25 mg, Oral, Daily  miconazole, , Topical, Q12H  pantoprazole, 40 mg, Oral, BID AC  piperacillin-tazobactam, 3.375 g, Intravenous, Q8H  sodium chloride, 10 mL, Intravenous, Q12H  sucralfate, 1 g, Oral, 4x Daily AC & at Bedtime      lactated ringers, 9 mL/hr, Last Rate: 9 mL/hr (22 1137)        Objective   Vital Signs  Temp:  [97.6 °F (36.4 °C)-98.7 °F (37.1 °C)] 98.7 °F (37.1 °C)  Heart Rate:  [66-88] 88  Resp:  [16-18] 16  BP: (122-129)/(54-66) 124/64  Body mass index is 39.03 kg/m².    Intake/Output Summary (Last 24 hours) at 2022 1247  Last data filed at 2022 1147  Gross per 24 hour   Intake 360 ml   Output 1450 ml   Net -1090 ml       Physical Exam  Vitals and nursing note reviewed.   Constitutional:       Appearance: Normal appearance.   Cardiovascular:      Rate and Rhythm: Normal rate and regular rhythm.   Pulmonary:      Effort: No respiratory  distress.      Breath sounds: Normal breath sounds.   Abdominal:      General: Bowel sounds are normal.      Palpations: Abdomen is soft.   Neurological:      General: No focal deficit present.      Mental Status: She is alert and oriented to person, place, and time.           Results Review:       I reviewed the patient's new clinical results.  Results from last 7 days   Lab Units 06/08/22  0409 06/07/22  0349 06/06/22  1146 06/05/22  2310 06/05/22  0903 06/04/22  0554 06/03/22  0453 06/02/22  0638 06/01/22  1916   WBC 10*3/mm3 14.03* 15.47*  --   --  26.03* 3.87 4.39 3.23* 7.00   HEMOGLOBIN g/dL 8.6* 9.2* 9.6* 6.6* 6.5* 8.5* 8.2* 6.0* 7.5*   PLATELETS 10*3/mm3 176 156  --   --  251 145 136* 121* 180     Results from last 7 days   Lab Units 06/08/22  0409 06/07/22  0349 06/05/22  0903 06/04/22  0554 06/03/22  0453 06/02/22  0638 06/01/22  1916   SODIUM mmol/L 127* 128* 129* 134* 130* 130* 126*   POTASSIUM mmol/L 4.2 4.3 5.0 4.1 4.1 3.9 4.2   CHLORIDE mmol/L 99 102 99 105 104 102 97*   CO2 mmol/L 23.0 18.6* 19.6* 21.0* 19.0* 18.4* 17.3*   BUN mg/dL 16 20 15 7* 10 14 15   CREATININE mg/dL 0.75 0.82 1.48* 0.76 0.71 0.85 1.03*   CALCIUM mg/dL 7.5* 7.3* 7.7* 8.0* 7.6* 7.3* 7.6*   MAGNESIUM mg/dL  --  1.8  --   --   --   --   --    PHOSPHORUS mg/dL 2.6  --   --   --   --   --   --    Estimated Creatinine Clearance: 82.7 mL/min (by C-G formula based on SCr of 0.75 mg/dL).  Lab Results   Component Value Date    HGBA1C 6.20 (H) 04/21/2022    HGBA1C 5.60 01/29/2022    HGBA1C 7.56 (H) 01/16/2022     Glucose   Date/Time Value Ref Range Status   06/08/2022 1157 192 (H) 70 - 130 mg/dL Final     Comment:     Meter: [id number] : [id number] Carlos Bowen NA   06/08/2022 1127 195 (H) 70 - 130 mg/dL Final     Comment:     Meter: [id number] : [id number] Patel Mallery NA   06/08/2022 0622 114 70 - 130 mg/dL Final     Comment:     Meter: [id number] : [id number] Reinaldo Nugent NA   06/07/2022 2025 179 (H) 70 - 130 mg/dL Final      Comment:     Meter: [id number] : [id number] Reinaldo Nugent NA   06/07/2022 1624 191 (H) 70 - 130 mg/dL Final     Comment:     Meter: [id number] : [id number] Sukhi Tellez CNA   06/07/2022 1101 147 (H) 70 - 130 mg/dL Final     Comment:     Meter: [id number] : [id number] Sukhi Tellez CNA   06/07/2022 0622 155 (H) 70 - 130 mg/dL Final     Comment:     Meter: [id number] : [id number] Mora Anayaandra ERT   06/06/2022 2023 172 (H) 70 - 130 mg/dL Final     Comment:     Meter: [id number] : [id number] Velazco Yana ERT         Assessment & Plan   Active Hospital Problems    Diagnosis  POA   • **Osteomyelitis of hip (HCC) [M86.9]  Yes   • Hyponatremia [E87.1]  Yes   • Metabolic acidosis [E87.2]  Yes   • Sepsis without acute organ dysfunction (HCC) [A41.9]  Yes   • Pathological fracture of right femur (HCC) [M84.451A]  Yes   • Iron deficiency anemia [D50.9]  Yes   • Chronic diastolic CHF (congestive heart failure) ([facility]) [I50.32]  Yes   • Obesity (BMI 30-39.9) [E66.9]  Yes   • Type 2 diabetes mellitus with diabetic polyneuropathy, with long-term current use of insulin ([facility]) [E11.42, Z79.4]  Not Applicable   • Hypertension [I10]  Yes      Resolved Hospital Problems   No resolved problems to display.       PLAN  This is a 67-year-old female who presented to the hospital with hip pain and is found to have recurrent osteomyelitis of her hip  -Discussed with infectious disease continue antibiotics for up to 2 weeks following her last surgical intervention if felt to have all infectious material removed  -Continue empiric treatment for the underlying possible gastritis with Carafate and PPI.  She continues to complain of upper abdominal pain but every time she describes it it sounds a little different.  Given her decreasing hemoglobin and ongoing symptoms I am going to ask GI to weigh in.  -Abdominal x-ray reviewed from yesterday without real significant findings.  -Hemoglobin had appeared to stabilize  but now is down 1 g over the last 24 hours.  I am not sure if she may have a bleed from her GI tract have some other source of bleeding but I am also concerned regarding the dark bloody fluid coming from her wound VAC.  I did reach out to orthopedic surgery to discuss what they might think about this.  I am not really sure what to expect given how complicated this lady's course has been regarding her right hip.  -Sodium levels remain low but at or near her baseline.  -Blood sugars acceptable right now      Disposition  To be determined      Abhijit Cantu MD  [facility]  06/08/22  12:47 EDT

## 2022-06-09 ENCOUNTER — ANESTHESIA (OUTPATIENT)
Dept: GASTROENTEROLOGY | Facility: HOSPITAL | Age: 67
End: 2022-06-09

## 2022-06-09 ENCOUNTER — ANESTHESIA EVENT (OUTPATIENT)
Dept: GASTROENTEROLOGY | Facility: HOSPITAL | Age: 67
End: 2022-06-09

## 2022-06-09 ENCOUNTER — APPOINTMENT (OUTPATIENT)
Dept: INFUSION THERAPY | Facility: HOSPITAL | Age: 67
End: 2022-06-09

## 2022-06-09 LAB
ALBUMIN SERPL-MCNC: 1.4 G/DL (ref 3.5–5.2)
ANION GAP SERPL CALCULATED.3IONS-SCNC: 7.6 MMOL/L (ref 5–15)
BASOPHILS # BLD AUTO: 0.03 10*3/MM3 (ref 0–0.2)
BASOPHILS NFR BLD AUTO: 0.3 % (ref 0–1.5)
BUN SERPL-MCNC: 11 MG/DL (ref 8–23)
BUN/CREAT SERPL: 15.9 (ref 7–25)
CALCIUM SPEC-SCNC: 7.8 MG/DL (ref 8.6–10.5)
CHLORIDE SERPL-SCNC: 100 MMOL/L (ref 98–107)
CO2 SERPL-SCNC: 21.4 MMOL/L (ref 22–29)
CREAT SERPL-MCNC: 0.69 MG/DL (ref 0.57–1)
DEPRECATED RDW RBC AUTO: 48.4 FL (ref 37–54)
EGFRCR SERPLBLD CKD-EPI 2021: 94.1 ML/MIN/1.73
EOSINOPHIL # BLD AUTO: 0.45 10*3/MM3 (ref 0–0.4)
EOSINOPHIL NFR BLD AUTO: 4.5 % (ref 0.3–6.2)
ERYTHROCYTE [DISTWIDTH] IN BLOOD BY AUTOMATED COUNT: 15.7 % (ref 12.3–15.4)
GLUCOSE BLDC GLUCOMTR-MCNC: 132 MG/DL (ref 70–130)
GLUCOSE BLDC GLUCOMTR-MCNC: 169 MG/DL (ref 70–130)
GLUCOSE BLDC GLUCOMTR-MCNC: 175 MG/DL (ref 70–130)
GLUCOSE BLDC GLUCOMTR-MCNC: 180 MG/DL (ref 70–130)
GLUCOSE SERPL-MCNC: 131 MG/DL (ref 65–99)
HCT VFR BLD AUTO: 26.4 % (ref 34–46.6)
HGB BLD-MCNC: 8.4 G/DL (ref 12–15.9)
IMM GRANULOCYTES # BLD AUTO: 0.35 10*3/MM3 (ref 0–0.05)
IMM GRANULOCYTES NFR BLD AUTO: 3.5 % (ref 0–0.5)
LYMPHOCYTES # BLD AUTO: 1.36 10*3/MM3 (ref 0.7–3.1)
LYMPHOCYTES NFR BLD AUTO: 13.7 % (ref 19.6–45.3)
MCH RBC QN AUTO: 27.1 PG (ref 26.6–33)
MCHC RBC AUTO-ENTMCNC: 31.8 G/DL (ref 31.5–35.7)
MCV RBC AUTO: 85.2 FL (ref 79–97)
MONOCYTES # BLD AUTO: 0.67 10*3/MM3 (ref 0.1–0.9)
MONOCYTES NFR BLD AUTO: 6.7 % (ref 5–12)
NEUTROPHILS NFR BLD AUTO: 7.08 10*3/MM3 (ref 1.7–7)
NEUTROPHILS NFR BLD AUTO: 71.3 % (ref 42.7–76)
NRBC BLD AUTO-RTO: 0 /100 WBC (ref 0–0.2)
PHOSPHATE SERPL-MCNC: 3 MG/DL (ref 2.5–4.5)
PLATELET # BLD AUTO: 175 10*3/MM3 (ref 140–450)
PMV BLD AUTO: 9.1 FL (ref 6–12)
POTASSIUM SERPL-SCNC: 4.1 MMOL/L (ref 3.5–5.2)
RBC # BLD AUTO: 3.1 10*6/MM3 (ref 3.77–5.28)
SODIUM SERPL-SCNC: 129 MMOL/L (ref 136–145)
WBC NRBC COR # BLD: 9.94 10*3/MM3 (ref 3.4–10.8)

## 2022-06-09 PROCEDURE — 82962 GLUCOSE BLOOD TEST: CPT

## 2022-06-09 PROCEDURE — 63710000001 INSULIN LISPRO (HUMAN) PER 5 UNITS: Performed by: ORTHOPAEDIC SURGERY

## 2022-06-09 PROCEDURE — 80069 RENAL FUNCTION PANEL: CPT | Performed by: HOSPITALIST

## 2022-06-09 PROCEDURE — 25010000002 PIPERACILLIN SOD-TAZOBACTAM PER 1 G: Performed by: INTERNAL MEDICINE

## 2022-06-09 PROCEDURE — 25010000002 DAPTOMYCIN PER 1 MG: Performed by: INTERNAL MEDICINE

## 2022-06-09 PROCEDURE — 63710000001 INSULIN LISPRO (HUMAN) PER 5 UNITS: Performed by: INTERNAL MEDICINE

## 2022-06-09 PROCEDURE — 25010000002 PIPERACILLIN SOD-TAZOBACTAM PER 1 G: Performed by: ORTHOPAEDIC SURGERY

## 2022-06-09 PROCEDURE — 85025 COMPLETE CBC W/AUTO DIFF WBC: CPT | Performed by: HOSPITALIST

## 2022-06-09 PROCEDURE — 0DJ08ZZ INSPECTION OF UPPER INTESTINAL TRACT, VIA NATURAL OR ARTIFICIAL OPENING ENDOSCOPIC: ICD-10-PCS | Performed by: INTERNAL MEDICINE

## 2022-06-09 PROCEDURE — 0DB98ZX EXCISION OF DUODENUM, VIA NATURAL OR ARTIFICIAL OPENING ENDOSCOPIC, DIAGNOSTIC: ICD-10-PCS | Performed by: INTERNAL MEDICINE

## 2022-06-09 PROCEDURE — 97530 THERAPEUTIC ACTIVITIES: CPT

## 2022-06-09 PROCEDURE — 0DB78ZX EXCISION OF STOMACH, PYLORUS, VIA NATURAL OR ARTIFICIAL OPENING ENDOSCOPIC, DIAGNOSTIC: ICD-10-PCS | Performed by: INTERNAL MEDICINE

## 2022-06-09 PROCEDURE — 25010000002 PROPOFOL 10 MG/ML EMULSION: Performed by: NURSE ANESTHETIST, CERTIFIED REGISTERED

## 2022-06-09 PROCEDURE — 88305 TISSUE EXAM BY PATHOLOGIST: CPT | Performed by: INTERNAL MEDICINE

## 2022-06-09 PROCEDURE — 43239 EGD BIOPSY SINGLE/MULTIPLE: CPT | Performed by: INTERNAL MEDICINE

## 2022-06-09 RX ORDER — LIDOCAINE HYDROCHLORIDE 20 MG/ML
INJECTION, SOLUTION INFILTRATION; PERINEURAL AS NEEDED
Status: DISCONTINUED | OUTPATIENT
Start: 2022-06-09 | End: 2022-06-09 | Stop reason: SURG

## 2022-06-09 RX ORDER — PROPOFOL 10 MG/ML
VIAL (ML) INTRAVENOUS CONTINUOUS PRN
Status: DISCONTINUED | OUTPATIENT
Start: 2022-06-09 | End: 2022-06-09 | Stop reason: SURG

## 2022-06-09 RX ORDER — SODIUM CHLORIDE, SODIUM LACTATE, POTASSIUM CHLORIDE, CALCIUM CHLORIDE 600; 310; 30; 20 MG/100ML; MG/100ML; MG/100ML; MG/100ML
INJECTION, SOLUTION INTRAVENOUS CONTINUOUS PRN
Status: DISCONTINUED | OUTPATIENT
Start: 2022-06-09 | End: 2022-06-09

## 2022-06-09 RX ORDER — PROPOFOL 10 MG/ML
VIAL (ML) INTRAVENOUS AS NEEDED
Status: DISCONTINUED | OUTPATIENT
Start: 2022-06-09 | End: 2022-06-09 | Stop reason: SURG

## 2022-06-09 RX ORDER — PANTOPRAZOLE SODIUM 40 MG/1
40 TABLET, DELAYED RELEASE ORAL
Status: DISCONTINUED | OUTPATIENT
Start: 2022-06-10 | End: 2022-06-10 | Stop reason: HOSPADM

## 2022-06-09 RX ORDER — SODIUM CHLORIDE 9 MG/ML
30 INJECTION, SOLUTION INTRAVENOUS CONTINUOUS PRN
Status: DISCONTINUED | OUTPATIENT
Start: 2022-06-09 | End: 2022-06-10 | Stop reason: HOSPADM

## 2022-06-09 RX ADMIN — OXYCODONE AND ACETAMINOPHEN 1 TABLET: 5; 325 TABLET ORAL at 21:39

## 2022-06-09 RX ADMIN — MICONAZOLE NITRATE: 2 POWDER TOPICAL at 21:40

## 2022-06-09 RX ADMIN — OXYCODONE AND ACETAMINOPHEN 1 TABLET: 5; 325 TABLET ORAL at 01:51

## 2022-06-09 RX ADMIN — Medication 10 ML: at 21:40

## 2022-06-09 RX ADMIN — PANTOPRAZOLE SODIUM 40 MG: 40 TABLET, DELAYED RELEASE ORAL at 06:24

## 2022-06-09 RX ADMIN — GABAPENTIN 600 MG: 300 CAPSULE ORAL at 10:10

## 2022-06-09 RX ADMIN — TAZOBACTAM SODIUM AND PIPERACILLIN SODIUM 3.38 G: 375; 3 INJECTION, SOLUTION INTRAVENOUS at 23:41

## 2022-06-09 RX ADMIN — LIDOCAINE HYDROCHLORIDE 50 MG: 20 INJECTION, SOLUTION INFILTRATION; PERINEURAL at 08:26

## 2022-06-09 RX ADMIN — GABAPENTIN 600 MG: 300 CAPSULE ORAL at 21:39

## 2022-06-09 RX ADMIN — PROPOFOL 50 MG: 10 INJECTION, EMULSION INTRAVENOUS at 08:26

## 2022-06-09 RX ADMIN — TAZOBACTAM SODIUM AND PIPERACILLIN SODIUM 3.38 G: 375; 3 INJECTION, SOLUTION INTRAVENOUS at 16:41

## 2022-06-09 RX ADMIN — Medication 160 MCG/KG/MIN: at 08:26

## 2022-06-09 RX ADMIN — INSULIN LISPRO 3 UNITS: 100 INJECTION, SOLUTION INTRAVENOUS; SUBCUTANEOUS at 10:12

## 2022-06-09 RX ADMIN — CETIRIZINE HYDROCHLORIDE 10 MG: 10 TABLET ORAL at 10:11

## 2022-06-09 RX ADMIN — ISOSORBIDE MONONITRATE 30 MG: 30 TABLET ORAL at 10:10

## 2022-06-09 RX ADMIN — OXYCODONE AND ACETAMINOPHEN 1 TABLET: 5; 325 TABLET ORAL at 06:24

## 2022-06-09 RX ADMIN — METOPROLOL SUCCINATE 25 MG: 25 TABLET, EXTENDED RELEASE ORAL at 10:10

## 2022-06-09 RX ADMIN — SODIUM CHLORIDE 30 ML/HR: 9 INJECTION, SOLUTION INTRAVENOUS at 08:20

## 2022-06-09 RX ADMIN — DAPTOMYCIN 750 MG: 500 INJECTION, POWDER, LYOPHILIZED, FOR SOLUTION INTRAVENOUS at 23:02

## 2022-06-09 RX ADMIN — OXYCODONE AND ACETAMINOPHEN 1 TABLET: 5; 325 TABLET ORAL at 10:11

## 2022-06-09 RX ADMIN — SUCRALFATE 1 G: 1 TABLET ORAL at 06:24

## 2022-06-09 RX ADMIN — INSULIN GLARGINE-YFGN 30 UNITS: 100 INJECTION, SOLUTION SUBCUTANEOUS at 21:38

## 2022-06-09 RX ADMIN — Medication 1 CAPSULE: at 10:10

## 2022-06-09 RX ADMIN — Medication 10 ML: at 10:12

## 2022-06-09 RX ADMIN — TAZOBACTAM SODIUM AND PIPERACILLIN SODIUM 3.38 G: 375; 3 INJECTION, SOLUTION INTRAVENOUS at 10:11

## 2022-06-09 RX ADMIN — MICONAZOLE NITRATE: 2 POWDER TOPICAL at 10:11

## 2022-06-09 RX ADMIN — INSULIN LISPRO 3 UNITS: 100 INJECTION, SOLUTION INTRAVENOUS; SUBCUTANEOUS at 13:16

## 2022-06-09 RX ADMIN — INSULIN LISPRO 3 UNITS: 100 INJECTION, SOLUTION INTRAVENOUS; SUBCUTANEOUS at 17:25

## 2022-06-09 RX ADMIN — OXYCODONE AND ACETAMINOPHEN 1 TABLET: 5; 325 TABLET ORAL at 17:32

## 2022-06-09 NOTE — THERAPY TREATMENT NOTE
Patient Name: Stephy Duncan  : 1955    MRN: [id number]                              Today's Date: 2022       Admit Date: 2022    Visit Dx:     ICD-10-CM ICD-9-CM   1. Osteomyelitis of hip (HCC)  M86.9 730.25   2. Right hip pain  M25.551 719.45   3. History of diabetes mellitus  Z86.39 V12.29   4. History of hypertension  Z86.79 V12.59   5. History of infection with vancomycin resistant Enterococcus (VRE)  Z86.19 V12.09   6. Pathological fracture of right femur due to age-related osteoporosis, initial encounter ([facility])  M80.051A 733.14     733.01   7. Epigastric abdominal pain  R10.13 789.06     Patient Active Problem List   Diagnosis   • Status post total replacement of hip   • Type 2 diabetes mellitus with diabetic polyneuropathy, with long-term current use of insulin ([facility])   • Hypertension   • Normocytic anemia   • Postoperative infection   • Iron deficiency anemia   • Obesity (BMI 30-39.9)   • Presence of stent in coronary artery in patient with coronary artery disease   • Chronic diastolic CHF (congestive heart failure) ([facility])   • Septic arthritis of hip ([facility])   • Drainage from wound   • Candidal intertrigo   • Postoperative anemia due to acute blood loss   • S/P Girdlestone procedure   • Enterococcus faecalis infection   • Pyogenic arthritis, pelvic region and thigh ([facility])   • Osteomyelitis of hip ([facility])   • Hyponatremia   • Metabolic acidosis   • Sepsis without acute organ dysfunction ([facility])   • Pathological fracture of right femur ([facility])   • Epigastric abdominal pain     Past Medical History:   Diagnosis Date   • Allergies    • Arthritis    • Cardiac murmur    • Diabetes mellitus ([facility])    • GERD (gastroesophageal reflux disease)    • Heart attack ([facility]) 2020   • History of cervical cancer 1981   • History of transfusion     SEVERAL   • Hyperlipidemia    • Hypertension    • Iron deficiency anemia    • Osteomyelitis hip (HCC)     RIGHT   • PONV (postoperative nausea and vomiting)    • Right  hip pain    • VRE infection within last 3 months    • Wound infection     RIGHT HIP.  WOUND VAC IN PLACE     Past Surgical History:   Procedure Laterality Date   • CARDIAC CATHETERIZATION     • CERVICAL CONIZATION     • COLONOSCOPY     • CORONARY ANGIOPLASTY WITH STENT PLACEMENT     • ENDOSCOPY     • HIP ARTHROPLASTY Right    • HIP HARDWARE REMOVAL Right    • HIP MINI REVISION Right 01/18/2022    Procedure: TOTAL HIP ARTHROPLASTY LINER REVISION, pegboard lateral;  Surgeon: Karri Schaeffer II, MD;  [facility];  Service: Orthopedics;  Laterality: Right;   • HIP SPACER INSERTION WITH ANTIBIOTIC CEMENT      X3   • HYSTERECTOMY     • INCISION AND DRAINAGE HIP Right 12/18/2021    Procedure: INCISION AND DRAINAGE TOTAL HIP, LINER EXCHANGE AND WOUND VAC PLACEMENT;  Surgeon: Karri Schaeffer II, MD;  [facility];  Service: Orthopedics;  Laterality: Right;   • INCISION AND DRAINAGE HIP Right 5/2/2022    Procedure: HIP INCISION AND DRAINAGE;  Surgeon: Karri Schaeffer II, MD;  Location: [facility] MAIN OR;  Service: Orthopedics;  Laterality: Right;   • INCISION AND DRAINAGE HIP Right 6/4/2022    Procedure: HIP INCISION AND DRAINAGE WITH WOUND VAC REPLACEMENT;  Surgeon: Karri Schaeffer II, MD;  Location: [facility] MAIN OR;  Service: Orthopedics;  Laterality: Right;   • KNEE ARTHROPLASTY Left    • LAPAROSCOPIC CHOLECYSTECTOMY     • TONSILLECTOMY     • TOTAL HIP ARTHROPLASTY Right 4/25/2022    Procedure: HIP GIRDLESTONE PROCEDURE;  Surgeon: Karri Schaeffer II, MD;  Location: [facility] MAIN OR;  Service: Orthopedics;  Laterality: Right;   • TOTAL HIP ARTHROPLASTY REVISION Right 11/16/2021    Procedure: TOTAL HIP REVISION ARTHROPLASTY RIGHT POSTERIOR;  Surgeon: Karri Schaeffer II, MD;  Location: [facility] MAIN OR;  Service: Orthopedics;  Laterality: Right;      General Information     Row Name 06/09/22 1238          Physical Therapy Time and Intention    Document  Type therapy note (daily note)  -EE     Mode of Treatment physical therapy;individual therapy  -EE     Row Name 06/09/22 1238          General Information    Existing Precautions/Restrictions fall;partial weight bearing;right  TTWB R LE, wound vac  -EE     Barriers to Rehab previous functional deficit;medically complex  -EE     Row Name 06/09/22 1238          Cognition    Orientation Status (Cognition) oriented x 3  -EE     Row Name 06/09/22 1238          Safety Issues, Functional Mobility    Impairments Affecting Function (Mobility) balance;endurance/activity tolerance;strength;range of motion (ROM);pain  -EE           User Key  (r) = Recorded By, (t) = Taken By, (c) = Cosigned By    Initials Name Provider Type    EE Carmita Christianson, [facility] Physical Therapist               Mobility     Row Name 06/09/22 1239          Bed Mobility    Bed Mobility supine-sit  -EE     Supine-Sit Deuel (Bed Mobility) minimum assist (75% patient effort);verbal cues  -EE     Assistive Device (Bed Mobility) head of bed elevated;bed rails  -EE     Comment, (Bed Mobility) assist required with R LE  -EE     Row Name 06/09/22 1239          Bed-Chair Transfer    Bed-Chair Deuel (Transfers) contact guard;2 person assist;verbal cues  -EE     Assistive Device (Bed-Chair Transfers) walker, front-wheeled  -EE     Row Name 06/09/22 1239          Sit-Stand Transfer    Sit-Stand Deuel (Transfers) contact guard;2 person assist;verbal cues  -EE     Assistive Device (Sit-Stand Transfers) walker, front-wheeled  -EE     Comment, (Sit-Stand Transfer) Pt able to maintain TTWB R LE without assistance.  -EE     Row Name 06/09/22 1239          Gait/Stairs (Locomotion)    Deuel Level (Gait) unable to assess  -EE     Row Name 06/09/22 1239          Mobility    Extremity Weight-bearing Status right lower extremity  -EE     Right Lower Extremity (Weight-bearing Status) toe touch weight-bearing (TTWB)  -EE           User Key  (r) = Recorded By,  (t) = Taken By, (c) = Cosigned By    Initials Name Provider Type    EE Carmita Christianson, PT Physical Therapist               Obj/Interventions     Row Name 06/09/22 1246          Motor Skills    Therapeutic Exercise other (see comments)  B LAQ x 15 reps  -EE     Row Name 06/09/22 1246          Balance    Static Standing Balance contact guard;verbal cues  -EE     Position/Device Used, Standing Balance walker, front-wheeled  -EE           User Key  (r) = Recorded By, (t) = Taken By, (c) = Cosigned By    Initials Name Provider Type    EE Carmita Christianson, PT Physical Therapist               Goals/Plan    No documentation.                Clinical Impression     Row Name 06/09/22 1247          Pain    Pretreatment Pain Rating 6/10  -EE     Posttreatment Pain Rating 6/10  -EE     Pain Location - Side/Orientation Right  -EE     Pain Location - hip  -EE     Pain Intervention(s) Repositioned;Rest;Medication (See MAR)  -EE     Row Name 06/09/22 1247          Plan of Care Review    Plan of Care Reviewed With patient  -EE     Progress improving  -EE     Outcome Evaluation Pt demonstrates steady progress with mobility. Able to progress activity in order to perform stand pivot transfer to chair with CGA and rwx. Pt able to maintain TTWB R LE during transfer. No new PT concerns, pt will continue to benefit from PT for ongoing strengthening and mobility training.  -EE     Row Name 06/09/22 1247          Positioning and Restraints    Pre-Treatment Position in bed  -EE     Post Treatment Position chair  -EE     In Chair reclined;call light within reach;encouraged to call for assist;exit alarm on;with family/caregiver;legs elevated;notified nsg  -EE           User Key  (r) = Recorded By, (t) = Taken By, (c) = Cosigned By    Initials Name Provider Type    EE Carmita Christianson, PT Physical Therapist               Outcome Measures     Row Name 06/09/22 1249 06/09/22 0924       How much help from another person do you currently need...    Turning from  your back to your side while in flat bed without using bedrails? 3  -EE 3  -MM    Moving from lying on back to sitting on the side of a flat bed without bedrails? 3  -EE 3  -MM    Moving to and from a bed to a chair (including a wheelchair)? 3  -EE 3  -MM    Standing up from a chair using your arms (e.g., wheelchair, bedside chair)? 3  -EE 2  -MM    Climbing 3-5 steps with a railing? 1  -EE 1  -MM    To walk in hospital room? 2  -EE 2  -MM    AM-PAC 6 Clicks Score (PT) 15  -EE 14  -MM    Highest level of mobility 4 --> Transferred to chair/commode  -EE 4 --> Transferred to chair/commode  -MM          User Key  (r) = Recorded By, (t) = Taken By, (c) = Cosigned By    Initials Name Provider Type    EE Carmita Christianson, PT Physical Therapist    Karissa Ramon RN Registered Nurse                             Physical Therapy Education                 Title: PT OT SLP Therapies (In Progress)     Topic: Physical Therapy (In Progress)     Point: Mobility training (Done)     Learning Progress Summary           Patient Acceptance, E,TB, VU,NR by EE at 6/9/2022 1249    Acceptance, E, VU,NR by EE at 6/7/2022 1548                   Point: Home exercise program (Done)     Learning Progress Summary           Patient Acceptance, E,TB, VU,NR by EE at 6/9/2022 1249    Acceptance, E, VU,NR by EE at 6/7/2022 1548                   Point: Body mechanics (Not Started)     Learner Progress:  Not documented in this visit.          Point: Precautions (Done)     Learning Progress Summary           Patient Acceptance, E, VU,NR by EE at 6/7/2022 1548                               User Key     Initials Effective Dates Name Provider Type Discipline    EE 06/16/21 -  Carmita Christianson, PT Physical Therapist PT              PT Recommendation and Plan  Planned Therapy Interventions (PT): balance training, bed mobility training, home exercise program, patient/family education, ROM (range of motion), transfer training, strengthening, postural  re-education  Plan of Care Reviewed With: patient  Progress: improving  Outcome Evaluation: Pt demonstrates steady progress with mobility. Able to progress activity in order to perform stand pivot transfer to chair with CGA and rwx. Pt able to maintain TTWB R LE during transfer. No new PT concerns, pt will continue to benefit from PT for ongoing strengthening and mobility training.     Time Calculation:    PT Charges     Row Name 06/09/22 1249             Time Calculation    Start Time 1123  -EE      Stop Time 1136  -EE      Time Calculation (min) 13 min  -EE      PT Received On 06/09/22  -EE      PT - Next Appointment 06/10/22  -EE              Time Calculation- PT    Total Timed Code Minutes- PT 13 minute(s)  -EE              Timed Charges    28943 - PT Therapeutic Activity Minutes 13  -EE              Total Minutes    Timed Charges Total Minutes 13  -EE       Total Minutes 13  -EE            User Key  (r) = Recorded By, (t) = Taken By, (c) = Cosigned By    Initials Name Provider Type    EE Carmita Christianson, PT Physical Therapist              Therapy Charges for Today     Code Description Service Date Service Provider Modifiers Qty    28450580422  PT THERAPEUTIC ACT EA 15 MIN 6/9/2022 Carmita Christianson, PT GP 1    [id number]  PT THER SUPP EA 15 MIN 6/9/2022 Carmita Christianson, PT GP 1          PT G-Codes  Outcome Measure Options: AM-PAC 6 Clicks Basic Mobility (PT)  AM-PAC 6 Clicks Score (PT): 15     Patient was not wearing a face mask during this therapy encounter. Therapist used appropriate personal protective equipment including gown, mask and gloves.  Mask used was standard procedure mask. Appropriate PPE was worn during the entire therapy session. Hand hygiene was completed before and after therapy session. Patient is not in enhanced droplet precautions.       Carmita Christianson PT  6/9/2022

## 2022-06-09 NOTE — PLAN OF CARE
Problem: Adult Inpatient Plan of Care  Goal: Absence of Hospital-Acquired Illness or Injury  Intervention: Identify and Manage Fall Risk  Recent Flowsheet Documentation  Taken 6/9/2022 1249 by Karissa Quintanilla, RN  Safety Promotion/Fall Prevention:   activity supervised   gait belt   room organization consistent   safety round/check completed   nonskid shoes/slippers when out of bed  Taken 6/9/2022 0924 by Karissa Quintanilla, RN  Safety Promotion/Fall Prevention:   activity supervised   clutter free environment maintained   nonskid shoes/slippers when out of bed   room organization consistent   safety round/check completed   Goal Outcome Evaluation:  Plan of Care Reviewed With: patient           Outcome Evaluation: Patient AO x 4 VSS room air tolerated well Endo today result normal biopsy taken pending result, Wound vac in place increased pressure to 175 mmhg continue monitor.

## 2022-06-09 NOTE — PROGRESS NOTES
[facility] Hospital [facility]    [phone]   LOS: 8 days     Name: Stephy Duncan  Age/Sex: 67 y.o. female  :  1955        PCP: Gregorio Rosales  Chief Complaint   Patient presents with   • Wound Infection      Subjective   She tolerated endoscopy this morning without concerning findings.  Still complaining of some abdominal bloating and fullness but work-up has been negative.  Denies any new symptoms or complaints denies bleeding today  General: No Fever or Chills, Cardiac: No Chest Pain or Palpitations, Resp: No Cough or SOA, GI: No Nausea, Vomiting, or Diarrhea and Other: No bleeding    cetirizine, 10 mg, Oral, Daily  DAPTOmycin (CUBICIN)  IV, 10 mg/kg (Adjusted), Intravenous, Q24H  ferrous sulfate, 325 mg, Oral, Every Other Day  gabapentin, 600 mg, Oral, Q12H  insulin glargine, 30 Units, Subcutaneous, Nightly  insulin lispro, 3 Units, Subcutaneous, TID With Meals  isosorbide mononitrate, 30 mg, Oral, QAM  lactobacillus acidophilus, 1 capsule, Oral, Daily  metoprolol succinate XL, 25 mg, Oral, Daily  miconazole, , Topical, Q12H  [START ON 6/10/2022] pantoprazole, 40 mg, Oral, Q AM  piperacillin-tazobactam, 3.375 g, Intravenous, Q8H  sodium chloride, 10 mL, Intravenous, Q12H      lactated ringers, 9 mL/hr, Last Rate: 9 mL/hr (22 1137)  sodium chloride, 30 mL/hr, Last Rate: Stopped (22 0843)        Objective   Vital Signs  Temp:  [98 °F (36.7 °C)-98.2 °F (36.8 °C)] 98.2 °F (36.8 °C)  Heart Rate:  [65-81] 81  Resp:  [12-18] 16  BP: ()/(51-64) 129/64  Body mass index is 36.73 kg/m².    Intake/Output Summary (Last 24 hours) at 2022 1152  Last data filed at 2022 1120  Gross per 24 hour   Intake 2800 ml   Output 950 ml   Net 1850 ml       Physical Exam  Vitals and nursing note reviewed.   Constitutional:       Appearance: Normal appearance.   Cardiovascular:      Rate and Rhythm: Normal rate and regular rhythm.   Pulmonary:      Effort: No respiratory distress.      Breath sounds:  Normal breath sounds.   Abdominal:      General: Bowel sounds are normal.      Palpations: Abdomen is soft.   Neurological:      General: No focal deficit present.      Mental Status: She is alert and oriented to person, place, and time.           Results Review:       I reviewed the patient's new clinical results.  Results from last 7 days   Lab Units 06/09/22  0634 06/08/22  0409 06/07/22  0349 06/06/22  1146 06/05/22  2310 06/05/22  0903 06/04/22  0554 06/03/22  0453   WBC 10*3/mm3 9.94 14.03* 15.47*  --   --  26.03* 3.87 4.39   HEMOGLOBIN g/dL 8.4* 8.6* 9.2* 9.6* 6.6* 6.5* 8.5* 8.2*   PLATELETS 10*3/mm3 175 176 156  --   --  251 145 136*     Results from last 7 days   Lab Units 06/09/22  0634 06/08/22  0409 06/07/22  0349 06/05/22  0903 06/04/22  0554 06/03/22  0453   SODIUM mmol/L 129* 127* 128* 129* 134* 130*   POTASSIUM mmol/L 4.1 4.2 4.3 5.0 4.1 4.1   CHLORIDE mmol/L 100 99 102 99 105 104   CO2 mmol/L 21.4* 23.0 18.6* 19.6* 21.0* 19.0*   BUN mg/dL 11 16 20 15 7* 10   CREATININE mg/dL 0.69 0.75 0.82 1.48* 0.76 0.71   CALCIUM mg/dL 7.8* 7.5* 7.3* 7.7* 8.0* 7.6*   MAGNESIUM mg/dL  --   --  1.8  --   --   --    PHOSPHORUS mg/dL 3.0 2.6  --   --   --   --    Estimated Creatinine Clearance: 87.1 mL/min (by C-G formula based on SCr of 0.69 mg/dL).  Lab Results   Component Value Date    HGBA1C 6.20 (H) 04/21/2022    HGBA1C 5.60 01/29/2022    HGBA1C 7.56 (H) 01/16/2022     Glucose   Date/Time Value Ref Range Status   06/09/2022 1116 175 (H) 70 - 130 mg/dL Final     Comment:     Meter: [id number] : [id number] Annette CUNNINGHAM   06/09/2022 0610 132 (H) 70 - 130 mg/dL Final     Comment:     Meter: [id number] : [id number] Scot CUNNINGHAM   06/08/2022 2216 186 (H) 70 - 130 mg/dL Final     Comment:     Meter: [id number] : [id number] Scot CUNNING[place]AM   06/08/2022 1606 160 (H) 70 - 130 mg/dL Final     Comment:     Meter: [id number] : [id number] Ivana West CNA   06/08/2022 1157 192 (H) 70 - 130 mg/dL  Final     Comment:     Meter: [id number] : [id number] Carlos Bowen NA   06/08/2022 1127 195 (H) 70 - 130 mg/dL Final     Comment:     Meter: [id number] : [id number] Jorge Piedra NA   06/08/2022 0622 114 70 - 130 mg/dL Final     Comment:     Meter: [id number] : [id number] Reinaldo Nugent NA   06/07/2022 2025 179 (H) 70 - 130 mg/dL Final     Comment:     Meter: [id number] : [id number] Reinaldo Nugent NA         Assessment & Plan   Active Hospital Problems    Diagnosis  POA   • **Osteomyelitis of hip (HCC) [M86.9]  Yes   • Hyponatremia [E87.1]  Yes   • Metabolic acidosis [E87.2]  Yes   • Sepsis without acute organ dysfunction (HCC) [A41.9]  Yes   • Pathological fracture of right femur (HCC) [M84.451A]  Yes   • Epigastric abdominal pain [R10.13]  Unknown   • Iron deficiency anemia [D50.9]  Yes   • Chronic diastolic CHF (congestive heart failure) ([facility]) [I50.32]  Yes   • Obesity (BMI 30-39.9) [E66.9]  Yes   • Type 2 diabetes mellitus with diabetic polyneuropathy, with long-term current use of insulin ([facility]) [E11.42, Z79.4]  Not Applicable   • Hypertension [I10]  Yes      Resolved Hospital Problems   No resolved problems to display.       PLAN  This is a 67-year-old female who presented to the hospital with hip pain and is found to have recurrent osteomyelitis of her hip  -Discussed with infectious disease continue antibiotics for up to 2 weeks following her last surgical intervention if felt to have all infectious material removed  -Given normal endoscopy today I think we can decrease her PPI to once daily and can probably get rid of the Carafate.  Continue supportive care for this abdominal discomfort appreciate GIs assistance  -Hemoglobin is stable over the last 24 hours.  The output from her wound VAC has been less dark.  No evidence of GI bleeding.  I think at this point a likely management will be to follow H&H is in the outpatient setting and plan for periodic blood transfusions to keep up with losses.   Discussed with orthopedic surgery yesterday she is not a candidate for additional surgical washout and there is nothing he can really do from his perspective to slow the bleeding from her hip.  The best bet is to continue the wound VAC in hopes that this stops on its own.  -Sodium levels remain low but at or near her baseline.  -Blood sugars acceptable right now      Disposition  Plan discharge to skilled nursing facility tomorrow      Abhijit Cantu MD  [facility]  06/09/22  11:52 EDT

## 2022-06-09 NOTE — PROGRESS NOTES
Continued Stay Note  [facility]     Patient Name: Stephy Duncan  MRN: [id number]  Today's Date: 6/9/2022    Admit Date: 6/1/2022     Discharge Plan     Row Name 06/09/22 1603       Plan    Plan [facility], [employer] Medicare pre-cert approved, via [employer] stretcher 6/10 at 1500    Patient/Family in Agreement with Plan yes    Plan Comments Per Rajat, [facility] can accept pt anytime for rehab, [employer] Medicare pre-cert approved. No new covid swab needed. Pharmacy updated and packet in CCP office. [employer] stretcher scheduled 6/10 at 3:00 PM (ID: [id number]) for anticipated d/c. Updated pt who remains agreeable. Alise Byers LCSW               Discharge Codes    No documentation.               Expected Discharge Date and Time     Expected Discharge Date Expected Discharge Time    [place] 10, 2022             Luz Elena Byers LCSW

## 2022-06-09 NOTE — PROGRESS NOTES
It does appear on her ins and outs that her output from the wound VAC may be slightly decreasing.  Her canister was apparently changed yesterday and there is not a whole lot in it this morning.  Unfortunately, there is really not much else to do other than give this some more time.  Another washout will just restart the healing process and I do not know that there is a whole lot more I can do from a surgical perspective.  She does not have any remaining bone or hardware to remove.

## 2022-06-09 NOTE — PLAN OF CARE
Goal Outcome Evaluation:              Outcome Evaluation: VSS aox4. IV abx, wound vac. NPO for EGD today. PICC. PO pain meds

## 2022-06-09 NOTE — PROGRESS NOTES
"  Infectious Diseases Progress Note    Fernando Hart MD     [facility]  [place]: 8 days  Patient Identification:  Name: Stephy Duncan  Age: 67 y.o.  Sex: female  :  1955  MRN: [id number]         Primary Care Physician: Gregorio Rosales            Subjective: Just came back from endoscopy.  Denies any fever and chills.  Feeling overall better.  Interval History: See consultation note.  On 2022 patient underwent:Irrigation and Debridement of right hip joint with excision of proximal femur  Objective:    Scheduled Meds:cetirizine, 10 mg, Oral, Daily  ferrous sulfate, 325 mg, Oral, Every Other Day  gabapentin, 600 mg, Oral, Q12H  insulin glargine, 30 Units, Subcutaneous, Nightly  insulin lispro, 3 Units, Subcutaneous, TID With Meals  isosorbide mononitrate, 30 mg, Oral, QAM  lactobacillus acidophilus, 1 capsule, Oral, Daily  metoprolol succinate XL, 25 mg, Oral, Daily  miconazole, , Topical, Q12H  pantoprazole, 40 mg, Oral, BID AC  piperacillin-tazobactam, 3.375 g, Intravenous, Q8H  sodium chloride, 10 mL, Intravenous, Q12H  sucralfate, 1 g, Oral, 4x Daily AC & at Bedtime      Continuous Infusions:lactated ringers, 9 mL/hr, Last Rate: 9 mL/hr (22 1137)        Vital signs in last 24 hours:  Temp:  [98 °F (36.7 °C)-98.7 °F (37.1 °C)] 98 °F (36.7 °C)  Heart Rate:  [65-88] 80  Resp:  [16-18] 16  BP: (102-124)/(60-64) 121/64    Intake/Output:    Intake/Output Summary (Last 24 hours) at 2022 0604  Last data filed at 2022  Gross per 24 hour   Intake 2700 ml   Output 800 ml   Net 1900 ml       Exam:  /64 (BP Location: Right arm, Patient Position: Lying)   Pulse 80   Temp 98 °F (36.7 °C) (Oral)   Resp 16   Ht 162.6 cm (64.02\")   Wt 103 kg (227 lb 8.2 oz)   SpO2 96%   BMI 39.03 kg/m²   Patient is examined using the personal protective equipment as per guidelines from infection control for this particular patient as enacted.  Hand washing was performed before and after " patient interaction.  General Appearance:  Awake interactive does not appear toxic                          Head:    Normocephalic, without obvious abnormality, atraumatic                           Eyes:    PERRL, conjunctivae/corneas clear, EOM's intact, both eyes                         Throat:   Lips, tongue, gums normal; oral mucosa pink and moist                           Neck:   Supple, symmetrical, trachea midline, no JVD                         Lungs:    Clear to auscultation bilaterally, respirations unlabored                 Chest Wall:    No tenderness or deformity                          Heart:  S1-S2 regular                  Abdomen:   Soft nontender                 Extremities: Right hip surgical site dressed                            Pulses:   Pulses palpable in all extremities                            Skin: Chronic ecchymotic changes noted                  Neurologic: Grossly nonfocal       Data Review:    I reviewed the patient's new clinical results.  Results from last 7 days   Lab Units 06/08/22  0409 06/07/22  0349 06/06/22  1146 06/05/22  2310 06/05/22  0903 06/04/22  0554 06/03/22  0453 06/02/22  0638   WBC 10*3/mm3 14.03* 15.47*  --   --  26.03* 3.87 4.39 3.23*   HEMOGLOBIN g/dL 8.6* 9.2* 9.6* 6.6* 6.5* 8.5* 8.2* 6.0*   PLATELETS 10*3/mm3 176 156  --   --  251 145 136* 121*     Results from last 7 days   Lab Units 06/08/22  0409 06/07/22  0349 06/05/22  0903 06/04/22  0554 06/03/22  0453 06/02/22  0638   SODIUM mmol/L 127* 128* 129* 134* 130* 130*   POTASSIUM mmol/L 4.2 4.3 5.0 4.1 4.1 3.9   CHLORIDE mmol/L 99 102 99 105 104 102   CO2 mmol/L 23.0 18.6* 19.6* 21.0* 19.0* 18.4*   BUN mg/dL 16 20 15 7* 10 14   CREATININE mg/dL 0.75 0.82 1.48* 0.76 0.71 0.85   CALCIUM mg/dL 7.5* 7.3* 7.7* 8.0* 7.6* 7.3*   GLUCOSE mg/dL 127* 141* 223* 114* 139* 236*     Microbiology Results (last 10 days)     Procedure Component Value - Date/Time    COVID PRE-OP / PRE-PROCEDURE SCREENING ORDER (NO ISOLATION) -  Swab, Nasopharynx [[id number]]  (Normal) Collected: 06/08/22 1743    Lab Status: Final result Specimen: Swab from Nasopharynx Updated: 06/08/22 2203    Narrative:      The following orders were created for panel order COVID PRE-OP / PRE-PROCEDURE SCREENING ORDER (NO ISOLATION) - Swab, Nasopharynx.  Procedure                               Abnormality         Status                     ---------                               -----------         ------                     COVID-19,APTIMA PANTHER(...[[id number]]  Normal              Final result                 Please view results for these tests on the individual orders.    COVID-19,APTIMA PANTHER([facility]),[facility], NP/OP SWAB IN UTM/VTM/[facility]ALINE TRANSPORT MEDIA,24 HR TAT - Swab, Nasopharynx [[id number]]  (Normal) Collected: 06/08/22 1743    Lab Status: Final result Specimen: Swab from Nasopharynx Updated: 06/08/22 2203     COVID19 Not Detected    Narrative:      Fact sheet for providers: https://www.fda.gov/media/481998/download     Fact sheet for patients: https://www.fda.gov/media/499311/download    Test performed by RT PCR.    Clostridium Difficile Toxin, PCR - Stool, Per Rectum [[id number]]  (Normal) Collected: 06/06/22 2128    Lab Status: Final result Specimen: Stool from Per Rectum Updated: 06/06/22 2222     C. Difficile Toxins by PCR Negative    Urine Culture - Urine, Urine, Catheter In/Out [[id number]]  (Normal) Collected: 06/05/22 1137    Lab Status: Final result Specimen: Urine, Catheter In/Out Updated: 06/06/22 1155     Urine Culture No growth    COVID PRE-OP / PRE-PROCEDURE SCREENING ORDER (NO ISOLATION) - Swab, Nasopharynx [[id number]]  (Normal) Collected: 06/04/22 0336    Lab Status: Final result Specimen: Swab from Nasopharynx Updated: 06/04/22 0426    Narrative:      The following orders were created for panel order COVID PRE-OP / PRE-PROCEDURE SCREENING ORDER (NO ISOLATION) - Swab, Nasopharynx.  Procedure                               Abnormality          Status                     ---------                               -----------         ------                     COVID-19,[facility] IN-HOUSE...[[id number]]  Normal              Final result                 Please view results for these tests on the individual orders.    COVID-19,[facility] IN-HOUSE CEPHEID/ROBERTO CARLOS NP SWAB IN TRANSPORT MEDIA 8-12 HR TAT - Swab, Nasopharynx [[id number]]  (Normal) Collected: 06/04/22 0336    Lab Status: Final result Specimen: Swab from Nasopharynx Updated: 06/04/22 0426     COVID19 Not Detected    Narrative:      Fact sheet for providers: https://www.fda.gov/media/673011/download     Fact sheet for patients: https://www.fda.gov/media/756449/download    Blood Culture - Blood, Arm, Right [[id number]]  (Normal) Collected: 06/01/22 2018    Lab Status: Final result Specimen: Blood from Arm, Right Updated: 06/06/22 2032     Blood Culture No growth at 5 days    Narrative:      Less than seven (7) mL's of blood was collected.  Insufficient quantity may yield false negative results.    Blood Culture - Blood, Blood, Central Line [[id number]]  (Normal) Collected: 06/01/22 2005    Lab Status: Final result Specimen: Blood, Central Line Updated: 06/06/22 2017     Blood Culture No growth at 5 days    Narrative:      Less than seven (7) mL's of blood was collected.  Insufficient quantity may yield false negative results.    COVID PRE-OP / PRE-PROCEDURE SCREENING ORDER (NO ISOLATION) - Swab, Nasopharynx [[id number]]  (Normal) Collected: 06/01/22 2003    Lab Status: Final result Specimen: Swab from Nasopharynx Updated: 06/01/22 2057    Narrative:      The following orders were created for panel order COVID PRE-OP / PRE-PROCEDURE SCREENING ORDER (NO ISOLATION) - Swab, Nasopharynx.  Procedure                               Abnormality         Status                     ---------                               -----------         ------                     COVID-19,[facility] IN-HOUSE...[[id number]]  Normal              Final  result                 Please view results for these tests on the individual orders.    COVID-19,[facility] IN-HOUSE CEPHEID/ROBERTO CARLOS NP SWAB IN TRANSPORT MEDIA 8-12 HR TAT - Swab, Nasopharynx [[id number]]  (Normal) Collected: 06/01/22 2003    Lab Status: Final result Specimen: Swab from Nasopharynx Updated: 06/01/22 2057     COVID19 Not Detected    Narrative:      Fact sheet for providers: https://www.fda.gov/media/067524/download     Fact sheet for patients: https://www.fda.gov/media/964593/download            Assessment:    Osteomyelitis of hip (HCC)    Type 2 diabetes mellitus with diabetic polyneuropathy, with long-term current use of insulin (HCC)    Hypertension    Iron deficiency anemia    Obesity (BMI 30-39.9)    Chronic diastolic CHF (congestive heart failure) (HCC)    Hyponatremia    Metabolic acidosis    Sepsis without acute organ dysfunction (HCC)    Pathological fracture of right femur (HCC)    Epigastric abdominal pain  1-acute on chronic osteomyelitis of the right proximal femur in a similar area in a patient who had previous recurrent right hip prosthetic and multiple surgeries with a latest procedure performed on 4/25/2022 and currently likely has mixed infection given the foul smell drainage and 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  2-pancytopenia  3-morbid obesity  4-type 2 diabetes  5-hyponatremia  6-other diagnosis per primary team     Recommendations/Discussions:  · Continue Zosyn and daptomycin   · Monitor closely for complications of antibiotics  · Since there are no operative culture results empiric antibiotic treatment based on the prior culture results and since she declined while on daptomycin consideration for mixed infection is needed to construct antibiotic regimen.  Based on this concept Zosyn and daptomycin need  to be continued for at least couple weeks from 6/4/2022 and may be extended to 6 weeks if orthopedic surgery service thinks that there is residual osteomyelitis.  · Based on the operative findings it appears that the acetabular involvement is also noted though it is curetted.  Because of this fact I would recommend extending antibiotic treatment to be 6 weeks.  · Obviously patient need to be monitored closely for side effects of antibiotics.  · Above concept of care discussed in great detail with patient and patient's  at the bedside.  Fernando Hart MD  6/9/2022  06:04 EDT    Much of this encounter note is an electronic transcription/translation of spoken language to printed text. The electronic translation of spoken language may permit erroneous, or at times, nonsensical words or phrases to be inadvertently transcribed; Although I have reviewed the note for such errors, some may still exist

## 2022-06-09 NOTE — PLAN OF CARE
Goal Outcome Evaluation:  Plan of Care Reviewed With: patient        Progress: improving  Outcome Evaluation: Pt demonstrates steady progress with mobility. Able to progress activity in order to perform stand pivot transfer to chair with CGA and rwx. Pt able to maintain TTWB R LE during transfer. No new PT concerns, pt will continue to benefit from PT for ongoing strengthening and mobility training.

## 2022-06-09 NOTE — NURSING NOTE
Coming back from Endo awake alert x 4 in company of  received report from nurse state Endo go well esophagus and stomach normal took some biopsy.

## 2022-06-09 NOTE — NURSING NOTE
Wound/ostomy - patient's nurse called regarding vac alarming. Went and assessed dressing. Dressing was intact, no drainage pooled under drape, there was an area where drape had shifted and allowing for slight leak. Area patched with drape, seal achieved, drainage not being pulled through tube. Turned vac up to 175 and this allowed for drainage return to tube. Gentle palpation of hip area near track pad and drainage could be visualized entering into the track pad and tubing. Since track pad is placed to posterior hip, I wonder if the pressure from laying on the track pad is impeding suction. Suggest to place a pillow behind R hip a bit to tilt and relieve any pressure from the track pad.  There is about 25 ml drainage to current cannister. I am unsure when cannister was last changed. When dressing was changed yesterday there was only about 25 ml to the cannister then so I am not sure if the drainage has been being removed or not.     Patient to d/c to SNF tomorrow at 1500. Vac will need to be removed for transfer and an absorbant dressing applied.

## 2022-06-09 NOTE — ANESTHESIA PREPROCEDURE EVALUATION
Anesthesia Evaluation     history of anesthetic complications: PONV               Airway   Mallampati: II  TM distance: >3 FB  Neck ROM: full  Dental    (+) edentulous    Pulmonary    (+) recent URI,   (-) asthma, shortness of breath  Cardiovascular     (+) hypertension, past MI , CAD, cardiac stents (X2) CHF , hyperlipidemia,       Neuro/Psych  GI/Hepatic/Renal/Endo    (+) morbid obesity, GERD,  diabetes mellitus,     Musculoskeletal     Abdominal    Substance History      OB/GYN          Other   arthritis (unanle to walk, total joint, sepsis),                    Anesthesia Plan    ASA 3     MAC     intravenous induction     Anesthetic plan, risks, benefits, and alternatives have been provided, discussed and informed consent has been obtained with: patient.        CODE STATUS:    Code Status (Patient has no pulse and is not breathing): CPR (Attempt to Resuscitate)  Medical Interventions (Patient has pulse or is breathing): Full Support

## 2022-06-09 NOTE — ANESTHESIA POSTPROCEDURE EVALUATION
"Patient: Stephy Duncan    Procedure Summary     Date: 06/09/22 Room / Location: [facility] ENDOSCOPY 1 /  [facility]    Anesthesia Start: 0822 Anesthesia Stop: 0845    Procedure: ESOPHAGOGASTRODUODENOSCOPY WITH COLD BIOSPIES (N/A Esophagus) Diagnosis:       Epigastric abdominal pain      (Epigastric abdominal pain [R10.13])    Surgeons: Zechariah Nj MD Provider: Myrtle Henry MD    Anesthesia Type: MAC ASA Status: 3          Anesthesia Type: MAC    Vitals  Vitals Value Taken Time   BP 95/54 06/09/22 0854   Temp     Pulse 78 06/09/22 0854   Resp 14 06/09/22 0854   SpO2 98 % 06/09/22 0854           Post Anesthesia Care and Evaluation    Patient location during evaluation: PHASE II  Patient participation: complete - patient participated  Level of consciousness: awake and alert  Pain management: adequate    Airway patency: patent  Anesthetic complications: No anesthetic complications    Cardiovascular status: acceptable  Respiratory status: acceptable  Hydration status: acceptable    Comments: /64 (BP Location: Right arm, Patient Position: Lying)   Pulse 69   Temp 36.8 °C (98.2 °F) (Oral)   Resp 16   Ht 162.6 cm (64\")   Wt 97.1 kg (214 lb)   SpO2 99%   BMI 36.73 kg/m²         "

## 2022-06-10 ENCOUNTER — APPOINTMENT (OUTPATIENT)
Dept: INFUSION THERAPY | Facility: HOSPITAL | Age: 67
End: 2022-06-10

## 2022-06-10 VITALS
TEMPERATURE: 97.2 F | SYSTOLIC BLOOD PRESSURE: 119 MMHG | DIASTOLIC BLOOD PRESSURE: 61 MMHG | HEART RATE: 72 BPM | HEIGHT: 64 IN | BODY MASS INDEX: 36.54 KG/M2 | RESPIRATION RATE: 18 BRPM | WEIGHT: 214 LBS | OXYGEN SATURATION: 98 %

## 2022-06-10 LAB
BASOPHILS # BLD AUTO: 0.03 10*3/MM3 (ref 0–0.2)
BASOPHILS NFR BLD AUTO: 0.4 % (ref 0–1.5)
BH BB BLOOD EXPIRATION DATE: NORMAL
BH BB BLOOD EXPIRATION DATE: NORMAL
BH BB BLOOD TYPE BARCODE: 5100
BH BB BLOOD TYPE BARCODE: 5100
BH BB DISPENSE STATUS: NORMAL
BH BB DISPENSE STATUS: NORMAL
BH BB PRODUCT CODE: NORMAL
BH BB PRODUCT CODE: NORMAL
BH BB UNIT NUMBER: NORMAL
BH BB UNIT NUMBER: NORMAL
CK SERPL-CCNC: 11 U/L (ref 20–180)
CROSSMATCH INTERPRETATION: NORMAL
CROSSMATCH INTERPRETATION: NORMAL
DEPRECATED RDW RBC AUTO: 48.9 FL (ref 37–54)
EOSINOPHIL # BLD AUTO: 0.45 10*3/MM3 (ref 0–0.4)
EOSINOPHIL NFR BLD AUTO: 6.1 % (ref 0.3–6.2)
ERYTHROCYTE [DISTWIDTH] IN BLOOD BY AUTOMATED COUNT: 15.8 % (ref 12.3–15.4)
GLUCOSE BLDC GLUCOMTR-MCNC: 155 MG/DL (ref 70–130)
HCT VFR BLD AUTO: 25.7 % (ref 34–46.6)
HGB BLD-MCNC: 8.2 G/DL (ref 12–15.9)
IMM GRANULOCYTES # BLD AUTO: 0.22 10*3/MM3 (ref 0–0.05)
IMM GRANULOCYTES NFR BLD AUTO: 3 % (ref 0–0.5)
LAB AP CASE REPORT: NORMAL
LYMPHOCYTES # BLD AUTO: 1.34 10*3/MM3 (ref 0.7–3.1)
LYMPHOCYTES NFR BLD AUTO: 18.2 % (ref 19.6–45.3)
MCH RBC QN AUTO: 27.3 PG (ref 26.6–33)
MCHC RBC AUTO-ENTMCNC: 31.9 G/DL (ref 31.5–35.7)
MCV RBC AUTO: 85.7 FL (ref 79–97)
MONOCYTES # BLD AUTO: 0.53 10*3/MM3 (ref 0.1–0.9)
MONOCYTES NFR BLD AUTO: 7.2 % (ref 5–12)
NEUTROPHILS NFR BLD AUTO: 4.81 10*3/MM3 (ref 1.7–7)
NEUTROPHILS NFR BLD AUTO: 65.1 % (ref 42.7–76)
NRBC BLD AUTO-RTO: 0 /100 WBC (ref 0–0.2)
PATH REPORT.FINAL DX SPEC: NORMAL
PATH REPORT.GROSS SPEC: NORMAL
PLATELET # BLD AUTO: 172 10*3/MM3 (ref 140–450)
PMV BLD AUTO: 9.1 FL (ref 6–12)
RBC # BLD AUTO: 3 10*6/MM3 (ref 3.77–5.28)
UNIT  ABO: NORMAL
UNIT  ABO: NORMAL
UNIT  RH: NORMAL
UNIT  RH: NORMAL
WBC NRBC COR # BLD: 7.38 10*3/MM3 (ref 3.4–10.8)

## 2022-06-10 PROCEDURE — 99232 SBSQ HOSP IP/OBS MODERATE 35: CPT | Performed by: INTERNAL MEDICINE

## 2022-06-10 PROCEDURE — 25010000002 PIPERACILLIN SOD-TAZOBACTAM PER 1 G: Performed by: INTERNAL MEDICINE

## 2022-06-10 PROCEDURE — 63710000001 INSULIN LISPRO (HUMAN) PER 5 UNITS: Performed by: INTERNAL MEDICINE

## 2022-06-10 PROCEDURE — 82962 GLUCOSE BLOOD TEST: CPT

## 2022-06-10 PROCEDURE — 82550 ASSAY OF CK (CPK): CPT | Performed by: INTERNAL MEDICINE

## 2022-06-10 PROCEDURE — 85025 COMPLETE CBC W/AUTO DIFF WBC: CPT | Performed by: INTERNAL MEDICINE

## 2022-06-10 RX ORDER — OXYCODONE HYDROCHLORIDE AND ACETAMINOPHEN 5; 325 MG/1; MG/1
1 TABLET ORAL EVERY 4 HOURS PRN
Qty: 20 TABLET | Refills: 0 | Status: SHIPPED | OUTPATIENT
Start: 2022-06-10

## 2022-06-10 RX ORDER — INSULIN ASPART 100 [IU]/ML
4 INJECTION, SOLUTION INTRAVENOUS; SUBCUTANEOUS
Start: 2022-06-10

## 2022-06-10 RX ORDER — GABAPENTIN 600 MG/1
600 TABLET ORAL 2 TIMES DAILY
Qty: 40 TABLET | Refills: 0 | Status: SHIPPED | OUTPATIENT
Start: 2022-06-10

## 2022-06-10 RX ORDER — L.ACID,PARA/B.BIFIDUM/S.THERM 8B CELL
1 CAPSULE ORAL DAILY
Start: 2022-06-11 | End: 2022-07-18

## 2022-06-10 RX ADMIN — OXYCODONE AND ACETAMINOPHEN 1 TABLET: 5; 325 TABLET ORAL at 15:33

## 2022-06-10 RX ADMIN — ISOSORBIDE MONONITRATE 30 MG: 30 TABLET ORAL at 08:14

## 2022-06-10 RX ADMIN — CETIRIZINE HYDROCHLORIDE 10 MG: 10 TABLET ORAL at 08:14

## 2022-06-10 RX ADMIN — FERROUS SULFATE TAB 325 MG (65 MG ELEMENTAL FE) 325 MG: 325 (65 FE) TAB at 08:14

## 2022-06-10 RX ADMIN — Medication 1 CAPSULE: at 08:14

## 2022-06-10 RX ADMIN — TAZOBACTAM SODIUM AND PIPERACILLIN SODIUM 3.38 G: 375; 3 INJECTION, SOLUTION INTRAVENOUS at 08:14

## 2022-06-10 RX ADMIN — PANTOPRAZOLE SODIUM 40 MG: 40 TABLET, DELAYED RELEASE ORAL at 06:24

## 2022-06-10 RX ADMIN — Medication 10 ML: at 11:44

## 2022-06-10 RX ADMIN — INSULIN LISPRO 3 UNITS: 100 INJECTION, SOLUTION INTRAVENOUS; SUBCUTANEOUS at 08:14

## 2022-06-10 RX ADMIN — GABAPENTIN 600 MG: 300 CAPSULE ORAL at 11:43

## 2022-06-10 RX ADMIN — METOPROLOL SUCCINATE 25 MG: 25 TABLET, EXTENDED RELEASE ORAL at 08:14

## 2022-06-10 RX ADMIN — MICONAZOLE NITRATE: 2 POWDER TOPICAL at 11:44

## 2022-06-10 RX ADMIN — OXYCODONE AND ACETAMINOPHEN 1 TABLET: 5; 325 TABLET ORAL at 08:41

## 2022-06-10 RX ADMIN — INSULIN LISPRO 3 UNITS: 100 INJECTION, SOLUTION INTRAVENOUS; SUBCUTANEOUS at 11:43

## 2022-06-10 NOTE — DISCHARGE SUMMARY
Patient Name: Stephy Duncan  : 1955  MRN: [id number]    Date of Admission: 2022  Date of Discharge:  6/10/2022  Primary Care Physician: Gregorio Rosales      Chief Complaint:   Wound Infection      Discharge Diagnoses     Active Hospital Problems    Diagnosis  POA   • **Osteomyelitis of hip (HCC) [M86.9]  Yes   • Hyponatremia [E87.1]  Yes   • Metabolic acidosis [E87.2]  Yes   • Sepsis without acute organ dysfunction (HCC) [A41.9]  Yes   • Pathological fracture of right femur (HCC) [M84.451A]  Yes   • Epigastric abdominal pain [R10.13]  Unknown   • Iron deficiency anemia [D50.9]  Yes   • Chronic diastolic CHF (congestive heart failure) ([facility]) [I50.32]  Yes   • Obesity (BMI 30-39.9) [E66.9]  Yes   • Type 2 diabetes mellitus with diabetic polyneuropathy, with long-term current use of insulin ([facility]) [E11.42, Z79.4]  Not Applicable   • Hypertension [I10]  Yes      Resolved Hospital Problems   No resolved problems to display.        Hospital Course     Ms. Duncan is a 67 y.o. female with a history of prior hip infections and osteomyelitis, chronic diastolic heart failure, type 2 diabetes with polyneuropathy, gastroesophageal reflux disease, and hypertension who presented to [facility] initially complaining of wound infection.  Please see the admitting history and physical for further details.  She was found to have wound infection and was admitted to the hospital for further evaluation and treatment.  She has had several hip surgeries and has been very complicated.  She had a wound VAC in place in rehab but was noted to have foul-smelling drainage and was transferred to our facility for further evaluation and management.  Orthopedic surgery took the patient to the operating room and did extensive debridement and irrigation.  Following the procedure local wound care was continued and ultimately placed on a wound VAC.  She is tolerated this well and only complications have really been acute  blood loss anemia.  Unfortunately she is in somewhat of a tough spot from this standpoint.  She is likely to have continued ongoing blood loss from this wound.  There is not much the surgeon feels he can do to further alleviate the blood loss until the tissues begin to heal.  Recommendation is to continue weekly CBCs with her weekly labs on IV antibiotics and would recommend transfusions for hemoglobin is found to be less than 7.5.  I would recommend these be arranged in the outpatient setting unless the patient is severely symptomatic.  Hematology did follow along with her pancytopenia during the hospitalization and agreed with management.  The plan is to continue antibiotics for at least 6 weeks (ending on July 17)  following this procedure and may ultimately require suppressive antibiotics in the future.  There was also some concern for this epigastric abdominal pain the patient has been having throughout the hospitalization.  Empiric treatment was started to cover for gastritis and ultimately GI was consulted and she did undergo an upper endoscopy.  Findings showed no concerning gastritis or esophagitis biopsies were taken and are pending at the present time.  Following multiple transfusions of packed red blood cells during this hospitalization her hemoglobin is shown stability over the last 3 days and at this point she stable to discharge to skilled nursing facility for ongoing wound care and evaluation.  Is recommended she follow-up with orthopedic surgery as directed.  The plan was discussed with the patient and her  on the day prior to discharge and again with the patient on the day of discharge they both acknowledged understanding and all questions were addressed and answered      Day of Discharge     Subjective:  She feels well today and is eager to get out of the hospital.    Physical Exam:  Temp:  [98 °F (36.7 °C)-98.6 °F (37 °C)] 98 °F (36.7 °C)  Heart Rate:  [63-83] 71  Resp:  [12-18] 18  BP:  ()/(51-72) 133/72  Body mass index is 36.73 kg/m².  Physical Exam  Vitals reviewed.   Constitutional:       Comments: Chronic ill-appearing   HENT:      Head: Normocephalic and atraumatic.   Cardiovascular:      Rate and Rhythm: Normal rate and regular rhythm.   Pulmonary:      Effort: No respiratory distress.      Breath sounds: Normal breath sounds.   Skin:     General: Skin is warm and dry.   Neurological:      Mental Status: She is alert.         Consultants     Consult Orders (all) (From admission, onward)     Start     Ordered    06/08/22 1238  Inpatient Gastroenterology Consult  Once        Specialty:  Gastroenterology  Provider:  Teena Bryant MD    06/08/22 1237    06/02/22 0824  Hematology & Oncology Inpatient Consult  Once        Specialty:  Hematology and Oncology  Provider:  Brendon Molina Jr., MD    06/02/22 0828    06/02/22 0721  Inpatient Infectious Diseases Consult  Once        Specialty:  Infectious Diseases  Provider:  Fernando Hart MD    06/02/22 0720    06/02/22 0709  Inpatient Infectious Diseases Consult  Once,   Status:  Canceled        Specialty:  Infectious Diseases  Provider:  Tano Rahman MD    06/02/22 0708    06/02/22 0102  Inpatient Case Management  Consult  Once        Provider:  (Not yet assigned)    06/02/22 0102    06/02/22 0028  Inpatient Orthopedic Surgery Consult  Once        Specialty:  Orthopedic Surgery  Provider:  Karri Schaeffer II, MD    06/02/22 0028    06/01/22 2129  [facility] (on-call MD unless specified) Details  Once,   Status:  Canceled        Specialty:  Hospitalist  Provider:  (Not yet assigned)    06/01/22 2128 06/01/22 1952  Ortho (on-call MD unless specified)  STAT,   Status:  Canceled        Specialty:  Orthopedic Surgery  Provider:  (Not yet assigned)    06/01/22 1951              Procedures     ESOPHAGOGASTRODUODENOSCOPY WITH COLD BIOSPIES      Imaging Results (All)     Procedure Component Value Units Date/Time     XR Abdomen KUB [[id number]] Collected: 06/07/22 1632     Updated: 06/07/22 1636    Narrative:      ABDOMEN KUB     CLINICAL HISTORY: Upper abdominal pain.     2 underpenetrated views of abdomen were obtained. The bowel gas pattern  is unremarkable. There is no evidence of obstruction. No pathologic  intra-abdominal calcifications are identified. Surgical clips are noted  in the right upper quadrant. There is very extensive bony destruction  involving the proximal right femur. 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     This report was finalized on 6/7/2022 4:33 PM by Dr. Jevon Sanchez M.D.       MRI Hip Right Without Contrast [[id number]] Collected: 06/03/22 1006     Updated: 06/03/22 1016    Narrative:      RIGHT HIP MRI     HISTORY: Multiple right hip surgeries, most recently arthroplasty  removal 04/25/2022. Evaluate osteomyelitis.     TECHNIQUE: MRI of the right hip was performed using a protocol which  shows both hips in axial plane and most of the pelvis in the coronal  plane using T1 and STIR sequences. A sagittal proton density sequence  was made through the right hip. This is correlated with CT scan  performed yesterday and multiple prior x-rays as recent as 04/25/2022  and as remote as 11/10/2021.     FINDINGS: There is postoperative change in the right hip from hardware  removal. Coronal images show substantial degenerative change in the  lower lumbar spine with dextroscoliosis but no finding suggestive of  osteomyelitis or discitis in the lower lumbar spine. Most of the bones  of the pelvis and those around the left hip and proximal femur  demonstrate normal marrow signal. There is a rind of edematous tissue at  the acetabulum, corresponding to the soft tissue material seen in this  area on the CT. On the coronal STIR sequence, this material measures 7-8  mm thick. Underlying marrow signal is normal; there is no compelling  evidence for osteomyelitis in the pelvis.     There is postoperative  change at the proximal femur. The note by Dr. Schaeffer describes challenging removal of the femoral stem and previous  imaging has demonstrated fairly extensive fracturing of the  periprosthetic portion of the proximal right femur. There is multifocal  signal dropout attributable to both the postoperative change and the  multiple small foci of gas seen within and around the fractured bone of  the proximal femur on the CT. There is extensive soft tissue edema deep  around the proximal femur as well as gas in the joint space and in the  soft tissue along the lateral proximal thigh. The coronal images show  the femoral shaft beyond the level of the tip of the removed femoral  stem. Normal fatty marrow signal is observed in that area on the coronal  images. No undrained fluid collection is identified.     No intrapelvic lesion is identified but there is a small volume of  ascites. There are enlarged lymph nodes in the lower right hemipelvis.       Impression:      Expected changes from previous Girdlestone procedure, right  hip arthroplasty reimplantation and subsequent removal for infection. No  abnormality in the pelvis is suggestive of osteomyelitis around the  acetabulum. The extensive deformity of the proximal femur is as expected  given the description of the surgical procedure. However, there is  robust soft tissue edema with multiple small foci of gas in the right  hip joint space, around the bone fragments of the proximal right femur,  and in the soft tissue lateral to the femur. Given the history and the  open wound, these findings almost certainly represent cellulitis with  osteomyelitis involving the fragmented portions of the proximal femur.     This report was finalized on 6/3/2022 10:13 AM by Dr. Brendon Carrillo M.D.       CT Lower Extremity Right With Contrast [[id number]] Collected: 06/01/22 2059     Updated: 06/01/22 2114    Narrative:      CT OF THE RIGHT LOWER EXTREMITY WITH CONTRAST     HISTORY:  Surgical site swelling     COMPARISON: 04/25/2022     TECHNIQUE: Axial CT imaging was performed through the right lower  extremity. Coronal and sagittal reformatted images were obtained. IV  contrast was administered.     FINDINGS:  The patient is status post Girdlestone procedure, as well as incision  and drainage of the right hip. The patient has a comminuted displaced  fracture of the proximal right femur. There is destruction of the  intertrochanteric region of the right femur. Distal femoral shaft  appears intact. There is also irregularity of the right acetabulum. Some  of the appearance may be related to prior operative procedures, but  overall, appearance is extremely worrisome for osteomyelitis. MRI would  allow for further characterization. There is air identified within the  joint space, although this may be postoperative. There is extensive  edema, soft tissue stranding, and skin thickening within the lateral  right thigh, worrisome for infection. No discrete rim-enhancing  collection is not identified. Again, MRI would be more sensitive. There  is air identified within the right lateral thigh, likely related to open  wound. There is a suprapatellar effusion. There is diffuse edema noted  throughout the right lower extremity. Prominent inguinal lymph nodes are  likely reactive. There are also prominent external iliac chain nodes as  well. I don't see any extension of the inflammation into the pelvis  itself. Patient's urinary bladder does appear thick-walled, and  correlation with urinalysis and urine cultures is recommended.       Impression:      Comminuted and displaced fracture of the proximal right femur, with  apparent destruction of the right intertrochanteric region. Appearance  is extremely concerning for osteomyelitis and pathologic fracture. There  is also irregularity of the right acetabulum, which may reflect further  infectious involvement. MRI could be considered for further assessment.      Radiation dose reduction techniques were utilized, including automated  exposure control and exposure modulation based on body size.     This report was finalized on 6/1/2022 9:10 PM by Dr. Roula Anthony M.D.           Results for orders placed during the hospital encounter of 06/01/22    Duplex Venous Lower Extremity - Right    Interpretation Summary  · Normal right lower extremity venous duplex scan.      Pertinent Labs     Results from last 7 days   Lab Units 06/10/22  0624 06/09/22  0634 06/08/22  0409 06/07/22  0349   WBC 10*3/mm3 7.38 9.94 14.03* 15.47*   HEMOGLOBIN g/dL 8.2* 8.4* 8.6* 9.2*   PLATELETS 10*3/mm3 172 175 176 156     Results from last 7 days   Lab Units 06/09/22  0634 06/08/22  0409 06/07/22  0349 06/05/22  0903   SODIUM mmol/L 129* 127* 128* 129*   POTASSIUM mmol/L 4.1 4.2 4.3 5.0   CHLORIDE mmol/L 100 99 102 99   CO2 mmol/L 21.4* 23.0 18.6* 19.6*   BUN mg/dL 11 16 20 15   CREATININE mg/dL 0.69 0.75 0.82 1.48*   GLUCOSE mg/dL 131* 127* 141* 223*   EGFR mL/min/1.73 94.1 86.3 77.5 38.2*     Results from last 7 days   Lab Units 06/09/22  0634 06/08/22  0409   ALBUMIN g/dL 1.40* 1.60*     Results from last 7 days   Lab Units 06/09/22  0634 06/08/22  0409 06/07/22  0349 06/05/22  0903   CALCIUM mg/dL 7.8* 7.5* 7.3* 7.7*   ALBUMIN g/dL 1.40* 1.60*  --   --    MAGNESIUM mg/dL  --   --  1.8  --    PHOSPHORUS mg/dL 3.0 2.6  --   --        Results from last 7 days   Lab Units 06/10/22  0624   CK TOTAL U/L 11*     Results from last 7 days   Lab Units 06/07/22  0349   URIC ACID mg/dL 3.5         Invalid input(s): LDLCALC  Results from last 7 days   Lab Units 06/05/22  1137   URINECX  No growth     Results from last 7 days   Lab Units 06/08/22  1743   COVID19  Not Detected       Test Results Pending at Discharge     Pending Labs     Order Current Status    Tissue Pathology Exam In process        PATIENT NEEDS WEEKLY CBC CMP faxed to Dr Hart    Discharge Details        Discharge Medications      New  Medications      Instructions Start Date   lactobacillus acidophilus capsule capsule   1 capsule, Oral, Daily   Start Date: June 11, 2022     piperacillin-tazobactam 3-0.375 GM/50ML IVPB  Commonly known as: ZOSYN   3.375 g, Intravenous, Every 8 Hours         Changes to Medications      Instructions Start Date   insulin detemir 100 UNIT/ML injection  Commonly known as: LEVEMIR  What changed: how much to take   30 Units, Subcutaneous, Nightly      NovoLOG FlexPen 100 UNIT/ML solution pen-injector sc pen  Generic drug: insulin aspart  What changed: how much to take   4 Units, Subcutaneous, 3 Times Daily With Meals      oxyCODONE-acetaminophen 5-325 MG per tablet  Commonly known as: PERCOCET  What changed: Another medication with the same name was removed. Continue taking this medication, and follow the directions you see here.   1 tablet, Oral, Every 4 Hours PRN         Continue These Medications      Instructions Start Date   DAPTOmycin 750 mg in sodium chloride 0.9 % 50 mL   10 mg/kg (750 mg), Intravenous, Every 24 Hours      Diclofenac Sodium 1 % gel gel  Commonly known as: VOLTAREN   4 g, Topical, 4 Times Daily PRN      esomeprazole 40 MG capsule  Commonly known as: nexIUM   40 mg, Oral, Every Morning Before Breakfast      ferrous sulfate 325 (65 FE) MG tablet   325 mg, Oral, Every Other Day, Mornings      folic acid 1 MG tablet  Commonly known as: FOLVITE   1,000 mcg, Oral, Daily      gabapentin 600 MG tablet  Commonly known as: NEURONTIN   600 mg, Oral, 2 Times Daily      isosorbide mononitrate 30 MG 24 hr tablet  Commonly known as: IMDUR   30 mg, Oral, Every Morning      loratadine 10 MG tablet  Commonly known as: CLARITIN   10 mg, Oral, Nightly      metoprolol succinate XL 25 MG 24 hr tablet  Commonly known as: Toprol XL   25 mg, Oral, Daily      vitamin D3 125 MCG (5000 UT) capsule capsule   5,000 Units, Oral, Daily         Stop These Medications    aspirin 81 MG EC tablet     Chlorhexidine Gluconate Cloth 2 %  pads     Trulicity 3 MG/0.5ML solution pen-injector  Generic drug: Dulaglutide            Allergies   Allergen Reactions   • Ace Inhibitors Shortness Of Breath and Swelling   • Morphine Shortness Of Breath       Discharge Disposition:  Skilled Nursing Facility (DC - External)      Discharge Diet:  Diet Order   Procedures   • Diet Regular; Consistent Carbohydrate       Discharge Activity:   Activity Instructions     Activity as Tolerated            CODE STATUS:    Code Status and Medical Interventions:   Ordered at: 06/02/22 0028     Code Status (Patient has no pulse and is not breathing):    CPR (Attempt to Resuscitate)     Medical Interventions (Patient has pulse or is breathing):    Full Support       No future appointments.  Additional Instructions for the Follow-ups that You Need to Schedule     Discharge Follow-up with PCP   As directed       Currently Documented PCP:    Gregorio Rosales    PCP Phone Number:    [phone]     Follow Up Details: 4-6 weeks         Discharge Follow-up with Specified Provider: Dr Schaeffer as directed   As directed      To: Dr Schaeffer as directed            Contact information for follow-up providers     Gregorio Rosales .    Specialty: Family Medicine  Why: 4-6 weeks  Contact information:  [address] [address]  [phone]                   Contact information for after-discharge care     Destination     [facility] [place] .    Service: Skilled Nursing  Contact information:  [address] [facility] [address] [phone]                             Additional Instructions for the Follow-ups that You Need to Schedule     Discharge Follow-up with PCP   As directed       Currently Documented PCP:    Gregorio Rosales    PCP Phone Number:    [phone]     Follow Up Details: 4-6 weeks         Discharge Follow-up with Specified Provider: Dr Schaeffer as directed   As directed      To: Dr Schaeffer as directed           Time Spent  on Discharge:  Greater than 30 minutes      Abhijit Cantu MD  [facility]  06/10/22  08:30 EDT

## 2022-06-10 NOTE — NURSING NOTE
Give report to Registered Nurse Alysa Galarza at facility [facility] patient A O x 4 with PICC line in Left upper arm cap in place dressing need to be changed 6/13/22 y will continue with Antibiotic will go in stretcher EMS  3 pm. Coordinate with nurse about Wound vac they will have available in 3-4 hrs They already requested. Pain medication prescription have been send electronically per MD.

## 2022-06-10 NOTE — PROGRESS NOTES
[facility]  Inpatient Progress Note    Reason for Follow Up: Upper abdominal pain    Subjective     Interval History:   EGD was normal yesterday, upper abdominal pain is resolved    Current Facility-Administered Medications:   •  acetaminophen (TYLENOL) tablet 650 mg, 650 mg, Oral, Q4H PRN **OR** [DISCONTINUED] acetaminophen (TYLENOL) 160 MG/5ML solution 650 mg, 650 mg, Oral, Q4H PRN **OR** [DISCONTINUED] acetaminophen (TYLENOL) suppository 650 mg, 650 mg, Rectal, Q4H PRN, Berenice Meek, APRN  •  cetirizine (zyrTEC) tablet 10 mg, 10 mg, Oral, Daily, Zechariah Nj MD, 10 mg at 06/10/22 0814  •  DAPTOmycin (CUBICIN) 750 mg in sodium chloride 0.9 % 50 mL IVPB, 10 mg/kg (Adjusted), Intravenous, Q24H, Zechariah Nj MD, Last Rate: 100 mL/hr at 06/09/22 2302, 750 mg at 06/09/22 2302  •  dextrose (D50W) (25 g/50 mL) IV injection 25 g, 25 g, Intravenous, Q15 Min PRN, Zechariah Nj MD  •  dextrose (GLUTOSE) oral gel 15 g, 15 g, Oral, Q15 Min PRN, Zechariah Nj MD  •  Diclofenac Sodium (VOLTAREN) 1 % gel 4 g, 4 g, Topical, 4x Daily PRN, Zechariah Nj MD, 4 g at 06/08/22 1215  •  ferrous sulfate tablet 325 mg, 325 mg, Oral, Every Other Day, Zechariah Nj MD, 325 mg at 06/10/22 0814  •  gabapentin (NEURONTIN) capsule 600 mg, 600 mg, Oral, Q12H, Zechariah Nj MD, 600 mg at 06/10/22 1143  •  glucagon (human recombinant) (GLUCAGEN DIAGNOSTIC) injection 1 mg, 1 mg, Intramuscular, Q15 Min PRN, Zechariah Nj MD  •  insulin glargine (LANTUS, SEMGLEE) injection 30 Units, 30 Units, Subcutaneous, Nightly, Zechariah Nj MD, 30 Units at 06/09/22 2138  •  insulin lispro (ADMELOG) injection 3 Units, 3 Units, Subcutaneous, TID With Meals, Zechariah Nj MD, 3 Units at 06/10/22 1143  •  isosorbide mononitrate (IMDUR) 24 hr tablet 30 mg, 30 mg, Oral, QAM, Zechariah Nj MD, 30 mg at 06/10/22 0814  •  lactobacillus acidophilus (RISAQUAD) capsule 1 capsule, 1 capsule, Oral, Daily,  Zechariah Nj MD, 1 capsule at 06/10/22 0814  •  metoprolol succinate XL (TOPROL-XL) 24 hr tablet 25 mg, 25 mg, Oral, Daily, Zechariah Nj MD, 25 mg at 06/10/22 0814  •  miconazole (MICOTIN) 2 % powder, , Topical, Q12H, Zechariah Nj MD, Given at 06/10/22 1144  •  nitroglycerin (NITROSTAT) SL tablet 0.4 mg, 0.4 mg, Sublingual, Q5 Min PRN, Zechariah Nj MD  •  ondansetron (ZOFRAN) injection 4 mg, 4 mg, Intravenous, Q4H PRN, Zechariah Nj MD, 4 mg at 06/05/22 0823  •  oxyCODONE-acetaminophen (PERCOCET) 5-325 MG per tablet 1 tablet, 1 tablet, Oral, Q4H PRN, Zechariah Nj MD, 1 tablet at 06/10/22 0841  •  pantoprazole (PROTONIX) EC tablet 40 mg, 40 mg, Oral, Q AM, Abhijit Cantu MD, 40 mg at 06/10/22 0624  •  piperacillin-tazobactam (ZOSYN) 3.375 g in iso-osmotic dextrose 50 ml (premix), 3.375 g, Intravenous, Q8H, Zechariah Nj MD, 3.375 g at 06/10/22 0814  •  sodium chloride 0.9 % flush 10 mL, 10 mL, Intravenous, Q12H, Zechariah Nj MD, 10 mL at 06/10/22 1144  •  sodium chloride 0.9 % flush 10 mL, 10 mL, Intravenous, PRN, Zechariah Nj MD  •  sodium chloride 0.9 % flush 20 mL, 20 mL, Intravenous, PRN, Zechariah Nj MD  •  sodium chloride 0.9 % infusion, 30 mL/hr, Intravenous, Continuous PRN, Zechariah Nj MD, Stopped at 06/09/22 0843  Review of Systems:    There is weakness and fatigue all other systems reviewed and negative    Objective     Vital Signs  Temp:  [97.2 °F (36.2 °C)-98.6 °F (37 °C)] 97.2 °F (36.2 °C)  Heart Rate:  [63-83] 72  Resp:  [16-18] 18  BP: (108-133)/(56-72) 119/61  Body mass index is 36.73 kg/m².    Intake/Output Summary (Last 24 hours) at 6/10/2022 1418  Last data filed at 6/10/2022 1146  Gross per 24 hour   Intake 1080 ml   Output 1725 ml   Net -645 ml     I/O this shift:  In: 600 [P.O.:600]  Out: 725 [Urine:325]     Physical Exam:   General: patient awake, alert and cooperative   Eyes: Normal lids and lashes, no scleral icterus   Neck: supple, normal  ROM   Skin: warm and dry, not jaundiced   Cardiovascular: regular rhythm and rate, no murmurs auscultated   Pulm: clear to auscultation bilaterally, regular and unlabored   Abdomen: soft, nontender, nondistended; normal bowel sounds   Extremities: no rash or edema   Psychiatric: Normal mood and behavior; memory intact     Results Review:     I reviewed the patient's new clinical results.    Results from last 7 days   Lab Units 06/10/22  0624 06/09/22  0634 06/08/22  0409   WBC 10*3/mm3 7.38 9.94 14.03*   HEMOGLOBIN g/dL 8.2* 8.4* 8.6*   HEMATOCRIT % 25.7* 26.4* 26.0*   PLATELETS 10*3/mm3 172 175 176     Results from last 7 days   Lab Units 06/09/22  0634 06/08/22  0409 06/07/22  0349   SODIUM mmol/L 129* 127* 128*   POTASSIUM mmol/L 4.1 4.2 4.3   CHLORIDE mmol/L 100 99 102   CO2 mmol/L 21.4* 23.0 18.6*   BUN mg/dL 11 16 20   CREATININE mg/dL 0.69 0.75 0.82   CALCIUM mg/dL 7.8* 7.5* 7.3*   GLUCOSE mg/dL 131* 127* 141*     Results from last 7 days   Lab Units 06/04/22  0657   INR  1.20*     No results found for: LIPASE    Radiology:  XR Abdomen KUB   Final Result      MRI Hip Right Without Contrast   Final Result   Expected changes from previous Girdlestone procedure, right   hip arthroplasty reimplantation and subsequent removal for infection. No   abnormality in the pelvis is suggestive of osteomyelitis around the   acetabulum. The extensive deformity of the proximal femur is as expected   given the description of the surgical procedure. However, there is   robust soft tissue edema with multiple small foci of gas in the right   hip joint space, around the bone fragments of the proximal right femur,   and in the soft tissue lateral to the femur. Given the history and the   open wound, these findings almost certainly represent cellulitis with   osteomyelitis involving the fragmented portions of the proximal femur.       This report was finalized on 6/3/2022 10:13 AM by Dr. Brendon Carrillo M.D.          CT Lower  Extremity Right With Contrast   Final Result   Comminuted and displaced fracture of the proximal right femur, with   apparent destruction of the right intertrochanteric region. Appearance   is extremely concerning for osteomyelitis and pathologic fracture. There   is also irregularity of the right acetabulum, which may reflect further   infectious involvement. MRI could be considered for further assessment.       Radiation dose reduction techniques were utilized, including automated   exposure control and exposure modulation based on body size.       This report was finalized on 6/1/2022 9:10 PM by Dr. Roula Anthony M.D.              Assessment & Plan     Patient Active Problem List   Diagnosis   • Status post total replacement of hip   • Type 2 diabetes mellitus with diabetic polyneuropathy, with long-term current use of insulin ([facility])   • Hypertension   • Normocytic anemia   • Postoperative infection   • Iron deficiency anemia   • Obesity (BMI 30-39.9)   • Presence of stent in coronary artery in patient with coronary artery disease   • Chronic diastolic CHF (congestive heart failure) ([facility])   • Septic arthritis of hip ([facility])   • Drainage from wound   • Candidal intertrigo   • Postoperative anemia due to acute blood loss   • S/P Girdlestone procedure   • Enterococcus faecalis infection   • Pyogenic arthritis, pelvic region and thigh ([facility])   • Osteomyelitis of hip ([facility])   • Hyponatremia   • Metabolic acidosis   • Sepsis without acute organ dysfunction ([facility])   • Pathological fracture of right femur ([facility])   • Epigastric abdominal pain       Assessment:  1. Septic arthritis of the right hip s/p mulitple debridements and wound vac placement  2. Acute on chronic anemia  3. Epigastric abdominal pain  4. Type 2 diabetes mellitus  5. Essential hypertension  6. History of VRE infection  7. Coronary artery disease status post stent placement x2  8. Obesity with a BMI of 39     Plan:  · Continue twice daily PPI and  Carafate.  · EGD was normal    Pain has resolved  GI okay with discharge    I discussed the patients findings and my recommendations with patient and nursing staff.    Zechariah Nj MD

## 2022-06-10 NOTE — PLAN OF CARE
Problem: Adult Inpatient Plan of Care  Goal: Absence of Hospital-Acquired Illness or Injury  Intervention: Identify and Manage Fall Risk  Recent Flowsheet Documentation  Taken 6/10/2022 1055 by Karissa Quintanilla, CHET  Safety Promotion/Fall Prevention: activity supervised  Taken 6/10/2022 0841 by Karissa Quintanilla, RN  Safety Promotion/Fall Prevention:   activity supervised   clutter free environment maintained   nonskid shoes/slippers when out of bed   room organization consistent   safety round/check completed   Goal Outcome Evaluation:  Plan of Care Reviewed With: patient        Progress: improving  Outcome Evaluation: Patient AO x 4 able to transfer from bed to BSC with assistant x1 pibe with antibiotic per IV good appettite tolerated diet will be discharge today in the afternoon [facility]  ambulance schedule 3:00 pm

## 2022-06-10 NOTE — NURSING NOTE
"   06/10/22 1440   Wound 05/02/22 1454 Right posterior hip Incision   Placement Date/Time: 05/02/22 1454   Present on Hospital Admission: Yes  Side: Right  Orientation: posterior  Location: hip  Primary Wound Type: Incision   Dressing Appearance dry;intact   Closure Sutures   Drainage Characteristics/Odor serosanguineous   Drainage Amount large   Dressing Care dressing changed  (maxorb AG 4x4 sheet folded and placed to proximal incision, cover with 6x6 optifoam, strips of maxorb AG cut to fit and placed along rest of incision and covered with bordered gauze island dressings)     Wound/ostomy - changed dressing to apply a more secure dressing for transport and assess incision. There was scant drainage to dry dressing that had been applied. Posterior hip palpated with deep pressure at times to express drainage as I had done earlier. Approx 30 ml of serousanguineous drainage was expressed. Dressing applied to R hip and a note written on the dressing to capture the attention of the staff to promptly place the vac so that the drainage can be managed \"Apply vac dressing to incision line and place track pad to middle aspect of incision\".   "

## 2022-06-10 NOTE — PLAN OF CARE
Goal Outcome Evaluation:              Outcome Evaluation: Endo done yday, patient not complaining of abdominal pain anymore, just hip pain which patient reports releif with Percocet. Wound vac remains in place with small amount of drainage. Pateint declined right hip elevation while sleeping, educated on wound vac drainage and skin breakdown. IV abx. PICC care

## 2022-06-10 NOTE — PROGRESS NOTES
Case Management Discharge Note      Final Note: Per Betty Earl [facility] can accept pt for skilled rehab today, [employer] Medicare pre-cert approved. No new covid swab needed. Pharmacy updated and packet provided to RN. Pt updated and agreeable. Suresh da silva scheduled today at 3:00 PM (ID: [id number]). Alise Byers LCSW    Provided Post Acute Provider List?: N/A  N/A Provider List Comment: Pt indicated [facility] for STR and [facility] for HH  Provided Post Acute Provider Quality & Resource List?: N/A  N/A Quality & Resource List Comment: Pt indicated [facility] for STR and [facility] for HH    Selected Continued Care - Admitted Since 6/1/2022     Destination Coordination complete.    Service Provider Selected Services Address Phone Fax Patient Preferred    [facility] [place]  Skilled [facility] [address], [place] [address] [phone] --          Durable Medical Equipment    No services have been selected for the patient.              Dialysis/Infusion    No services have been selected for the patient.              Home Medical Care    No services have been selected for the patient.              Therapy    No services have been selected for the patient.              Community Resources    No services have been selected for the patient.              Community & DME    No services have been selected for the patient.                Selected Continued Care - Prior Encounters Includes selections from prior encounters from 3/3/2022 to 6/10/2022    Discharged on 5/8/2022 Admission date: 4/25/2022 - Discharge disposition: Skilled Nursing Facility (DC - External)    Destination     Service Provider Selected Services Address Phone Fax Patient Preferred    [facility]  Skilled Nursing [address] [place], [place] [address] [phone] --       Internal Comment last updated by Roopa Escobar, MICHAELW  4/28/2022 1349    Left voicemail for admissions                                    Transportation: Suresh da silva  Final Discharge Disposition Code: 03 - skilled nursing facility (SNF)

## 2022-06-10 NOTE — PROGRESS NOTES
"  Infectious Diseases Progress Note        [facility]  Los: 9 days  Patient Identification:  Name: Stephy Duncan  Age: 67 y.o.  Sex: female  :  1955  MRN: [id number]         Primary Care Physician: Gregorio Rosales            Subjective: Just came back from endoscopy.  Denies any fever and chills.  Feeling overall better.  Interval History: See consultation note.  On 2022 patient underwent:Irrigation and Debridement of right hip joint with excision of proximal femur  Objective:    Scheduled Meds:cetirizine, 10 mg, Oral, Daily  DAPTOmycin (CUBICIN)  IV, 10 mg/kg (Adjusted), Intravenous, Q24H  ferrous sulfate, 325 mg, Oral, Every Other Day  gabapentin, 600 mg, Oral, Q12H  insulin glargine, 30 Units, Subcutaneous, Nightly  insulin lispro, 3 Units, Subcutaneous, TID With Meals  isosorbide mononitrate, 30 mg, Oral, QAM  lactobacillus acidophilus, 1 capsule, Oral, Daily  metoprolol succinate XL, 25 mg, Oral, Daily  miconazole, , Topical, Q12H  pantoprazole, 40 mg, Oral, Q AM  piperacillin-tazobactam, 3.375 g, Intravenous, Q8H  sodium chloride, 10 mL, Intravenous, Q12H      Continuous Infusions:sodium chloride, 30 mL/hr, Last Rate: Stopped (22 0843)        Vital signs in last 24 hours:  Temp:  [98 °F (36.7 °C)-98.6 °F (37 °C)] 98 °F (36.7 °C)  Heart Rate:  [63-83] 71  Resp:  [16-18] 18  BP: (108-133)/(56-72) 133/72    Intake/Output:    Intake/Output Summary (Last 24 hours) at 6/10/2022 1251  Last data filed at 6/10/2022 1146  Gross per 24 hour   Intake 1080 ml   Output 1725 ml   Net -645 ml       Exam:  /72 (BP Location: Right arm, Patient Position: Sitting)   Pulse 71   Temp 98 °F (36.7 °C) (Oral)   Resp 18   Ht 162.6 cm (64\")   Wt 97.1 kg (214 lb)   SpO2 100%   BMI 36.73 kg/m²   Patient is examined using the personal protective equipment as per guidelines from infection control for this particular patient as enacted.  Hand washing was performed before and after patient " interaction.  General Appearance:  Awake interactive does not appear toxic                          Head:    Normocephalic, without obvious abnormality, atraumatic                           Eyes:    PERRL, conjunctivae/corneas clear, EOM's intact, both eyes                         Throat:   Lips, tongue, gums normal; oral mucosa pink and moist                           Neck:   Supple, symmetrical, trachea midline, no JVD                         Lungs:    Clear to auscultation bilaterally, respirations unlabored                 Chest Wall:    No tenderness or deformity                          Heart:  S1-S2 regular                  Abdomen:   Soft nontender                 Extremities: Right hip surgical site dressed                            Pulses:   Pulses palpable in all extremities                            Skin: Chronic ecchymotic changes noted                  Neurologic: Grossly nonfocal       Data Review:    I reviewed the patient's new clinical results.  Results from last 7 days   Lab Units 06/10/22  0624 06/09/22  0634 06/08/22  0409 06/07/22  0349 06/06/22  1146 06/05/22  2310 06/05/22  0903 06/04/22  0554   WBC 10*3/mm3 7.38 9.94 14.03* 15.47*  --   --  26.03* 3.87   HEMOGLOBIN g/dL 8.2* 8.4* 8.6* 9.2* 9.6* 6.6* 6.5* 8.5*   PLATELETS 10*3/mm3 172 175 176 156  --   --  251 145     Results from last 7 days   Lab Units 06/09/22  0634 06/08/22  0409 06/07/22  0349 06/05/22  0903 06/04/22  0554   SODIUM mmol/L 129* 127* 128* 129* 134*   POTASSIUM mmol/L 4.1 4.2 4.3 5.0 4.1   CHLORIDE mmol/L 100 99 102 99 105   CO2 mmol/L 21.4* 23.0 18.6* 19.6* 21.0*   BUN mg/dL 11 16 20 15 7*   CREATININE mg/dL 0.69 0.75 0.82 1.48* 0.76   CALCIUM mg/dL 7.8* 7.5* 7.3* 7.7* 8.0*   GLUCOSE mg/dL 131* 127* 141* 223* 114*     Microbiology Results (last 10 days)     Procedure Component Value - Date/Time    COVID PRE-OP / PRE-PROCEDURE SCREENING ORDER (NO ISOLATION) - Swab, Nasopharynx [[id number]]  (Normal) Collected: 06/08/22  1743    Lab Status: Final result Specimen: Swab from Nasopharynx Updated: 06/08/22 2203    Narrative:      The following orders were created for panel order COVID PRE-OP / PRE-PROCEDURE SCREENING ORDER (NO ISOLATION) - Swab, Nasopharynx.  Procedure                               Abnormality         Status                     ---------                               -----------         ------                     COVID-19,APTIMA PANTHER(...[[id number]]  Normal              Final result                 Please view results for these tests on the individual orders.    COVID-19,APTIMA PANTHER(LORA),[facility], NP/OP SWAB IN UTM/VTM/SALINE TRANSPORT MEDIA,24 HR TAT - Swab, Nasopharynx [[id number]]  (Normal) Collected: 06/08/22 1743    Lab Status: Final result Specimen: Swab from Nasopharynx Updated: 06/08/22 2203     COVID19 Not Detected    Narrative:      Fact sheet for providers: https://www.fda.gov/media/588127/download     Fact sheet for patients: https://www.fda.gov/media/695613/download    Test performed by RT PCR.    Clostridium Difficile Toxin, PCR - Stool, Per Rectum [[id number]]  (Normal) Collected: 06/06/22 2128    Lab Status: Final result Specimen: Stool from Per Rectum Updated: 06/06/22 2222     C. Difficile Toxins by PCR Negative    Urine Culture - Urine, Urine, Catheter In/Out [[id number]]  (Normal) Collected: 06/05/22 1137    Lab Status: Final result Specimen: Urine, Catheter In/Out Updated: 06/06/22 1155     Urine Culture No growth    COVID PRE-OP / PRE-PROCEDURE SCREENING ORDER (NO ISOLATION) - Swab, Nasopharynx [[id number]]  (Normal) Collected: 06/04/22 0336    Lab Status: Final result Specimen: Swab from Nasopharynx Updated: 06/04/22 0426    Narrative:      The following orders were created for panel order COVID PRE-OP / PRE-PROCEDURE SCREENING ORDER (NO ISOLATION) - Swab, Nasopharynx.  Procedure                               Abnormality         Status                     ---------                                -----------         ------                     COVID-19,[facility] IN-HOUSE...[[id number]]  Normal              Final result                 Please view results for these tests on the individual orders.    COVID-19,[facility] IN-HOUSE CEPHEID/ROBERTO CARLOS NP SWAB IN TRANSPORT MEDIA 8-12 HR TAT - Swab, Nasopharynx [[id number]]  (Normal) Collected: 06/04/22 0336    Lab Status: Final result Specimen: Swab from Nasopharynx Updated: 06/04/22 0426     COVID19 Not Detected    Narrative:      Fact sheet for providers: https://www.fda.gov/media/988198/download     Fact sheet for patients: https://www.fda.gov/media/058332/download    Blood Culture - Blood, Arm, Right [[id number]]  (Normal) Collected: 06/01/22 2018    Lab Status: Final result Specimen: Blood from Arm, Right Updated: 06/06/22 2032     Blood Culture No growth at 5 days    Narrative:      Less than seven (7) mL's of blood was collected.  Insufficient quantity may yield false negative results.    Blood Culture - Blood, Blood, Central Line [[id number]]  (Normal) Collected: 06/01/22 2005    Lab Status: Final result Specimen: Blood, Central Line Updated: 06/06/22 2017     Blood Culture No growth at 5 days    Narrative:      Less than seven (7) mL's of blood was collected.  Insufficient quantity may yield false negative results.    COVID PRE-OP / PRE-PROCEDURE SCREENING ORDER (NO ISOLATION) - Swab, Nasopharynx [[id number]]  (Normal) Collected: 06/01/22 2003    Lab Status: Final result Specimen: Swab from Nasopharynx Updated: 06/01/22 2057    Narrative:      The following orders were created for panel order COVID PRE-OP / PRE-PROCEDURE SCREENING ORDER (NO ISOLATION) - Swab, Nasopharynx.  Procedure                               Abnormality         Status                     ---------                               -----------         ------                     COVID-19,[facility] IN-HOUSE...[[id number]]  Normal              Final result                 Please view results for these tests on  the individual orders.    COVID-19,[facility] IN-HOUSE CEPHEID/ROBERTO CARLOS NP SWAB IN TRANSPORT MEDIA 8-12 HR TAT - Swab, Nasopharynx [[id number]]  (Normal) Collected: 06/01/22 2003    Lab Status: Final result Specimen: Swab from Nasopharynx Updated: 06/01/22 2057     COVID19 Not Detected    Narrative:      Fact sheet for providers: https://www.fda.gov/media/417862/download     Fact sheet for patients: https://www.fda.gov/media/011526/download            Assessment:    Osteomyelitis of hip (HCC)    Type 2 diabetes mellitus with diabetic polyneuropathy, with long-term current use of insulin (HCC)    Hypertension    Iron deficiency anemia    Obesity (BMI 30-39.9)    Chronic diastolic CHF (congestive heart failure) (HCC)    Hyponatremia    Metabolic acidosis    Sepsis without acute organ dysfunction (HCC)    Pathological fracture of right femur (HCC)    Epigastric abdominal pain  1-acute on chronic osteomyelitis of the right proximal femur in a similar area in a patient who had previous recurrent right hip prosthetic and multiple surgeries with a latest procedure performed on 4/25/2022 and currently likely has mixed infection given the foul smell drainage and 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  2-pancytopenia  3-morbid obesity  4-type 2 diabetes  5-hyponatremia  6-other diagnosis per primary team     Recommendations/Discussions:  · Continue Zosyn and daptomycin   · Monitor closely for complications of antibiotics  · Since there are no operative culture results empiric antibiotic treatment based on the prior culture results and since she declined while on daptomycin consideration for mixed infection is needed to construct antibiotic regimen.  Based on this concept Zosyn and daptomycin need to be continued for at least couple weeks from 6/4/2022 and may  be extended to 6 weeks if orthopedic surgery service thinks that there is residual osteomyelitis.  · Based on the operative findings it appears that the acetabular involvement is also noted though it is curetted.  Because of this fact I would recommend extending antibiotic treatment to be 6 weeks.  · Obviously patient need to be monitored closely for side effects of antibiotics.  · Above concept of care discussed in great detail with patient and patient's  at the bedside.  Robert Ponce MD  6/10/2022  12:51 EDT

## 2022-06-10 NOTE — PROGRESS NOTES
Case Management Discharge Note      Final Note: Per Betty Earl can accept pt for skilled rehab today, [employer] pre-cert approved. No new covid swab needed. Pharmacy updated and packet provided to RN. Pt updated and agreeable. Suresh da silva scheduled today at 3:00 PM (ID: [id number]). Alise Byers LCSW    Provided Post Acute Provider List?: N/A  N/A Provider List Comment: Pt indicated [facility] for STR and [facility] for [facility]  Provided Post Acute Provider Quality & Resource List?: N/A  N/A Quality & Resource List Comment: Pt indicated [facility] for STR and [facility] for [facility]    Selected Continued Care - Admitted Since 6/1/2022     Destination Coordination complete.    Service Provider Selected Services Address Phone Fax Patient Preferred    [facility]  Skilled Nursing [address] [place] [address] [phone] --          Durable Medical Equipment    No services have been selected for the patient.              Dialysis/Infusion    No services have been selected for the patient.              Home Medical Care    No services have been selected for the patient.              Therapy    No services have been selected for the patient.              Community Resources    No services have been selected for the patient.              Community & DME    No services have been selected for the patient.                Selected Continued Care - Prior Encounters Includes selections from prior encounters from 3/3/2022 to 6/10/2022    Discharged on 5/8/2022 Admission date: 4/25/2022 - Discharge disposition: Skilled Nursing Facility (DC - External)    Destination     Service Provider Selected Services Address Phone Fax Patient Preferred    [facility]  Skilled Nursing [address] [place] [address] [phone] --       Internal Comment last updated by Roopa Escobar CSW 4/28/2022 1349    Left  voicemail for admissions                                 Transportation Services  W/C Van: [facility] (stretcher)    Final Discharge Disposition Code: 03 - skilled nursing facility (SNF)

## 2022-06-10 NOTE — NURSING NOTE
06/10/22 0910   Wound 05/02/22 1454 Right posterior hip Incision   Placement Date/Time: 05/02/22 1454   Present on Hospital Admission: Yes  Side: Right  Orientation: posterior  Location: hip  Primary Wound Type: Incision   Closure Staples;Approximated   Periwound macerated  (macerated on either side of incision, scattered areas of skin denudation around incision under where versatel and foam sit)   Periwound Temperature warm   Periwound Skin Turgor   (indurated distal aspect to thigh, soft/boggy to postrerior hip at proximal aspect of incision)   Drainage Amount copious   Dressing Care dressing changed  (vac removed, drainage expressed from incision and dry dressing applied (4x4 gauze, ABD pads x 2, paper tape))   Wound Output (mL) 400   NPWT (Negative Pressure Wound Therapy) 05/04/22 R hip   Placement Date: 05/04/22   Location: R hip   Therapy Setting vacuum off     Wound/ostomy - patient scheduled for d/c to facility today at 1500. Vac canister has same amount of drainage as it had yesterday afternoon, vac seal is good, sponge compressed appropriately, no drainage under drape. Vac dressing was removed and with manual massage and palpation 400 ml of serousanguineous drainage was expressed from far proximal aspect of incision and was able to be collected in canister for measurement. No odor initially with drainage expelled but towards the end there was mild odor, but nothing like the odor when she presented to the hospital this admission. This amount of drainage is not uncharacteristic for this patient. In the past she has been known to drain 100+ ml daily.     The vac therapy has worked in the past for this patient. Usually the track pad is positioned more to the center of the incision but the last dressing change it was placed to the most proximal aspect to posterior hip because of the excessive drainage from there but this did not seem to help. Moving forward it would be best to continue with the vac to  cushion the track pad with foam and place track pad to the center of the incision as this approach has worked in the past. I will write a note for the accepting facility with instructions on what to do and provide a phone number for them to reach me for any questions.     I will go back and change dressing prior to her leaving today.

## 2022-06-13 ENCOUNTER — APPOINTMENT (OUTPATIENT)
Dept: INFUSION THERAPY | Facility: HOSPITAL | Age: 67
End: 2022-06-13

## 2022-06-13 NOTE — PAYOR COMM NOTE
"Stephy Duncan (67 y.o. Female)     ATTN: DISCHARGE SUMMARY AND CSC FOR REVIEW:  REF# [id number]     DEPT: [phone],  [phone]              Date of Birth   1955    Social Security Number       Address   [address] [place] [address]    Home Phone   [phone]    MRN   [id number]       Episcopal   Zoroastrianism    Marital Status                               Admission Date   6/1/22    Admission Type   Emergency    Admitting Provider   Juan Luz MD    Attending Provider       Department, Room/Bed   [address], S409/1       Discharge Date   6/10/2022    Discharge Disposition   Skilled Nursing Facility (DC - External)    Discharge Destination                               Attending Provider: (none)   Allergies: Ace Inhibitors, Morphine    Isolation: None   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)   Code Status: Prior   Advance Care Planning Activity    Ht: 162.6 cm (64\")   Wt: 97.1 kg (214 lb)    Admission Cmt: None   Principal Problem: Osteomyelitis of hip (HCC) [M86.9]                 Active Insurance as of 6/1/2022     Primary Coverage     Payor Plan Insurance Group Employer/Plan Group    AETNA MEDICARE REPLACEMENT AETNA MEDICARE REPLACEMENT [id number]     Payor Plan Address Payor Plan Phone Number Payor Plan Fax Number Effective Dates    [address] [phone]  1/1/2022 - None Entered    [facility] [id number]       Subscriber Name Subscriber Birth Date Member ID       Stephy Duncan 1955 [id number]                 Emergency Contacts      (Rel.) Home Phone Work Phone Mobile Phone    ESTUARDO DUNCAN (Spouse) [phone] -- [phone]    ChrisBhumika (Sister) [phone] -- [phone]            Vital Signs (last 3 days) before discharge     Date/Time Temp Temp src Pulse Resp BP Patient Position SpO2    06/10/22 1349 97.2 (36.2) Oral 72 18 119/61 Lying 98    06/10/22 0757 98 (36.7) Oral 71 18 133/72 Sitting 100    " 06/09/22 2346 98.6 (37) Oral 63 16 108/56 Lying 96    06/09/22 2013 98.1 (36.7) Oral 73 18 114/61 Lying 96    06/09/22 1700 -- -- 72 -- -- -- --    06/09/22 1500 -- -- 83 -- -- -- 99    06/09/22 1300 -- -- 83 -- -- -- 96    06/09/22 1100 -- -- 81 -- -- -- 96    06/09/22 0918 98.2 (36.8) Oral 69 16 129/64 Lying 99    06/09/22 0854 -- -- 78 14 95/54 Sitting 98    06/09/22 0849 -- -- 78 14 116/59 Sitting 98    06/09/22 0836 -- -- 78 12 96/51 Lying 97    06/09/22 0813 -- -- 70 16 128/58 Lying 99    06/08/22 2320 98 (36.7) Oral 80 16 121/64 Lying 96    06/08/22 2115 98.2 (36.8) Oral 71 16 117/60 Lying 99    06/08/22 1700 -- -- 71 -- -- -- 98    06/08/22 1500 -- -- 65 -- -- -- 96    06/08/22 1324 98.1 (36.7) Oral 71 18 102/63 Lying 98    06/08/22 0900 -- -- 88 -- -- -- 99    06/08/22 0700 98.7 (37.1) Oral 70 16 124/64 Lying 98    06/07/22 2311 -- -- -- -- -- -- 96    06/07/22 2310 98.7 (37.1) Oral 67 18 129/54 Lying --    06/07/22 1951 98.3 (36.8) Oral 72 18 122/64 Lying 98    06/07/22 1445 97.6 (36.4) Oral 66 18 122/66 Lying 100    06/07/22 0700 98 (36.7) Oral 75 20 114/91 Lying 98    06/07/22 0637 -- -- 76 -- 118/56 Lying --          Oxygen Therapy (last 3 days) before discharge     Date/Time SpO2 Device (Oxygen Therapy) Flow (L/min) Oxygen Concentration (%) ETCO2 (mmHg)    06/10/22 1435 -- room air -- -- --    06/10/22 1349 98 room air -- -- --    06/10/22 0841 -- room air -- -- --    06/10/22 0757 100 room air -- -- --    06/10/22 0002 -- room air -- -- --    06/09/22 2346 96 room air -- -- --    06/09/22 2139 -- room air -- -- --    06/09/22 2013 96 room air -- -- --    06/09/22 1500 99 room air -- -- --    06/09/22 1430 -- room air -- -- --    06/09/22 1300 96 room air -- -- --    06/09/22 1100 96 room air -- -- --    06/09/22 0924 -- room air -- -- --    06/09/22 0918 99 room air -- -- --    06/09/22 0854 98 room air -- -- --    06/09/22 0849 98 room air -- -- --    06/09/22 0836 97 room air -- -- --    06/09/22  0813 99 room air -- -- --    06/09/22 0020 -- room air -- -- --    06/08/22 2320 96 room air -- -- --    06/08/22 2115 99 room air -- -- --    06/08/22 2027 -- room air -- -- --    06/08/22 1700 98 room air -- -- --    06/08/22 1500 96 room air -- -- --    06/08/22 1410 -- room air -- -- --    06/08/22 1324 98 room air -- -- --    06/08/22 0900 99 room air -- -- --    06/08/22 0832 -- room air -- -- --    06/08/22 0700 98 room air -- -- --    06/08/22 0015 -- room air -- -- --    06/07/22 2311 96 -- -- -- --    06/07/22 2310 -- room air -- -- --    06/07/22 2110 -- room air -- -- --    06/07/22 1951 98 room air -- -- --    06/07/22 1445 100 room air -- -- --    06/07/22 0700 98 room air -- -- --    06/07/22 0032 -- room air -- -- --          No current facility-administered medications for this encounter.     Current Outpatient Medications   Medication Sig Dispense Refill   • DAPTOmycin 750 mg in sodium chloride 0.9 % 50 mL Infuse 750 mg into a venous catheter Daily for 33 doses. Indications: Bone and/or Joint Infection     • Diclofenac Sodium (VOLTAREN) 1 % gel gel Apply 4 g topically to the appropriate area as directed 4 (Four) Times a Day As Needed.     • esomeprazole (nexIUM) 40 MG capsule Take 40 mg by mouth Every Morning Before Breakfast.     • ferrous sulfate 325 (65 FE) MG tablet Take 325 mg by mouth Every Other Day. Mornings     • folic acid (FOLVITE) 1 MG tablet Take 1,000 mcg by mouth Daily.     • gabapentin (NEURONTIN) 600 MG tablet Take 1 tablet by mouth 2 (Two) Times a Day. 40 tablet 0   • insulin aspart (NovoLOG FlexPen) 100 UNIT/ML solution pen-injector sc pen Inject 4 Units under the skin into the appropriate area as directed 3 (Three) Times a Day With Meals.     • insulin detemir (LEVEMIR) 100 UNIT/ML injection Inject 30 Units under the skin into the appropriate area as directed Every Night.  12   • isosorbide mononitrate (IMDUR) 30 MG 24 hr tablet Take 30 mg by mouth Every Morning.     •  lactobacillus acidophilus (RISAQUAD) capsule capsule Take 1 capsule by mouth Daily.     • loratadine (CLARITIN) 10 MG tablet Take 10 mg by mouth Every Night.     • metoprolol succinate XL (Toprol XL) 25 MG 24 hr tablet Take 1 tablet by mouth Daily.     • oxyCODONE-acetaminophen (PERCOCET) 5-325 MG per tablet Take 1 tablet by mouth Every 4 (Four) Hours As Needed for Severe Pain . 20 tablet 0   • piperacillin-tazobactam (ZOSYN) 3-0.375 GM/50ML IVPB Infuse 50 mL into a venous catheter Every 8 (Eight) Hours for 103 doses. Indications: Bone and/or Joint Infection 4500 mL 1   • vitamin D3 125 MCG (5000 UT) capsule capsule Take 5,000 Units by mouth Daily.       Medication Administration Report for Stephy Duncan as of 06/13/22 5740   Legend:    Given Hold Not Given Due Canceled Entry Other Actions    Time Time (Time) Time  Time-Action       Discontinued     Completed     Future     MAR Hold     Linked           Medications 06/08/22 06/09/22 06/10/22   Completed Medications    alteplase ((CATHFLO/ACTIVASE)) syringe 2 mg  Dose: 2 mg  Freq: Once Route: IK  Start: 06/06/22 0830   End: 06/06/22 1511   Admin Instructions:   ** Caution:  High Risk/High Alert med.**  Refrigerate.  If alteplase induced angioedema is suspected, activate the TPA induced angioedema order set. These symptoms include orolingual, hemifacial swelling, often contralateral to ischemic cerebral hemisphere. Onset may range from 5 - 180 minutes following infusion initiation.          aluminum-magnesium hydroxide-simethicone (MAALOX MAX) 400-400-40 MG/5ML suspension 15 mL  Dose: 15 mL  Freq: Once Route: PO  Start: 06/06/22 1230   End: 06/06/22 1251   Admin Instructions:   Maximum 60 mL in 24 hours.          DAPTOmycin (CUBICIN) 750 mg in sodium chloride 0.9 % 50 mL IVPB  Dose: 10 mg/kg  Weight Dosing Info: 74 kg (Adjusted)  Freq: Every 24 Hours Route: IV  Indications of Use: BONE AND/OR JOINT INFECTION  Start: 06/02/22 2100   End: 06/08/22 2058   Admin  Instructions:   Caution: Look alike/sound alike drug alert.  Refrigerate. Do not shake.   Order specific questions:   Reason for Therapy MRSA & documented vancomycin failure      2028-New Bag               DAPTOmycin (CUBICIN) 750 mg in sodium chloride 0.9 % 50 mL IVPB  Dose: 750 mg  Freq: Once Route: IV  Indications of Use: BONE AND/OR JOINT INFECTION  Start: 06/01/22 2117   End: 06/01/22 2255   Admin Instructions:   Caution: Look alike/sound alike drug alert.  Refrigerate. Do not shake.   Order specific questions:   Reason for Therapy Confirmed VRE infection            famotidine (PEPCID) injection 20 mg  Dose: 20 mg  Freq: Once Route: IV  Start: 06/04/22 0723   End: 06/04/22 0727   Admin Instructions:   Dilute to 10 mL total volume and give IV push over 2 minutes.          HYDROmorphone (DILAUDID) injection 0.5 mg  Dose: 0.5 mg  Freq: Once Route: IV  Start: 06/01/22 2130   End: 06/01/22 2140   Admin Instructions:   If given for pain, use the following pain scale:  Mild Pain = Pain Score of 1-3, CPOT 1-2  Moderate Pain = Pain Score of 4-6, CPOT 3-4  Severe Pain = Pain Score of 7-10, CPOT 5-8          iopamidol (ISOVUE-300) 61 % injection 100 mL  Dose: 100 mL  Freq: Once in Imaging Route: IV  Start: 06/01/22 2037   End: 06/01/22 2037          Lidocaine Viscous HCl (XYLOCAINE) 2 % solution 5 mL  Dose: 5 mL  Freq: Once Route: MT  Start: 06/06/22 1230   End: 06/06/22 1251   Admin Instructions:   If given for pain, use the following pain scale:  Mild Pain = Pain Score of 1-3, CPOT 1-2  Moderate Pain = Pain Score of 4-6, CPOT 3-4  Severe Pain = Pain Score of 7-10, CPOT 5-8          oxyCODONE-acetaminophen (PERCOCET) 5-325 MG per tablet 1 tablet  Dose: 1 tablet  Freq: Once Route: PO  Start: 06/01/22 1910   End: 06/01/22 2002   Admin Instructions:   [RICCARDO]    Do not exceed 4 grams of acetaminophen in a 24 hr period. Max dose of 2gm for AST/ALT greater than 120 units/L        If given for pain, use the following pain scale:    Mild Pain = Pain Score of 1-3, CPOT 1-2  Moderate Pain = Pain Score of 4-6, CPOT 3-4  Severe Pain = Pain Score of 7-10, CPOT 5-8          piperacillin-tazobactam (ZOSYN) 3.375 g in iso-osmotic dextrose 50 ml (premix)  Dose: 3.375 g  Freq: Once Route: IV  Start: 06/02/22 1400   End: 06/02/22 1538   Admin Instructions:   Refrigerate          sodium chloride 0.9 % bolus 1,000 mL  Dose: 1,000 mL  Freq: Once Route: IV  Start: 06/02/22 1015   End: 06/02/22 1041         Discontinued Medications  Medications 06/08/22 06/09/22 06/10/22       acetaminophen (TYLENOL) tablet 650 mg  Dose: 650 mg  Freq: Every 4 Hours PRN Route: PO  PRN Reason: Mild Pain   Start: 06/02/22 0026   End: 06/10/22 1823   Admin Instructions:   Do not exceed 4 grams of acetaminophen in a 24 hr period. Max dose of 2gm for AST/ALT greater than 120 units/L    If given for fever, use fever parameter: fever greater than 100.4 °F.    If given for pain, use the following pain scale:   Mild Pain = Pain Score of 1-3, CPOT 1-2  Moderate Pain = Pain Score of 4-6, CPOT 3-4  Severe Pain = Pain Score of 7-10, CPOT 5-8         Or  acetaminophen (TYLENOL) 160 MG/5ML solution 650 mg  Dose: 650 mg  Freq: Every 4 Hours PRN Route: PO  PRN Reason: Mild Pain   Start: 06/02/22 0026   End: 06/02/22 0828   Admin Instructions:   Do not exceed 4 grams of acetaminophen in a 24 hr period. Max dose of 2gm for AST/ALT greater than 120 units/L    If given for fever, use fever parameter: fever greater than 100.4 °F.    If given for pain, use the following pain scale:   Mild Pain = Pain Score of 1-3, CPOT 1-2  Moderate Pain = Pain Score of 4-6, CPOT 3-4  Severe Pain = Pain Score of 7-10, CPOT 5-8         Or  acetaminophen (TYLENOL) suppository 650 mg  Dose: 650 mg  Freq: Every 4 Hours PRN Route: RE  PRN Reason: Mild Pain   Start: 06/02/22 0026   End: 06/02/22 0828   Admin Instructions:   Do not exceed 4 grams of acetaminophen in a 24 hr period. Max dose of 2gm for AST/ALT greater than  120 units/L    If given for fever, use fever parameter: fever greater than 100.4 °F.    If given for pain, use the following pain scale:   Mild Pain = Pain Score of 1-3, CPOT 1-2  Moderate Pain = Pain Score of 4-6, CPOT 3-4  Severe Pain = Pain Score of 7-10, CPOT 5-8          cetirizine (zyrTEC) tablet 10 mg  Dose: 10 mg  Freq: Daily Route: PO  Start: 06/02/22 0900   End: 06/10/22 1823    0813-Given          1011-Given          0814-Given             DAPTOmycin (CUBICIN) 750 mg in sodium chloride 0.9 % 50 mL IVPB  Dose: 10 mg/kg  Weight Dosing Info: 74 kg (Adjusted)  Freq: Every 24 Hours Route: IV  Indications of Use: BONE AND/OR JOINT INFECTION  Start: 06/09/22 2100   End: 06/10/22 1823   Admin Instructions:   Caution: Look alike/sound alike drug alert.  Refrigerate. Do not shake.   Order specific questions:   Reason for Therapy Confirmed VRE infection       2302-New Bag              dextrose (D50W) (25 g/50 mL) IV injection 25 g  Dose: 25 g  Freq: Every 15 Minutes PRN Route: IV  PRN Reason: Low Blood Sugar  PRN Comment: Blood Sugar Less Than 70  Start: 06/02/22 0255   End: 06/10/22 1823   Admin Instructions:   Blood sugar less than 70; patient has IV access - Unresponsive, NPO or Unable To Safely Swallow          dextrose (GLUTOSE) oral gel 15 g  Dose: 15 g  Freq: Every 15 Minutes PRN Route: PO  PRN Reason: Low Blood Sugar  PRN Comment: Blood sugar less than 70  Start: 06/02/22 0255   End: 06/10/22 1823   Admin Instructions:   BS<70, Patient Alert, Is not NPO, Can safely swallow.     2048-Canceled Entry              Diclofenac Sodium (VOLTAREN) 1 % gel 4 g  Dose: 4 g  Freq: 4 Times Daily PRN Route: TOP  PRN Comment: Pain  Start: 06/02/22 0224   End: 06/10/22 1823   Admin Instructions:   Apply to affected areas     Do not cover area with occlusive dressing or apply any other med to affected area. Do not wash affected area for 1 hr after applying. Use dosing card for correct dose measurement.    1215-Given                diphenhydrAMINE (BENADRYL) capsule 25 mg  Dose: 25 mg  Freq: Every 30 Minutes PRN Route: PO  PRN Reason: Itching  PRN Comment: May repeat x 1  Indications of Use: EXTRAPYRAMIDAL DISORDER,PRURITUS  Start: 06/04/22 0858   End: 06/04/22 1048   Admin Instructions:   Caution: Look alike/sound alike drug alert. This med may be ordered in other forms and routes. Before giving verify the last time the drug was given by any route/form.            diphenhydrAMINE (BENADRYL) injection 12.5 mg  Dose: 12.5 mg  Freq: Every 15 Minutes PRN Route: IV  PRN Reason: Itching  PRN Comment: May repeat x 1  Start: 06/04/22 0858   End: 06/04/22 1048   Admin Instructions:   Caution: Look alike/sound alike drug alert. This med may be ordered in other forms and routes. Before giving verify the last time the drug was given by any route/form.            ePHEDrine injection 5 mg  Dose: 5 mg  Freq: Once As Needed Route: IV  PRN Comment: symptomatic hypotension - Notify attending anesthesiologist if this needs to be given  Start: 06/04/22 0858   End: 06/04/22 1048   Admin Instructions:   Caution: Look alike/sound alike drug alert   Dilute with NS to 5-10 mg/mL.  Central line preferred, if unavailable use large bore IV access with frequent nurse monitoring of IV site.          fentaNYL citrate (PF) (SUBLIMAZE) injection 25 mcg  Dose: 25 mcg  Freq: Every 5 Minutes PRN Route: IV  PRN Reasons: Moderate Pain ,Severe Pain   Start: 06/04/22 0858   End: 06/04/22 1048   Admin Instructions:   May alternate fentanyl with hydromorphone using fentanyl first.    Maximum total dose of fentanyl is 200 mcg.  If given for pain, use the following pain scale:  Mild Pain = Pain Score of 1-3, CPOT 1-2  Moderate Pain = Pain Score of 4-6, CPOT 3-4  Severe Pain = Pain Score of 7-10, CPOT 5-8          fentaNYL citrate (PF) (SUBLIMAZE) injection 50 mcg  Dose: 50 mcg  Freq: Every 10 Minutes PRN Route: IV  PRN Reason: Severe Pain   Start: 06/04/22 0721   End: 06/04/22 0938    Admin Instructions:   Maximum total dose of fentanyl is 100 mcg.  If given for pain, use the following pain scale:  Mild Pain = Pain Score of 1-3, CPOT 1-2  Moderate Pain = Pain Score of 4-6, CPOT 3-4  Severe Pain = Pain Score of 7-10, CPOT 5-8          ferrous sulfate tablet 325 mg  Dose: 325 mg  Freq: Every Other Day Route: PO  Start: 06/02/22 0900   End: 06/10/22 1823   Admin Instructions:   Swallow whole. Do not crush, split, or chew. Take with food if GI upset occurs.    0812-Given           0814-Given             flumazenil (ROMAZICON) injection 0.2 mg  Dose: 0.2 mg  Freq: As Needed Route: IV  PRN Comment: for benzodiazepine induced unresponsiveness or sedation  Indications of Use: BENZODIAZEPINE-INDUCED SEDATION  Start: 06/04/22 0858   End: 06/04/22 1048   Admin Instructions:   Notify Anesthesia if given  ** give IV over 15-30 seconds **          gabapentin (NEURONTIN) capsule 600 mg  Dose: 600 mg  Freq: Every 12 Hours Scheduled Route: PO  Start: 06/02/22 0900   End: 06/10/22 1823   Admin Instructions:         0812-Given     2027-Given         1010-Given     2139-Given         1143-Given             glucagon (human recombinant) (GLUCAGEN DIAGNOSTIC) injection 1 mg  Dose: 1 mg  Freq: Every 15 Minutes PRN Route: IM  PRN Reason: Low Blood Sugar  PRN Comment: Blood Glucose Less Than 70  Start: 06/02/22 0255   End: 06/10/22 1823   Admin Instructions:   Blood Glucose Less Than 70 - Patient Without IV Access - Unresponsive, NPO or Unable To Safely Swallow          hydrALAZINE (APRESOLINE) injection 5 mg  Dose: 5 mg  Freq: Every 10 Minutes PRN Route: IV  PRN Reason: High Blood Pressure  PRN Comment: for systolic blood pressure greater than 180 mmHg or diastolic blood pressure greater than 105 mmHg  Start: 06/04/22 0858   End: 06/04/22 1048   Admin Instructions:   Up to 20 mg.  Caution: Look alike/sound alike drug alert          hydrogen peroxide 3 % external solution  Freq: As Needed  Start: 06/04/22 0854   End:  06/04/22 0923          HYDROmorphone (DILAUDID) injection 0.25 mg  Dose: 0.25 mg  Freq: Every 5 Minutes PRN Route: IV  PRN Reasons: Moderate Pain ,Severe Pain   Start: 06/04/22 0858   End: 06/04/22 1048   Admin Instructions:   May alternate fentanyl with hydromorphone using fentanyl first.    Maximum total dose of hydromorphone is 2 mg.  If given for pain, use the following pain scale:  Mild Pain = Pain Score of 1-3, CPOT 1-2  Moderate Pain = Pain Score of 4-6, CPOT 3-4  Severe Pain = Pain Score of 7-10, CPOT 5-8          insulin glargine (LANTUS, SEMGLEE) injection 25 Units  Dose: 25 Units  Freq: Nightly Route: SC  Start: 06/02/22 2100   End: 06/05/22 1051   Admin Instructions:               insulin glargine (LANTUS, SEMGLEE) injection 30 Units  Dose: 30 Units  Freq: Nightly Route: SC  Start: 06/05/22 2100   End: 06/10/22 1823   Admin Instructions:         2320-Given          2138-Given              insulin lispro (ADMELOG) injection 2 Units  Dose: 2 Units  Freq: 3 Times Daily With Meals Route: SC  Start: 06/02/22 0800   End: 06/03/22 0913   Admin Instructions:      Caution: Look alike/sound alike drug alert            insulin lispro (ADMELOG) injection 3 Units  Dose: 3 Units  Freq: 3 Times Daily With Meals Route: SC  Start: 06/03/22 1200   End: 06/10/22 1823   Admin Instructions:      Caution: Look alike/sound alike drug alert      (0747)-Not Given     1215-Given     1743-Given        0717-Hold     1012-Given [C]     1316-Given       1725-Given          0814-Given     1143-Given            isosorbide mononitrate (IMDUR) 24 hr tablet 30 mg  Dose: 30 mg  Freq: Every Morning Route: PO  Start: 06/03/22 0700   End: 06/10/22 1823   Admin Instructions:   Do not crush, or chew.    0631-Given          1010-Given          0814-Given             isosorbide mononitrate (IMDUR) 24 hr tablet 30 mg  Dose: 30 mg  Freq: Every Morning Route: PO  Start: 06/02/22 0700   End: 06/02/22 0829   Admin Instructions:   Do not crush, or  chew.          labetalol (NORMODYNE,TRANDATE) injection 5 mg  Dose: 5 mg  Freq: Every 5 Minutes PRN Route: IV  PRN Reason: High Blood Pressure  PRN Comment: for systolic blood pressure greater than 180 mmHg or diastolic blood pressure greater than 105 mmHg  Start: 06/04/22 0858   End: 06/04/22 1048   Admin Instructions:   Hold for heart rate less than 60.  Give by slow IV Push each 20mg (or less) over 2 minutes          lactated ringers infusion  Rate: 9 mL/hr Dose: 9 mL/hr  Freq: Continuous Route: IV  Start: 06/04/22 0723   End: 06/09/22 1252          lactobacillus acidophilus (RISAQUAD) capsule 1 capsule  Dose: 1 capsule  Freq: Daily Route: PO  Start: 06/03/22 1230   End: 06/10/22 1823    0813-Given          1010-Given          0814-Given             lidocaine PF 1% (XYLOCAINE) injection 0.5 mL  Dose: 0.5 mL  Freq: Once As Needed Route: IJ  PRN Comment: IV Start  Start: 06/04/22 0721   End: 06/04/22 0938          metoprolol succinate XL (TOPROL-XL) 24 hr tablet 25 mg  Dose: 25 mg  Freq: Daily Route: PO  Start: 06/02/22 0900   End: 06/10/22 1823   Admin Instructions:   Hold for SBP less than 100 or HR less than 60  Do not crush or chew.    0812-Given          1010-Given          0814-Given             miconazole (MICOTIN) 2 % powder  Freq: Every 12 Hours Scheduled Route: TOP  Start: 06/03/22 1230   End: 06/10/22 1823   Admin Instructions:   Apply to groin    0822-Given     2028-Given         1011-Given     2140-Given         1144-Given             midazolam (VERSED) injection 0.5 mg  Dose: 0.5 mg  Freq: Every 10 Minutes PRN Route: IV  PRN Comment: Anxiety prophylaxis, Pre-op comfort  Start: 06/04/22 0721   End: 06/04/22 0938   Admin Instructions:   May repeat dose in 10 minutes one time then contact provider for additional orders.                naloxone (NARCAN) injection 0.2 mg  Dose: 0.2 mg  Freq: As Needed Route: IV  PRN Reasons: Opioid Reversal,Respiratory Depression  PRN Comment: unresponsiveness, decrease  oxygen saturation  Indications of Use: ACUTE RESPIRATORY FAILURE,OPIOID-INDUCED RESPIRATORY DEPRESSION  Start: 06/04/22 0858   End: 06/04/22 1048   Admin Instructions:   Notify Anesthesia if given          nitroglycerin (NITROSTAT) SL tablet 0.4 mg  Dose: 0.4 mg  Freq: Every 5 Minutes PRN Route: SL  PRN Reason: Chest Pain  PRN Comment: Only if SBP Greater Than 100  Start: 06/02/22 0025   End: 06/10/22 1823   Admin Instructions:   If Pain Unrelieved After 3 Doses Notify MD          ondansetron (ZOFRAN) injection 4 mg  Dose: 4 mg  Freq: Every 4 Hours PRN Route: IV  PRN Reasons: Nausea,Vomiting  Start: 06/04/22 1416   End: 06/10/22 1823   Admin Instructions:   If BOTH ondansetron (ZOFRAN) and promethazine (PHENERGAN) are ordered use ondansetron first and THEN promethazine IF ondansetron is ineffective.          ondansetron (ZOFRAN) injection 4 mg  Dose: 4 mg  Freq: Once As Needed Route: IV  PRN Reasons: Nausea,Vomiting  Indications of Use: POSTOPERATIVE NAUSEA AND VOMITING  Start: 06/04/22 0858   End: 06/04/22 1048   Admin Instructions:   If BOTH ondansetron (ZOFRAN) and promethazine (PHENERGAN) are ordered use ondansetron first and THEN promethazine IF ondansetron is ineffective.          ondansetron (ZOFRAN) injection 4 mg  Dose: 4 mg  Freq: Every 6 Hours PRN Route: IV  PRN Reasons: Nausea,Vomiting  Start: 06/02/22 0026   End: 06/04/22 1416   Admin Instructions:   If BOTH ondansetron (ZOFRAN) and promethazine (PHENERGAN) are ordered use ondansetron first and THEN promethazine IF ondansetron is ineffective.          oxyCODONE-acetaminophen (PERCOCET) 5-325 MG per tablet 1 tablet  Dose: 1 tablet  Freq: Every 4 Hours PRN Route: PO  PRN Reason: Severe Pain   Start: 06/02/22 0226   End: 06/10/22 1823   Admin Instructions:   [RICCARDO]    Do not exceed 4 grams of acetaminophen in a 24 hr period. Max dose of 2gm for AST/ALT greater than 120 units/L        If given for pain, use the following pain scale:   Mild Pain = Pain Score  of 1-3, CPOT 1-2  Moderate Pain = Pain Score of 4-6, CPOT 3-4  Severe Pain = Pain Score of 7-10, CPOT 5-8    0034-Given     0434-Given     0832-Given       1215-Given     1610-Given     2027-Given        0151-Given     0624-Given     1011-Given       1732-Given     2139-Given         0841-Given     1533-Given            pantoprazole (PROTONIX) EC tablet 40 mg  Dose: 40 mg  Freq: Every Early Morning Route: PO  Start: 06/10/22 0600   End: 06/10/22 1823   Admin Instructions:   Swallow whole; do not crush, split, or chew.      0624-Given             pantoprazole (PROTONIX) EC tablet 40 mg  Dose: 40 mg  Freq: 2 Times Daily Before Meals Route: PO  Start: 06/06/22 1730   End: 06/09/22 0935   Admin Instructions:   Swallow whole; do not crush, split, or chew.    0631-Given     1744-Given         0624-Given [C]     0730-Canceled Entry             pantoprazole (PROTONIX) EC tablet 40 mg  Dose: 40 mg  Freq: Every Morning Route: PO  Start: 06/02/22 0700   End: 06/06/22 1501   Admin Instructions:   Swallow whole; do not crush, split, or chew.          piperacillin-tazobactam (ZOSYN) 3.375 g in iso-osmotic dextrose 50 ml (premix)  Dose: 3.375 g  Freq: Every 8 Hours Route: IV  Indications of Use: BONE AND/OR JOINT INFECTION  Start: 06/02/22 2000   End: 06/10/22 1823   Admin Instructions:   Refrigerate    0814-New Bag     1611-New Bag     2320-New Bag        1011-New Bag     1641-New Bag     2341-New Bag        0814-New Bag     1600            sodium chloride 0.9 % flush 10 mL  Dose: 10 mL  Freq: Every 12 Hours Scheduled Route: IV  Start: 06/02/22 0900   End: 06/10/22 1823    0828-Given     2029-Given         1012-Given     2140-Given         1144-Given             sodium chloride 0.9 % infusion  Rate: 30 mL/hr Dose: 30 mL/hr  Freq: Continuous PRN Route: IV  PRN Comment: Start Prior to Procedure  Start: 06/09/22 0820   End: 06/10/22 1823     0820-New Bag     0822-Currently Infusing     0832-Anesthesia Volume Adjustment        0843-Stopped              sodium chloride 1,000 mL with povidone-iodine 30 mL irrigation  Freq: As Needed  Start: 06/04/22 0815   End: 06/04/22 0923          sodium chloride 3,000 mL with povidone-iodine 90 mL irrigation  Freq: As Needed  Start: 06/04/22 0814   End: 06/04/22 0923          sucralfate (CARAFATE) tablet 1 g  Dose: 1 g  Freq: 4 Times Daily Before Meals & Nightly Route: PO  Start: 06/06/22 1730   End: 06/09/22 0935   Admin Instructions:   Take on an empty stomach.  For nasogastric or slurry administration:   1. Remove the cap and plunger from a 60 mL syringe   2. Place the sucralfate tablet inside the syringe   3. Replace the plunger so that minimal airspace exists around the tablet   4. Draw up approximately 20 mL water into the syringe   5. Replace the syringe cap   6. Allow the syringe to stand for ~5 minutes, shaking occasionally   7. Shake the suspension and administer directly from the syringe into the tube   **Flush tube before and after administration**    0631-Given     1215-Given     1744-Given       2027-Given          0624-Given [C]     0730-Canceled Entry                        Lab Results (last 72 hours)     Procedure Component Value Units Date/Time    POC Glucose Once [[id number]]  (Abnormal) Collected: 06/10/22 1109    Specimen: Blood Updated: 06/10/22 1111     Glucose 155 mg/dL      Comment: Meter: [id number] : [id number] Nelson CUNNING[place]AM       Tissue Pathology Exam [[id number]] Collected: 06/09/22 0830    Specimen: Tissue from Small Intestine, Duodenum; Tissue from Stomach Updated: 06/10/22 0926     Case Report --     Surgical Pathology Report                         Case: [id number]                                  Authorizing Provider:  Zechariah Nj MD        Collected:           06/09/2022 08:30 AM          Ordering Location:     [facility]  Received:            06/09/2022 10:17 AM                                 ENDO SUITES                                                                   Pathologist:           Alysa Win MD                                                    Specimens:   1) - Small Intestine, Duodenum, DUODENAL BIOPSIES                                                   2) - Stomach, GASTRIC BIOPSIES                                                              Final Diagnosis --     1. Duodenum, Biopsy: Benign small bowel mucosa with   A.  Normal intact villous surface.   B.  No significant inflammation, no granulomas.   C.  No viral inclusions or other organisms on routinely stained sections.    2. Stomach, Biopsy:   A. Reactive gastropathy and repair.   B. Negative for Helicobacter by routine staining.   C. No metaplasia, dysplasia nor malignancy identified.    swm/pkm        Gross Description --     1. Small Intestine, Duodenum.  The specimen is received in formalin labeled with the patient's name and further designated 'duodenal biopsy' are multiple small fragments of gray-tan tissue. The specimen is submitted for embedding as received.      2. Stomach.  The specimen is received in formalin labeled with the patient's name and further designated 'gastric biopsy' are multiple small fragments of gray-tan tissue. The specimen is submitted for embedding as received.          CK [[id number]]  (Abnormal) Collected: 06/10/22 0624    Specimen: Blood Updated: 06/10/22 0755     Creatine Kinase 11 U/L     CBC & Differential [[id number]]  (Abnormal) Collected: 06/10/22 0624    Specimen: Blood Updated: 06/10/22 0705    Narrative:      The following orders were created for panel order CBC & Differential.  Procedure                               Abnormality         Status                     ---------                               -----------         ------                     CBC Auto Differential[[id number]]        Abnormal            Final result                 Please view results for these tests on the individual orders.    CBC Auto Differential  [[id number]]  (Abnormal) Collected: 06/10/22 0624    Specimen: Blood Updated: 06/10/22 0705     WBC 7.38 10*3/mm3      RBC 3.00 10*6/mm3      Hemoglobin 8.2 g/dL      Hematocrit 25.7 %      MCV 85.7 fL      MCH 27.3 pg      MCHC 31.9 g/dL      RDW 15.8 %      RDW-SD 48.9 fl      MPV 9.1 fL      Platelets 172 10*3/mm3      Neutrophil % 65.1 %      Lymphocyte % 18.2 %      Monocyte % 7.2 %      Eosinophil % 6.1 %      Basophil % 0.4 %      Immature Grans % 3.0 %      Neutrophils, Absolute 4.81 10*3/mm3      Lymphocytes, Absolute 1.34 10*3/mm3      Monocytes, Absolute 0.53 10*3/mm3      Eosinophils, Absolute 0.45 10*3/mm3      Basophils, Absolute 0.03 10*3/mm3      Immature Grans, Absolute 0.22 10*3/mm3      nRBC 0.0 /100 WBC     POC Glucose Once [[id number]]  (Abnormal) Collected: 06/09/22 2039    Specimen: Blood Updated: 06/09/22 2041     Glucose 180 mg/dL      Comment: Meter: [id number] : [id number] Meme Aaron OCTAVIO       POC Glucose Once [[id number]]  (Abnormal) Collected: 06/09/22 1632    Specimen: Blood Updated: 06/09/22 1634     Glucose 169 mg/dL      Comment: Meter: [id number] : [id number] Caitline Ortiz NA       POC Glucose Once [[id number]]  (Abnormal) Collected: 06/09/22 1116    Specimen: Blood Updated: 06/09/22 1120     Glucose 175 mg/dL      Comment: Meter: [id number] : [id number] Aabye Ortiz NA       Renal Function Panel [[id number]]  (Abnormal) Collected: 06/09/22 0634    Specimen: Blood Updated: 06/09/22 0705     Glucose 131 mg/dL      BUN 11 mg/dL      Creatinine 0.69 mg/dL      Sodium 129 mmol/L      Potassium 4.1 mmol/L      Chloride 100 mmol/L      CO2 21.4 mmol/L      Calcium 7.8 mg/dL      Albumin 1.40 g/dL      Phosphorus 3.0 mg/dL      Anion Gap 7.6 mmol/L      BUN/Creatinine Ratio 15.9     eGFR 94.1 mL/min/1.73      Comment: National Kidney Foundation and American Society of Nephrology (ASN) Task Force recommended calculation based on the Chronic Kidney Disease Epidemiology Collaboration  (CKD-EPI) equation refit without adjustment for race.       Narrative:      GFR Normal >60  Chronic Kidney Disease <60  Kidney Failure <15      CBC & Differential [[id number]]  (Abnormal) Collected: 06/09/22 0634    Specimen: Blood Updated: 06/09/22 0652    Narrative:      The following orders were created for panel order CBC & Differential.  Procedure                               Abnormality         Status                     ---------                               -----------         ------                     CBC Auto Differential[[id number]]        Abnormal            Final result                 Please view results for these tests on the individual orders.    CBC Auto Differential [[id number]]  (Abnormal) Collected: 06/09/22 0634    Specimen: Blood Updated: 06/09/22 0652     WBC 9.94 10*3/mm3      RBC 3.10 10*6/mm3      Hemoglobin 8.4 g/dL      Hematocrit 26.4 %      MCV 85.2 fL      MCH 27.1 pg      MCHC 31.8 g/dL      RDW 15.7 %      RDW-SD 48.4 fl      MPV 9.1 fL      Platelets 175 10*3/mm3      Neutrophil % 71.3 %      Lymphocyte % 13.7 %      Monocyte % 6.7 %      Eosinophil % 4.5 %      Basophil % 0.3 %      Immature Grans % 3.5 %      Neutrophils, Absolute 7.08 10*3/mm3      Lymphocytes, Absolute 1.36 10*3/mm3      Monocytes, Absolute 0.67 10*3/mm3      Eosinophils, Absolute 0.45 10*3/mm3      Basophils, Absolute 0.03 10*3/mm3      Immature Grans, Absolute 0.35 10*3/mm3      nRBC 0.0 /100 WBC     POC Glucose Once [[id number]]  (Abnormal) Collected: 06/09/22 0610    Specimen: Blood Updated: 06/09/22 0612     Glucose 132 mg/dL      Comment: Meter: [id number] : [id number] Scot [place]       POC Glucose Once [[id number]]  (Abnormal) Collected: 06/08/22 2216    Specimen: Blood Updated: 06/08/22 2217     Glucose 186 mg/dL      Comment: Meter: [id number] : [id number] Scot [place]       COVID PRE-OP / PRE-PROCEDURE SCREENING ORDER (NO ISOLATION) - Swab, Nasopharynx [[id number]]  (Normal) Collected:  06/08/22 1743    Specimen: Swab from Nasopharynx Updated: 06/08/22 2203    Narrative:      The following orders were created for panel order COVID PRE-OP / PRE-PROCEDURE SCREENING ORDER (NO ISOLATION) - Swab, Nasopharynx.  Procedure                               Abnormality         Status                     ---------                               -----------         ------                     COVID-19,APTIMA PANTHER(...[[id number]]  Normal              Final result                 Please view results for these tests on the individual orders.    COVID-19,APTIMA PANTHER(LORA),[facility], NP/OP SWAB IN UTM/VTM/SALINE TRANSPORT MEDIA,24 HR TAT - Swab, Nasopharynx [[id number]]  (Normal) Collected: 06/08/22 1743    Specimen: Swab from Nasopharynx Updated: 06/08/22 2203     COVID19 Not Detected    Narrative:      Fact sheet for providers: https://www.fda.gov/media/758998/download     Fact sheet for patients: https://www.fda.gov/media/740083/download    Test performed by RT PCR.    POC Glucose Once [[id number]]  (Abnormal) Collected: 06/08/22 1606    Specimen: Blood Updated: 06/08/22 1607     Glucose 160 mg/dL      Comment: Meter: [id number] : [id number] Ivana West CNA       POC Glucose Once [[id number]]  (Abnormal) Collected: 06/08/22 1157    Specimen: Blood Updated: 06/08/22 1159     Glucose 192 mg/dL      Comment: Meter: [id number] : [id number] Carlos [place]       POC Glucose Once [[id number]]  (Abnormal) Collected: 06/08/22 1127    Specimen: Blood Updated: 06/08/22 1134     Glucose 195 mg/dL      Comment: Meter: [id number] : [id number] Jorge [place]       POC Glucose Once [[id number]]  (Normal) Collected: 06/08/22 0622    Specimen: Blood Updated: 06/08/22 0623     Glucose 114 mg/dL      Comment: Meter: [id number] : [id number] Reinaldo [place]       Renal Function Panel [[id number]]  (Abnormal) Collected: 06/08/22 0409    Specimen: Blood from Arm, Left Updated: 06/08/22 0449     Glucose 127 mg/dL       BUN 16 mg/dL      Creatinine 0.75 mg/dL      Sodium 127 mmol/L      Potassium 4.2 mmol/L      Chloride 99 mmol/L      CO2 23.0 mmol/L      Calcium 7.5 mg/dL      Albumin 1.60 g/dL      Phosphorus 2.6 mg/dL      Anion Gap 5.0 mmol/L      BUN/Creatinine Ratio 21.3     eGFR 86.3 mL/min/1.73      Comment: National Kidney Foundation and American Society of Nephrology (ASN) Task Force recommended calculation based on the Chronic Kidney Disease Epidemiology Collaboration (CKD-EPI) equation refit without adjustment for race.       Narrative:      GFR Normal >60  Chronic Kidney Disease <60  Kidney Failure <15      CBC & Differential [[id number]]  (Abnormal) Collected: 06/08/22 0409    Specimen: Blood from Arm, Left Updated: 06/08/22 0438    Narrative:      The following orders were created for panel order CBC & Differential.  Procedure                               Abnormality         Status                     ---------                               -----------         ------                     CBC Auto Differential[[id number]]        Abnormal            Final result                 Please view results for these tests on the individual orders.    CBC Auto Differential [[id number]]  (Abnormal) Collected: 06/08/22 0409    Specimen: Blood from Arm, Left Updated: 06/08/22 0438     WBC 14.03 10*3/mm3      RBC 3.17 10*6/mm3      Hemoglobin 8.6 g/dL      Hematocrit 26.0 %      MCV 82.0 fL      MCH 27.1 pg      MCHC 33.1 g/dL      RDW 15.6 %      RDW-SD 46.6 fl      MPV 9.4 fL      Platelets 176 10*3/mm3      Neutrophil % 76.0 %      Lymphocyte % 11.8 %      Monocyte % 7.1 %      Eosinophil % 2.5 %      Basophil % 0.2 %      Immature Grans % 2.4 %      Neutrophils, Absolute 10.66 10*3/mm3      Lymphocytes, Absolute 1.66 10*3/mm3      Monocytes, Absolute 0.99 10*3/mm3      Eosinophils, Absolute 0.35 10*3/mm3      Basophils, Absolute 0.03 10*3/mm3      Immature Grans, Absolute 0.34 10*3/mm3      nRBC 0.0 /100 WBC     POC Glucose  Once [[id number]]  (Abnormal) Collected: 06/07/22 2025    Specimen: Blood Updated: 06/07/22 2026     Glucose 179 mg/dL      Comment: Meter: [id number] : [id number] Reinaldo C[place]       POC Glucose Once [[id number]]  (Abnormal) Collected: 06/07/22 1624    Specimen: Blood Updated: 06/07/22 1626     Glucose 191 mg/dL      Comment: Meter: [id number] : [id number] Sukhi Tellez CNA             Imaging Results (Last 72 Hours)     Procedure Component Value Units Date/Time    XR Abdomen KUB [[id number]] Collected: 06/07/22 1632     Updated: 06/07/22 1636    Narrative:      ABDOMEN KUB     CLINICAL HISTORY: Upper abdominal pain.     2 underpenetrated views of abdomen were obtained. The bowel gas pattern  is unremarkable. There is no evidence of obstruction. No pathologic  intra-abdominal calcifications are identified. Surgical clips are noted  in the right upper quadrant. There is very extensive bony destruction  involving the proximal right femur. 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     This report was finalized on 6/7/2022 4:33 PM by Dr. Jevon Sanchez M.D.           ECG/EMG Results (last 72 hours)     Procedure Component Value Units Date/Time    SCANNED - TELEMETRY   [[id number]] Resulted: 06/01/22     Updated: 06/08/22 1010    SCANNED - TELEMETRY   [[id number]] Resulted: 06/01/22     Updated: 06/09/22 0904    SCANNED - TELEMETRY   [[id number]] Resulted: 06/01/22     Updated: 06/09/22 0904    SCANNED - TELEMETRY   [[id number]] Resulted: 06/01/22     Updated: 06/09/22 0904    SCANNED - TELEMETRY   [[id number]] Resulted: 06/01/22     Updated: 06/09/22 1839    SCANNED - TELEMETRY   [[id number]] Resulted: 06/01/22     Updated: 06/09/22 1840          Orders (last 72 hrs)      Start     Ordered    06/11/22 0000  lactobacillus acidophilus (RISAQUAD) capsule capsule  Daily         06/10/22 0829    06/10/22 0600  pantoprazole (PROTONIX) EC tablet 40 mg  Every Early Morning,   Status:  Discontinued         06/09/22  0935    06/10/22 0000  DAPTOmycin 750 mg in sodium chloride 0.9 % 50 mL  Every 24 Hours         06/10/22 0829    06/10/22 0000  gabapentin (NEURONTIN) 600 MG tablet  2 Times Daily         06/10/22 0829    06/10/22 0000  insulin detemir (LEVEMIR) 100 UNIT/ML injection  Nightly         06/10/22 0829    06/10/22 0000  insulin aspart (NovoLOG FlexPen) 100 UNIT/ML solution pen-injector sc pen  3 Times Daily With Meals         06/10/22 0829    06/10/22 0000  oxyCODONE-acetaminophen (PERCOCET) 5-325 MG per tablet  Every 4 Hours PRN         06/10/22 0829    06/10/22 0000  piperacillin-tazobactam (ZOSYN) 3-0.375 GM/50ML IVPB  Every 8 Hours         06/10/22 0829    06/10/22 0000  Activity as Tolerated         06/10/22 0829    06/10/22 0000  Diet: Regular, Consistent Carbohydrate         06/10/22 0829    06/10/22 0000  Discharge Follow-up with PCP         06/10/22 0829    06/10/22 0000  Discharge Follow-up with Specified Provider: Dr Schaeffer as directed         06/10/22 0829    06/09/22 2100  DAPTOmycin (CUBICIN) 750 mg in sodium chloride 0.9 % 50 mL IVPB  Every 24 Hours,   Status:  Discontinued         06/09/22 0742    06/09/22 0820  sodium chloride 0.9 % infusion  Continuous PRN,   Status:  Discontinued         06/09/22 0820    06/05/22 2100  insulin glargine (LANTUS, SEMGLEE) injection 30 Units  Nightly,   Status:  Discontinued         06/05/22 1051    06/05/22 1104  Assess Patient For Urinary Retention  As Needed,   Status:  Canceled      Comments: Signs / Symptoms Urinary Retention:  - Bladder Palpable  - Suprapubic / Bladder Discomfort or Pain  - Urge to Void, But Unable  - Reports Unable to Void After 8 Hours    06/05/22 1104    06/05/22 1104  Utilize Voiding Measures if Retention is Suspected  As Needed,   Status:  Canceled      Comments: Voiding Measures:  - Privacy  - Relaxed Environment  - Suprapubic Massage  - Suprapubic Warm Compress  - Trigger Techniques  - Stand to Void   - Bedside Commode    06/05/22 1104     06/05/22 1104  Bladder Scan for Suspected Urinary Retention  As Needed,   Status:  Canceled       06/05/22 1104    06/05/22 1104  Monitor Every 1-2 Hours for Spontaneous Void if Bladder Scan Volume is Less Than 500mL & Patient is Without Symptoms of Bladder Discomfort / Distention  As Needed,   Status:  Canceled       06/05/22 1104    06/05/22 1104  Straight Cath For Acute Retention if Bladder Scan Volume is Greater Than 500mL or Patient Has Symptoms of Bladder Discomfort / Distention (Unless Contraindicated)  As Needed,   Status:  Canceled       06/05/22 1104    06/04/22 1416  ondansetron (ZOFRAN) injection 4 mg  Every 4 Hours PRN,   Status:  Discontinued         06/04/22 1416    06/04/22 0858  Oxygen Therapy- Nasal Cannula; Titrate for SPO2: per policy  Continuous PRN,   Status:  Canceled       06/04/22 0858    06/04/22 0721  Oxygen Therapy- Nasal Cannula; Titrate for SPO2: Per Policy  Continuous PRN,   Status:  Canceled       06/04/22 0722    06/03/22 1230  lactobacillus acidophilus (RISAQUAD) capsule 1 capsule  Daily,   Status:  Discontinued         06/03/22 1053    06/03/22 1230  miconazole (MICOTIN) 2 % powder  Every 12 Hours Scheduled,   Status:  Discontinued         06/03/22 1053    06/03/22 1200  insulin lispro (ADMELOG) injection 3 Units  3 Times Daily With Meals,   Status:  Discontinued         06/03/22 0913    06/03/22 0700  isosorbide mononitrate (IMDUR) 24 hr tablet 30 mg  Every Morning,   Status:  Discontinued        Note to Pharmacy: Hold systolic less than 110    06/02/22 0829    06/02/22 2000  piperacillin-tazobactam (ZOSYN) 3.375 g in iso-osmotic dextrose 50 ml (premix)  Every 8 Hours,   Status:  Discontinued         06/02/22 1307    06/02/22 1800  Dietary Nutrition Supplements Claude  Daily With Breakfast & Dinner,   Status:  Canceled       06/02/22 1514    06/02/22 0900  sodium chloride 0.9 % flush 10 mL  Every 12 Hours Scheduled,   Status:  Discontinued         06/02/22 0213    06/02/22 0900   "gabapentin (NEURONTIN) capsule 600 mg  Every 12 Hours Scheduled,   Status:  Discontinued         06/02/22 0227 06/02/22 0900  cetirizine (zyrTEC) tablet 10 mg  Daily,   Status:  Discontinued         06/02/22 0227 06/02/22 0900  metoprolol succinate XL (TOPROL-XL) 24 hr tablet 25 mg  Daily,   Status:  Discontinued         06/02/22 0227 06/02/22 0900  ferrous sulfate tablet 325 mg  Every Other Day,   Status:  Discontinued         06/02/22 0726 06/02/22 0700  POC Glucose TID AC  3 Times Daily Before Meals,   Status:  Canceled       06/02/22 0256 06/02/22 0400  Vital Signs  Every 4 Hours,   Status:  Canceled       06/02/22 0028    06/02/22 0255  glucagon (human recombinant) (GLUCAGEN DIAGNOSTIC) injection 1 mg  Every 15 Minutes PRN,   Status:  Discontinued         06/02/22 0256 06/02/22 0255  dextrose (GLUTOSE) oral gel 15 g  Every 15 Minutes PRN,   Status:  Discontinued         06/02/22 0256 06/02/22 0255  dextrose (D50W) (25 g/50 mL) IV injection 25 g  Every 15 Minutes PRN,   Status:  Discontinued         06/02/22 0256 06/02/22 0226  oxyCODONE-acetaminophen (PERCOCET) 5-325 MG per tablet 1 tablet  Every 4 Hours PRN,   Status:  Discontinued         06/02/22 0227 06/02/22 0224  Diclofenac Sodium (VOLTAREN) 1 % gel 4 g  4 Times Daily PRN,   Status:  Discontinued         06/02/22 0227 06/02/22 0209  sodium chloride 0.9 % flush 10 mL  As Needed,   Status:  Discontinued         06/02/22 0213 06/02/22 0209  sodium chloride 0.9 % flush 20 mL  As Needed,   Status:  Discontinued         06/02/22 0213 06/02/22 0209  Change Dressing to IV Site As Needed When Damp, Loose or Soiled  As Needed,   Status:  Canceled       06/02/22 0213 06/02/22 0026  acetaminophen (TYLENOL) tablet 650 mg  Every 4 Hours PRN,   Status:  Discontinued        \"Or\" Linked Group Details    06/02/22 0028    06/02/22 0026  Up With Assistance  As Needed,   Status:  Canceled       06/02/22 0028    06/02/22 0025  Magnesium  " As Needed,   Status:  Canceled      Comments: For Ventricular Arrhythmias      06/02/22 0028    06/02/22 0025  Troponin  As Needed,   Status:  Canceled      Comments: For Chest Pain      06/02/22 0028    06/02/22 0025  Blood Gas, Arterial -  As Needed,   Status:  Canceled      Comments: Draw for Acute Dyspnea, Cyanosis, Suspected Hypoxemia or UnresponsivenessNotify Provider of Results      06/02/22 0028 06/02/22 0025  ACLS Protocol For Life Threatening Dysrhythmias (Unless Code Status Indicates Otherwise)  As Needed,   Status:  Canceled       06/02/22 0028    06/02/22 0025  Oxygen Therapy- Nasal Cannula; Titrate for SPO2: 90% - 95%  Continuous PRN,   Status:  Canceled      Comments: If Patient Develops Unresponsiveness, Acute Dyspnea, Cyanosis or Suspected Hypoxemia Start Continuous Pulse Ox Monitoring, Apply Oxygen & Notify Provider    06/02/22 0028 06/02/22 0025  nitroglycerin (NITROSTAT) SL tablet 0.4 mg  Every 5 Minutes PRN,   Status:  Discontinued         06/02/22 0028    06/02/22 0025  ECG 12 Lead  As Needed,   Status:  Canceled      Comments: Nurse to Release if Patient Expericences Acute Chest Pain or Dysrhythmias    06/02/22 0028    06/02/22 0025  Potassium  As Needed,   Status:  Canceled      Comments: For Ventricular Arrhythmias      06/02/22 0028    06/02/22 0024  Intake & Output  Every Shift,   Status:  Canceled       06/02/22 0028    --  SCANNED - TELEMETRY           06/01/22 0000    --  SCANNED - TELEMETRY           06/01/22 0000    --  SCANNED - TELEMETRY           06/01/22 0000    --  SCANNED - TELEMETRY           06/01/22 0000    --  SCANNED - TELEMETRY           06/01/22 0000    --  SCANNED - TELEMETRY           06/01/22 0000    --  SCANNED - TELEMETRY           06/01/22 0000    --  SCANNED - TELEMETRY           06/01/22 0000    --  UPPER GI ENDOSCOPY         06/09/22 0824    --  SCANNED - TELEMETRY           06/01/22 0000    --  SCANNED - TELEMETRY           06/01/22 0000    --  SCANNED -  TELEMETRY           06/01/22 0000    --  SCANNED - TELEMETRY           06/01/22 0000    --  SCANNED - TELEMETRY           06/01/22 0000                   Operative/Procedure Notes (most recent note)      Procedures signed by Zechariah Nj MD at 06/09/22 0834       Procedure Orders    1. UPPER GI ENDOSCOPY [[id number]] ordered by Zechariah Nj MD at 06/09/22 0824          Scan on 6/9/2022 0824 by Zechariah Nj MD: EGD          Electronically signed by Zechariah Nj MD at 06/09/22 0834          Physician Progress Notes       Zechariah Nj MD at 06/10/22 1418          [facility]  Inpatient Progress Note    Reason for Follow Up: Upper abdominal pain    Subjective     Interval History:   EGD was normal yesterday, upper abdominal pain is resolved    Review of Systems:    There is weakness and fatigue all other systems reviewed and negative    Objective     Vital Signs  Temp:  [97.2 °F (36.2 °C)-98.6 °F (37 °C)] 97.2 °F (36.2 °C)  Heart Rate:  [63-83] 72  Resp:  [16-18] 18  BP: (108-133)/(56-72) 119/61  Body mass index is 36.73 kg/m².    Intake/Output Summary (Last 24 hours) at 6/10/2022 1418  Last data filed at 6/10/2022 1146  Gross per 24 hour   Intake 1080 ml   Output 1725 ml   Net -645 ml     I/O this shift:  In: 600 [P.O.:600]  Out: 725 [Urine:325]     Physical Exam:   General: patient awake, alert and cooperative   Eyes: Normal lids and lashes, no scleral icterus   Neck: supple, normal ROM   Skin: warm and dry, not jaundiced   Cardiovascular: regular rhythm and rate, no murmurs auscultated   Pulm: clear to auscultation bilaterally, regular and unlabored   Abdomen: soft, nontender, nondistended; normal bowel sounds   Extremities: no rash or edema   Psychiatric: Normal mood and behavior; memory intact     Results Review:     I reviewed the patient's new clinical results.        Assessment & Plan     Patient Active Problem List   Diagnosis   • Status post total replacement of hip    • Type 2 diabetes mellitus with diabetic polyneuropathy, with long-term current use of insulin ([facility])   • Hypertension   • Normocytic anemia   • Postoperative infection   • Iron deficiency anemia   • Obesity (BMI 30-39.9)   • Presence of stent in coronary artery in patient with coronary artery disease   • Chronic diastolic CHF (congestive heart failure) ([facility])   • Septic arthritis of hip ([facility])   • Drainage from wound   • Candidal intertrigo   • Postoperative anemia due to acute blood loss   • S/P Girdlestone procedure   • Enterococcus faecalis infection   • Pyogenic arthritis, pelvic region and thigh ([facility])   • Osteomyelitis of hip ([facility])   • Hyponatremia   • Metabolic acidosis   • Sepsis without acute organ dysfunction ([facility])   • Pathological fracture of right femur ([facility])   • Epigastric abdominal pain       Assessment:  1. Septic arthritis of the right hip s/p mulitple debridements and wound vac placement  2. Acute on chronic anemia  3. Epigastric abdominal pain  4. Type 2 diabetes mellitus  5. Essential hypertension  6. History of VRE infection  7. Coronary artery disease status post stent placement x2  8. Obesity with a BMI of 39     Plan:  · Continue twice daily PPI and Carafate.  · EGD was normal    Pain has resolved  GI okay with discharge    I discussed the patients findings and my recommendations with patient and nursing staff.    Zechariah Nj MD                Electronically signed by Zechariah Nj MD at 06/10/22 1419     Robert Ponce MD at 06/10/22 1251            Infectious Diseases Progress Note        [facility]  Los: 9 days  Patient Identification:  Name: Stephy Duncan  Age: 67 y.o.  Sex: female  :  1955  MRN: [id number]         Primary Care Physician: Gregorio Rosales            Subjective: Just came back from endoscopy.  Denies any fever and chills.  Feeling overall better.  Interval History: See consultation note.  On 2022 patient underwent:Irrigation and  "Debridement of right hip joint with excision of proximal femur  Objective:    Scheduled Meds:cetirizine, 10 mg, Oral, Daily  DAPTOmycin (CUBICIN)  IV, 10 mg/kg (Adjusted), Intravenous, Q24H  ferrous sulfate, 325 mg, Oral, Every Other Day  gabapentin, 600 mg, Oral, Q12H  insulin glargine, 30 Units, Subcutaneous, Nightly  insulin lispro, 3 Units, Subcutaneous, TID With Meals  isosorbide mononitrate, 30 mg, Oral, QAM  lactobacillus acidophilus, 1 capsule, Oral, Daily  metoprolol succinate XL, 25 mg, Oral, Daily  miconazole, , Topical, Q12H  pantoprazole, 40 mg, Oral, Q AM  piperacillin-tazobactam, 3.375 g, Intravenous, Q8H  sodium chloride, 10 mL, Intravenous, Q12H      Continuous Infusions:sodium chloride, 30 mL/hr, Last Rate: Stopped (06/09/22 0843)        Vital signs in last 24 hours:  Temp:  [98 °F (36.7 °C)-98.6 °F (37 °C)] 98 °F (36.7 °C)  Heart Rate:  [63-83] 71  Resp:  [16-18] 18  BP: (108-133)/(56-72) 133/72    Intake/Output:    Intake/Output Summary (Last 24 hours) at 6/10/2022 1251  Last data filed at 6/10/2022 1146  Gross per 24 hour   Intake 1080 ml   Output 1725 ml   Net -645 ml       Exam:  /72 (BP Location: Right arm, Patient Position: Sitting)   Pulse 71   Temp 98 °F (36.7 °C) (Oral)   Resp 18   Ht 162.6 cm (64\")   Wt 97.1 kg (214 lb)   SpO2 100%   BMI 36.73 kg/m²   Patient is examined using the personal protective equipment as per guidelines from infection control for this particular patient as enacted.  Hand washing was performed before and after patient interaction.  General Appearance:  Awake interactive does not appear toxic                          Head:    Normocephalic, without obvious abnormality, atraumatic                           Eyes:    PERRL, conjunctivae/corneas clear, EOM's intact, both eyes                         Throat:   Lips, tongue, gums normal; oral mucosa pink and moist                           Neck:   Supple, symmetrical, trachea midline, no JVD                  "        Lungs:    Clear to auscultation bilaterally, respirations unlabored                 Chest Wall:    No tenderness or deformity                          Heart:  S1-S2 regular                  Abdomen:   Soft nontender                 Extremities: Right hip surgical site dressed                            Pulses:   Pulses palpable in all extremities                            Skin: Chronic ecchymotic changes noted                  Neurologic: Grossly nonfocal       Data Review:    I reviewed the patient's new clinical results.      Assessment:    Osteomyelitis of hip (HCC)    Type 2 diabetes mellitus with diabetic polyneuropathy, with long-term current use of insulin ([facility])    Hypertension    Iron deficiency anemia    Obesity (BMI 30-39.9)    Chronic diastolic CHF (congestive heart failure) ([facility])    Hyponatremia    Metabolic acidosis    Sepsis without acute organ dysfunction (HCC)    Pathological fracture of right femur ([facility])    Epigastric abdominal pain  1-acute on chronic osteomyelitis of the right proximal femur in a similar area in a patient who had previous recurrent right hip prosthetic and multiple surgeries with a latest procedure performed on 4/25/2022 and currently likely has mixed infection given the foul smell drainage and 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  2-pancytopenia  3-morbid obesity  4-type 2 diabetes  5-hyponatremia  6-other diagnosis per primary team     Recommendations/Discussions:  · Continue Zosyn and daptomycin   · Monitor closely for complications of antibiotics  · Since there are no operative culture results empiric antibiotic treatment based on the prior culture results and since she declined while on daptomycin consideration for mixed infection is needed to construct antibiotic regimen.  Based on this  concept Zosyn and daptomycin need to be continued for at least couple weeks from 2022 and may be extended to 6 weeks if orthopedic surgery service thinks that there is residual osteomyelitis.  · Based on the operative findings it appears that the acetabular involvement is also noted though it is curetted.  Because of this fact I would recommend extending antibiotic treatment to be 6 weeks.  · Obviously patient need to be monitored closely for side effects of antibiotics.  · Above concept of care discussed in great detail with patient and patient's  at the bedside.  Robert Ponce MD  6/10/2022  12:51 EDT          Electronically signed by Robert Ponce MD at 06/10/22 1939     Abhijit Cantu MD at 22 0346            [facility]    [phone]   LOS: 8 days     Name: Stephy Duncan  Age/Sex: 67 y.o. female  :  1955        PCP: Gregorio Rosales  Chief Complaint   Patient presents with   • Wound Infection      Subjective   She tolerated endoscopy this morning without concerning findings.  Still complaining of some abdominal bloating and fullness but work-up has been negative.  Denies any new symptoms or complaints denies bleeding today  General: No Fever or Chills, Cardiac: No Chest Pain or Palpitations, Resp: No Cough or SOA, GI: No Nausea, Vomiting, or Diarrhea and Other: No bleeding    cetirizine, 10 mg, Oral, Daily  DAPTOmycin (CUBICIN)  IV, 10 mg/kg (Adjusted), Intravenous, Q24H  ferrous sulfate, 325 mg, Oral, Every Other Day  gabapentin, 600 mg, Oral, Q12H  insulin glargine, 30 Units, Subcutaneous, Nightly  insulin lispro, 3 Units, Subcutaneous, TID With Meals  isosorbide mononitrate, 30 mg, Oral, QAM  lactobacillus acidophilus, 1 capsule, Oral, Daily  metoprolol succinate XL, 25 mg, Oral, Daily  miconazole, , Topical, Q12H  [START ON 6/10/2022] pantoprazole, 40 mg, Oral, Q AM  piperacillin-tazobactam, 3.375 g, Intravenous, Q8H  sodium chloride, 10 mL, Intravenous,  Q12H      lactated ringers, 9 mL/hr, Last Rate: 9 mL/hr (06/04/22 1137)  sodium chloride, 30 mL/hr, Last Rate: Stopped (06/09/22 0843)        Objective   Vital Signs  Temp:  [98 °F (36.7 °C)-98.2 °F (36.8 °C)] 98.2 °F (36.8 °C)  Heart Rate:  [65-81] 81  Resp:  [12-18] 16  BP: ()/(51-64) 129/64  Body mass index is 36.73 kg/m².    Intake/Output Summary (Last 24 hours) at 6/9/2022 1152  Last data filed at 6/9/2022 1120  Gross per 24 hour   Intake 2800 ml   Output 950 ml   Net 1850 ml       Physical Exam  Vitals and nursing note reviewed.   Constitutional:       Appearance: Normal appearance.   Cardiovascular:      Rate and Rhythm: Normal rate and regular rhythm.   Pulmonary:      Effort: No respiratory distress.      Breath sounds: Normal breath sounds.   Abdominal:      General: Bowel sounds are normal.      Palpations: Abdomen is soft.   Neurological:      General: No focal deficit present.      Mental Status: She is alert and oriented to person, place, and time.           Results Review:       I reviewed the patient's new clinical results.      Assessment & Plan   Active Hospital Problems    Diagnosis  POA   • **Osteomyelitis of hip (HCC) [M86.9]  Yes   • Hyponatremia [E87.1]  Yes   • Metabolic acidosis [E87.2]  Yes   • Sepsis without acute organ dysfunction ([facility]) [A41.9]  Yes   • Pathological fracture of right femur ([facility]) [M84.451A]  Yes   • Epigastric abdominal pain [R10.13]  Unknown   • Iron deficiency anemia [D50.9]  Yes   • Chronic diastolic CHF (congestive heart failure) ([facility]) [I50.32]  Yes   • Obesity (BMI 30-39.9) [E66.9]  Yes   • Type 2 diabetes mellitus with diabetic polyneuropathy, with long-term current use of insulin ([facility]) [E11.42, Z79.4]  Not Applicable   • Hypertension [I10]  Yes      Resolved Hospital Problems   No resolved problems to display.       PLAN  This is a 67-year-old female who presented to the hospital with hip pain and is found to have recurrent osteomyelitis of her hip  -Discussed  with infectious disease continue antibiotics for up to 2 weeks following her last surgical intervention if felt to have all infectious material removed  -Given normal endoscopy today I think we can decrease her PPI to once daily and can probably get rid of the Carafate.  Continue supportive care for this abdominal discomfort appreciate GIs assistance  -Hemoglobin is stable over the last 24 hours.  The output from her wound VAC has been less dark.  No evidence of GI bleeding.  I think at this point a likely management will be to follow H&H is in the outpatient setting and plan for periodic blood transfusions to keep up with losses.  Discussed with orthopedic surgery yesterday she is not a candidate for additional surgical washout and there is nothing he can really do from his perspective to slow the bleeding from her hip.  The best bet is to continue the wound VAC in hopes that this stops on its own.  -Sodium levels remain low but at or near her baseline.  -Blood sugars acceptable right now      Disposition  Plan discharge to skilled nursing facility tomorrow      Abhijit Cantu MD  [facility]  06/09/22  11:52 EDT            Electronically signed by Abhijit Cantu MD at 06/09/22 1254     Krari Schaeffer II, MD at 06/09/22 0703        It does appear on her ins and outs that her output from the wound VAC may be slightly decreasing.  Her canister was apparently changed yesterday and there is not a whole lot in it this morning.  Unfortunately, there is really not much else to do other than give this some more time.  Another washout will just restart the healing process and I do not know that there is a whole lot more I can do from a surgical perspective.  She does not have any remaining bone or hardware to remove.     Electronically signed by Karri Schaeffer II, MD at 06/09/22 0704     Fernando Hart MD at 06/09/22 0604            Infectious Diseases Progress Note    Fernando  "MD Tanner     [facility]  Los: 8 days  Patient Identification:  Name: Stephy Duncan  Age: 67 y.o.  Sex: female  :  1955  MRN: [id number]         Primary Care Physician: Gregorio Rosales            Subjective: Just came back from endoscopy.  Denies any fever and chills.  Feeling overall better.  Interval History: See consultation note.  On 2022 patient underwent:Irrigation and Debridement of right hip joint with excision of proximal femur  Objective:    Scheduled Meds:cetirizine, 10 mg, Oral, Daily  ferrous sulfate, 325 mg, Oral, Every Other Day  gabapentin, 600 mg, Oral, Q12H  insulin glargine, 30 Units, Subcutaneous, Nightly  insulin lispro, 3 Units, Subcutaneous, TID With Meals  isosorbide mononitrate, 30 mg, Oral, QAM  lactobacillus acidophilus, 1 capsule, Oral, Daily  metoprolol succinate XL, 25 mg, Oral, Daily  miconazole, , Topical, Q12H  pantoprazole, 40 mg, Oral, BID AC  piperacillin-tazobactam, 3.375 g, Intravenous, Q8H  sodium chloride, 10 mL, Intravenous, Q12H  sucralfate, 1 g, Oral, 4x Daily AC & at Bedtime      Continuous Infusions:lactated ringers, 9 mL/hr, Last Rate: 9 mL/hr (22 1137)        Vital signs in last 24 hours:  Temp:  [98 °F (36.7 °C)-98.7 °F (37.1 °C)] 98 °F (36.7 °C)  Heart Rate:  [65-88] 80  Resp:  [16-18] 16  BP: (102-124)/(60-64) 121/64    Intake/Output:    Intake/Output Summary (Last 24 hours) at 2022 0604  Last data filed at 2022  Gross per 24 hour   Intake 2700 ml   Output 800 ml   Net 1900 ml       Exam:  /64 (BP Location: Right arm, Patient Position: Lying)   Pulse 80   Temp 98 °F (36.7 °C) (Oral)   Resp 16   Ht 162.6 cm (64.02\")   Wt 103 kg (227 lb 8.2 oz)   SpO2 96%   BMI 39.03 kg/m²   Patient is examined using the personal protective equipment as per guidelines from infection control for this particular patient as enacted.  Hand washing was performed before and after patient interaction.  General Appearance:  Awake " interactive does not appear toxic                          Head:    Normocephalic, without obvious abnormality, atraumatic                           Eyes:    PERRL, conjunctivae/corneas clear, EOM's intact, both eyes                         Throat:   Lips, tongue, gums normal; oral mucosa pink and moist                           Neck:   Supple, symmetrical, trachea midline, no JVD                         Lungs:    Clear to auscultation bilaterally, respirations unlabored                 Chest Wall:    No tenderness or deformity                          Heart:  S1-S2 regular                  Abdomen:   Soft nontender                 Extremities: Right hip surgical site dressed                            Pulses:   Pulses palpable in all extremities                            Skin: Chronic ecchymotic changes noted                  Neurologic: Grossly nonfocal       Data Review:    I reviewed the patient's new clinical results.    Assessment:    Osteomyelitis of hip (HCC)    Type 2 diabetes mellitus with diabetic polyneuropathy, with long-term current use of insulin ([facility])    Hypertension    Iron deficiency anemia    Obesity (BMI 30-39.9)    Chronic diastolic CHF (congestive heart failure) ([facility])    Hyponatremia    Metabolic acidosis    Sepsis without acute organ dysfunction ([facility])    Pathological fracture of right femur ([facility])    Epigastric abdominal pain  1-acute on chronic osteomyelitis of the right proximal femur in a similar area in a patient who had previous recurrent right hip prosthetic and multiple surgeries with a latest procedure performed on 4/25/2022 and currently likely has mixed infection given the foul smell drainage and 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  2-pancytopenia  3-morbid obesity  4-type 2  diabetes  5-hyponatremia  6-other diagnosis per primary team     Recommendations/Discussions:  · Continue Zosyn and daptomycin   · Monitor closely for complications of antibiotics  · Since there are no operative culture results empiric antibiotic treatment based on the prior culture results and since she declined while on daptomycin consideration for mixed infection is needed to construct antibiotic regimen.  Based on this concept Zosyn and daptomycin need to be continued for at least couple weeks from 2022 and may be extended to 6 weeks if orthopedic surgery service thinks that there is residual osteomyelitis.  · Based on the operative findings it appears that the acetabular involvement is also noted though it is curetted.  Because of this fact I would recommend extending antibiotic treatment to be 6 weeks.  · Obviously patient need to be monitored closely for side effects of antibiotics.  · Above concept of care discussed in great detail with patient and patient's  at the bedside.  Fernando Hart MD  2022  06:04 EDT    Much of this encounter note is an electronic transcription/translation of spoken language to printed text. The electronic translation of spoken language may permit erroneous, or at times, nonsensical words or phrases to be inadvertently transcribed; Although I have reviewed the note for such errors, some may still exist      Electronically signed by Fernando Hart MD at 22 5332     Abhijit Cantu MD at 22 4289            Dedicated to Hospital Care    [phone]   LOS: 7 days     Name: Stephy Duncan  Age/Sex: 67 y.o. female  :  1955        PCP: Gregorio Rosales  Chief Complaint   Patient presents with   • Wound Infection      Subjective   Still complaining of upper abdominal pain.  She is describes this differently each day.  Somewhat difficult to discern what the etiology might be.  Denies nausea or vomiting and is tolerating a diet.  Does say the pain is a  little better compared to when it started 3 days ago.  General: No Fever or Chills, Cardiac: No Chest Pain or Palpitations, Resp: No Cough or SOA, GI: No Nausea, Vomiting, or Diarrhea and Other: No bleeding    cetirizine, 10 mg, Oral, Daily  DAPTOmycin (CUBICIN)  IV, 10 mg/kg (Adjusted), Intravenous, Q24H  ferrous sulfate, 325 mg, Oral, Every Other Day  gabapentin, 600 mg, Oral, Q12H  insulin glargine, 30 Units, Subcutaneous, Nightly  insulin lispro, 3 Units, Subcutaneous, TID With Meals  isosorbide mononitrate, 30 mg, Oral, QAM  lactobacillus acidophilus, 1 capsule, Oral, Daily  metoprolol succinate XL, 25 mg, Oral, Daily  miconazole, , Topical, Q12H  pantoprazole, 40 mg, Oral, BID AC  piperacillin-tazobactam, 3.375 g, Intravenous, Q8H  sodium chloride, 10 mL, Intravenous, Q12H  sucralfate, 1 g, Oral, 4x Daily AC & at Bedtime      lactated ringers, 9 mL/hr, Last Rate: 9 mL/hr (06/04/22 1137)        Objective   Vital Signs  Temp:  [97.6 °F (36.4 °C)-98.7 °F (37.1 °C)] 98.7 °F (37.1 °C)  Heart Rate:  [66-88] 88  Resp:  [16-18] 16  BP: (122-129)/(54-66) 124/64  Body mass index is 39.03 kg/m².    Intake/Output Summary (Last 24 hours) at 6/8/2022 1247  Last data filed at 6/8/2022 1147  Gross per 24 hour   Intake 360 ml   Output 1450 ml   Net -1090 ml       Physical Exam  Vitals and nursing note reviewed.   Constitutional:       Appearance: Normal appearance.   Cardiovascular:      Rate and Rhythm: Normal rate and regular rhythm.   Pulmonary:      Effort: No respiratory distress.      Breath sounds: Normal breath sounds.   Abdominal:      General: Bowel sounds are normal.      Palpations: Abdomen is soft.   Neurological:      General: No focal deficit present.      Mental Status: She is alert and oriented to person, place, and time.       Results Review:       I reviewed the patient's new clinical results.      Assessment & Plan   Active Hospital Problems    Diagnosis  POA   • **Osteomyelitis of hip (HCC) [M86.9]  Yes    • Hyponatremia [E87.1]  Yes   • Metabolic acidosis [E87.2]  Yes   • Sepsis without acute organ dysfunction ([facility]) [A41.9]  Yes   • Pathological fracture of right femur ([facility]) [M84.451A]  Yes   • Iron deficiency anemia [D50.9]  Yes   • Chronic diastolic CHF (congestive heart failure) ([facility]) [I50.32]  Yes   • Obesity (BMI 30-39.9) [E66.9]  Yes   • Type 2 diabetes mellitus with diabetic polyneuropathy, with long-term current use of insulin ([facility]) [E11.42, Z79.4]  Not Applicable   • Hypertension [I10]  Yes      Resolved Hospital Problems   No resolved problems to display.       PLAN  This is a 67-year-old female who presented to the hospital with hip pain and is found to have recurrent osteomyelitis of her hip  -Discussed with infectious disease continue antibiotics for up to 2 weeks following her last surgical intervention if felt to have all infectious material removed  -Continue empiric treatment for the underlying possible gastritis with Carafate and PPI.  She continues to complain of upper abdominal pain but every time she describes it it sounds a little different.  Given her decreasing hemoglobin and ongoing symptoms I am going to ask GI to weigh in.  -Abdominal x-ray reviewed from yesterday without real significant findings.  -Hemoglobin had appeared to stabilize but now is down 1 g over the last 24 hours.  I am not sure if she may have a bleed from her GI tract have some other source of bleeding but I am also concerned regarding the dark bloody fluid coming from her wound VAC.  I did reach out to orthopedic surgery to discuss what they might think about this.  I am not really sure what to expect given how complicated this lady's course has been regarding her right hip.  -Sodium levels remain low but at or near her baseline.  -Blood sugars acceptable right now      Disposition  To be determined      Abhijit Cantu MD  [facility]  06/08/22  12:47 EDT            Electronically signed by  Abhijit Cantu MD at 22 1252     Abhijit Cantu MD at 22 1426            Dedicated to Hospital Care    [phone]   LOS: 6 days     Name: Stephy Duncan  Age/Sex: 67 y.o. female  :  1955        PCP: Gregorio Rosales  Chief Complaint   Patient presents with   • Wound Infection      Subjective   Today she is more complaining of bilateral flank pain.  The epigastric discomfort yesterday resolved with GI cocktail.  She was started empirically on treatment for gastritis.  Denies any fevers or chills does feel little better today and tolerated aggressive blood transfusions yesterday.  General: No Fever or Chills, Cardiac: No Chest Pain or Palpitations, Resp: No Cough or SOA, GI: No Nausea, Vomiting, or Diarrhea and Other: No bleeding    cetirizine, 10 mg, Oral, Daily  DAPTOmycin (CUBICIN)  IV, 10 mg/kg (Adjusted), Intravenous, Q24H  ferrous sulfate, 325 mg, Oral, Every Other Day  gabapentin, 600 mg, Oral, Q12H  insulin glargine, 30 Units, Subcutaneous, Nightly  insulin lispro, 3 Units, Subcutaneous, TID With Meals  isosorbide mononitrate, 30 mg, Oral, QAM  lactobacillus acidophilus, 1 capsule, Oral, Daily  metoprolol succinate XL, 25 mg, Oral, Daily  miconazole, , Topical, Q12H  pantoprazole, 40 mg, Oral, BID AC  piperacillin-tazobactam, 3.375 g, Intravenous, Q8H  sodium chloride, 10 mL, Intravenous, Q12H  sucralfate, 1 g, Oral, 4x Daily AC & at Bedtime      lactated ringers, 9 mL/hr, Last Rate: 9 mL/hr (22 1137)        Objective   Vital Signs  Temp:  [98 °F (36.7 °C)-99 °F (37.2 °C)] 98 °F (36.7 °C)  Heart Rate:  [71-76] 75  Resp:  [18-20] 20  BP: (108-129)/(49-91) 114/91  Body mass index is 39.03 kg/m².    Intake/Output Summary (Last 24 hours) at 2022 1426  Last data filed at 2022 2339  Gross per 24 hour   Intake 240 ml   Output 950 ml   Net -710 ml       Physical Exam  Vitals and nursing note reviewed.   Constitutional:       Appearance: Normal appearance.    Cardiovascular:      Rate and Rhythm: Normal rate and regular rhythm.   Pulmonary:      Effort: No respiratory distress.      Breath sounds: Normal breath sounds.   Abdominal:      General: Bowel sounds are normal.      Palpations: Abdomen is soft.   Neurological:      General: No focal deficit present.      Mental Status: She is alert and oriented to person, place, and time.           Results Review:       I reviewed the patient's new clinical results.    Assessment & Plan   Active Hospital Problems    Diagnosis  POA   • **Osteomyelitis of hip ([facility]) [M86.9]  Yes   • Hyponatremia [E87.1]  Yes   • Metabolic acidosis [E87.2]  Yes   • Sepsis without acute organ dysfunction ([facility]) [A41.9]  Yes   • Pathological fracture of right femur ([facility]) [M84.451A]  Yes   • Iron deficiency anemia [D50.9]  Yes   • Chronic diastolic CHF (congestive heart failure) ([facility]) [I50.32]  Yes   • Obesity (BMI 30-39.9) [E66.9]  Yes   • Type 2 diabetes mellitus with diabetic polyneuropathy, with long-term current use of insulin ([facility]) [E11.42, Z79.4]  Not Applicable   • Hypertension [I10]  Yes      Resolved Hospital Problems   No resolved problems to display.       PLAN  This is a 67-year-old female who presented to the hospital with hip pain and is found to have recurrent osteomyelitis of her hip  -Discussed with infectious disease today continue antibiotics for up to 2 weeks following her last surgical intervention if felt to have all infectious material removed  -Continue empiric treatment for the underlying possible gastritis with Carafate and PPI.  -Plan abdominal x-ray today to evaluate this upper abdominal/flank pain.  -Hemoglobin has stabilized following additional transfusion continue to follow her H&H.  -Sodium levels remain low but at or near her baseline.  -Blood sugars acceptable right now      Disposition  To be determined      Abhijit Cantu MD  [facility]  06/07/22  14:26 EDT            Electronically  "signed by Abhijit Cantu MD at 22 1427     Fernando Hart MD at 22 0857            Infectious Diseases Progress Note    Fernando Hart MD     [facility]  [place]: 6 days  Patient Identification:  Name: Stephy Duncan  Age: 67 y.o.  Sex: female  :  1955  MRN: [id number]         Primary Care Physician: Gregorio Rosales            Subjective: Continues to feel well and denies any specific complaints.  Denies any intolerance or side effects to current antibiotic therapy.  Interval History: See consultation note.  On 2022 patient underwent:Irrigation and Debridement of right hip joint with excision of proximal femur  Objective:    Scheduled Meds:cetirizine, 10 mg, Oral, Daily  DAPTOmycin (CUBICIN)  IV, 10 mg/kg (Adjusted), Intravenous, Q24H  ferrous sulfate, 325 mg, Oral, Every Other Day  gabapentin, 600 mg, Oral, Q12H  insulin glargine, 30 Units, Subcutaneous, Nightly  insulin lispro, 3 Units, Subcutaneous, TID With Meals  isosorbide mononitrate, 30 mg, Oral, QAM  lactobacillus acidophilus, 1 capsule, Oral, Daily  metoprolol succinate XL, 25 mg, Oral, Daily  miconazole, , Topical, Q12H  pantoprazole, 40 mg, Oral, BID AC  piperacillin-tazobactam, 3.375 g, Intravenous, Q8H  sodium chloride, 10 mL, Intravenous, Q12H  sucralfate, 1 g, Oral, 4x Daily AC & at Bedtime      Continuous Infusions:lactated ringers, 9 mL/hr, Last Rate: 9 mL/hr (22 1137)        Vital signs in last 24 hours:  Temp:  [98 °F (36.7 °C)-99.1 °F (37.3 °C)] 98 °F (36.7 °C)  Heart Rate:  [71-77] 75  Resp:  [18-20] 20  BP: ()/(46-91) 114/91    Intake/Output:    Intake/Output Summary (Last 24 hours) at 2022 0857  Last data filed at 2022 2339  Gross per 24 hour   Intake 540 ml   Output 950 ml   Net -410 ml       Exam:  /91 (BP Location: Right arm, Patient Position: Lying)   Pulse 75   Temp 98 °F (36.7 °C) (Oral)   Resp 20   Ht 162.6 cm (64.02\")   Wt 103 kg (227 lb 8.2 oz)   SpO2 98%   BMI " 39.03 kg/m²   Patient is examined using the personal protective equipment as per guidelines from infection control for this particular patient as enacted.  Hand washing was performed before and after patient interaction.  General Appearance:  Awake interactive does not appear toxic                          Head:    Normocephalic, without obvious abnormality, atraumatic                           Eyes:    PERRL, conjunctivae/corneas clear, EOM's intact, both eyes                         Throat:   Lips, tongue, gums normal; oral mucosa pink and moist                           Neck:   Supple, symmetrical, trachea midline, no JVD                         Lungs:    Clear to auscultation bilaterally, respirations unlabored                 Chest Wall:    No tenderness or deformity                          Heart:  S1-S2 regular                  Abdomen:   Soft nontender                 Extremities: Right hip surgical site dressed                            Pulses:   Pulses palpable in all extremities                            Skin: Chronic ecchymotic changes noted                  Neurologic: Grossly nonfocal       Data Review:    I reviewed the patient's new clinical results.      Assessment:    Osteomyelitis of hip (HCC)    Type 2 diabetes mellitus with diabetic polyneuropathy, with long-term current use of insulin ([facility])    Hypertension    Iron deficiency anemia    Obesity (BMI 30-39.9)    Chronic diastolic CHF (congestive heart failure) ([facility])    Hyponatremia    Metabolic acidosis    Sepsis without acute organ dysfunction ([facility])    Pathological fracture of right femur ([facility])  1-acute on chronic osteomyelitis of the right proximal femur in a similar area in a patient who had previous recurrent right hip prosthetic and multiple surgeries with a latest procedure performed on 4/25/2022 and currently likely has mixed infection given the foul smell drainage and 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  2-pancytopenia  3-morbid obesity  4-type 2 diabetes  5-hyponatremia  6-other diagnosis per primary team     Recommendations/Discussions:  · Continue Zosyn and daptomycin   · Monitor closely for complications of antibiotics  · Since there are no operative culture results empiric antibiotic treatment based on the prior culture results and since she declined while on daptomycin consideration for mixed infection is needed to construct antibiotic regimen.  Based on this concept Zosyn and daptomycin need to be continued for at least couple weeks from 6/4/2022 and may be extended to 6 weeks if orthopedic surgery service thinks that there is residual osteomyelitis.  · Obviously patient need to be monitored closely for side effects of antibiotics.  Fernando Hart MD  6/7/2022  08:57 EDT    Much of this encounter note is an electronic transcription/translation of spoken language to printed text. The electronic translation of spoken language may permit erroneous, or at times, nonsensical words or phrases to be inadvertently transcribed; Although I have reviewed the note for such errors, some may still exist      Electronically signed by Fernando Hart MD at 06/08/22 0004          Consult Notes       Eleni Huston PA at 06/08/22 1602      Consult Orders    1. Inpatient Gastroenterology Consult [[id number]] ordered by Abhijit Cantu MD at 06/08/22 1237               [facility]  Initial Inpatient Consult Note    Referring Provider: Dr. Cantu    Reason for Consultation: Upper abdominal pain and anemia    Subjective     History of present illness:      Thank you for allowing us to participate in the care of this patient.    67 y.o. female not previously known to our service with a past medical history significant for recurrent right prosthetic  hip infection, type 2 diabetes mellitus, hypertension, chronic diastolic congestive heart failure, coronary artery disease status post stent placement x2 over a year ago, iron deficiency anemia who was admitted 6/1 for increased drainage and malodorous output from wound vac.  We have been asked to see the patient for complaints of upper abdominal pain and anemia. Her current hemoglobin is 8.6 g/dL. She has received 5 units packed cells since admission.  She has been undergone over 70 procedures on the right hip and required long-term use of antibiotics.  She reports epigastric abdominal pain which radiates across the entire upper abdomen and straight through to the back.  She denies heartburn/reflux, nausea, vomiting, hematemesis, melena, hematochezia, nonsteroidal anti-inflammatory use.  She is status post lap cholecystectomy.  Her current antacid regimen includes pantoprazole 40 mg p.o. twice daily and Carafate 1 g 4 times daily.  She is receiving oral iron supplementation.    She reportedly underwent a colonoscopy roughly 5 years ago by Dr. Cabrera at [facility] with no concerning findings. She has undergone an EGD but feels this was closer to 10 years ago.  There is no family history of GI malignancies.      Current Meds:   cetirizine, 10 mg, Oral, Daily  DAPTOmycin (CUBICIN)  IV, 10 mg/kg (Adjusted), Intravenous, Q24H  ferrous sulfate, 325 mg, Oral, Every Other Day  gabapentin, 600 mg, Oral, Q12H  insulin glargine, 30 Units, Subcutaneous, Nightly  insulin lispro, 3 Units, Subcutaneous, TID With Meals  isosorbide mononitrate, 30 mg, Oral, QAM  lactobacillus acidophilus, 1 capsule, Oral, Daily  metoprolol succinate XL, 25 mg, Oral, Daily  miconazole, , Topical, Q12H  pantoprazole, 40 mg, Oral, BID AC  piperacillin-tazobactam, 3.375 g, Intravenous, Q8H  sodium chloride, 10 mL, Intravenous, Q12H  sucralfate, 1 g, Oral, 4x Daily AC & at Bedtime      Allergies:  Allergies   Allergen Reactions   • Ace Inhibitors Shortness Of  Breath and Swelling   • Morphine Shortness Of Breath     Review of Systems   Constitutional:   No fevers, chills, sweats   Eye:   No recent visual problems, eye discharge, eye pain, redness   HENT:   No ear pain, nasal congestion, sore throat, voice changes   Respiratory:   No shortness of breath, cough, pain on breathing, sputum production   Cardiovascular:   No Chest pain, palpitations, syncope, shortness of breath while laying flat   Gastrointestinal:   No nausea, vomiting, diarrhea, constipation   Genitourinary:   No hematuria, dysuria, incontinence   Hema/Lymph:   Negative for bruising tendency, swollen lymph glands, nosebleeds, history of anticoagulation   Endocrine:   Negative for excessive thirst, excessive hunger, excessive urination,   Musculoskeletal:   No back pain, neck pain, muscle pain, decreased range of motion + right hip pain  Integumentary:   No rash, pruritus, abrasions  Neurologic: No weakness, numbness, frequent headaches   Psychiatric:   No anxiety, depression, mood change    Objective     Vital Signs  Temp:  [98.1 °F (36.7 °C)-98.7 °F (37.1 °C)] 98.1 °F (36.7 °C)  Heart Rate:  [67-88] 71  Resp:  [16-18] 18  BP: (102-129)/(54-64) 102/63  Physical Exam:   Constitutional:   Well developed, well nourished. No acute distress.   Head:   Atraumatic, normocephalic.   Neck:   Supple and symmetric. Trachea midline.   ENT:   External ears and nose without lesion. Hearing grossly intact.   Eyes:   Pupils equal and round. Sclerae anicteric. Conjunctivae clear.   Respiratory:   Quiet, even, non-labored breathing. Clear to auscultation bilaterally.   Cardiovascular:   Regular rate and rhythm. No murmur, gallop or rub appreciated.   Gastrointestinal:   Soft, nondistended, nontender. No rebound or guarding present. Bowel sounds present in all 4 quadrants.  Laparoscopic incisional scars noted.  Integumentary:   Skin warm and dry, not jaundiced.   Neurological:   Alert and oriented to person, place and time.  Fluid of speech.   Musculoskeletal:  Right hip with wound vac in place, cannister currently empty as the appliance was changed earlier today.  Psychological:   Appropriate mood and affect. Cooperative.    Results Review:   I reviewed the patient's new clinical results.          Assessment & Plan   Patient Active Problem List   Diagnosis   • Status post total replacement of hip   • Type 2 diabetes mellitus with diabetic polyneuropathy, with long-term current use of insulin ([facility])   • Hypertension   • Normocytic anemia   • Postoperative infection   • Iron deficiency anemia   • Obesity (BMI 30-39.9)   • Presence of stent in coronary artery in patient with coronary artery disease   • Chronic diastolic CHF (congestive heart failure) ([facility])   • Septic arthritis of hip ([facility])   • Drainage from wound   • Candidal intertrigo   • Postoperative anemia due to acute blood loss   • S/P Girdlestone procedure   • Enterococcus faecalis infection   • Pyogenic arthritis, pelvic region and thigh ([facility])   • Osteomyelitis of hip ([facility])   • Hyponatremia   • Metabolic acidosis   • Sepsis without acute organ dysfunction ([facility])   • Pathological fracture of right femur ([facility])       Assessment:  1. Septic arthritis of the right hip s/p mulitple debridements and wound vac placement  2. Acute on chronic anemia  3. Epigastric abdominal pain  4. Type 2 diabetes mellitus  5. Essential hypertension  6. History of VRE infection  7. Coronary artery disease status post stent placement x2  8. Obesity with a BMI of 39    Plan:  · Continue twice daily PPI and Carafate.  · Plan for EGD tomorrow with Dr. Nj to rule out gastritis, peptic ulcer, H. pylori infection.    · N.p.o. after midnight.    I discussed the patient's findings and my recommendations with patient and Dr Bryant.    Irina dictation used throughout this note.            JENNY Valero  [facility]  [address] [place] [id number]  Office: [phone]        Electronically signed by Eleni Huston PA at 22 1704          Discharge Summary      Abhijit Cantu MD at 06/10/22 6751              Patient Name: Stephy Duncan  : 1955  MRN: [id number]    Date of Admission: 2022  Date of Discharge:  6/10/2022  Primary Care Physician: Gregorio Rosales      Chief Complaint:   Wound Infection      Discharge Diagnoses     Active Hospital Problems    Diagnosis  POA   • **Osteomyelitis of hip (HCC) [M86.9]  Yes   • Hyponatremia [E87.1]  Yes   • Metabolic acidosis [E87.2]  Yes   • Sepsis without acute organ dysfunction (HCC) [A41.9]  Yes   • Pathological fracture of right femur (HCC) [M84.451A]  Yes   • Epigastric abdominal pain [R10.13]  Unknown   • Iron deficiency anemia [D50.9]  Yes   • Chronic diastolic CHF (congestive heart failure) ([facility]) [I50.32]  Yes   • Obesity (BMI 30-39.9) [E66.9]  Yes   • Type 2 diabetes mellitus with diabetic polyneuropathy, with long-term current use of insulin ([facility]) [E11.42, Z79.4]  Not Applicable   • Hypertension [I10]  Yes      Resolved Hospital Problems   No resolved problems to display.        Hospital Course     Ms. Duncan is a 67 y.o. female with a history of prior hip infections and osteomyelitis, chronic diastolic heart failure, type 2 diabetes with polyneuropathy, gastroesophageal reflux disease, and hypertension who presented to [facility] initially complaining of wound infection.  Please see the admitting history and physical for further details.  She was found to have wound infection and was admitted to the hospital for further evaluation and treatment.  She has had several hip surgeries and has been very complicated.  She had a wound VAC in place in rehab but was noted to have foul-smelling drainage and was transferred to our facility for further evaluation and management.  Orthopedic surgery took the patient to the operating room and did extensive debridement and irrigation.   Following the procedure local wound care was continued and ultimately placed on a wound VAC.  She is tolerated this well and only complications have really been acute blood loss anemia.  Unfortunately she is in somewhat of a tough spot from this standpoint.  She is likely to have continued ongoing blood loss from this wound.  There is not much the surgeon feels he can do to further alleviate the blood loss until the tissues begin to heal.  Recommendation is to continue weekly CBCs with her weekly labs on IV antibiotics and would recommend transfusions for hemoglobin is found to be less than 7.5.  I would recommend these be arranged in the outpatient setting unless the patient is severely symptomatic.  Hematology did follow along with her pancytopenia during the hospitalization and agreed with management.  The plan is to continue antibiotics for at least 6 weeks (ending on July 17)  following this procedure and may ultimately require suppressive antibiotics in the future.  There was also some concern for this epigastric abdominal pain the patient has been having throughout the hospitalization.  Empiric treatment was started to cover for gastritis and ultimately GI was consulted and she did undergo an upper endoscopy.  Findings showed no concerning gastritis or esophagitis biopsies were taken and are pending at the present time.  Following multiple transfusions of packed red blood cells during this hospitalization her hemoglobin is shown stability over the last 3 days and at this point she stable to discharge to skilled nursing facility for ongoing wound care and evaluation.  Is recommended she follow-up with orthopedic surgery as directed.  The plan was discussed with the patient and her  on the day prior to discharge and again with the patient on the day of discharge they both acknowledged understanding and all questions were addressed and answered      Day of Discharge     Subjective:  She feels well today  and is eager to get out of the hospital.    Physical Exam:  Temp:  [98 °F (36.7 °C)-98.6 °F (37 °C)] 98 °F (36.7 °C)  Heart Rate:  [63-83] 71  Resp:  [12-18] 18  BP: ()/(51-72) 133/72  Body mass index is 36.73 kg/m².  Physical Exam  Vitals reviewed.   Constitutional:       Comments: Chronic ill-appearing   HENT:      Head: Normocephalic and atraumatic.   Cardiovascular:      Rate and Rhythm: Normal rate and regular rhythm.   Pulmonary:      Effort: No respiratory distress.      Breath sounds: Normal breath sounds.   Skin:     General: Skin is warm and dry.   Neurological:      Mental Status: She is alert.         Consultants     Consult Orders (all) (From admission, onward)     Start     Ordered    06/08/22 1238  Inpatient Gastroenterology Consult  Once        Specialty:  Gastroenterology  Provider:  Teena Bryant MD    06/08/22 1237    06/02/22 0824  Hematology & Oncology Inpatient Consult  Once        Specialty:  Hematology and Oncology  Provider:  Brendon Molina Jr., MD    06/02/22 0828    06/02/22 0721  Inpatient Infectious Diseases Consult  Once        Specialty:  Infectious Diseases  Provider:  Fernando Hart MD    06/02/22 0720    06/02/22 0709  Inpatient Infectious Diseases Consult  Once,   Status:  Canceled        Specialty:  Infectious Diseases  Provider:  Tano Rahman MD    06/02/22 0708    06/02/22 0102  Inpatient Case Management  Consult  Once        Provider:  (Not yet assigned)    06/02/22 0102    06/02/22 0028  Inpatient Orthopedic Surgery Consult  Once        Specialty:  Orthopedic Surgery  Provider:  Karri Schaeffer II, MD    06/02/22 0028    06/01/22 2129  [facility] (on-call MD unless specified) Details  Once,   Status:  Canceled        Specialty:  Hospitalist  Provider:  (Not yet assigned)    06/01/22 2128 06/01/22 1952  Ortho (on-call MD unless specified)  STAT,   Status:  Canceled        Specialty:  Orthopedic Surgery  Provider:  (Not yet assigned)     06/01/22 1951              Procedures     ESOPHAGOGASTRODUODENOSCOPY WITH COLD BIOSPIES      Imaging Results (All)     Procedure Component Value Units Date/Time    XR Abdomen KUB [[id number]] Collected: 06/07/22 1632     Updated: 06/07/22 1636    Narrative:      ABDOMEN KUB     CLINICAL HISTORY: Upper abdominal pain.     2 underpenetrated views of abdomen were obtained. The bowel gas pattern  is unremarkable. There is no evidence of obstruction. No pathologic  intra-abdominal calcifications are identified. Surgical clips are noted  in the right upper quadrant. There is very extensive bony destruction  involving the proximal right femur. 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     This report was finalized on 6/7/2022 4:33 PM by Dr. Jevon Sanchez M.D.       MRI Hip Right Without Contrast [[id number]] Collected: 06/03/22 1006     Updated: 06/03/22 1016    Narrative:      RIGHT HIP MRI     HISTORY: Multiple right hip surgeries, most recently arthroplasty  removal 04/25/2022. Evaluate osteomyelitis.     TECHNIQUE: MRI of the right hip was performed using a protocol which  shows both hips in axial plane and most of the pelvis in the coronal  plane using T1 and STIR sequences. A sagittal proton density sequence  was made through the right hip. This is correlated with CT scan  performed yesterday and multiple prior x-rays as recent as 04/25/2022  and as remote as 11/10/2021.     FINDINGS: There is postoperative change in the right hip from hardware  removal. Coronal images show substantial degenerative change in the  lower lumbar spine with dextroscoliosis but no finding suggestive of  osteomyelitis or discitis in the lower lumbar spine. Most of the bones  of the pelvis and those around the left hip and proximal femur  demonstrate normal marrow signal. There is a rind of edematous tissue at  the acetabulum, corresponding to the soft tissue material seen in this  area on the CT. On the coronal STIR sequence,  this material measures 7-8  mm thick. Underlying marrow signal is normal; there is no compelling  evidence for osteomyelitis in the pelvis.     There is postoperative change at the proximal femur. The note by Dr. Schaeffer describes challenging removal of the femoral stem and previous  imaging has demonstrated fairly extensive fracturing of the  periprosthetic portion of the proximal right femur. There is multifocal  signal dropout attributable to both the postoperative change and the  multiple small foci of gas seen within and around the fractured bone of  the proximal femur on the CT. There is extensive soft tissue edema deep  around the proximal femur as well as gas in the joint space and in the  soft tissue along the lateral proximal thigh. The coronal images show  the femoral shaft beyond the level of the tip of the removed femoral  stem. Normal fatty marrow signal is observed in that area on the coronal  images. No undrained fluid collection is identified.     No intrapelvic lesion is identified but there is a small volume of  ascites. There are enlarged lymph nodes in the lower right hemipelvis.       Impression:      Expected changes from previous Girdlestone procedure, right  hip arthroplasty reimplantation and subsequent removal for infection. No  abnormality in the pelvis is suggestive of osteomyelitis around the  acetabulum. The extensive deformity of the proximal femur is as expected  given the description of the surgical procedure. However, there is  robust soft tissue edema with multiple small foci of gas in the right  hip joint space, around the bone fragments of the proximal right femur,  and in the soft tissue lateral to the femur. Given the history and the  open wound, these findings almost certainly represent cellulitis with  osteomyelitis involving the fragmented portions of the proximal femur.     This report was finalized on 6/3/2022 10:13 AM by Dr. Brendon Carrillo M.D.       CT Lower Extremity  Right With Contrast [[id number]] Collected: 06/01/22 2059     Updated: 06/01/22 2114    Narrative:      CT OF THE RIGHT LOWER EXTREMITY WITH CONTRAST     HISTORY: Surgical site swelling     COMPARISON: 04/25/2022     TECHNIQUE: Axial CT imaging was performed through the right lower  extremity. Coronal and sagittal reformatted images were obtained. IV  contrast was administered.     FINDINGS:  The patient is status post Girdlestone procedure, as well as incision  and drainage of the right hip. The patient has a comminuted displaced  fracture of the proximal right femur. There is destruction of the  intertrochanteric region of the right femur. Distal femoral shaft  appears intact. There is also irregularity of the right acetabulum. Some  of the appearance may be related to prior operative procedures, but  overall, appearance is extremely worrisome for osteomyelitis. MRI would  allow for further characterization. There is air identified within the  joint space, although this may be postoperative. There is extensive  edema, soft tissue stranding, and skin thickening within the lateral  right thigh, worrisome for infection. No discrete rim-enhancing  collection is not identified. Again, MRI would be more sensitive. There  is air identified within the right lateral thigh, likely related to open  wound. There is a suprapatellar effusion. There is diffuse edema noted  throughout the right lower extremity. Prominent inguinal lymph nodes are  likely reactive. There are also prominent external iliac chain nodes as  well. I don't see any extension of the inflammation into the pelvis  itself. Patient's urinary bladder does appear thick-walled, and  correlation with urinalysis and urine cultures is recommended.       Impression:      Comminuted and displaced fracture of the proximal right femur, with  apparent destruction of the right intertrochanteric region. Appearance  is extremely concerning for osteomyelitis and pathologic  fracture. There  is also irregularity of the right acetabulum, which may reflect further  infectious involvement. MRI could be considered for further assessment.     Radiation dose reduction techniques were utilized, including automated  exposure control and exposure modulation based on body size.     This report was finalized on 6/1/2022 9:10 PM by Dr. Roula Anthony M.D.           Results for orders placed during the hospital encounter of 06/01/22    Duplex Venous Lower Extremity - Right    Interpretation Summary  · Normal right lower extremity venous duplex scan.      Pertinent Labs     Results from last 7 days   Lab Units 06/10/22  0624 06/09/22  0634 06/08/22  0409 06/07/22  0349   WBC 10*3/mm3 7.38 9.94 14.03* 15.47*   HEMOGLOBIN g/dL 8.2* 8.4* 8.6* 9.2*   PLATELETS 10*3/mm3 172 175 176 156     Results from last 7 days   Lab Units 06/09/22  0634 06/08/22  0409 06/07/22 0349 06/05/22  0903   SODIUM mmol/L 129* 127* 128* 129*   POTASSIUM mmol/L 4.1 4.2 4.3 5.0   CHLORIDE mmol/L 100 99 102 99   CO2 mmol/L 21.4* 23.0 18.6* 19.6*   BUN mg/dL 11 16 20 15   CREATININE mg/dL 0.69 0.75 0.82 1.48*   GLUCOSE mg/dL 131* 127* 141* 223*   EGFR mL/min/1.73 94.1 86.3 77.5 38.2*     Results from last 7 days   Lab Units 06/09/22  0634 06/08/22  0409   ALBUMIN g/dL 1.40* 1.60*     Results from last 7 days   Lab Units 06/09/22  0634 06/08/22  0409 06/07/22  0349 06/05/22  0903   CALCIUM mg/dL 7.8* 7.5* 7.3* 7.7*   ALBUMIN g/dL 1.40* 1.60*  --   --    MAGNESIUM mg/dL  --   --  1.8  --    PHOSPHORUS mg/dL 3.0 2.6  --   --        Results from last 7 days   Lab Units 06/10/22  0624   CK TOTAL U/L 11*     Results from last 7 days   Lab Units 06/07/22  0349   URIC ACID mg/dL 3.5         Invalid input(s): LDLCALC  Results from last 7 days   Lab Units 06/05/22  1137   URINECX  No growth     Results from last 7 days   Lab Units 06/08/22  1743   COVID19  Not Detected       Test Results Pending at Discharge     Pending Labs     Order  Current Status    Tissue Pathology Exam In process        PATIENT NEEDS WEEKLY CBC CMP faxed to Dr Hart    Discharge Details        Discharge Medications      New Medications      Instructions Start Date   lactobacillus acidophilus capsule capsule   1 capsule, Oral, Daily   Start Date: June 11, 2022     piperacillin-tazobactam 3-0.375 GM/50ML IVPB  Commonly known as: ZOSYN   3.375 g, Intravenous, Every 8 Hours         Changes to Medications      Instructions Start Date   insulin detemir 100 UNIT/ML injection  Commonly known as: LEVEMIR  What changed: how much to take   30 Units, Subcutaneous, Nightly      NovoLOG FlexPen 100 UNIT/ML solution pen-injector sc pen  Generic drug: insulin aspart  What changed: how much to take   4 Units, Subcutaneous, 3 Times Daily With Meals      oxyCODONE-acetaminophen 5-325 MG per tablet  Commonly known as: PERCOCET  What changed: Another medication with the same name was removed. Continue taking this medication, and follow the directions you see here.   1 tablet, Oral, Every 4 Hours PRN         Continue These Medications      Instructions Start Date   DAPTOmycin 750 mg in sodium chloride 0.9 % 50 mL   10 mg/kg (750 mg), Intravenous, Every 24 Hours      Diclofenac Sodium 1 % gel gel  Commonly known as: VOLTAREN   4 g, Topical, 4 Times Daily PRN      esomeprazole 40 MG capsule  Commonly known as: nexIUM   40 mg, Oral, Every Morning Before Breakfast      ferrous sulfate 325 (65 FE) MG tablet   325 mg, Oral, Every Other Day, Mornings      folic acid 1 MG tablet  Commonly known as: FOLVITE   1,000 mcg, Oral, Daily      gabapentin 600 MG tablet  Commonly known as: NEURONTIN   600 mg, Oral, 2 Times Daily      isosorbide mononitrate 30 MG 24 hr tablet  Commonly known as: IMDUR   30 mg, Oral, Every Morning      loratadine 10 MG tablet  Commonly known as: CLARITIN   10 mg, Oral, Nightly      metoprolol succinate XL 25 MG 24 hr tablet  Commonly known as: Toprol XL   25 mg, Oral, Daily       vitamin D3 125 MCG (5000 UT) capsule capsule   5,000 Units, Oral, Daily         Stop These Medications    aspirin 81 MG EC tablet     Chlorhexidine Gluconate Cloth 2 % pads     Trulicity 3 MG/0.5ML solution pen-injector  Generic drug: Dulaglutide            Allergies   Allergen Reactions   • Ace Inhibitors Shortness Of Breath and Swelling   • Morphine Shortness Of Breath       Discharge Disposition:  Skilled Nursing Facility (DC - External)      Discharge Diet:  Diet Order   Procedures   • Diet Regular; Consistent Carbohydrate       Discharge Activity:   Activity Instructions     Activity as Tolerated            CODE STATUS:    Code Status and Medical Interventions:   Ordered at: 06/02/22 0028     Code Status (Patient has no pulse and is not breathing):    CPR (Attempt to Resuscitate)     Medical Interventions (Patient has pulse or is breathing):    Full Support       No future appointments.  Additional Instructions for the Follow-ups that You Need to Schedule     Discharge Follow-up with PCP   As directed       Currently Documented PCP:    Gregorio Rosales    PCP Phone Number:    [phone]     Follow Up Details: 4-6 weeks         Discharge Follow-up with Specified Provider: Dr Schaeffer as directed   As directed      To: Dr Schaeffer as directed            Contact information for follow-up providers     Gregorio Rosales .    Specialty: Family Medicine  Why: 4-6 weeks  Contact information:  [address] [facility] [address] [phone]                   Contact information for after-discharge care     Destination     [facility] [place] .    Service: Skilled Nursing  [facility] information[facility]  [facility] [address] [phone]                             Additional Instructions for the Follow-ups that You Need to Schedule     Discharge Follow-up with PCP   As directed       Currently Documented PCP:    Gregorio Rosales    PCP Phone Number:    [phone]      Follow Up Details: 4-6 weeks         Discharge Follow-up with Specified Provider: Dr Schaeffer as directed   As directed      To: Dr Schaeffer as directed           Time Spent on Discharge:  Greater than 30 minutes      Abhijit Cantu MD  [facility]  06/10/22  08:30 EDT                Electronically signed by Abhijit Cantu MD at 06/10/22 4046

## 2022-06-14 ENCOUNTER — APPOINTMENT (OUTPATIENT)
Dept: INFUSION THERAPY | Facility: HOSPITAL | Age: 67
End: 2022-06-14

## 2022-06-15 ENCOUNTER — APPOINTMENT (OUTPATIENT)
Dept: INFUSION THERAPY | Facility: HOSPITAL | Age: 67
End: 2022-06-15

## 2022-06-16 ENCOUNTER — TELEPHONE (OUTPATIENT)
Dept: GASTROENTEROLOGY | Facility: CLINIC | Age: 67
End: 2022-06-16

## 2022-06-16 NOTE — TELEPHONE ENCOUNTER
----- Message from Zechariah Nj MD sent at 6/15/2022  3:48 PM EDT -----  Pathology benign  Follow-up with PCP

## 2022-06-17 ENCOUNTER — APPOINTMENT (OUTPATIENT)
Dept: INFUSION THERAPY | Facility: HOSPITAL | Age: 67
End: 2022-06-17

## 2022-06-20 ENCOUNTER — APPOINTMENT (OUTPATIENT)
Dept: INFUSION THERAPY | Facility: HOSPITAL | Age: 67
End: 2022-06-20

## 2022-06-21 ENCOUNTER — APPOINTMENT (OUTPATIENT)
Dept: INFUSION THERAPY | Facility: HOSPITAL | Age: 67
End: 2022-06-21

## 2022-06-22 ENCOUNTER — APPOINTMENT (OUTPATIENT)
Dept: INFUSION THERAPY | Facility: HOSPITAL | Age: 67
End: 2022-06-22

## 2022-06-24 ENCOUNTER — APPOINTMENT (OUTPATIENT)
Dept: INFUSION THERAPY | Facility: HOSPITAL | Age: 67
End: 2022-06-24

## 2022-06-27 ENCOUNTER — APPOINTMENT (OUTPATIENT)
Dept: INFUSION THERAPY | Facility: HOSPITAL | Age: 67
End: 2022-06-27

## 2022-06-28 ENCOUNTER — APPOINTMENT (OUTPATIENT)
Dept: INFUSION THERAPY | Facility: HOSPITAL | Age: 67
End: 2022-06-28

## 2022-06-29 ENCOUNTER — TRANSCRIBE ORDERS (OUTPATIENT)
Dept: ADMINISTRATIVE | Facility: HOSPITAL | Age: 67
End: 2022-06-29

## 2022-06-29 ENCOUNTER — APPOINTMENT (OUTPATIENT)
Dept: INFUSION THERAPY | Facility: HOSPITAL | Age: 67
End: 2022-06-29

## 2022-06-29 DIAGNOSIS — M86.9 OSTEOMYELITIS, UNSPECIFIED SITE, UNSPECIFIED TYPE: Primary | ICD-10-CM

## 2022-06-29 DIAGNOSIS — T84.51XA INFLAMMATORY REACTION DUE TO INTERNAL PROSTHESIS OF RIGHT HIP, INITIAL ENCOUNTER: ICD-10-CM

## 2022-07-01 ENCOUNTER — HOSPITAL ENCOUNTER (OUTPATIENT)
Dept: INFUSION THERAPY | Facility: HOSPITAL | Age: 67
Setting detail: INFUSION SERIES
Discharge: HOME | End: 2022-07-01

## 2022-07-01 ENCOUNTER — APPOINTMENT (OUTPATIENT)
Dept: INFUSION THERAPY | Facility: HOSPITAL | Age: 67
End: 2022-07-01

## 2022-07-01 VITALS
SYSTOLIC BLOOD PRESSURE: 106 MMHG | TEMPERATURE: 98.1 F | BODY MASS INDEX: 39.93 KG/M2 | HEIGHT: 64 IN | DIASTOLIC BLOOD PRESSURE: 54 MMHG | RESPIRATION RATE: 16 BRPM | WEIGHT: 233.91 LBS | OXYGEN SATURATION: 100 % | HEART RATE: 89 BPM

## 2022-07-01 DIAGNOSIS — T81.40XD POSTOPERATIVE INFECTION, UNSPECIFIED TYPE, SUBSEQUENT ENCOUNTER: ICD-10-CM

## 2022-07-01 DIAGNOSIS — M00.9 PYOGENIC ARTHRITIS, PELVIC REGION AND THIGH: ICD-10-CM

## 2022-07-01 DIAGNOSIS — M86.9: ICD-10-CM

## 2022-07-01 DIAGNOSIS — B95.2 ENTEROCOCCUS FAECALIS INFECTION: Primary | ICD-10-CM

## 2022-07-01 PROCEDURE — 96365 THER/PROPH/DIAG IV INF INIT: CPT

## 2022-07-01 PROCEDURE — 25010000002 ERTAPENEM PER 500 MG: Performed by: INTERNAL MEDICINE

## 2022-07-01 PROCEDURE — G0463 HOSPITAL OUTPT CLINIC VISIT: HCPCS

## 2022-07-01 PROCEDURE — 96367 TX/PROPH/DG ADDL SEQ IV INF: CPT

## 2022-07-01 PROCEDURE — 25010000002 PIPERACILLIN SOD-TAZOBACTAM PER 1 G: Performed by: INTERNAL MEDICINE

## 2022-07-01 PROCEDURE — 25010000002 DAPTOMYCIN PER 1 MG: Performed by: INTERNAL MEDICINE

## 2022-07-01 RX ADMIN — DAPTOMYCIN 750 MG: 500 INJECTION, POWDER, FOR SOLUTION INTRAVENOUS at 11:00

## 2022-07-01 RX ADMIN — ERTAPENEM 1 G: 1 INJECTION, POWDER, LYOPHILIZED, FOR SOLUTION INTRAMUSCULAR; INTRAVENOUS at 16:58

## 2022-07-01 RX ADMIN — TAZOBACTAM SODIUM AND PIPERACILLIN SODIUM 3.38 G: 375; 3 INJECTION, SOLUTION INTRAVENOUS at 09:10

## 2022-07-02 ENCOUNTER — APPOINTMENT (OUTPATIENT)
Dept: INFUSION THERAPY | Facility: HOSPITAL | Age: 67
End: 2022-07-02

## 2022-07-02 ENCOUNTER — HOSPITAL ENCOUNTER (OUTPATIENT)
Dept: INFUSION THERAPY | Facility: HOSPITAL | Age: 67
Setting detail: INFUSION SERIES
Discharge: HOME | End: 2022-07-02

## 2022-07-02 DIAGNOSIS — M00.9 PYOGENIC ARTHRITIS, PELVIC REGION AND THIGH: ICD-10-CM

## 2022-07-02 DIAGNOSIS — B95.2 ENTEROCOCCUS FAECALIS INFECTION: Primary | ICD-10-CM

## 2022-07-02 DIAGNOSIS — T81.40XD POSTOPERATIVE INFECTION, UNSPECIFIED TYPE, SUBSEQUENT ENCOUNTER: ICD-10-CM

## 2022-07-02 PROCEDURE — 96365 THER/PROPH/DIAG IV INF INIT: CPT

## 2022-07-02 PROCEDURE — 25010000002 ERTAPENEM PER 500 MG: Performed by: INTERNAL MEDICINE

## 2022-07-02 PROCEDURE — 96367 TX/PROPH/DG ADDL SEQ IV INF: CPT

## 2022-07-02 PROCEDURE — 25010000002 DAPTOMYCIN PER 1 MG: Performed by: INTERNAL MEDICINE

## 2022-07-02 RX ADMIN — DAPTOMYCIN 750 MG: 500 INJECTION, POWDER, FOR SOLUTION INTRAVENOUS at 09:32

## 2022-07-02 RX ADMIN — ERTAPENEM 1 G: 1 INJECTION, POWDER, LYOPHILIZED, FOR SOLUTION INTRAMUSCULAR; INTRAVENOUS at 08:46

## 2022-07-03 ENCOUNTER — HOSPITAL ENCOUNTER (OUTPATIENT)
Dept: INFUSION THERAPY | Facility: HOSPITAL | Age: 67
Setting detail: INFUSION SERIES
Discharge: HOME | End: 2022-07-03

## 2022-07-03 ENCOUNTER — APPOINTMENT (OUTPATIENT)
Dept: INFUSION THERAPY | Facility: HOSPITAL | Age: 67
End: 2022-07-03

## 2022-07-03 VITALS
OXYGEN SATURATION: 100 % | SYSTOLIC BLOOD PRESSURE: 131 MMHG | DIASTOLIC BLOOD PRESSURE: 47 MMHG | TEMPERATURE: 98.6 F | HEART RATE: 87 BPM | RESPIRATION RATE: 16 BRPM

## 2022-07-03 DIAGNOSIS — M00.9 PYOGENIC ARTHRITIS, PELVIC REGION AND THIGH: ICD-10-CM

## 2022-07-03 DIAGNOSIS — B95.2 ENTEROCOCCUS FAECALIS INFECTION: Primary | ICD-10-CM

## 2022-07-03 DIAGNOSIS — T81.40XD POSTOPERATIVE INFECTION, UNSPECIFIED TYPE, SUBSEQUENT ENCOUNTER: ICD-10-CM

## 2022-07-03 PROCEDURE — 96365 THER/PROPH/DIAG IV INF INIT: CPT

## 2022-07-03 PROCEDURE — 25010000002 DAPTOMYCIN PER 1 MG: Performed by: INTERNAL MEDICINE

## 2022-07-03 PROCEDURE — 96366 THER/PROPH/DIAG IV INF ADDON: CPT

## 2022-07-03 PROCEDURE — 96367 TX/PROPH/DG ADDL SEQ IV INF: CPT

## 2022-07-03 PROCEDURE — 25010000002 ERTAPENEM PER 500 MG: Performed by: INTERNAL MEDICINE

## 2022-07-03 RX ADMIN — DAPTOMYCIN 750 MG: 500 INJECTION, POWDER, FOR SOLUTION INTRAVENOUS at 09:59

## 2022-07-03 RX ADMIN — ERTAPENEM 1 G: 1 INJECTION, POWDER, LYOPHILIZED, FOR SOLUTION INTRAMUSCULAR; INTRAVENOUS at 08:49

## 2022-07-04 ENCOUNTER — HOSPITAL ENCOUNTER (OUTPATIENT)
Dept: INFUSION THERAPY | Facility: HOSPITAL | Age: 67
Setting detail: INFUSION SERIES
Discharge: HOME | End: 2022-07-04

## 2022-07-04 ENCOUNTER — APPOINTMENT (OUTPATIENT)
Dept: INFUSION THERAPY | Facility: HOSPITAL | Age: 67
End: 2022-07-04

## 2022-07-04 ENCOUNTER — HOSPITAL ENCOUNTER (OUTPATIENT)
Dept: INFUSION THERAPY | Facility: HOSPITAL | Age: 67
Setting detail: INFUSION SERIES
End: 2022-07-04

## 2022-07-04 VITALS
SYSTOLIC BLOOD PRESSURE: 131 MMHG | OXYGEN SATURATION: 98 % | HEART RATE: 81 BPM | TEMPERATURE: 98.4 F | DIASTOLIC BLOOD PRESSURE: 54 MMHG | RESPIRATION RATE: 20 BRPM

## 2022-07-04 DIAGNOSIS — M00.9 PYOGENIC ARTHRITIS, PELVIC REGION AND THIGH: ICD-10-CM

## 2022-07-04 DIAGNOSIS — T81.40XD POSTOPERATIVE INFECTION, UNSPECIFIED TYPE, SUBSEQUENT ENCOUNTER: ICD-10-CM

## 2022-07-04 DIAGNOSIS — B95.2 ENTEROCOCCUS FAECALIS INFECTION: Primary | ICD-10-CM

## 2022-07-04 PROCEDURE — 25010000002 DAPTOMYCIN PER 1 MG: Performed by: INTERNAL MEDICINE

## 2022-07-04 PROCEDURE — 96367 TX/PROPH/DG ADDL SEQ IV INF: CPT

## 2022-07-04 PROCEDURE — 25010000002 ERTAPENEM PER 500 MG: Performed by: INTERNAL MEDICINE

## 2022-07-04 PROCEDURE — G0463 HOSPITAL OUTPT CLINIC VISIT: HCPCS

## 2022-07-04 PROCEDURE — 96365 THER/PROPH/DIAG IV INF INIT: CPT

## 2022-07-04 RX ADMIN — DAPTOMYCIN 750 MG: 500 INJECTION, POWDER, FOR SOLUTION INTRAVENOUS at 10:21

## 2022-07-04 RX ADMIN — ERTAPENEM 1 G: 1 INJECTION, POWDER, LYOPHILIZED, FOR SOLUTION INTRAMUSCULAR; INTRAVENOUS at 09:31

## 2022-07-05 ENCOUNTER — HOSPITAL ENCOUNTER (OUTPATIENT)
Dept: INFUSION THERAPY | Facility: HOSPITAL | Age: 67
Setting detail: INFUSION SERIES
End: 2022-07-05

## 2022-07-05 ENCOUNTER — APPOINTMENT (OUTPATIENT)
Dept: INFUSION THERAPY | Facility: HOSPITAL | Age: 67
End: 2022-07-05

## 2022-07-05 ENCOUNTER — HOSPITAL ENCOUNTER (OUTPATIENT)
Dept: INFUSION THERAPY | Facility: HOSPITAL | Age: 67
Setting detail: INFUSION SERIES
Discharge: HOME | End: 2022-07-05

## 2022-07-05 VITALS
OXYGEN SATURATION: 98 % | RESPIRATION RATE: 18 BRPM | TEMPERATURE: 97.9 F | SYSTOLIC BLOOD PRESSURE: 133 MMHG | HEART RATE: 90 BPM | DIASTOLIC BLOOD PRESSURE: 52 MMHG

## 2022-07-05 DIAGNOSIS — B95.2 ENTEROCOCCUS FAECALIS INFECTION: Primary | ICD-10-CM

## 2022-07-05 DIAGNOSIS — M00.9 PYOGENIC ARTHRITIS, PELVIC REGION AND THIGH: ICD-10-CM

## 2022-07-05 DIAGNOSIS — T81.40XD POSTOPERATIVE INFECTION, UNSPECIFIED TYPE, SUBSEQUENT ENCOUNTER: ICD-10-CM

## 2022-07-05 PROCEDURE — 25010000002 ERTAPENEM PER 500 MG: Performed by: INTERNAL MEDICINE

## 2022-07-05 PROCEDURE — G0463 HOSPITAL OUTPT CLINIC VISIT: HCPCS

## 2022-07-05 PROCEDURE — 96367 TX/PROPH/DG ADDL SEQ IV INF: CPT

## 2022-07-05 PROCEDURE — 96365 THER/PROPH/DIAG IV INF INIT: CPT

## 2022-07-05 PROCEDURE — 25010000002 DAPTOMYCIN PER 1 MG: Performed by: INTERNAL MEDICINE

## 2022-07-05 RX ADMIN — ERTAPENEM 1 G: 1 INJECTION, POWDER, LYOPHILIZED, FOR SOLUTION INTRAMUSCULAR; INTRAVENOUS at 08:09

## 2022-07-05 RX ADMIN — DAPTOMYCIN 750 MG: 500 INJECTION, POWDER, FOR SOLUTION INTRAVENOUS at 09:25

## 2022-07-06 ENCOUNTER — HOSPITAL ENCOUNTER (OUTPATIENT)
Dept: INFUSION THERAPY | Facility: HOSPITAL | Age: 67
Setting detail: INFUSION SERIES
Discharge: HOME | End: 2022-07-06

## 2022-07-06 ENCOUNTER — APPOINTMENT (OUTPATIENT)
Dept: INFUSION THERAPY | Facility: HOSPITAL | Age: 67
End: 2022-07-06

## 2022-07-06 VITALS
RESPIRATION RATE: 20 BRPM | HEART RATE: 87 BPM | OXYGEN SATURATION: 100 % | DIASTOLIC BLOOD PRESSURE: 49 MMHG | TEMPERATURE: 98.1 F | SYSTOLIC BLOOD PRESSURE: 129 MMHG

## 2022-07-06 DIAGNOSIS — M00.9 PYOGENIC ARTHRITIS, PELVIC REGION AND THIGH: ICD-10-CM

## 2022-07-06 DIAGNOSIS — T81.40XD POSTOPERATIVE INFECTION, UNSPECIFIED TYPE, SUBSEQUENT ENCOUNTER: ICD-10-CM

## 2022-07-06 DIAGNOSIS — B95.2 ENTEROCOCCUS FAECALIS INFECTION: Primary | ICD-10-CM

## 2022-07-06 PROCEDURE — 96365 THER/PROPH/DIAG IV INF INIT: CPT

## 2022-07-06 PROCEDURE — 96367 TX/PROPH/DG ADDL SEQ IV INF: CPT

## 2022-07-06 PROCEDURE — 25010000002 DAPTOMYCIN PER 1 MG: Performed by: INTERNAL MEDICINE

## 2022-07-06 PROCEDURE — G0463 HOSPITAL OUTPT CLINIC VISIT: HCPCS

## 2022-07-06 PROCEDURE — 25010000002 ERTAPENEM PER 500 MG: Performed by: INTERNAL MEDICINE

## 2022-07-06 RX ADMIN — DAPTOMYCIN 750 MG: 500 INJECTION, POWDER, FOR SOLUTION INTRAVENOUS at 10:27

## 2022-07-06 RX ADMIN — ERTAPENEM 1 G: 1 INJECTION, POWDER, LYOPHILIZED, FOR SOLUTION INTRAMUSCULAR; INTRAVENOUS at 09:52

## 2022-07-07 ENCOUNTER — APPOINTMENT (OUTPATIENT)
Dept: INFUSION THERAPY | Facility: HOSPITAL | Age: 67
End: 2022-07-07

## 2022-07-07 ENCOUNTER — HOSPITAL ENCOUNTER (OUTPATIENT)
Dept: INFUSION THERAPY | Facility: HOSPITAL | Age: 67
Setting detail: INFUSION SERIES
Discharge: HOME | End: 2022-07-07

## 2022-07-07 VITALS
DIASTOLIC BLOOD PRESSURE: 48 MMHG | TEMPERATURE: 98.3 F | RESPIRATION RATE: 20 BRPM | OXYGEN SATURATION: 96 % | HEART RATE: 89 BPM | SYSTOLIC BLOOD PRESSURE: 121 MMHG

## 2022-07-07 DIAGNOSIS — M00.9 PYOGENIC ARTHRITIS, PELVIC REGION AND THIGH: ICD-10-CM

## 2022-07-07 DIAGNOSIS — T81.40XD POSTOPERATIVE INFECTION, UNSPECIFIED TYPE, SUBSEQUENT ENCOUNTER: ICD-10-CM

## 2022-07-07 DIAGNOSIS — B95.2 ENTEROCOCCUS FAECALIS INFECTION: Primary | ICD-10-CM

## 2022-07-07 LAB
ANION GAP SERPL CALCULATED.3IONS-SCNC: 10.5 MMOL/L (ref 5–15)
BUN SERPL-MCNC: 9 MG/DL (ref 8–23)
BUN/CREAT SERPL: 12.2 (ref 7–25)
CALCIUM SPEC-SCNC: 8.6 MG/DL (ref 8.6–10.5)
CHLORIDE SERPL-SCNC: 107 MMOL/L (ref 98–107)
CK SERPL-CCNC: 39 U/L (ref 20–180)
CO2 SERPL-SCNC: 22.5 MMOL/L (ref 22–29)
CREAT SERPL-MCNC: 0.74 MG/DL (ref 0.57–1)
EGFRCR SERPLBLD CKD-EPI 2021: 87.7 ML/MIN/1.73
GLUCOSE SERPL-MCNC: 237 MG/DL (ref 65–99)
POTASSIUM SERPL-SCNC: 3.6 MMOL/L (ref 3.5–5.2)
SODIUM SERPL-SCNC: 140 MMOL/L (ref 136–145)

## 2022-07-07 PROCEDURE — 80048 BASIC METABOLIC PNL TOTAL CA: CPT | Performed by: INTERNAL MEDICINE

## 2022-07-07 PROCEDURE — 96365 THER/PROPH/DIAG IV INF INIT: CPT

## 2022-07-07 PROCEDURE — 25010000002 DAPTOMYCIN PER 1 MG: Performed by: INTERNAL MEDICINE

## 2022-07-07 PROCEDURE — 25010000002 ERTAPENEM PER 500 MG: Performed by: INTERNAL MEDICINE

## 2022-07-07 PROCEDURE — G0463 HOSPITAL OUTPT CLINIC VISIT: HCPCS

## 2022-07-07 PROCEDURE — 82550 ASSAY OF CK (CPK): CPT | Performed by: INTERNAL MEDICINE

## 2022-07-07 PROCEDURE — 36415 COLL VENOUS BLD VENIPUNCTURE: CPT

## 2022-07-07 PROCEDURE — 96367 TX/PROPH/DG ADDL SEQ IV INF: CPT

## 2022-07-07 RX ADMIN — DAPTOMYCIN 750 MG: 500 INJECTION, POWDER, FOR SOLUTION INTRAVENOUS at 10:01

## 2022-07-07 RX ADMIN — ERTAPENEM 1 G: 1 INJECTION, POWDER, LYOPHILIZED, FOR SOLUTION INTRAMUSCULAR; INTRAVENOUS at 09:24

## 2022-07-08 ENCOUNTER — HOSPITAL ENCOUNTER (OUTPATIENT)
Dept: INFUSION THERAPY | Facility: HOSPITAL | Age: 67
Setting detail: INFUSION SERIES
End: 2022-07-08

## 2022-07-08 ENCOUNTER — HOSPITAL ENCOUNTER (OUTPATIENT)
Dept: INFUSION THERAPY | Facility: HOSPITAL | Age: 67
Setting detail: INFUSION SERIES
Discharge: HOME | End: 2022-07-08

## 2022-07-08 ENCOUNTER — APPOINTMENT (OUTPATIENT)
Dept: INFUSION THERAPY | Facility: HOSPITAL | Age: 67
End: 2022-07-08

## 2022-07-08 VITALS
SYSTOLIC BLOOD PRESSURE: 118 MMHG | TEMPERATURE: 98.5 F | HEART RATE: 84 BPM | OXYGEN SATURATION: 100 % | DIASTOLIC BLOOD PRESSURE: 44 MMHG | RESPIRATION RATE: 20 BRPM

## 2022-07-08 DIAGNOSIS — T81.40XD POSTOPERATIVE INFECTION, UNSPECIFIED TYPE, SUBSEQUENT ENCOUNTER: ICD-10-CM

## 2022-07-08 DIAGNOSIS — M00.9 PYOGENIC ARTHRITIS, PELVIC REGION AND THIGH: ICD-10-CM

## 2022-07-08 DIAGNOSIS — B95.2 ENTEROCOCCUS FAECALIS INFECTION: Primary | ICD-10-CM

## 2022-07-08 PROCEDURE — 25010000002 ERTAPENEM PER 500 MG: Performed by: INTERNAL MEDICINE

## 2022-07-08 PROCEDURE — 96365 THER/PROPH/DIAG IV INF INIT: CPT

## 2022-07-08 PROCEDURE — G0463 HOSPITAL OUTPT CLINIC VISIT: HCPCS

## 2022-07-08 PROCEDURE — 96367 TX/PROPH/DG ADDL SEQ IV INF: CPT

## 2022-07-08 PROCEDURE — 25010000002 DAPTOMYCIN PER 1 MG: Performed by: INTERNAL MEDICINE

## 2022-07-08 RX ADMIN — ERTAPENEM 1 G: 1 INJECTION, POWDER, LYOPHILIZED, FOR SOLUTION INTRAMUSCULAR; INTRAVENOUS at 08:52

## 2022-07-08 RX ADMIN — DAPTOMYCIN 750 MG: 500 INJECTION, POWDER, FOR SOLUTION INTRAVENOUS at 10:10

## 2022-07-09 ENCOUNTER — APPOINTMENT (OUTPATIENT)
Dept: INFUSION THERAPY | Facility: HOSPITAL | Age: 67
End: 2022-07-09

## 2022-07-09 ENCOUNTER — HOSPITAL ENCOUNTER (OUTPATIENT)
Dept: INFUSION THERAPY | Facility: HOSPITAL | Age: 67
Setting detail: INFUSION SERIES
Discharge: HOME | End: 2022-07-09

## 2022-07-09 VITALS
RESPIRATION RATE: 20 BRPM | OXYGEN SATURATION: 100 % | SYSTOLIC BLOOD PRESSURE: 125 MMHG | DIASTOLIC BLOOD PRESSURE: 41 MMHG | HEART RATE: 83 BPM | TEMPERATURE: 98.2 F

## 2022-07-09 DIAGNOSIS — B95.2 ENTEROCOCCUS FAECALIS INFECTION: Primary | ICD-10-CM

## 2022-07-09 DIAGNOSIS — T81.40XD POSTOPERATIVE INFECTION, UNSPECIFIED TYPE, SUBSEQUENT ENCOUNTER: ICD-10-CM

## 2022-07-09 DIAGNOSIS — M00.9 PYOGENIC ARTHRITIS, PELVIC REGION AND THIGH: ICD-10-CM

## 2022-07-09 PROCEDURE — 25010000002 DAPTOMYCIN PER 1 MG: Performed by: INTERNAL MEDICINE

## 2022-07-09 PROCEDURE — G0463 HOSPITAL OUTPT CLINIC VISIT: HCPCS

## 2022-07-09 PROCEDURE — 96367 TX/PROPH/DG ADDL SEQ IV INF: CPT

## 2022-07-09 PROCEDURE — 25010000002 ERTAPENEM PER 500 MG: Performed by: INTERNAL MEDICINE

## 2022-07-09 PROCEDURE — 96365 THER/PROPH/DIAG IV INF INIT: CPT

## 2022-07-09 RX ADMIN — ERTAPENEM 1 G: 1 INJECTION, POWDER, LYOPHILIZED, FOR SOLUTION INTRAMUSCULAR; INTRAVENOUS at 12:08

## 2022-07-09 RX ADMIN — DAPTOMYCIN 750 MG: 500 INJECTION, POWDER, FOR SOLUTION INTRAVENOUS at 11:27

## 2022-07-10 ENCOUNTER — APPOINTMENT (OUTPATIENT)
Dept: INFUSION THERAPY | Facility: HOSPITAL | Age: 67
End: 2022-07-10

## 2022-07-10 ENCOUNTER — HOSPITAL ENCOUNTER (OUTPATIENT)
Dept: INFUSION THERAPY | Facility: HOSPITAL | Age: 67
Setting detail: INFUSION SERIES
Discharge: HOME | End: 2022-07-10

## 2022-07-10 VITALS
HEART RATE: 80 BPM | SYSTOLIC BLOOD PRESSURE: 123 MMHG | RESPIRATION RATE: 18 BRPM | DIASTOLIC BLOOD PRESSURE: 51 MMHG | TEMPERATURE: 98.6 F

## 2022-07-10 DIAGNOSIS — B95.2 ENTEROCOCCUS FAECALIS INFECTION: Primary | ICD-10-CM

## 2022-07-10 DIAGNOSIS — T81.40XD POSTOPERATIVE INFECTION, UNSPECIFIED TYPE, SUBSEQUENT ENCOUNTER: ICD-10-CM

## 2022-07-10 DIAGNOSIS — M00.9 PYOGENIC ARTHRITIS, PELVIC REGION AND THIGH: ICD-10-CM

## 2022-07-10 PROCEDURE — 96365 THER/PROPH/DIAG IV INF INIT: CPT

## 2022-07-10 PROCEDURE — 25010000002 DAPTOMYCIN PER 1 MG: Performed by: INTERNAL MEDICINE

## 2022-07-10 PROCEDURE — 25010000002 ERTAPENEM PER 500 MG: Performed by: INTERNAL MEDICINE

## 2022-07-10 PROCEDURE — 96367 TX/PROPH/DG ADDL SEQ IV INF: CPT

## 2022-07-10 RX ADMIN — DAPTOMYCIN 750 MG: 500 INJECTION, POWDER, FOR SOLUTION INTRAVENOUS at 10:10

## 2022-07-10 RX ADMIN — ERTAPENEM 1 G: 1 INJECTION, POWDER, LYOPHILIZED, FOR SOLUTION INTRAMUSCULAR; INTRAVENOUS at 09:33

## 2022-07-11 ENCOUNTER — HOSPITAL ENCOUNTER (OUTPATIENT)
Dept: INFUSION THERAPY | Facility: HOSPITAL | Age: 67
Setting detail: INFUSION SERIES
Discharge: HOME | End: 2022-07-11

## 2022-07-11 ENCOUNTER — APPOINTMENT (OUTPATIENT)
Dept: INFUSION THERAPY | Facility: HOSPITAL | Age: 67
End: 2022-07-11

## 2022-07-11 VITALS
SYSTOLIC BLOOD PRESSURE: 110 MMHG | TEMPERATURE: 98.1 F | RESPIRATION RATE: 18 BRPM | DIASTOLIC BLOOD PRESSURE: 59 MMHG | OXYGEN SATURATION: 99 % | HEART RATE: 95 BPM

## 2022-07-11 DIAGNOSIS — B95.2 ENTEROCOCCUS FAECALIS INFECTION: Primary | ICD-10-CM

## 2022-07-11 DIAGNOSIS — M00.9 PYOGENIC ARTHRITIS, PELVIC REGION AND THIGH: ICD-10-CM

## 2022-07-11 DIAGNOSIS — T81.40XD POSTOPERATIVE INFECTION, UNSPECIFIED TYPE, SUBSEQUENT ENCOUNTER: ICD-10-CM

## 2022-07-11 LAB
ANION GAP SERPL CALCULATED.3IONS-SCNC: 9.4 MMOL/L (ref 5–15)
BUN SERPL-MCNC: 9 MG/DL (ref 8–23)
BUN/CREAT SERPL: 11.5 (ref 7–25)
CALCIUM SPEC-SCNC: 8.6 MG/DL (ref 8.6–10.5)
CHLORIDE SERPL-SCNC: 106 MMOL/L (ref 98–107)
CK SERPL-CCNC: 28 U/L (ref 20–180)
CO2 SERPL-SCNC: 24.6 MMOL/L (ref 22–29)
CREAT SERPL-MCNC: 0.78 MG/DL (ref 0.57–1)
EGFRCR SERPLBLD CKD-EPI 2021: 82.3 ML/MIN/1.73
GLUCOSE SERPL-MCNC: 196 MG/DL (ref 65–99)
POTASSIUM SERPL-SCNC: 3.7 MMOL/L (ref 3.5–5.2)
SODIUM SERPL-SCNC: 140 MMOL/L (ref 136–145)

## 2022-07-11 PROCEDURE — 96367 TX/PROPH/DG ADDL SEQ IV INF: CPT

## 2022-07-11 PROCEDURE — 80048 BASIC METABOLIC PNL TOTAL CA: CPT | Performed by: INTERNAL MEDICINE

## 2022-07-11 PROCEDURE — 25010000002 DAPTOMYCIN PER 1 MG: Performed by: INTERNAL MEDICINE

## 2022-07-11 PROCEDURE — 36415 COLL VENOUS BLD VENIPUNCTURE: CPT

## 2022-07-11 PROCEDURE — G0463 HOSPITAL OUTPT CLINIC VISIT: HCPCS

## 2022-07-11 PROCEDURE — 82550 ASSAY OF CK (CPK): CPT | Performed by: INTERNAL MEDICINE

## 2022-07-11 PROCEDURE — 96366 THER/PROPH/DIAG IV INF ADDON: CPT

## 2022-07-11 PROCEDURE — 96365 THER/PROPH/DIAG IV INF INIT: CPT

## 2022-07-11 PROCEDURE — 25010000002 ERTAPENEM PER 500 MG: Performed by: INTERNAL MEDICINE

## 2022-07-11 RX ADMIN — ERTAPENEM 1 G: 1 INJECTION, POWDER, LYOPHILIZED, FOR SOLUTION INTRAMUSCULAR; INTRAVENOUS at 09:07

## 2022-07-11 RX ADMIN — DAPTOMYCIN 750 MG: 500 INJECTION, POWDER, FOR SOLUTION INTRAVENOUS at 10:25

## 2022-07-12 ENCOUNTER — APPOINTMENT (OUTPATIENT)
Dept: INFUSION THERAPY | Facility: HOSPITAL | Age: 67
End: 2022-07-12

## 2022-07-12 ENCOUNTER — HOSPITAL ENCOUNTER (OUTPATIENT)
Dept: INFUSION THERAPY | Facility: HOSPITAL | Age: 67
Setting detail: INFUSION SERIES
Discharge: HOME | End: 2022-07-12

## 2022-07-12 VITALS
HEART RATE: 89 BPM | DIASTOLIC BLOOD PRESSURE: 94 MMHG | TEMPERATURE: 98.1 F | SYSTOLIC BLOOD PRESSURE: 116 MMHG | RESPIRATION RATE: 20 BRPM | OXYGEN SATURATION: 95 %

## 2022-07-12 DIAGNOSIS — M00.9 PYOGENIC ARTHRITIS, PELVIC REGION AND THIGH: ICD-10-CM

## 2022-07-12 DIAGNOSIS — T81.40XD POSTOPERATIVE INFECTION, UNSPECIFIED TYPE, SUBSEQUENT ENCOUNTER: ICD-10-CM

## 2022-07-12 DIAGNOSIS — B95.2 ENTEROCOCCUS FAECALIS INFECTION: Primary | ICD-10-CM

## 2022-07-12 PROCEDURE — 96365 THER/PROPH/DIAG IV INF INIT: CPT

## 2022-07-12 PROCEDURE — 25010000002 ERTAPENEM PER 500 MG: Performed by: INTERNAL MEDICINE

## 2022-07-12 PROCEDURE — 25010000002 DAPTOMYCIN PER 1 MG: Performed by: INTERNAL MEDICINE

## 2022-07-12 PROCEDURE — G0463 HOSPITAL OUTPT CLINIC VISIT: HCPCS

## 2022-07-12 PROCEDURE — 96367 TX/PROPH/DG ADDL SEQ IV INF: CPT

## 2022-07-12 RX ADMIN — DAPTOMYCIN 750 MG: 500 INJECTION, POWDER, FOR SOLUTION INTRAVENOUS at 09:45

## 2022-07-12 RX ADMIN — ERTAPENEM 1 G: 1 INJECTION, POWDER, LYOPHILIZED, FOR SOLUTION INTRAMUSCULAR; INTRAVENOUS at 09:07

## 2022-07-13 ENCOUNTER — APPOINTMENT (OUTPATIENT)
Dept: INFUSION THERAPY | Facility: HOSPITAL | Age: 67
End: 2022-07-13

## 2022-07-13 ENCOUNTER — HOSPITAL ENCOUNTER (OUTPATIENT)
Dept: INFUSION THERAPY | Facility: HOSPITAL | Age: 67
Setting detail: INFUSION SERIES
Discharge: HOME | End: 2022-07-13

## 2022-07-13 VITALS
TEMPERATURE: 97.9 F | HEART RATE: 82 BPM | DIASTOLIC BLOOD PRESSURE: 54 MMHG | OXYGEN SATURATION: 100 % | SYSTOLIC BLOOD PRESSURE: 111 MMHG

## 2022-07-13 DIAGNOSIS — M00.9 PYOGENIC ARTHRITIS, PELVIC REGION AND THIGH: ICD-10-CM

## 2022-07-13 DIAGNOSIS — B95.2 ENTEROCOCCUS FAECALIS INFECTION: Primary | ICD-10-CM

## 2022-07-13 DIAGNOSIS — T81.40XD POSTOPERATIVE INFECTION, UNSPECIFIED TYPE, SUBSEQUENT ENCOUNTER: ICD-10-CM

## 2022-07-13 PROCEDURE — G0463 HOSPITAL OUTPT CLINIC VISIT: HCPCS

## 2022-07-13 PROCEDURE — 25010000002 DAPTOMYCIN PER 1 MG: Performed by: INTERNAL MEDICINE

## 2022-07-13 PROCEDURE — 96367 TX/PROPH/DG ADDL SEQ IV INF: CPT

## 2022-07-13 PROCEDURE — 96365 THER/PROPH/DIAG IV INF INIT: CPT

## 2022-07-13 PROCEDURE — 25010000002 ERTAPENEM PER 500 MG: Performed by: INTERNAL MEDICINE

## 2022-07-13 RX ADMIN — DAPTOMYCIN 750 MG: 500 INJECTION, POWDER, FOR SOLUTION INTRAVENOUS at 10:02

## 2022-07-13 RX ADMIN — ERTAPENEM 1 G: 1 INJECTION, POWDER, LYOPHILIZED, FOR SOLUTION INTRAMUSCULAR; INTRAVENOUS at 09:26

## 2022-07-14 ENCOUNTER — APPOINTMENT (OUTPATIENT)
Dept: INFUSION THERAPY | Facility: HOSPITAL | Age: 67
End: 2022-07-14

## 2022-07-14 ENCOUNTER — HOSPITAL ENCOUNTER (OUTPATIENT)
Dept: INFUSION THERAPY | Facility: HOSPITAL | Age: 67
Setting detail: INFUSION SERIES
Discharge: HOME | End: 2022-07-14

## 2022-07-14 VITALS
HEART RATE: 80 BPM | SYSTOLIC BLOOD PRESSURE: 122 MMHG | DIASTOLIC BLOOD PRESSURE: 50 MMHG | TEMPERATURE: 98.2 F | OXYGEN SATURATION: 96 % | RESPIRATION RATE: 20 BRPM

## 2022-07-14 DIAGNOSIS — B95.2 ENTEROCOCCUS FAECALIS INFECTION: Primary | ICD-10-CM

## 2022-07-14 DIAGNOSIS — T81.40XD POSTOPERATIVE INFECTION, UNSPECIFIED TYPE, SUBSEQUENT ENCOUNTER: ICD-10-CM

## 2022-07-14 DIAGNOSIS — M00.9 PYOGENIC ARTHRITIS, PELVIC REGION AND THIGH: ICD-10-CM

## 2022-07-14 PROCEDURE — G0463 HOSPITAL OUTPT CLINIC VISIT: HCPCS

## 2022-07-14 PROCEDURE — 96365 THER/PROPH/DIAG IV INF INIT: CPT

## 2022-07-14 PROCEDURE — 25010000002 DAPTOMYCIN PER 1 MG: Performed by: INTERNAL MEDICINE

## 2022-07-14 PROCEDURE — 96367 TX/PROPH/DG ADDL SEQ IV INF: CPT

## 2022-07-14 PROCEDURE — 25010000002 ERTAPENEM PER 500 MG: Performed by: INTERNAL MEDICINE

## 2022-07-14 RX ADMIN — ERTAPENEM SODIUM 1 G: 1 INJECTION, POWDER, LYOPHILIZED, FOR SOLUTION INTRAMUSCULAR; INTRAVENOUS at 09:38

## 2022-07-14 RX ADMIN — DAPTOMYCIN 750 MG: 500 INJECTION, POWDER, FOR SOLUTION INTRAVENOUS at 09:04

## 2022-07-14 NOTE — ADDENDUM NOTE
Encounter addended by: Rosalinda Calles RP on: 7/14/2022 3:00 PM   Actions taken: Specialty comments modified

## 2022-07-15 ENCOUNTER — HOSPITAL ENCOUNTER (OUTPATIENT)
Dept: INFUSION THERAPY | Facility: HOSPITAL | Age: 67
Setting detail: INFUSION SERIES
Discharge: HOME | End: 2022-07-15

## 2022-07-15 ENCOUNTER — APPOINTMENT (OUTPATIENT)
Dept: INFUSION THERAPY | Facility: HOSPITAL | Age: 67
End: 2022-07-15

## 2022-07-15 ENCOUNTER — HOSPITAL ENCOUNTER (OUTPATIENT)
Dept: INFUSION THERAPY | Facility: HOSPITAL | Age: 67
Setting detail: INFUSION SERIES
End: 2022-07-15

## 2022-07-15 VITALS
OXYGEN SATURATION: 100 % | TEMPERATURE: 98.3 F | HEART RATE: 79 BPM | SYSTOLIC BLOOD PRESSURE: 134 MMHG | RESPIRATION RATE: 20 BRPM | DIASTOLIC BLOOD PRESSURE: 57 MMHG

## 2022-07-15 DIAGNOSIS — B95.2 ENTEROCOCCUS FAECALIS INFECTION: Primary | ICD-10-CM

## 2022-07-15 DIAGNOSIS — M00.9 PYOGENIC ARTHRITIS, PELVIC REGION AND THIGH: ICD-10-CM

## 2022-07-15 DIAGNOSIS — T81.40XD POSTOPERATIVE INFECTION, UNSPECIFIED TYPE, SUBSEQUENT ENCOUNTER: ICD-10-CM

## 2022-07-15 PROCEDURE — 25010000002 DAPTOMYCIN PER 1 MG: Performed by: INTERNAL MEDICINE

## 2022-07-15 PROCEDURE — 96365 THER/PROPH/DIAG IV INF INIT: CPT

## 2022-07-15 PROCEDURE — G0463 HOSPITAL OUTPT CLINIC VISIT: HCPCS

## 2022-07-15 PROCEDURE — 96367 TX/PROPH/DG ADDL SEQ IV INF: CPT

## 2022-07-15 PROCEDURE — 25010000002 ERTAPENEM PER 500 MG: Performed by: INTERNAL MEDICINE

## 2022-07-15 RX ORDER — POTASSIUM CHLORIDE 1500 MG/1
20 TABLET, EXTENDED RELEASE ORAL DAILY
COMMUNITY
Start: 2022-07-07

## 2022-07-15 RX ORDER — LOSARTAN POTASSIUM 25 MG/1
25 TABLET ORAL DAILY
COMMUNITY
Start: 2022-07-10 | End: 2022-07-18

## 2022-07-15 RX ORDER — FUROSEMIDE 40 MG/1
40 TABLET ORAL DAILY
COMMUNITY
Start: 2022-07-07

## 2022-07-15 RX ADMIN — DAPTOMYCIN 750 MG: 500 INJECTION, POWDER, FOR SOLUTION INTRAVENOUS at 08:59

## 2022-07-15 RX ADMIN — ERTAPENEM SODIUM 1 G: 1 INJECTION, POWDER, LYOPHILIZED, FOR SOLUTION INTRAMUSCULAR; INTRAVENOUS at 09:34

## 2022-07-18 ENCOUNTER — APPOINTMENT (OUTPATIENT)
Dept: GENERAL RADIOLOGY | Facility: HOSPITAL | Age: 67
End: 2022-07-18

## 2022-07-18 ENCOUNTER — HOSPITAL ENCOUNTER (INPATIENT)
Facility: HOSPITAL | Age: 67
LOS: 4 days | Discharge: HOME HEALTH | End: 2022-07-22
Attending: EMERGENCY MEDICINE | Admitting: INTERNAL MEDICINE

## 2022-07-18 DIAGNOSIS — R06.09 DYSPNEA ON EXERTION: ICD-10-CM

## 2022-07-18 DIAGNOSIS — R26.2 DIFFICULTY WALKING: ICD-10-CM

## 2022-07-18 DIAGNOSIS — Z78.9 FAILURE OF OUTPATIENT TREATMENT: ICD-10-CM

## 2022-07-18 DIAGNOSIS — Z78.9 DECREASED ACTIVITIES OF DAILY LIVING (ADL): ICD-10-CM

## 2022-07-18 DIAGNOSIS — R07.89 CHEST DISCOMFORT: ICD-10-CM

## 2022-07-18 DIAGNOSIS — I50.9 ACUTE ON CHRONIC CONGESTIVE HEART FAILURE, UNSPECIFIED HEART FAILURE TYPE: Primary | ICD-10-CM

## 2022-07-18 LAB
ALBUMIN SERPL-MCNC: 3.1 G/DL (ref 3.5–5.2)
ALBUMIN/GLOB SERPL: 0.7 G/DL
ALP SERPL-CCNC: 123 U/L (ref 39–117)
ALT SERPL W P-5'-P-CCNC: 9 U/L (ref 1–33)
ANION GAP SERPL CALCULATED.3IONS-SCNC: 7.3 MMOL/L (ref 5–15)
AST SERPL-CCNC: 21 U/L (ref 1–32)
BASOPHILS # BLD AUTO: 0.03 10*3/MM3 (ref 0–0.2)
BASOPHILS NFR BLD AUTO: 0.6 % (ref 0–1.5)
BILIRUB SERPL-MCNC: 0.4 MG/DL (ref 0–1.2)
BUN SERPL-MCNC: 11 MG/DL (ref 8–23)
BUN/CREAT SERPL: 11.3 (ref 7–25)
CALCIUM SPEC-SCNC: 9.2 MG/DL (ref 8.6–10.5)
CHLORIDE SERPL-SCNC: 100 MMOL/L (ref 98–107)
CO2 SERPL-SCNC: 28.7 MMOL/L (ref 22–29)
CREAT SERPL-MCNC: 0.97 MG/DL (ref 0.57–1)
DEPRECATED RDW RBC AUTO: 51.2 FL (ref 37–54)
EGFRCR SERPLBLD CKD-EPI 2021: 63.4 ML/MIN/1.73
EOSINOPHIL # BLD AUTO: 0.38 10*3/MM3 (ref 0–0.4)
EOSINOPHIL NFR BLD AUTO: 7.1 % (ref 0.3–6.2)
ERYTHROCYTE [DISTWIDTH] IN BLOOD BY AUTOMATED COUNT: 15.9 % (ref 12.3–15.4)
GLOBULIN UR ELPH-MCNC: 4.2 GM/DL
GLUCOSE BLDC GLUCOMTR-MCNC: 257 MG/DL (ref 70–99)
GLUCOSE SERPL-MCNC: 242 MG/DL (ref 65–99)
HCT VFR BLD AUTO: 34.8 % (ref 34–46.6)
HGB BLD-MCNC: 10.8 G/DL (ref 12–15.9)
HOLD SPECIMEN: NORMAL
HOLD SPECIMEN: NORMAL
IMM GRANULOCYTES # BLD AUTO: 0.02 10*3/MM3 (ref 0–0.05)
IMM GRANULOCYTES NFR BLD AUTO: 0.4 % (ref 0–0.5)
LYMPHOCYTES # BLD AUTO: 1.06 10*3/MM3 (ref 0.7–3.1)
LYMPHOCYTES NFR BLD AUTO: 19.7 % (ref 19.6–45.3)
MCH RBC QN AUTO: 27.4 PG (ref 26.6–33)
MCHC RBC AUTO-ENTMCNC: 31 G/DL (ref 31.5–35.7)
MCV RBC AUTO: 88.3 FL (ref 79–97)
MONOCYTES # BLD AUTO: 0.5 10*3/MM3 (ref 0.1–0.9)
MONOCYTES NFR BLD AUTO: 9.3 % (ref 5–12)
NEUTROPHILS NFR BLD AUTO: 3.38 10*3/MM3 (ref 1.7–7)
NEUTROPHILS NFR BLD AUTO: 62.9 % (ref 42.7–76)
NRBC BLD AUTO-RTO: 0 /100 WBC (ref 0–0.2)
NT-PROBNP SERPL-MCNC: 1057 PG/ML (ref 0–900)
PLATELET # BLD AUTO: 172 10*3/MM3 (ref 140–450)
PMV BLD AUTO: 9.8 FL (ref 6–12)
POTASSIUM SERPL-SCNC: 4 MMOL/L (ref 3.5–5.2)
PROT SERPL-MCNC: 7.3 G/DL (ref 6–8.5)
RBC # BLD AUTO: 3.94 10*6/MM3 (ref 3.77–5.28)
SODIUM SERPL-SCNC: 136 MMOL/L (ref 136–145)
TROPONIN T SERPL-MCNC: <0.01 NG/ML (ref 0–0.03)
WBC NRBC COR # BLD: 5.37 10*3/MM3 (ref 3.4–10.8)
WHOLE BLOOD HOLD COAG: NORMAL
WHOLE BLOOD HOLD SPECIMEN: NORMAL

## 2022-07-18 PROCEDURE — 99284 EMERGENCY DEPT VISIT MOD MDM: CPT

## 2022-07-18 PROCEDURE — 84484 ASSAY OF TROPONIN QUANT: CPT

## 2022-07-18 PROCEDURE — 94799 UNLISTED PULMONARY SVC/PX: CPT

## 2022-07-18 PROCEDURE — 36415 COLL VENOUS BLD VENIPUNCTURE: CPT

## 2022-07-18 PROCEDURE — 71045 X-RAY EXAM CHEST 1 VIEW: CPT

## 2022-07-18 PROCEDURE — 83880 ASSAY OF NATRIURETIC PEPTIDE: CPT

## 2022-07-18 PROCEDURE — 63710000001 INSULIN DETEMIR PER 5 UNITS: Performed by: INTERNAL MEDICINE

## 2022-07-18 PROCEDURE — 93005 ELECTROCARDIOGRAM TRACING: CPT

## 2022-07-18 PROCEDURE — 82962 GLUCOSE BLOOD TEST: CPT

## 2022-07-18 PROCEDURE — 85025 COMPLETE CBC W/AUTO DIFF WBC: CPT

## 2022-07-18 PROCEDURE — 80053 COMPREHEN METABOLIC PANEL: CPT

## 2022-07-18 PROCEDURE — 99223 1ST HOSP IP/OBS HIGH 75: CPT | Performed by: INTERNAL MEDICINE

## 2022-07-18 PROCEDURE — 25010000002 ENOXAPARIN PER 10 MG: Performed by: INTERNAL MEDICINE

## 2022-07-18 PROCEDURE — 93010 ELECTROCARDIOGRAM REPORT: CPT | Performed by: INTERNAL MEDICINE

## 2022-07-18 PROCEDURE — 25010000002 FUROSEMIDE PER 20 MG: Performed by: EMERGENCY MEDICINE

## 2022-07-18 PROCEDURE — 93005 ELECTROCARDIOGRAM TRACING: CPT | Performed by: EMERGENCY MEDICINE

## 2022-07-18 RX ORDER — SODIUM CHLORIDE 9 MG/ML
40 INJECTION, SOLUTION INTRAVENOUS AS NEEDED
Status: DISCONTINUED | OUTPATIENT
Start: 2022-07-18 | End: 2022-07-22 | Stop reason: HOSPADM

## 2022-07-18 RX ORDER — OXYCODONE HYDROCHLORIDE AND ACETAMINOPHEN 5; 325 MG/1; MG/1
1 TABLET ORAL EVERY 4 HOURS PRN
Status: DISCONTINUED | OUTPATIENT
Start: 2022-07-18 | End: 2022-07-22 | Stop reason: HOSPADM

## 2022-07-18 RX ORDER — GABAPENTIN 300 MG/1
600 CAPSULE ORAL EVERY 12 HOURS SCHEDULED
Status: DISCONTINUED | OUTPATIENT
Start: 2022-07-18 | End: 2022-07-22 | Stop reason: HOSPADM

## 2022-07-18 RX ORDER — CETIRIZINE HYDROCHLORIDE 10 MG/1
10 TABLET ORAL DAILY
Status: DISCONTINUED | OUTPATIENT
Start: 2022-07-19 | End: 2022-07-22 | Stop reason: HOSPADM

## 2022-07-18 RX ORDER — ISOSORBIDE MONONITRATE 30 MG/1
30 TABLET, EXTENDED RELEASE ORAL EVERY MORNING
Status: DISCONTINUED | OUTPATIENT
Start: 2022-07-19 | End: 2022-07-22 | Stop reason: HOSPADM

## 2022-07-18 RX ORDER — FUROSEMIDE 40 MG/1
40 TABLET ORAL DAILY
Status: DISCONTINUED | OUTPATIENT
Start: 2022-07-19 | End: 2022-07-19

## 2022-07-18 RX ORDER — SODIUM CHLORIDE 0.9 % (FLUSH) 0.9 %
10 SYRINGE (ML) INJECTION AS NEEDED
Status: DISCONTINUED | OUTPATIENT
Start: 2022-07-18 | End: 2022-07-19

## 2022-07-18 RX ORDER — ENOXAPARIN SODIUM 100 MG/ML
40 INJECTION SUBCUTANEOUS DAILY
Status: DISCONTINUED | OUTPATIENT
Start: 2022-07-18 | End: 2022-07-19

## 2022-07-18 RX ORDER — FERROUS SULFATE 325(65) MG
325 TABLET ORAL EVERY OTHER DAY
Status: DISCONTINUED | OUTPATIENT
Start: 2022-07-20 | End: 2022-07-22 | Stop reason: HOSPADM

## 2022-07-18 RX ORDER — SODIUM CHLORIDE 0.9 % (FLUSH) 0.9 %
10 SYRINGE (ML) INJECTION AS NEEDED
Status: DISCONTINUED | OUTPATIENT
Start: 2022-07-18 | End: 2022-07-22 | Stop reason: HOSPADM

## 2022-07-18 RX ORDER — METOPROLOL SUCCINATE 25 MG/1
25 TABLET, EXTENDED RELEASE ORAL DAILY
Status: DISCONTINUED | OUTPATIENT
Start: 2022-07-19 | End: 2022-07-20

## 2022-07-18 RX ORDER — FOLIC ACID 1 MG/1
1000 TABLET ORAL DAILY
Status: DISCONTINUED | OUTPATIENT
Start: 2022-07-19 | End: 2022-07-22 | Stop reason: HOSPADM

## 2022-07-18 RX ORDER — PANTOPRAZOLE SODIUM 40 MG/1
40 TABLET, DELAYED RELEASE ORAL EVERY MORNING
Status: DISCONTINUED | OUTPATIENT
Start: 2022-07-19 | End: 2022-07-22 | Stop reason: HOSPADM

## 2022-07-18 RX ORDER — SODIUM CHLORIDE 0.9 % (FLUSH) 0.9 %
10 SYRINGE (ML) INJECTION EVERY 12 HOURS SCHEDULED
Status: DISCONTINUED | OUTPATIENT
Start: 2022-07-18 | End: 2022-07-22 | Stop reason: HOSPADM

## 2022-07-18 RX ORDER — FUROSEMIDE 10 MG/ML
80 INJECTION INTRAMUSCULAR; INTRAVENOUS ONCE
Status: COMPLETED | OUTPATIENT
Start: 2022-07-18 | End: 2022-07-18

## 2022-07-18 RX ORDER — ACETAMINOPHEN 325 MG/1
650 TABLET ORAL EVERY 4 HOURS PRN
Status: DISCONTINUED | OUTPATIENT
Start: 2022-07-18 | End: 2022-07-22 | Stop reason: HOSPADM

## 2022-07-18 RX ADMIN — INSULIN DETEMIR 30 UNITS: 100 INJECTION, SOLUTION SUBCUTANEOUS at 23:38

## 2022-07-18 RX ADMIN — ENOXAPARIN SODIUM 40 MG: 100 INJECTION SUBCUTANEOUS at 23:37

## 2022-07-18 RX ADMIN — Medication 10 ML: at 23:38

## 2022-07-18 RX ADMIN — OXYCODONE HYDROCHLORIDE AND ACETAMINOPHEN 1 TABLET: 5; 325 TABLET ORAL at 23:37

## 2022-07-18 RX ADMIN — FUROSEMIDE 80 MG: 10 INJECTION, SOLUTION INTRAMUSCULAR; INTRAVENOUS at 19:17

## 2022-07-18 RX ADMIN — GABAPENTIN 600 MG: 300 CAPSULE ORAL at 23:37

## 2022-07-19 ENCOUNTER — APPOINTMENT (OUTPATIENT)
Dept: INFUSION THERAPY | Facility: HOSPITAL | Age: 67
End: 2022-07-19

## 2022-07-19 ENCOUNTER — APPOINTMENT (OUTPATIENT)
Dept: CARDIOLOGY | Facility: HOSPITAL | Age: 67
End: 2022-07-19

## 2022-07-19 LAB
ALBUMIN SERPL-MCNC: 2.6 G/DL (ref 3.5–5.2)
ALBUMIN/GLOB SERPL: 0.8 G/DL
ALP SERPL-CCNC: 94 U/L (ref 39–117)
ALT SERPL W P-5'-P-CCNC: 7 U/L (ref 1–33)
ANION GAP SERPL CALCULATED.3IONS-SCNC: 8 MMOL/L (ref 5–15)
AST SERPL-CCNC: 17 U/L (ref 1–32)
BASOPHILS # BLD AUTO: 0.03 10*3/MM3 (ref 0–0.2)
BASOPHILS NFR BLD AUTO: 0.9 % (ref 0–1.5)
BILIRUB SERPL-MCNC: 0.4 MG/DL (ref 0–1.2)
BUN SERPL-MCNC: 11 MG/DL (ref 8–23)
BUN/CREAT SERPL: 11.7 (ref 7–25)
CALCIUM SPEC-SCNC: 8.8 MG/DL (ref 8.6–10.5)
CHLORIDE SERPL-SCNC: 100 MMOL/L (ref 98–107)
CO2 SERPL-SCNC: 28 MMOL/L (ref 22–29)
CREAT SERPL-MCNC: 0.94 MG/DL (ref 0.57–1)
DEPRECATED RDW RBC AUTO: 50.7 FL (ref 37–54)
EGFRCR SERPLBLD CKD-EPI 2021: 65.8 ML/MIN/1.73
EOSINOPHIL # BLD AUTO: 0.19 10*3/MM3 (ref 0–0.4)
EOSINOPHIL NFR BLD AUTO: 5.9 % (ref 0.3–6.2)
ERYTHROCYTE [DISTWIDTH] IN BLOOD BY AUTOMATED COUNT: 15.9 % (ref 12.3–15.4)
FOLATE SERPL-MCNC: >20 NG/ML (ref 4.78–24.2)
GLOBULIN UR ELPH-MCNC: 3.3 GM/DL
GLUCOSE BLDC GLUCOMTR-MCNC: 196 MG/DL (ref 70–99)
GLUCOSE BLDC GLUCOMTR-MCNC: 207 MG/DL (ref 70–99)
GLUCOSE BLDC GLUCOMTR-MCNC: 215 MG/DL (ref 70–99)
GLUCOSE BLDC GLUCOMTR-MCNC: 293 MG/DL (ref 70–99)
GLUCOSE SERPL-MCNC: 255 MG/DL (ref 65–99)
HCT VFR BLD AUTO: 28 % (ref 34–46.6)
HGB BLD-MCNC: 8.8 G/DL (ref 12–15.9)
IMM GRANULOCYTES # BLD AUTO: 0 10*3/MM3 (ref 0–0.05)
IMM GRANULOCYTES NFR BLD AUTO: 0 % (ref 0–0.5)
IRON 24H UR-MRATE: 41 MCG/DL (ref 37–145)
IRON SATN MFR SERPL: 15 % (ref 20–50)
LYMPHOCYTES # BLD AUTO: 0.88 10*3/MM3 (ref 0.7–3.1)
LYMPHOCYTES NFR BLD AUTO: 27.5 % (ref 19.6–45.3)
MAGNESIUM SERPL-MCNC: 1.6 MG/DL (ref 1.6–2.4)
MCH RBC QN AUTO: 27.5 PG (ref 26.6–33)
MCHC RBC AUTO-ENTMCNC: 31.4 G/DL (ref 31.5–35.7)
MCV RBC AUTO: 87.5 FL (ref 79–97)
MONOCYTES # BLD AUTO: 0.39 10*3/MM3 (ref 0.1–0.9)
MONOCYTES NFR BLD AUTO: 12.2 % (ref 5–12)
NEUTROPHILS NFR BLD AUTO: 1.71 10*3/MM3 (ref 1.7–7)
NEUTROPHILS NFR BLD AUTO: 53.5 % (ref 42.7–76)
NRBC BLD AUTO-RTO: 0 /100 WBC (ref 0–0.2)
PLATELET # BLD AUTO: 116 10*3/MM3 (ref 140–450)
PMV BLD AUTO: 10 FL (ref 6–12)
POTASSIUM SERPL-SCNC: 3.7 MMOL/L (ref 3.5–5.2)
PROT SERPL-MCNC: 5.9 G/DL (ref 6–8.5)
QT INTERVAL: 411 MS
RBC # BLD AUTO: 3.2 10*6/MM3 (ref 3.77–5.28)
SODIUM SERPL-SCNC: 136 MMOL/L (ref 136–145)
TIBC SERPL-MCNC: 271 MCG/DL (ref 298–536)
TRANSFERRIN SERPL-MCNC: 182 MG/DL (ref 200–360)
TROPONIN T SERPL-MCNC: <0.01 NG/ML (ref 0–0.03)
VIT B12 BLD-MCNC: 373 PG/ML (ref 211–946)
WBC NRBC COR # BLD: 3.2 10*3/MM3 (ref 3.4–10.8)

## 2022-07-19 PROCEDURE — 84466 ASSAY OF TRANSFERRIN: CPT | Performed by: INTERNAL MEDICINE

## 2022-07-19 PROCEDURE — 82607 VITAMIN B-12: CPT | Performed by: INTERNAL MEDICINE

## 2022-07-19 PROCEDURE — 83735 ASSAY OF MAGNESIUM: CPT | Performed by: INTERNAL MEDICINE

## 2022-07-19 PROCEDURE — 84484 ASSAY OF TROPONIN QUANT: CPT | Performed by: INTERNAL MEDICINE

## 2022-07-19 PROCEDURE — 93306 TTE W/DOPPLER COMPLETE: CPT

## 2022-07-19 PROCEDURE — 97161 PT EVAL LOW COMPLEX 20 MIN: CPT

## 2022-07-19 PROCEDURE — 85025 COMPLETE CBC W/AUTO DIFF WBC: CPT | Performed by: INTERNAL MEDICINE

## 2022-07-19 PROCEDURE — 63710000001 INSULIN DETEMIR PER 5 UNITS: Performed by: INTERNAL MEDICINE

## 2022-07-19 PROCEDURE — 25010000002 MAGNESIUM SULFATE 2 GM/50ML SOLUTION: Performed by: INTERNAL MEDICINE

## 2022-07-19 PROCEDURE — 82962 GLUCOSE BLOOD TEST: CPT

## 2022-07-19 PROCEDURE — 97530 THERAPEUTIC ACTIVITIES: CPT

## 2022-07-19 PROCEDURE — 80053 COMPREHEN METABOLIC PANEL: CPT | Performed by: INTERNAL MEDICINE

## 2022-07-19 PROCEDURE — 25010000002 ENOXAPARIN PER 10 MG: Performed by: INTERNAL MEDICINE

## 2022-07-19 PROCEDURE — 97165 OT EVAL LOW COMPLEX 30 MIN: CPT

## 2022-07-19 PROCEDURE — 25010000002 FUROSEMIDE PER 20 MG: Performed by: INTERNAL MEDICINE

## 2022-07-19 PROCEDURE — 82746 ASSAY OF FOLIC ACID SERUM: CPT | Performed by: INTERNAL MEDICINE

## 2022-07-19 PROCEDURE — 63710000001 INSULIN LISPRO (HUMAN) PER 5 UNITS: Performed by: INTERNAL MEDICINE

## 2022-07-19 PROCEDURE — 83540 ASSAY OF IRON: CPT | Performed by: INTERNAL MEDICINE

## 2022-07-19 PROCEDURE — 99233 SBSQ HOSP IP/OBS HIGH 50: CPT | Performed by: INTERNAL MEDICINE

## 2022-07-19 RX ORDER — NICOTINE POLACRILEX 4 MG
24 LOZENGE BUCCAL
Status: DISCONTINUED | OUTPATIENT
Start: 2022-07-19 | End: 2022-07-22 | Stop reason: HOSPADM

## 2022-07-19 RX ORDER — FUROSEMIDE 10 MG/ML
40 INJECTION INTRAMUSCULAR; INTRAVENOUS 2 TIMES DAILY
Status: DISCONTINUED | OUTPATIENT
Start: 2022-07-19 | End: 2022-07-22 | Stop reason: HOSPADM

## 2022-07-19 RX ORDER — SACCHAROMYCES BOULARDII 250 MG
250 CAPSULE ORAL 2 TIMES DAILY
Status: DISCONTINUED | OUTPATIENT
Start: 2022-07-19 | End: 2022-07-22 | Stop reason: HOSPADM

## 2022-07-19 RX ORDER — DEXTROSE MONOHYDRATE 25 G/50ML
25 INJECTION, SOLUTION INTRAVENOUS
Status: DISCONTINUED | OUTPATIENT
Start: 2022-07-19 | End: 2022-07-22 | Stop reason: HOSPADM

## 2022-07-19 RX ORDER — MAGNESIUM SULFATE HEPTAHYDRATE 40 MG/ML
2 INJECTION, SOLUTION INTRAVENOUS ONCE
Status: COMPLETED | OUTPATIENT
Start: 2022-07-19 | End: 2022-07-19

## 2022-07-19 RX ORDER — INSULIN LISPRO 100 [IU]/ML
0-7 INJECTION, SOLUTION INTRAVENOUS; SUBCUTANEOUS
Status: DISCONTINUED | OUTPATIENT
Start: 2022-07-19 | End: 2022-07-22 | Stop reason: HOSPADM

## 2022-07-19 RX ORDER — ENOXAPARIN SODIUM 100 MG/ML
40 INJECTION SUBCUTANEOUS EVERY 12 HOURS
Status: DISCONTINUED | OUTPATIENT
Start: 2022-07-19 | End: 2022-07-22 | Stop reason: HOSPADM

## 2022-07-19 RX ADMIN — INSULIN LISPRO 3 UNITS: 100 INJECTION, SOLUTION INTRAVENOUS; SUBCUTANEOUS at 22:00

## 2022-07-19 RX ADMIN — FUROSEMIDE 40 MG: 10 INJECTION, SOLUTION INTRAMUSCULAR; INTRAVENOUS at 22:00

## 2022-07-19 RX ADMIN — INSULIN LISPRO 2 UNITS: 100 INJECTION, SOLUTION INTRAVENOUS; SUBCUTANEOUS at 08:57

## 2022-07-19 RX ADMIN — CETIRIZINE HYDROCHLORIDE 10 MG: 10 TABLET, FILM COATED ORAL at 08:57

## 2022-07-19 RX ADMIN — ENOXAPARIN SODIUM 40 MG: 100 INJECTION SUBCUTANEOUS at 08:57

## 2022-07-19 RX ADMIN — METOPROLOL SUCCINATE 25 MG: 25 TABLET, EXTENDED RELEASE ORAL at 08:58

## 2022-07-19 RX ADMIN — INSULIN LISPRO 4 UNITS: 100 INJECTION, SOLUTION INTRAVENOUS; SUBCUTANEOUS at 12:33

## 2022-07-19 RX ADMIN — INSULIN DETEMIR 15 UNITS: 100 INJECTION, SOLUTION SUBCUTANEOUS at 15:50

## 2022-07-19 RX ADMIN — FOLIC ACID 1000 MCG: 1 TABLET ORAL at 08:58

## 2022-07-19 RX ADMIN — ENOXAPARIN SODIUM 40 MG: 100 INJECTION SUBCUTANEOUS at 22:00

## 2022-07-19 RX ADMIN — GABAPENTIN 600 MG: 300 CAPSULE ORAL at 22:00

## 2022-07-19 RX ADMIN — OXYCODONE HYDROCHLORIDE AND ACETAMINOPHEN 1 TABLET: 5; 325 TABLET ORAL at 11:37

## 2022-07-19 RX ADMIN — Medication 250 MG: at 22:13

## 2022-07-19 RX ADMIN — INSULIN DETEMIR 30 UNITS: 100 INJECTION, SOLUTION SUBCUTANEOUS at 22:00

## 2022-07-19 RX ADMIN — INSULIN LISPRO 3 UNITS: 100 INJECTION, SOLUTION INTRAVENOUS; SUBCUTANEOUS at 17:38

## 2022-07-19 RX ADMIN — Medication 10 ML: at 08:58

## 2022-07-19 RX ADMIN — Medication 10 ML: at 22:00

## 2022-07-19 RX ADMIN — MAGNESIUM SULFATE HEPTAHYDRATE 2 G: 40 INJECTION, SOLUTION INTRAVENOUS at 12:32

## 2022-07-19 RX ADMIN — ISOSORBIDE MONONITRATE 30 MG: 30 TABLET, EXTENDED RELEASE ORAL at 06:33

## 2022-07-19 RX ADMIN — PANTOPRAZOLE SODIUM 40 MG: 40 TABLET, DELAYED RELEASE ORAL at 06:33

## 2022-07-19 RX ADMIN — Medication 5000 UNITS: at 08:58

## 2022-07-19 RX ADMIN — GABAPENTIN 600 MG: 300 CAPSULE ORAL at 08:58

## 2022-07-19 RX ADMIN — FUROSEMIDE 40 MG: 40 TABLET ORAL at 08:58

## 2022-07-20 LAB
027 TOXIN: NORMAL
ANION GAP SERPL CALCULATED.3IONS-SCNC: 7.2 MMOL/L (ref 5–15)
ASCENDING AORTA: 3.6 CM
BASOPHILS # BLD AUTO: 0.02 10*3/MM3 (ref 0–0.2)
BASOPHILS NFR BLD AUTO: 0.7 % (ref 0–1.5)
BH CV ECHO MEAS - AO MAX PG: 23 MMHG
BH CV ECHO MEAS - AO MEAN PG: 14 MMHG
BH CV ECHO MEAS - AO ROOT DIAM: 3.2 CM
BH CV ECHO MEAS - AO V2 MAX: 241 CM/SEC
BH CV ECHO MEAS - AO V2 VTI: 61 CM
BH CV ECHO MEAS - AVA(I,D): 2.1 CM2
BH CV ECHO MEAS - EDV(MOD-SP2): 37 ML
BH CV ECHO MEAS - EDV(MOD-SP4): 97 ML
BH CV ECHO MEAS - EF(MOD-BP): 71 %
BH CV ECHO MEAS - ESV(MOD-SP2): 11 ML
BH CV ECHO MEAS - ESV(MOD-SP4): 29 ML
BH CV ECHO MEAS - IVSD: 1.2 CM
BH CV ECHO MEAS - LA DIMENSION: 4.7 CM
BH CV ECHO MEAS - LAT PEAK E' VEL: 6.5 CM/SEC
BH CV ECHO MEAS - LV MAX PG: 12 MMHG
BH CV ECHO MEAS - LV MEAN PG: 8 MMHG
BH CV ECHO MEAS - LV V1 MAX: 175 CM/SEC
BH CV ECHO MEAS - LV V1 VTI: 42 CM
BH CV ECHO MEAS - LVIDD: 4.3 CM
BH CV ECHO MEAS - LVIDS: 2 CM
BH CV ECHO MEAS - LVOT DIAM: 2 CM
BH CV ECHO MEAS - LVPWD: 1.3 CM
BH CV ECHO MEAS - MED PEAK E' VEL: 6.7 CM/SEC
BH CV ECHO MEAS - MV A MAX VEL: 114 CM/SEC
BH CV ECHO MEAS - MV DEC TIME: 240 MSEC
BH CV ECHO MEAS - MV E MAX VEL: 99 CM/SEC
BH CV ECHO MEAS - MV E/A: 0.9
BH CV ECHO MEAS - RAP SYSTOLE: 3 MMHG
BH CV ECHO MEAS - RVDD: 3.1 CM
BH CV ECHO MEAS - RVSP: 34 MMHG
BH CV ECHO MEAS - TR MAX PG: 31 MMHG
BH CV ECHO MEAS - TR MAX VEL: 280 CM/SEC
BH CV ECHO MEASUREMENTS AVERAGE E/E' RATIO: 15
BUN SERPL-MCNC: 10 MG/DL (ref 8–23)
BUN/CREAT SERPL: 10.6 (ref 7–25)
C DIFF TOX GENS STL QL NAA+PROBE: NEGATIVE
CALCIUM SPEC-SCNC: 9.1 MG/DL (ref 8.6–10.5)
CHLORIDE SERPL-SCNC: 100 MMOL/L (ref 98–107)
CO2 SERPL-SCNC: 29.8 MMOL/L (ref 22–29)
CREAT SERPL-MCNC: 0.94 MG/DL (ref 0.57–1)
DEPRECATED RDW RBC AUTO: 50.6 FL (ref 37–54)
EGFRCR SERPLBLD CKD-EPI 2021: 65.8 ML/MIN/1.73
EOSINOPHIL # BLD AUTO: 0.19 10*3/MM3 (ref 0–0.4)
EOSINOPHIL NFR BLD AUTO: 6.2 % (ref 0.3–6.2)
ERYTHROCYTE [DISTWIDTH] IN BLOOD BY AUTOMATED COUNT: 15.9 % (ref 12.3–15.4)
GLUCOSE BLDC GLUCOMTR-MCNC: 174 MG/DL (ref 70–99)
GLUCOSE BLDC GLUCOMTR-MCNC: 202 MG/DL (ref 70–99)
GLUCOSE BLDC GLUCOMTR-MCNC: 237 MG/DL (ref 70–99)
GLUCOSE SERPL-MCNC: 171 MG/DL (ref 65–99)
HCT VFR BLD AUTO: 30.1 % (ref 34–46.6)
HGB BLD-MCNC: 9.3 G/DL (ref 12–15.9)
IMM GRANULOCYTES # BLD AUTO: 0.01 10*3/MM3 (ref 0–0.05)
IMM GRANULOCYTES NFR BLD AUTO: 0.3 % (ref 0–0.5)
IVRT: 98 MSEC
LEFT ATRIUM VOLUME INDEX: 32.4 ML/M2
LYMPHOCYTES # BLD AUTO: 0.76 10*3/MM3 (ref 0.7–3.1)
LYMPHOCYTES NFR BLD AUTO: 24.9 % (ref 19.6–45.3)
MAGNESIUM SERPL-MCNC: 2 MG/DL (ref 1.6–2.4)
MAXIMAL PREDICTED HEART RATE: 151 BPM
MCH RBC QN AUTO: 27 PG (ref 26.6–33)
MCHC RBC AUTO-ENTMCNC: 30.9 G/DL (ref 31.5–35.7)
MCV RBC AUTO: 87.5 FL (ref 79–97)
MONOCYTES # BLD AUTO: 0.32 10*3/MM3 (ref 0.1–0.9)
MONOCYTES NFR BLD AUTO: 10.5 % (ref 5–12)
NEUTROPHILS NFR BLD AUTO: 1.75 10*3/MM3 (ref 1.7–7)
NEUTROPHILS NFR BLD AUTO: 57.4 % (ref 42.7–76)
NRBC BLD AUTO-RTO: 0 /100 WBC (ref 0–0.2)
PHOSPHATE SERPL-MCNC: 3.7 MG/DL (ref 2.5–4.5)
PLATELET # BLD AUTO: 130 10*3/MM3 (ref 140–450)
PMV BLD AUTO: 10.2 FL (ref 6–12)
POTASSIUM SERPL-SCNC: 4.1 MMOL/L (ref 3.5–5.2)
RBC # BLD AUTO: 3.44 10*6/MM3 (ref 3.77–5.28)
SODIUM SERPL-SCNC: 137 MMOL/L (ref 136–145)
STRESS TARGET HR: 128 BPM
WBC NRBC COR # BLD: 3.05 10*3/MM3 (ref 3.4–10.8)

## 2022-07-20 PROCEDURE — 25010000002 FUROSEMIDE PER 20 MG: Performed by: INTERNAL MEDICINE

## 2022-07-20 PROCEDURE — 97110 THERAPEUTIC EXERCISES: CPT

## 2022-07-20 PROCEDURE — 63710000001 INSULIN DETEMIR PER 5 UNITS: Performed by: INTERNAL MEDICINE

## 2022-07-20 PROCEDURE — 82962 GLUCOSE BLOOD TEST: CPT

## 2022-07-20 PROCEDURE — 83735 ASSAY OF MAGNESIUM: CPT | Performed by: INTERNAL MEDICINE

## 2022-07-20 PROCEDURE — 25010000002 ENOXAPARIN PER 10 MG: Performed by: INTERNAL MEDICINE

## 2022-07-20 PROCEDURE — 63710000001 INSULIN LISPRO (HUMAN) PER 5 UNITS: Performed by: INTERNAL MEDICINE

## 2022-07-20 PROCEDURE — 97530 THERAPEUTIC ACTIVITIES: CPT

## 2022-07-20 PROCEDURE — 85025 COMPLETE CBC W/AUTO DIFF WBC: CPT | Performed by: INTERNAL MEDICINE

## 2022-07-20 PROCEDURE — 84100 ASSAY OF PHOSPHORUS: CPT | Performed by: INTERNAL MEDICINE

## 2022-07-20 PROCEDURE — 99233 SBSQ HOSP IP/OBS HIGH 50: CPT | Performed by: INTERNAL MEDICINE

## 2022-07-20 PROCEDURE — 80048 BASIC METABOLIC PNL TOTAL CA: CPT | Performed by: INTERNAL MEDICINE

## 2022-07-20 PROCEDURE — 87493 C DIFF AMPLIFIED PROBE: CPT | Performed by: INTERNAL MEDICINE

## 2022-07-20 RX ORDER — VERAPAMIL HYDROCHLORIDE 80 MG/1
80 TABLET ORAL 2 TIMES DAILY
Status: DISCONTINUED | OUTPATIENT
Start: 2022-07-20 | End: 2022-07-22 | Stop reason: HOSPADM

## 2022-07-20 RX ADMIN — INSULIN LISPRO 3 UNITS: 100 INJECTION, SOLUTION INTRAVENOUS; SUBCUTANEOUS at 17:39

## 2022-07-20 RX ADMIN — Medication 10 ML: at 08:14

## 2022-07-20 RX ADMIN — PANTOPRAZOLE SODIUM 40 MG: 40 TABLET, DELAYED RELEASE ORAL at 06:20

## 2022-07-20 RX ADMIN — INSULIN LISPRO 3 UNITS: 100 INJECTION, SOLUTION INTRAVENOUS; SUBCUTANEOUS at 21:23

## 2022-07-20 RX ADMIN — INSULIN DETEMIR 15 UNITS: 100 INJECTION, SOLUTION SUBCUTANEOUS at 08:12

## 2022-07-20 RX ADMIN — FERROUS SULFATE TAB 325 MG (65 MG ELEMENTAL FE) 325 MG: 325 (65 FE) TAB at 08:15

## 2022-07-20 RX ADMIN — FOLIC ACID 1000 MCG: 1 TABLET ORAL at 08:15

## 2022-07-20 RX ADMIN — INSULIN DETEMIR 30 UNITS: 100 INJECTION, SOLUTION SUBCUTANEOUS at 21:23

## 2022-07-20 RX ADMIN — CETIRIZINE HYDROCHLORIDE 10 MG: 10 TABLET, FILM COATED ORAL at 08:15

## 2022-07-20 RX ADMIN — Medication 250 MG: at 08:15

## 2022-07-20 RX ADMIN — INSULIN LISPRO 2 UNITS: 100 INJECTION, SOLUTION INTRAVENOUS; SUBCUTANEOUS at 08:11

## 2022-07-20 RX ADMIN — INSULIN LISPRO 3 UNITS: 100 INJECTION, SOLUTION INTRAVENOUS; SUBCUTANEOUS at 12:52

## 2022-07-20 RX ADMIN — Medication 250 MG: at 21:22

## 2022-07-20 RX ADMIN — ISOSORBIDE MONONITRATE 30 MG: 30 TABLET, EXTENDED RELEASE ORAL at 06:20

## 2022-07-20 RX ADMIN — ENOXAPARIN SODIUM 40 MG: 100 INJECTION SUBCUTANEOUS at 21:22

## 2022-07-20 RX ADMIN — VERAPAMIL HYDROCHLORIDE 80 MG: 80 TABLET ORAL at 21:22

## 2022-07-20 RX ADMIN — OXYCODONE HYDROCHLORIDE AND ACETAMINOPHEN 1 TABLET: 5; 325 TABLET ORAL at 19:50

## 2022-07-20 RX ADMIN — METOPROLOL SUCCINATE 25 MG: 25 TABLET, EXTENDED RELEASE ORAL at 08:15

## 2022-07-20 RX ADMIN — GABAPENTIN 600 MG: 300 CAPSULE ORAL at 21:22

## 2022-07-20 RX ADMIN — Medication 10 ML: at 21:23

## 2022-07-20 RX ADMIN — FUROSEMIDE 40 MG: 10 INJECTION, SOLUTION INTRAMUSCULAR; INTRAVENOUS at 21:22

## 2022-07-20 RX ADMIN — VERAPAMIL HYDROCHLORIDE 80 MG: 80 TABLET ORAL at 12:52

## 2022-07-20 RX ADMIN — ENOXAPARIN SODIUM 40 MG: 100 INJECTION SUBCUTANEOUS at 08:14

## 2022-07-20 RX ADMIN — Medication 5000 UNITS: at 08:15

## 2022-07-20 RX ADMIN — FUROSEMIDE 40 MG: 10 INJECTION, SOLUTION INTRAMUSCULAR; INTRAVENOUS at 08:14

## 2022-07-20 RX ADMIN — GABAPENTIN 600 MG: 300 CAPSULE ORAL at 08:15

## 2022-07-21 LAB
ANION GAP SERPL CALCULATED.3IONS-SCNC: 6.5 MMOL/L (ref 5–15)
BASOPHILS # BLD AUTO: 0.02 10*3/MM3 (ref 0–0.2)
BASOPHILS NFR BLD AUTO: 0.5 % (ref 0–1.5)
BUN SERPL-MCNC: 12 MG/DL (ref 8–23)
BUN/CREAT SERPL: 14 (ref 7–25)
CALCIUM SPEC-SCNC: 9 MG/DL (ref 8.6–10.5)
CHLORIDE SERPL-SCNC: 99 MMOL/L (ref 98–107)
CO2 SERPL-SCNC: 29.5 MMOL/L (ref 22–29)
CREAT SERPL-MCNC: 0.86 MG/DL (ref 0.57–1)
DEPRECATED RDW RBC AUTO: 50.1 FL (ref 37–54)
EGFRCR SERPLBLD CKD-EPI 2021: 73.2 ML/MIN/1.73
EOSINOPHIL # BLD AUTO: 0.2 10*3/MM3 (ref 0–0.4)
EOSINOPHIL NFR BLD AUTO: 5.2 % (ref 0.3–6.2)
ERYTHROCYTE [DISTWIDTH] IN BLOOD BY AUTOMATED COUNT: 15.8 % (ref 12.3–15.4)
GLUCOSE BLDC GLUCOMTR-MCNC: 194 MG/DL (ref 70–99)
GLUCOSE BLDC GLUCOMTR-MCNC: 196 MG/DL (ref 70–99)
GLUCOSE BLDC GLUCOMTR-MCNC: 218 MG/DL (ref 70–99)
GLUCOSE BLDC GLUCOMTR-MCNC: 257 MG/DL (ref 70–99)
GLUCOSE SERPL-MCNC: 208 MG/DL (ref 65–99)
HCT VFR BLD AUTO: 30.7 % (ref 34–46.6)
HGB BLD-MCNC: 9.6 G/DL (ref 12–15.9)
IMM GRANULOCYTES # BLD AUTO: 0.01 10*3/MM3 (ref 0–0.05)
IMM GRANULOCYTES NFR BLD AUTO: 0.3 % (ref 0–0.5)
LYMPHOCYTES # BLD AUTO: 0.82 10*3/MM3 (ref 0.7–3.1)
LYMPHOCYTES NFR BLD AUTO: 21.3 % (ref 19.6–45.3)
MAGNESIUM SERPL-MCNC: 1.9 MG/DL (ref 1.6–2.4)
MCH RBC QN AUTO: 27.3 PG (ref 26.6–33)
MCHC RBC AUTO-ENTMCNC: 31.3 G/DL (ref 31.5–35.7)
MCV RBC AUTO: 87.2 FL (ref 79–97)
MONOCYTES # BLD AUTO: 0.45 10*3/MM3 (ref 0.1–0.9)
MONOCYTES NFR BLD AUTO: 11.7 % (ref 5–12)
NEUTROPHILS NFR BLD AUTO: 2.35 10*3/MM3 (ref 1.7–7)
NEUTROPHILS NFR BLD AUTO: 61 % (ref 42.7–76)
NRBC BLD AUTO-RTO: 0 /100 WBC (ref 0–0.2)
PHOSPHATE SERPL-MCNC: 3.8 MG/DL (ref 2.5–4.5)
PLATELET # BLD AUTO: 136 10*3/MM3 (ref 140–450)
PMV BLD AUTO: 10.1 FL (ref 6–12)
POTASSIUM SERPL-SCNC: 3.8 MMOL/L (ref 3.5–5.2)
RBC # BLD AUTO: 3.52 10*6/MM3 (ref 3.77–5.28)
SODIUM SERPL-SCNC: 135 MMOL/L (ref 136–145)
WBC NRBC COR # BLD: 3.85 10*3/MM3 (ref 3.4–10.8)

## 2022-07-21 PROCEDURE — 25010000002 FUROSEMIDE PER 20 MG: Performed by: INTERNAL MEDICINE

## 2022-07-21 PROCEDURE — 84100 ASSAY OF PHOSPHORUS: CPT | Performed by: INTERNAL MEDICINE

## 2022-07-21 PROCEDURE — 80048 BASIC METABOLIC PNL TOTAL CA: CPT | Performed by: INTERNAL MEDICINE

## 2022-07-21 PROCEDURE — 63710000001 INSULIN LISPRO (HUMAN) PER 5 UNITS: Performed by: INTERNAL MEDICINE

## 2022-07-21 PROCEDURE — 25010000002 ENOXAPARIN PER 10 MG: Performed by: INTERNAL MEDICINE

## 2022-07-21 PROCEDURE — 63710000001 INSULIN DETEMIR PER 5 UNITS: Performed by: INTERNAL MEDICINE

## 2022-07-21 PROCEDURE — 99232 SBSQ HOSP IP/OBS MODERATE 35: CPT | Performed by: INTERNAL MEDICINE

## 2022-07-21 PROCEDURE — 83735 ASSAY OF MAGNESIUM: CPT | Performed by: INTERNAL MEDICINE

## 2022-07-21 PROCEDURE — 85025 COMPLETE CBC W/AUTO DIFF WBC: CPT | Performed by: INTERNAL MEDICINE

## 2022-07-21 PROCEDURE — 82962 GLUCOSE BLOOD TEST: CPT

## 2022-07-21 RX ADMIN — Medication 250 MG: at 08:43

## 2022-07-21 RX ADMIN — GABAPENTIN 600 MG: 300 CAPSULE ORAL at 21:35

## 2022-07-21 RX ADMIN — ENOXAPARIN SODIUM 40 MG: 100 INJECTION SUBCUTANEOUS at 08:43

## 2022-07-21 RX ADMIN — MICONAZOLE NITRATE: 2 POWDER TOPICAL at 22:50

## 2022-07-21 RX ADMIN — INSULIN LISPRO 3 UNITS: 100 INJECTION, SOLUTION INTRAVENOUS; SUBCUTANEOUS at 21:36

## 2022-07-21 RX ADMIN — INSULIN DETEMIR 15 UNITS: 100 INJECTION, SOLUTION SUBCUTANEOUS at 08:44

## 2022-07-21 RX ADMIN — Medication 5000 UNITS: at 08:42

## 2022-07-21 RX ADMIN — OXYCODONE HYDROCHLORIDE AND ACETAMINOPHEN 1 TABLET: 5; 325 TABLET ORAL at 05:22

## 2022-07-21 RX ADMIN — Medication 10 ML: at 21:36

## 2022-07-21 RX ADMIN — CETIRIZINE HYDROCHLORIDE 10 MG: 10 TABLET, FILM COATED ORAL at 08:43

## 2022-07-21 RX ADMIN — Medication 10 ML: at 08:43

## 2022-07-21 RX ADMIN — INSULIN LISPRO 3 UNITS: 100 INJECTION, SOLUTION INTRAVENOUS; SUBCUTANEOUS at 12:59

## 2022-07-21 RX ADMIN — FOLIC ACID 1000 MCG: 1 TABLET ORAL at 08:42

## 2022-07-21 RX ADMIN — VERAPAMIL HYDROCHLORIDE 80 MG: 80 TABLET ORAL at 21:35

## 2022-07-21 RX ADMIN — ISOSORBIDE MONONITRATE 30 MG: 30 TABLET, EXTENDED RELEASE ORAL at 05:22

## 2022-07-21 RX ADMIN — PANTOPRAZOLE SODIUM 40 MG: 40 TABLET, DELAYED RELEASE ORAL at 05:22

## 2022-07-21 RX ADMIN — OXYCODONE HYDROCHLORIDE AND ACETAMINOPHEN 1 TABLET: 5; 325 TABLET ORAL at 14:24

## 2022-07-21 RX ADMIN — Medication 250 MG: at 21:35

## 2022-07-21 RX ADMIN — ENOXAPARIN SODIUM 40 MG: 100 INJECTION SUBCUTANEOUS at 21:35

## 2022-07-21 RX ADMIN — INSULIN LISPRO 2 UNITS: 100 INJECTION, SOLUTION INTRAVENOUS; SUBCUTANEOUS at 17:51

## 2022-07-21 RX ADMIN — FUROSEMIDE 40 MG: 10 INJECTION, SOLUTION INTRAMUSCULAR; INTRAVENOUS at 21:36

## 2022-07-21 RX ADMIN — INSULIN LISPRO 2 UNITS: 100 INJECTION, SOLUTION INTRAVENOUS; SUBCUTANEOUS at 08:43

## 2022-07-21 RX ADMIN — GABAPENTIN 600 MG: 300 CAPSULE ORAL at 08:42

## 2022-07-21 RX ADMIN — FUROSEMIDE 40 MG: 10 INJECTION, SOLUTION INTRAMUSCULAR; INTRAVENOUS at 08:43

## 2022-07-21 RX ADMIN — OXYCODONE HYDROCHLORIDE AND ACETAMINOPHEN 1 TABLET: 5; 325 TABLET ORAL at 21:35

## 2022-07-21 RX ADMIN — INSULIN DETEMIR 30 UNITS: 100 INJECTION, SOLUTION SUBCUTANEOUS at 21:36

## 2022-07-21 RX ADMIN — VERAPAMIL HYDROCHLORIDE 80 MG: 80 TABLET ORAL at 08:42

## 2022-07-22 ENCOUNTER — READMISSION MANAGEMENT (OUTPATIENT)
Dept: CALL CENTER | Facility: HOSPITAL | Age: 67
End: 2022-07-22

## 2022-07-22 VITALS
WEIGHT: 217.15 LBS | OXYGEN SATURATION: 97 % | HEART RATE: 95 BPM | TEMPERATURE: 97.7 F | RESPIRATION RATE: 18 BRPM | HEIGHT: 64 IN | BODY MASS INDEX: 37.07 KG/M2 | SYSTOLIC BLOOD PRESSURE: 142 MMHG | DIASTOLIC BLOOD PRESSURE: 85 MMHG

## 2022-07-22 LAB
ANION GAP SERPL CALCULATED.3IONS-SCNC: 8 MMOL/L (ref 5–15)
BASOPHILS # BLD AUTO: 0.02 10*3/MM3 (ref 0–0.2)
BASOPHILS NFR BLD AUTO: 0.6 % (ref 0–1.5)
BUN SERPL-MCNC: 14 MG/DL (ref 8–23)
BUN/CREAT SERPL: 15.4 (ref 7–25)
CALCIUM SPEC-SCNC: 9.1 MG/DL (ref 8.6–10.5)
CHLORIDE SERPL-SCNC: 98 MMOL/L (ref 98–107)
CO2 SERPL-SCNC: 29 MMOL/L (ref 22–29)
CREAT SERPL-MCNC: 0.91 MG/DL (ref 0.57–1)
DEPRECATED RDW RBC AUTO: 49.5 FL (ref 37–54)
EGFRCR SERPLBLD CKD-EPI 2021: 68.4 ML/MIN/1.73
EOSINOPHIL # BLD AUTO: 0.19 10*3/MM3 (ref 0–0.4)
EOSINOPHIL NFR BLD AUTO: 5.5 % (ref 0.3–6.2)
ERYTHROCYTE [DISTWIDTH] IN BLOOD BY AUTOMATED COUNT: 15.8 % (ref 12.3–15.4)
GLUCOSE BLDC GLUCOMTR-MCNC: 172 MG/DL (ref 70–99)
GLUCOSE BLDC GLUCOMTR-MCNC: 269 MG/DL (ref 70–99)
GLUCOSE SERPL-MCNC: 151 MG/DL (ref 65–99)
HCT VFR BLD AUTO: 28.9 % (ref 34–46.6)
HGB BLD-MCNC: 9.4 G/DL (ref 12–15.9)
IMM GRANULOCYTES # BLD AUTO: 0.01 10*3/MM3 (ref 0–0.05)
IMM GRANULOCYTES NFR BLD AUTO: 0.3 % (ref 0–0.5)
LYMPHOCYTES # BLD AUTO: 0.94 10*3/MM3 (ref 0.7–3.1)
LYMPHOCYTES NFR BLD AUTO: 27.1 % (ref 19.6–45.3)
MAGNESIUM SERPL-MCNC: 1.9 MG/DL (ref 1.6–2.4)
MCH RBC QN AUTO: 27.9 PG (ref 26.6–33)
MCHC RBC AUTO-ENTMCNC: 32.5 G/DL (ref 31.5–35.7)
MCV RBC AUTO: 85.8 FL (ref 79–97)
MONOCYTES # BLD AUTO: 0.4 10*3/MM3 (ref 0.1–0.9)
MONOCYTES NFR BLD AUTO: 11.5 % (ref 5–12)
NEUTROPHILS NFR BLD AUTO: 1.91 10*3/MM3 (ref 1.7–7)
NEUTROPHILS NFR BLD AUTO: 55 % (ref 42.7–76)
NRBC BLD AUTO-RTO: 0 /100 WBC (ref 0–0.2)
PHOSPHATE SERPL-MCNC: 3.9 MG/DL (ref 2.5–4.5)
PLATELET # BLD AUTO: 125 10*3/MM3 (ref 140–450)
PMV BLD AUTO: 10 FL (ref 6–12)
POTASSIUM SERPL-SCNC: 3.4 MMOL/L (ref 3.5–5.2)
RBC # BLD AUTO: 3.37 10*6/MM3 (ref 3.77–5.28)
SODIUM SERPL-SCNC: 135 MMOL/L (ref 136–145)
WBC NRBC COR # BLD: 3.47 10*3/MM3 (ref 3.4–10.8)

## 2022-07-22 PROCEDURE — 82962 GLUCOSE BLOOD TEST: CPT

## 2022-07-22 PROCEDURE — 25010000002 FUROSEMIDE PER 20 MG: Performed by: INTERNAL MEDICINE

## 2022-07-22 PROCEDURE — 99239 HOSP IP/OBS DSCHRG MGMT >30: CPT | Performed by: INTERNAL MEDICINE

## 2022-07-22 PROCEDURE — 83735 ASSAY OF MAGNESIUM: CPT | Performed by: INTERNAL MEDICINE

## 2022-07-22 PROCEDURE — 25010000002 ENOXAPARIN PER 10 MG: Performed by: INTERNAL MEDICINE

## 2022-07-22 PROCEDURE — 63710000001 INSULIN DETEMIR PER 5 UNITS: Performed by: INTERNAL MEDICINE

## 2022-07-22 PROCEDURE — 97110 THERAPEUTIC EXERCISES: CPT

## 2022-07-22 PROCEDURE — 80048 BASIC METABOLIC PNL TOTAL CA: CPT | Performed by: INTERNAL MEDICINE

## 2022-07-22 PROCEDURE — 85025 COMPLETE CBC W/AUTO DIFF WBC: CPT | Performed by: INTERNAL MEDICINE

## 2022-07-22 PROCEDURE — 63710000001 INSULIN LISPRO (HUMAN) PER 5 UNITS: Performed by: INTERNAL MEDICINE

## 2022-07-22 PROCEDURE — 84100 ASSAY OF PHOSPHORUS: CPT | Performed by: INTERNAL MEDICINE

## 2022-07-22 RX ORDER — VERAPAMIL HYDROCHLORIDE 80 MG/1
80 TABLET ORAL 2 TIMES DAILY
Qty: 60 TABLET | Refills: 0 | Status: SHIPPED | OUTPATIENT
Start: 2022-07-22

## 2022-07-22 RX ORDER — SACCHAROMYCES BOULARDII 250 MG
250 CAPSULE ORAL 2 TIMES DAILY
Qty: 60 CAPSULE | Refills: 0 | Status: SHIPPED | OUTPATIENT
Start: 2022-07-22

## 2022-07-22 RX ORDER — POTASSIUM CHLORIDE 750 MG/1
40 CAPSULE, EXTENDED RELEASE ORAL ONCE
Status: COMPLETED | OUTPATIENT
Start: 2022-07-22 | End: 2022-07-22

## 2022-07-22 RX ADMIN — Medication 10 ML: at 09:31

## 2022-07-22 RX ADMIN — GABAPENTIN 600 MG: 300 CAPSULE ORAL at 09:29

## 2022-07-22 RX ADMIN — FOLIC ACID 1000 MCG: 1 TABLET ORAL at 09:30

## 2022-07-22 RX ADMIN — FUROSEMIDE 40 MG: 10 INJECTION, SOLUTION INTRAMUSCULAR; INTRAVENOUS at 09:28

## 2022-07-22 RX ADMIN — ISOSORBIDE MONONITRATE 30 MG: 30 TABLET, EXTENDED RELEASE ORAL at 06:05

## 2022-07-22 RX ADMIN — INSULIN LISPRO 2 UNITS: 100 INJECTION, SOLUTION INTRAVENOUS; SUBCUTANEOUS at 09:28

## 2022-07-22 RX ADMIN — MICONAZOLE NITRATE: 2 POWDER TOPICAL at 12:07

## 2022-07-22 RX ADMIN — Medication 5000 UNITS: at 09:30

## 2022-07-22 RX ADMIN — Medication 250 MG: at 09:30

## 2022-07-22 RX ADMIN — CETIRIZINE HYDROCHLORIDE 10 MG: 10 TABLET, FILM COATED ORAL at 09:30

## 2022-07-22 RX ADMIN — VERAPAMIL HYDROCHLORIDE 80 MG: 80 TABLET ORAL at 09:29

## 2022-07-22 RX ADMIN — FERROUS SULFATE TAB 325 MG (65 MG ELEMENTAL FE) 325 MG: 325 (65 FE) TAB at 09:30

## 2022-07-22 RX ADMIN — OXYCODONE HYDROCHLORIDE AND ACETAMINOPHEN 1 TABLET: 5; 325 TABLET ORAL at 12:07

## 2022-07-22 RX ADMIN — ENOXAPARIN SODIUM 40 MG: 100 INJECTION SUBCUTANEOUS at 09:29

## 2022-07-22 RX ADMIN — INSULIN LISPRO 4 UNITS: 100 INJECTION, SOLUTION INTRAVENOUS; SUBCUTANEOUS at 12:07

## 2022-07-22 RX ADMIN — POTASSIUM CHLORIDE 40 MEQ: 750 CAPSULE, EXTENDED RELEASE ORAL at 09:29

## 2022-07-22 RX ADMIN — PANTOPRAZOLE SODIUM 40 MG: 40 TABLET, DELAYED RELEASE ORAL at 06:05

## 2022-07-22 RX ADMIN — INSULIN DETEMIR 15 UNITS: 100 INJECTION, SOLUTION SUBCUTANEOUS at 09:32

## 2022-07-23 NOTE — OUTREACH NOTE
Prep Survey    Flowsheet Row Responses   [facility] facility patient discharged from? [facility]   Is LACE score < 7 ? No   Emergency Room discharge w/ pulse ox? No   Eligibility Readm Mgmt   Discharge diagnosis Acute on chronic diastolic congestive heart failure with acute exacerbation   Does the patient have one of the following disease processes/diagnoses(primary or secondary)? CHF   Does the patient have Home health ordered? Yes   What is the Home health agency?  [facility]    Is there a DME ordered? No   Prep survey completed? Yes          SKIP TREADWELL - Registered Nurse

## 2022-07-26 ENCOUNTER — READMISSION MANAGEMENT (OUTPATIENT)
Dept: CALL CENTER | Facility: HOSPITAL | Age: 67
End: 2022-07-26

## 2022-07-26 NOTE — OUTREACH NOTE
CHF Week 1 Survey    Flowsheet Row Responses   [facility] facility patient discharged from? [facility]   Does the patient have one of the following disease processes/diagnoses(primary or secondary)? CHF   CHF Week 1 attempt successful? Yes   Call start time 1145   Rescheduled Rescheduled-pt requested  [ driving requests call back tomorrow. ]   Call end time 1145   Discharge diagnosis Acute on chronic diastolic congestive heart failure with acute exacerbation          JAZLYN TREADWELL - Registered Nurse

## 2022-07-29 ENCOUNTER — READMISSION MANAGEMENT (OUTPATIENT)
Dept: CALL CENTER | Facility: HOSPITAL | Age: 67
End: 2022-07-29

## 2022-07-29 NOTE — OUTREACH NOTE
CHF Week 1 Survey    Flowsheet Row Responses   [facility] patient discharged from? [facility]   Does the patient have one of the following disease processes/diagnoses(primary or secondary)? CHF   CHF Week 1 attempt successful? Yes   Call start time 1005   Call end time 1011   Discharge diagnosis Acute on chronic diastolic congestive heart failure with acute exacerbation   Person spoke with today (if not patient) and relationship Bulmaro- and patient    Meds reviewed with patient/caregiver? Yes   Is the patient having any side effects they believe may be caused by any medication additions or changes? No   Does the patient have all medications ordered at discharge? Yes   Is the patient taking all medications as directed (includes completed medication regime)? Yes   Comments regarding appointments Appt with Dr. Schaeffer on 7/28/22-infection is back,  Pt is waiting to go back and see ID on 8/16/22   Does the patient have a primary care provider?  Yes   Does the patient have an appointment with their PCP within 7 days of discharge? Yes   Comments regarding PCP 7/27/22   Has the patient kept scheduled appointments due by today? Yes   What is the Home health agency?  [facility]    Has home health visited the patient within 72 hours of discharge? Yes   Pulse Ox monitoring Intermittent   Pulse Ox device source Patient   O2 Sat comments Doesn't check on regular basis   Psychosocial issues? No   Did the patient receive a copy of their discharge instructions? Yes   Nursing interventions Reviewed instructions with patient   What is the patient's perception of their health status since discharge? Improving   Is the patient weighing daily? Yes   Does the patient have scales? Yes   Is the patient able to teach back Heart Failure Zones? Yes   Is the patient able to teach back signs and symptoms of worsening condition? (i.e. weight gain, shortness of air, etc.) Yes    CHF Week 1 call completed? Yes          GET Arana  Registered Nurse

## 2022-08-09 ENCOUNTER — READMISSION MANAGEMENT (OUTPATIENT)
Dept: CALL CENTER | Facility: HOSPITAL | Age: 67
End: 2022-08-09

## 2022-08-09 NOTE — OUTREACH NOTE
CHF Week 3 Survey    Flowsheet Row Responses   [facility] patient discharged from? [facility]   Does the patient have one of the following disease processes/diagnoses(primary or secondary)? CHF   Week 3 attempt successful? Yes   Call start time 1547   Call end time 1552   Discharge diagnosis Acute on chronic diastolic congestive heart failure with acute exacerbation   Person spoke with today (if not patient) and relationship patient   Meds reviewed with patient/caregiver? Yes   Does the patient have all medications ordered at discharge? Yes   Is the patient taking all medications as directed (includes completed medication regime)? Yes   Comments regarding appointments Infectious disease appt 8/16   Does the patient have a primary care provider?  Yes   Comments regarding PCP Patient has seen PCP since discharge.    Has the patient kept scheduled appointments due by today? Yes   What is the Home health agency?  [facility] [place]   Pulse Ox monitoring Intermittent   Pulse Ox device source Patient   O2 Sat comments did not share any readings today.    Psychosocial issues? No   Did the patient receive a copy of their discharge instructions? Yes   What is the patient's perception of their health status since discharge? Improving  [patient reports that she is not holding extra fluid right how. Patient reports that she still feels she has infection in her hip with some redness and heat. Patient has appt in place with infectious disease. ]   Nursing interventions Nurse provided patient education, Advised patient to call provider   Is the patient weighing daily? Yes   Does the patient have scales? Yes   Daily weight interventions Education provided on importance of daily weight   Is the patient able to teach back signs and symptoms of worsening condition? (i.e. weight gain, shortness of air, etc.) Yes   If the patient is a current smoker, are they able to teach back resources for cessation? Not a smoker   Is the  patient/caregiver able to teach back the hierarchy of who to call/visit for symptoms/problems? PCP, Specialist, Home health nurse, Urgent Care, ED, 911 Yes   CHF Week 3 call completed? Yes   Wrap up additional comments Denies questions or needs today.           YARA DENNIS - Registered Nurse

## 2022-08-18 ENCOUNTER — READMISSION MANAGEMENT (OUTPATIENT)
Dept: CALL CENTER | Facility: HOSPITAL | Age: 67
End: 2022-08-18

## 2022-08-18 NOTE — OUTREACH NOTE
CHF Week 4 Survey    Flowsheet Row Responses   [facility] patient discharged from? [facility]   Does the patient have one of the following disease processes/diagnoses(primary or secondary)? CHF   Week 4 attempt successful? Yes   Call start time 1418   Call end time 1424   Medication alerts for this patient Taking oral antibiotics at this time.   Meds reviewed with patient/caregiver? Yes   Is the patient taking all medications as directed (includes completed medication regime)? Yes   Has the patient kept scheduled appointments due by today? Yes   What is the patient's perception of their health status since discharge? Improving   Is the patient weighing daily? Yes   Week 4 Call Completed? Yes   Would the patient like one additional call? Yes   Wrap up additional comments Pt reports feeling well. Pt will begin to take oral antiobiotics on this day. Pt reports no sob and no weight gain.          MAHI RUIZ - Registered Nurse

## 2022-08-22 ENCOUNTER — HOSPITAL ENCOUNTER (OUTPATIENT)
Dept: OTHER | Facility: HOSPITAL | Age: 67
Discharge: HOME | End: 2022-08-22

## 2022-09-28 ENCOUNTER — TRANSCRIBE ORDERS (OUTPATIENT)
Dept: LAB | Facility: HOSPITAL | Age: 67
End: 2022-09-28

## 2022-09-28 ENCOUNTER — LAB (OUTPATIENT)
Dept: LAB | Facility: HOSPITAL | Age: 67
End: 2022-09-28

## 2022-09-28 DIAGNOSIS — L29.9 PRURITUS OF SKIN: Primary | ICD-10-CM

## 2022-09-28 DIAGNOSIS — M00.851 ARTHRITIS OF RIGHT HIP DUE TO OTHER BACTERIA: ICD-10-CM

## 2022-09-28 DIAGNOSIS — L29.9 PRURITUS OF SKIN: ICD-10-CM

## 2022-09-28 LAB
ALBUMIN SERPL-MCNC: 3.7 G/DL (ref 3.5–5.2)
ALBUMIN/GLOB SERPL: 1.2 G/DL
ALP SERPL-CCNC: 122 U/L (ref 39–117)
ALT SERPL W P-5'-P-CCNC: 14 U/L (ref 1–33)
ANION GAP SERPL CALCULATED.3IONS-SCNC: 8.1 MMOL/L (ref 5–15)
AST SERPL-CCNC: 20 U/L (ref 1–32)
BASOPHILS # BLD AUTO: 0.04 10*3/MM3 (ref 0–0.2)
BASOPHILS NFR BLD AUTO: 0.9 % (ref 0–1.5)
BILIRUB SERPL-MCNC: 0.5 MG/DL (ref 0–1.2)
BUN SERPL-MCNC: 15 MG/DL (ref 8–23)
BUN/CREAT SERPL: 18.1 (ref 7–25)
CALCIUM SPEC-SCNC: 9.1 MG/DL (ref 8.6–10.5)
CHLORIDE SERPL-SCNC: 104 MMOL/L (ref 98–107)
CO2 SERPL-SCNC: 26.9 MMOL/L (ref 22–29)
CREAT SERPL-MCNC: 0.83 MG/DL (ref 0.57–1)
DEPRECATED RDW RBC AUTO: 46.5 FL (ref 37–54)
EGFRCR SERPLBLD CKD-EPI 2021: 76.4 ML/MIN/1.73
EOSINOPHIL # BLD AUTO: 0.28 10*3/MM3 (ref 0–0.4)
EOSINOPHIL NFR BLD AUTO: 6.3 % (ref 0.3–6.2)
ERYTHROCYTE [DISTWIDTH] IN BLOOD BY AUTOMATED COUNT: 15 % (ref 12.3–15.4)
GLOBULIN UR ELPH-MCNC: 3.2 GM/DL
GLUCOSE SERPL-MCNC: 139 MG/DL (ref 65–99)
HCT VFR BLD AUTO: 38.5 % (ref 34–46.6)
HGB BLD-MCNC: 12.3 G/DL (ref 12–15.9)
IMM GRANULOCYTES # BLD AUTO: 0.01 10*3/MM3 (ref 0–0.05)
IMM GRANULOCYTES NFR BLD AUTO: 0.2 % (ref 0–0.5)
LYMPHOCYTES # BLD AUTO: 0.87 10*3/MM3 (ref 0.7–3.1)
LYMPHOCYTES NFR BLD AUTO: 19.6 % (ref 19.6–45.3)
MCH RBC QN AUTO: 27.1 PG (ref 26.6–33)
MCHC RBC AUTO-ENTMCNC: 31.9 G/DL (ref 31.5–35.7)
MCV RBC AUTO: 84.8 FL (ref 79–97)
MONOCYTES # BLD AUTO: 0.39 10*3/MM3 (ref 0.1–0.9)
MONOCYTES NFR BLD AUTO: 8.8 % (ref 5–12)
NEUTROPHILS NFR BLD AUTO: 2.85 10*3/MM3 (ref 1.7–7)
NEUTROPHILS NFR BLD AUTO: 64.2 % (ref 42.7–76)
NRBC BLD AUTO-RTO: 0 /100 WBC (ref 0–0.2)
PLATELET # BLD AUTO: 108 10*3/MM3 (ref 140–450)
PMV BLD AUTO: 10.7 FL (ref 6–12)
POTASSIUM SERPL-SCNC: 3.7 MMOL/L (ref 3.5–5.2)
PROT SERPL-MCNC: 6.9 G/DL (ref 6–8.5)
RBC # BLD AUTO: 4.54 10*6/MM3 (ref 3.77–5.28)
SODIUM SERPL-SCNC: 139 MMOL/L (ref 136–145)
TSH SERPL DL<=0.05 MIU/L-ACNC: 2.65 UIU/ML (ref 0.27–4.2)
WBC NRBC COR # BLD: 4.44 10*3/MM3 (ref 3.4–10.8)

## 2022-09-28 PROCEDURE — 84443 ASSAY THYROID STIM HORMONE: CPT

## 2022-09-28 PROCEDURE — 85025 COMPLETE CBC W/AUTO DIFF WBC: CPT

## 2022-09-28 PROCEDURE — 80053 COMPREHEN METABOLIC PANEL: CPT

## 2022-09-28 PROCEDURE — 36415 COLL VENOUS BLD VENIPUNCTURE: CPT

## 2023-02-17 ENCOUNTER — HOSPITAL ENCOUNTER (OUTPATIENT)
Dept: OTHER | Facility: HOSPITAL | Age: 68
Discharge: HOME | End: 2023-02-17

## 2023-02-21 NOTE — CASE MANAGEMENT/SOCIAL WORK
Continued Stay Note  [facility]     Patient Name: Stephy Duncan  MRN: [id number]  Today's Date: 1/21/2022    Admit Date: 1/16/2022     Discharge Plan     Row Name 01/21/22 1607       Plan    Plan Home with family support, [facility]  & [facility].    Patient/Family in Agreement with Plan yes    Plan Comments Spoke with the patient who plans to d/c home with family support & [facility]. ID MD's plans for IV antibiotics for the next 6 weeks noted. Discussed with the patient who's agreeable and said her  will be able to administer the IV antibiotics at home. She's worked with [facility] in the past and requests to work with them again. Referral sent - spoke with Pushpa who will look into the pt's case. She asked CCP to call her office ([phone]) tomorrow and confirm the d/c. Updated the weekend CCP. Patient also said she plans for her family to transport her home at d/c. No other needs identified.               Discharge Codes    No documentation.               Expected Discharge Date and Time     Expected Discharge Date Expected Discharge Time    Jan 22, 2022             Lucy Meeks RN     [phone]

## 2023-06-05 ENCOUNTER — LAB (OUTPATIENT)
Dept: LAB | Facility: HOSPITAL | Age: 68
End: 2023-06-05
Payer: MEDICARE

## 2023-06-05 ENCOUNTER — TRANSCRIBE ORDERS (OUTPATIENT)
Dept: LAB | Facility: HOSPITAL | Age: 68
End: 2023-06-05
Payer: MEDICARE

## 2023-06-05 DIAGNOSIS — E11.9 DIABETES MELLITUS WITHOUT COMPLICATION: ICD-10-CM

## 2023-06-05 DIAGNOSIS — I10 HYPERTENSION, ESSENTIAL: ICD-10-CM

## 2023-06-05 DIAGNOSIS — E78.2 MIXED HYPERLIPIDEMIA: ICD-10-CM

## 2023-06-05 DIAGNOSIS — E11.9 DIABETES MELLITUS WITHOUT COMPLICATION: Primary | ICD-10-CM

## 2023-06-05 LAB
ALBUMIN SERPL-MCNC: 4.2 G/DL (ref 3.5–5.2)
ALBUMIN/GLOB SERPL: 1.4 G/DL
ALP SERPL-CCNC: 92 U/L (ref 39–117)
ALT SERPL W P-5'-P-CCNC: 22 U/L (ref 1–33)
ANION GAP SERPL CALCULATED.3IONS-SCNC: 13.9 MMOL/L (ref 5–15)
AST SERPL-CCNC: 23 U/L (ref 1–32)
BASOPHILS # BLD AUTO: 0.05 10*3/MM3 (ref 0–0.2)
BASOPHILS NFR BLD AUTO: 0.8 % (ref 0–1.5)
BILIRUB SERPL-MCNC: 0.8 MG/DL (ref 0–1.2)
BUN SERPL-MCNC: 14 MG/DL (ref 8–23)
BUN/CREAT SERPL: 13.7 (ref 7–25)
CALCIUM SPEC-SCNC: 9.9 MG/DL (ref 8.6–10.5)
CHLORIDE SERPL-SCNC: 100 MMOL/L (ref 98–107)
CHOLEST SERPL-MCNC: 174 MG/DL (ref 0–200)
CO2 SERPL-SCNC: 24.1 MMOL/L (ref 22–29)
CREAT SERPL-MCNC: 1.02 MG/DL (ref 0.57–1)
DEPRECATED RDW RBC AUTO: 40.8 FL (ref 37–54)
EGFRCR SERPLBLD CKD-EPI 2021: 59.3 ML/MIN/1.73
EOSINOPHIL # BLD AUTO: 0.28 10*3/MM3 (ref 0–0.4)
EOSINOPHIL NFR BLD AUTO: 4.6 % (ref 0.3–6.2)
ERYTHROCYTE [DISTWIDTH] IN BLOOD BY AUTOMATED COUNT: 12.6 % (ref 12.3–15.4)
GLOBULIN UR ELPH-MCNC: 3 GM/DL
GLUCOSE SERPL-MCNC: 174 MG/DL (ref 65–99)
HBA1C MFR BLD: 8 % (ref 4.8–5.6)
HCT VFR BLD AUTO: 45.6 % (ref 34–46.6)
HDLC SERPL-MCNC: 46 MG/DL (ref 40–60)
HGB BLD-MCNC: 15.2 G/DL (ref 12–15.9)
IMM GRANULOCYTES # BLD AUTO: 0.03 10*3/MM3 (ref 0–0.05)
IMM GRANULOCYTES NFR BLD AUTO: 0.5 % (ref 0–0.5)
LDLC SERPL CALC-MCNC: 107 MG/DL (ref 0–100)
LDLC/HDLC SERPL: 2.28 {RATIO}
LYMPHOCYTES # BLD AUTO: 1.25 10*3/MM3 (ref 0.7–3.1)
LYMPHOCYTES NFR BLD AUTO: 20.7 % (ref 19.6–45.3)
MCH RBC QN AUTO: 29.3 PG (ref 26.6–33)
MCHC RBC AUTO-ENTMCNC: 33.3 G/DL (ref 31.5–35.7)
MCV RBC AUTO: 88 FL (ref 79–97)
MONOCYTES # BLD AUTO: 0.54 10*3/MM3 (ref 0.1–0.9)
MONOCYTES NFR BLD AUTO: 9 % (ref 5–12)
NEUTROPHILS NFR BLD AUTO: 3.88 10*3/MM3 (ref 1.7–7)
NEUTROPHILS NFR BLD AUTO: 64.4 % (ref 42.7–76)
NRBC BLD AUTO-RTO: 0 /100 WBC (ref 0–0.2)
PLATELET # BLD AUTO: 128 10*3/MM3 (ref 140–450)
PMV BLD AUTO: 9.8 FL (ref 6–12)
POTASSIUM SERPL-SCNC: 3.9 MMOL/L (ref 3.5–5.2)
PROT SERPL-MCNC: 7.2 G/DL (ref 6–8.5)
RBC # BLD AUTO: 5.18 10*6/MM3 (ref 3.77–5.28)
SODIUM SERPL-SCNC: 138 MMOL/L (ref 136–145)
TRIGL SERPL-MCNC: 116 MG/DL (ref 0–150)
TSH SERPL DL<=0.05 MIU/L-ACNC: 3.13 UIU/ML (ref 0.27–4.2)
VLDLC SERPL-MCNC: 21 MG/DL (ref 5–40)
WBC NRBC COR # BLD: 6.03 10*3/MM3 (ref 3.4–10.8)

## 2023-06-05 PROCEDURE — 84443 ASSAY THYROID STIM HORMONE: CPT

## 2023-06-05 PROCEDURE — 80053 COMPREHEN METABOLIC PANEL: CPT

## 2023-06-05 PROCEDURE — 36415 COLL VENOUS BLD VENIPUNCTURE: CPT

## 2023-06-05 PROCEDURE — 80061 LIPID PANEL: CPT

## 2023-06-05 PROCEDURE — 85025 COMPLETE CBC W/AUTO DIFF WBC: CPT

## 2023-06-05 PROCEDURE — 83036 HEMOGLOBIN GLYCOSYLATED A1C: CPT

## 2023-09-27 ENCOUNTER — OFFICE VISIT (OUTPATIENT)
Dept: INTERNAL MEDICINE | Facility: CLINIC | Age: 68
End: 2023-09-27
Payer: MEDICARE

## 2023-09-27 VITALS
WEIGHT: 222 LBS | SYSTOLIC BLOOD PRESSURE: 138 MMHG | RESPIRATION RATE: 20 BRPM | DIASTOLIC BLOOD PRESSURE: 76 MMHG | HEART RATE: 69 BPM | HEIGHT: 64 IN | BODY MASS INDEX: 37.9 KG/M2 | OXYGEN SATURATION: 94 % | TEMPERATURE: 98.2 F

## 2023-09-27 DIAGNOSIS — Z76.89 ESTABLISHING CARE WITH NEW DOCTOR, ENCOUNTER FOR: Primary | ICD-10-CM

## 2023-09-27 DIAGNOSIS — Z79.4 TYPE 2 DIABETES MELLITUS WITH DIABETIC POLYNEUROPATHY, WITH LONG-TERM CURRENT USE OF INSULIN: ICD-10-CM

## 2023-09-27 DIAGNOSIS — F17.211 CIGARETTE NICOTINE DEPENDENCE IN REMISSION: ICD-10-CM

## 2023-09-27 DIAGNOSIS — Z95.5 PRESENCE OF STENT IN CORONARY ARTERY IN PATIENT WITH CORONARY ARTERY DISEASE: Chronic | ICD-10-CM

## 2023-09-27 DIAGNOSIS — E66.01 CLASS 2 SEVERE OBESITY DUE TO EXCESS CALORIES WITH SERIOUS COMORBIDITY AND BODY MASS INDEX (BMI) OF 38.0 TO 38.9 IN ADULT: ICD-10-CM

## 2023-09-27 DIAGNOSIS — M86.9: ICD-10-CM

## 2023-09-27 DIAGNOSIS — I36.1 NONRHEUMATIC TRICUSPID VALVE REGURGITATION: ICD-10-CM

## 2023-09-27 DIAGNOSIS — M25.551 PAIN OF RIGHT HIP: ICD-10-CM

## 2023-09-27 DIAGNOSIS — Z79.899 MEDICATION MANAGEMENT: ICD-10-CM

## 2023-09-27 DIAGNOSIS — I10 PRIMARY HYPERTENSION: ICD-10-CM

## 2023-09-27 DIAGNOSIS — I50.32 CHRONIC DIASTOLIC CHF (CONGESTIVE HEART FAILURE): ICD-10-CM

## 2023-09-27 DIAGNOSIS — E11.42 TYPE 2 DIABETES MELLITUS WITH DIABETIC POLYNEUROPATHY, WITH LONG-TERM CURRENT USE OF INSULIN: ICD-10-CM

## 2023-09-27 DIAGNOSIS — I25.10 PRESENCE OF STENT IN CORONARY ARTERY IN PATIENT WITH CORONARY ARTERY DISEASE: Chronic | ICD-10-CM

## 2023-09-27 PROBLEM — E66.812 CLASS 2 SEVERE OBESITY DUE TO EXCESS CALORIES WITH SERIOUS COMORBIDITY AND BODY MASS INDEX (BMI) OF 38.0 TO 38.9 IN ADULT: Status: ACTIVE | Noted: 2023-09-27

## 2023-09-27 LAB
ALBUMIN SERPL-MCNC: 4.1 G/DL (ref 3.5–5.2)
ALBUMIN/GLOB SERPL: 1.2 G/DL
ALP SERPL-CCNC: 83 U/L (ref 39–117)
ALT SERPL W P-5'-P-CCNC: 25 U/L (ref 1–33)
AMPHET+METHAMPHET UR QL: NEGATIVE
AMPHETAMINE INTERNAL CONTROL: NORMAL
AMPHETAMINES UR QL: NEGATIVE
ANION GAP SERPL CALCULATED.3IONS-SCNC: 11.7 MMOL/L (ref 5–15)
AST SERPL-CCNC: 30 U/L (ref 1–32)
BARBITURATE INTERNAL CONTROL: NORMAL
BARBITURATES UR QL SCN: NEGATIVE
BASOPHILS # BLD AUTO: 0.03 10*3/MM3 (ref 0–0.2)
BASOPHILS NFR BLD AUTO: 0.5 % (ref 0–1.5)
BENZODIAZ UR QL SCN: NEGATIVE
BENZODIAZEPINE INTERNAL CONTROL: NORMAL
BILIRUB SERPL-MCNC: 0.7 MG/DL (ref 0–1.2)
BUN SERPL-MCNC: 18 MG/DL (ref 8–23)
BUN/CREAT SERPL: 21.4 (ref 7–25)
BUPRENORPHINE INTERNAL CONTROL: NORMAL
BUPRENORPHINE SERPL-MCNC: NEGATIVE NG/ML
CALCIUM SPEC-SCNC: 9.2 MG/DL (ref 8.6–10.5)
CANNABINOIDS SERPL QL: NEGATIVE
CHLORIDE SERPL-SCNC: 99 MMOL/L (ref 98–107)
CLUMPED PLATELETS: PRESENT
CO2 SERPL-SCNC: 22.3 MMOL/L (ref 22–29)
COCAINE INTERNAL CONTROL: NORMAL
COCAINE UR QL: NEGATIVE
CREAT SERPL-MCNC: 0.84 MG/DL (ref 0.57–1)
CRP SERPL-MCNC: 0.37 MG/DL (ref 0–0.5)
DEPRECATED RDW RBC AUTO: 42.8 FL (ref 37–54)
EGFRCR SERPLBLD CKD-EPI 2021: 74.9 ML/MIN/1.73
EOSINOPHIL # BLD AUTO: 0.35 10*3/MM3 (ref 0–0.4)
EOSINOPHIL NFR BLD AUTO: 5.5 % (ref 0.3–6.2)
ERYTHROCYTE [DISTWIDTH] IN BLOOD BY AUTOMATED COUNT: 13.1 % (ref 12.3–15.4)
ERYTHROCYTE [SEDIMENTATION RATE] IN BLOOD: 24 MM/HR (ref 0–30)
EXPIRATION DATE: NORMAL
GLOBULIN UR ELPH-MCNC: 3.3 GM/DL
GLUCOSE BLDC GLUCOMTR-MCNC: 183 MG/DL (ref 70–130)
GLUCOSE SERPL-MCNC: 206 MG/DL (ref 65–99)
HCT VFR BLD AUTO: 42 % (ref 34–46.6)
HGB BLD-MCNC: 14 G/DL (ref 12–15.9)
LYMPHOCYTES # BLD AUTO: 1.15 10*3/MM3 (ref 0.7–3.1)
LYMPHOCYTES NFR BLD AUTO: 18.1 % (ref 19.6–45.3)
Lab: NORMAL
MCH RBC QN AUTO: 29.8 PG (ref 26.6–33)
MCHC RBC AUTO-ENTMCNC: 33.3 G/DL (ref 31.5–35.7)
MCV RBC AUTO: 89.4 FL (ref 79–97)
MDMA (ECSTASY) INTERNAL CONTROL: NORMAL
MDMA UR QL SCN: NEGATIVE
METHADONE INTERNAL CONTROL: NORMAL
METHADONE UR QL SCN: NEGATIVE
METHAMPHETAMINE INTERNAL CONTROL: NORMAL
MONOCYTES # BLD AUTO: 0.5 10*3/MM3 (ref 0.1–0.9)
MONOCYTES NFR BLD AUTO: 7.9 % (ref 5–12)
NEUTROPHILS NFR BLD AUTO: 4.28 10*3/MM3 (ref 1.7–7)
NEUTROPHILS NFR BLD AUTO: 67.5 % (ref 42.7–76)
OPIATES INTERNAL CONTROL: NORMAL
OPIATES UR QL: NEGATIVE
OXYCODONE INTERNAL CONTROL: NORMAL
OXYCODONE UR QL SCN: NEGATIVE
PCP UR QL SCN: NEGATIVE
PHENCYCLIDINE INTERNAL CONTROL: NORMAL
PLATELET # BLD AUTO: ABNORMAL 10*3/UL
PMV BLD AUTO: 11.2 FL (ref 6–12)
POTASSIUM SERPL-SCNC: 4.7 MMOL/L (ref 3.5–5.2)
PROT SERPL-MCNC: 7.4 G/DL (ref 6–8.5)
RBC # BLD AUTO: 4.7 10*6/MM3 (ref 3.77–5.28)
RBC MORPH BLD: NORMAL
SODIUM SERPL-SCNC: 133 MMOL/L (ref 136–145)
THC INTERNAL CONTROL: NORMAL
WBC MORPH BLD: NORMAL
WBC NRBC COR # BLD: 6.34 10*3/MM3 (ref 3.4–10.8)

## 2023-09-27 PROCEDURE — 86140 C-REACTIVE PROTEIN: CPT | Performed by: STUDENT IN AN ORGANIZED HEALTH CARE EDUCATION/TRAINING PROGRAM

## 2023-09-27 PROCEDURE — 85007 BL SMEAR W/DIFF WBC COUNT: CPT | Performed by: STUDENT IN AN ORGANIZED HEALTH CARE EDUCATION/TRAINING PROGRAM

## 2023-09-27 PROCEDURE — 85652 RBC SED RATE AUTOMATED: CPT | Performed by: STUDENT IN AN ORGANIZED HEALTH CARE EDUCATION/TRAINING PROGRAM

## 2023-09-27 PROCEDURE — 85025 COMPLETE CBC W/AUTO DIFF WBC: CPT | Performed by: STUDENT IN AN ORGANIZED HEALTH CARE EDUCATION/TRAINING PROGRAM

## 2023-09-27 PROCEDURE — 80053 COMPREHEN METABOLIC PANEL: CPT | Performed by: STUDENT IN AN ORGANIZED HEALTH CARE EDUCATION/TRAINING PROGRAM

## 2023-09-27 RX ORDER — LEVOCETIRIZINE DIHYDROCHLORIDE 5 MG/1
5 TABLET, FILM COATED ORAL EVERY EVENING
COMMUNITY

## 2023-09-27 RX ORDER — INSULIN ASPART 100 [IU]/ML
23 INJECTION, SOLUTION INTRAVENOUS; SUBCUTANEOUS
COMMUNITY
Start: 2023-06-06 | End: 2024-06-05

## 2023-09-27 NOTE — PROGRESS NOTES
Chief Complaint  Diabetes    Subjective          Virginia Cayla Duncan presents to [facility]  History of Present Illness    Previous PCP: Gregorio Rosales  Specialist(s): Cardiology (Fred Auguste), ID  COVID vaccine: Yes  Pneumonia vaccine: Yes (Aug 19, 2023)  Shingles vaccine: Yes (3 years ago)  Colon cancer screening: Yes (4 years ago)  Mammogram: Yes   Pap Smear: s/p hysterectomy  DEXA/Bone Density: Yes (a long time ago)    Mrs Duncan is a 69 yo F with a history of T2DM, history of right hip osteo (for which she follows with ID), HTN and CAD who presents to establish care.    Had insulin adjusted fairly recently after appointment with previous PCP 8/30/23.  Lowest interim blood sugar measurement was 76.  Taking 100U levemir at night and 23U novolog at mealtime TID.  She was on trulicity previously, but was taken off when in treatment for osteo.    Has a history of osteo, right hip.  Uses a wheelchair.  Follows with ID.  Not currently on antibiotics.  Has had multiple previous surgeries on this area.  Uses oxy as needed for management of hip pain, but hasn't needed in some time (uses ibuprofen instead).  Does not have home health, and no needs for home health identified today.    Checks BP at home, and per her report running 130-140/60-70.  She was previously taken off lisinopril.  Current regimen includes furosemide, verapamil and imdur.    Has a history of MI (Dec 2019) and has had 2 stents place.  No known previous CVA.  She does have a history of treated cervical cancer (1982) and is s/p hysterectomy.    PHQ-9 Depression Screening  Little interest or pleasure in doing things? 0-->not at all   Feeling down, depressed, or hopeless? 0-->not at all   Trouble falling or staying asleep, or sleeping too much?     Feeling tired or having little energy?     Poor appetite or overeating?     Feeling bad about yourself - or that you are a failure or have let yourself or your  family down?     Trouble concentrating on things, such as reading the newspaper or watching television?     Moving or speaking so slowly that other people could have noticed? Or the opposite - being so fidgety or restless that you have been moving around a lot more than usual?     Thoughts that you would be better off dead, or of hurting yourself in some way?     PHQ-9 Total Score 0   If you checked off any problems, how difficult have these problems made it for you to do your work, take care of things at home, or get along with other people?           Current Outpatient Medications   Medication Instructions    esomeprazole (NEXIUM) 40 mg, Oral, Every Morning Before Breakfast    ferrous sulfate 325 mg, Oral, Every Other Day, Mornings    folic acid (FOLVITE) 1,000 mcg, Oral, Daily    furosemide (LASIX) 40 mg, Oral, Daily    gabapentin (NEURONTIN) 600 mg, Oral, 2 Times Daily    insulin detemir (LEVEMIR) 100 UNIT/ML injection     insulin detemir (LEVEMIR) 30 Units, Subcutaneous, Nightly    isosorbide mononitrate (IMDUR) 30 mg, Oral, Every Morning    KLOR-CON 20 MEQ CR tablet 20 mEq, Oral, Daily    levocetirizine (XYZAL) 5 mg, Oral, Every Evening    loratadine (CLARITIN) 10 mg, Oral, Nightly    NovoLOG FlexPen 4 Units, Subcutaneous, 3 Times Daily With Meals    NovoLOG FlexPen 23 Units, Subcutaneous    oxyCODONE-acetaminophen (PERCOCET) 5-325 MG per tablet 1 tablet, Oral, Every 4 Hours PRN    saccharomyces boulardii (FLORASTOR) 250 mg, Oral, 2 Times Daily    verapamil (CALAN) 80 mg, Oral, 2 Times Daily    vitamin D3 5,000 Units, Oral, Daily       The following portions of the patient's history were reviewed and updated as appropriate: allergies, current medications, past family history, past medical history, past social history, past surgical history, and problem list.    Objective   Vital Signs:   /76 (BP Location: Left arm, Patient Position: Sitting, Cuff Size: Adult)   Pulse 69   Temp 98.2 °F (36.8 °C)  "(Temporal)   Resp 20   Ht 162.6 cm (64\")   Wt 101 kg (222 lb)   SpO2 94%   BMI 38.11 kg/m²     BP Readings from Last 3 Encounters:   09/27/23 138/76   07/22/22 142/85   07/15/22 134/57     Wt Readings from Last 3 Encounters:   09/27/23 101 kg (222 lb)   07/22/22 98.5 kg (217 lb 2.5 oz)   07/01/22 106 kg (233 lb 14.5 oz)     Class 2 Severe Obesity (BMI >=35 and <=39.9). Obesity-related health conditions include the following: hypertension, coronary heart disease, and diabetes mellitus. Obesity is unchanged. BMI is is above average; BMI management plan is completed. We discussed portion control and increasing exercise.     Physical Exam  Vitals reviewed.   Constitutional:       General: She is not in acute distress.     Appearance: Normal appearance. She is not ill-appearing, toxic-appearing or diaphoretic.   HENT:      Head: Normocephalic and atraumatic.      Right Ear: External ear normal.      Left Ear: External ear normal.   Eyes:      Conjunctiva/sclera: Conjunctivae normal.   Cardiovascular:      Rate and Rhythm: Normal rate and regular rhythm.      Pulses: Normal pulses.      Heart sounds: Normal heart sounds. No murmur heard.    No friction rub. No gallop.   Pulmonary:      Effort: Pulmonary effort is normal. No respiratory distress.      Breath sounds: Normal breath sounds. No stridor. No wheezing, rhonchi or rales.   Chest:      Chest wall: No tenderness.   Abdominal:      General: Abdomen is flat.      Palpations: Abdomen is soft. There is no mass.      Tenderness: There is no abdominal tenderness.   Musculoskeletal:      Right lower leg: No edema.      Left lower leg: No edema.   Skin:     General: Skin is warm and dry.   Neurological:      General: No focal deficit present.      Mental Status: She is alert. Mental status is at baseline.   Psychiatric:         Behavior: Behavior normal.         Thought Content: Thought content normal.         Judgment: Judgment normal.      Result Review :   The " following data was reviewed by: Donnell Garcia MD on 09/27/2023:  Common labs          1/10/2023    10:37 4/11/2023    10:55 6/5/2023    09:33   Common Labs   Glucose   174    BUN   14    Creatinine   1.02    Sodium   138    Potassium   3.9    Chloride   100    Calcium   9.9    Albumin   4.2    Total Bilirubin   0.8    Alkaline Phosphatase   92    AST (SGOT)   23    ALT (SGPT)   22    WBC 5.95     5.29     6.03    Hemoglobin 13.8     14.5     15.2    Hematocrit 43.5     43.7     45.6    Platelets 120     116     128    Total Cholesterol   174    Triglycerides   116    HDL Cholesterol   46    LDL Cholesterol    107    Hemoglobin A1C   8.00       Details          This result is from an external source.                    Lab Results   Component Value Date    COVID19 Not Detected 06/08/2022    INR 1.20 (H) 06/04/2022    BILIRUBINUR Negative 06/05/2022       Procedures        Assessment and Plan    Diagnoses and all orders for this visit:    1. Establishing care with new doctor, encounter for (Primary)    2. Primary hypertension  -     Comprehensive Metabolic Panel    3. Chronic diastolic CHF (congestive heart failure)  -     Comprehensive Metabolic Panel    4. Type 2 diabetes mellitus with diabetic polyneuropathy, with long-term current use of insulin  -     Microalbumin / Creatinine Urine Ratio - Urine, Clean Catch  -     POCT Glucose  -     Comprehensive Metabolic Panel  -     POC Urine Drug Screen [facility]    5. Presence of stent in coronary artery in patient with coronary artery disease    6. Osteomyelitis of hip  -     C-reactive Protein  -     Sedimentation Rate  -     CBC & Differential  -     Comprehensive Metabolic Panel    7. Nonrheumatic tricuspid valve regurgitation    8. Pain of right hip  -     POC Urine Drug Screen [facility]    9. Class 2 severe obesity due to excess calories with serious comorbidity and body mass index (BMI) of 38.0 to 38.9 in adult    10. Medication management  -     POC  Urine Drug Screen [facility]    11. Cigarette nicotine dependence in remission  -      CT Chest Low Dose Cancer Screening WO; Future      HTN:  -labs today  -BP above goal  -if renal function stable, will plan to re-start lisinopril    T2DM:  -A1c above goal, recently had insulin adjusted  -labs today, if renal function stable will likely re-start trulicity  -reviewed rule of 15 for treatment of hypoglycemic episodes (if glc < 70, take 15 grams of carbs and re-check in 15 minutes; repeat if still below 70; ER if still less than 70 after 2-3 boluses)  Pain or right hip, history of osteo:  -labs  -PRN oxy  -UDS today, controlled paperwork today    Neuropathy:  -UDS and paperwork today  -will continue gabapentin        There are no discontinued medications.       Follow Up   Return in about 3 months (around 12/27/2023) for Type 2 Diabetes.  Patient was given instructions and counseling regarding her condition or for health maintenance advice. Please see specific information pulled into the AVS if appropriate.       Donnell Garcia MD  09/27/23  13:21 EDT

## 2023-09-28 DIAGNOSIS — Z79.4 TYPE 2 DIABETES MELLITUS WITH DIABETIC POLYNEUROPATHY, WITH LONG-TERM CURRENT USE OF INSULIN: ICD-10-CM

## 2023-09-28 DIAGNOSIS — I10 PRIMARY HYPERTENSION: Primary | ICD-10-CM

## 2023-09-28 DIAGNOSIS — E11.42 TYPE 2 DIABETES MELLITUS WITH DIABETIC POLYNEUROPATHY, WITH LONG-TERM CURRENT USE OF INSULIN: ICD-10-CM

## 2023-09-28 RX ORDER — SPIRONOLACTONE 25 MG/1
25 TABLET ORAL DAILY
Qty: 90 TABLET | Refills: 2 | Status: SHIPPED | OUTPATIENT
Start: 2023-09-28

## 2023-09-28 RX ORDER — DULAGLUTIDE 0.75 MG/.5ML
0.75 INJECTION, SOLUTION SUBCUTANEOUS WEEKLY
Qty: 2 ML | Refills: 4 | Status: SHIPPED | OUTPATIENT
Start: 2023-09-28

## 2023-10-03 ENCOUNTER — DOCUMENTATION (OUTPATIENT)
Dept: INTERNAL MEDICINE | Facility: CLINIC | Age: 68
End: 2023-10-03
Payer: MEDICARE

## 2023-10-23 ENCOUNTER — HOSPITAL ENCOUNTER (OUTPATIENT)
Dept: CT IMAGING | Facility: HOSPITAL | Age: 68
Discharge: HOME | End: 2023-10-23
Admitting: STUDENT IN AN ORGANIZED HEALTH CARE EDUCATION/TRAINING PROGRAM
Payer: MEDICARE

## 2023-10-23 DIAGNOSIS — F17.211 CIGARETTE NICOTINE DEPENDENCE IN REMISSION: ICD-10-CM

## 2023-10-23 PROCEDURE — 71271 CT THORAX LUNG CANCER SCR C-: CPT

## 2023-11-01 ENCOUNTER — NURSE TRIAGE (OUTPATIENT)
Dept: CALL CENTER | Facility: HOSPITAL | Age: 68
End: 2023-11-01
Payer: MEDICARE

## 2023-11-01 NOTE — TELEPHONE ENCOUNTER
Blood sugar issues 11/01/2023 Caller states her blood sugars are going up and down, 214 this morning and 70 this afternoon, states she feels weak as well. BP has also been down and upper abdominal cramping.    Hub transferred caller, unable to get answer at office. Reviewed protocol with patient she states she has been on Trulicity for about a month and has had her BG be high and low, would like to talk with her doctor about how to regulate this. Warm Transfer to Antonette at Dr. Garcai's office.

## 2023-11-01 NOTE — TELEPHONE ENCOUNTER
"Reason for Disposition   [1] Symptoms of high blood sugar (e.g., abnormally thirsty, frequent urination, weight loss) AND [2] not able to test blood glucose    Additional Information   Negative: Unconscious or difficult to awaken   Negative: Acting confused (e.g., disoriented, slurred speech)   Negative: Very weak (e.g., can't stand)   Negative: Sounds like a life-threatening emergency to the triager   Negative: [1] Vomiting AND [2] signs of dehydration (e.g., very dry mouth, lightheaded, dark urine)   Negative: [1] Blood glucose > 240 mg/dL (13.3 mmol/L) AND [2] rapid breathing   Negative: Blood glucose > 500 mg/dL (27.8 mmol/L)   Negative: [1] Blood glucose > 240 mg/dL (13.3 mmol/L) AND [2] urine ketones moderate-large (or more than 1+)   Negative: [1] Blood glucose > 240 mg/dL (13.3 mmol/L) AND [2] blood ketones > 1.4 mmol/L   Negative: [1] Blood glucose > 240 mg/dL (13.3 mmol/L) AND [2] vomiting AND [3] unable to check for ketones (in blood or urine)   Negative: [1] New-onset diabetes suspected (e.g., frequent urination, weak, weight loss) AND [2] vomiting or rapid breathing   Negative: Vomiting lasts > 4 hours   Negative: Patient sounds very sick or weak to the triager   Negative: Fever > 100.4 F (38.0 C)   Negative: Blood glucose > 400 mg/dL (22.2 mmol/L)   Negative: [1] Blood glucose > 300 mg/dL (16.7 mmol/L) AND [2] two or more times in a row   Negative: Urine ketones moderate - large (or blood ketones > 1.4 mmol/L)   Negative: [1] Symptoms of high blood sugar (e.g., abnormally thirsty, frequent urination, weight loss) AND [2] not able to test blood glucose AND [3] pregnant   Negative: [1] Caller has URGENT medication or insulin pump question AND [2] triager unable to answer question   Negative: [1] Blood glucose > 240 mg/dL (13.3 mmol/L) AND [2] pregnant    Answer Assessment - Initial Assessment Questions  1. BLOOD GLUCOSE: \"What is your blood glucose level?\"       BG is 70 this afternoon and she has eaten " "to bring it up, states she was recently was put on Trulicity for about a month   2. ONSET: \"When did you check the blood glucose?\"      Just a few minutes ago   3. USUAL RANGE: \"What is your glucose level usually?\" (e.g., usual fasting morning value, usual evening value)      Usually ran a little high in the afternoons before she started on Trulicity  4. KETONES: \"Do you check for ketones (urine or blood test strips)?\" If Yes, ask: \"What does the test show now?\"       no  5. TYPE 1 or 2:  \"Do you know what type of diabetes you have?\"  (e.g., Type 1, Type 2, Gestational; doesn't know)       Type 2  6. INSULIN: \"Do you take insulin?\" \"What type of insulin(s) do you use? What is the mode of delivery? (syringe, pen; injection or pump)?\"       Yes, 23 units of Novolog with each meal and 100 units of levimir at night  7. DIABETES PILLS: \"Do you take any pills for your diabetes?\" If Yes, ask: \"Have you missed taking any pills recently?\"      no  8. OTHER SYMPTOMS: \"Do you have any symptoms?\" (e.g., fever, frequent urination, difficulty breathing, dizziness, weakness, vomiting)      Weakness, frequent urination  9. PREGNANCY: \"Is there any chance you are pregnant?\" \"When was your last menstrual period?\"      Na    Protocols used: Diabetes - High Blood Sugar-ADULT-AH    "

## 2023-11-02 ENCOUNTER — OFFICE VISIT (OUTPATIENT)
Dept: INTERNAL MEDICINE | Facility: CLINIC | Age: 68
End: 2023-11-02
Payer: MEDICARE

## 2023-11-02 VITALS
BODY MASS INDEX: 40.12 KG/M2 | HEIGHT: 64 IN | DIASTOLIC BLOOD PRESSURE: 72 MMHG | OXYGEN SATURATION: 96 % | HEART RATE: 59 BPM | SYSTOLIC BLOOD PRESSURE: 115 MMHG | WEIGHT: 235 LBS | TEMPERATURE: 97.3 F

## 2023-11-02 DIAGNOSIS — Z79.4 TYPE 2 DIABETES MELLITUS WITH DIABETIC POLYNEUROPATHY, WITH LONG-TERM CURRENT USE OF INSULIN: Primary | ICD-10-CM

## 2023-11-02 DIAGNOSIS — E11.42 TYPE 2 DIABETES MELLITUS WITH DIABETIC POLYNEUROPATHY, WITH LONG-TERM CURRENT USE OF INSULIN: Primary | ICD-10-CM

## 2023-11-02 DIAGNOSIS — L03.311 CELLULITIS OF ABDOMINAL WALL: ICD-10-CM

## 2023-11-02 DIAGNOSIS — Z79.899 MEDICATION MANAGEMENT: ICD-10-CM

## 2023-11-02 DIAGNOSIS — I10 PRIMARY HYPERTENSION: ICD-10-CM

## 2023-11-02 LAB — GLUCOSE BLDC GLUCOMTR-MCNC: 104 MG/DL (ref 70–130)

## 2023-11-02 RX ORDER — MUPIROCIN CALCIUM 20 MG/G
1 CREAM TOPICAL 3 TIMES DAILY
Qty: 30 G | Refills: 0 | Status: SHIPPED | OUTPATIENT
Start: 2023-11-02 | End: 2023-11-12

## 2023-11-02 RX ORDER — INSULIN ASPART 100 [IU]/ML
INJECTION, SOLUTION INTRAVENOUS; SUBCUTANEOUS
Qty: 20 ML | Refills: 11 | Status: SHIPPED | OUTPATIENT
Start: 2023-11-02 | End: 2024-11-01

## 2023-11-02 NOTE — PROGRESS NOTES
Chief Complaint  Blood Sugar Problem (Been getting highs and lows 62/212)    Subjective          Stephy Duncan presents to [facility]  History of Present Illness    Here with complaints of fatigue and fluctuating blood sugars.    In the interim, reports b.s. ranging from 62 to high of 212.  In the mornings before breakfast, b.s. is consistently above goal (e.g.,last two days were 141 and 212).  Having hypoglycemic episodes to 62 at times in later morning after breakfast.  Evening post-dinner measurements are generally high (as high as 180-190).  She is taking 100U of levemir in the evening, and 23U of short acting with each meal.  Dinner, of note, is her biggest meal of the day.    She denies dizziness or syncope.  No falls.  She of note does report some abdominal cramping off and on this past week.    Also of note, she reports having some sores on her right lower abdomen.    Current Outpatient Medications   Medication Instructions    esomeprazole (NEXIUM) 40 mg, Oral, Every Morning Before Breakfast    ferrous sulfate 325 mg, Oral, Every Other Day, Mornings    folic acid (FOLVITE) 1,000 mcg, Oral, Daily    furosemide (LASIX) 40 mg, Oral, Daily    gabapentin (NEURONTIN) 600 mg, Oral, 2 Times Daily    insulin aspart (NovoLOG FlexPen) 100 UNIT/ML solution pen-injector sc pen Inject 20 Units under the skin into the appropriate area as directed Daily With Breakfast AND 23 Units Daily With Lunch AND 23 Units Daily With Dinner.    insulin detemir (LEVEMIR) 105 Units, Subcutaneous, Nightly    isosorbide mononitrate (IMDUR) 30 mg, Oral, Every Morning    KLOR-CON 20 MEQ CR tablet 20 mEq, Oral, Daily    levocetirizine (XYZAL) 5 mg, Oral, Every Evening    mupirocin (BACTROBAN) 2 % cream 1 application , Topical, 3 Times Daily    oxyCODONE-acetaminophen (PERCOCET) 5-325 MG per tablet 1 tablet, Oral, Every 4 Hours PRN    spironolactone (ALDACTONE) 25 mg, Oral, Daily     "Trulicity 0.75 mg, Subcutaneous, Weekly    verapamil (CALAN) 80 mg, Oral, 2 Times Daily    vitamin D3 5,000 Units, Oral, Daily       The following portions of the patient's history were reviewed and updated as appropriate: allergies, current medications, past family history, past medical history, past social history, past surgical history, and problem list.    Objective   Vital Signs:   /72 (BP Location: Right arm, Patient Position: Sitting, Cuff Size: Large Adult)   Pulse 59   Temp 97.3 °F (36.3 °C) (Temporal)   Ht 162.6 cm (64\")   Wt 107 kg (235 lb)   SpO2 96%   BMI 40.34 kg/m²     BP Readings from Last 3 Encounters:   11/02/23 115/72   09/27/23 138/76   07/22/22 142/85     Wt Readings from Last 3 Encounters:   11/02/23 107 kg (235 lb)   09/27/23 101 kg (222 lb)   07/22/22 98.5 kg (217 lb 2.5 oz)           Physical Exam  Vitals reviewed.   Constitutional:       General: She is not in acute distress.     Appearance: Normal appearance. She is not ill-appearing, toxic-appearing or diaphoretic.   HENT:      Head: Normocephalic and atraumatic.      Right Ear: External ear normal.      Left Ear: External ear normal.   Eyes:      Conjunctiva/sclera: Conjunctivae normal.   Cardiovascular:      Rate and Rhythm: Normal rate and regular rhythm.      Pulses: Normal pulses.      Heart sounds: Normal heart sounds. No murmur heard.     No friction rub. No gallop.   Pulmonary:      Effort: Pulmonary effort is normal. No respiratory distress.      Breath sounds: Normal breath sounds. No stridor. No wheezing, rhonchi or rales.   Chest:      Chest wall: No tenderness.   Abdominal:      General: Abdomen is flat.      Palpations: Abdomen is soft. There is no mass.      Tenderness: There is no abdominal tenderness.      Comments: Two superficial erosions noted, each about 1.5 cm in diameter and with surrounding erythema, on right lower abdomen. No evidence of intertrigo.   Musculoskeletal:      Right lower leg: No edema.    "   Left lower leg: No edema.   Skin:     General: Skin is warm and dry.   Neurological:      Mental Status: She is alert. Mental status is at baseline.   Psychiatric:         Behavior: Behavior normal.         Thought Content: Thought content normal.         Judgment: Judgment normal.          Result Review :   The following data was reviewed by: Donnell Garcia MD on 11/02/2023:  Common labs          4/11/2023    10:55 6/5/2023    09:33 9/27/2023    10:15   Common Labs   Glucose  174  206    BUN  14  18    Creatinine  1.02  0.84    Sodium  138  133    Potassium  3.9  4.7    Chloride  100  99    Calcium  9.9  9.2    Albumin  4.2  4.1    Total Bilirubin  0.8  0.7    Alkaline Phosphatase  92  83    AST (SGOT)  23  30    ALT (SGPT)  22  25    WBC 5.29     6.03  6.34    Hemoglobin 14.5     15.2  14.0    Hematocrit 43.7     45.6  42.0    Platelets 116     128     Total Cholesterol  174     Triglycerides  116     HDL Cholesterol  46     LDL Cholesterol   107     Hemoglobin A1C  8.00        Details          This result is from an external source.                    Lab Results   Component Value Date    COVID19 Not Detected 06/08/2022    INR 1.20 (H) 06/04/2022    BILIRUBINUR Negative 06/05/2022       Procedures        Assessment and Plan    Diagnoses and all orders for this visit:    1. Type 2 diabetes mellitus with diabetic polyneuropathy, with long-term current use of insulin (Primary)  -     POC Glucose  -     insulin detemir (LEVEMIR) 100 UNIT/ML injection; Inject 105 Units under the skin into the appropriate area as directed Every Night.  Dispense: 10 mL; Refill: 3  -     insulin aspart (NovoLOG FlexPen) 100 UNIT/ML solution pen-injector sc pen; Inject 20 Units under the skin into the appropriate area as directed Daily With Breakfast AND 23 Units Daily With Lunch AND 23 Units Daily With Dinner.  Dispense: 20 mL; Refill: 11    2. Primary hypertension    3. Cellulitis of abdominal wall  -     mupirocin (BACTROBAN) 2 %  cream; Apply 1 application  topically to the appropriate area as directed 3 (Three) Times a Day for 10 days.  Dispense: 30 g; Refill: 0    4. Medication management      T2DM:  -reviewed rule of 15 for hypoglycemia  -will reduce breakfast time short acting and increase long acting  -new regimen: novolog 20U at breakfast, 23 U at both lunch and dinner; 105U of levemir daily  -I suspect we will need to eventually increase her dinner time short acting  -will keep routine follow up scheduled on 12/29/2023  -suspect abdominal cramping is 2/2 to trulicity; will cautiously continue for now and monitor    HTN:  -BP at goal  -will continue verapamil, imdur,spironolactone  -fatigue noted , but difficult to ascertain if due to blood sugar fluctuations, antihypertensives, or other factors  -will continue current regimen and monitor for now        Medications Discontinued During This Encounter   Medication Reason    saccharomyces boulardii (FLORASTOR) 250 MG capsule *Therapy completed    insulin aspart (NovoLOG FlexPen) 100 UNIT/ML solution pen-injector sc pen Reorder    insulin detemir (LEVEMIR) 100 UNIT/ML injection Reorder          Follow Up   Return if symptoms worsen or fail to improve.  Patient was given instructions and counseling regarding her condition or for health maintenance advice. Please see specific information pulled into the AVS if appropriate.       Donnell Garcia MD  11/02/23  10:57 EDT

## 2023-11-03 ENCOUNTER — TELEPHONE (OUTPATIENT)
Dept: INTERNAL MEDICINE | Facility: CLINIC | Age: 68
End: 2023-11-03
Payer: MEDICARE

## 2023-11-03 DIAGNOSIS — E11.42 TYPE 2 DIABETES MELLITUS WITH DIABETIC POLYNEUROPATHY, WITH LONG-TERM CURRENT USE OF INSULIN: Primary | ICD-10-CM

## 2023-11-03 DIAGNOSIS — Z79.4 TYPE 2 DIABETES MELLITUS WITH DIABETIC POLYNEUROPATHY, WITH LONG-TERM CURRENT USE OF INSULIN: Primary | ICD-10-CM

## 2023-11-03 NOTE — TELEPHONE ENCOUNTER
Patients  picked up her prescription yesterday for the Levemir, she stated when she got it was the vials and not the flex pens so she is needing needles called into the pharmacy so she can use the medication and request next time the flex pen to be sent in

## 2023-11-03 NOTE — TELEPHONE ENCOUNTER
Caller: Stephy Duncan    Relationship: Self    Best call back number: [phone]    Requested Prescriptions:   Requested Prescriptions      No prescriptions requested or ordered in this encounter      [facility]       Pharmacy where request should be sent: [facility]Y #[id number] - [place], [place] - [address] - [phone]  - [phone] FX     Last office visit with prescribing clinician: 11/2/2023   Last telemedicine visit with prescribing clinician: Visit date not found   Next office visit with prescribing clinician: 12/29/2023     Additional details provided by patient:      Does the patient have less than a 3 day supply:  [x] Yes  [] No    Would you like a call back once the refill request has been completed: [] Yes [] No    If the office needs to give you a call back, can they leave a voicemail: [] Yes [x] No    Agapito Lopez Rep   11/03/23 14:36 EDT

## 2023-11-06 RX ORDER — INSULIN DETEMIR 100 [IU]/ML
105 INJECTION, SOLUTION SUBCUTANEOUS DAILY
Qty: 10 ML | Refills: 3 | Status: SHIPPED | OUTPATIENT
Start: 2023-11-06

## 2023-12-08 ENCOUNTER — OFFICE VISIT (OUTPATIENT)
Dept: INTERNAL MEDICINE | Facility: CLINIC | Age: 68
End: 2023-12-08
Payer: MEDICARE

## 2023-12-08 VITALS
DIASTOLIC BLOOD PRESSURE: 63 MMHG | HEART RATE: 73 BPM | OXYGEN SATURATION: 92 % | HEIGHT: 64 IN | TEMPERATURE: 96.8 F | BODY MASS INDEX: 40.12 KG/M2 | SYSTOLIC BLOOD PRESSURE: 139 MMHG | WEIGHT: 235 LBS

## 2023-12-08 DIAGNOSIS — B02.21 HZO (HERPES ZOSTER OTICUS): Primary | ICD-10-CM

## 2023-12-08 PROCEDURE — 3075F SYST BP GE 130 - 139MM HG: CPT | Performed by: INTERNAL MEDICINE

## 2023-12-08 PROCEDURE — 3078F DIAST BP <80 MM HG: CPT | Performed by: INTERNAL MEDICINE

## 2023-12-08 PROCEDURE — 3052F HG A1C>EQUAL 8.0%<EQUAL 9.0%: CPT | Performed by: INTERNAL MEDICINE

## 2023-12-08 PROCEDURE — 99213 OFFICE O/P EST LOW 20 MIN: CPT | Performed by: INTERNAL MEDICINE

## 2023-12-08 NOTE — PROGRESS NOTES
Chief Complaint  Stye (IN RIGHT EYE that has turned into sore and now effecting eye site )    Subjective          Stephy Duncan presents to [facility]  History of Present Illness  Patient reports having irritation and itching on the right side of her face. Patient has been on erythromycin and amoxicillin for a stye without improvement for 6 days. Patient reports her vision is blurry in the right eye as well. She has previously had shingrix vaccine.     Current Outpatient Medications   Medication Instructions    amoxicillin-clavulanate (AUGMENTIN) 875-125 MG per tablet 1 tablet, Oral, Every 12 Hours    erythromycin (ROMYCIN) 5 MG/GM ophthalmic ointment Right Eye, Every 4 Hours While Awake    esomeprazole (NEXIUM) 40 mg, Oral, Every Morning Before Breakfast    ferrous sulfate 325 mg, Oral, Every Other Day, Mornings    folic acid (FOLVITE) 1,000 mcg, Oral, Daily    furosemide (LASIX) 40 mg, Oral, Daily    gabapentin (NEURONTIN) 600 mg, Oral, 2 Times Daily    insulin aspart (NovoLOG FlexPen) 100 UNIT/ML solution pen-injector sc pen Inject 20 Units under the skin into the appropriate area as directed Daily With Breakfast AND 23 Units Daily With Lunch AND 23 Units Daily With Dinner.    isosorbide mononitrate (IMDUR) 30 mg, Oral, Every Morning    KLOR-CON 20 MEQ CR tablet 20 mEq, Oral, Daily    Levemir FlexPen 105 Units, Subcutaneous, Daily    levocetirizine (XYZAL) 5 mg, Oral, Every Evening    oxyCODONE-acetaminophen (PERCOCET) 5-325 MG per tablet 1 tablet, Oral, Every 4 Hours PRN    spironolactone (ALDACTONE) 25 mg, Oral, Daily    Trulicity 0.75 mg, Subcutaneous, Weekly    verapamil (CALAN) 80 mg, Oral, 2 Times Daily    vitamin D3 5,000 Units, Oral, Daily       The following portions of the patient's history were reviewed and updated as appropriate: allergies, current medications, past family history, past medical history, past social history, past surgical  "history, and problem list.    Objective   Vital Signs:   /63 (BP Location: Left arm, Patient Position: Sitting)   Pulse 73   Temp 96.8 °F (36 °C) (Temporal)   Ht 162.6 cm (64\")   Wt 107 kg (235 lb)   SpO2 92%   BMI 40.34 kg/m²     Wt Readings from Last 3 Encounters:   12/08/23 107 kg (235 lb)   11/02/23 107 kg (235 lb)   09/27/23 101 kg (222 lb)     BP Readings from Last 3 Encounters:   12/08/23 139/63   12/02/23 130/67   11/02/23 115/72       Physical Exam   Appearance: No acute distress, well-nourished  Head: normocephalic, atraumatic  Eyes: extraocular movements intact, no scleral icterus, no conjunctival injection  Ears, Nose, and Throat: external ears normal, nares patent, moist mucous membranes. Tonsils within normal limits.   Cardiovascular: regular rate. no edema  Respiratory: breathing comfortably, symmetric chest rise  Neuro: alert and moves all extremities equally  Skin: + multiple crusted excoriations on right side without exudate.       Result Review :   The following data was reviewed by: Jamel Walden Jr, MD on 12/08/2023:  Common labs          4/11/2023    10:55 6/5/2023    09:33 9/27/2023    10:15   Common Labs   Glucose  174  206    BUN  14  18    Creatinine  1.02  0.84    Sodium  138  133    Potassium  3.9  4.7    Chloride  100  99    Calcium  9.9  9.2    Albumin  4.2  4.1    Total Bilirubin  0.8  0.7    Alkaline Phosphatase  92  83    AST (SGOT)  23  30    ALT (SGPT)  22  25    WBC 5.29     6.03  6.34    Hemoglobin 14.5     15.2  14.0    Hematocrit 43.7     45.6  42.0    Platelets 116     128     Total Cholesterol  174     Triglycerides  116     HDL Cholesterol  46     LDL Cholesterol   107     Hemoglobin A1C  8.00        Details          This result is from an external source.               Lab Results   Component Value Date    COVID19 Not Detected 06/08/2022    INR 1.20 (H) 06/04/2022    BILIRUBINUR Negative 06/05/2022        Assessment and Plan    Diagnoses and all orders " for this visit:    1. HZO (herpes zoster oticus) (Primary)  Comments:  refer to ER for further management. Dr. Ayers in ER notified.      There are no discontinued medications.       Follow Up   Return if symptoms worsen or fail to improve.  Patient was given instructions and counseling regarding her condition or for health maintenance advice. Please see specific information pulled into the AVS if appropriate.       Jamel Walden Jr, MD  12/08/23  17:34 EST

## 2023-12-11 ENCOUNTER — TELEPHONE (OUTPATIENT)
Dept: INTERNAL MEDICINE | Facility: CLINIC | Age: 68
End: 2023-12-11
Payer: MEDICARE

## 2023-12-11 DIAGNOSIS — B02.21 HZO (HERPES ZOSTER OTICUS): Primary | ICD-10-CM

## 2023-12-11 NOTE — TELEPHONE ENCOUNTER
Caller: Stephy Duncan    Relationship: Self    Best call back number: [phone]    What medication are you requesting: SOMETHING FOR SHINGLES IN HER EYE     What are your current symptoms: CONFIRMED SINGLES IN EYE     How long have you been experiencing symptoms: WEEK AND A HALF     Have you had these symptoms before:    [x] Yes  [] No    Have you been treated for these symptoms before:   [x] Yes  [] No    If a prescription is needed, what is your preferred pharmacy and phone number: [facility]/PHARMACY #[id number] - [place] - [address] [facility] [phone] [facility] [phone] FX     Additional notes: PATIENT WAS SEEN BY ALBINA ON 12.8.23 AND WAS DIAGNOSED WITH SHINGLES IN HER EYE AND INFORMED TO GO TO THE ER TO GET IV MEDICATION. ER DID NOT GIVE PATIENT ANY MEDICATION. PATIENT ALSO WANTING TO KNOW IF SHE WILL NEED A REFERRAL TO SEE THE EYE SPECIALIST FOR THIS ISSUE.

## 2023-12-12 NOTE — TELEPHONE ENCOUNTER
Caller: Stephy Duncan    Relationship to patient: Self    Best call back number: [phone]    Patient is needing: PATIENT CALLING TO CHECK ON THE STATUS OF HER REQUEST FOR A SHINGLES MEDICATION. PATIENT REQUESTING A CALL BACK AS SOON AS POSSIBLE.

## 2023-12-19 NOTE — TELEPHONE ENCOUNTER
Patient does need to see ophtho. I would expect the infection to be quite a bit different by now. Has it resolved?

## 2023-12-26 NOTE — TELEPHONE ENCOUNTER
Called patient   She stated that it has gotten better.   She does have an appt with her eye doc in Jan .     She also had an appt with dr. Garcia on Friday

## 2023-12-29 ENCOUNTER — OFFICE VISIT (OUTPATIENT)
Dept: INTERNAL MEDICINE | Facility: CLINIC | Age: 68
End: 2023-12-29
Payer: MEDICARE

## 2023-12-29 VITALS
HEIGHT: 64 IN | DIASTOLIC BLOOD PRESSURE: 72 MMHG | WEIGHT: 236 LBS | HEART RATE: 71 BPM | OXYGEN SATURATION: 95 % | SYSTOLIC BLOOD PRESSURE: 129 MMHG | TEMPERATURE: 98.2 F | BODY MASS INDEX: 40.29 KG/M2

## 2023-12-29 DIAGNOSIS — B02.21 HZO (HERPES ZOSTER OTICUS): ICD-10-CM

## 2023-12-29 DIAGNOSIS — I10 PRIMARY HYPERTENSION: ICD-10-CM

## 2023-12-29 DIAGNOSIS — E78.2 MIXED HYPERLIPIDEMIA: ICD-10-CM

## 2023-12-29 DIAGNOSIS — E11.42 TYPE 2 DIABETES MELLITUS WITH DIABETIC POLYNEUROPATHY, WITH LONG-TERM CURRENT USE OF INSULIN: Primary | ICD-10-CM

## 2023-12-29 DIAGNOSIS — Z79.4 TYPE 2 DIABETES MELLITUS WITH DIABETIC POLYNEUROPATHY, WITH LONG-TERM CURRENT USE OF INSULIN: Primary | ICD-10-CM

## 2023-12-29 PROCEDURE — 82570 ASSAY OF URINE CREATININE: CPT | Performed by: STUDENT IN AN ORGANIZED HEALTH CARE EDUCATION/TRAINING PROGRAM

## 2023-12-29 PROCEDURE — 3078F DIAST BP <80 MM HG: CPT | Performed by: STUDENT IN AN ORGANIZED HEALTH CARE EDUCATION/TRAINING PROGRAM

## 2023-12-29 PROCEDURE — 3074F SYST BP LT 130 MM HG: CPT | Performed by: STUDENT IN AN ORGANIZED HEALTH CARE EDUCATION/TRAINING PROGRAM

## 2023-12-29 PROCEDURE — 82043 UR ALBUMIN QUANTITATIVE: CPT | Performed by: STUDENT IN AN ORGANIZED HEALTH CARE EDUCATION/TRAINING PROGRAM

## 2023-12-29 PROCEDURE — 3052F HG A1C>EQUAL 8.0%<EQUAL 9.0%: CPT | Performed by: STUDENT IN AN ORGANIZED HEALTH CARE EDUCATION/TRAINING PROGRAM

## 2023-12-29 PROCEDURE — 80053 COMPREHEN METABOLIC PANEL: CPT | Performed by: STUDENT IN AN ORGANIZED HEALTH CARE EDUCATION/TRAINING PROGRAM

## 2023-12-29 PROCEDURE — 83036 HEMOGLOBIN GLYCOSYLATED A1C: CPT | Performed by: STUDENT IN AN ORGANIZED HEALTH CARE EDUCATION/TRAINING PROGRAM

## 2023-12-29 PROCEDURE — 80061 LIPID PANEL: CPT | Performed by: STUDENT IN AN ORGANIZED HEALTH CARE EDUCATION/TRAINING PROGRAM

## 2023-12-29 PROCEDURE — 99214 OFFICE O/P EST MOD 30 MIN: CPT | Performed by: STUDENT IN AN ORGANIZED HEALTH CARE EDUCATION/TRAINING PROGRAM

## 2023-12-29 RX ORDER — INSULIN DETEMIR 100 [IU]/ML
110 INJECTION, SOLUTION SUBCUTANEOUS DAILY
Qty: 10 ML | Refills: 3 | Status: SHIPPED | OUTPATIENT
Start: 2023-12-29

## 2023-12-29 NOTE — PROGRESS NOTES
Chief Complaint  Diabetes, Hypertension, and Hyperlipidemia    Subjective          [place] Cayla Duncan presents to [facility]  History of Present Illness    Mrs. Lara is here unaccompanied.    Reports went to the ER after 12/8/23 appointment with Dr. Walden.  Mrs Duncan reports that while she was in the vehicle her  spoke with  and was advised they would have to wait 4-5 hours minimum, and that they'd be given a prescription and sent home.   And Mrs Lara decided to leave at that point.    She has made an ophtho appointment with Dr. Akshat Henry for January 17th.  Her right eye has improved substantially, and reports scabs have resolved.  Her right eye is a bit more blurry than normal, and she feels tender around the bridge of her nose, but is otherwise well.    She is here with a blood sugar log, which shows no hypoglycemic episodes.  Mornings AM fasting measurements are above goal.    Checks BP irregularly (122-138 over 68-88 per her report).    Current Outpatient Medications   Medication Instructions    esomeprazole (NEXIUM) 40 mg, Oral, Every Morning Before Breakfast    ferrous sulfate 325 mg, Oral, Every Other Day, Mornings    folic acid (FOLVITE) 1,000 mcg, Oral, Daily    furosemide (LASIX) 40 mg, Oral, Daily    gabapentin (NEURONTIN) 600 mg, Oral, 2 Times Daily    insulin aspart (NovoLOG FlexPen) 100 UNIT/ML solution pen-injector sc pen Inject 20 Units under the skin into the appropriate area as directed Daily With Breakfast AND 23 Units Daily With Lunch AND 23 Units Daily With Dinner.    isosorbide mononitrate (IMDUR) 30 mg, Oral, Every Morning    KLOR-CON 20 MEQ CR tablet 20 mEq, Oral, Daily    Levemir FlexPen 110 Units, Subcutaneous, Daily    levocetirizine (XYZAL) 5 mg, Oral, Every Evening    spironolactone (ALDACTONE) 25 mg, Oral, Daily    Trulicity 0.75 mg, Subcutaneous, Weekly    verapamil (CALAN) 80 mg, Oral, 2 Times Daily     "vitamin D3 5,000 Units, Oral, Daily       The following portions of the patient's history were reviewed and updated as appropriate: allergies, current medications, past family history, past medical history, past social history, past surgical history, and problem list.    Objective   Vital Signs:   /72 (BP Location: Left arm, Patient Position: Sitting, Cuff Size: Adult)   Pulse 71   Temp 98.2 °F (36.8 °C) (Temporal)   Ht 162.6 cm (64\")   Wt 107 kg (236 lb)   SpO2 95%   BMI 40.51 kg/m²     BP Readings from Last 3 Encounters:   12/29/23 129/72   12/08/23 139/63   12/02/23 130/67     Wt Readings from Last 3 Encounters:   12/29/23 107 kg (236 lb)   12/08/23 107 kg (235 lb)   11/02/23 107 kg (235 lb)           Physical Exam  Vitals reviewed.   Constitutional:       General: She is not in acute distress.     Appearance: Normal appearance. She is not ill-appearing, toxic-appearing or diaphoretic.   HENT:      Head: Normocephalic and atraumatic.      Right Ear: External ear normal.      Left Ear: External ear normal.   Eyes:      General: No scleral icterus.     Extraocular Movements: Extraocular movements intact.      Pupils: Pupils are equal, round, and reactive to light.      Comments: Sclera mildly injected, with right slightly worse than left   Cardiovascular:      Rate and Rhythm: Normal rate and regular rhythm.      Pulses: Normal pulses.      Heart sounds: Normal heart sounds. No murmur heard.     No friction rub. No gallop.   Pulmonary:      Effort: Pulmonary effort is normal. No respiratory distress.      Breath sounds: Normal breath sounds. No stridor. No wheezing, rhonchi or rales.   Chest:      Chest wall: No tenderness.   Abdominal:      General: Abdomen is flat.      Palpations: Abdomen is soft. There is no mass.      Tenderness: There is no abdominal tenderness.   Musculoskeletal:      Right lower leg: No edema.      Left lower leg: No edema.   Skin:     General: Skin is warm and dry.      " Findings: No rash.   Neurological:      General: No focal deficit present.      Mental Status: She is alert. Mental status is at baseline.   Psychiatric:         Mood and Affect: Mood normal.         Behavior: Behavior normal.         Thought Content: Thought content normal.         Judgment: Judgment normal.          Result Review :   The following data was reviewed by: Donnell Garcia MD on 12/29/2023:  Common labs          4/11/2023    10:55 6/5/2023    09:33 9/27/2023    10:15   Common Labs   Glucose  174  206    BUN  14  18    Creatinine  1.02  0.84    Sodium  138  133    Potassium  3.9  4.7    Chloride  100  99    Calcium  9.9  9.2    Albumin  4.2  4.1    Total Bilirubin  0.8  0.7    Alkaline Phosphatase  92  83    AST (SGOT)  23  30    ALT (SGPT)  22  25    WBC 5.29     6.03  6.34    Hemoglobin 14.5     15.2  14.0    Hematocrit 43.7     45.6  42.0    Platelets 116     128     Total Cholesterol  174     Triglycerides  116     HDL Cholesterol  46     LDL Cholesterol   107     Hemoglobin A1C  8.00        Details          This result is from an external source.                    Lab Results   Component Value Date    COVID19 Not Detected 06/08/2022    INR 1.20 (H) 06/04/2022    BILIRUBINUR Negative 06/05/2022       Procedures        Assessment and Plan    Diagnoses and all orders for this visit:    1. Type 2 diabetes mellitus with diabetic polyneuropathy, with long-term current use of insulin (Primary)  -     Comprehensive Metabolic Panel  -     Hemoglobin A1c  -     Microalbumin / Creatinine Urine Ratio - Urine, Clean Catch  -     insulin detemir (Levemir FlexPen) 100 UNIT/ML injection; Inject 110 Units under the skin into the appropriate area as directed Daily.  Dispense: 10 mL; Refill: 3    2. Primary hypertension  -     Comprehensive Metabolic Panel    3. HZO (herpes zoster oticus)    4. Mixed hyperlipidemia  -     Lipid Panel      HTN:  -BP at goal  -will continue aldactone,  imdur    HZO:  -improved  -seeing ophtho in the near term  -if worsening vision or other ocular symptoms, I did recommend immediate ER evaluation    Medications Discontinued During This Encounter   Medication Reason    oxyCODONE-acetaminophen (PERCOCET) 5-325 MG per tablet *Therapy completed    amoxicillin-clavulanate (AUGMENTIN) 875-125 MG per tablet     erythromycin (ROMYCIN) 5 MG/GM ophthalmic ointment     insulin detemir (Levemir FlexPen) 100 UNIT/ML injection Reorder          Follow Up   Return in about 3 months (around 3/29/2024) for Medicare Wellness.  Patient was given instructions and counseling regarding her condition or for health maintenance advice. Please see specific information pulled into the AVS if appropriate.       Donnell Garcia MD  12/29/23  13:04 EST

## 2023-12-30 LAB
ALBUMIN SERPL-MCNC: 4.1 G/DL (ref 3.5–5.2)
ALBUMIN UR-MCNC: <1.2 MG/DL
ALBUMIN/GLOB SERPL: 1.3 G/DL
ALP SERPL-CCNC: 74 U/L (ref 39–117)
ALT SERPL W P-5'-P-CCNC: 26 U/L (ref 1–33)
ANION GAP SERPL CALCULATED.3IONS-SCNC: 13.1 MMOL/L (ref 5–15)
AST SERPL-CCNC: 36 U/L (ref 1–32)
BILIRUB SERPL-MCNC: 0.8 MG/DL (ref 0–1.2)
BUN SERPL-MCNC: 17 MG/DL (ref 8–23)
BUN/CREAT SERPL: 18.7 (ref 7–25)
CALCIUM SPEC-SCNC: 9.5 MG/DL (ref 8.6–10.5)
CHLORIDE SERPL-SCNC: 99 MMOL/L (ref 98–107)
CHOLEST SERPL-MCNC: 132 MG/DL (ref 0–200)
CO2 SERPL-SCNC: 23.9 MMOL/L (ref 22–29)
CREAT SERPL-MCNC: 0.91 MG/DL (ref 0.57–1)
CREAT UR-MCNC: 11.2 MG/DL
EGFRCR SERPLBLD CKD-EPI 2021: 68 ML/MIN/1.73
GLOBULIN UR ELPH-MCNC: 3.1 GM/DL
GLUCOSE SERPL-MCNC: 150 MG/DL (ref 65–99)
HBA1C MFR BLD: 7.2 % (ref 4.8–5.6)
HDLC SERPL-MCNC: 48 MG/DL (ref 40–60)
LDLC SERPL CALC-MCNC: 66 MG/DL (ref 0–100)
LDLC/HDLC SERPL: 1.34 {RATIO}
MICROALBUMIN/CREAT UR: NORMAL MG/G{CREAT}
POTASSIUM SERPL-SCNC: 4.2 MMOL/L (ref 3.5–5.2)
PROT SERPL-MCNC: 7.2 G/DL (ref 6–8.5)
SODIUM SERPL-SCNC: 136 MMOL/L (ref 136–145)
TRIGL SERPL-MCNC: 98 MG/DL (ref 0–150)
VLDLC SERPL-MCNC: 18 MG/DL (ref 5–40)

## 2024-01-17 ENCOUNTER — TELEPHONE (OUTPATIENT)
Dept: INTERNAL MEDICINE | Facility: CLINIC | Age: 69
End: 2024-01-17

## 2024-01-18 RX ORDER — INSULIN GLARGINE 100 [IU]/ML
110 INJECTION, SOLUTION SUBCUTANEOUS DAILY
Qty: 10 ML | Refills: 12 | Status: SHIPPED | OUTPATIENT
Start: 2024-01-18

## 2024-02-07 ENCOUNTER — TELEPHONE (OUTPATIENT)
Dept: INTERNAL MEDICINE | Facility: CLINIC | Age: 69
End: 2024-02-07
Payer: MEDICARE

## 2024-02-07 DIAGNOSIS — Z98.890 S/P GIRDLESTONE PROCEDURE: ICD-10-CM

## 2024-02-07 RX ORDER — GABAPENTIN 600 MG/1
600 TABLET ORAL 2 TIMES DAILY
Qty: 180 TABLET | Refills: 0 | Status: SHIPPED | OUTPATIENT
Start: 2024-02-07

## 2024-02-07 NOTE — TELEPHONE ENCOUNTER
Caller: PerlaStephy    Relationship: Self    Best call back number: [phone]     Requested Prescriptions:   Requested Prescriptions     Pending Prescriptions Disp Refills    gabapentin (NEURONTIN) 600 MG tablet 40 tablet 0     Sig: Take 1 tablet by mouth 2 (Two) Times a Day.        Pharmacy where request should be sent: [facility]/PHARMACY #[id number] - [place], [place] - [address] [facility] [phone]  - [phone] FX     Last office visit with prescribing clinician: 12/29/2023   Last telemedicine visit with prescribing clinician: Visit date not found   Next office visit with prescribing clinician: 4/1/2024     Does the patient have less than a 3 day supply:  [x] Yes  [] No    Would you like a call back once the refill request has been completed: [] Yes [x] No    If the office needs to give you a call back, can they leave a voicemail: [] Yes [x] No    Alisson Eckert, PCT   02/07/24 15:32 EST

## 2024-02-07 NOTE — TELEPHONE ENCOUNTER
LAST APPOINTMENT: 12/29/2023  NEXT APPOINTMENT: 04/01/2024  LAST UDS: 09/27/2023  LAST CONTROLLED SUBSTANCE AGREEMENT: 09/27/2023

## 2024-02-22 DIAGNOSIS — Z79.4 TYPE 2 DIABETES MELLITUS WITH DIABETIC POLYNEUROPATHY, WITH LONG-TERM CURRENT USE OF INSULIN: ICD-10-CM

## 2024-02-22 DIAGNOSIS — E11.42 TYPE 2 DIABETES MELLITUS WITH DIABETIC POLYNEUROPATHY, WITH LONG-TERM CURRENT USE OF INSULIN: ICD-10-CM

## 2024-02-22 RX ORDER — DULAGLUTIDE 0.75 MG/.5ML
0.75 INJECTION, SOLUTION SUBCUTANEOUS WEEKLY
Qty: 0.5 ML | Refills: 4 | Status: SHIPPED | OUTPATIENT
Start: 2024-02-22

## 2024-02-26 ENCOUNTER — TELEPHONE (OUTPATIENT)
Dept: INTERNAL MEDICINE | Facility: CLINIC | Age: 69
End: 2024-02-26
Payer: MEDICARE

## 2024-02-26 RX ORDER — INSULIN GLARGINE 100 [IU]/ML
110 INJECTION, SOLUTION SUBCUTANEOUS DAILY
Qty: 10 ML | Refills: 12 | Status: SHIPPED | OUTPATIENT
Start: 2024-02-26

## 2024-02-26 NOTE — PLAN OF CARE
Goal Outcome Evaluation:  Plan of Care Reviewed With: patient, spouse        Progress: no change  Outcome Evaluation: new admit from ER, pt is alert and oriented, room air, no falls, bed alarm on,  at bedside, medicated PRN for pain, IV antibiotic, PICC in place, wound vac in place, wound RN consult, continue to monitor                             Patient: CANDACE RAPHAEL [id number] 74y Male                            Hospitalist Attending Note    Wife at bedside.  Denies complaints.  No SOB.   No cough / pain.     ____________________PHYSICAL EXAM:  GENERAL:  NAD Alert and Oriented x 3   HEENT: NCAT  CARDIOVASCULAR:  S1, S2  LUNGS: CTAB  ABDOMEN:  soft, (-) tenderness, (-) distension, (+) bowel sounds, (-) guarding, (-) rebound (-) rigidity  EXTREMITIES:  no cyanosis / clubbing / edema.   ____________________     VITALS:  Vital Signs Last 24 Hrs  T(C): 36.6 (26 Feb 2024 15:53), Max: 37.1 (26 Feb 2024 11:10)  T(F): 97.9 (26 Feb 2024 15:53), Max: 98.7 (26 Feb 2024 11:10)  HR: 82 (26 Feb 2024 16:40) (60 - 95)  BP: 132/73 (26 Feb 2024 15:53) (116/62 - 137/84)  BP(mean): --  RR: 18 (26 Feb 2024 15:53) (18 - 18)  SpO2: 95% (26 Feb 2024 16:40) (92% - 95%)    Parameters below as of 26 Feb 2024 16:40  Patient On (Oxygen Delivery Method): nasal cannula     Daily     Daily   CAPILLARY BLOOD GLUCOSE        I&O's Summary    25 Feb 2024 07:01  -  26 Feb 2024 07:00  --------------------------------------------------------  IN: 150 mL / OUT: 1300 mL / NET: -1150 mL    26 Feb 2024 07:01  -  26 Feb 2024 16:58  --------------------------------------------------------  IN: 0 mL / OUT: 500 mL / NET: -500 mL        HISTORY:  PAST MEDICAL & SURGICAL HISTORY:  Chronic systolic congestive heart failure      Benign essential HTN      HLD (hyperlipidemia)      Advanced COPD      H/O removal of cyst      Allergies    No Known Allergies    Intolerances       LABS:                        12.9   11.36 )-----------( 100      ( 26 Feb 2024 03:06 )             36.4     02-26    131<L>  |  96  |  40.1<H>  ----------------------------<  204<H>  5.4<H>   |  20.0<L>  |  1.49<H>    Ca    9.1      26 Feb 2024 03:06  Phos  3.5     02-26          Urinalysis Basic - ( 26 Feb 2024 03:06 )    Color: x / Appearance: x / SG: x / pH: x  Gluc: 204 mg/dL / Ketone: x  / Bili: x / Urobili: x   Blood: x / Protein: x / Nitrite: x   Leuk Esterase: x / RBC: x / WBC x   Sq Epi: x / Non Sq Epi: x / Bacteria: x              MEDICATIONS:  MEDICATIONS  (STANDING):  albuterol/ipratropium for Nebulization 3 milliLiter(s) Nebulizer every 6 hours  atorvastatin 20 milliGRAM(s) Oral at bedtime  azithromycin  IVPB 500 milliGRAM(s) IV Intermittent every 24 hours  guaiFENesin  milliGRAM(s) Oral every 12 hours  heparin   Injectable 5000 Unit(s) SubCutaneous every 12 hours  hydrochlorothiazide 25 milliGRAM(s) Oral daily  losartan 100 milliGRAM(s) Oral daily  methylPREDNISolone sodium succinate Injectable 40 milliGRAM(s) IV Push every 12 hours  metoprolol tartrate 75 milliGRAM(s) Oral two times a day  pantoprazole    Tablet 40 milliGRAM(s) Oral before breakfast    MEDICATIONS  (PRN):  albuterol    90 MICROgram(s) HFA Inhaler 2 Puff(s) Inhalation every 3 hours PRN Shortness of Breath and/or Wheezing

## 2024-02-26 NOTE — TELEPHONE ENCOUNTER
Caller: Stephy Duncan    Relationship: Self    Best call back number: [phone]    Requested Prescriptions:   Requested Prescriptions     Pending Prescriptions Disp Refills    insulin glargine (Lantus) 100 UNIT/ML injection 10 mL 12     Sig: Inject 110 Units under the skin into the appropriate area as directed Daily.        Pharmacy where request should be sent: [facility]/PHARMACY #[id number] - [place] - [address] [facility] - [phone] [facility] [phone] FX     Last office visit with prescribing clinician: 12/29/2023   Last telemedicine visit with prescribing clinician: Visit date not found   Next office visit with prescribing clinician: 4/1/2024       Does the patient have less than a 3 day supply:  [x] Yes  [] No        Agapito Jung Rep   02/26/24 09:31 EST

## 2024-03-13 ENCOUNTER — HOSPITAL ENCOUNTER (EMERGENCY)
Facility: HOSPITAL | Age: 69
Discharge: HOME | End: 2024-03-13
Attending: EMERGENCY MEDICINE | Admitting: EMERGENCY MEDICINE
Payer: MEDICARE

## 2024-03-13 ENCOUNTER — APPOINTMENT (OUTPATIENT)
Facility: HOSPITAL | Age: 69
End: 2024-03-13
Payer: MEDICARE

## 2024-03-13 ENCOUNTER — TELEPHONE (OUTPATIENT)
Dept: ORTHOPEDIC SURGERY | Facility: CLINIC | Age: 69
End: 2024-03-13
Payer: MEDICARE

## 2024-03-13 VITALS
OXYGEN SATURATION: 98 % | HEART RATE: 78 BPM | RESPIRATION RATE: 20 BRPM | DIASTOLIC BLOOD PRESSURE: 53 MMHG | TEMPERATURE: 98.6 F | SYSTOLIC BLOOD PRESSURE: 134 MMHG

## 2024-03-13 DIAGNOSIS — M79.604 RIGHT LEG PAIN: Primary | ICD-10-CM

## 2024-03-13 DIAGNOSIS — M25.561 ACUTE PAIN OF RIGHT KNEE: ICD-10-CM

## 2024-03-13 DIAGNOSIS — S72.8X1A OTHER FRACTURE OF RIGHT FEMUR, INITIAL ENCOUNTER FOR CLOSED FRACTURE: ICD-10-CM

## 2024-03-13 DIAGNOSIS — M19.011 OSTEOARTHRITIS OF RIGHT SHOULDER, UNSPECIFIED OSTEOARTHRITIS TYPE: ICD-10-CM

## 2024-03-13 DIAGNOSIS — M25.511 ACUTE PAIN OF RIGHT SHOULDER: ICD-10-CM

## 2024-03-13 LAB
BH CV LOWER VASCULAR RIGHT COMMON FEMORAL AUGMENT: NORMAL
BH CV LOWER VASCULAR RIGHT COMMON FEMORAL COMPETENT: NORMAL
BH CV LOWER VASCULAR RIGHT COMMON FEMORAL COMPRESS: NORMAL
BH CV LOWER VASCULAR RIGHT COMMON FEMORAL PHASIC: NORMAL
BH CV LOWER VASCULAR RIGHT COMMON FEMORAL SPONT: NORMAL
BH CV LOWER VASCULAR RIGHT GASTRONEMIUS COMPRESS: NORMAL
BH CV LOWER VASCULAR RIGHT GREATER SAPH AK COMPRESS: NORMAL
BH CV LOWER VASCULAR RIGHT GREATER SAPH BK COMPRESS: NORMAL
BH CV LOWER VASCULAR RIGHT LESSER SAPH COMPRESS: NORMAL
BH CV LOWER VASCULAR RIGHT MID FEMORAL AUGMENT: NORMAL
BH CV LOWER VASCULAR RIGHT MID FEMORAL COMPETENT: NORMAL
BH CV LOWER VASCULAR RIGHT MID FEMORAL COMPRESS: NORMAL
BH CV LOWER VASCULAR RIGHT MID FEMORAL PHASIC: NORMAL
BH CV LOWER VASCULAR RIGHT MID FEMORAL SPONT: NORMAL
BH CV LOWER VASCULAR RIGHT POPLITEAL AUGMENT: NORMAL
BH CV LOWER VASCULAR RIGHT POPLITEAL COMPETENT: NORMAL
BH CV LOWER VASCULAR RIGHT POPLITEAL COMPRESS: NORMAL
BH CV LOWER VASCULAR RIGHT POPLITEAL PHASIC: NORMAL
BH CV LOWER VASCULAR RIGHT POPLITEAL SPONT: NORMAL
BH CV LOWER VASCULAR RIGHT POSTERIOR TIBIAL COMPRESS: NORMAL
BH CV LOWER VASCULAR RIGHT PROXIMAL FEMORAL COMPRESS: NORMAL
BH CV VAS PRELIMINARY FINDINGS SCRIPTING: 1

## 2024-03-13 PROCEDURE — 99284 EMERGENCY DEPT VISIT MOD MDM: CPT

## 2024-03-13 PROCEDURE — 93971 EXTREMITY STUDY: CPT | Performed by: SURGERY

## 2024-03-13 PROCEDURE — 93971 EXTREMITY STUDY: CPT

## 2024-03-13 RX ORDER — HYDROCODONE BITARTRATE AND ACETAMINOPHEN 5; 325 MG/1; MG/1
1 TABLET ORAL EVERY 6 HOURS PRN
Qty: 12 TABLET | Refills: 0 | Status: SHIPPED | OUTPATIENT
Start: 2024-03-13 | End: 2024-03-18 | Stop reason: SDUPTHER

## 2024-03-13 RX ORDER — HYDROCODONE BITARTRATE AND ACETAMINOPHEN 7.5; 325 MG/1; MG/1
1 TABLET ORAL ONCE
Status: COMPLETED | OUTPATIENT
Start: 2024-03-13 | End: 2024-03-13

## 2024-03-13 RX ADMIN — HYDROCODONE BITARTRATE AND ACETAMINOPHEN 1 TABLET: 7.5; 325 TABLET ORAL at 17:13

## 2024-03-13 NOTE — ED PROVIDER NOTES
Time: 3:44 PM EDT  Date of encounter:  3/13/2024  Room number: 54/54  Independent Historian/Clinical History and Information was obtained by:   Patient    History is limited by: N/A    Chief Complaint: right lower leg pain       History of Present Illness:  Patient is a 68 y.o. year old female who presents to the emergency department for evaluation of right lower leg pain. Pt presented to local [facility] with right upper leg pain s/p falling out of her wheelchair on Sunday.  They completed x-rays which did confirm fracture however they are also concerned about a possible DVT.  Patient sent here for ultrasound.    HPI    Patient Care Team  Primary Care Provider: Donnell Garcia MD    Past Medical History:     Allergies   Allergen Reactions    Ace Inhibitors Shortness Of Breath and Swelling    Morphine Shortness Of Breath     Past Medical History:   Diagnosis Date    Allergies     Arthritis     Cardiac murmur     Diabetes mellitus     GERD (gastroesophageal reflux disease)     Heart attack 12/2020    History of cervical cancer 1981    History of transfusion     SEVERAL    Hyperlipidemia     Hypertension     Iron deficiency anemia     Osteomyelitis hip     RIGHT    PONV (postoperative nausea and vomiting)     Right hip pain     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  WOUND VAC IN PLACE     Past Surgical History:   Procedure Laterality Date    CARDIAC CATHETERIZATION      CERVICAL CONIZATION      COLONOSCOPY      CORONARY ANGIOPLASTY WITH STENT PLACEMENT      ENDOSCOPY      ENDOSCOPY N/A 6/9/2022    Procedure: ESOPHAGOGASTRODUODENOSCOPY WITH COLD BIOSPIES;  Surgeon: Zechariah Nj MD;  [facility]: [facility] ENDOSCOPY;  Service: Gastroenterology;  Laterality: N/A;  PRE- EPIGASTRIC PAIN AND ABDOMINAL PAIN  POST- NORMAL    HIP ARTHROPLASTY Right     HIP HARDWARE REMOVAL Right     HIP MINI REVISION Right 01/18/2022    Procedure: TOTAL HIP ARTHROPLASTY LINER REVISION, pegboard lateral;  Surgeon: Laury  Karri Obregon II, MD;  Location:  [facility];  Service: Orthopedics;  Laterality: Right;    HIP SPACER INSERTION WITH ANTIBIOTIC CEMENT      X3    HYSTERECTOMY      INCISION AND DRAINAGE HIP Right 12/18/2021    Procedure: INCISION AND DRAINAGE TOTAL HIP, LINER EXCHANGE AND WOUND VAC PLACEMENT;  Surgeon: Karri Schaeffer II, MD;  Location:  [facility];  Service: Orthopedics;  Laterality: Right;    INCISION AND DRAINAGE HIP Right 5/2/2022    Procedure: HIP INCISION AND DRAINAGE;  Surgeon: Karri Schaeffer II, MD;  Location:  [facility];  Service: Orthopedics;  Laterality: Right;    INCISION AND DRAINAGE HIP Right 6/4/2022    Procedure: HIP INCISION AND DRAINAGE WITH WOUND VAC REPLACEMENT;  Surgeon: Karri Schaeffer II, MD;  Location:  [facility];  Service: Orthopedics;  Laterality: Right;    KNEE ARTHROPLASTY Left     LAPAROSCOPIC CHOLECYSTECTOMY      TONSILLECTOMY      TOTAL HIP ARTHROPLASTY Right 4/25/2022    Procedure: HIP GIRDLESTONE PROCEDURE;  Surgeon: Karri Schaeffer II, MD;  [facility];  Service: Orthopedics;  Laterality: Right;    TOTAL HIP ARTHROPLASTY REVISION Right 11/16/2021    Procedure: TOTAL HIP REVISION ARTHROPLASTY RIGHT POSTERIOR;  Surgeon: Karri Schaeffer II, MD;  [facility];  Service: Orthopedics;  Laterality: Right;     Family History   Problem Relation Age of Onset    Malig Hyperthermia Neg Hx        Home Medications:  Prior to Admission medications    Medication Sig Start Date End Date Taking? Authorizing Provider   Dulaglutide (Trulicity) 0.75 MG/0.5ML solution pen-injector INJECT 0.75 MG UNDER THE SKIN INTO THE APPROPRIATE AREA AS DIRECTED 1 (ONE) TIME PER WEEK 2/22/24   Donnell Garcia MD   esomeprazole (nexIUM) 40 MG capsule Take 1 capsule by mouth Every Morning Before Breakfast.    Provider, MD Nikunj   ferrous sulfate 325 (65 FE) MG tablet Take 1 tablet by mouth Every Other Day. Mornings     Nikunj Isaac MD   folic acid (FOLVITE) 1 MG tablet Take 1 tablet by mouth Daily. 3/28/22   Nikunj Isaac MD   furosemide (LASIX) 40 MG tablet Take 1 tablet by mouth Daily. 22   Nikunj Isaac MD   gabapentin (NEURONTIN) 600 MG tablet Take 1 tablet by mouth 2 (Two) Times a Day. 24   Donnell Garcia MD   insulin aspart (NovoLOG FlexPen) 100 UNIT/ML solution pen-injector sc pen Inject 20 Units under the skin into the appropriate area as directed Daily With Breakfast AND 23 Units Daily With Lunch AND 23 Units Daily With Dinner. 23  Donnell Garcia MD   insulin glargine (Lantus) 100 UNIT/ML injection Inject 110 Units under the skin into the appropriate area as directed Daily. 24   Donnell Garcia MD   isosorbide mononitrate (IMDUR) 30 MG 24 hr tablet Take 1 tablet by mouth Every Morning.    Nikunj Isaac MD   KLOR-CON 20 MEQ CR tablet Take 1 tablet by mouth Daily. 22   Nikunj Isaac MD   levocetirizine (XYZAL) 5 MG tablet Take 1 tablet by mouth Every Evening.    Nikunj Isaac MD   spironolactone (Aldactone) 25 MG tablet Take 1 tablet by mouth Daily. 23   Donnell Garcia MD   verapamil (CALAN) 80 MG tablet Take 1 tablet by mouth 2 (Two) Times a Day. 22   Kartik Em MD   vitamin D3 125 MCG (5000 UT) capsule capsule Take 1 capsule by mouth Daily.    Nikunj Isaac MD        Social History:   Social History     Tobacco Use    Smoking status: Former     Current packs/day: 0.00     Average packs/day: 1 pack/day for 45.0 years (45.0 ttl pk-yrs)     Types: Cigarettes     Start date: 12/10/1970     Quit date: 12/10/2015     Years since quittin.2     Passive exposure: Never    Smokeless tobacco: Never   Vaping Use    Vaping status: Never Used   Substance Use Topics    Alcohol use: Never    Drug use: Never         Review of Systems:  Review of Systems   Constitutional:  Negative for chills and fever.   HENT:  Negative  for congestion, ear pain and sore throat.    Eyes:  Negative for pain.   Respiratory:  Negative for cough, chest tightness and shortness of breath.    Cardiovascular:  Negative for chest pain.   Gastrointestinal:  Negative for abdominal pain, diarrhea, nausea and vomiting.   Genitourinary:  Negative for flank pain and hematuria.   Musculoskeletal:  Positive for arthralgias. Negative for joint swelling.   Skin:  Negative for pallor.   Neurological:  Negative for seizures and headaches.   All other systems reviewed and are negative.       Physical Exam:  /53 (BP Location: Right arm, Patient Position: Sitting)   Pulse 78   Temp 98.6 °F (37 °C) (Oral)   Resp 20   SpO2 98%     Physical Exam  Vitals and nursing note reviewed.   Constitutional:       General: She is not in acute distress.     Appearance: Normal appearance. She is not toxic-appearing.   HENT:      Head: Normocephalic and atraumatic.      Mouth/Throat:      Mouth: Mucous membranes are moist.   Eyes:      General: No scleral icterus.  Cardiovascular:      Rate and Rhythm: Normal rate and regular rhythm.      Pulses: Normal pulses.      Heart sounds: Normal heart sounds.   Pulmonary:      Effort: Pulmonary effort is normal. No respiratory distress.      Breath sounds: Normal breath sounds.   Abdominal:      General: Abdomen is flat.      Palpations: Abdomen is soft.      Tenderness: There is no abdominal tenderness.   Musculoskeletal:         General: Tenderness and signs of injury present. No deformity. Normal range of motion.      Cervical back: Normal range of motion and neck supple.   Skin:     General: Skin is warm and dry.      Capillary Refill: Capillary refill takes less than 2 seconds.      Findings: No bruising or erythema.   Neurological:      Mental Status: She is alert and oriented to person, place, and time. Mental status is at baseline.      Sensory: No sensory deficit.                  Procedures:  Procedures      Medical Decision  Making:      Comorbidities that affect care:    Patient reports no hip on right side due to multiple infections with replacement, diabetes, hypertension, arthritis, GERD    External Notes reviewed:    Previous Radiological Studies: I reviewed the x-rays the patient had completed at an outside facility just prior to arriving in the emergency department to confirm fracture.      The following orders were placed and all results were independently analyzed by me:  Orders Placed This Encounter   Procedures    [facility] 05.  Knee Immobilizer    Ambulatory Referral to Orthopedic Surgery    Knee immobilizer  Misc Nursing Order (Specify)       Medications Given in the Emergency Department:  Medications   HYDROcodone-acetaminophen (NORCO) 7.5-325 MG per tablet 1 tablet (1 tablet Oral Given 3/13/24 1713)        ED Course:    ED Course as of 03/15/24 1555   Wed Mar 13, 2024   1626 Vascular at bedside completing US [MS]   1637 Vasc tech advises pt is negative for DVT [MS]   1734 Consulting with Dr. Melton at this time to verify he has been contacted by urgent care for consultation as well as to inquire about specific splint..  [MS]   1849 Pt is non-weight bearing on right due to previous ortho complication - she advises that she does not have a right hip and that is whey she is wheelchair bound.  [MS]      ED Course User Index  [MS] Priya Collier, MORGAN       Labs:    Lab Results (last 24 hours)       ** No results found for the last 24 hours. **             Imaging:    No Radiology Exams Resulted Within Past 24 Hours      Differential Diagnosis and Discussion:    Extremity Pain: Differential diagnosis includes but is not limited to soft tissue sprain, tendonitis, tendon injury, dislocation, fracture, deep vein thrombosis, arterial insufficiency, osteoarthritis, bursitis, and ligamentous damage.    All X-rays impressions were independently interpreted by me.  Ultrasound impression was interpreted by me.      MDM     Amount and/or Complexity of Data Reviewed  Tests in the radiology section of CPT®: reviewed                 Patient Care Considerations:    ANTIBIOTICS: I considered prescribing antibiotics as an outpatient however no bacterial focus of infection was found.      Consultants/Shared Management Plan:    Consultant: I have discussed the case with on-call orthopedic physician, Dr. Melton, who states he will see her in his office on Monday and he would prefer a knee immobilizer as a brace/    Social Determinants of Health:    Patient is independent, reliable, and has access to care.       Disposition and Care Coordination:    Discharged: The patient is suitable and stable for discharge with no need for consideration of admission.    I have explained the patient´s condition, diagnoses and treatment plan based on the information available to me at this time. I have answered questions and addressed any concerns. The patient has a good  understanding of the patient´s diagnosis, condition, and treatment plan as can be expected at this point. The vital signs have been stable. The patient´s condition is stable and appropriate for discharge from the emergency department.      The patient will pursue further outpatient evaluation with the primary care physician or other designated or consulting physician as outlined in the discharge instructions. They are agreeable to this plan of care and follow-up instructions have been explained in detail. The patient has received these instructions in written format and has expressed an understanding of the discharge instructions. The patient is aware that any significant change in condition or worsening of symptoms should prompt an immediate return to this or the closest emergency department or call to 911.    Final diagnoses:   Right leg pain   Acute pain of right knee   Other fracture of right femur, initial encounter for closed fracture   Acute pain of right shoulder    Osteoarthritis of right shoulder, unspecified osteoarthritis type        ED Disposition       ED Disposition   Discharge    Condition   Stable    Comment   --               This medical record created using voice recognition software.       Priya Collier, APRN  03/15/24 [id number]

## 2024-03-13 NOTE — DISCHARGE INSTRUCTIONS
Please know that your ultrasound of your leg was negative for any blood clot.  Your x-rays from the urgent care center have been reviewed and sent to Dr. Melton.  You are to follow-up with him Monday in his office.    Please wear the knee immobilizer until you are cleared by Dr. Melton.  Take the pain medication prescribed today as directed.  If it anytime you develop any isolated, swollen, red areas to your leg, become short of air, or feel as if your leg is becoming cool or extremely warm to the touch please return to the ER.  Otherwise, follow-up with the orthopedic surgeon on Monday.

## 2024-03-18 ENCOUNTER — OFFICE VISIT (OUTPATIENT)
Dept: ORTHOPEDIC SURGERY | Facility: CLINIC | Age: 69
End: 2024-03-18
Payer: MEDICARE

## 2024-03-18 VITALS
SYSTOLIC BLOOD PRESSURE: 119 MMHG | WEIGHT: 225 LBS | DIASTOLIC BLOOD PRESSURE: 71 MMHG | HEIGHT: 64 IN | BODY MASS INDEX: 38.41 KG/M2 | OXYGEN SATURATION: 96 % | HEART RATE: 59 BPM

## 2024-03-18 DIAGNOSIS — M19.011 OSTEOARTHRITIS OF RIGHT SHOULDER, UNSPECIFIED OSTEOARTHRITIS TYPE: ICD-10-CM

## 2024-03-18 DIAGNOSIS — S72.401A CLOSED FRACTURE OF DISTAL END OF RIGHT FEMUR, UNSPECIFIED FRACTURE MORPHOLOGY, INITIAL ENCOUNTER: Primary | ICD-10-CM

## 2024-03-18 PROCEDURE — 1160F RVW MEDS BY RX/DR IN RCRD: CPT | Performed by: STUDENT IN AN ORGANIZED HEALTH CARE EDUCATION/TRAINING PROGRAM

## 2024-03-18 PROCEDURE — 29505 APPLICATION LONG LEG SPLINT: CPT | Performed by: STUDENT IN AN ORGANIZED HEALTH CARE EDUCATION/TRAINING PROGRAM

## 2024-03-18 PROCEDURE — 3078F DIAST BP <80 MM HG: CPT | Performed by: STUDENT IN AN ORGANIZED HEALTH CARE EDUCATION/TRAINING PROGRAM

## 2024-03-18 PROCEDURE — 99203 OFFICE O/P NEW LOW 30 MIN: CPT | Performed by: STUDENT IN AN ORGANIZED HEALTH CARE EDUCATION/TRAINING PROGRAM

## 2024-03-18 PROCEDURE — 1159F MED LIST DOCD IN RCRD: CPT | Performed by: STUDENT IN AN ORGANIZED HEALTH CARE EDUCATION/TRAINING PROGRAM

## 2024-03-18 PROCEDURE — 3074F SYST BP LT 130 MM HG: CPT | Performed by: STUDENT IN AN ORGANIZED HEALTH CARE EDUCATION/TRAINING PROGRAM

## 2024-03-18 RX ORDER — HYDROCODONE BITARTRATE AND ACETAMINOPHEN 5; 325 MG/1; MG/1
1 TABLET ORAL EVERY 6 HOURS PRN
Qty: 30 TABLET | Refills: 0 | Status: SHIPPED | OUTPATIENT
Start: 2024-03-18

## 2024-03-18 NOTE — PROGRESS NOTES
"Chief Complaint  Pain of the Right Knee    Subjective          Stephy Duncan presents to [facility] for   History of Present Illness    The patient presents here today for evaluation of the right knee. The patient reports right knee pain after a fall. She ambulates with an electric wheelchair. She has a history of infections to her right hip. She was seen and evaluated with x-rays and was placed into a brace.   Allergies   Allergen Reactions   • Ace Inhibitors Shortness Of Breath and Swelling   • Morphine Shortness Of Breath        Social History     Socioeconomic History   • Marital status:    Tobacco Use   • Smoking status: Former     Current packs/day: 0.00     Average packs/day: 1 pack/day for 45.0 years (45.0 ttl pk-yrs)     Types: Cigarettes     Start date: 12/10/1970     Quit date: 12/10/2015     Years since quittin.2     Passive exposure: Never   • Smokeless tobacco: Never   Vaping Use   • Vaping status: Never Used   Substance and Sexual Activity   • Alcohol use: Never   • Drug use: Never   • Sexual activity: Defer        I reviewed the patient's chief complaint, history of present illness, review of systems, past medical history, surgical history, family history, social history, medications, and allergy list.     REVIEW OF SYSTEMS    Constitutional: Denies fevers, chills, weight loss  Cardiovascular: Denies chest pain, shortness of breath  Skin: Denies rashes, acute skin changes  Neurologic: Denies headache, loss of consciousness  MSK: Right knee pain      Objective   Vital Signs:   /71   Pulse 59   Ht 162.6 cm (64\")   Wt 102 kg (225 lb)   SpO2 96%   BMI 38.62 kg/m²     Body mass index is 38.62 kg/m².    Physical Exam    General: Alert. No acute distress.   Right lower extremity- moderate swelling. Positive EHL, FHL, GS and TA. Sensation intact to all 5 nerves of the foot. Positive pulses. Tender to the distal femur. Calf soft.     Orthopedic " Injury Treatment    Date/Time: 3/18/2024 2:11 PM    Performed by: Bebeto Melton MD  Authorized by: Bebeto Melton MD  Injury location: knee  Location details: right knee  Injury type: fracture    Anesthesia:  Local anesthesia used: no    Sedation:  Patient sedated: no    Immobilization: splint  Splint type: long leg  Supplies used: Ortho-Glass  Post-procedure neurovascular assessment: post-procedure neurovascularly intact  Patient tolerance: patient tolerated the procedure well with no immediate complications      Imaging Results (Most Recent)       None                     Assessment and Plan        Duplex Venous Lower Extremity - Right CAR    Result Date: 3/13/2024  Narrative: •  Normal right lower extremity venous duplex scan. •  Technically limited right lower extremity imaging quality.     XR Shoulder 2+ View Right    Result Date: 3/13/2024  Narrative: PROCEDURE: XR SHOULDER 2+ VW RIGHT  COMPARISON: [facility] , CR, SHOULDER >OR= 2V RT, 12/07/2017, 14:35.  INDICATIONS: fall, shoulder pain  FINDINGS:  No fractures are visualized.  There are no findings of dislocation.  Mild degenerative change consistent with osteoarthritis is seen in the glenohumeral joint.  Mild AC arthrosis is evident.  No lytic or sclerotic bone lesions are seen.  No abnormality is seen at the right lung apex.      Impression:   Right shoulder series demonstrating mild degenerative change consistent with osteoarthritis.      JOB ANGLIN MD       Electronically Signed and Approved By: JOB ANGLIN MD on 3/13/2024 at 13:56             XR Knee 1 or 2 View Right    Result Date: 3/13/2024  Narrative: PROCEDURE: XR KNEE 1 OR 2 VW RIGHT  COMPARISON: [facility] , CR, KNEE 3 VIEWS RT, 5/08/2018, 10:23.  INDICATIONS: fall, knee pain  FINDINGS:  Comminuted fracture of the distal femoral metadiaphysis is seen, with 20 degrees of anterior angulation.  Bony structures have an osteopenic appearance.  No lytic or sclerotic bone lesions  are seen.  Moderate tricompartmental degenerative changes consistent with osteoarthritis.  A small amount of fluid is seen in the tibiotalar joint.      Impression:   Right knee series demonstrating comminuted fracture of the distal femoral metadiaphysis, with 20 degrees of anterior angulation.  Moderate tricompartmental degenerative change consistent with osteoarthritis.      JOB ANGLIN MD       Electronically Signed and Approved By: JOB ANGLIN MD on 3/13/2024 at 13:55               Diagnoses and all orders for this visit:    1. Closed fracture of distal end of right femur, unspecified fracture morphology, initial encounter (Primary)        Discussed the treatment plan with the patient.  I reviewed the previous x-rays with the patient. The patient was placed into a splint today. Plan for ice and elevation for swelling. I advised her to take aspirin for DVT prevention.       Will obtain X-Rays of right knee at next visit.     Call or return if worsening symptoms.    Scribed for Bebeto Melton MD by Edna Lan  03/18/2024   13:17 EDT         Follow Up       2 weeks     Patient was given instructions and counseling regarding her condition or for health maintenance advice. Please see specific information pulled into the AVS if appropriate.       I have personally performed the services described in this document as scribed by the above individual and it is both accurate and complete.     Bebeto Melton MD  03/18/24  13:38 EDT

## 2024-03-29 NOTE — PROGRESS NOTES
The ABCs of the Annual Wellness Visit  Subsequent Medicare Wellness Visit    Subjective      Stephy Duncan is a 68 y.o. female who presents for a Subsequent Medicare Wellness Visit.    The following portions of the patient's history were reviewed and   updated as appropriate: allergies, current medications, past family history, past medical history, past social history, past surgical history, and problem list.    Compared to one year ago, the patient feels her physical   health is better.    Compared to one year ago, the patient feels her mental   health is the same.    Recent Hospitalizations:  She was not admitted to the hospital during the last year.       Current Medical Providers:  Patient Care Team:  Donnell Garcia MD as PCP - General (Internal Medicine)  Jose Kong MD as Consulting Physician (Cardiology)    Outpatient Medications Prior to Visit   Medication Sig Dispense Refill    diclofenac (VOLTAREN) 50 MG EC tablet Take 1 tablet by mouth 3 (Three) Times a Day. 20 tablet 0    Dulaglutide (Trulicity) 0.75 MG/0.5ML solution pen-injector INJECT 0.75 MG UNDER THE SKIN INTO THE APPROPRIATE AREA AS DIRECTED 1 (ONE) TIME PER WEEK 0.5 mL 4    esomeprazole (nexIUM) 40 MG capsule Take 1 capsule by mouth Every Morning Before Breakfast.      ferrous sulfate 325 (65 FE) MG tablet Take 1 tablet by mouth Every Other Day. Mornings      folic acid (FOLVITE) 1 MG tablet Take 1 tablet by mouth Daily.      furosemide (LASIX) 40 MG tablet Take 1 tablet by mouth Daily.      gabapentin (NEURONTIN) 600 MG tablet Take 1 tablet by mouth 2 (Two) Times a Day. 180 tablet 0    HYDROcodone-acetaminophen (NORCO) 5-325 MG per tablet Take 1 tablet by mouth Every 6 (Six) Hours As Needed for Severe Pain. 30 tablet 0    insulin aspart (NovoLOG FlexPen) 100 UNIT/ML solution pen-injector sc pen Inject 20 Units under the skin into the appropriate area as directed Daily With Breakfast AND 23 Units Daily With Lunch AND 23 Units  Daily With Dinner. 20 mL 11    insulin glargine (Lantus) 100 UNIT/ML injection Inject 110 Units under the skin into the appropriate area as directed Daily. 10 mL 12    isosorbide mononitrate (IMDUR) 30 MG 24 hr tablet Take 1 tablet by mouth Every Morning.      KLOR-CON 20 MEQ CR tablet Take 1 tablet by mouth Daily.      levocetirizine (XYZAL) 5 MG tablet Take 1 tablet by mouth Every Evening.      spironolactone (Aldactone) 25 MG tablet Take 1 tablet by mouth Daily. 90 tablet 2    verapamil (CALAN) 80 MG tablet Take 1 tablet by mouth 2 (Two) Times a Day. 60 tablet 0    vitamin D3 125 MCG (5000 UT) capsule capsule Take 1 capsule by mouth Daily.       No facility-administered medications prior to visit.       Opioid medication/s are on active medication list.  and I have evaluated her active treatment plan and pain score trends (see table).  There were no vitals filed for this visit.  I have reviewed the chart for potential of high risk medication and harmful drug interactions in the elderly.          Aspirin is not on active medication list.   ASA currently used as prophylaxis with broken leg .    Patient Active Problem List   Diagnosis    Status post total replacement of hip    Type 2 diabetes mellitus with diabetic polyneuropathy, with long-term current use of insulin    Hypertension    Normocytic anemia    Postoperative infection    Iron deficiency anemia    Obesity (BMI 30-39.9)    Presence of stent in coronary artery in patient with coronary artery disease    Chronic diastolic CHF (congestive heart failure)    Septic arthritis of hip    Drainage from wound    Candidal intertrigo    Postoperative anemia due to acute blood loss    S/P Girdlestone procedure    Enterococcus faecalis infection    Pyogenic arthritis, pelvic region and thigh    Osteomyelitis of hip    Hyponatremia    Metabolic acidosis    Sepsis without acute organ dysfunction    Pathological fracture of right femur    Epigastric abdominal pain    Acute  "on chronic congestive heart failure, unspecified heart failure type    Class 2 severe obesity due to excess calories with serious comorbidity and body mass index (BMI) of 38.0 to 38.9 in adult    Morbid (severe) obesity due to excess calories (*specify comorbidity)     Advance Care Planning   Advance Care Planning     Advance Directive is not on file.  ACP discussion was held with the patient during this visit. Patient has an advance directive (not in EMR), copy requested.     Objective    There were no vitals filed for this visit.  Estimated body mass index is 38.62 kg/m² as calculated from the following:    Height as of 3/18/24: 162.6 cm (64\").    Weight as of 3/18/24: 102 kg (225 lb).           Does the patient have evidence of cognitive impairment?   No            HEALTH RISK ASSESSMENT    Smoking Status:  Social History     Tobacco Use   Smoking Status Former    Current packs/day: 0.00    Average packs/day: 1 pack/day for 45.0 years (45.0 ttl pk-yrs)    Types: Cigarettes    Start date: 12/10/1970    Quit date: 12/10/2015    Years since quittin.3    Passive exposure: Never   Smokeless Tobacco Never     Alcohol Consumption:  Social History     Substance and Sexual Activity   Alcohol Use Never     Fall Risk Screen:    STEADI Fall Risk Assessment has not been completed.    Depression Screenin/27/2023     9:06 AM   PHQ-2/PHQ-9 Depression Screening   Little Interest or Pleasure in Doing Things 0-->not at all   Feeling Down, Depressed or Hopeless 0-->not at all   PHQ-9: Brief Depression Severity Measure Score 0       Health Habits and Functional and Cognitive Screening:      3/25/2024     9:17 AM   Functional & Cognitive Status   Do you have difficulty preparing food and eating? No   Do you have difficulty bathing yourself, getting dressed or grooming yourself? No   Do you have difficulty using the toilet? No   Do you have difficulty moving around from place to place? Yes   Do you have trouble with steps " or getting out of a bed or a chair? Yes   Current Diet Well Balanced Diet   Dental Exam Other   Eye Exam Up to date   Exercise (times per week) 0 times per week   Do you need help using the phone?  No   Are you deaf or do you have serious difficulty hearing?  No   Do you need help to go to places out of walking distance? Yes   Do you need help shopping? No   Do you need help preparing meals?  No   Do you need help with housework?  Yes   Do you need help with laundry? No   Do you need help taking your medications? No   Do you need help managing money? No   Do you ever drive or ride in a car without wearing a seat belt? No   Have you felt unusual stress, anger or loneliness in the last month? Yes   Who do you live with? Alone   If you need help, do you have trouble finding someone available to you? No   Have you been bothered in the last four weeks by sexual problems? No   Do you have difficulty concentrating, remembering or making decisions? No       Age-appropriate Screening Schedule:  Refer to the list below for future screening recommendations based on patient's age, sex and/or medical conditions. Orders for these recommended tests are listed in the plan section. The patient has been provided with a written plan.    Health Maintenance   Topic Date Due    RSV Vaccine - Adults (1 - 1-dose 60+ series) Never done    ZOSTER VACCINE (2 of 2) 01/18/2021    DIABETIC FOOT EXAM  Never done    DXA SCAN  05/17/2023    ANNUAL WELLNESS VISIT  01/23/2024    HEMOGLOBIN A1C  06/29/2024    INFLUENZA VACCINE  03/31/2024 (Originally 8/1/2023)    TDAP/TD VACCINES (1 - Tdap) 12/08/2024 (Originally 5/7/1974)    BMI FOLLOWUP  09/27/2024    LUNG CANCER SCREENING  10/23/2024    LIPID PANEL  12/29/2024    URINE MICROALBUMIN  12/29/2024    DIABETIC EYE EXAM  02/08/2025    MAMMOGRAM  02/17/2025    COLORECTAL CANCER SCREENING  12/27/2026    HEPATITIS C SCREENING  Completed    COVID-19 Vaccine  Completed    Pneumococcal Vaccine 65+  Completed                   CMS Preventative Services Quick Reference  Risk Factors Identified During Encounter:    Fall Risk-High or Moderate: Discussed Fall Prevention in the home  Inactivity/Sedentary: Patient was advised to exercise at least 150 minutes a week per CDC recommendations.    The above risks/problems have been discussed with the patient.  Pertinent information has been shared with the patient in the After Visit Summary.    There are no diagnoses linked to this encounter.    Follow Up:   Next Medicare Wellness visit to be scheduled in 1 year.      An After Visit Summary and PPPS were made available to the patient.

## 2024-03-31 NOTE — PATIENT INSTRUCTIONS
Cooking With Less Salt  Cooking with less salt is one way to reduce the amount of sodium you get from food. Sodium is one of the elements that make up salt. It is found naturally in foods and is also added to certain foods. Depending on your condition and overall health, your health care provider or dietitian may recommend that you reduce your sodium intake. Most people should have less than 2,300 milligrams (mg) of sodium each day. If you have high blood pressure (hypertension), you may need to limit your sodium to 1,500 mg each day. Follow the tips below to help reduce your sodium intake.  What are tips for eating less sodium?  Reading food labels       Check the food label before buying or using packaged ingredients. Always check the label for the serving size and sodium content.  Look for products with no more than 140 mg of sodium in one serving.  Check the % Daily Value column to see what percent of the daily recommended amount of sodium is provided in one serving of the product. Foods with 5% or less in this column are considered low in sodium. Foods with 20% or higher are considered high in sodium.  Do not choose foods with salt as one of the first three ingredients on the ingredients list. If salt is one of the first three ingredients, it usually means the item is high in sodium.     Shopping  Buy sodium-free or low-sodium products. Look for the following words on food labels:  Low-sodium.  Sodium-free.  Reduced-sodium.  No salt added.  Unsalted.  Always check the sodium content even if foods are labeled as low-sodium or no salt added.  Buy fresh foods.  Cooking  Use herbs, seasonings without salt, and spices as substitutes for salt.  Use sodium-free baking soda when baking.  Paia, braise, or roast foods to add flavor with less salt.  Avoid adding salt to pasta, rice, or hot cereals.  Drain and rinse canned vegetables, beans, and meat before use.  Avoid adding salt when cooking sweets and desserts.  Cook  "with low-sodium ingredients.  What foods are high in sodium?  Vegetables  Regular canned vegetables (not low-sodium or reduced-sodium). Sauerkraut, pickled vegetables, and relishes. Olives. French fries. Onion rings. Regular canned tomato sauce and paste. Regular tomato and vegetable juice. Frozen vegetables in sauces.  Grains  Instant hot cereals. Bread stuffing, pancake, and biscuit mixes. Croutons. Seasoned rice or pasta mixes. Noodle soup cups. Boxed or frozen macaroni and cheese. Regular salted crackers. Self-rising flour. Rolls. Bagels. Flour tortillas and wraps.  Meats and other proteins  Meat or fish that is salted, canned, smoked, cured, spiced, or pickled. This includes raines, ham, sausages, hot dogs, corned beef, chipped beef, meat loaves, salt pork, jerky, pickled herring, anchovies, regular canned tuna, and sardines. Salted nuts.  Dairy  Processed cheese and cheese spreads. Cheese curds. Blue cheese. Feta cheese. String cheese. Regular cottage cheese. Buttermilk. Canned milk.  The items listed above may not be a complete list of foods high in sodium. Actual amounts of sodium may be different depending on processing. Contact a dietitian for more information.  What foods are low in sodium?  Fruits  Fresh, frozen, or canned fruit with no sauce added. Fruit juice.  Vegetables  Fresh or frozen vegetables with no sauce added. \"No salt added\" canned vegetables. \"No salt added\" tomato sauce and paste. Low-sodium or reduced-sodium tomato and vegetable juice.  Grains  Noodles, pasta, quinoa, rice. Shredded or puffed wheat or puffed rice. Regular or quick oats (not instant). Low-sodium crackers. Low-sodium bread. Whole-grain bread and whole-grain pasta. Unsalted popcorn.  Meats and other proteins  Fresh or frozen whole meats, poultry (not injected with sodium), and fish with no sauce added. Unsalted nuts. Dried peas, beans, and lentils without added salt. Unsalted canned beans. Eggs. Unsalted nut butters. " Low-sodium canned tuna or chicken.  Dairy  Milk. Soy milk. Yogurt. Low-sodium cheeses, such as Swiss, [place], mozzarella, and ricotta. Sherbet or ice cream (keep to ½ cup per serving). Cream cheese.  Fats and oils  Unsalted butter or margarine.  Other foods  Homemade pudding. Sodium-free baking soda and baking powder. Herbs and spices. Low-sodium seasoning mixes.  Beverages  Coffee and tea. Carbonated beverages.  The items listed above may not be a complete list of foods low in sodium. Actual amounts of sodium may be different depending on processing. Contact a dietitian for more information.  What are some salt alternatives when cooking?  The following are herbs, seasonings, and spices that can be used instead of salt to flavor your food. Herbs should be fresh or dried. Do not choose packaged mixes. Next to the name of the herb, spice, or seasoning are some examples of foods you can pair it with.  Herbs  Bay leaves - Soups, meat and vegetable dishes, and spaghetti sauce.  Basil - Italian dishes, soups, pasta, and fish dishes.  Cilantro - Meat, poultry, and vegetable dishes.  Chili powder - Marinades and Mexican dishes.  Chives - Salad dressings and potato dishes.  Cumin - Mexican dishes, couscous, and meat dishes.  Dill - Fish dishes, sauces, and salads.  Fennel - Meat and vegetable dishes, breads, and cookies.  Garlic (do not use garlic salt) - Italian dishes, meat dishes, salad dressings, and sauces.  Marjoram - Soups, potato dishes, and meat dishes.  Oregano - Pizza and spaghetti sauce.  Parsley - Salads, soups, pasta, and meat dishes.  Jihan - Italian dishes, salad dressings, soups, and red meats.  Saffron - Fish dishes, pasta, and some poultry dishes.  Ander - Stuffings and sauces.  Tarragon - Fish and poultry dishes.  Thyme - Stuffing, meat, and fish dishes.  Seasonings  Lemon juice - Fish dishes, poultry dishes, vegetables, and salads.  Vinegar - Salad dressings, vegetables, and fish  "dishes.  Spices  Cinnamon - Sweet dishes, such as cakes, cookies, and puddings.  Cloves - Gingerbread, puddings, and marinades for meats.  Gross - Vegetable dishes, fish and poultry dishes, and stir-madden dishes.  Jessika - Vegetable dishes, fish dishes, and stir-madden dishes.  Nutmeg - Pasta, vegetables, poultry, fish dishes, and custard.  Summary  Cooking with less salt is one way to reduce the amount of sodium that you get from food.  Buy sodium-free or low-sodium products.  Check the food label before using or buying packaged ingredients.  Use herbs, seasonings without salt, and spices as substitutes for salt in foods.  This information is not intended to replace advice given to you by your health care provider. Make sure you discuss any questions you have with your health care provider.  Document Revised: 12/09/2020 Document Reviewed: 12/09/2020  [employer] Patient Education © 2021 [employer]      https://www.nhlbi.nih.gov/files/docs/public/heart/dash_brief.pdf\">   DASH Eating Plan  DASH stands for Dietary Approaches to Stop Hypertension. The DASH eating plan is a healthy eating plan that has been shown to:  Reduce high blood pressure (hypertension).  Reduce your risk for type 2 diabetes, heart disease, and stroke.  Help with weight loss.  What are tips for following this plan?  Reading food labels  Check food labels for the amount of salt (sodium) per serving. Choose foods with less than 5 percent of the Daily Value of sodium. Generally, foods with less than 300 milligrams (mg) of sodium per serving fit into this eating plan.  To find whole grains, look for the word \"whole\" as the first word in the ingredient list.  Shopping  Buy products labeled as \"low-sodium\" or \"no salt added.\"  Buy fresh foods. Avoid canned foods and pre-made or frozen meals.  Cooking  Avoid adding salt when cooking. Use salt-free seasonings or herbs instead of table salt or sea salt. Check with your health care provider or pharmacist before " using salt substitutes.  Do not madden foods. Cook foods using healthy methods such as baking, boiling, grilling, roasting, and broiling instead.  Cook with heart-healthy oils, such as olive, canola, avocado, soybean, or sunflower oil.  Meal planning       Eat a balanced diet that includes:  4 or more servings of fruits and 4 or more servings of vegetables each day. Try to fill one-half of your plate with fruits and vegetables.  6-8 servings of whole grains each day.  Less than 6 oz (170 g) of lean meat, poultry, or fish each day. A 3-oz (85-g) serving of meat is about the same size as a deck of cards. One egg equals 1 oz (28 g).  2-3 servings of low-fat dairy each day. One serving is 1 cup (237 mL).  1 serving of nuts, seeds, or beans 5 times each week.  2-3 servings of heart-healthy fats. Healthy fats called omega-3 fatty acids are found in foods such as walnuts, flaxseeds, fortified milks, and eggs. These fats are also found in cold-water fish, such as sardines, salmon, and mackerel.  Limit how much you eat of:  Canned or prepackaged foods.  Food that is high in trans fat, such as some fried foods.  Food that is high in saturated fat, such as fatty meat.  Desserts and other sweets, sugary drinks, and other foods with added sugar.  Full-fat dairy products.  Do not salt foods before eating.  Do not eat more than 4 egg yolks a week.  Try to eat at least 2 vegetarian meals a week.  Eat more home-cooked food and less restaurant, buffet, and fast food.     Lifestyle  When eating at a restaurant, ask that your food be prepared with less salt or no salt, if possible.  If you drink alcohol:  Limit how much you use to:  0-1 drink a day for women who are not pregnant.  0-2 drinks a day for men.  Be aware of how much alcohol is in your drink. In the [place] one drink equals one 12 oz bottle of beer (355 mL), one 5 oz glass of wine (148 mL), or one 1½ oz glass of hard liquor (44 mL).  General information  Avoid eating more than  2,300 mg of salt a day. If you have hypertension, you may need to reduce your sodium intake to 1,500 mg a day.  Work with your health care provider to maintain a healthy body weight or to lose weight. Ask what an ideal weight is for you.  Get at least 30 minutes of exercise that causes your heart to beat faster (aerobic exercise) most days of the week. Activities may include walking, swimming, or biking.  Work with your health care provider or dietitian to adjust your eating plan to your individual calorie needs.  What foods should I eat?  Fruits  All fresh, dried, or frozen fruit. Canned fruit in natural juice (without added sugar).  Vegetables  Fresh or frozen vegetables (raw, steamed, roasted, or grilled). Low-sodium or reduced-sodium tomato and vegetable juice. Low-sodium or reduced-sodium tomato sauce and tomato paste. Low-sodium or reduced-sodium canned vegetables.  Grains  Whole-grain or whole-wheat bread. Whole-grain or whole-wheat pasta. Brown rice. Oatmeal. Quinoa. Bulgur. Whole-grain and low-sodium cereals. Blaire bread. Low-fat, low-sodium crackers. Whole-wheat flour tortillas.  Meats and other proteins  Skinless chicken or turkey. Ground chicken or turkey. Pork with fat trimmed off. Fish and seafood. Egg whites. Dried beans, peas, or lentils. Unsalted nuts, nut butters, and seeds. Unsalted canned beans. Lean cuts of beef with fat trimmed off. Low-sodium, lean precooked or cured meat, such as sausages or meat loaves.  Dairy  Low-fat (1%) or fat-free (skim) milk. Reduced-fat, low-fat, or fat-free cheeses. Nonfat, low-sodium ricotta or cottage cheese. Low-fat or nonfat yogurt. Low-fat, low-sodium cheese.  Fats and oils  Soft margarine without trans fats. Vegetable oil. Reduced-fat, low-fat, or light mayonnaise and salad dressings (reduced-sodium). Canola, safflower, olive, avocado, soybean, and sunflower oils. Avocado.  Seasonings and condiments  Herbs. Spices. Seasoning mixes without salt.  Other  foods  Unsalted popcorn and pretzels. Fat-free sweets.  The items listed above may not be a complete list of foods and beverages you can eat. Contact a dietitian for more information.  What foods should I avoid?  Fruits  Canned fruit in a light or heavy syrup. Fried fruit. Fruit in cream or butter sauce.  Vegetables  Creamed or fried vegetables. Vegetables in a cheese sauce. Regular canned vegetables (not low-sodium or reduced-sodium). Regular canned tomato sauce and paste (not low-sodium or reduced-sodium). Regular tomato and vegetable juice (not low-sodium or reduced-sodium). Pickles. Olives.  Grains  Baked goods made with fat, such as croissants, muffins, or some breads. Dry pasta or rice meal packs.  Meats and other proteins  Fatty cuts of meat. Ribs. Fried meat. [place]. Bologna, salami, and other precooked or cured meats, such as sausages or meat loaves. Fat from the back of a pig (fatback). Bratwurst. Salted nuts and seeds. Canned beans with added salt. Canned or smoked fish. Whole eggs or egg yolks. Chicken or turkey with skin.  Dairy  Whole or 2% milk, cream, and half-and-half. Whole or full-fat cream cheese. Whole-fat or sweetened yogurt. Full-fat cheese. Nondairy creamers. Whipped toppings. Processed cheese and cheese spreads.  Fats and oils  Butter. Stick margarine. Lard. Shortening. Ghee. [place] fat. Tropical oils, such as coconut, palm kernel, or palm oil.  Seasonings and condiments  Onion salt, garlic salt, seasoned salt, table salt, and sea salt. Worcestershire sauce. Tartar sauce. Barbecue sauce. Teriyaki sauce. Soy sauce, including reduced-sodium. Steak sauce. Canned and packaged gravies. Fish sauce. Oyster sauce. Cocktail sauce. Store-bought horseradish. Ketchup. Mustard. Meat flavorings and tenderizers. Bouillon cubes. Hot sauces. Pre-made or packaged marinades. Pre-made or packaged taco seasonings. Relishes. Regular salad dressings.  Other foods  Salted popcorn and pretzels.  The items listed above  may not be a complete list of foods and beverages you should avoid. Contact a dietitian for more information.  Where to find more information  National Heart, Lung, and Blood [place]: www.nhlbi.nih.gov  American Heart Association: www.heart.org  Academy of Nutrition and Dietetics: www.eatright.org  National Kidney Foundation: www.kidney.org  Summary  The DASH eating plan is a healthy eating plan that has been shown to reduce high blood pressure (hypertension). It may also reduce your risk for type 2 diabetes, heart disease, and stroke.  When on the DASH eating plan, aim to eat more fresh fruits and vegetables, whole grains, lean proteins, low-fat dairy, and heart-healthy fats.  With the DASH eating plan, you should limit salt (sodium) intake to 2,300 mg a day. If you have hypertension, you may need to reduce your sodium intake to 1,500 mg a day.  Work with your health care provider or dietitian to adjust your eating plan to your individual calorie needs.  This information is not intended to replace advice given to you by your health care provider. Make sure you discuss any questions you have with your health care provider.  Document Revised: 11/20/2020 Document Reviewed: 11/20/2020  [employer] Patient Education © 2021 [employer].       Heart-Healthy Eating Plan  Heart-healthy meal planning includes:  Eating less unhealthy fats.  Eating more healthy fats.  Making other changes in your diet.  Talk with your doctor or a diet specialist (dietitian) to create an eating plan that is right for you.  What is my plan?  Your doctor may recommend an eating plan that includes:  Total fat: ______% or less of total calories a day.  Saturated fat: ______% or less of total calories a day.  Cholesterol: less than _________mg a day.  What are tips for following this plan?  Cooking  Avoid frying your food. Try to bake, boil, grill, or broil it instead. You can also reduce fat by:  Removing the skin from poultry.  Removing all  visible fats from meats.  Steaming vegetables in water or broth.  Meal planning       At meals, divide your plate into four equal parts:  Fill one-half of your plate with vegetables and green salads.  Fill one-fourth of your plate with whole grains.  Fill one-fourth of your plate with lean protein foods.  Eat 4-5 servings of vegetables per day. A serving of vegetables is:  1 cup of raw or cooked vegetables.  2 cups of raw leafy greens.  Eat 4-5 servings of fruit per day. A serving of fruit is:  1 medium whole fruit.  ¼ cup of dried fruit.  ½ cup of fresh, frozen, or canned fruit.  ½ cup of 100% fruit juice.  Eat more foods that have soluble fiber. These are apples, broccoli, carrots, beans, peas, and barley. Try to get 20-30 g of fiber per day.  Eat 4-5 servings of nuts, legumes, and seeds per week:  1 serving of dried beans or legumes equals ½ cup after being cooked.  1 serving of nuts is ¼ cup.  1 serving of seeds equals 1 tablespoon.     General information  Eat more home-cooked food. Eat less restaurant, buffet, and fast food.  Limit or avoid alcohol.  Limit foods that are high in starch and sugar.  Avoid fried foods.  Lose weight if you are overweight.  Keep track of how much salt (sodium) you eat. This is important if you have high blood pressure. Ask your doctor to tell you more about this.  Try to add vegetarian meals each week.  Fats  Choose healthy fats. These include olive oil and canola oil, flaxseeds, walnuts, almonds, and seeds.  Eat more omega-3 fats. These include salmon, mackerel, sardines, tuna, flaxseed oil, and ground flaxseeds. Try to eat fish at least 2 times each week.  Check food labels. Avoid foods with trans fats or high amounts of saturated fat.  Limit saturated fats.  These are often found in animal products, such as meats, butter, and cream.  These are also found in plant foods, such as palm oil, palm kernel oil, and coconut oil.  Avoid foods with partially hydrogenated oils in them.  These have trans fats. Examples are stick margarine, some tub margarines, cookies, crackers, and other baked goods.  What foods can I eat?  Fruits  All fresh, canned (in natural juice), or frozen fruits.  Vegetables  Fresh or frozen vegetables (raw, steamed, roasted, or grilled). Green salads.  Grains  Most grains. Choose whole wheat and whole grains most of the time. Rice and pasta, including brown rice and pastas made with whole wheat.  Meats and other proteins  Lean, well-trimmed beef, veal, pork, and lamb. Chicken and turkey without skin. All fish and shellfish. Wild duck, rabbit, pheasant, and venison. Egg whites or low-cholesterol egg substitutes. Dried beans, peas, lentils, and tofu. Seeds and most nuts.  Dairy  Low-fat or nonfat cheeses, including ricotta and mozzarella. Skim or 1% milk that is liquid, powdered, or evaporated. Buttermilk that is made with low-fat milk. Nonfat or low-fat yogurt.  Fats and oils  Non-hydrogenated (trans-free) margarines. Vegetable oils, including soybean, sesame, sunflower, olive, peanut, safflower, corn, canola, and cottonseed. Salad dressings or mayonnaise made with a vegetable oil.  Beverages  Mineral water. Coffee and tea. Diet carbonated beverages.  Sweets and desserts  Sherbet, gelatin, and fruit ice. Small amounts of dark chocolate.  Limit all sweets and desserts.  Seasonings and condiments  All seasonings and condiments.  The items listed above may not be a complete list of foods and drinks you can eat. Contact a dietitian for more options.  What foods should I avoid?  Fruits  Canned fruit in heavy syrup. Fruit in cream or butter sauce. Fried fruit. Limit coconut.  Vegetables  Vegetables cooked in cheese, cream, or butter sauce. Fried vegetables.  Grains  Breads that are made with saturated or trans fats, oils, or whole milk. Croissants. Sweet rolls. Donuts. High-fat crackers, such as cheese crackers.  Meats and other proteins  Fatty meats, such as hot dogs, ribs,  sausage, raines, rib-eye roast or steak. High-fat deli meats, such as salami and bologna. Caviar. Domestic duck and goose. Organ meats, such as liver.  Dairy  Cream, sour cream, cream cheese, and creamed cottage cheese. Whole-milk cheeses. Whole or 2% milk that is liquid, evaporated, or condensed. Whole buttermilk. Cream sauce or high-fat cheese sauce. Yogurt that is made from whole milk.  Fats and oils  Meat fat, or shortening. Cocoa butter, hydrogenated oils, palm oil, coconut oil, palm kernel oil. Solid fats and shortenings, including raines fat, salt pork, lard, and butter. Nondairy cream substitutes. Salad dressings with cheese or sour cream.  Beverages  Regular sodas and juice drinks with added sugar.  Sweets and desserts  Frosting. Pudding. Cookies. Cakes. Pies. Milk chocolate or white chocolate. Buttered syrups. Full-fat ice cream or ice cream drinks.  The items listed above may not be a complete list of foods and drinks to avoid. Contact a dietitian for more information.  Summary  Heart-healthy meal planning includes eating less unhealthy fats, eating more healthy fats, and making other changes in your diet.  Eat a balanced diet. This includes fruits and vegetables, low-fat or nonfat dairy, lean protein, nuts and legumes, whole grains, and heart-healthy oils and fats.  This information is not intended to replace advice given to you by your health care provider. Make sure you discuss any questions you have with your health care provider.  Document Revised: 02/21/2019 Document Reviewed: 01/25/2019  [employer] Patient Education © 2021 [employer] Inc.       Mediterranean Diet  A Mediterranean diet refers to food and lifestyle choices that are based on the traditions of countries located on the [place]. This way of eating has been shown to help prevent certain conditions and improve outcomes for people who have chronic diseases, like kidney disease and heart disease.  What are tips for following this  plan?  Lifestyle  Cook and eat meals together with your family, when possible.  Drink enough fluid to keep your urine clear or pale yellow.  Be physically active every day. This includes:  Aerobic exercise like running or swimming.  Leisure activities like gardening, walking, or housework.  Get 7-8 hours of sleep each night.  If recommended by your health care provider, drink red wine in moderation. This means 1 glass a day for nonpregnant women and 2 glasses a day for men. A glass of wine equals 5 oz (150 mL).  Reading food labels       Check the serving size of packaged foods. For foods such as rice and pasta, the serving size refers to the amount of cooked product, not dry.  Check the total fat in packaged foods. Avoid foods that have saturated fat or trans fats.  Check the ingredients list for added sugars, such as corn syrup.     Shopping  At the grocery store, buy most of your food from the areas near the walls of the store. This includes:  Fresh fruits and vegetables (produce).  Grains, beans, nuts, and seeds. Some of these may be available in unpackaged forms or large amounts (in bulk).  Fresh seafood.  Poultry and eggs.  Low-fat dairy products.  Buy whole ingredients instead of prepackaged foods.  Buy fresh fruits and vegetables in-season from local farmers markets.  Buy frozen fruits and vegetables in resealable bags.  If you do not have access to quality fresh seafood, buy precooked frozen shrimp or canned fish, such as tuna, salmon, or sardines.  Buy small amounts of raw or cooked vegetables, salads, or olives from the deli or salad bar at your store.  Stock your pantry so you always have certain foods on hand, such as olive oil, canned tuna, canned tomatoes, rice, pasta, and beans.  Cooking  Cook foods with extra-virgin olive oil instead of using butter or other vegetable oils.  Have meat as a side dish, and have vegetables or grains as your main dish. This means having meat in small portions or adding  small amounts of meat to foods like pasta or stew.  Use beans or vegetables instead of meat in common dishes like chili or lasagna.  [place] with different cooking methods. Try roasting or broiling vegetables instead of steaming or sautéeing them.  Add frozen vegetables to soups, stews, pasta, or rice.  Add nuts or seeds for added healthy fat at each meal. You can add these to yogurt, salads, or vegetable dishes.  Marinate fish or vegetables using olive oil, lemon juice, garlic, and fresh herbs.  Meal planning       Plan to eat 1 vegetarian meal one day each week. Try to work up to 2 vegetarian meals, if possible.  Eat seafood 2 or more times a week.  Have healthy snacks readily available, such as:  Vegetable sticks with hummus.  Greek yogurt.  Fruit and nut trail mix.  Eat balanced meals throughout the week. This includes:  Fruit: 2-3 servings a day  Vegetables: 4-5 servings a day  Low-fat dairy: 2 servings a day  Fish, poultry, or lean meat: 1 serving a day  Beans and legumes: 2 or more servings a week  Nuts and seeds: 1-2 servings a day  Whole grains: 6-8 servings a day  Extra-virgin olive oil: 3-4 servings a day  Limit red meat and sweets to only a few servings a month     What are my food choices?  Mediterranean diet  Recommended  Grains: Whole-grain pasta. Brown rice. Bulgar wheat. Polenta. Couscous. Whole-wheat bread. Oatmeal. Quinoa.  Vegetables: Artichokes. Beets. Broccoli. Cabbage. Carrots. Eggplant. Green beans. Chard. Kale. Spinach. Onions. Leeks. Peas. Squash. Tomatoes. Peppers. Radishes.  Fruits: Apples. Apricots. Avocado. Berries. Bananas. Cherries. Dates. Figs. Grapes. [place]. Melon. Oranges. Peaches. Plums. Pomegranate.  Meats and other protein foods: Beans. Almonds. Sunflower seeds. Pine nuts. Peanuts. Cod. [place]. Scallops. Shrimp. Tuna. Tilapia. Clams. Oysters. Eggs.  Dairy: Low-fat milk. Cheese. Greek yogurt.  Beverages: Water. Red wine. Herbal tea.  Fats and oils: Extra virgin olive oil.  Avocado oil. Grape seed oil.  Sweets and desserts: Greek yogurt with honey. Baked apples. Poached pears. Trail mix.  Seasoning and other foods: Basil. Cilantro. Coriander. Cumin. Mint. Parsley. Ander. Rosemary. Tarragon. Garlic. Oregano. Thyme. Pepper. Balsalmic vinegar. Tahini. Hummus. Tomato sauce. Olives. Mushrooms.  Limit these  Grains: Prepackaged pasta or rice dishes. Prepackaged cereal with added sugar.  Vegetables: Deep fried potatoes (french fries).  Fruits: Fruit canned in syrup.  Meats and other protein foods: Beef. Pork. Lamb. Poultry with skin. Hot dogs. [place].  Dairy: Ice cream. Sour cream. Whole milk.  Beverages: Juice. Sugar-sweetened soft drinks. Beer. Liquor and spirits.  Fats and oils: Butter. Canola oil. Vegetable oil. Beef fat (tallow). Lard.  Sweets and desserts: Cookies. Cakes. Pies. Candy.  Seasoning and other foods: Mayonnaise. Premade sauces and marinades.  The items listed may not be a complete list. Talk with your dietitian about what dietary choices are right for you.  Summary  The Mediterranean diet includes both food and lifestyle choices.  Eat a variety of fresh fruits and vegetables, beans, nuts, seeds, and whole grains.  Limit the amount of red meat and sweets that you eat.  Talk with your health care provider about whether it is safe for you to drink red wine in moderation. This means 1 glass a day for nonpregnant women and 2 glasses a day for men. A glass of wine equals 5 oz (150 mL).  This information is not intended to replace advice given to you by your health care provider. Make sure you discuss any questions you have with your health care provider.  Document Revised: 08/17/2017 Document Reviewed: 08/10/2017  Elsevier Patient Education © 2020 [employer]         Exercising to Stay Healthy  To become healthy and stay healthy, it is recommended that you do moderate-intensity and vigorous-intensity exercise. You can tell that you are exercising at a moderate intensity if your  heart starts beating faster and you start breathing faster but can still hold a conversation. You can tell that you are exercising at a vigorous intensity if you are breathing much harder and faster and cannot hold a conversation while exercising.  Exercising regularly is important. It has many health benefits, such as:  Improving overall fitness, flexibility, and endurance.  Increasing bone density.  Helping with weight control.  Decreasing body fat.  Increasing muscle strength.  Reducing stress and tension.  Improving overall health.  How often should I exercise?  Choose an activity that you enjoy, and set realistic goals. Your health care provider can help you make an activity plan that works for you.  Exercise regularly as told by your health care provider. This may include:  Doing strength training two times a week, such as:  Lifting weights.  Using resistance bands.  Push-ups.  Sit-ups.  Yoga.  Doing a certain intensity of exercise for a given amount of time. Choose from these options:  A total of 150 minutes of moderate-intensity exercise every week.  A total of 75 minutes of vigorous-intensity exercise every week.  A mix of moderate-intensity and vigorous-intensity exercise every week.  Children, pregnant women, people who have not exercised regularly, people who are overweight, and older adults may need to talk with a health care provider about what activities are safe to do. If you have a medical condition, be sure to talk with your health care provider before you start a new exercise program.  What are some exercise ideas?    Moderate-intensity exercise ideas include:  Walking 1 mile (1.6 km) in about 15 minutes.  Biking.  Hiking.  Golfing.  Dancing.  Water aerobics.  Vigorous-intensity exercise ideas include:  Walking 4.5 miles (7.2 km) or more in about 1 hour.  Jogging or running 5 miles (8 km) in about 1 hour.  Biking 10 miles (16.1 km) or more in about 1 hour.  Lap swimming.  Roller-skating or in-line  skating.  Cross-country skiing.  Vigorous competitive sports, such as football, basketball, and soccer.  Jumping rope.  Aerobic dancing.  What are some everyday activities that can help me to get exercise?  Yard work, such as:  Pushing a .  Raking and bagging leaves.  Washing your car.  Pushing a stroller.  Shoveling snow.  Gardening.  Washing windows or floors.  How can I be more active in my day-to-day activities?  Use stairs instead of an elevator.  Take a walk during your lunch break.  If you drive, park your car farther away from your work or school.  If you take public transportation, get off one stop early and walk the rest of the way.  Stand up or walk around during all of your indoor phone calls.  Get up, stretch, and walk around every 30 minutes throughout the day.  Enjoy exercise with a friend. Support to continue exercising will help you keep a regular routine of activity.  What guidelines can I follow while exercising?  Before you start a new exercise program, talk with your health care provider.  Do not exercise so much that you hurt yourself, feel dizzy, or get very short of breath.  Wear comfortable clothes and wear shoes with good support.  Drink plenty of water while you exercise to prevent dehydration or heat stroke.  Work out until your breathing and your heartbeat get faster.  Where to find more information  [place] Department of Health and Human Services: www.hhs.gov  Centers for Disease Control and Prevention (CDC): www.cdc.gov  Summary  Exercising regularly is important. It will improve your overall fitness, flexibility, and endurance.  Regular exercise also will improve your overall health. It can help you control your weight, reduce stress, and improve your bone density.  Do not exercise so much that you hurt yourself, feel dizzy, or get very short of breath.  Before you start a new exercise program, talk with your health care provider.  This information is not intended to replace  advice given to you by your health care provider. Make sure you discuss any questions you have with your health care provider.  Document Revised: 11/30/2018 Document Reviewed: 11/08/2018  Elsevier Patient Education © 2021 [employer]           Mindfulness-Based Stress Reduction  Mindfulness-based stress reduction (MBSR) is a program that helps people learn to practice mindfulness. Mindfulness is the practice of intentionally paying attention to the present moment. It can be learned and practiced through techniques such as education, breathing exercises, meditation, and yoga. MBSR includes several mindfulness techniques in one program.  MBSR works best when you understand the treatment, are willing to try new things, and can commit to spending time practicing what you learn. MBSR training may include learning about:  How your emotions, thoughts, and reactions affect your body.  New ways to respond to things that cause negative thoughts to start (triggers).  How to notice your thoughts and let go of them.  Practicing awareness of everyday things that you normally do without thinking.  The techniques and goals of different types of meditation.  What are the benefits of MBSR?  MBSR can have many benefits, which include helping you to:  Develop self-awareness. This refers to knowing and understanding yourself.  Learn skills and attitudes that help you to participate in your own health care.  Learn new ways to care for yourself.  Be more accepting about how things are, and let things go.  Be less judgmental and approach things with an open mind.  Be patient with yourself and trust yourself more.  MBSR has also been shown to:  Reduce negative emotions, such as depression and anxiety.  Improve memory and focus.  Change how you sense and approach pain.  Boost your body's ability to fight infections.  Help you connect better with other people.  Improve your sense of well-being.  Follow these instructions at home:       Find  a local in-person or online MBSR program.  Set aside some time regularly for mindfulness practice.  Find a mindfulness practice that works best for you. This may include one or more of the following:  Meditation. Meditation involves focusing your mind on a certain thought or activity.  Breathing awareness exercises. These help you to stay present by focusing on your breath.  Body scan. For this practice, you lie down and pay attention to each part of your body from head to toe. You can identify tension and soreness and intentionally relax parts of your body.  Yoga. Yoga involves stretching and breathing, and it can improve your ability to move and be flexible. It can also provide an experience of testing your body's limits, which can help you release stress.  Mindful eating. This way of eating involves focusing on the taste, texture, color, and smell of each bite of food. Because this slows down eating and helps you feel full sooner, it can be an important part of a weight-loss plan.  Find a podcast or recording that provides guidance for breathing awareness, body scan, or meditation exercises. You can listen to these any time when you have a free moment to rest without distractions.  Follow your treatment plan as told by your health care provider. This may include taking regular medicines and making changes to your diet or lifestyle as recommended.  How to practice mindfulness  To do a basic awareness exercise:  Find a comfortable place to sit.  Pay attention to the present moment. Observe your thoughts, feelings, and surroundings just as they are.  Avoid placing judgment on yourself, your feelings, or your surroundings. Make note of any judgment that comes up, and let it go.  Your mind may wander, and that is okay. Make note of when your thoughts drift, and return your attention to the present moment.  To do basic mindfulness meditation:  Find a comfortable place to sit. This may include a stable chair or a firm  floor cushion.  Sit upright with your back straight. Let your arms fall next to your side with your hands resting on your legs.  If sitting in a chair, rest your feet flat on the floor.  If sitting on a cushion, cross your legs in front of you.  Keep your head in a neutral position with your chin dropped slightly. Relax your jaw and rest the tip of your tongue on the roof of your mouth. Drop your gaze to the floor. You can close your eyes if you like.  Breathe normally and pay attention to your breath. Feel the air moving in and out of your nose. Feel your belly expanding and relaxing with each breath.  Your mind may wander, and that is okay. Make note of when your thoughts drift, and return your attention to your breath.  Avoid placing judgment on yourself, your feelings, or your surroundings. Make note of any judgment or feelings that come up, let them go, and bring your attention back to your breath.  When you are ready, lift your gaze or open your eyes. Pay attention to how your body feels after the meditation.  Where to find more information  You can find more information about MBSR from:  Your health care provider.  Community-based meditation centers or programs.  Programs offered near you.  Summary  Mindfulness-based stress reduction (MBSR) is a program that teaches you how to intentionally pay attention to the present moment. It is used with other treatments to help you cope better with daily stress, emotions, and pain.  MBSR focuses on developing self-awareness, which allows you to respond to life stress without judgment or negative emotions.  MBSR programs may involve learning different mindfulness practices, such as breathing exercises, meditation, yoga, body scan, or mindful eating. Find a mindfulness practice that works best for you, and set aside time for it on a regular basis.  This information is not intended to replace advice given to you by your health care provider. Make sure you discuss any  questions you have with your health care provider.  Document Revised: 02/22/2021 Document Reviewed: 04/26/2018  Elsevier Patient Education © 2021 [employer] Inc.

## 2024-04-01 ENCOUNTER — OFFICE VISIT (OUTPATIENT)
Dept: INTERNAL MEDICINE | Facility: CLINIC | Age: 69
End: 2024-04-01
Payer: MEDICARE

## 2024-04-01 VITALS
WEIGHT: 230 LBS | BODY MASS INDEX: 39.27 KG/M2 | SYSTOLIC BLOOD PRESSURE: 119 MMHG | TEMPERATURE: 98.6 F | HEART RATE: 67 BPM | OXYGEN SATURATION: 96 % | HEIGHT: 64 IN | DIASTOLIC BLOOD PRESSURE: 58 MMHG

## 2024-04-01 DIAGNOSIS — Z79.899 MEDICATION MANAGEMENT: ICD-10-CM

## 2024-04-01 DIAGNOSIS — S72.401D CLOSED FRACTURE OF DISTAL END OF RIGHT FEMUR WITH ROUTINE HEALING, UNSPECIFIED FRACTURE MORPHOLOGY, SUBSEQUENT ENCOUNTER: ICD-10-CM

## 2024-04-01 DIAGNOSIS — I25.10 PRESENCE OF STENT IN CORONARY ARTERY IN PATIENT WITH CORONARY ARTERY DISEASE: ICD-10-CM

## 2024-04-01 DIAGNOSIS — F17.211 CIGARETTE NICOTINE DEPENDENCE IN REMISSION: ICD-10-CM

## 2024-04-01 DIAGNOSIS — Z79.4 TYPE 2 DIABETES MELLITUS WITH DIABETIC POLYNEUROPATHY, WITH LONG-TERM CURRENT USE OF INSULIN: ICD-10-CM

## 2024-04-01 DIAGNOSIS — I10 PRIMARY HYPERTENSION: ICD-10-CM

## 2024-04-01 DIAGNOSIS — Z78.0 POSTMENOPAUSAL: ICD-10-CM

## 2024-04-01 DIAGNOSIS — Z95.5 PRESENCE OF STENT IN CORONARY ARTERY IN PATIENT WITH CORONARY ARTERY DISEASE: ICD-10-CM

## 2024-04-01 DIAGNOSIS — E66.01 CLASS 2 SEVERE OBESITY DUE TO EXCESS CALORIES WITH SERIOUS COMORBIDITY AND BODY MASS INDEX (BMI) OF 39.0 TO 39.9 IN ADULT: ICD-10-CM

## 2024-04-01 DIAGNOSIS — I50.32 CHRONIC DIASTOLIC CHF (CONGESTIVE HEART FAILURE): ICD-10-CM

## 2024-04-01 DIAGNOSIS — L57.0 ACTINIC KERATOSIS: ICD-10-CM

## 2024-04-01 DIAGNOSIS — E11.42 TYPE 2 DIABETES MELLITUS WITH DIABETIC POLYNEUROPATHY, WITH LONG-TERM CURRENT USE OF INSULIN: ICD-10-CM

## 2024-04-01 DIAGNOSIS — Z00.00 MEDICARE ANNUAL WELLNESS VISIT, SUBSEQUENT: Primary | ICD-10-CM

## 2024-04-01 DIAGNOSIS — E78.2 MIXED HYPERLIPIDEMIA: ICD-10-CM

## 2024-04-01 LAB
ALBUMIN SERPL-MCNC: 3.8 G/DL (ref 3.5–5.2)
ALBUMIN/GLOB SERPL: 1.2 G/DL
ALP SERPL-CCNC: 111 U/L (ref 39–117)
ALT SERPL W P-5'-P-CCNC: 17 U/L (ref 1–33)
ANION GAP SERPL CALCULATED.3IONS-SCNC: 10.5 MMOL/L (ref 5–15)
AST SERPL-CCNC: 26 U/L (ref 1–32)
BASOPHILS # BLD AUTO: 0.03 10*3/MM3 (ref 0–0.2)
BASOPHILS NFR BLD AUTO: 0.4 % (ref 0–1.5)
BILIRUB SERPL-MCNC: 0.7 MG/DL (ref 0–1.2)
BUN SERPL-MCNC: 22 MG/DL (ref 8–23)
BUN/CREAT SERPL: 21.2 (ref 7–25)
CALCIUM SPEC-SCNC: 9.3 MG/DL (ref 8.6–10.5)
CHLORIDE SERPL-SCNC: 102 MMOL/L (ref 98–107)
CHOLEST SERPL-MCNC: 114 MG/DL (ref 0–200)
CO2 SERPL-SCNC: 24.5 MMOL/L (ref 22–29)
CREAT SERPL-MCNC: 1.04 MG/DL (ref 0.57–1)
DEPRECATED RDW RBC AUTO: 44.6 FL (ref 37–54)
EGFRCR SERPLBLD CKD-EPI 2021: 57.9 ML/MIN/1.73
EOSINOPHIL # BLD AUTO: 0.26 10*3/MM3 (ref 0–0.4)
EOSINOPHIL NFR BLD AUTO: 3.8 % (ref 0.3–6.2)
ERYTHROCYTE [DISTWIDTH] IN BLOOD BY AUTOMATED COUNT: 13.6 % (ref 12.3–15.4)
GLOBULIN UR ELPH-MCNC: 3.1 GM/DL
GLUCOSE SERPL-MCNC: 122 MG/DL (ref 65–99)
HBA1C MFR BLD: 6.5 % (ref 4.8–5.6)
HCT VFR BLD AUTO: 35.3 % (ref 34–46.6)
HDLC SERPL-MCNC: 43 MG/DL (ref 40–60)
HGB BLD-MCNC: 11.6 G/DL (ref 12–15.9)
IMM GRANULOCYTES # BLD AUTO: 0.03 10*3/MM3 (ref 0–0.05)
IMM GRANULOCYTES NFR BLD AUTO: 0.4 % (ref 0–0.5)
LDLC SERPL CALC-MCNC: 55 MG/DL (ref 0–100)
LDLC/HDLC SERPL: 1.28 {RATIO}
LYMPHOCYTES # BLD AUTO: 1.3 10*3/MM3 (ref 0.7–3.1)
LYMPHOCYTES NFR BLD AUTO: 19.1 % (ref 19.6–45.3)
MCH RBC QN AUTO: 29.8 PG (ref 26.6–33)
MCHC RBC AUTO-ENTMCNC: 32.9 G/DL (ref 31.5–35.7)
MCV RBC AUTO: 90.7 FL (ref 79–97)
MONOCYTES # BLD AUTO: 0.61 10*3/MM3 (ref 0.1–0.9)
MONOCYTES NFR BLD AUTO: 9 % (ref 5–12)
NEUTROPHILS NFR BLD AUTO: 4.56 10*3/MM3 (ref 1.7–7)
NEUTROPHILS NFR BLD AUTO: 67.3 % (ref 42.7–76)
NRBC BLD AUTO-RTO: 0 /100 WBC (ref 0–0.2)
PLATELET # BLD AUTO: 130 10*3/MM3 (ref 140–450)
PMV BLD AUTO: 10 FL (ref 6–12)
POTASSIUM SERPL-SCNC: 4.4 MMOL/L (ref 3.5–5.2)
PROT SERPL-MCNC: 6.9 G/DL (ref 6–8.5)
RBC # BLD AUTO: 3.89 10*6/MM3 (ref 3.77–5.28)
SODIUM SERPL-SCNC: 137 MMOL/L (ref 136–145)
TRIGL SERPL-MCNC: 80 MG/DL (ref 0–150)
TSH SERPL DL<=0.05 MIU/L-ACNC: 2.09 UIU/ML (ref 0.27–4.2)
VLDLC SERPL-MCNC: 16 MG/DL (ref 5–40)
WBC NRBC COR # BLD AUTO: 6.79 10*3/MM3 (ref 3.4–10.8)

## 2024-04-01 PROCEDURE — 80053 COMPREHEN METABOLIC PANEL: CPT | Performed by: STUDENT IN AN ORGANIZED HEALTH CARE EDUCATION/TRAINING PROGRAM

## 2024-04-01 PROCEDURE — 84443 ASSAY THYROID STIM HORMONE: CPT | Performed by: STUDENT IN AN ORGANIZED HEALTH CARE EDUCATION/TRAINING PROGRAM

## 2024-04-01 PROCEDURE — 85025 COMPLETE CBC W/AUTO DIFF WBC: CPT | Performed by: STUDENT IN AN ORGANIZED HEALTH CARE EDUCATION/TRAINING PROGRAM

## 2024-04-01 PROCEDURE — 80061 LIPID PANEL: CPT | Performed by: STUDENT IN AN ORGANIZED HEALTH CARE EDUCATION/TRAINING PROGRAM

## 2024-04-01 PROCEDURE — 83036 HEMOGLOBIN GLYCOSYLATED A1C: CPT | Performed by: STUDENT IN AN ORGANIZED HEALTH CARE EDUCATION/TRAINING PROGRAM

## 2024-04-01 RX ORDER — ASPIRIN 81 MG/1
81 TABLET ORAL DAILY
COMMUNITY

## 2024-04-01 NOTE — PROGRESS NOTES
Chief Complaint  Medicare Wellness-subsequent and femur facture (R Leg)    Subjective          [place] Cayla Duncan presents to [facility]  History of Present Illness    Mrs Duncan's spouse passed away in February.  Here with blood sugar log which is quite elevated in early March (she reports she was not taking good care of herself for a time) but more recently eating better and blood sugars improved.  Has had some mild hypoglycemic measurements at lunch time.    She is taking 100U of lantus currently, 20U of short acting at breakfast, 23U short acting at lunch, and 23U short acting at dinner time.    Most recently saw Dr. Henry of [facility] on February 8th, and had reassuring exam.    Broke right leg in the interim falling out of wheelchair.  Currently following with ortho.    PHQ-9 Depression Screening  Little interest or pleasure in doing things? 0-->not at all   Feeling down, depressed, or hopeless? 0-->not at all   Trouble falling or staying asleep, or sleeping too much?     Feeling tired or having little energy?     Poor appetite or overeating?     Feeling bad about yourself - or that you are a failure or have let yourself or your family down?     Trouble concentrating on things, such as reading the newspaper or watching television?     Moving or speaking so slowly that other people could have noticed? Or the opposite - being so fidgety or restless that you have been moving around a lot more than usual?     Thoughts that you would be better off dead, or of hurting yourself in some way?     PHQ-9 Total Score 0   If you checked off any problems, how difficult have these problems made it for you to do your work, take care of things at home, or get along with other people?           Current Outpatient Medications   Medication Instructions    aspirin 81 mg, Oral, Daily    diclofenac (VOLTAREN) 50 mg, Oral, 3 Times Daily    esomeprazole (NEXIUM) 40 mg, Oral, Every  "Morning Before Breakfast    ferrous sulfate 325 mg, Oral, Every Other Day, Mornings    folic acid (FOLVITE) 1,000 mcg, Oral, Daily    furosemide (LASIX) 40 mg, Oral, Daily    gabapentin (NEURONTIN) 600 mg, Oral, 2 Times Daily    HYDROcodone-acetaminophen (NORCO) 5-325 MG per tablet 1 tablet, Oral, Every 6 Hours PRN    insulin aspart (NovoLOG FlexPen) 100 UNIT/ML solution pen-injector sc pen Inject 20 Units under the skin into the appropriate area as directed Daily With Breakfast AND 23 Units Daily With Lunch AND 23 Units Daily With Dinner.    insulin glargine (LANTUS) 110 Units, Subcutaneous, Daily    isosorbide mononitrate (IMDUR) 30 mg, Oral, Every Morning    KLOR-CON 20 MEQ CR tablet 20 mEq, Oral, Daily    levocetirizine (XYZAL) 5 mg, Oral, Every Evening    spironolactone (ALDACTONE) 25 mg, Oral, Daily    Trulicity 0.75 mg, Subcutaneous, Weekly    verapamil (CALAN) 80 mg, Oral, 2 Times Daily    vitamin D3 5,000 Units, Oral, Daily       The following portions of the patient's history were reviewed and updated as appropriate: allergies, current medications, past family history, past medical history, past social history, past surgical history, and problem list.    Objective   Vital Signs:   /58 (BP Location: Right arm, Patient Position: Sitting, Cuff Size: Adult)   Pulse 67   Temp 98.6 °F (37 °C)   Ht 162.6 cm (64\")   Wt 104 kg (230 lb)   SpO2 96%   BMI 39.48 kg/m²     BP Readings from Last 3 Encounters:   04/01/24 119/58   03/18/24 119/71   03/13/24 134/53     Wt Readings from Last 3 Encounters:   04/01/24 104 kg (230 lb)   03/18/24 102 kg (225 lb)   12/29/23 107 kg (236 lb)           Physical Exam  Vitals reviewed.   Constitutional:       General: She is not in acute distress.     Appearance: Normal appearance. She is not ill-appearing, toxic-appearing or diaphoretic.   HENT:      Head: Normocephalic and atraumatic.      Right Ear: External ear normal.      Left Ear: External ear normal.   Eyes:      " Conjunctiva/sclera: Conjunctivae normal.   Cardiovascular:      Rate and Rhythm: Normal rate and regular rhythm.      Pulses: Normal pulses.      Heart sounds: Murmur heard.      No friction rub. No gallop.      Comments: II/VI systolic murmur noted, LUSB  Pulmonary:      Effort: Pulmonary effort is normal. No respiratory distress.      Breath sounds: Normal breath sounds. No stridor. No wheezing, rhonchi or rales.   Chest:      Chest wall: No tenderness.   Abdominal:      General: Abdomen is flat.      Palpations: Abdomen is soft. There is no mass.      Tenderness: There is no abdominal tenderness.   Musculoskeletal:      Right lower leg: No edema.      Left lower leg: No edema.   Skin:     General: Skin is warm and dry.      Comments: Actinic keratosis noted, back of left hand   Neurological:      General: No focal deficit present.      Mental Status: She is alert. Mental status is at baseline.   Psychiatric:         Mood and Affect: Mood normal.         Behavior: Behavior normal.         Thought Content: Thought content normal.         Judgment: Judgment normal.            Result Review :   The following data was reviewed by: Donnell Garcia MD on 04/01/2024:  Common labs          6/5/2023    09:33 9/27/2023    10:15 12/29/2023    12:20   Common Labs   Glucose 174  206  150    BUN 14  18  17    Creatinine 1.02  0.84  0.91    Sodium 138  133  136    Potassium 3.9  4.7  4.2    Chloride 100  99  99    Calcium 9.9  9.2  9.5    Albumin 4.2  4.1  4.1    Total Bilirubin 0.8  0.7  0.8    Alkaline Phosphatase 92  83  74    AST (SGOT) 23  30  36    ALT (SGPT) 22  25  26    WBC 6.03  6.34     Hemoglobin 15.2  14.0     Hematocrit 45.6  42.0     Platelets 128      Total Cholesterol 174   132    Triglycerides 116   98    HDL Cholesterol 46   48    LDL Cholesterol  107   66    Hemoglobin A1C 8.00   7.20    Microalbumin, Urine   <1.2             Lab Results   Component Value Date    COVID19 Not Detected 06/08/2022    INR 1.20  (H) 06/04/2022    BILIRUBINUR Negative 06/05/2022       Procedures        Assessment and Plan    Diagnoses and all orders for this visit:    1. Medicare annual wellness visit, subsequent (Primary)  -     CBC & Differential  -     Comprehensive Metabolic Panel  -     Hemoglobin A1c  -     Lipid Panel  -     TSH    2. Type 2 diabetes mellitus with diabetic polyneuropathy, with long-term current use of insulin  -     Comprehensive Metabolic Panel  -     Hemoglobin A1c    3. Primary hypertension  -     Comprehensive Metabolic Panel    4. Mixed hyperlipidemia  -     Lipid Panel    5. Medication management    6. Presence of stent in coronary artery in patient with coronary artery disease    7. Cigarette nicotine dependence in remission    8. Chronic diastolic CHF (congestive heart failure)    9. Closed fracture of distal end of right femur with routine healing, unspecified fracture morphology, subsequent encounter    10. Class 2 severe obesity due to excess calories with serious comorbidity and body mass index (BMI) of 39.0 to 39.9 in adult    11. Postmenopausal  -     DEXA Bone Density Axial    12. Actinic keratosis  -     Ambulatory Referral to Dermatology      T2DM:  -b.s. log showing hypoglycemic measurements at times around lunch time  -new regimen: 100U lantus with 18U short acting at breakfast, 23U short acting at lunch, 23U short acting at dinner time    Health Maintenance:  -nutritional counseling on the components of a heart healthy diet  -exercise counseling, recommending at least 2.5 hours of moderate exercise weekly      There are no discontinued medications.       Follow Up   Return in about 3 months (around 7/1/2024) for Type 2 Diabetes.  Patient was given instructions and counseling regarding her condition or for health maintenance advice. Please see specific information pulled into the AVS if appropriate.       Donnell Garcia MD  04/01/24  17:32 EDT

## 2024-04-03 DIAGNOSIS — D64.9 NORMOCYTIC ANEMIA: Primary | ICD-10-CM

## 2024-04-03 DIAGNOSIS — D69.6 THROMBOCYTOPENIA: ICD-10-CM

## 2024-04-08 ENCOUNTER — OFFICE VISIT (OUTPATIENT)
Dept: ORTHOPEDIC SURGERY | Facility: CLINIC | Age: 69
End: 2024-04-08
Payer: MEDICARE

## 2024-04-08 VITALS
WEIGHT: 230 LBS | SYSTOLIC BLOOD PRESSURE: 154 MMHG | HEART RATE: 78 BPM | DIASTOLIC BLOOD PRESSURE: 63 MMHG | BODY MASS INDEX: 39.27 KG/M2 | OXYGEN SATURATION: 96 % | HEIGHT: 64 IN

## 2024-04-08 DIAGNOSIS — M19.011 OSTEOARTHRITIS OF RIGHT SHOULDER, UNSPECIFIED OSTEOARTHRITIS TYPE: ICD-10-CM

## 2024-04-08 DIAGNOSIS — S72.401D CLOSED FRACTURE OF DISTAL END OF RIGHT FEMUR WITH ROUTINE HEALING, UNSPECIFIED FRACTURE MORPHOLOGY, SUBSEQUENT ENCOUNTER: ICD-10-CM

## 2024-04-08 DIAGNOSIS — M25.561 RIGHT KNEE PAIN, UNSPECIFIED CHRONICITY: Primary | ICD-10-CM

## 2024-04-08 PROCEDURE — 1160F RVW MEDS BY RX/DR IN RCRD: CPT | Performed by: STUDENT IN AN ORGANIZED HEALTH CARE EDUCATION/TRAINING PROGRAM

## 2024-04-08 PROCEDURE — 99213 OFFICE O/P EST LOW 20 MIN: CPT | Performed by: STUDENT IN AN ORGANIZED HEALTH CARE EDUCATION/TRAINING PROGRAM

## 2024-04-08 PROCEDURE — 3078F DIAST BP <80 MM HG: CPT | Performed by: STUDENT IN AN ORGANIZED HEALTH CARE EDUCATION/TRAINING PROGRAM

## 2024-04-08 PROCEDURE — 1159F MED LIST DOCD IN RCRD: CPT | Performed by: STUDENT IN AN ORGANIZED HEALTH CARE EDUCATION/TRAINING PROGRAM

## 2024-04-08 PROCEDURE — 3077F SYST BP >= 140 MM HG: CPT | Performed by: STUDENT IN AN ORGANIZED HEALTH CARE EDUCATION/TRAINING PROGRAM

## 2024-04-08 RX ORDER — HYDROCODONE BITARTRATE AND ACETAMINOPHEN 5; 325 MG/1; MG/1
1 TABLET ORAL EVERY 6 HOURS PRN
Qty: 20 TABLET | Refills: 0 | Status: SHIPPED | OUTPATIENT
Start: 2024-04-08

## 2024-04-08 NOTE — PROGRESS NOTES
"Chief Complaint  Pain and Follow-up of the Right Knee    Subjective          Stephy Duncan presents to [facility] for   History of Present Illness    The patient presents here today for follow up evaluation of the right knee. The patient has been treating her right distal femur fracture conservatively. She is ambulating in an electric wheelchair. She has no other complaints.   Allergies   Allergen Reactions    Ace Inhibitors Shortness Of Breath and Swelling    Morphine Shortness Of Breath        Social History     Socioeconomic History    Marital status:    Tobacco Use    Smoking status: Former     Current packs/day: 0.00     Average packs/day: 1 pack/day for 45.0 years (45.0 ttl pk-yrs)     Types: Cigarettes     Start date: 12/10/1970     Quit date: 12/10/2015     Years since quittin.3     Passive exposure: Never    Smokeless tobacco: Never   Vaping Use    Vaping status: Never Used   Substance and Sexual Activity    Alcohol use: Never    Drug use: Never    Sexual activity: Defer        I reviewed the patient's chief complaint, history of present illness, review of systems, past medical history, surgical history, family history, social history, medications, and allergy list.     REVIEW OF SYSTEMS    Constitutional: Denies fevers, chills, weight loss  Cardiovascular: Denies chest pain, shortness of breath  Skin: Denies rashes, acute skin changes  Neurologic: Denies headache, loss of consciousness  MSK: Right knee pain      Objective   Vital Signs:   /63   Pulse 78   Ht 162.6 cm (64\")   Wt 104 kg (230 lb)   SpO2 96%   BMI 39.48 kg/m²     Body mass index is 39.48 kg/m².    Physical Exam    General: Alert. No acute distress.   Right knee- Positive EHL, FHL, GS and TA. Sensation intact to all 5 nerves of the foot. Positive pulses. Mild tenderness. Calf soft.     Procedures    Imaging Results (Most Recent)       Procedure Component Value Units Date/Time    XR " Knee AP & Lateral [[id number]] Resulted: 04/08/24 1409     Updated: 04/08/24 1411    Narrative:      Indications: Follow-up right distal femur fracture    Views: AP and lateral right knee    Findings: Comminuted distal femur fracture again seen.  Early callus   formation is noted.  Varus and medial translation to the distal fragment.    No obvious intra-articular extension.  Degenerative changes in the knee   are stable.    Comparative Data: Comparative data found and reviewed                     Assessment and Plan        XR Knee AP & Lateral    Result Date: 4/8/2024  Narrative: Indications: Follow-up right distal femur fracture Views: AP and lateral right knee Findings: Comminuted distal femur fracture again seen.  Early callus formation is noted.  Varus and medial translation to the distal fragment.  No obvious intra-articular extension.  Degenerative changes in the knee are stable. Comparative Data: Comparative data found and reviewed    Duplex Venous Lower Extremity - Right CAR    Result Date: 3/13/2024  Narrative:   Normal right lower extremity venous duplex scan.   Technically limited right lower extremity imaging quality.     XR Shoulder 2+ View Right    Result Date: 3/13/2024  Narrative: PROCEDURE: XR SHOULDER 2+ VW RIGHT  COMPARISON: [facility] , CR, SHOULDER >OR= 2V RT, 12/07/2017, 14:35.  INDICATIONS: fall, shoulder pain  FINDINGS:  No fractures are visualized.  There are no findings of dislocation.  Mild degenerative change consistent with osteoarthritis is seen in the glenohumeral joint.  Mild AC arthrosis is evident.  No lytic or sclerotic bone lesions are seen.  No abnormality is seen at the right lung apex.      Impression:   Right shoulder series demonstrating mild degenerative change consistent with osteoarthritis.      JOB ANGLIN MD       Electronically Signed and Approved By: JOB ANGLIN MD on 3/13/2024 at 13:56             XR Knee 1 or 2 View Right    Result Date:  3/13/2024  Narrative: PROCEDURE: XR KNEE 1 OR 2 VW RIGHT  COMPARISON: [facility] , CR, KNEE 3 VIEWS RT, 5/08/2018, 10:23.  INDICATIONS: fall, knee pain  FINDINGS:  Comminuted fracture of the distal femoral metadiaphysis is seen, with 20 degrees of anterior angulation.  Bony structures have an osteopenic appearance.  No lytic or sclerotic bone lesions are seen.  Moderate tricompartmental degenerative changes consistent with osteoarthritis.  A small amount of fluid is seen in the tibiotalar joint.      Impression:   Right knee series demonstrating comminuted fracture of the distal femoral metadiaphysis, with 20 degrees of anterior angulation.  Moderate tricompartmental degenerative change consistent with osteoarthritis.      JOB ANGLIN MD       Electronically Signed and Approved By: JOB ANGLIN MD on 3/13/2024 at 13:55               Diagnoses and all orders for this visit:    1. Right knee pain, unspecified chronicity (Primary)  -     XR Knee AP & Lateral    2. Closed fracture of distal end of right femur with routine healing, unspecified fracture morphology, subsequent encounter    3. Osteoarthritis of right shoulder, unspecified osteoarthritis type  -     HYDROcodone-acetaminophen (NORCO) 5-325 MG per tablet; Take 1 tablet by mouth Every 6 (Six) Hours As Needed for Severe Pain.  Dispense: 20 tablet; Refill: 0        Discussed the treatment plan with the patient.  I reviewed the x-rays that were obtained today with the patient. Plan to continue activity restrictions. Refill of pain medication given today. Plan to continue aspirin.       Will obtain X-Rays of right knee at next visit.     Call or return if worsening symptoms.    Scribed for Bebeto Melton MD by Edna Lan  04/08/2024   13:45 EDT         Follow Up       4 weeks    Patient was given instructions and counseling regarding her condition or for health maintenance advice. Please see specific information pulled into the AVS if  appropriate.       I have personally performed the services described in this document as scribed by the above individual and it is both accurate and complete.     Bebeto Melton MD  04/08/24  15:09 EDT

## 2024-05-02 DIAGNOSIS — I10 PRIMARY HYPERTENSION: ICD-10-CM

## 2024-05-02 RX ORDER — SPIRONOLACTONE 25 MG/1
25 TABLET ORAL DAILY
Qty: 90 TABLET | Refills: 2 | Status: SHIPPED | OUTPATIENT
Start: 2024-05-02

## 2024-05-13 ENCOUNTER — OFFICE VISIT (OUTPATIENT)
Dept: ORTHOPEDIC SURGERY | Facility: CLINIC | Age: 69
End: 2024-05-13
Payer: MEDICARE

## 2024-05-13 VITALS — DIASTOLIC BLOOD PRESSURE: 67 MMHG | SYSTOLIC BLOOD PRESSURE: 122 MMHG | HEART RATE: 69 BPM | OXYGEN SATURATION: 92 %

## 2024-05-13 DIAGNOSIS — M25.561 RIGHT KNEE PAIN, UNSPECIFIED CHRONICITY: ICD-10-CM

## 2024-05-13 DIAGNOSIS — S72.401D CLOSED FRACTURE OF DISTAL END OF RIGHT FEMUR WITH ROUTINE HEALING, UNSPECIFIED FRACTURE MORPHOLOGY, SUBSEQUENT ENCOUNTER: Primary | ICD-10-CM

## 2024-05-13 PROCEDURE — 3078F DIAST BP <80 MM HG: CPT | Performed by: PHYSICIAN ASSISTANT

## 2024-05-13 PROCEDURE — 3074F SYST BP LT 130 MM HG: CPT | Performed by: PHYSICIAN ASSISTANT

## 2024-05-13 PROCEDURE — 99213 OFFICE O/P EST LOW 20 MIN: CPT | Performed by: PHYSICIAN ASSISTANT

## 2024-05-13 RX ORDER — BLOOD SUGAR DIAGNOSTIC
STRIP MISCELLANEOUS
COMMUNITY
Start: 2024-02-22

## 2024-05-13 RX ORDER — INSULIN DETEMIR 100 [IU]/ML
INJECTION, SOLUTION SUBCUTANEOUS
COMMUNITY
Start: 2024-01-31

## 2024-05-13 RX ORDER — ROSUVASTATIN CALCIUM 5 MG/1
TABLET, COATED ORAL
COMMUNITY
Start: 2024-02-22

## 2024-05-13 NOTE — PROGRESS NOTES
Chief Complaint  Follow-up of the Right Knee    Subjective          Stephy Dunacn presents to [facility]   History of Present Illness    Stephy Duncan presents today for a follow-up of her right leg.  Patient has a right distal femur fracture that we have been treating conservatively.  Today, patient states that she is doing well.  She reports no pain to her knee.  Denies swelling.  She states that she has good range of motion and has returned to his baseline of her knee.  She states that she is able to toe-touch and pivot to her right leg without pain.      Allergies   Allergen Reactions    Ace Inhibitors Shortness Of Breath and Swelling    Morphine Shortness Of Breath        Social History     Socioeconomic History    Marital status:    Tobacco Use    Smoking status: Former     Current packs/day: 0.00     Average packs/day: 1 pack/day for 45.0 years (45.0 ttl pk-yrs)     Types: Cigarettes     Start date: 12/10/1970     Quit date: 12/10/2015     Years since quittin.4     Passive exposure: Never    Smokeless tobacco: Never   Vaping Use    Vaping status: Never Used   Substance and Sexual Activity    Alcohol use: Never    Drug use: Never    Sexual activity: Defer        I reviewed the patient's chief complaint, history of present illness, review of systems, past medical history, surgical history, family history, social history, medications, and allergy list.     REVIEW OF SYSTEMS    Constitutional: Denies fevers, chills, weight loss  Cardiovascular: Denies chest pain, shortness of breath  Skin: Denies rashes, acute skin changes  Neurologic: Denies headache, loss of consciousness  MSK: Right leg pain      Objective   Vital Signs:   /67   Pulse 69   SpO2 92%     There is no height or weight on file to calculate BMI.    Physical Exam    General: Alert. No acute distress.   Right lower extremity: Nontender to medial or lateral joint lines.  Full knee  extension.  No knee effusion.  Knee extensor mechanism intact.  Knee stable to varus and valgus stress.  Nontender to palpation of the distal femur.  Calf soft, nontender.  Demonstrates active ankle plantarflexion and dorsiflexion.  Sensation intact over dorsal and plantar foot.  Palpable pedal pulses.    Procedures    Imaging Results (Most Recent)       Procedure Component Value Units Date/Time    XR Knee 1 or 2 View Right [[id number]] Resulted: 05/13/24 1413     Updated: 05/13/24 1415    Narrative:      Indications: Follow-up right distal femur fracture    Views: AP and lateral right knee    Findings: Right distal femur fracture is seen.  Fracture appears stable   with an increase in callus formation about the fracture site.    Degenerative changes in the knee remains stable.    Comparative Data: Comparative data found and reviewed today.                   Assessment and Plan    Diagnoses and all orders for this visit:    1. Closed fracture of distal end of right femur with routine healing, unspecified fracture morphology, subsequent encounter (Primary)    2. Right knee pain, unspecified chronicity  -     XR Knee 1 or 2 View Right        Stephy Duncan presents today to follow-up with a right distal femur fracture that we are treating conservatively.  X-rays reviewed with the patient today.  Patient directed to continue with home exercises for knee range of motion.  Continue with activity restrictions.      Patient will follow up in 6 weeks for reevaluation.  We will obtain new x-rays of the right knee at next visit.      Call or return if symptoms worsen or patient has any concerns.       Follow Up   Return in about 6 weeks (around 6/24/2024).  Patient was given instructions and counseling regarding her condition or for health maintenance advice. Please see specific information pulled into the AVS if appropriate.     Abbey Nicholson PA-C  05/13/24  14:44 EDT

## 2024-05-24 ENCOUNTER — HOSPITAL ENCOUNTER (OUTPATIENT)
Dept: BONE DENSITY | Facility: HOSPITAL | Age: 69
Discharge: HOME | End: 2024-05-24
Payer: MEDICARE

## 2024-05-24 PROCEDURE — 77080 DXA BONE DENSITY AXIAL: CPT

## 2024-05-28 RX ORDER — CALCIUM CARBONATE 500(1250)
500 TABLET ORAL DAILY
Qty: 90 TABLET | Refills: 2 | Status: SHIPPED | OUTPATIENT
Start: 2024-05-28

## 2024-06-13 RX ORDER — INSULIN GLARGINE 100 [IU]/ML
100 INJECTION, SOLUTION SUBCUTANEOUS DAILY
Qty: 100 ML | Refills: 3 | Status: SHIPPED | OUTPATIENT
Start: 2024-06-13

## 2024-06-20 DIAGNOSIS — Z98.890 S/P GIRDLESTONE PROCEDURE: ICD-10-CM

## 2024-06-21 DIAGNOSIS — Z79.4 TYPE 2 DIABETES MELLITUS WITH DIABETIC POLYNEUROPATHY, WITH LONG-TERM CURRENT USE OF INSULIN: ICD-10-CM

## 2024-06-21 DIAGNOSIS — E11.42 TYPE 2 DIABETES MELLITUS WITH DIABETIC POLYNEUROPATHY, WITH LONG-TERM CURRENT USE OF INSULIN: ICD-10-CM

## 2024-06-21 RX ORDER — GABAPENTIN 600 MG/1
600 TABLET ORAL 2 TIMES DAILY
Qty: 180 TABLET | Refills: 0 | Status: SHIPPED | OUTPATIENT
Start: 2024-06-21

## 2024-06-21 RX ORDER — DULAGLUTIDE 0.75 MG/.5ML
0.75 INJECTION, SOLUTION SUBCUTANEOUS WEEKLY
Qty: 2 ML | Refills: 4 | Status: SHIPPED | OUTPATIENT
Start: 2024-06-21

## 2024-06-21 NOTE — TELEPHONE ENCOUNTER
Refill request for controlled substance.      Date of request: 6/21/2024    Medication requested: Gabapentin  Last OV: 4/1/24  Last UDS: 9/27/23  Contract signed: yes    Date:9/27/23  Next office visit: 7/3/24    Marilynn Jones

## 2024-07-01 NOTE — PROGRESS NOTES
Chief Complaint  Diabetes, Hypertension, and Shoulder Pain (Right shoulder pain)    Subjective          Stephy Duncan presents to [facility]  History of Present Illness    Here with blood sugar log.  During the process of moving in with her sister, was eating less well.  She reports that her diet has improved quite a bit in the last week.    She reports having issues with her powered wheelchair, and needs assistance in getting it repaired.    Having right shoulder pain for past 2 weeks.  She would prefer not to pursue PT for her shoulder at this time.      Current Outpatient Medications   Medication Instructions    calcium carbonate (oyster shell) 500 mg, Oral, Daily    Calcium 500 mg, Oral, Daily    esomeprazole (NEXIUM) 40 mg, Oral, Every Morning Before Breakfast    ferrous sulfate 325 mg, Oral, Every Other Day, Mornings    folic acid (FOLVITE) 1,000 mcg, Oral, Daily    furosemide (LASIX) 40 mg, Oral, Daily    gabapentin (NEURONTIN) 600 mg, Oral, 2 Times Daily    HYDROcodone-acetaminophen (NORCO) 5-325 MG per tablet 1 tablet, Oral, Every 6 Hours PRN    ibuprofen (ADVIL,MOTRIN) 200 mg, Oral, Every 6 Hours PRN    insulin aspart (NovoLOG FlexPen) 100 UNIT/ML solution pen-injector sc pen Inject 20 Units under the skin into the appropriate area as directed Daily With Breakfast AND 23 Units Daily With Lunch AND 23 Units Daily With Dinner.    insulin glargine (LANTUS) 100 Units, Subcutaneous, Daily    isosorbide mononitrate (IMDUR) 30 mg, Oral, Every Morning    KLOR-CON 20 MEQ CR tablet 20 mEq, Oral, Daily    Levemir FlexPen 100 UNIT/ML injection INJECT 90 UNITS SUBCUTANEOUSLY IN THE MORNING.    levocetirizine (XYZAL) 5 mg, Oral, Every Evening    OneTouch Verio test strip USE AS DIRECTED 3 TIMES A DAY    rosuvastatin (CRESTOR) 5 MG tablet TAKE 1 TABLET (5 MG TOTAL) BY MOUTH DAILY.    spironolactone (ALDACTONE) 25 mg, Oral, Daily    Trulicity 0.75 mg, Subcutaneous,  "Weekly    verapamil (CALAN) 80 mg, Oral, 2 Times Daily    vitamin D3 5,000 Units, Oral, Daily       The following portions of the patient's history were reviewed and updated as appropriate: allergies, current medications, past family history, past medical history, past social history, past surgical history, and problem list.    Objective   Vital Signs:   /60 (BP Location: Right arm, Patient Position: Sitting, Cuff Size: Adult)   Pulse 71   Temp 97.8 °F (36.6 °C) (Temporal)   Ht 162.6 cm (64\")   Wt 109 kg (241 lb)   SpO2 94%   BMI 41.37 kg/m²     BP Readings from Last 3 Encounters:   07/03/24 136/60   05/13/24 122/67   04/08/24 154/63     Wt Readings from Last 3 Encounters:   07/03/24 109 kg (241 lb)   04/08/24 104 kg (230 lb)   04/01/24 104 kg (230 lb)           Physical Exam  Vitals reviewed.   Constitutional:       General: She is not in acute distress.     Appearance: Normal appearance. She is not ill-appearing, toxic-appearing or diaphoretic.   HENT:      Head: Normocephalic and atraumatic.      Right Ear: External ear normal.      Left Ear: External ear normal.   Eyes:      Conjunctiva/sclera: Conjunctivae normal.   Cardiovascular:      Rate and Rhythm: Normal rate and regular rhythm.      Pulses: Normal pulses.      Heart sounds: Normal heart sounds. No murmur heard.     No friction rub. No gallop.   Pulmonary:      Effort: Pulmonary effort is normal. No respiratory distress.      Breath sounds: Normal breath sounds. No stridor. No wheezing, rhonchi or rales.   Chest:      Chest wall: No tenderness.   Abdominal:      General: Abdomen is flat.      Palpations: Abdomen is soft. There is no mass.      Tenderness: There is no abdominal tenderness.   Musculoskeletal:      Right lower leg: No edema.      Left lower leg: No edema.      Comments: Right shoulder pain with resisted internal rotation.  No pain in right shoulder with resisted external rotation.  Right acromioclavicular joint is TTP   Skin:     " General: Skin is warm and dry.   Neurological:      General: No focal deficit present.      Mental Status: She is alert. Mental status is at baseline.   Psychiatric:         Behavior: Behavior normal.         Thought Content: Thought content normal.         Judgment: Judgment normal.        Result Review :   The following data was reviewed by: Donnell Garcia MD on 07/03/2024:  Common labs          9/27/2023    10:15 12/29/2023    12:20 4/1/2024    14:21   Common Labs   Glucose 206  150  122    BUN 18  17  22    Creatinine 0.84  0.91  1.04    Sodium 133  136  137    Potassium 4.7  4.2  4.4    Chloride 99  99  102    Calcium 9.2  9.5  9.3    Albumin 4.1  4.1  3.8    Total Bilirubin 0.7  0.8  0.7    Alkaline Phosphatase 83  74  111    AST (SGOT) 30  36  26    ALT (SGPT) 25  26  17    WBC 6.34   6.79    Hemoglobin 14.0   11.6    Hematocrit 42.0   35.3    Platelets   130    Total Cholesterol  132  114    Triglycerides  98  80    HDL Cholesterol  48  43    LDL Cholesterol   66  55    Hemoglobin A1C  7.20  6.50    Microalbumin, Urine  <1.2              Lab Results   Component Value Date    COVID19 Not Detected 06/08/2022    INR 1.20 (H) 06/04/2022    BILIRUBINUR Negative 06/05/2022       Procedures        Assessment and Plan    Diagnoses and all orders for this visit:    1. Type 2 diabetes mellitus with diabetic polyneuropathy, with long-term current use of insulin (Primary)  -     Hemoglobin A1c  -     Comprehensive Metabolic Panel  -     CBC & Differential    2. Primary hypertension  -     Comprehensive Metabolic Panel  -     spironolactone (ALDACTONE) 25 MG tablet; Take 1 tablet by mouth Daily.  Dispense: 90 tablet; Refill: 2    3. Medication management  -     POC Medline 12 Panel Urine Drug Screen    4. Wheelchair dependence    5. Acute pain of right shoulder  -     XR Shoulder 2+ View Right; Future    6. S/P Girdlestone procedure  -     gabapentin (NEURONTIN) 600 MG tablet; Take 1 tablet by mouth 2 (Two) Times a  Day.      T2DM:  -blood sugar log showing elevated blood sugars this past month, that seem to have normalized in the last week  -will check A1c  -I would be hesitant to increase her insulin given the low measurements in the 70s noted earlier in the log.  I do expect her blood sugar control to improve now that she's returned to her normal eating habits    HTN:  -stable    Wheelchair dependence/s/p Girdlestone:  -have sent a message to chronic care asking for assistance with wheelchair repair order    Right shoulder pain:  -suspect tendinitis  -acromoclavicular joint TTP.  Have ordered imaging  -patient declines PT at this time    Medications Discontinued During This Encounter   Medication Reason    diclofenac (VOLTAREN) 50 MG EC tablet *Therapy completed    aspirin 81 MG EC tablet *Therapy completed    spironolactone (ALDACTONE) 25 MG tablet Reorder    gabapentin (NEURONTIN) 600 MG tablet Reorder          Follow Up   Return in about 3 months (around 10/3/2024) for Type 2 Diabetes, Hypertension.  Patient was given instructions and counseling regarding her condition or for health maintenance advice. Please see specific information pulled into the AVS if appropriate.       Donnell Garcia MD  07/03/24  17:36 EDT

## 2024-07-03 ENCOUNTER — PATIENT OUTREACH (OUTPATIENT)
Dept: CASE MANAGEMENT | Facility: OTHER | Age: 69
End: 2024-07-03
Payer: MEDICARE

## 2024-07-03 ENCOUNTER — OFFICE VISIT (OUTPATIENT)
Dept: INTERNAL MEDICINE | Facility: CLINIC | Age: 69
End: 2024-07-03
Payer: MEDICARE

## 2024-07-03 ENCOUNTER — TELEPHONE (OUTPATIENT)
Dept: CASE MANAGEMENT | Facility: OTHER | Age: 69
End: 2024-07-03
Payer: MEDICARE

## 2024-07-03 VITALS
OXYGEN SATURATION: 94 % | SYSTOLIC BLOOD PRESSURE: 136 MMHG | TEMPERATURE: 97.8 F | WEIGHT: 241 LBS | HEART RATE: 71 BPM | BODY MASS INDEX: 41.15 KG/M2 | HEIGHT: 64 IN | DIASTOLIC BLOOD PRESSURE: 60 MMHG

## 2024-07-03 DIAGNOSIS — Z99.3 WHEELCHAIR DEPENDENCE: ICD-10-CM

## 2024-07-03 DIAGNOSIS — I25.10 PRESENCE OF STENT IN CORONARY ARTERY IN PATIENT WITH CORONARY ARTERY DISEASE: ICD-10-CM

## 2024-07-03 DIAGNOSIS — Z96.641 STATUS POST TOTAL REPLACEMENT OF RIGHT HIP: Primary | ICD-10-CM

## 2024-07-03 DIAGNOSIS — M25.511 ACUTE PAIN OF RIGHT SHOULDER: ICD-10-CM

## 2024-07-03 DIAGNOSIS — Z98.890 S/P GIRDLESTONE PROCEDURE: ICD-10-CM

## 2024-07-03 DIAGNOSIS — I10 PRIMARY HYPERTENSION: ICD-10-CM

## 2024-07-03 DIAGNOSIS — M77.8 TENDINITIS OF RIGHT SHOULDER: ICD-10-CM

## 2024-07-03 DIAGNOSIS — E11.42 TYPE 2 DIABETES MELLITUS WITH DIABETIC POLYNEUROPATHY, WITH LONG-TERM CURRENT USE OF INSULIN: Primary | ICD-10-CM

## 2024-07-03 DIAGNOSIS — Z95.5 PRESENCE OF STENT IN CORONARY ARTERY IN PATIENT WITH CORONARY ARTERY DISEASE: ICD-10-CM

## 2024-07-03 DIAGNOSIS — Z79.4 TYPE 2 DIABETES MELLITUS WITH DIABETIC POLYNEUROPATHY, WITH LONG-TERM CURRENT USE OF INSULIN: Primary | ICD-10-CM

## 2024-07-03 DIAGNOSIS — M00.9 PYOGENIC ARTHRITIS, PELVIC REGION AND THIGH: ICD-10-CM

## 2024-07-03 DIAGNOSIS — Z79.899 MEDICATION MANAGEMENT: ICD-10-CM

## 2024-07-03 DIAGNOSIS — I50.32 CHRONIC DIASTOLIC CHF (CONGESTIVE HEART FAILURE): ICD-10-CM

## 2024-07-03 LAB
ALBUMIN SERPL-MCNC: 4 G/DL (ref 3.5–5.2)
ALBUMIN/GLOB SERPL: 1.4 G/DL
ALP SERPL-CCNC: 92 U/L (ref 39–117)
ALT SERPL W P-5'-P-CCNC: 17 U/L (ref 1–33)
AMPHET+METHAMPHET UR QL: NEGATIVE
AMPHETAMINE INTERNAL CONTROL: NORMAL
AMPHETAMINES UR QL: NEGATIVE
ANION GAP SERPL CALCULATED.3IONS-SCNC: 12 MMOL/L (ref 5–15)
AST SERPL-CCNC: 24 U/L (ref 1–32)
BARBITURATE INTERNAL CONTROL: NORMAL
BARBITURATES UR QL SCN: NEGATIVE
BASOPHILS # BLD AUTO: 0.04 10*3/MM3 (ref 0–0.2)
BASOPHILS NFR BLD AUTO: 0.6 % (ref 0–1.5)
BENZODIAZ UR QL SCN: NEGATIVE
BENZODIAZEPINE INTERNAL CONTROL: NORMAL
BILIRUB SERPL-MCNC: 0.8 MG/DL (ref 0–1.2)
BUN SERPL-MCNC: 25 MG/DL (ref 8–23)
BUN/CREAT SERPL: 18.8 (ref 7–25)
BUPRENORPHINE INTERNAL CONTROL: NORMAL
BUPRENORPHINE SERPL-MCNC: NEGATIVE NG/ML
CALCIUM SPEC-SCNC: 10.1 MG/DL (ref 8.6–10.5)
CANNABINOIDS SERPL QL: NEGATIVE
CHLORIDE SERPL-SCNC: 102 MMOL/L (ref 98–107)
CO2 SERPL-SCNC: 24 MMOL/L (ref 22–29)
COCAINE INTERNAL CONTROL: NORMAL
COCAINE UR QL: NEGATIVE
CREAT SERPL-MCNC: 1.33 MG/DL (ref 0.57–1)
DEPRECATED RDW RBC AUTO: 42.8 FL (ref 37–54)
EGFRCR SERPLBLD CKD-EPI 2021: 42.9 ML/MIN/1.73
EOSINOPHIL # BLD AUTO: 0.33 10*3/MM3 (ref 0–0.4)
EOSINOPHIL NFR BLD AUTO: 4.6 % (ref 0.3–6.2)
ERYTHROCYTE [DISTWIDTH] IN BLOOD BY AUTOMATED COUNT: 13.4 % (ref 12.3–15.4)
EXPIRATION DATE: NORMAL
GLOBULIN UR ELPH-MCNC: 2.8 GM/DL
GLUCOSE SERPL-MCNC: 110 MG/DL (ref 65–99)
HBA1C MFR BLD: 6.7 % (ref 4.8–5.6)
HCT VFR BLD AUTO: 40.8 % (ref 34–46.6)
HGB BLD-MCNC: 13.5 G/DL (ref 12–15.9)
IMM GRANULOCYTES # BLD AUTO: 0.02 10*3/MM3 (ref 0–0.05)
IMM GRANULOCYTES NFR BLD AUTO: 0.3 % (ref 0–0.5)
LYMPHOCYTES # BLD AUTO: 1.34 10*3/MM3 (ref 0.7–3.1)
LYMPHOCYTES NFR BLD AUTO: 18.7 % (ref 19.6–45.3)
Lab: NORMAL
MCH RBC QN AUTO: 29.3 PG (ref 26.6–33)
MCHC RBC AUTO-ENTMCNC: 33.1 G/DL (ref 31.5–35.7)
MCV RBC AUTO: 88.7 FL (ref 79–97)
MDMA (ECSTASY) INTERNAL CONTROL: NORMAL
MDMA UR QL SCN: NEGATIVE
METHADONE INTERNAL CONTROL: NORMAL
METHADONE UR QL SCN: NEGATIVE
METHAMPHETAMINE INTERNAL CONTROL: NORMAL
MONOCYTES # BLD AUTO: 0.6 10*3/MM3 (ref 0.1–0.9)
MONOCYTES NFR BLD AUTO: 8.4 % (ref 5–12)
MORPHINE INTERNAL CONTROL: NORMAL
MORPHINE/OPIATES SCREEN, URINE: NEGATIVE
NEUTROPHILS NFR BLD AUTO: 4.84 10*3/MM3 (ref 1.7–7)
NEUTROPHILS NFR BLD AUTO: 67.4 % (ref 42.7–76)
OXYCODONE INTERNAL CONTROL: NORMAL
OXYCODONE UR QL SCN: NEGATIVE
PCP UR QL SCN: NEGATIVE
PHENCYCLIDINE INTERNAL CONTROL: NORMAL
PLATELET # BLD AUTO: 107 10*3/MM3 (ref 140–450)
PMV BLD AUTO: 10.7 FL (ref 6–12)
POTASSIUM SERPL-SCNC: 3.9 MMOL/L (ref 3.5–5.2)
PROT SERPL-MCNC: 6.8 G/DL (ref 6–8.5)
RBC # BLD AUTO: 4.6 10*6/MM3 (ref 3.77–5.28)
SODIUM SERPL-SCNC: 138 MMOL/L (ref 136–145)
THC INTERNAL CONTROL: NORMAL
WBC NRBC COR # BLD AUTO: 7.17 10*3/MM3 (ref 3.4–10.8)

## 2024-07-03 PROCEDURE — G2211 COMPLEX E/M VISIT ADD ON: HCPCS | Performed by: STUDENT IN AN ORGANIZED HEALTH CARE EDUCATION/TRAINING PROGRAM

## 2024-07-03 PROCEDURE — 3075F SYST BP GE 130 - 139MM HG: CPT | Performed by: STUDENT IN AN ORGANIZED HEALTH CARE EDUCATION/TRAINING PROGRAM

## 2024-07-03 PROCEDURE — 3078F DIAST BP <80 MM HG: CPT | Performed by: STUDENT IN AN ORGANIZED HEALTH CARE EDUCATION/TRAINING PROGRAM

## 2024-07-03 PROCEDURE — 3044F HG A1C LEVEL LT 7.0%: CPT | Performed by: STUDENT IN AN ORGANIZED HEALTH CARE EDUCATION/TRAINING PROGRAM

## 2024-07-03 PROCEDURE — 83036 HEMOGLOBIN GLYCOSYLATED A1C: CPT | Performed by: STUDENT IN AN ORGANIZED HEALTH CARE EDUCATION/TRAINING PROGRAM

## 2024-07-03 PROCEDURE — 85025 COMPLETE CBC W/AUTO DIFF WBC: CPT | Performed by: STUDENT IN AN ORGANIZED HEALTH CARE EDUCATION/TRAINING PROGRAM

## 2024-07-03 PROCEDURE — 99214 OFFICE O/P EST MOD 30 MIN: CPT | Performed by: STUDENT IN AN ORGANIZED HEALTH CARE EDUCATION/TRAINING PROGRAM

## 2024-07-03 PROCEDURE — 1125F AMNT PAIN NOTED PAIN PRSNT: CPT | Performed by: STUDENT IN AN ORGANIZED HEALTH CARE EDUCATION/TRAINING PROGRAM

## 2024-07-03 PROCEDURE — 80053 COMPREHEN METABOLIC PANEL: CPT | Performed by: STUDENT IN AN ORGANIZED HEALTH CARE EDUCATION/TRAINING PROGRAM

## 2024-07-03 RX ORDER — CALCIUM CARBONATE 500(1250)
500 TABLET ORAL DAILY
COMMUNITY
Start: 2024-05-28

## 2024-07-03 RX ORDER — SPIRONOLACTONE 25 MG/1
25 TABLET ORAL DAILY
Qty: 90 TABLET | Refills: 2 | Status: SHIPPED | OUTPATIENT
Start: 2024-07-03

## 2024-07-03 RX ORDER — IBUPROFEN 200 MG
200 TABLET ORAL EVERY 6 HOURS PRN
COMMUNITY
Start: 2024-05-15

## 2024-07-03 RX ORDER — GABAPENTIN 600 MG/1
600 TABLET ORAL 2 TIMES DAILY
Start: 2024-07-03

## 2024-07-03 NOTE — OUTREACH NOTE
AMBULATORY CASE MANAGEMENT NOTE    Names and Relationships of Patient/Support Persons: Caller: Stephy Duncan; Relationship: Self -     Patient Outreach    PCP communicated electronically to me regarding patient needing assistance with getting her electric scooter repaired. Patient placed in [facility] for ACM to assist her with this and any chronic medical needs.    I called patient she states the model is a Crystal Elite Electric Scooter and it needs service and repair.    Care Coordination    I called [employer] to see what they need faxed over to provide service and repair to an electric scooter.    I electronically communicated with PCP    Education Documentation  No documentation found.        Mandi BELLE  Ambulatory Case Management    7/3/2024, 15:56 EDT

## 2024-07-05 ENCOUNTER — REFERRAL TRIAGE (OUTPATIENT)
Dept: CASE MANAGEMENT | Facility: OTHER | Age: 69
End: 2024-07-05
Payer: MEDICARE

## 2024-07-05 ENCOUNTER — PATIENT OUTREACH (OUTPATIENT)
Dept: CASE MANAGEMENT | Facility: OTHER | Age: 69
End: 2024-07-05
Payer: MEDICARE

## 2024-07-05 NOTE — OUTREACH NOTE
AMBULATORY CASE MANAGEMENT NOTE    Names and Relationships of Patient/Support Persons:  -     Care Coordination    PCP signed order for service and repair for patient's Crystal Elite electric scooter. MA will fax order and  information to [employer] for review. I will follow up on this next week.    Mandi BELLE  Ambulatory Case Management    7/5/2024, 09:14 EDT

## 2024-07-11 ENCOUNTER — PATIENT OUTREACH (OUTPATIENT)
Dept: CASE MANAGEMENT | Facility: OTHER | Age: 69
End: 2024-07-11
Payer: MEDICARE

## 2024-07-11 NOTE — OUTREACH NOTE
AMBULATORY CASE MANAGEMENT NOTE    Names and Relationships of Patient/Support Persons: Contact: Helen; Relationship:  -     Care Coordination    I called [employer] to confirm receipt of faxed order for repair and service of patient's wheelchair. They did confirm they received the order on 7/8 but the service department is out today so no other update is available.    Mandi BELLE  Ambulatory Case Management    7/11/2024, 15:07 EDT

## 2024-07-12 ENCOUNTER — PATIENT OUTREACH (OUTPATIENT)
Dept: CASE MANAGEMENT | Facility: OTHER | Age: 69
End: 2024-07-12
Payer: MEDICARE

## 2024-07-12 NOTE — OUTREACH NOTE
AMBULATORY CASE MANAGEMENT NOTE    Names and Relationships of Patient/Support Persons:  -     Care Coordination    I called [employer] again today for an update. They are unable to give me an update due to the employee who handles this, not being at work today. Advised to call back next week    Mandi BELLE  Ambulatory Case Management    7/12/2024, 14:25 EDT

## 2024-07-15 ENCOUNTER — HOSPITAL ENCOUNTER (OUTPATIENT)
Dept: GENERAL RADIOLOGY | Facility: HOSPITAL | Age: 69
Discharge: HOME | End: 2024-07-15
Admitting: STUDENT IN AN ORGANIZED HEALTH CARE EDUCATION/TRAINING PROGRAM
Payer: MEDICARE

## 2024-07-15 DIAGNOSIS — M25.511 ACUTE PAIN OF RIGHT SHOULDER: ICD-10-CM

## 2024-07-15 PROCEDURE — 73030 X-RAY EXAM OF SHOULDER: CPT

## 2024-07-17 ENCOUNTER — TELEPHONE (OUTPATIENT)
Dept: CASE MANAGEMENT | Facility: OTHER | Age: 69
End: 2024-07-17
Payer: MEDICARE

## 2024-07-17 ENCOUNTER — PATIENT OUTREACH (OUTPATIENT)
Dept: CASE MANAGEMENT | Facility: OTHER | Age: 69
End: 2024-07-17
Payer: MEDICARE

## 2024-07-17 DIAGNOSIS — M00.9 PYOGENIC ARTHRITIS, PELVIC REGION AND THIGH: ICD-10-CM

## 2024-07-17 DIAGNOSIS — Z96.641 STATUS POST TOTAL REPLACEMENT OF RIGHT HIP: Primary | ICD-10-CM

## 2024-07-17 NOTE — OUTREACH NOTE
AMBULATORY CASE MANAGEMENT NOTE    Names and Relationships of Patient/Support Persons: Contact: Helen; Relationship: Other -     Care Coordination    I called [employer] twice today in an attempt to get an update on patient's order for repair and service on patient's wheelchair. I have spoken to 2 different people without being able to get a clear answer as to where we are in this process. Eventually I left a message for a third person.    I attempted to call the patient to explain this to her but I was not able to reach her or a voicemail.    Guanakito Suresh from [employer] called and left me a message. They are planning to come to patient's house on 7/26/2024. I confirmed this with the patient then called and left Demetrice a message to reconfirm this appointment.    Mandi BELLE  Ambulatory Case Management    7/17/2024, 14:41 EDT

## 2024-07-31 ENCOUNTER — PATIENT OUTREACH (OUTPATIENT)
Dept: CASE MANAGEMENT | Facility: OTHER | Age: 69
End: 2024-07-31
Payer: MEDICARE

## 2024-07-31 NOTE — OUTREACH NOTE
AMBULATORY CASE MANAGEMENT NOTE    Names and Relationships of Patient/Support Persons: Contact: Stephy Duncan; Relationship: Self -     Patient Outreach    I spoke with patient about her wheelchair that needs repaired. She states that someone did come to her house from the DME company. She says they are going to have to order the parts and get it approved through insurance. No expected timeline was given to her by the DME company.    Tricai established with patient. Patient given my direct number to call with further questions or concerns      Mandi BELLE  Ambulatory Case Management    7/31/2024, 13:23 EDT

## 2024-08-06 NOTE — PROGRESS NOTES
"Chief Complaint  Joint Swelling (Patient right wrist and hand swelling) and Hand Pain (Right hand pain )    Subjective          Stephy Duncan presents to [facility]  History of Present Illness    Since Friday or Saturday (8/2 or 8/3), has had redness, warmth and swelling right wrist.  Started ulnar side, but now both sides.  She has never had anything like this happen before.  No known history of gout.  She did have some cough and congestion last week, but no other sick symptoms (No fever, no current cough/congestion, no vomiting or diarrhea).  She has had no falls or other injuries recently to her right hand or wrist.  Pain she describes as \"like a bull there.\"  She is using ibuprofen to treat pain.      Current Outpatient Medications   Medication Instructions    calcium carbonate (oyster shell) 500 mg, Oral, Daily    Calcium 500 mg, Oral, Daily    dicloxacillin (DYNAPEN) 500 mg, Oral, 4 Times Daily    esomeprazole (NEXIUM) 40 mg, Oral, Every Morning Before Breakfast    ferrous sulfate 325 mg, Oral, Every Other Day, Mornings    folic acid (FOLVITE) 1,000 mcg, Oral, Daily    furosemide (LASIX) 40 mg, Oral, Daily    gabapentin (NEURONTIN) 600 mg, Oral, 2 Times Daily    HYDROcodone-acetaminophen (NORCO) 5-325 MG per tablet 1 tablet, Oral, Every 6 Hours PRN    ibuprofen (ADVIL,MOTRIN) 200 mg, Every 6 Hours PRN    insulin aspart (NovoLOG FlexPen) 100 UNIT/ML solution pen-injector sc pen Inject 20 Units under the skin into the appropriate area as directed Daily With Breakfast AND 23 Units Daily With Lunch AND 23 Units Daily With Dinner.    insulin glargine (LANTUS) 100 Units, Subcutaneous, Daily    isosorbide mononitrate (IMDUR) 30 mg, Oral, Every Morning    KLOR-CON 20 MEQ CR tablet 20 mEq, Oral, Daily    Levemir FlexPen 100 UNIT/ML injection     levocetirizine (XYZAL) 5 mg, Oral, Every Evening    OneTouch Verio test strip USE AS DIRECTED 3 TIMES A DAY    " "rosuvastatin (CRESTOR) 5 MG tablet TAKE 1 TABLET (5 MG TOTAL) BY MOUTH DAILY.    spironolactone (ALDACTONE) 25 mg, Oral, Daily    Trulicity 0.75 mg, Subcutaneous, Weekly    verapamil (CALAN) 80 mg, Oral, 2 Times Daily    vitamin D3 5,000 Units, Oral, Daily       The following portions of the patient's history were reviewed and updated as appropriate: allergies, current medications, past family history, past medical history, past social history, past surgical history, and problem list.    Objective   Vital Signs:   /66 (BP Location: Left arm, Patient Position: Sitting, Cuff Size: Large Adult)   Pulse 68   Temp 97.1 °F (36.2 °C) (Temporal)   Ht 162.6 cm (64\")   SpO2 96%   BMI 41.37 kg/m²     BP Readings from Last 3 Encounters:   08/07/24 120/66   07/03/24 136/60   05/13/24 122/67     Wt Readings from Last 3 Encounters:   07/03/24 109 kg (241 lb)   04/08/24 104 kg (230 lb)   04/01/24 104 kg (230 lb)        Physical Exam  Vitals reviewed.   Constitutional:       General: She is not in acute distress.     Appearance: Normal appearance. She is not ill-appearing, toxic-appearing or diaphoretic.   HENT:      Head: Normocephalic and atraumatic.      Right Ear: External ear normal.      Left Ear: External ear normal.   Eyes:      Conjunctiva/sclera: Conjunctivae normal.   Cardiovascular:      Rate and Rhythm: Normal rate and regular rhythm.      Pulses: Normal pulses.      Heart sounds: Normal heart sounds. No murmur heard.     No friction rub. No gallop.   Pulmonary:      Effort: Pulmonary effort is normal. No respiratory distress.      Breath sounds: Normal breath sounds. No stridor. No wheezing, rhonchi or rales.   Chest:      Chest wall: No tenderness.   Abdominal:      General: Abdomen is flat.      Palpations: Abdomen is soft. There is no mass.      Tenderness: There is no abdominal tenderness.   Musculoskeletal:      Right lower leg: No edema.      Left lower leg: No edema.   Skin:     General: Skin is warm " and dry.      Comments: Right wrist with redness, warmth and mild swelling noted in palm in distinct areas (both radial side and ulnar side).  These areas are both TTP.  Anatomic snuffbox is also TTP on right hand   Neurological:      General: No focal deficit present.      Mental Status: She is alert. Mental status is at baseline.   Psychiatric:         Behavior: Behavior normal.         Thought Content: Thought content normal.         Judgment: Judgment normal.        Result Review :   The following data was reviewed by: Donnell Garcia MD on 08/07/2024:  Common labs          12/29/2023    12:20 4/1/2024    14:21 7/3/2024    14:06   Common Labs   Glucose 150  122  110    BUN 17  22  25    Creatinine 0.91  1.04  1.33    Sodium 136  137  138    Potassium 4.2  4.4  3.9    Chloride 99  102  102    Calcium 9.5  9.3  10.1    Albumin 4.1  3.8  4.0    Total Bilirubin 0.8  0.7  0.8    Alkaline Phosphatase 74  111  92    AST (SGOT) 36  26  24    ALT (SGPT) 26  17  17    WBC  6.79  7.17    Hemoglobin  11.6  13.5    Hematocrit  35.3  40.8    Platelets  130  107    Total Cholesterol 132  114     Triglycerides 98  80     HDL Cholesterol 48  43     LDL Cholesterol  66  55     Hemoglobin A1C 7.20  6.50  6.70    Microalbumin, Urine <1.2               Lab Results   Component Value Date    COVID19 Not Detected 06/08/2022    INR 1.20 (H) 06/04/2022    BILIRUBINUR Negative 06/05/2022            Assessment and Plan    Diagnoses and all orders for this visit:    1. Right wrist effusion (Primary)  -     Uric Acid  -     CBC & Differential  -     C-reactive Protein  -     Sedimentation Rate  -     Comprehensive Metabolic Panel  -     XR Wrist 2 View Right; Future  -     XR Hand 3+ View Right; Future  -     XR Forearm 2 View Right; Future    2. Type 2 diabetes mellitus with diabetic polyneuropathy, with long-term current use of insulin  -     Comprehensive Metabolic Panel    3. Primary hypertension  -     Comprehensive Metabolic  Panel    4. Wheelchair dependence    5. Encounter for immunization  -     COVID-19 F23 (Pfizer) 12yrs+ (COMIRNATY)    6. Cellulitis of right upper extremity  -     Discontinue: dicloxacillin (DYNAPEN) 500 MG capsule; Take 1 capsule by mouth 4 (Four) Times a Day for 7 days.  Dispense: 28 capsule; Refill: 0  -     dicloxacillin (DYNAPEN) 500 MG capsule; Take 1 capsule by mouth 4 (Four) Times a Day for 14 days.  Dispense: 56 capsule; Refill: 0      Right wrist swelling/redness:  -I have considered infection (including osteomyelitis or cellulitis), gout, arthritis, fracture, allergic reaction  -at this time, I am most concerned about the possibility of infection  -I will check labs and obtain x-rays.  I have sent antibiotic.  If evidence of gout, I will trial steroid burst and allopurinol  -I did recommend stopping ibuprofen given her CKD.  She is okay for appropriate doses of tylenol    HTN:  -BP at goal of < 130/80  -will continue verapamil, spironolactone, imdur    CKD:  -I did recommend stopping ibuprofen given her CKD.  She is okay for appropriate doses of tylenol    Immunization:  -Discussed risks/benefits to vaccination, reviewed components of the vaccine, discussed VIS, discussed informed consent, informed consent obtained. Patient/Parent was allowed to accept or refuse vaccine. Questions answered to satisfactory state of patient/parent. We reviewed typical age appropriate and seasonally appropriate vaccinations. Reviewed immunization history and updated state vaccination form as needed. Patient/Parent was counseled on the above vaccines.      Medications Discontinued During This Encounter   Medication Reason    dicloxacillin (DYNAPEN) 500 MG capsule           Follow Up   Return in 2 weeks (on 8/21/2024) for re-evaluate right wrist.  Patient was given instructions and counseling regarding her condition or for health maintenance advice. Please see specific information pulled into the AVS if appropriate.        Donnell Garcia MD  08/07/24  11:20 EDT

## 2024-08-07 ENCOUNTER — OFFICE VISIT (OUTPATIENT)
Dept: INTERNAL MEDICINE | Facility: CLINIC | Age: 69
End: 2024-08-07
Payer: MEDICARE

## 2024-08-07 ENCOUNTER — HOSPITAL ENCOUNTER (OUTPATIENT)
Dept: GENERAL RADIOLOGY | Facility: HOSPITAL | Age: 69
Discharge: HOME | End: 2024-08-07
Payer: MEDICARE

## 2024-08-07 VITALS
OXYGEN SATURATION: 96 % | BODY MASS INDEX: 41.37 KG/M2 | HEIGHT: 64 IN | DIASTOLIC BLOOD PRESSURE: 66 MMHG | SYSTOLIC BLOOD PRESSURE: 120 MMHG | HEART RATE: 68 BPM | TEMPERATURE: 97.1 F

## 2024-08-07 DIAGNOSIS — N18.32 STAGE 3B CHRONIC KIDNEY DISEASE: ICD-10-CM

## 2024-08-07 DIAGNOSIS — L03.113 CELLULITIS OF RIGHT UPPER EXTREMITY: ICD-10-CM

## 2024-08-07 DIAGNOSIS — M25.431 RIGHT WRIST EFFUSION: ICD-10-CM

## 2024-08-07 DIAGNOSIS — Z23 ENCOUNTER FOR IMMUNIZATION: ICD-10-CM

## 2024-08-07 DIAGNOSIS — I10 PRIMARY HYPERTENSION: ICD-10-CM

## 2024-08-07 DIAGNOSIS — E11.42 TYPE 2 DIABETES MELLITUS WITH DIABETIC POLYNEUROPATHY, WITH LONG-TERM CURRENT USE OF INSULIN: ICD-10-CM

## 2024-08-07 DIAGNOSIS — Z99.3 WHEELCHAIR DEPENDENCE: ICD-10-CM

## 2024-08-07 DIAGNOSIS — Z79.4 TYPE 2 DIABETES MELLITUS WITH DIABETIC POLYNEUROPATHY, WITH LONG-TERM CURRENT USE OF INSULIN: ICD-10-CM

## 2024-08-07 DIAGNOSIS — M25.431 RIGHT WRIST EFFUSION: Primary | ICD-10-CM

## 2024-08-07 LAB
ALBUMIN SERPL-MCNC: 3.9 G/DL (ref 3.5–5.2)
ALBUMIN/GLOB SERPL: 1.2 G/DL
ALP SERPL-CCNC: 87 U/L (ref 39–117)
ALT SERPL W P-5'-P-CCNC: 23 U/L (ref 1–33)
ANION GAP SERPL CALCULATED.3IONS-SCNC: 12 MMOL/L (ref 5–15)
AST SERPL-CCNC: 27 U/L (ref 1–32)
BASOPHILS # BLD AUTO: 0.04 10*3/MM3 (ref 0–0.2)
BASOPHILS NFR BLD AUTO: 0.5 % (ref 0–1.5)
BILIRUB SERPL-MCNC: 1 MG/DL (ref 0–1.2)
BUN SERPL-MCNC: 13 MG/DL (ref 8–23)
BUN/CREAT SERPL: 11.5 (ref 7–25)
CALCIUM SPEC-SCNC: 9.7 MG/DL (ref 8.6–10.5)
CHLORIDE SERPL-SCNC: 103 MMOL/L (ref 98–107)
CO2 SERPL-SCNC: 23 MMOL/L (ref 22–29)
CREAT SERPL-MCNC: 1.13 MG/DL (ref 0.57–1)
CRP SERPL-MCNC: 2.83 MG/DL (ref 0–0.5)
DEPRECATED RDW RBC AUTO: 43.8 FL (ref 37–54)
EGFRCR SERPLBLD CKD-EPI 2021: 52.1 ML/MIN/1.73
EOSINOPHIL # BLD AUTO: 0.32 10*3/MM3 (ref 0–0.4)
EOSINOPHIL NFR BLD AUTO: 3.6 % (ref 0.3–6.2)
ERYTHROCYTE [DISTWIDTH] IN BLOOD BY AUTOMATED COUNT: 13.5 % (ref 12.3–15.4)
ERYTHROCYTE [SEDIMENTATION RATE] IN BLOOD: 46 MM/HR (ref 0–30)
GLOBULIN UR ELPH-MCNC: 3.3 GM/DL
GLUCOSE SERPL-MCNC: 121 MG/DL (ref 65–99)
HCT VFR BLD AUTO: 41.8 % (ref 34–46.6)
HGB BLD-MCNC: 13.6 G/DL (ref 12–15.9)
IMM GRANULOCYTES # BLD AUTO: 0.04 10*3/MM3 (ref 0–0.05)
IMM GRANULOCYTES NFR BLD AUTO: 0.5 % (ref 0–0.5)
LYMPHOCYTES # BLD AUTO: 1.27 10*3/MM3 (ref 0.7–3.1)
LYMPHOCYTES NFR BLD AUTO: 14.3 % (ref 19.6–45.3)
MCH RBC QN AUTO: 29.2 PG (ref 26.6–33)
MCHC RBC AUTO-ENTMCNC: 32.5 G/DL (ref 31.5–35.7)
MCV RBC AUTO: 89.9 FL (ref 79–97)
MONOCYTES # BLD AUTO: 0.7 10*3/MM3 (ref 0.1–0.9)
MONOCYTES NFR BLD AUTO: 7.9 % (ref 5–12)
NEUTROPHILS NFR BLD AUTO: 6.5 10*3/MM3 (ref 1.7–7)
NEUTROPHILS NFR BLD AUTO: 73.2 % (ref 42.7–76)
PLATELET # BLD AUTO: 137 10*3/MM3 (ref 140–450)
PMV BLD AUTO: 10.2 FL (ref 6–12)
POTASSIUM SERPL-SCNC: 4.5 MMOL/L (ref 3.5–5.2)
PROT SERPL-MCNC: 7.2 G/DL (ref 6–8.5)
RBC # BLD AUTO: 4.65 10*6/MM3 (ref 3.77–5.28)
SODIUM SERPL-SCNC: 138 MMOL/L (ref 136–145)
URATE SERPL-MCNC: 7.6 MG/DL (ref 2.4–5.7)
WBC NRBC COR # BLD AUTO: 8.87 10*3/MM3 (ref 3.4–10.8)

## 2024-08-07 PROCEDURE — 86140 C-REACTIVE PROTEIN: CPT | Performed by: STUDENT IN AN ORGANIZED HEALTH CARE EDUCATION/TRAINING PROGRAM

## 2024-08-07 PROCEDURE — 80053 COMPREHEN METABOLIC PANEL: CPT | Performed by: STUDENT IN AN ORGANIZED HEALTH CARE EDUCATION/TRAINING PROGRAM

## 2024-08-07 PROCEDURE — 1125F AMNT PAIN NOTED PAIN PRSNT: CPT | Performed by: STUDENT IN AN ORGANIZED HEALTH CARE EDUCATION/TRAINING PROGRAM

## 2024-08-07 PROCEDURE — 84550 ASSAY OF BLOOD/URIC ACID: CPT | Performed by: STUDENT IN AN ORGANIZED HEALTH CARE EDUCATION/TRAINING PROGRAM

## 2024-08-07 PROCEDURE — 85652 RBC SED RATE AUTOMATED: CPT | Performed by: STUDENT IN AN ORGANIZED HEALTH CARE EDUCATION/TRAINING PROGRAM

## 2024-08-07 PROCEDURE — 73100 X-RAY EXAM OF WRIST: CPT

## 2024-08-07 PROCEDURE — 90480 ADMN SARSCOV2 VAC 1/ONLY CMP: CPT | Performed by: STUDENT IN AN ORGANIZED HEALTH CARE EDUCATION/TRAINING PROGRAM

## 2024-08-07 PROCEDURE — 91320 SARSCV2 VAC 30MCG TRS-SUC IM: CPT | Performed by: STUDENT IN AN ORGANIZED HEALTH CARE EDUCATION/TRAINING PROGRAM

## 2024-08-07 PROCEDURE — 3078F DIAST BP <80 MM HG: CPT | Performed by: STUDENT IN AN ORGANIZED HEALTH CARE EDUCATION/TRAINING PROGRAM

## 2024-08-07 PROCEDURE — 3074F SYST BP LT 130 MM HG: CPT | Performed by: STUDENT IN AN ORGANIZED HEALTH CARE EDUCATION/TRAINING PROGRAM

## 2024-08-07 PROCEDURE — 99214 OFFICE O/P EST MOD 30 MIN: CPT | Performed by: STUDENT IN AN ORGANIZED HEALTH CARE EDUCATION/TRAINING PROGRAM

## 2024-08-07 PROCEDURE — 73130 X-RAY EXAM OF HAND: CPT

## 2024-08-07 PROCEDURE — 3044F HG A1C LEVEL LT 7.0%: CPT | Performed by: STUDENT IN AN ORGANIZED HEALTH CARE EDUCATION/TRAINING PROGRAM

## 2024-08-07 PROCEDURE — 73090 X-RAY EXAM OF FOREARM: CPT

## 2024-08-07 PROCEDURE — 85025 COMPLETE CBC W/AUTO DIFF WBC: CPT | Performed by: STUDENT IN AN ORGANIZED HEALTH CARE EDUCATION/TRAINING PROGRAM

## 2024-08-08 ENCOUNTER — TELEPHONE (OUTPATIENT)
Dept: INTERNAL MEDICINE | Facility: CLINIC | Age: 69
End: 2024-08-08
Payer: MEDICARE

## 2024-08-08 DIAGNOSIS — M25.331 SCAPHOLUNATE INSTABILITY OF RIGHT WRIST: Primary | ICD-10-CM

## 2024-08-08 RX ORDER — COLCHICINE 0.6 MG/1
0.6 TABLET ORAL DAILY
Qty: 90 TABLET | Refills: 2 | Status: SHIPPED | OUTPATIENT
Start: 2024-08-08

## 2024-08-08 RX ORDER — ALLOPURINOL 100 MG/1
100 TABLET ORAL DAILY
Qty: 30 TABLET | Refills: 2 | Status: SHIPPED | OUTPATIENT
Start: 2024-08-08

## 2024-08-08 NOTE — TELEPHONE ENCOUNTER
Patient is aware and has been informed about referral. Marion communicated this with patient and patient has been informed about referral.

## 2024-08-08 NOTE — TELEPHONE ENCOUNTER
----- Message from Donnell Garcia sent at 8/8/2024  3:35 PM EDT -----  X-ray shows a widening between some of the bones of the wrist, as can be seen with a previous injury.  I'd like to send you to see hand surgery to have this further evaluated.  I have placed a referral.

## 2024-08-13 ENCOUNTER — TELEPHONE (OUTPATIENT)
Dept: INTERNAL MEDICINE | Facility: CLINIC | Age: 69
End: 2024-08-13
Payer: MEDICARE

## 2024-08-13 NOTE — TELEPHONE ENCOUNTER
Caller: Stephy Duncan    Relationship: Self    Best call back number: [phone]    Which medication are you concerned about: DICLOXACILLIN     Who prescribed you this medication: MD LOBO     When did you start taking this medication: 8.7.24    What are your concerns: STOMACH PAIN, NAUSEA/VOMITING     How long have you had these concerns: SINCE BEGINNING MEDICATION     PATIENT STATES SHE HAS COMPLETELY STOPPED THIS MEDICATION.

## 2024-08-14 RX ORDER — CLINDAMYCIN HYDROCHLORIDE 300 MG/1
300 CAPSULE ORAL 3 TIMES DAILY
Qty: 21 CAPSULE | Refills: 0 | Status: SHIPPED | OUTPATIENT
Start: 2024-08-14 | End: 2024-08-21

## 2024-08-14 NOTE — TELEPHONE ENCOUNTER
Attempted to contact patient to return call to clinic, was not able to leave voicemail.     HUB TO READ:  A different antibiotic has been sent in

## 2024-08-14 NOTE — TELEPHONE ENCOUNTER
Name: PerlaStephy zaragoza Cayla    Relationship: Self    Best Callback Number: [phone]     HUB PROVIDED THE RELAY MESSAGE FROM THE OFFICE   PATIENT VOICED UNDERSTANDING AND HAS NO FURTHER QUESTIONS AT THIS TIME

## 2024-08-20 NOTE — PROGRESS NOTES
Chief Complaint  Wrist Pain (Follow up )    Subjective          [place] Cayla Duncan presents to [facility]  History of Present Illness      Right hand is no longer swollen, and doing much better.  Seeing Kleinert and [facility]ience on October 16th.    Has 1-2 days of clindamycin left.    PHQ-9 Depression Screening  Little interest or pleasure in doing things?     Feeling down, depressed, or hopeless?     Trouble falling or staying asleep, or sleeping too much?     Feeling tired or having little energy?     Poor appetite or overeating?     Feeling bad about yourself - or that you are a failure or have let yourself or your family down?     Trouble concentrating on things, such as reading the newspaper or watching television?     Moving or speaking so slowly that other people could have noticed? Or the opposite - being so fidgety or restless that you have been moving around a lot more than usual?     Thoughts that you would be better off dead, or of hurting yourself in some way?     PHQ-9 Total Score     If you checked off any problems, how difficult have these problems made it for you to do your work, take care of things at home, or get along with other people?         Current Outpatient Medications   Medication Instructions    allopurinol (ZYLOPRIM) 100 mg, Oral, Daily    calcium carbonate (oyster shell) 500 mg, Oral, Daily    Calcium 500 mg, Oral, Daily    colchicine 0.6 mg, Oral, Daily    esomeprazole (NEXIUM) 40 mg, Oral, Every Morning Before Breakfast    ferrous sulfate 325 mg, Oral, Every Other Day, Mornings    folic acid (FOLVITE) 1,000 mcg, Oral, Daily    furosemide (LASIX) 40 mg, Oral, Daily    gabapentin (NEURONTIN) 600 mg, Oral, 2 Times Daily    HYDROcodone-acetaminophen (NORCO) 5-325 MG per tablet 1 tablet, Oral, Every 6 Hours PRN    ibuprofen (ADVIL,MOTRIN) 200 mg, Every 6 Hours PRN    insulin aspart (NovoLOG FlexPen) 100 UNIT/ML solution pen-injector sc pen  "Inject 20 Units under the skin into the appropriate area as directed Daily With Breakfast AND 23 Units Daily With Lunch AND 23 Units Daily With Dinner.    insulin glargine (LANTUS) 100 Units, Subcutaneous, Daily    isosorbide mononitrate (IMDUR) 30 mg, Oral, Every Morning    KLOR-CON 20 MEQ CR tablet 20 mEq, Oral, Daily    Levemir FlexPen 100 UNIT/ML injection     levocetirizine (XYZAL) 5 mg, Oral, Every Evening    OneTouch Verio test strip USE AS DIRECTED 3 TIMES A DAY    rosuvastatin (CRESTOR) 5 MG tablet TAKE 1 TABLET (5 MG TOTAL) BY MOUTH DAILY.    spironolactone (ALDACTONE) 25 mg, Oral, Daily    Trulicity 0.75 mg, Subcutaneous, Weekly    verapamil (CALAN) 80 mg, Oral, 2 Times Daily    vitamin D3 5,000 Units, Oral, Daily       The following portions of the patient's history were reviewed and updated as appropriate: allergies, current medications, past family history, past medical history, past social history, past surgical history, and problem list.    Objective   Vital Signs:   /75 (BP Location: Left arm, Patient Position: Sitting, Cuff Size: Large Adult)   Pulse 75   Temp 97.5 °F (36.4 °C) (Temporal)   Ht 162.6 cm (64\")   Wt 108 kg (238 lb 9.6 oz)   SpO2 94%   BMI 40.96 kg/m²     BP Readings from Last 3 Encounters:   08/22/24 131/75   08/07/24 120/66   07/03/24 136/60     Wt Readings from Last 3 Encounters:   08/22/24 108 kg (238 lb 9.6 oz)   07/03/24 109 kg (241 lb)   04/08/24 104 kg (230 lb)           Physical Exam  Vitals reviewed.   Constitutional:       General: She is not in acute distress.     Appearance: Normal appearance. She is not ill-appearing, toxic-appearing or diaphoretic.   HENT:      Head: Normocephalic and atraumatic.      Right Ear: External ear normal.      Left Ear: External ear normal.   Eyes:      Conjunctiva/sclera: Conjunctivae normal.   Cardiovascular:      Rate and Rhythm: Normal rate and regular rhythm.      Pulses: Normal pulses.      Heart sounds: Normal heart sounds. " No murmur heard.     No friction rub. No gallop.   Pulmonary:      Effort: Pulmonary effort is normal. No respiratory distress.      Breath sounds: Normal breath sounds. No stridor. No wheezing, rhonchi or rales.   Chest:      Chest wall: No tenderness.   Abdominal:      General: Abdomen is flat.      Palpations: Abdomen is soft. There is no mass.      Tenderness: There is no abdominal tenderness.   Musculoskeletal:      Right lower leg: No edema.      Left lower leg: No edema.      Comments: Right wrist with no effusion.  Radial and ulnar heads of right wrist mildly TTP.  Right anatomic snuffbox is mildly TTP.   Skin:     General: Skin is warm and dry.   Neurological:      General: No focal deficit present.      Mental Status: She is alert. Mental status is at baseline.   Psychiatric:         Mood and Affect: Mood normal.         Behavior: Behavior normal.         Thought Content: Thought content normal.         Judgment: Judgment normal.       Result Review :   The following data was reviewed by: Donnell Garcia MD on 08/22/2024:  Common labs          4/1/2024    14:21 7/3/2024    14:06 8/7/2024    11:13   Common Labs   Glucose 122  110  121    BUN 22  25  13    Creatinine 1.04  1.33  1.13    Sodium 137  138  138    Potassium 4.4  3.9  4.5    Chloride 102  102  103    Calcium 9.3  10.1  9.7    Albumin 3.8  4.0  3.9    Total Bilirubin 0.7  0.8  1.0    Alkaline Phosphatase 111  92  87    AST (SGOT) 26  24  27    ALT (SGPT) 17  17  23    WBC 6.79  7.17  8.87    Hemoglobin 11.6  13.5  13.6    Hematocrit 35.3  40.8  41.8    Platelets 130  107  137    Total Cholesterol 114      Triglycerides 80      HDL Cholesterol 43      LDL Cholesterol  55      Hemoglobin A1C 6.50  6.70     Uric Acid   7.6             Lab Results   Component Value Date    COVID19 Not Detected 06/08/2022    INR 1.20 (H) 06/04/2022    BILIRUBINUR Negative 06/05/2022            Assessment and Plan    Diagnoses and all orders for this visit:    1.  Scapholunate instability of right wrist (Primary)    2. Right wrist effusion  -     CBC & Differential  -     C-reactive Protein  -     Sedimentation Rate  -     Uric Acid    3. Primary hypertension  -     Comprehensive Metabolic Panel    4. 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  Will keep 10/7/24 appointment  Patient was given instructions and counseling regarding her condition or for health maintenance advice. Please see specific information pulled into the AVS if appropriate.       Donnell Garcia MD  08/22/24  16:26 EDT

## 2024-08-22 ENCOUNTER — OFFICE VISIT (OUTPATIENT)
Dept: INTERNAL MEDICINE | Facility: CLINIC | Age: 69
End: 2024-08-22
Payer: MEDICARE

## 2024-08-22 VITALS
TEMPERATURE: 97.5 F | DIASTOLIC BLOOD PRESSURE: 75 MMHG | OXYGEN SATURATION: 94 % | WEIGHT: 238.6 LBS | HEART RATE: 75 BPM | HEIGHT: 64 IN | SYSTOLIC BLOOD PRESSURE: 131 MMHG | BODY MASS INDEX: 40.74 KG/M2

## 2024-08-22 DIAGNOSIS — M25.331 SCAPHOLUNATE INSTABILITY OF RIGHT WRIST: Primary | ICD-10-CM

## 2024-08-22 DIAGNOSIS — M1A.0310 IDIOPATHIC CHRONIC GOUT OF RIGHT WRIST WITHOUT TOPHUS: ICD-10-CM

## 2024-08-22 DIAGNOSIS — I10 PRIMARY HYPERTENSION: ICD-10-CM

## 2024-08-22 DIAGNOSIS — M25.431 RIGHT WRIST EFFUSION: ICD-10-CM

## 2024-08-22 LAB
ALBUMIN SERPL-MCNC: 3.8 G/DL (ref 3.5–5.2)
ALBUMIN/GLOB SERPL: 1.4 G/DL
ALP SERPL-CCNC: 84 U/L (ref 39–117)
ALT SERPL W P-5'-P-CCNC: 25 U/L (ref 1–33)
ANION GAP SERPL CALCULATED.3IONS-SCNC: 11.1 MMOL/L (ref 5–15)
AST SERPL-CCNC: 31 U/L (ref 1–32)
BASOPHILS # BLD AUTO: 0.02 10*3/MM3 (ref 0–0.2)
BASOPHILS NFR BLD AUTO: 0.5 % (ref 0–1.5)
BILIRUB SERPL-MCNC: 0.8 MG/DL (ref 0–1.2)
BUN SERPL-MCNC: 15 MG/DL (ref 8–23)
BUN/CREAT SERPL: 13.3 (ref 7–25)
CALCIUM SPEC-SCNC: 9.2 MG/DL (ref 8.6–10.5)
CHLORIDE SERPL-SCNC: 100 MMOL/L (ref 98–107)
CO2 SERPL-SCNC: 24.9 MMOL/L (ref 22–29)
CREAT SERPL-MCNC: 1.13 MG/DL (ref 0.57–1)
CRP SERPL-MCNC: 0.69 MG/DL (ref 0–0.5)
DEPRECATED RDW RBC AUTO: 45 FL (ref 37–54)
EGFRCR SERPLBLD CKD-EPI 2021: 52.1 ML/MIN/1.73
EOSINOPHIL # BLD AUTO: 0.32 10*3/MM3 (ref 0–0.4)
EOSINOPHIL NFR BLD AUTO: 8 % (ref 0.3–6.2)
ERYTHROCYTE [DISTWIDTH] IN BLOOD BY AUTOMATED COUNT: 13.6 % (ref 12.3–15.4)
ERYTHROCYTE [SEDIMENTATION RATE] IN BLOOD: 18 MM/HR (ref 0–30)
GLOBULIN UR ELPH-MCNC: 2.8 GM/DL
GLUCOSE SERPL-MCNC: 177 MG/DL (ref 65–99)
HCT VFR BLD AUTO: 38 % (ref 34–46.6)
HGB BLD-MCNC: 12.4 G/DL (ref 12–15.9)
IMM GRANULOCYTES # BLD AUTO: 0.01 10*3/MM3 (ref 0–0.05)
IMM GRANULOCYTES NFR BLD AUTO: 0.3 % (ref 0–0.5)
LYMPHOCYTES # BLD AUTO: 0.76 10*3/MM3 (ref 0.7–3.1)
LYMPHOCYTES NFR BLD AUTO: 19 % (ref 19.6–45.3)
MCH RBC QN AUTO: 29.6 PG (ref 26.6–33)
MCHC RBC AUTO-ENTMCNC: 32.6 G/DL (ref 31.5–35.7)
MCV RBC AUTO: 90.7 FL (ref 79–97)
MONOCYTES # BLD AUTO: 0.61 10*3/MM3 (ref 0.1–0.9)
MONOCYTES NFR BLD AUTO: 15.3 % (ref 5–12)
NEUTROPHILS NFR BLD AUTO: 2.27 10*3/MM3 (ref 1.7–7)
NEUTROPHILS NFR BLD AUTO: 56.9 % (ref 42.7–76)
NRBC BLD AUTO-RTO: 0 /100 WBC (ref 0–0.2)
PLATELET # BLD AUTO: 82 10*3/MM3 (ref 140–450)
PMV BLD AUTO: 11.2 FL (ref 6–12)
POTASSIUM SERPL-SCNC: 4 MMOL/L (ref 3.5–5.2)
PROT SERPL-MCNC: 6.6 G/DL (ref 6–8.5)
RBC # BLD AUTO: 4.19 10*6/MM3 (ref 3.77–5.28)
SODIUM SERPL-SCNC: 136 MMOL/L (ref 136–145)
URATE SERPL-MCNC: 5.3 MG/DL (ref 2.4–5.7)
WBC NRBC COR # BLD AUTO: 3.99 10*3/MM3 (ref 3.4–10.8)

## 2024-08-22 PROCEDURE — 85652 RBC SED RATE AUTOMATED: CPT | Performed by: STUDENT IN AN ORGANIZED HEALTH CARE EDUCATION/TRAINING PROGRAM

## 2024-08-22 PROCEDURE — 3075F SYST BP GE 130 - 139MM HG: CPT | Performed by: STUDENT IN AN ORGANIZED HEALTH CARE EDUCATION/TRAINING PROGRAM

## 2024-08-22 PROCEDURE — 99214 OFFICE O/P EST MOD 30 MIN: CPT | Performed by: STUDENT IN AN ORGANIZED HEALTH CARE EDUCATION/TRAINING PROGRAM

## 2024-08-22 PROCEDURE — 80053 COMPREHEN METABOLIC PANEL: CPT | Performed by: STUDENT IN AN ORGANIZED HEALTH CARE EDUCATION/TRAINING PROGRAM

## 2024-08-22 PROCEDURE — 3078F DIAST BP <80 MM HG: CPT | Performed by: STUDENT IN AN ORGANIZED HEALTH CARE EDUCATION/TRAINING PROGRAM

## 2024-08-22 PROCEDURE — 3044F HG A1C LEVEL LT 7.0%: CPT | Performed by: STUDENT IN AN ORGANIZED HEALTH CARE EDUCATION/TRAINING PROGRAM

## 2024-08-22 PROCEDURE — 1125F AMNT PAIN NOTED PAIN PRSNT: CPT | Performed by: STUDENT IN AN ORGANIZED HEALTH CARE EDUCATION/TRAINING PROGRAM

## 2024-08-22 PROCEDURE — 85025 COMPLETE CBC W/AUTO DIFF WBC: CPT | Performed by: STUDENT IN AN ORGANIZED HEALTH CARE EDUCATION/TRAINING PROGRAM

## 2024-08-22 PROCEDURE — 86140 C-REACTIVE PROTEIN: CPT | Performed by: STUDENT IN AN ORGANIZED HEALTH CARE EDUCATION/TRAINING PROGRAM

## 2024-08-22 PROCEDURE — 84550 ASSAY OF BLOOD/URIC ACID: CPT | Performed by: STUDENT IN AN ORGANIZED HEALTH CARE EDUCATION/TRAINING PROGRAM

## 2024-08-22 PROCEDURE — G2211 COMPLEX E/M VISIT ADD ON: HCPCS | Performed by: STUDENT IN AN ORGANIZED HEALTH CARE EDUCATION/TRAINING PROGRAM

## 2024-08-27 ENCOUNTER — CONSULT (OUTPATIENT)
Dept: ONCOLOGY | Facility: HOSPITAL | Age: 69
End: 2024-08-27
Payer: MEDICARE

## 2024-08-27 ENCOUNTER — LAB (OUTPATIENT)
Dept: ONCOLOGY | Facility: HOSPITAL | Age: 69
End: 2024-08-27
Payer: MEDICARE

## 2024-08-27 VITALS
WEIGHT: 238 LBS | HEIGHT: 64 IN | OXYGEN SATURATION: 95 % | RESPIRATION RATE: 18 BRPM | TEMPERATURE: 98.7 F | BODY MASS INDEX: 40.63 KG/M2 | SYSTOLIC BLOOD PRESSURE: 143 MMHG | DIASTOLIC BLOOD PRESSURE: 54 MMHG | HEART RATE: 72 BPM

## 2024-08-27 DIAGNOSIS — K74.69 OTHER CIRRHOSIS OF LIVER: ICD-10-CM

## 2024-08-27 DIAGNOSIS — D69.6 THROMBOCYTOPENIA: Primary | ICD-10-CM

## 2024-08-27 DIAGNOSIS — R16.1 SPLENOMEGALY: ICD-10-CM

## 2024-08-27 LAB
ALBUMIN SERPL-MCNC: 4.1 G/DL (ref 3.5–5.2)
ALBUMIN/GLOB SERPL: 1.4 G/DL
ALP SERPL-CCNC: 85 U/L (ref 39–117)
ALT SERPL W P-5'-P-CCNC: 37 U/L (ref 1–33)
ANION GAP SERPL CALCULATED.3IONS-SCNC: 7.8 MMOL/L (ref 5–15)
AST SERPL-CCNC: 41 U/L (ref 1–32)
BASOPHILS # BLD AUTO: 0.05 10*3/MM3 (ref 0–0.2)
BASOPHILS # BLD MANUAL: 0.04 10*3/MM3 (ref 0–0.2)
BASOPHILS NFR BLD AUTO: 1.2 % (ref 0–1.5)
BASOPHILS NFR BLD MANUAL: 1 % (ref 0–1.5)
BILIRUB SERPL-MCNC: 0.8 MG/DL (ref 0–1.2)
BUN SERPL-MCNC: 17 MG/DL (ref 8–23)
BUN/CREAT SERPL: 15.7 (ref 7–25)
CALCIUM SPEC-SCNC: 9.6 MG/DL (ref 8.6–10.5)
CHLORIDE SERPL-SCNC: 99 MMOL/L (ref 98–107)
CLUMPED PLATELETS: PRESENT
CO2 SERPL-SCNC: 29.2 MMOL/L (ref 22–29)
CREAT SERPL-MCNC: 1.08 MG/DL (ref 0.57–1)
DEPRECATED RDW RBC AUTO: 47.6 FL (ref 37–54)
EGFRCR SERPLBLD CKD-EPI 2021: 55 ML/MIN/1.73
EOSINOPHIL # BLD AUTO: 0.35 10*3/MM3 (ref 0–0.4)
EOSINOPHIL # BLD MANUAL: 0.21 10*3/MM3 (ref 0–0.4)
EOSINOPHIL NFR BLD AUTO: 8.4 % (ref 0.3–6.2)
EOSINOPHIL NFR BLD MANUAL: 5 % (ref 0.3–6.2)
ERYTHROCYTE [DISTWIDTH] IN BLOOD BY AUTOMATED COUNT: 14.5 % (ref 12.3–15.4)
FOLATE SERPL-MCNC: >20 NG/ML (ref 4.78–24.2)
GLOBULIN UR ELPH-MCNC: 3 GM/DL
GLUCOSE SERPL-MCNC: 228 MG/DL (ref 65–99)
HCT VFR BLD AUTO: 40.5 % (ref 34–46.6)
HGB BLD-MCNC: 13.4 G/DL (ref 12–15.9)
HYPOCHROMIA BLD QL: ABNORMAL
IMM GRANULOCYTES # BLD AUTO: 0.01 10*3/MM3 (ref 0–0.05)
IMM GRANULOCYTES NFR BLD AUTO: 0.2 % (ref 0–0.5)
LARGE PLATELETS: ABNORMAL
LDH SERPL-CCNC: 246 U/L (ref 135–214)
LYMPHOCYTES # BLD AUTO: 0.97 10*3/MM3 (ref 0.7–3.1)
LYMPHOCYTES # BLD MANUAL: 0.79 10*3/MM3 (ref 0.7–3.1)
LYMPHOCYTES NFR BLD AUTO: 23.4 % (ref 19.6–45.3)
LYMPHOCYTES NFR BLD MANUAL: 11 % (ref 5–12)
MCH RBC QN AUTO: 29.5 PG (ref 26.6–33)
MCHC RBC AUTO-ENTMCNC: 33.1 G/DL (ref 31.5–35.7)
MCV RBC AUTO: 89 FL (ref 79–97)
MONOCYTES # BLD AUTO: 0.33 10*3/MM3 (ref 0.1–0.9)
MONOCYTES # BLD: 0.46 10*3/MM3 (ref 0.1–0.9)
MONOCYTES NFR BLD AUTO: 8 % (ref 5–12)
NEUTROPHILS # BLD AUTO: 2.66 10*3/MM3 (ref 1.7–7)
NEUTROPHILS NFR BLD AUTO: 2.44 10*3/MM3 (ref 1.7–7)
NEUTROPHILS NFR BLD AUTO: 58.8 % (ref 42.7–76)
NEUTROPHILS NFR BLD MANUAL: 63 % (ref 42.7–76)
NEUTS BAND NFR BLD MANUAL: 1 % (ref 0–5)
OVALOCYTES BLD QL SMEAR: ABNORMAL
PATHOLOGY REVIEW: YES
PLATELET # BLD AUTO: 95 10*3/MM3 (ref 140–450)
PMV BLD AUTO: 10.6 FL (ref 6–12)
POIKILOCYTOSIS BLD QL SMEAR: ABNORMAL
POTASSIUM SERPL-SCNC: 4.2 MMOL/L (ref 3.5–5.2)
PROT SERPL-MCNC: 7.1 G/DL (ref 6–8.5)
RBC # BLD AUTO: 4.55 10*6/MM3 (ref 3.77–5.28)
SMALL PLATELETS BLD QL SMEAR: ABNORMAL
SODIUM SERPL-SCNC: 136 MMOL/L (ref 136–145)
VARIANT LYMPHS NFR BLD MANUAL: 15 % (ref 19.6–45.3)
VARIANT LYMPHS NFR BLD MANUAL: 4 % (ref 0–5)
VIT B12 BLD-MCNC: 468 PG/ML (ref 211–946)
WBC MORPH BLD: NORMAL
WBC NRBC COR # BLD AUTO: 4.15 10*3/MM3 (ref 3.4–10.8)

## 2024-08-27 PROCEDURE — 82607 VITAMIN B-12: CPT | Performed by: NURSE PRACTITIONER

## 2024-08-27 PROCEDURE — 3077F SYST BP >= 140 MM HG: CPT | Performed by: NURSE PRACTITIONER

## 2024-08-27 PROCEDURE — 1125F AMNT PAIN NOTED PAIN PRSNT: CPT | Performed by: NURSE PRACTITIONER

## 2024-08-27 PROCEDURE — 36415 COLL VENOUS BLD VENIPUNCTURE: CPT | Performed by: NURSE PRACTITIONER

## 2024-08-27 PROCEDURE — 1160F RVW MEDS BY RX/DR IN RCRD: CPT | Performed by: NURSE PRACTITIONER

## 2024-08-27 PROCEDURE — 85025 COMPLETE CBC W/AUTO DIFF WBC: CPT | Performed by: NURSE PRACTITIONER

## 2024-08-27 PROCEDURE — 1159F MED LIST DOCD IN RCRD: CPT | Performed by: NURSE PRACTITIONER

## 2024-08-27 PROCEDURE — G0463 HOSPITAL OUTPT CLINIC VISIT: HCPCS | Performed by: NURSE PRACTITIONER

## 2024-08-27 PROCEDURE — 3078F DIAST BP <80 MM HG: CPT | Performed by: NURSE PRACTITIONER

## 2024-08-27 PROCEDURE — 99214 OFFICE O/P EST MOD 30 MIN: CPT | Performed by: NURSE PRACTITIONER

## 2024-08-27 PROCEDURE — 83615 LACTATE (LD) (LDH) ENZYME: CPT | Performed by: NURSE PRACTITIONER

## 2024-08-27 PROCEDURE — 82746 ASSAY OF FOLIC ACID SERUM: CPT | Performed by: NURSE PRACTITIONER

## 2024-08-27 PROCEDURE — 80053 COMPREHEN METABOLIC PANEL: CPT | Performed by: NURSE PRACTITIONER

## 2024-08-27 NOTE — PROGRESS NOTES
Chief Complaint/Reason for Referral:  new pt (Normocytic anemia / thrombocytopenia)    Donnell Garcia MD [place]ose, Thomas, MD    Records Obtained:  Records of the patients history including those obtained from   were reviewed and summarized in detail.    Subjective    History of Present Illness    Stephy Duncan presents to [facility] for chronic thrombocytopenia.     PMH includes: type II DM, hypertension, postop infection secondary to osteomyelitis and the  THR. Reports had 8 hip surgeries and then discontinued. She does not have a current hip replacement. She is wheel chair bound. Unable to walk.  Denies any skin breakdown. Reports the right leg is 3 inches shorter than the left. Other med history includes: gout, YANIRA, obesity, osteomyelitis, CHF, hyponatremia. She avoids alcohol. Previous smoker and quit in 12/10/2015. Cancer-related family history includes Cancer in her maternal grandfather, maternal grandmother, mother, and sister.      Has had chronic thrombocytopenia since July of 2022 in the range of 82 to 136. Most recent platelet count was 82 a couple of days ago. Reports she has bruising to the bilateral arms and she bleeds easier then she used to. She is not taking any anti-coagulation. Denies any acute prolonged bleeding at this time. Normal hemoglobin and normal WBC currently, but did have anemia present in the range of 8.2 to 11.6 in August of 2022. Hemoglobin has been normal since then. She is taking oral iron.     Prior CT scan back in December of 2019 did show partially included splenomegaly and possible hepatic cirrhosis. Reports has never seen GI for the cirrhosis or the splenomegaly.         Oncology/Hematology History    No history exists.       Review of Systems   Constitutional: Negative.    HENT: Negative.     Eyes: Negative.    Respiratory: Negative.     Cardiovascular: Negative.    Gastrointestinal: Negative.    Endocrine: Negative.     Musculoskeletal:  Positive for back pain.   Allergic/Immunologic: Negative.    Neurological:  Positive for headache.   Hematological:  Bruises/bleeds easily.   Psychiatric/Behavioral: Negative.     All other systems reviewed and are negative.      Current Outpatient Medications on File Prior to Visit   Medication Sig Dispense Refill    allopurinol (Zyloprim) 100 MG tablet Take 1 tablet by mouth Daily. 30 tablet 2    Calcium 500 MG tablet Take 500 mg by mouth Daily. 90 tablet 2    calcium carbonate, oyster shell, 500 MG tablet tablet Take 1 tablet by mouth Daily.      colchicine 0.6 MG tablet Take 1 tablet by mouth Daily. 90 tablet 2    Dulaglutide (Trulicity) 0.75 MG/0.5ML solution pen-injector INJECT 0.75 MG UNDER THE SKIN INTO THE APPROPRIATE AREA AS DIRECTED 1 (ONE) TIME PER WEEK 2 mL 4    esomeprazole (nexIUM) 40 MG capsule Take 1 capsule by mouth Every Morning Before Breakfast.      ferrous sulfate 325 (65 FE) MG tablet Take 1 tablet by mouth Every Other Day. Mornings      folic acid (FOLVITE) 1 MG tablet Take 1 tablet by mouth Daily.      furosemide (LASIX) 40 MG tablet Take 1 tablet by mouth Daily.      gabapentin (NEURONTIN) 600 MG tablet Take 1 tablet by mouth 2 (Two) Times a Day.      insulin aspart (NovoLOG FlexPen) 100 UNIT/ML solution pen-injector sc pen Inject 20 Units under the skin into the appropriate area as directed Daily With Breakfast AND 23 Units Daily With Lunch AND 23 Units Daily With Dinner. 20 mL 11    insulin glargine (Lantus) 100 UNIT/ML injection Inject 100 Units under the skin into the appropriate area as directed Daily. 100 mL 3    isosorbide mononitrate (IMDUR) 30 MG 24 hr tablet Take 1 tablet by mouth Every Morning.      KLOR-CON 20 MEQ CR tablet Take 1 tablet by mouth Daily.      Levemir FlexPen 100 UNIT/ML injection       levocetirizine (XYZAL) 5 MG tablet Take 1 tablet by mouth Every Evening.      OneTouch Verio test strip USE AS DIRECTED 3 TIMES A DAY      rosuvastatin  (CRESTOR) 5 MG tablet TAKE 1 TABLET (5 MG TOTAL) BY MOUTH DAILY.      spironolactone (ALDACTONE) 25 MG tablet Take 1 tablet by mouth Daily. 90 tablet 2    verapamil (CALAN) 80 MG tablet Take 1 tablet by mouth 2 (Two) Times a Day. 60 tablet 0    vitamin D3 125 MCG (5000 UT) capsule capsule Take 1 capsule by mouth Daily.      HYDROcodone-acetaminophen (NORCO) 5-325 MG per tablet Take 1 tablet by mouth Every 6 (Six) Hours As Needed for Severe Pain. (Patient not taking: Reported on 8/22/2024) 20 tablet 0    ibuprofen (ADVIL,MOTRIN) 200 MG tablet Take 1 tablet by mouth Every 6 (Six) Hours As Needed for Mild Pain. (Patient not taking: Reported on 8/7/2024)       No current facility-administered medications on file prior to visit.       Allergies   Allergen Reactions    Ace Inhibitors Shortness Of Breath and Swelling    Morphine Shortness Of Breath     Past Medical History:   Diagnosis Date    Allergic     Morphine, ACE inhibitors    Allergies     Arthritis     Cancer 1982    Cervical cancer    Cardiac murmur     Coronary artery disease 2016    Received stints    Diabetes mellitus     GERD (gastroesophageal reflux disease)     Heart attack 12/2020    History of cervical cancer 1981    History of transfusion     SEVERAL    Hyperlipidemia     Hypertension     Iron deficiency anemia     Obesity     Osteomyelitis hip     RIGHT    PONV (postoperative nausea and vomiting)     Right hip pain     Visual impairment     Corrected with glasses    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  WOUND VAC IN PLACE     Past Surgical History:   Procedure Laterality Date    CARDIAC CATHETERIZATION      CERVICAL CONIZATION      COLONOSCOPY      CORONARY ANGIOPLASTY WITH STENT PLACEMENT      ENDOSCOPY      ENDOSCOPY N/A 06/09/2022    Procedure: ESOPHAGOGASTRODUODENOSCOPY WITH COLD BIOSPIES;  Surgeon: Zechariah Nj MD;  Location: [facility] ENDOSCOPY;  Service: Gastroenterology;  Laterality: N/A;  PRE- EPIGASTRIC PAIN  AND ABDOMINAL PAIN  POST- NORMAL    HIP ARTHROPLASTY Right     HIP HARDWARE REMOVAL Right     HIP MINI REVISION Right 01/18/2022    Procedure: TOTAL HIP ARTHROPLASTY LINER REVISION, pegboard lateral;  Surgeon: Karri Schaeffer II, MD;  Location:  [facility];  Service: Orthopedics;  Laterality: Right;    HIP SPACER INSERTION WITH ANTIBIOTIC CEMENT      X3    HYSTERECTOMY      INCISION AND DRAINAGE HIP Right 12/18/2021    Procedure: INCISION AND DRAINAGE TOTAL HIP, LINER EXCHANGE AND WOUND VAC PLACEMENT;  Surgeon: Karri Schaeffer II, MD;  Location:  [facility];  Service: Orthopedics;  Laterality: Right;    INCISION AND DRAINAGE HIP Right 05/02/2022    Procedure: HIP INCISION AND DRAINAGE;  Surgeon: Karri Schaeffer II, MD;  Location:  [facility];  Service: Orthopedics;  Laterality: Right;    INCISION AND DRAINAGE HIP Right 06/04/2022    Procedure: HIP INCISION AND DRAINAGE WITH WOUND VAC REPLACEMENT;  Surgeon: Karri Schaeffer II, MD;  [facility];  Service: Orthopedics;  Laterality: Right;    JOINT REPLACEMENT  L knee, R hip    Knee 2014, hip replacement [id number] multiple surgeries    KNEE ARTHROPLASTY Left     LAPAROSCOPIC CHOLECYSTECTOMY      TONSILLECTOMY      TOTAL HIP ARTHROPLASTY Right 04/25/2022    Procedure: HIP GIRDLESTONE PROCEDURE;  Surgeon: Karri Schaeffer II, MD;  [facility];  Service: Orthopedics;  Laterality: Right;    TOTAL HIP ARTHROPLASTY REVISION Right 11/16/2021    Procedure: TOTAL HIP REVISION ARTHROPLASTY RIGHT POSTERIOR;  Surgeon: Karri Schaeffer II, MD;  Location: [facility] MAIN OR;  Service: Orthopedics;  Laterality: Right;     Social History     Socioeconomic History    Marital status:    Tobacco Use    Smoking status: Former     Current packs/day: 0.00     Average packs/day: 1.1 packs/day for 60.0 years (67.5 ttl pk-yrs)     Types: Cigarettes     Start date: 12/10/1970     Quit date: 12/10/2015      Years since quittin.7     Passive exposure: Never    Smokeless tobacco: Never   Vaping Use    Vaping status: Never Used   Substance and Sexual Activity    Alcohol use: Not Currently    Drug use: Never    Sexual activity: Not Currently     Partners: Male     Birth control/protection: None, Hysterectomy     Family History   Problem Relation Age of Onset    Arthritis Mother     Cancer Mother     COPD Mother     Hearing loss Mother     Hyperlipidemia Mother     Thyroid disease Mother     Vision loss Mother     Arthritis Father     COPD Father     Hearing loss Father     Hyperlipidemia Father     Cancer Maternal Grandfather     Arthritis Maternal Grandmother     Cancer Maternal Grandmother     Hyperlipidemia Maternal Grandmother     Diabetes Paternal Grandfather     Heart disease Paternal Grandfather     Hyperlipidemia Paternal Grandfather     Diabetes Paternal Grandmother     Arthritis Sister     Cancer Sister     COPD Sister     Diabetes Sister     Hyperlipidemia Sister     Diabetes Brother     Hyperlipidemia Brother     Kidney disease Brother     Vision loss Maternal Uncle     Malig Hyperthermia Neg Hx      Immunization History   Administered Date(s) Administered    COVID-19 (PFIZER) BIVALENT 12+YRS 2022    COVID-19 (PFIZER) Purple Cap Monovalent 2021, 2021, 10/04/2021    COVID-19 F23 (PFIZER) 12YRS+ (COMIRNATY) 10/26/2023, 2024    Covid-19 (Pfizer) Gray Cap Monovalent 2022    Fluzone (or Fluarix & Flulaval for VFC) >6mos 2017    Fluzone High-Dose 65+YRS 2018, 2019    Fluzone High-Dose 65+yrs 10/04/2021    Pneumococcal Conjugate 13-Valent (PCV13) 09/10/2019    Pneumococcal Polysaccharide (PPSV23) 2018    Shingrix 2020       Tobacco Use: Medium Risk (2024)    Patient History     Smoking Tobacco Use: Former     Smokeless Tobacco Use: Never     Passive Exposure: Never       Objective     Physical Exam  Vitals and nursing note reviewed.   Constitutional:  "      Appearance: Normal appearance.   HENT:      Nose: Nose normal.      Mouth/Throat:      Mouth: Mucous membranes are moist.   Eyes:      Pupils: Pupils are equal, round, and reactive to light.   Cardiovascular:      Rate and Rhythm: Normal rate and regular rhythm.      Pulses: Normal pulses.      Heart sounds: Normal heart sounds. No murmur heard.  Pulmonary:      Effort: Pulmonary effort is normal. No respiratory distress.      Breath sounds: Normal breath sounds.   Abdominal:      General: Bowel sounds are normal.      Palpations: Abdomen is soft.   Musculoskeletal:         General: Normal range of motion.      Cervical back: Normal range of motion and neck supple.   Skin:     General: Skin is warm and dry.      Capillary Refill: Capillary refill takes less than 2 seconds.   Neurological:      General: No focal deficit present.      Mental Status: She is alert and oriented to person, place, and time.   Psychiatric:         Mood and Affect: Mood normal.         Behavior: Behavior normal.         Thought Content: Thought content normal.         Judgment: Judgment normal.         Vitals:    08/27/24 1142   BP: 143/54   Pulse: 72   Resp: 18   Temp: 98.7 °F (37.1 °C)   TempSrc: Temporal   SpO2: 95%   Weight: 108 kg (238 lb)   Height: 162.6 cm (64\")   PainSc:   3   PainLoc: Back       Wt Readings from Last 3 Encounters:   08/27/24 108 kg (238 lb)   08/22/24 108 kg (238 lb 9.6 oz)   07/03/24 109 kg (241 lb)                 ECOG score: 2         ECOG: (1) Restricted in Physically Strenuous Activity, Ambulatory & Able to Do Work of Light Nature  Fall Risk Assessment was completed, and patient is at low risk for falls.  PHQ-9 Total Score: 0       The patient is  experiencing fatigue. Fatigue score: 2    PT/OT Functional Screening: PT fx screen : No needs identified  Speech Functional Screening: Speech fx screen : No needs identified  Rehab to be ordered: Rehab to be ordered : No needs identified        Result Review " ":  The following data was reviewed by: MORGAN Augustin on 08/27/2024:  Lab Results   Component Value Date    HGB 12.4 08/22/2024    HCT 38.0 08/22/2024    MCV 90.7 08/22/2024    PLT 82 (L) 08/22/2024    WBC 3.99 08/22/2024    NEUTROABS 2.27 08/22/2024    LYMPHSABS 0.76 08/22/2024    MONOSABS 0.61 08/22/2024    EOSABS 0.32 08/22/2024    BASOSABS 0.02 08/22/2024     Lab Results   Component Value Date    GLUCOSE 177 (H) 08/22/2024    BUN 15 08/22/2024    CREATININE 1.13 (H) 08/22/2024     08/22/2024    K 4.0 08/22/2024     08/22/2024    CO2 24.9 08/22/2024    CALCIUM 9.2 08/22/2024    PROTEINTOT 6.6 08/22/2024    ALBUMIN 3.8 08/22/2024    BILITOT 0.8 08/22/2024    ALKPHOS 84 08/22/2024    AST 31 08/22/2024    ALT 25 08/22/2024     Lab Results   Component Value Date     06/02/2022    FERRITIN 616.00 (H) 06/02/2022    SHBXGRNC24 373 07/19/2022    FOLATE >20.00 07/19/2022     Lab Results   Component Value Date    IRON 41 07/19/2022    LABIRON 15 (L) 07/19/2022    TRANSFERRIN 182 (L) 07/19/2022    TIBC 271 (L) 07/19/2022     Lab Results   Component Value Date     06/02/2022    FERRITIN 616.00 (H) 06/02/2022    APZCGUVF68 373 07/19/2022    FOLATE >20.00 07/19/2022     No results found for: \"PSA\", \"CEA\", \"AFP\", \"\", \"\"    XR Hand 3+ View Right    Result Date: 8/8/2024  Impression: 1.*Number widening of the scapholunate interval suggesting previous injury or laxity of the scapholunate ligament. 2.Mild to moderate osteoarthritic changes in the wrist and hand. Electronically Signed: Jose Maria Lugo MD  8/8/2024 1:39 PM EDT  Workstation ID: [id number]    XR Wrist 2 View Right    Result Date: 8/8/2024  Impression: 1. No acute osseous injury to the right wrist. 2. Scapholunate interval widening. 3. Intact radius and ulna without osseous injury to the right forearm. Electronically Signed: An Arnold MD  8/8/2024 1:37 PM EDT  Workstation ID: [id number]    XR Forearm 2 View " Right    Result Date: 8/8/2024  Impression: 1. No acute osseous injury to the right wrist. 2. Scapholunate interval widening. 3. Intact radius and ulna without osseous injury to the right forearm. Electronically Signed: An Arnold MD  8/8/2024 1:37 PM EDT  Workstation ID: [id number]    XR Shoulder 2+ View Right    Result Date: 7/16/2024  Impression: Degenerative change. No acute osseous abnormalities are identified. Electronically Signed: Wes Meek MD  7/16/2024 2:15 PM EDT  Workstation ID: [id number]    DEXA Bone Density Axial    Result Date: 5/28/2024  Impression: Osteopenia - Based upon the FRAX score, this patient does meet criteria for initiation of pharmacological treatment recommendations for prevention of osteoporosis per the National Osteoporosis Foundation (NOF) guidelines. However, individualized treatment decisions should be made accounting for additional clinical factors.    Electronically Signed By-TAO HARKINS MD On:5/28/2024 9:24 AM             Assessment and Plan:  Diagnoses and all orders for this visit:    1. Thrombocytopenia (Primary)  -     CBC & Differential  -     Comprehensive Metabolic Panel  -     Vitamin B12  -     Folate  -     Peripheral Blood Smear  -     Platelet Ct On Citrated Bld  -     Lactate Dehydrogenase  -     US Abdomen Complete    2. Splenomegaly  -     CBC & Differential  -     Comprehensive Metabolic Panel  -     Vitamin B12  -     Folate  -     Peripheral Blood Smear  -     Platelet Ct On Citrated Bld  -     Lactate Dehydrogenase  -     US Abdomen Complete    3. Other cirrhosis of liver  -     CBC & Differential  -     Comprehensive Metabolic Panel  -     Vitamin B12  -     Folate  -     Peripheral Blood Smear  -     Platelet Ct On Citrated Bld  -     Lactate Dehydrogenase  -     US Abdomen Complete    Thrombocytopenia since at least July of 2022 in the range of 87 to 136. Recently trended downward platelet count. Denies any acute bleeding, but does have some  intermittent bruising noted to the arms. Likely secondary to splenomegaly / cirrhosis seen on prior CT chest in 2019. Has never had any dedicated abdominal imaging. No recent anemia, but did have some in the past and has been normal since April. Normal WBC count. Liver enzymes are normal. Bilirubin is normal. Mild CKD.     See above labs to check today with CBC, CMP< LDH, folate and B-12, peripheral smear, platelet count on citrated tube and will also check an abdominal US to evaluate for hepatosplenomegaly.     Consider referral to GI for cirrhosis.     I spent 30 minutes caring for [place] on this date of service. This time includes time spent by me in the following activities:preparing for the visit, reviewing tests, obtaining and/or reviewing a separately obtained history, performing a medically appropriate examination and/or evaluation , counseling and educating the patient/family/caregiver, ordering medications, tests, or procedures, referring and communicating with other health care professionals , documenting information in the medical record, and independently interpreting results and communicating that information with the patient/family/caregiver    Patient Follow Up: 2 months with NP>     Patient was given instructions and counseling regarding her condition or for health maintenance advice. Please see specific information pulled into the AVS if appropriate.     Farhana Evans, APRN    8/27/2024    .tob

## 2024-08-28 ENCOUNTER — PATIENT ROUNDING (BHMG ONLY) (OUTPATIENT)
Dept: ONCOLOGY | Facility: HOSPITAL | Age: 69
End: 2024-08-28
Payer: MEDICARE

## 2024-08-28 LAB
CYTO UR: NORMAL
LAB AP CASE REPORT: NORMAL
LAB AP CLINICAL INFORMATION: NORMAL
PATH REPORT.FINAL DX SPEC: NORMAL
PATH REPORT.GROSS SPEC: NORMAL

## 2024-08-28 NOTE — PROGRESS NOTES
August 28, 2024    Hello, may I speak with Stephy Duncan?    My name is Julee.      I am  with [facility]  [address] [place], [place] [address]  [place] [address] [phone].    Before we get started may I verify your date of birth? 1955    I am calling to officially welcome you to our practice and ask about your recent visit. Is this a good time to talk? NO- Sent [employer] Message    Tell me about your visit with us. What things went well?         We're always looking for ways to make our patients' experiences even better. Do you have recommendations on ways we may improve?      Overall were you satisfied with your first visit to our practice?        I appreciate you taking the time to speak with me today. Is there anything else I can do for you?       Thank you, and have a great day.

## 2024-08-29 DIAGNOSIS — E61.1 IRON DEFICIENCY: Primary | ICD-10-CM

## 2024-09-06 ENCOUNTER — PATIENT OUTREACH (OUTPATIENT)
Dept: CASE MANAGEMENT | Facility: OTHER | Age: 69
End: 2024-09-06
Payer: MEDICARE

## 2024-09-06 NOTE — OUTREACH NOTE
AMBULATORY CASE MANAGEMENT NOTE    Names and Relationships of Patient/Support Persons:  -     Patient Outreach    I spoke with the patient today regarding her repairs on her wheelchair. There has been no further communication from [employer] with her about this. Patient has the phone number to call [employer]. I have verified that number and encouraged her to call them for an update. Patient is agreeable to this.    Mandi BELLE  Ambulatory Case Management    9/6/2024, 12:20 EDT

## 2024-09-10 ENCOUNTER — PATIENT OUTREACH (OUTPATIENT)
Dept: CASE MANAGEMENT | Facility: OTHER | Age: 69
End: 2024-09-10
Payer: MEDICARE

## 2024-09-10 DIAGNOSIS — Z96.641 STATUS POST TOTAL REPLACEMENT OF RIGHT HIP: Primary | ICD-10-CM

## 2024-09-10 DIAGNOSIS — E11.42 TYPE 2 DIABETES MELLITUS WITH DIABETIC POLYNEUROPATHY, WITH LONG-TERM CURRENT USE OF INSULIN: ICD-10-CM

## 2024-09-10 DIAGNOSIS — Z79.4 TYPE 2 DIABETES MELLITUS WITH DIABETIC POLYNEUROPATHY, WITH LONG-TERM CURRENT USE OF INSULIN: ICD-10-CM

## 2024-09-10 NOTE — OUTREACH NOTE
AMBULATORY CASE MANAGEMENT NOTE    Names and Relationships of Patient/Support Persons: Contact: Stephy Duncan; Relationship: Self  Contact: [employer]; Relationship: Other  Contact: Stephy Duncan; Relationship: Self -     Patient Outreach    Patient called me yesterday after speaking with Kriss to tell me she found out that "[employer]'s sold the repair of motorized wheelchairs to [employer]. She also called the new company to see what the hold up is. She states they need PCP documentation.    I called [employer] and spoke with Abby today. She stated she needed a diagnosis code and the most recent face to face office notes faxed to them. Patient was seen on 8/22/2024 by PCP.     I faxed those notes over and gave Abby a relevant diagnosis code.    Abby stated that they will contact patient when the parts are available to set up a time to fix the WC.    I called the patient and relayed all the above. Patient is agreeable to follow up with [employer] every couple of weeks for an update until they call her.    Mandi BELLE  Ambulatory Case Management    9/10/2024, 13:44 EDT

## 2024-09-22 DIAGNOSIS — Z98.890 S/P GIRDLESTONE PROCEDURE: ICD-10-CM

## 2024-09-23 RX ORDER — ALLOPURINOL 100 MG/1
100 TABLET ORAL DAILY
Qty: 90 TABLET | Refills: 0 | Status: SHIPPED | OUTPATIENT
Start: 2024-09-23

## 2024-09-23 RX ORDER — GABAPENTIN 600 MG/1
600 TABLET ORAL 2 TIMES DAILY
Qty: 180 TABLET | Refills: 0 | Status: SHIPPED | OUTPATIENT
Start: 2024-09-23

## 2024-09-25 ENCOUNTER — HOSPITAL ENCOUNTER (OUTPATIENT)
Dept: ULTRASOUND IMAGING | Facility: HOSPITAL | Age: 69
Discharge: HOME | End: 2024-09-25
Admitting: NURSE PRACTITIONER
Payer: MEDICARE

## 2024-09-25 PROCEDURE — 76700 US EXAM ABDOM COMPLETE: CPT

## 2024-10-03 ENCOUNTER — PATIENT OUTREACH (OUTPATIENT)
Dept: CASE MANAGEMENT | Facility: OTHER | Age: 69
End: 2024-10-03
Payer: MEDICARE

## 2024-10-03 DIAGNOSIS — Z96.641 STATUS POST TOTAL REPLACEMENT OF RIGHT HIP: Primary | ICD-10-CM

## 2024-10-03 NOTE — OUTREACH NOTE
AMBULATORY CASE MANAGEMENT NOTE    Names and Relationships of Patient/Support Persons: Contact: Stephy Duncan; Relationship: Self  Contact: [employer] [facility] [employer]; Relationship: Other -     Patient Outreach    I spoke to patient today on the phone regarding the repairs on her wheelchair. She states no one from [employer] has contacted her.    Care Coordination    I called [employer] and spoke to Sarah regarding the above. She states they faxed a RXDWO to the PCP office for him to sign, date and return. RXDWO was faxed to incorrect number. I have given them the correct number and requested they fax it again.    I notified GERONIMO Subramanian and GERONIMO Whitman regarding the fax and the need for it to be signed, dated and faxed back to the company.    I called patient back to inform her of the above.    Mandi BELLE  Ambulatory Case Management    10/3/2024, 14:46 EDT

## 2024-10-17 ENCOUNTER — PATIENT OUTREACH (OUTPATIENT)
Dept: CASE MANAGEMENT | Facility: OTHER | Age: 69
End: 2024-10-17
Payer: MEDICARE

## 2024-10-17 DIAGNOSIS — E11.42 TYPE 2 DIABETES MELLITUS WITH DIABETIC POLYNEUROPATHY, WITH LONG-TERM CURRENT USE OF INSULIN: ICD-10-CM

## 2024-10-17 DIAGNOSIS — Z79.4 TYPE 2 DIABETES MELLITUS WITH DIABETIC POLYNEUROPATHY, WITH LONG-TERM CURRENT USE OF INSULIN: ICD-10-CM

## 2024-10-17 DIAGNOSIS — Z96.641 STATUS POST TOTAL REPLACEMENT OF RIGHT HIP: Primary | ICD-10-CM

## 2024-10-17 NOTE — OUTREACH NOTE
AMBULATORY CASE MANAGEMENT NOTE    Names and Relationships of Patient/Support Persons: Contact: [employer]; Relationship:  -     Care Coordination    I called and spoke with Sarah at [employer] regarding patient's WC repair. She stated that she faxed an order to be signed by PCP on 10/3 and again on 10/16.    I had practice manager find and print fax for me. I took it to Dr. Garcia, had him sign it then I gave it to GERONIMO Samayoa to fax today. She is agreeable to this. I will follow up with this.    Mandi BELLE  Ambulatory Case Management    10/17/2024, 11:37 EDT

## 2024-10-18 ENCOUNTER — TELEPHONE (OUTPATIENT)
Dept: CASE MANAGEMENT | Facility: OTHER | Age: 69
End: 2024-10-18
Payer: MEDICARE

## 2024-10-18 NOTE — TELEPHONE ENCOUNTER
Attempted to call [employer] to verify they received a fax from the PCP office yesterday regarding parts for patient's wc. Left voicemail.

## 2024-10-22 DIAGNOSIS — E11.42 TYPE 2 DIABETES MELLITUS WITH DIABETIC POLYNEUROPATHY, WITH LONG-TERM CURRENT USE OF INSULIN: ICD-10-CM

## 2024-10-22 DIAGNOSIS — Z79.4 TYPE 2 DIABETES MELLITUS WITH DIABETIC POLYNEUROPATHY, WITH LONG-TERM CURRENT USE OF INSULIN: ICD-10-CM

## 2024-10-22 RX ORDER — DULAGLUTIDE 0.75 MG/.5ML
0.75 INJECTION, SOLUTION SUBCUTANEOUS WEEKLY
Qty: 2 ML | Refills: 3 | Status: SHIPPED | OUTPATIENT
Start: 2024-10-22 | End: 2024-10-28

## 2024-10-23 NOTE — PROGRESS NOTES
Chief Complaint  Hypertension and Diabetes    Subjective          Stephy Duncan presents to [facility]  History of Present Illness    Here with blood sugar log.  Has had several AM fasting measurements over 200 (she reports eating poorly the night before).  Only one hypoglycemic measurement during this period (68, lunch time).  Using 100U lantus daily.  Taking novolog with meals (20U and breakfast, 23U at lunch, 23U at dinner)    Using ibuprofen for right hand pain.  Discussed its effect on the kidneys, and recommended stopping this medicine.    PHQ-9 Depression Screening  Little interest or pleasure in doing things?     Feeling down, depressed, or hopeless?     PHQ-2 Total Score     Trouble falling or staying asleep, or sleeping too much?     Feeling tired or having little energy?     Poor appetite or overeating?     Feeling bad about yourself - or that you are a failure or have let yourself or your family down?     Trouble concentrating on things, such as reading the newspaper or watching television?     Moving or speaking so slowly that other people could have noticed? Or the opposite - being so fidgety or restless that you have been moving around a lot more than usual?     Thoughts that you would be better off dead, or of hurting yourself in some way?     PHQ-9 Total Score     If you checked off any problems, how difficult have these problems made it for you to do your work, take care of things at home, or get along with other people?           Current Outpatient Medications   Medication Instructions    allopurinol (ZYLOPRIM) 100 mg, Oral, Daily    calcium carbonate (oyster shell) 500 mg, Daily    Calcium 500 mg, Oral, Daily    Diclofenac Sodium (VOLTAREN) 4 g, Topical, 4 Times Daily PRN    esomeprazole (NEXIUM) 40 mg, Oral, Every Morning Before Breakfast    ferrous sulfate 325 mg, Every Other Day    folic acid (FOLVITE) 1,000 mcg, Daily    furosemide  "(LASIX) 40 mg, Daily    gabapentin (NEURONTIN) 600 mg, Oral, 2 Times Daily    ibuprofen (ADVIL,MOTRIN) 200 mg, Every 6 Hours PRN    insulin aspart (NovoLOG FlexPen) 100 UNIT/ML solution pen-injector sc pen Inject 20 Units under the skin into the appropriate area as directed Daily With Breakfast AND 23 Units Daily With Lunch AND 23 Units Daily With Dinner.    insulin glargine (LANTUS) 100 Units, Subcutaneous, Daily    isosorbide mononitrate (IMDUR) 30 mg, Every Morning    KLOR-CON 20 MEQ CR tablet 20 mEq, Daily    Levemir FlexPen 100 UNIT/ML injection     levocetirizine (XYZAL) 5 mg, Every Evening    OneTouch Verio test strip USE AS DIRECTED 3 TIMES A DAY    rosuvastatin (CRESTOR) 5 MG tablet TAKE 1 TABLET (5 MG TOTAL) BY MOUTH DAILY.    spironolactone (ALDACTONE) 25 mg, Oral, Daily    Trulicity 0.75 mg, Subcutaneous, Weekly    verapamil (CALAN) 80 mg, Oral, 2 Times Daily    vitamin D3 5,000 Units, Daily       The following portions of the patient's history were reviewed and updated as appropriate: allergies, current medications, past family history, past medical history, past social history, past surgical history, and problem list.    Objective   Vital Signs:   /70 (BP Location: Right arm, Patient Position: Sitting, Cuff Size: Adult)   Pulse 76   Temp 98.2 °F (36.8 °C) (Temporal)   Ht 162.6 cm (64\")   Wt 109 kg (240 lb 3.2 oz)   SpO2 95%   BMI 41.23 kg/m²     BP Readings from Last 3 Encounters:   10/25/24 134/70   08/27/24 143/54   08/22/24 131/75     Wt Readings from Last 3 Encounters:   10/25/24 109 kg (240 lb 3.2 oz)   08/27/24 108 kg (238 lb)   08/22/24 108 kg (238 lb 9.6 oz)     Class 3 Severe Obesity (BMI >=40). Obesity-related health conditions include the following: hypertension and diabetes mellitus. Obesity is unchanged. BMI is is above average; BMI management plan is completed. We discussed Information on healthy weight added to patient's after visit summary.     Physical Exam  Vitals " reviewed.   Constitutional:       General: She is not in acute distress.     Appearance: Normal appearance. She is not ill-appearing, toxic-appearing or diaphoretic.   HENT:      Head: Normocephalic and atraumatic.      Right Ear: External ear normal.      Left Ear: External ear normal.   Eyes:      Conjunctiva/sclera: Conjunctivae normal.   Cardiovascular:      Rate and Rhythm: Normal rate and regular rhythm.      Pulses: Normal pulses.      Heart sounds: Murmur heard.      No friction rub. No gallop.      Comments: II/VI systolic murmur  Pulmonary:      Effort: Pulmonary effort is normal. No respiratory distress.      Breath sounds: Normal breath sounds. No stridor. No wheezing, rhonchi or rales.   Chest:      Chest wall: No tenderness.   Abdominal:      General: Abdomen is flat.      Palpations: Abdomen is soft. There is no mass.      Tenderness: There is no abdominal tenderness.   Musculoskeletal:      Right lower leg: No edema.      Left lower leg: No edema.   Skin:     General: Skin is warm and dry.   Neurological:      General: No focal deficit present.      Mental Status: She is alert. Mental status is at baseline.   Psychiatric:         Behavior: Behavior normal.         Thought Content: Thought content normal.         Judgment: Judgment normal.          Result Review :   The following data was reviewed by: Donnell Garcia MD on 10/25/2024:  Common labs          8/7/2024    11:13 8/22/2024    15:22 8/27/2024    12:34   Common Labs   Glucose 121  177  228    BUN 13  15  17    Creatinine 1.13  1.13  1.08    Sodium 138  136  136    Potassium 4.5  4.0  4.2    Chloride 103  100  99    Calcium 9.7  9.2  9.6    Albumin 3.9  3.8  4.1    Total Bilirubin 1.0  0.8  0.8    Alkaline Phosphatase 87  84  85    AST (SGOT) 27  31  41    ALT (SGPT) 23  25  37    WBC 8.87  3.99  4.15    Hemoglobin 13.6  12.4  13.4    Hematocrit 41.8  38.0  40.5    Platelets 137  82  95    Uric Acid 7.6  5.3              Lab Results    Component Value Date    COVID19 Not Detected 06/08/2022    INR 1.20 (H) 06/04/2022    BILIRUBINUR Negative 06/05/2022            Assessment and Plan    Diagnoses and all orders for this visit:    1. Type 2 diabetes mellitus with diabetic polyneuropathy, with long-term current use of insulin (Primary)  -     Hemoglobin A1c  -     Comprehensive metabolic panel    2. Primary hypertension  -     Comprehensive metabolic panel    3. 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  Dispense: 90 tablet; Refill: 0    4. Encounter for immunization  -     Fluzone High-Dose 65+yrs    5. Screening for lung cancer  -      CT Chest Low Dose Cancer Screening WO; Future    6. Cigarette nicotine dependence in remission  -      CT Chest Low Dose Cancer Screening WO; Future    7. Pain of right hand  -     Diclofenac Sodium (VOLTAREN) 1 % gel gel; Apply 4 g topically to the appropriate area as directed 4 (Four) Times a Day As Needed (MSK pain).  Dispense: 100 g; Refill: 3    Other orders  -     esomeprazole (nexIUM) 40 MG capsule; Take 1 capsule by mouth Every Morning Before Breakfast.  Dispense: 90 capsule; Refill: 1      T2DM:  -labs today  -will consider going up on trulicity    HTN:  -stable    Gout:  -labs today  -will stop colchicine    Immunization:  -Discussed risks/benefits to vaccination, reviewed components of the vaccine, discussed VIS, discussed informed consent, informed consent obtained. Patient/Parent was allowed to accept or refuse vaccine. Questions answered to satisfactory state of patient/parent. We reviewed typical age appropriate and seasonally appropriate vaccinations. Reviewed immunization history and updated state vaccination form as needed. Patient/Parent was counseled on the above vaccines.        Medications Discontinued During This Encounter   Medication Reason    HYDROcodone-acetaminophen (NORCO) 5-325 MG per  tablet *Therapy completed    colchicine 0.6 MG tablet     esomeprazole (nexIUM) 40 MG capsule Reorder    allopurinol (ZYLOPRIM) 100 MG tablet Reorder          Follow Up   Return in about 3 months (around 1/25/2025) for Type 2 Diabetes.  Patient was given instructions and counseling regarding her condition or for health maintenance advice. Please see specific information pulled into the AVS if appropriate.       Donnell Garcia MD  10/25/24  15:05 EDT

## 2024-10-24 ENCOUNTER — TELEPHONE (OUTPATIENT)
Dept: CASE MANAGEMENT | Facility: OTHER | Age: 69
End: 2024-10-24
Payer: MEDICARE

## 2024-10-24 NOTE — TELEPHONE ENCOUNTER
Attempted to call [employer] for update on repairs to patient's wheelchair. Left voicemail.

## 2024-10-25 ENCOUNTER — OFFICE VISIT (OUTPATIENT)
Dept: INTERNAL MEDICINE | Facility: CLINIC | Age: 69
End: 2024-10-25
Payer: MEDICARE

## 2024-10-25 VITALS
DIASTOLIC BLOOD PRESSURE: 70 MMHG | SYSTOLIC BLOOD PRESSURE: 134 MMHG | BODY MASS INDEX: 41.01 KG/M2 | TEMPERATURE: 98.2 F | OXYGEN SATURATION: 95 % | HEIGHT: 64 IN | HEART RATE: 76 BPM | WEIGHT: 240.2 LBS

## 2024-10-25 DIAGNOSIS — M79.641 PAIN OF RIGHT HAND: ICD-10-CM

## 2024-10-25 DIAGNOSIS — I10 PRIMARY HYPERTENSION: ICD-10-CM

## 2024-10-25 DIAGNOSIS — M1A.0310 IDIOPATHIC CHRONIC GOUT OF RIGHT WRIST WITHOUT TOPHUS: ICD-10-CM

## 2024-10-25 DIAGNOSIS — Z79.4 TYPE 2 DIABETES MELLITUS WITH DIABETIC POLYNEUROPATHY, WITH LONG-TERM CURRENT USE OF INSULIN: Primary | ICD-10-CM

## 2024-10-25 DIAGNOSIS — Z12.2 SCREENING FOR LUNG CANCER: ICD-10-CM

## 2024-10-25 DIAGNOSIS — F17.211 CIGARETTE NICOTINE DEPENDENCE IN REMISSION: ICD-10-CM

## 2024-10-25 DIAGNOSIS — Z23 ENCOUNTER FOR IMMUNIZATION: ICD-10-CM

## 2024-10-25 DIAGNOSIS — E11.42 TYPE 2 DIABETES MELLITUS WITH DIABETIC POLYNEUROPATHY, WITH LONG-TERM CURRENT USE OF INSULIN: Primary | ICD-10-CM

## 2024-10-25 LAB
ALBUMIN SERPL-MCNC: 4 G/DL (ref 3.5–5.2)
ALBUMIN/GLOB SERPL: 1.3 G/DL
ALP SERPL-CCNC: 91 U/L (ref 39–117)
ALT SERPL W P-5'-P-CCNC: 51 U/L (ref 1–33)
ANION GAP SERPL CALCULATED.3IONS-SCNC: 9.8 MMOL/L (ref 5–15)
AST SERPL-CCNC: 60 U/L (ref 1–32)
BILIRUB SERPL-MCNC: 0.8 MG/DL (ref 0–1.2)
BUN SERPL-MCNC: 16 MG/DL (ref 8–23)
BUN/CREAT SERPL: 14.8 (ref 7–25)
CALCIUM SPEC-SCNC: 9.8 MG/DL (ref 8.6–10.5)
CHLORIDE SERPL-SCNC: 101 MMOL/L (ref 98–107)
CO2 SERPL-SCNC: 26.2 MMOL/L (ref 22–29)
CREAT SERPL-MCNC: 1.08 MG/DL (ref 0.57–1)
EGFRCR SERPLBLD CKD-EPI 2021: 55 ML/MIN/1.73
GLOBULIN UR ELPH-MCNC: 3.1 GM/DL
GLUCOSE SERPL-MCNC: 162 MG/DL (ref 65–99)
HBA1C MFR BLD: 7 % (ref 4.8–5.6)
POTASSIUM SERPL-SCNC: 3.8 MMOL/L (ref 3.5–5.2)
PROT SERPL-MCNC: 7.1 G/DL (ref 6–8.5)
SODIUM SERPL-SCNC: 137 MMOL/L (ref 136–145)
URATE SERPL-MCNC: 5.1 MG/DL (ref 2.4–5.7)

## 2024-10-25 PROCEDURE — 90662 IIV NO PRSV INCREASED AG IM: CPT | Performed by: STUDENT IN AN ORGANIZED HEALTH CARE EDUCATION/TRAINING PROGRAM

## 2024-10-25 PROCEDURE — 99214 OFFICE O/P EST MOD 30 MIN: CPT | Performed by: STUDENT IN AN ORGANIZED HEALTH CARE EDUCATION/TRAINING PROGRAM

## 2024-10-25 PROCEDURE — G0008 ADMIN INFLUENZA VIRUS VAC: HCPCS | Performed by: STUDENT IN AN ORGANIZED HEALTH CARE EDUCATION/TRAINING PROGRAM

## 2024-10-25 PROCEDURE — 83036 HEMOGLOBIN GLYCOSYLATED A1C: CPT | Performed by: STUDENT IN AN ORGANIZED HEALTH CARE EDUCATION/TRAINING PROGRAM

## 2024-10-25 PROCEDURE — 1125F AMNT PAIN NOTED PAIN PRSNT: CPT | Performed by: STUDENT IN AN ORGANIZED HEALTH CARE EDUCATION/TRAINING PROGRAM

## 2024-10-25 PROCEDURE — 3075F SYST BP GE 130 - 139MM HG: CPT | Performed by: STUDENT IN AN ORGANIZED HEALTH CARE EDUCATION/TRAINING PROGRAM

## 2024-10-25 PROCEDURE — 80053 COMPREHEN METABOLIC PANEL: CPT | Performed by: STUDENT IN AN ORGANIZED HEALTH CARE EDUCATION/TRAINING PROGRAM

## 2024-10-25 PROCEDURE — 84550 ASSAY OF BLOOD/URIC ACID: CPT | Performed by: STUDENT IN AN ORGANIZED HEALTH CARE EDUCATION/TRAINING PROGRAM

## 2024-10-25 PROCEDURE — 3044F HG A1C LEVEL LT 7.0%: CPT | Performed by: STUDENT IN AN ORGANIZED HEALTH CARE EDUCATION/TRAINING PROGRAM

## 2024-10-25 PROCEDURE — 3078F DIAST BP <80 MM HG: CPT | Performed by: STUDENT IN AN ORGANIZED HEALTH CARE EDUCATION/TRAINING PROGRAM

## 2024-10-25 RX ORDER — COLCHICINE 0.6 MG/1
0.6 TABLET ORAL DAILY
Qty: 90 TABLET | Refills: 2 | Status: CANCELLED | OUTPATIENT
Start: 2024-10-25

## 2024-10-25 RX ORDER — ALLOPURINOL 100 MG/1
100 TABLET ORAL DAILY
Qty: 90 TABLET | Refills: 0 | Status: SHIPPED | OUTPATIENT
Start: 2024-10-25

## 2024-10-25 RX ORDER — ESOMEPRAZOLE MAGNESIUM 40 MG/1
40 CAPSULE, DELAYED RELEASE ORAL
Qty: 90 CAPSULE | Refills: 1 | Status: SHIPPED | OUTPATIENT
Start: 2024-10-25

## 2024-10-28 DIAGNOSIS — Z79.4 TYPE 2 DIABETES MELLITUS WITH DIABETIC POLYNEUROPATHY, WITH LONG-TERM CURRENT USE OF INSULIN: ICD-10-CM

## 2024-10-28 DIAGNOSIS — E11.42 TYPE 2 DIABETES MELLITUS WITH DIABETIC POLYNEUROPATHY, WITH LONG-TERM CURRENT USE OF INSULIN: ICD-10-CM

## 2024-10-29 ENCOUNTER — OFFICE VISIT (OUTPATIENT)
Dept: ONCOLOGY | Facility: HOSPITAL | Age: 69
End: 2024-10-29
Payer: MEDICARE

## 2024-10-29 ENCOUNTER — LAB (OUTPATIENT)
Dept: ONCOLOGY | Facility: HOSPITAL | Age: 69
End: 2024-10-29
Payer: MEDICARE

## 2024-10-29 VITALS
SYSTOLIC BLOOD PRESSURE: 129 MMHG | HEART RATE: 60 BPM | RESPIRATION RATE: 16 BRPM | BODY MASS INDEX: 41.23 KG/M2 | HEIGHT: 64 IN | DIASTOLIC BLOOD PRESSURE: 52 MMHG | TEMPERATURE: 98 F | OXYGEN SATURATION: 98 %

## 2024-10-29 DIAGNOSIS — D64.9 ANEMIA, UNSPECIFIED TYPE: Primary | ICD-10-CM

## 2024-10-29 DIAGNOSIS — R74.8 ELEVATED LIVER ENZYMES: ICD-10-CM

## 2024-10-29 DIAGNOSIS — K75.81 LIVER CIRRHOSIS SECONDARY TO NASH: Primary | ICD-10-CM

## 2024-10-29 DIAGNOSIS — K74.60 LIVER CIRRHOSIS SECONDARY TO NASH: Primary | ICD-10-CM

## 2024-10-29 DIAGNOSIS — D69.6 THROMBOCYTOPENIA: ICD-10-CM

## 2024-10-29 LAB
BASOPHILS # BLD AUTO: 0.03 10*3/MM3 (ref 0–0.2)
BASOPHILS NFR BLD AUTO: 0.6 % (ref 0–1.5)
DEPRECATED RDW RBC AUTO: 49.4 FL (ref 37–54)
EOSINOPHIL # BLD AUTO: 0.39 10*3/MM3 (ref 0–0.4)
EOSINOPHIL NFR BLD AUTO: 8.4 % (ref 0.3–6.2)
ERYTHROCYTE [DISTWIDTH] IN BLOOD BY AUTOMATED COUNT: 14.6 % (ref 12.3–15.4)
HCT VFR BLD AUTO: 41.1 % (ref 34–46.6)
HGB BLD-MCNC: 13.8 G/DL (ref 12–15.9)
IMM GRANULOCYTES # BLD AUTO: 0 10*3/MM3 (ref 0–0.05)
IMM GRANULOCYTES NFR BLD AUTO: 0 % (ref 0–0.5)
LYMPHOCYTES # BLD AUTO: 1.04 10*3/MM3 (ref 0.7–3.1)
LYMPHOCYTES NFR BLD AUTO: 22.4 % (ref 19.6–45.3)
MCH RBC QN AUTO: 30.4 PG (ref 26.6–33)
MCHC RBC AUTO-ENTMCNC: 33.6 G/DL (ref 31.5–35.7)
MCV RBC AUTO: 90.5 FL (ref 79–97)
MONOCYTES # BLD AUTO: 0.47 10*3/MM3 (ref 0.1–0.9)
MONOCYTES NFR BLD AUTO: 10.1 % (ref 5–12)
NEUTROPHILS NFR BLD AUTO: 2.71 10*3/MM3 (ref 1.7–7)
NEUTROPHILS NFR BLD AUTO: 58.5 % (ref 42.7–76)
PLATELET # BLD AUTO: 93 10*3/MM3 (ref 140–450)
PMV BLD AUTO: 11.6 FL (ref 6–12)
RBC # BLD AUTO: 4.54 10*6/MM3 (ref 3.77–5.28)
WBC NRBC COR # BLD AUTO: 4.64 10*3/MM3 (ref 3.4–10.8)

## 2024-10-29 PROCEDURE — 36415 COLL VENOUS BLD VENIPUNCTURE: CPT | Performed by: NURSE PRACTITIONER

## 2024-10-29 PROCEDURE — 85025 COMPLETE CBC W/AUTO DIFF WBC: CPT | Performed by: NURSE PRACTITIONER

## 2024-10-29 PROCEDURE — G0463 HOSPITAL OUTPT CLINIC VISIT: HCPCS | Performed by: NURSE PRACTITIONER

## 2024-10-29 NOTE — PROGRESS NOTES
Chief Complaint/Reason for Referral:  Follow-up ( Normocytic anemia/Thrombocytopenia/)    Donnell Garcia MD Vanhoose, Thomas, MD    Records Obtained:  Records of the patients history including those obtained from   were reviewed and summarized in detail.    Subjective    History of Present Illness    Stephy Duncan presents to [facility] for chronic thrombocytopenia.     PMH includes: type II DM, hypertension, postop infection secondary to osteomyelitis and the  THR. Reports had 8 hip surgeries and then discontinued. She does not have a current hip replacement. She is wheel chair bound. Unable to walk.  Denies any skin breakdown. Reports the right leg is 3 inches shorter than the left. Other med history includes: gout, YANIRA, obesity, osteomyelitis, CHF, hyponatremia. She avoids alcohol. Previous smoker and quit in 12/10/2015. Cancer-related family history includes Cancer in her maternal grandfather, maternal grandmother, mother, and sister.      Has had chronic thrombocytopenia since July of 2022 in the range of 82 to 136. Most recent platelet count was 82 a couple of days ago. Reports she has bruising to the bilateral arms and she bleeds easier then she used to. She is not taking any anti-coagulation. Denies any acute prolonged bleeding at this time. Normal hemoglobin and normal WBC currently, but did have anemia present in the range of 8.2 to 11.6 in August of 2022. Hemoglobin has been normal since then. She is taking oral iron.     Prior CT scan back in December of 2019 did show partially included splenomegaly and possible hepatic cirrhosis. Reports has never seen GI for the cirrhosis or the splenomegaly.       10/29/2024: Interval follow up: Ms. Duncan presents for 2 month follow up for thrombocytopenia. Abdominal US done on 9/25/2024 does not show any splenomegaly but did show cirrhotic liver morphology with no focal liver lesions seen. Lab work done on  10/25/2024: CMP with elevated liver enzymes of AST of 60 and ALT of 51. CBC is acceptable with normal WBC and hemoglobin and the platelet count is 93 comparable to previous lab work in August of 2024. Denies any bleeding or bruising. She reports is not following with GI for the liver cirrhosis, but has in the past.         Oncology/Hematology History    No history exists.       Review of Systems   Constitutional: Negative.    HENT: Negative.     Eyes: Negative.    Respiratory: Negative.     Cardiovascular: Negative.    Gastrointestinal: Negative.    Endocrine: Negative.    Genitourinary: Negative.    Musculoskeletal:  Positive for back pain.   Skin: Negative.    Allergic/Immunologic: Negative.    Neurological:  Positive for headache.   Hematological:  Bruises/bleeds easily.   Psychiatric/Behavioral: Negative.     All other systems reviewed and are negative.      Current Outpatient Medications on File Prior to Visit   Medication Sig Dispense Refill    allopurinol (ZYLOPRIM) 100 MG tablet Take 1 tablet by mouth Daily. 90 tablet 0    calcium carbonate, oyster shell, 500 MG tablet tablet Take 1 tablet by mouth Daily.      Diclofenac Sodium (VOLTAREN) 1 % gel gel Apply 4 g topically to the appropriate area as directed 4 (Four) Times a Day As Needed (MSK pain). 100 g 3    Dulaglutide (Trulicity) 1.5 MG/0.5ML solution pen-injector Inject 1.5 mg under the skin into the appropriate area as directed 1 (One) Time Per Week. 2 mL 3    esomeprazole (nexIUM) 40 MG capsule Take 1 capsule by mouth Every Morning Before Breakfast. 90 capsule 1    ferrous sulfate 325 (65 FE) MG tablet Take 1 tablet by mouth Every Other Day. Mornings      folic acid (FOLVITE) 1 MG tablet Take 1 tablet by mouth Daily.      furosemide (LASIX) 40 MG tablet Take 1 tablet by mouth Daily.      gabapentin (NEURONTIN) 600 MG tablet TAKE 1 TABLET BY MOUTH TWICE A  tablet 0    insulin aspart (NovoLOG FlexPen) 100 UNIT/ML solution pen-injector sc pen Inject  20 Units under the skin into the appropriate area as directed Daily With Breakfast AND 23 Units Daily With Lunch AND 23 Units Daily With Dinner. 20 mL 11    insulin glargine (Lantus) 100 UNIT/ML injection Inject 100 Units under the skin into the appropriate area as directed Daily. 100 mL 3    isosorbide mononitrate (IMDUR) 30 MG 24 hr tablet Take 1 tablet by mouth Every Morning.      KLOR-CON 20 MEQ CR tablet Take 1 tablet by mouth Daily.      levocetirizine (XYZAL) 5 MG tablet Take 1 tablet by mouth Every Evening.      OneTouch Verio test strip USE AS DIRECTED 3 TIMES A DAY      rosuvastatin (CRESTOR) 5 MG tablet TAKE 1 TABLET (5 MG TOTAL) BY MOUTH DAILY.      spironolactone (ALDACTONE) 25 MG tablet Take 1 tablet by mouth Daily. 90 tablet 2    verapamil (CALAN) 80 MG tablet Take 1 tablet by mouth 2 (Two) Times a Day. 60 tablet 0    vitamin D3 125 MCG (5000 UT) capsule capsule Take 1 capsule by mouth Daily.      Calcium 500 MG tablet Take 500 mg by mouth Daily. (Patient not taking: Reported on 10/29/2024) 90 tablet 2    ibuprofen (ADVIL,MOTRIN) 200 MG tablet Take 1 tablet by mouth Every 6 (Six) Hours As Needed for Mild Pain. (Patient not taking: Reported on 8/7/2024)      Levemir FlexPen 100 UNIT/ML injection  (Patient not taking: Reported on 10/29/2024)       No current facility-administered medications on file prior to visit.       Allergies   Allergen Reactions    Ace Inhibitors Shortness Of Breath and Swelling    Morphine Shortness Of Breath     Past Medical History:   Diagnosis Date    Allergic     Morphine, ACE inhibitors    Allergies     Arthritis     Cancer 1982    Cervical cancer    Cardiac murmur     Coronary artery disease 2016    Received stints    Diabetes mellitus     GERD (gastroesophageal reflux disease)     Heart attack 12/2020    History of cervical cancer 1981    History of transfusion     SEVERAL    Hyperlipidemia     Hypertension     Iron deficiency anemia     Obesity     Osteomyelitis hip      RIGHT    PONV (postoperative nausea and vomiting)     Right hip pain     Visual impairment     Corrected with glasses    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  WOUND VAC IN PLACE     Past Surgical History:   Procedure Laterality Date    CARDIAC CATHETERIZATION      CERVICAL CONIZATION      COLONOSCOPY      CORONARY ANGIOPLASTY WITH STENT PLACEMENT      ENDOSCOPY      ENDOSCOPY N/A 06/09/2022    Procedure: ESOPHAGOGASTRODUODENOSCOPY WITH COLD BIOSPIES;  Surgeon: Zechariah Nj MD;  Location: [facility] ENDOSCOPY;  Service: Gastroenterology;  Laterality: N/A;  PRE- EPIGASTRIC PAIN AND ABDOMINAL PAIN  POST- NORMAL    HIP ARTHROPLASTY Right     HIP HARDWARE REMOVAL Right     HIP MINI REVISION Right 01/18/2022    Procedure: TOTAL HIP ARTHROPLASTY LINER REVISION, pegboard lateral;  Surgeon: Karri Schaeffer II, MD;  Location: [facility] OR;  Service: Orthopedics;  Laterality: Right;    HIP SPACER INSERTION WITH ANTIBIOTIC CEMENT      X3    HYSTERECTOMY      INCISION AND DRAINAGE HIP Right 12/18/2021    Procedure: INCISION AND DRAINAGE TOTAL HIP, LINER EXCHANGE AND WOUND VAC PLACEMENT;  Surgeon: Karri Schaeffer II, MD;  Location: [facility] OR;  Service: Orthopedics;  Laterality: Right;    INCISION AND DRAINAGE HIP Right 05/02/2022    Procedure: HIP INCISION AND DRAINAGE;  Surgeon: Karri Schaeffer II, MD;  Location: [facility] OR;  Service: Orthopedics;  Laterality: Right;    INCISION AND DRAINAGE HIP Right 06/04/2022    Procedure: HIP INCISION AND DRAINAGE WITH WOUND VAC REPLACEMENT;  Surgeon: Karri Schaeffer II, MD;  Location: [facility] OR;  Service: Orthopedics;  Laterality: Right;    JOINT REPLACEMENT  L knee, R hip    Knee 2014, hip replacement [id number] multiple surgeries    KNEE ARTHROPLASTY Left     LAPAROSCOPIC CHOLECYSTECTOMY      TONSILLECTOMY      TOTAL HIP ARTHROPLASTY Right 04/25/2022    Procedure: HIP GIRDLESTONE PROCEDURE;  Surgeon:  Karri Schaeffer II, MD;  Location: [facility] MAIN OR;  Service: Orthopedics;  Laterality: Right;    TOTAL HIP ARTHROPLASTY REVISION Right 2021    Procedure: TOTAL HIP REVISION ARTHROPLASTY RIGHT POSTERIOR;  Surgeon: Karri Schaeffer II, MD;  Location: [facility] MAIN OR;  Service: Orthopedics;  Laterality: Right;     Social History     Socioeconomic History    Marital status:    Tobacco Use    Smoking status: Former     Current packs/day: 0.00     Average packs/day: 1.4 packs/day for 81.3 years (110.1 ttl pk-yrs)     Types: Cigarettes     Start date: 12/10/1970     Quit date: 12/10/2015     Years since quittin.8     Passive exposure: Never    Smokeless tobacco: Never   Vaping Use    Vaping status: Never Used   Substance and Sexual Activity    Alcohol use: Not Currently    Drug use: Never    Sexual activity: Not Currently     Partners: Male     Birth control/protection: None, Hysterectomy     Family History   Problem Relation Age of Onset    Arthritis Mother     Cancer Mother     COPD Mother     Hearing loss Mother     Hyperlipidemia Mother     Thyroid disease Mother     Vision loss Mother     Arthritis Father     COPD Father     Hearing loss Father     Hyperlipidemia Father     Cancer Maternal Grandfather     Arthritis Maternal Grandmother     Cancer Maternal Grandmother     Hyperlipidemia Maternal Grandmother     Diabetes Paternal Grandfather     Heart disease Paternal Grandfather     Hyperlipidemia Paternal Grandfather     Diabetes Paternal Grandmother     Arthritis Sister     Cancer Sister     COPD Sister     Diabetes Sister     Hyperlipidemia Sister     Diabetes Brother     Hyperlipidemia Brother     Kidney disease Brother     Vision loss Maternal Uncle     Malig Hyperthermia Neg Hx      Immunization History   Administered Date(s) Administered    COVID-19 (PFIZER) 12YRS+ (COMIRNATY) 10/26/2023, 2024    COVID-19 (PFIZER) BIVALENT 12+YRS 2022    COVID-19 (PFIZER) Purple Cap  "Monovalent 03/08/2021, 03/29/2021, 10/04/2021    Covid-19 (Pfizer) Gray Cap Monovalent 04/23/2022    Fluzone (or Fluarix & Flulaval for VFC) >6mos 08/21/2017    Fluzone High-Dose 65+YRS 09/26/2018, 09/19/2019, 10/25/2024    Fluzone High-Dose 65+yrs 10/04/2021    Pneumococcal Conjugate 13-Valent (PCV13) 09/10/2019    Pneumococcal Polysaccharide (PPSV23) 09/26/2018    Shingrix 11/23/2020       Tobacco Use: Medium Risk (10/29/2024)    Patient History     Smoking Tobacco Use: Former     Smokeless Tobacco Use: Never     Passive Exposure: Never       Objective     Physical Exam  Vitals and nursing note reviewed.   Constitutional:       Appearance: Normal appearance.   HENT:      Head: Normocephalic.      Nose: Nose normal.      Mouth/Throat:      Mouth: Mucous membranes are moist.   Eyes:      Extraocular Movements: Extraocular movements intact.   Cardiovascular:      Rate and Rhythm: Normal rate and regular rhythm.      Pulses: Normal pulses.      Heart sounds: Normal heart sounds. No murmur heard.  Pulmonary:      Effort: Pulmonary effort is normal. No respiratory distress.      Breath sounds: Normal breath sounds.   Abdominal:      General: Bowel sounds are normal.      Palpations: Abdomen is soft.   Musculoskeletal:         General: Normal range of motion.      Cervical back: Normal range of motion and neck supple.   Skin:     General: Skin is warm and dry.      Capillary Refill: Capillary refill takes less than 2 seconds.   Neurological:      General: No focal deficit present.      Mental Status: She is alert and oriented to person, place, and time.   Psychiatric:         Mood and Affect: Mood normal.         Behavior: Behavior normal.         Thought Content: Thought content normal.         Judgment: Judgment normal.         Vitals:    10/29/24 1043   BP: 129/52   Pulse: 60   Resp: 16   Temp: 98 °F (36.7 °C)   TempSrc: Temporal   SpO2: 98%   Weight: Comment: unable to weigh   Height: 162.6 cm (64\")   PainSc: 0-No " "pain       Wt Readings from Last 3 Encounters:   10/25/24 109 kg (240 lb 3.2 oz)   08/27/24 108 kg (238 lb)   08/22/24 108 kg (238 lb 9.6 oz)                 ECOG score: 2         ECOG: (1) Restricted in Physically Strenuous Activity, Ambulatory & Able to Do Work of Light Nature  Fall Risk Assessment was completed, and patient is at low risk for falls.  PHQ-9 Total Score:         The patient is  experiencing fatigue. Fatigue score: 2    PT/OT Functional Screening: PT fx screen : No needs identified  Speech Functional Screening: Speech fx screen : No needs identified  Rehab to be ordered: Rehab to be ordered : No needs identified        Result Review :  The following data was reviewed by: MORGAN Augustin on 08/27/2024:  Lab Results   Component Value Date    HGB 13.8 10/29/2024    HCT 41.1 10/29/2024    MCV 90.5 10/29/2024    PLT 93 (L) 10/29/2024    WBC 4.64 10/29/2024    NEUTROABS 2.71 10/29/2024    LYMPHSABS 1.04 10/29/2024    MONOSABS 0.47 10/29/2024    EOSABS 0.39 10/29/2024    BASOSABS 0.03 10/29/2024     Lab Results   Component Value Date    GLUCOSE 162 (H) 10/25/2024    BUN 16 10/25/2024    CREATININE 1.08 (H) 10/25/2024     10/25/2024    K 3.8 10/25/2024     10/25/2024    CO2 26.2 10/25/2024    CALCIUM 9.8 10/25/2024    PROTEINTOT 7.1 10/25/2024    ALBUMIN 4.0 10/25/2024    BILITOT 0.8 10/25/2024    ALKPHOS 91 10/25/2024    AST 60 (H) 10/25/2024    ALT 51 (H) 10/25/2024     Lab Results   Component Value Date     (H) 08/27/2024    FERRITIN 616.00 (H) 06/02/2022    [id number] 468 08/27/2024    FOLATE >20.00 08/27/2024     Lab Results   Component Value Date    IRON 41 07/19/2022    LABIRON 15 (L) 07/19/2022    TRANSFERRIN 182 (L) 07/19/2022    TIBC 271 (L) 07/19/2022     Lab Results   Component Value Date     (H) 08/27/2024    FERRITIN 616.00 (H) 06/02/2022    [id number] 468 08/27/2024    FOLATE >20.00 08/27/2024     No results found for: \"PSA\", \"CEA\", \"AFP\", \"\", " "\"\"    XR Hand 3+ View Right    Result Date: 8/8/2024  Impression: 1.*Number widening of the scapholunate interval suggesting previous injury or laxity of the scapholunate ligament. 2.Mild to moderate osteoarthritic changes in the wrist and hand. Electronically Signed: Jose Maria Lugo MD  8/8/2024 1:39 PM EDT  Workstation ID: [id number]    XR Wrist 2 View Right    Result Date: 8/8/2024  Impression: 1. No acute osseous injury to the right wrist. 2. Scapholunate interval widening. 3. Intact radius and ulna without osseous injury to the right forearm. Electronically Signed: An Arnold MD  8/8/2024 1:37 PM EDT  Workstation ID: [id number]    XR Forearm 2 View Right    Result Date: 8/8/2024  Impression: 1. No acute osseous injury to the right wrist. 2. Scapholunate interval widening. 3. Intact radius and ulna without osseous injury to the right forearm. Electronically Signed: An Arnold MD  8/8/2024 1:37 PM EDT  Workstation ID: [id number]    XR Shoulder 2+ View Right    Result Date: 7/16/2024  Impression: Degenerative change. No acute osseous abnormalities are identified. Electronically Signed: Wes Meek MD  7/16/2024 2:15 PM EDT  Workstation ID: [id number]    DEXA Bone Density Axial    Result Date: 5/28/2024  Impression: Osteopenia - Based upon the FRAX score, this patient does meet criteria for initiation of pharmacological treatment recommendations for prevention of osteoporosis per the National Osteoporosis Foundation (NOF) guidelines. However, individualized treatment decisions should be made accounting for additional clinical factors.    Electronically Signed By-TAO HARKINS MD On:5/28/2024 9:24 AM             Assessment and Plan:  Diagnoses and all orders for this visit:    1. Liver cirrhosis secondary to MEEHAN (Primary)  -     Ambulatory Referral to Gastroenterology    2. Thrombocytopenia  -     CBC & Differential; Future  -     Comprehensive Metabolic Panel; Future  -     CBC & Differential    3. " Elevated liver enzymes  -     Ambulatory Referral to Gastroenterology      Thrombocytopenia since at least July of 2022 in the range of 87 to 136. Recently trended downward platelet count. Denies any acute bleeding, but does have some intermittent bruising noted to the arms. Likely secondary to splenomegaly / cirrhosis seen on prior CT chest in 2019. Has never had any dedicated abdominal imaging.  Abdominal US done on 9/25/2024 did show the sequale of cirrhosis, but no splenomegaly was noted at this time. No recent anemia, but did have some in the past and has been normal since April. Thrombocytopenia is stable at 93,000 and should have adequate hemostasis at this time. Normal WBC count. Liver enzymes are mildly elevated. Bilirubin is normal. Mild CKD.     Referral to GI for cirrhosis since she has seen them in quite some time to evaluate for esophageal varices and portal hypertension and elevated liver enzymes.      Repeat lab work in 6 months with CBC, CMP and follow up with NP>     I spent 30 minutes caring for [place] on this date of service. This time includes time spent by me in the following activities:preparing for the visit, reviewing tests, obtaining and/or reviewing a separately obtained history, performing a medically appropriate examination and/or evaluation , counseling and educating the patient/family/caregiver, ordering medications, tests, or procedures, referring and communicating with other health care professionals , documenting information in the medical record, and independently interpreting results and communicating that information with the patient/family/caregiver    Patient Follow Up: 6 months with NP>     Patient was given instructions and counseling regarding her condition or for health maintenance advice. Please see specific information pulled into the AVS if appropriate.     Farhana Evans, APRN    10/29/2024    .tob

## 2024-10-30 ENCOUNTER — PATIENT OUTREACH (OUTPATIENT)
Dept: CASE MANAGEMENT | Facility: OTHER | Age: 69
End: 2024-10-30
Payer: MEDICARE

## 2024-10-30 DIAGNOSIS — Z96.641 STATUS POST TOTAL REPLACEMENT OF RIGHT HIP: Primary | ICD-10-CM

## 2024-10-30 NOTE — OUTREACH NOTE
AMBULATORY CASE MANAGEMENT NOTE    Names and Relationships of Patient/Support Persons: Contact: [employer]; Relationship: Other  Contact: Stephy Duncan; Relationship: Self -     Care Coordination    I spoke with a representative for [employer] on the phone today. They stated that the insurance approval for the parts and repair of the patient's wheelchair was obtained on 10/29/2024. She stated that now it will take 2-6 weeks for the parts to come in.    Patient Outreach    I called patient to let her know the above. She verbalized understanding.    Mandi BELLE  Ambulatory Case Management    10/30/2024, 14:06 EDT

## 2024-11-22 RX ORDER — CALCIUM CARBONATE 500(1250)
500 TABLET ORAL DAILY
Qty: 90 TABLET | Refills: 2 | Status: SHIPPED | OUTPATIENT
Start: 2024-11-22

## 2024-12-02 ENCOUNTER — TELEPHONE (OUTPATIENT)
Dept: CASE MANAGEMENT | Facility: OTHER | Age: 69
End: 2024-12-02
Payer: MEDICARE

## 2024-12-02 ENCOUNTER — PATIENT OUTREACH (OUTPATIENT)
Dept: CASE MANAGEMENT | Facility: OTHER | Age: 69
End: 2024-12-02
Payer: MEDICARE

## 2024-12-02 DIAGNOSIS — Z96.641 STATUS POST TOTAL REPLACEMENT OF RIGHT HIP: Primary | ICD-10-CM

## 2024-12-02 DIAGNOSIS — Z79.4 TYPE 2 DIABETES MELLITUS WITH DIABETIC POLYNEUROPATHY, WITH LONG-TERM CURRENT USE OF INSULIN: ICD-10-CM

## 2024-12-02 DIAGNOSIS — E11.42 TYPE 2 DIABETES MELLITUS WITH DIABETIC POLYNEUROPATHY, WITH LONG-TERM CURRENT USE OF INSULIN: ICD-10-CM

## 2024-12-02 NOTE — TELEPHONE ENCOUNTER
Attempted to call patient. Left voicemail. UTR X 1    I also called [employer] to follow up on her WC repairs but I had to leave a voicemail.

## 2024-12-02 NOTE — OUTREACH NOTE
AMBULATORY CASE MANAGEMENT NOTE    Names and Relationships of Patient/Support Persons: Contact: Stephy Duncan; Relationship: Self -     CCM Interim Update    I spoke with patient on the phone today regarding her WC being repaired. She states she was told by [employer] that the parts for the WC will be in this week. They have scheduled the repair to be done at her house on 12/12.    Patient and I also discussed Dr. Garcia leaving the PCP practice at the end of January. She states she has been accepted as a patient with Dr. Walden. She has her last appointment with Dr. Garcia on 1/24/2025 then will see Dr. Walden on 4/25/2025.    Patient has no other concerns or needs at this time.    Mandi BELLE  Ambulatory Case Management    12/2/2024, 12:30 EST

## 2024-12-05 ENCOUNTER — HOSPITAL ENCOUNTER (OUTPATIENT)
Dept: CT IMAGING | Facility: HOSPITAL | Age: 69
Discharge: HOME | End: 2024-12-05
Admitting: STUDENT IN AN ORGANIZED HEALTH CARE EDUCATION/TRAINING PROGRAM
Payer: MEDICARE

## 2024-12-05 DIAGNOSIS — F17.211 CIGARETTE NICOTINE DEPENDENCE IN REMISSION: ICD-10-CM

## 2024-12-05 DIAGNOSIS — Z12.2 SCREENING FOR LUNG CANCER: ICD-10-CM

## 2024-12-05 PROCEDURE — 71271 CT THORAX LUNG CANCER SCR C-: CPT

## 2024-12-17 ENCOUNTER — PATIENT OUTREACH (OUTPATIENT)
Dept: CASE MANAGEMENT | Facility: OTHER | Age: 69
End: 2024-12-17
Payer: MEDICARE

## 2024-12-17 DIAGNOSIS — Z96.641 STATUS POST TOTAL REPLACEMENT OF RIGHT HIP: Primary | ICD-10-CM

## 2024-12-17 NOTE — OUTREACH NOTE
"AMBULATORY CASE MANAGEMENT NOTE    Names and Relationships of Patient/Support Persons: Contact: Stephy Duncan; Relationship: Self  Contact: Stephy Duncan; Relationship: Self -     Patient Outreach    Patient called and left me a message stating that her PWC has still not been repaired. Apparently the parts that came in, didn't include everything that was needed.    Care Coordination    I called [employer] regarding the above. They confirmed what patient relayed. When asked how long it would take to get the parts in, I was told, \"they were ordered on 12/12 and it's take a few weeks.\"    All of the above was relayed to the patient. I have also reached out to other [facility] to see if anyone has a direct rep contact for this company.     Mandi BELLE  Ambulatory Case Management    12/17/2024, 12:07 EST  "

## 2024-12-21 DIAGNOSIS — Z98.890 S/P GIRDLESTONE PROCEDURE: ICD-10-CM

## 2024-12-23 RX ORDER — GABAPENTIN 600 MG/1
600 TABLET ORAL 2 TIMES DAILY
Qty: 180 TABLET | Refills: 2 | Status: SHIPPED | OUTPATIENT
Start: 2024-12-23

## 2024-12-23 NOTE — TELEPHONE ENCOUNTER
Refill Request for Controlled Substance    Date of Request: 12/23/2024  Medication Requested: Gabapentin   Last UDS: 07/03/2024  Last Consent: 09/27/2023  Last OV: 10/25/2024  Next OV: 01/24/2025

## 2025-01-21 ENCOUNTER — TELEPHONE (OUTPATIENT)
Dept: CASE MANAGEMENT | Facility: OTHER | Age: 70
End: 2025-01-21
Payer: MEDICARE

## 2025-01-21 NOTE — TELEPHONE ENCOUNTER
Patient called and left me a message today stating that [employer] called and the parts for her WC will be in 2/13.

## 2025-01-22 NOTE — PROGRESS NOTES
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  No hypoglycemic measurements, but frequent AM hyperglycemic measurements.  She reports that worsening AM measurements seem to be associated with eating later in the evening.  She is trying to eat earlier in the evening, but finding this difficult.    BP log reviewed and average blood pressure above goal of < 130/80.    No interim falls.    Gabapentin helping to alleviate pain in legs.    Current Outpatient Medications   Medication Instructions    allopurinol (ZYLOPRIM) 100 mg, Oral, Daily    calcium carbonate (oyster shell) 500 mg, Oral, Daily    Calcium 500 mg, Oral, Daily    colchicine 0.6 MG tablet 1 tablet, Daily    Diclofenac Sodium (VOLTAREN) 4 g, Topical, 4 Times Daily PRN    esomeprazole (NEXIUM) 40 mg, Oral, Every Morning Before Breakfast    ferrous sulfate 325 mg, Every Other Day    folic acid (FOLVITE) 1,000 mcg, Daily    furosemide (LASIX) 40 mg, Daily    gabapentin (NEURONTIN) 600 mg, Oral, 2 Times Daily    insulin aspart (NovoLOG FlexPen) 100 UNIT/ML solution pen-injector sc pen Inject 20 Units under the skin into the appropriate area as directed Daily With Breakfast AND 23 Units Daily With Lunch AND 23 Units Daily With Dinner.    insulin glargine (LANTUS) 100 Units, Subcutaneous, Daily    isosorbide mononitrate (IMDUR) 30 mg, Every Morning    KLOR-CON 20 MEQ CR tablet 20 mEq, Daily    Levemir FlexPen 100 UNIT/ML injection     levocetirizine (XYZAL) 5 mg, Every Evening    nitroglycerin (NITROSTAT) 0.4 MG SL tablet 1 tablet    OneTouch Verio test strip USE AS DIRECTED 3 TIMES A DAY    rosuvastatin (CRESTOR) 5 MG tablet TAKE 1 TABLET (5 MG TOTAL) BY MOUTH DAILY.    spironolactone (ALDACTONE) 50 mg, Oral, Daily    Trulicity 1.5  "mg, Subcutaneous, Weekly    verapamil (CALAN) 80 mg, Oral, 2 Times Daily    vitamin D3 5,000 Units, Daily       The following portions of the patient's history were reviewed and updated as appropriate: allergies, current medications, past family history, past medical history, past social history, past surgical history, and problem list.    Objective   Vital Signs:   /68 (BP Location: Left arm, Patient Position: Sitting, Cuff Size: Adult)   Pulse 62   Temp 97.8 °F (36.6 °C) (Temporal)   Ht 162.6 cm (64\")   Wt 111 kg (245 lb)   SpO2 96%   BMI 42.05 kg/m²     BP Readings from Last 3 Encounters:   01/24/25 134/68   10/29/24 129/52   10/25/24 134/70     Wt Readings from Last 3 Encounters:   01/24/25 111 kg (245 lb)   10/25/24 109 kg (240 lb 3.2 oz)   08/27/24 108 kg (238 lb)           Physical Exam  Vitals reviewed.   Constitutional:       General: She is not in acute distress.     Appearance: Normal appearance. She is not ill-appearing, toxic-appearing or diaphoretic.   HENT:      Head: Normocephalic and atraumatic.      Right Ear: External ear normal.      Left Ear: External ear normal.   Eyes:      Conjunctiva/sclera: Conjunctivae normal.   Cardiovascular:      Rate and Rhythm: Normal rate and regular rhythm.      Pulses: Normal pulses.      Heart sounds: Normal heart sounds. No murmur heard.     No friction rub. No gallop.   Pulmonary:      Effort: Pulmonary effort is normal. No respiratory distress.      Breath sounds: Normal breath sounds. No stridor. No wheezing, rhonchi or rales.   Chest:      Chest wall: No tenderness.   Abdominal:      General: Abdomen is flat.      Palpations: Abdomen is soft. There is no mass.      Tenderness: There is no abdominal tenderness.   Musculoskeletal:      Right lower leg: No edema.      Left lower leg: No edema.   Skin:     General: Skin is warm and dry.   Neurological:      General: No focal deficit present.      Mental Status: She is alert. Mental status is at " baseline.   Psychiatric:         Behavior: Behavior normal.         Thought Content: Thought content normal.         Judgment: Judgment normal.          Result Review :   The following data was reviewed by: Donnell Garcia MD on 01/24/2025:  Common labs          8/27/2024    12:34 10/25/2024    14:49 10/29/2024    11:53   Common Labs   Glucose 228  162     BUN 17  16     Creatinine 1.08  1.08     Sodium 136  137     Potassium 4.2  3.8     Chloride 99  101     Calcium 9.6  9.8     Albumin 4.1  4.0     Total Bilirubin 0.8  0.8     Alkaline Phosphatase 85  91     AST (SGOT) 41  60     ALT (SGPT) 37  51     WBC 4.15   4.64    Hemoglobin 13.4   13.8    Hematocrit 40.5   41.1    Platelets 95   93    Hemoglobin A1C  7.00     Uric Acid  5.1              Lab Results   Component Value Date    COVID19 Not Detected 06/08/2022    INR 1.20 (H) 06/04/2022    BILIRUBINUR Negative 06/05/2022            Assessment and Plan    Diagnoses and all orders for this visit:    1. Type 2 diabetes mellitus with diabetic polyneuropathy, with long-term current use of insulin (Primary)  -     CBC & Differential  -     Comprehensive Metabolic Panel  -     Hemoglobin A1c  -     Microalbumin / Creatinine Urine Ratio - Urine, Clean Catch  -     gabapentin (NEURONTIN) 600 MG tablet; Take 1 tablet by mouth 2 (Two) Times a Day.  Dispense: 180 tablet; Refill: 2    2. Primary hypertension  -     Comprehensive Metabolic Panel  -     spironolactone (ALDACTONE) 50 MG tablet; Take 1 tablet by mouth Daily.  Dispense: 90 tablet; Refill: 2    3. 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 Mixed hyperlipidemia  -     Lipid Panel      Neuropathy:  -gabapentin well tolerated and helping alleviate pain  -will continue at current dosage    T2DM:  -AM blood sugars are above goal  -will check A1c.  If above goal of < 7%, will likely increase lantus    HTN:  -BP above goal of < 130/80  -will increase spironolactone        Medications  Discontinued During This Encounter   Medication Reason    ibuprofen (ADVIL,MOTRIN) 200 MG tablet *Therapy completed    spironolactone (ALDACTONE) 25 MG tablet Reorder    gabapentin (NEURONTIN) 600 MG tablet Reorder          Follow Up   Return in about 3 months (around 4/24/2025) for Medicare Wellness.  Patient was given instructions and counseling regarding her condition or for health maintenance advice. Please see specific information pulled into the AVS if appropriate.       Donnell Garcia MD  01/24/25  14:47 EST

## 2025-01-23 RX ORDER — ESOMEPRAZOLE MAGNESIUM 40 MG/1
40 CAPSULE, DELAYED RELEASE ORAL
Qty: 90 CAPSULE | Refills: 1 | Status: SHIPPED | OUTPATIENT
Start: 2025-01-23

## 2025-01-24 ENCOUNTER — OFFICE VISIT (OUTPATIENT)
Dept: INTERNAL MEDICINE | Facility: CLINIC | Age: 70
End: 2025-01-24
Payer: MEDICARE

## 2025-01-24 VITALS
BODY MASS INDEX: 41.83 KG/M2 | WEIGHT: 245 LBS | DIASTOLIC BLOOD PRESSURE: 68 MMHG | OXYGEN SATURATION: 96 % | SYSTOLIC BLOOD PRESSURE: 134 MMHG | HEART RATE: 62 BPM | HEIGHT: 64 IN | TEMPERATURE: 97.8 F

## 2025-01-24 DIAGNOSIS — I10 PRIMARY HYPERTENSION: ICD-10-CM

## 2025-01-24 DIAGNOSIS — M1A.0310 IDIOPATHIC CHRONIC GOUT OF RIGHT WRIST WITHOUT TOPHUS: ICD-10-CM

## 2025-01-24 DIAGNOSIS — E78.2 MIXED HYPERLIPIDEMIA: ICD-10-CM

## 2025-01-24 DIAGNOSIS — Z79.4 TYPE 2 DIABETES MELLITUS WITH DIABETIC POLYNEUROPATHY, WITH LONG-TERM CURRENT USE OF INSULIN: Primary | ICD-10-CM

## 2025-01-24 DIAGNOSIS — E11.42 TYPE 2 DIABETES MELLITUS WITH DIABETIC POLYNEUROPATHY, WITH LONG-TERM CURRENT USE OF INSULIN: Primary | ICD-10-CM

## 2025-01-24 LAB
ALBUMIN SERPL-MCNC: 3.7 G/DL (ref 3.5–5.2)
ALBUMIN UR-MCNC: <1.2 MG/DL
ALBUMIN/GLOB SERPL: 1.2 G/DL
ALP SERPL-CCNC: 77 U/L (ref 39–117)
ALT SERPL W P-5'-P-CCNC: 35 U/L (ref 1–33)
ANION GAP SERPL CALCULATED.3IONS-SCNC: 10.2 MMOL/L (ref 5–15)
AST SERPL-CCNC: 56 U/L (ref 1–32)
BASOPHILS # BLD AUTO: 0.06 10*3/MM3 (ref 0–0.2)
BASOPHILS NFR BLD AUTO: 1 % (ref 0–1.5)
BILIRUB SERPL-MCNC: 0.7 MG/DL (ref 0–1.2)
BUN SERPL-MCNC: 16 MG/DL (ref 8–23)
BUN/CREAT SERPL: 15.8 (ref 7–25)
CALCIUM SPEC-SCNC: 9.4 MG/DL (ref 8.6–10.5)
CHLORIDE SERPL-SCNC: 100 MMOL/L (ref 98–107)
CHOLEST SERPL-MCNC: 121 MG/DL (ref 0–200)
CO2 SERPL-SCNC: 25.8 MMOL/L (ref 22–29)
CREAT SERPL-MCNC: 1.01 MG/DL (ref 0.57–1)
CREAT UR-MCNC: 14.2 MG/DL
DEPRECATED RDW RBC AUTO: 46.6 FL (ref 37–54)
EGFRCR SERPLBLD CKD-EPI 2021: 59.6 ML/MIN/1.73
EOSINOPHIL # BLD AUTO: 0.4 10*3/MM3 (ref 0–0.4)
EOSINOPHIL NFR BLD AUTO: 6.8 % (ref 0.3–6.2)
ERYTHROCYTE [DISTWIDTH] IN BLOOD BY AUTOMATED COUNT: 13.3 % (ref 12.3–15.4)
GLOBULIN UR ELPH-MCNC: 3.1 GM/DL
GLUCOSE SERPL-MCNC: 126 MG/DL (ref 65–99)
HBA1C MFR BLD: 7.6 % (ref 4.8–5.6)
HCT VFR BLD AUTO: 40.8 % (ref 34–46.6)
HDLC SERPL-MCNC: 45 MG/DL (ref 40–60)
HGB BLD-MCNC: 13.2 G/DL (ref 12–15.9)
IMM GRANULOCYTES # BLD AUTO: 0.02 10*3/MM3 (ref 0–0.05)
IMM GRANULOCYTES NFR BLD AUTO: 0.3 % (ref 0–0.5)
LDLC SERPL CALC-MCNC: 60 MG/DL (ref 0–100)
LDLC/HDLC SERPL: 1.34 {RATIO}
LYMPHOCYTES # BLD AUTO: 1.15 10*3/MM3 (ref 0.7–3.1)
LYMPHOCYTES NFR BLD AUTO: 19.6 % (ref 19.6–45.3)
MCH RBC QN AUTO: 30.5 PG (ref 26.6–33)
MCHC RBC AUTO-ENTMCNC: 32.4 G/DL (ref 31.5–35.7)
MCV RBC AUTO: 94.2 FL (ref 79–97)
MICROALBUMIN/CREAT UR: NORMAL MG/G{CREAT}
MONOCYTES # BLD AUTO: 0.56 10*3/MM3 (ref 0.1–0.9)
MONOCYTES NFR BLD AUTO: 9.5 % (ref 5–12)
NEUTROPHILS NFR BLD AUTO: 3.69 10*3/MM3 (ref 1.7–7)
NEUTROPHILS NFR BLD AUTO: 62.8 % (ref 42.7–76)
NRBC BLD AUTO-RTO: 0 /100 WBC (ref 0–0.2)
PLATELET # BLD AUTO: 82 10*3/MM3 (ref 140–450)
PMV BLD AUTO: 10.9 FL (ref 6–12)
POTASSIUM SERPL-SCNC: 4.5 MMOL/L (ref 3.5–5.2)
PROT SERPL-MCNC: 6.8 G/DL (ref 6–8.5)
RBC # BLD AUTO: 4.33 10*6/MM3 (ref 3.77–5.28)
SODIUM SERPL-SCNC: 136 MMOL/L (ref 136–145)
TRIGL SERPL-MCNC: 79 MG/DL (ref 0–150)
URATE SERPL-MCNC: 4.7 MG/DL (ref 2.4–5.7)
VLDLC SERPL-MCNC: 16 MG/DL (ref 5–40)
WBC NRBC COR # BLD AUTO: 5.88 10*3/MM3 (ref 3.4–10.8)

## 2025-01-24 PROCEDURE — 83036 HEMOGLOBIN GLYCOSYLATED A1C: CPT | Performed by: STUDENT IN AN ORGANIZED HEALTH CARE EDUCATION/TRAINING PROGRAM

## 2025-01-24 PROCEDURE — 80061 LIPID PANEL: CPT | Performed by: STUDENT IN AN ORGANIZED HEALTH CARE EDUCATION/TRAINING PROGRAM

## 2025-01-24 PROCEDURE — 85025 COMPLETE CBC W/AUTO DIFF WBC: CPT | Performed by: STUDENT IN AN ORGANIZED HEALTH CARE EDUCATION/TRAINING PROGRAM

## 2025-01-24 PROCEDURE — 99214 OFFICE O/P EST MOD 30 MIN: CPT | Performed by: STUDENT IN AN ORGANIZED HEALTH CARE EDUCATION/TRAINING PROGRAM

## 2025-01-24 PROCEDURE — 82570 ASSAY OF URINE CREATININE: CPT | Performed by: STUDENT IN AN ORGANIZED HEALTH CARE EDUCATION/TRAINING PROGRAM

## 2025-01-24 PROCEDURE — 1126F AMNT PAIN NOTED NONE PRSNT: CPT | Performed by: STUDENT IN AN ORGANIZED HEALTH CARE EDUCATION/TRAINING PROGRAM

## 2025-01-24 PROCEDURE — 80053 COMPREHEN METABOLIC PANEL: CPT | Performed by: STUDENT IN AN ORGANIZED HEALTH CARE EDUCATION/TRAINING PROGRAM

## 2025-01-24 PROCEDURE — 3078F DIAST BP <80 MM HG: CPT | Performed by: STUDENT IN AN ORGANIZED HEALTH CARE EDUCATION/TRAINING PROGRAM

## 2025-01-24 PROCEDURE — 84550 ASSAY OF BLOOD/URIC ACID: CPT | Performed by: STUDENT IN AN ORGANIZED HEALTH CARE EDUCATION/TRAINING PROGRAM

## 2025-01-24 PROCEDURE — 82043 UR ALBUMIN QUANTITATIVE: CPT | Performed by: STUDENT IN AN ORGANIZED HEALTH CARE EDUCATION/TRAINING PROGRAM

## 2025-01-24 PROCEDURE — 3075F SYST BP GE 130 - 139MM HG: CPT | Performed by: STUDENT IN AN ORGANIZED HEALTH CARE EDUCATION/TRAINING PROGRAM

## 2025-01-24 RX ORDER — NITROGLYCERIN 0.4 MG/1
1 TABLET SUBLINGUAL
COMMUNITY

## 2025-01-24 RX ORDER — SPIRONOLACTONE 50 MG/1
50 TABLET, FILM COATED ORAL DAILY
Qty: 90 TABLET | Refills: 2 | Status: SHIPPED | OUTPATIENT
Start: 2025-01-24

## 2025-01-24 RX ORDER — GABAPENTIN 600 MG/1
600 TABLET ORAL 2 TIMES DAILY
Qty: 180 TABLET | Refills: 2 | Status: SHIPPED | OUTPATIENT
Start: 2025-01-24

## 2025-01-24 RX ORDER — COLCHICINE 0.6 MG/1
1 TABLET ORAL DAILY
COMMUNITY
Start: 2024-12-21

## 2025-01-27 RX ORDER — INSULIN GLARGINE 100 [IU]/ML
110 INJECTION, SOLUTION SUBCUTANEOUS DAILY
Qty: 100 ML | Refills: 3 | Status: SHIPPED | OUTPATIENT
Start: 2025-01-27

## 2025-02-06 DIAGNOSIS — E11.42 TYPE 2 DIABETES MELLITUS WITH DIABETIC POLYNEUROPATHY, WITH LONG-TERM CURRENT USE OF INSULIN: ICD-10-CM

## 2025-02-06 DIAGNOSIS — Z79.4 TYPE 2 DIABETES MELLITUS WITH DIABETIC POLYNEUROPATHY, WITH LONG-TERM CURRENT USE OF INSULIN: ICD-10-CM

## 2025-02-06 RX ORDER — DULAGLUTIDE 1.5 MG/.5ML
1.5 INJECTION, SOLUTION SUBCUTANEOUS
Qty: 2 ML | Refills: 3 | OUTPATIENT
Start: 2025-02-06

## 2025-02-14 ENCOUNTER — PATIENT OUTREACH (OUTPATIENT)
Dept: CASE MANAGEMENT | Facility: OTHER | Age: 70
End: 2025-02-14
Payer: MEDICARE

## 2025-02-14 NOTE — OUTREACH NOTE
AMBULATORY CASE MANAGEMENT NOTE    Names and Relationships of Patient/Support Persons: Contact: Stephy Duncan; Relationship: Self -     Patient Outreach    I spoke with patient today regarding her WC repairs. Patient states that the WC got repaired yesterday and she is very pleased with it.    Patient was previously a Dr. Garcia patient but is changing to Dr. Chun Marley. She has an appointment scheduled for 2/19.    I explained to the patient that if she wanted to stay with an ACM, her new ACM would be Radha. She verbalized understanding.    Care Coordination    I sent Radha LAIRD an electronic communication regarding this patient.     I am closing this HRCM program due to goal achieved and will make the patient CCM eligible.    Mandi BELLE  Ambulatory Case Management    2/14/2025, 13:35 EST

## 2025-02-23 ENCOUNTER — HOSPITAL ENCOUNTER (EMERGENCY)
Facility: HOSPITAL | Age: 70
Discharge: HOME | End: 2025-02-23
Attending: EMERGENCY MEDICINE | Admitting: EMERGENCY MEDICINE
Payer: MEDICARE

## 2025-02-23 ENCOUNTER — APPOINTMENT (OUTPATIENT)
Dept: CT IMAGING | Facility: HOSPITAL | Age: 70
End: 2025-02-23
Payer: MEDICARE

## 2025-02-23 VITALS
TEMPERATURE: 99 F | DIASTOLIC BLOOD PRESSURE: 47 MMHG | WEIGHT: 244.05 LBS | OXYGEN SATURATION: 92 % | RESPIRATION RATE: 18 BRPM | SYSTOLIC BLOOD PRESSURE: 137 MMHG | HEIGHT: 64 IN | BODY MASS INDEX: 41.67 KG/M2 | HEART RATE: 90 BPM

## 2025-02-23 DIAGNOSIS — K56.41 FECAL IMPACTION IN RECTUM: Primary | ICD-10-CM

## 2025-02-23 LAB
ALBUMIN SERPL-MCNC: 4.2 G/DL (ref 3.5–5.2)
ALBUMIN/GLOB SERPL: 1.2 G/DL
ALP SERPL-CCNC: 93 U/L (ref 39–117)
ALT SERPL W P-5'-P-CCNC: 39 U/L (ref 1–33)
ANION GAP SERPL CALCULATED.3IONS-SCNC: 13.7 MMOL/L (ref 5–15)
AST SERPL-CCNC: 46 U/L (ref 1–32)
BASOPHILS # BLD AUTO: 0.05 10*3/MM3 (ref 0–0.2)
BASOPHILS NFR BLD AUTO: 0.6 % (ref 0–1.5)
BILIRUB SERPL-MCNC: 1.7 MG/DL (ref 0–1.2)
BUN SERPL-MCNC: 19 MG/DL (ref 8–23)
BUN/CREAT SERPL: 13.4 (ref 7–25)
CALCIUM SPEC-SCNC: 9.9 MG/DL (ref 8.6–10.5)
CHLORIDE SERPL-SCNC: 97 MMOL/L (ref 98–107)
CO2 SERPL-SCNC: 22.3 MMOL/L (ref 22–29)
CREAT SERPL-MCNC: 1.42 MG/DL (ref 0.57–1)
D-LACTATE SERPL-SCNC: 2.2 MMOL/L (ref 0.5–2)
DEPRECATED RDW RBC AUTO: 47.8 FL (ref 37–54)
EGFRCR SERPLBLD CKD-EPI 2021: 39.6 ML/MIN/1.73
EOSINOPHIL # BLD AUTO: 0.37 10*3/MM3 (ref 0–0.4)
EOSINOPHIL NFR BLD AUTO: 4.2 % (ref 0.3–6.2)
ERYTHROCYTE [DISTWIDTH] IN BLOOD BY AUTOMATED COUNT: 14.2 % (ref 12.3–15.4)
GLOBULIN UR ELPH-MCNC: 3.6 GM/DL
GLUCOSE SERPL-MCNC: 202 MG/DL (ref 65–99)
HCT VFR BLD AUTO: 45.4 % (ref 34–46.6)
HGB BLD-MCNC: 15.3 G/DL (ref 12–15.9)
HOLD SPECIMEN: NORMAL
HOLD SPECIMEN: NORMAL
IMM GRANULOCYTES # BLD AUTO: 0.02 10*3/MM3 (ref 0–0.05)
IMM GRANULOCYTES NFR BLD AUTO: 0.2 % (ref 0–0.5)
LIPASE SERPL-CCNC: 14 U/L (ref 13–60)
LYMPHOCYTES # BLD AUTO: 1.58 10*3/MM3 (ref 0.7–3.1)
LYMPHOCYTES NFR BLD AUTO: 17.8 % (ref 19.6–45.3)
MCH RBC QN AUTO: 30.7 PG (ref 26.6–33)
MCHC RBC AUTO-ENTMCNC: 33.7 G/DL (ref 31.5–35.7)
MCV RBC AUTO: 91.2 FL (ref 79–97)
MONOCYTES # BLD AUTO: 0.79 10*3/MM3 (ref 0.1–0.9)
MONOCYTES NFR BLD AUTO: 8.9 % (ref 5–12)
NEUTROPHILS NFR BLD AUTO: 6.07 10*3/MM3 (ref 1.7–7)
NEUTROPHILS NFR BLD AUTO: 68.3 % (ref 42.7–76)
NRBC BLD AUTO-RTO: 0 /100 WBC (ref 0–0.2)
PLATELET # BLD AUTO: 126 10*3/MM3 (ref 140–450)
PMV BLD AUTO: 9.5 FL (ref 6–12)
POTASSIUM SERPL-SCNC: 3.9 MMOL/L (ref 3.5–5.2)
PROT SERPL-MCNC: 7.8 G/DL (ref 6–8.5)
RBC # BLD AUTO: 4.98 10*6/MM3 (ref 3.77–5.28)
SODIUM SERPL-SCNC: 133 MMOL/L (ref 136–145)
WBC NRBC COR # BLD AUTO: 8.88 10*3/MM3 (ref 3.4–10.8)
WHOLE BLOOD HOLD COAG: NORMAL
WHOLE BLOOD HOLD SPECIMEN: NORMAL

## 2025-02-23 PROCEDURE — 25010000002 ONDANSETRON PER 1 MG

## 2025-02-23 PROCEDURE — 83605 ASSAY OF LACTIC ACID: CPT | Performed by: EMERGENCY MEDICINE

## 2025-02-23 PROCEDURE — 83690 ASSAY OF LIPASE: CPT | Performed by: EMERGENCY MEDICINE

## 2025-02-23 PROCEDURE — 74177 CT ABD & PELVIS W/CONTRAST: CPT

## 2025-02-23 PROCEDURE — 25010000002 HYDROMORPHONE 1 MG/ML SOLUTION: Performed by: EMERGENCY MEDICINE

## 2025-02-23 PROCEDURE — 25510000001 IOPAMIDOL PER 1 ML: Performed by: EMERGENCY MEDICINE

## 2025-02-23 PROCEDURE — 85025 COMPLETE CBC W/AUTO DIFF WBC: CPT | Performed by: EMERGENCY MEDICINE

## 2025-02-23 PROCEDURE — 96361 HYDRATE IV INFUSION ADD-ON: CPT

## 2025-02-23 PROCEDURE — 99285 EMERGENCY DEPT VISIT HI MDM: CPT

## 2025-02-23 PROCEDURE — 80053 COMPREHEN METABOLIC PANEL: CPT | Performed by: EMERGENCY MEDICINE

## 2025-02-23 PROCEDURE — 96375 TX/PRO/DX INJ NEW DRUG ADDON: CPT

## 2025-02-23 PROCEDURE — 96374 THER/PROPH/DIAG INJ IV PUSH: CPT

## 2025-02-23 PROCEDURE — 25810000003 SODIUM CHLORIDE 0.9 % SOLUTION

## 2025-02-23 RX ORDER — IOPAMIDOL 755 MG/ML
100 INJECTION, SOLUTION INTRAVASCULAR
Status: COMPLETED | OUTPATIENT
Start: 2025-02-23 | End: 2025-02-23

## 2025-02-23 RX ORDER — ONDANSETRON 2 MG/ML
4 INJECTION INTRAMUSCULAR; INTRAVENOUS ONCE
Status: COMPLETED | OUTPATIENT
Start: 2025-02-23 | End: 2025-02-23

## 2025-02-23 RX ORDER — SODIUM CHLORIDE 0.9 % (FLUSH) 0.9 %
10 SYRINGE (ML) INJECTION AS NEEDED
Status: DISCONTINUED | OUTPATIENT
Start: 2025-02-23 | End: 2025-02-23 | Stop reason: HOSPADM

## 2025-02-23 RX ORDER — MAG HYDROX/ALUMINUM HYD/SIMETH 400-400-40
1 SUSPENSION, ORAL (FINAL DOSE FORM) ORAL ONCE
Status: DISCONTINUED | OUTPATIENT
Start: 2025-02-23 | End: 2025-02-23 | Stop reason: HOSPADM

## 2025-02-23 RX ORDER — DOCUSATE SODIUM 100 MG/1
100 CAPSULE, LIQUID FILLED ORAL 2 TIMES DAILY
Qty: 20 CAPSULE | Refills: 0 | Status: SHIPPED | OUTPATIENT
Start: 2025-02-23

## 2025-02-23 RX ORDER — MAG HYDROX/ALUMINUM HYD/SIMETH 400-400-40
1 SUSPENSION, ORAL (FINAL DOSE FORM) ORAL ONCE
Status: COMPLETED | OUTPATIENT
Start: 2025-02-23 | End: 2025-02-23

## 2025-02-23 RX ADMIN — ONDANSETRON 4 MG: 2 INJECTION INTRAMUSCULAR; INTRAVENOUS at 05:33

## 2025-02-23 RX ADMIN — GLYCERIN 1 SUPPOSITORY: 2 SUPPOSITORY RECTAL at 06:49

## 2025-02-23 RX ADMIN — HYDROMORPHONE HYDROCHLORIDE 1 MG: 1 INJECTION, SOLUTION INTRAMUSCULAR; INTRAVENOUS; SUBCUTANEOUS at 08:05

## 2025-02-23 RX ADMIN — IOPAMIDOL 100 ML: 755 INJECTION, SOLUTION INTRAVENOUS at 05:52

## 2025-02-23 RX ADMIN — SODIUM CHLORIDE 1000 ML: 9 INJECTION, SOLUTION INTRAVENOUS at 06:49

## 2025-02-23 NOTE — DISCHARGE INSTRUCTIONS
Take stool softeners as directed.  Return to the emergency department for vomiting, intractable pain, other symptoms concerning to you.

## 2025-02-23 NOTE — ED PROVIDER NOTES
Time: 5:45 AM EST  Date of encounter:  2/23/2025  Independent Historian/Clinical History and Information was obtained by:   Patient    History is limited by: N/A    Chief Complaint: Abdominal pain      History of Present Illness:  Patient is a 69 y.o. year old female who presents to the emergency department for evaluation of abdominal pain, nausea, and vomiting. States that symptoms started around 3 days ago. States that she was having diarrhea at the beginning of the week so she took imodium. Since then, she has not had any bowel movement. Denies any dysuria. Hx of hysterectomy.     Patient Care Team  Primary Care Provider: Donnell Garcia MD    Past Medical History:     Allergies   Allergen Reactions    Ace Inhibitors Shortness Of Breath and Swelling    Morphine Shortness Of Breath     Past Medical History:   Diagnosis Date    Allergic     Morphine, ACE inhibitors    Allergies     Arthritis     Cancer 1982    Cervical cancer    Cardiac murmur     Coronary artery disease 2016    Received stints    Diabetes mellitus     GERD (gastroesophageal reflux disease)     Heart attack 12/2020    History of cervical cancer 1981    History of transfusion     SEVERAL    Hyperlipidemia     Hypertension     Iron deficiency anemia     Obesity     Osteomyelitis hip     RIGHT    PONV (postoperative nausea and vomiting)     Right hip pain     Visual impairment     Corrected with glasses    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  WOUND VAC IN PLACE     Past Surgical History:   Procedure Laterality Date    CARDIAC CATHETERIZATION      CERVICAL CONIZATION      COLONOSCOPY      CORONARY ANGIOPLASTY WITH STENT PLACEMENT      ENDOSCOPY      ENDOSCOPY N/A 06/09/2022    Procedure: ESOPHAGOGASTRODUODENOSCOPY WITH COLD BIOSPIES;  Surgeon: Zechariah Nj MD;  Location: [facility] ENDOSCOPY;  Service: Gastroenterology;  Laterality: N/A;  PRE- EPIGASTRIC PAIN AND ABDOMINAL PAIN  POST- NORMAL    HIP ARTHROPLASTY Right      HIP HARDWARE REMOVAL Right     HIP MINI REVISION Right 01/18/2022    Procedure: TOTAL HIP ARTHROPLASTY LINER REVISION, pegboard lateral;  Surgeon: Karri Schaeffer II, MD;  Location: [facility] MAIN OR;  Service: Orthopedics;  Laterality: Right;    HIP SPACER INSERTION WITH ANTIBIOTIC CEMENT      X3    HYSTERECTOMY      INCISION AND DRAINAGE HIP Right 12/18/2021    Procedure: INCISION AND DRAINAGE TOTAL HIP, LINER EXCHANGE AND WOUND VAC PLACEMENT;  Surgeon: Karri Schaeffer II, MD;  Location: [facility] MAIN [facility];  Service: Orthopedics;  Laterality: Right;    INCISION AND DRAINAGE HIP Right 05/02/2022    Procedure: HIP INCISION AND DRAINAGE;  Surgeon: Karri Schaeffer II, MD;  Location: [facility] MAIN OR;  Service: Orthopedics;  Laterality: Right;    INCISION AND DRAINAGE HIP Right 06/04/2022    Procedure: HIP INCISION AND DRAINAGE WITH WOUND VAC REPLACEMENT;  Surgeon: Karri Schaeffer II, MD;  Location: [facility] MAIN [facility];  Service: Orthopedics;  Laterality: Right;    JOINT REPLACEMENT  L knee, R hip    Knee 2014, hip replacement [id number] multiple surgeries    KNEE ARTHROPLASTY Left     LAPAROSCOPIC CHOLECYSTECTOMY      TONSILLECTOMY      TOTAL HIP ARTHROPLASTY Right 04/25/2022    Procedure: HIP GIRDLESTONE PROCEDURE;  Surgeon: Karri Schaeffer II, MD;  Location: [facility] MAIN OR;  Service: Orthopedics;  Laterality: Right;    TOTAL HIP ARTHROPLASTY REVISION Right 11/16/2021    Procedure: TOTAL HIP REVISION ARTHROPLASTY RIGHT POSTERIOR;  Surgeon: Karri Schaeffer II, MD;  Location: [facility] MAIN OR;  Service: Orthopedics;  Laterality: Right;     Family History   Problem Relation Age of Onset    Arthritis Mother     Cancer Mother     COPD Mother     Hearing loss Mother     Hyperlipidemia Mother     Thyroid disease Mother     Vision loss Mother     Arthritis Father     COPD Father     Hearing loss Father     Hyperlipidemia Father     Cancer Maternal Grandfather     Arthritis Maternal  Grandmother     Cancer Maternal Grandmother     Hyperlipidemia Maternal Grandmother     Diabetes Paternal Grandfather     Heart disease Paternal Grandfather     Hyperlipidemia Paternal Grandfather     Diabetes Paternal Grandmother     Arthritis Sister     Cancer Sister     COPD Sister     Diabetes Sister     Hyperlipidemia Sister     Diabetes Brother     Hyperlipidemia Brother     Kidney disease Brother     Vision loss Maternal Uncle     Malig Hyperthermia Neg Hx        Home Medications:  Prior to Admission medications    Medication Sig Start Date End Date Taking? Authorizing Provider   allopurinol (ZYLOPRIM) 100 MG tablet Take 1 tablet by mouth Daily. 10/25/24   Donnell Garcia MD   Calcium 500 MG tablet Take 500 mg by mouth Daily.  Patient not taking: Reported on 1/24/2025 5/28/24   Donnell Garcia MD   calcium carbonate, oyster shell, 500 MG tablet tablet TAKE 500 MG BY MOUTH DAILY. 11/22/24   Donnell Garcia MD   colchicine 0.6 MG tablet Take 1 tablet by mouth Daily. 12/21/24   Nikunj Isaac MD   Diclofenac Sodium (VOLTAREN) 1 % gel gel Apply 4 g topically to the appropriate area as directed 4 (Four) Times a Day As Needed (MSK pain). 10/25/24   Donnell Garcia MD   Dulaglutide (Trulicity) 1.5 MG/0.5ML solution pen-injector Inject 1.5 mg under the skin into the appropriate area as directed 1 (One) Time Per Week. 10/28/24   Donnell Garcia MD   esomeprazole (nexIUM) 40 MG capsule TAKE 1 CAPSULE BY MOUTH EVERY DAY IN THE MORNING BEFORE BREAKFAST 1/23/25   Donnell Garcia MD   ferrous sulfate 325 (65 FE) MG tablet Take 1 tablet by mouth Every Other Day. Mornings    Nikunj Isaac MD   folic acid (FOLVITE) 1 MG tablet Take 1 tablet by mouth Daily. 3/28/22   Nikunj Isaac MD   furosemide (LASIX) 40 MG tablet Take 1 tablet by mouth Daily. 7/7/22   Nikunj Isaac MD   gabapentin (NEURONTIN) 600 MG tablet Take 1 tablet by mouth 2 (Two) Times a Day. 1/24/25   Donnell Garcia MD    insulin aspart (NovoLOG FlexPen) 100 UNIT/ML solution pen-injector sc pen Inject 20 Units under the skin into the appropriate area as directed Daily With Breakfast AND 23 Units Daily With Lunch AND 23 Units Daily With Dinner. 23  Donnell Garcia MD   insulin glargine (Lantus) 100 UNIT/ML injection Inject 110 Units under the skin into the appropriate area as directed Daily. 25   Donnell Garcia MD   isosorbide mononitrate (IMDUR) 30 MG 24 hr tablet Take 1 tablet by mouth Every Morning.    Provider, Historical, MD   KLOR-CON 20 MEQ CR tablet Take 1 tablet by mouth Daily. 22   Nikunj Isaac MD   Levemir FlexPen 100 UNIT/ML injection  24   Nikunj Isaac MD   levocetirizine (XYZAL) 5 MG tablet Take 1 tablet by mouth Every Evening.    Nikunj Isaac MD   nitroglycerin (NITROSTAT) 0.4 MG SL tablet Take 1 tablet by mouth.    Nikunj Isaac MD   OneTouch Verio test strip USE AS DIRECTED 3 TIMES A DAY 24   Nikunj Isaac MD   rosuvastatin (CRESTOR) 5 MG tablet TAKE 1 TABLET (5 MG TOTAL) BY MOUTH DAILY. 24   Nikunj Isaac MD   spironolactone (ALDACTONE) 50 MG tablet Take 1 tablet by mouth Daily. 25   Donnell Garcia MD   verapamil (CALAN) 80 MG tablet Take 1 tablet by mouth 2 (Two) Times a Day. 22   Kartik Em MD   vitamin D3 125 MCG (5000 UT) capsule capsule Take 1 capsule by mouth Daily.    Nikunj Isaac MD        Social History:   Social History     Tobacco Use    Smoking status: Former     Current packs/day: 0.00     Average packs/day: 1.4 packs/day for 81.3 years (110.1 ttl pk-yrs)     Types: Cigarettes     Start date: 12/10/1970     Quit date: 12/10/2015     Years since quittin.2     Passive exposure: Never    Smokeless tobacco: Never   Vaping Use    Vaping status: Never Used   Substance Use Topics    Alcohol use: Not Currently    Drug use: Never         Review of Systems:  Review of Systems  "  Constitutional:  Negative for chills and fever.   HENT:  Negative for ear pain.    Eyes:  Negative for pain.   Respiratory:  Negative for cough and shortness of breath.    Cardiovascular:  Negative for chest pain.   Gastrointestinal:  Positive for abdominal pain, nausea and vomiting. Negative for diarrhea.   Genitourinary:  Negative for dysuria.   Musculoskeletal:  Negative for arthralgias.   Skin:  Negative for rash.   Neurological:  Negative for headaches.        Physical Exam:  /59   Pulse 83   Temp 99 °F (37.2 °C) (Oral)   Resp 18   Ht 162.6 cm (64\")   Wt 111 kg (244 lb 0.8 oz)   SpO2 94%   BMI 41.89 kg/m²     Physical Exam  Vitals and nursing note reviewed. Exam conducted with a chaperone present (Sarabjit FREEDMAN).   Constitutional:       Appearance: Normal appearance.   HENT:      Head: Normocephalic and atraumatic.      Nose: Nose normal.   Eyes:      Extraocular Movements: Extraocular movements intact.      Conjunctiva/sclera: Conjunctivae normal.      Pupils: Pupils are equal, round, and reactive to light.   Cardiovascular:      Rate and Rhythm: Normal rate and regular rhythm.      Heart sounds: Normal heart sounds.   Pulmonary:      Effort: Pulmonary effort is normal.      Breath sounds: Normal breath sounds.   Abdominal:      General: Abdomen is flat. Bowel sounds are normal.      Palpations: Abdomen is soft.      Tenderness: There is generalized abdominal tenderness.   Genitourinary:     Comments: Stool burden in rectum  Musculoskeletal:         General: Normal range of motion.      Cervical back: Normal range of motion and neck supple.   Skin:     General: Skin is warm and dry.   Neurological:      General: No focal deficit present.      Mental Status: She is alert and oriented to person, place, and time.   Psychiatric:         Mood and Affect: Mood normal.         Behavior: Behavior normal.         Thought Content: Thought content normal.         Judgment: Judgment normal.            "         Medical Decision Making:      Comorbidities that affect care:    Obesity, diabetes, hyperlipidemia, hypertension, GERD    External Notes reviewed:    None      The following orders were placed and all results were independently analyzed by me:  Orders Placed This Encounter   Procedures    CT Abdomen Pelvis With Contrast    Wanette Draw    Comprehensive Metabolic Panel    Lipase    Urinalysis With Microscopic If Indicated (No Culture) - Urine, Clean Catch    Lactic Acid, Plasma    CBC Auto Differential    NPO Diet NPO Type: Strict NPO    Undress & Gown    Insert Peripheral IV    CBC & Differential    Green Top (Gel)    Lavender Top    Gold Top - SST    Light Blue Top       Medications Given in the Emergency Department:  Medications   sodium chloride 0.9 % flush 10 mL (has no administration in time range)   glycerin adult 1 suppository (has no administration in time range)   ondansetron (ZOFRAN) injection 4 mg (4 mg Intravenous Given 2/23/25 0533)   iopamidol (ISOVUE-370) 76 % injection 100 mL (100 mL Intravenous Given 2/23/25 0552)   sodium chloride 0.9 % bolus 1,000 mL (1,000 mL Intravenous New Bag 2/23/25 0649)   glycerin adult 1 suppository (1 suppository Rectal Given 2/23/25 0649)   HYDROmorphone (DILAUDID) injection 1 mg (1 mg Intravenous Given 2/23/25 0805)        ED Course:    ED Course as of 02/23/25 0825   Sun Feb 23, 2025   0622 CT Abdomen Pelvis With Contrast  Negative. Moderate to Large stool burden. Possible fecal stasis/impaction. [MV]   0725 I went to evaluate patient at this time, she is sitting on bedside commode.  Will return. [MD]   0745 Patient had minimal bowel movement and is still complaining of significant rectal pressure.  Will complete disimpaction at this time. [MD]   0820 Fecal disimpaction completed. [MD]      ED Course User Index  [MD] Troy Loaiza PA-C  [MV] Gilbert Cole PA       Labs:    Lab Results (last 24 hours)       Procedure Component Value Units Date/Time    CBC &  Differential [[id number]]  (Abnormal) Collected: 02/23/25 0436    Specimen: Blood Updated: 02/23/25 0447    Narrative:      The following orders were created for panel order CBC & Differential.  Procedure                               Abnormality         Status                     ---------                               -----------         ------                     CBC Auto Differential[[id number]]        Abnormal            Final result               Scan Slide[[id number]]                                                                    Please view results for these tests on the individual orders.    Comprehensive Metabolic Panel [[id number]]  (Abnormal) Collected: 02/23/25 0436    Specimen: Blood Updated: 02/23/25 0501     Glucose 202 mg/dL      BUN 19 mg/dL      Creatinine 1.42 mg/dL      Sodium 133 mmol/L      Potassium 3.9 mmol/L      Chloride 97 mmol/L      CO2 22.3 mmol/L      Calcium 9.9 mg/dL      Total Protein 7.8 g/dL      Albumin 4.2 g/dL      ALT (SGPT) 39 U/L      AST (SGOT) 46 U/L      Alkaline Phosphatase 93 U/L      Total Bilirubin 1.7 mg/dL      Globulin 3.6 gm/dL      A/G Ratio 1.2 g/dL      BUN/Creatinine Ratio 13.4     Anion Gap 13.7 mmol/L      eGFR 39.6 mL/min/1.73     Narrative:      GFR Categories in Chronic Kidney Disease (CKD)      GFR Category          GFR (mL/min/1.73)    Interpretation  G1                     90 or greater         Normal or high (1)  G2                      60-89                Mild decrease (1)  G3a                   45-59                Mild to moderate decrease  G3b                   30-44                Moderate to severe decrease  G4                    15-29                Severe decrease  G5                    14 or less           Kidney failure          (1)In the absence of evidence of kidney disease, neither GFR category G1 or G2 fulfill the criteria for CKD.    eGFR calculation 2021 CKD-EPI creatinine equation, which does not include race as a factor    Lipase  [[id number]]  (Normal) Collected: 02/23/25 0436    Specimen: Blood Updated: 02/23/25 0501     Lipase 14 U/L     Lactic Acid, Plasma [[id number]]  (Abnormal) Collected: 02/23/25 0436    Specimen: Blood Updated: 02/23/25 0515     Lactate 2.2 mmol/L     CBC Auto Differential [[id number]]  (Abnormal) Collected: 02/23/25 0436    Specimen: Blood Updated: 02/23/25 0447     WBC 8.88 10*3/mm3      RBC 4.98 10*6/mm3      Hemoglobin 15.3 g/dL      Hematocrit 45.4 %      MCV 91.2 fL      MCH 30.7 pg      MCHC 33.7 g/dL      RDW 14.2 %      RDW-SD 47.8 fl      MPV 9.5 fL      Platelets 126 10*3/mm3      Neutrophil % 68.3 %      Lymphocyte % 17.8 %      Monocyte % 8.9 %      Eosinophil % 4.2 %      Basophil % 0.6 %      Immature Grans % 0.2 %      Neutrophils, Absolute 6.07 10*3/mm3      Lymphocytes, Absolute 1.58 10*3/mm3      Monocytes, Absolute 0.79 10*3/mm3      Eosinophils, Absolute 0.37 10*3/mm3      Basophils, Absolute 0.05 10*3/mm3      Immature Grans, Absolute 0.02 10*3/mm3      nRBC 0.0 /100 WBC              Imaging:    CT Abdomen Pelvis With Contrast    Result Date: 2/23/2025  CT ABDOMEN PELVIS W CONTRAST-  Date of exam: 2/23/2025, 5:53 A.M.  Indication: abd. pain, diffuse  Comparisons: 6/1/2022; 10/6/2021.  TECHNIQUE: Axial CT images were obtained of the abdomen and pelvis following the uneventful intravenous administration of 85 mL (or less) of Isovue-370 contrast agent. Reconstructed 2D (two-dimensional) coronal and sagittal images were also obtained. Automated exposure control and iterative construction methods were used. Additionally, the radiation dose reduction device was turned on for each scan per the ALARA (As Low as Reasonably Achievable) protocol. No oral contrast agent was administered for the study. Please see the electronic medical record, EMR (i.e., Epic), for the documented dose of intravenous contrast agent as well as the radiation dose. The study is limited due to large body habitus. For instance,  portions of the abdominal and pelvic wall are excluded from the field-of-view.  FINDINGS: Again, the patient has undergone a right-sided Girdlestone procedure with chronic scarring and possibly a chronic residual fluid collection lateral to the lower right pelvic bones. No ectopic gas foci are seen in these areas. Probably no convincing CT evidence for osteomyelitis in this region or elsewhere. Please correlate clinically. Severe degenerative changes are seen throughout the imaged spine. A large disc herniation at the L3-4 level is possible. Consider nonemergent lumbar spine MRI examination for further assessment if clinically warranted and if not contraindicated. There may be mild thoracolumbar scoliosis. There are degenerative changes of the bilateral sacroiliac (SI) joints. There is cirrhotic morphology of the liver. There is splenomegaly. Portal hypertension and portosystemic venous shunting are suggested. The main portal vein is patent. There is a small hiatal hernia. 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 The patient has undergone cholecystectomy. Probably mild compensatory biliary tree dilatation is seen. No acute pancreatitis. No definite adrenal nodule. No renal stones or hydronephrosis or obstructive uropathy due to a ureteral calculus. No acute pyelonephritis. No definite urinary bladder calculi are seen. A moderate-to-large stool burden is suggested. There may be fecal stasis (or fecal retention) as with constipation. Fecal impaction is possible. No definite stercoral ulceration. No pneumoperitoneum. No portal or mesenteric venous gas is seen to suggest ischemic bowel. No acute appendicitis. Atherosclerotic changes are present without aneurysmal dilatation of the aortoiliac arterial system. There is no aneurysmal dilatation of the abdominal aorta. Within the partially imaged lung bases, no acute infiltrate is suggested. No pleural effusion. Minimal, if any,  ascites is seen. A small fat-containing umbilical hernia is seen. It does not contain bowel. Multiple collateral venous structures are seen in the anterior pelvic and abdominal wall and may be related to portosystemic venous shunting. Chronic deep vein thrombosis involving the lower extremities would be in the differential diagnosis.       Probably no significant acute findings are seen in the abdomen or pelvis by enhanced CT examination.    Portions of this note were completed with a voice recognition program.  2/23/2025 6:14 AM by Emiliano Mcpherson MD on Workstation: [facility]         Differential Diagnosis and Discussion:    Abdominal Pain: Based on the patient's signs and symptoms, I considered abdominal aortic aneurysm, small bowel obstruction, pancreatitis, acute cholecystitis, acute appendecitis, peptic ulcer disease, gastritis, colitis, endocrine disorders, irritable bowel syndrome and other differential diagnosis an etiology of the patient's abdominal pain.    PROCEDURES:    Labs were collected in the emergency department and all labs were reviewed and interpreted by me.  CT scan was performed in the emergency department and the CT scan radiology impression was interpreted by me.    No orders to display       Procedures    MDM  Number of Diagnoses or Management Options  Fecal impaction in rectum  Diagnosis management comments: Patient presented to the emergency department today for evaluation of abdominal pain with nausea and vomiting constipation after taking Imodium for having diarrhea for the past 3 days.  CBC is unremarkable.  CMP notes chronic findings of CKD, glucose of 202, and mildly elevated liver enzymes which are at patient's baseline.  Lipase unremarkable.  Lactic acid 2.2.  CT abdomen pelvis noted chronic findings but there was also a fecal impaction.  Patient was given glycerin suppository by Gilbert Cole PA-C and patient was handed off to me pending having bowel movement.  She did not have any bowel  movement after the suppository and then fecal disimpaction was completed with success. Will discharge patient home with stool softener.  Return to the emergency department guidelines provided.       Amount and/or Complexity of Data Reviewed  Clinical lab tests: reviewed and ordered  Tests in the radiology section of CPT®: reviewed and ordered  Decide to obtain previous medical records or to obtain history from someone other than the patient: yes    Risk of Complications, Morbidity, and/or Mortality  Presenting problems: moderate  Diagnostic procedures: moderate  Management options: low    Patient Progress  Patient progress: stable         Patient Care Considerations:    ANTIBIOTICS: I considered prescribing antibiotics as an outpatient however no bacterial focus of infection was found.      Consultants/Shared Management Plan:    None    Social Determinants of Health:    Patient has presented with family members who are responsible, reliable and will ensure follow up care.      Disposition and Care Coordination:    Discharged: The patient is suitable and stable for discharge with no need for consideration of admission.    I have explained the patient´s condition, diagnoses and treatment plan based on the information available to me at this time. I have answered questions and addressed any concerns. The patient has a good  understanding of the patient´s diagnosis, condition, and treatment plan as can be expected at this point. The vital signs have been stable. The patient´s condition is stable and appropriate for discharge from the emergency department.      The patient will pursue further outpatient evaluation with the primary care physician or other designated or consulting physician as outlined in the discharge instructions. They are agreeable to this plan of care and follow-up instructions have been explained in detail. The patient has received these instructions in written format and has expressed an  understanding of the discharge instructions. The patient is aware that any significant change in condition or worsening of symptoms should prompt an immediate return to this or the closest emergency department or call to 911.  I have explained discharge medications and the need for follow up with the patient/caretakers. This was also printed in the discharge instructions. Patient was discharged with the following medications and follow up:      Medication List        New Prescriptions      docusate sodium 100 MG capsule  Commonly known as: COLACE  Take 1 capsule by mouth 2 (Two) Times a Day.               Where to Get Your Medications        These medications were sent to [facility]/pharmacy #[id number] - [place], [place] - [address] - [phone]  - [phone]   [address] [place] [address]      Hours: 24-hours Phone: [phone]   docusate sodium 100 MG capsule      Donnell Garcia MD  [address] [facility]  [address]  [facility] [address]  [phone]             Final diagnoses:   Fecal impaction in rectum        ED Disposition       ED Disposition   Discharge    Condition   Stable    Comment   --               This medical record created using voice recognition software.             Troy Loaiza PA-C  02/23/25 0825       Troy Loaiza PA-C  02/23/25 0878

## 2025-02-24 ENCOUNTER — PATIENT OUTREACH (OUTPATIENT)
Dept: CASE MANAGEMENT | Facility: OTHER | Age: 70
End: 2025-02-24
Payer: MEDICARE

## 2025-02-24 ENCOUNTER — OFFICE VISIT (OUTPATIENT)
Dept: FAMILY MEDICINE CLINIC | Facility: CLINIC | Age: 70
End: 2025-02-24
Payer: MEDICARE

## 2025-02-24 VITALS
BODY MASS INDEX: 40.46 KG/M2 | SYSTOLIC BLOOD PRESSURE: 132 MMHG | HEIGHT: 64 IN | HEART RATE: 86 BPM | WEIGHT: 237 LBS | DIASTOLIC BLOOD PRESSURE: 61 MMHG | OXYGEN SATURATION: 97 %

## 2025-02-24 DIAGNOSIS — I10 PRIMARY HYPERTENSION: ICD-10-CM

## 2025-02-24 DIAGNOSIS — E66.01 CLASS 3 SEVERE OBESITY DUE TO EXCESS CALORIES WITH SERIOUS COMORBIDITY AND BODY MASS INDEX (BMI) OF 40.0 TO 44.9 IN ADULT: ICD-10-CM

## 2025-02-24 DIAGNOSIS — K75.81 LIVER CIRRHOSIS SECONDARY TO NASH: ICD-10-CM

## 2025-02-24 DIAGNOSIS — E11.42 TYPE 2 DIABETES MELLITUS WITH DIABETIC POLYNEUROPATHY, WITH LONG-TERM CURRENT USE OF INSULIN: Primary | ICD-10-CM

## 2025-02-24 DIAGNOSIS — M85.80 OSTEOPENIA AFTER MENOPAUSE: ICD-10-CM

## 2025-02-24 DIAGNOSIS — D50.9 IRON DEFICIENCY ANEMIA, UNSPECIFIED IRON DEFICIENCY ANEMIA TYPE: ICD-10-CM

## 2025-02-24 DIAGNOSIS — Z79.4 TYPE 2 DIABETES MELLITUS WITH DIABETIC POLYNEUROPATHY, WITH LONG-TERM CURRENT USE OF INSULIN: Primary | ICD-10-CM

## 2025-02-24 DIAGNOSIS — K74.60 LIVER CIRRHOSIS SECONDARY TO NASH: ICD-10-CM

## 2025-02-24 DIAGNOSIS — Z78.0 OSTEOPENIA AFTER MENOPAUSE: ICD-10-CM

## 2025-02-24 DIAGNOSIS — E66.813 CLASS 3 SEVERE OBESITY DUE TO EXCESS CALORIES WITH SERIOUS COMORBIDITY AND BODY MASS INDEX (BMI) OF 40.0 TO 44.9 IN ADULT: ICD-10-CM

## 2025-02-24 DIAGNOSIS — E61.1 IRON DEFICIENCY: ICD-10-CM

## 2025-02-24 DIAGNOSIS — I50.32 CHRONIC DIASTOLIC CHF (CONGESTIVE HEART FAILURE): ICD-10-CM

## 2025-02-24 DIAGNOSIS — E55.9 VITAMIN D DEFICIENCY: ICD-10-CM

## 2025-02-24 DIAGNOSIS — E78.00 PURE HYPERCHOLESTEROLEMIA: ICD-10-CM

## 2025-02-24 PROCEDURE — 3078F DIAST BP <80 MM HG: CPT | Performed by: FAMILY MEDICINE

## 2025-02-24 PROCEDURE — 1126F AMNT PAIN NOTED NONE PRSNT: CPT | Performed by: FAMILY MEDICINE

## 2025-02-24 PROCEDURE — 3051F HG A1C>EQUAL 7.0%<8.0%: CPT | Performed by: FAMILY MEDICINE

## 2025-02-24 PROCEDURE — 99214 OFFICE O/P EST MOD 30 MIN: CPT | Performed by: FAMILY MEDICINE

## 2025-02-24 PROCEDURE — 3075F SYST BP GE 130 - 139MM HG: CPT | Performed by: FAMILY MEDICINE

## 2025-02-24 RX ORDER — LEVOCETIRIZINE DIHYDROCHLORIDE 5 MG/1
5 TABLET, FILM COATED ORAL EVERY EVENING
Qty: 90 TABLET | Refills: 1 | Status: SHIPPED | OUTPATIENT
Start: 2025-02-24

## 2025-02-24 RX ORDER — CALCIUM CARBONATE 500(1250)
500 TABLET ORAL DAILY
Qty: 90 TABLET | Refills: 2 | Status: SHIPPED | OUTPATIENT
Start: 2025-02-24

## 2025-02-24 RX ORDER — HYDROCHLOROTHIAZIDE 12.5 MG/1
CAPSULE ORAL
Qty: 6 EACH | Refills: 3 | Status: SHIPPED | OUTPATIENT
Start: 2025-02-24

## 2025-02-24 NOTE — PROGRESS NOTES
Chief Complaint  Establish Care    Subjective     Problem List  Visit Diagnosis   Encounters  Notes  Medications  Labs  Result Review Imaging  Media    Stephy Duncan presents to [facility]  History of Present Illness    History of Present Illness  The patient is a 69-year-old female who presents to [facility]. She is a former patient of Dr. Bravo.    She had a flare of gout last year. The plan is to stop the allopurinol and see how she does.    She has chronic kidney disease stage 3 and is on both Lasix and spironolactone. Labs will be rechecked due to a drop in the GFR from 59 to 39.    She has an upcoming appointment with GI and will see about increasing the Nexium to Protonix.  Last EGD was in 2022.    Her A1c is slightly high at 7.6, with some readings above goal. She reports inconsistent adherence to her dietary regimen but maintains an active lifestyle within her home, engaging in various household chores. She has experienced weight gain due to decreased physical activity following her hip surgery. She has not previously tried Ozempic or Mounjaro and reports no adverse effects from Trulicity. She expresses interest in continuous glucose monitoring and diligently checks her blood sugar levels every morning before consuming any food or beverages. She is currently on Trulicity 1.5 mg weekly.    She is on Imdur for her heart and sees a cardiologist, Dr. Bro, every 6 months. She had a mild heart attack and has 2 stents. She also has a heart murmur and coronary artery disease.    She takes gabapentin 600 mg twice a day for neuropathy.    She is on a potassium supplement and finds it difficult to swallow.    She is on a magnesium supplement and calcium. She had a DEXA scan on 05/24/2024 and was osteopenic. She is on oyster shell calcium.    She is on rosuvastatin for her cholesterol. Her total cholesterol was 121 in January 2025.    She takes docusate  "twice a day for her bowels. She did not take one this morning as her bowels were moving well. She does not want to be impacted anymore.    She has been experiencing scalp itching for the past few weeks, which has worsened recently. She has tried using a new shampoo without relief. She also reports itching on her arm. She is concerned because her mother had severe psoriasis.    FAMILY HISTORY  Her mother had severe psoriasis.    MEDICATIONS  Current: Lasix, spironolactone, Nexium, Trulicity, Imdur, gabapentin, potassium supplement, magnesium supplement, calcium, rosuvastatin, verapamil, docusate, Xyzal.       Objective   Vital Signs:   /61 (BP Location: Left arm, Patient Position: Sitting, Cuff Size: Large Adult)   Pulse 86   Ht 162.6 cm (64\")   Wt 108 kg (237 lb)   SpO2 97%   BMI 40.68 kg/m²     Physical Exam  Vitals reviewed.   Constitutional:       General: She is not in acute distress.     Appearance: Normal appearance.   HENT:      Head: Normocephalic and atraumatic.      Mouth/Throat:      Mouth: Mucous membranes are moist.   Eyes:      Conjunctiva/sclera: Conjunctivae normal.   Cardiovascular:      Rate and Rhythm: Normal rate and regular rhythm.      Heart sounds: Normal heart sounds.   Pulmonary:      Effort: Pulmonary effort is normal.      Breath sounds: Normal breath sounds.   Musculoskeletal:         General: No swelling.      Cervical back: Normal range of motion and neck supple.   Skin:     General: Skin is warm.   Neurological:      General: No focal deficit present.      Mental Status: She is alert.   Psychiatric:         Mood and Affect: Mood normal.         Behavior: Behavior normal.          Physical Exam      Result Review :Labs  Result Review  Imaging  Med Tab  Media  Procedures       Common labs          10/29/2024    11:53 1/24/2025    14:35 1/24/2025    16:23 2/23/2025    04:36   Common Labs   Glucose  126   202    BUN  16   19    Creatinine  1.01   1.42    Sodium  136   133  "   Potassium  4.5   3.9    Chloride  100   97    Calcium  9.4   9.9    Albumin  3.7   4.2    Total Bilirubin  0.7   1.7    Alkaline Phosphatase  77   93    AST (SGOT)  56   46    ALT (SGPT)  35   39    WBC 4.64  5.88   8.88    Hemoglobin 13.8  13.2   15.3    Hematocrit 41.1  40.8   45.4    Platelets 93  82   126    Total Cholesterol  121      Triglycerides  79      HDL Cholesterol  45      LDL Cholesterol   60      Hemoglobin A1C  7.60      Microalbumin, Urine   <1.2     Uric Acid  4.7          Results for orders placed or performed during the hospital encounter of 02/23/25   Comprehensive Metabolic Panel    Collection Time: 02/23/25  4:36 AM    Specimen: Blood   Result Value Ref Range    Glucose 202 (H) 65 - 99 mg/dL    BUN 19 8 - 23 mg/dL    Creatinine 1.42 (H) 0.57 - 1.00 mg/dL    Sodium 133 (L) 136 - 145 mmol/L    Potassium 3.9 3.5 - 5.2 mmol/L    Chloride 97 (L) 98 - 107 mmol/L    CO2 22.3 22.0 - 29.0 mmol/L    Calcium 9.9 8.6 - 10.5 mg/dL    Total Protein 7.8 6.0 - 8.5 g/dL    Albumin 4.2 3.5 - 5.2 g/dL    ALT (SGPT) 39 (H) 1 - 33 U/L    AST (SGOT) 46 (H) 1 - 32 U/L    Alkaline Phosphatase 93 39 - 117 U/L    Total Bilirubin 1.7 (H) 0.0 - 1.2 mg/dL    Globulin 3.6 gm/dL    A/G Ratio 1.2 g/dL    BUN/Creatinine Ratio 13.4 7.0 - 25.0    Anion Gap 13.7 5.0 - 15.0 mmol/L    eGFR 39.6 (L) >60.0 mL/min/1.73   Lipase    Collection Time: 02/23/25  4:36 AM    Specimen: Blood   Result Value Ref Range    Lipase 14 13 - 60 U/L   Lactic Acid, Plasma    Collection Time: 02/23/25  4:36 AM    Specimen: Blood   Result Value Ref Range    Lactate 2.2 (C) 0.5 - 2.0 mmol/L   CBC Auto Differential    Collection Time: 02/23/25  4:36 AM    Specimen: Blood   Result Value Ref Range    WBC 8.88 3.40 - 10.80 10*3/mm3    RBC 4.98 3.77 - 5.28 10*6/mm3    Hemoglobin 15.3 12.0 - 15.9 g/dL    Hematocrit 45.4 34.0 - 46.6 %    MCV 91.2 79.0 - 97.0 fL    MCH 30.7 26.6 - 33.0 pg    MCHC 33.7 31.5 - 35.7 g/dL    RDW 14.2 12.3 - 15.4 %    RDW-SD 47.8  37.0 - 54.0 fl    MPV 9.5 6.0 - 12.0 fL    Platelets 126 (L) 140 - 450 10*3/mm3    Neutrophil % 68.3 42.7 - 76.0 %    Lymphocyte % 17.8 (L) 19.6 - 45.3 %    Monocyte % 8.9 5.0 - 12.0 %    Eosinophil % 4.2 0.3 - 6.2 %    Basophil % 0.6 0.0 - 1.5 %    Immature Grans % 0.2 0.0 - 0.5 %    Neutrophils, Absolute 6.07 1.70 - 7.00 10*3/mm3    Lymphocytes, Absolute 1.58 0.70 - 3.10 10*3/mm3    Monocytes, Absolute 0.79 0.10 - 0.90 10*3/mm3    Eosinophils, Absolute 0.37 0.00 - 0.40 10*3/mm3    Basophils, Absolute 0.05 0.00 - 0.20 10*3/mm3    Immature Grans, Absolute 0.02 0.00 - 0.05 10*3/mm3    nRBC 0.0 0.0 - 0.2 /100 WBC   Green Top (Gel)    Collection Time: 02/23/25  4:36 AM   Result Value Ref Range    Extra Tube Hold for add-ons.    Lavender Top    Collection Time: 02/23/25  4:36 AM   Result Value Ref Range    Extra Tube hold for add-on    Gold Top - SST    Collection Time: 02/23/25  4:36 AM   Result Value Ref Range    Extra Tube Hold for add-ons.    Light Blue Top    Collection Time: 02/23/25  4:36 AM   Result Value Ref Range    Extra Tube Hold for add-ons.        Results  Laboratory Studies  A1c is 7.6. Total cholesterol was 121 in January 2025.           Procedures        Assessment and Plan CC Problem List  Visit Diagnosis   ROS  Review (Popup)  Health Maintenance  Quality  BestPractice  Medications  SmartSets  SnapShot Encounters  Media   Diagnoses and all orders for this visit:    1. Type 2 diabetes mellitus with diabetic polyneuropathy, with long-term current use of insulin (Primary)  -     levocetirizine (XYZAL) 5 MG tablet; Take 1 tablet by mouth Every Evening.  Dispense: 90 tablet; Refill: 1  -     calcium carbonate, oyster shell, 500 MG tablet tablet; Take 1 tablet by mouth Daily.  Dispense: 90 tablet; Refill: 2  -     Basic metabolic panel; Future  -     Folate; Future  -     Iron Profile; Future  -     Ferritin; Future  -     Vitamin D 25 hydroxy; Future  -     Semaglutide, 1 MG/DOSE, (OZEMPIC) 4  MG/3ML solution pen-injector; Inject 1 mg under the skin into the appropriate area as directed 1 (One) Time Per Week.  Dispense: 12 mL; Refill: 0  -     Continuous Glucose Sensor (FreeStyle Carol 3 Plus Sensor); Use Every 14 (Fourteen) Days.  Dispense: 6 each; Refill: 3    2. Vitamin D deficiency  -     Vitamin D 25 hydroxy; Future    3. Iron deficiency  -     Iron Profile; Future  -     Ferritin; Future    4. Primary hypertension    5. Iron deficiency anemia, unspecified iron deficiency anemia type    6. Chronic diastolic CHF (congestive heart failure)    7. Class 3 severe obesity due to excess calories with serious comorbidity and body mass index (BMI) of 40.0 to 44.9 in adult    8. Liver cirrhosis secondary to MEEHAN    9. Osteopenia after menopause    10. Pure hypercholesterolemia        Assessment & Plan  1. Gout.  She had a flare of gout last year. The plan is to stop allopurinol and monitor her condition.    2. Chronic kidney disease stage 3.  She is on both Lasix and spironolactone. There has been a drop in her GFR from 59 to 39. Labs will be rechecked to monitor her kidney function.    3. Gastroesophageal reflux disease.  She has an upcoming appointment with GI. The possibility of increasing Nexium to Protonix will be considered during her GI appointment.    4. Diabetes mellitus.  Her A1c is slightly elevated at 7.6. She is advised to hold mealtime insulin if not eating but to continue Trulicity 1.5 mg once a week regardless of meals. She is encouraged to engage in physical activity to the extent possible given her hip issues. A prescription for Ozempic has been provided, and she is instructed to start with a middle dose. If she experiences nausea, she should allow a week for her body to adjust. If the nausea persists, the dosage can be reduced. A Carol sensor has been provided for continuous glucose monitoring, and she is instructed to download the Carol nallely on her smartphone for this purpose. Her A1c will  be rechecked in 3 months.    5. Coronary artery disease.  She is on Imdur and verapamil for her heart. She has a history of a mild heart attack and 2 stents. She also has a heart murmur. She will continue to follow up with her cardiologist, Dr. Bro, every 6 months.    6. Neuropathy.  She takes gabapentin 600 mg twice a day. Her controlled substance agreement will be updated today.    7. Hypokalemia.  She is on a potassium supplement. She is advised to check with her pharmacist for a smaller version of the potassium pill.    8. Osteopenia.  She had a DEXA scan on 05/24/2024, which showed osteopenia. She is on a magnesium supplement and calcium. Her calcium supplement will be refilled.    9. Hypercholesterolemia.  She is on rosuvastatin for her cholesterol. Her total cholesterol was 121 in January 2025.    10. Constipation.  She takes docusate (Colace) for stool softening. She is advised to use it based on her bowel movements and can take it 1-2 times a day as needed. A prescription can be sent in the future if needed, but it is also available over the counter.    11. Scalp pruritus.  She reports scalp itching that has worsened over the last couple of weeks. She is advised to use an antifungal shampoo, alternating between salicylic acid shampoo and ketoconazole shampoo.    12. Health maintenance.  Labs will be checked, including vitamin D, iron, folate, and a basic metabolic panel. Her allergy medication (Xyzal) will be refilled.    PROCEDURE  The patient has 2 stents placed in the past.    Patient Instructions   Recommend salicylic acid shampoo and a nizoral ketoconazole shampoo            Follow Up Instructions Charge Capture  Follow-up Communications   Return in about 3 months (around 5/24/2025) for diabetes.  Patient was given instructions and counseling regarding her condition or for health maintenance advice. Please see specific information pulled into the AVS if appropriate.       Transcribed from  ambient dictation for Chun Marley DO by Chun Marley DO.  02/24/25   13:52 EST    Patient or patient representative verbalized consent for the use of Ambient Listening during the visit with  Chun Marley DO for chart documentation. 2/25/2025  13:20 EST

## 2025-02-24 NOTE — OUTREACH NOTE
AMBULATORY CASE MANAGEMENT NOTE    Names and Relationships of Patient/Support Persons: Contact: Stephy Duncan; Relationship: Self -     Patient Outreach  Meet with patient in the office today to discuss Chronic Care Management program. She's living with her sister and doing well right now. She doesn't drive but her sister helps with all her transportation. She said  made some changes to her medications, she's hoping that will help.   She doesn't think the program is something she needs right now, however I gave her my card with contact information if anything changes.     Education Documentation  No documentation found.      Radha LAIRD  Ambulatory Case Management  2/24/2025, 14:54 EST

## 2025-02-26 ENCOUNTER — OFFICE VISIT (OUTPATIENT)
Dept: GASTROENTEROLOGY | Facility: CLINIC | Age: 70
End: 2025-02-26
Payer: MEDICARE

## 2025-02-26 VITALS
DIASTOLIC BLOOD PRESSURE: 82 MMHG | HEART RATE: 64 BPM | WEIGHT: 237 LBS | BODY MASS INDEX: 40.46 KG/M2 | HEIGHT: 64 IN | SYSTOLIC BLOOD PRESSURE: 125 MMHG

## 2025-02-26 DIAGNOSIS — R13.10 DYSPHAGIA, UNSPECIFIED TYPE: ICD-10-CM

## 2025-02-26 DIAGNOSIS — K59.00 CONSTIPATION, UNSPECIFIED CONSTIPATION TYPE: ICD-10-CM

## 2025-02-26 DIAGNOSIS — E78.49 OTHER HYPERLIPIDEMIA: ICD-10-CM

## 2025-02-26 DIAGNOSIS — K74.60 CIRRHOSIS OF LIVER WITHOUT ASCITES, UNSPECIFIED HEPATIC CIRRHOSIS TYPE: Primary | ICD-10-CM

## 2025-02-26 DIAGNOSIS — Z86.39 HISTORY OF DIABETES MELLITUS, TYPE II: ICD-10-CM

## 2025-02-26 DIAGNOSIS — K21.9 GASTROESOPHAGEAL REFLUX DISEASE, UNSPECIFIED WHETHER ESOPHAGITIS PRESENT: ICD-10-CM

## 2025-02-26 DIAGNOSIS — K75.81 NASH (NONALCOHOLIC STEATOHEPATITIS): ICD-10-CM

## 2025-02-26 DIAGNOSIS — K76.82 HEPATIC ENCEPHALOPATHY: ICD-10-CM

## 2025-02-26 RX ORDER — LACTULOSE 10 G/15ML
20 SOLUTION ORAL 2 TIMES DAILY
Qty: 1800 ML | Refills: 3 | Status: SHIPPED | OUTPATIENT
Start: 2025-02-26

## 2025-02-26 RX ORDER — PANTOPRAZOLE SODIUM 40 MG/1
40 TABLET, DELAYED RELEASE ORAL DAILY
Qty: 30 TABLET | Refills: 5 | Status: SHIPPED | OUTPATIENT
Start: 2025-02-26

## 2025-02-26 NOTE — PROGRESS NOTES
Chief Complaint        Cirrhosis and Elevated Hepatic Enzymes    History of Present Illness      Stephy Duncan is a 69 y.o. female who presents to [facility] as a new patient for cirrhosis.    History of Driscoll with cirrhosis.  History of type 2 diabetes.  On insulin. Most recent HgbA1c 7.6. She just started seeing a new PCP. 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     GERD----Nexium 40 mg daily--has been on it for a long time. She will have some dysphagia recenty.     On diuretics. Changing from TRULICITY to Semaglutide      She had an ultrasound of the abdomen done on 9/25/2024 that showed liver and spleen size are normal.  Cirrhotic liver morphology is demonstrated.  No focal liver lesion is seen.  CT scan of the abdomen and pelvis was done on 2/23/2025 that did show a large disc herniation at the L3-L4 level is possible.  There is cirrhotic morphology of the liver.  There is splenomegaly.  Portal hypertension and portosystemic venous shunting are suggested.  The main portal vein is patent.  There is a small hiatal hernia.  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  The patient has undergone cholecystectomy.  Probably mild compensator he biliary tree dilatation is seen.  No acute pancreatitis.  No definite adrenal nodule.  No renal stones or hydronephrosis or obstructive uropathy due to ureteral calculus.  No acute polynephritis.  No definite urinary bladder calculi are seen.  A moderate to large stool burden is suggested.  Fecal impaction is possible.    EGD/colonoscopy--both were done many years ago. Denies any hx colon polyps.     GI family history----denies any     Denies any pedal edema or ascites. Will have fatigue and brain fog. Some confusion.     Results       Result Review :   The following data was reviewed by: MORGAN Wright on 02/26/2025     CMP          10/25/2024    14:49 1/24/2025    14:35 2/23/2025    04:36   [facility]  "  Glucose 162  126  202    BUN 16  16  19    Creatinine 1.08  1.01  1.42    EGFR 55.0  59.6  39.6    Sodium 137  136  133    Potassium 3.8  4.5  3.9    Chloride 101  100  97    Calcium 9.8  9.4  9.9    Total Protein 7.1  6.8  7.8    Albumin 4.0  3.7  4.2    Globulin 3.1  3.1  3.6    Total Bilirubin 0.8  0.7  1.7    Alkaline Phosphatase 91  77  93    AST (SGOT) 60  56  46    ALT (SGPT) 51  35  39    Albumin/Globulin Ratio 1.3  1.2  1.2    BUN/Creatinine Ratio 14.8  15.8  13.4    Anion Gap 9.8  10.2  13.7      CBC          10/29/2024    11:53 1/24/2025    14:35 2/23/2025    04:36   CBC   WBC 4.64  5.88  8.88    RBC 4.54  4.33  4.98    Hemoglobin 13.8  13.2  15.3    Hematocrit 41.1  40.8  45.4    MCV 90.5  94.2  91.2    MCH 30.4  30.5  30.7    MCHC 33.6  32.4  33.7    RDW 14.6  13.3  14.2    Platelets 93  82  126      CBC w/diff          10/29/2024    11:53 1/24/2025    14:35 2/23/2025    04:36   CBC w/Diff   WBC 4.64  5.88  8.88    RBC 4.54  4.33  4.98    Hemoglobin 13.8  13.2  15.3    Hematocrit 41.1  40.8  45.4    MCV 90.5  94.2  91.2    MCH 30.4  30.5  30.7    MCHC 33.6  32.4  33.7    RDW 14.6  13.3  14.2    Platelets 93  82  126    Neutrophil Rel % 58.5  62.8  68.3    Immature Granulocyte Rel % 0.0  0.3  0.2    Lymphocyte Rel % 22.4  19.6  17.8    Monocyte Rel % 10.1  9.5  8.9    Eosinophil Rel % 8.4  6.8  4.2    Basophil Rel % 0.6  1.0  0.6      Lipid Panel          4/1/2024    14:21 1/24/2025    14:35   Lipid Panel   Total Cholesterol 114  121    Triglycerides 80  79    HDL Cholesterol 43  45    VLDL Cholesterol 16  16    LDL Cholesterol  55  60    LDL/HDL Ratio 1.28  1.34      TSH          4/1/2024    14:21   TSH   TSH 2.090        Lipase   Lipase   Date Value Ref Range Status   02/23/2025 14 13 - 60 U/L Final     Amylase No results found for: \"AMYLASE\"  Iron Profile   Iron   Date Value Ref Range Status   07/19/2022 41 37 - 145 mcg/dL Final     TIBC   Date Value Ref Range Status   07/19/2022 271 (L) 298 - 536 " mcg/dL Final     Iron Saturation (TSAT)   Date Value Ref Range Status   07/19/2022 15 (L) 20 - 50 % Final     Transferrin   Date Value Ref Range Status   07/19/2022 182 (L) 200 - 360 mg/dL Final     Ferritin   Ferritin   Date Value Ref Range Status   06/02/2022 616.00 (H) 13.00 - 150.00 ng/mL Final     ESR (Sed Rate)   Sed Rate   Date Value Ref Range Status   08/22/2024 18 0 - 30 mm/hr Final     CRP (C-Reactive)   C-Reactive Protein   Date Value Ref Range Status   08/22/2024 0.69 (H) 0.00 - 0.50 mg/dL Final     Liver Workup   Ferritin   Date Value Ref Range Status   06/02/2022 616.00 (H) 13.00 - 150.00 ng/mL Final     Iron   Date Value Ref Range Status   07/19/2022 41 37 - 145 mcg/dL Final     TIBC   Date Value Ref Range Status   07/19/2022 271 (L) 298 - 536 mcg/dL Final     Iron Saturation (TSAT)   Date Value Ref Range Status   07/19/2022 15 (L) 20 - 50 % Final     Transferrin   Date Value Ref Range Status   07/19/2022 182 (L) 200 - 360 mg/dL Final     Protime   Date Value Ref Range Status   06/04/2022 15.0 (H) 11.7 - 14.2 Seconds Final     INR   Date Value Ref Range Status   06/04/2022 1.20 (H) 0.90 - 1.10 Final            Past Medical History       Past Medical History:   Diagnosis Date    Allergic     Morphine, ACE inhibitors    Allergies     Arthritis     Cancer 1982    Cervical cancer    Cardiac murmur     Coronary artery disease 2016    Received stints    Diabetes mellitus     GERD (gastroesophageal reflux disease)     Heart attack 12/2020    History of cervical cancer 1981    History of transfusion     SEVERAL    Hyperlipidemia     Hypertension     Iron deficiency anemia     Obesity     Osteomyelitis hip     RIGHT    PONV (postoperative nausea and vomiting)     Right hip pain     Visual impairment     Corrected with glasses    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  WOUND VAC IN PLACE       Past Surgical History:   Procedure Laterality Date    CARDIAC CATHETERIZATION       CERVICAL CONIZATION      COLONOSCOPY      CORONARY ANGIOPLASTY WITH STENT PLACEMENT      ENDOSCOPY      ENDOSCOPY N/A 06/09/2022    Procedure: ESOPHAGOGASTRODUODENOSCOPY WITH COLD BIOSPIES;  Surgeon: Zechariah Nj MD;  Location: [facility] ENDOSCOPY;  Service: Gastroenterology;  Laterality: N/A;  PRE- EPIGASTRIC PAIN AND ABDOMINAL PAIN  POST- NORMAL    HIP ARTHROPLASTY Right     HIP HARDWARE REMOVAL Right     HIP MINI REVISION Right 01/18/2022    Procedure: TOTAL HIP ARTHROPLASTY LINER REVISION, pegboard lateral;  Surgeon: Karri Schaeffer II, MD;  Location: [facility] MAIN OR;  Service: Orthopedics;  Laterality: Right;    HIP SPACER INSERTION WITH ANTIBIOTIC CEMENT      X3    HYSTERECTOMY      INCISION AND DRAINAGE HIP Right 12/18/2021    Procedure: INCISION AND DRAINAGE TOTAL HIP, LINER EXCHANGE AND WOUND VAC PLACEMENT;  Surgeon: Karri Schaeffer II, MD;  Location: [facility] MAIN OR;  Service: Orthopedics;  Laterality: Right;    INCISION AND DRAINAGE HIP Right 05/02/2022    Procedure: HIP INCISION AND DRAINAGE;  Surgeon: Karri Schaeffer II, MD;  Location: [facility] MAIN OR;  Service: Orthopedics;  Laterality: Right;    INCISION AND DRAINAGE HIP Right 06/04/2022    Procedure: HIP INCISION AND DRAINAGE WITH WOUND VAC REPLACEMENT;  Surgeon: Karri Schaeffer II, MD;  Location: [facility] MAIN OR;  Service: Orthopedics;  Laterality: Right;    JOINT REPLACEMENT  L knee, R hip    Knee 2014, hip replacement [id number] multiple surgeries    KNEE ARTHROPLASTY Left     LAPAROSCOPIC CHOLECYSTECTOMY      TONSILLECTOMY      TOTAL HIP ARTHROPLASTY Right 04/25/2022    Procedure: HIP GIRDLESTONE PROCEDURE;  Surgeon: Karri Schaeffer II, MD;  Location: [facility] MAIN OR;  Service: Orthopedics;  Laterality: Right;    TOTAL HIP ARTHROPLASTY REVISION Right 11/16/2021    Procedure: TOTAL HIP REVISION ARTHROPLASTY RIGHT POSTERIOR;  Surgeon: Karri Schaeffer II, MD;  Location: [facility] MAIN OR;  Service:  Orthopedics;  Laterality: Right;         Current Outpatient Medications:     allopurinol (ZYLOPRIM) 100 MG tablet, Take 1 tablet by mouth Daily., Disp: 90 tablet, Rfl: 0    calcium carbonate, oyster shell, 500 MG tablet tablet, Take 1 tablet by mouth Daily., Disp: 90 tablet, Rfl: 2    colchicine 0.6 MG tablet, Take 1 tablet by mouth Daily., Disp: , Rfl:     Continuous Glucose Sensor (FreeStyle Carol 3 Plus Sensor), Use Every 14 (Fourteen) Days., Disp: 6 each, Rfl: 3    Diclofenac Sodium (VOLTAREN) 1 % gel gel, Apply 4 g topically to the appropriate area as directed 4 (Four) Times a Day As Needed (MSK pain)., Disp: 100 g, Rfl: 3    docusate sodium (COLACE) 100 MG capsule, Take 1 capsule by mouth 2 (Two) Times a Day., Disp: 20 capsule, Rfl: 0    ferrous sulfate 325 (65 FE) MG tablet, Take 1 tablet by mouth Every Other Day. Mornings, Disp: , Rfl:     folic acid (FOLVITE) 1 MG tablet, Take 1 tablet by mouth Daily., Disp: , Rfl:     furosemide (LASIX) 40 MG tablet, Take 1 tablet by mouth Daily., Disp: , Rfl:     gabapentin (NEURONTIN) 600 MG tablet, Take 1 tablet by mouth 2 (Two) Times a Day., Disp: 180 tablet, Rfl: 2    insulin glargine (Lantus) 100 UNIT/ML injection, Inject 110 Units under the skin into the appropriate area as directed Daily., Disp: 100 mL, Rfl: 3    isosorbide mononitrate (IMDUR) 30 MG 24 hr tablet, Take 1 tablet by mouth Every Morning., Disp: , Rfl:     KLOR-CON 20 MEQ CR tablet, Take 1 tablet by mouth Daily., Disp: , Rfl:     Levemir FlexPen 100 UNIT/ML injection, , Disp: , Rfl:     levocetirizine (XYZAL) 5 MG tablet, Take 1 tablet by mouth Every Evening., Disp: 90 tablet, Rfl: 1    nitroglycerin (NITROSTAT) 0.4 MG SL tablet, Take 1 tablet by mouth., Disp: , Rfl:     OneTouch Verio test strip, USE AS DIRECTED 3 TIMES A DAY, Disp: , Rfl:     rosuvastatin (CRESTOR) 5 MG tablet, TAKE 1 TABLET (5 MG TOTAL) BY MOUTH DAILY., Disp: , Rfl:     Semaglutide, 1 MG/DOSE, (OZEMPIC) 4 MG/3ML solution  pen-injector, Inject 1 mg under the skin into the appropriate area as directed 1 (One) Time Per Week., Disp: 12 mL, Rfl: 0    spironolactone (ALDACTONE) 50 MG tablet, Take 1 tablet by mouth Daily., Disp: 90 tablet, Rfl: 2    verapamil (CALAN) 80 MG tablet, Take 1 tablet by mouth 2 (Two) Times a Day., Disp: 60 tablet, Rfl: 0    vitamin D3 125 MCG (5000 UT) capsule capsule, Take 1 capsule by mouth Daily., Disp: , Rfl:     insulin aspart (NovoLOG FlexPen) 100 UNIT/ML solution pen-injector sc pen, Inject 20 Units under the skin into the appropriate area as directed Daily With Breakfast AND 23 Units Daily With Lunch AND 23 Units Daily With Dinner., Disp: 20 mL, Rfl: 11    lactulose (CHRONULAC) 10 GM/15ML solution, Take 30 mL by mouth 2 (Two) Times a Day., Disp: 1800 mL, Rfl: 3    pantoprazole (PROTONIX) 40 MG EC tablet, Take 1 tablet by mouth Daily., Disp: 30 tablet, Rfl: 5    riFAXIMin (Xifaxan) 550 MG tablet, Take 1 tablet by mouth Every 12 (Twelve) Hours., Disp: 180 tablet, Rfl: 3     Allergies   Allergen Reactions    Ace Inhibitors Shortness Of Breath and Swelling    Morphine Shortness Of Breath       Family History   Problem Relation Age of Onset    Arthritis Mother     Cancer Mother     COPD Mother     Hearing loss Mother     Hyperlipidemia Mother     Thyroid disease Mother     Vision loss Mother     Arthritis Father     COPD Father     Hearing loss Father     Hyperlipidemia Father     Cancer Maternal Grandfather     Arthritis Maternal Grandmother     Cancer Maternal Grandmother     Hyperlipidemia Maternal Grandmother     Diabetes Paternal Grandfather     Heart disease Paternal Grandfather     Hyperlipidemia Paternal Grandfather     Diabetes Paternal Grandmother     Arthritis Sister     Cancer Sister     COPD Sister     Diabetes Sister     Hyperlipidemia Sister     Diabetes Brother     Hyperlipidemia Brother     Kidney disease Brother     Vision loss Maternal Uncle     Malig Hyperthermia Neg Hx         Social  "History     Social History Narrative    Not on file       Objective       Objective     Vital Signs:   /82 (BP Location: Right arm, Patient Position: Sitting, Cuff Size: Adult)   Pulse 64   Ht 162.6 cm (64\")   Wt 108 kg (237 lb)   BMI 40.68 kg/m²     Body mass index is 40.68 kg/m².    Review of Systems   Constitutional:  Positive for fatigue. Negative for appetite change, chills, diaphoresis, fever and unexpected weight change.   HENT:  Positive for trouble swallowing. Negative for nosebleeds, postnasal drip, sore throat and voice change.    Respiratory:  Negative for cough, choking, chest tightness, shortness of breath, wheezing and stridor.    Cardiovascular:  Negative for chest pain, palpitations and leg swelling.   Gastrointestinal:  Positive for abdominal distention and constipation. Negative for abdominal pain, anal bleeding, blood in stool, diarrhea, nausea, rectal pain and vomiting.   Endocrine: Negative for polydipsia, polyphagia and polyuria.   Musculoskeletal:  Negative for gait problem.   Skin:  Negative for rash and wound.   Allergic/Immunologic: Negative for food allergies.   Neurological:  Negative for dizziness, speech difficulty and light-headedness.   Psychiatric/Behavioral:  Negative for confusion, self-injury, sleep disturbance and suicidal ideas.         Physical Exam  Constitutional:       General: She is not in acute distress.     Appearance: She is well-developed. She is not ill-appearing.   HENT:      Head: Normocephalic.   Eyes:      General: No scleral icterus.     Pupils: Pupils are equal, round, and reactive to light.   Cardiovascular:      Rate and Rhythm: Normal rate and regular rhythm.      Heart sounds: Normal heart sounds.   Pulmonary:      Effort: Pulmonary effort is normal.      Breath sounds: Normal breath sounds.   Abdominal:      General: Bowel sounds are normal. There is distension.      Palpations: Abdomen is soft. There is no mass.      Tenderness: There is no " abdominal tenderness. There is no guarding or rebound.      Hernia: No hernia is present.   Musculoskeletal:         General: Normal range of motion.      Right lower leg: No edema.      Left lower leg: No edema.   Skin:     General: Skin is warm and dry.      Coloration: Skin is not jaundiced.   Neurological:      Mental Status: She is alert and oriented to person, place, and time.   Psychiatric:         Speech: Speech normal.         Behavior: Behavior normal.         Judgment: Judgment normal.              Assessment & Plan          Assessment and Plan    Diagnoses and all orders for this visit:    1. Cirrhosis of liver without ascites, unspecified hepatic cirrhosis type (Primary)  -     Protime-INR; Future  -     Hepatitis Panel, Acute  -     LUIS ANGEL  -     Alpha - 1 - Antitrypsin  -     Anti-Smooth Muscle Antibody Titer  -     Ceruloplasmin  -     Immunofixation, Serum; Future  -     Mitochondrial Antibodies, M2  -     MEEHAN Fibrosure  -     Ammonia; Future  -     Protime-INR; Future  -     Case Request; Standing  -     Follow Anesthesia Guidelines / Protocol; Future  -     Case Request    2. MEEHAN (nonalcoholic steatohepatitis)  -     Protime-INR; Future  -     Hepatitis Panel, Acute  -     LUIS ANGEL  -     Alpha - 1 - Antitrypsin  -     Anti-Smooth Muscle Antibody Titer  -     Ceruloplasmin  -     Immunofixation, Serum; Future  -     Mitochondrial Antibodies, M2  -     MEEHAN Fibrosure  -     Ammonia; Future  -     Protime-INR; Future  -     Case Request; Standing  -     Follow Anesthesia Guidelines / Protocol; Future  -     Case Request    3. History of diabetes mellitus, type II  -     Protime-INR; Future  -     Hepatitis Panel, Acute  -     LUIS ANGEL  -     Alpha - 1 - Antitrypsin  -     Anti-Smooth Muscle Antibody Titer  -     Ceruloplasmin  -     Immunofixation, Serum; Future  -     Mitochondrial Antibodies, M2  -     MEEHAN Fibrosure  -     Ammonia; Future  -     Protime-INR; Future    4. Other hyperlipidemia  -     MEEHAN  Fibrosure    5. Hepatic encephalopathy  -     lactulose (CHRONULAC) 10 GM/15ML solution; Take 30 mL by mouth 2 (Two) Times a Day.  Dispense: 1800 mL; Refill: 3  -     riFAXIMin (Xifaxan) 550 MG tablet; Take 1 tablet by mouth Every 12 (Twelve) Hours.  Dispense: 180 tablet; Refill: 3    6. Constipation, unspecified constipation type  -     lactulose (CHRONULAC) 10 GM/15ML solution; Take 30 mL by mouth 2 (Two) Times a Day.  Dispense: 1800 mL; Refill: 3    7. Gastroesophageal reflux disease, unspecified whether esophagitis present  -     Case Request; Standing  -     Follow Anesthesia Guidelines / Protocol; Future  -     Case Request  -     pantoprazole (PROTONIX) 40 MG EC tablet; Take 1 tablet by mouth Daily.  Dispense: 30 tablet; Refill: 5    8. Dysphagia, unspecified type  -     Case Request; Standing  -     Follow Anesthesia Guidelines / Protocol; Future  -     Case Request    Other orders  -     Verify NPO; Standing    I spent more than 45 minutes with the patient today.  Reviewed medical history with her today.  Looks like she most likely has cirrhosis secondary to MEEHAN and diabetes.  We had a long discussion about this today.  We will check liver serology.  We will check a Meehan FibroSure as well.  Given her history and current symptoms recommend EGD with Dr. Allen for further evaluation.  Patient is agreeable to the scope.  She will need cardiac clearance.  On Ozempic.  Stop Nexium.  We will start Protonix 40 mg daily.  Prescription sent.  We will also start lactulose to help with constipation.  This will hopefully also help with possible hepatic encephalopathy symptoms.  Will start Xifaxan.  Will check labs to calculate a MELD score.  Recommend low-sodium/high-protein diet.  Needs to avoid all alcohol and NSAIDs.  Patient to call the office in 2 weeks with an update.  Patient to follow-up with me after her scope.  Patient is agreeable to the plan.    The risk of the endoscopy were discussed in detail.  Possible risks/complications, benefits, and alternatives to surgical or invasive procedure have been explained to patient and/or legal guardian; risks include bleeding, infection, and perforation. Patient has been evaluated and can tolerate anesthesia and/or sedation.             Follow Up       Follow Up   Return in about 3 months (around 5/26/2025) for CIRRHOSIS, FATTY LIVER, GERD.  Patient was given instructions and counseling regarding her condition or for health maintenance advice. Please see specific information pulled into the AVS if appropriate.

## 2025-02-26 NOTE — PATIENT INSTRUCTIONS
Stop nexium  Start protonix 40 mg daily for reflux symptoms  Start lactulose once daily for constipation and fatigue (your brain)  Start xifaxan twice a day for fatigue and your brain (hepatic encephalopathy) from liver disease    Low sodium/low carb diet (high protein)

## 2025-02-28 ENCOUNTER — PATIENT MESSAGE (OUTPATIENT)
Dept: GASTROENTEROLOGY | Facility: CLINIC | Age: 70
End: 2025-02-28
Payer: MEDICARE

## 2025-02-28 ENCOUNTER — TELEPHONE (OUTPATIENT)
Dept: GASTROENTEROLOGY | Facility: CLINIC | Age: 70
End: 2025-02-28
Payer: MEDICARE

## 2025-02-28 NOTE — TELEPHONE ENCOUNTER
Insurance approved until 08/27/2025. Faxed approval to [facility] pharmacy in [facility].

## 2025-03-07 NOTE — TELEPHONE ENCOUNTER
Stephy Duncan 1955 has picked up the sample of Xifaxan which was prescribed by MORGAN Pandey. Medication list has been reconciled.

## 2025-03-13 ENCOUNTER — TELEPHONE (OUTPATIENT)
Dept: GASTROENTEROLOGY | Facility: CLINIC | Age: 70
End: 2025-03-13
Payer: MEDICARE

## 2025-03-14 ENCOUNTER — RESULTS FOLLOW-UP (OUTPATIENT)
Dept: GASTROENTEROLOGY | Facility: CLINIC | Age: 70
End: 2025-03-14
Payer: COMMERCIAL

## 2025-03-14 ENCOUNTER — LAB (OUTPATIENT)
Facility: HOSPITAL | Age: 70
End: 2025-03-14
Payer: COMMERCIAL

## 2025-03-14 DIAGNOSIS — Z79.4 TYPE 2 DIABETES MELLITUS WITH DIABETIC POLYNEUROPATHY, WITH LONG-TERM CURRENT USE OF INSULIN: ICD-10-CM

## 2025-03-14 DIAGNOSIS — K75.81 NASH (NONALCOHOLIC STEATOHEPATITIS): ICD-10-CM

## 2025-03-14 DIAGNOSIS — D69.6 THROMBOCYTOPENIA: ICD-10-CM

## 2025-03-14 DIAGNOSIS — E11.42 TYPE 2 DIABETES MELLITUS WITH DIABETIC POLYNEUROPATHY, WITH LONG-TERM CURRENT USE OF INSULIN: ICD-10-CM

## 2025-03-14 DIAGNOSIS — Z86.39 HISTORY OF DIABETES MELLITUS, TYPE II: ICD-10-CM

## 2025-03-14 DIAGNOSIS — E55.9 VITAMIN D DEFICIENCY: ICD-10-CM

## 2025-03-14 DIAGNOSIS — E61.1 IRON DEFICIENCY: ICD-10-CM

## 2025-03-14 DIAGNOSIS — K74.60 CIRRHOSIS OF LIVER WITHOUT ASCITES, UNSPECIFIED HEPATIC CIRRHOSIS TYPE: ICD-10-CM

## 2025-03-14 LAB
25(OH)D3 SERPL-MCNC: 77.8 NG/ML (ref 30–100)
ALBUMIN SERPL-MCNC: 4.1 G/DL (ref 3.5–5.2)
ALBUMIN/GLOB SERPL: 1.3 G/DL
ALP SERPL-CCNC: 88 U/L (ref 39–117)
ALPHA1 GLOB MFR UR ELPH: 169 MG/DL (ref 90–200)
ALT SERPL W P-5'-P-CCNC: 38 U/L (ref 1–33)
AMMONIA BLD-SCNC: 63 UMOL/L (ref 11–51)
ANION GAP SERPL CALCULATED.3IONS-SCNC: 10.1 MMOL/L (ref 5–15)
AST SERPL-CCNC: 41 U/L (ref 1–32)
BASOPHILS # BLD AUTO: 0.04 10*3/MM3 (ref 0–0.2)
BASOPHILS NFR BLD AUTO: 0.8 % (ref 0–1.5)
BILIRUB SERPL-MCNC: 0.8 MG/DL (ref 0–1.2)
BUN SERPL-MCNC: 16 MG/DL (ref 8–23)
BUN/CREAT SERPL: 13.6 (ref 7–25)
CALCIUM SPEC-SCNC: 9.9 MG/DL (ref 8.6–10.5)
CERULOPLASMIN SERPL-MCNC: 22 MG/DL (ref 19–39)
CHLORIDE SERPL-SCNC: 100 MMOL/L (ref 98–107)
CO2 SERPL-SCNC: 23.9 MMOL/L (ref 22–29)
CREAT SERPL-MCNC: 1.18 MG/DL (ref 0.57–1)
DEPRECATED RDW RBC AUTO: 47 FL (ref 37–54)
EGFRCR SERPLBLD CKD-EPI 2021: 49.5 ML/MIN/1.73
EOSINOPHIL # BLD AUTO: 0.29 10*3/MM3 (ref 0–0.4)
EOSINOPHIL NFR BLD AUTO: 6 % (ref 0.3–6.2)
ERYTHROCYTE [DISTWIDTH] IN BLOOD BY AUTOMATED COUNT: 13.9 % (ref 12.3–15.4)
FERRITIN SERPL-MCNC: 501 NG/ML (ref 13–150)
FOLATE SERPL-MCNC: >20 NG/ML (ref 4.78–24.2)
GLOBULIN UR ELPH-MCNC: 3.1 GM/DL
GLUCOSE SERPL-MCNC: 172 MG/DL (ref 65–99)
HAV IGM SERPL QL IA: NORMAL
HBV CORE IGM SERPL QL IA: NORMAL
HBV SURFACE AG SERPL QL IA: NORMAL
HCT VFR BLD AUTO: 40.9 % (ref 34–46.6)
HCV AB SER QL: NORMAL
HGB BLD-MCNC: 13.8 G/DL (ref 12–15.9)
IMM GRANULOCYTES # BLD AUTO: 0.01 10*3/MM3 (ref 0–0.05)
IMM GRANULOCYTES NFR BLD AUTO: 0.2 % (ref 0–0.5)
INR PPP: 1.05 (ref 0.86–1.15)
IRON 24H UR-MRATE: 93 MCG/DL (ref 37–145)
IRON SATN MFR SERPL: 24 % (ref 20–50)
LYMPHOCYTES # BLD AUTO: 1.01 10*3/MM3 (ref 0.7–3.1)
LYMPHOCYTES NFR BLD AUTO: 21 % (ref 19.6–45.3)
MCH RBC QN AUTO: 30.7 PG (ref 26.6–33)
MCHC RBC AUTO-ENTMCNC: 33.7 G/DL (ref 31.5–35.7)
MCV RBC AUTO: 91.1 FL (ref 79–97)
MONOCYTES # BLD AUTO: 0.41 10*3/MM3 (ref 0.1–0.9)
MONOCYTES NFR BLD AUTO: 8.5 % (ref 5–12)
NEUTROPHILS NFR BLD AUTO: 3.06 10*3/MM3 (ref 1.7–7)
NEUTROPHILS NFR BLD AUTO: 63.5 % (ref 42.7–76)
NRBC BLD AUTO-RTO: 0 /100 WBC (ref 0–0.2)
PLATELET # BLD AUTO: 96 10*3/MM3 (ref 140–450)
PMV BLD AUTO: 10.3 FL (ref 6–12)
POTASSIUM SERPL-SCNC: 4.6 MMOL/L (ref 3.5–5.2)
PROT SERPL-MCNC: 7.2 G/DL (ref 6–8.5)
PROTHROMBIN TIME: 14.2 SECONDS (ref 11.8–14.9)
RBC # BLD AUTO: 4.49 10*6/MM3 (ref 3.77–5.28)
SODIUM SERPL-SCNC: 134 MMOL/L (ref 136–145)
TIBC SERPL-MCNC: 390 MCG/DL (ref 298–536)
TRANSFERRIN SERPL-MCNC: 262 MG/DL (ref 200–360)
WBC NRBC COR # BLD AUTO: 4.82 10*3/MM3 (ref 3.4–10.8)

## 2025-03-14 PROCEDURE — 83883 ASSAY NEPHELOMETRY NOT SPEC: CPT

## 2025-03-14 PROCEDURE — 86381 MITOCHONDRIAL ANTIBODY EACH: CPT | Performed by: NURSE PRACTITIONER

## 2025-03-14 PROCEDURE — 82390 ASSAY OF CERULOPLASMIN: CPT | Performed by: NURSE PRACTITIONER

## 2025-03-14 PROCEDURE — 84466 ASSAY OF TRANSFERRIN: CPT

## 2025-03-14 PROCEDURE — 83540 ASSAY OF IRON: CPT

## 2025-03-14 PROCEDURE — 83010 ASSAY OF HAPTOGLOBIN QUANT: CPT

## 2025-03-14 PROCEDURE — 85610 PROTHROMBIN TIME: CPT

## 2025-03-14 PROCEDURE — 82306 VITAMIN D 25 HYDROXY: CPT

## 2025-03-14 PROCEDURE — 86334 IMMUNOFIX E-PHORESIS SERUM: CPT

## 2025-03-14 PROCEDURE — 82977 ASSAY OF GGT: CPT

## 2025-03-14 PROCEDURE — 82746 ASSAY OF FOLIC ACID SERUM: CPT

## 2025-03-14 PROCEDURE — 84478 ASSAY OF TRIGLYCERIDES: CPT

## 2025-03-14 PROCEDURE — 82172 ASSAY OF APOLIPOPROTEIN: CPT

## 2025-03-14 PROCEDURE — 80074 ACUTE HEPATITIS PANEL: CPT | Performed by: NURSE PRACTITIONER

## 2025-03-14 PROCEDURE — 80053 COMPREHEN METABOLIC PANEL: CPT

## 2025-03-14 PROCEDURE — 86015 ACTIN ANTIBODY EACH: CPT | Performed by: NURSE PRACTITIONER

## 2025-03-14 PROCEDURE — 36415 COLL VENOUS BLD VENIPUNCTURE: CPT

## 2025-03-14 PROCEDURE — 82103 ALPHA-1-ANTITRYPSIN TOTAL: CPT | Performed by: NURSE PRACTITIONER

## 2025-03-14 PROCEDURE — 82465 ASSAY BLD/SERUM CHOLESTEROL: CPT

## 2025-03-14 PROCEDURE — 85025 COMPLETE CBC W/AUTO DIFF WBC: CPT

## 2025-03-14 PROCEDURE — 82784 ASSAY IGA/IGD/IGG/IGM EACH: CPT

## 2025-03-14 PROCEDURE — 82140 ASSAY OF AMMONIA: CPT

## 2025-03-14 PROCEDURE — 86038 ANTINUCLEAR ANTIBODIES: CPT | Performed by: NURSE PRACTITIONER

## 2025-03-14 PROCEDURE — 82728 ASSAY OF FERRITIN: CPT

## 2025-03-15 LAB
MITOCHONDRIA M2 IGG SER-ACNC: <20 UNITS (ref 0–20)
SMA IGG SER-ACNC: 2 UNITS (ref 0–19)

## 2025-03-17 LAB — ANA SER QL: NEGATIVE

## 2025-03-18 LAB
IGA SERPL-MCNC: 342 MG/DL (ref 64–422)
IGG SERPL-MCNC: 1035 MG/DL (ref 586–1602)
IGM SERPL-MCNC: 17 MG/DL (ref 26–217)
PROT PATTERN SERPL IFE-IMP: ABNORMAL

## 2025-03-19 LAB
A2 MACROGLOB SERPL-MCNC: 340 MG/DL (ref 110–276)
ALT SERPL W P-5'-P-CCNC: 41 IU/L (ref 0–40)
APO A-I SERPL-MCNC: 136 MG/DL (ref 116–209)
AST SERPL W P-5'-P-CCNC: 47 IU/L (ref 0–40)
BILIRUB SERPL-MCNC: 0.1 MG/DL (ref 0–1.2)
CHOLEST SERPL-MCNC: 122 MG/DL (ref 100–199)
FIBROSIS SCORING:: ABNORMAL
FIBROSIS STAGE SERPL QL: ABNORMAL
GGT SERPL-CCNC: 39 IU/L (ref 0–60)
GLUCOSE SERPL-MCNC: 177 MG/DL (ref 70–99)
HAPTOGLOB SERPL-MCNC: 105 MG/DL (ref 42–346)
LABORATORY COMMENT REPORT: ABNORMAL
LIVER FIBR SCORE SERPL CALC.FIBROSURE: 0.32 (ref 0–0.21)
LIVER STEATOSIS GRADE SERPL QL: ABNORMAL
LIVER STEATOSIS SCORE SERPL: 0.68 (ref 0–0.4)
NASH GRADE SERPL QL: ABNORMAL
NASH INTERPRETATION SERPL-IMP: ABNORMAL
NASH SCORE SERPL: 0.7 (ref 0–0.25)
NASH SCORING: ABNORMAL
STEATOSIS SCORING: ABNORMAL
TEST PERFORMANCE INFO SPEC: ABNORMAL
TEST PERFORMANCE INFO SPEC: ABNORMAL
TRIGL SERPL-MCNC: 94 MG/DL (ref 0–149)

## 2025-03-20 NOTE — TELEPHONE ENCOUNTER
Pt notified of results. Pt states she ran out of xifaxan samples last night and has not received her xifaxan yet.

## 2025-03-27 NOTE — THERAPY TREATMENT NOTE
Patient Name: Stephy Duncan  : 1955    MRN: [id number]                              Today's Date: 2022       Admit Date: 2022    Visit Dx:     ICD-10-CM ICD-9-CM   1. S/P Girdlestone procedure  Z98.890 V45.89   2. Prosthetic hip infection (HCC)  T84.59XA 996.66    Z96.649 V43.64     Patient Active Problem List   Diagnosis   • Status post total replacement of hip   • Type 2 diabetes mellitus with diabetic polyneuropathy, with long-term current use of insulin (HCC)   • Hypertension   • Normocytic anemia   • Postoperative infection   • Iron deficiency anemia   • Morbid obesity (HCC)   • Presence of stent in coronary artery in patient with coronary artery disease   • Chronic diastolic CHF (congestive heart failure) (HCC)   • Septic arthritis of hip (HCC)   • Drainage from wound   • Candidal intertrigo   • Postoperative anemia due to acute blood loss   • S/P Girdlestone procedure     Past Medical History:   Diagnosis Date   • Allergies    • Arthritis    • Cardiac murmur    • Diabetes mellitus (HCC)    • GERD (gastroesophageal reflux disease)    • Heart attack (HCC) 2020   • History of cervical cancer    • History of transfusion     SEVERAL   • Hyperlipidemia    • Hypertension    • Iron deficiency anemia    • Osteomyelitis hip (HCC)     RIGHT   • PONV (postoperative nausea and vomiting)    • Right hip pain    • VRE infection within last 3 months    • Wound infection     RIGHT HIP.  WOUND VAC IN PLACE     Past Surgical History:   Procedure Laterality Date   • CARDIAC CATHETERIZATION     • CERVICAL CONIZATION     • COLONOSCOPY     • CORONARY ANGIOPLASTY WITH STENT PLACEMENT     • ENDOSCOPY     • HIP ARTHROPLASTY Right    • HIP HARDWARE REMOVAL Right    • HIP MINI REVISION Right 2022    Procedure: TOTAL HIP ARTHROPLASTY LINER REVISION, pegboard lateral;  Surgeon: Karri Schaeffer II, MD;  Location: [facility];  Service: Orthopedics;  Laterality: Right;   • HIP SPACER INSERTION  WITH ANTIBIOTIC CEMENT      X3   • HYSTERECTOMY     • INCISION AND DRAINAGE HIP Right 12/18/2021    Procedure: INCISION AND DRAINAGE TOTAL HIP, LINER EXCHANGE AND WOUND VAC PLACEMENT;  Surgeon: Karri Schaeffer II, MD;  Location: [facility] MAIN OR;  Service: Orthopedics;  Laterality: Right;   • INCISION AND DRAINAGE HIP Right 5/2/2022    Procedure: HIP INCISION AND DRAINAGE;  Surgeon: Karri Schaeffer II, MD;  Location: [facility];  Service: Orthopedics;  Laterality: Right;   • KNEE ARTHROPLASTY Left    • LAPAROSCOPIC CHOLECYSTECTOMY     • TONSILLECTOMY     • TOTAL HIP ARTHROPLASTY Right 4/25/2022    Procedure: HIP GIRDLESTONE PROCEDURE;  Surgeon: Karri Schaeffer II, MD;  Location: [facility] MA[facility] OR;  Service: Orthopedics;  Laterality: Right;   • TOTAL HIP ARTHROPLASTY REVISION Right 11/16/2021    Procedure: TOTAL HIP REVISION ARTHROPLASTY RIGHT POSTERIOR;  Surgeon: Karri Schaeffer II, MD;  Location: [facility] MAIN OR;  Service: Orthopedics;  Laterality: Right;      General Information     Row Name 05/05/22 5040          Physical Therapy Time and Intention    Document Type therapy note (daily note)  -     Mode of Treatment physical therapy  -     Row Name 05/05/22 1356          General Information    Existing Precautions/Restrictions fall;non-weight bearing  -     Row Name 05/05/22 9091          Safety Issues, Functional Mobility    Impairments Affecting Function (Mobility) endurance/activity tolerance;strength;pain;range of motion (ROM)  -     Comment, Safety Issues/Impairments (Mobility) gt belt and non skid socks worn for safety  -           User Key  (r) = Recorded By, (t) = Taken By, (c) = Cosigned By    Initials Name Provider Type    PH Antonia Zhong PTA Physical Therapist Assistant               Mobility     Row Name 05/05/22 8483          Bed Mobility    Bed Mobility supine-sit  -PH     Supine-Sit [place] (Bed Mobility) minimum assist (75% patient  effort);verbal cues;nonverbal cues (demo/gesture)  -PH     Assistive Device (Bed Mobility) bed rails;head of bed elevated  -PH     Comment, (Bed Mobility) extra time to EOB w/ pt req SBA at  EOB  -PH     Row Name 05/05/22 1355          Bed-Chair Transfer    Bed-Chair [place] (Transfers) minimum assist (75% patient effort);verbal cues;nonverbal cues (demo/gesture)  -PH     Assistive Device (Bed-Chair Transfers) walker, front-wheeled  -PH     Comment, (Bed-Chair Transfer) pt able to maintain NWB w/ a few steps and turn to chair on L foot w/ pt transferring toward L side.  -PH     Row Name 05/05/22 1355          Sit-Stand Transfer    Sit-Stand [place] (Transfers) minimum assist (75% patient effort);verbal cues  -PH     Assistive Device (Sit-Stand Transfers) walker, front-wheeled  -PH     Row Name 05/05/22 1355          Gait/Stairs (Locomotion)    Wood Level (Gait) unable to assess  -PH     Wood Level (Stairs) unable to assess  -PH     Row Name 05/05/22 1355          Mobility    Extremity Weight-bearing Status right lower extremity  -PH     Right Lower Extremity (Weight-bearing Status) non weight-bearing (NWB)  -PH           User Key  (r) = Recorded By, (t) = Taken By, (c) = Cosigned By    Initials Name Provider Type     Antonia Zhong PTA Physical Therapist Assistant               Obj/Interventions     Row Name 05/05/22 1357          Balance    Balance Assessment sitting static balance;standing static balance  -PH     Static Sitting Balance standby assist  -PH     Static Standing Balance contact guard  -PH     Position/Device Used, Standing Balance walker, front-wheeled  -PH           User Key  (r) = Recorded By, (t) = Taken By, (c) = Cosigned By    Initials Name Provider Type     Antonia Zhong PTA Physical Therapist Assistant               Goals/Plan    No documentation.                Clinical Impression     Row Name 05/05/22 1358          Pain    Posttreatment Pain  Rating 7/10  -PH     Pain Location - Side/Orientation Right  -PH     Pain Location incisional  -PH     Pain Location - hip  -PH     Pain Intervention(s) Repositioned;Ambulation/increased activity;Rest  -PH     Additional Documentation Pain Scale: Numbers Pre/Post-Treatment (Group)  -PH     Row Name 05/05/22 1358          Plan of Care Review    Plan of Care Reviewed With patient  -PH     Progress improving  -PH     Outcome Evaluation Pt in bed at beg of PT session. Pt sat up to EOB w/ min A and a little extra time. Pt stood req min A and use of fww. Pt transferred b>c req CGA/min A and use of fww. Pt able to maintain NWB for RLE as she transferred toward L to chair in a few steps. Pt in chair at end of session w/ alarm set. PT will prog as pt lisa.  -PH     Row Name 05/05/22 4000          Positioning and Restraints    Pre-Treatment Position in bed  -PH     Post Treatment Position chair  -PH     In Chair reclined;call light within reach;encouraged to call for assist;exit alarm on  -PH           User Key  (r) = Recorded By, (t) = Taken By, (c) = Cosigned By    Initials Name Provider Type    PH Antonia Zhong PTA Physical Therapist Assistant               Outcome Measures     Row Name 05/05/22 1400 05/05/22 0823       How much help from another person do you currently need...    Turning from your back to your side while in flat bed without using bedrails? 3  -PH 3  -LG    Moving from lying on back to sitting on the side of a flat bed without bedrails? 3  -PH 3  -LG    Moving to and from a bed to a chair (including a wheelchair)? 3  -PH 2  -LG    Standing up from a chair using your arms (e.g., wheelchair, bedside chair)? 3  -PH 2  -LG    Climbing 3-5 steps with a railing? 1  -PH 1  -LG    To walk in hospital room? 2  -PH 2  -LG    AM-PAC 6 Clicks Score (PT) 15  -PH 13  -LG    Highest level of mobility 4 --> Transferred to chair/commode  -PH 4 --> Transferred to chair/commode  -LG    Row Name 05/05/22 1400           Functional Assessment    Outcome Measure Options AM-PAC 6 Clicks Basic Mobility (PT)  -           User Key  (r) = Recorded By, (t) = Taken By, (c) = Cosigned By    Initials Name Provider Type     Antonia Zhong PTA Physical Therapist Assistant    Teena Hernandez RN Registered Nurse                             Physical Therapy Education                 Title: PT OT SLP Therapies (Done)     Topic: Physical Therapy (Done)     Point: Mobility training (Done)     Learning Progress Summary           Patient Acceptance, E,D, VU,DU,NR by  at 5/5/2022 1400      Show all documentation for this point (4)                 Point: Home exercise program (Done)     Learning Progress Summary           Patient Acceptance, E,TB, VU,DU by NL at 5/2/2022 0825                   Point: Body mechanics (Done)     Learning Progress Summary           Patient Acceptance, E,D, VU,DU,NR by  at 5/5/2022 1400      Show all documentation for this point (3)                 Point: Precautions (Done)     Learning Progress Summary           Patient Acceptance, E,D, VU,DU,NR by  at 5/5/2022 1400      Show all documentation for this point (3)                             User Key     Initials Effective Dates Name Provider Type Discipline    NL 06/16/21 -  Ana Jacinto RN Registered Nurse Nurse     06/16/21 -  Antonia Zhong PTA Physical Therapist Assistant PT              PT Recommendation and Plan     Plan of Care Reviewed With: patient  Progress: improving  Outcome Evaluation: Pt in bed at beg of PT session. Pt sat up to EOB w/ min A and a little extra time. Pt stood req min A and use of fww. Pt transferred b>c req CGA/min A and use of fww. Pt able to maintain NWB for RLE as she transferred toward L to chair in a few steps. Pt in chair at end of session w/ alarm set. PT will prog as pt lisa.     Time Calculation:    PT Charges     Row Name 05/05/22 1401             Time Calculation    Start Time 1332  -      Stop  Time 1347  -PH      Time Calculation (min) 15 min  -PH      PT Received On 05/05/22  -PH      PT - Next Appointment 05/06/22  -PH              Timed Charges    82413 - PT Therapeutic Activity Minutes 15  -PH              Total Minutes    Timed Charges Total Minutes 15  -PH       Total Minutes 15  -PH            User Key  (r) = Recorded By, (t) = Taken By, (c) = Cosigned By    Initials Name Provider Type    PH Antonia Zhong PTA Physical Therapist Assistant              Therapy Charges for Today     Code Description Service Date Service Provider Modifiers Qty    [id number]  PT THERAPEUTIC ACT EA 15 MIN 5/5/2022 Antonia Zhong PTA GP 1          PT G-Codes  Outcome Measure Options: AM-PAC 6 Clicks Basic Mobility (PT)  AM-PAC 6 Clicks Score (PT): 15    Antonia Zhong PTA  5/5/2022     DISPLAY PLAN FREE TEXT

## 2025-03-28 ENCOUNTER — TELEPHONE (OUTPATIENT)
Dept: GASTROENTEROLOGY | Facility: CLINIC | Age: 70
End: 2025-03-28
Payer: COMMERCIAL

## 2025-03-28 NOTE — TELEPHONE ENCOUNTER
ENDO RECONCILIATION  Verify source of procedure(s): Office visit  If other, please list source: 3/28/25    TIME OUT-CONFIRM CORRECT PROCEDURE: EGD  Cardiology: Fred Greco  Pulmonology: no  Blood thinner: no  GLP-1: Ozempic  Additional DX/indication for procedure:     Please include any other notes relevant to endo reconciliation:     Cardiac clearance from Dr Fred Greco is pending.   [place]

## 2025-04-11 NOTE — PRE-PROCEDURE INSTRUCTIONS
"Instructed on date and arrival time of 0900. Instructed that arrival time is not procedure time but allows time to prepare for procedure. Come to entrance \"C\".  Must have  over age 18 to drive home.  May have two visitors; however, children under 12 must stay in waiting room.  Discussed diet/NPO.  May take medications as usual except for blood thinners, diabetic medications, or weight loss medications.  Verbalized understanding of instructions given.  Instructed to call for questions or concerns.  Hold Ozempic for one week prior to procedure.  Cardiac clearance noted in chart.  "

## 2025-04-16 ENCOUNTER — ANESTHESIA EVENT (OUTPATIENT)
Dept: GASTROENTEROLOGY | Facility: HOSPITAL | Age: 70
End: 2025-04-16
Payer: MEDICARE

## 2025-04-16 NOTE — ANESTHESIA PREPROCEDURE EVALUATION
Anesthesia Evaluation     history of anesthetic complications:  PONV  NPO Solid Status: > 8 hours  NPO Liquid Status: > 2 hours           Airway   Mallampati: II  TM distance: >3 FB  Neck ROM: full  No difficulty expected  Dental    (+) upper dentures and lower dentures    Pulmonary    (+) ,decreased breath sounds  Cardiovascular     (+) hypertension 2 medications or greater, valvular problems/murmurs murmur, past MI  >12 months, CAD, cardiac stents more than 12 months ago , CHF , murmur, hyperlipidemia      Neuro/Psych  (+) numbness  GI/Hepatic/Renal/Endo    (+) obesity, morbid obesity, GERD, hepatitis, liver disease (MEEHAN, cirrhosis) fatty liver disease, diabetes mellitus type 2    Musculoskeletal     Abdominal    Substance History      OB/GYN          Other   arthritis,   history of cancer (cervical cancer)    ROS/Med Hx Other: Semaglutide- last taken 4/10/25 per phone conversation    Cardiac clearance in chart    Continuous glucose monitor on upper right arm     In 2019- patient had hip replacement that became infected- her hip was removed. Patient no longer can ambulate. Patient uses wheelchair. She stated she is able to roll in bed    7/19/22 Echo  · Estimated right ventricular systolic pressure from tricuspid regurgitation is normal (<35 mmHg).  · Left ventricular wall thickness is consistent with mild concentric hypertrophy.  · Left ventricular diastolic function is consistent with (grade I) impaired relaxation.  · Left ventricular ejection fraction appears to be greater than 70%.     1.  There were no apparent intracardiac masses, vegetations or thrombi.  2.  Significant subaortic valve obstruction, mostly due to hypertensive heart disease.                        Anesthesia Plan    ASA 3     general   total IV anesthesia  (Patient understands anesthesia not responsible for dental damage. Risks explained including allergic reactions, BP, HR, O2 changes, aspiration, advanced airway placement. Pt verbalized  understanding.)  intravenous induction     Anesthetic plan, risks, benefits, and alternatives have been provided, discussed and informed consent has been obtained with: patient.    Plan discussed with CRNA.        CODE STATUS:

## 2025-04-17 ENCOUNTER — HOSPITAL ENCOUNTER (OUTPATIENT)
Facility: HOSPITAL | Age: 70
Setting detail: HOSPITAL OUTPATIENT SURGERY
Discharge: HOME | End: 2025-04-17
Attending: INTERNAL MEDICINE | Admitting: INTERNAL MEDICINE
Payer: MEDICARE

## 2025-04-17 ENCOUNTER — ANESTHESIA (OUTPATIENT)
Dept: GASTROENTEROLOGY | Facility: HOSPITAL | Age: 70
End: 2025-04-17
Payer: MEDICARE

## 2025-04-17 VITALS
RESPIRATION RATE: 23 BRPM | DIASTOLIC BLOOD PRESSURE: 71 MMHG | HEART RATE: 75 BPM | WEIGHT: 239.86 LBS | SYSTOLIC BLOOD PRESSURE: 129 MMHG | OXYGEN SATURATION: 95 % | BODY MASS INDEX: 40.95 KG/M2 | TEMPERATURE: 97.6 F | HEIGHT: 64 IN

## 2025-04-17 DIAGNOSIS — R13.10 DYSPHAGIA, UNSPECIFIED TYPE: ICD-10-CM

## 2025-04-17 DIAGNOSIS — K21.9 GASTROESOPHAGEAL REFLUX DISEASE, UNSPECIFIED WHETHER ESOPHAGITIS PRESENT: ICD-10-CM

## 2025-04-17 DIAGNOSIS — K74.60 CIRRHOSIS OF LIVER WITHOUT ASCITES, UNSPECIFIED HEPATIC CIRRHOSIS TYPE: ICD-10-CM

## 2025-04-17 DIAGNOSIS — K75.81 NASH (NONALCOHOLIC STEATOHEPATITIS): ICD-10-CM

## 2025-04-17 LAB — GLUCOSE BLDC GLUCOMTR-MCNC: 175 MG/DL (ref 70–99)

## 2025-04-17 PROCEDURE — 88305 TISSUE EXAM BY PATHOLOGIST: CPT | Performed by: INTERNAL MEDICINE

## 2025-04-17 PROCEDURE — 25010000002 PROPOFOL 10 MG/ML EMULSION: Performed by: NURSE ANESTHETIST, CERTIFIED REGISTERED

## 2025-04-17 PROCEDURE — 25810000003 LACTATED RINGERS PER 1000 ML

## 2025-04-17 PROCEDURE — 43239 EGD BIOPSY SINGLE/MULTIPLE: CPT | Performed by: INTERNAL MEDICINE

## 2025-04-17 PROCEDURE — 25010000002 LIDOCAINE PF 2% 2 % SOLUTION: Performed by: NURSE ANESTHETIST, CERTIFIED REGISTERED

## 2025-04-17 PROCEDURE — 82948 REAGENT STRIP/BLOOD GLUCOSE: CPT

## 2025-04-17 RX ORDER — LIDOCAINE HYDROCHLORIDE 20 MG/ML
INJECTION, SOLUTION EPIDURAL; INFILTRATION; INTRACAUDAL; PERINEURAL AS NEEDED
Status: DISCONTINUED | OUTPATIENT
Start: 2025-04-17 | End: 2025-04-17 | Stop reason: SURG

## 2025-04-17 RX ORDER — SODIUM CHLORIDE, SODIUM LACTATE, POTASSIUM CHLORIDE, CALCIUM CHLORIDE 600; 310; 30; 20 MG/100ML; MG/100ML; MG/100ML; MG/100ML
30 INJECTION, SOLUTION INTRAVENOUS CONTINUOUS
Status: DISCONTINUED | OUTPATIENT
Start: 2025-04-17 | End: 2025-04-17 | Stop reason: HOSPADM

## 2025-04-17 RX ORDER — PROPOFOL 10 MG/ML
VIAL (ML) INTRAVENOUS AS NEEDED
Status: DISCONTINUED | OUTPATIENT
Start: 2025-04-17 | End: 2025-04-17 | Stop reason: SURG

## 2025-04-17 RX ADMIN — PROPOFOL 175 MCG/KG/MIN: 10 INJECTION, EMULSION INTRAVENOUS at 10:25

## 2025-04-17 RX ADMIN — LIDOCAINE HYDROCHLORIDE 50 MG: 20 INJECTION, SOLUTION EPIDURAL; INFILTRATION; INTRACAUDAL; PERINEURAL at 10:25

## 2025-04-17 RX ADMIN — PROPOFOL 100 MG: 10 INJECTION, EMULSION INTRAVENOUS at 10:25

## 2025-04-17 RX ADMIN — SODIUM CHLORIDE, POTASSIUM CHLORIDE, SODIUM LACTATE AND CALCIUM CHLORIDE 30 ML/HR: 600; 310; 30; 20 INJECTION, SOLUTION INTRAVENOUS at 09:47

## 2025-04-17 NOTE — ANESTHESIA POSTPROCEDURE EVALUATION
Patient: Stephy Duncan    Procedure Summary       Date: 04/17/25 Room / Location: [facility] ENDOSCOPY 4 / [facility] ENDOSCOPY    Anesthesia Start: 1022 Anesthesia Stop: 1037    Procedure: ESOPHAGOGASTRODUODENOSCOPY WITH BIOPSIES Diagnosis:       Cirrhosis of liver without ascites, unspecified hepatic cirrhosis type      MEEHAN (nonalcoholic steatohepatitis)      Gastroesophageal reflux disease, unspecified whether esophagitis present      Dysphagia, unspecified type      (Cirrhosis of liver without ascites, unspecified hepatic cirrhosis type [K74.60])      (MEEHAN (nonalcoholic steatohepatitis) [K75.81])      (Gastroesophageal reflux disease, unspecified whether esophagitis present [K21.9])      (Dysphagia, unspecified type [R13.10])    Surgeons: Bhumika Allen MD Provider: Jesse Marques CRNA    Anesthesia Type: general ASA Status: 3            Anesthesia Type: general    Vitals  Vitals Value Taken Time   /71 04/17/25 10:50   Temp 36.4 °C (97.6 °F) 04/17/25 10:50   Pulse 75 04/17/25 10:50   Resp 23 04/17/25 10:50   SpO2 95 % 04/17/25 10:50           Post Anesthesia Care and Evaluation    Patient location during evaluation: bedside  Patient participation: complete - patient participated  Level of consciousness: awake  Pain management: adequate    Airway patency: patent  Anesthetic complications: No anesthetic complications  PONV Status: controlled  Cardiovascular status: acceptable and stable  Respiratory status: acceptable

## 2025-04-17 NOTE — H&P
Pre Procedure History & Physical    Chief Complaint:   Dysphagia, GERD, MEEHAN cirrhosis    Subjective     HPI:   70 yo F here for eval of dysphagia, GERD, MEEHAN cirrhosis.    Past Medical History:   Past Medical History:   Diagnosis Date    Allergic     Morphine, ACE inhibitors    Allergies     Arthritis     Cancer 1982    Cervical cancer    Cardiac murmur     Coronary artery disease 2016    Received stints    Diabetes mellitus     GERD (gastroesophageal reflux disease)     Heart attack 12/2020    History of cervical cancer 1981    History of transfusion     SEVERAL    Hyperlipidemia     Hypertension     Iron deficiency anemia     Obesity     Osteomyelitis hip     RIGHT    PONV (postoperative nausea and vomiting)     Right hip pain     Visual impairment     Corrected with glasses    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  WOUND VAC IN PLACE       Past Surgical History:  Past Surgical History:   Procedure Laterality Date    CARDIAC CATHETERIZATION      CERVICAL CONIZATION      COLONOSCOPY      CORONARY ANGIOPLASTY WITH STENT PLACEMENT      ENDOSCOPY      ENDOSCOPY N/A 06/09/2022    Procedure: ESOPHAGOGASTRODUODENOSCOPY WITH COLD BIOSPIES;  Surgeon: Zechariah Nj MD;  Location: [facility] ENDOSCOPY;  Service: Gastroenterology;  Laterality: N/A;  PRE- EPIGASTRIC PAIN AND ABDOMINAL PAIN  POST- NORMAL    HIP ARTHROPLASTY Right     HIP HARDWARE REMOVAL Right     HIP MINI REVISION Right 01/18/2022    Procedure: TOTAL HIP ARTHROPLASTY LINER REVISION, pegboard lateral;  Surgeon: Karri Schaeffer II, MD;  Location: [facility] OR;  Service: Orthopedics;  Laterality: Right;    HIP SPACER INSERTION WITH ANTIBIOTIC CEMENT      X3    HYSTERECTOMY      INCISION AND DRAINAGE HIP Right 12/18/2021    Procedure: INCISION AND DRAINAGE TOTAL HIP, LINER EXCHANGE AND WOUND VAC PLACEMENT;  Surgeon: Karri Schaeffer II, MD;  Location: [facility] OR;  Service: Orthopedics;  Laterality: Right;     INCISION AND DRAINAGE HIP Right 05/02/2022    Procedure: HIP INCISION AND DRAINAGE;  Surgeon: Karri Schaeffer II, MD;  Location:  [facility] OR;  Service: Orthopedics;  Laterality: Right;    INCISION AND DRAINAGE HIP Right 06/04/2022    Procedure: HIP INCISION AND DRAINAGE WITH WOUND VAC REPLACEMENT;  Surgeon: Karri Schaeffer II, MD;  Location:  [facility] OR;  Service: Orthopedics;  Laterality: Right;    JOINT REPLACEMENT  L knee, R hip    Knee 2014, hip replacement [id number] multiple surgeries    KNEE ARTHROPLASTY Left     LAPAROSCOPIC CHOLECYSTECTOMY      TONSILLECTOMY      TOTAL HIP ARTHROPLASTY Right 04/25/2022    Procedure: HIP GIRDLESTONE PROCEDURE;  Surgeon: Karri Schaeffer II, MD;  Location:  [facility] OR;  Service: Orthopedics;  Laterality: Right;    TOTAL HIP ARTHROPLASTY REVISION Right 11/16/2021    Procedure: TOTAL HIP REVISION ARTHROPLASTY RIGHT POSTERIOR;  Surgeon: Karri Schaeffer II, MD;  Location:  [facility] OR;  Service: Orthopedics;  Laterality: Right;       Family History:  Family History   Problem Relation Age of Onset    Arthritis Mother     Cancer Mother     COPD Mother     Hearing loss Mother     Hyperlipidemia Mother     Thyroid disease Mother     Vision loss Mother     Arthritis Father     COPD Father     Hearing loss Father     Hyperlipidemia Father     Cancer Maternal Grandfather     Arthritis Maternal Grandmother     Cancer Maternal Grandmother     Hyperlipidemia Maternal Grandmother     Diabetes Paternal Grandfather     Heart disease Paternal Grandfather     Hyperlipidemia Paternal Grandfather     Diabetes Paternal Grandmother     Arthritis Sister     Cancer Sister     COPD Sister     Diabetes Sister     Hyperlipidemia Sister     Diabetes Brother     Hyperlipidemia Brother     Kidney disease Brother     Vision loss Maternal Uncle     Malig Hyperthermia Neg Hx        Social History:   reports that she quit smoking about 9 years ago. Her smoking use  included cigarettes. She started smoking about 54 years ago. She has a 110.1 pack-year smoking history. She has never been exposed to tobacco smoke. She has never used smokeless tobacco. She reports that she does not currently use alcohol. She reports that she does not use drugs.    Medications:   Medications Prior to Admission   Medication Sig Dispense Refill Last Dose/Taking    allopurinol (ZYLOPRIM) 100 MG tablet Take 1 tablet by mouth Daily. 90 tablet 0 Past Month    calcium carbonate, oyster shell, 500 MG tablet tablet Take 1 tablet by mouth Daily. 90 tablet 2 4/16/2025    Continuous Glucose Sensor (FreeStyle Carol 3 Plus Sensor) Use Every 14 (Fourteen) Days. 6 each 3 4/17/2025 Morning    Diclofenac Sodium (VOLTAREN) 1 % gel gel Apply 4 g topically to the appropriate area as directed 4 (Four) Times a Day As Needed (MSK pain). 100 g 3 Past Week    ferrous sulfate 325 (65 FE) MG tablet Take 1 tablet by mouth Every Other Day. Mornings   4/16/2025    folic acid (FOLVITE) 1 MG tablet Take 1 tablet by mouth Daily.   4/16/2025    furosemide (LASIX) 40 MG tablet Take 1 tablet by mouth Daily.   4/16/2025    gabapentin (NEURONTIN) 600 MG tablet Take 1 tablet by mouth 2 (Two) Times a Day. 180 tablet 2 4/17/2025 Morning    insulin aspart (NovoLOG FlexPen) 100 UNIT/ML solution pen-injector sc pen Inject 20 Units under the skin into the appropriate area as directed Daily With Breakfast AND 23 Units Daily With Lunch AND 23 Units Daily With Dinner. 20 mL 11 4/16/2025    insulin glargine (Lantus) 100 UNIT/ML injection Inject 110 Units under the skin into the appropriate area as directed Daily. 100 mL 3 4/16/2025    isosorbide mononitrate (IMDUR) 30 MG 24 hr tablet Take 1 tablet by mouth Every Morning.   4/17/2025 Morning    KLOR-CON 20 MEQ CR tablet Take 1 tablet by mouth Daily.   4/16/2025    lactulose (CHRONULAC) 10 GM/15ML solution Take 30 mL by mouth 2 (Two) Times a Day. 1800 mL 3 4/16/2025    levocetirizine (XYZAL) 5 MG  "tablet Take 1 tablet by mouth Every Evening. 90 tablet 1 4/16/2025    pantoprazole (PROTONIX) 40 MG EC tablet Take 1 tablet by mouth Daily. 30 tablet 5 4/17/2025 Morning    riFAXIMin (Xifaxan) 550 MG tablet Take 1 tablet by mouth Every 12 (Twelve) Hours. 24 tablet 0 4/16/2025    rosuvastatin (CRESTOR) 5 MG tablet TAKE 1 TABLET (5 MG TOTAL) BY MOUTH DAILY.   4/16/2025    Semaglutide, 1 MG/DOSE, (OZEMPIC) 4 MG/3ML solution pen-injector Inject 1 mg under the skin into the appropriate area as directed 1 (One) Time Per Week. 12 mL 0 Past Week    spironolactone (ALDACTONE) 50 MG tablet Take 1 tablet by mouth Daily. 90 tablet 2 4/16/2025    verapamil (CALAN) 80 MG tablet Take 1 tablet by mouth 2 (Two) Times a Day. 60 tablet 0 4/16/2025    vitamin D3 125 MCG (5000 UT) capsule capsule Take 1 capsule by mouth Daily.   4/16/2025    colchicine 0.6 MG tablet Take 1 tablet by mouth Daily.   More than a month    nitroglycerin (NITROSTAT) 0.4 MG SL tablet Take 1 tablet by mouth.       OneTouch Verio test strip USE AS DIRECTED 3 TIMES A DAY       riFAXIMin (Xifaxan) 550 MG tablet Take 1 tablet by mouth Every 12 (Twelve) Hours. 180 tablet 3        Allergies:  Ace inhibitors and Morphine    ROS:    Pertinent items are noted in HPI     Objective     Blood pressure 147/57, pulse 76, temperature 98.1 °F (36.7 °C), temperature source Temporal, resp. rate 19, height 162.6 cm (64\"), weight 109 kg (239 lb 13.8 oz), SpO2 98%.    Physical Exam   Constitutional: Pt is oriented to person, place, and time and well-developed, well-nourished, and in no distress.   Mouth/Throat: Oropharynx is clear and moist.   Neck: Normal range of motion.   Cardiovascular: Normal rate, regular rhythm and normal heart sounds.    Pulmonary/Chest: Effort normal and breath sounds normal.   Abdominal: Soft. Nontender  Skin: Skin is warm and dry.   Psychiatric: Mood, memory, affect and judgment normal.     Assessment & Plan     Diagnosis:  Dysphagia, GERD, MEEHAN " cirrhosis    Anticipated Surgical Procedure:  EGD    The risks, benefits, and alternatives of this procedure have been discussed with the patient or the responsible party- the patient understands and agrees to proceed.

## 2025-04-21 ENCOUNTER — RESULTS FOLLOW-UP (OUTPATIENT)
Dept: GASTROENTEROLOGY | Facility: HOSPITAL | Age: 70
End: 2025-04-21
Payer: MEDICARE

## 2025-04-23 NOTE — TELEPHONE ENCOUNTER
Patient was notified of her results and recommendations. Patient verbalized understanding. Patient already has follow up scheduled.    Patient is requesting to know what she should do about the Xifaxan samples? She is almost out again. She is wanting to know if she is going to continue getting samples or needs to be given something else.

## 2025-04-30 ENCOUNTER — OFFICE VISIT (OUTPATIENT)
Dept: ONCOLOGY | Facility: HOSPITAL | Age: 70
End: 2025-04-30
Payer: MEDICARE

## 2025-04-30 VITALS
WEIGHT: 241.4 LBS | SYSTOLIC BLOOD PRESSURE: 159 MMHG | HEIGHT: 64 IN | BODY MASS INDEX: 41.21 KG/M2 | TEMPERATURE: 98.2 F | DIASTOLIC BLOOD PRESSURE: 63 MMHG | RESPIRATION RATE: 18 BRPM | OXYGEN SATURATION: 95 % | HEART RATE: 68 BPM

## 2025-04-30 DIAGNOSIS — K74.60 CIRRHOSIS OF LIVER WITHOUT ASCITES, UNSPECIFIED HEPATIC CIRRHOSIS TYPE: ICD-10-CM

## 2025-04-30 DIAGNOSIS — D69.6 THROMBOCYTOPENIA: Primary | ICD-10-CM

## 2025-04-30 DIAGNOSIS — D64.9 ANEMIA, UNSPECIFIED TYPE: ICD-10-CM

## 2025-04-30 PROCEDURE — G0463 HOSPITAL OUTPT CLINIC VISIT: HCPCS | Performed by: NURSE PRACTITIONER

## 2025-04-30 NOTE — PROGRESS NOTES
Chief Complaint/Reason for Referral:  normocytic anemia/ thrombocytopenia    Donnell Garcia MD [place], [place],     Records Obtained:  Records of the patients history including those obtained from [facility] and patient information were reviewed and summarized in detail.    Subjective    History of Present Illness  The patient is a very pleasant 69-year-old  female who presents for follow-up for chronic thrombocytopenia. Past medical history includes type 2 diabetes, hypertension, history of osteomyelitis infection, history of gout, iron deficiency anemia, congestive heart failure. She is a previous smoker who quit in 12/2015. She was initially seen in 08/2024. Chronic thrombocytopenia has been present since at least 07/2022.    A prior CT scan in 12/2019 showed splenomegaly and possible liver cirrhosis. Recent lab work done in 03/2025 revealed a normal white count of 4.82, normal hemoglobin of 13.8, and a platelet count in the upper 90 range of 96. Platelet count is normally in the 80s to upper 100s range. Differential was normal. A chemistry panel done in 03/2025 showed mildly low sodium at 134, elevated creatinine at 1.18 with GFR of 49.5, and mildly elevated liver enzymes in the 41 and 38 range. GGT was normal at 39 in 03/2025.     An upper GI was completed on 04/17/2025, revealing a normal esophagus. Biopsies were taken, showing diffuse mild inflammation characterized by erythema in the gastric antrum, and gastritis with erythema was noted. Folate was normal in 03/2025, and B12 was in the acceptable range in 08/2024. Immunofixation did not show any monoclonality.    No instances of bleeding or bruising are reported. She has been under the care of multiple physicians over the past month and a half.    The patient has been diagnosed with gastritis and is currently on Protonix as part of her treatment regimen.    SOCIAL HISTORY  She is a previous smoker that quit in December 2015.    Results  Labs   -  White count: 03/2025, Normal at 4.82   - Hemoglobin: 03/2025, Normal at 13.8   - Platelet count: 03/2025, 96   - Differential: 03/2025, Normal   - Sodium: 03/2025, Mildly low at 134   - Creatinine: 03/2025, Elevated at 1.18   - GFR: 03/2025, 49.5   - Liver enzymes: 03/2025, Elevated mildly in the 41 and 38 range   - GGT: 03/2025, Normal at 39   - Folate: 03/2025, Normal   - B12: 08/2024, Acceptable range   - Immunofixation: Did not show any monoclonality    Imaging   - CT scan: 12/2019, Splenomegaly and possible liver cirrhosis   - Abdominal ultrasound: 09/2024, Liver cirrhosis    Diagnostic Testing   - Upper GI: 04/17/2025, Normal esophagus, diffuse mild inflammation characterized by erythema found in the gastric antrum, and gastritis noted with erythema       Oncology/Hematology History    No history exists.       Review of Systems   Constitutional:  Positive for fatigue.   HENT: Negative.     Eyes: Negative.    Respiratory: Negative.     Cardiovascular: Negative.    Gastrointestinal: Negative.    Endocrine: Negative.    Genitourinary: Negative.    Musculoskeletal: Negative.    Allergic/Immunologic: Negative.    Neurological: Negative.    Hematological: Negative.    Psychiatric/Behavioral: Negative.         Current Outpatient Medications on File Prior to Visit   Medication Sig Dispense Refill    allopurinol (ZYLOPRIM) 100 MG tablet Take 1 tablet by mouth Daily. 90 tablet 0    calcium carbonate, oyster shell, 500 MG tablet tablet Take 1 tablet by mouth Daily. 90 tablet 2    colchicine 0.6 MG tablet Take 1 tablet by mouth Daily.      Continuous Glucose Sensor (FreeStyle Carol 3 Plus Sensor) Use Every 14 (Fourteen) Days. 6 each 3    Diclofenac Sodium (VOLTAREN) 1 % gel gel Apply 4 g topically to the appropriate area as directed 4 (Four) Times a Day As Needed (MSK pain). 100 g 3    ferrous sulfate 325 (65 FE) MG tablet Take 1 tablet by mouth Every Other Day. Mornings      folic acid (FOLVITE) 1 MG tablet Take 1 tablet  by mouth Daily.      furosemide (LASIX) 40 MG tablet Take 1 tablet by mouth Daily.      gabapentin (NEURONTIN) 600 MG tablet Take 1 tablet by mouth 2 (Two) Times a Day. 180 tablet 2    insulin glargine (Lantus) 100 UNIT/ML injection Inject 110 Units under the skin into the appropriate area as directed Daily. 100 mL 3    isosorbide mononitrate (IMDUR) 30 MG 24 hr tablet Take 1 tablet by mouth Every Morning.      KLOR-CON 20 MEQ CR tablet Take 1 tablet by mouth Daily.      lactulose (CHRONULAC) 10 GM/15ML solution Take 30 mL by mouth 2 (Two) Times a Day. 1800 mL 3    levocetirizine (XYZAL) 5 MG tablet Take 1 tablet by mouth Every Evening. 90 tablet 1    nitroglycerin (NITROSTAT) 0.4 MG SL tablet Take 1 tablet by mouth.      OneTouch Verio test strip USE AS DIRECTED 3 TIMES A DAY      pantoprazole (PROTONIX) 40 MG EC tablet Take 1 tablet by mouth Daily. 30 tablet 5    riFAXIMin (Xifaxan) 550 MG tablet Take 1 tablet by mouth Every 12 (Twelve) Hours. 180 tablet 3    riFAXIMin (Xifaxan) 550 MG tablet Take 1 tablet by mouth Every 12 (Twelve) Hours. 24 tablet 0    rosuvastatin (CRESTOR) 5 MG tablet TAKE 1 TABLET (5 MG TOTAL) BY MOUTH DAILY.      Semaglutide, 1 MG/DOSE, (OZEMPIC) 4 MG/3ML solution pen-injector Inject 1 mg under the skin into the appropriate area as directed 1 (One) Time Per Week. 12 mL 0    spironolactone (ALDACTONE) 50 MG tablet Take 1 tablet by mouth Daily. 90 tablet 2    verapamil (CALAN) 80 MG tablet Take 1 tablet by mouth 2 (Two) Times a Day. 60 tablet 0    vitamin D3 125 MCG (5000 UT) capsule capsule Take 1 capsule by mouth Daily.      insulin aspart (NovoLOG FlexPen) 100 UNIT/ML solution pen-injector sc pen Inject 20 Units under the skin into the appropriate area as directed Daily With Breakfast AND 23 Units Daily With Lunch AND 23 Units Daily With Dinner. 20 mL 11     No current facility-administered medications on file prior to visit.       Allergies   Allergen Reactions    Ace Inhibitors Shortness  Of Breath and Swelling    Morphine Shortness Of Breath     Past Medical History:   Diagnosis Date    Allergic     Morphine, ACE inhibitors    Allergies     Arthritis     Cancer 1982    Cervical cancer    Cardiac murmur     Coronary artery disease 2016    Received stints    Diabetes mellitus     GERD (gastroesophageal reflux disease)     Heart attack 12/2020    History of cervical cancer 1981    History of transfusion     SEVERAL    Hyperlipidemia     Hypertension     Iron deficiency anemia     Obesity     Osteomyelitis hip     RIGHT    PONV (postoperative nausea and vomiting)     Right hip pain     Visual impairment     Corrected with glasses    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  WOUND VAC IN PLACE     Past Surgical History:   Procedure Laterality Date    CARDIAC CATHETERIZATION      CERVICAL CONIZATION      COLONOSCOPY      CORONARY ANGIOPLASTY WITH STENT PLACEMENT      ENDOSCOPY      ENDOSCOPY N/A 06/09/2022    Procedure: ESOPHAGOGASTRODUODENOSCOPY WITH COLD BIOSPIES;  Surgeon: Zechariah Nj MD;  Location: [facility] ENDOSCOPY;  Service: Gastroenterology;  Laterality: N/A;  PRE- EPIGASTRIC PAIN AND ABDOMINAL PAIN  POST- NORMAL    ENDOSCOPY N/A 4/17/2025    Procedure: ESOPHAGOGASTRODUODENOSCOPY WITH BIOPSIES;  Surgeon: Bhumika Allen MD;  Location: [facility] ENDOSCOPY;  Service: Gastroenterology;  Laterality: N/A;  FOOD IN STOMACH. GASTRITIS    HIP ARTHROPLASTY Right     HIP HARDWARE REMOVAL Right     HIP MINI REVISION Right 01/18/2022    Procedure: TOTAL HIP ARTHROPLASTY LINER REVISION, pegboard lateral;  Surgeon: Karri Schaeffer II, MD;  Location: [facility] OR;  Service: Orthopedics;  Laterality: Right;    HIP SPACER INSERTION WITH ANTIBIOTIC CEMENT      X3    HYSTERECTOMY      INCISION AND DRAINAGE HIP Right 12/18/2021    Procedure: INCISION AND DRAINAGE TOTAL HIP, LINER EXCHANGE AND WOUND VAC PLACEMENT;  Surgeon: Karri Schaeffer II, MD;  Location:   [facility];  Service: Orthopedics;  Laterality: Right;    INCISION AND DRAINAGE HIP Right 2022    Procedure: HIP INCISION AND DRAINAGE;  Surgeon: Karri Schaeffer II, MD;  Location:  [facility];  Service: Orthopedics;  Laterality: Right;    INCISION AND DRAINAGE HIP Right 2022    Procedure: HIP INCISION AND DRAINAGE WITH WOUND VAC REPLACEMENT;  Surgeon: Karri Schaeffer II, MD;  Location:  [facility];  Service: Orthopedics;  Laterality: Right;    JOINT REPLACEMENT  L knee, R hip    Knee , hip replacement [id number] multiple surgeries    KNEE ARTHROPLASTY Left     LAPAROSCOPIC CHOLECYSTECTOMY      TONSILLECTOMY      TOTAL HIP ARTHROPLASTY Right 2022    Procedure: HIP GIRDLESTONE PROCEDURE;  Surgeon: Karri Schaeffer II, MD;  Location:  [facility];  Service: Orthopedics;  Laterality: Right;    TOTAL HIP ARTHROPLASTY REVISION Right 2021    Procedure: TOTAL HIP REVISION ARTHROPLASTY RIGHT POSTERIOR;  Surgeon: Karri Schaeffer II, MD;  Location:  [facility];  Service: Orthopedics;  Laterality: Right;     Social History     Socioeconomic History    Marital status:    Tobacco Use    Smoking status: Former     Current packs/day: 0.00     Average packs/day: 1.4 packs/day for 81.3 years (110.1 ttl pk-yrs)     Types: Cigarettes     Start date: 12/10/1970     Quit date: 12/10/2015     Years since quittin.3     Passive exposure: Never    Smokeless tobacco: Never   Vaping Use    Vaping status: Never Used   Substance and Sexual Activity    Alcohol use: Not Currently    Drug use: Never    Sexual activity: Defer     Partners: Male     Birth control/protection: None, Hysterectomy     Family History   Problem Relation Age of Onset    Arthritis Mother     Cancer Mother     COPD Mother     Hearing loss Mother     Hyperlipidemia Mother     Thyroid disease Mother     Vision loss Mother     Arthritis Father     COPD Father     Hearing loss Father      Hyperlipidemia Father     Cancer Maternal Grandfather     Arthritis Maternal Grandmother     Cancer Maternal Grandmother     Hyperlipidemia Maternal Grandmother     Diabetes Paternal Grandfather     Heart disease Paternal Grandfather     Hyperlipidemia Paternal Grandfather     Diabetes Paternal Grandmother     Arthritis Sister     Cancer Sister     COPD Sister     Diabetes Sister     Hyperlipidemia Sister     Diabetes Brother     Hyperlipidemia Brother     Kidney disease Brother     Vision loss Maternal Uncle     Malig Hyperthermia Neg Hx      Immunization History   Administered Date(s) Administered    COVID-19 (PFIZER) 12YRS+ (COMIRNATY) 10/26/2023, 08/07/2024    COVID-19 (PFIZER) BIVALENT 12+YRS 11/04/2022    COVID-19 (PFIZER) Purple Cap Monovalent 03/08/2021, 03/29/2021, 10/04/2021    Covid-19 (Pfizer) Gray Cap Monovalent 04/23/2022    Fluzone (or Fluarix & Flulaval for VFC) >6mos 08/21/2017    Fluzone High-Dose 65+YRS 09/26/2018, 09/19/2019, 10/25/2024    Fluzone High-Dose 65+yrs 10/04/2021    Pneumococcal Conjugate 13-Valent (PCV13) 09/10/2019    Pneumococcal Polysaccharide (PPSV23) 09/26/2018    Shingrix 11/23/2020       Tobacco Use: Medium Risk (4/30/2025)    Patient History     Smoking Tobacco Use: Former     Smokeless Tobacco Use: Never     Passive Exposure: Never       Objective     Physical Exam  Vitals and nursing note reviewed.   Constitutional:       Appearance: Normal appearance.   HENT:      Head: Normocephalic.      Nose: Nose normal.      Mouth/Throat:      Mouth: Mucous membranes are moist.   Eyes:      Extraocular Movements: Extraocular movements intact.   Cardiovascular:      Rate and Rhythm: Normal rate.   Pulmonary:      Effort: Pulmonary effort is normal. No respiratory distress.   Musculoskeletal:         General: Normal range of motion.      Cervical back: Normal range of motion and neck supple.   Skin:     General: Skin is warm and dry.   Neurological:      General: No focal deficit  "present.      Mental Status: She is alert and oriented to person, place, and time.   Psychiatric:         Mood and Affect: Mood normal.         Behavior: Behavior normal.         Thought Content: Thought content normal.         Judgment: Judgment normal.         Vitals:    04/30/25 1058   BP: 159/63   Pulse: 68   Resp: 18   Temp: 98.2 °F (36.8 °C)   TempSrc: Temporal   SpO2: 95%   Weight: 110 kg (241 lb 6.5 oz)   Height: 162.6 cm (64.02\")   PainSc: 0-No pain       Wt Readings from Last 3 Encounters:   04/30/25 110 kg (241 lb 6.5 oz)   04/17/25 109 kg (239 lb 13.8 oz)   02/26/25 108 kg (237 lb)          ECOG score: 0         ECOG: (0) Fully Active - Able to Carry On All Pre-disease Performance Without Restriction  Fall Risk Assessment was completed, and patient is at low risk for falls.  PHQ-9 Total Score:         The patient is  experiencing fatigue. Fatigue score: 1    PT/OT Functional Screening: PT fx screen : No needs identified  Speech Functional Screening: Speech fx screen : No needs identified  Rehab to be ordered: Rehab to be ordered : No needs identified        Result Review :  The following data was reviewed by: MORGAN Augustin on 04/30/2025:  Lab Results   Component Value Date    HGB 13.8 03/14/2025    HCT 40.9 03/14/2025    MCV 91.1 03/14/2025    PLT 96 (L) 03/14/2025    WBC 4.82 03/14/2025    NEUTROABS 3.06 03/14/2025    LYMPHSABS 1.01 03/14/2025    MONOSABS 0.41 03/14/2025    EOSABS 0.29 03/14/2025    BASOSABS 0.04 03/14/2025     Lab Results   Component Value Date    GLUCOSE 172 (H) 03/14/2025    BUN 16 03/14/2025    CREATININE 1.18 (H) 03/14/2025     (L) 03/14/2025    K 4.6 03/14/2025     03/14/2025    CO2 23.9 03/14/2025    CALCIUM 9.9 03/14/2025    PROTEINTOT 7.2 03/14/2025    ALBUMIN 4.1 03/14/2025    BILITOT 0.8 03/14/2025    ALKPHOS 88 03/14/2025    AST 41 (H) 03/14/2025    ALT 38 (H) 03/14/2025     Lab Results   Component Value Date     (H) 08/27/2024    Ferritin " "501.00 (H) 03/14/2025    Folate >20.00 03/14/2025     Lab Results   Component Value Date    IRON 93 03/14/2025    LABIRON 24 03/14/2025    TRANSFERRIN 262 03/14/2025    TIBC 390 03/14/2025     Lab Results   Component Value Date     (H) 08/27/2024    FERRITIN 501.00 (H) 03/14/2025    [id number] 468 08/27/2024    FOLATE >20.00 03/14/2025     No results found for: \"PSA\", \"CEA\", \"AFP\", \"\", \"\"         Assessment and Plan:  Diagnoses and all orders for this visit:    1. Thrombocytopenia (Primary)  -     CBC & Differential; Future  -     Comprehensive Metabolic Panel; Future  -     Iron Profile; Future  -     Ferritin; Future  -     Vitamin B12; Future  -     Folate; Future    2. Anemia, unspecified type  -     CBC & Differential; Future  -     Comprehensive Metabolic Panel; Future  -     Iron Profile; Future  -     Ferritin; Future  -     Vitamin B12; Future  -     Folate; Future    3. Cirrhosis of liver without ascites, unspecified hepatic cirrhosis type  -     CBC & Differential; Future  -     Comprehensive Metabolic Panel; Future  -     Iron Profile; Future  -     Ferritin; Future  -     Vitamin B12; Future  -     Folate; Future        Assessment & Plan  1. Chronic thrombocytopenia.  Chronic thrombocytopenia is likely secondary to liver cirrhosis, as indicated by the abdominal ultrasound from 09/2024. Folate and B12 levels have remained within normal limits throughout the course of treatment. Given that the platelet count is currently satisfactory, no immediate intervention is required at this time. Emergency medical attention should be sought if symptoms such as worsening bruising or bleeding, hemoptysis, epistaxis, or rectal bleeding occur, which could be potential sequelae of liver cirrhosis.    2. Gastritis.  Gastritis was identified during an EGD on 04/17/2025, characterized by diffuse mild inflammation and erythema in the gastric antrum. Protonix is being taken for management.    Follow-up  A " follow-up appointment is scheduled for 11/2025, with labs to be completed 1 to 2 days prior to the visit.    PROCEDURE  Upper GI was completed on 04/17/2025.  Biopsies were taken from the gastric antrum revealing diffuse mild inflammation characterized by erythema.  Biopsies revealed gastritis with erythema.        I spent 25 minutes caring for [place] on this date of service. This time includes time spent by me in the following activities:preparing for the visit, reviewing tests, obtaining and/or reviewing a separately obtained history, counseling and educating the patient/family/caregiver, ordering medications, tests, or procedures, referring and communicating with other health care professionals , documenting information in the medical record, and independently interpreting results and communicating that information with the patient/family/caregiver    Patient Follow Up: 6 months with NP.   Patient was given instructions and counseling regarding her condition or for health maintenance advice. Please see specific information pulled into the AVS if appropriate.     MORGAN Augustin    4/30/2025    .tob    Patient or patient representative verbalized consent for the use of Ambient Listening during the visit with  MORGAN Augustin for chart documentation. 4/30/2025  12:24 EDT

## 2025-04-30 NOTE — PROGRESS NOTES
Chief Complaint/Reason for Referral:  normocytic anemia/ thrombocytopenia    Donnell Garcia MD Fields, [place], DO    Records Obtained:  Records of the patients history including those obtained from   were reviewed and summarized in detail.    Subjective    History of Present Illness    Stephy Duncan presents to [facility] for chronic thrombocytopenia.     PMH includes: type II DM, hypertension, postop infection secondary to osteomyelitis and the  THR. Reports had 8 hip surgeries and then discontinued. She does not have a current hip replacement. She is wheel chair bound. Unable to walk.  Denies any skin breakdown. Reports the right leg is 3 inches shorter than the left. Other med history includes: gout, YANIRA, obesity, osteomyelitis, CHF, hyponatremia. She avoids alcohol. Previous smoker and quit in 12/10/2015. Cancer-related family history includes Cancer in her maternal grandfather, maternal grandmother, mother, and sister.      Has had chronic thrombocytopenia since July of 2022 in the range of 82 to 136. Most recent platelet count was 82 a couple of days ago. Reports she has bruising to the bilateral arms and she bleeds easier then she used to. She is not taking any anti-coagulation. Denies any acute prolonged bleeding at this time. Normal hemoglobin and normal WBC currently, but did have anemia present in the range of 8.2 to 11.6 in August of 2022. Hemoglobin has been normal since then. She is taking oral iron.     Prior CT scan back in December of 2019 did show partially included splenomegaly and possible hepatic cirrhosis. Reports has never seen GI for the cirrhosis or the splenomegaly.       10/29/2024: Interval follow up: Ms. Duncan presents for 2 month follow up for thrombocytopenia. Abdominal US done on 9/25/2024 does not show any splenomegaly but did show cirrhotic liver morphology with no focal liver lesions seen. Lab work done on 10/25/2024: CMP with  elevated liver enzymes of AST of 60 and ALT of 51. CBC is acceptable with normal WBC and hemoglobin and the platelet count is 93 comparable to previous lab work in August of 2024. Denies any bleeding or bruising. She reports is not following with GI for the liver cirrhosis, but has in the past.         Oncology/Hematology History    No history exists.       Review of Systems   Constitutional: Negative.  Negative for appetite change, diaphoresis, fatigue, fever, unexpected weight gain and unexpected weight loss.   HENT: Negative.  Negative for hearing loss, mouth sores, sore throat, swollen glands, trouble swallowing and voice change.    Eyes: Negative.  Negative for blurred vision.   Respiratory: Negative.  Negative for cough, shortness of breath and wheezing.    Cardiovascular: Negative.  Negative for chest pain and palpitations.   Gastrointestinal: Negative.  Negative for abdominal pain, blood in stool, constipation, diarrhea, nausea and vomiting.   Endocrine: Negative.  Negative for cold intolerance and heat intolerance.   Genitourinary: Negative.  Negative for difficulty urinating, dysuria, frequency, hematuria and urinary incontinence.   Musculoskeletal:  Positive for back pain. Negative for arthralgias and myalgias.   Skin: Negative.  Negative for rash, skin lesions and wound.   Allergic/Immunologic: Negative.    Neurological:  Positive for headache. Negative for dizziness, seizures, weakness and numbness.   Hematological:  Bruises/bleeds easily.   Psychiatric/Behavioral: Negative.  Negative for depressed mood. The patient is not nervous/anxious.    All other systems reviewed and are negative.      Current Outpatient Medications on File Prior to Visit   Medication Sig Dispense Refill    allopurinol (ZYLOPRIM) 100 MG tablet Take 1 tablet by mouth Daily. 90 tablet 0    calcium carbonate, oyster shell, 500 MG tablet tablet Take 1 tablet by mouth Daily. 90 tablet 2    colchicine 0.6 MG tablet Take 1 tablet by  mouth Daily.      Continuous Glucose Sensor (FreeStyle Carol 3 Plus Sensor) Use Every 14 (Fourteen) Days. 6 each 3    Diclofenac Sodium (VOLTAREN) 1 % gel gel Apply 4 g topically to the appropriate area as directed 4 (Four) Times a Day As Needed (MSK pain). 100 g 3    ferrous sulfate 325 (65 FE) MG tablet Take 1 tablet by mouth Every Other Day. Mornings      folic acid (FOLVITE) 1 MG tablet Take 1 tablet by mouth Daily.      furosemide (LASIX) 40 MG tablet Take 1 tablet by mouth Daily.      gabapentin (NEURONTIN) 600 MG tablet Take 1 tablet by mouth 2 (Two) Times a Day. 180 tablet 2    insulin aspart (NovoLOG FlexPen) 100 UNIT/ML solution pen-injector sc pen Inject 20 Units under the skin into the appropriate area as directed Daily With Breakfast AND 23 Units Daily With Lunch AND 23 Units Daily With Dinner. 20 mL 11    insulin glargine (Lantus) 100 UNIT/ML injection Inject 110 Units under the skin into the appropriate area as directed Daily. 100 mL 3    isosorbide mononitrate (IMDUR) 30 MG 24 hr tablet Take 1 tablet by mouth Every Morning.      KLOR-CON 20 MEQ CR tablet Take 1 tablet by mouth Daily.      lactulose (CHRONULAC) 10 GM/15ML solution Take 30 mL by mouth 2 (Two) Times a Day. 1800 mL 3    levocetirizine (XYZAL) 5 MG tablet Take 1 tablet by mouth Every Evening. 90 tablet 1    nitroglycerin (NITROSTAT) 0.4 MG SL tablet Take 1 tablet by mouth.      OneTouch Verio test strip USE AS DIRECTED 3 TIMES A DAY      pantoprazole (PROTONIX) 40 MG EC tablet Take 1 tablet by mouth Daily. 30 tablet 5    riFAXIMin (Xifaxan) 550 MG tablet Take 1 tablet by mouth Every 12 (Twelve) Hours. 180 tablet 3    riFAXIMin (Xifaxan) 550 MG tablet Take 1 tablet by mouth Every 12 (Twelve) Hours. 24 tablet 0    rosuvastatin (CRESTOR) 5 MG tablet TAKE 1 TABLET (5 MG TOTAL) BY MOUTH DAILY.      Semaglutide, 1 MG/DOSE, (OZEMPIC) 4 MG/3ML solution pen-injector Inject 1 mg under the skin into the appropriate area as directed 1 (One) Time Per  Week. 12 mL 0    spironolactone (ALDACTONE) 50 MG tablet Take 1 tablet by mouth Daily. 90 tablet 2    verapamil (CALAN) 80 MG tablet Take 1 tablet by mouth 2 (Two) Times a Day. 60 tablet 0    vitamin D3 125 MCG (5000 UT) capsule capsule Take 1 capsule by mouth Daily.       No current facility-administered medications on file prior to visit.       Allergies   Allergen Reactions    Ace Inhibitors Shortness Of Breath and Swelling    Morphine Shortness Of Breath     Past Medical History:   Diagnosis Date    Allergic     Morphine, ACE inhibitors    Allergies     Arthritis     Cancer 1982    Cervical cancer    Cardiac murmur     Coronary artery disease 2016    Received stints    Diabetes mellitus     GERD (gastroesophageal reflux disease)     Heart attack 12/2020    History of cervical cancer 1981    History of transfusion     SEVERAL    Hyperlipidemia     Hypertension     Iron deficiency anemia     Obesity     Osteomyelitis hip     RIGHT    PONV (postoperative nausea and vomiting)     Right hip pain     Visual impairment     Corrected with glasses    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  WOUND VAC IN PLACE     Past Surgical History:   Procedure Laterality Date    CARDIAC CATHETERIZATION      CERVICAL CONIZATION      COLONOSCOPY      CORONARY ANGIOPLASTY WITH STENT PLACEMENT      ENDOSCOPY      ENDOSCOPY N/A 06/09/2022    Procedure: ESOPHAGOGASTRODUODENOSCOPY WITH COLD BIOSPIES;  Surgeon: Zechariah Nj MD;  Location: [facility] ENDOSCOPY;  Service: Gastroenterology;  Laterality: N/A;  PRE- EPIGASTRIC PAIN AND ABDOMINAL PAIN  POST- NORMAL    ENDOSCOPY N/A 4/17/2025    Procedure: ESOPHAGOGASTRODUODENOSCOPY WITH BIOPSIES;  Surgeon: Bhumika Allen MD;  Location: [facility] ENDOSCOPY;  Service: Gastroenterology;  Laterality: N/A;  FOOD IN STOMACH. GASTRITIS    HIP ARTHROPLASTY Right     HIP HARDWARE REMOVAL Right     HIP MINI REVISION Right 01/18/2022    Procedure: TOTAL HIP ARTHROPLASTY  LINER REVISION, pegboard lateral;  Surgeon: Karri Schaeffer II, MD;  Location[facility] MAIN OR;  Service: Orthopedics;  Laterality: Right;    HIP SPACER INSERTION WITH ANTIBIOTIC CEMENT      X3    HYSTERECTOMY      INCISION AND DRAINAGE HIP Right 2021    Procedure: INCISION AND DRAINAGE TOTAL HIP, LINER EXCHANGE AND WOUND VAC PLACEMENT;  Surgeon: Karri Schaeffer II, MD;  Location: [facility] MA[facility];  Service: Orthopedics;  Laterality: Right;    INCISION AND DRAINAGE HIP Right 2022    Procedure: HIP INCISION AND DRAINAGE;  Surgeon: Karri Schaeffer II, MD;  Location: [facility] MA[facility];  Service: Orthopedics;  Laterality: Right;    INCISION AND DRAINAGE HIP Right 2022    Procedure: HIP INCISION AND DRAINAGE WITH WOUND VAC REPLACEMENT;  Surgeon: Karri Schaeffer II, MD;  Location: [facility] MAIN OR;  Service: Orthopedics;  Laterality: Right;    JOINT REPLACEMENT  L knee, R hip    Knee , hip replacement [id number] multiple surgeries    KNEE ARTHROPLASTY Left     LAPAROSCOPIC CHOLECYSTECTOMY      TONSILLECTOMY      TOTAL HIP ARTHROPLASTY Right 2022    Procedure: HIP GIRDLESTONE PROCEDURE;  Surgeon: Karri Schaeffer II, MD;  Location: [facility] MAIN OR;  Service: Orthopedics;  Laterality: Right;    TOTAL HIP ARTHROPLASTY REVISION Right 2021    Procedure: TOTAL HIP REVISION ARTHROPLASTY RIGHT POSTERIOR;  Surgeon: Karri Schaeffer II, MD;  Location: [facility] MAIN OR;  Service: Orthopedics;  Laterality: Right;     Social History     Socioeconomic History    Marital status:    Tobacco Use    Smoking status: Former     Current packs/day: 0.00     Average packs/day: 1.4 packs/day for 81.3 years (110.1 ttl pk-yrs)     Types: Cigarettes     Start date: 12/10/1970     Quit date: 12/10/2015     Years since quittin.3     Passive exposure: Never    Smokeless tobacco: Never   Vaping Use    Vaping status: Never Used   Substance and Sexual Activity    Alcohol  use: Not Currently    Drug use: Never    Sexual activity: Defer     Partners: Male     Birth control/protection: None, Hysterectomy     Family History   Problem Relation Age of Onset    Arthritis Mother     Cancer Mother     COPD Mother     Hearing loss Mother     Hyperlipidemia Mother     Thyroid disease Mother     Vision loss Mother     Arthritis Father     COPD Father     Hearing loss Father     Hyperlipidemia Father     Cancer Maternal Grandfather     Arthritis Maternal Grandmother     Cancer Maternal Grandmother     Hyperlipidemia Maternal Grandmother     Diabetes Paternal Grandfather     Heart disease Paternal Grandfather     Hyperlipidemia Paternal Grandfather     Diabetes Paternal Grandmother     Arthritis Sister     Cancer Sister     COPD Sister     Diabetes Sister     Hyperlipidemia Sister     Diabetes Brother     Hyperlipidemia Brother     Kidney disease Brother     Vision loss Maternal Uncle     Malig Hyperthermia Neg Hx      Immunization History   Administered Date(s) Administered    COVID-19 (PFIZER) 12YRS+ (COMIRNATY) 10/26/2023, 08/07/2024    COVID-19 (PFIZER) BIVALENT 12+YRS 11/04/2022    COVID-19 (PFIZER) Purple Cap Monovalent 03/08/2021, 03/29/2021, 10/04/2021    Covid-19 (Pfizer) Gray Cap Monovalent 04/23/2022    Fluzone (or Fluarix & Flulaval for VFC) >6mos 08/21/2017    Fluzone High-Dose 65+YRS 09/26/2018, 09/19/2019, 10/25/2024    Fluzone High-Dose 65+yrs 10/04/2021    Pneumococcal Conjugate 13-Valent (PCV13) 09/10/2019    Pneumococcal Polysaccharide (PPSV23) 09/26/2018    Shingrix 11/23/2020       Tobacco Use: Medium Risk (4/30/2025)    Patient History     Smoking Tobacco Use: Former     Smokeless Tobacco Use: Never     Passive Exposure: Never       Objective     Physical Exam  Vitals and nursing note reviewed.   Constitutional:       Appearance: Normal appearance.   HENT:      Head: Normocephalic.      Nose: Nose normal.      Mouth/Throat:      Mouth: Mucous membranes are moist.   Eyes:       Extraocular Movements: Extraocular movements intact.   Cardiovascular:      Rate and Rhythm: Normal rate and regular rhythm.      Pulses: Normal pulses.      Heart sounds: Normal heart sounds. No murmur heard.  Pulmonary:      Effort: Pulmonary effort is normal. No respiratory distress.      Breath sounds: Normal breath sounds.   Abdominal:      General: Bowel sounds are normal.      Palpations: Abdomen is soft.   Musculoskeletal:         General: Normal range of motion.      Cervical back: Normal range of motion and neck supple.   Skin:     General: Skin is warm and dry.      Capillary Refill: Capillary refill takes less than 2 seconds.   Neurological:      General: No focal deficit present.      Mental Status: She is alert and oriented to person, place, and time.   Psychiatric:         Mood and Affect: Mood normal.         Behavior: Behavior normal.         Thought Content: Thought content normal.         Judgment: Judgment normal.         There were no vitals filed for this visit.      Wt Readings from Last 3 Encounters:   04/17/25 109 kg (239 lb 13.8 oz)   02/26/25 108 kg (237 lb)   02/24/25 108 kg (237 lb)                           ECOG: (1) Restricted in Physically Strenuous Activity, Ambulatory & Able to Do Work of Light Nature  Fall Risk Assessment was completed, and patient is at low risk for falls.  PHQ-9 Total Score:         The patient is  experiencing fatigue. Fatigue score: 2    PT/OT Functional Screening: PT fx screen : No needs identified  Speech Functional Screening: Speech fx screen : No needs identified  Rehab to be ordered: Rehab to be ordered : No needs identified        Result Review :  The following data was reviewed by: Alexis Mcdonald MA on 08/27/2024:  Lab Results   Component Value Date    HGB 13.8 03/14/2025    HCT 40.9 03/14/2025    MCV 91.1 03/14/2025    PLT 96 (L) 03/14/2025    WBC 4.82 03/14/2025    NEUTROABS 3.06 03/14/2025    LYMPHSABS 1.01 03/14/2025    MONOSABS 0.41 03/14/2025     "EOSABS 0.29 03/14/2025    BASOSABS 0.04 03/14/2025     Lab Results   Component Value Date    GLUCOSE 172 (H) 03/14/2025    BUN 16 03/14/2025    CREATININE 1.18 (H) 03/14/2025     (L) 03/14/2025    K 4.6 03/14/2025     03/14/2025    CO2 23.9 03/14/2025    CALCIUM 9.9 03/14/2025    PROTEINTOT 7.2 03/14/2025    ALBUMIN 4.1 03/14/2025    BILITOT 0.8 03/14/2025    ALKPHOS 88 03/14/2025    AST 41 (H) 03/14/2025    ALT 38 (H) 03/14/2025     Lab Results   Component Value Date     (H) 08/27/2024    Ferritin 501.00 (H) 03/14/2025    Folate >20.00 03/14/2025     Lab Results   Component Value Date    IRON 93 03/14/2025    LABIRON 24 03/14/2025    TRANSFERRIN 262 03/14/2025    TIBC 390 03/14/2025     Lab Results   Component Value Date     (H) 08/27/2024    FERRITIN 501.00 (H) 03/14/2025    JZRUMAPF30 468 08/27/2024    FOLATE >20.00 03/14/2025     No results found for: \"PSA\", \"CEA\", \"AFP\", \"\", \"\"    XR Hand 3+ View Right    Result Date: 8/8/2024  Impression: 1.*Number widening of the scapholunate interval suggesting previous injury or laxity of the scapholunate ligament. 2.Mild to moderate osteoarthritic changes in the wrist and hand. Electronically Signed: Jose Maria Lugo MD  8/8/2024 1:39 PM EDT  Workstation ID: [id number]    XR Wrist 2 View Right    Result Date: 8/8/2024  Impression: 1. No acute osseous injury to the right wrist. 2. Scapholunate interval widening. 3. Intact radius and ulna without osseous injury to the right forearm. Electronically Signed: An Arnold MD  8/8/2024 1:37 PM EDT  Workstation ID: [id number]    XR Forearm 2 View Right    Result Date: 8/8/2024  Impression: 1. No acute osseous injury to the right wrist. 2. Scapholunate interval widening. 3. Intact radius and ulna without osseous injury to the right forearm. Electronically Signed: An Arnold MD  8/8/2024 1:37 PM EDT  Workstation ID: [id number]    XR Shoulder 2+ View Right    Result Date: 7/16/2024  Impression: " Degenerative change. No acute osseous abnormalities are identified. Electronically Signed: Wes Meek MD  7/16/2024 2:15 PM EDT  Workstation ID: [id number]    DEXA Bone Density Axial    Result Date: 5/28/2024  Impression: Osteopenia - Based upon the FRAX score, this patient does meet criteria for initiation of pharmacological treatment recommendations for prevention of osteoporosis per the National Osteoporosis Foundation (NOF) guidelines. However, individualized treatment decisions should be made accounting for additional clinical factors.    Electronically Signed By-TAO HARKINS MD On:5/28/2024 9:24 AM             Assessment and Plan:  There are no diagnoses linked to this encounter.    Thrombocytopenia since at least July of 2022 in the range of 87 to 136. Recently trended downward platelet count. Denies any acute bleeding, but does have some intermittent bruising noted to the arms. Likely secondary to splenomegaly / cirrhosis seen on prior CT chest in 2019. Has never had any dedicated abdominal imaging.  Abdominal US done on 9/25/2024 did show the sequale of cirrhosis, but no splenomegaly was noted at this time. No recent anemia, but did have some in the past and has been normal since April. Thrombocytopenia is stable at 93,000 and should have adequate hemostasis at this time. Normal WBC count. Liver enzymes are mildly elevated. Bilirubin is normal. Mild CKD.     Referral to GI for cirrhosis since she has seen them in quite some time to evaluate for esophageal varices and portal hypertension and elevated liver enzymes.      Repeat lab work in 6 months with CBC, CMP and follow up with NP>     I spent 30 minutes caring for [place] on this date of service. This time includes time spent by me in the following activities:preparing for the visit, reviewing tests, obtaining and/or reviewing a separately obtained history, performing a medically appropriate examination and/or evaluation , counseling and educating  the patient/family/caregiver, ordering medications, tests, or procedures, referring and communicating with other health care professionals , documenting information in the medical record, and independently interpreting results and communicating that information with the patient/family/caregiver    Patient Follow Up: 6 months with NP>     Patient was given instructions and counseling regarding her condition or for health maintenance advice. Please see specific information pulled into the AVS if appropriate.     Alexis Mcdonald MA    4/30/2025    .tob

## 2025-04-30 NOTE — LETTER
April 30, 2025     Chun Marley DO  [address] [address]    Patient: Stephy Duncan   YOB: 1955   Date of Visit: 4/30/2025     Dear Chun Marley DO:       Thank you for referring Stephy Duncan to me for evaluation. Below are the relevant portions of my assessment and plan of care.    If you have questions, please do not hesitate to call me. I look forward to following [place] along with you.         Sincerely,        MORGAN Augustin        CC: No Recipients    Farhana Evans APRN  04/30/25 1224  Sign when Signing Visit  Chief Complaint/Reason for Referral:  normocytic anemia/ thrombocytopenia    Donnell Garcia MD Fields, [place], DO    Records Obtained:  Records of the patients history including those obtained from [facility] and patient information were reviewed and summarized in detail.    Subjective    History of Present Illness  The patient is a very pleasant 69-year-old  female who presents for follow-up for chronic thrombocytopenia. Past medical history includes type 2 diabetes, hypertension, history of osteomyelitis infection, history of gout, iron deficiency anemia, congestive heart failure. She is a previous smoker who quit in 12/2015. She was initially seen in 08/2024. Chronic thrombocytopenia has been present since at least 07/2022.    A prior CT scan in 12/2019 showed splenomegaly and possible liver cirrhosis. Recent lab work done in 03/2025 revealed a normal white count of 4.82, normal hemoglobin of 13.8, and a platelet count in the upper 90 range of 96. Platelet count is normally in the 80s to upper 100s range. Differential was normal. A chemistry panel done in 03/2025 showed mildly low sodium at 134, elevated creatinine at 1.18 with GFR of 49.5, and mildly elevated liver enzymes in the 41 and 38 range. GGT was normal at 39 in 03/2025.     An upper GI was completed on 04/17/2025, revealing a normal esophagus. Biopsies  were taken, showing diffuse mild inflammation characterized by erythema in the gastric antrum, and gastritis with erythema was noted. Folate was normal in 03/2025, and B12 was in the acceptable range in 08/2024. Immunofixation did not show any monoclonality.    No instances of bleeding or bruising are reported. She has been under the care of multiple physicians over the past month and a half.    The patient has been diagnosed with gastritis and is currently on Protonix as part of her treatment regimen.    SOCIAL HISTORY  She is a previous smoker that quit in December 2015.    Results  Labs   - White count: 03/2025, Normal at 4.82   - Hemoglobin: 03/2025, Normal at 13.8   - Platelet count: 03/2025, 96   - Differential: 03/2025, Normal   - Sodium: 03/2025, Mildly low at 134   - Creatinine: 03/2025, Elevated at 1.18   - GFR: 03/2025, 49.5   - Liver enzymes: 03/2025, Elevated mildly in the 41 and 38 range   - GGT: 03/2025, Normal at 39   - Folate: 03/2025, Normal   - B12: 08/2024, Acceptable range   - Immunofixation: Did not show any monoclonality    Imaging   - CT scan: 12/2019, Splenomegaly and possible liver cirrhosis   - Abdominal ultrasound: 09/2024, Liver cirrhosis    Diagnostic Testing   - Upper GI: 04/17/2025, Normal esophagus, diffuse mild inflammation characterized by erythema found in the gastric antrum, and gastritis noted with erythema       Oncology/Hematology History    No history exists.       Review of Systems   Constitutional:  Positive for fatigue.   HENT: Negative.     Eyes: Negative.    Respiratory: Negative.     Cardiovascular: Negative.    Gastrointestinal: Negative.    Endocrine: Negative.    Genitourinary: Negative.    Musculoskeletal: Negative.    Allergic/Immunologic: Negative.    Neurological: Negative.    Hematological: Negative.    Psychiatric/Behavioral: Negative.         Current Outpatient Medications on File Prior to Visit   Medication Sig Dispense Refill   • allopurinol (ZYLOPRIM) 100  MG tablet Take 1 tablet by mouth Daily. 90 tablet 0   • calcium carbonate, oyster shell, 500 MG tablet tablet Take 1 tablet by mouth Daily. 90 tablet 2   • colchicine 0.6 MG tablet Take 1 tablet by mouth Daily.     • Continuous Glucose Sensor (FreeStyle Carol 3 Plus Sensor) Use Every 14 (Fourteen) Days. 6 each 3   • Diclofenac Sodium (VOLTAREN) 1 % gel gel Apply 4 g topically to the appropriate area as directed 4 (Four) Times a Day As Needed (MSK pain). 100 g 3   • ferrous sulfate 325 (65 FE) MG tablet Take 1 tablet by mouth Every Other Day. Mornings     • folic acid (FOLVITE) 1 MG tablet Take 1 tablet by mouth Daily.     • furosemide (LASIX) 40 MG tablet Take 1 tablet by mouth Daily.     • gabapentin (NEURONTIN) 600 MG tablet Take 1 tablet by mouth 2 (Two) Times a Day. 180 tablet 2   • insulin glargine (Lantus) 100 UNIT/ML injection Inject 110 Units under the skin into the appropriate area as directed Daily. 100 mL 3   • isosorbide mononitrate (IMDUR) 30 MG 24 hr tablet Take 1 tablet by mouth Every Morning.     • KLOR-CON 20 MEQ CR tablet Take 1 tablet by mouth Daily.     • lactulose (CHRONULAC) 10 GM/15ML solution Take 30 mL by mouth 2 (Two) Times a Day. 1800 mL 3   • levocetirizine (XYZAL) 5 MG tablet Take 1 tablet by mouth Every Evening. 90 tablet 1   • nitroglycerin (NITROSTAT) 0.4 MG SL tablet Take 1 tablet by mouth.     • OneTouch Verio test strip USE AS DIRECTED 3 TIMES A DAY     • pantoprazole (PROTONIX) 40 MG EC tablet Take 1 tablet by mouth Daily. 30 tablet 5   • riFAXIMin (Xifaxan) 550 MG tablet Take 1 tablet by mouth Every 12 (Twelve) Hours. 180 tablet 3   • riFAXIMin (Xifaxan) 550 MG tablet Take 1 tablet by mouth Every 12 (Twelve) Hours. 24 tablet 0   • rosuvastatin (CRESTOR) 5 MG tablet TAKE 1 TABLET (5 MG TOTAL) BY MOUTH DAILY.     • Semaglutide, 1 MG/DOSE, (OZEMPIC) 4 MG/3ML solution pen-injector Inject 1 mg under the skin into the appropriate area as directed 1 (One) Time Per Week. 12 mL 0   •  spironolactone (ALDACTONE) 50 MG tablet Take 1 tablet by mouth Daily. 90 tablet 2   • verapamil (CALAN) 80 MG tablet Take 1 tablet by mouth 2 (Two) Times a Day. 60 tablet 0   • vitamin D3 125 MCG (5000 UT) capsule capsule Take 1 capsule by mouth Daily.     • insulin aspart (NovoLOG FlexPen) 100 UNIT/ML solution pen-injector sc pen Inject 20 Units under the skin into the appropriate area as directed Daily With Breakfast AND 23 Units Daily With Lunch AND 23 Units Daily With Dinner. 20 mL 11     No current facility-administered medications on file prior to visit.       Allergies   Allergen Reactions   • Ace Inhibitors Shortness Of Breath and Swelling   • Morphine Shortness Of Breath     Past Medical History:   Diagnosis Date   • Allergic     Morphine, ACE inhibitors   • Allergies    • Arthritis    • Cancer 1982    Cervical cancer   • Cardiac murmur    • Coronary artery disease 2016    Received stints   • Diabetes mellitus    • GERD (gastroesophageal reflux disease)    • Heart attack 12/2020   • History of cervical cancer 1981   • History of transfusion     SEVERAL   • Hyperlipidemia    • Hypertension    • Iron deficiency anemia    • Obesity    • Osteomyelitis hip     RIGHT   • PONV (postoperative nausea and vomiting)    • Right hip pain    • Visual impairment     Corrected with glasses   • VRE infection within last 3 months    • Wound infection     RIGHT HIP.  WOUND VAC IN PLACE     Past Surgical History:   Procedure Laterality Date   • CARDIAC CATHETERIZATION     • CERVICAL CONIZATION     • COLONOSCOPY     • CORONARY ANGIOPLASTY WITH STENT PLACEMENT     • ENDOSCOPY     • ENDOSCOPY N/A 06/09/2022    Procedure: ESOPHAGOGASTRODUODENOSCOPY WITH COLD BIOSPIES;  Surgeon: Zechariah Nj MD;  Location: [facility];  Service: Gastroenterology;  Laterality: N/A;  PRE- EPIGASTRIC PAIN AND ABDOMINAL PAIN  POST- NORMAL   • ENDOSCOPY N/A 4/17/2025    Procedure: ESOPHAGOGASTRODUODENOSCOPY WITH BIOPSIES;  Surgeon: Alejandro  Bhumika Petty MD;  Location: [facility] ENDOSCOPY;  Service: Gastroenterology;  Laterality: N/A;  FOOD IN STOMACH. GASTRITIS   • HIP ARTHROPLASTY Right    • HIP HARDWARE REMOVAL Right    • HIP MINI REVISION Right 01/18/2022    Procedure: TOTAL HIP ARTHROPLASTY LINER REVISION, pegboard lateral;  Surgeon: Karri Schaeffer II, MD;  Location: [facility] OR;  Service: Orthopedics;  Laterality: Right;   • HIP SPACER INSERTION WITH ANTIBIOTIC CEMENT      X3   • HYSTERECTOMY     • INCISION AND DRAINAGE HIP Right 12/18/2021    Procedure: INCISION AND DRAINAGE TOTAL HIP, LINER EXCHANGE AND WOUND VAC PLACEMENT;  Surgeon: Karri Schaeffer II, MD;  Location: [facility] OR;  Service: Orthopedics;  Laterality: Right;   • INCISION AND DRAINAGE HIP Right 05/02/2022    Procedure: HIP INCISION AND DRAINAGE;  Surgeon: Karri Schaeffer II, MD;  Location: [facility] OR;  Service: Orthopedics;  Laterality: Right;   • INCISION AND DRAINAGE HIP Right 06/04/2022    Procedure: HIP INCISION AND DRAINAGE WITH WOUND VAC REPLACEMENT;  Surgeon: Karri Schaeffer II, MD;  Location: [facility] OR;  Service: Orthopedics;  Laterality: Right;   • JOINT REPLACEMENT  L knee, R hip    Knee 2014, hip replacement [id number] multiple surgeries   • KNEE ARTHROPLASTY Left    • LAPAROSCOPIC CHOLECYSTECTOMY     • TONSILLECTOMY     • TOTAL HIP ARTHROPLASTY Right 04/25/2022    Procedure: HIP GIRDLESTONE PROCEDURE;  Surgeon: Karri Schaeffer II, MD;  Location: [facility];  Service: Orthopedics;  Laterality: Right;   • TOTAL HIP ARTHROPLASTY REVISION Right 11/16/2021    Procedure: TOTAL HIP REVISION ARTHROPLASTY RIGHT POSTERIOR;  Surgeon: Karri Schaeffer II, MD;  Location: [facility] OR;  Service: Orthopedics;  Laterality: Right;     Social History     Socioeconomic History   • Marital status:    Tobacco Use   • Smoking status: Former     Current packs/day: 0.00     Average packs/day: 1.4 packs/day for 81.3  years (110.1 ttl pk-yrs)     Types: Cigarettes     Start date: 12/10/1970     Quit date: 12/10/2015     Years since quittin.3     Passive exposure: Never   • Smokeless tobacco: Never   Vaping Use   • Vaping status: Never Used   Substance and Sexual Activity   • Alcohol use: Not Currently   • Drug use: Never   • Sexual activity: Defer     Partners: Male     Birth control/protection: None, Hysterectomy     Family History   Problem Relation Age of Onset   • Arthritis Mother    • Cancer Mother    • COPD Mother    • Hearing loss Mother    • Hyperlipidemia Mother    • Thyroid disease Mother    • Vision loss Mother    • Arthritis Father    • COPD Father    • Hearing loss Father    • Hyperlipidemia Father    • Cancer Maternal Grandfather    • Arthritis Maternal Grandmother    • Cancer Maternal Grandmother    • Hyperlipidemia Maternal Grandmother    • Diabetes Paternal Grandfather    • Heart disease Paternal Grandfather    • Hyperlipidemia Paternal Grandfather    • Diabetes Paternal Grandmother    • Arthritis Sister    • Cancer Sister    • COPD Sister    • Diabetes Sister    • Hyperlipidemia Sister    • Diabetes Brother    • Hyperlipidemia Brother    • Kidney disease Brother    • Vision loss Maternal Uncle    • Malig Hyperthermia Neg Hx      Immunization History   Administered Date(s) Administered   • COVID-19 (PFIZER) 12YRS+ (COMIRNATY) 10/26/2023, 2024   • COVID-19 (PFIZER) BIVALENT 12+YRS 2022   • COVID-19 (PFIZER) Purple Cap Monovalent 2021, 2021, 10/04/2021   • Covid-19 (Pfizer) Gray Cap Monovalent 2022   • Fluzone (or Fluarix & Flulaval for VFC) >6mos 2017   • Fluzone High-Dose 65+YRS 2018, 2019, 10/25/2024   • Fluzone High-Dose 65+yrs 10/04/2021   • Pneumococcal Conjugate 13-Valent (PCV13) 09/10/2019   • Pneumococcal Polysaccharide (PPSV23) 2018   • Shingrix 2020       Tobacco Use: Medium Risk (2025)    Patient History    • Smoking Tobacco Use:  "Former    • Smokeless Tobacco Use: Never    • Passive Exposure: Never       Objective     Physical Exam  Vitals and nursing note reviewed.   Constitutional:       Appearance: Normal appearance.   HENT:      Head: Normocephalic.      Nose: Nose normal.      Mouth/Throat:      Mouth: Mucous membranes are moist.   Eyes:      Extraocular Movements: Extraocular movements intact.   Cardiovascular:      Rate and Rhythm: Normal rate.   Pulmonary:      Effort: Pulmonary effort is normal. No respiratory distress.   Musculoskeletal:         General: Normal range of motion.      Cervical back: Normal range of motion and neck supple.   Skin:     General: Skin is warm and dry.   Neurological:      General: No focal deficit present.      Mental Status: She is alert and oriented to person, place, and time.   Psychiatric:         Mood and Affect: Mood normal.         Behavior: Behavior normal.         Thought Content: Thought content normal.         Judgment: Judgment normal.         Vitals:    04/30/25 1058   BP: 159/63   Pulse: 68   Resp: 18   Temp: 98.2 °F (36.8 °C)   TempSrc: Temporal   SpO2: 95%   Weight: 110 kg (241 lb 6.5 oz)   Height: 162.6 cm (64.02\")   PainSc: 0-No pain       Wt Readings from Last 3 Encounters:   04/30/25 110 kg (241 lb 6.5 oz)   04/17/25 109 kg (239 lb 13.8 oz)   02/26/25 108 kg (237 lb)          ECOG score: 0         ECOG: (0) Fully Active - Able to Carry On All Pre-disease Performance Without Restriction  Fall Risk Assessment was completed, and patient is at low risk for falls.  PHQ-9 Total Score:         The patient is  experiencing fatigue. Fatigue score: 1    PT/OT Functional Screening: PT fx screen : No needs identified  Speech Functional Screening: Speech fx screen : No needs identified  Rehab to be ordered: Rehab to be ordered : No needs identified        Result Review :  The following data was reviewed by: MORGAN Augustin on 04/30/2025:  Lab Results   Component Value Date    HGB 13.8 " "03/14/2025    HCT 40.9 03/14/2025    MCV 91.1 03/14/2025    PLT 96 (L) 03/14/2025    WBC 4.82 03/14/2025    NEUTROABS 3.06 03/14/2025    LYMPHSABS 1.01 03/14/2025    MONOSABS 0.41 03/14/2025    EOSABS 0.29 03/14/2025    BASOSABS 0.04 03/14/2025     Lab Results   Component Value Date    GLUCOSE 172 (H) 03/14/2025    BUN 16 03/14/2025    CREATININE 1.18 (H) 03/14/2025     (L) 03/14/2025    K 4.6 03/14/2025     03/14/2025    CO2 23.9 03/14/2025    CALCIUM 9.9 03/14/2025    PROTEINTOT 7.2 03/14/2025    ALBUMIN 4.1 03/14/2025    BILITOT 0.8 03/14/2025    ALKPHOS 88 03/14/2025    AST 41 (H) 03/14/2025    ALT 38 (H) 03/14/2025     Lab Results   Component Value Date     (H) 08/27/2024    Ferritin 501.00 (H) 03/14/2025    Folate >20.00 03/14/2025     Lab Results   Component Value Date    IRON 93 03/14/2025    LABIRON 24 03/14/2025    TRANSFERRIN 262 03/14/2025    TIBC 390 03/14/2025     Lab Results   Component Value Date     (H) 08/27/2024    FERRITIN 501.00 (H) 03/14/2025    [id number] 468 08/27/2024    FOLATE >20.00 03/14/2025     No results found for: \"PSA\", \"CEA\", \"AFP\", \"\", \"\"         Assessment and Plan:  Diagnoses and all orders for this visit:    1. Thrombocytopenia (Primary)  -     CBC & Differential; Future  -     Comprehensive Metabolic Panel; Future  -     Iron Profile; Future  -     Ferritin; Future  -     Vitamin B12; Future  -     Folate; Future    2. Anemia, unspecified type  -     CBC & Differential; Future  -     Comprehensive Metabolic Panel; Future  -     Iron Profile; Future  -     Ferritin; Future  -     Vitamin B12; Future  -     Folate; Future    3. Cirrhosis of liver without ascites, unspecified hepatic cirrhosis type  -     CBC & Differential; Future  -     Comprehensive Metabolic Panel; Future  -     Iron Profile; Future  -     Ferritin; Future  -     Vitamin B12; Future  -     Folate; Future        Assessment & Plan  1. Chronic thrombocytopenia.  Chronic " thrombocytopenia is likely secondary to liver cirrhosis, as indicated by the abdominal ultrasound from 09/2024. Folate and B12 levels have remained within normal limits throughout the course of treatment. Given that the platelet count is currently satisfactory, no immediate intervention is required at this time. Emergency medical attention should be sought if symptoms such as worsening bruising or bleeding, hemoptysis, epistaxis, or rectal bleeding occur, which could be potential sequelae of liver cirrhosis.    2. Gastritis.  Gastritis was identified during an EGD on 04/17/2025, characterized by diffuse mild inflammation and erythema in the gastric antrum. Protonix is being taken for management.    Follow-up  A follow-up appointment is scheduled for 11/2025, with labs to be completed 1 to 2 days prior to the visit.    PROCEDURE  Upper GI was completed on 04/17/2025.  Biopsies were taken from the gastric antrum revealing diffuse mild inflammation characterized by erythema.  Biopsies revealed gastritis with erythema.        I spent 25 minutes caring for [place] on this date of service. This time includes time spent by me in the following activities:preparing for the visit, reviewing tests, obtaining and/or reviewing a separately obtained history, counseling and educating the patient/family/caregiver, ordering medications, tests, or procedures, referring and communicating with other health care professionals , documenting information in the medical record, and independently interpreting results and communicating that information with the patient/family/caregiver    Patient Follow Up: 6 months with NP.   Patient was given instructions and counseling regarding her condition or for health maintenance advice. Please see specific information pulled into the AVS if appropriate.     Farhana Evans, APRN    4/30/2025    .tob    Patient or patient representative verbalized consent for the use of Ambient Listening during  the visit with  MORGAN Augustin for chart documentation. 4/30/2025  12:24 EDT

## 2025-05-06 DIAGNOSIS — Z79.4 TYPE 2 DIABETES MELLITUS WITH DIABETIC POLYNEUROPATHY, WITH LONG-TERM CURRENT USE OF INSULIN: ICD-10-CM

## 2025-05-06 DIAGNOSIS — E11.42 TYPE 2 DIABETES MELLITUS WITH DIABETIC POLYNEUROPATHY, WITH LONG-TERM CURRENT USE OF INSULIN: ICD-10-CM

## 2025-05-07 RX ORDER — SEMAGLUTIDE 1.34 MG/ML
1 INJECTION, SOLUTION SUBCUTANEOUS
Qty: 9 ML | Refills: 1 | Status: SHIPPED | OUTPATIENT
Start: 2025-05-07

## 2025-05-11 ENCOUNTER — PATIENT MESSAGE (OUTPATIENT)
Dept: FAMILY MEDICINE CLINIC | Facility: CLINIC | Age: 70
End: 2025-05-11
Payer: MEDICARE

## 2025-05-11 DIAGNOSIS — E11.42 TYPE 2 DIABETES MELLITUS WITH DIABETIC POLYNEUROPATHY, WITH LONG-TERM CURRENT USE OF INSULIN: ICD-10-CM

## 2025-05-11 DIAGNOSIS — Z79.4 TYPE 2 DIABETES MELLITUS WITH DIABETIC POLYNEUROPATHY, WITH LONG-TERM CURRENT USE OF INSULIN: ICD-10-CM

## 2025-05-12 RX ORDER — INSULIN ASPART 100 [IU]/ML
INJECTION, SOLUTION INTRAVENOUS; SUBCUTANEOUS
Qty: 20 ML | Refills: 5 | Status: SHIPPED | OUTPATIENT
Start: 2025-05-12 | End: 2026-05-12

## 2025-05-12 RX ORDER — INSULIN GLARGINE 100 [IU]/ML
110 INJECTION, SOLUTION SUBCUTANEOUS DAILY
Qty: 100 ML | Refills: 5 | Status: SHIPPED | OUTPATIENT
Start: 2025-05-12

## 2025-05-13 DIAGNOSIS — K76.82 HEPATIC ENCEPHALOPATHY: Primary | ICD-10-CM

## 2025-05-14 ENCOUNTER — TELEPHONE (OUTPATIENT)
Dept: GASTROENTEROLOGY | Facility: CLINIC | Age: 70
End: 2025-05-14
Payer: MEDICARE

## 2025-05-14 DIAGNOSIS — K76.82 HEPATIC ENCEPHALOPATHY: Primary | ICD-10-CM

## 2025-05-14 NOTE — TELEPHONE ENCOUNTER
Spoke with April at [employer] re: her Xifaxan copy, she states her prior authorization is good through 08/27/2025 at $0 copay for 30 day supply. Per Amairani I will send in new RX for #60 with 11RF.

## 2025-06-04 ENCOUNTER — OFFICE VISIT (OUTPATIENT)
Dept: FAMILY MEDICINE CLINIC | Facility: CLINIC | Age: 70
End: 2025-06-04
Payer: MEDICARE

## 2025-06-04 VITALS
HEART RATE: 56 BPM | BODY MASS INDEX: 39.78 KG/M2 | SYSTOLIC BLOOD PRESSURE: 114 MMHG | WEIGHT: 233 LBS | OXYGEN SATURATION: 97 % | DIASTOLIC BLOOD PRESSURE: 45 MMHG | HEIGHT: 64 IN

## 2025-06-04 DIAGNOSIS — Z00.00 MEDICARE ANNUAL WELLNESS VISIT, SUBSEQUENT: ICD-10-CM

## 2025-06-04 DIAGNOSIS — E11.42 TYPE 2 DIABETES MELLITUS WITH DIABETIC POLYNEUROPATHY, WITH LONG-TERM CURRENT USE OF INSULIN: ICD-10-CM

## 2025-06-04 DIAGNOSIS — Z79.4 TYPE 2 DIABETES MELLITUS WITH DIABETIC POLYNEUROPATHY, WITH LONG-TERM CURRENT USE OF INSULIN: ICD-10-CM

## 2025-06-04 DIAGNOSIS — E87.6 HYPOKALEMIA: ICD-10-CM

## 2025-06-04 DIAGNOSIS — E78.00 PURE HYPERCHOLESTEROLEMIA: Primary | ICD-10-CM

## 2025-06-04 DIAGNOSIS — Z12.31 ENCOUNTER FOR SCREENING MAMMOGRAM FOR MALIGNANT NEOPLASM OF BREAST: ICD-10-CM

## 2025-06-04 RX ORDER — GABAPENTIN 600 MG/1
600 TABLET ORAL 2 TIMES DAILY
Qty: 180 TABLET | Refills: 1 | Status: SHIPPED | OUTPATIENT
Start: 2025-06-04

## 2025-06-04 RX ORDER — INSULIN ASPART 100 [IU]/ML
23 INJECTION, SOLUTION INTRAVENOUS; SUBCUTANEOUS
Qty: 75 ML | Refills: 1 | Status: SHIPPED | OUTPATIENT
Start: 2025-06-04 | End: 2026-06-04

## 2025-06-04 RX ORDER — FOLIC ACID 1 MG/1
1000 TABLET ORAL DAILY
Qty: 90 TABLET | Refills: 1 | Status: SHIPPED | OUTPATIENT
Start: 2025-06-04

## 2025-06-04 RX ORDER — ROSUVASTATIN CALCIUM 5 MG/1
5 TABLET, COATED ORAL NIGHTLY
Qty: 90 TABLET | Refills: 3 | Status: SHIPPED | OUTPATIENT
Start: 2025-06-04

## 2025-06-04 RX ORDER — INSULIN GLARGINE 100 [IU]/ML
110 INJECTION, SOLUTION SUBCUTANEOUS NIGHTLY
Qty: 120 ML | Refills: 1 | Status: SHIPPED | OUTPATIENT
Start: 2025-06-04

## 2025-06-04 RX ORDER — POTASSIUM CHLORIDE 1500 MG/1
20 TABLET, EXTENDED RELEASE ORAL DAILY
Qty: 90 TABLET | Refills: 1 | Status: SHIPPED | OUTPATIENT
Start: 2025-06-04

## 2025-06-04 RX ORDER — SEMAGLUTIDE 1.34 MG/ML
1 INJECTION, SOLUTION SUBCUTANEOUS
Qty: 9 ML | Refills: 1 | Status: SHIPPED | OUTPATIENT
Start: 2025-06-04

## 2025-06-04 NOTE — PROGRESS NOTES
Subjective   The ABCs of the Annual Wellness Visit  Medicare Wellness Visit      Stephy Duncan is a 70 y.o. patient who presents for a Medicare Wellness Visit.    The following portions of the patient's history were reviewed and   updated as appropriate: allergies, current medications, past family history, past medical history, past social history, past surgical history, and problem list.    Compared to one year ago, the patient's physical   health is better.  Compared to one year ago, the patient's mental   health is better.    Recent Hospitalizations:  She was not admitted to the hospital during the last year.     Current Medical Providers:  Patient Care Team:  Chun Marley DO as PCP - General (Family Medicine)  Jose Kong MD as Consulting Physician (Cardiology)  Amairani Lloyd APRN as Nurse Practitioner (Nurse Practitioner)    Outpatient Medications Prior to Visit   Medication Sig Dispense Refill    calcium carbonate, oyster shell, 500 MG tablet tablet Take 1 tablet by mouth Daily. 90 tablet 2    colchicine 0.6 MG tablet Take 1 tablet by mouth Daily.      Continuous Glucose Sensor (FreeStyle Carol 3 Plus Sensor) Use Every 14 (Fourteen) Days. 6 each 3    Diclofenac Sodium (VOLTAREN) 1 % gel gel Apply 4 g topically to the appropriate area as directed 4 (Four) Times a Day As Needed (MSK pain). 100 g 3    ferrous sulfate 325 (65 FE) MG tablet Take 1 tablet by mouth Every Other Day. Mornings      furosemide (LASIX) 40 MG tablet Take 1 tablet by mouth Daily.      isosorbide mononitrate (IMDUR) 30 MG 24 hr tablet Take 1 tablet by mouth Every Morning.      lactulose (CHRONULAC) 10 GM/15ML solution Take 30 mL by mouth 2 (Two) Times a Day. 1800 mL 3    levocetirizine (XYZAL) 5 MG tablet Take 1 tablet by mouth Every Evening. 90 tablet 1    nitroglycerin (NITROSTAT) 0.4 MG SL tablet Take 1 tablet by mouth.      OneTouch Verio test strip USE AS DIRECTED 3 TIMES A DAY      pantoprazole (PROTONIX) 40  MG EC tablet Take 1 tablet by mouth Daily. 30 tablet 5    riFAXIMin (Xifaxan) 550 MG tablet Take 1 tablet by mouth Every 12 (Twelve) Hours. 180 tablet 3    riFAXIMin (Xifaxan) 550 MG tablet Take 1 tablet by mouth Every 12 (Twelve) Hours. 24 tablet 0    riFAXIMin (Xifaxan) 550 MG tablet Take 1 tablet by mouth Every 12 (Twelve) Hours for 30 days. 60 tablet 0    spironolactone (ALDACTONE) 50 MG tablet Take 1 tablet by mouth Daily. 90 tablet 2    verapamil (CALAN) 80 MG tablet Take 1 tablet by mouth 2 (Two) Times a Day. 60 tablet 0    vitamin D3 125 MCG (5000 UT) capsule capsule Take 1 capsule by mouth Daily.      folic acid (FOLVITE) 1 MG tablet Take 1 tablet by mouth Daily.      gabapentin (NEURONTIN) 600 MG tablet Take 1 tablet by mouth 2 (Two) Times a Day. 180 tablet 2    insulin aspart (NovoLOG FlexPen) 100 UNIT/ML solution pen-injector sc pen Inject 20 Units under the skin into the appropriate area as directed Daily With Breakfast AND 23 Units Daily With Lunch AND 23 Units Daily With Dinner. 20 mL 5    insulin glargine (Lantus) 100 UNIT/ML injection Inject 110 Units under the skin into the appropriate area as directed Daily. 100 mL 5    KLOR-CON 20 MEQ CR tablet Take 1 tablet by mouth Daily.      rosuvastatin (CRESTOR) 5 MG tablet TAKE 1 TABLET (5 MG TOTAL) BY MOUTH DAILY.      Semaglutide, 1 MG/DOSE, (Ozempic, 1 MG/DOSE,) 4 MG/3ML solution pen-injector INJECT 1 MG UNDER THE SKIN INTO THE APPROPRIATE AREA AS DIRECTED 1 (ONE) TIME PER WEEK. 9 mL 1    allopurinol (ZYLOPRIM) 100 MG tablet Take 1 tablet by mouth Daily. 90 tablet 0     No facility-administered medications prior to visit.     No opioid medication identified on active medication list. I have reviewed chart for other potential  high risk medication/s and harmful drug interactions in the elderly.      Aspirin is not on active medication list.  Aspirin use is contraindicated for this patient due to: history of bleeding.  .    Patient Active Problem List  "  Diagnosis    Status post total replacement of hip    Type 2 diabetes mellitus with diabetic polyneuropathy, with long-term current use of insulin    Hypertension    Normocytic anemia    Postoperative infection    Iron deficiency anemia    Obesity (BMI 30-39.9)    Presence of stent in coronary artery in patient with coronary artery disease    Chronic diastolic CHF (congestive heart failure)    Septic arthritis of hip    Drainage from wound    Candidal intertrigo    Postoperative anemia due to acute blood loss    S/P Girdlestone procedure    Enterococcus faecalis infection    Pyogenic arthritis, pelvic region and thigh    Osteomyelitis of hip    Hyponatremia    Metabolic acidosis    Sepsis without acute organ dysfunction    Pathological fracture of right femur    Epigastric abdominal pain    Acute on chronic congestive heart failure, unspecified heart failure type    Class 2 severe obesity due to excess calories with serious comorbidity and body mass index (BMI) of 38.0 to 38.9 in adult    Morbid (severe) obesity due to excess calories (*specify comorbidity)    Thrombocytopenia    Elevated liver enzymes    Liver cirrhosis secondary to MEEHAN    Cirrhosis of liver without ascites    MEEHAN (nonalcoholic steatohepatitis)    Gastroesophageal reflux disease    Dysphagia     Advance Care Planning Advance Directive is on file.  ACP discussion was held with the patient during this visit. Patient has an advance directive in EMR which is still valid.             Objective   Vitals:    06/04/25 1304   BP: 114/45   BP Location: Right arm   Patient Position: Sitting   Cuff Size: Large Adult   Pulse: 56   SpO2: 97%   Weight: 106 kg (233 lb)   Height: 162.6 cm (64.02\")       Estimated body mass index is 39.97 kg/m² as calculated from the following:    Height as of this encounter: 162.6 cm (64.02\").    Weight as of this encounter: 106 kg (233 lb).       Does the patient have evidence of cognitive impairment? No  Lab Results "   Component Value Date    TRIG 94 2025                                                                                                Health  Risk Assessment    Smoking Status:  Social History     Tobacco Use   Smoking Status Former    Current packs/day: 0.00    Average packs/day: 1.4 packs/day for 81.3 years (110.1 ttl pk-yrs)    Types: Cigarettes    Start date: 12/10/1970    Quit date: 12/10/2015    Years since quittin.4    Passive exposure: Never   Smokeless Tobacco Never     Alcohol Consumption:  Social History     Substance and Sexual Activity   Alcohol Use Not Currently       Fall Risk Screen  STEADI Fall Risk Assessment was completed, and patient is at HIGH risk for falls. Assessment completed on:2025    Depression Screening   Little interest or pleasure in doing things? Not at all   Feeling down, depressed, or hopeless? Not at all   PHQ-2 Total Score 0      Health Habits and Functional and Cognitive Screenin/4/2025     1:00 PM   Functional & Cognitive Status   Do you have difficulty preparing food and eating? No   Do you have difficulty bathing yourself, getting dressed or grooming yourself? No   Do you have difficulty using the toilet? No   Do you have difficulty moving around from place to place? No   Do you have trouble with steps or getting out of a bed or a chair? No   Current Diet Well Balanced Diet   Dental Exam Not up to date   Eye Exam Up to date   Exercise (times per week) 7 times per week   Current Exercises Include House Cleaning   Do you need help using the phone?  No   Are you deaf or do you have serious difficulty hearing?  No   Do you need help to go to places out of walking distance? No   Do you need help shopping? No   Do you need help preparing meals?  No   Do you need help with housework?  No   Do you need help with laundry? No   Do you need help taking your medications? No   Do you need help managing money? No   Do you ever drive or ride in a car without wearing a  seat belt? No   Have you felt unusual stress, anger or loneliness in the last month? No   Who do you live with? Sibling   If you need help, do you have trouble finding someone available to you? No   Have you been bothered in the last four weeks by sexual problems? No   Do you have difficulty concentrating, remembering or making decisions? No           Age-appropriate Screening Schedule:  Refer to the list below for future screening recommendations based on patient's age, sex and/or medical conditions. Orders for these recommended tests are listed in the plan section. The patient has been provided with a written plan.    Health Maintenance List  Health Maintenance   Topic Date Due    COVID-19 Vaccine (8 - 2024-25 season) 10/02/2024    MAMMOGRAM  02/17/2025    HEMOGLOBIN A1C  07/24/2025    ZOSTER VACCINE (2 of 2) 07/03/2025 (Originally 1/18/2021)    Hepatitis B (1 of 3 - Risk 3-dose series) 01/24/2026 (Originally 5/7/2013)    TDAP/TD VACCINES (1 - Tdap) 01/24/2026 (Originally 5/7/1974)    INFLUENZA VACCINE  07/01/2025    LUNG CANCER SCREENING  12/05/2025    LIPID PANEL  01/24/2026    URINE MICROALBUMIN-CREATININE RATIO (uACR)  01/24/2026    DIABETIC EYE EXAM  03/25/2026    DXA SCAN  05/24/2026    ANNUAL WELLNESS VISIT  06/04/2026    DIABETIC FOOT EXAM  06/04/2026    COLORECTAL CANCER SCREENING  12/27/2026    HEPATITIS C SCREENING  Completed    Pneumococcal Vaccine 50+  Completed                                                                                                                                                CMS Preventative Services Quick Reference  Risk Factors Identified During Encounter  None Identified    The above risks/problems have been discussed with the patient.  Pertinent information has been shared with the patient in the After Visit Summary.  An After Visit Summary and PPPS were made available to the patient.    Follow Up:   Next Medicare Wellness visit to be scheduled in 1 year.          Additional E&M Note during same encounter follows:  Patient has additional, significant, and separately identifiable condition(s)/problem(s) that require work above and beyond the Medicare Wellness Visit     Chief Complaint  Medicare Wellness-subsequent    Subjective    HPI  [place] is also being seen today for additional medical problem/s.    1. Gout.  Patient is doing well off of her allopurinol.     2. Chronic kidney disease stage 3.  She is on both Lasix and spironolactone.  GFR fairly consistent and stage III range.      3. Gastroesophageal reflux disease.  Recently completed EGD, has follow-up with GI on June 24, 2025.      4. Diabetes mellitus.  A1c was previously 7.6%, we are out of point-of-care A1c testing today however based on patient's josefa her estimated A1c is 6.6%.  She is doing well on her Ozempic along with both short and long-acting insuli.  She is asking for refills and for these to be adjusted to 90-day prescriptions.    5. Coronary artery disease.  She is on Imdur and verapamil for her heart. She has a history of a mild heart attack and 2 stents. She also has a heart murmur. She will continue to follow up with her cardiologist, Dr. Bro, every 6 months.     6. Neuropathy.  She takes gabapentin 600 mg twice a day.  I thought we had updated CSA in February however I do not see this will make this a point of follow-up at next appointment.      7. Hypokalemia.  She is on a potassium supplement.  Level has been stable on past labs.     8. Osteopenia.  She had a DEXA scan on 05/24/2024, which showed osteopenia. She is on a magnesium supplement and calcium.  We due for recheck next year.     9. Hypercholesterolemia.  She is on rosuvastatin for her cholesterol. Her total cholesterol was 121 in January 2025.     10. Constipation.  On lactulose and Xifaxan from GI.  Has follow-up already scheduled.     11. Thrombocytopenia: Patient follows with heme-onc.  History of CT that showed  "splenomegaly and possible cirrhosis.  She has extensive labs set to complete with them in 6 months.                   Objective   Vital Signs:  /45 (BP Location: Right arm, Patient Position: Sitting, Cuff Size: Large Adult)   Pulse 56   Ht 162.6 cm (64.02\")   Wt 106 kg (233 lb)   SpO2 97%   BMI 39.97 kg/m²   Physical Exam  Constitutional:       Appearance: Normal appearance.   HENT:      Head: Normocephalic and atraumatic.      Mouth/Throat:      Mouth: Mucous membranes are moist.   Eyes:      Conjunctiva/sclera: Conjunctivae normal.   Cardiovascular:      Pulses:           Dorsalis pedis pulses are 2+ on the right side and 2+ on the left side.        Posterior tibial pulses are 2+ on the right side and 2+ on the left side.   Pulmonary:      Effort: Pulmonary effort is normal.   Musculoskeletal:      Cervical back: Normal range of motion.      Right foot: Decreased range of motion.      Left foot: Normal range of motion.   Feet:      Right foot:      Protective Sensation: 8 sites tested.  4 sites sensed.      Skin integrity: Skin breakdown and erythema present.      Left foot:      Protective Sensation: 8 sites tested.  4 sites sensed.      Skin integrity: No ulcer, skin breakdown or erythema.      Comments: Patient has some sores to the top of the right foot, there is some mild redness there, she is applying topical antibiotic ointment at home.  She believes it is secondary to bumping into cabinets but she does have swelling in this foot and is rubbing along her sandal strap.  Low threshold to reevaluate if not showing improvement over the next few days.  Patient is aware and agreeable to plan.  Patient gets her toenails painted regularly at the salon I am not able to note if there is any fungal disease at this time.  Both feet have decreased sensation on both the dorsal and plantar aspects of toes, sensation is well intact from the midfoot and more proximal.  Neurological:      General: No focal deficit " present.      Mental Status: She is alert.   Psychiatric:         Mood and Affect: Mood normal.         Behavior: Behavior normal.                       Results             Assessment and Plan                    Follow Up   Return in about 3 months (around 9/4/2025).  Patient was given instructions and counseling regarding her condition or for health maintenance advice. Please see specific information pulled into the AVS if appropriate.

## 2025-06-14 DIAGNOSIS — K76.82 HEPATIC ENCEPHALOPATHY: ICD-10-CM

## 2025-06-16 RX ORDER — RIFAXIMIN 550 MG/1
550 TABLET ORAL EVERY 12 HOURS SCHEDULED
Qty: 60 TABLET | Refills: 0 | Status: SHIPPED | OUTPATIENT
Start: 2025-06-16 | End: 2025-07-16

## 2025-06-16 NOTE — TELEPHONE ENCOUNTER
Medication Requested Xifaxan 550mg    Last Refill 4/15/2025    Last OV 2/26/2025    Next OV 6/24/2025    Medication pended for approval and correct pharmacy verified Yes

## 2025-06-24 ENCOUNTER — OFFICE VISIT (OUTPATIENT)
Dept: GASTROENTEROLOGY | Facility: CLINIC | Age: 70
End: 2025-06-24
Payer: MEDICARE

## 2025-06-24 VITALS
OXYGEN SATURATION: 97 % | DIASTOLIC BLOOD PRESSURE: 70 MMHG | SYSTOLIC BLOOD PRESSURE: 117 MMHG | HEART RATE: 69 BPM | BODY MASS INDEX: 39.78 KG/M2 | WEIGHT: 233 LBS | HEIGHT: 64 IN

## 2025-06-24 DIAGNOSIS — K75.81 NASH (NONALCOHOLIC STEATOHEPATITIS): Primary | ICD-10-CM

## 2025-06-24 DIAGNOSIS — D50.9 IRON DEFICIENCY ANEMIA, UNSPECIFIED IRON DEFICIENCY ANEMIA TYPE: ICD-10-CM

## 2025-06-24 DIAGNOSIS — K76.82 HEPATIC ENCEPHALOPATHY: ICD-10-CM

## 2025-06-24 DIAGNOSIS — K74.60 CIRRHOSIS OF LIVER WITHOUT ASCITES, UNSPECIFIED HEPATIC CIRRHOSIS TYPE: ICD-10-CM

## 2025-06-24 DIAGNOSIS — K59.04 CHRONIC IDIOPATHIC CONSTIPATION: ICD-10-CM

## 2025-06-24 PROCEDURE — 1160F RVW MEDS BY RX/DR IN RCRD: CPT | Performed by: NURSE PRACTITIONER

## 2025-06-24 PROCEDURE — 1159F MED LIST DOCD IN RCRD: CPT | Performed by: NURSE PRACTITIONER

## 2025-06-24 PROCEDURE — 3074F SYST BP LT 130 MM HG: CPT | Performed by: NURSE PRACTITIONER

## 2025-06-24 PROCEDURE — 99214 OFFICE O/P EST MOD 30 MIN: CPT | Performed by: NURSE PRACTITIONER

## 2025-06-24 PROCEDURE — 3078F DIAST BP <80 MM HG: CPT | Performed by: NURSE PRACTITIONER

## 2025-06-24 NOTE — PROGRESS NOTES
Chief Complaint    Cirrhosis of liver without ascites; MEEHAN (nonalcoholic steatohepatitis); Hepatic encephalopathy; Constipation, unspecified constipation type (Lactulose working); Gastroesophageal reflux disease; Difficulty Swallowing (Improved since EGD); and gastritis    History of Present Illness       Stephy Duncan is a 70 y.o. female who presents to [facility] for follow-up         History of Present Illness  The patient is a 70-year-old female who presents today for follow-up for cirrhosis. She was last seen in the office on 02/26/2025. She has a history of MEEHAN cirrhosis, type 2 diabetes on insulin, and GERD. She normally takes Nexium 40 mg daily and is on diuretics. There are plans to change from Trulicity to semaglutide.    An ultrasound of the abdomen on 09/25/2024 showed normal liver and spleen size with cirrhotic liver morphology noted. No focal liver lesion was seen. A CT scan of the abdomen and pelvis on 02/23/2025 revealed a large disk herniation at the L3-L4 level, cirrhotic morphology of the liver, splenomegaly, portal hypertension, and portal systemic venous shunting. The main portal vein is patent. A small hiatal hernia and paraesophageal varices were observed. She has undergone cholecystectomy, with probable mild compensatory biliary tree dilation noted. No acute pancreatitis, definite adrenal nodule, renal stones, or hydronephrosis were seen. A moderate to large stool burden was suggested, with possible fecal impaction.    She reports that both her EGD and colonoscopy were done many years ago. She reports no history of colon polyps or significant GI family history. At the last visit, she experienced fatigue, brain fog, and some confusion but reported no pedal edema or ascites. Liver serology and a MEEHAN FibroSure were checked, and she was scheduled for an EGD and colonoscopy. She was started on lactulose for suspected hepatic encephalopathy and  constipation, and Xifaxan b.i.d.    An EGD performed with Dr. Allen on 04/17/2025 showed a normal esophagus, gastritis, and a large amount of food in the stomach. Pathology of the EGD showed gastritis, otherwise negative. An ammonia level was elevated on 03/14/2025. MEEHAN FibroSure showed moderate MEEHAN with moderate to severe steatosis, F2 fibrosis, and no cirrhosis. Liver serology appeared normal.    She reports a positive response to lactulose liquid and Xifaxan, with an improvement in her bowel movements, typically having two per day. Occasionally, she experiences more than two bowel movements in a day. She notes a slight improvement in her fatigue and confusion since starting the medication. She recently enjoyed a trip to [place], which she believes contributed to her overall well-being.    She has a history of type 2 diabetes and is currently using Carol for monitoring. She adjusts her insulin dosage based on the readings.    She has a history of GERD and is currently taking Protonix 40 mg once daily, having discontinued Nexium.    PAST SURGICAL HISTORY:  Cholecystectomy    FAMILY HISTORY  - Negative for significant gastrointestinal conditions in family history.        Results       Result Review :       CMP          1/24/2025    14:35 2/23/2025    04:36 3/14/2025    11:18   CMP   Glucose 126  202  172    BUN 16  19  16    Creatinine 1.01  1.42  1.18    EGFR 59.6  39.6  49.5    Sodium 136  133  134    Potassium 4.5  3.9  4.6    Chloride 100  97  100    Calcium 9.4  9.9  9.9    Total Protein 6.8  7.8  7.2    Albumin 3.7  4.2  4.1    Globulin 3.1  3.6  3.1    Total Bilirubin 0.7  1.7  0.8    Alkaline Phosphatase 77  93  88    AST (SGOT) 56  46  41    ALT (SGPT) 35  39  38    Albumin/Globulin Ratio 1.2  1.2  1.3    BUN/Creatinine Ratio 15.8  13.4  13.6    Anion Gap 10.2  13.7  10.1      CBC          1/24/2025    14:35 2/23/2025    04:36 3/14/2025    11:18   CBC   WBC 5.88  8.88  4.82    RBC 4.33  4.98  4.49   "  Hemoglobin 13.2  15.3  13.8    Hematocrit 40.8  45.4  40.9    MCV 94.2  91.2  91.1    MCH 30.5  30.7  30.7    MCHC 32.4  33.7  33.7    RDW 13.3  14.2  13.9    Platelets 82  126  96      CBC w/diff          1/24/2025    14:35 2/23/2025    04:36 3/14/2025    11:18   CBC w/Diff   WBC 5.88  8.88  4.82    RBC 4.33  4.98  4.49    Hemoglobin 13.2  15.3  13.8    Hematocrit 40.8  45.4  40.9    MCV 94.2  91.2  91.1    MCH 30.5  30.7  30.7    MCHC 32.4  33.7  33.7    RDW 13.3  14.2  13.9    Platelets 82  126  96    Neutrophil Rel % 62.8  68.3  63.5    Immature Granulocyte Rel % 0.3  0.2  0.2    Lymphocyte Rel % 19.6  17.8  21.0    Monocyte Rel % 9.5  8.9  8.5    Eosinophil Rel % 6.8  4.2  6.0    Basophil Rel % 1.0  0.6  0.8      Lipid Panel          1/24/2025    14:35 3/14/2025    11:18   Lipid Panel   Total Cholesterol 121     Triglycerides 79  94    HDL Cholesterol 45     VLDL Cholesterol 16     LDL Cholesterol  60     LDL/HDL Ratio 1.34         Electrolytes          1/24/2025    14:35 2/23/2025    04:36 3/14/2025    11:18   Electrolytes   Sodium 136  133  134    Potassium 4.5  3.9  4.6    Chloride 100  97  100    Calcium 9.4  9.9  9.9      Renal Profile          1/24/2025    14:35 2/23/2025    04:36 3/14/2025    11:18   Renal Profile   BUN 16  19  16    Creatinine 1.01  1.42  1.18      BMP          1/24/2025    14:35 2/23/2025    04:36 3/14/2025    11:18   BMP   BUN 16  19  16    Creatinine 1.01  1.42  1.18    Sodium 136  133  134    Potassium 4.5  3.9  4.6    Chloride 100  97  100    CO2 25.8  22.3  23.9    Calcium 9.4  9.9  9.9      Most Recent A1C          1/24/2025    14:35   HGBA1C Most Recent   Hemoglobin A1C 7.60        Lipase   Lipase   Date Value Ref Range Status   02/23/2025 14 13 - 60 U/L Final     Amylase No results found for: \"AMYLASE\"  Iron Profile   Iron   Date Value Ref Range Status   03/14/2025 93 37 - 145 mcg/dL Final     TIBC   Date Value Ref Range Status   03/14/2025 390 298 - 536 mcg/dL Final     Iron " Saturation (TSAT)   Date Value Ref Range Status   03/14/2025 24 20 - 50 % Final     Transferrin   Date Value Ref Range Status   03/14/2025 262 200 - 360 mg/dL Final     Ferritin   Ferritin   Date Value Ref Range Status   03/14/2025 501.00 (H) 13.00 - 150.00 ng/mL Final     ESR (Sed Rate)   Sed Rate   Date Value Ref Range Status   08/22/2024 18 0 - 30 mm/hr Final     CRP (C-Reactive)   C-Reactive Protein   Date Value Ref Range Status   08/22/2024 0.69 (H) 0.00 - 0.50 mg/dL Final     Liver Workup   ALPHA -1 ANTITRYPSIN   Date Value Ref Range Status   03/14/2025 169 90 - 200 mg/dL Final     Smooth Muscle Ab   Date Value Ref Range Status   03/14/2025 2 0 - 19 Units Final     Comment:                      Negative                     0 - 19                   Weak positive               20 - 30                   Moderate to strong positive     >30   Actin Antibodies are found in 52-85% of patients with   autoimmune hepatitis or chronic active hepatitis and   in 22% of patients with primary biliary cirrhosis.     Ceruloplasmin   Date Value Ref Range Status   03/14/2025 22 19 - 39 mg/dL Final     Ferritin   Date Value Ref Range Status   03/14/2025 501.00 (H) 13.00 - 150.00 ng/mL Final     Immunofixation Result, Serum   Date Value Ref Range Status   03/14/2025 Comment  Final     Comment:     No monoclonality detected.     IgG   Date Value Ref Range Status   03/14/2025 1035 586 - 1602 mg/dL Final     IgA   Date Value Ref Range Status   03/14/2025 342 64 - 422 mg/dL Final     IgM   Date Value Ref Range Status   03/14/2025 17 (L) 26 - 217 mg/dL Final     Comment:     Result confirmed on concentration.     Iron   Date Value Ref Range Status   03/14/2025 93 37 - 145 mcg/dL Final     TIBC   Date Value Ref Range Status   03/14/2025 390 298 - 536 mcg/dL Final     Iron Saturation (TSAT)   Date Value Ref Range Status   03/14/2025 24 20 - 50 % Final     Transferrin   Date Value Ref Range Status   03/14/2025 262 200 - 360 mg/dL Final  "    Mitochondrial Ab   Date Value Ref Range Status   03/14/2025 <20.0 0.0 - 20.0 Units Final     Comment:                                     Negative    0.0 - 20.0                                  Equivocal  20.1 - 24.9                                  Positive         >24.9  Mitochondrial (M2) Antibodies are found in 90-96% of  patients with primary biliary cirrhosis.     Protime   Date Value Ref Range Status   03/14/2025 14.2 11.8 - 14.9 Seconds Final     INR   Date Value Ref Range Status   03/14/2025 1.05 0.86 - 1.15 Final     Acute HEP Panel   Hepatitis B Surface Ag   Date Value Ref Range Status   03/14/2025 Non-Reactive Non-Reactive Final     Hep A IgM   Date Value Ref Range Status   03/14/2025 Non-Reactive Non-Reactive Final     Hep B C IgM   Date Value Ref Range Status   03/14/2025 Non-Reactive Non-Reactive Final     Hepatitis C Ab   Date Value Ref Range Status   03/14/2025 Non-Reactive Non-Reactive Final     Alpha 1   ALPHA -1 ANTITRYPSIN   Date Value Ref Range Status   03/14/2025 169 90 - 200 mg/dL Final     LUIS ANGEL No results found for: \"DSDNA\", \"EXPANDEDENA\"  ASMA   Smooth Muscle Ab   Date Value Ref Range Status   03/14/2025 2 0 - 19 Units Final     Comment:                      Negative                     0 - 19                   Weak positive               20 - 30                   Moderate to strong positive     >30   Actin Antibodies are found in 52-85% of patients with   autoimmune hepatitis or chronic active hepatitis and   in 22% of patients with primary biliary cirrhosis.     AFP No results found for: \"AFP\"             Results  Labs   - Ammonia level: 03/14/2025, Elevated   - MEEHAN FibroSure: Moderate MEEHAN with moderate to severe steatosis, F2 fibrosis, no cirrhosis   - Liver serology: Normal   - Bilirubin: 0.8   - Sodium: 134   - Pro time: 1.05    Imaging   - Ultrasound of the abdomen: 09/25/2024, Liver and spleen size normal, cirrhotic liver morphology noted, no focal liver lesion was seen   - CT " scan of the abdomen and pelvis: 02/23/2025, Large disk herniation at the L3-L4 level, cirrhotic morphology of the liver, splenomegaly, portal hypertension and portal systemic venous shunting are suggested, main portal vein is patent, small hiatal hernia, paraesophageal varices are seen about the lower esophagus and the hiatal hernia, mild compensatory biliary tree dilation is seen, no acute pancreatitis, no definite adrenal nodule, no renal stones or hydronephrosis, moderate to large stool burden was suggested, fecal impaction was possible    Diagnostic Testing   - EGD: 04/17/2025, Normal esophagus, gastritis, a large amount of food in the stomach. Pathology of the EGD did show gastritis otherwise negative      Past Medical History       Past Medical History:   Diagnosis Date    Allergic     Morphine, ACE inhibitors    Allergies     Arthritis     Cancer 1982    Cervical cancer    Cardiac murmur     Coronary artery disease 2016    Received stints    Diabetes mellitus     GERD (gastroesophageal reflux disease)     Heart attack 12/2020    History of cervical cancer 1981    History of transfusion     SEVERAL    Hyperlipidemia     Hypertension     Iron deficiency anemia     Obesity     Osteomyelitis hip     RIGHT    PONV (postoperative nausea and vomiting)     Right hip pain     Visual impairment     Corrected with glasses    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  WOUND VAC IN PLACE       Past Surgical History:   Procedure Laterality Date    CARDIAC CATHETERIZATION      CERVICAL CONIZATION      COLONOSCOPY      CORONARY ANGIOPLASTY WITH STENT PLACEMENT      ENDOSCOPY      ENDOSCOPY N/A 06/09/2022    Procedure: ESOPHAGOGASTRODUODENOSCOPY WITH COLD BIOSPIES;  Surgeon: Zechariah Nj MD;  Location: [facility] ENDOSCOPY;  Service: Gastroenterology;  Laterality: N/A;  PRE- EPIGASTRIC PAIN AND ABDOMINAL PAIN  POST- NORMAL    ENDOSCOPY N/A 4/17/2025    Procedure: ESOPHAGOGASTRODUODENOSCOPY WITH  BIOPSIES;  Surgeon: Bhumika Allen MD;  Location: [facility] ENDOSCOPY;  Service: Gastroenterology;  Laterality: N/A;  FOOD IN STOMACH. GASTRITIS    HIP ARTHROPLASTY Right     HIP HARDWARE REMOVAL Right     HIP MINI REVISION Right 01/18/2022    Procedure: TOTAL HIP ARTHROPLASTY LINER REVISION, pegboard lateral;  Surgeon: Karri Schaeffer II, MD;  Location: [facility] OR;  Service: Orthopedics;  Laterality: Right;    HIP SPACER INSERTION WITH ANTIBIOTIC CEMENT      X3    HYSTERECTOMY      INCISION AND DRAINAGE HIP Right 12/18/2021    Procedure: INCISION AND DRAINAGE TOTAL HIP, LINER EXCHANGE AND WOUND VAC PLACEMENT;  Surgeon: Karri Schaeffer II, MD;  Location: [facility] OR;  Service: Orthopedics;  Laterality: Right;    INCISION AND DRAINAGE HIP Right 05/02/2022    Procedure: HIP INCISION AND DRAINAGE;  Surgeon: Karri Schaeffer II, MD;  Location: [facility] OR;  Service: Orthopedics;  Laterality: Right;    INCISION AND DRAINAGE HIP Right 06/04/2022    Procedure: HIP INCISION AND DRAINAGE WITH WOUND VAC REPLACEMENT;  Surgeon: Karri Schaeffer II, MD;  Location: [facility] OR;  Service: Orthopedics;  Laterality: Right;    JOINT REPLACEMENT  L knee, R hip    Knee 2014, hip replacement [id number] multiple surgeries    KNEE ARTHROPLASTY Left     LAPAROSCOPIC CHOLECYSTECTOMY      TONSILLECTOMY      TOTAL HIP ARTHROPLASTY Right 04/25/2022    Procedure: HIP GIRDLESTONE PROCEDURE;  Surgeon: Karri Schaeffer II, MD;  Location: [facility];  Service: Orthopedics;  Laterality: Right;    TOTAL HIP ARTHROPLASTY REVISION Right 11/16/2021    Procedure: TOTAL HIP REVISION ARTHROPLASTY RIGHT POSTERIOR;  Surgeon: Karri Schaeffer II, MD;  Location: [facility];  Service: Orthopedics;  Laterality: Right;         Current Outpatient Medications:     riFAXIMin (Xifaxan) 550 MG tablet, Take 1 tablet by mouth Every 12 (Twelve) Hours., Disp: 180 tablet, Rfl: 3    calcium  carbonate, oyster shell, 500 MG tablet tablet, Take 1 tablet by mouth Daily., Disp: 90 tablet, Rfl: 2    colchicine 0.6 MG tablet, Take 1 tablet by mouth Daily., Disp: , Rfl:     Continuous Glucose Sensor (FreeStyle Carol 3 Plus Sensor), Use Every 14 (Fourteen) Days., Disp: 6 each, Rfl: 3    Diclofenac Sodium (VOLTAREN) 1 % gel gel, Apply 4 g topically to the appropriate area as directed 4 (Four) Times a Day As Needed (MSK pain)., Disp: 100 g, Rfl: 3    ferrous sulfate 325 (65 FE) MG tablet, Take 1 tablet by mouth Every Other Day. Mornings, Disp: , Rfl:     folic acid (FOLVITE) 1 MG tablet, Take 1 tablet by mouth Daily., Disp: 90 tablet, Rfl: 1    furosemide (LASIX) 40 MG tablet, Take 1 tablet by mouth Daily., Disp: , Rfl:     gabapentin (NEURONTIN) 600 MG tablet, Take 1 tablet by mouth 2 (Two) Times a Day., Disp: 180 tablet, Rfl: 1    insulin aspart (NovoLOG FlexPen) 100 UNIT/ML solution pen-injector sc pen, Inject 23 Units under the skin into the appropriate area as directed 3 (Three) Times a Day With Meals., Disp: 75 mL, Rfl: 1    insulin glargine (Lantus) 100 UNIT/ML injection, Inject 110 Units under the skin into the appropriate area as directed Every Night., Disp: 120 mL, Rfl: 1    isosorbide mononitrate (IMDUR) 30 MG 24 hr tablet, Take 1 tablet by mouth Every Morning., Disp: , Rfl:     Klor-Con M20 20 MEQ CR tablet, Take 1 tablet by mouth Daily., Disp: 90 tablet, Rfl: 1    lactulose (CHRONULAC) 10 GM/15ML solution, Take 30 mL by mouth 2 (Two) Times a Day., Disp: 1800 mL, Rfl: 3    levocetirizine (XYZAL) 5 MG tablet, Take 1 tablet by mouth Every Evening., Disp: 90 tablet, Rfl: 1    nitroglycerin (NITROSTAT) 0.4 MG SL tablet, Take 1 tablet by mouth., Disp: , Rfl:     OneTouch Verio test strip, USE AS DIRECTED 3 TIMES A DAY, Disp: , Rfl:     pantoprazole (PROTONIX) 40 MG EC tablet, Take 1 tablet by mouth Daily., Disp: 30 tablet, Rfl: 5    riFAXIMin (Xifaxan) 550 MG tablet, Take 1 tablet by mouth Every 12  (Twelve) Hours., Disp: 24 tablet, Rfl: 0    rosuvastatin (CRESTOR) 5 MG tablet, Take 1 tablet by mouth Every Night., Disp: 90 tablet, Rfl: 3    Semaglutide, 1 MG/DOSE, (Ozempic, 1 MG/DOSE,) 4 MG/3ML solution pen-injector, Inject 1 mg under the skin into the appropriate area as directed Every 7 (Seven) Days., Disp: 9 mL, Rfl: 1    spironolactone (ALDACTONE) 50 MG tablet, Take 1 tablet by mouth Daily., Disp: 90 tablet, Rfl: 2    verapamil (CALAN) 80 MG tablet, Take 1 tablet by mouth 2 (Two) Times a Day., Disp: 60 tablet, Rfl: 0    vitamin D3 125 MCG (5000 UT) capsule capsule, Take 1 capsule by mouth Daily., Disp: , Rfl:     Xifaxan 550 MG tablet, TAKE 1 TABLET BY MOUTH EVERY 12 HOURS FOR 30 DAYS., Disp: 60 tablet, Rfl: 0     Allergies   Allergen Reactions    Ace Inhibitors Shortness Of Breath and Swelling    Morphine Shortness Of Breath       Family History   Problem Relation Age of Onset    Arthritis Mother     Cancer Mother     COPD Mother     Hearing loss Mother     Hyperlipidemia Mother     Thyroid disease Mother     Vision loss Mother     Arthritis Father     COPD Father     Hearing loss Father     Hyperlipidemia Father     Arthritis Sister     Cancer Sister     COPD Sister     Diabetes Sister     Hyperlipidemia Sister     Diabetes Brother     Hyperlipidemia Brother     Kidney disease Brother     Vision loss Maternal Uncle     Arthritis Maternal Grandmother     Cancer Maternal Grandmother     Hyperlipidemia Maternal Grandmother     Cancer Maternal Grandfather     Diabetes Paternal Grandmother     Diabetes Paternal Grandfather     Heart disease Paternal Grandfather     Hyperlipidemia Paternal Grandfather     Malig Hyperthermia Neg Hx     Colon cancer Neg Hx         Social History     Social History Narrative    Not on file       Objective       Review of Systems   Constitutional:  Negative for appetite change, fatigue, fever, unexpected weight gain and unexpected weight loss.   HENT:  Negative for trouble  "swallowing.    Respiratory:  Negative for cough, choking, chest tightness, shortness of breath, wheezing and stridor.    Cardiovascular:  Negative for chest pain, palpitations and leg swelling.   Gastrointestinal:  Negative for abdominal distention, abdominal pain, anal bleeding, blood in stool, constipation, diarrhea, nausea, rectal pain, vomiting, GERD and indigestion.        Vital Signs:   /70 (BP Location: Left arm, Patient Position: Sitting)   Pulse 69   Ht 162.6 cm (64.02\")   Wt 106 kg (233 lb)   SpO2 97%   BMI 39.97 kg/m²       Physical Exam  Constitutional:       General: She is not in acute distress.     Appearance: She is well-developed. She is not ill-appearing.   HENT:      Head: Normocephalic.   Eyes:      General: No scleral icterus.     Pupils: Pupils are equal, round, and reactive to light.   Cardiovascular:      Rate and Rhythm: Normal rate and regular rhythm.      Heart sounds: Normal heart sounds.   Pulmonary:      Effort: Pulmonary effort is normal.      Breath sounds: Normal breath sounds.   Abdominal:      General: Bowel sounds are normal. There is no distension.      Palpations: Abdomen is soft. There is no mass.      Tenderness: There is no abdominal tenderness. There is no guarding or rebound.      Hernia: No hernia is present.   Musculoskeletal:         General: Normal range of motion.      Right lower leg: No edema.      Left lower leg: No edema.      Comments: Ambulates with WC   Skin:     General: Skin is warm and dry.      Coloration: Skin is not jaundiced.   Neurological:      Mental Status: She is alert and oriented to person, place, and time.   Psychiatric:         Speech: Speech normal.         Behavior: Behavior normal.         Judgment: Judgment normal.         Physical Exam          Assessment & Plan          Assessment and Plan    Diagnoses and all orders for this visit:    1. MEEHAN (nonalcoholic steatohepatitis) (Primary)    2. Cirrhosis of liver without ascites, " unspecified hepatic cirrhosis type    3. Hepatic encephalopathy  -     Ammonia; Future  -     riFAXIMin (Xifaxan) 550 MG tablet; Take 1 tablet by mouth Every 12 (Twelve) Hours.  Dispense: 180 tablet; Refill: 3    4. Chronic idiopathic constipation    5. Iron deficiency anemia, unspecified iron deficiency anemia type      Cirrhosis: MEEHAN & DM MELD-Na: 9   Ascites: none  Varices: none. Last EGD 4/17/2025.  Encephalopathy : Ammonia elevated.  Started lactulose and Xifaxan.  Health maintenance: Next surveillance EGD due on 4/2027.  Assessment & Plan  1. Cirrhosis:  - MELD score is 9, indicating a good prognosis. The goal is to maintain this score <10.  - Type 2 diabetes may have contributed to the development of cirrhosis.  - Absence of cirrhosis in blood tests suggests early stages and not widespread.  - Upper endoscopy results satisfactory, no evidence of varices.  - Continue lactulose liquid and Xifaxan 550 mg twice daily.  - Prescription for a 90-day supply of Xifaxan provided, pending insurance approval. If not approved, adjust to a 30-day supply with refills.  - Ammonia level test to be conducted today to monitor for any decrease.  - MELD score to be reassessed every few months during routine blood work and liver function tests.  - If MELD score approaches 15, discuss liver transplant and refer to a liver transplant specialist.    2. Type 2 Diabetes Mellitus:  - Currently on insulin.  - Uses a Carol device to monitor blood glucose levels.  - Adjusts insulin dosage based on readings.    3. Gastroesophageal Reflux Disease (GERD):  - Reports significant improvement with Protonix.  - Continue Protonix 40 mg once daily.    Follow-up: The patient will follow up in 3 months.          Follow Up       Follow Up   Return in about 3 months (around 9/24/2025) for CIRRHOSIS, GERD, FATTY LIVER.  Patient was given instructions and counseling regarding her condition or for health maintenance advice. Please see specific  information pulled into the AVS if appropriate.       Patient or patient representative verbalized consent for the use of Ambient Listening during the visit with  MORGAN Wright for chart documentation. 6/24/2025  14:40 EDT

## 2025-07-11 ENCOUNTER — LAB (OUTPATIENT)
Facility: HOSPITAL | Age: 70
End: 2025-07-11
Payer: MEDICARE

## 2025-07-11 ENCOUNTER — HOSPITAL ENCOUNTER (OUTPATIENT)
Dept: MAMMOGRAPHY | Facility: HOSPITAL | Age: 70
Discharge: HOME | End: 2025-07-11
Payer: MEDICARE

## 2025-07-11 DIAGNOSIS — Z12.31 ENCOUNTER FOR SCREENING MAMMOGRAM FOR MALIGNANT NEOPLASM OF BREAST: ICD-10-CM

## 2025-07-11 DIAGNOSIS — K76.82 HEPATIC ENCEPHALOPATHY: ICD-10-CM

## 2025-07-11 LAB — AMMONIA BLD-SCNC: 25 UMOL/L (ref 11–51)

## 2025-07-11 PROCEDURE — 82140 ASSAY OF AMMONIA: CPT

## 2025-07-11 PROCEDURE — 77067 SCR MAMMO BI INCL CAD: CPT

## 2025-07-11 PROCEDURE — 36415 COLL VENOUS BLD VENIPUNCTURE: CPT

## 2025-07-11 PROCEDURE — 77063 BREAST TOMOSYNTHESIS BI: CPT

## 2025-07-16 ENCOUNTER — RESULTS FOLLOW-UP (OUTPATIENT)
Dept: GASTROENTEROLOGY | Facility: CLINIC | Age: 70
End: 2025-07-16
Payer: MEDICARE

## 2025-08-04 ENCOUNTER — HOSPITAL ENCOUNTER (OUTPATIENT)
Dept: MAMMOGRAPHY | Facility: HOSPITAL | Age: 70
Discharge: HOME | End: 2025-08-04
Admitting: FAMILY MEDICINE
Payer: MEDICARE

## 2025-08-04 DIAGNOSIS — R92.8 ABNORMAL MAMMOGRAM OF RIGHT BREAST: ICD-10-CM

## 2025-08-04 PROCEDURE — G0279 TOMOSYNTHESIS, MAMMO: HCPCS

## 2025-08-04 PROCEDURE — 77065 DX MAMMO INCL CAD UNI: CPT

## 2025-08-08 DIAGNOSIS — K21.9 GASTROESOPHAGEAL REFLUX DISEASE, UNSPECIFIED WHETHER ESOPHAGITIS PRESENT: ICD-10-CM

## 2025-08-08 RX ORDER — PANTOPRAZOLE SODIUM 40 MG/1
40 TABLET, DELAYED RELEASE ORAL DAILY
Qty: 90 TABLET | Refills: 1 | Status: SHIPPED | OUTPATIENT
Start: 2025-08-08

## (undated) DEVICE — PROXIMATE RH ROTATING HEAD SKIN STAPLERS (35 WIDE) CONTAINS 35 STAINLESS STEEL STAPLES: Brand: PROXIMATE

## (undated) DEVICE — PK HIP TOTL 40

## (undated) DEVICE — GLV SURG SIGNATURE ESSENTIAL PF LTX SZ8.5

## (undated) DEVICE — SPNG LAP 18X18IN LF STRL PK/5

## (undated) DEVICE — TRAP FLD MINIVAC MEGADYNE 100ML

## (undated) DEVICE — APPL CHLORAPREP HI/LITE 26ML ORNG

## (undated) DEVICE — DRAPE,REIN 53X77,STERILE: Brand: MEDLINE

## (undated) DEVICE — GLV SURG BIOGEL LTX PF 7

## (undated) DEVICE — REFLECTION FLEXIBLE DRILL 25MM: Brand: REFLECTION

## (undated) DEVICE — SOLIDIFIER LIQLOC PLS 1500CC BT

## (undated) DEVICE — GLV SURG PREMIERPRO ORTHO LTX PF SZ8.5 BRN

## (undated) DEVICE — SUT ETHIB 2 CV V37 MS/4 30IN MX69G

## (undated) DEVICE — 450 ML BOTTLE OF 0.05% CHLORHEXIDINE GLUCONATE IN 99.95% STERILE WATER FOR IRRIGATION, USP AND APPLICATOR.: Brand: IRRISEPT ANTIMICROBIAL WOUND LAVAGE

## (undated) DEVICE — 3M™ IOBAN™ 2 ANTIMICROBIAL INCISE DRAPE 6640EZ: Brand: IOBAN™ 2

## (undated) DEVICE — PENCL E/S ULTRAVAC TELESCP NOSE HOLSTR 10FT

## (undated) DEVICE — PAD,ABDOMINAL,8"X10",ST,LF: Brand: MEDLINE

## (undated) DEVICE — SOL ISO/ALC RUB 70PCT 4OZ

## (undated) DEVICE — GLV SURG SENSICARE PI MIC PF SZ7 LF STRL

## (undated) DEVICE — DRSNG GZ CURAD XEROFORM NONADHR OVERWRAP 5X9IN

## (undated) DEVICE — KT DRSNG VAC SIMPLACE MD 10PK

## (undated) DEVICE — Device

## (undated) DEVICE — ST IRR CYSTO W/SPK 77IN LF

## (undated) DEVICE — THE STERILE LIGHT HANDLE COVER IS USED WITH STERIS SURGICAL LIGHTING AND VISUALIZATION SYSTEMS.

## (undated) DEVICE — FRCP BX RADJAW4 NDL 2.8 240CM LG OG BX40

## (undated) DEVICE — REFLECTION SPHERICAL HEAD SCREW 20MM
Type: IMPLANTABLE DEVICE | Site: HIP | Status: NON-FUNCTIONAL
Brand: REFLECTION
Removed: 2021-11-16

## (undated) DEVICE — KT DRN EVAC WND PVC 15F3/16IN 400CC

## (undated) DEVICE — REFLECTION FLEXIBLE DRILL 35MM: Brand: REFLECTION

## (undated) DEVICE — THIN OSTEOTOME BLADE 12MM X 3 IN: Brand: RENOVATION

## (undated) DEVICE — KT DRN EVAC WND PVC PCH WTROC RND 10F400

## (undated) DEVICE — STPLR SKIN VISISTAT WD 35CT

## (undated) DEVICE — SPNG GZ WOVN 4X4IN 12PLY 10/BX STRL

## (undated) DEVICE — DRAPE,HIP,W/POUCHES,STERILE: Brand: MEDLINE

## (undated) DEVICE — SUT PDS 1 CT1 36IN Z347H

## (undated) DEVICE — SOL NACL 0.9PCT 100ML SGL

## (undated) DEVICE — THIN OSTEOTOME BLADE 10MM X 5 IN: Brand: RENOVATION

## (undated) DEVICE — MAT FLR ABSORBENT LG 4FT 10 2.5FT

## (undated) DEVICE — GAUZE,SPONGE,FLUFF,6"X6.75",STRL,10/TRAY: Brand: MEDLINE

## (undated) DEVICE — THIN OSTEOTOME BLADE 8MM X 5 IN: Brand: RENOVATION

## (undated) DEVICE — SYR LUERLOK 20CC BX/50

## (undated) DEVICE — BITEBLOCK OMNI BLOC

## (undated) DEVICE — SUT VICRYL 1 CT1 27IN  JJ40G

## (undated) DEVICE — PICO 7 10CM X 30CM: Brand: PICO™ 7

## (undated) DEVICE — TBG PENCL TELESCP MEGADYNE SMOKE EVAC 10FT

## (undated) DEVICE — DRSNG WND GZ CURAD OIL EMULSION 3X8IN LF STRL 1PK

## (undated) DEVICE — NEEDLE, QUINCKE, 20GX3.5": Brand: MEDLINE

## (undated) DEVICE — BLCK/BITE BLOX WO/DENTL/RIM W/STRAP 54F

## (undated) DEVICE — DRAPE,U/ SHT,SPLIT,PLAS,STERIL: Brand: MEDLINE

## (undated) DEVICE — HANDPIECE SET WITH COAXIAL HIGH FLOW TIP AND SUCTION TUBE: Brand: INTERPULSE

## (undated) DEVICE — DEFENDO AIR WATER SUCTION AND BIOPSY VALVE KIT FOR  OLYMPUS: Brand: DEFENDO AIR/WATER/SUCTION AND BIOPSY VALVE

## (undated) DEVICE — LINER SURG CANSTR SXN S/RIGD 1500CC

## (undated) DEVICE — ANTIBACTERIAL UNDYED BRAIDED (POLYGLACTIN 910), SYNTHETIC ABSORBABLE SUTURE: Brand: COATED VICRYL

## (undated) DEVICE — MSK ENDO PORT O2 POM ELITE CURAPLEX A/

## (undated) DEVICE — KT ORCA ORCAPOD DISP STRL

## (undated) DEVICE — SUT VIC 2/0 CT1 27IN J259H

## (undated) DEVICE — SUT VIC 0 CT1 36IN J946H

## (undated) DEVICE — TOWEL,OR,DSP,ST,BLUE,STD,4/PK,20PK/CS: Brand: MEDLINE

## (undated) DEVICE — SUT VIC 1 CT1 36IN J947H

## (undated) DEVICE — THIN OSTEOTOME BLADE 8MM X 3 IN: Brand: RENOVATION

## (undated) DEVICE — CONN JET HYDRA H20 AUXILIARY DISP

## (undated) DEVICE — TUBING, SUCTION, 1/4" X 10', STRAIGHT: Brand: MEDLINE

## (undated) DEVICE — CULT AER/ANAEROB FASTIDIOUS BACT

## (undated) DEVICE — ADAPT CLN BIOGUARD AIR/H2O DISP

## (undated) DEVICE — DUAL CUT SAGITTAL BLADE

## (undated) DEVICE — DRSNG BURN ACTICOAT FLEX 7 1X24IN

## (undated) DEVICE — KT CANSTR VAC WND W/ISOLYSER SENSATRAC 500CC 10CS

## (undated) DEVICE — CANN O2 ETCO2 FITS ALL CONN CO2 SMPL A/ 7IN DISP LF

## (undated) DEVICE — LN SMPL CO2 SHTRM SD STREAM W/M LUER

## (undated) DEVICE — A SINGLE USE BEAD MOLD FOR MOLDING BEADS OUT OF CERAMIC BONE GRAFT SUBSTITUTES. THE BEAD MOLD CONSISTS OF CAVITIES IN THREE DIFFERENT SIZES. THE CAVITIES ARE FILLED WITH CERAMIC BONE GRAFT SUBSTITUTE AND EVENLY DISTRIBUTED ACROSS THE MOLD USING THE SPATULA. THE CERAMIC BONE GRAFT SUBSTITUTE SETS IN THE CAVITIES AND THE CERAMIC BONE GRAFT SUBSTITUTE BEADS ARE THEN REMOVED FROM THE MOLD.: Brand: CERAMENT™ BEAD TRAY

## (undated) DEVICE — HEWSON SUTURE RETRIEVER: Brand: HEWSON SUTURE RETRIEVER

## (undated) DEVICE — SOL IRRG H2O PL/BG 1000ML STRL

## (undated) DEVICE — SINGLE-USE BIOPSY FORCEPS: Brand: RADIAL JAW 4

## (undated) DEVICE — SENSR O2 OXIMAX FNGR A/ 18IN NONSTR